# Patient Record
Sex: FEMALE | Race: WHITE | NOT HISPANIC OR LATINO | Employment: UNEMPLOYED | URBAN - METROPOLITAN AREA
[De-identification: names, ages, dates, MRNs, and addresses within clinical notes are randomized per-mention and may not be internally consistent; named-entity substitution may affect disease eponyms.]

---

## 2017-01-31 ENCOUNTER — TELEPHONE (OUTPATIENT)
Dept: PEDIATRICS | Facility: CLINIC | Age: 42
End: 2017-01-31

## 2017-02-01 NOTE — TELEPHONE ENCOUNTER
[facility] CLINICAL DOCUMENTATION    Form Documentation Form or Letter Request    Type or form/letter needing completion: unemployment insurance forms  Provider: MARIA ELENA Parrish CNP  Has provider seen patient for office visit related to reason for form request? Does not know  Date form needed: before 2/7/17  Once completed: Patient will  at .       COLE Ramos

## 2017-02-03 NOTE — TELEPHONE ENCOUNTER
Attempt #2  Left message for patient to call back regarding the forms we received.    Margaret Ramirez CMA

## 2017-02-08 NOTE — TELEPHONE ENCOUNTER
"Called and spoke to patient, she noted okay to note on the form \"that provider does not have a history with patient and to providers knowledge she does not have a disability\". Forms just has to be signed. Can call patient with further questions if needed.    Adina Kong, COLE    "

## 2017-02-16 NOTE — TELEPHONE ENCOUNTER
Left message for patient to call back regarding form that needed to be filled out.  Per Anayeli Ramirez CMA

## 2017-02-16 NOTE — TELEPHONE ENCOUNTER
I think I may not have kept the form as it did not look like it not something I was appropriate to do so if they just need something to say that there is not disability I am sorry may need to have them refax to me and I can do that

## 2017-03-09 DIAGNOSIS — M62.838 SPASM OF MUSCLE: ICD-10-CM

## 2017-03-14 RX ORDER — METHOCARBAMOL 500 MG/1
TABLET, FILM COATED ORAL
Qty: 90 TABLET | Refills: 1 | Status: SHIPPED | OUTPATIENT
Start: 2017-03-14 | End: 2017-05-07

## 2017-04-25 ENCOUNTER — TELEPHONE (OUTPATIENT)
Dept: PEDIATRICS | Facility: CLINIC | Age: 42
End: 2017-04-25

## 2017-04-25 ENCOUNTER — MYC REFILL (OUTPATIENT)
Dept: PEDIATRICS | Facility: CLINIC | Age: 42
End: 2017-04-25

## 2017-04-25 DIAGNOSIS — M62.838 SPASM OF MUSCLE: ICD-10-CM

## 2017-04-25 RX ORDER — METHOCARBAMOL 500 MG/1
TABLET, FILM COATED ORAL
Qty: 90 TABLET | Refills: 1 | Status: CANCELLED | OUTPATIENT
Start: 2017-04-25

## 2017-04-25 NOTE — TELEPHONE ENCOUNTER
Message from MyChart:  Original authorizing provider: MARIA ELENA Boateng CNP would like a refill of the following medications:  methocarbamol (ROBAXIN) 500 MG tablet [MARIA ELENA Boateng CNP]    [facility] [id number]    Comment:

## 2017-04-25 NOTE — TELEPHONE ENCOUNTER
Triage nurse found forms on her desk from 01/30/17 from the unemployment insurance [place], forms were not completed.  I called the patient to advise her of this and if the form had been completed and this was a duplicate copy?  If she needed it done still?  Patient states the forms were never completed but it all worked out and the forms are now irrelevant and to recycle them.       Margaret Ramirez CMA

## 2017-05-07 DIAGNOSIS — M62.838 SPASM OF MUSCLE: ICD-10-CM

## 2017-05-08 RX ORDER — METHOCARBAMOL 500 MG/1
TABLET, FILM COATED ORAL
Qty: 90 TABLET | Refills: 1 | Status: SHIPPED | OUTPATIENT
Start: 2017-05-08 | End: 2017-07-06

## 2017-05-08 NOTE — TELEPHONE ENCOUNTER
Disp Refills Start End LORENZO      methocarbamol (ROBAXIN) 500 MG tablet 90 tablet 1 3/14/2017  No     Sig: TAKE ONE TAB BY MOUTH THREE TIMES DAILY AS NEEDED FOR MUSCLE SPASM         Routing refill request to provider for review/approval because:  Drug not on the [facility] refill protocol     Lorena Obando RN, [facility]

## 2017-07-06 DIAGNOSIS — M62.838 SPASM OF MUSCLE: ICD-10-CM

## 2017-07-06 RX ORDER — METHOCARBAMOL 500 MG/1
TABLET, FILM COATED ORAL
Qty: 90 TABLET | Refills: 1 | Status: SHIPPED | OUTPATIENT
Start: 2017-07-06 | End: 2017-12-14

## 2017-07-06 NOTE — TELEPHONE ENCOUNTER
Routing refill request to provider for review/approval because:  Drug not on the [facility] refill protocol     methocarbamol (ROBAXIN) 500 MG tablet (3x's daily PRN)  Last Written Prescription Date: 5/8/2017  Last Fill Quantity: 90,  # refills: 1   Last Office Visit with G, P or [facility] prescribing provider: 10/11/2016    Gissell Daly RN

## 2017-09-28 ENCOUNTER — MYC MEDICAL ADVICE (OUTPATIENT)
Dept: PEDIATRICS | Facility: CLINIC | Age: 42
End: 2017-09-28

## 2017-09-28 DIAGNOSIS — F98.8 ATTENTION DEFICIT DISORDER, UNSPECIFIED HYPERACTIVITY PRESENCE: Primary | ICD-10-CM

## 2017-09-29 RX ORDER — DEXTROAMPHETAMINE SACCHARATE, AMPHETAMINE ASPARTATE MONOHYDRATE, DEXTROAMPHETAMINE SULFATE AND AMPHETAMINE SULFATE 7.5; 7.5; 7.5; 7.5 MG/1; MG/1; MG/1; MG/1
30 CAPSULE, EXTENDED RELEASE ORAL 3 TIMES DAILY
Qty: 90 CAPSULE | Refills: 0 | Status: SHIPPED | OUTPATIENT
Start: 2017-10-11 | End: 2017-10-10 | Stop reason: ALTCHOICE

## 2017-09-29 NOTE — TELEPHONE ENCOUNTER
Called patient and informed. She agrees to plan.  Brought to .    Lorena Obando RN, [facility]

## 2017-09-29 NOTE — TELEPHONE ENCOUNTER
Last refill was 9/11 so next refill will be dated for 10/11  Again we will do this for only a couple months until she gets back into psychiatry   Please inform patient, refill/prescription approved and when prescription is ready to be picked up at .

## 2017-09-29 NOTE — TELEPHONE ENCOUNTER
Patient called to ask when and where she should  the medication.    Lorena Obando RN, [facility]

## 2017-10-10 ENCOUNTER — TELEPHONE (OUTPATIENT)
Dept: PEDIATRICS | Facility: CLINIC | Age: 42
End: 2017-10-10

## 2017-10-10 DIAGNOSIS — F98.8 ATTENTION DEFICIT DISORDER, UNSPECIFIED HYPERACTIVITY PRESENCE: Primary | ICD-10-CM

## 2017-10-10 RX ORDER — DEXTROAMPHETAMINE SACCHARATE, AMPHETAMINE ASPARTATE, DEXTROAMPHETAMINE SULFATE AND AMPHETAMINE SULFATE 7.5; 7.5; 7.5; 7.5 MG/1; MG/1; MG/1; MG/1
30 TABLET ORAL 3 TIMES DAILY
Qty: 90 TABLET | Refills: 0 | Status: SHIPPED | OUTPATIENT
Start: 2017-10-10 | End: 2017-11-01

## 2017-10-10 NOTE — TELEPHONE ENCOUNTER
Patient called stating she just picked up an adderall XR 30 mg script written by Anayeli Forbes and it is written wrong.  It should be written as Adderall 30 mg tid.  Patient advised Anayeli Forbes will write her a new script.  Patient is on her way to  script this evening.    Margaret Ramirez CMA

## 2017-10-10 NOTE — TELEPHONE ENCOUNTER
Patient advised of information below per Anayeli Forbes.  Patient stated understanding.    Margaret Ramirez CMA

## 2017-10-10 NOTE — TELEPHONE ENCOUNTER
Refill corrected   Remind patient will only do this for 2 months until she gets back in with psychiatry   Please inform patient, refill/prescription approved and when prescription is ready to be picked up at .

## 2017-11-01 ENCOUNTER — MYC MEDICAL ADVICE (OUTPATIENT)
Dept: PEDIATRICS | Facility: CLINIC | Age: 42
End: 2017-11-01

## 2017-11-01 DIAGNOSIS — F98.8 ATTENTION DEFICIT DISORDER, UNSPECIFIED HYPERACTIVITY PRESENCE: ICD-10-CM

## 2017-11-01 RX ORDER — DEXTROAMPHETAMINE SACCHARATE, AMPHETAMINE ASPARTATE, DEXTROAMPHETAMINE SULFATE AND AMPHETAMINE SULFATE 7.5; 7.5; 7.5; 7.5 MG/1; MG/1; MG/1; MG/1
30 TABLET ORAL 3 TIMES DAILY
Qty: 90 TABLET | Refills: 0 | Status: SHIPPED | OUTPATIENT
Start: 2017-11-09 | End: 2017-12-05

## 2017-11-01 NOTE — TELEPHONE ENCOUNTER
Please inform patient, refill/prescriptioni approved. Please mail  prescription at pharmacy.   Please remind her we need to know when her appointment with psychiatry is as this was short term refill arrangement as this dose is higher than I am wanting to prescribe over long ter.

## 2017-11-01 NOTE — TELEPHONE ENCOUNTER
Routed [employer] message to PCP      Created: 11/1/2017 12:55 PM        *-*-*This message has not been handled.*-*-*    I have insurance effective 12/1/17 - is it possible to get my rx for Adderall IR 30mgs x3 daily for November mailed [facility] pharmacy? [facility] [address] [address] [phone]  I will return to psych clinic  12/7/17 for future rx/treatment.  Please let me know.   Thanks!  Aliyah  [phone]            Routing refill request to provider for review/approval because:  Drug not on the [facility] refill protocol       adderall             Last Written Prescription Date: 10/10/17  Last Fill Quantity: 90, # refills: 0    Last Office Visit with [facility], P or [facility] prescribing provider:  10/11/2016   Future Office Visit:        BP Readings from Last 3 Encounters:   10/11/16 150/90   03/07/13 128/85           Haley Wright RN,   [facility]

## 2017-11-02 NOTE — TELEPHONE ENCOUNTER
Script mailed to [facility] pharmacy. mychart message sent to patient.    COLE Ramos

## 2017-11-07 ENCOUNTER — TELEPHONE (OUTPATIENT)
Dept: PEDIATRICS | Facility: CLINIC | Age: 42
End: 2017-11-07

## 2017-11-07 NOTE — TELEPHONE ENCOUNTER
Our records indicate that the patients Adderall was mailed to her [facility] pharmacy on 11/2/2017/ LM for patient indicating this. Gissell Daly RN

## 2017-11-07 NOTE — TELEPHONE ENCOUNTER
11.7.17  Patient is requesting refill of ADDERALL.  She will be going out of town tomorrow and would like a refill.  Please call. [phone]

## 2017-12-02 ENCOUNTER — MYC MEDICAL ADVICE (OUTPATIENT)
Dept: PEDIATRICS | Facility: CLINIC | Age: 42
End: 2017-12-02

## 2017-12-02 DIAGNOSIS — F98.8 ATTENTION DEFICIT DISORDER, UNSPECIFIED HYPERACTIVITY PRESENCE: ICD-10-CM

## 2017-12-04 RX ORDER — DEXTROAMPHETAMINE SACCHARATE, AMPHETAMINE ASPARTATE, DEXTROAMPHETAMINE SULFATE AND AMPHETAMINE SULFATE 7.5; 7.5; 7.5; 7.5 MG/1; MG/1; MG/1; MG/1
30 TABLET ORAL 3 TIMES DAILY
Qty: 90 TABLET | Refills: 0 | Status: CANCELLED | OUTPATIENT
Start: 2017-12-04

## 2017-12-04 NOTE — TELEPHONE ENCOUNTER
Routed [employer] message to PCP    I just learned that my insurance becomes effective 1-1-18, not 12-1-17 like I thought. I had  to  reschedule my psych appointment that was originally 12-7 to 1-24 (which is well past my refill date of the 8th)due to this. I am on  the  cancelation call list hoping to get in sooner. I'm  hoping against hope that you'll allow me one or two more refills on my Adderall to fill the gap. If I have to come in or have an e-appointment I  will & just pay out  of  pocket for  it.  Please let me know ASAP what your thoughts are.  Thank you so much!   Aliyah Angeles  [phone]        amphetamine-dextroamphetamine (ADDERALL) 30 MG per tablet 90 tablet 0 11/9/2017 12/9/2017 No      Sig: Take 1 tablet (30 mg) by mouth 3 times daily     Last office visit:  10/11/16     Haley Wright RN,   [facility]

## 2017-12-05 RX ORDER — DEXTROAMPHETAMINE SACCHARATE, AMPHETAMINE ASPARTATE, DEXTROAMPHETAMINE SULFATE AND AMPHETAMINE SULFATE 7.5; 7.5; 7.5; 7.5 MG/1; MG/1; MG/1; MG/1
30 TABLET ORAL 3 TIMES DAILY
Qty: 90 TABLET | Refills: 0 | Status: SHIPPED | OUTPATIENT
Start: 2017-12-05 | End: 2017-12-14

## 2017-12-14 ENCOUNTER — OFFICE VISIT (OUTPATIENT)
Dept: PEDIATRICS | Facility: CLINIC | Age: 42
End: 2017-12-14

## 2017-12-14 VITALS
HEART RATE: 98 BPM | DIASTOLIC BLOOD PRESSURE: 80 MMHG | SYSTOLIC BLOOD PRESSURE: 125 MMHG | HEIGHT: 65 IN | TEMPERATURE: 98.5 F | OXYGEN SATURATION: 100 % | BODY MASS INDEX: 24.21 KG/M2 | WEIGHT: 145.3 LBS

## 2017-12-14 DIAGNOSIS — F98.8 ATTENTION DEFICIT DISORDER, UNSPECIFIED HYPERACTIVITY PRESENCE: ICD-10-CM

## 2017-12-14 DIAGNOSIS — G47.00 INSOMNIA, UNSPECIFIED TYPE: Primary | ICD-10-CM

## 2017-12-14 PROCEDURE — 99213 OFFICE O/P EST LOW 20 MIN: CPT | Performed by: NURSE PRACTITIONER

## 2017-12-14 RX ORDER — DEXTROAMPHETAMINE SACCHARATE, AMPHETAMINE ASPARTATE, DEXTROAMPHETAMINE SULFATE AND AMPHETAMINE SULFATE 7.5; 7.5; 7.5; 7.5 MG/1; MG/1; MG/1; MG/1
30 TABLET ORAL 3 TIMES DAILY
Qty: 90 TABLET | Refills: 0 | Status: SHIPPED | OUTPATIENT
Start: 2018-01-05 | End: 2018-01-17

## 2017-12-14 RX ORDER — AMITRIPTYLINE HYDROCHLORIDE 10 MG/1
TABLET ORAL
Qty: 90 TABLET | Refills: 0 | Status: SHIPPED | OUTPATIENT
Start: 2017-12-14 | End: 2018-01-20

## 2017-12-14 RX ORDER — GABAPENTIN 300 MG/1
CAPSULE ORAL
Refills: 5 | COMMUNITY
Start: 2017-11-12 | End: 2017-12-14 | Stop reason: ALTCHOICE

## 2017-12-14 ASSESSMENT — ANXIETY QUESTIONNAIRES
6. BECOMING EASILY ANNOYED OR IRRITABLE: NOT AT ALL
3. WORRYING TOO MUCH ABOUT DIFFERENT THINGS: NOT AT ALL
IF YOU CHECKED OFF ANY PROBLEMS ON THIS QUESTIONNAIRE, HOW DIFFICULT HAVE THESE PROBLEMS MADE IT FOR YOU TO DO YOUR WORK, TAKE CARE OF THINGS AT HOME, OR GET ALONG WITH OTHER PEOPLE: NOT DIFFICULT AT ALL
GAD7 TOTAL SCORE: 0
1. FEELING NERVOUS, ANXIOUS, OR ON EDGE: NOT AT ALL
7. FEELING AFRAID AS IF SOMETHING AWFUL MIGHT HAPPEN: NOT AT ALL
2. NOT BEING ABLE TO STOP OR CONTROL WORRYING: NOT AT ALL
5. BEING SO RESTLESS THAT IT IS HARD TO SIT STILL: NOT AT ALL

## 2017-12-14 ASSESSMENT — PATIENT HEALTH QUESTIONNAIRE - PHQ9
SUM OF ALL RESPONSES TO PHQ QUESTIONS 1-9: 0
5. POOR APPETITE OR OVEREATING: NOT AT ALL

## 2017-12-14 NOTE — PROGRESS NOTES
"  SUBJECTIVE:   Aliyah Angeles is a 42 year old female who presents to clinic today for the following health issues:    1. would like to discuss gabapentin  Has been on it for since at [facility]   Was on amitriptyline and that worked but was some concern of interaction with Lexapro and was switched to Gabapentin at bedtime which has not worked as well   Is here for follow up until her insurance starts and can to back to seeing her psychiatrist     Medication Followup of adderall     Taking Medication as prescribed: yes    Side Effects:  None    Medication Helping Symptoms:  yes   Aliyah Angeles is a 42 year old  female, for a medication check and ADHD follow up.      MEDICATION BENEFITS:  Controlled symptoms:  Hyperactivity - motor restlessness, Attention span, Distractability and Finishing tasks  Uncontrolled symptoms:  None     MEDICATION SIDE EFFECTS:   Has:  none  Denies:  appetite suppression, weight loss, insomnia, tics, palpitations, stomach ache, headache, emotional lability, rebound irritability, drowsiness, \"zombie\" effect, growth suppression and dry mouth        Problem list and histories reviewed & adjusted, as indicated.  Additional history: as documented    Patient Active Problem List   Diagnosis     Acne vulgaris     Attention deficit disorder     Anxiety disorder     Cobalamin deficiency     Other long term (current) drug therapy     Personal history of other medical treatment     Drug-seeking behavior     Stress incontinence in female     Insomnia     Mild episode of recurrent major depressive disorder (H)     Premenstrual dysphoric disorder     Raynaud's phenomenon     Bariatric surgery status     Arthralgia of temporomandibular joint     Vitamin D deficiency     Elevated blood pressure reading without diagnosis of hypertension     Past Surgical History:   Procedure Laterality Date     ABDOMEN SURGERY  08/10/2010    Gastric  bypass     CHOLECYSTECTOMY  01/01/2010     COLONOSCOPY  01/01/2009 "     ENT SURGERY  01/01/1988    Tonsils     HYSTERECTOMY, VAGINAL         Social History   Substance Use Topics     Smoking status: Former Smoker     Packs/day: 1.00     Years: 10.00     Types: Cigarettes     Start date: 1/1/1989     Quit date: 8/1/2010     Smokeless tobacco: Never Used     Alcohol use 0.0 oz/week      Comment: about once a week 1 or 2      Family History   Problem Relation Age of Onset     Prostate Cancer Father      DIABETES Maternal Grandmother      Colon Cancer Maternal Grandmother      Other Cancer Maternal Grandmother      pancreatic cancer     DIABETES Paternal Grandmother      DIABETES Paternal Grandfather      Coronary Artery Disease No family hx of      Hypertension No family hx of      Hyperlipidemia No family hx of      CEREBROVASCULAR DISEASE No family hx of      Breast Cancer No family hx of      Depression No family hx of      Anxiety Disorder No family hx of      MENTAL ILLNESS No family hx of      Substance Abuse No family hx of      Anesthesia Reaction No family hx of      Asthma No family hx of      OSTEOPOROSIS No family hx of      Genetic Disorder No family hx of      Thyroid Disease No family hx of      Obesity No family hx of      Unknown/Adopted No family hx of          Current Outpatient Prescriptions   Medication Sig Dispense Refill     amphetamine-dextroamphetamine (ADDERALL) 30 MG per tablet Take 1 tablet (30 mg) by mouth 3 times daily 90 tablet 0     cyanocobalamin (VITAMIN B12) 1000 MCG/ML injection 1 ML every 4 weeks       clindamycin (CLINDAMAX) 1 % gel        escitalopram (LEXAPRO) 20 MG tablet Take 1 tablet by mouth daily        Calcium Citrate-Vitamin D (CALCIUM + D PO) Take 2,000 mg by mouth       DRYSOL 20 % external solution        tretinoin (RETIN-A) 0.025 % cream        MULTIPLE VITAMIN PO Take  by mouth.       cholecalciferol (VITAMIN D3) 5000 UNITS CAPS capsule Take 5,000 Units by mouth daily        Omega-3 Fatty Acids (OMEGA 3 PO) Take  by mouth.        "gabapentin (NEURONTIN) 300 MG capsule TAKE 1 CAPSULE (300 MG) BY MOUTH AT BEDTIME AS NEEDED.  5     Allergies   Allergen Reactions     Nsaids      Gastric bypass     Labs reviewed in EPIC      Reviewed and updated as needed this visit by clinical staffTobacco  Allergies       Reviewed and updated as needed this visit by Provider         ROS:  Constitutional, HEENT, cardiovascular, pulmonary, gi and gu systems are negative, except as otherwise noted.      OBJECTIVE:   /80 (BP Location: Right arm, Patient Position: Sitting, Cuff Size: Adult Regular)  Pulse 98  Temp 98.5  F (36.9  C) (Temporal)  Ht 5' 5\" (1.651 m)  Wt 145 lb 4.8 oz (65.9 kg)  SpO2 100%  Breastfeeding? No  BMI 24.18 kg/m2  Body mass index is 24.18 kg/(m^2).  GENERAL APPEARANCE: alert, active and no distress  RESP: lungs clear to auscultation - no rales, rhonchi or wheezes  CV: regular rates and rhythm and no murmur, click or rub  MS: extremities normal- no gross deformities noted  NEURO: Normal strength and tone, mentation intact and speech normal  PSYCH: mentation appears normal and affect normal/bright  MENTAL STATUS EXAM:  Appearance/Behavior: No apparent distress and Dressed appropriately for weather  Speech: Normal  Mood/Affect: normal affect  Insight: Adequate    Diagnostic Test Results:  none     ASSESSMENT/PLAN:     Aliyah was seen today for a.d.h.d and medication question.    Diagnoses and all orders for this visit:    Insomnia, unspecified type  -     amitriptyline (ELAVIL) 10 MG tablet; Start at 1 tab at bedtime for 3 days, then 2 tabs at bedtime    Attention deficit disorder, unspecified hyperactivity presence  -     amphetamine-dextroamphetamine (ADDERALL) 30 MG per tablet; Take 1 tablet (30 mg) by mouth 3 times daily    PLAN:   1.   Symptomatic therapy suggested: stop the gabapentin and restart the elavil.  2.  Orders Placed This Encounter   Medications     DISCONTD: gabapentin (NEURONTIN) 300 MG capsule     Sig: TAKE 1 " CAPSULE (300 MG) BY MOUTH AT BEDTIME AS NEEDED.     Refill:  5     amphetamine-dextroamphetamine (ADDERALL) 30 MG per tablet     Sig: Take 1 tablet (30 mg) by mouth 3 times daily     Dispense:  90 tablet     Refill:  0     amitriptyline (ELAVIL) 10 MG tablet     Sig: Start at 1 tab at bedtime for 3 days, then 2 tabs at bedtime     Dispense:  90 tablet     Refill:  0       3. Patient needs to follow up in if no improvement,or sooner if worsening of symptoms or other symptoms develop.  FOLLOW UP WITH SPECIALIST :Psychiatry  Schedule physical exam     See Patient Instructions    MARIA ELENA Boateng [facility]

## 2017-12-14 NOTE — MR AVS SNAPSHOT
After Visit Summary   12/14/2017    Aliyah Angeles    MRN: [id number]           Patient Information     Date Of Birth          1975        Visit Information        Provider Department      12/14/2017 7:50 AM Iman Forbes APRN [facility]        Today's Diagnoses     Insomnia, unspecified type    -  1    Attention deficit disorder, unspecified hyperactivity presence          Care Instructions    PLAN:   1.   Symptomatic therapy suggested: stop the gabapentin and restart the elavil.  2.  Orders Placed This Encounter   Medications     DISCONTD: gabapentin (NEURONTIN) 300 MG capsule     Sig: TAKE 1 CAPSULE (300 MG) BY MOUTH AT BEDTIME AS NEEDED.     Refill:  5     amphetamine-dextroamphetamine (ADDERALL) 30 MG per tablet     Sig: Take 1 tablet (30 mg) by mouth 3 times daily     Dispense:  90 tablet     Refill:  0     amitriptyline (ELAVIL) 10 MG tablet     Sig: Start at 1 tab at bedtime for 3 days, then 2 tabs at bedtime     Dispense:  90 tablet     Refill:  0       3. Patient needs to follow up in if no improvement,or sooner if worsening of symptoms or other symptoms develop.  FOLLOW UP WITH SPECIALIST :Psychiatry  Schedule physical exam     It was a pleasure seeing you today at the [facility]. Thank you for allowing us to care for you today. We truly hope we provided you with the excellent service you deserve. Please let us know if there is anything else we can do for you so we can be sure you are leaving completley satisfied with your care experience.       General information about your clinic   Clinic Hours Lab Hours (Appointments are required)   Mon-Thurs: 7:30 AM - 7 PM Mon-Thurs: 7:30 AM - 7 PM   Fri: 7:30 AM - 5 PM Fri: 7:30 AM - 5 PM        After Hours Nurse Advise & Appts:  [facility] Nurse [facility]visors: [phone]  [facility] On Call: to make appointments anytime: [phone] On Call Physician: call [phone] and answering  service will page the on call physician.        For urgent appointments, please call [phone] and ask for the triage nurse or your care team clinic nurse.  How to contact my care team:  Laisha: [email or web address]   Phone: [phone]   Fax: [phone]       [facility]:   Phone: [phone]  Hours: 8:00 AM - 6:00 PM  Medication Refills:  Call your pharmacy and they will forward the refill to us. Please allow 3 business days for your refills to be completed.       Normal or non-critical lab and imaging results will be communicated to you by MyChart, letter or phone within 7 days.  If you do not hear from us within 10 days, please call the clinic. If you have a critical or abnormal lab result, we will notify you by phone as soon as possible.       We now have PWIC (Pediatric Walk in Care)  Monday-Friday from 7:30-4. Simply walk in and be seen for your urgent needs like cough, fever, rash, diarrhea or vomiting, pink eye, UTI. No appointments needed. Ask one of the team for more information      -Your Care Team:    Dr. Mike Lawrence - Internal Medicine/Pediatrics   Dr. Levar Suggs - Family Medicine  Dr. Saloni Reid - Pediatrics  Iman Forbes CNP - Family Practice Nurse Practitioner  Dr. Georgia Magdaleno - Pediatrics                       Follow-ups after your visit        Who to contact     If you have questions or need follow up information about today's clinic visit or your schedule please contact [facility] directly at [phone].  Normal or non-critical lab and imaging results will be communicated to you by MyChart, letter or phone within 4 business days after the clinic has received the results. If you do not hear from us within 7 days, please contact the clinic through [employer] or phone. If you have a critical or abnormal lab result, we will notify you by phone as soon as possible.  Submit refill requests through [employer] or call your pharmacy and they will forward the refill  "request to us. Please allow 3 business days for your refill to be completed.          Additional Information About Your Visit        [employer] Information     [employer] gives you secure access to your electronic health record. If you see a primary care provider, you can also send messages to your care team and make appointments. If you have questions, please call your primary care clinic.  If you do not have a primary care provider, please call [phone] and they will assist you.      [employer] is an electronic gateway that provides easy, online access to your medical records. With [employer], you can request a clinic appointment, read your test results, renew a prescription or communicate with your care team.     To access your existing account, please contact your [facility] or call [phone] for assistance.        Care EveryWhere ID     This is your Care EveryWhere ID. This could be used by other organizations to access your [place] medical records  [id number]        Your Vitals Were     Pulse Temperature Height Pulse Oximetry Breastfeeding? BMI (Body Mass Index)    98 98.5  F (36.9  C) (Temporal) 5' 5\" (1.651 m) 100% No 24.18 kg/m2       Blood Pressure from Last 3 Encounters:   12/14/17 125/80   10/11/16 150/90   03/07/13 128/85    Weight from Last 3 Encounters:   12/14/17 145 lb 4.8 oz (65.9 kg)   10/11/16 154 lb (69.9 kg)   03/07/13 128 lb (58.1 kg)              Today, you had the following     No orders found for display         Today's Medication Changes          These changes are accurate as of: 12/14/17  8:19 AM.  If you have any questions, ask your nurse or doctor.               Start taking these medicines.        Dose/Directions    amitriptyline 10 MG tablet   Commonly known as:  ELAVIL   Used for:  Insomnia, unspecified type   Started by:  Iman Forbes APRN CNP        Start at 1 tab at bedtime for 3 days, then 2 tabs at bedtime   Quantity:  90 tablet   Refills:  " 0         Stop taking these medicines if you haven't already. Please contact your care team if you have questions.     gabapentin 300 MG capsule   Commonly known as:  NEURONTIN   Stopped by:  Iman Forbes APRN CNP                Where to get your medicines      These medications were sent to [facility] - [place], [place] - [address] [place] [address] [address]  [address] [facility] [address]     Phone:  [phone]     amitriptyline 10 MG tablet         Some of these will need a paper prescription and others can be bought over the counter.  Ask your nurse if you have questions.     Bring a paper prescription for each of these medications     amphetamine-dextroamphetamine 30 MG per tablet                Primary Care Provider Office Phone # Fax #    MARIA ELENA Boateng [phone]       [address]   [place] [address]        Equal Access to Services     ARACELIS RICHARDSON : Hadii aad ku hadasho Soomaali, waaxda luqadaha, qaybta kaalmada adeegyada, tisha downing haybette mckeon . So [facility] [phone].    ATENCIÓN: Si habla español, tiene a hackett disposición servicios gratuitos de asistencia lingüística. Addison al [phone].    We comply with applicable federal civil rights laws and [place] laws. We do not discriminate on the basis of race, color, national origin, age, disability, sex, sexual orientation, or gender identity.            Thank you!     Thank you for choosing [facility]  for your care. Our goal is always to provide you with excellent care. Hearing back from our patients is one way we can continue to improve our services. Please take a few minutes to complete the written survey that you may receive in the mail after your visit with us. Thank you!             Your Updated Medication List - Protect others around you: Learn how to safely use, store and throw away your medicines at www.disposemymeds.org.          This list is accurate as of:  12/14/17  8:19 AM.  Always use your most recent med list.                   Brand Name Dispense Instructions for use Diagnosis    amitriptyline 10 MG tablet    ELAVIL    90 tablet    Start at 1 tab at bedtime for 3 days, then 2 tabs at bedtime    Insomnia, unspecified type       amphetamine-dextroamphetamine 30 MG per tablet   Start taking on:  1/5/2018    ADDERALL    90 tablet    Take 1 tablet (30 mg) by mouth 3 times daily    Attention deficit disorder, unspecified hyperactivity presence       CALCIUM + D PO      Take 2,000 mg by mouth        cholecalciferol 5000 UNITS Caps capsule    vitamin D3     Take 5,000 Units by mouth daily        clindamycin 1 % topical gel    CLINDAMAX          cyanocobalamin 1000 MCG/ML injection    VITAMIN B12     1 ML every 4 weeks        DRYSOL 20 % external solution   Generic drug:  aluminum chloride           escitalopram 20 MG tablet    LEXAPRO     Take 1 tablet by mouth daily    Mild episode of recurrent major depressive disorder (H)       MULTIPLE VITAMIN PO      Take  by mouth.        OMEGA 3 PO      Take  by mouth.        tretinoin 0.025 % cream    RETIN-A

## 2017-12-14 NOTE — PATIENT INSTRUCTIONS
PLAN:   1.   Symptomatic therapy suggested: stop the gabapentin and restart the elavil.  2.  Orders Placed This Encounter   Medications     DISCONTD: gabapentin (NEURONTIN) 300 MG capsule     Sig: TAKE 1 CAPSULE (300 MG) BY MOUTH AT BEDTIME AS NEEDED.     Refill:  5     amphetamine-dextroamphetamine (ADDERALL) 30 MG per tablet     Sig: Take 1 tablet (30 mg) by mouth 3 times daily     Dispense:  90 tablet     Refill:  0     amitriptyline (ELAVIL) 10 MG tablet     Sig: Start at 1 tab at bedtime for 3 days, then 2 tabs at bedtime     Dispense:  90 tablet     Refill:  0       3. Patient needs to follow up in if no improvement,or sooner if worsening of symptoms or other symptoms develop.  FOLLOW UP WITH SPECIALIST :Psychiatry  Schedule physical exam     It was a pleasure seeing you today at the [facility]. Thank you for allowing us to care for you today. We truly hope we provided you with the excellent service you deserve. Please let us know if there is anything else we can do for you so we can be sure you are leaving completley satisfied with your care experience.       General information about your clinic   Clinic Hours Lab Hours (Appointments are required)   Mon-Thurs: 7:30 AM - 7 PM Mon-Thurs: 7:30 AM - 7 PM   Fri: 7:30 AM - 5 PM Fri: 7:30 AM - 5 PM        After Hours Nurse Advise & Appts:  [facility] Nurse Advisors: [phone]  [facility] On Call: to make appointments anytime: [phone] On Call Physician: call [phone] and answering service will page the on call physician.        For urgent appointments, please call [phone] and ask for the triage nurse or your care team clinic nurse.  How to contact my care team:  MyChart: [email or web address]   Phone: [phone]   Fax: [phone]       [facility]:   Phone: [phone]  Hours: 8:00 AM - 6:00 PM  Medication Refills:  Call your pharmacy and they will forward the refill to us. Please allow 3 business days for  your refills to be completed.       Normal or non-critical lab and imaging results will be communicated to you by MyChart, letter or phone within 7 days.  If you do not hear from us within 10 days, please call the clinic. If you have a critical or abnormal lab result, we will notify you by phone as soon as possible.       We now have PWIC (Pediatric Walk in Care)  Monday-Friday from 7:30-4. Simply walk in and be seen for your urgent needs like cough, fever, rash, diarrhea or vomiting, pink eye, UTI. No appointments needed. Ask one of the team for more information      -Your Care Team:    Dr. Mike Lawrence - Internal Medicine/Pediatrics   Dr. Levar Suggs - Family Medicine  Dr. Saloni Reid - Pediatrics  Iman Forbes CNP - Family Practice Nurse Practitioner  Dr. Georgia Magdaleno - Pediatrics

## 2017-12-14 NOTE — NURSING NOTE
"Chief Complaint   Patient presents with     A.D.H.D     recheck medication     Medication Question     would like to discuss gabapentin       Initial /80 (BP Location: Right arm, Patient Position: Sitting, Cuff Size: Adult Regular)  Pulse 98  Temp 98.5  F (36.9  C) (Temporal)  Ht 5' 5\" (1.651 m)  Wt 145 lb 4.8 oz (65.9 kg)  SpO2 100%  Breastfeeding? No  BMI 24.18 kg/m2 Estimated body mass index is 24.18 kg/(m^2) as calculated from the following:    Height as of this encounter: 5' 5\" (1.651 m).    Weight as of this encounter: 145 lb 4.8 oz (65.9 kg).  Medication Reconciliation: complete      COLE Ramos      "

## 2017-12-15 ASSESSMENT — ANXIETY QUESTIONNAIRES: GAD7 TOTAL SCORE: 0

## 2017-12-28 ENCOUNTER — MYC MEDICAL ADVICE (OUTPATIENT)
Dept: PEDIATRICS | Facility: CLINIC | Age: 42
End: 2017-12-28

## 2017-12-28 NOTE — TELEPHONE ENCOUNTER
Patient is calling stating that her pharmacy, [facility] need a verbal from Anayeli Forbes to fill the patients medication early. The patient left a phone number of [phone]. Please advise.

## 2017-12-28 NOTE — TELEPHONE ENCOUNTER
Called [facility] and gave verbal order to fill adderall early to John, pharmacist.  Informed patient verbal order given to fill early.        Created: 12/28/2017 11:18 AM        Hi Aliyah Angeles  [place] probably would see if pharmacy will let you fill early.  If they need an OK from us that is fine they may want to call for a verbal on this.  I do not think you need anything except that usually the medications need to be in the bottle they are prescribed in but you may ask pharmacy but I have never had a patient leave country that needed any letter before.   Take care  Iman Forbes, NP, APRN RODERICK Wright RN,   [facility]

## 2018-01-16 ENCOUNTER — MYC MEDICAL ADVICE (OUTPATIENT)
Dept: PEDIATRICS | Facility: CLINIC | Age: 43
End: 2018-01-16

## 2018-01-16 DIAGNOSIS — F98.8 ATTENTION DEFICIT DISORDER, UNSPECIFIED HYPERACTIVITY PRESENCE: ICD-10-CM

## 2018-01-17 RX ORDER — DEXTROAMPHETAMINE SACCHARATE, AMPHETAMINE ASPARTATE, DEXTROAMPHETAMINE SULFATE AND AMPHETAMINE SULFATE 7.5; 7.5; 7.5; 7.5 MG/1; MG/1; MG/1; MG/1
30 TABLET ORAL 3 TIMES DAILY
Qty: 90 TABLET | Refills: 0 | Status: SHIPPED | OUTPATIENT
Start: 2018-02-05 | End: 2018-02-19

## 2018-01-17 NOTE — TELEPHONE ENCOUNTER
Please let patient know can  script   Fill date should be 2/5 as she picked up early in December because she was going to [place] and that script was for 1/5

## 2018-01-17 NOTE — TELEPHONE ENCOUNTER
"Routing refill request to provider for review/approval because:  Drug not on the [facility] refill protocol       amphetamine-dextroamphetamine (ADDERALL) 30 MG per tablet  Sig: Take 1 tablet (30 mg) by mouth 3 times daily  Last Written Prescription Date:  1/5/2018  Last Fill Quantity: 90,  # refills: 0   Last Office Visit with [facility], [facility] or [facility] prescribing provider:  12/4/2017   Future Office Visit:          Last 4 Adderall requests patient was instructed that it was the last refill and to follow up with psychiatry.     10/10/2017 (Early refill)    I have been going to [place] [facility] for ADHD management and rx but recently lost insurance and cannot be seen there without insurance or paying $175 per visit. Im wondering/hoping you would be willing to write my rx for adderall for a month or two until I am able to get insurance coverage again.   Please let me know.   Aliyah     \"Refill corrected   Remind patient will only do this for 2 months until she gets back in with psychiatry   Please inform patient, refill/prescription approved and when prescription is ready to be picked up at \"    11/1/2017  I have insurance effective 12/1/17 - is it possible to get my rx for Adderall IR 30mgs x3 daily for November mailed to my pharmacy? [facility] [address] [address] [phone]   I will return to psych clinic  12/7/17 for future rx/treatment.   Please let me know.   Thanks!   Aliyah  [phone]     12/2/2017  I just learned that my insurance becomes effective 1-1-18, not 12-1-17 like I thought. I had  to  reschedule my psych appointment that was originally 12-7 to 1-24 (which is well past my refill date of the 8th)due to this. I am on  the  cancelation call list hoping to get in sooner. I'm  hoping against hope that you'll allow me one or two more refills on my Adderall to fill the gap. If I have to come in or have an e-appointment I  will & just pay out  of  pocket for  it.  Please " let me know ASAP what your thoughts are.  Thank you so much!   Aliyah Angeles  [phone]    12/28/2017 (early refill)  My family and I were surprised  with a two week stay [place] in [place] with my 's family as a [employer] gift. We're leaving this Saturday (30th) & returning   1/12. I have two questions:   1. Do I  need any paperwork for my prescription  meds for customs that you know of?   2. My Adderall rx is due to be filled 1/5; will I  be  able  to  fill in [place] or should I fill before we leave (early refill)?   Please let me know!   Thanks!     Gissell Daly RN

## 2018-01-20 DIAGNOSIS — G47.00 INSOMNIA, UNSPECIFIED TYPE: ICD-10-CM

## 2018-01-22 RX ORDER — AMITRIPTYLINE HYDROCHLORIDE 10 MG/1
20 TABLET ORAL AT BEDTIME
Qty: 180 TABLET | Refills: 1 | Status: SHIPPED | OUTPATIENT
Start: 2018-01-22 | End: 2018-08-03

## 2018-01-22 NOTE — TELEPHONE ENCOUNTER
amitriptyline (ELAVIL) 10 MG tablet  Last Written Prescription Date:  12/14/2017  Last Fill Quantity: 90,  # refills: 0   Last Office Visit with [facility] or [facility] prescribing provider:  12/14/2017   Future Office Visit:       BP Readings from Last 3 Encounters:   12/14/17 125/80   10/11/16 150/90   03/07/13 128/85       Tricyclic Antidepressants Protocol Passed1/20 11:57 AM   Blood pressure under 140/90    Recent (12 mo) or future (30 d) visit with authorizing provider's specialty     Patient is age 18 or older    No active pregnancy on record    No positive pregnancy test in past 12 months     Refilled per [facility] protocol.    Gissell Daly RN

## 2018-01-24 DIAGNOSIS — G47.00 INSOMNIA, UNSPECIFIED TYPE: ICD-10-CM

## 2018-01-26 RX ORDER — AMITRIPTYLINE HYDROCHLORIDE 10 MG/1
TABLET ORAL
Qty: 90 TABLET | Refills: 0 | OUTPATIENT
Start: 2018-01-26

## 2018-01-26 NOTE — TELEPHONE ENCOUNTER
This is a duplicate, was refilled  on 1/22/18.  Note sent to pharmacy to check.    Lorena Obando RN, [facility]

## 2018-02-19 ENCOUNTER — MYC MEDICAL ADVICE (OUTPATIENT)
Dept: PEDIATRICS | Facility: CLINIC | Age: 43
End: 2018-02-19

## 2018-02-19 DIAGNOSIS — F98.8 ATTENTION DEFICIT DISORDER, UNSPECIFIED HYPERACTIVITY PRESENCE: ICD-10-CM

## 2018-02-19 NOTE — TELEPHONE ENCOUNTER
Routed [employer] message to PCP    Haley Wright RN,   [facility]

## 2018-02-21 RX ORDER — DEXTROAMPHETAMINE SACCHARATE, AMPHETAMINE ASPARTATE, DEXTROAMPHETAMINE SULFATE AND AMPHETAMINE SULFATE 7.5; 7.5; 7.5; 7.5 MG/1; MG/1; MG/1; MG/1
30 TABLET ORAL 3 TIMES DAILY
Qty: 90 TABLET | Refills: 0 | Status: SHIPPED | OUTPATIENT
Start: 2018-03-05 | End: 2018-03-20

## 2018-02-21 NOTE — TELEPHONE ENCOUNTER
Please inform patient, refill/prescription approved and when prescription is ready to be picked up at .   Start date 3/5  Can  or mail to pharmacy which either is fine

## 2018-02-22 NOTE — TELEPHONE ENCOUNTER
amphetamine-dextroamphetamine (ADDERALL) 30 MG per tablet script mailed to [facility]: [address] [place] [address] per patient request.  Sent patient MyChart reply stating script has been mailed.  Shobah Roldan CMA

## 2018-02-22 NOTE — TELEPHONE ENCOUNTER
Patient sent MyChart reply asking to have amphetamine-dextroamphetamine (ADDERALL) 30 MG per tablet script mailed to [facility].  Shobha Roldan CMA

## 2018-03-20 ENCOUNTER — MYC MEDICAL ADVICE (OUTPATIENT)
Dept: PEDIATRICS | Facility: CLINIC | Age: 43
End: 2018-03-20

## 2018-03-20 DIAGNOSIS — F98.8 ATTENTION DEFICIT DISORDER, UNSPECIFIED HYPERACTIVITY PRESENCE: ICD-10-CM

## 2018-03-20 RX ORDER — DEXTROAMPHETAMINE SACCHARATE, AMPHETAMINE ASPARTATE, DEXTROAMPHETAMINE SULFATE AND AMPHETAMINE SULFATE 7.5; 7.5; 7.5; 7.5 MG/1; MG/1; MG/1; MG/1
30 TABLET ORAL 3 TIMES DAILY
Qty: 90 TABLET | Refills: 0 | Status: SHIPPED | OUTPATIENT
Start: 2018-04-04 | End: 2018-04-23

## 2018-03-20 NOTE — TELEPHONE ENCOUNTER
Unable to mail Rx to patient home. No pharmacy specified.    Sent mychart to inform patient and left voicemail on home/cell number as well. Waiting on patient response with pharmacy info.    Rx on MA desk.  Wilver Lassiter, CMA

## 2018-03-20 NOTE — TELEPHONE ENCOUNTER
Routing refill request to provider for review/approval because:  Drug not on the [facility] refill protocol   **Patient would like her script mailed to her**    amphetamine-dextroamphetamine (ADDERALL) 30 MG per tablet 90 tablet 0 3/5/2018  No   Sig: Take 1 tablet (30 mg) by mouth 3 times daily     Last OV with Iman Forbes CNP: 12/14/2017    No future apts.     Gissell Daly RN

## 2018-03-27 ENCOUNTER — MYC MEDICAL ADVICE (OUTPATIENT)
Dept: PEDIATRICS | Facility: CLINIC | Age: 43
End: 2018-03-27

## 2018-03-27 NOTE — TELEPHONE ENCOUNTER
Please give a verbal okay to the pharmacy due to her plan for traveling to refill it early but will start on 4/4/18

## 2018-03-27 NOTE — TELEPHONE ENCOUNTER
[facility] Call Center    Phone Message    May a detailed message be left on voicemail: yes    Reason for Call: Other: Patient had already picked up the prescription, she needs the start date changed, as she will be in [place] when she can finally refill it. Please call to discuss getting medication earlier     Action Taken: Message routed to:  Primary Care p [phone]

## 2018-03-27 NOTE — TELEPHONE ENCOUNTER
Verbal okay given to patients [facility] pharmacy in [facility].     Called patient and informed her of this. No further questions or concerns.     Gissell Daly RN

## 2018-04-21 ENCOUNTER — MYC MEDICAL ADVICE (OUTPATIENT)
Dept: PEDIATRICS | Facility: CLINIC | Age: 43
End: 2018-04-21

## 2018-04-21 DIAGNOSIS — F98.8 ATTENTION DEFICIT DISORDER, UNSPECIFIED HYPERACTIVITY PRESENCE: ICD-10-CM

## 2018-04-23 RX ORDER — DEXTROAMPHETAMINE SACCHARATE, AMPHETAMINE ASPARTATE, DEXTROAMPHETAMINE SULFATE AND AMPHETAMINE SULFATE 7.5; 7.5; 7.5; 7.5 MG/1; MG/1; MG/1; MG/1
30 TABLET ORAL 3 TIMES DAILY
Qty: 90 TABLET | Refills: 0 | Status: SHIPPED | OUTPATIENT
Start: 2018-05-04 | End: 2018-05-14

## 2018-04-23 NOTE — TELEPHONE ENCOUNTER
Please inform patient, refill/prescriptioni approved. Please mail prescription to designated pharmacy.

## 2018-04-23 NOTE — TELEPHONE ENCOUNTER
[facility] Call Center    Phone Message    May a detailed message be left on voicemail: yes    Reason for Call: Medication Refill Request    Has the patient contacted the pharmacy for the refill? Yes   Name of medication being requested: amphetamine-dextroamphetamine (ADDERALL) 30 MG per tablet [[id number]] (Order [id number])     Provider who prescribed the medication: Iman Coello  Pharmacy: [facility] [address] [place] [address] [address]  Date medication is needed: asap   Patient is requesting Rx mailed to [facility]       Action Taken: Message routed to:  Primary Care p [phone]

## 2018-04-23 NOTE — TELEPHONE ENCOUNTER
Routing MyChart refill request to Anayeli Forbes NP to please review when able.  Next refill to be 5/4/18.      amphetamine-dextroamphetamine (ADDERALL) 30 MG per tablet 90 tablet 0 4/4/2018  No   Sig: Take 1 tablet (30 mg) by mouth 3 times daily       Lorena Obando RN, [facility]

## 2018-04-23 NOTE — TELEPHONE ENCOUNTER
Adderall 30 mg script mailed to [facility] [place] [address].    Margaret Ramirez CMA

## 2018-04-27 ENCOUNTER — OFFICE VISIT (OUTPATIENT)
Dept: PEDIATRICS | Facility: CLINIC | Age: 43
End: 2018-04-27

## 2018-04-27 ENCOUNTER — RESULT FOLLOW UP (OUTPATIENT)
Dept: OBGYN | Facility: CLINIC | Age: 43
End: 2018-04-27

## 2018-04-27 VITALS
DIASTOLIC BLOOD PRESSURE: 92 MMHG | WEIGHT: 152.2 LBS | OXYGEN SATURATION: 100 % | TEMPERATURE: 97.8 F | HEIGHT: 64 IN | SYSTOLIC BLOOD PRESSURE: 150 MMHG | HEART RATE: 93 BPM | BODY MASS INDEX: 25.99 KG/M2

## 2018-04-27 DIAGNOSIS — Z12.31 ENCOUNTER FOR SCREENING MAMMOGRAM FOR BREAST CANCER: ICD-10-CM

## 2018-04-27 DIAGNOSIS — Z13.29 SCREENING FOR THYROID DISORDER: ICD-10-CM

## 2018-04-27 DIAGNOSIS — Z12.4 SCREENING FOR MALIGNANT NEOPLASM OF CERVIX: ICD-10-CM

## 2018-04-27 DIAGNOSIS — D22.9 ATYPICAL MOLE: ICD-10-CM

## 2018-04-27 DIAGNOSIS — R87.810 CERVICAL HIGH RISK HPV (HUMAN PAPILLOMAVIRUS) TEST POSITIVE: ICD-10-CM

## 2018-04-27 DIAGNOSIS — F33.0 MILD EPISODE OF RECURRENT MAJOR DEPRESSIVE DISORDER (H): ICD-10-CM

## 2018-04-27 DIAGNOSIS — Z13.1 SCREENING FOR DIABETES MELLITUS: ICD-10-CM

## 2018-04-27 DIAGNOSIS — E53.8 COBALAMIN DEFICIENCY: ICD-10-CM

## 2018-04-27 DIAGNOSIS — F98.8 ATTENTION DEFICIT DISORDER, UNSPECIFIED HYPERACTIVITY PRESENCE: ICD-10-CM

## 2018-04-27 DIAGNOSIS — R76.8 ELEVATED ANTINUCLEAR ANTIBODY (ANA) LEVEL: ICD-10-CM

## 2018-04-27 DIAGNOSIS — Z00.00 ENCOUNTER FOR ROUTINE ADULT HEALTH EXAMINATION WITHOUT ABNORMAL FINDINGS: Primary | ICD-10-CM

## 2018-04-27 DIAGNOSIS — F41.9 ANXIETY DISORDER, UNSPECIFIED TYPE: ICD-10-CM

## 2018-04-27 DIAGNOSIS — E55.9 VITAMIN D DEFICIENCY: ICD-10-CM

## 2018-04-27 DIAGNOSIS — Z98.84 BARIATRIC SURGERY STATUS: ICD-10-CM

## 2018-04-27 DIAGNOSIS — Z13.6 CARDIOVASCULAR SCREENING; LDL GOAL LESS THAN 160: ICD-10-CM

## 2018-04-27 LAB
ALBUMIN SERPL-MCNC: 4 G/DL (ref 3.4–5)
ALP SERPL-CCNC: 73 U/L (ref 40–150)
ALT SERPL W P-5'-P-CCNC: 27 U/L (ref 0–50)
ANION GAP SERPL CALCULATED.3IONS-SCNC: 5 MMOL/L (ref 3–14)
AST SERPL W P-5'-P-CCNC: 33 U/L (ref 0–45)
BILIRUB SERPL-MCNC: 0.5 MG/DL (ref 0.2–1.3)
BUN SERPL-MCNC: 10 MG/DL (ref 7–30)
CALCIUM SERPL-MCNC: 8.6 MG/DL (ref 8.5–10.1)
CHLORIDE SERPL-SCNC: 104 MMOL/L (ref 94–109)
CHOLEST SERPL-MCNC: 216 MG/DL
CO2 SERPL-SCNC: 28 MMOL/L (ref 20–32)
CREAT SERPL-MCNC: 0.64 MG/DL (ref 0.52–1.04)
DEPRECATED CALCIDIOL+CALCIFEROL SERPL-MC: 25 UG/L (ref 20–75)
ERYTHROCYTE [DISTWIDTH] IN BLOOD BY AUTOMATED COUNT: 13.4 % (ref 10–15)
FERRITIN SERPL-MCNC: 36 NG/ML (ref 12–150)
FOLATE SERPL-MCNC: 24.7 NG/ML
GFR SERPL CREATININE-BSD FRML MDRD: >90 ML/MIN/1.7M2
GLUCOSE SERPL-MCNC: 98 MG/DL (ref 70–99)
HCT VFR BLD AUTO: 41 % (ref 35–47)
HDLC SERPL-MCNC: 108 MG/DL
HGB BLD-MCNC: 13.3 G/DL (ref 11.7–15.7)
HIV 1+2 AB+HIV1 P24 AG SERPL QL IA: NONREACTIVE
LDLC SERPL CALC-MCNC: 82 MG/DL
MCH RBC QN AUTO: 30.7 PG (ref 26.5–33)
MCHC RBC AUTO-ENTMCNC: 32.4 G/DL (ref 31.5–36.5)
MCV RBC AUTO: 95 FL (ref 78–100)
NONHDLC SERPL-MCNC: 108 MG/DL
PLATELET # BLD AUTO: 299 10E9/L (ref 150–450)
POTASSIUM SERPL-SCNC: 4.3 MMOL/L (ref 3.4–5.3)
PROT SERPL-MCNC: 7.6 G/DL (ref 6.8–8.8)
RBC # BLD AUTO: 4.33 10E12/L (ref 3.8–5.2)
SODIUM SERPL-SCNC: 137 MMOL/L (ref 133–144)
TRIGL SERPL-MCNC: 129 MG/DL
TSH SERPL DL<=0.005 MIU/L-ACNC: 3.2 MU/L (ref 0.4–4)
VIT B12 SERPL-MCNC: 353 PG/ML (ref 193–986)
WBC # BLD AUTO: 5.3 10E9/L (ref 4–11)

## 2018-04-27 PROCEDURE — 87624 HPV HI-RISK TYP POOLED RSLT: CPT | Performed by: NURSE PRACTITIONER

## 2018-04-27 PROCEDURE — 36415 COLL VENOUS BLD VENIPUNCTURE: CPT | Performed by: NURSE PRACTITIONER

## 2018-04-27 PROCEDURE — 86038 ANTINUCLEAR ANTIBODIES: CPT | Performed by: NURSE PRACTITIONER

## 2018-04-27 PROCEDURE — 80307 DRUG TEST PRSMV CHEM ANLYZR: CPT | Mod: 90 | Performed by: NURSE PRACTITIONER

## 2018-04-27 PROCEDURE — 82306 VITAMIN D 25 HYDROXY: CPT | Performed by: NURSE PRACTITIONER

## 2018-04-27 PROCEDURE — G0145 SCR C/V CYTO,THINLAYER,RESCR: HCPCS | Performed by: NURSE PRACTITIONER

## 2018-04-27 PROCEDURE — 82746 ASSAY OF FOLIC ACID SERUM: CPT | Performed by: NURSE PRACTITIONER

## 2018-04-27 PROCEDURE — 82607 VITAMIN B-12: CPT | Performed by: NURSE PRACTITIONER

## 2018-04-27 PROCEDURE — 86039 ANTINUCLEAR ANTIBODIES (ANA): CPT | Performed by: NURSE PRACTITIONER

## 2018-04-27 PROCEDURE — 99396 PREV VISIT EST AGE 40-64: CPT | Performed by: NURSE PRACTITIONER

## 2018-04-27 PROCEDURE — 80061 LIPID PANEL: CPT | Performed by: NURSE PRACTITIONER

## 2018-04-27 PROCEDURE — 87389 HIV-1 AG W/HIV-1&-2 AB AG IA: CPT | Performed by: NURSE PRACTITIONER

## 2018-04-27 PROCEDURE — 84443 ASSAY THYROID STIM HORMONE: CPT | Performed by: NURSE PRACTITIONER

## 2018-04-27 PROCEDURE — 85027 COMPLETE CBC AUTOMATED: CPT | Performed by: NURSE PRACTITIONER

## 2018-04-27 PROCEDURE — 80053 COMPREHEN METABOLIC PANEL: CPT | Performed by: NURSE PRACTITIONER

## 2018-04-27 PROCEDURE — 99000 SPECIMEN HANDLING OFFICE-LAB: CPT | Performed by: NURSE PRACTITIONER

## 2018-04-27 PROCEDURE — 82728 ASSAY OF FERRITIN: CPT | Performed by: NURSE PRACTITIONER

## 2018-04-27 NOTE — NURSING NOTE
"Chief Complaint   Patient presents with     Physical       Initial BP (!) 150/92 (BP Location: Right arm, Patient Position: Chair, Cuff Size: Adult Regular)  Pulse 93  Temp 97.8  F (36.6  C) (Temporal)  Ht 5' 4\" (1.626 m)  Wt 152 lb 3.2 oz (69 kg)  SpO2 100%  BMI 26.13 kg/m2 Estimated body mass index is 26.13 kg/(m^2) as calculated from the following:    Height as of this encounter: 5' 4\" (1.626 m).    Weight as of this encounter: 152 lb 3.2 oz (69 kg).  Medication Reconciliation: complete     Zehra Raygoza CMA      "

## 2018-04-27 NOTE — MR AVS SNAPSHOT
After Visit Summary   4/27/2018    Aliyah Angeles    MRN: [id number]           Patient Information     Date Of Birth          1975        Visit Information        Provider Department      4/27/2018 9:30 AM Iman Forbes APRN CNP [facility]        Today's Diagnoses     Encounter for routine adult health examination without abnormal findings    -  1    Screening for diabetes mellitus        Encounter for screening mammogram for breast cancer        Screening for thyroid disorder        CARDIOVASCULAR SCREENING; LDL GOAL LESS THAN 160        Cobalamin deficiency        Vitamin D deficiency        Bariatric surgery status        Screening for malignant neoplasm of cervix        Attention deficit disorder, unspecified hyperactivity presence        Anxiety disorder, unspecified type        Mild episode of recurrent major depressive disorder (H)        Elevated antinuclear antibody (FESTUS) level        Atypical mole left buttocks          Care Instructions    PLAN:   1.   Symptomatic therapy suggested: Continue current medications.  2.  Orders Placed This Encounter   Procedures     MA Screening Digital Bilateral     Lipid panel reflex to direct LDL Fasting     Comprehensive metabolic panel     CBC with platelets     Ferritin     TSH with free T4 reflex     JUST IN CASE     Vitamin B12     Vitamin D Deficiency     Folate     HPV High Risk Types DNA Cervical     HIV Antigen Antibody Combo     Drug  Screen Comprehensive , Urine with Reported Meds (MedTox) (Pain Care Package)     Pap imaged thin layer screen reflex to HPV if ASCUS - recommend age 25 - 29     MENTAL HEALTH REFERRAL  - Adult; Psychiatry and Medication Management; Psychiatry; [facility]: Psychiatry Clinic [phone]; We will contact you to schedule the appointment or please call with any questions     DERMATOLOGY REFERRAL       3. Patient needs to follow up in if no improvement,or sooner if worsening of symptoms or other  symptoms develop.  CONSULTATION/REFERRAL to Dermatology  And psychiatry   Mammogram appointment   Follow up in 6 months.  Follow up office visit in one year for annual health maintenance exam, sooner PRN.    Preventive Health Recommendations  Female Ages 40 to 49    Yearly exam:     See your health care provider every year in order to  1. Review health changes.   2. Discuss preventive care.    3. Review your medicines if your doctor prescribed any.      Get a Pap test every three years (unless you have an abnormal result and your provider advises testing more often).      If you get Pap tests with HPV test, you only need to test every 5 years, unless you have an abnormal result. You do not need a Pap test if your uterus was removed (hysterectomy) and you have not had cancer.      You should be tested each year for STDs (sexually transmitted diseases), if you're at risk.       Ask your doctor if you should have a mammogram.      Have a colonoscopy (test for colon cancer) if someone in your family has had colon cancer or polyps before age 50.       Have a cholesterol test every 5 years.       Have a diabetes test (fasting glucose) after age 45. If you are at risk for diabetes, you should have this test every 3 years.    Shots: Get a flu shot each year. Get a tetanus shot every 10 years.     Nutrition:     Eat at least 5 servings of fruits and vegetables each day.    Eat whole-grain bread, whole-wheat pasta and brown rice instead of white grains and rice.    Talk to your provider about Calcium and Vitamin D.     Lifestyle    Exercise at least 150 minutes a week (an average of 30 minutes a day, 5 days a week). This will help you control your weight and prevent disease.    Limit alcohol to one drink per day.    No smoking.     Wear sunscreen to prevent skin cancer.    See your dentist every six months for an exam and cleaning.  It was a pleasure seeing you today at the [facility] - Primary [facility]. Thank  you for allowing us to care for you today. We truly hope we provided you with the excellent service you deserve. Please let us know if there is anything else we can do for you so we can be sure you are leaving completley satisfied with your care experience.       General information about your clinic   Clinic Hours Lab Hours (Appointments are required)   Mon-Thurs: 7:30 AM - 7 PM Mon-Thurs: 7:30 AM - 7 PM   Fri: 7:30 AM - 5 PM Fri: 7:30 AM - 5 PM        After Hours Nurse Advise & Appts:  [facility]: [phone]  [facility] On Call: to make appointments anytime: [phone] On Call Physician: call [phone] and answering service will page the on call physician.        For urgent appointments, please call [phone] and ask for the triage nurse or your care team clinic nurse.  How to contact my care team:  MyChart: [email or web address]   Phone: [phone]   Fax: [phone]       [facility]:   Phone: [phone]  Hours: 8:00 AM - 6:00 PM  Medication Refills:  Call your pharmacy and they will forward the refill to us. Please allow 3 business days for your refills to be completed.       Normal or non-critical lab and imaging results will be communicated to you by MyChart, letter or phone within 7 days.  If you do not hear from us within 10 days, please call the clinic. If you have a critical or abnormal lab result, we will notify you by phone as soon as possible.       We now have PWIC (Pediatric Walk in Care)  Monday-Friday from 7:30-4. Simply walk in and be seen for your urgent needs like cough, fever, rash, diarrhea or vomiting, pink eye, UTI. No appointments needed. Ask one of the team for more information      -Your Care Team:    Dr. Mike Lawrence - Internal Medicine/Pediatrics   Dr. Levar Suggs - Family Medicine  Dr. Saloni Reid - Pediatrics  Dr. Georgia Magdaleno - Pediatrics  Iman Forbes CNP - Family Practice Nurse Practitioner            Follow-ups after your visit         Additional Services     DERMATOLOGY REFERRAL       Your provider has referred you to: [facility]: [facility] [place] [phone]   [email or web address]    Please be aware that coverage of these services is subject to the terms and limitations of your health insurance plan.  Call member services at your health plan with any benefit or coverage questions.      Please bring the following with you to your appointment:    (1) Any X-Rays, CTs or MRIs which have been performed.  Contact the facility where they were done to arrange for  prior to your scheduled appointment.    (2) List of current medications  (3) This referral request   (4) Any documents/labs given to you for this referral            MENTAL HEALTH REFERRAL  - Adult; Psychiatry and Medication Management; Psychiatry; [facility]: Psychiatry Clinic [phone]; We will contact you to schedule the appointment or please call with any questions       All scheduling is subject to the client's specific insurance plan & benefits, provider/location availability, and provider clinical specialities.  Please arrive 15 minutes early for your first appointment and bring your completed paperwork.    Please be aware that coverage of these services is subject to the terms and limitations of your health insurance plan.  Call member services at your health plan with any benefit or coverage questions.                            Future tests that were ordered for you today     Open Future Orders        Priority Expected Expires Ordered    MA Screening Digital Bilateral Routine  4/27/2019 4/27/2018            Who to contact     If you have questions or need follow up information about today's clinic visit or your schedule please contact [facility] directly at [phone].  Normal or non-critical lab and imaging results will be communicated to you by MyChart, letter or phone within 4  "business days after the clinic has received the results. If you do not hear from us within 7 days, please contact the clinic through [employer] or phone. If you have a critical or abnormal lab result, we will notify you by phone as soon as possible.  Submit refill requests through [employer] or call your pharmacy and they will forward the refill request to us. Please allow 3 business days for your refill to be completed.          Additional Information About Your Visit        [employer] Information     [employer] gives you secure access to your electronic health record. If you see a primary care provider, you can also send messages to your care team and make appointments. If you have questions, please call your primary care clinic.  If you do not have a primary care provider, please call [phone] and they will assist you.      [employer] is an electronic gateway that provides easy, online access to your medical records. With [employer], you can request a clinic appointment, read your test results, renew a prescription or communicate with your care team.     To access your existing account, please contact your [facility] or call [phone] for assistance.        Care EveryWhere ID     This is your Care EveryWhere ID. This could be used by other organizations to access your [facility] medical records  [id number]        Your Vitals Were     Pulse Temperature Height Pulse Oximetry BMI (Body Mass Index)       93 97.8  F (36.6  C) (Temporal) 5' 4\" (1.626 m) 100% 26.13 kg/m2        Blood Pressure from Last 3 Encounters:   04/27/18 (!) 150/92   12/14/17 125/80   10/11/16 150/90    Weight from Last 3 Encounters:   04/27/18 152 lb 3.2 oz (69 kg)   12/14/17 145 lb 4.8 oz (65.9 kg)   10/11/16 154 lb (69.9 kg)              We Performed the Following     Antinuclear antibody screen by EIA     CBC with platelets     Comprehensive metabolic panel     DERMATOLOGY REFERRAL     Drug  Screen Comprehensive , Urine " with Reported Meds (MedTox) (Pain Care Package)     Ferritin     Folate     HIV Antigen Antibody Combo     HPV High Risk Types DNA Cervical     JUST IN CASE     Lipid panel reflex to direct LDL Fasting     MENTAL HEALTH REFERRAL  - Adult; Psychiatry and Medication Management; Psychiatry; [facility]: Psychiatry Clinic [phone]; We will contact you to schedule the appointment or please call with any questions     Pap imaged thin layer screen reflex to HPV if ASCUS - recommend age 25 - 29     TSH with free T4 reflex     Vitamin B12     Vitamin D Deficiency        Primary Care Provider Office Phone # Fax #    Iman Forbes, APRN [phone]       [address]   [place] [address]        Equal Access to Services     ARACELIS RICHARDSON : Hadii clarita Ken, waaxda jasmineadaha, qaybta kaalmada orlin, tisha powell. So [facility] [phone].    ATENCIÓN: Si habla español, tiene a hackett disposición servicios gratuitos de asistencia lingüística. Llame al [phone].    We comply with applicable federal civil rights laws and [place] laws. We do not discriminate on the basis of race, color, national origin, age, disability, sex, sexual orientation, or gender identity.            Thank you!     Thank you for choosing [facility]  for your care. Our goal is always to provide you with excellent care. Hearing back from our patients is one way we can continue to improve our services. Please take a few minutes to complete the written survey that you may receive in the mail after your visit with us. Thank you!             Your Updated Medication List - Protect others around you: Learn how to safely use, store and throw away your medicines at www.disposemymeds.org.          This list is accurate as of 4/27/18 10:15 AM.  Always use your most recent med list.                   Brand Name Dispense Instructions for use Diagnosis    amitriptyline 10 MG  tablet    ELAVIL    180 tablet    Take 2 tablets (20 mg) by mouth At Bedtime    Insomnia, unspecified type       amphetamine-dextroamphetamine 30 MG per tablet   Start taking on:  5/4/2018    ADDERALL    90 tablet    Take 1 tablet (30 mg) by mouth 3 times daily    Attention deficit disorder, unspecified hyperactivity presence       CALCIUM + D PO      Take 2,000 mg by mouth        cholecalciferol 5000 units Caps capsule    vitamin D3     Take 5,000 Units by mouth daily        clindamycin 1 % topical gel    CLINDAMAX          cyanocobalamin 1000 MCG/ML injection    VITAMIN B12     1 ML every 4 weeks        DRYSOL 20 % external solution   Generic drug:  aluminum chloride           escitalopram 20 MG tablet    LEXAPRO     Take 1 tablet by mouth daily    Mild episode of recurrent major depressive disorder (H)       MULTIPLE VITAMIN PO      Take  by mouth.        OMEGA 3 PO      Take  by mouth.        tretinoin 0.025 % cream    RETIN-A

## 2018-04-27 NOTE — PROGRESS NOTES
SUBJECTIVE:   CC: Aliyah Angeles is an 42 year old woman who presents for preventive health visit.     Answers for HPI/ROS submitted by the patient on 4/27/2018   Annual Exam:  Getting at least 3 servings of Calcium per day:: NO  Bi-annual eye exam:: NO  Dental care twice a year:: NO  Sleep apnea or symptoms of sleep apnea:: None  Diet:: Other  Frequency of exercise:: 4-5 days/week  Taking medications regularly:: Yes  Medication side effects:: Other  Additional concerns today:: YES  PHQ-2 Score: 0  Duration of exercise:: 30-45 minutes        Today's PHQ-2 Score:   PHQ-2 ( 1999 [employer]) 4/27/2018 12/14/2017   Q1: Little interest or pleasure in doing things 0 0   Q2: Feeling down, depressed or hopeless 0 0   PHQ-2 Score 0 0   Q1: Little interest or pleasure in doing things Not at all -   Q2: Feeling down, depressed or hopeless Not at all -   PHQ-2 Score 0 -       Abuse: Current or Past(Physical, Sexual or Emotional)- No  Do you feel safe in your environment - Yes    Social History   Substance Use Topics     Smoking status: Former Smoker     Packs/day: 1.00     Years: 10.00     Types: Cigarettes     Start date: 1/1/1989     Quit date: 8/1/2010     Smokeless tobacco: Never Used     Alcohol use 0.0 oz/week      Comment: about once a week 1 or 2      If you drink alcohol do you typically have >3 drinks per day or >7 drinks per week? No                     Reviewed orders with patient.  Reviewed health maintenance and updated orders accordingly - Yes  Labs reviewed in EPIC  BP Readings from Last 3 Encounters:   04/27/18 (!) 150/92   12/14/17 125/80   10/11/16 150/90    Wt Readings from Last 3 Encounters:   04/27/18 152 lb 3.2 oz (69 kg)   12/14/17 145 lb 4.8 oz (65.9 kg)   10/11/16 154 lb (69.9 kg)                  Patient Active Problem List   Diagnosis     Acne vulgaris     Attention deficit disorder     Anxiety disorder     Cobalamin deficiency     Other long term (current) drug therapy     Personal history of other  medical treatment     Drug-seeking behavior     Stress incontinence in female     Insomnia     Mild episode of recurrent major depressive disorder (H)     Premenstrual dysphoric disorder     Raynaud's phenomenon     Bariatric surgery status     Arthralgia of temporomandibular joint     Vitamin D deficiency     Elevated blood pressure reading without diagnosis of hypertension     Past Surgical History:   Procedure Laterality Date     ABDOMEN SURGERY  08/10/2010    Gastric  bypass     CHOLECYSTECTOMY  01/01/2010     COLONOSCOPY  01/01/2009     ENT SURGERY  01/01/1988    Tonsils     HYSTERECTOMY, VAGINAL         Social History   Substance Use Topics     Smoking status: Former Smoker     Packs/day: 1.00     Years: 10.00     Types: Cigarettes     Start date: 1/1/1989     Quit date: 8/1/2010     Smokeless tobacco: Never Used     Alcohol use 0.0 oz/week      Comment: about once a week 1 or 2      Family History   Problem Relation Age of Onset     Prostate Cancer Father      DIABETES Maternal Grandmother      Colon Cancer Maternal Grandmother      Other Cancer Maternal Grandmother      pancreatic cancer     DIABETES Paternal Grandmother      DIABETES Paternal Grandfather      Coronary Artery Disease No family hx of      Hypertension No family hx of      Hyperlipidemia No family hx of      CEREBROVASCULAR DISEASE No family hx of      Breast Cancer No family hx of      Depression No family hx of      Anxiety Disorder No family hx of      MENTAL ILLNESS No family hx of      Substance Abuse No family hx of      Anesthesia Reaction No family hx of      Asthma No family hx of      OSTEOPOROSIS No family hx of      Genetic Disorder No family hx of      Thyroid Disease No family hx of      Obesity No family hx of      Unknown/Adopted No family hx of          Current Outpatient Prescriptions   Medication Sig Dispense Refill     amitriptyline (ELAVIL) 10 MG tablet Take 2 tablets (20 mg) by mouth At Bedtime 180 tablet 1     [START ON  5/4/2018] amphetamine-dextroamphetamine (ADDERALL) 30 MG per tablet Take 1 tablet (30 mg) by mouth 3 times daily 90 tablet 0     Calcium Citrate-Vitamin D (CALCIUM + D PO) Take 2,000 mg by mouth       cholecalciferol (VITAMIN D3) 5000 UNITS CAPS capsule Take 5,000 Units by mouth daily        clindamycin (CLINDAMAX) 1 % gel        cyanocobalamin (VITAMIN B12) 1000 MCG/ML injection 1 ML every 4 weeks       DRYSOL 20 % external solution        escitalopram (LEXAPRO) 20 MG tablet Take 1 tablet by mouth daily        MULTIPLE VITAMIN PO Take  by mouth.       Omega-3 Fatty Acids (OMEGA 3 PO) Take  by mouth.       tretinoin (RETIN-A) 0.025 % cream        Allergies   Allergen Reactions     Nsaids      Gastric bypass       Patient under age 50, mutual decision reflected in health maintenance.      Pertinent mammograms are reviewed under the imaging tab.  History of abnormal Pap smear:   YES - other categories - see link Cervical Cytology Screening Guidelines  Last 3 Pap Results:   PAP (no units)   Date Value   11/20/2015 negative     Last 3 Pap and HPV Results:   PAP / HPV Latest Ref Rng & Units 11/20/2015   PAP negative negative     Status post hysterectomy. Pap still indicated.     Reviewed and updated as needed this visit by clinical staff         Reviewed and updated as needed this visit by Provider        Past Medical History:   Diagnosis Date     Acne      ADHD (attention deficit hyperactivity disorder)      Arthralgia of temporomandibular joint 5/19/2016     Arthritis      Cobalamin deficiency 11/24/2014     Drug-seeking behavior 5/19/2016    Overview:  Per  website, patient has filled with multiple controlled substances with multiple prescribers at multiple pharmacies      History of blood transfusion 01/01/1988     Insomnia 7/21/2013     Raynaud's phenomenon 5/25/2015     Vitamin D deficiency 1/16/2013      Past Surgical History:   Procedure Laterality Date     ABDOMEN SURGERY  08/10/2010    Gastric  bypass      "CHOLECYSTECTOMY  01/01/2010     COLONOSCOPY  01/01/2009     ENT SURGERY  01/01/1988    Tonsils     HYSTERECTOMY, VAGINAL         ROS:  CONSTITUTIONAL:POSITIVE  for weight fluctuating  and NEGATIVE  for anorexia and sweats  INTEGUMENTARY/SKIN: NEGATIVE for non-healing lesion  and POSITIVE for mole changes on buttocks has changed and one on left shoulder blade. Teenager tanning bed   EYES: NEGATIVE for vision changes or irritation  ENT: NEGATIVE for ear, mouth and throat problems  RESP:NEGATIVE for significant cough or SOB  BREAST: NEGATIVE for masses, tenderness or discharge  CV: NEGATIVE for chest pain, palpitations or peripheral edema  GI: NEGATIVE for nausea, abdominal pain, heartburn, or change in bowel habits. Intermittent abdomen catch only with movement.    female: no unusual urinary symptoms, no unusual vaginal symptoms and menses: status post hysterectomy  MUSCULOSKELETAL:POSITIVE  for joint pain in hands for a long time.  and NEGATIVE for joint swelling  and joint warmth   NEURO: NEGATIVE for weakness, dizziness or paresthesias  ENDOCRINE: NEGATIVE for temperature intolerance, skin/hair changes  HEME/ALLERGY/IMMUNE: NEGATIVE for bleeding problems  PSYCHIATRIC: POSITIVE forconcentration difficulty, Hx anxiety and Hx depression and NEGATIVE forthoughts of hurting someone else and thoughts of self harm   Had been seeing psychiatry for years. Last seen was about 9 months ago. Was been seen for ADHD and anxiety.     OBJECTIVE:   BP (!) 150/92 (BP Location: Right arm, Patient Position: Chair, Cuff Size: Adult Regular)  Pulse 93  Temp 97.8  F (36.6  C) (Temporal)  Ht 5' 4\" (1.626 m)  Wt 152 lb 3.2 oz (69 kg)  SpO2 100%  BMI 26.13 kg/m2   BP Readings from Last 6 Encounters:   04/27/18 (!) 150/92   12/14/17 125/80   10/11/16 150/90   03/07/13 128/85     Wt Readings from Last 4 Encounters:   04/27/18 152 lb 3.2 oz (69 kg)   12/14/17 145 lb 4.8 oz (65.9 kg)   10/11/16 154 lb (69.9 kg)   03/07/13 128 lb (58.1 " kg)       EXAM:  GENERAL: healthy, alert and no distress  EYES: Eyes grossly normal to inspection, PERRL and conjunctivae and sclerae normal  HENT: ear canals and TM's normal, nose and mouth without ulcers or lesions  NECK: no adenopathy, no asymmetry, masses, or scars and thyroid normal to palpation  RESP: lungs clear to auscultation - no rales, rhonchi or wheezes  BREAST: normal without masses, tenderness or nipple discharge and no palpable axillary masses or adenopathy  CV: regular rate and rhythm, normal S1 S2, no S3 or S4, no murmur, click or rub, no peripheral edema and peripheral pulses strong  ABDOMEN: soft, nontender, no hepatosplenomegaly, no masses and bowel sounds normal   (female): normal female external genitalia, normal urethral meatus , vaginal mucosa pink, moist, well rugated and normal post-hysterectomy exam without masses.   MS: no gross musculoskeletal defects noted, no edema  SKIN: no suspicious lesions or rashes  NEURO: Normal strength and tone, mentation intact and speech normal  PSYCH: mentation appears normal, affect normal/bright  LYMPH: no cervical, supraclavicular, axillary, or inguinal adenopathy    ASSESSMENT/PLAN:   Aliyah was seen today for physical.    Diagnoses and all orders for this visit:    Encounter for routine adult health examination without abnormal findings  -     JUST IN CASE  -     HIV Antigen Antibody Combo  -     Anti Nuclear Olivia IgG by IFA with Reflex    Screening for diabetes mellitus  -     Comprehensive metabolic panel  -     JUST IN CASE    Encounter for screening mammogram for breast cancer  -     MA Screening Digital Bilateral; Future    Screening for thyroid disorder  -     TSH with free T4 reflex    CARDIOVASCULAR SCREENING; LDL GOAL LESS THAN 160  -     Lipid panel reflex to direct LDL Fasting    Cobalamin deficiency  -     Vitamin B12    Vitamin D deficiency  -     Vitamin D Deficiency    Bariatric surgery status  -     CBC with platelets  -     Ferritin  -      Vitamin B12  -     Vitamin D Deficiency  -     Folate    Screening for malignant neoplasm of cervix  -     Cancel: Pap imaged thin layer screen with HPV - recommended age 30 - 65 years (select HPV order below)  -     HPV High Risk Types DNA Cervical  -     Cancel: HPV High Risk Types DNA Cervical  -     Pap imaged thin layer screen reflex to HPV if ASCUS - recommend age 25 - 29    Attention deficit disorder, unspecified hyperactivity presence  -     Drug  Screen Comprehensive , Urine with Reported Meds (MedTox) (Pain Care Package)  -     MENTAL HEALTH REFERRAL  - Adult; Psychiatry and Medication Management; Psychiatry; [facility]: Psychiatry [facility] [phone]; We will contact you to schedule the appointment or please call with any questions    Anxiety disorder, unspecified type  -     MENTAL HEALTH REFERRAL  - Adult; [facility] Medication [facility] [phone]; We will contact you to schedule the appointment or please call with any questions    Mild episode of recurrent major depressive disorder (H)  -     MENTAL HEALTH REFERRAL  - Adult; Psychiatry and Medication Management; Psychiatry; [facility]: Psychiatry [facility] [phone]; We will contact you to schedule the appointment or please call with any questions    Elevated antinuclear antibody (FESTUS) level  -     Cancel: Antinuclear antibody screen by EIA  -     RHEUMATOLOGY REFERRAL    Atypical mole left buttocks  -     DERMATOLOGY REFERRAL      PLAN:   . Patient needs to follow up in if no improvement,or sooner if worsening of symptoms or other symptoms develop.  CONSULTATION/REFERRAL to Dermatology  And psychiatry   Mammogram appointment   Follow up in 6 months.  Follow up office visit in one year for annual health maintenance exam, sooner PRN.      COUNSELING:   Reviewed preventive health counseling, as reflected in patient instructions  Special attention given to:        Regular exercise       Healthy diet/nutrition        "Vision screening       Contraception       Family planning       Osteoporosis Prevention/Bone Health       The ASCVD Risk score (Yobani JAEGER Jr, et al., 2013) failed to calculate for the following reasons:    The valid HDL cholesterol range is 20 to 100 mg/dL    BP Screening:   Last 3 BP Readings:    BP Readings from Last 3 Encounters:   04/27/18 (!) 150/92   12/14/17 125/80   10/11/16 150/90       The following was recommended to the patient:  Re-screen within 4 weeks and recommend lifestyle modifications     reports that she quit smoking about 7 years ago. Her smoking use included Cigarettes. She started smoking about 29 years ago. She has a 10.00 pack-year smoking history. She has never used smokeless tobacco.    Estimated body mass index is 24.18 kg/(m^2) as calculated from the following:    Height as of 12/14/17: 5' 5\" (1.651 m).    Weight as of 12/14/17: 145 lb 4.8 oz (65.9 kg).       Counseling Resources:  ATP IV Guidelines  Pooled Cohorts Equation Calculator  Breast Cancer Risk Calculator  FRAX Risk Assessment  ICSI Preventive Guidelines  Dietary Guidelines for Americans, 2010  USDA's MyPlate  ASA Prophylaxis  Lung CA Screening    Iman Forbes, MARIA ELENA CNP  [facility]  "

## 2018-04-27 NOTE — PATIENT INSTRUCTIONS
PLAN:   1.   Symptomatic therapy suggested: Continue current medications.  2.  Orders Placed This Encounter   Procedures     MA Screening Digital Bilateral     Lipid panel reflex to direct LDL Fasting     Comprehensive metabolic panel     CBC with platelets     Ferritin     TSH with free T4 reflex     JUST IN CASE     Vitamin B12     Vitamin D Deficiency     Folate     HPV High Risk Types DNA Cervical     HIV Antigen Antibody Combo     Drug  Screen Comprehensive , Urine with Reported Meds (MedTox) (Pain Care Package)     Pap imaged thin layer screen reflex to HPV if ASCUS - recommend age 25 - 29     MENTAL HEALTH REFERRAL  - Adult; Psychiatry and Medication Management; Psychiatry; [facility]: Psychiatry Clinic [phone]; We will contact you to schedule the appointment or please call with any questions     DERMATOLOGY REFERRAL       3. Patient needs to follow up in if no improvement,or sooner if worsening of symptoms or other symptoms develop.  CONSULTATION/REFERRAL to Dermatology  And psychiatry   Mammogram appointment   Follow up in 6 months.  Follow up office visit in one year for annual health maintenance exam, sooner PRN.    Preventive Health Recommendations  Female Ages 40 to 49    Yearly exam:     See your health care provider every year in order to  1. Review health changes.   2. Discuss preventive care.    3. Review your medicines if your doctor prescribed any.      Get a Pap test every three years (unless you have an abnormal result and your provider advises testing more often).      If you get Pap tests with HPV test, you only need to test every 5 years, unless you have an abnormal result. You do not need a Pap test if your uterus was removed (hysterectomy) and you have not had cancer.      You should be tested each year for STDs (sexually transmitted diseases), if you're at risk.       Ask your doctor if you should have a mammogram.      Have a colonoscopy (test for colon cancer) if someone in your family  has had colon cancer or polyps before age 50.       Have a cholesterol test every 5 years.       Have a diabetes test (fasting glucose) after age 45. If you are at risk for diabetes, you should have this test every 3 years.    Shots: Get a flu shot each year. Get a tetanus shot every 10 years.     Nutrition:     Eat at least 5 servings of fruits and vegetables each day.    Eat whole-grain bread, whole-wheat pasta and brown rice instead of white grains and rice.    Talk to your provider about Calcium and Vitamin D.     Lifestyle    Exercise at least 150 minutes a week (an average of 30 minutes a day, 5 days a week). This will help you control your weight and prevent disease.    Limit alcohol to one drink per day.    No smoking.     Wear sunscreen to prevent skin cancer.    See your dentist every six months for an exam and cleaning.  It was a pleasure seeing you today at the [facility]. Thank you for allowing us to care for you today. We truly hope we provided you with the excellent service you deserve. Please let us know if there is anything else we can do for you so we can be sure you are leaving completley satisfied with your care experience.       General information about your clinic   Clinic Hours Lab Hours (Appointments are required)   Mon-Thurs: 7:30 AM - 7 PM Mon-Thurs: 7:30 AM - 7 PM   Fri: 7:30 AM - 5 PM Fri: 7:30 AM - 5 PM        After Hours Nurse Advise & Appts:  [facility] Nurse Advisors: [phone]  [facility] On Call: to make appointments anytime: [phone] On Call Physician: call [phone] and answering service will page the on call physician.        For urgent appointments, please call [phone] and ask for the triage nurse or your care team clinic nurse.  How to contact my care team:  MyChart: [email or web address]   Phone: [phone]   Fax: [phone]       [facility] Pharmacy:   Phone: [phone]  Hours: 8:00 AM - 6:00 PM  Medication Refills:  Call  your pharmacy and they will forward the refill to us. Please allow 3 business days for your refills to be completed.       Normal or non-critical lab and imaging results will be communicated to you by MyChart, letter or phone within 7 days.  If you do not hear from us within 10 days, please call the clinic. If you have a critical or abnormal lab result, we will notify you by phone as soon as possible.       We now have PWIC (Pediatric Walk in Care)  Monday-Friday from 7:30-4. Simply walk in and be seen for your urgent needs like cough, fever, rash, diarrhea or vomiting, pink eye, UTI. No appointments needed. Ask one of the team for more information      -Your Care Team:    Dr. Mike Lawrence - Internal Medicine/Pediatrics   Dr. Levar Suggs - Family Medicine  Dr. Saloni Reid - Pediatrics  Dr. Georgia Magdaleno - Pediatrics  Iman Forbes CNP - Family Practice Nurse Practitioner

## 2018-04-27 NOTE — PROGRESS NOTES
Asia Angeles,    Attached are your test results.  -Normal red blood cell (hgb) levels, normal white blood cell count and normal platelet levels.   Please contact us if you have any questions.    Iman Forbes, CNP

## 2018-04-28 ENCOUNTER — MYC MEDICAL ADVICE (OUTPATIENT)
Dept: PEDIATRICS | Facility: CLINIC | Age: 43
End: 2018-04-28

## 2018-04-28 ENCOUNTER — MYC REFILL (OUTPATIENT)
Dept: PEDIATRICS | Facility: CLINIC | Age: 43
End: 2018-04-28

## 2018-04-28 DIAGNOSIS — F33.0 MILD EPISODE OF RECURRENT MAJOR DEPRESSIVE DISORDER (H): ICD-10-CM

## 2018-04-28 DIAGNOSIS — E53.8 COBALAMIN DEFICIENCY: ICD-10-CM

## 2018-04-28 DIAGNOSIS — G47.00 INSOMNIA, UNSPECIFIED TYPE: ICD-10-CM

## 2018-04-28 DIAGNOSIS — Z98.84 BARIATRIC SURGERY STATUS: Primary | ICD-10-CM

## 2018-04-30 LAB
ANA PAT SER IF-IMP: ABNORMAL
ANA SER QL IF: POSITIVE
ANA TITR SER IF: ABNORMAL {TITER}
COPATH REPORT: NORMAL
PAP: NORMAL

## 2018-04-30 RX ORDER — CYANOCOBALAMIN 1000 UG/ML
1 INJECTION, SOLUTION INTRAMUSCULAR; SUBCUTANEOUS
Qty: 0.9 ML | Refills: 11 | Status: SHIPPED | OUTPATIENT
Start: 2018-04-30 | End: 2019-05-28

## 2018-04-30 RX ORDER — ESCITALOPRAM OXALATE 20 MG/1
20 TABLET ORAL DAILY
Qty: 90 TABLET | Refills: 1 | Status: SHIPPED | OUTPATIENT
Start: 2018-04-30 | End: 2018-11-07

## 2018-04-30 NOTE — TELEPHONE ENCOUNTER
Message from [employer]:  Original authorizing provider: MARIA ELENA Boateng CNP would like a refill of the following medications:  amitriptyline (ELAVIL) 10 MG tablet [MARIA ELENA Boateng CNP]    Preferred pharmacy: [id number] [address]    Comment:

## 2018-04-30 NOTE — PROGRESS NOTES
Asia Angeles,    Attached are your test results.  -Liver and gallbladder tests are normal. (ALT,AST, Alk phos, bilirubin), kidney function is normal (Cr, GFR), Sodium is normal, Potassium is normal, Calcium is normal, Glucose is normal (diabetes screening test).   -Cholesterol levels (LDL,HDL, Triglycerides) are normal.  ADVISE: rechecking in 1 year.  -TSH (thyroid stimulating hormone) level is normal which indicates normal thyroid function.  -Normal red blood cell (hgb) levels, normal white blood cell count and normal platelet levels.  -Vitamin D level is  Mildly below normal, 2000 IU daily in  supplements is recommended.   -Ferritin (iron) level is normal.   Please contact us if you have any questions.    Iman Forbes, CNP

## 2018-04-30 NOTE — TELEPHONE ENCOUNTER
Routing refill requests for Vitamin B12 and Lexapro to provider as these are listed as historical.    Lorena Obando RN, [facility]

## 2018-04-30 NOTE — TELEPHONE ENCOUNTER
amitriptyline (ELAVIL) 10 MG tablet      Last Written Prescription Date:  01/22/18  Last Fill Quantity: 180,   # refills: 1  Last Office Visit: 04/27/18

## 2018-05-01 RX ORDER — AMITRIPTYLINE HYDROCHLORIDE 10 MG/1
20 TABLET ORAL AT BEDTIME
Qty: 180 TABLET | Refills: 1 | OUTPATIENT
Start: 2018-05-01

## 2018-05-01 NOTE — PROGRESS NOTES
Asia Angeles,    Attached are your test results.  Lupus screen is positive This does not mean you have Lupus but with your symptoms would consider referral to rheumatology     Please call [phone] to make appointment  if you do not hear from referrals in the next few days.      Please contact us if you have any questions.    Iman Forbes, CNP

## 2018-05-02 NOTE — PROGRESS NOTES
"8/16/07 ASCUS pap,neg HPV  ([facility])  1/18/08 NIL pap ([facility])  2/23/09 NIL pap ([facility])  3/30/10 NIL pap ([facility])  3/8/13 LSIL pap.   3/25/13 [place] bx: NGHIA 1 ([facility])  4/5/13  Hysterectomy with cervix removed. Pathology: \"No malignancy is identified in an entirely histologically examined cervix.\" No further paps needed.   11/20/15 NIL pap  ([facility])  4/27/18 NIL pap, + HR HPV (not 16 or 18) of vaginal cuff. Plan: cotest in 1 yr.   10/10/18 NIL pap, neg HR HPV of vaginal cuff. Plan: no further pap screening per ASCCP current guidelines.      "

## 2018-05-03 ENCOUNTER — MYC MEDICAL ADVICE (OUTPATIENT)
Dept: PEDIATRICS | Facility: CLINIC | Age: 43
End: 2018-05-03

## 2018-05-03 LAB — PAIN DRUG SCR UR W RPTD MEDS: NORMAL

## 2018-05-03 NOTE — TELEPHONE ENCOUNTER
Routed [employer] message to PCP    Haley Wright RN,   [facility]

## 2018-05-04 ENCOUNTER — TELEPHONE (OUTPATIENT)
Dept: PEDIATRICS | Facility: CLINIC | Age: 43
End: 2018-05-04

## 2018-05-04 LAB
FINAL DIAGNOSIS: ABNORMAL
HPV HR 12 DNA CVX QL NAA+PROBE: POSITIVE
HPV16 DNA SPEC QL NAA+PROBE: NEGATIVE
HPV18 DNA SPEC QL NAA+PROBE: NEGATIVE
SPECIMEN DESCRIPTION: ABNORMAL
SPECIMEN SOURCE CVX/VAG CYTO: ABNORMAL

## 2018-05-04 NOTE — TELEPHONE ENCOUNTER
Patient sent in a [employer] message in response to her lab results.   Will close this encounter.      Lorena Obando RN, [facility]

## 2018-05-04 NOTE — TELEPHONE ENCOUNTER
Drug  Screen Comprehensive , Urine with Reported Meds (MedTox) (Pain Care Package)   Status:  Final result   Visible to patient:  Yes (MyChart) Dx:  Attention deficit disorder, unspecifi... Order: [id number]       Notes Recorded by Iman Forbes, APRN CNP on 5/4/2018 at 9:54 AM  Please call patient and let her know her urine was positive for narcotics and need to know if she is getting a script for this and from where.   Also looks like no celexa or amitriptyline in her urine so need to know if taking this regularly  Thanks  Iman Forbes, NP, APRN CNP

## 2018-05-04 NOTE — TELEPHONE ENCOUNTER
Can we call lab and ask if we can addend the result to note hysterectomy was vaginal swab as she had a hysterectomy

## 2018-05-04 NOTE — TELEPHONE ENCOUNTER
I called the lab and asked them to addend the result note to show that the HPV HIGH RISK TYPES DNA CERVICAL swab was a vaginal swab not cervical because patient had a hysterectomy.  Per [facility] Lab states they will change it.    Margaret Ramirez CMA

## 2018-05-14 ENCOUNTER — MYC REFILL (OUTPATIENT)
Dept: PEDIATRICS | Facility: CLINIC | Age: 43
End: 2018-05-14

## 2018-05-14 DIAGNOSIS — F98.8 ATTENTION DEFICIT DISORDER, UNSPECIFIED HYPERACTIVITY PRESENCE: ICD-10-CM

## 2018-05-14 RX ORDER — DEXTROAMPHETAMINE SACCHARATE, AMPHETAMINE ASPARTATE, DEXTROAMPHETAMINE SULFATE AND AMPHETAMINE SULFATE 7.5; 7.5; 7.5; 7.5 MG/1; MG/1; MG/1; MG/1
30 TABLET ORAL 3 TIMES DAILY
Qty: 90 TABLET | Refills: 0 | Status: SHIPPED | OUTPATIENT
Start: 2018-06-04 | End: 2018-06-14

## 2018-05-14 NOTE — TELEPHONE ENCOUNTER
Message from [employer]:  Original authorizing provider: MARIA ELENA Boateng CNP would like a refill of the following medications:  amphetamine-dextroamphetamine (ADDERALL) 30 MG per tablet [MARIA ELENA Boateng CNP]    Preferred pharmacy: [facility] [address] [place] [address] [address]    Comment:  Can you please mail rx to pharmacy? Thanks! Aliyah

## 2018-05-14 NOTE — TELEPHONE ENCOUNTER
Please inform patient, refill/prescriptioni approved. Please mail  prescription to designated pharmacy.   Next start date is June 4

## 2018-05-14 NOTE — TELEPHONE ENCOUNTER
amphetamine-dextroamphetamine (ADDERALL) 30 MG per tablet      Last Written Prescription Date:  05/04/18  Last Fill Quantity: 90,   # refills: 0  Last Office Visit: 04/27/18  Future Office visit:       Routing refill request to provider for review/approval because:  Drug not on the [facility], P or [facility] refill protocol or controlled substance

## 2018-06-11 ENCOUNTER — MYC MEDICAL ADVICE (OUTPATIENT)
Dept: PEDIATRICS | Facility: CLINIC | Age: 43
End: 2018-06-11

## 2018-06-11 DIAGNOSIS — F98.8 ATTENTION DEFICIT DISORDER, UNSPECIFIED HYPERACTIVITY PRESENCE: ICD-10-CM

## 2018-06-11 NOTE — TELEPHONE ENCOUNTER
amphetamine-dextroamphetamine (ADDERALL) 30 MG per tablet      Last Written Prescription Date:  06/04/18  Last Fill Quantity: 90,   # refills: 0  Last Office Visit: 04/27/18  Future Office visit:       Routing refill request to provider for review/approval because:  Drug not on the [facility], P or [facility] refill protocol or controlled substance

## 2018-06-14 RX ORDER — DEXTROAMPHETAMINE SACCHARATE, AMPHETAMINE ASPARTATE, DEXTROAMPHETAMINE SULFATE AND AMPHETAMINE SULFATE 7.5; 7.5; 7.5; 7.5 MG/1; MG/1; MG/1; MG/1
30 TABLET ORAL 3 TIMES DAILY
Qty: 90 TABLET | Refills: 0 | Status: SHIPPED | OUTPATIENT
Start: 2018-06-22 | End: 2018-07-16

## 2018-06-14 NOTE — TELEPHONE ENCOUNTER
Patient has not read [employer] message sent by MA staff on 06/11.    Called [facility] to discuss last fill date. Spoke with pharmacist. Pharmacist states that prescription with a start date of 06/04/18 was filled on 05/23/18 as pharmacist didn't see the start date. Patient received 90 tablets on 05/23/18 to last one month.    Routing message to PCP for review.  Shobha Roldan CMA

## 2018-06-14 NOTE — TELEPHONE ENCOUNTER
Left message for patient advising her that a script for Adderall 30 mg was mailed to [facility] pharmacy.    Margaret Ramirez CMA

## 2018-06-17 ENCOUNTER — MYC MEDICAL ADVICE (OUTPATIENT)
Dept: PEDIATRICS | Facility: CLINIC | Age: 43
End: 2018-06-17

## 2018-07-14 ENCOUNTER — MYC MEDICAL ADVICE (OUTPATIENT)
Dept: PEDIATRICS | Facility: CLINIC | Age: 43
End: 2018-07-14

## 2018-07-14 DIAGNOSIS — F98.8 ATTENTION DEFICIT DISORDER, UNSPECIFIED HYPERACTIVITY PRESENCE: ICD-10-CM

## 2018-07-16 RX ORDER — DEXTROAMPHETAMINE SACCHARATE, AMPHETAMINE ASPARTATE, DEXTROAMPHETAMINE SULFATE AND AMPHETAMINE SULFATE 7.5; 7.5; 7.5; 7.5 MG/1; MG/1; MG/1; MG/1
30 TABLET ORAL 3 TIMES DAILY
Qty: 90 TABLET | Refills: 0 | Status: SHIPPED | OUTPATIENT
Start: 2018-07-22 | End: 2018-07-18

## 2018-07-16 NOTE — TELEPHONE ENCOUNTER
Patient requesting that [facility] completion.    amphetamine-dextroamphetamine (ADDERALL) 30 MG per tablet      Last Written Prescription Date:  06/22/18  Last Fill Quantity: 90,   # refills: 0  Last Office Visit: 04/27/18  Future Office visit:       Routing refill request to provider for review/approval because:  Drug not on the FM, P or [facility] refill protocol or controlled substance

## 2018-07-18 DIAGNOSIS — F98.8 ATTENTION DEFICIT DISORDER, UNSPECIFIED HYPERACTIVITY PRESENCE: ICD-10-CM

## 2018-07-18 RX ORDER — DEXTROAMPHETAMINE SACCHARATE, AMPHETAMINE ASPARTATE, DEXTROAMPHETAMINE SULFATE AND AMPHETAMINE SULFATE 7.5; 7.5; 7.5; 7.5 MG/1; MG/1; MG/1; MG/1
30 TABLET ORAL 3 TIMES DAILY
Qty: 90 TABLET | Refills: 0 | Status: SHIPPED | OUTPATIENT
Start: 2018-07-18 | End: 2018-08-06

## 2018-07-18 NOTE — TELEPHONE ENCOUNTER
Routing to provider to please review and advise. Please indicate if patient can refill the Rx 4 days sooner that prescribed.    amphetamine-dextroamphetamine (ADDERALL) 30 MG per tablet 90 tablet 0 7/22/2018  No   Sig - Route: Take 1 tablet (30 mg) by mouth 3 times daily - Oral       Tia NAWAF Daly

## 2018-07-18 NOTE — TELEPHONE ENCOUNTER
Patient leaving for [place] Tomorrow through 8/10/18 and would like this refilled today. Please call patient to confirm or discuss. Thank you

## 2018-07-18 NOTE — TELEPHONE ENCOUNTER
Where is the current rx?  Pt has original at [facility] in [place].  She needs office to call in the verbal ok to fill early.  [phone]

## 2018-07-18 NOTE — TELEPHONE ENCOUNTER
Called [facility] and verbal Ok'd per Dr Lawrence.  Dr Lawrence rewrote the script and a verbal was called in, script shreadded.  Damaris Mata A

## 2018-07-18 NOTE — TELEPHONE ENCOUNTER
OK to refill   Need to destroy other script   Next refill will be August 22   Please have provider there sign off   Thanks

## 2018-07-18 NOTE — TELEPHONE ENCOUNTER
[facility] Call Center    Phone Message    May a detailed message be left on voicemail: yes    Reason for Call: Medication Question or concern regarding medication   Prescription Clarification  Name of Medication:   amphetamine-dextroamphetamine (ADDERALL) 30 MG per tablet 90 tablet 0 7/22/2018   No   Sig - Route: Take 1 tablet (30 mg) by mouth 3 times daily - Oral       Prescribing Provider: Iman Forbes   Pharmacy: [facility]   What on the order needs clarification? Requesting to see if Rx can be released early since patient is going out of town. Please advise.          Action Taken: Message routed to:  Primary Care p [phone]

## 2018-08-03 DIAGNOSIS — G47.00 INSOMNIA, UNSPECIFIED TYPE: ICD-10-CM

## 2018-08-03 NOTE — TELEPHONE ENCOUNTER
amitriptyline (ELAVIL) 10 MG tablet  Take 2 tablets (20 mg) by mouth At Bedtime    Routing refill request to provider for review/approval because:  Blood pressure above goal.      Tricyclic Agents ( Annual appt and no PHQ9) Failed8/3 12:18 PM  N Blood Pressure under 140/90 in past 12 mos    Recent (12 mo) or future (30 days) visit within authorizing provider's specialty    Patient is age 18 or older    Patient is not pregnant    No positive pregnancy test on record in past 12 mos     BP Readings from Last 1 Encounters:   04/27/18 (!) 150/92     Last office visit for Physical with Anayeli Forbes NP 4/27/18. Plan is to follow up in 6 months.    Lorena Obando RN, [facility]

## 2018-08-04 RX ORDER — AMITRIPTYLINE HYDROCHLORIDE 10 MG/1
20 TABLET ORAL AT BEDTIME
Qty: 180 TABLET | Refills: 1 | Status: SHIPPED | OUTPATIENT
Start: 2018-08-04 | End: 2019-01-03

## 2018-08-06 ENCOUNTER — MYC MEDICAL ADVICE (OUTPATIENT)
Dept: PEDIATRICS | Facility: CLINIC | Age: 43
End: 2018-08-06

## 2018-08-06 DIAGNOSIS — F98.8 ATTENTION DEFICIT DISORDER, UNSPECIFIED HYPERACTIVITY PRESENCE: ICD-10-CM

## 2018-08-06 RX ORDER — DEXTROAMPHETAMINE SACCHARATE, AMPHETAMINE ASPARTATE, DEXTROAMPHETAMINE SULFATE AND AMPHETAMINE SULFATE 7.5; 7.5; 7.5; 7.5 MG/1; MG/1; MG/1; MG/1
30 TABLET ORAL 3 TIMES DAILY
Qty: 90 TABLET | Refills: 0 | Status: SHIPPED | OUTPATIENT
Start: 2018-08-22 | End: 2018-08-27

## 2018-08-06 NOTE — TELEPHONE ENCOUNTER
Can my next Adderall rx be mailed to my pharmacy please?    Yaron  [address] [place] [address] N  [facility] [id number]    Thank you! Please let me know if this is a problem.      Routing refill request to provider for review/approval because:  Drug not on the G refill protocol     amphetamine-dextroamphetamine (ADDERALL) 30 MG per tablet 90 tablet 0 7/18/2018  No      Sig - Route: Take 1 tablet (30 mg) by mouth 3 times daily Next refill August 22. - Oral       Last office visit:  4/27/18        Haley Wright RN,   [facility]

## 2018-08-06 NOTE — TELEPHONE ENCOUNTER
Please inform patient, refill/prescriptioni approved. Please mail  prescription to designated pharmacy.   Let her know refill date is August 22

## 2018-08-09 ENCOUNTER — TELEPHONE (OUTPATIENT)
Dept: PEDIATRICS | Facility: CLINIC | Age: 43
End: 2018-08-09

## 2018-08-09 ENCOUNTER — MYC MEDICAL ADVICE (OUTPATIENT)
Dept: PEDIATRICS | Facility: CLINIC | Age: 43
End: 2018-08-09

## 2018-08-09 NOTE — TELEPHONE ENCOUNTER
Routed [employer] message to PCP    Patient requesting to  RX 8/16/18- 6 days early.  Need to contact [facility] pharmacy and give them ok to  early if able.    amphetamine-dextroamphetamine (ADDERALL) 30 MG per tablet 90 tablet 0 8/22/2018  No      Sig - Route: Take 1 tablet (30 mg) by mouth 3 times daily - Oral     Class: Local Print     Order: [id number]       Printout Tracking      External Result Report       Pharmacy      [facility] - [place], [place] - [address]         Haley Wright RN,   [facility]

## 2018-08-09 NOTE — TELEPHONE ENCOUNTER
Please call pharmacy OK to fill but we need to cecil her records that next refill needs to be then 9/22  Thanks

## 2018-08-10 NOTE — TELEPHONE ENCOUNTER
[facility] pharmacist advised of the information below per Anayeli Forbes.  Pharmacist marked patients file that the next fill on adderall 30 mg will not be until 09/22/18.  Patient advised of the information above via [employer].    Margaret Ramirez CMA

## 2018-08-27 ENCOUNTER — MYC MEDICAL ADVICE (OUTPATIENT)
Dept: PEDIATRICS | Facility: CLINIC | Age: 43
End: 2018-08-27

## 2018-08-27 DIAGNOSIS — F98.8 ATTENTION DEFICIT DISORDER, UNSPECIFIED HYPERACTIVITY PRESENCE: ICD-10-CM

## 2018-08-27 RX ORDER — DEXTROAMPHETAMINE SACCHARATE, AMPHETAMINE ASPARTATE, DEXTROAMPHETAMINE SULFATE AND AMPHETAMINE SULFATE 7.5; 7.5; 7.5; 7.5 MG/1; MG/1; MG/1; MG/1
30 TABLET ORAL 3 TIMES DAILY
Qty: 90 TABLET | Refills: 0 | Status: SHIPPED | OUTPATIENT
Start: 2018-09-21 | End: 2018-09-07

## 2018-08-27 NOTE — TELEPHONE ENCOUNTER
Routing refill request to provider for review/approval because:  Drug not on the [facility] refill protocol     Patients MyChart message.   Thank you!   Could you please mail my September rx to [facility] in [place]?   Between 7/22 and 9/22 there are 63 days (july and august have 31 days).  my start date  should be 9/19 I think, so could I pick it up 9/17 or 9/18? Calos me know please. Thanks and have  a  great  day!  Aliyah     amphetamine-dextroamphetamine (ADDERALL) 30 MG per tablet 90 tablet 0 8/22/2018  No   Sig - Route: Take 1 tablet (30 mg) by mouth 3 times daily - Oral     Last OV with Iman Forbes, CNP: 4/27/2018    No future apts.     Gissell Daly RN

## 2018-08-27 NOTE — TELEPHONE ENCOUNTER
08/10/2018 DEXTROAMP-AMPHETAMIN 30 MG TAB 90.914340 30 [id number] 08/06/2018 [id number] N [id number] 00.0  07/18/2018 DEXTROAMP-AMPHETAMIN 30 MG TAB 90.960490 30 [id number] 07/16/2018 [id number] N [id number] 00.0  06/22/2018 DEXTROAMP-AMPHETAMIN 30 MG TAB 90.655268 30 [id number] 06/14/2018 [id number] N [id number] 00.0    Please inform patient, refill/prescriptioni approved. Please mail  prescription to designated pharmacy.   Next fill date will be 9/21 based on last refills    picked up early on 7/18 and 8/10

## 2018-08-30 NOTE — TELEPHONE ENCOUNTER
Called [facility] pharmacist and gave the ok for patient to  RX 9/19/18 per Anayeli Forbes.    amphetamine-dextroamphetamine (ADDERALL) 30 MG per tablet 90 tablet 0 9/21/2018 10/21/2018 No      Sig - Route: Take 1 tablet (30 mg) by mouth 3 times daily - Oral     Class: Local Print     Order: [id number]       Printout Tracking      External Result Report       Pharmacy      [facility] - [place] [address]     Haley Wright RN,   [facility], [facility]

## 2018-09-05 ENCOUNTER — MYC MEDICAL ADVICE (OUTPATIENT)
Dept: PEDIATRICS | Facility: CLINIC | Age: 43
End: 2018-09-05

## 2018-09-05 DIAGNOSIS — F98.8 ATTENTION DEFICIT DISORDER, UNSPECIFIED HYPERACTIVITY PRESENCE: ICD-10-CM

## 2018-09-05 NOTE — TELEPHONE ENCOUNTER
Routing to provider to please review and advise. Gissell Daly RN    amphetamine-dextroamphetamine (ADDERALL) 30 MG per tablet 90 tablet 0 9/21/2018 10/21/2018 No   Sig - Route: Take 1 tablet (30 mg) by mouth 3 times daily - Oral     Asia Angeles   OK will let pharmacy know can  on 9/19   Take care   Iman Forbes, NP, APRN CNP     August 30, 2018   Haley Wright RN 2:36 PM   Note      Called [facility] pharmacist and gave the ok for patient to  RX 9/19/18 per Anayeli Forbes.               amphetamine-dextroamphetamine (ADDERALL) 30 MG per tablet 90 tablet 0 9/21/2018 10/21/2018 No        Sig - Route: Take 1 tablet (30 mg) by mouth 3 times daily - Oral      Class: Local Print      Order: [id number]        Printout Tracking       External Result Report        Pharmacy       [facility] [address] [place] [address] [address]      Haley Wright RN,   [facility]              Gissell Daly RN

## 2018-09-06 ENCOUNTER — MYC MEDICAL ADVICE (OUTPATIENT)
Dept: PEDIATRICS | Facility: CLINIC | Age: 43
End: 2018-09-06

## 2018-09-06 NOTE — TELEPHONE ENCOUNTER
ADDED THIS MESSAGE TO YESTERDAYS 9/5/2018 Anki MESSAGE.    Haley Wright RN,   [facility], [facility]

## 2018-09-06 NOTE — TELEPHONE ENCOUNTER
Routed [employer] message to PCP    Haley Wright RN,   [facility]

## 2018-09-06 NOTE — TELEPHONE ENCOUNTER
PATIENT SENT ANOTHER MYCHART MESSAGE:  MARIA ELENA Deutsch CNP 3 minutes ago (3:18 PM)                        Sorry to bother you again but  it must've been a subconscious typo when I  requested my refill 9/12 instead of 9/19. 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 9/12-9/21 at [facility], 11/15-11/21at [facility] and 12/12-12/20 at [facility], nothing in October. Will I be able to  my rx beforehand?

## 2018-09-07 RX ORDER — DEXTROAMPHETAMINE SACCHARATE, AMPHETAMINE ASPARTATE, DEXTROAMPHETAMINE SULFATE AND AMPHETAMINE SULFATE 7.5; 7.5; 7.5; 7.5 MG/1; MG/1; MG/1; MG/1
30 TABLET ORAL 3 TIMES DAILY
Qty: 90 TABLET | Refills: 0 | Status: SHIPPED | OUTPATIENT
Start: 2018-09-18 | End: 2018-09-07

## 2018-09-07 RX ORDER — DEXTROAMPHETAMINE SACCHARATE, AMPHETAMINE ASPARTATE, DEXTROAMPHETAMINE SULFATE AND AMPHETAMINE SULFATE 7.5; 7.5; 7.5; 7.5 MG/1; MG/1; MG/1; MG/1
30 TABLET ORAL 3 TIMES DAILY
Qty: 90 TABLET | Refills: 0 | Status: SHIPPED | OUTPATIENT
Start: 2018-09-12 | End: 2018-10-02

## 2018-09-07 NOTE — TELEPHONE ENCOUNTER
Routed [employer] message to PCP    Patient needs new RX.    Haley Wright RN,   [facility]

## 2018-09-07 NOTE — TELEPHONE ENCOUNTER
Please inform patient, refill/prescriptioni approved. Please mail prescription to designated pharmacy with start date of September 12  Next refill will be October 18

## 2018-09-11 ENCOUNTER — MYC MEDICAL ADVICE (OUTPATIENT)
Dept: PEDIATRICS | Facility: CLINIC | Age: 43
End: 2018-09-11

## 2018-09-11 NOTE — TELEPHONE ENCOUNTER
Verbal given to pharmacy. Nothing further needed at this time.     Radha Peralta RN   [facility], [facility]

## 2018-09-11 NOTE — TELEPHONE ENCOUNTER
amphetamine-dextroamphetamine (ADDERALL) 30 MG per tablet script mailed to [facility] pharmacy on 09/07/18. Per patient, pharmacy has not received script at this time. Patient leaves for business trip tomorrow and needs prescription prior to leaving. Pharmacist will take verbal okay to fill from provider prior to 4:00 PM today.  Shobha Roldan CMA

## 2018-10-02 ENCOUNTER — MYC MEDICAL ADVICE (OUTPATIENT)
Dept: PEDIATRICS | Facility: CLINIC | Age: 43
End: 2018-10-02

## 2018-10-02 DIAGNOSIS — F98.8 ATTENTION DEFICIT DISORDER, UNSPECIFIED HYPERACTIVITY PRESENCE: ICD-10-CM

## 2018-10-02 RX ORDER — DEXTROAMPHETAMINE SACCHARATE, AMPHETAMINE ASPARTATE, DEXTROAMPHETAMINE SULFATE AND AMPHETAMINE SULFATE 7.5; 7.5; 7.5; 7.5 MG/1; MG/1; MG/1; MG/1
30 TABLET ORAL 3 TIMES DAILY
Qty: 90 TABLET | Refills: 0 | Status: SHIPPED | OUTPATIENT
Start: 2018-10-18 | End: 2018-11-17

## 2018-10-02 NOTE — TELEPHONE ENCOUNTER
Routing refill request to provider for review/approval because:  Drug not on the [facility] refill protocol   **patient requests that the 10/18/18 script be mailed to her [facility] pharmacy in [facility]**  [facility] [id number] - [place], [place] - [facility] [address]    amphetamine-dextroamphetamine (ADDERALL) 30 MG per tablet 90 tablet 0 9/12/2018 10/12/2018 No   Sig - Route: Take 1 tablet (30 mg) by mouth 3 times daily Next refill will by 10/18/2018 - Oral     Medication Administration Instructions   Next refill will by 10/18/2018     Last OV with MOO Forbes, CNP: 4/27/2018    No future apts.

## 2018-10-02 NOTE — TELEPHONE ENCOUNTER
Please inform patient, refill/prescriptioni approved. Please mail prescription to designated pharmacy.   Need follow up appointment before next refill

## 2018-11-03 NOTE — TELEPHONE ENCOUNTER
Patient replied to [employer] message stating she will  amphetamine-dextroamphetamine (ADDERALL) 30 MG per tablet script in clinic.  Shobha Roldan CMA     Principal Discharge DX:	Hyponatremia  Secondary Diagnosis:	Seizure

## 2018-11-07 DIAGNOSIS — F33.0 MILD EPISODE OF RECURRENT MAJOR DEPRESSIVE DISORDER (H): ICD-10-CM

## 2018-11-08 RX ORDER — ESCITALOPRAM OXALATE 20 MG/1
TABLET ORAL
Qty: 30 TABLET | Refills: 0 | Status: SHIPPED | OUTPATIENT
Start: 2018-11-08 | End: 2019-07-10

## 2018-11-08 NOTE — TELEPHONE ENCOUNTER
Last office visit:  4/27/18     Alisha Refill    lexapro filled for 30 days.    Patient is due for: med check 6 month follow up, updated PHQ9    Future Appointments scheduled:  none    Follow up with patient:   My chart message sent to patient.    Haley Wright RN,   . [facility], [facility]

## 2018-12-26 ENCOUNTER — MYC MEDICAL ADVICE (OUTPATIENT)
Dept: PEDIATRICS | Facility: CLINIC | Age: 43
End: 2018-12-26

## 2018-12-26 DIAGNOSIS — F98.8 ATTENTION DEFICIT DISORDER, UNSPECIFIED HYPERACTIVITY PRESENCE: ICD-10-CM

## 2018-12-26 RX ORDER — DEXTROAMPHETAMINE SACCHARATE, AMPHETAMINE ASPARTATE, DEXTROAMPHETAMINE SULFATE AND AMPHETAMINE SULFATE 7.5; 7.5; 7.5; 7.5 MG/1; MG/1; MG/1; MG/1
30 TABLET ORAL 3 TIMES DAILY
Qty: 90 TABLET | Refills: 0 | Status: CANCELLED | OUTPATIENT
Start: 2018-12-26

## 2018-12-26 NOTE — TELEPHONE ENCOUNTER
Pending Prescriptions:                       Disp   Refills    amphetamine-dextroamphetamine (ADDERALL) *90 tab*0            Sig: Take 1 tablet (30 mg) by mouth 3 times daily Next           refill will by 11/17/2018      Last Written Prescription Date:  10/18/18  Last Fill Quantity: 90,  # refills: 0   Last office visit: 4/27/2018 with prescribing provider:  Iman Forbes NP, MARIA ELENA VAUGHN   Future Office Visit:   Next 5 appointments (look out 90 days)    Jan 03, 2019  3:30 PM CST  MyChart Long with MARIA ELENA Boateng CNP  [facility] ([facility] [address] [place] [id number] [phone]           Routing refill request to provider for review/approval because:  Drug not on the [facility], P or [facility] refill protocol or controlled substance

## 2018-12-26 NOTE — TELEPHONE ENCOUNTER
Patient was not seen by PCP for 6 months overdue recheck since April 2018.  Will need to be seen as scheduled before next refill

## 2018-12-27 ENCOUNTER — MYC MEDICAL ADVICE (OUTPATIENT)
Dept: PEDIATRICS | Facility: CLINIC | Age: 43
End: 2018-12-27

## 2019-01-03 ENCOUNTER — OFFICE VISIT (OUTPATIENT)
Dept: PEDIATRICS | Facility: CLINIC | Age: 44
End: 2019-01-03

## 2019-01-03 ENCOUNTER — TELEPHONE (OUTPATIENT)
Dept: ONCOLOGY | Facility: CLINIC | Age: 44
End: 2019-01-03

## 2019-01-03 VITALS
OXYGEN SATURATION: 98 % | TEMPERATURE: 98.1 F | BODY MASS INDEX: 26.67 KG/M2 | SYSTOLIC BLOOD PRESSURE: 125 MMHG | DIASTOLIC BLOOD PRESSURE: 90 MMHG | HEART RATE: 84 BPM | WEIGHT: 155.4 LBS

## 2019-01-03 DIAGNOSIS — G47.00 INSOMNIA, UNSPECIFIED TYPE: ICD-10-CM

## 2019-01-03 DIAGNOSIS — Z80.3 FAMILY HISTORY OF MALIGNANT NEOPLASM OF BREAST: ICD-10-CM

## 2019-01-03 DIAGNOSIS — F98.8 ATTENTION DEFICIT DISORDER, UNSPECIFIED HYPERACTIVITY PRESENCE: Primary | ICD-10-CM

## 2019-01-03 PROCEDURE — 99213 OFFICE O/P EST LOW 20 MIN: CPT | Performed by: NURSE PRACTITIONER

## 2019-01-03 RX ORDER — AMITRIPTYLINE HYDROCHLORIDE 10 MG/1
20 TABLET ORAL AT BEDTIME
Qty: 180 TABLET | Refills: 1 | Status: SHIPPED | OUTPATIENT
Start: 2019-01-03 | End: 2019-07-10

## 2019-01-03 RX ORDER — DEXTROAMPHETAMINE SACCHARATE, AMPHETAMINE ASPARTATE, DEXTROAMPHETAMINE SULFATE AND AMPHETAMINE SULFATE 7.5; 7.5; 7.5; 7.5 MG/1; MG/1; MG/1; MG/1
30 TABLET ORAL 3 TIMES DAILY
COMMUNITY
Start: 2018-11-20 | End: 2019-01-03

## 2019-01-03 RX ORDER — DEXTROAMPHETAMINE SACCHARATE, AMPHETAMINE ASPARTATE, DEXTROAMPHETAMINE SULFATE AND AMPHETAMINE SULFATE 7.5; 7.5; 7.5; 7.5 MG/1; MG/1; MG/1; MG/1
30 TABLET ORAL 3 TIMES DAILY
Qty: 90 TABLET | Refills: 0 | Status: SHIPPED | OUTPATIENT
Start: 2019-01-03 | End: 2019-01-16

## 2019-01-03 ASSESSMENT — ANXIETY QUESTIONNAIRES
2. NOT BEING ABLE TO STOP OR CONTROL WORRYING: SEVERAL DAYS
6. BECOMING EASILY ANNOYED OR IRRITABLE: NOT AT ALL
3. WORRYING TOO MUCH ABOUT DIFFERENT THINGS: NOT AT ALL
5. BEING SO RESTLESS THAT IT IS HARD TO SIT STILL: NOT AT ALL
1. FEELING NERVOUS, ANXIOUS, OR ON EDGE: SEVERAL DAYS

## 2019-01-03 ASSESSMENT — PATIENT HEALTH QUESTIONNAIRE - PHQ9
SUM OF ALL RESPONSES TO PHQ QUESTIONS 1-9: 1
5. POOR APPETITE OR OVEREATING: SEVERAL DAYS

## 2019-01-03 NOTE — PATIENT INSTRUCTIONS
PLAN:   1.   Symptomatic therapy suggested: Continue current medications.  2.  Orders Placed This Encounter   Medications     DISCONTD: amphetamine-dextroamphetamine (ADDERALL) 30 MG tablet     Sig: Take 30 mg by mouth 3 times daily     amitriptyline (ELAVIL) 10 MG tablet     Sig: Take 2 tablets (20 mg) by mouth At Bedtime     Dispense:  180 tablet     Refill:  1     amphetamine-dextroamphetamine (ADDERALL) 30 MG tablet     Sig: Take 1 tablet (30 mg) by mouth 3 times daily     Dispense:  90 tablet     Refill:  0     Orders Placed This Encounter   Procedures     CANCER RISK MGMT/CANCER GENETIC COUNSELING REFERRAL       3. Patient needs to follow up in if no improvement,or sooner if worsening of symptoms or other symptoms develop.  CONSULTATION/REFERRAL to cancer genetic counseling   FOLLOW UP WITH SPECIALIST :Psychiatry

## 2019-01-03 NOTE — TELEPHONE ENCOUNTER
RECORDS STATUS - GENETIC COUNSELING: Fx Hx of Breast Cancer      RECORDS RECEIVED FROM: [facility] - IN EPIC   DATE RECEIVED: 1/3/2019   NOTES STATUS DETAILS   OFFICE NOTE from referring provider RECEIVED 1/3/2019   OFFICE NOTE from medical oncologist     DISCHARGE SUMMARY from hospital     DISCHARGE REPORT from the ER     OPERATIVE REPORT     MEDICATION LIST     CLINICAL TRIAL TREATMENTS TO DATE     LABS     PATHOLOGY REPORTS     ANYTHING RELATED TO DIAGNOSIS     GENONOMIC TESTING     TYPE:     IMAGING (NEED IMAGES & REPORT)     CT SCANS     MRI     MAMMO     ULTRASOUND     PET

## 2019-01-03 NOTE — NURSING NOTE
"Chief Complaint   Patient presents with     A.D.H.D     Sleep Problem     recheck medication       Initial /90 (BP Location: Right arm, Patient Position: Sitting, Cuff Size: Adult Regular)   Pulse 84   Temp 98.1  F (36.7  C) (Temporal)   Wt 70.5 kg (155 lb 6.4 oz)   SpO2 98%   Breastfeeding? No   BMI 26.67 kg/m   Estimated body mass index is 26.67 kg/m  as calculated from the following:    Height as of 4/27/18: 1.626 m (5' 4\").    Weight as of this encounter: 70.5 kg (155 lb 6.4 oz).  Medication Reconciliation: complete      COLE Ramos      "

## 2019-01-03 NOTE — PROGRESS NOTES
SUBJECTIVE:   Aliyah Angeles is a 43 year old female who presents to clinic today for the following health issues:    Medication Followup of adderall     Taking Medication as prescribed: yes    Side Effects:  None    Medication Helping Symptoms:  yes   Has appointment with psychiatrist  with [facility] around March   Was on a wait list   Is on the Adderal and works well for her and has been on for at least 5 years     Medication Followup of amitriptyline     Taking Medication as prescribed: yes    Side Effects:  None    Medication Helping Symptoms:  Yes      Problem list and histories reviewed & adjusted, as indicated.  Additional history: as documented    Patient Active Problem List   Diagnosis     Acne vulgaris     Attention deficit disorder     Anxiety disorder     Cobalamin deficiency     Other long term (current) drug therapy     Personal history of other medical treatment     Drug-seeking behavior     Stress incontinence in female     Insomnia     Mild episode of recurrent major depressive disorder (H)     Premenstrual dysphoric disorder     Raynaud's phenomenon     Bariatric surgery status     Arthralgia of temporomandibular joint     Vitamin D deficiency     Elevated blood pressure reading without diagnosis of hypertension     Cervical high risk HPV (human papillomavirus) test positive     Past Surgical History:   Procedure Laterality Date     ABDOMEN SURGERY  08/10/2010    Gastric  bypass     CHOLECYSTECTOMY  2010     COLONOSCOPY  2009     ENT SURGERY  1988    Tonsils     HYSTERECTOMY, VAGINAL  2013    abnormal cervical pap        Social History     Tobacco Use     Smoking status: Former Smoker     Packs/day: 1.00     Years: 10.00     Pack years: 10.00     Types: Cigarettes     Start date: 1989     Last attempt to quit: 2010     Years since quittin.4     Smokeless tobacco: Never Used   Substance Use Topics     Alcohol use: Yes     Alcohol/week: 0.0 oz     Comment:  about once a week 1 or 2      Family History   Problem Relation Age of Onset     Prostate Cancer Father      Diabetes Maternal Grandmother      Colon Cancer Maternal Grandmother      Other Cancer Maternal Grandmother         pancreatic cancer     Diabetes Paternal Grandmother      Diabetes Paternal Grandfather      Breast Cancer Sister      Coronary Artery Disease No family hx of      Hypertension No family hx of      Hyperlipidemia No family hx of      Cerebrovascular Disease No family hx of      Depression No family hx of      Anxiety Disorder No family hx of      Mental Illness No family hx of      Substance Abuse No family hx of      Anesthesia Reaction No family hx of      Asthma No family hx of      Osteoporosis No family hx of      Genetic Disorder No family hx of      Thyroid Disease No family hx of      Obesity No family hx of      Unknown/Adopted No family hx of          Current Outpatient Medications   Medication Sig Dispense Refill     amitriptyline (ELAVIL) 10 MG tablet Take 2 tablets (20 mg) by mouth At Bedtime 180 tablet 1     amphetamine-dextroamphetamine (ADDERALL) 30 MG tablet Take 30 mg by mouth 3 times daily       Calcium Citrate-Vitamin D (CALCIUM + D PO) Take 2,000 mg by mouth       cholecalciferol (VITAMIN D3) 5000 UNITS CAPS capsule Take 5,000 Units by mouth daily        clindamycin (CLINDAMAX) 1 % gel        cyanocobalamin (VITAMIN B12) 1000 MCG/ML injection Inject 1 mL (1,000 mcg) into the muscle every 30 days 1 ML every 4 weeks 0.9 mL 11     DRYSOL 20 % external solution        escitalopram (LEXAPRO) 20 MG tablet TAKE 1 TABLET BY MOUTH EVERY DAY (Patient taking differently: TAKE 1 TABLET BY MOUTH DAILY) 30 tablet 0     MULTIPLE VITAMIN PO Take  by mouth.       Omega-3 Fatty Acids (OMEGA 3 PO) Take  by mouth.       tretinoin (RETIN-A) 0.025 % cream        Allergies   Allergen Reactions     Nsaids      Gastric bypass     BP Readings from Last 3 Encounters:   01/03/19 125/90   04/27/18 (!)  150/92   12/14/17 125/80    Wt Readings from Last 3 Encounters:   01/03/19 70.5 kg (155 lb 6.4 oz)   04/27/18 69 kg (152 lb 3.2 oz)   12/14/17 65.9 kg (145 lb 4.8 oz)                  Labs reviewed in EPIC    Reviewed and updated as needed this visit by clinical staff  Tobacco  Allergies  Meds  Med Hx  Surg Hx  Fam Hx  Soc Hx      Reviewed and updated as needed this visit by Provider         ROS:  Constitutional, HEENT, cardiovascular, pulmonary, gi and gu systems are negative, except as otherwise noted.    OBJECTIVE:     /90 (BP Location: Right arm, Patient Position: Sitting, Cuff Size: Adult Regular)   Pulse 84   Temp 98.1  F (36.7  C) (Temporal)   Wt 70.5 kg (155 lb 6.4 oz)   SpO2 98%   Breastfeeding? No   BMI 26.67 kg/m    Body mass index is 26.67 kg/m .  GENERAL APPEARANCE: alert, active and no distress  NECK: no adenopathy and thyroid normal to palpation  RESP: lungs clear to auscultation - no rales, rhonchi or wheezes  CV: regular rates and rhythm  MS: extremities normal- no gross deformities noted  SKIN: no suspicious lesions or rashes  NEURO: Normal strength and tone, mentation intact and speech normal  PSYCH: mentation appears normal and affect normal/bright  MENTAL STATUS EXAM:  Appearance/Behavior: No apparent distress, Casually groomed and Dressed appropriately for weather  Speech: Normal  Mood/Affect: normal affect  Insight: Adequate    Diagnostic Test Results:  none     ASSESSMENT/PLAN:     Aliyah was seen today for a.d.h.d and sleep problem.    Diagnoses and all orders for this visit:    Attention deficit disorder, unspecified hyperactivity presence  -     : amphetamine-dextroamphetamine (ADDERALL) 30 MG tablet; Take 1 tablet (30 mg) by mouth 3 times daily  -    The current medical regimen is effective;  continue present plan and medications.    Insomnia, unspecified type  -     amitriptyline (ELAVIL) 10 MG tablet; Take 2 tablets (20 mg) by mouth At Bedtime    Family history of  malignant neoplasm of breast  -     CANCER RISK MGMT/CANCER GENETIC COUNSELING REFERRAL    PLAN:   Patient needs to follow up in if no improvement,or sooner if worsening of symptoms or other symptoms develop.  CONSULTATION/REFERRAL to cancer genetic counseling   FOLLOW UP WITH SPECIALIST :Psychiatry    See Patient Instructions    MARIA ELENA Boateng [facility]

## 2019-01-03 NOTE — TELEPHONE ENCOUNTER
ONCOLOGY INTAKE: Records Information      APPT INFORMATION: 1/4/19 at 9:00AM  Referring provider:  Iman Forbes CNP  Referring provider s clinic:  [facility]  Reason for visit/diagnosis:  Genetic Counseling - Family History of Breast Cancer    Were the records received with the referral (via [employer])? No    Has patient been seen for any external appt for this diagnosis (enter clinic/location)? No - per clinic, no genetics done prior.

## 2019-01-04 ENCOUNTER — PRE VISIT (OUTPATIENT)
Dept: ONCOLOGY | Facility: CLINIC | Age: 44
End: 2019-01-04

## 2019-01-16 ENCOUNTER — MYC MEDICAL ADVICE (OUTPATIENT)
Dept: PEDIATRICS | Facility: CLINIC | Age: 44
End: 2019-01-16

## 2019-01-16 ENCOUNTER — TELEPHONE (OUTPATIENT)
Dept: PEDIATRICS | Facility: CLINIC | Age: 44
End: 2019-01-16

## 2019-01-16 DIAGNOSIS — F98.8 ATTENTION DEFICIT DISORDER, UNSPECIFIED HYPERACTIVITY PRESENCE: ICD-10-CM

## 2019-01-16 RX ORDER — DEXTROAMPHETAMINE SACCHARATE, AMPHETAMINE ASPARTATE, DEXTROAMPHETAMINE SULFATE AND AMPHETAMINE SULFATE 7.5; 7.5; 7.5; 7.5 MG/1; MG/1; MG/1; MG/1
30 TABLET ORAL 3 TIMES DAILY
Qty: 90 TABLET | Refills: 0 | Status: SHIPPED | OUTPATIENT
Start: 2019-02-02 | End: 2019-02-15

## 2019-01-16 NOTE — TELEPHONE ENCOUNTER
Called the patient for more information. States she was seen at [facility] for further evaluation of elbow pain after a fall earlier today. She was advised to take Tylenol for pain (allergy to nsaids) along with icing the area. She has been doing this but is still in a lot of pain. States she feels achy all over from the fall.     Assisted the patient with scheduling a follow up appointment for tomorrow morning. Patient is asking if there is anything we can do to help her with her pain tonight.    Routing to provider pool to review and advise.     Radha Peralta RN   .[facility]

## 2019-01-16 NOTE — TELEPHONE ENCOUNTER
Pending Prescriptions:                       Disp   Refills    amphetamine-dextroamphetamine (ADDERALL) *90 tab*0            Sig: Take 1 tablet (30 mg) by mouth 3 times daily    Last Written Prescription Date:  1/3/19  Last Fill Quantity: 90,  # refills: 0   Last office visit: Iman Forbes NP, APRN CNP:  1/3/19   Future Office Visit:      Routing refill request to provider for review/approval because:  Drug not on the [facility], [facility] or [facility] refill protocol or controlled substance    Patient not due for new script until 2/3/19.     Patient Contact    Attempt # 1    Sent mychart reply to inform. Waiting on patient response.    Wilver WADE CMA

## 2019-01-16 NOTE — TELEPHONE ENCOUNTER
[facility] Call Center    Phone Message    May a detailed message be left on voicemail: yes    Reason for Call: Other: Patient fell today on the ice and hurt her elbow. She states she was seen at the [facility]. She was advised by the  doctor that she would have to follow up with her PCP for pain meds. Wondering if she can get something today to help her sleep. Please advise.  [phone]    Action Taken: Message routed to:  Primary Care p [phone]

## 2019-01-16 NOTE — TELEPHONE ENCOUNTER
Since patient was just seen today at the urgent care, please inform patient to call the urgent care for prescription if appropriate

## 2019-01-16 NOTE — TELEPHONE ENCOUNTER
Patient responded with above message. Routing to Iman Forbes NP, APRN CNP for review.  Wilver WADE, CMA

## 2019-01-17 NOTE — TELEPHONE ENCOUNTER
Patient is scheduled to be seen in the next 20 minutes for follow up.    Radha Peralta RN   [facility]

## 2019-01-17 NOTE — TELEPHONE ENCOUNTER
Rx mailed to [facility] per patient request. Sent mychart to inform patient.  Wilver WADE CMA

## 2019-01-17 NOTE — TELEPHONE ENCOUNTER
Please inform patient, refill/prescription approved and when prescription is ready to be picked up at .   Start date 2/2/2019

## 2019-01-17 NOTE — TELEPHONE ENCOUNTER
Patient notified, understanding verbalized.     Radha Peralta RN   [facility]

## 2019-01-17 NOTE — TELEPHONE ENCOUNTER
Patient called stating that she wont be making it to her appointment this morning as she just woke up after having a rough night of sleep and is moving really slowly from feeling all over sore and achy. Patient r/s her appointment for tomorrow but is wondering of she can get something stronger than tylenol for pain or maybe a muscle relaxer to get her through today until she is evaluated further tomorrow.    Advised patient she can be seen today at a different location or return to , patient declined. Advised patient that if pain is severe, she will need to go to the ER for further evaluation. Patient states she is not at that point right now but will continue to monitor.    Routing to provider to review and advise.     Radha Peralta RN   .[facility]

## 2019-01-18 ENCOUNTER — OFFICE VISIT (OUTPATIENT)
Dept: PEDIATRICS | Facility: CLINIC | Age: 44
End: 2019-01-18

## 2019-01-18 VITALS
SYSTOLIC BLOOD PRESSURE: 142 MMHG | DIASTOLIC BLOOD PRESSURE: 100 MMHG | BODY MASS INDEX: 27.07 KG/M2 | HEART RATE: 92 BPM | OXYGEN SATURATION: 100 % | WEIGHT: 157.7 LBS | TEMPERATURE: 97.9 F

## 2019-01-18 DIAGNOSIS — S59.901A ELBOW INJURY, RIGHT, INITIAL ENCOUNTER: ICD-10-CM

## 2019-01-18 DIAGNOSIS — M25.521 RIGHT ELBOW PAIN: ICD-10-CM

## 2019-01-18 DIAGNOSIS — M62.830 BACK MUSCLE SPASM: Primary | ICD-10-CM

## 2019-01-18 PROCEDURE — 99214 OFFICE O/P EST MOD 30 MIN: CPT | Performed by: NURSE PRACTITIONER

## 2019-01-18 RX ORDER — METHOCARBAMOL 500 MG/1
500 TABLET, FILM COATED ORAL 3 TIMES DAILY PRN
Qty: 60 TABLET | Refills: 1 | Status: SHIPPED | OUTPATIENT
Start: 2019-01-18 | End: 2019-02-06

## 2019-01-18 RX ORDER — TRAMADOL HYDROCHLORIDE 50 MG/1
50 TABLET ORAL EVERY 6 HOURS PRN
Qty: 20 TABLET | Refills: 0 | Status: SHIPPED | OUTPATIENT
Start: 2019-01-18 | End: 2019-01-21
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
## 2019-01-18 NOTE — PATIENT INSTRUCTIONS
PLAN:   1.   Orders Placed This Encounter   Medications     methocarbamol (ROBAXIN) 500 MG tablet     Sig: Take 1 tablet (500 mg) by mouth 3 times daily as needed for muscle spasms     Dispense:  60 tablet     Refill:  1     traMADol (ULTRAM) 50 MG tablet     Sig: Take 1 tablet (50 mg) by mouth every 6 hours as needed for severe pain     Dispense:  20 tablet     Refill:  0     Orders Placed This Encounter   Procedures     XR Elbow Right 2 Views     ORTHOPEDICS ADULT REFERRAL       2. Patient needs to follow up in if no improvement,or sooner if worsening of symptoms or other symptoms develop.  CONSULTATION/REFERRAL to Orthopedics

## 2019-01-18 NOTE — NURSING NOTE
"Chief Complaint   Patient presents with     RECHECK     follow-up from [facility]       Initial BP (!) 142/100 (BP Location: Right arm, Patient Position: Sitting, Cuff Size: Adult Regular)   Pulse 92   Temp 97.9  F (36.6  C) (Temporal)   Wt 71.5 kg (157 lb 11.2 oz)   SpO2 100%   Breastfeeding? No   BMI 27.07 kg/m   Estimated body mass index is 27.07 kg/m  as calculated from the following:    Height as of 4/27/18: 1.626 m (5' 4\").    Weight as of this encounter: 71.5 kg (157 lb 11.2 oz).  Medication Reconciliation: complete      COLE Ramos      "

## 2019-01-31 ENCOUNTER — TELEPHONE (OUTPATIENT)
Dept: PEDIATRICS | Facility: CLINIC | Age: 44
End: 2019-01-31

## 2019-01-31 NOTE — TELEPHONE ENCOUNTER
[facility] Call Center    Phone Message    May a detailed message be left on voicemail: yes    Reason for Call: Medication Question or concern regarding medication   Prescription Clarification  Name of Medication: amphetamine-dextroamphetamine (ADDERALL) 30 MG tablet [[id number]] (Order [id number])     Prescribing Provider: Iman Forbes   [facility]   What on the order needs clarification? Patient states the Rx is not able to be picked up until 2/2/19. States she needs it tomorrow. The pharmacy is requesting a verbal okay for an early .           Action Taken: Message routed to:  Primary Care p [phone]

## 2019-02-01 ENCOUNTER — MYC MEDICAL ADVICE (OUTPATIENT)
Dept: PEDIATRICS | Facility: CLINIC | Age: 44
End: 2019-02-01

## 2019-02-06 ENCOUNTER — MYC MEDICAL ADVICE (OUTPATIENT)
Dept: PEDIATRICS | Facility: CLINIC | Age: 44
End: 2019-02-06

## 2019-02-06 DIAGNOSIS — M62.830 BACK MUSCLE SPASM: ICD-10-CM

## 2019-02-06 RX ORDER — METHOCARBAMOL 500 MG/1
500 TABLET, FILM COATED ORAL 3 TIMES DAILY PRN
Qty: 60 TABLET | Refills: 0 | Status: SHIPPED | OUTPATIENT
Start: 2019-02-06 | End: 2019-03-18

## 2019-02-06 NOTE — TELEPHONE ENCOUNTER
Review of Systems   Reason unable to perform ROS: non verbal.       Objective:     Vital Signs (Most Recent):  Temp: 99.1 °F (37.3 °C) (02/06/19 0708)  Pulse: 72 (02/06/19 1124)  Resp: 19 (02/06/19 1124)  BP: (!) 96/40 (02/06/19 1124)  SpO2: 100 % (02/06/19 1124) Vital Signs (24h Range):  Temp:  [94.5 °F (34.7 °C)-99.6 °F (37.6 °C)] 99.1 °F (37.3 °C)  Pulse:  [67-75] 72  Resp:  [3-29] 19  SpO2:  [91 %-100 %] 100 %  BP: ()/(32-88) 96/40     Weight: 69.8 kg (153 lb 14.1 oz)  Body mass index is 22.08 kg/m².      Intake/Output Summary (Last 24 hours) at 2/6/2019 1322  Last data filed at 2/6/2019 1100  Gross per 24 hour   Intake 695.83 ml   Output 1915 ml   Net -1219.17 ml       Physical Exam   Constitutional: She has a sickly appearance. She appears ill.   HENT:   Head: Atraumatic.   Eyes: Conjunctivae are normal. Pupils are equal, round, and reactive to light.   Neck: No JVD present. No tracheal deviation present.   Cardiovascular: Normal rate and regular rhythm.   Pulses:       Radial pulses are 2+ on the right side, and 2+ on the left side.        Dorsalis pedis pulses are 1+ on the right side, and 1+ on the left side.   Pulmonary/Chest: She has decreased breath sounds in the right upper field, the right middle field and the right lower field. She has no wheezes. She has rhonchi in the left upper field.   Abdominal: Soft. Bowel sounds are normal. She exhibits no distension.       Musculoskeletal: She exhibits no edema.   Neurological: She is alert.   Skin: Skin is warm and dry. Capillary refill takes 2 to 3 seconds. She is not diaphoretic. No cyanosis.            Vents:  Vent Mode: A/C (02/06/19 1302)  Ventilator Initiated: Yes (02/05/19 1127)  Set Rate: 18 bmp (02/06/19 1302)  Vt Set: 400 mL (02/06/19 1302)  Pressure Support: 0 cmH20 (02/06/19 1302)  PEEP/CPAP: 8 cmH20 (02/06/19 1302)  Oxygen Concentration (%): 40 (02/06/19 1302)  Peak Airway Pressure: 35 cmH2O (02/06/19 1302)  Plateau Pressure: 0 cmH20  Script mailed to pharmacy. Mychart sent to patient to inform her that she wont be able to get filled until 9/21 due to previous early fills.    COLE Ramos     (02/06/19 1302)  Total Ve: 8.23 mL (02/06/19 1302)  F/VT Ratio<105 (RSBI): (!) 44.5 (02/06/19 1124)    Lines/Drains/Airways     Drain                 Urethral Catheter -- days         Gastrostomy/Enterostomy 10/19/18 Percutaneous endoscopic gastrostomy (PEG)  days          Airway                 Surgical Airway Shiley -- days         Surgical Airway 07/18/18 1620 Shiley 202 days          Pressure Ulcer                 Pressure Injury 02/05/19 Left Ischial tuberosity Unstageable - Present on Admission 1 day         Pressure Injury 02/05/19 Right medial Malleolus DTPI - Present on Admission 1 day         Pressure Injury 02/05/19 Sacral spine Unstageable - Present on Admission 1 day         Pressure Injury 02/05/19 1620 Left lateral Hip Stage 3 less than 1 day         Pressure Injury 02/06/19 Left Thigh DTPI - Present on Admission less than 1 day         Pressure Injury 02/06/19 Left Thigh Unstageable - Present on Admission less than 1 day         Pressure Injury 02/06/19 Left lateral Leg Stage 2 less than 1 day         Pressure Injury 02/06/19 Left upper Arm DTPI - Present on Admission less than 1 day          Peripheral Intravenous Line                 Peripheral IV - Single Lumen 02/05/19 1150 Right Hand 1 day         Peripheral IV - Single Lumen 02/05/19 1243 Right Antecubital 1 day         Midline Catheter Insertion/Assessment  - Double Lumen 02/05/19 1717 Left basilic vein (medial side of arm) 18g x 10cm less than 1 day         Peripheral IV - Double Lumen 02/05/19 1545 Left Forearm less than 1 day                Significant Labs:    CBC/Anemia Profile:  Recent Labs   Lab 02/05/19  1150 02/05/19  1221 02/06/19  0343   WBC 16.18*  --  14.24*   HGB 5.6*  --  7.1*   HCT 19.9*  --  23.5*   *  --  602*   MCV 90  --  87   RDW 20.0*  --  19.1*   OCCULTBLOOD  --  Negative  --         Chemistries:  Recent Labs   Lab 02/05/19  1150 02/06/19  0343   * 151*   K 4.9 5.3*    102   CO2 34* 33*   BUN  115* 107*   CREATININE 2.4* 2.2*   CALCIUM 10.9* 10.2   ALBUMIN 1.8* 1.7*   PROT 7.8 7.3   BILITOT 0.2 0.3   ALKPHOS 139* 136*   ALT 14 13   AST 23 21   MG 2.9*  --        All pertinent labs within the past 24 hours have been reviewed.    Significant Imaging:  I have reviewed all pertinent imaging results/findings within the past 24 hours.

## 2019-02-06 NOTE — TELEPHONE ENCOUNTER
There is a refill remaining on 1/18/19 script. Resent to a different pharmacy and sent MyChart.  Zaida Ramirez RN

## 2019-02-15 ENCOUNTER — MYC MEDICAL ADVICE (OUTPATIENT)
Dept: PEDIATRICS | Facility: CLINIC | Age: 44
End: 2019-02-15

## 2019-02-15 DIAGNOSIS — F98.8 ATTENTION DEFICIT DISORDER, UNSPECIFIED HYPERACTIVITY PRESENCE: ICD-10-CM

## 2019-02-15 RX ORDER — DEXTROAMPHETAMINE SACCHARATE, AMPHETAMINE ASPARTATE, DEXTROAMPHETAMINE SULFATE AND AMPHETAMINE SULFATE 7.5; 7.5; 7.5; 7.5 MG/1; MG/1; MG/1; MG/1
30 TABLET ORAL 3 TIMES DAILY
Qty: 90 TABLET | Refills: 0 | Status: SHIPPED | OUTPATIENT
Start: 2019-03-02 | End: 2019-03-15

## 2019-02-15 NOTE — TELEPHONE ENCOUNTER
Patient is requesting March Adderall script so there is time to mail script to [facility] in [place].    Adderall      Last Written Prescription Date:  2/2/19  Last Fill Quantity: 90,   # refills: 0  Last Office Visit: 1/18/19  Future Office visit:       Routing refill request to provider for review/approval because:  Drug not on the [facility], P or [facility] refill protocol or controlled substance

## 2019-03-14 ENCOUNTER — MYC MEDICAL ADVICE (OUTPATIENT)
Dept: PEDIATRICS | Facility: CLINIC | Age: 44
End: 2019-03-14

## 2019-03-14 DIAGNOSIS — F98.8 ATTENTION DEFICIT DISORDER, UNSPECIFIED HYPERACTIVITY PRESENCE: ICD-10-CM

## 2019-03-14 NOTE — TELEPHONE ENCOUNTER
Adderall was sent on 2/15 to start on 3/2 so she should have two weeks left.   Called [facility] and they state is was filled on 2/27.  Sent MyChart.  Zaida Ramirez RN

## 2019-03-15 RX ORDER — DEXTROAMPHETAMINE SACCHARATE, AMPHETAMINE ASPARTATE, DEXTROAMPHETAMINE SULFATE AND AMPHETAMINE SULFATE 7.5; 7.5; 7.5; 7.5 MG/1; MG/1; MG/1; MG/1
30 TABLET ORAL 3 TIMES DAILY
Qty: 90 TABLET | Refills: 0 | Status: SHIPPED | OUTPATIENT
Start: 2019-03-27 | End: 2019-04-15

## 2019-03-15 NOTE — TELEPHONE ENCOUNTER
Please inform patient, refill/prescription approved and when prescription is ready to be picked up at .   Refill date 3/27  Last refill 2/27 02/27/2019  4  02/15/2019  Dextroamp-Amphetamin 30 Mg Tab  90 30 L Rn  [id number] [facility] ([id number])  0  [place]  [place]   01/31/2019  4  01/17/2019  Dextroamp-Amphetamin 30 Mg Tab  90 30 L Rn  [id number] [facility] ([id number][place]  [place]   01/18/2019  4  01/18/2019  Tramadol Hcl 50 Mg Tablet  20 5 L Rn  [id number] [facility] ([id number])  0 20.00 M[place]   01/04/2019  4  01/03/2019  Dextroamp-Amphetamin 30 Mg Tab  90 30 L Rn  [id number] [facility] ([id number])  0  [place]   12/13/2018  3  12/13/2018  Dextroamp-Amphetamin 30 Mg Tab  60 30 Br Lab  [id number] [facility] ([id number])  0  [place]   12/01/2018  1  11/20/2018  Dextroamp-Amphetamin 30 Mg Tab  60 30 Br Lab  [id number] [facility] ([id number])  0  [place]   11/23/2018  2  11/20/2018  Dextroamp-Amphetamin 30 Mg Tab  60 30 Br Lab  [id number] [facility] ([id number])  0  [place]

## 2019-03-18 DIAGNOSIS — M62.830 BACK MUSCLE SPASM: ICD-10-CM

## 2019-03-19 RX ORDER — METHOCARBAMOL 500 MG/1
TABLET, FILM COATED ORAL
Qty: 60 TABLET | Refills: 3 | Status: SHIPPED | OUTPATIENT
Start: 2019-03-19 | End: 2019-07-10

## 2019-03-19 NOTE — TELEPHONE ENCOUNTER
methocarbamol (ROBAXIN) 500 MG tablet  Last Written Prescription Date:  2/6/19  Last Fill Quantity: 60,  # refills: 0   Last office visit: 1/18/2019 with prescribing provider:     Future Office Visit:    Routing refill request to provider for review/approval because:  Drug not on the [facility] refill protocol     Radha Peralta RN   [facility]

## 2019-04-02 ENCOUNTER — MYC MEDICAL ADVICE (OUTPATIENT)
Dept: PEDIATRICS | Facility: CLINIC | Age: 44
End: 2019-04-02

## 2019-04-02 NOTE — TELEPHONE ENCOUNTER
Rx was completed on 3/27/19, mailed to the designated pharmacy on 3/28/19.    Routed to patient message to call her pharmacy to see if arrived today.    Lorena Obando RN, [facility]

## 2019-04-15 ENCOUNTER — MYC MEDICAL ADVICE (OUTPATIENT)
Dept: PEDIATRICS | Facility: CLINIC | Age: 44
End: 2019-04-15

## 2019-04-15 DIAGNOSIS — F98.8 ATTENTION DEFICIT DISORDER, UNSPECIFIED HYPERACTIVITY PRESENCE: ICD-10-CM

## 2019-04-15 PROBLEM — R87.810 CERVICAL HIGH RISK HPV (HUMAN PAPILLOMAVIRUS) TEST POSITIVE: Status: ACTIVE | Noted: 2018-04-27

## 2019-04-15 RX ORDER — DEXTROAMPHETAMINE SACCHARATE, AMPHETAMINE ASPARTATE, DEXTROAMPHETAMINE SULFATE AND AMPHETAMINE SULFATE 7.5; 7.5; 7.5; 7.5 MG/1; MG/1; MG/1; MG/1
30 TABLET ORAL 3 TIMES DAILY
Qty: 90 TABLET | Refills: 0 | Status: SHIPPED | OUTPATIENT
Start: 2019-04-26 | End: 2019-05-09

## 2019-04-15 NOTE — TELEPHONE ENCOUNTER
Adderall      Last Written Prescription Date:  3/27/19*  Last Fill Quantity: 90,   # refills: 0  Last Office Visit: 1/18/19  Future Office visit:       Routing refill request to provider for review/approval because:  Drug not on the [facility], P or [facility] refill protocol or controlled substance

## 2019-04-17 ENCOUNTER — NURSE TRIAGE (OUTPATIENT)
Dept: NURSING | Facility: CLINIC | Age: 44
End: 2019-04-17

## 2019-04-17 ENCOUNTER — TELEPHONE (OUTPATIENT)
Dept: PEDIATRICS | Facility: CLINIC | Age: 44
End: 2019-04-17

## 2019-04-17 NOTE — TELEPHONE ENCOUNTER
Patient asking about pre approval for ADDERALL refill.  Explained to patient that clinic won't get the message until they open in the morning.  Gris Swartz RN  [facility]

## 2019-04-17 NOTE — TELEPHONE ENCOUNTER
Pt states she is going out of the country tomorrow morning. Her Rx is set to be filled on 4/27, but she will still be out of the country and does not have enough of her medication while on her trip.    Pt is inquiring if she'd be able to  the Rx early (today). Please advise.

## 2019-04-17 NOTE — TELEPHONE ENCOUNTER
[facility] pharmacy in [place], [place] calling regarding patient's request for early approval for Adderall refill.      Patient is going to [place] 4-18-19 and will be gone before she can  her refill on 4-26.19 Please call [facility] pharmacy ASAP at [phone].    Routed to [facility]  Gris Swartz RN  [facility]

## 2019-04-18 NOTE — TELEPHONE ENCOUNTER
Reason for Disposition    Caller has NON-URGENT medication question about med that PCP prescribed and triager unable to answer question    Protocols used: MEDICATION QUESTION CALL-ADULT-AH

## 2019-04-18 NOTE — TELEPHONE ENCOUNTER
We can call and say ok to this but then needs to know next refill is still not until 5/26 and let the pharmacy know as well as this is a week early

## 2019-04-18 NOTE — TELEPHONE ENCOUNTER
Called [facility] pharmacy to verify Rx mailed to pharmacy on 4/15/19 was received. Spoke with pharmacist to give verbal ok to fill today per Iman Forbes CNP.    Sent [email or web address] message to inform patient of update and inform of provider message regarding next fill date.    Wilver WADE CMA

## 2019-04-18 NOTE — TELEPHONE ENCOUNTER
Routing encounter to PCP to advise on early fill date due to patient traveling.  Shobha Roldan, CMA

## 2019-05-09 ENCOUNTER — MYC MEDICAL ADVICE (OUTPATIENT)
Dept: PEDIATRICS | Facility: CLINIC | Age: 44
End: 2019-05-09

## 2019-05-09 DIAGNOSIS — F98.8 ATTENTION DEFICIT DISORDER, UNSPECIFIED HYPERACTIVITY PRESENCE: ICD-10-CM

## 2019-05-10 RX ORDER — DEXTROAMPHETAMINE SACCHARATE, AMPHETAMINE ASPARTATE, DEXTROAMPHETAMINE SULFATE AND AMPHETAMINE SULFATE 7.5; 7.5; 7.5; 7.5 MG/1; MG/1; MG/1; MG/1
30 TABLET ORAL 3 TIMES DAILY
Qty: 90 TABLET | Refills: 0 | Status: SHIPPED | OUTPATIENT
Start: 2019-05-26 | End: 2019-06-03

## 2019-05-27 ENCOUNTER — MYC MEDICAL ADVICE (OUTPATIENT)
Dept: PEDIATRICS | Facility: CLINIC | Age: 44
End: 2019-05-27

## 2019-05-27 DIAGNOSIS — E53.8 COBALAMIN DEFICIENCY: ICD-10-CM

## 2019-05-27 DIAGNOSIS — Z98.84 BARIATRIC SURGERY STATUS: ICD-10-CM

## 2019-05-28 RX ORDER — CYANOCOBALAMIN 1000 UG/ML
1 INJECTION, SOLUTION INTRAMUSCULAR; SUBCUTANEOUS
Qty: 0.9 ML | Refills: 7 | Status: SHIPPED | OUTPATIENT
Start: 2019-05-28 | End: 2019-07-10

## 2019-05-28 NOTE — TELEPHONE ENCOUNTER
Prescription approved per [facility] Refill Protocol.  Cyanocobalamin refilled.    Informed patient that the medication was refilled however asking for clarification for the needles and syringes. This is new and would need to be first filled by provider before RN can fill.    Called the [facility] in [place], they said they just sold her the needles to go with the medication as no syringe was needed.    Lorena Obando RN, [facility]

## 2019-06-03 ENCOUNTER — MYC MEDICAL ADVICE (OUTPATIENT)
Dept: PEDIATRICS | Facility: CLINIC | Age: 44
End: 2019-06-03

## 2019-06-03 DIAGNOSIS — F98.8 ATTENTION DEFICIT DISORDER, UNSPECIFIED HYPERACTIVITY PRESENCE: ICD-10-CM

## 2019-06-03 RX ORDER — DEXTROAMPHETAMINE SACCHARATE, AMPHETAMINE ASPARTATE, DEXTROAMPHETAMINE SULFATE AND AMPHETAMINE SULFATE 7.5; 7.5; 7.5; 7.5 MG/1; MG/1; MG/1; MG/1
30 TABLET ORAL 3 TIMES DAILY
Qty: 90 TABLET | Refills: 0 | Status: SHIPPED | OUTPATIENT
Start: 2019-06-03 | End: 2019-06-16

## 2019-06-03 NOTE — TELEPHONE ENCOUNTER
We can do a refill one time only for a lost script   Please inform patient, refill/prescription approved and when prescription is ready to be picked up at .

## 2019-06-03 NOTE — TELEPHONE ENCOUNTER
Called and left  for patient to call back. Mychart sent to patient. Script held on MA desk.    COLE Ramos
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
## 2019-06-14 ENCOUNTER — MYC MEDICAL ADVICE (OUTPATIENT)
Dept: PEDIATRICS | Facility: CLINIC | Age: 44
End: 2019-06-14

## 2019-06-14 DIAGNOSIS — F98.8 ATTENTION DEFICIT DISORDER, UNSPECIFIED HYPERACTIVITY PRESENCE: ICD-10-CM

## 2019-06-14 NOTE — TELEPHONE ENCOUNTER
Routing [employer] message to Anayeli Forbes NP  to please review when able.          amphetamine-dextroamphetamine (ADDERALL) 30 MG tablet 90 tablet 0 6/3/2019  No   Sig - Route: Take 1 tablet (30 mg) by mouth 3 times daily - Oral         Last Written Prescription Date:  6/3/19  Last Fill Quantity: 90,  # refills: 0   Last office visit: 1/18/2019 with prescribing provider:  Anayeli Forbes NP   Future Office Visit:        Lorena Obando RN, [facility]

## 2019-06-16 RX ORDER — DEXTROAMPHETAMINE SACCHARATE, AMPHETAMINE ASPARTATE, DEXTROAMPHETAMINE SULFATE AND AMPHETAMINE SULFATE 7.5; 7.5; 7.5; 7.5 MG/1; MG/1; MG/1; MG/1
30 TABLET ORAL 3 TIMES DAILY
Qty: 90 TABLET | Refills: 0 | Status: SHIPPED | OUTPATIENT
Start: 2019-06-26 | End: 2019-07-11

## 2019-06-17 NOTE — TELEPHONE ENCOUNTER
Script mailed to [facility] pharmacy. mychart sent to patient to inform.    COLE Ramos

## 2019-06-17 NOTE — TELEPHONE ENCOUNTER
She can fill this one early on June 26 as was supposed to be filled July 2  So next refill will be August after this one

## 2019-06-17 NOTE — TELEPHONE ENCOUNTER
Please inform patient, refill/prescriptioni approved. Please mail prescription to designated pharmacy.   OK to fill 6/26 and next refill will be August 2 as this will be an early refill

## 2019-06-25 NOTE — TELEPHONE ENCOUNTER
Called [facility] and gave a verbal for the early Adderall fill and left a VM that we completed what she had requested. Sent MyChart.  Zaida Ramirez RN

## 2019-06-25 NOTE — TELEPHONE ENCOUNTER
Pt is requesting a call back from clinic ASAP. Pt states pharmacy is not releasing medication until tomorrow to her and she was needing this script today as she is going out of town.

## 2019-06-25 NOTE — TELEPHONE ENCOUNTER
Please call and let her know we can call to OK to fill today  Noted she requested to  tomorrow   Could you please send my next adderall rx to [facility] pharmacy in [place] to start 7/4?  [place] are leaving [place]27 for the [place]- can I pick it up at pharmacy 6/26?  Next fill is not until August 2 as this is an early refill

## 2019-07-11 ENCOUNTER — MYC MEDICAL ADVICE (OUTPATIENT)
Dept: PEDIATRICS | Facility: CLINIC | Age: 44
End: 2019-07-11

## 2019-07-11 DIAGNOSIS — F98.8 ATTENTION DEFICIT DISORDER, UNSPECIFIED HYPERACTIVITY PRESENCE: ICD-10-CM

## 2019-07-11 RX ORDER — DEXTROAMPHETAMINE SACCHARATE, AMPHETAMINE ASPARTATE, DEXTROAMPHETAMINE SULFATE, AND AMPHETAMINE SULFATE 7.5; 7.5; 7.5; 7.5 MG/1; MG/1; MG/1; MG/1
30 TABLET ORAL 3 TIMES DAILY
Qty: 90 TABLET | Refills: 0 | Status: SHIPPED | OUTPATIENT
Start: 2019-08-02 | End: 2019-08-02

## 2019-07-11 NOTE — TELEPHONE ENCOUNTER
Next fill would be due on 8/2 per 6/14 encounter.    Adderall      Last Written Prescription Date:  6/26 to start on 7/4  Last Fill Quantity: 90,   # refills: 0  Last Office Visit: 1/18  Future Office visit:       Routing refill request to provider for review/approval because:  Drug not on the [facility], P or [facility] refill protocol or controlled substance

## 2019-07-12 NOTE — TELEPHONE ENCOUNTER
Please inform patient, refill/prescriptioni approved. Please mail prescription to designated pharmacy.   This script to start on August 2

## 2019-07-12 NOTE — TELEPHONE ENCOUNTER
Mailed script to [facility] pharmacy. Sent mychart to inform patient.  Wilver WADE CMA

## 2019-07-18 ENCOUNTER — TELEPHONE (OUTPATIENT)
Dept: PEDIATRICS | Facility: CLINIC | Age: 44
End: 2019-07-18

## 2019-07-18 DIAGNOSIS — M62.830 BACK MUSCLE SPASM: ICD-10-CM

## 2019-07-18 RX ORDER — METHOCARBAMOL 500 MG/1
TABLET, FILM COATED ORAL
Qty: 60 TABLET | Refills: 3 | OUTPATIENT
Start: 2019-07-18
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
## 2019-07-18 NOTE — TELEPHONE ENCOUNTER
Pending Prescriptions:                       Disp   Refills    methocarbamol (ROBAXIN) 500 MG tablet [Ph*60 tab*3            Sig: TAKE 1 TABLET BY MOUTH 3 TIMES DAILY AS NEEDED           FOR MUSCLE SPASMS      Last Written Prescription Date:  7/10/19  Last Fill Quantity: 60,  # refills: 3   Duplicate request

## 2019-07-18 NOTE — TELEPHONE ENCOUNTER
Patient states she's [place] [employer] parking lot now, called to okay an early fill for Adderall due to lost script. She will run it through insurance now, but is unsure when they will cover it.   She was seen at [facility].  Zaida Ramirez RN

## 2019-07-18 NOTE — TELEPHONE ENCOUNTER
Per chart review this was filled on 7/10/19.    Radha Peralta, RN, BSN, PHN  .[facility]
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
## 2019-08-02 ENCOUNTER — MYC MEDICAL ADVICE (OUTPATIENT)
Dept: PEDIATRICS | Facility: CLINIC | Age: 44
End: 2019-08-02

## 2019-08-02 DIAGNOSIS — F98.8 ATTENTION DEFICIT DISORDER, UNSPECIFIED HYPERACTIVITY PRESENCE: ICD-10-CM

## 2019-08-02 RX ORDER — DEXTROAMPHETAMINE SACCHARATE, AMPHETAMINE ASPARTATE, DEXTROAMPHETAMINE SULFATE AND AMPHETAMINE SULFATE 7.5; 7.5; 7.5; 7.5 MG/1; MG/1; MG/1; MG/1
30 TABLET ORAL 3 TIMES DAILY
Qty: 90 TABLET | Refills: 0 | Status: SHIPPED | OUTPATIENT
Start: 2019-08-18 | End: 2019-08-23

## 2019-08-02 NOTE — TELEPHONE ENCOUNTER
[facility] Call Center    Phone Message    May a detailed message be left on voicemail: yes    Reason for Call: Other: Patient requesting a call back in regards to her adderall. Destiney call her work number if it's before 2pm and her cell if its after that. Please advise.     Action Taken: Message routed to:  Primary Care p [phone]

## 2019-08-02 NOTE — TELEPHONE ENCOUNTER
Spoke to patient, she picked up 90 tablets of Adderall on 7/18/19.    She is being proactive and asking for a refill to be mailed to the [facility] pharmacy for next fill on 8/18/19.      Routing to Anayeli Forbes NP to review.    Lorena Obando RN, [facility]

## 2019-08-02 NOTE — TELEPHONE ENCOUNTER
Sent message back to patient regarding this request.  These medications should be at the pharmacy.  Asking patient to call the Pharmacy.    cyanocobalamin (CYANOCOBALAMIN) 1000 MCG/ML injection 3 mL 1 7/10/2019  No   Sig - Route: Inject 1 mL (1,000 mcg) into the muscle every 30 days 1 ML every 4 weeks - Intramuscular   Sent to pharmacy as: cyanocobalamin (CYANOCOBALAMIN) 1000 MCG/ML injection       ADDERALL 30 MG tablet 90 tablet 0 8/2/2019  Yes   Sig - Route: Take 1 tablet (30 mg) by mouth 3 times daily - Oral     Lorena Obando RN, [facility]

## 2019-08-04 DIAGNOSIS — M62.830 BACK MUSCLE SPASM: ICD-10-CM

## 2019-08-05 RX ORDER — METHOCARBAMOL 500 MG/1
TABLET, FILM COATED ORAL
Qty: 60 TABLET | Refills: 3 | Status: SHIPPED | OUTPATIENT
Start: 2019-08-05 | End: 2020-02-06

## 2019-08-05 NOTE — TELEPHONE ENCOUNTER
Pending Prescriptions:                       Disp   Refills    methocarbamol (ROBAXIN) 500 MG tablet [Ph*60 tab*3            Sig: TAKE 1 TABLET BY MOUTH 3 TIMES DAILY AS NEEDED           FOR MUSCLE SPASMS      Last Written Prescription Date:  7/10/19  Last Fill Quantity: 60,  # refills: 3   Last office visit: 1/18/2019 with prescribing provider:  Iman Forbes CNP   Future Office Visit:      Routing refill request to provider for review/approval because:  Drug not on the [facility] or [facility] refill protocol or controlled substance

## 2019-08-21 ENCOUNTER — MYC REFILL (OUTPATIENT)
Dept: PEDIATRICS | Facility: CLINIC | Age: 44
End: 2019-08-21

## 2019-08-21 DIAGNOSIS — F98.8 ATTENTION DEFICIT DISORDER, UNSPECIFIED HYPERACTIVITY PRESENCE: ICD-10-CM

## 2019-08-22 NOTE — TELEPHONE ENCOUNTER
amphetamine-dextroamphetamine (ADDERALL) 30 MG tablet  Last Written Prescription Date:  8/18/19  Last Fill Quantity: 90,  # refills: 0   Last office visit: 1/18/2019 with prescribing provider:     Future Office Visit:      Routing refill request to provider for review/approval because:  Drug not on the [facility] refill protocol     Radha Peralta RN, BSN, PHN  [facility]

## 2019-08-23 ENCOUNTER — MYC MEDICAL ADVICE (OUTPATIENT)
Dept: PEDIATRICS | Facility: CLINIC | Age: 44
End: 2019-08-23

## 2019-08-23 DIAGNOSIS — F98.8 ATTENTION DEFICIT DISORDER, UNSPECIFIED HYPERACTIVITY PRESENCE: ICD-10-CM

## 2019-08-23 RX ORDER — DEXTROAMPHETAMINE SACCHARATE, AMPHETAMINE ASPARTATE, DEXTROAMPHETAMINE SULFATE AND AMPHETAMINE SULFATE 7.5; 7.5; 7.5; 7.5 MG/1; MG/1; MG/1; MG/1
30 TABLET ORAL 3 TIMES DAILY
Qty: 90 TABLET | Refills: 0 | Status: ON HOLD | OUTPATIENT
Start: 2019-09-18 | End: 2020-02-10

## 2019-08-23 RX ORDER — DEXTROAMPHETAMINE SACCHARATE, AMPHETAMINE ASPARTATE, DEXTROAMPHETAMINE SULFATE AND AMPHETAMINE SULFATE 7.5; 7.5; 7.5; 7.5 MG/1; MG/1; MG/1; MG/1
30 TABLET ORAL 3 TIMES DAILY
Qty: 90 TABLET | Refills: 0 | Status: CANCELLED | OUTPATIENT
Start: 2019-08-23

## 2019-08-23 RX ORDER — DEXTROAMPHETAMINE SACCHARATE, AMPHETAMINE ASPARTATE, DEXTROAMPHETAMINE SULFATE AND AMPHETAMINE SULFATE 7.5; 7.5; 7.5; 7.5 MG/1; MG/1; MG/1; MG/1
30 TABLET ORAL 3 TIMES DAILY
Qty: 90 TABLET | Refills: 0 | OUTPATIENT
Start: 2019-08-23

## 2019-08-23 NOTE — TELEPHONE ENCOUNTER
Multiple encounter open regarding same request. Duplicate encounter states above message. Patient asking for Rx for September be printed, signed and mailed to pharmacy.    Routing to Iman Forbes CNP for review of above info and approval/denial of script for September before contacting patient with need for OV scheduling.  Wilver WADE, CMA

## 2019-08-23 NOTE — TELEPHONE ENCOUNTER
Can we check with her pharmacy on this as looks like there was an 8/18 script mailed already?  Also is due for follow up before next refill

## 2019-08-23 NOTE — TELEPHONE ENCOUNTER
amphetamine-dextroamphetamine (ADDERALL) 30 MG tablet  Last Written Prescription Date:  8/2/19  Last Fill Quantity: 90,  # refills: 0   Last office visit: 1/18/2019 with prescribing provider:     Future Office Visit:      Routing refill request to provider for review/approval because:  Drug not on the [facility] refill protocol     Radha Peralta RN, BSN, PHN  [facility]

## 2019-08-24 NOTE — TELEPHONE ENCOUNTER
Please inform patient, refill/prescriptioni approved. Please mail prescription to designated pharmacy.   Will need office appointment before next refill

## 2019-09-16 ENCOUNTER — OFFICE VISIT (OUTPATIENT)
Dept: PEDIATRICS | Facility: CLINIC | Age: 44
End: 2019-09-16

## 2019-09-16 VITALS
BODY MASS INDEX: 25.81 KG/M2 | TEMPERATURE: 97.7 F | WEIGHT: 151.2 LBS | SYSTOLIC BLOOD PRESSURE: 142 MMHG | DIASTOLIC BLOOD PRESSURE: 92 MMHG | HEART RATE: 81 BPM | OXYGEN SATURATION: 100 % | HEIGHT: 64 IN

## 2019-09-16 DIAGNOSIS — Z98.84 BARIATRIC SURGERY STATUS: ICD-10-CM

## 2019-09-16 DIAGNOSIS — E53.8 COBALAMIN DEFICIENCY: ICD-10-CM

## 2019-09-16 DIAGNOSIS — G47.00 INSOMNIA, UNSPECIFIED TYPE: ICD-10-CM

## 2019-09-16 DIAGNOSIS — K02.9 DENTAL CARIES: ICD-10-CM

## 2019-09-16 DIAGNOSIS — F98.8 ATTENTION DEFICIT DISORDER, UNSPECIFIED HYPERACTIVITY PRESENCE: Primary | ICD-10-CM

## 2019-09-16 PROCEDURE — 80307 DRUG TEST PRSMV CHEM ANLYZR: CPT | Mod: 90 | Performed by: NURSE PRACTITIONER

## 2019-09-16 PROCEDURE — 99000 SPECIMEN HANDLING OFFICE-LAB: CPT | Performed by: NURSE PRACTITIONER

## 2019-09-16 PROCEDURE — 99213 OFFICE O/P EST LOW 20 MIN: CPT | Performed by: NURSE PRACTITIONER

## 2019-09-16 RX ORDER — AMITRIPTYLINE HYDROCHLORIDE 10 MG/1
20 TABLET ORAL AT BEDTIME
Qty: 180 TABLET | Refills: 3 | Status: ON HOLD | OUTPATIENT
Start: 2019-09-16 | End: 2020-02-10

## 2019-09-16 RX ORDER — CYANOCOBALAMIN 1000 UG/ML
1 INJECTION, SOLUTION INTRAMUSCULAR; SUBCUTANEOUS
Qty: 3 ML | Refills: 1 | Status: ON HOLD | OUTPATIENT
Start: 2019-09-16 | End: 2020-02-10

## 2019-09-16 ASSESSMENT — ANXIETY QUESTIONNAIRES
3. WORRYING TOO MUCH ABOUT DIFFERENT THINGS: NOT AT ALL
1. FEELING NERVOUS, ANXIOUS, OR ON EDGE: SEVERAL DAYS
6. BECOMING EASILY ANNOYED OR IRRITABLE: NOT AT ALL
2. NOT BEING ABLE TO STOP OR CONTROL WORRYING: NOT AT ALL
7. FEELING AFRAID AS IF SOMETHING AWFUL MIGHT HAPPEN: NOT AT ALL
5. BEING SO RESTLESS THAT IT IS HARD TO SIT STILL: NOT AT ALL
IF YOU CHECKED OFF ANY PROBLEMS ON THIS QUESTIONNAIRE, HOW DIFFICULT HAVE THESE PROBLEMS MADE IT FOR YOU TO DO YOUR WORK, TAKE CARE OF THINGS AT HOME, OR GET ALONG WITH OTHER PEOPLE: NOT DIFFICULT AT ALL
GAD7 TOTAL SCORE: 2

## 2019-09-16 ASSESSMENT — PATIENT HEALTH QUESTIONNAIRE - PHQ9
SUM OF ALL RESPONSES TO PHQ QUESTIONS 1-9: 0
5. POOR APPETITE OR OVEREATING: SEVERAL DAYS

## 2019-09-16 ASSESSMENT — MIFFLIN-ST. JEOR: SCORE: 1324.81

## 2019-09-16 NOTE — PATIENT INSTRUCTIONS
PLAN:   1.   Symptomatic therapy suggested: rest, increase fluids and call prn if symptoms persist or worsen.  Continue current medication regimen unchanged.    2.  Orders Placed This Encounter   Medications     amitriptyline (ELAVIL) 10 MG tablet     Sig: Take 2 tablets (20 mg) by mouth At Bedtime     Dispense:  180 tablet     Refill:  3     cyanocobalamin (CYANOCOBALAMIN) 1000 MCG/ML injection     Sig: Inject 1 mL (1,000 mcg) into the muscle every 30 days 1 ML every 4 weeks     Dispense:  3 mL     Refill:  1     amoxicillin-clavulanate (AUGMENTIN) 875-125 MG tablet     Sig: Take 1 tablet by mouth 2 times daily     Dispense:  20 tablet     Refill:  0     Orders Placed This Encounter   Procedures     Pain Drug Scr UR W Rptd Meds       3. Patient needs to follow up in if no improvement,or sooner if worsening of symptoms or other symptoms develop.  CONSULTATION/REFERRAL to dentist   Follow up in 6 months.  Will follow up and/or notify patient of  results via My Chart to determine further need for followup

## 2019-09-16 NOTE — PROGRESS NOTES
Subjective     Aliyah Angeles is a 43 year old female who presents to clinic today for the following health issues:    HPI   Medication Followup of adderall 30 mg    Taking Medication as prescribed: yes    Side Effects:  None    Medication Helping Symptoms:  yes   Patient is here for follow   At present is uninsured and is on [facility] for [facility]   Also is having a tooth issue at present on upper teeth and left upper tooth on the left is painful   Has been living on [place]       Patient Active Problem List   Diagnosis     Acne vulgaris     Attention deficit disorder     Anxiety disorder     Cobalamin deficiency     Other long term (current) drug therapy     Personal history of other medical treatment     Drug-seeking behavior     Stress incontinence in female     Insomnia     Mild episode of recurrent major depressive disorder (H)     Premenstrual dysphoric disorder     Raynaud's phenomenon     Bariatric surgery status     Arthralgia of temporomandibular joint     Vitamin D deficiency     Elevated blood pressure reading without diagnosis of hypertension     Cervical high risk HPV (human papillomavirus) test positive     Right elbow pain     Past Surgical History:   Procedure Laterality Date     ABDOMEN SURGERY  08/10/2010    Gastric  bypass     BIOPSY  94    HPV, multiple reoccurrances, partial  hysterectomy  2013     CHOLECYSTECTOMY  2010     COLONOSCOPY  2009     ENT SURGERY  1988    Tonsils     GI SURGERY  8/10/10    Fitz-en-Y     HYSTERECTOMY VAGINAL  2013    pathology of cervix benign.        Social History     Tobacco Use     Smoking status: Former Smoker     Packs/day: 1.00     Years: 15.00     Pack years: 15.00     Types: Cigarettes     Start date: 1989     Last attempt to quit: 2010     Years since quittin.1     Smokeless tobacco: Never Used   Substance Use Topics     Alcohol use: Yes     Alcohol/week: 0.0 oz     Comment: Occasional/social     Family History    Problem Relation Age of Onset     Prostate Cancer Father      Hypertension Father      Hyperlipidemia Father      Substance Abuse Father      Obesity Father      Diabetes Maternal Grandmother      Colon Cancer Maternal Grandmother      Other Cancer Maternal Grandmother         pancreatic cancer     Obesity Maternal Grandmother      Diabetes Paternal Grandmother      Obesity Paternal Grandmother      Diabetes Paternal Grandfather      Obesity Paternal Grandfather      Breast Cancer Sister      Breast Cancer Sister      Anxiety Disorder Sister      Anesthesia Reaction Sister      Depression Mother      Anesthesia Reaction Mother      Asthma Daughter      Coronary Artery Disease No family hx of      Hypertension No family hx of      Hyperlipidemia No family hx of      Cerebrovascular Disease No family hx of      Depression No family hx of      Anxiety Disorder No family hx of      Mental Illness No family hx of      Substance Abuse No family hx of      Anesthesia Reaction No family hx of      Asthma No family hx of      Osteoporosis No family hx of      Genetic Disorder No family hx of      Thyroid Disease No family hx of      Obesity No family hx of      Unknown/Adopted No family hx of          Current Outpatient Medications   Medication Sig Dispense Refill     amitriptyline (ELAVIL) 10 MG tablet Take 2 tablets (20 mg) by mouth At Bedtime 180 tablet 3     [START ON 9/18/2019] amphetamine-dextroamphetamine (ADDERALL) 30 MG tablet Take 1 tablet (30 mg) by mouth 3 times daily 90 tablet 0     Calcium Citrate-Vitamin D (CALCIUM + D PO) Take 2,000 mg by mouth       cholecalciferol (VITAMIN D3) 5000 UNITS CAPS capsule Take 5,000 Units by mouth daily        clindamycin (CLINDAMAX) 1 % gel        cyanocobalamin (CYANOCOBALAMIN) 1000 MCG/ML injection Inject 1 mL (1,000 mcg) into the muscle every 30 days 1 ML every 4 weeks 3 mL 1     DRYSOL 20 % external solution        escitalopram (LEXAPRO) 20 MG tablet Take 1 tablet (20 mg)  "by mouth daily 30 tablet 0     methocarbamol (ROBAXIN) 500 MG tablet TAKE 1 TABLET BY MOUTH 3 TIMES DAILY AS NEEDED FOR MUSCLE SPASMS 60 tablet 3     methocarbamol (ROBAXIN) 500 MG tablet TAKE 1 TABLET BY MOUTH 3 TIMES DAILY AS NEEDED FOR MUSCLE SPASMS 60 tablet 3     MULTIPLE VITAMIN PO Take  by mouth.       Needle, Disp, (B-D DISP NEEDLE) 25G X 1\" MISC 1 each every 30 days 12 each 0     Omega-3 Fatty Acids (OMEGA 3 PO) Take  by mouth.       tretinoin (RETIN-A) 0.025 % cream        order for DME Equipment being ordered: sling (Patient not taking: Reported on 9/16/2019) 1 Device 0     Allergies   Allergen Reactions     Nsaids      Gastric bypass     BP Readings from Last 3 Encounters:   09/16/19 (!) 170/110   01/18/19 (!) 142/100   01/03/19 125/90    Wt Readings from Last 3 Encounters:   09/16/19 68.6 kg (151 lb 3.2 oz)   01/18/19 71.5 kg (157 lb 11.2 oz)   01/03/19 70.5 kg (155 lb 6.4 oz)            Reviewed and updated as needed this visit by Provider         Review of Systems   ROS COMP: CONSTITUTIONAL:NEGATIVE for fever, chills, change in weight  ENT/MOUTH: POSITIVE for tooth pain and NEGATIVE for epistaxis, fever, nasal congestion, postnasal drainage and sinus pressuretooth pain   RESP:NEGATIVE for significant cough or SOB  CV: NEGATIVE for chest pain/chest pressure  MUSCULOSKELETAL: NEGATIVE for significant arthralgias or myalgia  NEURO: NEGATIVE for weakness, dizziness or paresthesias  ENDOCRINE: NEGATIVE for temperature intolerance, skin/hair changes  PSYCHIATRIC: NEGATIVE for changes in mood or affect and POSITIVE forconcentration difficulty, Hx anxiety and Hx depression      Objective    BP (!) 142/92 (BP Location: Right arm, Patient Position: Sitting, Cuff Size: Adult Regular)   Pulse 81   Temp 97.7  F (36.5  C) (Temporal)   Ht 1.632 m (5' 4.25\")   Wt 68.6 kg (151 lb 3.2 oz)   SpO2 100%   Breastfeeding? No   BMI 25.75 kg/m    Body mass index is 25.75 kg/m .  Physical Exam   GENERAL APPEARANCE: " alert, active and no distress  Oropharyngeal exam - broken tooth upper left side of mouth. No swelling but patient describes pain into above this tooth.  NECK: no adenopathy, no asymmetry, masses, or scars and thyroid normal to palpation  RESP: lungs clear to auscultation - no rales, rhonchi or wheezes  CV: regular rates and rhythm and no murmur, click or rub  MS: extremities normal- no gross deformities noted  SKIN: Skin color, texture, turgor normal.    NEURO: Normal strength and tone, mentation intact and speech normal  PSYCH: mentation appears normal and affect normal/bright  MENTAL STATUS EXAM:  Appearance/Behavior: No apparent distress and Dressed appropriately for weather  Speech: Normal  Mood/Affect: normal affect  Insight: Adequate    Diagnostic Test Results:  Pending orders and results           Assessment & Plan     Aliyah was seen today for a.d.h.d.    Diagnoses and all orders for this visit:    Attention deficit disorder, unspecified hyperactivity presence  -     Pain Drug Scr UR W Rptd Meds  The current medical regimen is effective;  continue present plan and medications.    Insomnia, unspecified type  -     amitriptyline (ELAVIL) 10 MG tablet; Take 2 tablets (20 mg) by mouth At Bedtime  The current medical regimen is effective;  continue present plan and medications.    Bariatric surgery status  -     cyanocobalamin (CYANOCOBALAMIN) 1000 MCG/ML injection; Inject 1 mL (1,000 mcg) into the muscle every 30 days 1 ML every 4 weeks  The current medical regimen is effective;  continue present plan and medications.    Cobalamin deficiency  -     cyanocobalamin (CYANOCOBALAMIN) 1000 MCG/ML injection; Inject 1 mL (1,000 mcg) into the muscle every 30 days 1 ML every 4 weeks    Dental caries  -     amoxicillin-clavulanate (AUGMENTIN) 875-125 MG tablet; Take 1 tablet by mouth 2 times daily  Patient needs to follow up in if no improvement,or sooner if worsening of symptoms or other symptoms develop.  Told to make  "appointment with dentist       See Patient Instructions  Patient Instructions     PLAN:   1.   Symptomatic therapy suggested: rest, increase fluids and call prn if symptoms persist or worsen.  Continue current medication regimen unchanged.    2.  Orders Placed This Encounter   Medications     amitriptyline (ELAVIL) 10 MG tablet     Sig: Take 2 tablets (20 mg) by mouth At Bedtime     Dispense:  180 tablet     Refill:  3     cyanocobalamin (CYANOCOBALAMIN) 1000 MCG/ML injection     Sig: Inject 1 mL (1,000 mcg) into the muscle every 30 days 1 ML every 4 weeks     Dispense:  3 mL     Refill:  1     amoxicillin-clavulanate (AUGMENTIN) 875-125 MG tablet     Sig: Take 1 tablet by mouth 2 times daily     Dispense:  20 tablet     Refill:  0     Orders Placed This Encounter   Procedures     Pain Drug Scr UR W Rptd Meds       3. Patient needs to follow up in if no improvement,or sooner if worsening of symptoms or other symptoms develop.  CONSULTATION/REFERRAL to dentist   Follow up in 6 months.  Will follow up and/or notify patient of  results via My Chart to determine further need for followup      BMI:   Estimated body mass index is 25.75 kg/m  as calculated from the following:    Height as of this encounter: 1.632 m (5' 4.25\").    Weight as of this encounter: 68.6 kg (151 lb 3.2 oz).   Weight management plan: Discussed healthy diet and exercise guidelines      No follow-ups on file.    MARIA ELENA Boateng CNP  [facility]      "

## 2019-09-17 ENCOUNTER — TELEPHONE (OUTPATIENT)
Dept: PEDIATRICS | Facility: CLINIC | Age: 44
End: 2019-09-17

## 2019-09-17 ENCOUNTER — MYC MEDICAL ADVICE (OUTPATIENT)
Dept: PEDIATRICS | Facility: CLINIC | Age: 44
End: 2019-09-17

## 2019-09-17 DIAGNOSIS — F98.8 ATTENTION DEFICIT DISORDER, UNSPECIFIED HYPERACTIVITY PRESENCE: ICD-10-CM

## 2019-09-17 RX ORDER — DEXTROAMPHETAMINE SACCHARATE, AMPHETAMINE ASPARTATE, DEXTROAMPHETAMINE SULFATE AND AMPHETAMINE SULFATE 7.5; 7.5; 7.5; 7.5 MG/1; MG/1; MG/1; MG/1
30 TABLET ORAL 3 TIMES DAILY
Qty: 90 TABLET | Refills: 0 | Status: CANCELLED | OUTPATIENT
Start: 2019-09-18

## 2019-09-17 ASSESSMENT — ANXIETY QUESTIONNAIRES: GAD7 TOTAL SCORE: 2

## 2019-09-17 NOTE — TELEPHONE ENCOUNTER
Looked up  and last fill date was 8/28 which was an early fill so her next fill as previous was 8/6 so her next date she can  is actually October 4   Looks like she has had an early fill in June and July as well so her next fill will be October 4   We can send in a script to fill on October 4 08/28/2019 4 08/28/2019 Dextroamp-Amphetamin 30 Mg Tab 90.00 30 L Rn [id number] [id number]  08/06/2019 3 07/11/2019 Dextroamp-Amphetamin 30 Mg Tab 90.00 30 L Rn [id number] [facility] [id number]  07/18/2019 3 07/11/2019 Dextroamp-Amphetamin 30 Mg Tab 90.00 30 L Rn [id number] [facility] [id number]  06/25/2019 3 06/16/2019 Dextroamp-Amphetamin 30 Mg Tab 90.00 30 L Rn [id number] [facility] [place]  06/04/2019 4 06/03/2019 Dextroamp-Amphetamin 30 Mg Tab 90.00 30 L Rn [id number] [facility] [id number][place]

## 2019-09-17 NOTE — TELEPHONE ENCOUNTER
8/26 chart review appears that next refill is due on 10/4. A early fill was okayed on 8/28.  Zaida Ramirez RN

## 2019-09-20 LAB — PAIN DRUG SCR UR W RPTD MEDS: NORMAL

## 2019-10-11 ENCOUNTER — MYC MEDICAL ADVICE (OUTPATIENT)
Dept: PEDIATRICS | Facility: CLINIC | Age: 44
End: 2019-10-11

## 2019-10-11 DIAGNOSIS — F98.8 ATTENTION DEFICIT DISORDER, UNSPECIFIED HYPERACTIVITY PRESENCE: Primary | ICD-10-CM

## 2019-10-11 RX ORDER — DEXTROAMPHETAMINE SACCHARATE, AMPHETAMINE ASPARTATE, DEXTROAMPHETAMINE SULFATE AND AMPHETAMINE SULFATE 7.5; 7.5; 7.5; 7.5 MG/1; MG/1; MG/1; MG/1
30 TABLET ORAL 3 TIMES DAILY
Qty: 90 TABLET | Refills: 0 | Status: SHIPPED | OUTPATIENT
Start: 2019-10-24 | End: 2019-10-28

## 2019-10-11 NOTE — TELEPHONE ENCOUNTER
Placed script in mail to pharmacy as requested by patient.  Sent mychart to inform patient.  Wilver WADE CMA

## 2019-10-26 ENCOUNTER — MYC MEDICAL ADVICE (OUTPATIENT)
Dept: PEDIATRICS | Facility: CLINIC | Age: 44
End: 2019-10-26

## 2019-10-26 DIAGNOSIS — F98.8 ATTENTION DEFICIT DISORDER, UNSPECIFIED HYPERACTIVITY PRESENCE: ICD-10-CM

## 2019-10-28 RX ORDER — DEXTROAMPHETAMINE SACCHARATE, AMPHETAMINE ASPARTATE, DEXTROAMPHETAMINE SULFATE AND AMPHETAMINE SULFATE 7.5; 7.5; 7.5; 7.5 MG/1; MG/1; MG/1; MG/1
30 TABLET ORAL 3 TIMES DAILY
Qty: 90 TABLET | Refills: 0 | Status: SHIPPED | OUTPATIENT
Start: 2019-11-15 | End: 2020-02-06

## 2019-10-28 NOTE — TELEPHONE ENCOUNTER
Please inform patient, refill/prescriptioni approved. Please mail prescription to designated pharmacy.   Script written to fill 11/15 but then next refill is 12/23 as this script is early

## 2019-11-15 ENCOUNTER — TELEPHONE (OUTPATIENT)
Dept: PEDIATRICS | Facility: CLINIC | Age: 44
End: 2019-11-15

## 2019-11-15 ENCOUNTER — MYC MEDICAL ADVICE (OUTPATIENT)
Dept: PEDIATRICS | Facility: CLINIC | Age: 44
End: 2019-11-15

## 2019-11-15 DIAGNOSIS — F98.8 ATTENTION DEFICIT DISORDER, UNSPECIFIED HYPERACTIVITY PRESENCE: ICD-10-CM

## 2019-11-15 RX ORDER — DEXTROAMPHETAMINE SACCHARATE, AMPHETAMINE ASPARTATE, DEXTROAMPHETAMINE SULFATE AND AMPHETAMINE SULFATE 7.5; 7.5; 7.5; 7.5 MG/1; MG/1; MG/1; MG/1
30 TABLET ORAL 3 TIMES DAILY
Qty: 90 TABLET | Refills: 0 | Status: CANCELLED | OUTPATIENT
Start: 2019-11-15

## 2019-11-15 NOTE — TELEPHONE ENCOUNTER
We can not [place]  and she received Adderal on 9/24 from myself for 90 tablets  and also from Romulo Santiago for 60 tablets   Because it seems she is getting scripts from 2 different providers I am unable fill any longer       11/01/2019 3 10/28/2019 Dextroamp-Amphetamin 30 Mg Tab 90.00 30 L Rn [id number] [facility] [id number]  10/16/2019 3 10/11/2019 Dextroamp-Amphetamin 30 Mg Tab 90.00 30 L Rn [id number] [facility] [place]  09/24/2019 3 08/23/2019 Dextroamp-Amphetamin 30 Mg Tab 90.00 30 L Rn [id number] [facility] [place]  09/24/2019 4 09/12/2019 Dextroamp-Amphetamin 30 Mg Tab 60.00 30 Br Lab [id number] [facility]  08/28/2019 4 08/28/2019 Dextroamp-Amphetamin 30 Mg Tab 90.00 30 L Rn [id number] [facility] [id number]  08/06/2019 3 07/11/2019 Dextroamp-Amphetamin 30 Mg Tab 90.00 30 L Rn [id number] [facility] [place]  07/18/2019 3 07/11/2019 Dextroamp-Amphetamin 30 Mg Tab 90.00 30 L Rn [id number] [facility] [place]  06/25/2019 3 06/16/2019 Dextroamp-Amphetamin 30 Mg Tab 90.00 30 L Rn [id number] [facility] [place]  06/04/2019 4 06/03/2019 Dextroamp-Amphetamin 30 Mg Tab 90.00 30 L Rn [id number] [facility] [place]75[place]

## 2019-11-15 NOTE — TELEPHONE ENCOUNTER
Just to be fair can we call both the [facility] and the [facility] that she had the scripts filled at on 9/24 to make sure that she had these filled and who the prescribing provider was on the script?   [facility] in [place] and [facility]

## 2019-11-15 NOTE — TELEPHONE ENCOUNTER
Patient states there was two Adderall bottles taken (including one early fill). Patient is [place] way [place] from [place] going to [place].  Zaida Ramirez RN
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
## 2019-11-15 NOTE — TELEPHONE ENCOUNTER
Writer did relay message per NAWAF Cerda stating pt received 2 refills on 9/24/19. Pt does not have access to my chart due to bad reception. Pt would like a call back to discuss with care team that she has not seen Romulo Santiago in 2 years or more. Please advise

## 2019-11-15 NOTE — TELEPHONE ENCOUNTER
We can not [place]  and [place] received Adderal on 9/24 from myself for 90 tablets  and also from Romulo Santiago for 60 tablets   Because it seems she is getting scripts from 2 different providers I am unable fill any longer         11/01/2019 3 10/28/2019 Dextroamp-Amphetamin 30 Mg Tab 90.00 30 L Rn [id number] [facility] [place]  10/16/2019 3 10/11/2019 Dextroamp-Amphetamin 30 Mg Tab 90.00 30 L Rn [id number] [facility] [place]  09/24/2019 3 08/23/2019 Dextroamp-Amphetamin 30 Mg Tab 90.00 30 L Rn [id number] [facility] [place]  09/24/2019 4 09/12/2019 Dextroamp-Amphetamin 30 Mg Tab 60.00 30 Br Lab [id number] [facility] [place]  08/28/2019 4 08/28/2019 Dextroamp-Amphetamin 30 Mg Tab 90.00 30 L Rn [id number] [facility] [place]  08/06/2019 3 07/11/2019 Dextroamp-Amphetamin 30 Mg Tab 90.00 30 L Rn [id number] [facility] [place]  07/18/2019 3 07/11/2019 Dextroamp-Amphetamin 30 Mg Tab 90.00 30 L Rn [id number] [facility] [place]  06/25/2019 3 06/16/2019 Dextroamp-Amphetamin 30 Mg Tab 90.00 30 L Rn [id number] [facility] [place]  06/04/2019 4 06/03/2019 Dextroamp-Amphetamin 30 Mg Tab 90.00 30 L Rn [id number] [facility] [place]

## 2019-11-15 NOTE — TELEPHONE ENCOUNTER
In care everywhere had appointment with Dr Santiago  on 11/20/2018 For ADHD     On   script was written on 9/12/2019 and filled on 9/24/2019    Can no longer fill scripts for Adderal as has received from 2 different providers as documented on

## 2019-11-19 NOTE — TELEPHONE ENCOUNTER
I have called and verified with both pharmacies. [facility] was filled and sent in by Anayeli Forbes. [facility] was filled and sent in by Dr Santiago. Sandra NEUMANN CMA

## 2019-12-03 NOTE — TELEPHONE ENCOUNTER
Pt called transferred to me. Spoke to pt pt very rude and swearing at me. States she is NOT looking for adderall but would like something else.  She is going to have a heart attack. This is all our fault. Pt then hung up on me. Sandra NEUMANN CMA

## 2019-12-11 DIAGNOSIS — F33.0 MILD EPISODE OF RECURRENT MAJOR DEPRESSIVE DISORDER (H): ICD-10-CM

## 2019-12-11 RX ORDER — ESCITALOPRAM OXALATE 20 MG/1
20 TABLET ORAL DAILY
Qty: 90 TABLET | Refills: 0 | Status: ON HOLD | OUTPATIENT
Start: 2019-12-11 | End: 2020-02-10

## 2019-12-11 NOTE — TELEPHONE ENCOUNTER
Patient calling again ebcause she had two missed calls. Nothing showing for any callbacks as documented in patients chart. Feels like shes having heart attack. Denied speaking to a nurse/911.

## 2019-12-11 NOTE — TELEPHONE ENCOUNTER
LOV- 9/16 says to f/u in 6 months. She states she hasn't had Lexapro or Amitriptyline for awhile. Informed her pharmacy should have refills of Amitriptyline and I will send Lexapro.   She can't sleep after going off of Adderall. She hasn't gone to work and she's worried about getting fired. No other provider will write for her. She can't even read a book. Patient is desperate and cried, again informed that PCP is unable to write for Adderall.   Zaida Ramirez RN

## 2019-12-11 NOTE — TELEPHONE ENCOUNTER
[facility] Call Center    Phone Message    May a detailed message be left on voicemail: yes    Reason for Call: Medication Refill Request    Has the patient contacted the pharmacy for the refill? Yes   Name of medication being requested: escitalopram (LEXAPRO) 20 MG tablet [82401] (Order [id number])     Provider who prescribed the medication: Smith  Pharmacy:    [facility] [address] [place] [address] [address] [address] [facility] [address] [facility] [address]    Date medication is needed: ASAP     She also would like a call back to discuss other medication and the dosages.   amphetamine-dextroamphetamine (ADDERALL) 30 MG tablet [[id number]] (Order [id number])   amitriptyline (ELAVIL) 10 MG tablet [[id number]] (Order [id number])    Please advise.    Action Taken: Message routed to:  Primary Care p [phone]

## 2020-01-14 ENCOUNTER — MYC MEDICAL ADVICE (OUTPATIENT)
Dept: PEDIATRICS | Facility: CLINIC | Age: 45
End: 2020-01-14

## 2020-01-14 NOTE — TELEPHONE ENCOUNTER
Routing [employer] message to Anayeli Forbes NP  to please review when able.      Lorena Obando RN, [facility]

## 2020-01-16 ENCOUNTER — NURSE TRIAGE (OUTPATIENT)
Dept: NURSING | Facility: CLINIC | Age: 45
End: 2020-01-16

## 2020-01-16 NOTE — TELEPHONE ENCOUNTER
Patient went to [facility]   And was there for detox in [facility] for three days. She states she needs adderall. She needs something or she is going to lose her job. She wants a call back from Anayeli Forbes.     Please advise.

## 2020-01-17 NOTE — TELEPHONE ENCOUNTER
I Have huddled with Anayeli. Called pt  LM let pt know that this will not be prescribed per Anayeli. If pt was seen at [facility] she can get it from there. Anayeli will not prescribe medication. Sandra NEUMANNCMA

## 2020-01-17 NOTE — TELEPHONE ENCOUNTER
Routing to Anayeli Forbes NP to review.      Call center: if pt. Calls back, please alert pt that Anayeli Forbes NP has sent messages in response regarding seeing psychiatry to pt [facility].      Lorena Obando RN, [facility]

## 2020-01-17 NOTE — TELEPHONE ENCOUNTER
Pt calling again to say she has just been fired from her job because she cant focus and she needs this medication, please advise with pt.

## 2020-01-17 NOTE — TELEPHONE ENCOUNTER
Clinic Action Needed:Yes, please return call    Reason for Call: Aliyah is requesting a refill on her Adderall until she can in to see her psych doctor.  She yuliya like this sent to [facility] in [place]. It appears that [facility] has already denied this medication request, see [employer] message from this afternoon.  However, Aliyah was asking to speak to [facility].  Thank you.     Routed to: [facility] Primary [facility]    Maggy Szymanski, RN  [facility]

## 2020-02-04 ENCOUNTER — TELEPHONE (OUTPATIENT)
Dept: PEDIATRICS | Facility: CLINIC | Age: 45
End: 2020-02-04

## 2020-02-04 NOTE — TELEPHONE ENCOUNTER
[facility] Call Center    Phone Message    May a detailed message be left on voicemail: yes     Reason for Call: Form or Letter   Type or form/letter needing completion: Patient called stating that she needs a statement from Anayeli Forbes for her to get into an Alcohol treatment program.   Provider: Anayeli Forbes  Once completed: Fax form to: [phone]      Action Taken: Message routed to:  Primary Care p [phone]

## 2020-02-05 NOTE — TELEPHONE ENCOUNTER
I Have called pt pt states she does not need letter she will be going into program tomorrow at 9:30. Sandra NEUMANN CMA

## 2020-02-06 ENCOUNTER — HOSPITAL ENCOUNTER (INPATIENT)
Facility: CLINIC | Age: 45
LOS: 4 days | Discharge: PSYCHIATRIC OR SUBSTANCE USE FACILITY | End: 2020-02-10
Attending: EMERGENCY MEDICINE | Admitting: PSYCHIATRY & NEUROLOGY
Payer: MEDICAID

## 2020-02-06 DIAGNOSIS — F10.930 ALCOHOL WITHDRAWAL SYNDROME WITHOUT COMPLICATION (H): ICD-10-CM

## 2020-02-06 DIAGNOSIS — F33.0 MILD EPISODE OF RECURRENT MAJOR DEPRESSIVE DISORDER (H): ICD-10-CM

## 2020-02-06 DIAGNOSIS — F10.10 ALCOHOL ABUSE: Primary | ICD-10-CM

## 2020-02-06 LAB
ALBUMIN SERPL-MCNC: 3.9 G/DL (ref 3.4–5)
ALBUMIN UR-MCNC: NEGATIVE MG/DL
ALCOHOL BREATH TEST: 0 (ref 0–0.01)
ALP SERPL-CCNC: 99 U/L (ref 40–150)
ALT SERPL W P-5'-P-CCNC: 36 U/L (ref 0–50)
AMPHETAMINES UR QL SCN: NEGATIVE
ANION GAP SERPL CALCULATED.3IONS-SCNC: 12 MMOL/L (ref 3–14)
APPEARANCE UR: CLEAR
AST SERPL W P-5'-P-CCNC: 45 U/L (ref 0–45)
BARBITURATES UR QL: NEGATIVE
BASOPHILS # BLD AUTO: 0 10E9/L (ref 0–0.2)
BASOPHILS NFR BLD AUTO: 0.1 %
BENZODIAZ UR QL: POSITIVE
BILIRUB SERPL-MCNC: 0.6 MG/DL (ref 0.2–1.3)
BILIRUB UR QL STRIP: NEGATIVE
BUN SERPL-MCNC: 8 MG/DL (ref 7–30)
CALCIUM SERPL-MCNC: 8.3 MG/DL (ref 8.5–10.1)
CANNABINOIDS UR QL SCN: NEGATIVE
CHLORIDE SERPL-SCNC: 90 MMOL/L (ref 94–109)
CO2 SERPL-SCNC: 24 MMOL/L (ref 20–32)
COCAINE UR QL: NEGATIVE
COLOR UR AUTO: ABNORMAL
CREAT SERPL-MCNC: 0.59 MG/DL (ref 0.52–1.04)
DIFFERENTIAL METHOD BLD: ABNORMAL
EOSINOPHIL # BLD AUTO: 0 10E9/L (ref 0–0.7)
EOSINOPHIL NFR BLD AUTO: 0.1 %
ERYTHROCYTE [DISTWIDTH] IN BLOOD BY AUTOMATED COUNT: 14.7 % (ref 10–15)
ETHANOL UR QL SCN: NEGATIVE
GFR SERPL CREATININE-BSD FRML MDRD: >90 ML/MIN/{1.73_M2}
GLUCOSE SERPL-MCNC: 97 MG/DL (ref 70–99)
GLUCOSE UR STRIP-MCNC: NEGATIVE MG/DL
HCG UR QL: NEGATIVE
HCT VFR BLD AUTO: 34.2 % (ref 35–47)
HGB BLD-MCNC: 11.9 G/DL (ref 11.7–15.7)
HGB UR QL STRIP: NEGATIVE
IMM GRANULOCYTES # BLD: 0.1 10E9/L (ref 0–0.4)
IMM GRANULOCYTES NFR BLD: 0.3 %
KETONES UR STRIP-MCNC: NEGATIVE MG/DL
LEUKOCYTE ESTERASE UR QL STRIP: NEGATIVE
LYMPHOCYTES # BLD AUTO: 1.6 10E9/L (ref 0.8–5.3)
LYMPHOCYTES NFR BLD AUTO: 10.2 %
MAGNESIUM SERPL-MCNC: 1.2 MG/DL (ref 1.6–2.3)
MCH RBC QN AUTO: 33.1 PG (ref 26.5–33)
MCHC RBC AUTO-ENTMCNC: 34.8 G/DL (ref 31.5–36.5)
MCV RBC AUTO: 95 FL (ref 78–100)
MONOCYTES # BLD AUTO: 0.9 10E9/L (ref 0–1.3)
MONOCYTES NFR BLD AUTO: 5.7 %
MUCOUS THREADS #/AREA URNS LPF: PRESENT /LPF
NEUTROPHILS # BLD AUTO: 12.7 10E9/L (ref 1.6–8.3)
NEUTROPHILS NFR BLD AUTO: 83.6 %
NITRATE UR QL: NEGATIVE
NRBC # BLD AUTO: 0 10*3/UL
NRBC BLD AUTO-RTO: 0 /100
OPIATES UR QL SCN: NEGATIVE
PH UR STRIP: 5 PH (ref 5–7)
PLATELET # BLD AUTO: 297 10E9/L (ref 150–450)
POTASSIUM SERPL-SCNC: 3.6 MMOL/L (ref 3.4–5.3)
PROT SERPL-MCNC: 7.1 G/DL (ref 6.8–8.8)
RBC # BLD AUTO: 3.6 10E12/L (ref 3.8–5.2)
RBC #/AREA URNS AUTO: <1 /HPF (ref 0–2)
SODIUM SERPL-SCNC: 126 MMOL/L (ref 133–144)
SOURCE: ABNORMAL
SP GR UR STRIP: 1 (ref 1–1.03)
SQUAMOUS #/AREA URNS AUTO: <1 /HPF (ref 0–1)
UROBILINOGEN UR STRIP-MCNC: NORMAL MG/DL (ref 0–2)
WBC # BLD AUTO: 15.2 10E9/L (ref 4–11)
WBC #/AREA URNS AUTO: 0 /HPF (ref 0–5)

## 2020-02-06 PROCEDURE — 85025 COMPLETE CBC W/AUTO DIFF WBC: CPT | Performed by: EMERGENCY MEDICINE

## 2020-02-06 PROCEDURE — 96361 HYDRATE IV INFUSION ADD-ON: CPT | Performed by: EMERGENCY MEDICINE

## 2020-02-06 PROCEDURE — 80053 COMPREHEN METABOLIC PANEL: CPT | Performed by: EMERGENCY MEDICINE

## 2020-02-06 PROCEDURE — 99285 EMERGENCY DEPT VISIT HI MDM: CPT | Mod: 25 | Performed by: EMERGENCY MEDICINE

## 2020-02-06 PROCEDURE — 83735 ASSAY OF MAGNESIUM: CPT | Performed by: EMERGENCY MEDICINE

## 2020-02-06 PROCEDURE — 99285 EMERGENCY DEPT VISIT HI MDM: CPT | Mod: Z6 | Performed by: EMERGENCY MEDICINE

## 2020-02-06 PROCEDURE — 80307 DRUG TEST PRSMV CHEM ANLYZR: CPT | Performed by: EMERGENCY MEDICINE

## 2020-02-06 PROCEDURE — 96365 THER/PROPH/DIAG IV INF INIT: CPT | Performed by: EMERGENCY MEDICINE

## 2020-02-06 PROCEDURE — 81025 URINE PREGNANCY TEST: CPT | Performed by: EMERGENCY MEDICINE

## 2020-02-06 PROCEDURE — 96366 THER/PROPH/DIAG IV INF ADDON: CPT | Performed by: EMERGENCY MEDICINE

## 2020-02-06 PROCEDURE — 25000128 H RX IP 250 OP 636: Performed by: EMERGENCY MEDICINE

## 2020-02-06 PROCEDURE — 25000132 ZZH RX MED GY IP 250 OP 250 PS 637: Performed by: EMERGENCY MEDICINE

## 2020-02-06 PROCEDURE — 25000132 ZZH RX MED GY IP 250 OP 250 PS 637: Performed by: NURSE PRACTITIONER

## 2020-02-06 PROCEDURE — 12800008 ZZH R&B CD ADULT

## 2020-02-06 PROCEDURE — 25800030 ZZH RX IP 258 OP 636: Performed by: EMERGENCY MEDICINE

## 2020-02-06 PROCEDURE — 81001 URINALYSIS AUTO W/SCOPE: CPT | Performed by: EMERGENCY MEDICINE

## 2020-02-06 PROCEDURE — HZ2ZZZZ DETOXIFICATION SERVICES FOR SUBSTANCE ABUSE TREATMENT: ICD-10-PCS | Performed by: PSYCHIATRY & NEUROLOGY

## 2020-02-06 PROCEDURE — 82075 ASSAY OF BREATH ETHANOL: CPT | Performed by: EMERGENCY MEDICINE

## 2020-02-06 PROCEDURE — 80320 DRUG SCREEN QUANTALCOHOLS: CPT | Performed by: EMERGENCY MEDICINE

## 2020-02-06 RX ORDER — MULTIVITAMIN,THERAPEUTIC
1 TABLET ORAL ONCE
Status: COMPLETED | OUTPATIENT
Start: 2020-02-06 | End: 2020-02-06

## 2020-02-06 RX ORDER — TRAZODONE HYDROCHLORIDE 50 MG/1
50 TABLET, FILM COATED ORAL
Status: DISCONTINUED | OUTPATIENT
Start: 2020-02-06 | End: 2020-02-10 | Stop reason: HOSPADM

## 2020-02-06 RX ORDER — ALUMINA, MAGNESIA, AND SIMETHICONE 2400; 2400; 240 MG/30ML; MG/30ML; MG/30ML
30 SUSPENSION ORAL EVERY 4 HOURS PRN
Status: DISCONTINUED | OUTPATIENT
Start: 2020-02-06 | End: 2020-02-10 | Stop reason: HOSPADM

## 2020-02-06 RX ORDER — FOLIC ACID 1 MG/1
1 TABLET ORAL DAILY
Status: DISCONTINUED | OUTPATIENT
Start: 2020-02-07 | End: 2020-02-10 | Stop reason: HOSPADM

## 2020-02-06 RX ORDER — MAGNESIUM SULFATE HEPTAHYDRATE 40 MG/ML
2 INJECTION, SOLUTION INTRAVENOUS ONCE
Status: COMPLETED | OUTPATIENT
Start: 2020-02-06 | End: 2020-02-06

## 2020-02-06 RX ORDER — DIAZEPAM 5 MG
5-20 TABLET ORAL EVERY 30 MIN PRN
Status: DISCONTINUED | OUTPATIENT
Start: 2020-02-06 | End: 2020-02-10 | Stop reason: HOSPADM

## 2020-02-06 RX ORDER — BISACODYL 10 MG
10 SUPPOSITORY, RECTAL RECTAL DAILY PRN
Status: DISCONTINUED | OUTPATIENT
Start: 2020-02-06 | End: 2020-02-10 | Stop reason: HOSPADM

## 2020-02-06 RX ORDER — MAGNESIUM OXIDE 400 MG/1
800 TABLET ORAL ONCE
Status: COMPLETED | OUTPATIENT
Start: 2020-02-06 | End: 2020-02-06

## 2020-02-06 RX ORDER — MULTIPLE VITAMINS W/ MINERALS TAB 9MG-400MCG
1 TAB ORAL DAILY
Status: DISCONTINUED | OUTPATIENT
Start: 2020-02-07 | End: 2020-02-10 | Stop reason: HOSPADM

## 2020-02-06 RX ORDER — HYDROXYZINE HYDROCHLORIDE 25 MG/1
25 TABLET, FILM COATED ORAL EVERY 4 HOURS PRN
Status: DISCONTINUED | OUTPATIENT
Start: 2020-02-06 | End: 2020-02-07

## 2020-02-06 RX ORDER — SODIUM CHLORIDE 9 MG/ML
1000 INJECTION, SOLUTION INTRAVENOUS CONTINUOUS
Status: DISCONTINUED | OUTPATIENT
Start: 2020-02-06 | End: 2020-02-06

## 2020-02-06 RX ORDER — ACETAMINOPHEN 325 MG/1
650 TABLET ORAL EVERY 4 HOURS PRN
Status: DISCONTINUED | OUTPATIENT
Start: 2020-02-06 | End: 2020-02-10 | Stop reason: HOSPADM

## 2020-02-06 RX ORDER — DIAZEPAM 5 MG
5-20 TABLET ORAL EVERY 30 MIN PRN
Status: DISCONTINUED | OUTPATIENT
Start: 2020-02-06 | End: 2020-02-06

## 2020-02-06 RX ORDER — FOLIC ACID 1 MG/1
1 TABLET ORAL ONCE
Status: COMPLETED | OUTPATIENT
Start: 2020-02-06 | End: 2020-02-06

## 2020-02-06 RX ORDER — ATENOLOL 50 MG/1
50 TABLET ORAL DAILY PRN
Status: DISCONTINUED | OUTPATIENT
Start: 2020-02-06 | End: 2020-02-10 | Stop reason: HOSPADM

## 2020-02-06 RX ORDER — LANOLIN ALCOHOL/MO/W.PET/CERES
100 CREAM (GRAM) TOPICAL DAILY
Status: DISCONTINUED | OUTPATIENT
Start: 2020-02-07 | End: 2020-02-10 | Stop reason: HOSPADM

## 2020-02-06 RX ORDER — LANOLIN ALCOHOL/MO/W.PET/CERES
100 CREAM (GRAM) TOPICAL ONCE
Status: COMPLETED | OUTPATIENT
Start: 2020-02-06 | End: 2020-02-06

## 2020-02-06 RX ADMIN — MAGNESIUM SULFATE 2 G: 2 INJECTION INTRAVENOUS at 14:15

## 2020-02-06 RX ADMIN — DIAZEPAM 5 MG: 5 TABLET ORAL at 12:15

## 2020-02-06 RX ADMIN — TRAZODONE HYDROCHLORIDE 50 MG: 50 TABLET ORAL at 21:28

## 2020-02-06 RX ADMIN — DIAZEPAM 5 MG: 5 TABLET ORAL at 14:24

## 2020-02-06 RX ADMIN — THERA TABS 1 TABLET: TAB at 12:15

## 2020-02-06 RX ADMIN — FOLIC ACID 1 MG: 1 TABLET ORAL at 12:15

## 2020-02-06 RX ADMIN — THIAMINE HCL TAB 100 MG 100 MG: 100 TAB at 12:15

## 2020-02-06 RX ADMIN — Medication 800 MG: at 12:15

## 2020-02-06 RX ADMIN — SODIUM CHLORIDE 1000 ML: 9 INJECTION, SOLUTION INTRAVENOUS at 12:17

## 2020-02-06 ASSESSMENT — ACTIVITIES OF DAILY LIVING (ADL)
DRESS: INDEPENDENT
HYGIENE/GROOMING: INDEPENDENT
LAUNDRY: WITH SUPERVISION
ORAL_HYGIENE: INDEPENDENT

## 2020-02-06 ASSESSMENT — MIFFLIN-ST. JEOR: SCORE: 1373.9

## 2020-02-06 NOTE — ED PROVIDER NOTES
[facility] EMERGENCY DEPARTMENT ([facility])    2/06/20       History     Chief Complaint   Patient presents with     Alcohol Problem     pt seeking detox from alcohol. Last drink was on Tues. Drinks 12-18 beers per day. Pt is shakey. Denies h/o withdrawal seizures.     AYDIN Angeles is a 44 year old female who presents seeking alcohol detox.  She has a prior history of depression, ADHD (on Adderall), alcohol abuse, alcohol induced pancreatitis, gastric bypass, cholecystectomy.  She had been drinking 12-18 beers a day.   Patient states that her last drink was yesterday early morning.  She is experiencing shakes.  No history of alcohol withdrawal seizures or DTs.  She was detoxed 3 weeks ago in [place], was discharged afterwards to [facility].  She states she has been drinking daily since that time.  She states that she was started on suboxone from detox but has not had this in 2 days.  [facility] believes that she needs to complete detox before she could return there. No suicidal or homicidal ideation.  No IV drug use.  No other illicit drug use. Patient did call ahead for detox bed. She denies any history of opiate use in past.  However, she has misused tramadol in the past chart review, but denies any recent tramadol use.  She states she was told that she was placed on Suboxone for her alcohol use.  She adamantly denies any IV heroin use.  She denies any chest pain, shortness of breath, cough, fever, abdominal pain, vomiting, diarrhea, dysuria, hematuria, frequency, or other complaints.  She takes Lexapro and Adderall and states she is been compliant with these medications.  She ambulates without difficulty.  She denies any falls or trauma.    I have reviewed the Medications, Allergies, Past Medical and Surgical History, and Social History in the [employer] system.  Past Medical History:   Diagnosis Date     Acne      ADHD (attention deficit hyperactivity disorder)      Arthralgia of  temporomandibular joint 5/19/2016     Arthritis      Cervical high risk HPV (human papillomavirus) test positive 4/27/2018 4/27/18 NIL pap, + HR HPV (not 16 or 18). Plan: cotest in 1 yr.       Cobalamin deficiency 11/24/2014     Drug-seeking behavior 5/19/2016    Overview:  Per [facility] website, patient has filled with multiple controlled substances with multiple prescribers at multiple pharmacies      History of blood transfusion 01/01/1988     Insomnia 7/21/2013     Raynaud's phenomenon 5/25/2015     Vitamin D deficiency 1/16/2013       Past Surgical History:   Procedure Laterality Date     ABDOMEN SURGERY  08/10/2010    Gastric  bypass     BIOPSY  1/1/94    HPV, multiple reoccurrances, partial  hysterectomy  2013     CHOLECYSTECTOMY  01/01/2010     COLONOSCOPY  01/01/2009     ENT SURGERY  01/01/1988    Tonsils     GI SURGERY  8/10/10    Fitz-en-Y     HYSTERECTOMY VAGINAL  04/05/2013    pathology of cervix benign.        Family History   Problem Relation Age of Onset     Prostate Cancer Father      Hypertension Father      Hyperlipidemia Father      Substance Abuse Father      Obesity Father      Diabetes Maternal Grandmother      Colon Cancer Maternal Grandmother      Other Cancer Maternal Grandmother         pancreatic cancer     Obesity Maternal Grandmother      Diabetes Paternal Grandmother      Obesity Paternal Grandmother      Diabetes Paternal Grandfather      Obesity Paternal Grandfather      Breast Cancer Sister      Breast Cancer Sister      Anxiety Disorder Sister      Anesthesia Reaction Sister      Depression Mother      Anesthesia Reaction Mother      Asthma Daughter      Coronary Artery Disease No family hx of      Hypertension No family hx of      Hyperlipidemia No family hx of      Cerebrovascular Disease No family hx of      Depression No family hx of      Anxiety Disorder No family hx of      Mental Illness No family hx of      Substance Abuse No family hx of      Anesthesia Reaction No family hx  of      Asthma No family hx of      Osteoporosis No family hx of      Genetic Disorder No family hx of      Thyroid Disease No family hx of      Obesity No family hx of      Unknown/Adopted No family hx of        Social History     Tobacco Use     Smoking status: Former Smoker     Packs/day: 1.00     Years: 15.00     Pack years: 15.00     Types: Cigarettes     Start date: 1989     Last attempt to quit: 2010     Years since quittin.5     Smokeless tobacco: Never Used   Substance Use Topics     Alcohol use: Yes     Alcohol/week: 0.0 standard drinks     Comment: Occasional/social      Review of Systems  Pertinent positives and negatives are documented in the HPI. All other systems reviewed and are negative.    Physical Exam   BP: (!) 167/103  Heart Rate: 102  Temp: 98.7  F (37.1  C)  Resp: 18  Weight: 74.8 kg (164 lb 14.4 oz)  SpO2: 97 %      Physical Exam  Vitals signs reviewed.   Constitutional:       General: She is not in acute distress.     Appearance: She is well-developed.   HENT:      Head: Normocephalic and atraumatic.      Comments: No scalp hematoma.  No facial trauma.     Nose: Nose normal.      Mouth/Throat:      Mouth: Mucous membranes are moist.      Pharynx: No oropharyngeal exudate or posterior oropharyngeal erythema.   Eyes:      Extraocular Movements: Extraocular movements intact.      Conjunctiva/sclera: Conjunctivae normal.      Pupils: Pupils are equal, round, and reactive to light.   Neck:      Musculoskeletal: Normal range of motion and neck supple.   Cardiovascular:      Rate and Rhythm: Regular rhythm. Tachycardia present.      Pulses: Normal pulses.      Heart sounds: Normal heart sounds. No murmur.      Comments: Mild tachycardia.  Mild hypertension.  Pulmonary:      Effort: Pulmonary effort is normal. No respiratory distress.      Breath sounds: Normal breath sounds. No wheezing or rales.   Abdominal:      General: Bowel sounds are normal. There is no distension.       Palpations: Abdomen is soft. There is no mass.      Tenderness: There is no abdominal tenderness. There is no guarding or rebound.   Musculoskeletal: Normal range of motion.         General: No swelling, tenderness or signs of injury.      Comments: No track marks noted.   Skin:     General: Skin is warm and dry.      Capillary Refill: Capillary refill takes less than 2 seconds.      Findings: No rash.   Neurological:      General: No focal deficit present.      Mental Status: She is alert and oriented to person, place, and time.      GCS: GCS eye subscore is 4. GCS verbal subscore is 5. GCS motor subscore is 6.      Cranial Nerves: Cranial nerves are intact. No cranial nerve deficit, dysarthria or facial asymmetry.      Sensory: Sensation is intact. No sensory deficit.      Motor: Tremor present. No weakness or abnormal muscle tone.      Gait: Gait normal.      Comments: Tremor noted with arms outstretched.  No asterixis.  5-5 strength in all 4 extremities bilaterally.  Sensation intact light touch in all 4 extremities bilaterally.  Ambulating without difficulty.   Psychiatric:         Mood and Affect: Mood normal.         ED Course       Results for orders placed or performed during the hospital encounter of 02/06/20 (from the past 24 hour(s))   Alcohol breath test POCT   Result Value Ref Range    Alcohol Breath Test 0.00 0.00 - 0.01   CBC with platelets differential   Result Value Ref Range    WBC 15.2 (H) 4.0 - 11.0 10e9/L    RBC Count 3.60 (L) 3.8 - 5.2 10e12/L    Hemoglobin 11.9 11.7 - 15.7 g/dL    Hematocrit 34.2 (L) 35.0 - 47.0 %    MCV 95 78 - 100 fl    MCH 33.1 (H) 26.5 - 33.0 pg    MCHC 34.8 31.5 - 36.5 g/dL    RDW 14.7 10.0 - 15.0 %    Platelet Count 297 150 - 450 10e9/L    Diff Method Automated Method     % Neutrophils 83.6 %    % Lymphocytes 10.2 %    % Monocytes 5.7 %    % Eosinophils 0.1 %    % Basophils 0.1 %    % Immature Granulocytes 0.3 %    Nucleated RBCs 0 0 /100    Absolute Neutrophil 12.7 (H)  1.6 - 8.3 10e9/L    Absolute Lymphocytes 1.6 0.8 - 5.3 10e9/L    Absolute Monocytes 0.9 0.0 - 1.3 10e9/L    Absolute Eosinophils 0.0 0.0 - 0.7 10e9/L    Absolute Basophils 0.0 0.0 - 0.2 10e9/L    Abs Immature Granulocytes 0.1 0 - 0.4 10e9/L    Absolute Nucleated RBC 0.0    Comprehensive metabolic panel   Result Value Ref Range    Sodium 126 (L) 133 - 144 mmol/L    Potassium 3.6 3.4 - 5.3 mmol/L    Chloride 90 (L) 94 - 109 mmol/L    Carbon Dioxide 24 20 - 32 mmol/L    Anion Gap 12 3 - 14 mmol/L    Glucose 97 70 - 99 mg/dL    Urea Nitrogen 8 7 - 30 mg/dL    Creatinine 0.59 0.52 - 1.04 mg/dL    GFR Estimate >90 >60 mL/min/[1.73_m2]    GFR Estimate If Black >90 >60 mL/min/[1.73_m2]    Calcium 8.3 (L) 8.5 - 10.1 mg/dL    Bilirubin Total 0.6 0.2 - 1.3 mg/dL    Albumin 3.9 3.4 - 5.0 g/dL    Protein Total 7.1 6.8 - 8.8 g/dL    Alkaline Phosphatase 99 40 - 150 U/L    ALT 36 0 - 50 U/L    AST 45 0 - 45 U/L   Magnesium   Result Value Ref Range    Magnesium 1.2 (L) 1.6 - 2.3 mg/dL   HCG qualitative urine (UPT)   Result Value Ref Range    HCG Qual Urine Negative NEG^Negative   Drug abuse screen 6 urine (chem dep)   Result Value Ref Range    Amphetamine Qual Urine Negative NEG^Negative    Barbiturates Qual Urine Negative NEG^Negative    Benzodiazepine Qual Urine Positive (A) NEG^Negative    Cannabinoids Qual Urine Negative NEG^Negative    Cocaine Qual Urine Negative NEG^Negative    Ethanol Qual Urine Negative NEG^Negative    Opiates Qualitative Urine Negative NEG^Negative   UA with Microscopic   Result Value Ref Range    Color Urine Straw     Appearance Urine Clear     Glucose Urine Negative NEG^Negative mg/dL    Bilirubin Urine Negative NEG^Negative    Ketones Urine Negative NEG^Negative mg/dL    Specific Gravity Urine 1.002 (L) 1.003 - 1.035    Blood Urine Negative NEG^Negative    pH Urine 5.0 5.0 - 7.0 pH    Protein Albumin Urine Negative NEG^Negative mg/dL    Urobilinogen mg/dL Normal 0.0 - 2.0 mg/dL    Nitrite Urine Negative  NEG^Negative    Leukocyte Esterase Urine Negative NEG^Negative    Source Midstream Urine     WBC Urine 0 0 - 5 /HPF    RBC Urine <1 0 - 2 /HPF    Squamous Epithelial /HPF Urine <1 0 - 1 /HPF    Mucous Urine Present (A) NEG^Negative /LPF     Medications   0.9% sodium chloride BOLUS (has no administration in time range)     Followed by   sodium chloride 0.9% infusion (has no administration in time range)   multivitamin, therapeutic (THERA-VIT) tablet 1 tablet (has no administration in time range)   folic acid (FOLVITE) tablet 1 mg (has no administration in time range)   vitamin B1 (THIAMINE) tablet 100 mg (has no administration in time range)   magnesium oxide (MAG-OX) tablet 800 mg (has no administration in time range)   diazepam (VALIUM) tablet 5-20 mg (has no administration in time range)         Assessments & Plan (with Medical Decision Making)   On exam, patient is overall well-appearing and in no acute distress.  She does have some tremors noted and is mildly tachycardic at 102 bpm and mildly hypertensive at 167/103.  I do feel that she is exhibiting some signs of withdrawal.  She states she has not drank in 24 hours.  She denies any other illicit drug use but reports that she had been previously started on Suboxone after her last detox in [place] 3 weeks ago.  She states she has not taken this in the last 2 days.  She adamantly denies any IV heroin or opioid use.  Per her chart it does appear that she has misused tramadol in the past but she denies taking this recently.  She denies any suicidal or homicidal ideation.  Due to her evidence of withdrawal she did receive 5 mg of oral Valium with improvement.  Her blood pressure has now been within normal limits and her heart rate decreased to the 70s.  She again is in no acute distress.  She was given oral magnesium, thiamine, folic acid, and multivitamin.  She was given IV fluids.  Her magnesium was low at 1.2 thus we did give her 2 g of IV magnesium sulfate as  well.  Urine drug screen is positive for benzodiazepines only which she was given here.  Urine pregnancy test is negative.  CMP reveals a slightly low sodium of 126 with a chloride of 90.  She did have this replaced with the IV normal saline here.  She does not have any signs of effects of hyponatremia on exam.  She does not have any focal neurologic deficits.  CBC revealed a white blood cell count of 15.2 however the patient again after being questioned twice does not have any cough, fever, abdominal pain, nausea, vomiting, diarrhea, urinary symptoms, runny nose, or any other infectious symptoms.  She states she feels entirely at baseline.  Thus this is nonspecific at this time and she does not have any clinical indication for chest x-ray or other testing at this time.  Urinalysis was also negative for any sign of UTI or hematuria.  Per chart review she has had pancreatitis in the past however the patient denies any abdominal pain at this time and thus we feel that pancreatitis would be unlikely.  She does not have any tenderness on abdominal exam.  Her tachycardia improved after treatment of withdrawal and thus we felt that this would be unlikely to be indicative of a significant infection.  She is also afebrile here and has not had any fevers at home.  Alcohol breathalyzer is 0.  Spoke with behavioral intake about the patient and she does have a bed on hold here for alcohol detox.  Patient was voluntary with this plan.  At this time she is awaiting transfer to her inpatient detox bed.  She is in stable condition. Se will be signed out to my oncoming colleague pending transfer to his detox bed. She was stable on my last evaluation.       I have reviewed the nursing notes.    I have reviewed the findings, diagnosis, plan and need for follow up with the patient.    New Prescriptions    No medications on file       Final diagnoses:   Alcohol withdrawal syndrome without complication (H)     I, Saima Banks, am  serving as a trained medical scribe to document services personally performed by Kallie Arias MD based on the provider's statements to me on February 6, 2020.  This document has been checked and approved by the attending provider.    I, Kallie Arias MD, was physically present and have reviewed and verified the accuracy of this note documented by Saima Banks, medical scribe.     2/6/2020   [facility], [place], EMERGENCY DEPARTMENT     Kallie Arias MD  02/06/20 1630

## 2020-02-06 NOTE — ED NOTES
ED to Behavioral Floor Handoff    SITUATION  Aliyah Angeles is a 44 year old female who speaks English and lives in a home alone The patient arrived in the ED by private car from home with a complaint of Alcohol Problem (pt seeking detox from alcohol. Last drink was on Tues. Drinks 12-18 beers per day. Pt is shakey. Denies h/o withdrawal seizures.)  .The patient's current symptoms started/worsened 1 day(s) ago and during this time the symptoms have increased.   In the ED, pt was diagnosed with   Final diagnoses:   Alcohol withdrawal syndrome without complication (H)        Initial vitals were: BP: 144/82  Heart Rate: 90  Temp: 98.7  F (37.1  C)  Resp: 18  Weight: 74.8 kg (164 lb 14.4 oz)  SpO2: 97 %   --------  Is the patient diabetic? No   If yes, last blood glucose? --     If yes, was this treated in the ED? --  --------  Is the patient inebriated (ETOH) No or Impaired on other substances? No  MSSA done? Yes  Last MSSA score: --    Were withdrawal symptoms treated? Yes  Does the patient have a seizure history? No. If yes, date of most recent seizure--  --------  Is the patient patient experiencing suicidal ideation? denies current or recent suicidal ideation     Homicidal ideation? denies current or recent homicidal ideation or behaviors.    Self-injurious behavior/urges? denies current or recent self injurious behavior or ideation.  ------  Was pt aggressive in the ED No  Was a code called No  Is the pt now cooperative? Yes  -------  Meds given in ED:   Medications   0.9% sodium chloride BOLUS (0 mLs Intravenous Stopped 2/6/20 1250)     Followed by   sodium chloride 0.9% infusion (has no administration in time range)   diazepam (VALIUM) tablet 5-20 mg (5 mg Oral Given 2/6/20 1215)   multivitamin, therapeutic (THERA-VIT) tablet 1 tablet (1 tablet Oral Given 2/6/20 1215)   folic acid (FOLVITE) tablet 1 mg (1 mg Oral Given 2/6/20 1215)   vitamin B1 (THIAMINE) tablet 100 mg (100 mg Oral Given 2/6/20 1215)    magnesium oxide (MAG-OX) tablet 800 mg (800 mg Oral Given 20 1215)      Family present during ED course? No  Family currently present? No    BACKGROUND  Does the patient have a cognitive impairment or developmental disability? No  Allergies:   Allergies   Allergen Reactions     Nsaids      Gastric bypass   .   Social demographics are   Social History     Socioeconomic History     Marital status: Legally      Spouse name: Not on file     Number of children: Not on file     Years of education: Not on file     Highest education level: Not on file   Occupational History     Not on file   Social Needs     Financial resource strain: Not on file     Food insecurity:     Worry: Not on file     Inability: Not on file     Transportation needs:     Medical: Not on file     Non-medical: Not on file   Tobacco Use     Smoking status: Former Smoker     Packs/day: 1.00     Years: 15.00     Pack years: 15.00     Types: Cigarettes     Start date: 1989     Last attempt to quit: 2010     Years since quittin.5     Smokeless tobacco: Never Used   Substance and Sexual Activity     Alcohol use: Yes     Alcohol/week: 0.0 standard drinks     Comment: Occasional/social     Drug use: No     Sexual activity: Yes     Partners: Male     Birth control/protection: Male Surgical, Female Surgical     Comment:  one partner   Lifestyle     Physical activity:     Days per week: Not on file     Minutes per session: Not on file     Stress: Not on file   Relationships     Social connections:     Talks on phone: Not on file     Gets together: Not on file     Attends Gnosticist service: Not on file     Active member of club or organization: Not on file     Attends meetings of clubs or organizations: Not on file     Relationship status: Not on file     Intimate partner violence:     Fear of current or ex partner: Not on file     Emotionally abused: Not on file     Physically abused: Not on file     Forced sexual activity: Not  on file   Other Topics Concern     Parent/sibling w/ CABG, MI or angioplasty before 65F 55M? No   Social History Narrative     Not on file        ASSESSMENT  Labs results   Labs Ordered and Resulted from Time of ED Arrival Up to the Time of Departure from the ED   CBC WITH PLATELETS DIFFERENTIAL - Abnormal; Notable for the following components:       Result Value    WBC 15.2 (*)     RBC Count 3.60 (*)     Hematocrit 34.2 (*)     MCH 33.1 (*)     Absolute Neutrophil 12.7 (*)     All other components within normal limits   COMPREHENSIVE METABOLIC PANEL - Abnormal; Notable for the following components:    Sodium 126 (*)     Chloride 90 (*)     Calcium 8.3 (*)     All other components within normal limits   MAGNESIUM - Abnormal; Notable for the following components:    Magnesium 1.2 (*)     All other components within normal limits   ALCOHOL BREATH TEST POCT - Normal   HCG QUALITATIVE URINE   DRUG ABUSE SCREEN 6 CHEM DEP URINE ([facility])   MSSA SCORE AND VS   NOTIFY      Imaging Studies: No results found for this or any previous visit (from the past 24 hour(s)).   Most recent vital signs BP (!) 144/82   Temp 98.7  F (37.1  C) (Oral)   Resp 16   Wt 74.8 kg (164 lb 14.4 oz)   SpO2 98%   BMI 28.09 kg/m     Abnormal labs/tests/findings requiring intervention:---   Pain control: pt had none  Nausea control: pt had none    RECOMMENDATION  Are any infection precautions needed (MRSA, VRE, etc.)? No If yes, what infection? --  ---  Does the patient have mobility issues? independently. If yes, what device does the pt use? ---  ---  Is patient on 72 hour hold or commitment? No If on 72 hour hold, have hold and rights been given to patient? N/A  Are admitting orders written if after 10 p.m. ?N/A  Tasks needing to be completed:---     Lillian Garvey RN

## 2020-02-06 NOTE — PHARMACY-ADMISSION MEDICATION HISTORY
Admission Medication History status for the 2/6/2020 admission is complete.  See EPIC admission navigator for Prior to Admission medications.    Medication history sources:  Patient, Sure Script, [place]      Medication history source reliability: Good    Medication adherence:  Poor    Changes made to PTA medication list (reason)  Added: Tylenol PM  Deleted: Augmentin, Adderall (duplicate), Vitamin D, Methocarbamol, DME device-sling  Changed: Added dosing to fish oil, MV, Tretinoin, Drysol, Clindamycin gel, calcium/vitamin d    Additional medication history information (including reliability of information, actions taken by pharmacist):   -Pt knew medications fairly well.   -She reports not taking several of her medications for the last few months.     [place] :  Adderall 30 mg was filled on 11/1/19, #90, 30 day supply.     Time spent in this activity: 20 minutes    Medication history completed by: Karina Crandall [facility]     Prior to Admission medications    Medication Sig Last Dose Taking? Auth Provider   calcium carbonate 600 mg-vitamin D 400 units (CALTRATE) 600-400 MG-UNIT per tablet Take 1 tablet by mouth daily 2/5/2020 at Unknown time Yes Unknown, Entered By History   escitalopram (LEXAPRO) 20 MG tablet Take 1 tablet (20 mg) by mouth daily 2/6/2020 at Unknown time Yes Iman Forbes APRN CNP   MULTIPLE VITAMIN PO Take 1 tablet by mouth daily  2/6/2020 at Unknown time Yes Reported, Patient   Omega-3 Fatty Acids (OMEGA 3 PO) Take 1 g by mouth daily  2/5/2020 at Unknown time Yes Reported, Patient   amitriptyline (ELAVIL) 10 MG tablet Take 2 tablets (20 mg) by mouth At Bedtime More than a month at Unknown time  Iman Forbes APRN CNP   amphetamine-dextroamphetamine (ADDERALL) 30 MG tablet Take 1 tablet (30 mg) by mouth 3 times daily More than a month at Unknown time  Iman Forbes APRN CNP   clindamycin (CLINDAMAX) 1 % gel Apply topically daily as needed  More than a month at Unknown time   "Reported, Patient   cyanocobalamin (CYANOCOBALAMIN) 1000 MCG/ML injection Inject 1 mL (1,000 mcg) into the muscle every 30 days 1 ML every 4 weeks More than a month at Unknown time  Iman Forbes APRN CNP   DRYSOL 20 % external solution Apply topically daily as needed  More than a month at Unknown time  Reported, Patient   Needle, Disp, (B-D DISP NEEDLE) 25G X 1\" MISC 1 each every 30 days   Iman Forbes APRN CNP   tretinoin (RETIN-A) 0.025 % cream Apply topically daily as needed  More than a month at Unknown time  Reported, Patient         "

## 2020-02-07 LAB
ANION GAP SERPL CALCULATED.3IONS-SCNC: 7 MMOL/L (ref 3–14)
BUN SERPL-MCNC: 6 MG/DL (ref 7–30)
CALCIUM SERPL-MCNC: 8.1 MG/DL (ref 8.5–10.1)
CHLORIDE SERPL-SCNC: 101 MMOL/L (ref 94–109)
CHOLEST SERPL-MCNC: 164 MG/DL
CO2 SERPL-SCNC: 26 MMOL/L (ref 20–32)
CREAT SERPL-MCNC: 0.57 MG/DL (ref 0.52–1.04)
GFR SERPL CREATININE-BSD FRML MDRD: >90 ML/MIN/{1.73_M2}
GGT SERPL-CCNC: 189 U/L (ref 0–40)
GLUCOSE SERPL-MCNC: 93 MG/DL (ref 70–99)
HDLC SERPL-MCNC: 85 MG/DL
LDLC SERPL CALC-MCNC: 48 MG/DL
NONHDLC SERPL-MCNC: 79 MG/DL
POTASSIUM SERPL-SCNC: 4.2 MMOL/L (ref 3.4–5.3)
SODIUM SERPL-SCNC: 134 MMOL/L (ref 133–144)
TRIGL SERPL-MCNC: 156 MG/DL
TSH SERPL DL<=0.005 MIU/L-ACNC: 2.41 MU/L (ref 0.4–4)
VIT B12 SERPL-MCNC: 334 PG/ML (ref 193–986)

## 2020-02-07 PROCEDURE — 25000132 ZZH RX MED GY IP 250 OP 250 PS 637: Performed by: NURSE PRACTITIONER

## 2020-02-07 PROCEDURE — 80061 LIPID PANEL: CPT | Performed by: NURSE PRACTITIONER

## 2020-02-07 PROCEDURE — 12800008 ZZH R&B CD ADULT

## 2020-02-07 PROCEDURE — 25000132 ZZH RX MED GY IP 250 OP 250 PS 637: Performed by: PSYCHIATRY & NEUROLOGY

## 2020-02-07 PROCEDURE — 99207 ZZC DOWN CODE DUE TO SUBSEQUENT EXAM: CPT | Performed by: PSYCHIATRY & NEUROLOGY

## 2020-02-07 PROCEDURE — H2032 ACTIVITY THERAPY, PER 15 MIN: HCPCS

## 2020-02-07 PROCEDURE — 80048 BASIC METABOLIC PNL TOTAL CA: CPT | Performed by: PHYSICIAN ASSISTANT

## 2020-02-07 PROCEDURE — 82607 VITAMIN B-12: CPT | Performed by: NURSE PRACTITIONER

## 2020-02-07 PROCEDURE — 99207 ZZC CONSULT E&M CHANGED TO SUBSEQUENT LEVEL: CPT | Performed by: PHYSICIAN ASSISTANT

## 2020-02-07 PROCEDURE — 84443 ASSAY THYROID STIM HORMONE: CPT | Performed by: NURSE PRACTITIONER

## 2020-02-07 PROCEDURE — 99221 1ST HOSP IP/OBS SF/LOW 40: CPT | Mod: AI | Performed by: PSYCHIATRY & NEUROLOGY

## 2020-02-07 PROCEDURE — 80048 BASIC METABOLIC PNL TOTAL CA: CPT | Performed by: NURSE PRACTITIONER

## 2020-02-07 PROCEDURE — 82977 ASSAY OF GGT: CPT | Performed by: NURSE PRACTITIONER

## 2020-02-07 PROCEDURE — 25000128 H RX IP 250 OP 636: Performed by: PSYCHIATRY & NEUROLOGY

## 2020-02-07 PROCEDURE — 36415 COLL VENOUS BLD VENIPUNCTURE: CPT | Performed by: NURSE PRACTITIONER

## 2020-02-07 PROCEDURE — 99232 SBSQ HOSP IP/OBS MODERATE 35: CPT | Performed by: PHYSICIAN ASSISTANT

## 2020-02-07 RX ORDER — CYANOCOBALAMIN 1000 UG/ML
1000 INJECTION, SOLUTION INTRAMUSCULAR; SUBCUTANEOUS
Status: DISCONTINUED | OUTPATIENT
Start: 2020-02-07 | End: 2020-02-10 | Stop reason: HOSPADM

## 2020-02-07 RX ORDER — NICOTINE 21 MG/24HR
1 PATCH, TRANSDERMAL 24 HOURS TRANSDERMAL DAILY
Status: DISCONTINUED | OUTPATIENT
Start: 2020-02-07 | End: 2020-02-08

## 2020-02-07 RX ORDER — HYDROXYZINE HYDROCHLORIDE 25 MG/1
25-50 TABLET, FILM COATED ORAL EVERY 4 HOURS PRN
Status: DISCONTINUED | OUTPATIENT
Start: 2020-02-07 | End: 2020-02-10 | Stop reason: HOSPADM

## 2020-02-07 RX ORDER — HYDRALAZINE HYDROCHLORIDE 10 MG/1
10 TABLET, FILM COATED ORAL 4 TIMES DAILY PRN
Status: DISCONTINUED | OUTPATIENT
Start: 2020-02-07 | End: 2020-02-10 | Stop reason: HOSPADM

## 2020-02-07 RX ORDER — ESCITALOPRAM OXALATE 20 MG/1
20 TABLET ORAL DAILY
Status: DISCONTINUED | OUTPATIENT
Start: 2020-02-07 | End: 2020-02-10 | Stop reason: HOSPADM

## 2020-02-07 RX ADMIN — CYANOCOBALAMIN 1000 MCG: 1000 INJECTION, SOLUTION INTRAMUSCULAR at 16:06

## 2020-02-07 RX ADMIN — CALCIUM CARBONATE 600 MG (1,500 MG)-VITAMIN D3 400 UNIT TABLET 1 TABLET: at 09:03

## 2020-02-07 RX ADMIN — THIAMINE HCL TAB 100 MG 100 MG: 100 TAB at 09:03

## 2020-02-07 RX ADMIN — MULTIPLE VITAMINS W/ MINERALS TAB 1 TABLET: TAB at 09:03

## 2020-02-07 RX ADMIN — NICOTINE 1 PATCH: 14 PATCH, EXTENDED RELEASE TRANSDERMAL at 14:25

## 2020-02-07 RX ADMIN — HYDROXYZINE HYDROCHLORIDE 50 MG: 25 TABLET, FILM COATED ORAL at 18:16

## 2020-02-07 RX ADMIN — TRAZODONE HYDROCHLORIDE 50 MG: 50 TABLET ORAL at 20:54

## 2020-02-07 RX ADMIN — HYDROXYZINE HYDROCHLORIDE 25 MG: 25 TABLET, FILM COATED ORAL at 12:40

## 2020-02-07 RX ADMIN — ESCITALOPRAM OXALATE 20 MG: 20 TABLET ORAL at 09:03

## 2020-02-07 RX ADMIN — FOLIC ACID 1 MG: 1 TABLET ORAL at 09:03

## 2020-02-07 ASSESSMENT — ACTIVITIES OF DAILY LIVING (ADL)
HYGIENE/GROOMING: INDEPENDENT
ORAL_HYGIENE: INDEPENDENT
LAUNDRY: WITH SUPERVISION
DRESS: INDEPENDENT

## 2020-02-07 NOTE — CONSULTS
Internal Medicine Initial Visit      Aliyah Angeles MRN# [id number]   YOB: 1975 Age: 44 year old   Date of Admission: 2/6/2020  PCP: Iman Forbes    Referring Provider: Behavioral Health - Belia Kerr MD  Reason for Visit: General Medical Evaluation, alcohol intoxication withdrawal         Assessment and Recommendations:   Aliyah Angeles is a 44 year old year old woman with a history of alcohol induced pancreatitis, gastric bypass, cholecystectomy who is admitted to station 3a with EtOH withdrawal.        Alcohol intoxication withdrawal.  Utox: + benzodiazapines, otherwise negative  Management per psychiatry team.     Hyponatremia  NA from labs in ED showed sodium 126 concurrent with low magnesium, low calcium, that may be consistent with dilution such as IV fluids.  Proved with sodium 134.  Patient reports drinking approximately 2 gallons of water yesterday and has been drinking alcohol for several days with poor nutrition.  Suspect is secondary to acute water toxicity plus or minus lab error.  Urinalysis shows SG 1.002 and is not consistent with infection or other abnormality  -Encourage patient to take electrolyte-containing fluids  -Hold off on fluid restriction at this time  -repeat BMP in a.m. if patient remains inpatient    leukocytosis  WBC 15.2 on admission laboratories.  Patient remains AVSS other than intermittent hypertension in the setting of withdrawal.  UA not consistent with infection.  Examination of lungs CTA B  -monitor  -Low threshold for antibiotics if abnormal vitals or signs or symptoms consistent with infection    Hypertension  Occurs in the setting of alcohol withdrawal with labile blood pressure more recently systolic in the 1  range in the  range.  -PRN hydralazine  -Monitor    Elevated GGT  Occurs without transaminitis and in the setting of acute alcohol intoxication.  Relevance is unclear.  -Follow-up nonurgently with primary care  provider    Medicine will follow-up on BP and a.m. labs, please page with any additional concerns.     Sima Flores PA-C  Hospitalist Service   Pager: [phone]     Reason for Admission:   Alcohol intoxication withdrawal         History of Present Illness:   History is obtained from the patient and medical record.     Aliyah Angeles is a 44 year old year old woman with a history of alcohol induced pancreatitis, gastric bypass, cholecystectomy who is admitted to station 3a with EtOH withdrawal.     Internal Medicine service was asked to see patient for a general medication evaluation. Currently, patient is hoping to be discharged and sites recently passed away loved ones. She has not had any consequences of hyponatremia including any headache, dizziness, lightheadedness and has never had a seizure.  She has never had delirium tremens.  She has not had any swelling.  She denies any numbness, tingling, pins-and-needles.  She denies any chest pain, shortness breath, rashes lumps or lesions.  She denies any significant changes to her bowel habits or abdominal pain.  She denies any urinary complaints including any frequency, urgency, dysuria.          Review of Systems:     10 point ROS was performed and negative unless otherwise noted in HPI.           Past Medical History:   Reviewed and updated in Epic.  Past Medical History:   Diagnosis Date     Acne      ADHD (attention deficit hyperactivity disorder)      Arthralgia of temporomandibular joint 5/19/2016     Arthritis      Cervical high risk HPV (human papillomavirus) test positive 4/27/2018 4/27/18 NIL pap, + HR HPV (not 16 or 18). Plan: cotest in 1 yr.       Cobalamin deficiency 11/24/2014     Drug-seeking behavior 5/19/2016    Overview:  Per [facility] website, patient has filled with multiple controlled substances with multiple prescribers at multiple pharmacies      History of blood transfusion 01/01/1988     Insomnia 7/21/2013     Raynaud's phenomenon 5/25/2015      Vitamin D deficiency 2013             Past Surgical History:   Reviewed and updated in Epic.  Past Surgical History:   Procedure Laterality Date     ABDOMEN SURGERY  08/10/2010    Gastric  bypass     BIOPSY  89    HPV, multiple reoccurrances, partial  hysterectomy  2013     CHOLECYSTECTOMY  2010     COLONOSCOPY  2009     ENT SURGERY  1988    Tonsils     GI SURGERY  8/10/10    Fitz-en-Y     HYSTERECTOMY VAGINAL  2013    pathology of cervix benign.              Social History:     Social History     Tobacco Use     Smoking status: Former Smoker     Packs/day: 1.00     Years: 15.00     Pack years: 15.00     Types: Cigarettes     Start date: 1989     Last attempt to quit: 2010     Years since quittin.5     Smokeless tobacco: Never Used   Substance Use Topics     Alcohol use: Yes     Alcohol/week: 0.0 standard drinks     Comment: Occasional/social     Drug use: No             Family History:   Reviewed and updated in Epic.  Family History   Problem Relation Age of Onset     Prostate Cancer Father      Hypertension Father      Hyperlipidemia Father      Substance Abuse Father      Obesity Father      Diabetes Maternal Grandmother      Colon Cancer Maternal Grandmother      Other Cancer Maternal Grandmother         pancreatic cancer     Obesity Maternal Grandmother      Diabetes Paternal Grandmother      Obesity Paternal Grandmother      Diabetes Paternal Grandfather      Obesity Paternal Grandfather      Breast Cancer Sister      Breast Cancer Sister      Anxiety Disorder Sister      Anesthesia Reaction Sister      Depression Mother      Anesthesia Reaction Mother      Asthma Daughter      Coronary Artery Disease No family hx of      Hypertension No family hx of      Hyperlipidemia No family hx of      Cerebrovascular Disease No family hx of      Depression No family hx of      Anxiety Disorder No family hx of      Mental Illness No family hx of      Substance Abuse No  "family hx of      Anesthesia Reaction No family hx of      Asthma No family hx of      Osteoporosis No family hx of      Genetic Disorder No family hx of      Thyroid Disease No family hx of      Obesity No family hx of      Unknown/Adopted No family hx of              Allergies:     Allergies   Allergen Reactions     Nsaids      Gastric bypass             Medications:     Medications Prior to Admission   Medication Sig Dispense Refill Last Dose     calcium carbonate 600 mg-vitamin D 400 units (CALTRATE) 600-400 MG-UNIT per tablet Take 1 tablet by mouth daily   2/5/2020 at Unknown time     escitalopram (LEXAPRO) 20 MG tablet Take 1 tablet (20 mg) by mouth daily 90 tablet 0 2/6/2020 at Unknown time     MULTIPLE VITAMIN PO Take 1 tablet by mouth daily    2/6/2020 at Unknown time     Omega-3 Fatty Acids (OMEGA 3 PO) Take 1 g by mouth daily    2/5/2020 at Unknown time     amitriptyline (ELAVIL) 10 MG tablet Take 2 tablets (20 mg) by mouth At Bedtime 180 tablet 3 More than a month at Unknown time     amphetamine-dextroamphetamine (ADDERALL) 30 MG tablet Take 1 tablet (30 mg) by mouth 3 times daily 90 tablet 0 More than a month at Unknown time     clindamycin (CLINDAMAX) 1 % gel Apply topically daily as needed    More than a month at Unknown time     cyanocobalamin (CYANOCOBALAMIN) 1000 MCG/ML injection Inject 1 mL (1,000 mcg) into the muscle every 30 days 1 ML every 4 weeks 3 mL 1 More than a month at Unknown time     diphenhydrAMINE-acetaminophen (TYLENOL PM)  MG tablet Take 2 tablets by mouth nightly as needed for sleep   Unknown at Unknown time     DRYSOL 20 % external solution Apply topically daily as needed    More than a month at Unknown time     Needle, Disp, (B-D DISP NEEDLE) 25G X 1\" MISC 1 each every 30 days 12 each 0 Taking     tretinoin (RETIN-A) 0.025 % cream Apply topically daily as needed    More than a month at Unknown time        Current Facility-Administered Medications   Medication     " "acetaminophen (TYLENOL) tablet 650 mg     alum & mag hydroxide-simethicone (MYLANTA ES/MAALOX  ES) suspension 30 mL     atenolol (TENORMIN) tablet 50 mg     bisacodyl (DULCOLAX) Suppository 10 mg     diazepam (VALIUM) tablet 5-20 mg     folic acid (FOLVITE) tablet 1 mg     hydrOXYzine (ATARAX) tablet 25 mg     magnesium hydroxide (MILK OF MAGNESIA) suspension 30 mL     multivitamin w/minerals (THERA-VIT-M) tablet 1 tablet     traZODone (DESYREL) tablet 50 mg     vitamin B1 (THIAMINE) tablet 100 mg            Physical Exam:   Blood pressure (!) 171/106, pulse 73, temperature 98.3  F (36.8  C), temperature source Tympanic, resp. rate 16, height 1.626 m (5' 4\"), weight 74.4 kg (164 lb), SpO2 97 %, not currently breastfeeding.  Body mass index is 28.15 kg/m .  GENERAL: Alert and oriented x 3. NAD, ambulatory, cooperative  HEENT: Anicteric sclera. Mucous membranes moist.  Pupils equal and round. EOMI. NC. AT.  CV: RRR. S1, S2. No murmurs appreciated.   RESPIRATORY: Effort normal on room air. Lungs CTAB with no wheezing, rales, rhonchi.   GI: Abdomen soft, non distended, non tender.   MUSCULOSKELETAL: 5 out of 5 strength of the bilateral upper and lower extremities no joint swelling or tenderness.  NEUROLOGICAL: No asterixis.  No cerebellar signs.  No focal deficits. Moves all extremities.   EXTREMITIES: No peripheral edema. Intact bilateral pedal pulses.   SKIN: No jaundice. No rashes.           Data:   CBC:  Recent Labs   Lab Test 02/06/20  1203   WBC 15.2*   RBC 3.60*   HGB 11.9   HCT 34.2*   MCV 95   MCH 33.1*   MCHC 34.8   RDW 14.7          CMP:  Recent Labs   Lab Test 02/06/20  1203   *   POTASSIUM 3.6   CHLORIDE 90*   STEFFANY 8.3*   CO2 24   BUN 8   CR 0.59   GLC 97   AST 45   ALT 36   BILITOTAL 0.6   ALBUMIN 3.9   PROTTOTAL 7.1   ALKPHOS 99       TSH:  TSH   Date Value Ref Range Status   02/07/2020 2.41 0.40 - 4.00 mU/L Final       Tox screen: Utox: + benzodiazapines, otherwise negative  HCG: " neg    Unresulted Labs Ordered in the Past 30 Days of this Admission     Date and Time Order Name Status Description    2/6/2020 2043 Vitamin B12 In process

## 2020-02-07 NOTE — PLAN OF CARE
"NITHYA      Aliyah Angeles is a 44 year old year old female with a chief complaint of Alcohol Problem (pt seeking detox from alcohol. Last drink was on Tues. Drinks 12-18 beers per day. Pt is shakey. Denies h/o withdrawal seizures.)    S = Situation:   Drinking 12-18 beers daily x 3 weeks. Last drink 2/04/20. Went to detox in [facility] 3 weeks ago and was planning to go to [facility] for CD treatment. Pt will be able to go to [facility] after detox.   Denies hx of withdrawal seizures or Dt's.  Has not taken medications x 1 month, except had lexapro this morning. PTA medications not ordered.  Denies SI.  Tearful when discussing friend who committed suicide last week.  B  = Background:   Hx gastric bypass in 2010. Hx depression and ADHD.    Vital Signs: /85   Pulse 75   Temp 98.7  F (37.1  C) (Tympanic)   Resp 16   Ht 1.626 m (5' 4\")   Wt 74.4 kg (164 lb)   SpO2 99%   BMI 28.15 kg/m    A  =  Assessment:   Cooperative with admission process.  Alert and orientated x 4.  MSSA score 4.  Given Valium 5 mg x 2 in ED for withdrawal symptoms.  R =   Request or Recommendation:   Continue to monitor for withdrawal symptoms.  15 minute checks for safety.  Placed on withdrawal precautions.  MSSA for alcohol withdrawal.  "

## 2020-02-07 NOTE — PROGRESS NOTES
"Patient presents as tearful, agitated. Patient stated, \"I need to discharge by 1300 to [facility], or I won't be able to go there until Monday.\" This RN informed patient that she is still not out of detox at this time, and her vitals have not been stabilized. Dr. Kerr notified of patient intent to discharge. Patient requested/received hydroxyzine 25mg x 1 (see MAR) for high anxiety. Patient remains quite verbally abuse to writer, often swearing, stating \"You don't know what it's like to fucking be in here!\"   "

## 2020-02-07 NOTE — PROVIDER NOTIFICATION
"   02/07/20 0745   Vital Signs   Temp 98.3  F (36.8  C)   Temp src Tympanic   Resp 16   Pulse 61   Pulse/Heart Rate Source Monitor   BP (!) 182/114     Patient presents as hypertensive this am. Vitals re-assessed:     Blood pressure (!) 171/106, pulse 73, temperature 98.3  F (36.8  C), temperature source Tympanic, resp. rate 16, height 1.626 m (5' 4\"), weight 74.4 kg (164 lb), SpO2 97 %, not currently breastfeeding.    Internal medicine PA, Sima Flores, paged and notified of HTN.    Patient MSSA = 3 upon first am check. Patient attributes to feeling \"shaky\" because she drank coffee this am, which is atypical. Patient requesting discharge this evening to [facility] if she is out of detox. Pt denies SI/SIB/HI. Patient denies auditory/visual hallucinations. Patient encouraged to drink crystal light with water. Patient eating and drinking appropriately, denies any other issues.   "

## 2020-02-07 NOTE — PROGRESS NOTES
Writer met with pt to discuss aftercare plans. Pt reports she is scheduled to be admitted to [facility] when out-of-detox. Pt signed ROBERT and writer spoke with Oswald ([phone]) at [facility] to coordinate admission. [facility] will not do weekend admission so pt is scheduled to admit to [facility] on Monday 2/10 at 10:30 am. If pt remains hospitalized through the weekend, please coordinate with [facility] for admission on Monday if date or time needs to change. AVS updated.

## 2020-02-07 NOTE — PROGRESS NOTES
02/06/20 2050   Patient Belongings   Did you bring any home meds/supplements to the hospital?  Yes   Disposition of meds  Other (see comment)   Patient Belongings other (see comments)   Belongings Search Yes   Clothing Search Yes   Second Staff Mya & Camille   Comment See notes   Storage: Belongings not searched; in vitals room.  Discharge bin: phone  Med envelope: [id number]  A               Admission:  I am responsible for any personal items that are not sent to the safe or pharmacy.  [facility] is not responsible for loss, theft or damage of any property in my possession.    Signature:  _________________________________ Date: _______  Time: _____                                              Staff Signature:  ____________________________ Date: ________  Time: _____      2nd Staff person, if patient is unable/unwilling to sign:    Signature: ________________________________ Date: ________  Time: _____     Discharge:  [facility] has returned all of my personal belongings:    Signature: _________________________________ Date: ________  Time: _____                                          Staff Signature:  ____________________________ Date: ________  Time: _____
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
## 2020-02-07 NOTE — PLAN OF CARE
Behavioral Team Discussion: (2/7/2020)    Continued Stay Criteria/Rationale: Patient admitted for Chemical Use Issues.  Plan: The following services will be provided to the patient; psychiatric assessment, medication management, therapeutic milieu, individual and group support, and skills groups.   Participants: 3A Provider: Dr. Belia Kerr MD; 3A RN's: Leta Rendon, RN; 3A CM's: Bailey Rosales.  Summary/Recommendation: Providers will assess today for treatment recommendations, discharge planning, and aftercare plans. CM will meet with pt for discharge planning.   Medical/Physical: Per ED note:    Acne       ADHD (attention deficit hyperactivity disorder)       Arthralgia of temporomandibular joint 5/19/2016     Arthritis       Cervical high risk HPV (human papillomavirus) test positive 4/27/2018 4/27/18 NIL pap, + HR HPV (not 16 or 18). Plan: cotest in 1 yr.       Cobalamin deficiency 11/24/2014     Drug-seeking behavior 5/19/2016     Overview:  Per [facility] website, patient has filled with multiple controlled substances with multiple prescribers at multiple pharmacies      History of blood transfusion 01/01/1988     Insomnia 7/21/2013     Raynaud's phenomenon 5/25/2015     Vitamin D deficiency 1/16/2013     Precautions:   Behavioral Orders   Procedures     Code 1 - Restrict to Unit     Routine Programming     As clinically indicated     Status 15     Every 15 minutes.     Withdrawal precautions     Rationale for change in precautions or plan: N/A  Progress: Initial.

## 2020-02-07 NOTE — PROGRESS NOTES
"SPIRITUAL HEALTH SERVICES  [facility] ([facility] Chem Dep  ON-CALL VISIT     REFERRAL SOURCE: Pt request.  Pt was anxious, tearful, and shaky as she shared about her life.   She states \"I'm tired of living like this (drinking every day).\" She states that she has recently missed 3 funerals - an Uncle, a Friend from suicide, and her sister's Father-in-law. She shares that she has \"no where to go,\" adding \"my spouse kicked me out because of drinking.\" She went to her mother's place, sharing that Mom is alcoholic.   She asked for prayer. When asked about her beliefs, she states \"I was raised [employer]regational but I don't go. I don't believe most of that stuff.\" When asked \"what do you believe \" she began to cry and stated \"I don't know. I'm lost.\" She also stated she wanted to leave the hospital because \"I don't like the people here (pts), they scare me.\" Though she couldn't identify what it was about \"the people\" that scared her.    Listening and reflective conversation, affirmed the safety of the unit, prayer for peace, patience and courage to heal.     PLAN: Chaplains remain available per pt/staff request.     Rev. Moraima Barrera MDiv, [facility]  Staff    Pager [phone]  * SHS remains available 24/7 for emergent requests/referrals, either by having the switchboard page the on-call  or by entering an ASAP/STAT consult in Epic (this will also page the on-call ).*          "

## 2020-02-08 LAB
ANION GAP SERPL CALCULATED.3IONS-SCNC: 2 MMOL/L (ref 3–14)
BUN SERPL-MCNC: 5 MG/DL (ref 7–30)
CALCIUM SERPL-MCNC: 8.4 MG/DL (ref 8.5–10.1)
CHLORIDE SERPL-SCNC: 110 MMOL/L (ref 94–109)
CO2 SERPL-SCNC: 30 MMOL/L (ref 20–32)
CREAT SERPL-MCNC: 0.51 MG/DL (ref 0.52–1.04)
ERYTHROCYTE [DISTWIDTH] IN BLOOD BY AUTOMATED COUNT: 15.1 % (ref 10–15)
GFR SERPL CREATININE-BSD FRML MDRD: >90 ML/MIN/{1.73_M2}
GLUCOSE SERPL-MCNC: 91 MG/DL (ref 70–99)
HCT VFR BLD AUTO: 33 % (ref 35–47)
HGB BLD-MCNC: 10.7 G/DL (ref 11.7–15.7)
MCH RBC QN AUTO: 32.6 PG (ref 26.5–33)
MCHC RBC AUTO-ENTMCNC: 32.4 G/DL (ref 31.5–36.5)
MCV RBC AUTO: 101 FL (ref 78–100)
PLATELET # BLD AUTO: 216 10E9/L (ref 150–450)
POTASSIUM SERPL-SCNC: 4.2 MMOL/L (ref 3.4–5.3)
RBC # BLD AUTO: 3.28 10E12/L (ref 3.8–5.2)
SODIUM SERPL-SCNC: 142 MMOL/L (ref 133–144)
WBC # BLD AUTO: 5.8 10E9/L (ref 4–11)

## 2020-02-08 PROCEDURE — 25000132 ZZH RX MED GY IP 250 OP 250 PS 637: Performed by: PSYCHIATRY & NEUROLOGY

## 2020-02-08 PROCEDURE — 80048 BASIC METABOLIC PNL TOTAL CA: CPT | Performed by: PHYSICIAN ASSISTANT

## 2020-02-08 PROCEDURE — 12800008 ZZH R&B CD ADULT

## 2020-02-08 PROCEDURE — 36415 COLL VENOUS BLD VENIPUNCTURE: CPT | Performed by: PHYSICIAN ASSISTANT

## 2020-02-08 PROCEDURE — 25000132 ZZH RX MED GY IP 250 OP 250 PS 637: Performed by: NURSE PRACTITIONER

## 2020-02-08 PROCEDURE — 85027 COMPLETE CBC AUTOMATED: CPT | Performed by: PHYSICIAN ASSISTANT

## 2020-02-08 RX ORDER — POLYETHYLENE GLYCOL 3350 17 G
2 POWDER IN PACKET (EA) ORAL
Status: DISCONTINUED | OUTPATIENT
Start: 2020-02-08 | End: 2020-02-08

## 2020-02-08 RX ORDER — DOCUSATE SODIUM 100 MG/1
100 CAPSULE, LIQUID FILLED ORAL 2 TIMES DAILY
Status: DISCONTINUED | OUTPATIENT
Start: 2020-02-08 | End: 2020-02-10 | Stop reason: HOSPADM

## 2020-02-08 RX ADMIN — THIAMINE HCL TAB 100 MG 100 MG: 100 TAB at 08:17

## 2020-02-08 RX ADMIN — HYDROXYZINE HYDROCHLORIDE 50 MG: 25 TABLET, FILM COATED ORAL at 20:34

## 2020-02-08 RX ADMIN — HYDROXYZINE HYDROCHLORIDE 50 MG: 25 TABLET, FILM COATED ORAL at 12:17

## 2020-02-08 RX ADMIN — NICOTINE 1 PATCH: 14 PATCH, EXTENDED RELEASE TRANSDERMAL at 10:50

## 2020-02-08 RX ADMIN — CALCIUM CARBONATE 600 MG (1,500 MG)-VITAMIN D3 400 UNIT TABLET 1 TABLET: at 08:17

## 2020-02-08 RX ADMIN — FOLIC ACID 1 MG: 1 TABLET ORAL at 08:17

## 2020-02-08 RX ADMIN — NICOTINE POLACRILEX 2 MG: 2 LOZENGE ORAL at 18:51

## 2020-02-08 RX ADMIN — HYDROXYZINE HYDROCHLORIDE 50 MG: 25 TABLET, FILM COATED ORAL at 08:17

## 2020-02-08 RX ADMIN — TRAZODONE HYDROCHLORIDE 50 MG: 50 TABLET ORAL at 22:33

## 2020-02-08 RX ADMIN — NICOTINE POLACRILEX 4 MG: 4 LOZENGE ORAL at 20:35

## 2020-02-08 RX ADMIN — ESCITALOPRAM OXALATE 20 MG: 20 TABLET ORAL at 08:17

## 2020-02-08 RX ADMIN — DOCUSATE SODIUM 100 MG: 100 CAPSULE, LIQUID FILLED ORAL at 20:34

## 2020-02-08 RX ADMIN — MULTIPLE VITAMINS W/ MINERALS TAB 1 TABLET: TAB at 08:17

## 2020-02-08 RX ADMIN — HYDROXYZINE HYDROCHLORIDE 50 MG: 25 TABLET, FILM COATED ORAL at 16:11

## 2020-02-08 RX ADMIN — NICOTINE 1 PATCH: 14 PATCH, EXTENDED RELEASE TRANSDERMAL at 16:10

## 2020-02-08 ASSESSMENT — ACTIVITIES OF DAILY LIVING (ADL)
HYGIENE/GROOMING: INDEPENDENT
DRESS: INDEPENDENT
ORAL_HYGIENE: INDEPENDENT
LAUNDRY: WITH SUPERVISION

## 2020-02-08 NOTE — PLAN OF CARE
S:  Pt has not required any medication for alcohol withdrawal for more than 24 hours. She us eating and drinking normally.  She is visible on the unit.  B:  Pt with history of gastric bypass and more extensive medical history (see Epic list) admitted for alcohol withdrawal and detoxification.  A:  Pt medically stable in the detox process AEB MSSA score of 4 and < 8 for > 24 hours.  R:  Pt removed from the detox monitoring process.  Continue to monitor VS and prepare for discharge to treatment.

## 2020-02-08 NOTE — PROGRESS NOTES
"Participated in Music Therapy group with focus on mood elevation, validation and decreasing anxiety and improved group cohesiveness. Engaged and cooperative in music listening interventions.   Showed progress in session goals. Requested the Aj Baldwin song \"Broken Halos\".  Appeared calm throughout group.      "

## 2020-02-08 NOTE — PROGRESS NOTES
"Brief Medicine Note    Followed up regarding bp and a.m. labs.     Today's vital signs, medications, and nursing notes were reviewed. Labs reviewed    /85 (BP Location: Left arm)   Pulse 70   Temp 98.8  F (37.1  C) (Oral)   Resp 16   Ht 1.626 m (5' 4\")   Wt 74.4 kg (164 lb)   SpO2 99%   BMI 28.15 kg/m        A/P:  Hyponatremia  Today Na is normal at 142. NA from labs in ED showed sodium 126 concurrent with low magnesium, low calcium, that may be consistent with dilution such as IV fluids.  Proved with sodium 134.  Patient reports drinking approximately 2 gallons of water yesterday and has been drinking alcohol for several days with poor nutrition.  Suspect is secondary to acute water toxicity plus or minus lab error.  Urinalysis shows SG 1.002 and is not consistent with infection or other abnormality  -Encourage patient to take electrolyte-containing fluids  -Hold off on fluid restriction at this time    Leukocytosis  WBC today 5.8  -resolved    HTN  bp normotensive today  -no indication for further intervention    Medicine will sign off, please call with questions/concerns       Sima Flores PA-C  Hospitalist Service  Pager: [phone]    "

## 2020-02-08 NOTE — PLAN OF CARE
"Problem: Substance Withdrawal  Goal: Social and Therapeutic (Substance Withdrawal)  Description  Signs and symptoms of listed problems will be absent or manageable.  Outcome: Improving    Patient is up and visible in the milieu. Patient is alert and oriented x 4. Patient is attending/participating in unit programming. Pt is social with peers. Affect is blunted/flat, mood is anxious. Patient denies SI/SIB/HI. Pt denies auditory/visual hallucinations.     This RN administered the PRN medications hydroxyzine 50mg x 1 (see MAR) at the request of the patient for anxiety. Patient reports this medication has been helpful. Patient is tolerating medications well, denies any current side effects.     Patient remains out of detox at this time. Patient reports a good appetite, and good sleep. Patient anticipated to discharge to [place] on Monday. Rest, fluids, and food encouraged. Status 15 checks remain.     Blood pressure 131/85, pulse 70, temperature 98.8  F (37.1  C), temperature source Oral, resp. rate 16, height 1.626 m (5' 4\"), weight 74.4 kg (164 lb), SpO2 99 %, not currently breastfeeding.   "

## 2020-02-09 LAB
OSMOLALITY UR: 380 MMOL/KG (ref 100–1200)
SODIUM UR-SCNC: 91 MMOL/L

## 2020-02-09 PROCEDURE — 84300 ASSAY OF URINE SODIUM: CPT | Performed by: PHYSICIAN ASSISTANT

## 2020-02-09 PROCEDURE — 12800008 ZZH R&B CD ADULT

## 2020-02-09 PROCEDURE — 25000132 ZZH RX MED GY IP 250 OP 250 PS 637: Performed by: PSYCHIATRY & NEUROLOGY

## 2020-02-09 PROCEDURE — 25000132 ZZH RX MED GY IP 250 OP 250 PS 637: Performed by: NURSE PRACTITIONER

## 2020-02-09 PROCEDURE — H2032 ACTIVITY THERAPY, PER 15 MIN: HCPCS

## 2020-02-09 PROCEDURE — 25000132 ZZH RX MED GY IP 250 OP 250 PS 637: Performed by: PHYSICIAN ASSISTANT

## 2020-02-09 PROCEDURE — 83935 ASSAY OF URINE OSMOLALITY: CPT | Performed by: PHYSICIAN ASSISTANT

## 2020-02-09 RX ORDER — POLYETHYLENE GLYCOL 3350 17 G/17G
17 POWDER, FOR SOLUTION ORAL DAILY PRN
Status: DISCONTINUED | OUTPATIENT
Start: 2020-02-09 | End: 2020-02-10 | Stop reason: HOSPADM

## 2020-02-09 RX ADMIN — NICOTINE POLACRILEX 8 MG: 4 LOZENGE ORAL at 17:42

## 2020-02-09 RX ADMIN — HYDRALAZINE HYDROCHLORIDE 10 MG: 10 TABLET ORAL at 20:50

## 2020-02-09 RX ADMIN — NICOTINE POLACRILEX 8 MG: 4 LOZENGE ORAL at 21:31

## 2020-02-09 RX ADMIN — HYDROXYZINE HYDROCHLORIDE 50 MG: 25 TABLET, FILM COATED ORAL at 08:27

## 2020-02-09 RX ADMIN — NICOTINE POLACRILEX 4 MG: 4 LOZENGE ORAL at 15:59

## 2020-02-09 RX ADMIN — NICOTINE POLACRILEX 4 MG: 4 LOZENGE ORAL at 14:07

## 2020-02-09 RX ADMIN — THIAMINE HCL TAB 100 MG 100 MG: 100 TAB at 08:27

## 2020-02-09 RX ADMIN — POLYETHYLENE GLYCOL 3350 17 G: 17 POWDER, FOR SOLUTION ORAL at 14:29

## 2020-02-09 RX ADMIN — ESCITALOPRAM OXALATE 20 MG: 20 TABLET ORAL at 08:27

## 2020-02-09 RX ADMIN — DOCUSATE SODIUM 100 MG: 100 CAPSULE, LIQUID FILLED ORAL at 20:13

## 2020-02-09 RX ADMIN — NICOTINE POLACRILEX 8 MG: 4 LOZENGE ORAL at 20:13

## 2020-02-09 RX ADMIN — TRAZODONE HYDROCHLORIDE 50 MG: 50 TABLET ORAL at 23:38

## 2020-02-09 RX ADMIN — CALCIUM CARBONATE 600 MG (1,500 MG)-VITAMIN D3 400 UNIT TABLET 1 TABLET: at 08:27

## 2020-02-09 RX ADMIN — HYDROXYZINE HYDROCHLORIDE 50 MG: 25 TABLET, FILM COATED ORAL at 12:30

## 2020-02-09 RX ADMIN — TRAZODONE HYDROCHLORIDE 50 MG: 50 TABLET ORAL at 22:27

## 2020-02-09 RX ADMIN — MULTIPLE VITAMINS W/ MINERALS TAB 1 TABLET: TAB at 08:27

## 2020-02-09 RX ADMIN — DOCUSATE SODIUM 100 MG: 100 CAPSULE, LIQUID FILLED ORAL at 08:27

## 2020-02-09 RX ADMIN — FOLIC ACID 1 MG: 1 TABLET ORAL at 08:27

## 2020-02-09 RX ADMIN — HYDROXYZINE HYDROCHLORIDE 50 MG: 25 TABLET, FILM COATED ORAL at 17:41

## 2020-02-09 RX ADMIN — NICOTINE POLACRILEX 4 MG: 4 LOZENGE ORAL at 12:20

## 2020-02-09 RX ADMIN — HYDROXYZINE HYDROCHLORIDE 50 MG: 25 TABLET, FILM COATED ORAL at 21:31

## 2020-02-09 RX ADMIN — NICOTINE POLACRILEX 4 MG: 4 LOZENGE ORAL at 10:30

## 2020-02-09 RX ADMIN — NICOTINE POLACRILEX 4 MG: 4 LOZENGE ORAL at 08:29

## 2020-02-09 ASSESSMENT — ACTIVITIES OF DAILY LIVING (ADL)
ORAL_HYGIENE: INDEPENDENT
LAUNDRY: WITH SUPERVISION
HYGIENE/GROOMING: INDEPENDENT
DRESS: INDEPENDENT

## 2020-02-09 NOTE — PROVIDER NOTIFICATION
Patient reports no relief of constipation at this time despite starting scheduled Colace. Patient requested Miralax PRN. Internal medicine UMESH, Sima Flores, paged and notified.

## 2020-02-09 NOTE — PLAN OF CARE
"Problem: Adult Behavioral Health Plan of Care  Goal: Plan of Care Review  Outcome: Improving  Goal: Patient-Specific Goal (Individualization)  Outcome: Improving  Note:     Patient is up and visible in the milieu. Patient is alert and oriented x 4. Patient is attending/participating in unit programming. Pt is social with peers. Affect is blunted/flat (does brighten at times), and mood is anxious. Patient denies SI/SIB/HI. Pt denies auditory/visual hallucinations.     This RN administered the PRN medications hydroxyzine 50mg x 1, (see MAR) at the request of the patient for anxiety. Patient states this continues to help her. Patient is tolerating medications well, denies any current side effects.     Patient remains out of detox for etoh withdrawal with no issues. Patient reports a good appetite, and good sleep. Patient reports feeling hopeful for an early discharge tomorrow am to [place]. Rest, fluids, and food encouraged. Status 15 checks remain.     Blood pressure (!) 143/89, pulse 76, temperature 98  F (36.7  C), temperature source Tympanic, resp. rate 16, height 1.626 m (5' 4\"), weight 74.4 kg (164 lb), SpO2 99 %, not currently breastfeeding.   "

## 2020-02-10 VITALS
DIASTOLIC BLOOD PRESSURE: 103 MMHG | HEART RATE: 86 BPM | SYSTOLIC BLOOD PRESSURE: 165 MMHG | BODY MASS INDEX: 28 KG/M2 | WEIGHT: 164 LBS | OXYGEN SATURATION: 99 % | RESPIRATION RATE: 16 BRPM | TEMPERATURE: 98.1 F | HEIGHT: 64 IN

## 2020-02-10 PROCEDURE — 25000132 ZZH RX MED GY IP 250 OP 250 PS 637: Performed by: PSYCHIATRY & NEUROLOGY

## 2020-02-10 PROCEDURE — 25000132 ZZH RX MED GY IP 250 OP 250 PS 637: Performed by: PHYSICIAN ASSISTANT

## 2020-02-10 PROCEDURE — 99239 HOSP IP/OBS DSCHRG MGMT >30: CPT | Performed by: PSYCHIATRY & NEUROLOGY

## 2020-02-10 PROCEDURE — 25000132 ZZH RX MED GY IP 250 OP 250 PS 637: Performed by: NURSE PRACTITIONER

## 2020-02-10 RX ORDER — ESCITALOPRAM OXALATE 20 MG/1
20 TABLET ORAL DAILY
Qty: 30 TABLET | Refills: 0 | Status: ON HOLD | OUTPATIENT
Start: 2020-02-10 | End: 2021-09-20

## 2020-02-10 RX ORDER — HYDROXYZINE HYDROCHLORIDE 25 MG/1
25-50 TABLET, FILM COATED ORAL EVERY 4 HOURS PRN
Qty: 30 TABLET | Refills: 0 | Status: ON HOLD | OUTPATIENT
Start: 2020-02-10 | End: 2020-04-10

## 2020-02-10 RX ORDER — TRAZODONE HYDROCHLORIDE 50 MG/1
50 TABLET, FILM COATED ORAL
Qty: 30 TABLET | Refills: 0 | Status: ON HOLD | OUTPATIENT
Start: 2020-02-10 | End: 2021-09-13

## 2020-02-10 RX ORDER — MULTIPLE VITAMINS W/ MINERALS TAB 9MG-400MCG
1 TAB ORAL DAILY
Qty: 30 TABLET | Refills: 1 | Status: ON HOLD | OUTPATIENT
Start: 2020-02-11 | End: 2021-09-20

## 2020-02-10 RX ORDER — NALTREXONE HYDROCHLORIDE 50 MG/1
50 TABLET, FILM COATED ORAL DAILY
Qty: 30 TABLET | Refills: 0 | Status: ON HOLD | OUTPATIENT
Start: 2020-02-10 | End: 2020-04-09

## 2020-02-10 RX ADMIN — THIAMINE HCL TAB 100 MG 100 MG: 100 TAB at 07:53

## 2020-02-10 RX ADMIN — CALCIUM CARBONATE 600 MG (1,500 MG)-VITAMIN D3 400 UNIT TABLET 1 TABLET: at 07:53

## 2020-02-10 RX ADMIN — NICOTINE POLACRILEX 8 MG: 4 LOZENGE ORAL at 08:49

## 2020-02-10 RX ADMIN — HYDROXYZINE HYDROCHLORIDE 50 MG: 25 TABLET, FILM COATED ORAL at 06:05

## 2020-02-10 RX ADMIN — DOCUSATE SODIUM 100 MG: 100 CAPSULE, LIQUID FILLED ORAL at 07:53

## 2020-02-10 RX ADMIN — ESCITALOPRAM OXALATE 20 MG: 20 TABLET ORAL at 07:53

## 2020-02-10 RX ADMIN — POLYETHYLENE GLYCOL 3350 17 G: 17 POWDER, FOR SOLUTION ORAL at 07:54

## 2020-02-10 RX ADMIN — MULTIPLE VITAMINS W/ MINERALS TAB 1 TABLET: TAB at 07:53

## 2020-02-10 RX ADMIN — FOLIC ACID 1 MG: 1 TABLET ORAL at 07:53

## 2020-02-10 RX ADMIN — NICOTINE POLACRILEX 8 MG: 4 LOZENGE ORAL at 06:05

## 2020-02-10 ASSESSMENT — ACTIVITIES OF DAILY LIVING (ADL)
HYGIENE/GROOMING: INDEPENDENT
ORAL_HYGIENE: INDEPENDENT
DRESS: INDEPENDENT
LAUNDRY: WITH SUPERVISION

## 2020-02-10 NOTE — DISCHARGE SUMMARY
Admit Date:     02/06/2020   Discharge Date:      2/10/2020     More than 35 minutes spent on discharge summary, doing the discharge instructions, discharge medication, and discharge mental status examination.      DISCHARGE DIAGNOSES AXIS 1: Alcohol use disorder, severe.      Please see detailed admission by Dr. Kerr on 02/07/2020.      HOSPITAL COURSE:  During the hospitalization, the patient was detoxed off alcohol using MSSA protocol and Valium.  During the hospitalization, the patient's energy, motivation, sleep and interest improved.  She did not have any suicidal or homicidal ideation, plan or intent.  She was seen by Sima Flores PA-C.    She had a low sodium, low magnesium.  They thought it was per dilution from IV fluids and she had a leukocytosis.  She has hypertension and elevated GGT.  She completed the detox.  Her energy, motivation, sleep and interest improved.  She met with the case management.  The patient initially wanted to leave and then I told her that she could leave, but she wanted to stay because she felt like she was a high risk and over the weekend she continued to stay and finished the program and she met with the  and the plan is to go to [place].      DISCHARGE DISPOSITION:  The patient is going to [place].      DISCHARGE MENTAL STATUS EXAMINATION:  The patient is alert, oriented x3.  Recent and remote memory, and language are adequate.  No loose associations.  The patient does not have any active suicidal or homicidal ideation, plan or intent.  The patient is stable to be discharged.         Because this patient meets criteria for an Alcohol Use Disorder, I performed the following brief intervention on the date of this note:              1) Expressed concern that the patient is drinking at unhealthy levels known to increase their risk of alcohol related problems              2) Gave feedback linking alcohol use and health, including personalized feedback explaining how  alcohol use can interact with their medical and/or psychiatric problems, and with prescribed medications.              3) Advised patient to abstain.      DISCHARGE MENTAL STATUS EXAMINATION:  The patient is alert, oriented x3.  Good fund of knowledge.  Good use of language.  Recent and remote memory, language, fund of knowledge are all adequate.  Euthymic mood congruent affect  Speech normal rate/rhythm linear tp no loose asso,The patient does not have any active suicidal or homicidal ideation.  Does not have any auditory or visual hallucination.  Fair insight/judgment At this time, the patient was stable to be discharged.        Pt was not determined to not be a danger to himself or others. At the current time of discharge, the patient does not meet criteria for involuntary hospitalization. On the day of discharge, the patient reports that they do not have suicidal or homicidal ideation and would never hurt themselves or others. Steps taken to minimize risk include: assessing patient s behavior and thought process daily during hospital stay, discharging patient with adequate plan for follow up for mental and physical health and discussing safety plan of returning to the hospital should the patient ever have thoughts of harming themselves or others. Therefore, based on all available evidence including the factors cited above, the patient does not appear to be at imminent risk for self-harm, and is appropriate for outpatient level of care.     Educated about side effects/risk vs benefits /alternative including non treatment.Pt consented to be on medication.     .Total time spent on discharge summary more than 35 min  More than  20 min  planning, coordination of care, medication reconciliation and performance of physical exam on day of discharge.Care was coordinated with unit RN and unit therapist       Aliyah [place]   Home Medication Instructions FRIDA:[id number]    Printed on:02/11/20 1134   Medication  "Information                      calcium carbonate 600 mg-vitamin D 400 units (CALTRATE) 600-400 MG-UNIT per tablet  Take 1 tablet by mouth daily             escitalopram (LEXAPRO) 20 MG tablet  Take 1 tablet (20 mg) by mouth daily             hydrOXYzine (ATARAX) 25 MG tablet  Take 1-2 tablets (25-50 mg) by mouth every 4 hours as needed for anxiety             multivitamin w/minerals (THERA-VIT-M) tablet  Take 1 tablet by mouth daily             naltrexone (DEPADE/REVIA) 50 MG tablet  Take 1 tablet (50 mg) by mouth daily             Needle, Disp, (B-D DISP NEEDLE) 25G X 1\" MISC  1 each every 30 days             nicotine (NICORETTE) 4 MG lozenge  Place 1-2 lozenges (4-8 mg) inside cheek every hour as needed for smoking cessation             Omega-3 Fatty Acids (OMEGA 3 PO)  Take 1 g by mouth daily              traZODone (DESYREL) 50 MG tablet  Take 1 tablet (50 mg) by mouth nightly as needed for sleep             Disposition: [place]     Treatment Follow-Up:  [place]  Admission:  at 10:30 am  [phone]     Primary Care Follow-up:  [facility]  [address] [place] [address] [phone]   Time: PIPPA CASTRO MD             D: 02/10/2020   T: 02/10/2020   MT: JELANI      Name:     MAGALY FAJARDO   MRN:      [id number]        Account:        [id number]   :      1975           Admit Date:     2020                                  Discharge Date:       Document: [id number]      "

## 2020-02-10 NOTE — PROGRESS NOTES
02/09/20 1900   General Information   Art Directive other (see comments)   AT directive was to draw an image of self as landscape. Goals of directive: to create a personal narrative through use of visual metaphor, identifying personal strengths and goals, emotional expression, coping through art.  Pt was a positive participant, focused on task for the full duration of group.  Pts mood was calm.

## 2020-02-10 NOTE — PROGRESS NOTES
Brief Medicine Note    Contacted by nursing regarding elevated blood pressure. Patient's SBP up into the 160s today. BP previously had been elevated during her acute etoh withdrawal period with normotensive readings at times. Nursing staff reports patient is very anxious about discharging to CD facility today. No prior diagnosis of hypertension.     Plan:   - Suspect elevated BP due to patient's acute stress/anxiety   - Will hold on starting daily BP medication at this time   - She should follow-up with a primary care provider within 1 week for BP recheck - RN is assisting patient in setting this up before discharge today     Gloria Colon PA-C  Hospitalist Service  Pager: [phone]

## 2020-02-10 NOTE — PROGRESS NOTES
Patient discharged at 1000 directly to [place]. Patient took a cab. All patient belongings from room, locker, and security sent with patient. Patient escorted off the unit by Psych Associate, Kelsey.     This RN went over discharge instructions, teachings, patient's labs, and unit recommendations with patient. This RN went over patient's prescribed medications being sent with patient from discharge pharmacy. Patient verbalizes and demonstrates understanding of all teachings. Patient verbalizes understanding of medication instructions and indications. Patient denies thoughts of harm towards self or others. Patient denies auditory/visual hallucinations. Pt denies any current medication side effects or medical issues at this time. Patient reports feeling happy and hopeful for the future.     Patient encouraged to make a follow-up appointment for one week post-discharge, patient reported she would do so. Patient denies any further questions and is now discharged at this time.

## 2020-02-10 NOTE — DISCHARGE INSTRUCTIONS
Behavioral Discharge Planning and Instructions  THANK YOU FOR CHOOSING [facility]  3A  [phone]    Summary: You were admitted to Station 3A on 2/6/20 for detoxification from alcohol.  A medical exam was performed that included lab work. You have met with a  and opted to attend residential treatment at [facility].  Please take care and make your recovery a daily priority, Aliyah! It was a pleasure working with you and the entire treatment team here wishes you the very best in your recovery!     Recommendation:  Residential Treatment    Main Diagnoses:  Per Dr. Belia Kerr MD:  Alcohol Use Disorder-Severe    Major Treatments, Procedures and Findings:  You were treated for alcohol detoxification using [facility] protocol. You previously had a chemical dependency assessment. You had labs drawn and those results were reviewed with you. Please take a copy of your lab work with you to your next primary care physician appointment.    Symptoms to Report:  If you experience more anxiety, confusion, sleeplessness, deep sadness or thoughts of suicide, notify your treatment team or notify your primary care physician. IF ANY OF THE SYMPTOMS YOU ARE EXPERIENCING ARE A MEDICAL EMERGENCY CALL 911 IMMEDIATELY.     Lifestyle Adjustment: Adjust your lifestyle to get enough sleep, relaxation, exercise and good nutrition. Continue to develop healthy coping skills to decrease stress and promote a sober living environment. Do not use mood altering substances including alcohol, illegal drugs or addictive medications other than what is currently prescribed.     Disposition: [facility]    Treatment Follow-Up:  [facility]  Admission: Monday February 10th at 10:30 am  [phone]    Primary Care Follow-up:  [facility]  [address] [place] [address]  [phone]  Monday, February 17th 2020 Time: TBD    Resources:   AA/NA and Sponsors are excellent resources for support and you can find one at  any support group meeting.   Alcoholics Anonymous (www.alcoholics-anonymous.org): for local information 24 hours/day  AA Intergroup service office in [place] (http://www.aastpaul.org/) [phone]  AA Intergroup service office in [facility]: [phone]. (http://www.a[place].org/)  Narcotics Anonymous (www.InfotrieveinRayspan.org) (304) 271-7679  https://aafairviewriverside.org/meetings  SMART Recovery - self management for addiction recovery:  www.smartrecYouxiduoy.org  Pathways ~ A Health Crisis Resource & Support Center:  404.520.9755.  https://prescribetoprevent.org/patient-education/videos/  http://www.harmreduction.org  [facility] [phone]  Support Group:  AA/NA and Sponsor/support.  National [place] on Mental Illness (www.[place].yessi.org): 508.264.7709 or 701-156-1706.  Alcoholics Anonymous (www.alcoholics-anonymous.org): Check your phone book for your local chapter.  Suicide Awareness Voices of Education (SAVE) (www.save.org): 712-131-HYOZ (6240)  National Suicide Prevention Line (www.mentalhealthmn.org): 593-547-NQWB (0069)  Mental Health Consumer/Survivor Network of [place] (www.mhcsn.net): 878.251.2057 or 295-561-6803  Mental Health Association of [place] (www.mentalhealth.org): 380.305.1328 or 481-849-7184   Substance Abuse and Mental Health Services (www.samhsa.gov)  [place] Opioid Prevention Coalition: www.opioidcoalition.org    [place] Recovery Connection (MRC)  [facility] connects people seeking recovery to resources that help foster and sustain long-term recovery.  Whether you are seeking resources for treatment, transportation, housing, job training, education, health care or other pathways to recovery, [facility] is a great place to start.  687.932.2964.  www.[facility] Hospital[facility].org    General Medication Instructions:   See your medication sheet(s) for instructions.   Take all medications as prescribed.  Make no changes unless your doctor suggests them.   Go to all your doctor visits.  Be sure to  have all your required lab tests. This way, your medicines can be refilled on time.  Do not use any forms of alcohol.    Please Note:  If you have any questions at anytime after you are discharged please call [facility] detox unit 3AW at [phone].  [facility], [facility] [phone]  Medical Records call [phone]  Outpatient Behavioral Intake call [phone]  LP+ Wait List/Bed Availability call [phone]    Please remember to take all of your behavioral discharge planning and lab paperwork to any follow up appointments, it contains your lab results, diagnosis, medication list and discharge recommendations.      THANK YOU FOR CHOOSING [facility]

## 2020-02-10 NOTE — PROVIDER NOTIFICATION
02/10/20 0738   Vital Signs   Temp 98.1  F (36.7  C)   Temp src Oral   Resp 16   Pulse 86   Pulse/Heart Rate Source Monitor   BP (!) 165/103     Patient hypertensive this am. Patient remains out of detox for etoh withdrawal. Patient anticipated discharge today at 1000. Patient denies any symptoms associated with hypertension. Internal medicine, UMESH Colon, paged and notified. Patient does not meet parameters for hydralazine PRN.

## 2020-03-02 ENCOUNTER — HEALTH MAINTENANCE LETTER (OUTPATIENT)
Age: 45
End: 2020-03-02

## 2020-04-08 ENCOUNTER — HOSPITAL ENCOUNTER (OUTPATIENT)
Facility: CLINIC | Age: 45
Setting detail: OBSERVATION
Discharge: PSYCHIATRIC OR SUBSTANCE USE FACILITY | End: 2020-04-09
Attending: FAMILY MEDICINE | Admitting: INTERNAL MEDICINE
Payer: COMMERCIAL

## 2020-04-08 ENCOUNTER — TELEPHONE (OUTPATIENT)
Dept: BEHAVIORAL HEALTH | Facility: CLINIC | Age: 45
End: 2020-04-08

## 2020-04-08 ENCOUNTER — APPOINTMENT (OUTPATIENT)
Dept: GENERAL RADIOLOGY | Facility: CLINIC | Age: 45
End: 2020-04-08
Attending: FAMILY MEDICINE
Payer: COMMERCIAL

## 2020-04-08 DIAGNOSIS — F10.229 ACUTE ALCOHOLIC INTOXICATION IN ALCOHOLISM WITH COMPLICATION (H): ICD-10-CM

## 2020-04-08 DIAGNOSIS — R06.02 SOB (SHORTNESS OF BREATH): ICD-10-CM

## 2020-04-08 DIAGNOSIS — R05.9 COUGH: ICD-10-CM

## 2020-04-08 PROBLEM — Z20.822 SUSPECTED COVID-19 VIRUS INFECTION: Status: ACTIVE | Noted: 2020-04-08

## 2020-04-08 LAB
ALBUMIN SERPL-MCNC: 3.7 G/DL (ref 3.4–5)
ALBUMIN UR-MCNC: NEGATIVE MG/DL
ALP SERPL-CCNC: 88 U/L (ref 40–150)
ALT SERPL W P-5'-P-CCNC: 21 U/L (ref 0–50)
AMPHETAMINES UR QL SCN: NEGATIVE
ANION GAP SERPL CALCULATED.3IONS-SCNC: 9 MMOL/L (ref 3–14)
APPEARANCE UR: CLEAR
APTT PPP: 26 SEC (ref 22–37)
AST SERPL W P-5'-P-CCNC: 21 U/L (ref 0–45)
BARBITURATES UR QL: NEGATIVE
BASOPHILS # BLD AUTO: 0 10E9/L (ref 0–0.2)
BASOPHILS NFR BLD AUTO: 0.4 %
BENZODIAZ UR QL: NEGATIVE
BILIRUB SERPL-MCNC: 0.3 MG/DL (ref 0.2–1.3)
BILIRUB UR QL STRIP: NEGATIVE
BUN SERPL-MCNC: 9 MG/DL (ref 7–30)
C PNEUM DNA SPEC QL NAA+PROBE: NOT DETECTED
CALCIUM SERPL-MCNC: 8 MG/DL (ref 8.5–10.1)
CANNABINOIDS UR QL SCN: NEGATIVE
CHLORIDE SERPL-SCNC: 99 MMOL/L (ref 94–109)
CO2 SERPL-SCNC: 24 MMOL/L (ref 20–32)
COCAINE UR QL: NEGATIVE
COLOR UR AUTO: NORMAL
CREAT SERPL-MCNC: 0.59 MG/DL (ref 0.52–1.04)
DIFFERENTIAL METHOD BLD: ABNORMAL
EOSINOPHIL # BLD AUTO: 0 10E9/L (ref 0–0.7)
EOSINOPHIL NFR BLD AUTO: 0.2 %
ERYTHROCYTE [DISTWIDTH] IN BLOOD BY AUTOMATED COUNT: 16.2 % (ref 10–15)
ETHANOL SERPL-MCNC: 0.2 G/DL
ETHANOL UR QL SCN: POSITIVE
FLUAV H1 2009 PAND RNA SPEC QL NAA+PROBE: NOT DETECTED
FLUAV H1 RNA SPEC QL NAA+PROBE: NOT DETECTED
FLUAV H3 RNA SPEC QL NAA+PROBE: NOT DETECTED
FLUAV RNA SPEC QL NAA+PROBE: NOT DETECTED
FLUAV+FLUBV RNA SPEC QL NAA+PROBE: NEGATIVE
FLUAV+FLUBV RNA SPEC QL NAA+PROBE: NEGATIVE
FLUBV RNA SPEC QL NAA+PROBE: NOT DETECTED
GFR SERPL CREATININE-BSD FRML MDRD: >90 ML/MIN/{1.73_M2}
GLUCOSE SERPL-MCNC: 93 MG/DL (ref 70–99)
GLUCOSE UR STRIP-MCNC: NEGATIVE MG/DL
HADV DNA SPEC QL NAA+PROBE: NOT DETECTED
HCG UR QL: NEGATIVE
HCOV PNL SPEC NAA+PROBE: NOT DETECTED
HCT VFR BLD AUTO: 35.9 % (ref 35–47)
HGB BLD-MCNC: 12.2 G/DL (ref 11.7–15.7)
HGB UR QL STRIP: NEGATIVE
HMPV RNA SPEC QL NAA+PROBE: NOT DETECTED
HPIV1 RNA SPEC QL NAA+PROBE: NOT DETECTED
HPIV2 RNA SPEC QL NAA+PROBE: NOT DETECTED
HPIV3 RNA SPEC QL NAA+PROBE: NOT DETECTED
HPIV4 RNA SPEC QL NAA+PROBE: NOT DETECTED
IMM GRANULOCYTES # BLD: 0 10E9/L (ref 0–0.4)
IMM GRANULOCYTES NFR BLD: 0.2 %
INR PPP: 1.02 (ref 0.86–1.14)
KETONES UR STRIP-MCNC: NEGATIVE MG/DL
LEUKOCYTE ESTERASE UR QL STRIP: NEGATIVE
LYMPHOCYTES # BLD AUTO: 2.5 10E9/L (ref 0.8–5.3)
LYMPHOCYTES NFR BLD AUTO: 30.8 %
M PNEUMO DNA SPEC QL NAA+PROBE: NOT DETECTED
MCH RBC QN AUTO: 29.2 PG (ref 26.5–33)
MCHC RBC AUTO-ENTMCNC: 34 G/DL (ref 31.5–36.5)
MCV RBC AUTO: 86 FL (ref 78–100)
MICROBIOLOGIST REVIEW: NORMAL
MONOCYTES # BLD AUTO: 0.6 10E9/L (ref 0–1.3)
MONOCYTES NFR BLD AUTO: 6.8 %
NEUTROPHILS # BLD AUTO: 5.1 10E9/L (ref 1.6–8.3)
NEUTROPHILS NFR BLD AUTO: 61.6 %
NITRATE UR QL: NEGATIVE
NRBC # BLD AUTO: 0 10*3/UL
NRBC BLD AUTO-RTO: 0 /100
NT-PROBNP SERPL-MCNC: 32 PG/ML (ref 0–450)
OPIATES UR QL SCN: NEGATIVE
PH UR STRIP: 6 PH (ref 5–7)
PLATELET # BLD AUTO: 285 10E9/L (ref 150–450)
POTASSIUM SERPL-SCNC: 3.9 MMOL/L (ref 3.4–5.3)
PROT SERPL-MCNC: 7 G/DL (ref 6.8–8.8)
RBC # BLD AUTO: 4.18 10E12/L (ref 3.8–5.2)
RSV RNA SPEC NAA+PROBE: NEGATIVE
RSV RNA SPEC QL NAA+PROBE: NOT DETECTED
RSV RNA SPEC QL NAA+PROBE: NOT DETECTED
RV+EV RNA SPEC QL NAA+PROBE: NOT DETECTED
SARS-COV-2 PCR COMMENT: NORMAL
SARS-COV-2 RNA SPEC QL NAA+PROBE: NORMAL
SARS-COV-2 RNA SPEC QL NAA+PROBE: NORMAL
SODIUM SERPL-SCNC: 132 MMOL/L (ref 133–144)
SOURCE: NORMAL
SP GR UR STRIP: 1 (ref 1–1.03)
SPECIMEN SOURCE: NORMAL
TROPONIN I SERPL-MCNC: <0.015 UG/L (ref 0–0.04)
UROBILINOGEN UR STRIP-MCNC: NORMAL MG/DL (ref 0–2)
WBC # BLD AUTO: 8.3 10E9/L (ref 4–11)

## 2020-04-08 PROCEDURE — 99285 EMERGENCY DEPT VISIT HI MDM: CPT | Mod: 25 | Performed by: FAMILY MEDICINE

## 2020-04-08 PROCEDURE — 12000001 ZZH R&B MED SURG/OB UMMC

## 2020-04-08 PROCEDURE — 80307 DRUG TEST PRSMV CHEM ANLYZR: CPT | Performed by: FAMILY MEDICINE

## 2020-04-08 PROCEDURE — 80320 DRUG SCREEN QUANTALCOHOLS: CPT | Performed by: FAMILY MEDICINE

## 2020-04-08 PROCEDURE — 87635 SARS-COV-2 COVID-19 AMP PRB: CPT | Performed by: FAMILY MEDICINE

## 2020-04-08 PROCEDURE — 25800025 ZZH RX 258: Performed by: FAMILY MEDICINE

## 2020-04-08 PROCEDURE — 87631 RESP VIRUS 3-5 TARGETS: CPT | Performed by: FAMILY MEDICINE

## 2020-04-08 PROCEDURE — 25000132 ZZH RX MED GY IP 250 OP 250 PS 637: Performed by: FAMILY MEDICINE

## 2020-04-08 PROCEDURE — 71045 X-RAY EXAM CHEST 1 VIEW: CPT

## 2020-04-08 PROCEDURE — 85730 THROMBOPLASTIN TIME PARTIAL: CPT | Performed by: FAMILY MEDICINE

## 2020-04-08 PROCEDURE — 99207 ZZC CDG-CODE CATEGORY CHANGED: CPT | Performed by: INTERNAL MEDICINE

## 2020-04-08 PROCEDURE — 81025 URINE PREGNANCY TEST: CPT | Performed by: FAMILY MEDICINE

## 2020-04-08 PROCEDURE — 80053 COMPREHEN METABOLIC PANEL: CPT | Performed by: FAMILY MEDICINE

## 2020-04-08 PROCEDURE — 93005 ELECTROCARDIOGRAM TRACING: CPT | Performed by: FAMILY MEDICINE

## 2020-04-08 PROCEDURE — 81003 URINALYSIS AUTO W/O SCOPE: CPT | Performed by: FAMILY MEDICINE

## 2020-04-08 PROCEDURE — 93010 ELECTROCARDIOGRAM REPORT: CPT | Mod: Z6 | Performed by: FAMILY MEDICINE

## 2020-04-08 PROCEDURE — 25000128 H RX IP 250 OP 636: Performed by: FAMILY MEDICINE

## 2020-04-08 PROCEDURE — 84484 ASSAY OF TROPONIN QUANT: CPT | Performed by: FAMILY MEDICINE

## 2020-04-08 PROCEDURE — 99220 ZZC INITIAL OBSERVATION CARE,LEVL III: CPT | Performed by: INTERNAL MEDICINE

## 2020-04-08 PROCEDURE — 87486 CHLMYD PNEUM DNA AMP PROBE: CPT | Performed by: FAMILY MEDICINE

## 2020-04-08 PROCEDURE — 94640 AIRWAY INHALATION TREATMENT: CPT | Performed by: FAMILY MEDICINE

## 2020-04-08 PROCEDURE — 87633 RESP VIRUS 12-25 TARGETS: CPT | Performed by: FAMILY MEDICINE

## 2020-04-08 PROCEDURE — 83880 ASSAY OF NATRIURETIC PEPTIDE: CPT | Performed by: FAMILY MEDICINE

## 2020-04-08 PROCEDURE — 25000125 ZZHC RX 250: Performed by: FAMILY MEDICINE

## 2020-04-08 PROCEDURE — 96365 THER/PROPH/DIAG IV INF INIT: CPT | Performed by: FAMILY MEDICINE

## 2020-04-08 PROCEDURE — 85610 PROTHROMBIN TIME: CPT | Performed by: FAMILY MEDICINE

## 2020-04-08 PROCEDURE — 87581 M.PNEUMON DNA AMP PROBE: CPT | Performed by: FAMILY MEDICINE

## 2020-04-08 PROCEDURE — 85025 COMPLETE CBC W/AUTO DIFF WBC: CPT | Performed by: FAMILY MEDICINE

## 2020-04-08 PROCEDURE — 25000132 ZZH RX MED GY IP 250 OP 250 PS 637: Performed by: INTERNAL MEDICINE

## 2020-04-08 RX ORDER — LANOLIN ALCOHOL/MO/W.PET/CERES
100 CREAM (GRAM) TOPICAL DAILY
Status: DISCONTINUED | OUTPATIENT
Start: 2020-04-09 | End: 2020-04-09 | Stop reason: HOSPADM

## 2020-04-08 RX ORDER — HYDROXYZINE HYDROCHLORIDE 25 MG/1
25-50 TABLET, FILM COATED ORAL EVERY 6 HOURS PRN
Status: DISCONTINUED | OUTPATIENT
Start: 2020-04-08 | End: 2020-04-09 | Stop reason: HOSPADM

## 2020-04-08 RX ORDER — CALCIUM CARBONATE 500 MG/1
1000 TABLET, CHEWABLE ORAL 4 TIMES DAILY PRN
Status: DISCONTINUED | OUTPATIENT
Start: 2020-04-08 | End: 2020-04-09 | Stop reason: HOSPADM

## 2020-04-08 RX ORDER — HYDROCHLOROTHIAZIDE 25 MG/1
25 TABLET ORAL DAILY
Status: ON HOLD | COMMUNITY
End: 2020-04-09

## 2020-04-08 RX ORDER — ESCITALOPRAM OXALATE 20 MG/1
20 TABLET ORAL DAILY
Status: DISCONTINUED | OUTPATIENT
Start: 2020-04-09 | End: 2020-04-09 | Stop reason: HOSPADM

## 2020-04-08 RX ORDER — NALOXONE HYDROCHLORIDE 0.4 MG/ML
.1-.4 INJECTION, SOLUTION INTRAMUSCULAR; INTRAVENOUS; SUBCUTANEOUS
Status: DISCONTINUED | OUTPATIENT
Start: 2020-04-08 | End: 2020-04-09 | Stop reason: HOSPADM

## 2020-04-08 RX ORDER — ALBUTEROL SULFATE 90 UG/1
2 AEROSOL, METERED RESPIRATORY (INHALATION) ONCE
Status: COMPLETED | OUTPATIENT
Start: 2020-04-08 | End: 2020-04-08

## 2020-04-08 RX ORDER — MULTIPLE VITAMINS W/ MINERALS TAB 9MG-400MCG
1 TAB ORAL DAILY
Status: DISCONTINUED | OUTPATIENT
Start: 2020-04-09 | End: 2020-04-09 | Stop reason: HOSPADM

## 2020-04-08 RX ORDER — ALBUTEROL SULFATE 90 UG/1
2 AEROSOL, METERED RESPIRATORY (INHALATION) EVERY 4 HOURS PRN
Status: DISCONTINUED | OUTPATIENT
Start: 2020-04-08 | End: 2020-04-09 | Stop reason: HOSPADM

## 2020-04-08 RX ORDER — ONDANSETRON 2 MG/ML
4 INJECTION INTRAMUSCULAR; INTRAVENOUS EVERY 6 HOURS PRN
Status: DISCONTINUED | OUTPATIENT
Start: 2020-04-08 | End: 2020-04-09 | Stop reason: HOSPADM

## 2020-04-08 RX ORDER — QUETIAPINE FUMARATE 25 MG/1
25-50 TABLET, FILM COATED ORAL
Status: ON HOLD | COMMUNITY
End: 2021-09-13

## 2020-04-08 RX ORDER — TRAZODONE HYDROCHLORIDE 50 MG/1
50 TABLET, FILM COATED ORAL
Status: DISCONTINUED | OUTPATIENT
Start: 2020-04-08 | End: 2020-04-09 | Stop reason: HOSPADM

## 2020-04-08 RX ORDER — HYDRALAZINE HYDROCHLORIDE 10 MG/1
10 TABLET, FILM COATED ORAL 4 TIMES DAILY PRN
Status: DISCONTINUED | OUTPATIENT
Start: 2020-04-08 | End: 2020-04-09 | Stop reason: HOSPADM

## 2020-04-08 RX ORDER — ACETAMINOPHEN 325 MG/1
650 TABLET ORAL EVERY 4 HOURS PRN
Status: DISCONTINUED | OUTPATIENT
Start: 2020-04-08 | End: 2020-04-09 | Stop reason: HOSPADM

## 2020-04-08 RX ORDER — FOLIC ACID 1 MG/1
1 TABLET ORAL DAILY
Status: DISCONTINUED | OUTPATIENT
Start: 2020-04-09 | End: 2020-04-09 | Stop reason: HOSPADM

## 2020-04-08 RX ORDER — LISINOPRIL 20 MG/1
20 TABLET ORAL DAILY
Status: ON HOLD | COMMUNITY
End: 2021-09-13

## 2020-04-08 RX ORDER — LIDOCAINE 40 MG/G
CREAM TOPICAL
Status: DISCONTINUED | OUTPATIENT
Start: 2020-04-08 | End: 2020-04-09 | Stop reason: HOSPADM

## 2020-04-08 RX ORDER — DIAZEPAM 5 MG
5-20 TABLET ORAL EVERY 30 MIN PRN
Status: DISCONTINUED | OUTPATIENT
Start: 2020-04-08 | End: 2020-04-09 | Stop reason: HOSPADM

## 2020-04-08 RX ORDER — LORAZEPAM 1 MG/1
1 TABLET ORAL ONCE
Status: COMPLETED | OUTPATIENT
Start: 2020-04-08 | End: 2020-04-08

## 2020-04-08 RX ORDER — ONDANSETRON 4 MG/1
4 TABLET, ORALLY DISINTEGRATING ORAL EVERY 6 HOURS PRN
Status: DISCONTINUED | OUTPATIENT
Start: 2020-04-08 | End: 2020-04-09 | Stop reason: HOSPADM

## 2020-04-08 RX ORDER — ACETAMINOPHEN 500 MG
500-1000 TABLET ORAL EVERY 6 HOURS PRN
Status: ON HOLD | COMMUNITY
End: 2020-04-09

## 2020-04-08 RX ORDER — LANOLIN ALCOHOL/MO/W.PET/CERES
100 CREAM (GRAM) TOPICAL DAILY
Status: DISCONTINUED | OUTPATIENT
Start: 2020-04-08 | End: 2020-04-08

## 2020-04-08 RX ORDER — LISINOPRIL 20 MG/1
20 TABLET ORAL DAILY
Status: DISCONTINUED | OUTPATIENT
Start: 2020-04-08 | End: 2020-04-09 | Stop reason: HOSPADM

## 2020-04-08 RX ORDER — GABAPENTIN 100 MG/1
100 CAPSULE ORAL
Status: ON HOLD | COMMUNITY
End: 2021-09-20

## 2020-04-08 RX ADMIN — LORAZEPAM 1 MG: 1 TABLET ORAL at 14:24

## 2020-04-08 RX ADMIN — THIAMINE HYDROCHLORIDE: 100 INJECTION, SOLUTION INTRAMUSCULAR; INTRAVENOUS at 11:30

## 2020-04-08 RX ADMIN — ALBUTEROL SULFATE 2 PUFF: 90 AEROSOL, METERED RESPIRATORY (INHALATION) at 11:31

## 2020-04-08 RX ADMIN — LISINOPRIL 20 MG: 20 TABLET ORAL at 17:01

## 2020-04-08 RX ADMIN — HYDROXYZINE HYDROCHLORIDE 50 MG: 25 TABLET, FILM COATED ORAL at 18:47

## 2020-04-08 ASSESSMENT — ENCOUNTER SYMPTOMS
BRUISES/BLEEDS EASILY: 0
ARTHRALGIAS: 1
SORE THROAT: 0
VOICE CHANGE: 0
DYSPHORIC MOOD: 1
HEADACHES: 0
VOMITING: 0
DECREASED CONCENTRATION: 1
COUGH: 1
NERVOUS/ANXIOUS: 1
ACTIVITY CHANGE: 1
SHORTNESS OF BREATH: 1
WEAKNESS: 1
COLOR CHANGE: 0
EYE REDNESS: 0
DYSURIA: 0
HALLUCINATIONS: 0
WHEEZING: 0
CONFUSION: 0
TROUBLE SWALLOWING: 0
ABDOMINAL PAIN: 0
NAUSEA: 0
DIFFICULTY URINATING: 0
APPETITE CHANGE: 1
FEVER: 0
FATIGUE: 1
SEIZURES: 0
NECK STIFFNESS: 0

## 2020-04-08 NOTE — ED NOTES
[facility], [place]   ED Nurse to Floor Handoff     Aliyah Angeles is a 44 year old female who speaks English and lives alone,  is homeless  They arrived in the ED by ambulance from home    ED Chief Complaint: Shortness of Breath (SOB for 2 days increaed today, alert/oriented upon arrival ); Cough (Non productive cough for 2 days ); and Alcohol Problem (Reports drinking a case of beer over last day drinks daily, no hx of withdrawal seizures last drink 7 AM)    ED Dx;   Final diagnoses:   Acute alcoholic intoxication in alcoholism with complication (H)   Cough   SOB (shortness of breath)         Needed?: No    Allergies:   Allergies   Allergen Reactions     Nsaids      Gastric bypass   .  Past Medical Hx:   Past Medical History:   Diagnosis Date     Acne      ADHD (attention deficit hyperactivity disorder)      Arthralgia of temporomandibular joint 5/19/2016     Arthritis      Cervical high risk HPV (human papillomavirus) test positive 4/27/2018 4/27/18 NIL pap, + HR HPV (not 16 or 18). Plan: cotest in 1 yr.       Cobalamin deficiency 11/24/2014     Drug-seeking behavior 5/19/2016    Overview:  Per  website, patient has filled with multiple controlled substances with multiple prescribers at multiple pharmacies      History of blood transfusion 01/01/1988     Insomnia 7/21/2013     Raynaud's phenomenon 5/25/2015     Vitamin D deficiency 1/16/2013      Baseline Mental status: WDL  Current Mental Status changes: at basesline    Infection present or suspected this encounter: yes respiratory (unknown at this time, PUI)  Sepsis suspected: No  Isolation type: No active isolations     Activity level - Baseline/Home:  Independent  Activity Level - Current:   Stand with Assist    Bariatric equipment needed?: No    In the ED these meds were given:   Medications   lidocaine 1 % 0.1-1 mL (has no administration in time range)   lidocaine (LMX4) cream (has no administration in time  range)   sodium chloride (PF) 0.9% PF flush 3 mL (has no administration in time range)   sodium chloride (PF) 0.9% PF flush 3 mL (has no administration in time range)   dextrose 5% and 0.45% NaCl 1,000 mL with Infuvite Adult 10 mL, thiamine 100 mg, folic acid 1 mg infusion ( Intravenous New Bag 4/8/20 1130)   albuterol (PROAIR HFA/PROVENTIL HFA/VENTOLIN HFA) 108 (90 Base) MCG/ACT inhaler 2 puff (2 puffs Inhalation Given 4/8/20 1131)       Drips running?  Yes    Home pump  No    Current LDAs  Peripheral IV 02/06/20 Left Upper arm (Active)   Number of days: 62       Peripheral IV 04/08/20 Left Upper forearm (Active)   Site Assessment WDL 04/08/20 1102   Line Status Saline locked 04/08/20 1102   Phlebitis Scale 0-->no symptoms 04/08/20 1102   Number of days: 0       Labs results:   Labs Ordered and Resulted from Time of ED Arrival Up to the Time of Departure from the ED   CBC WITH PLATELETS DIFFERENTIAL - Abnormal; Notable for the following components:       Result Value    RDW 16.2 (*)     All other components within normal limits   COMPREHENSIVE METABOLIC PANEL - Abnormal; Notable for the following components:    Sodium 132 (*)     Calcium 8.0 (*)     All other components within normal limits   ALCOHOL ETHYL - Abnormal; Notable for the following components:    Ethanol g/dL 0.20 (*)     All other components within normal limits   INR   PARTIAL THROMBOPLASTIN TIME   NT PROBNP INPATIENT   TROPONIN I   DRUG ABUSE SCREEN 6 CHEM DEP URINE ([facility])   UA MACROSCOPIC WITH REFLEX TO MICRO AND CULTURE   HCG QUALITATIVE URINE   PERIPHERAL IV CATHETER   PULSE OXIMETRY NURSING       Imaging Studies:   Recent Results (from the past 24 hour(s))   XR Chest Port 1 View    Narrative    Portable chest    INDICATION: Cough, shortness of breath    COMPARISON: None    FINDINGS: Heart size and shape appear normal.  Well-circumscribed dense subcentimeter pulmonary nodule is noted in  the right lower lung field, consistent with probable  granuloma. No  infiltrates or effusions. Bony structures appear intact.      Impression    IMPRESSION: Granulomatous disease.    ANGÉLICA ABDALLA MD       Recent vital signs:   BP (!) 124/97   Temp 97.2  F (36.2  C) (Oral)   Resp 17   Wt 74.8 kg (165 lb)   SpO2 97%   BMI 28.32 kg/m      Josué Coma Scale Score: 15 (04/08/20 1047)       Cardiac Rhythm: Normal Sinus  Pt needs tele? No  Skin/wound Issues: None    Code Status: Full Code    Pain control: fair    Nausea control: pt had none    Abnormal labs/tests/findings requiring intervention: Granuloma on chest xray, albuterol given for cough/SOB    Family present during ED course? No   Family Comments/Social Situation comments: Family members are healthcare workers     Tasks needing completion: None    DYAN SIMONS RN  [facility] --   [phone] [facility]  [phone] [facility]

## 2020-04-08 NOTE — PHARMACY-ADMISSION MEDICATION HISTORY
Admission medication history for the April 8, 2020 admission is complete.     Interview sources:  Discharge Summary 2/10/2020 & Kell Mcdowell [phone], [facility] Office Visit 2/12/2020    Changes made to PTA medication list (reason)  Added:   - gabapentin 200 mg TID (per [place] - filled 4/3/2020)  - lisinopril 20 mg daily, hydrochlorothiazide 25 mg daily (last filled at [place] 3/10/2020 and 2/12/2020 for 30 day supplies, respectively)  - quetiapine 25-20 mg HS PRN (per Office Visit note 2/12/2020)  Deleted: N/A  Changed:   - omega 3 caps (clarified strength)    Additional medication history information:   - Patient not interviewed as part of this medication history due to enhanced contact precautions. Please confirm medications with patient and discuss non-prescription/OTC medication use that may not be reflected in the list below.  - Patient reported to ED RN that she has not taken any of her scheduled medications for ~2 weeks.    Per [facility] :  - gabapentin 100 mg caps #180 for 30 days supply filled 4/3/2020    Prior to Admission medications    Medication Sig Last Dose Taking? Auth Provider   acetaminophen (TYLENOL) 500 MG tablet Take 500-1,000 mg by mouth every 6 hours as needed for mild pain 4/8/2020 Yes Reported, Patient   calcium carbonate 600 mg-vitamin D 400 units (CALTRATE) 600-400 MG-UNIT per tablet Take 1 tablet by mouth daily Past Month Yes Belia Kerr MD   escitalopram (LEXAPRO) 20 MG tablet Take 1 tablet (20 mg) by mouth daily Past Month Yes Belia Kerr MD   gabapentin (NEURONTIN) 100 MG capsule Take 200 mg by mouth 3 times daily   Unknown, Entered By History   hydrochlorothiazide (HYDRODIURIL) 25 MG tablet Take 25 mg by mouth daily   Unknown, Entered By History   hydrOXYzine (ATARAX) 25 MG tablet Take 1-2 tablets (25-50 mg) by mouth every 4 hours as needed for anxiety PRN Yes Belia Kerr MD   lisinopril (ZESTRIL) 20 MG tablet Take 20 mg by mouth daily   Unknown,  Entered By History   multivitamin w/minerals (THERA-VIT-M) tablet Take 1 tablet by mouth daily Past Month Yes Belia Kerr MD   naltrexone (DEPADE/REVIA) 50 MG tablet Take 1 tablet (50 mg) by mouth daily Past Month Yes Belia Kerr MD   nicotine (NICORETTE) 4 MG lozenge Place 1-2 lozenges (4-8 mg) inside cheek every hour as needed for smoking cessation PRN Yes Belia Kerr MD   omega 3 1000 MG CAPS Take 1 g by mouth daily  Past Month Yes Reported, Patient   QUEtiapine (SEROQUEL) 25 MG tablet Take 25-50 mg by mouth nightly as needed (insomnia)   Unknown, Entered By History   traZODone (DESYREL) 50 MG tablet Take 1 tablet (50 mg) by mouth nightly as needed for sleep PRN Yes Belia Kerr MD       Time spent: 30 minutes    Medication history completed by:   Bennett Shipley PharmD  [facility] Building: Ascom *[phone]

## 2020-04-08 NOTE — TELEPHONE ENCOUNTER
S Pt is a 44 year old female requesting detox. Intake did reserve bed.    B Pt has been drinking at least 12 beers daily. No dt or sz. Travel screen done. Pt does have a cough this is new for pt.    R In route to the ed

## 2020-04-08 NOTE — ED PROVIDER NOTES
ED Provider Note  [facility]      History     Chief Complaint   Patient presents with     Shortness of Breath     SOB for 2 days increaed today, alert/oriented upon arrival      Cough     Non productive cough for 2 days      Alcohol Problem     Reports drinking a case of beer over last day drinks daily, no hx of withdrawal seizures last drink 7 AM     HPI  Aliyah Angeles is a 44 year old female who presents to the ER for eval for 2 days of cough with chest pain and sob. Also reported ongoing etoh use of beer a case per day, last use this am needing detox. Patient reports her daughters who she has been around are health care workers. Patient denies cardiac or asthma or PE etc. No recent travel. Patient noted the cough that is dry without hemoptysis. Patient presented to ER by ambulance would like detox from alcohol and has been using for last 3 weeks. No hx of seizures but tremors from withdrawal. No other reported substances. No ab pain of fever or st or N?V?D. No uti sx. No other substances.     Past Medical History  Past Medical History:   Diagnosis Date     Acne      ADHD (attention deficit hyperactivity disorder)      Arthralgia of temporomandibular joint 5/19/2016     Arthritis      Cervical high risk HPV (human papillomavirus) test positive 4/27/2018 4/27/18 NIL pap, + HR HPV (not 16 or 18). Plan: cotest in 1 yr.       Cobalamin deficiency 11/24/2014     Drug-seeking behavior 5/19/2016    Overview:  Per  website, patient has filled with multiple controlled substances with multiple prescribers at multiple pharmacies      History of blood transfusion 01/01/1988     Insomnia 7/21/2013     Raynaud's phenomenon 5/25/2015     Vitamin D deficiency 1/16/2013     Past Surgical History:   Procedure Laterality Date     ABDOMEN SURGERY  08/10/2010    Gastric  bypass     BIOPSY  1/1/94    HPV, multiple reoccurrances, partial  hysterectomy  2013     CHOLECYSTECTOMY  01/01/2010      "COLONOSCOPY  01/01/2009     ENT SURGERY  01/01/1988    Tonsils     GI SURGERY  8/10/10    [facility]     HYSTERECTOMY VAGINAL  04/05/2013    pathology of cervix benign.      acetaminophen (TYLENOL) 500 MG tablet  calcium carbonate 600 mg-vitamin D 400 units (CALTRATE) 600-400 MG-UNIT per tablet  escitalopram (LEXAPRO) 20 MG tablet  gabapentin (NEURONTIN) 100 MG capsule  hydrochlorothiazide (HYDRODIURIL) 25 MG tablet  hydrOXYzine (ATARAX) 25 MG tablet  lisinopril (ZESTRIL) 20 MG tablet  multivitamin w/minerals (THERA-VIT-M) tablet  naltrexone (DEPADE/REVIA) 50 MG tablet  nicotine (NICORETTE) 4 MG lozenge  omega 3 1000 MG CAPS  QUEtiapine (SEROQUEL) 25 MG tablet  traZODone (DESYREL) 50 MG tablet  Needle, Disp, (B-D DISP NEEDLE) 25G X 1\" MISC      Allergies   Allergen Reactions     Nsaids      Gastric bypass     Past medical history, past surgical history, medications, and allergies were reviewed with the patient. Additional pertinent items: None    Family History  Family History   Problem Relation Age of Onset     Prostate Cancer Father      Hypertension Father      Hyperlipidemia Father      Substance Abuse Father      Obesity Father      Diabetes Maternal Grandmother      Colon Cancer Maternal Grandmother      Other Cancer Maternal Grandmother         pancreatic cancer     Obesity Maternal Grandmother      Diabetes Paternal Grandmother      Obesity Paternal Grandmother      Diabetes Paternal Grandfather      Obesity Paternal Grandfather      Breast Cancer Sister      Breast Cancer Sister      Anxiety Disorder Sister      Anesthesia Reaction Sister      Depression Mother      Anesthesia Reaction Mother      Asthma Daughter      Coronary Artery Disease No family hx of      Hypertension No family hx of      Hyperlipidemia No family hx of      Cerebrovascular Disease No family hx of      Depression No family hx of      Anxiety Disorder No family hx of      Mental Illness No family hx of      Substance Abuse No family hx " of      Anesthesia Reaction No family hx of      Asthma No family hx of      Osteoporosis No family hx of      Genetic Disorder No family hx of      Thyroid Disease No family hx of      Obesity No family hx of      Unknown/Adopted No family hx of      Family history was reviewed with the patient. Additional pertinent items: None    Social History  Social History     Tobacco Use     Smoking status: Current Every Day Smoker     Packs/day: 1.00     Years: 15.00     Pack years: 15.00     Types: Cigarettes     Start date: 1/1/1989     Smokeless tobacco: Never Used   Substance Use Topics     Alcohol use: Yes     Alcohol/week: 0.0 standard drinks     Comment: daily unknown amount      Drug use: No      Social history was reviewed with the patient. Additional pertinent items: None    Review of Systems   Constitutional: Positive for activity change, appetite change and fatigue. Negative for fever.   HENT: Negative for congestion, sore throat, tinnitus, trouble swallowing and voice change.    Eyes: Negative for redness and visual disturbance.   Respiratory: Positive for cough and shortness of breath. Negative for wheezing.    Cardiovascular: Positive for chest pain (with cough).   Gastrointestinal: Negative for abdominal pain, nausea and vomiting.   Genitourinary: Negative for difficulty urinating and dysuria.   Musculoskeletal: Positive for arthralgias. Negative for neck stiffness.        Patient notes wearing a cam walker boot left ankle from an injury recently after tripping over her cat at home   Skin: Negative for color change.   Allergic/Immunologic: Negative for immunocompromised state.   Neurological: Positive for weakness (general). Negative for seizures, syncope and headaches.   Hematological: Does not bruise/bleed easily.   Psychiatric/Behavioral: Positive for decreased concentration and dysphoric mood. Negative for confusion, hallucinations and suicidal ideas. The patient is nervous/anxious.    All other systems  reviewed and are negative.    A complete review of systems was performed with pertinent positives and negatives noted in the HPI, and all other systems negative.    Physical Exam   BP: (!) 135/96  Heart Rate: 97  Temp: 97.2  F (36.2  C)  Resp: 16  Weight: 74.8 kg (165 lb)  SpO2: 98 %  Physical Exam  Constitutional:       General: She is in acute distress.      Appearance: She is well-developed. She is ill-appearing. She is not toxic-appearing or diaphoretic.   HENT:      Head: Normocephalic and atraumatic.      Nose: No congestion.      Mouth/Throat:      Mouth: Mucous membranes are moist.   Eyes:      General: No scleral icterus.     Extraocular Movements: Extraocular movements intact.      Pupils: Pupils are equal, round, and reactive to light.      Comments: Tearful mild bilateral injection   Neck:      Musculoskeletal: Normal range of motion and neck supple.   Cardiovascular:      Rate and Rhythm: Regular rhythm. Tachycardia present.   Pulmonary:      Effort: Respiratory distress present.      Breath sounds: No wheezing or rhonchi.   Abdominal:      General: There is no distension.      Tenderness: There is no abdominal tenderness. There is no guarding.   Musculoskeletal:         General: Tenderness present.      Comments: Left ankle   Skin:     General: Skin is warm and dry.      Capillary Refill: Capillary refill takes less than 2 seconds.      Coloration: Skin is not pale.      Findings: No erythema or rash.   Neurological:      General: No focal deficit present.      Mental Status: She is alert and oriented to person, place, and time.   Psychiatric:      Comments: In the ER patient is tearful anxious here in the ER but redirectable.  The smell of alcohol.         ED Course     ED Course as of Apr 08 1530 Wed Apr 08, 2020   1514 EKG 12 lead     Procedures   Patient valuate here in the ER.  Standard coronavirus precautions were taken throughout.  In the ER and IV was established labs drawn portable chest  x-ray did not show any significant findings.  EKG normal sinus rhythm without changes.  Labs are drawn reviewed CBC chemistries otherwise etc. alcohol level is 0.20 given a banana bag here in the ER.  Sodium is 132.  We did at this point plan to admit the patient to medicine service as patient being covered risk cannot go to straight detox therefore patient needed to medicine the hospital service here they did agree.  The meantime then with being admitted to hospital we ran testing for Covid and influenza and respiratory panel all PCR.  Patient to be admitted to medical bed for alcohol withdrawal along with further testing of new onset cough for the last 2 days.  Patient did receive 1 mg Ativan here with some signs of alcohol withdrawal otherwise stable seen by the hospitalist in the ER also.             EKG Interpretation:      Interpreted by Louie Smith MD  Time reviewed: 1020  Symptoms at time of EKG: cp and sob and cough   Rhythm: normal sinus   Rate: normal  Axis: normal  Ectopy: none  Conduction: normal  ST Segments/ T Waves: No ST-T wave changes  Q Waves: none  Comparison to prior: No old EKG available    Clinical Impression: normal EKG                      Results for orders placed or performed during the hospital encounter of 04/08/20   XR Chest Port 1 View     Status: None    Narrative    Portable chest    INDICATION: Cough, shortness of breath    COMPARISON: None    FINDINGS: Heart size and shape appear normal.  Well-circumscribed dense subcentimeter pulmonary nodule is noted in  the right lower lung field, consistent with probable granuloma. No  infiltrates or effusions. Bony structures appear intact.      Impression    IMPRESSION: Granulomatous disease.    ANGÉLICA ABDALLA MD   CBC with platelets differential     Status: Abnormal   Result Value Ref Range    WBC 8.3 4.0 - 11.0 10e9/L    RBC Count 4.18 3.8 - 5.2 10e12/L    Hemoglobin 12.2 11.7 - 15.7 g/dL    Hematocrit 35.9 35.0 - 47.0 %    MCV  86 78 - 100 fl    MCH 29.2 26.5 - 33.0 pg    MCHC 34.0 31.5 - 36.5 g/dL    RDW 16.2 (H) 10.0 - 15.0 %    Platelet Count 285 150 - 450 10e9/L    Diff Method Automated Method     % Neutrophils 61.6 %    % Lymphocytes 30.8 %    % Monocytes 6.8 %    % Eosinophils 0.2 %    % Basophils 0.4 %    % Immature Granulocytes 0.2 %    Nucleated RBCs 0 0 /100    Absolute Neutrophil 5.1 1.6 - 8.3 10e9/L    Absolute Lymphocytes 2.5 0.8 - 5.3 10e9/L    Absolute Monocytes 0.6 0.0 - 1.3 10e9/L    Absolute Eosinophils 0.0 0.0 - 0.7 10e9/L    Absolute Basophils 0.0 0.0 - 0.2 10e9/L    Abs Immature Granulocytes 0.0 0 - 0.4 10e9/L    Absolute Nucleated RBC 0.0    INR     Status: None   Result Value Ref Range    INR 1.02 0.86 - 1.14   Partial thromboplastin time     Status: None   Result Value Ref Range    PTT 26 22 - 37 sec   Comprehensive metabolic panel     Status: Abnormal   Result Value Ref Range    Sodium 132 (L) 133 - 144 mmol/L    Potassium 3.9 3.4 - 5.3 mmol/L    Chloride 99 94 - 109 mmol/L    Carbon Dioxide 24 20 - 32 mmol/L    Anion Gap 9 3 - 14 mmol/L    Glucose 93 70 - 99 mg/dL    Urea Nitrogen 9 7 - 30 mg/dL    Creatinine 0.59 0.52 - 1.04 mg/dL    GFR Estimate >90 >60 mL/min/[1.73_m2]    GFR Estimate If Black >90 >60 mL/min/[1.73_m2]    Calcium 8.0 (L) 8.5 - 10.1 mg/dL    Bilirubin Total 0.3 0.2 - 1.3 mg/dL    Albumin 3.7 3.4 - 5.0 g/dL    Protein Total 7.0 6.8 - 8.8 g/dL    Alkaline Phosphatase 88 40 - 150 U/L    ALT 21 0 - 50 U/L    AST 21 0 - 45 U/L   Nt probnp inpatient (BNP)     Status: None   Result Value Ref Range    N-Terminal Pro BNP Inpatient 32 0 - 450 pg/mL   Troponin I     Status: None   Result Value Ref Range    Troponin I ES <0.015 0.000 - 0.045 ug/L   Alcohol ethyl     Status: Abnormal   Result Value Ref Range    Ethanol g/dL 0.20 (H) <0.01 g/dL   Drug abuse screen 6 urine (chem dep) ([facility])     Status: Abnormal   Result Value Ref Range    Amphetamine Qual Urine Negative NEG^Negative    Barbiturates Qual Urine  Negative NEG^Negative    Benzodiazepine Qual Urine Negative NEG^Negative    Cannabinoids Qual Urine Negative NEG^Negative    Cocaine Qual Urine Negative NEG^Negative    Ethanol Qual Urine Positive (A) NEG^Negative    Opiates Qualitative Urine Negative NEG^Negative   UA reflex to Microscopic and Culture     Status: None    Specimen: Urine clean catch; Midstream Urine   Result Value Ref Range    Color Urine Light Yellow     Appearance Urine Clear     Glucose Urine Negative NEG^Negative mg/dL    Bilirubin Urine Negative NEG^Negative    Ketones Urine Negative NEG^Negative mg/dL    Specific Gravity Urine 1.004 1.003 - 1.035    Blood Urine Negative NEG^Negative    pH Urine 6.0 5.0 - 7.0 pH    Protein Albumin Urine Negative NEG^Negative mg/dL    Urobilinogen mg/dL Normal 0.0 - 2.0 mg/dL    Nitrite Urine Negative NEG^Negative    Leukocyte Esterase Urine Negative NEG^Negative    Source Midstream Urine    HCG qualitative urine     Status: None   Result Value Ref Range    HCG Qual Urine Negative NEG^Negative   COVID-19 Virus (Coronavirus) by PCR Nasopharyngeal     Status: None    Specimen: Nasopharyngeal   Result Value Ref Range    COVID-19 Virus PCR to [facility] - Source Nasopharyngeal     COVID-19 Virus PCR to [facility] - Result       Test received-See reflex to IDDL test SARS CoV2 (COVID-19) Virus RT-PCR   EKG 12 lead     Status: None (Preliminary result)   Result Value Ref Range    Interpretation ECG Click View Image link to view waveform and result    Influenza A and B and RSV PCR     Status: None   Result Value Ref Range    Specimen Description Nasopharyngeal     Influenza A PCR Negative NEG^Negative    Influenza B PCR Negative NEG^Negative    Resp Syncytial Virus Negative NEG^Negative     Medications   lidocaine 1 % 0.1-1 mL (has no administration in time range)   lidocaine (LMX4) cream (has no administration in time range)   sodium chloride (PF) 0.9% PF flush 3 mL (has no administration in time range)   sodium chloride  (PF) 0.9% PF flush 3 mL (has no administration in time range)   dextrose 5% and 0.45% NaCl 1,000 mL with Infuvite Adult 10 mL, thiamine 100 mg, folic acid 1 mg infusion ( Intravenous Stopped 4/8/20 1220)   albuterol (PROAIR HFA/PROVENTIL HFA/VENTOLIN HFA) 108 (90 Base) MCG/ACT inhaler 2 puff (2 puffs Inhalation Given 4/8/20 1131)   LORazepam (ATIVAN) tablet 1 mg (1 mg Oral Given 4/8/20 1424)     Results for orders placed or performed during the hospital encounter of 04/08/20   XR Chest Port 1 View     Status: None    Narrative    Portable chest    INDICATION: Cough, shortness of breath    COMPARISON: None    FINDINGS: Heart size and shape appear normal.  Well-circumscribed dense subcentimeter pulmonary nodule is noted in  the right lower lung field, consistent with probable granuloma. No  infiltrates or effusions. Bony structures appear intact.      Impression    IMPRESSION: Granulomatous disease.    ANGÉLICA ABDALLA MD   CBC with platelets differential     Status: Abnormal   Result Value Ref Range    WBC 8.3 4.0 - 11.0 10e9/L    RBC Count 4.18 3.8 - 5.2 10e12/L    Hemoglobin 12.2 11.7 - 15.7 g/dL    Hematocrit 35.9 35.0 - 47.0 %    MCV 86 78 - 100 fl    MCH 29.2 26.5 - 33.0 pg    MCHC 34.0 31.5 - 36.5 g/dL    RDW 16.2 (H) 10.0 - 15.0 %    Platelet Count 285 150 - 450 10e9/L    Diff Method Automated Method     % Neutrophils 61.6 %    % Lymphocytes 30.8 %    % Monocytes 6.8 %    % Eosinophils 0.2 %    % Basophils 0.4 %    % Immature Granulocytes 0.2 %    Nucleated RBCs 0 0 /100    Absolute Neutrophil 5.1 1.6 - 8.3 10e9/L    Absolute Lymphocytes 2.5 0.8 - 5.3 10e9/L    Absolute Monocytes 0.6 0.0 - 1.3 10e9/L    Absolute Eosinophils 0.0 0.0 - 0.7 10e9/L    Absolute Basophils 0.0 0.0 - 0.2 10e9/L    Abs Immature Granulocytes 0.0 0 - 0.4 10e9/L    Absolute Nucleated RBC 0.0    INR     Status: None   Result Value Ref Range    INR 1.02 0.86 - 1.14   Partial thromboplastin time     Status: None   Result Value Ref Range     PTT 26 22 - 37 sec   Comprehensive metabolic panel     Status: Abnormal   Result Value Ref Range    Sodium 132 (L) 133 - 144 mmol/L    Potassium 3.9 3.4 - 5.3 mmol/L    Chloride 99 94 - 109 mmol/L    Carbon Dioxide 24 20 - 32 mmol/L    Anion Gap 9 3 - 14 mmol/L    Glucose 93 70 - 99 mg/dL    Urea Nitrogen 9 7 - 30 mg/dL    Creatinine 0.59 0.52 - 1.04 mg/dL    GFR Estimate >90 >60 mL/min/[1.73_m2]    GFR Estimate If Black >90 >60 mL/min/[1.73_m2]    Calcium 8.0 (L) 8.5 - 10.1 mg/dL    Bilirubin Total 0.3 0.2 - 1.3 mg/dL    Albumin 3.7 3.4 - 5.0 g/dL    Protein Total 7.0 6.8 - 8.8 g/dL    Alkaline Phosphatase 88 40 - 150 U/L    ALT 21 0 - 50 U/L    AST 21 0 - 45 U/L   Nt probnp inpatient (BNP)     Status: None   Result Value Ref Range    N-Terminal Pro BNP Inpatient 32 0 - 450 pg/mL   Troponin I     Status: None   Result Value Ref Range    Troponin I ES <0.015 0.000 - 0.045 ug/L   Alcohol ethyl     Status: Abnormal   Result Value Ref Range    Ethanol g/dL 0.20 (H) <0.01 g/dL   Drug abuse screen 6 urine (chem dep) ([facility])     Status: Abnormal   Result Value Ref Range    Amphetamine Qual Urine Negative NEG^Negative    Barbiturates Qual Urine Negative NEG^Negative    Benzodiazepine Qual Urine Negative NEG^Negative    Cannabinoids Qual Urine Negative NEG^Negative    Cocaine Qual Urine Negative NEG^Negative    Ethanol Qual Urine Positive (A) NEG^Negative    Opiates Qualitative Urine Negative NEG^Negative   UA reflex to Microscopic and Culture     Status: None    Specimen: Urine clean catch; Midstream Urine   Result Value Ref Range    Color Urine Light Yellow     Appearance Urine Clear     Glucose Urine Negative NEG^Negative mg/dL    Bilirubin Urine Negative NEG^Negative    Ketones Urine Negative NEG^Negative mg/dL    Specific Gravity Urine 1.004 1.003 - 1.035    Blood Urine Negative NEG^Negative    pH Urine 6.0 5.0 - 7.0 pH    Protein Albumin Urine Negative NEG^Negative mg/dL    Urobilinogen mg/dL Normal 0.0 - 2.0 mg/dL     Nitrite Urine Negative NEG^Negative    Leukocyte Esterase Urine Negative NEG^Negative    Source Midstream Urine    HCG qualitative urine     Status: None   Result Value Ref Range    HCG Qual Urine Negative NEG^Negative   COVID-19 Virus (Coronavirus) by PCR Nasopharyngeal     Status: None    Specimen: Nasopharyngeal   Result Value Ref Range    COVID-19 Virus PCR to [facility] - Source Nasopharyngeal     COVID-19 Virus PCR to [facility] - Result       Test received-See reflex to IDDL test SARS CoV2 (COVID-19) Virus RT-PCR   EKG 12 lead     Status: None (Preliminary result)   Result Value Ref Range    Interpretation ECG Click View Image link to view waveform and result    Influenza A and B and RSV PCR     Status: None   Result Value Ref Range    Specimen Description Nasopharyngeal     Influenza A PCR Negative NEG^Negative    Influenza B PCR Negative NEG^Negative    Resp Syncytial Virus Negative NEG^Negative          Assessments & Plan (with Medical Decision Making)  44-year-old female presents ER for alcohol drawl but she is also had sudden onset of a cough with some chest pain and shortness of breath although vital signs are stable for the last 2 days.  Patient does live with healthcare workers or is had exposure to healthcare workers.  Patient does not have history of asthma cardiac disease.  Here in the ER we did do standard coronavirus precautions.  This was followed throughout the whole ER course negative airflow etc.  Chest x-ray portable did not show an acute changes EKG normal other labs stable sodium 132.  Patient alcohol is 0.2.  Received a banana bag here in the ER.  Given Ativan 1 mg orally for withdrawal symptoms.  Will be admitted to hospitalist service to a medicine bed to further rule out coronavirus potential and treat for alcohol withdrawal patient agrees with plan.  She did receive albuterol metered-dose inhaler 2 puffs which did help slightly also.           I have reviewed the nursing notes. I have  reviewed the findings, diagnosis, plan and need for follow up with the patient.    New Prescriptions    No medications on file       Final diagnoses:   Acute alcoholic intoxication in alcoholism with complication (H)   Cough   SOB (shortness of breath)       --  Louie Smith MD   Emergency Medicine   [facility], [place], EMERGENCY DEPARTMENT  4/8/2020    This note was created at least in part by the use of dragon voice dictation system. Inadvertent typographical errors may still exist.  Louie Smith MD.         Louie Smith MD  04/08/20 [id number]

## 2020-04-08 NOTE — ED TRIAGE NOTES
BIBA for cough/SOB. Homeless for last month. Woke up this morning with increased SOB and cough for 2 days. Temp in route 98.2. Patient called 911 herself.

## 2020-04-08 NOTE — H&P
[facility], [place]    History and Physical - Hospitalist Service       Date of Admission:  4/8/2020    Assessment & Plan   Aliyah Angeles is a 44 year old female admitted on 4/8/2020.   Aliyah Angeles is a 44 year old female with history of HTN, alcohol use disorder, depression, who presents to the ER for eval for 2 days of cough with chest tightness and sob.      # Cough, dry  # SOB- without e/o hypoxemia. Mid to high 90s in pulse ox on RA.   # Covid-19 r/o.   Exposure to daughter who is RN. No travel history.   Hemodynamics stable.   Influenza, RSV PCR neg.   COVID -19 swap PCR- sent, pending. Follow-up.   Droplet, contact isolation pending results  Respi virus panel pcr pending.   EKG, trop. bnp neg. Wbc: 8.3.   CXR: no infiltrate. Well-circumscribed dense subcentimeter pulmonary nodule is noted in the right lower lung field, consistent with probable granuloma.    Supportive treatment: acetaminophen prn. Robitussin DM prn. Cepacol lozenges prn. Albuterol inhaler prn. Cetrizine daily.   Monitor vitals. Pulse ox.   Oxygen prn.     # Alcohol dependency  # Alcohol withdrawal  No hx of seizure. DTs.   S/p banana bag in ED  - Monitor using MSSA , diazepam protocol.   - MVI, thiamine, folic acid daily.   - Sw, CD consult   - Fall precautions.     # HTN: hold pta hydrochlorothiazide. Ct lisinopril with holding parameters.   - Hydralazine prn for sbp>160.   - Monitor.     # Depression, anxiety, insomnia:   - Resume PTA meds.          Diet: regular diet as tolerated.   DVT Prophylaxis: Pneumatic Compression Devices  Rice Catheter: not present  Code Status: full     Disposition Plan   Expected discharge: 2 - 3 days, recommended to prior living arrangement once SIRS/Sepsis treated and detoxed. .  Entered: Ralph Childers MD 04/08/2020, 2:30 PM     The patient's care was discussed with the Patient and RN..    Ralph Childers MD  [facility],  [place]    ______________________________________________________________________    Chief Complaint     Cough, sob x 2 days.   Alcohol problem, seeking detox.     History is obtained from the patient, electronic health record and emergency department physician    History of Present Illness     Aliyah Angeles is a 44 year old female with history of alcohol use disorder, depression, who presents to the ER for eval for 2 days of cough with chest tightness and sob. Cough mostly dry. Patient with subjective dyspnea without hypoxia on RA in ED. No fever or chills.   Hx of etoh use of beer a case per day for ~ last 3 weeks,  last use 7 am of the day of admission.  Patient reports her daughters who she has been around is RN.  Patient denies cardiac or asthma or PE etc. No recent travel. No hemoptysis. No other illicit substance intake. Reports mild shakes, anxiety and nausea as part of alcohol withdrawal.  No hx of seizures or DTs. No emesis. Or pain abdomen. No dysuria or bowel complaint. Denies SI.      Review of Systems    The 10 point Review of Systems is negative other than noted in the HPI or here.     Past Medical History    I have reviewed this patient's medical history and updated it with pertinent information if needed.   Past Medical History:   Diagnosis Date     Acne      ADHD (attention deficit hyperactivity disorder)      Arthralgia of temporomandibular joint 5/19/2016     Arthritis      Cervical high risk HPV (human papillomavirus) test positive 4/27/2018 4/27/18 NIL pap, + HR HPV (not 16 or 18). Plan: cotest in 1 yr.       Cobalamin deficiency 11/24/2014     Drug-seeking behavior 5/19/2016    Overview:  Per  website, patient has filled with multiple controlled substances with multiple prescribers at multiple pharmacies      History of blood transfusion 01/01/1988     Insomnia 7/21/2013     Raynaud's phenomenon 5/25/2015     Vitamin D deficiency 1/16/2013       Past Surgical History   I have reviewed  this patient's surgical history and updated it with pertinent information if needed.  Past Surgical History:   Procedure Laterality Date     ABDOMEN SURGERY  08/10/2010    Gastric  bypass     BIOPSY  1/1/94    HPV, multiple reoccurrances, partial  hysterectomy  2013     CHOLECYSTECTOMY  01/01/2010     COLONOSCOPY  01/01/2009     ENT SURGERY  01/01/1988    Tonsils     GI SURGERY  8/10/10    F[facility]     HYSTERECTOMY VAGINAL  04/05/2013    pathology of cervix benign.        Social History   I have reviewed this patient's social history and updated it with pertinent information if needed.  Social History     Tobacco Use     Smoking status: Current Every Day Smoker     Packs/day: 1.00     Years: 15.00     Pack years: 15.00     Types: Cigarettes     Start date: 1/1/1989     Smokeless tobacco: Never Used   Substance Use Topics     Alcohol use: Yes     Alcohol/week: 0.0 standard drinks     Comment: daily unknown amount      Drug use: No       Family History   I have reviewed this patient's family history and updated it with pertinent information if needed.   Family History   Problem Relation Age of Onset     Prostate Cancer Father      Hypertension Father      Hyperlipidemia Father      Substance Abuse Father      Obesity Father      Diabetes Maternal Grandmother      Colon Cancer Maternal Grandmother      Other Cancer Maternal Grandmother         pancreatic cancer     Obesity Maternal Grandmother      Diabetes Paternal Grandmother      Obesity Paternal Grandmother      Diabetes Paternal Grandfather      Obesity Paternal Grandfather      Breast Cancer Sister      Breast Cancer Sister      Anxiety Disorder Sister      Anesthesia Reaction Sister      Depression Mother      Anesthesia Reaction Mother      Asthma Daughter      Coronary Artery Disease No family hx of      Hypertension No family hx of      Hyperlipidemia No family hx of      Cerebrovascular Disease No family hx of      Depression No family hx of      Anxiety  "Disorder No family hx of      Mental Illness No family hx of      Substance Abuse No family hx of      Anesthesia Reaction No family hx of      Asthma No family hx of      Osteoporosis No family hx of      Genetic Disorder No family hx of      Thyroid Disease No family hx of      Obesity No family hx of      Unknown/Adopted No family hx of        Prior to Admission Medications   Prior to Admission Medications   Prescriptions Last Dose Informant Patient Reported? Taking?   Needle, Disp, (B-D DISP NEEDLE) 25G X 1\" MISC   No No   Si each every 30 days   acetaminophen (TYLENOL) 500 MG tablet 2020 at Unknown time  Yes Yes   Sig: Take 500-1,000 mg by mouth every 6 hours as needed for mild pain   calcium carbonate 600 mg-vitamin D 400 units (CALTRATE) 600-400 MG-UNIT per tablet   No Yes   Sig: Take 1 tablet by mouth daily   escitalopram (LEXAPRO) 20 MG tablet Past Month  No Yes   Sig: Take 1 tablet (20 mg) by mouth daily   hydrOXYzine (ATARAX) 25 MG tablet   No Yes   Sig: Take 1-2 tablets (25-50 mg) by mouth every 4 hours as needed for anxiety   multivitamin w/minerals (THERA-VIT-M) tablet   No Yes   Sig: Take 1 tablet by mouth daily   naltrexone (DEPADE/REVIA) 50 MG tablet Past Month  No Yes   Sig: Take 1 tablet (50 mg) by mouth daily   nicotine (NICORETTE) 4 MG lozenge   No Yes   Sig: Place 1-2 lozenges (4-8 mg) inside cheek every hour as needed for smoking cessation   omega 3 1000 MG CAPS Past Month  Yes Yes   Sig: Take 1 g by mouth daily    traZODone (DESYREL) 50 MG tablet   No Yes   Sig: Take 1 tablet (50 mg) by mouth nightly as needed for sleep      Facility-Administered Medications: None     Allergies   Allergies   Allergen Reactions     Nsaids      Gastric bypass       Physical Exam   Vital Signs: Temp: 98.5  F (36.9  C) Temp src: Oral BP: (!) 140/98   Heart Rate: 87 Resp: 16 SpO2: 100 % O2 Device: None (Room air)    Weight: 165 lbs 0 oz    General: alert, interactive, NAD,  HEENT: AT/NC,  Moist MM  Neck: " Supple. JVD not appreciated.   Respi/Chest: Non labored. On RA. Oxygenating high 90s on monitor.   CVS/Heart: RRR   GI/Abd: non distended.   MSK/Extremities: no pedal edema noted.      Neuro: AO x 4, Grossly non focal. Mild tremors+  Psychiatry: Anxious.   Skin: no new rash on exposed areas.         Data   Data reviewed today: I reviewed all medications, new labs and imaging results over the last 24 hours. I personally reviewed the chest x-ray image(s) showing no acute infiltrates. .    Recent Labs   Lab 04/08/20  1043   WBC 8.3   HGB 12.2   MCV 86      INR 1.02   *   POTASSIUM 3.9   CHLORIDE 99   CO2 24   BUN 9   CR 0.59   ANIONGAP 9   STEFFANY 8.0*   GLC 93   ALBUMIN 3.7   PROTTOTAL 7.0   BILITOTAL 0.3   ALKPHOS 88   ALT 21   AST 21   TROPI <0.015     Recent Results (from the past 24 hour(s))   XR Chest Port 1 View    Narrative    Portable chest    INDICATION: Cough, shortness of breath    COMPARISON: None    FINDINGS: Heart size and shape appear normal.  Well-circumscribed dense subcentimeter pulmonary nodule is noted in  the right lower lung field, consistent with probable granuloma. No  infiltrates or effusions. Bony structures appear intact.      Impression    IMPRESSION: Granulomatous disease.    ANGÉLICA ABDALLA MD

## 2020-04-09 ENCOUNTER — APPOINTMENT (OUTPATIENT)
Dept: BEHAVIORAL HEALTH | Facility: CLINIC | Age: 45
End: 2020-04-09
Attending: FAMILY MEDICINE
Payer: COMMERCIAL

## 2020-04-09 ENCOUNTER — TELEPHONE (OUTPATIENT)
Dept: BEHAVIORAL HEALTH | Facility: CLINIC | Age: 45
End: 2020-04-09

## 2020-04-09 ENCOUNTER — HOSPITAL ENCOUNTER (INPATIENT)
Facility: CLINIC | Age: 45
LOS: 1 days | Discharge: HOME | End: 2020-04-10
Attending: PSYCHIATRY & NEUROLOGY | Admitting: PSYCHIATRY & NEUROLOGY
Payer: COMMERCIAL

## 2020-04-09 VITALS
OXYGEN SATURATION: 99 % | WEIGHT: 165 LBS | TEMPERATURE: 97.3 F | HEART RATE: 65 BPM | RESPIRATION RATE: 16 BRPM | DIASTOLIC BLOOD PRESSURE: 79 MMHG | SYSTOLIC BLOOD PRESSURE: 115 MMHG | BODY MASS INDEX: 28.32 KG/M2

## 2020-04-09 DIAGNOSIS — E16.2 HYPOGLYCEMIA: ICD-10-CM

## 2020-04-09 DIAGNOSIS — K86.0 ALCOHOL-INDUCED CHRONIC PANCREATITIS (H): Primary | ICD-10-CM

## 2020-04-09 PROBLEM — F10.939 ALCOHOL WITHDRAWAL (H): Status: ACTIVE | Noted: 2020-04-09

## 2020-04-09 PROBLEM — F10.20 ALCOHOL DEPENDENCE (H): Status: ACTIVE | Noted: 2020-04-09

## 2020-04-09 LAB — INTERPRETATION ECG - MUSE: NORMAL

## 2020-04-09 PROCEDURE — 99223 1ST HOSP IP/OBS HIGH 75: CPT | Mod: 95 | Performed by: PSYCHIATRY & NEUROLOGY

## 2020-04-09 PROCEDURE — 25000132 ZZH RX MED GY IP 250 OP 250 PS 637: Performed by: INTERNAL MEDICINE

## 2020-04-09 PROCEDURE — 40000007 ZZH STATISTIC ADULT CD FACE TO FACE-NO CHRG

## 2020-04-09 PROCEDURE — 25000132 ZZH RX MED GY IP 250 OP 250 PS 637: Performed by: PSYCHIATRY & NEUROLOGY

## 2020-04-09 PROCEDURE — G0378 HOSPITAL OBSERVATION PER HR: HCPCS

## 2020-04-09 PROCEDURE — 12800008 ZZH R&B CD ADULT

## 2020-04-09 PROCEDURE — 99217 ZZC OBSERVATION CARE DISCHARGE: CPT | Performed by: INTERNAL MEDICINE

## 2020-04-09 PROCEDURE — 25000128 H RX IP 250 OP 636: Performed by: PSYCHIATRY & NEUROLOGY

## 2020-04-09 RX ORDER — ALBUTEROL SULFATE 90 UG/1
2 AEROSOL, METERED RESPIRATORY (INHALATION) EVERY 4 HOURS PRN
Status: ON HOLD | COMMUNITY
Start: 2020-04-09 | End: 2021-09-20

## 2020-04-09 RX ORDER — ACETAMINOPHEN 325 MG/1
650 TABLET ORAL EVERY 4 HOURS PRN
Status: DISCONTINUED | OUTPATIENT
Start: 2020-04-09 | End: 2020-04-10 | Stop reason: HOSPADM

## 2020-04-09 RX ORDER — ESCITALOPRAM OXALATE 20 MG/1
20 TABLET ORAL DAILY
Status: DISCONTINUED | OUTPATIENT
Start: 2020-04-10 | End: 2020-04-10 | Stop reason: HOSPADM

## 2020-04-09 RX ORDER — ALBUTEROL SULFATE 0.83 MG/ML
3 SOLUTION RESPIRATORY (INHALATION) EVERY 4 HOURS PRN
Status: DISCONTINUED | OUTPATIENT
Start: 2020-04-09 | End: 2020-04-10 | Stop reason: HOSPADM

## 2020-04-09 RX ORDER — GABAPENTIN 100 MG/1
200 CAPSULE ORAL 3 TIMES DAILY
Status: DISCONTINUED | OUTPATIENT
Start: 2020-04-09 | End: 2020-04-10 | Stop reason: HOSPADM

## 2020-04-09 RX ORDER — ALUMINA, MAGNESIA, AND SIMETHICONE 2400; 2400; 240 MG/30ML; MG/30ML; MG/30ML
30 SUSPENSION ORAL EVERY 4 HOURS PRN
Status: DISCONTINUED | OUTPATIENT
Start: 2020-04-09 | End: 2020-04-10 | Stop reason: HOSPADM

## 2020-04-09 RX ORDER — LANOLIN ALCOHOL/MO/W.PET/CERES
100 CREAM (GRAM) TOPICAL DAILY
Status: ON HOLD | COMMUNITY
Start: 2020-04-10 | End: 2021-09-13

## 2020-04-09 RX ORDER — DIAZEPAM 5 MG
5-20 TABLET ORAL EVERY 30 MIN PRN
Status: DISCONTINUED | OUTPATIENT
Start: 2020-04-09 | End: 2020-04-10 | Stop reason: HOSPADM

## 2020-04-09 RX ORDER — CYANOCOBALAMIN 1000 UG/ML
1000 INJECTION, SOLUTION INTRAMUSCULAR; SUBCUTANEOUS
Status: DISCONTINUED | OUTPATIENT
Start: 2020-04-09 | End: 2020-04-10 | Stop reason: HOSPADM

## 2020-04-09 RX ORDER — FOLIC ACID 1 MG/1
1 TABLET ORAL DAILY
Status: ON HOLD | COMMUNITY
Start: 2020-04-10 | End: 2021-09-13

## 2020-04-09 RX ORDER — TRAZODONE HYDROCHLORIDE 50 MG/1
50 TABLET, FILM COATED ORAL
Status: DISCONTINUED | OUTPATIENT
Start: 2020-04-09 | End: 2020-04-10 | Stop reason: HOSPADM

## 2020-04-09 RX ORDER — LISINOPRIL 20 MG/1
20 TABLET ORAL DAILY
Status: DISCONTINUED | OUTPATIENT
Start: 2020-04-09 | End: 2020-04-10 | Stop reason: HOSPADM

## 2020-04-09 RX ORDER — DIAZEPAM 5 MG
5-20 TABLET ORAL EVERY 30 MIN PRN
Status: ON HOLD | COMMUNITY
Start: 2020-04-09 | End: 2020-04-10

## 2020-04-09 RX ORDER — ATENOLOL 50 MG/1
50 TABLET ORAL DAILY PRN
Status: DISCONTINUED | OUTPATIENT
Start: 2020-04-09 | End: 2020-04-10 | Stop reason: HOSPADM

## 2020-04-09 RX ORDER — HYDROXYZINE HYDROCHLORIDE 25 MG/1
25 TABLET, FILM COATED ORAL EVERY 4 HOURS PRN
Status: DISCONTINUED | OUTPATIENT
Start: 2020-04-09 | End: 2020-04-10 | Stop reason: HOSPADM

## 2020-04-09 RX ORDER — MULTIPLE VITAMINS W/ MINERALS TAB 9MG-400MCG
1 TAB ORAL DAILY
Status: DISCONTINUED | OUTPATIENT
Start: 2020-04-10 | End: 2020-04-10 | Stop reason: HOSPADM

## 2020-04-09 RX ORDER — FOLIC ACID 1 MG/1
1 TABLET ORAL DAILY
Status: DISCONTINUED | OUTPATIENT
Start: 2020-04-10 | End: 2020-04-10 | Stop reason: HOSPADM

## 2020-04-09 RX ORDER — LANOLIN ALCOHOL/MO/W.PET/CERES
100 CREAM (GRAM) TOPICAL DAILY
Status: DISCONTINUED | OUTPATIENT
Start: 2020-04-10 | End: 2020-04-10 | Stop reason: HOSPADM

## 2020-04-09 RX ORDER — QUETIAPINE FUMARATE 25 MG/1
25-50 TABLET, FILM COATED ORAL
Status: DISCONTINUED | OUTPATIENT
Start: 2020-04-09 | End: 2020-04-10 | Stop reason: HOSPADM

## 2020-04-09 RX ORDER — ACETAMINOPHEN 325 MG/1
650 TABLET ORAL EVERY 4 HOURS PRN
Status: ON HOLD | COMMUNITY
Start: 2020-04-09 | End: 2020-04-10

## 2020-04-09 RX ORDER — BISACODYL 10 MG
10 SUPPOSITORY, RECTAL RECTAL DAILY PRN
Status: DISCONTINUED | OUTPATIENT
Start: 2020-04-09 | End: 2020-04-10 | Stop reason: HOSPADM

## 2020-04-09 RX ADMIN — DIAZEPAM 10 MG: 5 TABLET ORAL at 16:16

## 2020-04-09 RX ADMIN — FOLIC ACID 1 MG: 1 TABLET ORAL at 08:29

## 2020-04-09 RX ADMIN — MULTIPLE VITAMINS W/ MINERALS TAB 1 TABLET: TAB at 08:28

## 2020-04-09 RX ADMIN — QUETIAPINE FUMARATE 50 MG: 25 TABLET ORAL at 23:42

## 2020-04-09 RX ADMIN — GABAPENTIN 200 MG: 100 CAPSULE ORAL at 14:16

## 2020-04-09 RX ADMIN — ACETAMINOPHEN 650 MG: 325 TABLET, FILM COATED ORAL at 00:00

## 2020-04-09 RX ADMIN — DIAZEPAM 5 MG: 5 TABLET ORAL at 06:31

## 2020-04-09 RX ADMIN — DIAZEPAM 5 MG: 5 TABLET ORAL at 10:07

## 2020-04-09 RX ADMIN — HYDROXYZINE HYDROCHLORIDE 50 MG: 25 TABLET, FILM COATED ORAL at 00:00

## 2020-04-09 RX ADMIN — GABAPENTIN 200 MG: 100 CAPSULE ORAL at 20:19

## 2020-04-09 RX ADMIN — DIAZEPAM 10 MG: 5 TABLET ORAL at 14:17

## 2020-04-09 RX ADMIN — CYANOCOBALAMIN 1000 MCG: 1000 INJECTION, SOLUTION INTRAMUSCULAR at 16:32

## 2020-04-09 RX ADMIN — LISINOPRIL 20 MG: 20 TABLET ORAL at 14:16

## 2020-04-09 RX ADMIN — CALCIUM CARBONATE 600 MG (1,500 MG)-VITAMIN D3 400 UNIT TABLET 1 TABLET: at 14:16

## 2020-04-09 RX ADMIN — DIAZEPAM 5 MG: 5 TABLET ORAL at 00:00

## 2020-04-09 RX ADMIN — THIAMINE HCL TAB 100 MG 100 MG: 100 TAB at 08:29

## 2020-04-09 RX ADMIN — ESCITALOPRAM OXALATE 20 MG: 20 TABLET ORAL at 08:28

## 2020-04-09 ASSESSMENT — ACTIVITIES OF DAILY LIVING (ADL)
AMBULATION: 0-->INDEPENDENT
SWALLOWING: 0-->SWALLOWS FOODS/LIQUIDS WITHOUT DIFFICULTY
ORAL_HYGIENE: INDEPENDENT
FALL_HISTORY_WITHIN_LAST_SIX_MONTHS: NO
LAUNDRY: UNABLE TO COMPLETE
DRESS: 0-->INDEPENDENT
COGNITION: 0 - NO COGNITION ISSUES REPORTED
TOILETING: 0-->INDEPENDENT
DRESS: STREET CLOTHES
TRANSFERRING: 0-->INDEPENDENT
HYGIENE/GROOMING: INDEPENDENT
RETIRED_COMMUNICATION: 0-->UNDERSTANDS/COMMUNICATES WITHOUT DIFFICULTY
RETIRED_EATING: 0-->INDEPENDENT
BATHING: 0-->INDEPENDENT

## 2020-04-09 ASSESSMENT — MIFFLIN-ST. JEOR: SCORE: 1321.74

## 2020-04-09 NOTE — H&P
Telemedicine Visit: The patient's condition can be safely assessed and treated via synchronous audio and visual telemedicine encounter.   Start Time: 1.04  Stop Time: 1.20  Reason for Telemedicine Visit: Covid-19   Originating Site (Patient Location): [facility]   Distant Site (Provider Location): Provider Remote Setting   Consent: The patient/guardian has verbally consented to: the potential risks and benefits of telemedicine (video visit) versus in person care; bill my insurance or make self-payment for services provided; and responsibility for payment of non-covered services.   Mode of Communication: Video Conference via Skype   As the provider I attest to compliance with applicable laws and regulations related to telemedicine.       The patient/guardian has been notified of the following:   This telemedicine visit is conducted live between you and your clinician. We have found that certain health care needs can be provided without the need for a physical exam. This service lets us provide the care you need with a telemedicine conversation.          Aliyah Angeles is a 44 year old female who was referred by selffor evaluation of alcoholism .  History was provided by PATEINT who was a good historian.   CHIEF COMPLAINT:  alcohol    HISTORY OF PRESENT ILLNESS:      Patient is 44-year-old  female she was on the medical floor for detox and ruling out covid 19.  This was ruled out and patient came here to detox.  Patient was here with us in February 2 6 2/2/2010 she went to treatment at [facility], she says she completed it records indicate she tried to come to [facility].  Nevertheless patient relapsed she has been drinking 8-16 beers daily her marriage broke apart she was living in her car.  She called 911 and came to the emergency room.      She has a complex medical history.  She has history of depression, alcohol-induced pancreatitis, gastric bypass, cholecystectomy.    Patient has been using the  following substances:   Started at age 11 , became a problem in 2019.      Patient has tolerance, withdrawal, progressive use, loss of control, spending more time and more amount than intended. Patient has made attempts to quit, is experiencing cravings, and reports negative consequences.  Patient does not [place].    Patient does not have a history of seizures.  Patient does not have a history of delirium tremens.    Patient does not have a history of overdose.  Patient does not have a history of IV use.  Patient does not have a history of hepatitis, HIV, a          [id number]         USE DISORDER - CRITERIA  +admits to taking larger amounts than initially intended  +admits to unsuccessful efforts to cut down or control use  +admits to spending a great deal of time in activities necessary to obtain, use and recover from  +admits to cravings or a strong desire to use   + admits to failure to fulfill obligations at work, school or home  + admits to continued use despite negative consequences  + admits to giving up important activities to use  +admits to use in situations in which it is physically dangerous      She feels depressed poor energy no drive isolated lack self-report lack of energy lack of motivation hopeless and helpless  She smokes 1 pack cigarettes a day  She denies using any benzos denies using any other street drugs.    Denies thoughts of suicide or harming others.      Denies auditory or visual hallucinations.     PSYCHIATRIC REVIEW OF SYSTEMS:         Psychiatric Review of Systems:   Depression:   Reports: depressed mood,  Denied any suicidal ideation, decreased interest, changes in sleep, changes in appetite, guilt, hopelessness, helplessness, impaired concentration, decreased energy, irritability.   .  [place]:     Denies: sleeplessness, increased goal-directed activities, abrupt increase in energy, pressured speech  Psychosis:   .  Denies: visual hallucinations, auditory hallucinations,  paranoia  Anxiety:   denied: excessive worries that are difficult to control for the past 6 months,   Chronic anxiety , not able to stop worrying impacting sleep, poor conc, irritable , muscle tension        Denies: worries that are difficult to control for the past 6 months, panic attacks  PTSD:     Denies: re-experiencing past trauma, nightmares, increased arousal, avoidance of traumatic stimuli, impaired function.  OCD:   Reports: obsessions, checking,+ symmetry, cleaning, skin picking.  ED:      Denies: restriction, binging, purging.                  PSYCHIATRIC HISTORY     Previous diagnoses: alcoholism , mdd      Symptoms in relation to substance use (symptoms present without use?): depression precedes her alcohol    Past court commitments: none  SIB /SUICIDE ATTEMPTS NONE          Psych Hosp :none  Outpatient Programs   Inpatient cd trt:none  Out pt cd trt:none        SOCIAL HISTORY                                                                         Financial Support- unemployed living in er car    Legal- None  Recovery Support- none              Family History:   FAMILY HISTORY:   Family History   Problem Relation Age of Onset     Prostate Cancer Father      Hypertension Father      Hyperlipidemia Father      Substance Abuse Father      Obesity Father      Diabetes Maternal Grandmother      Colon Cancer Maternal Grandmother      Other Cancer Maternal Grandmother         pancreatic cancer     Obesity Maternal Grandmother      Diabetes Paternal Grandmother      Obesity Paternal Grandmother      Diabetes Paternal Grandfather      Obesity Paternal Grandfather      Breast Cancer Sister      Breast Cancer Sister      Anxiety Disorder Sister      Anesthesia Reaction Sister      Depression Mother      Anesthesia Reaction Mother      Asthma Daughter      Coronary Artery Disease No family hx of      Hypertension No family hx of      Hyperlipidemia No family hx of      Cerebrovascular Disease No family hx of       Depression No family hx of      Anxiety Disorder No family hx of      Mental Illness No family hx of      Substance Abuse No family hx of      Anesthesia Reaction No family hx of      Asthma No family hx of      Osteoporosis No family hx of      Genetic Disorder No family hx of      Thyroid Disease No family hx of      Obesity No family hx of      Unknown/Adopted No family hx of                     PTA Medications:     Medications Prior to Admission   Medication Sig Dispense Refill Last Dose     albuterol (PROAIR HFA/PROVENTIL HFA/VENTOLIN HFA) 108 (90 Base) MCG/ACT inhaler Inhale 2 puffs into the lungs every 4 hours as needed for wheezing or shortness of breath / dyspnea   4/8/2020 at Unknown time     calcium carbonate 600 mg-vitamin D 400 units (CALTRATE) 600-400 MG-UNIT per tablet Take 1 tablet by mouth daily 30 tablet 0 4/9/2020 at Unknown time     diazepam (VALIUM) 5 MG tablet Take 1-4 tablets (5-20 mg) by mouth every 30 minutes as needed   4/9/2020 at Unknown time     escitalopram (LEXAPRO) 20 MG tablet Take 1 tablet (20 mg) by mouth daily 30 tablet 0 4/9/2020 at Unknown time     [START ON 4/10/2020] folic acid (FOLVITE) 1 MG tablet Take 1 tablet (1 mg) by mouth daily   4/9/2020 at Unknown time     gabapentin (NEURONTIN) 100 MG capsule Take 200 mg by mouth 3 times daily   Past Month at Unknown time     hydrOXYzine (ATARAX) 25 MG tablet Take 1-2 tablets (25-50 mg) by mouth every 4 hours as needed for anxiety 30 tablet 0 4/9/2020 at Unknown time     lisinopril (ZESTRIL) 20 MG tablet Take 20 mg by mouth daily   4/8/2020 at Unknown time     multivitamin w/minerals (THERA-VIT-M) tablet Take 1 tablet by mouth daily 30 tablet 1 4/9/2020 at Unknown time     nicotine (NICORETTE) 4 MG lozenge Place 1-2 lozenges (4-8 mg) inside cheek every hour as needed for smoking cessation 30 lozenge 1 4/9/2020 at Unknown time     QUEtiapine (SEROQUEL) 25 MG tablet Take 25-50 mg by mouth nightly as needed (insomnia)   Past Week at  "Unknown time     [START ON 4/10/2020] vitamin B1 (THIAMINE) 100 MG tablet Take 1 tablet (100 mg) by mouth daily   4/9/2020 at Unknown time     acetaminophen (TYLENOL) 325 MG tablet Take 2 tablets (650 mg) by mouth every 4 hours as needed for mild pain   Unknown at Unknown time     Needle, Disp, (B-D DISP NEEDLE) 25G X 1\" MISC 1 each every 30 days 12 each 0      omega 3 1000 MG CAPS Take 1 g by mouth daily    Unknown at Unknown time     traZODone (DESYREL) 50 MG tablet Take 1 tablet (50 mg) by mouth nightly as needed for sleep 30 tablet 0           Allergies:     Allergies   Allergen Reactions     Nsaids      Gastric bypass          Labs:     Recent Results (from the past 48 hour(s))   EKG 12 lead    Collection Time: 04/08/20 10:29 AM   Result Value Ref Range    Interpretation ECG Click View Image link to view waveform and result    CBC with platelets differential    Collection Time: 04/08/20 10:43 AM   Result Value Ref Range    WBC 8.3 4.0 - 11.0 10e9/L    RBC Count 4.18 3.8 - 5.2 10e12/L    Hemoglobin 12.2 11.7 - 15.7 g/dL    Hematocrit 35.9 35.0 - 47.0 %    MCV 86 78 - 100 fl    MCH 29.2 26.5 - 33.0 pg    MCHC 34.0 31.5 - 36.5 g/dL    RDW 16.2 (H) 10.0 - 15.0 %    Platelet Count 285 150 - 450 10e9/L    Diff Method Automated Method     % Neutrophils 61.6 %    % Lymphocytes 30.8 %    % Monocytes 6.8 %    % Eosinophils 0.2 %    % Basophils 0.4 %    % Immature Granulocytes 0.2 %    Nucleated RBCs 0 0 /100    Absolute Neutrophil 5.1 1.6 - 8.3 10e9/L    Absolute Lymphocytes 2.5 0.8 - 5.3 10e9/L    Absolute Monocytes 0.6 0.0 - 1.3 10e9/L    Absolute Eosinophils 0.0 0.0 - 0.7 10e9/L    Absolute Basophils 0.0 0.0 - 0.2 10e9/L    Abs Immature Granulocytes 0.0 0 - 0.4 10e9/L    Absolute Nucleated RBC 0.0    INR    Collection Time: 04/08/20 10:43 AM   Result Value Ref Range    INR 1.02 0.86 - 1.14   Partial thromboplastin time    Collection Time: 04/08/20 10:43 AM   Result Value Ref Range    PTT 26 22 - 37 sec   Comprehensive " metabolic panel    Collection Time: 04/08/20 10:43 AM   Result Value Ref Range    Sodium 132 (L) 133 - 144 mmol/L    Potassium 3.9 3.4 - 5.3 mmol/L    Chloride 99 94 - 109 mmol/L    Carbon Dioxide 24 20 - 32 mmol/L    Anion Gap 9 3 - 14 mmol/L    Glucose 93 70 - 99 mg/dL    Urea Nitrogen 9 7 - 30 mg/dL    Creatinine 0.59 0.52 - 1.04 mg/dL    GFR Estimate >90 >60 mL/min/[1.73_m2]    GFR Estimate If Black >90 >60 mL/min/[1.73_m2]    Calcium 8.0 (L) 8.5 - 10.1 mg/dL    Bilirubin Total 0.3 0.2 - 1.3 mg/dL    Albumin 3.7 3.4 - 5.0 g/dL    Protein Total 7.0 6.8 - 8.8 g/dL    Alkaline Phosphatase 88 40 - 150 U/L    ALT 21 0 - 50 U/L    AST 21 0 - 45 U/L   Nt probnp inpatient (BNP)    Collection Time: 04/08/20 10:43 AM   Result Value Ref Range    N-Terminal Pro BNP Inpatient 32 0 - 450 pg/mL   Troponin I    Collection Time: 04/08/20 10:43 AM   Result Value Ref Range    Troponin I ES <0.015 0.000 - 0.045 ug/L   Alcohol ethyl    Collection Time: 04/08/20 10:43 AM   Result Value Ref Range    Ethanol g/dL 0.20 (H) <0.01 g/dL   Drug abuse screen 6 urine (chem dep) ([facility])    Collection Time: 04/08/20 10:43 AM   Result Value Ref Range    Amphetamine Qual Urine Negative NEG^Negative    Barbiturates Qual Urine Negative NEG^Negative    Benzodiazepine Qual Urine Negative NEG^Negative    Cannabinoids Qual Urine Negative NEG^Negative    Cocaine Qual Urine Negative NEG^Negative    Ethanol Qual Urine Positive (A) NEG^Negative    Opiates Qualitative Urine Negative NEG^Negative   UA reflex to Microscopic and Culture    Collection Time: 04/08/20 10:43 AM    Specimen: Urine clean catch; Midstream Urine   Result Value Ref Range    Color Urine Light Yellow     Appearance Urine Clear     Glucose Urine Negative NEG^Negative mg/dL    Bilirubin Urine Negative NEG^Negative    Ketones Urine Negative NEG^Negative mg/dL    Specific Gravity Urine 1.004 1.003 - 1.035    Blood Urine Negative NEG^Negative    pH Urine 6.0 5.0 - 7.0 pH    Protein Albumin  Urine Negative NEG^Negative mg/dL    Urobilinogen mg/dL Normal 0.0 - 2.0 mg/dL    Nitrite Urine Negative NEG^Negative    Leukocyte Esterase Urine Negative NEG^Negative    Source Midstream Urine    HCG qualitative urine    Collection Time: 04/08/20 10:43 AM   Result Value Ref Range    HCG Qual Urine Negative NEG^Negative   COVID-19 Virus (Coronavirus) by PCR Nasopharyngeal    Collection Time: 04/08/20 12:14 PM    Specimen: Nasopharyngeal   Result Value Ref Range    COVID-19 Virus PCR to [facility] - Source Nasopharyngeal     COVID-19 Virus PCR to [facility] - Result       Test received-See reflex to IDDL test SARS CoV2 (COVID-19) Virus RT-PCR   Influenza A and B and RSV PCR    Collection Time: 04/08/20 12:14 PM   Result Value Ref Range    Specimen Description Nasopharyngeal     Influenza A PCR Negative NEG^Negative    Influenza B PCR Negative NEG^Negative    Resp Syncytial Virus Negative NEG^Negative   Respiratory Panel PCR - NP Swab    Collection Time: 04/08/20 12:14 PM    Specimen: Nasopharyngeal swab   Result Value Ref Range    Adenovirus Not Detected NDET^Not Detected    Coronavirus Not Detected NDET^Not Detected    Human Metapneumovirus Not Detected NDET^Not Detected    Human Rhinovirus/Enterovirus Not Detected NDET^Not Detected    Influenza A Not Detected NDET^Not Detected    Influenza A, H1 Not Detected NDET^Not Detected    Influenza A 2009 H1N1 Not Detected NDET^Not Detected    Influenza A, H3 Not Detected NDET^Not Detected    Influenza B Not Detected NDET^Not Detected    Parainfluenza Virus 1 Not Detected NDET^Not Detected    Parainfluenza Virus 2 Not Detected NDET^Not Detected    Parainfluenza Virus 3 Not Detected NDET^Not Detected    Parainfluenza Virus 4 Not Detected NDET^Not Detected    Respiratory Syncytial Virus A Not Detected NDET^Not Detected    Respiratory Syncytial Virus B Not Detected NDET^Not Detected    Chlamydia pneumoniae Not Detected NDET^Not Detected    Mycoplasma pneumoniae Not Detected NDET^Not  "Detected    Respiratory Virus Comment See comment below    SARS-CoV-2 COVID-19 Virus (Coronavirus) RT-PCR Nasopharyngeal    Collection Time: 04/08/20 12:14 PM    Specimen: Nasopharyngeal   Result Value Ref Range    SARS-CoV-2 Virus Specimen Source Nasopharyngeal     SARS-CoV-2 PCR Result       NEGATIVE: SARS-CoV-2 (COVID-19) RNA not detected, presumed negative    SARS-CoV-2 PCR Comment       The Simplexa COVID-10 direct PCR assay by [employer] on the PenBlade instrument has been   given Emergency Use Authorization (EUA) for the in vitro qualitative detection of RNA from   the SARS-CoV-2 virus in nasopharyngeal swabs in viral transport medium from patients with   signs and symptoms of infection who are suspected of COVID-19.            Physical and Psychiatric Examination:     BP (!) 166/120   Pulse 102   Temp 99.4  F (37.4  C) (Tympanic)   Resp 16   Ht 1.6 m (5' 3\")   Wt 70.8 kg (156 lb)   BMI 27.63 kg/m    Weight is 156 lbs 0 oz  Body mass index is 27.63 kg/m .    Physical Exam:     ROS: 10 point ROS neg other than the symptoms noted above in the HPI.            Past Medical History:   PAST MEDICAL HISTORY:   Past Medical History:   Diagnosis Date     Acne      ADHD (attention deficit hyperactivity disorder)      Arthralgia of temporomandibular joint 5/19/2016     Arthritis      Cervical high risk HPV (human papillomavirus) test positive 4/27/2018 4/27/18 NIL pap, + HR HPV (not 16 or 18). Plan: cotest in 1 yr.       Cobalamin deficiency 11/24/2014     Drug-seeking behavior 5/19/2016    Overview:  Per [facility] website, patient has filled with multiple controlled substances with multiple prescribers at multiple pharmacies      History of blood transfusion 01/01/1988     Insomnia 7/21/2013     Raynaud's phenomenon 5/25/2015     Vitamin D deficiency 1/16/2013       PAST SURGICAL HISTORY:   Past Surgical History:   Procedure Laterality Date     ABDOMEN SURGERY  08/10/2010    Gastric  bypass     BIOPSY  1/1/94    " HPV, multiple reoccurrances, partial  hysterectomy  2013     CHOLECYSTECTOMY  01/01/2010     COLONOSCOPY  01/01/2009     ENT SURGERY  01/01/1988    Tonsils     GI SURGERY  8/10/10    Fitz-en-Y     HYSTERECTOMY VAGINAL  04/05/2013    pathology of cervix benign.        -    -           MENTAL STATUS EXAM:      Constitutional: General appearance of patient:      Appearance:  awake, alert, appeared as age stated, adequate groomed and slightly unkempt  Attitude:  cooperative  Eye Contact:  good  Mood:  sad  Affect:  congruent   Speech:  clear, coherent normal rate   Psychomotor Behavior:  no evidence of tardive dyskinesia, dystonia, or tics  Thought Process:  logical, linear and goal oriented  Associations:  no loose associations  Thought Content:  no evidence of psychotic thought and active suicidal ideation present  Denied any active suicidal /homicidation ideation plan intent   Insight:  fair  Judgment:  fair  Oriented to:  time, person, and place  Attention Span and Concentration:  intact  Recent and Remote Memory:  intact  Language:  english with appropriate syntax and vocabulary  Fund of Knowledge: appropriate  Muscle Strength and Tone: normal  Gait and Station: Normal     There are no abnormal or psychotic thoughts, no preoccupations, no overvalued ideas, no rumination, no obsessions, no compulsions, no somatic concerns, no hypochrondriasis, no ideas of reference, and no delusions.  Patient denies homicidal thoughts.   Patient denies suicidal thoughts.  Patient appears to have good judgment and good insight.     Musculoskeletal: Patient shows no abnormalities of motor activity: there is no tremor, no tic, and no dystonia.  There is no apparent muscle atrophy, strength and tone appear normal, and there are no abnormal movements.  Patient has normal gait and stance.    Dx  Alcohol use disorder severe  Alcohol withdrawal severe  Major depressive disorder moderate recurrent without psychotic features  Plan  1Alcohol  withdrawal severe  Patient has patient has poor appetite  Her blood pressure is 166/120 her pulse is 102 she has tremor she has agitation sweats  She will be detoxed off alcohol using [facility] protocol on Valium    She scored 8 and 10 since morning and required up to a total of 15 mg of Valium    Alcohol use disorder severe  Patient wants treatment she may need funding from the [facility] she will work with the       Major depressive disorder moderate 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  Continued use will put the patient at risk for medical and/or psychiatric complications.    I HAVE REVIEWED LABS WITH PT AND TALKED ABOUT RESULTS WITH PT  I HAVE REVIEWED AND SUMMARIZED OLD RECORDS including his medication reconcilation of his home medications  and PDMP   I HAVE SPOKEN WITH RN ABOUT MEDICATIONS AND DETOX SCORES  I HAVE SPOKEN WITH CM ABOUT PTS TREATMETN OPTIONS

## 2020-04-09 NOTE — UTILIZATION REVIEW
"  Admission Status; Secondary Review Determination         Under the authority of the Utilization Management Committee, the utilization review process indicated a secondary review on the above patient.  The review outcome is based on review of the medical records, discussions with staff, and applying clinical experience noted on the date of the review.          (x) Observation Status Appropriate - This patient does not meet hospital inpatient criteria and is placed in observation status. If this patient's primary payer is Medicare and was admitted as an inpatient, Condition Code 44 should be used and patient status changed to \"observation\".     RATIONALE FOR DETERMINATION   44 year old female with history of alcohol use disorder, depression, who presents to the ER for eval for 2 days of cough with chest tightness and sob. Cough mostly dry. Patient with subjective dyspnea without hypoxia on RA. No fever or chills. Hx of etoh use of beer a case per day for ~ last 3 weeks,  last use 7 am of the day of admission.  She ruled out for COVID-19, receiving oral benzo low-dose, her CIWA scores are not above 10. The severity of illness, intensity of service provided, expected LOS and risk for adverse outcome make the care appropriate for further observation; however, doesn't meet criteria for hospital inpatient admission. Dr Childers notified of this determination.    This document was produced using voice recognition software.      The information on this document is developed by the utilization review team in order for the business office to ensure compliance.  This only denotes the appropriateness of proper admission status and does not reflect the quality of care rendered.         The definitions of Inpatient Status and Observation Status used in making the determination above are those provided in the CMS Coverage Manual, Chapter 1 and Chapter 6, section 70.4.      Sincerely,     MIGUEL ANGEL FRAGOSO MD    System Medical " Director  Utilization Management  [facility].

## 2020-04-09 NOTE — PLAN OF CARE
Patient arrived the unit from Medical unit (5A) seeking detox from alcohol.  Per report, patient drinking 1 case of beer daily for 3 weeks.  Last use yesterday morning.  No history of seizures but tremors from withdrawals.  No other reported substances.  Patient pleasant and cooperative upon arrived the unit.  Flat affect, denies SI/SIB, endorses depression/anxiety.  MSSA on admission 8.  Received 10 mg valium per protocol.  Patient resting comfortably in room.  Will continue to monitor closely.

## 2020-04-09 NOTE — PROGRESS NOTES
A/Ox's 4. Pt rated pain as tolerable. Tylenol given for headache. MSSA 8 Valium given. Pt declined reassessment while sleeping. CMS intact. Tolerated regular diet. Denied any nausea, CP, SOB, lightheadedness or dizziness. Voiding without pain or difficulty. Passing flatus. Pt up ind.  Resting in bed at this time with call light in reach. Able to make needs known. Continue to monitor.

## 2020-04-09 NOTE — PROGRESS NOTES
04/09/20 1248   Valuables   Patient Belongings other (see comments);locker;returned to patient at discharge   Patient Belongings Put in Hospital Secure Location (Security or Locker, etc.) other (see comments)     Storage - medical boot, large black purse, small black purse, ace bandage, Small tricolor clutch, two packs cigarettes, 2 necklace, lighter, razor, shaving cream, cell charging cord, keys, brown wallet, Glasses,    Med room - [place] phone, [place], MHCP card, [employer] financial Debit/visa, [employer] debit/visa, [place] debit/MasterCard,     A               Admission:  I am responsible for any personal items that are not sent to the safe or pharmacy.  [facility] is not responsible for loss, theft or damage of any property in my possession.    Signature:  _________________________________ Date: _______  Time: _____                                              Staff Signature:  ____________________________ Date: ________  Time: _____      2nd Staff person, if patient is unable/unwilling to sign:    Signature: ________________________________ Date: ________  Time: _____     Discharge:  [facility] has returned all of my personal belongings:    Signature: _________________________________ Date: ________  Time: _____                                          Staff Signature:  ____________________________ Date: ________  Time: _____

## 2020-04-09 NOTE — PROGRESS NOTES
4/8/20 CD consult acknowledged  Per chart review, pt came to the hospital today for SOB, cough, and ETOH detox. Unclear if pt is interested in CD tx at this time.  Patient admitted to 3A Detox 2/6/2020-2/10/2020; she was discharged directly to MICD residential tx at [facility].  Referral info: If pt is interested in CD treatment, she needs a Rule 25 Update. A R25 Update can be completed over the phone by the original -- this is the most efficient way to obtain a new referral. Otherwise, a new  would need a copy of pt's original Rule 25; a copy of pt' discharge summary from [facility] would be helpful too, as she was there within the past month-- EHR documentation shows pt was still at [facility] on 3/9/20 and no longer there by 3/23/20.  Funding info: Pt has Medicaid therefore, would have to apply for CD funding through her [facility], per demographics. Per [facility]: For more information or to access this service, please call [phone] and request to speak with a Rule 25 assessment .    CD will consult with SW tomorrow to determine pt's CD needs.     Maria Elena Singleton, [facility]  [phone]

## 2020-04-09 NOTE — PLAN OF CARE
VS: VSS, Lisinopril held d/t systolic below 120, MSSA between 5-10 Valium for withdrawal symptoms   O2: >90% on RA.    Output: Void spontaneously in the toilet.   Last BM: 4/9 per pt report.   Activity: Independent in the room.   Skin: Intact. Tattoo   Pain: Denies   CMS: Intact. Denies numbness and tingling.   Dressing: None.    Diet: Regular diet. Tolerated well.   LDA: PIV removed overnight per report   Equipment: Personal belonging at bedside.    Plan: Discharge to 3A CD treatment today. Report called at 1130 am   Additional Info: Pt came from ED at 4pm yesterday with covid rule out. Result came back negative. Pt still has dry nonproductive cough.

## 2020-04-09 NOTE — CONSULTS
4/9/2020    Pt transferring to 3A. Please see Maria Elena Singleton's note from 4/08 about CD funding.    KAMLESH Wagner

## 2020-04-09 NOTE — PLAN OF CARE
VS: BP (!) 140/83 (BP Location: Left arm)   Pulse 97   Temp 97.8  F (36.6  C) (Oral)   Resp 18   Wt 74.8 kg (165 lb)   SpO2 98%   BMI 28.32 kg/m     O2: >90% on RA.    Output: Void spontaneously in the toilet.   Last BM: 4/8 per pt report.   Activity: Independent in the room.   Skin: Intact. Tattoo.   Pain: Denies, Atarax given for anxiety.   CMS: Intact. Denies numbness and tingling.   Dressing: None.    Diet: Regular diet. Tolerated well.   LDA: PIV infiltrated and removed.    Equipment: Personal belonging at bedside.    Plan: Possible discharge home tomorrow.     Additional Info: Pt came from ED at 4pm with covid rule out. Result came back negative. Pt still have dry nonproductive cough.

## 2020-04-09 NOTE — PROGRESS NOTES
Writer received call that pt's COVID 19 test results came back negative. Pt's nurse and charge nurse notified.

## 2020-04-09 NOTE — PHARMACY-ADMISSION MEDICATION HISTORY
"Pharmacist completed medication history on 04/08/2020 in Emergency department prior to patient admission to medical floor.    Refer to the note titled, \"Pharmacy-Admission Medication History\" for further details. Also refer to the discharge summary by Dr. Childers on 04/09/20 for further changes to the patients home medications.    Lorena Milan, PharmD, BCPP  Behavioral Health Pharmacist  [facility]  Emergency Room Ascom: *[phone]  Emergency Room Pharmacist phone: [phone]  "

## 2020-04-09 NOTE — DISCHARGE SUMMARY
[facility], [place]  Hospitalist Discharge Summary      Date of Admission:  4/8/2020  Date of Discharge:  4/9/2020 12:13 PM  Discharging Provider: Ralph Childers MD      Discharge Diagnoses      COVID rule out (cough, sob)  Alcohol dependency  Alcohol withdrawal.     Follow-ups Needed After Discharge   Follow-up Appointments     Follow Up and recommended labs and tests      Per inpatient psychiatry.             Discharge Disposition   Discharged to Detox unit.   Condition at discharge: Stable      Hospital Course     Aliyah Angeles is a 44 year old female with history of HTN, alcohol use disorder, depression, who presents to the ER for eval for 2 days of cough with chest tightness and sob.        # Cough, dry  # SOB- without e/o hypoxemia. In ED: Mid to high 90s in pulse ox on RA.   # COVID-19 rule out.   Exposure to daughter who is RN. No travel history.   Hemodynamics stable.   Influenza, RSV PCR neg.   COVID -19 swap PCR- returned negative.   Respi virus panel PCR: negative.   EKG, trop. bnp neg. Wbc: 8.3.   CXR: no infiltrate. Well-circumscribed dense subcentimeter pulmonary nodule is noted in the right lower lung field, consistent with probable granuloma.     Supportive treatment: acetaminophen prn. Robitussin DM prn. Cepacol lozenges prn. Albuterol inhaler prn. Cetrizine daily.   Monitor vitals. Pulse ox.   IS  Oxygen prn.     Doing better.       # Alcohol dependency  # Alcohol withdrawal  No hx of seizure. DTs. Mssa: 10  S/p banana bag in ED  - Monitor using MSSA , diazepam protocol.   - MVI, thiamine, folic acid daily.   - Sw, CD consult   - Fall precautions.       # HTN: hold pta hydrochlorothiazide. Ct lisinopril with holding parameters.   - Hydralazine prn for sbp>160.   - 4/9/2020: resume lisinopril. Ct to hold hydrochlorothiazide.   - Monitor BP     # Depression, anxiety, insomnia:   - Resume PTA meds.       Further per detox unit provider.     Consultations This Hospital  Stay   MEDICATION HISTORY IP PHARMACY CONSULT  SOCIAL WORK IP CONSULT  CHEMICAL DEPENDENCY IP CONSULT    Code Status   Full Code    Time Spent on this Encounter   I, Ralph Childers MD, personally saw the patient today and spent greater than 30 minutes discharging this patient.       Ralph Childers MD  [facility] [place]  ______________________________________________________________________    Physical Exam   Vital Signs: Temp: 97.3  F (36.3  C) Temp src: Oral BP: 115/79 Pulse: 65 Heart Rate: 90 Resp: 16 SpO2: 99 % O2 Device: None (Room air)    Weight: 165 lbs 0 oz      General: alert, interactive, NAD  HEENT: AT/NC, Anicteric, Moist MM  Neck: Supple.   Respi/Chest: Non labored  CVS/Heart: S1S2 regular,   GI/Abd: Soft, non tender, non distended  MSK/Extremities: Distally warm, well perfused.     Neuro: AO x 4, CNs intact.   Psychiatry: Stable mood.   Skin: no rash on exposed areas.            Primary Care Physician   Iman Forbes    Discharge Orders      Reason for your hospital stay    Cough, sob. covid r/o: negative.   Alcohol withdrawal.     Follow Up and recommended labs and tests    Per inpatient psychiatry.     Activity - Up ad reina     Encourage PO fluids     Fall precautions     Advance Diet as Tolerated    Follow this diet upon discharge: Orders Placed This Encounter      Combination Diet Regular Diet Adult       Significant Results and Procedures   Most Recent 3 CBC's:  Recent Labs   Lab Test 04/08/20  1043 02/08/20  0708 02/06/20  1203   WBC 8.3 5.8 15.2*   HGB 12.2 10.7* 11.9   MCV 86 101* 95    216 297     Most Recent 3 BMP's:  Recent Labs   Lab Test 04/08/20  1043 02/08/20  0708 02/07/20  0705   * 142 134   POTASSIUM 3.9 4.2 4.2   CHLORIDE 99 110* 101   CO2 24 30 26   BUN 9 5* 6*   CR 0.59 0.51* 0.57   ANIONGAP 9 2* 7   STEFFANY 8.0* 8.4* 8.1*   GLC 93 91 93     Most Recent 2 LFT's:  Recent Labs   Lab Test 04/08/20  1043 02/06/20  1203   AST 21 45   ALT 21  36   ALKPHOS 88 99   BILITOTAL 0.3 0.6   ,   Results for orders placed or performed during the hospital encounter of 04/08/20   XR Chest Port 1 View    Narrative    Portable chest    INDICATION: Cough, shortness of breath    COMPARISON: None    FINDINGS: Heart size and shape appear normal.  Well-circumscribed dense subcentimeter pulmonary nodule is noted in  the right lower lung field, consistent with probable granuloma. No  infiltrates or effusions. Bony structures appear intact.      Impression    IMPRESSION: Granulomatous disease.    ANGÉLICA ABDALLA MD       Discharge Medications   Discharge Medication List as of 4/9/2020 12:15 PM      START taking these medications    Details   albuterol (PROAIR HFA/PROVENTIL HFA/VENTOLIN HFA) 108 (90 Base) MCG/ACT inhaler Inhale 2 puffs into the lungs every 4 hours as needed for wheezing or shortness of breath / dyspnea, HistoricalPharmacy may dispense brand covered by insurance (Proair, or proventil or ventolin or generic albuterol inhaler)      diazepam (VALIUM) 5 MG tablet Take 1-4 tablets (5-20 mg) by mouth every 30 minutes as needed, Historical      folic acid (FOLVITE) 1 MG tablet Take 1 tablet (1 mg) by mouth daily, Historical      vitamin B1 (THIAMINE) 100 MG tablet Take 1 tablet (100 mg) by mouth daily, Historical         CONTINUE these medications which have CHANGED    Details   acetaminophen (TYLENOL) 325 MG tablet Take 2 tablets (650 mg) by mouth every 4 hours as needed for mild pain, Historical         CONTINUE these medications which have NOT CHANGED    Details   calcium carbonate 600 mg-vitamin D 400 units (CALTRATE) 600-400 MG-UNIT per tablet Take 1 tablet by mouth daily, Disp-30 tablet, R-0, E-Prescribe      escitalopram (LEXAPRO) 20 MG tablet Take 1 tablet (20 mg) by mouth daily, Disp-30 tablet, R-0, E-Prescribe      gabapentin (NEURONTIN) 100 MG capsule Take 200 mg by mouth 3 times daily, Historical      hydrOXYzine (ATARAX) 25 MG tablet Take 1-2 tablets  "(25-50 mg) by mouth every 4 hours as needed for anxiety, Disp-30 tablet, R-0, E-Prescribe      lisinopril (ZESTRIL) 20 MG tablet Take 20 mg by mouth daily, Historical      multivitamin w/minerals (THERA-VIT-M) tablet Take 1 tablet by mouth daily, Disp-30 tablet, R-1, E-Prescribe      nicotine (NICORETTE) 4 MG lozenge Place 1-2 lozenges (4-8 mg) inside cheek every hour as needed for smoking cessation, Disp-30 lozenge, R-1, E-Prescribe      QUEtiapine (SEROQUEL) 25 MG tablet Take 25-50 mg by mouth nightly as needed (insomnia), Historical      Needle, Disp, (B-D DISP NEEDLE) 25G X 1\" MISC 1 each every 30 days, Disp-12 each, R-0, E-Prescribe      omega 3 1000 MG CAPS Take 1 g by mouth daily , Historical      traZODone (DESYREL) 50 MG tablet Take 1 tablet (50 mg) by mouth nightly as needed for sleep, Disp-30 tablet, R-0, E-Prescribe         STOP taking these medications       hydrochlorothiazide (HYDRODIURIL) 25 MG tablet Comments:   Reason for Stopping:         naltrexone (DEPADE/REVIA) 50 MG tablet Comments:   Reason for Stopping:             Allergies   Allergies   Allergen Reactions     Nsaids      Gastric bypass     "

## 2020-04-10 VITALS
HEART RATE: 77 BPM | OXYGEN SATURATION: 100 % | RESPIRATION RATE: 16 BRPM | DIASTOLIC BLOOD PRESSURE: 84 MMHG | HEIGHT: 63 IN | SYSTOLIC BLOOD PRESSURE: 124 MMHG | WEIGHT: 156 LBS | TEMPERATURE: 97.2 F | BODY MASS INDEX: 27.64 KG/M2

## 2020-04-10 LAB
ANION GAP SERPL CALCULATED.3IONS-SCNC: 3 MMOL/L (ref 3–14)
BUN SERPL-MCNC: 10 MG/DL (ref 7–30)
CALCIUM SERPL-MCNC: 8.8 MG/DL (ref 8.5–10.1)
CHLORIDE SERPL-SCNC: 110 MMOL/L (ref 94–109)
CO2 SERPL-SCNC: 28 MMOL/L (ref 20–32)
CREAT SERPL-MCNC: 0.5 MG/DL (ref 0.52–1.04)
GFR SERPL CREATININE-BSD FRML MDRD: >90 ML/MIN/{1.73_M2}
GLUCOSE SERPL-MCNC: 92 MG/DL (ref 70–99)
POTASSIUM SERPL-SCNC: 4.6 MMOL/L (ref 3.4–5.3)
SODIUM SERPL-SCNC: 141 MMOL/L (ref 133–144)

## 2020-04-10 PROCEDURE — 25000132 ZZH RX MED GY IP 250 OP 250 PS 637: Performed by: PSYCHIATRY & NEUROLOGY

## 2020-04-10 PROCEDURE — 36415 COLL VENOUS BLD VENIPUNCTURE: CPT | Performed by: PHYSICIAN ASSISTANT

## 2020-04-10 PROCEDURE — 80048 BASIC METABOLIC PNL TOTAL CA: CPT | Performed by: PHYSICIAN ASSISTANT

## 2020-04-10 PROCEDURE — 99239 HOSP IP/OBS DSCHRG MGMT >30: CPT | Mod: 95 | Performed by: PSYCHIATRY & NEUROLOGY

## 2020-04-10 RX ADMIN — GABAPENTIN 200 MG: 100 CAPSULE ORAL at 08:17

## 2020-04-10 RX ADMIN — GABAPENTIN 200 MG: 100 CAPSULE ORAL at 13:58

## 2020-04-10 RX ADMIN — CALCIUM CARBONATE 600 MG (1,500 MG)-VITAMIN D3 400 UNIT TABLET 1 TABLET: at 08:18

## 2020-04-10 RX ADMIN — THIAMINE HCL TAB 100 MG 100 MG: 100 TAB at 08:18

## 2020-04-10 RX ADMIN — HYDROXYZINE HYDROCHLORIDE 25 MG: 25 TABLET, FILM COATED ORAL at 08:18

## 2020-04-10 RX ADMIN — LISINOPRIL 20 MG: 20 TABLET ORAL at 08:17

## 2020-04-10 RX ADMIN — FOLIC ACID 1 MG: 1 TABLET ORAL at 08:17

## 2020-04-10 RX ADMIN — MULTIPLE VITAMINS W/ MINERALS TAB 1 TABLET: TAB at 08:17

## 2020-04-10 RX ADMIN — ESCITALOPRAM OXALATE 20 MG: 20 TABLET ORAL at 08:17

## 2020-04-10 ASSESSMENT — ACTIVITIES OF DAILY LIVING (ADL)
ORAL_HYGIENE: INDEPENDENT
DRESS: STREET CLOTHES;INDEPENDENT
HYGIENE/GROOMING: HANDWASHING;INDEPENDENT

## 2020-04-10 NOTE — DISCHARGE SUMMARY
Admit Date:     04/09/2020   Discharge Date:    4/10/2020     More than 35 minutes spent on this discharge summary, doing the discharge instructions, discharge medications and mental status examination.  The patient was seen via telemedicine.   The patient's condition can be safely assessed and treated by a synchronous audiovisual telemedicine encounter.   Reason for visit by telemedicine is COVID-19.  Originating site is [address]  Distant provider site is remote setting.  Consent:  The patient has verbally consented to the potential risks versus benefits of telemedicine video visit versus in-person care, bill my insurance, make self-payments for service provided, and responsibility for payment of noncovered services.  Mode of communication is video conference via Skype.  As the provider, I attest to the compliance of applicable laws and regulations related to Telemedicine.   DISCHARGE DIAGNOSES:   Axis I:     1.  Alcohol use disorder, severe.   2.  Alcohol withdrawal, completed.   3.  Major depressive disorder, moderate, recurrent, without psychotic features.      Please see detailed admission by Dr. Kerr on 04/09/2020.      HOSPITAL COURSE:  During the hospitalization, the patient was detoxed off alcohol using MSSA protocol on Valium.  She completed detoxification.  She was seen by Internal Medicine consult.  She came from Medicine and she was seen by Dr. Ralph Childers on 04/08 for a followup.  During the hospitalization, the patient completed detox.  Her energy, motivation, sleep and interest improved.  She did not have any suicidal ideation, plan or intent.  She was seen by Carol Magaña for Internal Medicine consult.  Again, she was asked to continue her medications.  She did not develop any fever.  She was continued on lisinopril and hydrochlorothiazide.  Her energy, motivation, sleep and interest improved.      DISCHARGE DISPOSITION:  The patient going to treatment at [facility].      DISCHARGE  MENTAL STATUS EXAMINATION:  The patient is alert, oriented x 3.  Recent and remote memory, language, and fund of knowledge are all adequate.  No loose associations.  The patient does not have any active suicidal ideation, plan or intent.  The patient is stable to be discharged.                   Labs:     Recent Results (from the past 48 hour(s))   Basic metabolic panel    Collection Time: 04/10/20  6:33 AM   Result Value Ref Range    Sodium 141 133 - 144 mmol/L    Potassium 4.6 3.4 - 5.3 mmol/L    Chloride 110 (H) 94 - 109 mmol/L    Carbon Dioxide 28 20 - 32 mmol/L    Anion Gap 3 3 - 14 mmol/L    Glucose 92 70 - 99 mg/dL    Urea Nitrogen 10 7 - 30 mg/dL    Creatinine 0.50 (L) 0.52 - 1.04 mg/dL    GFR Estimate >90 >60 mL/min/[1.73_m2]    GFR Estimate If Black >90 >60 mL/min/[1.73_m2]    Calcium 8.8 8.5 - 10.1 mg/dL     Because this patient meets criteria for an Alcohol Use Disorder, I performed the following brief intervention on the date of this note:              1) Expressed concern that the patient is drinking at unhealthy levels known to increase their risk of alcohol related problems              2) Gave feedback linking alcohol use and health, including personalized feedback explaining how alcohol use can interact with their medical and/or psychiatric problems, and with prescribed medications.              3) Advised patient to abstain.      DISCHARGE MENTAL STATUS EXAMINATION:  The patient is alert, oriented x3.  Good fund of knowledge.  Good use of language.  Recent and remote memory, language, fund of knowledge are all adequate.  Euthymic mood congruent affect  Speech normal rate/rhythm linear tp no loose asso,The patient does not have any active suicidal or homicidal ideation.  Does not have any auditory or visual hallucination.  Fair insight/judgment At this time, the patient was stable to be discharged.        Pt was not determined to not be a danger to himself or others. At the current time of  discharge, the patient does not meet criteria for involuntary hospitalization. On the day of discharge, the patient reports that they do not have suicidal or homicidal ideation and would never hurt themselves or others. Steps taken to minimize risk include: assessing patient s behavior and thought process daily during hospital stay, discharging patient with adequate plan for follow up for mental and physical health and discussing safety plan of returning to the hospital should the patient ever have thoughts of harming themselves or others. Therefore, based on all available evidence including the factors cited above, the patient does not appear to be at imminent risk for self-harm, and is appropriate for outpatient level of care.     Educated about side effects/risk vs benefits /alternative including non treatment.Pt consented to be on medication.     .Total time spent on discharge summary more than 35 min  More than  20 min  planning, coordination of care, medication reconciliation and performance of physical exam on day of discharge.Care was coordinated with unit RN and unit therapist     Aliyah Angeles   Home Medication Instructions FRIDA:[id number]    Printed on:04/10/20 6867   Medication Information                      albuterol (PROAIR HFA/PROVENTIL HFA/VENTOLIN HFA) 108 (90 Base) MCG/ACT inhaler  Inhale 2 puffs into the lungs every 4 hours as needed for wheezing or shortness of breath / dyspnea             calcium carbonate 600 mg-vitamin D 400 units (CALTRATE) 600-400 MG-UNIT per tablet  Take 1 tablet by mouth daily             escitalopram (LEXAPRO) 20 MG tablet  Take 1 tablet (20 mg) by mouth daily             folic acid (FOLVITE) 1 MG tablet  Take 1 tablet (1 mg) by mouth daily             gabapentin (NEURONTIN) 100 MG capsule  Take 200 mg by mouth 3 times daily             lisinopril (ZESTRIL) 20 MG tablet  Take 20 mg by mouth daily             multivitamin w/minerals (THERA-VIT-M) tablet  Take 1 tablet  "by mouth daily             Needle, Disp, (B-D DISP NEEDLE) 25G X 1\" MISC  1 each every 30 days             nicotine (NICORETTE) 4 MG lozenge  Place 1-2 lozenges (4-8 mg) inside cheek every hour as needed for smoking cessation             omega 3 1000 MG CAPS  Take 1 g by mouth daily              QUEtiapine (SEROQUEL) 25 MG tablet  Take 25-50 mg by mouth nightly as needed (insomnia)             traZODone (DESYREL) 50 MG tablet  Take 1 tablet (50 mg) by mouth nightly as needed for sleep             vitamin B1 (THIAMINE) 100 MG tablet  Take 1 tablet (100 mg) by mouth daily                  CELESTINE CASTRO MD             D: 04/10/2020   T: 04/10/2020   MT:       Name:     MAGALY FJAARDO   MRN:      [id number]        Account:        [id number]   :      1975           Admit Date:     2020                                  Discharge Date:       Document: [id number]       cc: Iman Forbes NP     "

## 2020-04-10 NOTE — PROGRESS NOTES
[facility], [place]   Psychiatric Progress Note  TTelemedicine Visit: The patient's condition can be safely assessed and treated via synchronous audio and visual telemedicine encounter.   Start Time: 10.54  Stop Time: 11.00  Reason for Telemedicine Visit: Covid-19   Originating Site (Patient Location): [facility]  Distant Site (Provider Location): Provider Remote Setting   Consent: The patient/guardian has verbally consented to: the potential risks and benefits of telemedicine (video visit) versus in person care; bill my insurance or make self-payment for services provided; and responsibility for payment of non-covered services.   Mode of Communication: Video Conference via Skype   As the provider I attest to compliance with applicable laws and regulations related to telemedicine.       Pt seen and discharged today

## 2020-04-10 NOTE — PLAN OF CARE
Behavioral Team Discussion: (4/10/2020)    Continued Stay Criteria/Rationale: Patient admitted for alcohol withdrawal, complicated.  Plan: The following services will be provided to the patient; psychiatric assessment, medication management, therapeutic milieu, individual and group support, and skills groups.   Participants: 3A Provider: Dr. Belia Kerr MD; 3A RN's: Gio Hsu RN; 3A CM's: Kailee Davalos LPC [facility]  Summary/Recommendation: Providers will assess today for treatment recommendations, discharge planning, and aftercare plans. CM will meet with pt for discharge planning.   Medical/Physical: Internal medicine consult to be completed 4/10/2020.  Precautions:   Behavioral Orders   Procedures     Code 1 - Restrict to Unit     Routine Programming     As clinically indicated     Status 15     Every 15 minutes.     Withdrawal precautions     Rationale for change in precautions or plan: N/A  Progress: No Change.

## 2020-04-10 NOTE — DISCHARGE INSTRUCTIONS
Behavioral Discharge Planning and Instructions  THANK YOU FOR CHOOSING THE [facility]  3A  [phone]    Summary: You were admitted to Station 3A on 4/10/2020 for detoxification from Alcohol.  A medical exam was performed that included lab work. You have met with a  and opted to pursue IOP treatment at [facility].  Please take care and make your recovery a priority!    Main Diagnosis:  Per  Dr. Kerr  Alcohol use disorder, severe.   Alcohol withdrawal, completed.   Major depressive disorder, moderate, recurrent, without psychotic features.        Major Treatments, Procedures and Findings:  You were detoxed from alcohol. You have met with a  to develop a treatment plan for discharge.  You have had labs drawn and a copy of those labs will be sent home with you. Your alcohol withdrawal is complete and you are being discharged today.  Please bring your lab results with to your follow up doctor appointment.  Make your recovery a priority!                        Symptoms to Report:  If you experience more anxiety, confusion, sleeplessness, deep sadness or thoughts of suicide, notify your treatment team or notify your primary care physician. IF ANY OF THE SYMPTOMS YOU ARE EXPERIENCING ARE A MEDICAL EMERGENCY CALL 911 IMMEDIATELY.     Lifestyle Adjustment: Adjust your lifestyle to get enough sleep, relaxation, exercise and  good nutrition. Continue to develop healthy coping skills to decrease stress and promote a sober living environment. Do not use alcohol, illegal drugs or addictive medications other than what is currently prescribed. AA, NA, and  Sponsor are excellent resources for support.     Disposition: Home, Rule 25 assessment scheduled for 4/14/2020    Rule 25 Assessment:   Appointment date/time: Tuesday, April 14th @ 10:30 AM  Provider/Clinic: KAMLESH Lyon @ [facility]  Phone: [phone] (for  rescheduling or canceling appointment)  You will be called at your preferred number at the time of the appointment by the counselor. This assessment will be completed over the phone.    DISCHARGE RESOURCES:  -SMART Recovery - self management for addiction recovery:  www.smartrecovery.org    -Pathways ~ A Health Crisis Resource & Support Center: 654.632.3229.  -[facility] [phone]   -[facility] Behavioral Intake 372-163-7832 or 497-149-9031.  -Crisis Intervention: 278.642.4452 or 473-103-1734 (TTY: 810.425.5525).  Call anytime.  -Suicide Awareness Voices of Education (SAVE) (www.save.org): 187-087-TXHK (9240)  -National Suicide Prevention Line (www.mentalhealthmn.org): 012-332-PXVY (6180)  -National [place] on Mental Illness (www.[place].yessi.org): 355.628.9288 or 333-485-4357.  -Heuz7yqtm: text the word LIFE to 04860 for immediate support and crisis intervention  -Mental Health Consumer/Survivor Network of [place] (www.mhcsn.net): 191.174.7407 or 549-284-1029  -Mental Health Association of [place] (www.mentalhealth.org): 876.932.9090 or 361-758-8340     -Substance Abuse and Mental Health Services (www.samhsa.gov)  -Harm Reduction Coalition (www. Harmreduction.org)  -www.prescribetoprevent.org or http://prescribetoprevent.org/video  -Poison control 3-364-716-9079     Sober Support Group Information:  AA/NA & Sponsor/Support  -Alcoholics Anonymous (www.alcoholics-anonymous.org): for local information 24 hours/day  -AA Intergroup service office in [place] (http://www.aastpaul.org/) 132.967.3055  -AA Intergroup service office in [facility]: [phone]. (http://www.a[place].org/)  -Narcotics Anonymous (www.naminn[place].org) (443) 710-8456   **Sober Fun Activities: www.soberTok3nactivities.VirtualScopics.doxo/[facility]//[place]    [place] Recovery Connection (MRC)  [facility] connects people seeking recovery to resources that help foster and sustain long-term recovery.  Whether you are seeking  resources for treatment, transportation, housing, job training, education, health care or other pathways to recovery, [facility] is a great place to start.  586.630.1109.  Www.[facility] Cent[facility].org    General Medication Instructions:   See your medication sheet(s) for instructions.   Take all medicines as directed.  Make no changes unless your doctor suggests them.   Go to all your doctor visits.  Be sure to have all your required lab tests. This way, your medicines can be refilled on time.  Do not use any drugs not prescribed by your provider.  AA/NA and Sponsors are excellent resources for support  Avoid alcohol.    Please Note:  If you have any questions at anytime after you are discharged please call the [facility] [place] detox unit 3AW unit at [phone].    [facility], Behavioral Intake [phone]    Please take this discharge folder with you to all your follow up appointments, it contains your lab results, diagnosis, medication list and discharge recommendations.      THANK YOU FOR CHOOSING THE [facility]

## 2020-04-10 NOTE — PROGRESS NOTES
Patient discharged to home via ambulatory at 1710. Discharge instructions reviewed with patient. Patient verbalized understanding of instructions. Patient denies SI, SIB, HI, or hallucinations upon discharge. Patient identified supports and relapse prevention coping skills. No additional concerns noted.

## 2020-04-10 NOTE — PROGRESS NOTES
"Case Management Note  4/10/2020    Checked in with pt regarding discharge planning. Pt is not sure what she wants to do. Pt reports she was in [facility] in Feb, left AMA as she reports no one told her it was a 90 day program. Pt reports she's been staying with her mom in [facility]. Pt reports she would ideally like to return to living with her  and kids in [place], [place], maybe attend IOP out there. Pt reports she has not been living with them since before she attended [place] as he \"changed the locks\" because she was using Adderall. Pt reports thinking it will help her to not use if she is with her , kids, animals. Writer shared observation that these things had been unable to stop pt from using before, which is what led her to being kicked out. Pt reports she knows now \"what I've been missing\" and wants to be with them. Writer validated pt wanting to try to just go back to normal, but cautioned it's impossible to just hit a rewind button. Encouraged pt to talk with her spouse to see if this would be possible before pursuing IOP options in [place]. Will check in with pt later this AM.     Addendum: Checked in with pt again late AM, pt reports she spoke with her  and will return to living with him in [place]. Pt wants to do IOP in this area. Pt and writer looked up programs, pt prefers to go to [facility] in [place] (contact info below). Pt needs Rule 25, was scheduled for one via [facility] for Tuesday 4/14 at 10:30 AM. Signed release for [facility] and this was sent via secure email to . HRASHA updated. CM complete.     Kailee Davalos, JESUS, LADC          "

## 2020-04-10 NOTE — PROGRESS NOTES
Brief Medicine Note    Pt transferred from medical floor. Please refer to admission H&P while on medicine dated 4/8 and discharge summary dated 4/9, of which this writer reviewed and is up to date. Please call the on-call PA-C for any f/u medical issues if they arise. Diagnoses:      Cough  Shortness of breath without hypoxemia  COVID-19 rule out  Granulomatous disease on CXR:  Presented to ED with 2 day history of cough. COVID-19 negative. RSV PCR negative. Influenza negative. CXR without infiltrate. Well-circumscribed dense subcentimeter pulmonary nodule is noted in the right lower lung field, consistent with probable granuloma. No hypoxia. No leukocytosis. BNP and Troponin negative. ECG w/ NSR. Improved on supportive measures.  - Continue tylenol prn  - Continue Robitussin DM prn  - Continue Cepacol lozenges prn  - Continue Albuterol inhaler prn  - Continue Cetrizine daily   - Routine VS and Pulse Ox  - Please call medicine if any worsening cough, fever >100.4F, chest pain, SOB or hypoxia   - Patient is to follow up with PCP within 7 days for hospital follow-up.     Alcohol dependency  Alcohol withdrawal:  - Management per psychiatry    HTN:  On PTA Lisinopril 20mg daily and hydrochlorothiazide. hydrochlorothiazide has been on hold this admission. BP elevated on 4/9 to 160/120 but resolved to ~130/90 without intervention. Asymptomatic. Today /88.  - Continue PTA Lisinopril 20mg daily  - Hold PTA hydrochlorothiazide  - Hydralazine 12.5mg TID prn for SBP >175 or DBP >110   - Patient is to follow up with PCP within 7 days for hospital follow-up of BP    Depression  Anxiety   Insomnia   - Management per psychiatry    Hyponatremia, resolved:   Na on 4/8 epi630. Likely due to hypovolemia and likely to improve with withdrawal and oral intake. Repeat BMP with normalized Na to 141.     Medicine will continue to monitor BP peripherally, please feel free to call with any additional questions or concerns.       Carol  UMESH Magaña  Hospitalist Service  Pager [phone]

## 2020-04-10 NOTE — PROGRESS NOTES
Patient has not scored greater than an 8 on MSSA withdrawal monitoring for alcohol withdrawal in over 24 hours and patient has not required any valium for over 24 hours. Patient is removed from active withdrawal monitoring at this time.

## 2020-04-10 NOTE — DISCHARGE SUMMARY
Admit Date:     04/09/2020         The patient was seen by telemedicine visit.  The patient's condition can be safely assessed and treated via synchronous audiovisual telemedicine and consult.  Start time 10:54.  Stop time 11:00.        REASON FOR TELEMEDICINE:  COVID-19.        ORIGINATING SITE:  [address] [place] [address].       DISTANT SITE:  Provider remote setting.        CONSENT:  The patient has verbally consented to the potential risks and benefits of telemedicine video visit versus in person care, bill my insurance, make payments of services provided, responsibility of payment of noncovered services.      MODE OF COMMUNICATION:  Video conferencing via [employer].       As a provider, I attest to applicable laws and regulations related to telemedicine  More than 35 minutes spent on discharge summary, doing the discharge instructions, discharge medication, discharge mental status examination.  Please review the detailed admission note by Dr. Kerr.      IDENTIFYING INFORMATION:  The patient is a 44-year-old  female.  Please see detailed admission note by Dr. Kerr on 04/09/2020.      HOSPITAL COURSE:  During the hospitalization, the patient was detoxed off alcohol using MSSA protocol on Valium.  The patient was continued on Lexapro for depression.  She is status post gastric bypass.  She had received a B12 shot.last one was over 2 months ago.  She was seen by Dr. Childers.  She was seen by Carol Magaña for a followup.  COVID was ruled out.  She has shortness of breath without hypoxemia.  She met with the .  During her hospitalization, the patient was detoxed off alcohol using MSSA protocol.  She is anticipated to be out of detox.  Her energy, motivation, sleep and interest improved.  She met with the  and the plan is to do outpatient treatment.  Ideally, I would like for her to do some form of formal treatment; however, the patient is not interested.  She wants to go back home and stay with  her .  She is able to go back to live with him.      DISCHARGE DISPOSITION:  The patient going home.      DISCHARGE MENTAL STATUS EXAMINATION:  The patient is alert, oriented x 3.  Recent and remote memory, language and fund of knowledge were all adequate.  No loose associations.  The patient does not have any active suicidal or homicidal ideation, plan or intent.  The patient will continue the present medications which include albuterol 108 mg, calcium carbonate, Lexapro 20 mg, folic acid, gabapentin 200 mg 3 times a day, lisinopril 25 mg, multivitamin, nicotine lozenges and Seroquel.                   Labs:   No results found for this or any previous visit (from the past 48 hour(s)).  Because this patient meets criteria for an Alcohol Use Disorder, I performed the following brief intervention on the date of this note:              1) Expressed concern that the patient is drinking at unhealthy levels known to increase their risk of alcohol related problems              2) Gave feedback linking alcohol use and health, including personalized feedback explaining how alcohol use can interact with their medical and/or psychiatric problems, and with prescribed medications.              3) Advised patient to abstain.      DISCHARGE MENTAL STATUS EXAMINATION:  The patient is alert, oriented x3.  Good fund of knowledge.  Good use of language.  Recent and remote memory, language, fund of knowledge are all adequate.  Euthymic mood congruent affect  Speech normal rate/rhythm linear tp no loose asso,The patient does not have any active suicidal or homicidal ideation.  Does not have any auditory or visual hallucination.  Fair insight/judgment At this time, the patient was stable to be discharged.        Pt was not determined to not be a danger to himself or others. At the current time of discharge, the patient does not meet criteria for involuntary hospitalization. On the day of discharge, the patient reports that they do  "not have suicidal or homicidal ideation and would never hurt themselves or others. Steps taken to minimize risk include: assessing patient s behavior and thought process daily during hospital stay, discharging patient with adequate plan for follow up for mental and physical health and discussing safety plan of returning to the hospital should the patient ever have thoughts of harming themselves or others. Therefore, based on all available evidence including the factors cited above, the patient does not appear to be at imminent risk for self-harm, and is appropriate for outpatient level of care.     Educated about side effects/risk vs benefits /alternative including non treatment.Pt consented to be on medication.     .Total time spent on discharge summary more than 35 min  More than  20 min  planning, coordination of care, medication reconciliation and performance of physical exam on day of discharge.Care was coordinated with unit RN and unit therapist    .   Aliyah Angeles   Home Medication Instructions FRIDA:[id number]    Printed on:04/12/20 1012   Medication Information                      albuterol (PROAIR HFA/PROVENTIL HFA/VENTOLIN HFA) 108 (90 Base) MCG/ACT inhaler  Inhale 2 puffs into the lungs every 4 hours as needed for wheezing or shortness of breath / dyspnea             calcium carbonate 600 mg-vitamin D 400 units (CALTRATE) 600-400 MG-UNIT per tablet  Take 1 tablet by mouth daily             escitalopram (LEXAPRO) 20 MG tablet  Take 1 tablet (20 mg) by mouth daily             folic acid (FOLVITE) 1 MG tablet  Take 1 tablet (1 mg) by mouth daily             gabapentin (NEURONTIN) 100 MG capsule  Take 200 mg by mouth 3 times daily             lisinopril (ZESTRIL) 20 MG tablet  Take 20 mg by mouth daily             multivitamin w/minerals (THERA-VIT-M) tablet  Take 1 tablet by mouth daily             Needle, Disp, (B-D DISP NEEDLE) 25G X 1\" MISC  1 each every 30 days             nicotine (NICORETTE) 4 MG " lozenge  Place 1-2 lozenges (4-8 mg) inside cheek every hour as needed for smoking cessation             omega 3 1000 MG CAPS  Take 1 g by mouth daily              QUEtiapine (SEROQUEL) 25 MG tablet  Take 25-50 mg by mouth nightly as needed (insomnia)             traZODone (DESYREL) 50 MG tablet  Take 1 tablet (50 mg) by mouth nightly as needed for sleep             vitamin B1 (THIAMINE) 100 MG tablet  Take 1 tablet (100 mg) by mouth daily             2020     Rule 25 Assessment:   Appointment date/time:  @ 10:30 AM  Provider/Clinic: KAMLESH Lyon @ [facility]  Phone: [phone] (for rescheduling or canceling appointment)  You will be called at your preferred number at the time of the appointment by the counselor. This assessment will be completed over the phone.     CELESTINE CASTRO MD             D: 04/10/2020   T: 04/10/2020   MT: [id number]      Name:     MAGALY FAJARDO   MRN:      [id number]        Account:        [id number]   :      1975           Admit Date:     2020                                  Discharge Date:       Document: [id number]

## 2020-04-14 ENCOUNTER — HOSPITAL ENCOUNTER (OUTPATIENT)
Dept: BEHAVIORAL HEALTH | Facility: CLINIC | Age: 45
Discharge: HOME | End: 2020-04-14
Attending: FAMILY MEDICINE | Admitting: FAMILY MEDICINE
Payer: COMMERCIAL

## 2020-04-14 VITALS — HEIGHT: 63 IN | WEIGHT: 165 LBS | BODY MASS INDEX: 29.23 KG/M2

## 2020-04-14 PROCEDURE — H0001 ALCOHOL AND/OR DRUG ASSESS: HCPCS | Mod: HF,95

## 2020-04-14 ASSESSMENT — ANXIETY QUESTIONNAIRES
7. FEELING AFRAID AS IF SOMETHING AWFUL MIGHT HAPPEN: MORE THAN HALF THE DAYS
6. BECOMING EASILY ANNOYED OR IRRITABLE: NEARLY EVERY DAY
4. TROUBLE RELAXING: NEARLY EVERY DAY
2. NOT BEING ABLE TO STOP OR CONTROL WORRYING: NEARLY EVERY DAY
GAD7 TOTAL SCORE: 20
5. BEING SO RESTLESS THAT IT IS HARD TO SIT STILL: NEARLY EVERY DAY
IF YOU CHECKED OFF ANY PROBLEMS ON THIS QUESTIONNAIRE, HOW DIFFICULT HAVE THESE PROBLEMS MADE IT FOR YOU TO DO YOUR WORK, TAKE CARE OF THINGS AT HOME, OR GET ALONG WITH OTHER PEOPLE: VERY DIFFICULT
3. WORRYING TOO MUCH ABOUT DIFFERENT THINGS: NEARLY EVERY DAY
1. FEELING NERVOUS, ANXIOUS, OR ON EDGE: NEARLY EVERY DAY

## 2020-04-14 ASSESSMENT — PAIN SCALES - GENERAL: PAINLEVEL: SEVERE PAIN (6)

## 2020-04-14 ASSESSMENT — PATIENT HEALTH QUESTIONNAIRE - PHQ9: SUM OF ALL RESPONSES TO PHQ QUESTIONS 1-9: 19

## 2020-04-14 ASSESSMENT — MIFFLIN-ST. JEOR: SCORE: 1362.57

## 2020-04-14 NOTE — PROGRESS NOTES
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   Sibling(s)/Cousin(s) Always supportive   Child(lyric) Always supportive   Other relative(s) Always supportive   Friend(s)/neighbor(s) The patient doesn't have any other current friends.   Child(lyric) s father(s)/mother(s) Always supportive   Support group member(s) Always supportive   Community of sandy members Usually supportive   /counselor/therapist/healer Always supportive   Other (specify) No     8A. What is your current living situation?     Patient is living with her family and adult children.    8B. What is your long term plan for where you will be living?     NA    8C. Tell me about your living environment/neighborhood? Ask enough follow up questions to determine safety, criminal activity, availability of alcohol and drugs, supportive or antagonistic to the person making changes.      No concerns.    9. Criminal justice history: Gather current/recent history and any significant history related to substance use--Arrests? Convictions? Circumstances? Alcohol or drug involvement? Sentences? Still on probation or parole? Expectations of the court? Current court order? Any sex offenses - lifetime? What level?  (DSM)    None    10. What obstacles exist to participating in treatment? (Time off work, childcare, funding, transportation, pending long-term time, living situation)     The patient denied having any obstacles for participating in substance abuse treatment.    Dimension VI Ratings   Recovery environment - The placing authority must use the criteria in Dimension VI to determine a client s recovery environment.   RISK DESCRIPTIONS - Severity ratin Client is engaged in structured, meaningful activity, but peers, family, significant other, and living environment are unsupportive, or there is criminal justice involvement by the client or among the client's peers, significant others, or in the client's living environment.    REASONS SEVERITY WAS ASSIGNED - (What support does the person have for making changes? What structure/stability does the person have in his or her daily life that will increase the likelihood that changes can be sustained? What problems exist in the person s environment that will jeopardize getting/staying clean and sober?)     Patient resides with home with his family, and unemployed. Pt is currently involved in support groups, has a sponsor. She denies any legal issues at this time.         Client Choice/Exceptions   Would you like services specific to language, age, gender, culture, Druze preference, race, ethnicity, sexual orientation or disability?  No    What particular treatment choices and options would you like to have? IOP/OP.    Do you have a preference for a particular treatment program? [facility] in [facility]    Criteria for Diagnosis     Criteria for Diagnosis  DSM-5 Criteria for Substance Use Disorder  Instructions: Determine whether the client currently meets the criteria for Substance Use Disorder using the diagnostic criteria in the DSM-V pp.481-58. Current means during the most recent 12 months outside a facility that controls access to  substances    Category of Substance Severity (ICD-10 Code / DSM 5 Code)     Alcohol Use Disorder Severe  (10.20) (303.90)   Cannabis Use Disorder The patient does not meet the criteria for a Cannabis use disorder.   Hallucinogen Use Disorder The patient does not meet the criteria for a Hallucinogen use disorder.   Inhalant Use Disorder The patient does not meet the criteria for an Inhalant use disorder.   Opioid Use Disorder The patient does not meet the criteria for an Opioid use disorder.   Sedative, Hypnotic, or Anxiolytic Use Disorder The patient does not meet the criteria for a Sedative/Hypnotic use disorder.   Stimulant Related Disorder The patient does not meet the criteria for a Stimulant use disorder.   Tobacco Use Disorder Severe   (F17.200) (305.1)    Other (or unknown) Substance Use Disorder The patient does not meet the criteria for a Other (or unknown) Substance use disorder.       Collateral Contact Summary   Number of contacts made: NA    Contact with referring person:  No    If court related records were reviewed, summarize here: No court records had been reviewed at the time of this documentation.    Information from collateral contacts supported/largely agreed with information from the client and associated risk ratings.      Rule 25 Assessment Summary and Plan   's Recommendation    Attend [facility] in [facility]      Collateral Contacts     Name:    NA   Relationship:    NA   Phone Number:    NA Releases:    No     NA      Collateral Contacts     Name:    NA   Relationship:    NA   Phone Number:    NA   Releases:    No     NA    ollateral Contacts      A problematic pattern of alcohol/drug use leading to clinically significant impairment or distress, as manifested by at least two of the following, occurring within a 12-month period:    1.) Alcohol/drug is often taken in larger amounts or over a longer period than was intended.  2.) There is a persistent  desire or unsuccessful efforts to cut down or control alcohol/drug use  3.) A great deal of time is spent in activities necessary to obtain alcohol, use alcohol, or recover from its effects.  4.) Craving, or a strong desire or urge to use alcohol/drug  5.) Recurrent alcohol/drug use resulting in a failure to fulfill major role obligations at work, school or home.  6.) Continued alcohol use despite having persistent or recurrent social or interpersonal problems caused or exacerbated by the effects of alcohol/drug.  8.) Recurrent alcohol/drug use in situations in which it is physically hazardous.  9.) Alcohol/drug use is continued despite knowledge of having a persistent or recurrent physical or psychological problem that is likely to have been caused or exacerbated by alcohol.  10.) Tolerance, as defined by either of the following: A need for markedly increased amounts of alcohol/drug to achieve intoxication or desired effect.      Specify if: In early remission:  After full criteria for alcohol/drug use disorder were previously met, none of the criteria for alcohol/drug use disorder have been met for at least 3 months but for less than 12 months (with the exception that Criterion A4,  Craving or a strong desire or urge to use alcohol/drug  may be met).     In sustained remission:   After full criteria for alcohol use disorder were previously met, non of the criteria for alcohol/drug use disorder have been met at any time during a period of 12 months or longer (with the exception that Criterion A4,  Craving or strong desire or urge to use alcohol/drug  may be met).   Specify if:   This additional specifier is used if the individual is in an environment where access to alcohol is restricted.    Mild: Presence of 2-3 symptoms  Moderate: Presence of 4-5 symptoms  Severe: Presence of 6 or more symptoms
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
## 2020-04-15 ASSESSMENT — ANXIETY QUESTIONNAIRES: GAD7 TOTAL SCORE: 20

## 2020-04-16 NOTE — PROGRESS NOTES
On 4/15/20, Pt has requested that her rule 25 assessment be faxed to [facility] for outpatient treatment.

## 2020-12-14 ENCOUNTER — HEALTH MAINTENANCE LETTER (OUTPATIENT)
Age: 45
End: 2020-12-14

## 2021-04-18 ENCOUNTER — HEALTH MAINTENANCE LETTER (OUTPATIENT)
Age: 46
End: 2021-04-18

## 2021-09-13 ENCOUNTER — APPOINTMENT (OUTPATIENT)
Dept: ULTRASOUND IMAGING | Facility: CLINIC | Age: 46
End: 2021-09-13
Attending: INTERNAL MEDICINE
Payer: COMMERCIAL

## 2021-09-13 ENCOUNTER — HOSPITAL ENCOUNTER (INPATIENT)
Facility: CLINIC | Age: 46
LOS: 8 days | Discharge: PSYCHIATRIC OR SUBSTANCE USE FACILITY | End: 2021-09-21
Attending: INTERNAL MEDICINE | Admitting: INTERNAL MEDICINE
Payer: COMMERCIAL

## 2021-09-13 ENCOUNTER — APPOINTMENT (OUTPATIENT)
Dept: CARDIOLOGY | Facility: CLINIC | Age: 46
End: 2021-09-13
Attending: INTERNAL MEDICINE
Payer: COMMERCIAL

## 2021-09-13 ENCOUNTER — APPOINTMENT (OUTPATIENT)
Dept: GENERAL RADIOLOGY | Facility: CLINIC | Age: 46
End: 2021-09-13
Attending: INTERNAL MEDICINE
Payer: COMMERCIAL

## 2021-09-13 DIAGNOSIS — F41.9 ANXIETY DISORDER, UNSPECIFIED TYPE: ICD-10-CM

## 2021-09-13 DIAGNOSIS — F10.10 ALCOHOL ABUSE: ICD-10-CM

## 2021-09-13 DIAGNOSIS — F33.0 MILD EPISODE OF RECURRENT MAJOR DEPRESSIVE DISORDER (H): ICD-10-CM

## 2021-09-13 DIAGNOSIS — E11.69 TYPE 2 DIABETES MELLITUS WITH OTHER SPECIFIED COMPLICATION, WITHOUT LONG-TERM CURRENT USE OF INSULIN (H): ICD-10-CM

## 2021-09-13 DIAGNOSIS — E55.9 VITAMIN D DEFICIENCY: ICD-10-CM

## 2021-09-13 DIAGNOSIS — J45.909 UNCOMPLICATED ASTHMA, UNSPECIFIED ASTHMA SEVERITY, UNSPECIFIED WHETHER PERSISTENT: ICD-10-CM

## 2021-09-13 DIAGNOSIS — K70.31 ALCOHOLIC CIRRHOSIS OF LIVER WITH ASCITES (H): Primary | ICD-10-CM

## 2021-09-13 DIAGNOSIS — F10.188 ALCOHOL ABUSE WITH OTHER ALCOHOL-INDUCED DISORDER (H): ICD-10-CM

## 2021-09-13 LAB
ALBUMIN SERPL-MCNC: 1.7 G/DL (ref 3.4–5)
ALBUMIN UR-MCNC: 30 MG/DL
ALP SERPL-CCNC: 594 U/L (ref 40–150)
ALT SERPL W P-5'-P-CCNC: 96 U/L (ref 0–50)
AMMONIA PLAS-SCNC: 18 UMOL/L (ref 10–50)
AMPHETAMINES UR QL SCN: ABNORMAL
ANION GAP SERPL CALCULATED.3IONS-SCNC: 19 MMOL/L (ref 3–14)
ANION GAP SERPL CALCULATED.3IONS-SCNC: 8 MMOL/L (ref 3–14)
APPEARANCE UR: ABNORMAL
APTT PPP: 38 SECONDS (ref 22–38)
AST SERPL W P-5'-P-CCNC: 373 U/L (ref 0–45)
BARBITURATES UR QL: ABNORMAL
BASE EXCESS BLDV CALC-SCNC: 1 MMOL/L (ref -7.7–1.9)
BASOPHILS # BLD MANUAL: 0 10E3/UL (ref 0–0.2)
BASOPHILS NFR BLD MANUAL: 0 %
BENZODIAZ UR QL: ABNORMAL
BILIRUB DIRECT SERPL-MCNC: 10 MG/DL (ref 0–0.2)
BILIRUB SERPL-MCNC: 13 MG/DL (ref 0.2–1.3)
BILIRUB UR QL STRIP: ABNORMAL
BUN SERPL-MCNC: 4 MG/DL (ref 7–30)
BUN SERPL-MCNC: 4 MG/DL (ref 7–30)
C PNEUM DNA SPEC QL NAA+PROBE: NOT DETECTED
CA-I BLD-MCNC: 3.8 MG/DL (ref 4.4–5.2)
CA-I BLD-MCNC: 4.4 MG/DL (ref 4.4–5.2)
CALCIUM SERPL-MCNC: 7.6 MG/DL (ref 8.5–10.1)
CALCIUM SERPL-MCNC: 8.2 MG/DL (ref 8.5–10.1)
CANNABINOIDS UR QL SCN: ABNORMAL
CHLORIDE BLD-SCNC: 86 MMOL/L (ref 94–109)
CHLORIDE BLD-SCNC: 93 MMOL/L (ref 94–109)
CK SERPL-CCNC: 33 U/L (ref 30–225)
CO2 SERPL-SCNC: 18 MMOL/L (ref 20–32)
CO2 SERPL-SCNC: 26 MMOL/L (ref 20–32)
COCAINE UR QL: ABNORMAL
COLOR UR AUTO: ABNORMAL
CREAT SERPL-MCNC: 0.46 MG/DL (ref 0.52–1.04)
CREAT SERPL-MCNC: 0.47 MG/DL (ref 0.52–1.04)
EOSINOPHIL # BLD MANUAL: 0 10E3/UL (ref 0–0.7)
EOSINOPHIL NFR BLD MANUAL: 0 %
ERYTHROCYTE [DISTWIDTH] IN BLOOD BY AUTOMATED COUNT: 18.4 % (ref 10–15)
ERYTHROCYTE [DISTWIDTH] IN BLOOD BY AUTOMATED COUNT: 18.5 % (ref 10–15)
ETHANOL UR QL SCN: ABNORMAL
FERRITIN SERPL-MCNC: 1839 NG/ML (ref 8–252)
FIBRINOGEN PPP-MCNC: 377 MG/DL (ref 170–490)
FLUAV H1 2009 PAND RNA SPEC QL NAA+PROBE: NOT DETECTED
FLUAV H1 RNA SPEC QL NAA+PROBE: NOT DETECTED
FLUAV H3 RNA SPEC QL NAA+PROBE: NOT DETECTED
FLUAV RNA SPEC QL NAA+PROBE: NOT DETECTED
FLUBV RNA SPEC QL NAA+PROBE: NOT DETECTED
GFR SERPL CREATININE-BSD FRML MDRD: >90 ML/MIN/1.73M2
GFR SERPL CREATININE-BSD FRML MDRD: >90 ML/MIN/1.73M2
GLUCOSE BLD-MCNC: 181 MG/DL (ref 70–99)
GLUCOSE BLD-MCNC: 239 MG/DL (ref 70–99)
GLUCOSE BLDC GLUCOMTR-MCNC: 137 MG/DL (ref 70–99)
GLUCOSE BLDC GLUCOMTR-MCNC: 170 MG/DL (ref 70–99)
GLUCOSE BLDC GLUCOMTR-MCNC: 181 MG/DL (ref 70–99)
GLUCOSE BLDC GLUCOMTR-MCNC: 184 MG/DL (ref 70–99)
GLUCOSE BLDC GLUCOMTR-MCNC: 228 MG/DL (ref 70–99)
GLUCOSE BLDC GLUCOMTR-MCNC: 250 MG/DL (ref 70–99)
GLUCOSE BLDC GLUCOMTR-MCNC: 283 MG/DL (ref 70–99)
GLUCOSE UR STRIP-MCNC: 1000 MG/DL
HADV DNA SPEC QL NAA+PROBE: NOT DETECTED
HAPTOGLOB SERPL-MCNC: 6 MG/DL (ref 32–197)
HBA1C MFR BLD: 9.1 % (ref 0–5.6)
HCO3 BLDV-SCNC: 25 MMOL/L (ref 21–28)
HCOV PNL SPEC NAA+PROBE: NOT DETECTED
HCT VFR BLD AUTO: 24.7 % (ref 35–47)
HCT VFR BLD AUTO: 35.3 % (ref 35–47)
HGB BLD-MCNC: 12.2 G/DL (ref 11.7–15.7)
HGB BLD-MCNC: 8.8 G/DL (ref 11.7–15.7)
HGB UR QL STRIP: NEGATIVE
HMPV RNA SPEC QL NAA+PROBE: NOT DETECTED
HPIV1 RNA SPEC QL NAA+PROBE: NOT DETECTED
HPIV2 RNA SPEC QL NAA+PROBE: NOT DETECTED
HPIV3 RNA SPEC QL NAA+PROBE: NOT DETECTED
HPIV4 RNA SPEC QL NAA+PROBE: NOT DETECTED
HYALINE CASTS: 3 /LPF
INR PPP: 1.35 (ref 0.85–1.15)
KETONES BLD-SCNC: 0.2 MMOL/L (ref 0–0.6)
KETONES UR STRIP-MCNC: NEGATIVE MG/DL
KETONES UR STRIP-MCNC: NEGATIVE MG/DL
LACTATE SERPL-SCNC: 3 MMOL/L (ref 0.7–2)
LACTATE SERPL-SCNC: 4.7 MMOL/L (ref 0.7–2)
LACTATE SERPL-SCNC: 8.5 MMOL/L (ref 0.7–2)
LDH SERPL L TO P-CCNC: 552 U/L (ref 81–234)
LEUKOCYTE ESTERASE UR QL STRIP: NEGATIVE
LIPASE SERPL-CCNC: 138 U/L (ref 73–393)
LVEF ECHO: NORMAL
LYMPHOCYTES # BLD MANUAL: 0.9 10E3/UL (ref 0.8–5.3)
LYMPHOCYTES NFR BLD MANUAL: 11 %
M PNEUMO DNA SPEC QL NAA+PROBE: NOT DETECTED
MAGNESIUM SERPL-MCNC: 1.7 MG/DL (ref 1.6–2.3)
MAGNESIUM SERPL-MCNC: 3 MG/DL (ref 1.6–2.3)
MCH RBC QN AUTO: 36.1 PG (ref 26.5–33)
MCH RBC QN AUTO: 37 PG (ref 26.5–33)
MCHC RBC AUTO-ENTMCNC: 34.6 G/DL (ref 31.5–36.5)
MCHC RBC AUTO-ENTMCNC: 35.6 G/DL (ref 31.5–36.5)
MCV RBC AUTO: 104 FL (ref 78–100)
MCV RBC AUTO: 104 FL (ref 78–100)
MONOCYTES # BLD MANUAL: 0.2 10E3/UL (ref 0–1.3)
MONOCYTES NFR BLD MANUAL: 2 %
MRSA DNA SPEC QL NAA+PROBE: NEGATIVE
NEUTROPHILS # BLD MANUAL: 6.8 10E3/UL (ref 1.6–8.3)
NEUTROPHILS NFR BLD MANUAL: 87 %
NITRATE UR QL: NEGATIVE
NRBC # BLD AUTO: 0.3 10E3/UL
NRBC BLD MANUAL-RTO: 4 %
O2/TOTAL GAS SETTING VFR VENT: 21 %
OPIATES UR QL SCN: ABNORMAL
OSMOLALITY SERPL: 268 MMOL/KG (ref 275–295)
OSMOLALITY UR: 494 MMOL/KG (ref 100–1200)
OXYHGB MFR BLDV: 35 % (ref 70–75)
PATH REPORT.COMMENTS IMP SPEC: NORMAL
PATH REPORT.COMMENTS IMP SPEC: NORMAL
PATH REPORT.FINAL DX SPEC: NORMAL
PATH REPORT.MICROSCOPIC SPEC OTHER STN: NORMAL
PATH REPORT.MICROSCOPIC SPEC OTHER STN: NORMAL
PATH REPORT.RELEVANT HX SPEC: NORMAL
PCO2 BLDV: 34 MM HG (ref 40–50)
PCP UR QL SCN: ABNORMAL
PH BLDV: 7.47 [PH] (ref 7.32–7.43)
PH UR STRIP: 6 [PH] (ref 5–7)
PHOSPHATE SERPL-MCNC: 0.4 MG/DL (ref 2.5–4.5)
PLAT MORPH BLD: ABNORMAL
PLAT MORPH BLD: ABNORMAL
PLATELET # BLD AUTO: 21 10E3/UL (ref 150–450)
PLATELET # BLD AUTO: 28 10E3/UL (ref 150–450)
PO2 BLDV: 23 MM HG (ref 25–47)
POTASSIUM BLD-SCNC: 3.2 MMOL/L (ref 3.4–5.3)
POTASSIUM BLD-SCNC: 3.6 MMOL/L (ref 3.4–5.3)
PROCALCITONIN SERPL-MCNC: 0.87 NG/ML
PROCALCITONIN SERPL-MCNC: 0.9 NG/ML
PROT SERPL-MCNC: 6 G/DL (ref 6.8–8.8)
RBC # BLD AUTO: 2.38 10E6/UL (ref 3.8–5.2)
RBC # BLD AUTO: 3.38 10E6/UL (ref 3.8–5.2)
RBC MORPH BLD: ABNORMAL
RBC MORPH BLD: ABNORMAL
RBC URINE: 1 /HPF
RETICS # AUTO: 0.08 10E6/UL (ref 0.03–0.1)
RETICS/RBC NFR AUTO: 3.4 % (ref 0.5–2)
RSV RNA SPEC QL NAA+PROBE: NOT DETECTED
RSV RNA SPEC QL NAA+PROBE: NOT DETECTED
RV+EV RNA SPEC QL NAA+PROBE: NOT DETECTED
SA TARGET DNA: POSITIVE
SARS-COV-2 RNA RESP QL NAA+PROBE: NEGATIVE
SODIUM SERPL-SCNC: 123 MMOL/L (ref 133–144)
SODIUM SERPL-SCNC: 127 MMOL/L (ref 133–144)
SODIUM UR-SCNC: 6 MMOL/L
SP GR UR STRIP: 1.05 (ref 1–1.03)
SQUAMOUS EPITHELIAL: 8 /HPF
TARGETS BLD QL SMEAR: SLIGHT
TRANSITIONAL EPI: <1 /HPF
TRIGL SERPL-MCNC: NORMAL MG/DL
UROBILINOGEN UR STRIP-MCNC: 2 MG/DL
WBC # BLD AUTO: 7.3 10E3/UL (ref 4–11)
WBC # BLD AUTO: 7.8 10E3/UL (ref 4–11)
WBC CLUMPS #/AREA URNS HPF: PRESENT /HPF
WBC URINE: 7 /HPF

## 2021-09-13 PROCEDURE — 82805 BLOOD GASES W/O2 SATURATION: CPT | Performed by: STUDENT IN AN ORGANIZED HEALTH CARE EDUCATION/TRAINING PROGRAM

## 2021-09-13 PROCEDURE — 84145 PROCALCITONIN (PCT): CPT | Performed by: STUDENT IN AN ORGANIZED HEALTH CARE EDUCATION/TRAINING PROGRAM

## 2021-09-13 PROCEDURE — 99291 CRITICAL CARE FIRST HOUR: CPT | Mod: GC | Performed by: INTERNAL MEDICINE

## 2021-09-13 PROCEDURE — 83010 ASSAY OF HAPTOGLOBIN QUANT: CPT | Performed by: STUDENT IN AN ORGANIZED HEALTH CARE EDUCATION/TRAINING PROGRAM

## 2021-09-13 PROCEDURE — 85610 PROTHROMBIN TIME: CPT | Performed by: STUDENT IN AN ORGANIZED HEALTH CARE EDUCATION/TRAINING PROGRAM

## 2021-09-13 PROCEDURE — 80307 DRUG TEST PRSMV CHEM ANLYZR: CPT | Performed by: STUDENT IN AN ORGANIZED HEALTH CARE EDUCATION/TRAINING PROGRAM

## 2021-09-13 PROCEDURE — 83735 ASSAY OF MAGNESIUM: CPT | Performed by: STUDENT IN AN ORGANIZED HEALTH CARE EDUCATION/TRAINING PROGRAM

## 2021-09-13 PROCEDURE — 250N000009 HC RX 250: Performed by: STUDENT IN AN ORGANIZED HEALTH CARE EDUCATION/TRAINING PROGRAM

## 2021-09-13 PROCEDURE — 250N000013 HC RX MED GY IP 250 OP 250 PS 637: Performed by: INTERNAL MEDICINE

## 2021-09-13 PROCEDURE — 82330 ASSAY OF CALCIUM: CPT | Performed by: STUDENT IN AN ORGANIZED HEALTH CARE EDUCATION/TRAINING PROGRAM

## 2021-09-13 PROCEDURE — 87581 M.PNEUMON DNA AMP PROBE: CPT | Performed by: STUDENT IN AN ORGANIZED HEALTH CARE EDUCATION/TRAINING PROGRAM

## 2021-09-13 PROCEDURE — 85730 THROMBOPLASTIN TIME PARTIAL: CPT | Performed by: STUDENT IN AN ORGANIZED HEALTH CARE EDUCATION/TRAINING PROGRAM

## 2021-09-13 PROCEDURE — 76700 US EXAM ABDOM COMPLETE: CPT | Mod: 26 | Performed by: STUDENT IN AN ORGANIZED HEALTH CARE EDUCATION/TRAINING PROGRAM

## 2021-09-13 PROCEDURE — 85384 FIBRINOGEN ACTIVITY: CPT | Performed by: STUDENT IN AN ORGANIZED HEALTH CARE EDUCATION/TRAINING PROGRAM

## 2021-09-13 PROCEDURE — 84443 ASSAY THYROID STIM HORMONE: CPT | Performed by: STUDENT IN AN ORGANIZED HEALTH CARE EDUCATION/TRAINING PROGRAM

## 2021-09-13 PROCEDURE — 76700 US EXAM ABDOM COMPLETE: CPT

## 2021-09-13 PROCEDURE — 250N000011 HC RX IP 250 OP 636: Performed by: STUDENT IN AN ORGANIZED HEALTH CARE EDUCATION/TRAINING PROGRAM

## 2021-09-13 PROCEDURE — 84478 ASSAY OF TRIGLYCERIDES: CPT | Performed by: STUDENT IN AN ORGANIZED HEALTH CARE EDUCATION/TRAINING PROGRAM

## 2021-09-13 PROCEDURE — 200N000002 HC R&B ICU UMMC

## 2021-09-13 PROCEDURE — 85027 COMPLETE CBC AUTOMATED: CPT | Performed by: STUDENT IN AN ORGANIZED HEALTH CARE EDUCATION/TRAINING PROGRAM

## 2021-09-13 PROCEDURE — 84100 ASSAY OF PHOSPHORUS: CPT | Performed by: STUDENT IN AN ORGANIZED HEALTH CARE EDUCATION/TRAINING PROGRAM

## 2021-09-13 PROCEDURE — 82140 ASSAY OF AMMONIA: CPT | Performed by: STUDENT IN AN ORGANIZED HEALTH CARE EDUCATION/TRAINING PROGRAM

## 2021-09-13 PROCEDURE — 250N000013 HC RX MED GY IP 250 OP 250 PS 637

## 2021-09-13 PROCEDURE — 250N000013 HC RX MED GY IP 250 OP 250 PS 637: Performed by: STUDENT IN AN ORGANIZED HEALTH CARE EDUCATION/TRAINING PROGRAM

## 2021-09-13 PROCEDURE — 83605 ASSAY OF LACTIC ACID: CPT | Performed by: STUDENT IN AN ORGANIZED HEALTH CARE EDUCATION/TRAINING PROGRAM

## 2021-09-13 PROCEDURE — 82010 KETONE BODYS QUAN: CPT | Performed by: STUDENT IN AN ORGANIZED HEALTH CARE EDUCATION/TRAINING PROGRAM

## 2021-09-13 PROCEDURE — 87641 MR-STAPH DNA AMP PROBE: CPT | Performed by: STUDENT IN AN ORGANIZED HEALTH CARE EDUCATION/TRAINING PROGRAM

## 2021-09-13 PROCEDURE — 36415 COLL VENOUS BLD VENIPUNCTURE: CPT | Performed by: STUDENT IN AN ORGANIZED HEALTH CARE EDUCATION/TRAINING PROGRAM

## 2021-09-13 PROCEDURE — 80053 COMPREHEN METABOLIC PANEL: CPT | Performed by: STUDENT IN AN ORGANIZED HEALTH CARE EDUCATION/TRAINING PROGRAM

## 2021-09-13 PROCEDURE — 83690 ASSAY OF LIPASE: CPT | Performed by: STUDENT IN AN ORGANIZED HEALTH CARE EDUCATION/TRAINING PROGRAM

## 2021-09-13 PROCEDURE — 93010 ELECTROCARDIOGRAM REPORT: CPT | Performed by: INTERNAL MEDICINE

## 2021-09-13 PROCEDURE — 999N000208 ECHOCARDIOGRAM COMPLETE

## 2021-09-13 PROCEDURE — 87640 STAPH A DNA AMP PROBE: CPT | Performed by: STUDENT IN AN ORGANIZED HEALTH CARE EDUCATION/TRAINING PROGRAM

## 2021-09-13 PROCEDURE — 81001 URINALYSIS AUTO W/SCOPE: CPT | Performed by: STUDENT IN AN ORGANIZED HEALTH CARE EDUCATION/TRAINING PROGRAM

## 2021-09-13 PROCEDURE — 83615 LACTATE (LD) (LDH) ENZYME: CPT | Performed by: STUDENT IN AN ORGANIZED HEALTH CARE EDUCATION/TRAINING PROGRAM

## 2021-09-13 PROCEDURE — 71045 X-RAY EXAM CHEST 1 VIEW: CPT | Mod: 26 | Performed by: RADIOLOGY

## 2021-09-13 PROCEDURE — 82550 ASSAY OF CK (CPK): CPT | Performed by: INTERNAL MEDICINE

## 2021-09-13 PROCEDURE — 258N000003 HC RX IP 258 OP 636: Performed by: STUDENT IN AN ORGANIZED HEALTH CARE EDUCATION/TRAINING PROGRAM

## 2021-09-13 PROCEDURE — 82728 ASSAY OF FERRITIN: CPT | Performed by: STUDENT IN AN ORGANIZED HEALTH CARE EDUCATION/TRAINING PROGRAM

## 2021-09-13 PROCEDURE — 85060 BLOOD SMEAR INTERPRETATION: CPT | Performed by: PATHOLOGY

## 2021-09-13 PROCEDURE — 250N000012 HC RX MED GY IP 250 OP 636 PS 637: Performed by: STUDENT IN AN ORGANIZED HEALTH CARE EDUCATION/TRAINING PROGRAM

## 2021-09-13 PROCEDURE — 82248 BILIRUBIN DIRECT: CPT | Performed by: STUDENT IN AN ORGANIZED HEALTH CARE EDUCATION/TRAINING PROGRAM

## 2021-09-13 PROCEDURE — 71045 X-RAY EXAM CHEST 1 VIEW: CPT

## 2021-09-13 PROCEDURE — 87040 BLOOD CULTURE FOR BACTERIA: CPT | Performed by: STUDENT IN AN ORGANIZED HEALTH CARE EDUCATION/TRAINING PROGRAM

## 2021-09-13 PROCEDURE — 83935 ASSAY OF URINE OSMOLALITY: CPT | Performed by: STUDENT IN AN ORGANIZED HEALTH CARE EDUCATION/TRAINING PROGRAM

## 2021-09-13 PROCEDURE — 87633 RESP VIRUS 12-25 TARGETS: CPT | Performed by: STUDENT IN AN ORGANIZED HEALTH CARE EDUCATION/TRAINING PROGRAM

## 2021-09-13 PROCEDURE — 81003 URINALYSIS AUTO W/O SCOPE: CPT | Performed by: STUDENT IN AN ORGANIZED HEALTH CARE EDUCATION/TRAINING PROGRAM

## 2021-09-13 PROCEDURE — U0005 INFEC AGEN DETEC AMPLI PROBE: HCPCS | Performed by: STUDENT IN AN ORGANIZED HEALTH CARE EDUCATION/TRAINING PROGRAM

## 2021-09-13 PROCEDURE — HZ2ZZZZ DETOXIFICATION SERVICES FOR SUBSTANCE ABUSE TREATMENT: ICD-10-PCS | Performed by: INTERNAL MEDICINE

## 2021-09-13 PROCEDURE — 255N000002 HC RX 255 OP 636: Performed by: INTERNAL MEDICINE

## 2021-09-13 PROCEDURE — 93306 TTE W/DOPPLER COMPLETE: CPT | Mod: 26 | Performed by: INTERNAL MEDICINE

## 2021-09-13 PROCEDURE — 84300 ASSAY OF URINE SODIUM: CPT | Performed by: STUDENT IN AN ORGANIZED HEALTH CARE EDUCATION/TRAINING PROGRAM

## 2021-09-13 PROCEDURE — 99223 1ST HOSP IP/OBS HIGH 75: CPT | Mod: GC | Performed by: INTERNAL MEDICINE

## 2021-09-13 PROCEDURE — 82746 ASSAY OF FOLIC ACID SERUM: CPT | Performed by: STUDENT IN AN ORGANIZED HEALTH CARE EDUCATION/TRAINING PROGRAM

## 2021-09-13 PROCEDURE — 83930 ASSAY OF BLOOD OSMOLALITY: CPT | Performed by: STUDENT IN AN ORGANIZED HEALTH CARE EDUCATION/TRAINING PROGRAM

## 2021-09-13 PROCEDURE — 83036 HEMOGLOBIN GLYCOSYLATED A1C: CPT | Performed by: STUDENT IN AN ORGANIZED HEALTH CARE EDUCATION/TRAINING PROGRAM

## 2021-09-13 PROCEDURE — 85045 AUTOMATED RETICULOCYTE COUNT: CPT | Performed by: STUDENT IN AN ORGANIZED HEALTH CARE EDUCATION/TRAINING PROGRAM

## 2021-09-13 PROCEDURE — 99222 1ST HOSP IP/OBS MODERATE 55: CPT | Mod: GC | Performed by: INTERNAL MEDICINE

## 2021-09-13 PROCEDURE — 93005 ELECTROCARDIOGRAM TRACING: CPT

## 2021-09-13 RX ORDER — NICOTINE POLACRILEX 4 MG
15-30 LOZENGE BUCCAL
Status: DISCONTINUED | OUTPATIENT
Start: 2021-09-13 | End: 2021-09-13

## 2021-09-13 RX ORDER — AMOXICILLIN 250 MG
1 CAPSULE ORAL 2 TIMES DAILY PRN
Status: DISCONTINUED | OUTPATIENT
Start: 2021-09-13 | End: 2021-09-21 | Stop reason: HOSPADM

## 2021-09-13 RX ORDER — FOLIC ACID 1 MG/1
1 TABLET ORAL DAILY
Status: DISCONTINUED | OUTPATIENT
Start: 2021-09-16 | End: 2021-09-21 | Stop reason: HOSPADM

## 2021-09-13 RX ORDER — MULTIPLE VITAMINS W/ MINERALS TAB 9MG-400MCG
1 TAB ORAL DAILY
Status: DISCONTINUED | OUTPATIENT
Start: 2021-09-13 | End: 2021-09-21 | Stop reason: HOSPADM

## 2021-09-13 RX ORDER — METOPROLOL TARTRATE 1 MG/ML
5 INJECTION, SOLUTION INTRAVENOUS EVERY 6 HOURS PRN
Status: DISCONTINUED | OUTPATIENT
Start: 2021-09-13 | End: 2021-09-14

## 2021-09-13 RX ORDER — ALBUTEROL SULFATE 90 UG/1
2 AEROSOL, METERED RESPIRATORY (INHALATION) EVERY 4 HOURS PRN
Status: DISCONTINUED | OUTPATIENT
Start: 2021-09-13 | End: 2021-09-21 | Stop reason: HOSPADM

## 2021-09-13 RX ORDER — TRIAMCINOLONE ACETONIDE 1 MG/G
CREAM TOPICAL 3 TIMES DAILY
Status: ON HOLD | COMMUNITY
End: 2021-09-20

## 2021-09-13 RX ORDER — ONDANSETRON 4 MG/1
8 TABLET, ORALLY DISINTEGRATING ORAL
Status: ON HOLD | COMMUNITY
Start: 2021-07-31 | End: 2021-09-13

## 2021-09-13 RX ORDER — LANOLIN ALCOHOL/MO/W.PET/CERES
100 CREAM (GRAM) TOPICAL 3 TIMES DAILY
Status: DISCONTINUED | OUTPATIENT
Start: 2021-09-15 | End: 2021-09-13

## 2021-09-13 RX ORDER — CEFTRIAXONE 1 G/1
1 INJECTION, POWDER, FOR SOLUTION INTRAMUSCULAR; INTRAVENOUS EVERY 24 HOURS
Status: DISCONTINUED | OUTPATIENT
Start: 2021-09-13 | End: 2021-09-14

## 2021-09-13 RX ORDER — PRAZOSIN HYDROCHLORIDE 1 MG/1
1 CAPSULE ORAL
Status: ON HOLD | COMMUNITY
Start: 2021-08-31 | End: 2021-09-20

## 2021-09-13 RX ORDER — ONDANSETRON 2 MG/ML
4 INJECTION INTRAMUSCULAR; INTRAVENOUS EVERY 6 HOURS PRN
Status: DISCONTINUED | OUTPATIENT
Start: 2021-09-13 | End: 2021-09-13

## 2021-09-13 RX ORDER — CYANOCOBALAMIN 1000 UG/ML
INJECTION, SOLUTION INTRAMUSCULAR; SUBCUTANEOUS
Status: ON HOLD | COMMUNITY
Start: 2021-08-31 | End: 2021-11-05

## 2021-09-13 RX ORDER — PRAZOSIN HYDROCHLORIDE 1 MG/1
1 CAPSULE ORAL EVERY EVENING
Status: DISCONTINUED | OUTPATIENT
Start: 2021-09-13 | End: 2021-09-21 | Stop reason: HOSPADM

## 2021-09-13 RX ORDER — ACETAMINOPHEN 325 MG/1
650 TABLET ORAL EVERY 4 HOURS PRN
Status: DISCONTINUED | OUTPATIENT
Start: 2021-09-13 | End: 2021-09-14

## 2021-09-13 RX ORDER — HYDROXYZINE PAMOATE 25 MG/1
CAPSULE ORAL
Status: ON HOLD | COMMUNITY
Start: 2021-09-09 | End: 2021-09-20

## 2021-09-13 RX ORDER — LORAZEPAM 1 MG/1
1-2 TABLET ORAL EVERY 30 MIN PRN
Status: DISCONTINUED | OUTPATIENT
Start: 2021-09-13 | End: 2021-09-18

## 2021-09-13 RX ORDER — FOLIC ACID 5 MG/ML
1 INJECTION, SOLUTION INTRAMUSCULAR; INTRAVENOUS; SUBCUTANEOUS DAILY
Status: COMPLETED | OUTPATIENT
Start: 2021-09-14 | End: 2021-09-15

## 2021-09-13 RX ORDER — LANOLIN ALCOHOL/MO/W.PET/CERES
100 CREAM (GRAM) TOPICAL DAILY
Status: DISCONTINUED | OUTPATIENT
Start: 2021-09-21 | End: 2021-09-21 | Stop reason: HOSPADM

## 2021-09-13 RX ORDER — DOXYCYCLINE 100 MG/1
100 CAPSULE ORAL EVERY 12 HOURS SCHEDULED
Status: COMPLETED | OUTPATIENT
Start: 2021-09-13 | End: 2021-09-17

## 2021-09-13 RX ORDER — CLONIDINE HYDROCHLORIDE 0.1 MG/1
0.1 TABLET ORAL EVERY 8 HOURS
Status: CANCELLED | OUTPATIENT
Start: 2021-09-14

## 2021-09-13 RX ORDER — AMOXICILLIN 250 MG
2 CAPSULE ORAL 2 TIMES DAILY PRN
Status: DISCONTINUED | OUTPATIENT
Start: 2021-09-13 | End: 2021-09-21 | Stop reason: HOSPADM

## 2021-09-13 RX ORDER — FOLIC ACID 5 MG/ML
1 INJECTION, SOLUTION INTRAMUSCULAR; INTRAVENOUS; SUBCUTANEOUS ONCE
Status: COMPLETED | OUTPATIENT
Start: 2021-09-13 | End: 2021-09-13

## 2021-09-13 RX ORDER — LORAZEPAM 2 MG/ML
1-2 INJECTION INTRAMUSCULAR EVERY 30 MIN PRN
Status: DISCONTINUED | OUTPATIENT
Start: 2021-09-13 | End: 2021-09-18

## 2021-09-13 RX ORDER — DEXTROSE MONOHYDRATE 25 G/50ML
25-50 INJECTION, SOLUTION INTRAVENOUS
Status: DISCONTINUED | OUTPATIENT
Start: 2021-09-13 | End: 2021-09-13

## 2021-09-13 RX ORDER — LANOLIN ALCOHOL/MO/W.PET/CERES
100 CREAM (GRAM) TOPICAL DAILY
Status: DISCONTINUED | OUTPATIENT
Start: 2021-09-20 | End: 2021-09-13

## 2021-09-13 RX ORDER — NICOTINE POLACRILEX 4 MG
15-30 LOZENGE BUCCAL
Status: DISCONTINUED | OUTPATIENT
Start: 2021-09-13 | End: 2021-09-21 | Stop reason: HOSPADM

## 2021-09-13 RX ORDER — POTASSIUM CHLORIDE 20MEQ/15ML
60 LIQUID (ML) ORAL ONCE
Status: COMPLETED | OUTPATIENT
Start: 2021-09-13 | End: 2021-09-13

## 2021-09-13 RX ORDER — DEXTROSE MONOHYDRATE 25 G/50ML
25-50 INJECTION, SOLUTION INTRAVENOUS
Status: DISCONTINUED | OUTPATIENT
Start: 2021-09-13 | End: 2021-09-21 | Stop reason: HOSPADM

## 2021-09-13 RX ORDER — FAMOTIDINE 20 MG/1
20 TABLET, FILM COATED ORAL 2 TIMES DAILY
Status: ON HOLD | COMMUNITY
Start: 2021-07-15 | End: 2021-09-20

## 2021-09-13 RX ORDER — GABAPENTIN 100 MG/1
100 CAPSULE ORAL 3 TIMES DAILY
Status: DISCONTINUED | OUTPATIENT
Start: 2021-09-13 | End: 2021-09-21 | Stop reason: HOSPADM

## 2021-09-13 RX ORDER — CLONIDINE HYDROCHLORIDE 0.1 MG/1
0.1 TABLET ORAL EVERY 8 HOURS
Status: DISCONTINUED | OUTPATIENT
Start: 2021-09-13 | End: 2021-09-14

## 2021-09-13 RX ORDER — FLUMAZENIL 0.1 MG/ML
0.2 INJECTION, SOLUTION INTRAVENOUS
Status: DISCONTINUED | OUTPATIENT
Start: 2021-09-13 | End: 2021-09-18

## 2021-09-13 RX ORDER — ONDANSETRON 4 MG/1
4 TABLET, ORALLY DISINTEGRATING ORAL EVERY 6 HOURS PRN
Status: DISCONTINUED | OUTPATIENT
Start: 2021-09-13 | End: 2021-09-13

## 2021-09-13 RX ORDER — HALOPERIDOL 5 MG/ML
2.5-5 INJECTION INTRAMUSCULAR EVERY 4 HOURS PRN
Status: DISCONTINUED | OUTPATIENT
Start: 2021-09-13 | End: 2021-09-13

## 2021-09-13 RX ORDER — POTASSIUM CHLORIDE 20MEQ/15ML
60 LIQUID (ML) ORAL DAILY
Status: DISCONTINUED | OUTPATIENT
Start: 2021-09-13 | End: 2021-09-18

## 2021-09-13 RX ORDER — MAGNESIUM SULFATE HEPTAHYDRATE 40 MG/ML
4 INJECTION, SOLUTION INTRAVENOUS ONCE
Status: COMPLETED | OUTPATIENT
Start: 2021-09-13 | End: 2021-09-13

## 2021-09-13 RX ORDER — PANTOPRAZOLE SODIUM 40 MG/1
40 TABLET, DELAYED RELEASE ORAL
Status: DISCONTINUED | OUTPATIENT
Start: 2021-09-14 | End: 2021-09-21 | Stop reason: HOSPADM

## 2021-09-13 RX ORDER — FLUCONAZOLE 150 MG/1
150 TABLET ORAL ONCE
Status: ON HOLD | COMMUNITY
End: 2021-09-20

## 2021-09-13 RX ADMIN — DOXYCYCLINE HYCLATE 100 MG: 100 CAPSULE ORAL at 07:47

## 2021-09-13 RX ADMIN — INSULIN ASPART 5 UNITS: 100 INJECTION, SOLUTION INTRAVENOUS; SUBCUTANEOUS at 06:33

## 2021-09-13 RX ADMIN — MAGNESIUM SULFATE IN WATER 4 G: 40 INJECTION, SOLUTION INTRAVENOUS at 10:45

## 2021-09-13 RX ADMIN — POTASSIUM CHLORIDE 60 MEQ: 40 SOLUTION ORAL at 10:45

## 2021-09-13 RX ADMIN — THIAMINE HYDROCHLORIDE 200 MG: 100 INJECTION, SOLUTION INTRAMUSCULAR; INTRAVENOUS at 07:46

## 2021-09-13 RX ADMIN — HUMAN ALBUMIN MICROSPHERES AND PERFLUTREN 6 ML: 10; .22 INJECTION, SOLUTION INTRAVENOUS at 14:27

## 2021-09-13 RX ADMIN — CALCIUM CARBONATE 600 MG (1,500 MG)-VITAMIN D3 400 UNIT TABLET 1 TABLET: at 10:45

## 2021-09-13 RX ADMIN — CEFTRIAXONE SODIUM 1 G: 1 INJECTION, POWDER, FOR SOLUTION INTRAMUSCULAR; INTRAVENOUS at 07:47

## 2021-09-13 RX ADMIN — POTASSIUM & SODIUM PHOSPHATES POWDER PACK 280-160-250 MG 2 PACKET: 280-160-250 PACK at 15:34

## 2021-09-13 RX ADMIN — GABAPENTIN 100 MG: 100 CAPSULE ORAL at 13:56

## 2021-09-13 RX ADMIN — THIAMINE HYDROCHLORIDE 500 MG: 100 INJECTION, SOLUTION INTRAMUSCULAR; INTRAVENOUS at 13:56

## 2021-09-13 RX ADMIN — THIAMINE HYDROCHLORIDE 500 MG: 100 INJECTION, SOLUTION INTRAMUSCULAR; INTRAVENOUS at 19:44

## 2021-09-13 RX ADMIN — POTASSIUM & SODIUM PHOSPHATES POWDER PACK 280-160-250 MG 2 PACKET: 280-160-250 PACK at 19:44

## 2021-09-13 RX ADMIN — CLONIDINE HYDROCHLORIDE 0.1 MG: 0.1 TABLET ORAL at 07:46

## 2021-09-13 RX ADMIN — GABAPENTIN 100 MG: 100 CAPSULE ORAL at 19:44

## 2021-09-13 RX ADMIN — CALCIUM CHLORIDE 2 G: 100 INJECTION, SOLUTION INTRAVENOUS at 12:33

## 2021-09-13 RX ADMIN — GABAPENTIN 100 MG: 100 CAPSULE ORAL at 10:45

## 2021-09-13 RX ADMIN — POTASSIUM & SODIUM PHOSPHATES POWDER PACK 280-160-250 MG 2 PACKET: 280-160-250 PACK at 12:00

## 2021-09-13 RX ADMIN — SODIUM CHLORIDE 1000 ML: 9 INJECTION, SOLUTION INTRAVENOUS at 06:24

## 2021-09-13 RX ADMIN — SODIUM PHOSPHATE, MONOBASIC, MONOHYDRATE AND SODIUM PHOSPHATE, DIBASIC, ANHYDROUS 25 MMOL: 276; 142 INJECTION, SOLUTION INTRAVENOUS at 12:32

## 2021-09-13 RX ADMIN — Medication 1 TABLET: at 07:46

## 2021-09-13 RX ADMIN — POTASSIUM CHLORIDE 60 MEQ: 40 SOLUTION ORAL at 17:58

## 2021-09-13 RX ADMIN — SODIUM CHLORIDE, POTASSIUM CHLORIDE, SODIUM LACTATE AND CALCIUM CHLORIDE 1000 ML: 600; 310; 30; 20 INJECTION, SOLUTION INTRAVENOUS at 09:29

## 2021-09-13 RX ADMIN — DOXYCYCLINE HYCLATE 100 MG: 100 CAPSULE ORAL at 19:45

## 2021-09-13 RX ADMIN — CLONIDINE HYDROCHLORIDE 0.1 MG: 0.1 TABLET ORAL at 15:34

## 2021-09-13 RX ADMIN — FOLIC ACID 1 MG: 5 INJECTION, SOLUTION INTRAMUSCULAR; INTRAVENOUS; SUBCUTANEOUS at 09:29

## 2021-09-13 ASSESSMENT — ACTIVITIES OF DAILY LIVING (ADL)
ADLS_ACUITY_SCORE: 16
ADLS_ACUITY_SCORE: 16
ADLS_ACUITY_SCORE: 14
ADLS_ACUITY_SCORE: 16

## 2021-09-13 ASSESSMENT — MIFFLIN-ST. JEOR: SCORE: 1405

## 2021-09-13 NOTE — PLAN OF CARE
ICU End of Shift Summary. See flowsheets for vital signs and detailed assessment.    Changes this shift: Oriented and makes needs known. Drowsy and slept most of day. Able to walk to toilet in room with standby assist. Vitally stable. NPO. Two loose bowel movements. UA sent, see results. Replaced potassium, phos, mag, and calcium. AM platelets 28, recheck 21. Lovenox in AM held. MICU notified. Abd US completed. Echo completed.    Plan: Trend labs and replace electrolytes as needed. CIWAs per protocol. Notify team of any changes.      Problem: Risk for Delirium  Goal: Improved Sleep  Outcome: No Change     Problem: Risk for Delirium  Goal: Improved Attention and Thought Clarity  Outcome: No Change     Problem: Adult Inpatient Plan of Care  Goal: Optimal Comfort and Wellbeing  Outcome: No Change

## 2021-09-13 NOTE — PROGRESS NOTES
MEDICAL ICU PROGRESS NOTE  09/13/2021      Date of Service (when I saw the patient): 09/13/2021    ASSESSMENT: Aliyah Angeles is a 45 year old female with PMH alcohol use disorder, gastric bypass, PTSD, fibromyalgia, and alcoholic hepatitis who was presented to OSH ED on 9/12/2021 with weakness, dyspnea, AMS and c/f substance withdrawal. She was transferred to the [facility] ICU after being found to have lactic acidosis at OSH with initial lactate 13.0.     CHANGES and MAJOR THINGS TODAY:   - Lyte Replacements (Ca, Mg, K)  - Hold off on sodium correction   -High Dose Thiamine B12 (wernicke's dosing)   -Check UDS  -Hepatobiliary Consult  -Continue CAP tx (Doxy Ceftri) given dyspnea and mildly elevated procal  -RUQ US w/doppler  -Osm Labs    PLAN:    Neuro:  # Alcohol Abuse  Last drink 9/12/21, reports drinking 1L fireball daily. No history of DT, denies prior intubation, ICU stay. High risk for withdrawal, no withdrawal or encephalopathy signs at this time.  - CIWA with PRN Lorazepam  - Folate & B12 per protocol. Thiamine replacement Wernicke's Dosing TID    #PTSD  #Anxiety  - PTA Prazosin  - HOLD Escitalopram (Prolonged QTc)    #Neuropathy  #Fibromyalgia  - PTA Gabapentin    Pulmonary:  # Dyspnea without hypoxia likely 2/2 to lactic acidosis  # Recent hx of Bronchitis  She reports 3 day history of dyspnea associated w/ cough for the past month. She states she was recently treated with doxycycline for bronchitis, but states that her main complaint is that she can't take a deep breath as her abdomen feels more distended. Her CXR 9/13 looks clear without evidence of PTX, effusion, or PNA, but procal borderline 0.9. Suspect respiratory compensation for metabolic acidosis contributing to sensation of dyspnea.   - Continue doxycycline and ceftriaxone to cover CAP, reassess 9/14 and consider whether to stop antibiotics. MRSA and RSV panel negative.  - Procal AM  - Sputum culture pending.     # Stable 6 mm pulmonary nodule of  RLL  - Follow in May 2022 to document 2 years worth of stability    Cardiovascular:  # QT Prolongation  # C/f Alcoholic cardiomypoathy  Prolongation likely 2/2 to her hypomagnesemia and hypocalcemia. Given her significant history of alcohol abuse, concern for cardiac damage. No evidence of ST elevation or ischemia on EKG at this time.   - Replete Mg, Ca, K  - TTE  - Repeat EKG after lyte replacement    GI/Nutrition:  # Direct Hyperbilirubinemia   # Transaminitis 2/2 alcohol hepatitis  Likely 2/2 to intrinsic hepatic disease from alcoholic cirrhosis. AST: ALT 3:1 classic for alcohol etiology. She has significant hepatomegaly which does cause discomfort on palpation, and does not appear to have a recent hepatitis panel. She does have tattoos on her arm. On exam, she does have a dull abdomen with no shifting, possible some component of ascites, but may be a (small) contributory component to her dyspnea.  - MELD labs daily   - RUQ US with doppler  - Calculate Maddrey's once PT is back, whether she would benefit from steroid therapy.  - Hepatology consult    MELD-NA 9/12 of 27  MELD-Na score: 28 at 9/13/2021  6:42 AM  MELD score: 20 at 9/13/2021  6:42 AM  Calculated from:  Serum Creatinine: 0.46 mg/dL (Using min of 1 mg/dL) at 9/13/2021  6:42 AM  Serum Sodium: 123 mmol/L (Using min of 125 mmol/L) at 9/13/2021  6:42 AM  Total Bilirubin: 13.0 mg/dL at 9/13/2021  6:42 AM  INR(ratio): 1.35 at 9/13/2021  6:42 AM  Age: 45 years    #Pancreatic Fat Stranding  #Hx Alcoholic Pancreatitis  #16mm hypoattenuation near pancreatic head.  CT 9/13/21 at OSH read states some pancreatic atrophy and mld fat stranding. Lipase normal. Her mass could be pseudocyst vs pancreatic mass.  - Will attempt to obtain images from [facility]   - Will need to follow-up MRI after acute hospital stay.     #Hx Gastritis  - Pantoprazole 40mg daily    Renal/Fluids/Electrolytes:  # Anion Gap Metabolic Acidosis  # Lactic acidosis  # C/f starvation  ketosis/alcohol ketoacidosis  Lactate improving with IVF. She is currently afebrile with no signs of infection (clear CXR, urine). No evidence of cardiogenic failure, renal failure, intestinal ischemia. She does have heavy ethanol use and hepatic failure which can cause a lactic acidosis. General differential of other causes of lactic acidosis include tissue hypoxia ( cardiogenic, septic, hemorrhagic, seizure, carbon monoxide) intestinal ischemia, and diabetes with metformin use however lower suspicion given patient history and exam.   - S/p 4 L LR overnight w/ improvement 13 -> 8.5   - Continue LR 1 L bolus   - Echo as above to evaluate for cardiovascular changes  - Lactate in PM      #Hypokalemia  #Hypomagnesemia  #Hypophosphatemia  #Hypocalcemia  Likley 2/2 poor po intake and alcohol abuse. Of note, pathology notes that the specimen is slightly hemolyzed, and her potasium may be lower than it is (3.2) on 9/13. Ddx hypokalemia include decreased K intake (trauma, alkalosis), renal potassium loss (increased aldosterone effects (dehydration -> secondary hyperaldo), hypomagnesemia,  -nutrition consult    Administering 9/13  Calcium carbonate 2g  Calcium chloride 2g  Mag sulfate 4g  Potassium chloride 60 mEq  Sodium phosphate in D5W    # Hypovolemic/Euvolemic Hyponatremia  Suspect contribution of poor PO intake (potomania). Improving hyponatremia, 123 on 9/13. She was 117 on 9/12. Will need to hold shifting her sodium, otherwise there is risk of osmotic demyelination. She does have decreased skin turgor, but as she has received 5L of LR for her elevated lactate, she may be approaching euvolemia. Some evidence of third sparcing (1+ trace pitting edema in left ankle).  - BMP AM  - Urine Osm  - Serum Osm    #Serum Ketone   #Glucosuria  #Elevated Specific gravity  Unknown etiology. Consider mild ketosis 2/2 elevated BG vs starvation. Differential include hyperglycemia (DM, cushings), Fanconi, chronic renal failure, lead  toxicity. Fireball does have ~300g of sugar per 1/5 liter. It is possible that as this is her primary intake, her glucosuria could be 2/2 to fireball intake. She is not on any SGLT-2 inhibitors.   - HbA1c as below   - Urine tox screen    Endocrine:  # Elevated blood glucose  BG in low 200s.  - A1c pending  - High sliding scale insulin.  - If she uptrends, will give insulin drip    ID:  No acute concerns.    Hematology:    # Thrombocytopenia  There is no evidence on CT read of sequestration, possible decreased production, but high suspicion for hemolysis (small component of indirect bilirubin elevation), particular concern for MAHA (TTP vs DIC).  LDH elevated (non-specific).   - Peripheral smear  - Haptoglobin pending  - CBC BID    Musculoskeletal/Skin:  No acute concerns.    General Cares/Prophylaxis:    DVT Prophylaxis: VTE Prophylaxis contraindicated due to worsening thrombocytopenia  GI Prophylaxis: PPI  Restraints: None  Family Communication: Patient declined updating family.  Code Status: Full    Lines/tubes/drains:  - PIVs    Disposition:  - Medical ICU pending resolving lactate.    Patient seen and findings/plan discussed with medical ICU staff, Dr. Rodrigues.    Essie Brand MS4    I was present with the medical student who participated in the service and in the documentation of the notes.  I have verified the history and personally performed the physical exam and medical decision making.  I agree with the assessment and plan of care as documented in the note.     September 13, 2021   Patti Blackwell MD  Internal Medicine-Pediatrics, PGY-1    ====================================  INTERVAL HISTORY:   No acute events, patient was admitted this AM. Patient continues to describe non-productive intermittent cough (unchanged), malaise, abdominal and chest discomfort, and dyspnea.    OBJECTIVE:   1. VITAL SIGNS:   Temp:  [98.7  F (37.1  C)] 98.7  F (37.1  C)  Pulse:  [] 99  Resp:  [22] 22  BP:  ()/(51-68) 89/51  SpO2:  [96 %-99 %] 98 %  Resp: 22    2. INTAKE/ OUTPUT:   I/O last 3 completed shifts:  In: 1000 [IV Piggyback:1000]  Out: 50 [Urine:50]    3. PHYSICAL EXAMINATION:  General: Uncomfortable, jaundiced, tired-appearing  HEENT: EOM intact, no tongue fasiculations  Neuro: Oriented x3. No asterixis.  Pulm/Resp: Clear breath sounds bilaterally without rhonchi, crackles or wheeze, breathing non-labored. Some expiratory wheezes in LL base.  CV: Borderline tachycardia, RR. No m/r/g  Abdomen: Soft, distended, non-tender, some discomfort over RUQ and epigastric regions. Dull to percussion. Hepatomegaly. Overlying caput medusa veins.   Incisions/Skin: Jaundiced.     4. LABS:   Arterial Blood Gases   No lab results found in last 7 days.  Complete Blood Count   No lab results found in last 7 days.  Basic Metabolic Panel  Recent Labs   Lab 09/13/21  0630 09/13/21  0443   * 283*     Liver Function Tests  Recent Labs   Lab 09/13/21  0642   INR 1.35*     Coagulation Profile  Recent Labs   Lab 09/13/21  0642   INR 1.35*       5. RADIOLOGY:   No results found for this or any previous visit (from the past 24 hour(s)).

## 2021-09-13 NOTE — PROGRESS NOTES
Admitted/transferred from: [facility]  Reason for admission/transfer: Elevated lactic   Patient status upon admission/transfer: Stable  Interventions:   Plan: Repeat labs  2 RN skin assessment: completed by Kallie Julio RN and Cindy Ramirez RN  Result of skin assessment and interventions/actions: bruised buttocks from fall  Height, weight, drug calc weight: Done  Patient belongings (see Flowsheet - Adult Profile for details): Purse in room, home medications with security  MDRO education (if applicable): N/A

## 2021-09-13 NOTE — CONSULTS
[facility]    Hepatology Consult    Requesting provider: Fredi Luz MD    Consult requested for concern for possible alcoholic hepatitis; came from OSH, also with hx of alcoholic pancreatitis, possible pseudocyst on outside imaging (image unavailable)    HPI:  45 year old female with history of gastric bypass (2010) c/b B12 deficiency, anxiety, PTSD, fibromyalgia, alcoholic pancreatitis, and alcohol abuse who admitted to the ICU on 9/13 for elevated lactate with new hyperbilirubinemia and recent alcohol abuse.    Ms. Angeles had not been feeling well for the last 3-4 days. On 9/12, she was so weak she was unable to get from her house to her car when she decided to call her father to help get her to the hospital. She was not having abdominal pain. Was not taking ibuprofen or acetaminophen. Is have loose yellow stools, but unsure how often. Denies nausea, vomiting, hematemesis, melena, hematochezia, fevers or chills. Reports drinking 1L of fireball daily for the last week. Explains that prior to the last week she normally drinks 12 beers a day. Last rehab was Jan 2020 for which she remained sober for 60 days. No IVDU.    In regards to family history, she doesn't have any family members with liver disease or liver malignancy.    Medical hx Surgical hx   Past Medical History:   Diagnosis Date     Acne      ADHD (attention deficit hyperactivity disorder)      Arthralgia of temporomandibular joint 5/19/2016     Arthritis      Cervical high risk HPV (human papillomavirus) test positive 4/27/2018 4/27/18 NIL pap, + HR HPV (not 16 or 18). Plan: cotest in 1 yr.       Cobalamin deficiency 11/24/2014     Drug-seeking behavior 5/19/2016    Overview:  Per [facility] website, patient has filled with multiple controlled substances with multiple prescribers at multiple pharmacies      History of blood transfusion 01/01/1988     Insomnia 7/21/2013     Raynaud's phenomenon 5/25/2015     Vitamin D  "deficiency 1/16/2013      Past Surgical History:   Procedure Laterality Date     ABDOMEN SURGERY  08/10/2010    Gastric  bypass     BIOPSY  1/1/94    HPV, multiple reoccurrances, partial  hysterectomy  2013     CHOLECYSTECTOMY  01/01/2010     COLONOSCOPY  01/01/2009     ENT SURGERY  01/01/1988    Tonsils     GI SURGERY  8/10/10    Fitz-en-Y     HYSTERECTOMY VAGINAL  04/05/2013    pathology of cervix benign.           Medications  Current Facility-Administered Medications   Medication Dose Route Frequency     calcium carbonate 600 mg-vitamin D 400 units  1 tablet Oral Daily     cefTRIAXone  1 g Intravenous Q24H     cloNIDine  0.1 mg Oral Q8H     doxycycline hyclate  100 mg Oral Q12H [facility]     [START ON 9/16/2021] folic acid  1 mg Oral Daily     [START ON 9/14/2021] folic acid  1 mg Intravenous Daily     gabapentin  100 mg Oral TID     insulin aspart  1-12 Units Subcutaneous Q4H     multivitamin w/minerals  1 tablet Oral Daily     [START ON 9/14/2021] pantoprazole  40 mg Oral QAM AC     potassium & sodium phosphates  2 packet Oral 4x Daily     prazosin  1 mg Oral QPM     sodium phosphate  25 mmol Intravenous Once     thiamine  500 mg Intravenous TID    Followed by     [START ON 9/16/2021] thiamine  250 mg Intravenous Daily    Followed by     [START ON 9/21/2021] thiamine  100 mg Oral Daily       Allergies  Allergies   Allergen Reactions     Nsaids      Gastric bypass     Trazodone      In a \"hazy\" for the whole next day       Family hx Social hx   Family History   Problem Relation Age of Onset     Prostate Cancer Father      Hypertension Father      Hyperlipidemia Father      Substance Abuse Father      Obesity Father      Diabetes Maternal Grandmother      Colon Cancer Maternal Grandmother      Other Cancer Maternal Grandmother         pancreatic cancer     Obesity Maternal Grandmother      Diabetes Paternal Grandmother      Obesity Paternal Grandmother      Diabetes Paternal Grandfather      Obesity Paternal " "Grandfather      Breast Cancer Sister      Breast Cancer Sister      Anxiety Disorder Sister      Anesthesia Reaction Sister      Depression Mother      Anesthesia Reaction Mother      Asthma Daughter      Coronary Artery Disease No family hx of      Hypertension No family hx of      Hyperlipidemia No family hx of      Cerebrovascular Disease No family hx of      Depression No family hx of      Anxiety Disorder No family hx of      Mental Illness No family hx of      Substance Abuse No family hx of      Anesthesia Reaction No family hx of      Asthma No family hx of      Osteoporosis No family hx of      Genetic Disorder No family hx of      Thyroid Disease No family hx of      Obesity No family hx of      Unknown/Adopted No family hx of       Social History     Tobacco Use     Smoking status: Current Every Day Smoker     Packs/day: 1.00     Years: 15.00     Pack years: 15.00     Types: Cigarettes     Start date: 1/1/1989     Smokeless tobacco: Never Used   Substance Use Topics     Alcohol use: Yes     Alcohol/week: 0.0 standard drinks     Comment: daily unknown amount      Drug use: No          Review of systems  A 10-point review of systems was negative.      Examination  BP 93/64 (BP Location: Left arm)   Pulse 85   Temp 98.6  F (37  C) (Oral)   Resp 16   Ht 1.626 m (5' 4\")   Wt 78 kg (171 lb 15.3 oz)   SpO2 97%   BMI 29.52 kg/m      Intake/Output Summary (Last 24 hours) at 9/13/2021 1343  Last data filed at 9/13/2021 0800  Gross per 24 hour   Intake 1000 ml   Output 150 ml   Net 850 ml       Gen- well, NAD, A+Ox3, juandice, sleeping on arrival to room  Eye- sclera icterus   CVS- RRR  RS- CTA  Abd-soft, distended, non tender to palpation  Extr- no BELINDA  Neuro- mild asterixis  Skin- no obvious rash  Psych- normal mood    Laboratory  Lab Results   Component Value Date     09/13/2021     04/10/2020    POTASSIUM 3.2 09/13/2021    POTASSIUM 4.6 04/10/2020    CHLORIDE 86 09/13/2021    CHLORIDE 110 " 04/10/2020    CO2 18 09/13/2021    CO2 28 04/10/2020    BUN 4 09/13/2021    BUN 10 04/10/2020    CR 0.46 09/13/2021    CR 0.50 04/10/2020       Lab Results   Component Value Date    BILITOTAL 13.0 09/13/2021    BILITOTAL 0.3 04/08/2020    ALT 96 09/13/2021    ALT 21 04/08/2020     09/13/2021    AST 21 04/08/2020    ALKPHOS 594 09/13/2021    ALKPHOS 88 04/08/2020       Lab Results   Component Value Date    ALBUMIN 1.7 09/13/2021    ALBUMIN 3.7 04/08/2020    PROTTOTAL 6.0 09/13/2021    PROTTOTAL 7.0 04/08/2020        Lab Results   Component Value Date    WBC 7.8 09/13/2021    WBC 8.3 04/08/2020    HGB 8.8 09/13/2021    HGB 12.2 04/08/2020     09/13/2021    MCV 86 04/08/2020    PLT 21 09/13/2021     04/08/2020       Lab Results   Component Value Date    INR 1.35 09/13/2021    INR 1.02 04/08/2020     MELD-Na score: 28 at 9/13/2021  6:42 AM  MELD score: 20 at 9/13/2021  6:42 AM  Calculated from:  Serum Creatinine: 0.46 mg/dL (Using min of 1 mg/dL) at 9/13/2021  6:42 AM  Serum Sodium: 123 mmol/L (Using min of 125 mmol/L) at 9/13/2021  6:42 AM  Total Bilirubin: 13.0 mg/dL at 9/13/2021  6:42 AM  INR(ratio): 1.35 at 9/13/2021  6:42 AM  Age: 45 years    Radiology  US ABDOMEN (9/13/2021)  IMPRESSION:   1. Hepatomegaly with evidence of underlying chronic intrinsic hepatic  parenchymal disease including hepatic steatosis. No definite focal  hepatic mass.  2. Status post cholecystectomy.  3. Pancreas and IVC are not visualized with ultrasound.    CT ABDOMEN PELVIS (9/12/2021)  IMPRESSION: 1. Probable acute interstitial pancreatitis. The degree of peripancreatic fat stranding is significantly improved compared to 12/22/2020. 2. Nonspecific 16mm hypoattenuating focus in the region of the pancreatic head. While this is favored to be a complex pseudocyst, a pancreatic mass is difficult to exclude. A follow-up pancreas protocol MRI after resolution of acute symptoms is recommended. No pancreatic duct dilation. 3.  Faint groundglass opacities in the lower lungs are new since 12/22/2020. Imaging features can be seen with (COVID-19) pneumonia, though are nonspecific and can occur with a variety of infectious and noninfectious processes. 4. Trace ascites.     Assessment  45 year old female with history of gastric bypass (2010) c/b B12 deficiency, anxiety, PTSD, fibromyalgia, alcoholic pancreatitis, and alcohol abuse who admitted to the ICU on 9/13 for elevated lactate with new hyperbilirubinemia and recent alcohol abuse. GI is being consulted for possible alcoholic hepatitis; came from OSH, also with hx of alcoholic pancreatitis, possible pseudocyst on outside imaging (image unavailable).     #Alcohol hepatitis  #Direct hyperbilirubinemia  #Hx gastritis   #Hx gastric bypass  #C/f alcoholic cardiomyopathy  Ms. Angeles has a history of AUD for which she has been hospitalized several times. Ultimately, transferred from OSH here due to elevated lactate of 13 requiring ICU level care. Additionally, presented with blood alcohol level of 0.2. Once transferred here ALT 96, , Alk phos 594, Bilirubin total 13, and INR 1.35.  MELD-Na of  28. With current use of alcohol and AST >2x ALT and elevated INR makes this most consistent with alcoholic hepatitis. Maddrey's score calculated to be 13. At this time she would not benefit from initiation of steroids.     #Hx alcoholic pancreatitis  #16 mm hypoattenuation near pancreatic head   CTA at OSH on 9/12 showed probable acute interstitial pancreatitis although improved compared to 12/22/2020. Also noted to have 16 mm hypoattenuated focus in the region of the pancreatic head. Radiology favored complex pseudocyst, but mass difficult to exclude. CTA excludes pancreatic dilation as well as US abdomen 9/13 with out biliary dilation which makes obstructive pathology less likely. We will discuss with our pancreatic/biliary team.  -- We will discuss imaging findings with pancreatic/biliary team  --  "Possible follow up MRI once resolution of acute symptoms    MELD-Na of  28   Hepatic encephalopathy:   Ascites: No evidence of ascites on US 9/13   TIPS: No history of TIPS  Esophageal/Gastric varices: Last EGD was 6/1/2011, but done at OSH unable to review report  Hepatocellular carcinoma: No evidence of mass on US 9/13  Transplant: Not listed, actively drinking (last drink 9/12)  Nutrition: Weight is ; Gain/Loss,  Coagulopathy: INR 1.35  Thrombocytopenia: Plts 21, related to cirrhosis     RECOMMENDATIONS:  -- Rifaximin 550 mg BID  -- Lactulose PO, titrate to 3-4 BMs per day  -- Monitor neurologic status, high risk of progression  -- Monitor transaminases, bilirubin, INR  -- No current indication for steroids  -- Continue supportive cares  -- Adequate protein diet (1.2-1.5g/kg per day) w/ supplements in between meals  -- Please have outside hospital CT images pushed to our system to facilitate our review     Harriett Elam MS4    \"I was present with the student who participated in the service and in the documentation of the note. I have verified the history and personally performed the physical exam and medical decisionmaking. I agree with the assessment and plan of care as documented in the note.\"     Jessie Bourgeois MD, [facility]  Gastroenterology Fellow  Pager [phone]    Attestation:  This patient has been seen and evaluated by me, Sue Mattson.  Discussed with the house staff team or resident(s) and agree with the findings and plan in this note.     "

## 2021-09-13 NOTE — LETTER
Health Information Management Services               Recipient: [facility]          Sender: YANDY Slade PH: [phone], pager [phone], RN station: [phone]          Date: September 21, 2021  Patient Name:  Aliyah Angeles  Routing Message:  Discharge orders          The documents accompanying this notice contain confidential information belonging to the sender.  This information is intended only for the use of the individual or entity named above.  The authorized recipient of this information is prohibited from disclosing this information to any other party and is required to destroy the information after its stated need has been fulfilled, unless otherwise required by state law.      If you are not the intended recipient, you are hereby notified that any disclosure, copy, distribution or action taken in reliance on the contents of these documents is strictly prohibited.  If you have received this document in error, please return it by fax to [phone] with a note on the cover sheet explaining why you are returning it (e.g. not your patient, not your provider, etc.).  If you need further assistance, please call [facility] Centralized Transcription at [phone].  Documents may also be returned by mail to [employer], , [facility] [address], [place], [place] [address].

## 2021-09-13 NOTE — PHARMACY-ADMISSION MEDICATION HISTORY
"  Admission Medication History Completed by Pharmacy    See [facility] Admission Navigator for allergy information, preferred outpatient pharmacy, prior to admission medications and immunization status.     Medication History Sources:     [facility] medication list (primary health system)    Dispense history    Changes made to PTA medication list (reason):    Added: fluconazole (taken x1 on 9/6 for \"cough productive of purulent sputum\", triamcinolone cream    Deleted: seroquel, trazodone (no longer taking)    Changed: gabapentin 200 TID --> 100 mg 2-4x daily    Additional Information:    None    Prior to Admission medications    Medication Sig Last Dose Taking? Auth Provider   albuterol (PROAIR HFA/PROVENTIL HFA/VENTOLIN HFA) 108 (90 Base) MCG/ACT inhaler Inhale 2 puffs into the lungs every 4 hours as needed for wheezing or shortness of breath / dyspnea Unknown at Unknown time Yes Raplh Childers MD   calcium carbonate 600 mg-vitamin D 400 units (CALTRATE) 600-400 MG-UNIT per tablet Take 1 tablet by mouth daily Unknown at Unknown time Yes Belia Kerr MD   cyanocobalamin (CYANOCOBALAMIN) 1000 MCG/ML injection INJECT 1 ML (1,000 MCG) SUBCUTANEOUS EVERY 4 WEEKS. Past Month at Unknown time Yes Reported, Patient   escitalopram (LEXAPRO) 20 MG tablet Take 1 tablet (20 mg) by mouth daily  Patient taking differently: Take 10 mg by mouth daily  Unknown at Unknown time Yes Belia Kerr MD   famotidine (PEPCID) 20 MG tablet Take 20 mg by mouth 2 times daily  Unknown at Unknown time Yes Reported, Patient   gabapentin (NEURONTIN) 100 MG capsule Take 100 mg by mouth 2-4 times daily Unknown at Unknown time Yes Unknown, Entered By History   hydrOXYzine (VISTARIL) 25 MG capsule 1-2 capsules po q 6 hrs itch Unknown at Unknown time Yes Reported, Patient   multivitamin w/minerals (THERA-VIT-M) tablet Take 1 tablet by mouth daily Unknown at Unknown time Yes Belia Kerr MD   prazosin (MINIPRESS) 1 MG capsule Take 1 mg by " mouth Unknown at Unknown time Yes Reported, Patient   triamcinolone (KENALOG) 0.1 % external cream Apply topically 3 times daily Unknown at Unknown time Yes Unknown, Entered By History   fluconazole (DIFLUCAN) 150 MG tablet Take 150 mg by mouth once May repeat 9/11. 9/6/2021  Unknown, Entered By History       Date completed: 09/13/21    Medication history completed by: Jessica Abraham [facility]

## 2021-09-13 NOTE — CONSULTS
BRIEF SW NOTE:    SW met with pt at bedside to provide blank health care directive. Pt had no questions about HCD and understood need for notary. Pt to contact SW if she completes HCD during her hospitalization to set up notary.    JOY Vila, [facility]  ICU   Formerly [facility]  Ph: [phone]  [phone]

## 2021-09-13 NOTE — H&P
[facility]    History and Physical - MICU Service        Date of Admission:  9/13/21    Assessment & Plan      Aliyah Angeles is a 45 year old female admitted on 9/13/21. She has a past medical history of gastric bypass c/b B12 deficiency, anxiety, PTSD, fibromyalgia, alcoholic pancreatitis, and alcohol abuse who was admitted to the ICU on 9/13/21 for elevated lactate in the setting new hyperbilirubinemia and recent alcohol abuse.     Neuro:    #Alcohol Abuse  No obvious signs of withdrawal at this time. No history of withdrawal seizures or hallucinations. Last drink 9/12/21. Given her significant alcohol use (1L fireball daily), she high risk for withdrawal.   -CIWA protocol with PRN Lorazepam   -Thiamine and Folate   -Phos, Mag pending     #PTSD  #Anxiety  -PTA Prazosin   -PTA Escitalopram   -PTA Hydroxyzine 25mg     #Neuropathy  #Fibromyalgia   -PTA Gabapentin     Pulmonary:    #Shortness of Breath  #Recent hx of Bronchitis   Patient reports 3 day history of dyspnea and has been having a cough for the past month. She was recently treated for bronchitis with Doxycycline and she stated her cough is improving. She is oxygenating well on room air. CT Chest at outside hospital with faint groundglass opacities in lower lobes and bandlike atelectasis in right lower lobe. CXR at OSH with no obvious consolidation. Dyspnea may be secondary to resolving bronchitis vs. CAP. She remains afebrile. Of note, patient has not been vaccinated for COVID. COVID test was negative.   -CXR pending   -will treat for CAP with Ceftriaxone, Azithromycin   -MRSA pending  -procalcitonin pending   -respiratory viral panel  -sputum culture     Cardiac:  No acute concerns.     GI/Nutrition:    #Hyperbilirubinemia  #Hx of cholecystectomy  Bilirubin 13 at OSH. This may be secondary to acute liver dysfunction. Suspicion for biliary obstruction is low given patient has had a cholecystectomy and CT  Abdomen at OSH showed no evidence of biliary/pancreatic duct dilation.   -hepatic panel, LDH, haptoglobin   -RUQ U/S    #Pancreatic Fat Stranding   #Hx of alcoholic pancreatitis   #Incidental 16 mm hypoattenuation near pancreatic head   CT Abdomen obtained at OSH (9/13/21) showed pancreatic atrophy and interval decrease in peripancreatic fat stranding. There is some mild fat stranding, however lipase remains within normal limits and patient denies abdominal pain; no acute concern for acute pancreatitis. CT head also showed 16 mm hypoattenuation that could represent complex pseudocyst vs. pancreatic mass.   -patient will need follow up MRI to evaluate for hypoattenuation after resolution of acute symptoms     #Alcoholic Hepatitis   and ALT 88 at OSH. Ethanol level was 0.205. This is likely secondary to alcohol use. CT Abdomen with trace ascites. Patient feels like her abdomen is more distended than usual.   -Abdominal U/S to better evaluate ascites     #Gastritis  -PTA Famotidine     Renal/Fluids/Electrolytes:    #Anion Gap Metabolic Acidosis   #Lactic acidosis   #C/f starvation ketosis/ alcohol ketoacidosis   Lactate 13 at OSH, which improved to 11 s/p 1 L NS. This is likely in setting of alcohol use and poor PO intake. Given significantly elevated lactate, there is some concern for infection. On admission here, patient is  afebrile and hemodynamically stable which is reassuring.   -repeat lactate  -will give additional 1L NS   -serum/urine ketone pending   -for infectious workup, refer to ID     #Hypokalemia   Likely secondary to decreased po intake.     #Hyponatremia   Likely hypovolemic hyponatremia in the setting of alcohol use and poor PO intake. Sodium improved at OSH s/p 1 L NS.  -will give additional 1L NS     Endocrine:    #Hyperglycemia  No prior diagnosis of DM.  -sliding scale insulin  -HgA1c pending     ID:  #Elevated Lactate   Decreased po intake vs. infection.     Workup:  Blood Cultures  (9/13/21):   UA (9/13/21):  Sputum cultures (9/13/21):  Viral Respiratory Panel (9/13/21):     Antibiotics:   Vanc/Cefepime given at OSH prior to hospital admission here.    Ceftriaxone (9/13/21-*)   Azithromycin (9/13/21-*)     Hematology:    #Macrocytic Anemia   #Thrombocytopenia  Platelet 45 at OSH. Platelets on 7/2021 were 131, so this appears lower than baseline. CT Abdomen at OSH with normal sized spleen. This may be secondary to chronic B12 deficiency and further exacerbated by acute illness.    -CTM    MSK/Skin:  No acute concerns.     Lines and Tubes:  PIV        Diet:  NPO, for now    DVT Prophylaxis: Enoxaparin (Lovenox) SQ  Rice Catheter: Not present  Fluids: 1L NS  Central Lines: None  Code Status:  Full    Clinically Significant Risk Factors Present on Admission                 Patient staffed with attending physician Dr. Bar.     Disposition Plan   Medical ICU     Kathi Robles MD PGY-2   MICU Service  [facility]  Securely message with the Vocera Web Console (learn more here)  Text page via [facility] Paging/Directory  Please see sign in/sign out for up to date coverage information  ______________________________________________________________________    Chief Complaint   Shortness of Breath   Abdominal Distension   Decreased oral intake     History is obtained from the patient and EMR.     History of Present Illness   Aliyah Angeles is a 45 year old female admitted on 9/13/21. She has a past medical history of gastric bypass c/b B12 deficiency, anxiety, PTSD, fibromyalgia, alcoholic pancreatitis, and alcohol abuse who was transferred to [facility] from [place] ED for concerns of elevated lactate (11).     Patient was brought by her father to the [place] ED for concerns of dehydration and alcohol intoxication. She has been drinking 1 L of fireball daily, and he thought she was not looking well.     She states her last drink was 9/12/21 before she arrived to  the [place] ED. She states she drank 1 shot of fireball prior to coming to the ED. She states she has frequently been admitted for alcohol withdrawal, however denies a previous history of seizures or hallucinations.     She endorses decreased oral intake for the past week. She states she has not eaten anything because of loss of appetite. Denies abdominal pain, diarrhea dysuria, fevers, chills, nausea, or vomiting.     She also endorses cough and increased shortness of breath. She was recently treated with Doxycycline and since then the cough has somewhat improved, but is persistent. The shortness of breath is new and started about three days ago. She feels like her stomach is more distended than usual.     Review of Systems    The 10 point Review of Systems is negative other than noted in the HPI or here.     Past Medical History    Diabetes Maternal Grandmother     Cancer-colon Maternal Grandmother   dx at age 61     Diabetes Paternal Grandmother     Heart Disease Neg.     Good Health Mother     Other Father   unsure     Good Health Sister     Good Health Brother     Cancer-breast No Family History       Past Surgical History   . Laterality Date     Cryotherapy of cervix 2000     Gastric bypass 8/11/10     Lap cholecystectomy 1/22/2010     Leep procedure 2001     Tonsillectomy 1987     Vaginal hysterectomy 04/05/2013       Social History   Smoking status: Current Every Day Smoker  Packs/day: 1.00  Years: 24.00  Pack years: 24  Types: Cigarettes  Alcohol/week: 84.0 standard drinks  Types: 84 Cans of beer per week      Family History   I have reviewed this patient's family history and updated it with pertinent information if needed.  Family History   Problem Relation Age of Onset     Prostate Cancer Father      Hypertension Father      Hyperlipidemia Father      Substance Abuse Father      Obesity Father      Diabetes Maternal Grandmother      Colon Cancer Maternal Grandmother      Other Cancer Maternal Grandmother          pancreatic cancer     Obesity Maternal Grandmother      Diabetes Paternal Grandmother      Obesity Paternal Grandmother      Diabetes Paternal Grandfather      Obesity Paternal Grandfather      Breast Cancer Sister      Breast Cancer Sister      Anxiety Disorder Sister      Anesthesia Reaction Sister      Depression Mother      Anesthesia Reaction Mother      Asthma Daughter      Coronary Artery Disease No family hx of      Hypertension No family hx of      Hyperlipidemia No family hx of      Cerebrovascular Disease No family hx of      Depression No family hx of      Anxiety Disorder No family hx of      Mental Illness No family hx of      Substance Abuse No family hx of      Anesthesia Reaction No family hx of      Asthma No family hx of      Osteoporosis No family hx of      Genetic Disorder No family hx of      Thyroid Disease No family hx of      Obesity No family hx of      Unknown/Adopted No family hx of        Prior to Admission Medications   Cannot display prior to admission medications because the patient has not been admitted in this contact.     Allergies   Allergies   Allergen Reactions     Nsaids      Gastric bypass       Physical Exam   Vital Signs:                    Weight: 0 lbs 0 oz    General: no acute distress, coughing,laying in bed  HEENT: NC/AT, icteric sclera, EOMI   Resp: minimal course crackles lower lobes, no wheezing, no increased work of breathing  Cardiac: RRR, normal S1/S2, warm distal extremities  Abdomen: distended, soft, nontender   Extremities: no erythema or discoloration, intact sensation in all extremities, no lower extremity edema  Neuro: AAAX3, cranial nerves grossly intact     Data   Data reviewed today: I reviewed all medications, new labs and imaging results over the last 24 hours.

## 2021-09-14 ENCOUNTER — APPOINTMENT (OUTPATIENT)
Dept: OCCUPATIONAL THERAPY | Facility: CLINIC | Age: 46
End: 2021-09-14
Attending: INTERNAL MEDICINE
Payer: COMMERCIAL

## 2021-09-14 LAB
ALBUMIN SERPL-MCNC: 1.1 G/DL (ref 3.4–5)
ALP SERPL-CCNC: 437 U/L (ref 40–150)
ALT SERPL W P-5'-P-CCNC: 71 U/L (ref 0–50)
ANION GAP SERPL CALCULATED.3IONS-SCNC: 7 MMOL/L (ref 3–14)
AST SERPL W P-5'-P-CCNC: 286 U/L (ref 0–45)
ATRIAL RATE - MUSE: 98 BPM
BILIRUB DIRECT SERPL-MCNC: 8.8 MG/DL (ref 0–0.2)
BILIRUB SERPL-MCNC: 10.7 MG/DL (ref 0.2–1.3)
BUN SERPL-MCNC: 4 MG/DL (ref 7–30)
CA-I BLD-MCNC: 4.2 MG/DL (ref 4.4–5.2)
CALCIUM SERPL-MCNC: 7.8 MG/DL (ref 8.5–10.1)
CHLORIDE BLD-SCNC: 97 MMOL/L (ref 94–109)
CO2 SERPL-SCNC: 24 MMOL/L (ref 20–32)
CREAT SERPL-MCNC: 0.53 MG/DL (ref 0.52–1.04)
D DIMER PPP FEU-MCNC: 0.49 UG/ML FEU (ref 0–0.5)
DAT, ANTI-IGG, C3: NORMAL
DIASTOLIC BLOOD PRESSURE - MUSE: NORMAL MMHG
ERYTHROCYTE [DISTWIDTH] IN BLOOD BY AUTOMATED COUNT: 18.6 % (ref 10–15)
ERYTHROCYTE [DISTWIDTH] IN BLOOD BY AUTOMATED COUNT: 18.9 % (ref 10–15)
FOLATE SERPL-MCNC: 3 NG/ML
GFR SERPL CREATININE-BSD FRML MDRD: >90 ML/MIN/1.73M2
GLUCOSE BLD-MCNC: 192 MG/DL (ref 70–99)
GLUCOSE BLDC GLUCOMTR-MCNC: 189 MG/DL (ref 70–99)
GLUCOSE BLDC GLUCOMTR-MCNC: 207 MG/DL (ref 70–99)
GLUCOSE BLDC GLUCOMTR-MCNC: 220 MG/DL (ref 70–99)
GLUCOSE BLDC GLUCOMTR-MCNC: 220 MG/DL (ref 70–99)
GLUCOSE BLDC GLUCOMTR-MCNC: 266 MG/DL (ref 70–99)
HBV CORE IGM SERPL QL IA: NONREACTIVE
HCT VFR BLD AUTO: 23.2 % (ref 35–47)
HCT VFR BLD AUTO: 24.3 % (ref 35–47)
HCV AB SERPL QL IA: NONREACTIVE
HGB BLD-MCNC: 8.1 G/DL (ref 11.7–15.7)
HGB BLD-MCNC: 8.4 G/DL (ref 11.7–15.7)
HGB FREE PLAS-MCNC: 70 MG/DL
HOLD SPECIMEN: NORMAL
INR PPP: 1.62 (ref 0.85–1.15)
INTERPRETATION ECG - MUSE: NORMAL
LACTATE SERPL-SCNC: 1.8 MMOL/L (ref 0.7–2)
LDH SERPL L TO P-CCNC: 454 U/L (ref 81–234)
LDH SERPL L TO P-CCNC: 523 U/L (ref 81–234)
MAGNESIUM SERPL-MCNC: 2.2 MG/DL (ref 1.6–2.3)
MCH RBC QN AUTO: 36.4 PG (ref 26.5–33)
MCH RBC QN AUTO: 37.7 PG (ref 26.5–33)
MCHC RBC AUTO-ENTMCNC: 34.6 G/DL (ref 31.5–36.5)
MCHC RBC AUTO-ENTMCNC: 34.9 G/DL (ref 31.5–36.5)
MCV RBC AUTO: 105 FL (ref 78–100)
MCV RBC AUTO: 108 FL (ref 78–100)
P AXIS - MUSE: 39 DEGREES
PHOSPHATE SERPL-MCNC: 1.2 MG/DL (ref 2.5–4.5)
PHOSPHATE SERPL-MCNC: 1.2 MG/DL (ref 2.5–4.5)
PLAT MORPH BLD: NORMAL
PLATELET # BLD AUTO: 17 10E3/UL (ref 150–450)
PLATELET # BLD AUTO: 20 10E3/UL (ref 150–450)
POTASSIUM BLD-SCNC: 3.9 MMOL/L (ref 3.4–5.3)
PR INTERVAL - MUSE: 146 MS
PROT SERPL-MCNC: 4.4 G/DL (ref 6.8–8.8)
QRS DURATION - MUSE: 92 MS
QT - MUSE: 434 MS
QTC - MUSE: 554 MS
R AXIS - MUSE: 50 DEGREES
RBC # BLD AUTO: 2.15 10E6/UL (ref 3.8–5.2)
RBC # BLD AUTO: 2.31 10E6/UL (ref 3.8–5.2)
RBC MORPH BLD: NORMAL
SODIUM SERPL-SCNC: 128 MMOL/L (ref 133–144)
SPECIMEN EXPIRATION DATE: NORMAL
SYSTOLIC BLOOD PRESSURE - MUSE: NORMAL MMHG
T AXIS - MUSE: 29 DEGREES
TSH SERPL DL<=0.005 MIU/L-ACNC: 3.42 MU/L (ref 0.4–4)
VENTRICULAR RATE- MUSE: 98 BPM
VIT B12 SERPL-MCNC: 3161 PG/ML (ref 193–986)
WBC # BLD AUTO: 6.3 10E3/UL (ref 4–11)
WBC # BLD AUTO: 7.5 10E3/UL (ref 4–11)

## 2021-09-14 PROCEDURE — 85610 PROTHROMBIN TIME: CPT | Performed by: STUDENT IN AN ORGANIZED HEALTH CARE EDUCATION/TRAINING PROGRAM

## 2021-09-14 PROCEDURE — 250N000013 HC RX MED GY IP 250 OP 250 PS 637

## 2021-09-14 PROCEDURE — 999N000147 HC STATISTIC PT IP EVAL DEFER

## 2021-09-14 PROCEDURE — 82330 ASSAY OF CALCIUM: CPT | Performed by: STUDENT IN AN ORGANIZED HEALTH CARE EDUCATION/TRAINING PROGRAM

## 2021-09-14 PROCEDURE — 36415 COLL VENOUS BLD VENIPUNCTURE: CPT | Performed by: STUDENT IN AN ORGANIZED HEALTH CARE EDUCATION/TRAINING PROGRAM

## 2021-09-14 PROCEDURE — 85027 COMPLETE CBC AUTOMATED: CPT | Performed by: INTERNAL MEDICINE

## 2021-09-14 PROCEDURE — 86880 COOMBS TEST DIRECT: CPT | Performed by: STUDENT IN AN ORGANIZED HEALTH CARE EDUCATION/TRAINING PROGRAM

## 2021-09-14 PROCEDURE — 86705 HEP B CORE ANTIBODY IGM: CPT | Performed by: STUDENT IN AN ORGANIZED HEALTH CARE EDUCATION/TRAINING PROGRAM

## 2021-09-14 PROCEDURE — 83615 LACTATE (LD) (LDH) ENZYME: CPT | Performed by: STUDENT IN AN ORGANIZED HEALTH CARE EDUCATION/TRAINING PROGRAM

## 2021-09-14 PROCEDURE — 97535 SELF CARE MNGMENT TRAINING: CPT | Mod: GO

## 2021-09-14 PROCEDURE — 250N000013 HC RX MED GY IP 250 OP 250 PS 637: Performed by: STUDENT IN AN ORGANIZED HEALTH CARE EDUCATION/TRAINING PROGRAM

## 2021-09-14 PROCEDURE — 82607 VITAMIN B-12: CPT | Performed by: STUDENT IN AN ORGANIZED HEALTH CARE EDUCATION/TRAINING PROGRAM

## 2021-09-14 PROCEDURE — 36415 COLL VENOUS BLD VENIPUNCTURE: CPT | Performed by: INTERNAL MEDICINE

## 2021-09-14 PROCEDURE — 250N000009 HC RX 250: Performed by: STUDENT IN AN ORGANIZED HEALTH CARE EDUCATION/TRAINING PROGRAM

## 2021-09-14 PROCEDURE — 250N000009 HC RX 250: Performed by: HOSPITALIST

## 2021-09-14 PROCEDURE — 97530 THERAPEUTIC ACTIVITIES: CPT | Mod: GO

## 2021-09-14 PROCEDURE — 250N000013 HC RX MED GY IP 250 OP 250 PS 637: Performed by: INTERNAL MEDICINE

## 2021-09-14 PROCEDURE — 250N000009 HC RX 250: Performed by: INTERNAL MEDICINE

## 2021-09-14 PROCEDURE — 258N000003 HC RX IP 258 OP 636: Performed by: HOSPITALIST

## 2021-09-14 PROCEDURE — 82525 ASSAY OF COPPER: CPT | Performed by: STUDENT IN AN ORGANIZED HEALTH CARE EDUCATION/TRAINING PROGRAM

## 2021-09-14 PROCEDURE — 258N000003 HC RX IP 258 OP 636: Performed by: STUDENT IN AN ORGANIZED HEALTH CARE EDUCATION/TRAINING PROGRAM

## 2021-09-14 PROCEDURE — 250N000011 HC RX IP 250 OP 636: Performed by: STUDENT IN AN ORGANIZED HEALTH CARE EDUCATION/TRAINING PROGRAM

## 2021-09-14 PROCEDURE — 85379 FIBRIN DEGRADATION QUANT: CPT | Performed by: STUDENT IN AN ORGANIZED HEALTH CARE EDUCATION/TRAINING PROGRAM

## 2021-09-14 PROCEDURE — 99231 SBSQ HOSP IP/OBS SF/LOW 25: CPT | Mod: GC | Performed by: INTERNAL MEDICINE

## 2021-09-14 PROCEDURE — 84100 ASSAY OF PHOSPHORUS: CPT | Performed by: INTERNAL MEDICINE

## 2021-09-14 PROCEDURE — 99222 1ST HOSP IP/OBS MODERATE 55: CPT | Mod: GC | Performed by: INTERNAL MEDICINE

## 2021-09-14 PROCEDURE — 120N000002 HC R&B MED SURG/OB UMMC

## 2021-09-14 PROCEDURE — 83735 ASSAY OF MAGNESIUM: CPT | Performed by: INTERNAL MEDICINE

## 2021-09-14 PROCEDURE — 97165 OT EVAL LOW COMPLEX 30 MIN: CPT | Mod: GO

## 2021-09-14 PROCEDURE — 85027 COMPLETE CBC AUTOMATED: CPT | Performed by: STUDENT IN AN ORGANIZED HEALTH CARE EDUCATION/TRAINING PROGRAM

## 2021-09-14 PROCEDURE — 83051 HEMOGLOBIN PLASMA: CPT | Performed by: STUDENT IN AN ORGANIZED HEALTH CARE EDUCATION/TRAINING PROGRAM

## 2021-09-14 PROCEDURE — 82247 BILIRUBIN TOTAL: CPT | Performed by: STUDENT IN AN ORGANIZED HEALTH CARE EDUCATION/TRAINING PROGRAM

## 2021-09-14 PROCEDURE — 83605 ASSAY OF LACTIC ACID: CPT | Performed by: INTERNAL MEDICINE

## 2021-09-14 PROCEDURE — 87798 DETECT AGENT NOS DNA AMP: CPT | Performed by: STUDENT IN AN ORGANIZED HEALTH CARE EDUCATION/TRAINING PROGRAM

## 2021-09-14 PROCEDURE — 99233 SBSQ HOSP IP/OBS HIGH 50: CPT | Mod: GC | Performed by: INTERNAL MEDICINE

## 2021-09-14 PROCEDURE — 86706 HEP B SURFACE ANTIBODY: CPT | Performed by: STUDENT IN AN ORGANIZED HEALTH CARE EDUCATION/TRAINING PROGRAM

## 2021-09-14 PROCEDURE — 86803 HEPATITIS C AB TEST: CPT | Performed by: STUDENT IN AN ORGANIZED HEALTH CARE EDUCATION/TRAINING PROGRAM

## 2021-09-14 PROCEDURE — 84295 ASSAY OF SERUM SODIUM: CPT | Performed by: STUDENT IN AN ORGANIZED HEALTH CARE EDUCATION/TRAINING PROGRAM

## 2021-09-14 RX ORDER — LIDOCAINE HYDROCHLORIDE 20 MG/ML
1 JELLY TOPICAL DAILY PRN
Status: COMPLETED | OUTPATIENT
Start: 2021-09-14 | End: 2021-09-14

## 2021-09-14 RX ORDER — OXYCODONE HYDROCHLORIDE 5 MG/1
5 TABLET ORAL ONCE
Status: COMPLETED | OUTPATIENT
Start: 2021-09-14 | End: 2021-09-14

## 2021-09-14 RX ORDER — ACETAMINOPHEN 325 MG/1
325 TABLET ORAL ONCE
Status: CANCELLED | OUTPATIENT
Start: 2021-09-14 | End: 2021-09-14

## 2021-09-14 RX ADMIN — Medication 2.5 MG: at 02:03

## 2021-09-14 RX ADMIN — CEFTRIAXONE SODIUM 1 G: 1 INJECTION, POWDER, FOR SOLUTION INTRAMUSCULAR; INTRAVENOUS at 07:30

## 2021-09-14 RX ADMIN — GABAPENTIN 100 MG: 100 CAPSULE ORAL at 07:28

## 2021-09-14 RX ADMIN — Medication 1 TABLET: at 07:28

## 2021-09-14 RX ADMIN — CALCIUM CHLORIDE 2 G: 100 INJECTION, SOLUTION INTRAVENOUS at 11:19

## 2021-09-14 RX ADMIN — CALCIUM CARBONATE 600 MG (1,500 MG)-VITAMIN D3 400 UNIT TABLET 1 TABLET: at 07:28

## 2021-09-14 RX ADMIN — Medication 15 MMOL: at 05:05

## 2021-09-14 RX ADMIN — FOLIC ACID 1 MG: 5 INJECTION, SOLUTION INTRAMUSCULAR; INTRAVENOUS; SUBCUTANEOUS at 07:28

## 2021-09-14 RX ADMIN — RIFAXIMIN 550 MG: 550 TABLET ORAL at 11:19

## 2021-09-14 RX ADMIN — THIAMINE HYDROCHLORIDE 500 MG: 100 INJECTION, SOLUTION INTRAMUSCULAR; INTRAVENOUS at 20:44

## 2021-09-14 RX ADMIN — GABAPENTIN 100 MG: 100 CAPSULE ORAL at 13:59

## 2021-09-14 RX ADMIN — DOXYCYCLINE HYCLATE 100 MG: 100 CAPSULE ORAL at 07:30

## 2021-09-14 RX ADMIN — LIDOCAINE HYDROCHLORIDE 1 ML: 20 JELLY TOPICAL at 18:34

## 2021-09-14 RX ADMIN — DOXYCYCLINE HYCLATE 100 MG: 100 CAPSULE ORAL at 20:44

## 2021-09-14 RX ADMIN — THIAMINE HYDROCHLORIDE 500 MG: 100 INJECTION, SOLUTION INTRAMUSCULAR; INTRAVENOUS at 13:59

## 2021-09-14 RX ADMIN — POTASSIUM CHLORIDE 60 MEQ: 40 SOLUTION ORAL at 07:28

## 2021-09-14 RX ADMIN — GABAPENTIN 100 MG: 100 CAPSULE ORAL at 20:44

## 2021-09-14 RX ADMIN — PANTOPRAZOLE SODIUM 40 MG: 40 TABLET, DELAYED RELEASE ORAL at 07:28

## 2021-09-14 RX ADMIN — RIFAXIMIN 550 MG: 550 TABLET ORAL at 20:44

## 2021-09-14 RX ADMIN — SODIUM PHOSPHATE, MONOBASIC, MONOHYDRATE AND SODIUM PHOSPHATE, DIBASIC, ANHYDROUS 15 MMOL: 276; 142 INJECTION, SOLUTION INTRAVENOUS at 18:34

## 2021-09-14 RX ADMIN — THIAMINE HYDROCHLORIDE 500 MG: 100 INJECTION, SOLUTION INTRAMUSCULAR; INTRAVENOUS at 07:45

## 2021-09-14 RX ADMIN — OXYCODONE HYDROCHLORIDE 5 MG: 5 TABLET ORAL at 14:25

## 2021-09-14 RX ADMIN — LACTULOSE 20 G: 20 POWDER, FOR SOLUTION ORAL at 14:02

## 2021-09-14 ASSESSMENT — ACTIVITIES OF DAILY LIVING (ADL)
ADLS_ACUITY_SCORE: 16
PREVIOUS_RESPONSIBILITIES: MEAL PREP;HOUSEKEEPING;LAUNDRY;SHOPPING;MEDICATION MANAGEMENT;FINANCES;DRIVING
ADLS_ACUITY_SCORE: 16
ADLS_ACUITY_SCORE: 16

## 2021-09-14 ASSESSMENT — MIFFLIN-ST. JEOR: SCORE: 1438

## 2021-09-14 NOTE — CONSULTS
Hematology Consult Note   Date of Service: 09/14/2021    Patient: Aliyah Angeles  MRN: [id number]  Admission Date: 9/13/2021  Hospital Day: 1  Primary Outpatient Hematologist: None    Reason for Consult: Patient with alcoholic hepatitis with concerns for hemolysis and worsening thrombocytopenia of unclear etiology. Smear not concerning for TTP.    Assessment:   Aliyah Angeles is a 45 year old female with a history of severe alcohol use disorder, gastric bypass who was transferred from OSH to ICU for lactic acidosis (13) in the setting of acute alcoholic hepatitis found to have anemia concerning for hemolysis and worsening thrombocytopenia.    1. Acute macrocytic anemia, component of lawanda cell anemia  2. Acute on chronic thromboctyopenia  3. Alcoholic hepatitis  4. Severe alcohol use disorder  5. Gastric bypass    Although there is some degree of hemolysis based on elevated LDH, decreased haptoglobin, elevated plasma free hemoglobin, there is no evidence of a microangiopathic hemolytic anemia on formal peripheral smear (9/13/2021) or our review of peripheral smear today. However, we do see evidence of lawanda cell anemia, occasional bite cells, and poikilocytosis which is likely attributable to her liver disease, alcohol use, and possible G6PD deficiency (not a useful assay at this time). Low suspicion for extravascular hemolysis given normal spleen size and lack of spherocytes but cannot exclude AIHA at this time. No evidence of VTE or severe coagulopathy at this time. Low suspicion for infection as cause of cytopenias as HIV and HCV were negative (8/10/21) with pending EBV PCR, Parvovirus PCR and Hep B serologies. Given her severe alcohol use disorder as well as her history of gastric bypass, there is also a component of hypoproliferation (retic index 0.9) partly due to nutritonal deficiencies but B12 was 581 (8/31/2021), no recent iron labs and current ferritin of 1836 is likely an acute phase reactant from acute  alcoholic hepatitis. There is no reports or evidence of blood loss at this time but given a 4 g drop in hemoglobin in last 24 hours, we recommend further evaluation of this. Thrombocytopenia is likely secondary to liver disease and alcohol use and unlikely to be TTP or ITP.    Plan & Recommendations:   - Await copper level, LIZ, EBV PCR, hepatitis B serologies, parvovirus PCR  - Treatment of severe alcohol use disorder  - Agree with GI consultation    Patient was seen and plan of care was discussed with attending physician Dr. Rivera.  We will continue to follow this patient. Please don't hesitate to contact with questions.    John Calvo MD  Internal Medicine, PGY-3  Pager: [phone]    History of Present Illness:    Aliyah Angeles is a 45 year old female with a history of severe alcohol use disorder, gastric bypass who was transferred from OSH to ICU for lactic acidosis (13) in the setting of acute alcoholic hepatitis found to have anemia concerning for hemolysis and worsening thrombocytopenia.    Patient denies hematochezia, melena, hematemesis, abnormal uterine bleeding (hysterectomy) but does report small intermittent epistaxis.  Patient's abdomen is distended and denies any abdominal pain and is able to ambulate around the room without difficulty.  She denies any fever/chills, chest pain, shortness of breath, leg pain/swelling, rashes, lesions, joint pain. No recent procedures or transfusions of blood products. No history of VTE. Has a history of B12 and iron deficiency.    Subjective    Review of Systems:  A comprehensive ROS was performed and found to be negative or non-contributory with the exception of that noted in the HPI above.    Past Medical History:   Diagnosis Date     Acne      ADHD (attention deficit hyperactivity disorder)      Arthralgia of temporomandibular joint 5/19/2016     Arthritis      Cervical high risk HPV (human papillomavirus) test positive 4/27/2018 4/27/18 NIL pap, + HR  HPV (not 16 or 18). Plan: cotest in 1 yr.       Cobalamin deficiency 11/24/2014     Drug-seeking behavior 5/19/2016    Overview:  Per  website, patient has filled with multiple controlled substances with multiple prescribers at multiple pharmacies      History of blood transfusion 01/01/1988     Insomnia 7/21/2013     Raynaud's phenomenon 5/25/2015     Vitamin D deficiency 1/16/2013     Past Surgical History:   Procedure Laterality Date     ABDOMEN SURGERY  08/10/2010    Gastric  bypass     BIOPSY  1/1/94    HPV, multiple reoccurrances, partial  hysterectomy  2013     CHOLECYSTECTOMY  01/01/2010     COLONOSCOPY  01/01/2009     ENT SURGERY  01/01/1988    Tonsils     GI SURGERY  8/10/10    F[facility]     HYSTERECTOMY VAGINAL  04/05/2013    pathology of cervix benign.      Social History     Socioeconomic History     Marital status: Legally      Spouse name: Not on file     Number of children: Not on file     Years of education: Not on file     Highest education level: Not on file   Occupational History     Not on file   Tobacco Use     Smoking status: Current Every Day Smoker     Packs/day: 1.00     Years: 15.00     Pack years: 15.00     Types: Cigarettes     Start date: 1/1/1989     Smokeless tobacco: Never Used   Substance and Sexual Activity     Alcohol use: Yes     Alcohol/week: 0.0 standard drinks     Comment: daily unknown amount      Drug use: No     Sexual activity: Yes     Partners: Male     Birth control/protection: Male Surgical, Female Surgical     Comment:  one partner   Other Topics Concern     Parent/sibling w/ CABG, MI or angioplasty before 65F 55M? No   Social History Narrative     Not on file     Social Determinants of Health     Financial Resource Strain:      Difficulty of Paying Living Expenses:    Food Insecurity:      Worried About Running Out of Food in the Last Year:      Ran Out of Food in the Last Year:    Transportation Needs:      Lack of Transportation (Medical):       Lack of Transportation (Non-Medical):    Physical Activity:      Days of Exercise per Week:      Minutes of Exercise per Session:    Stress:      Feeling of Stress :    Social Connections:      Frequency of Communication with Friends and Family:      Frequency of Social Gatherings with Friends and Family:      Attends [employer] Services:      Active Member of Clubs or Organizations:      Attends Club or Organization Meetings:      Marital Status:    Intimate Partner Violence:      Fear of Current or Ex-Partner:      Emotionally Abused:      Physically Abused:      Sexually Abused:       Family History   Problem Relation Age of Onset     Prostate Cancer Father      Hypertension Father      Hyperlipidemia Father      Substance Abuse Father      Obesity Father      Diabetes Maternal Grandmother      Colon Cancer Maternal Grandmother      Other Cancer Maternal Grandmother         pancreatic cancer     Obesity Maternal Grandmother      Diabetes Paternal Grandmother      Obesity Paternal Grandmother      Diabetes Paternal Grandfather      Obesity Paternal Grandfather      Breast Cancer Sister      Breast Cancer Sister      Anxiety Disorder Sister      Anesthesia Reaction Sister      Depression Mother      Anesthesia Reaction Mother      Asthma Daughter      Coronary Artery Disease No family hx of      Hypertension No family hx of      Hyperlipidemia No family hx of      Cerebrovascular Disease No family hx of      Depression No family hx of      Anxiety Disorder No family hx of      Mental Illness No family hx of      Substance Abuse No family hx of      Anesthesia Reaction No family hx of      Asthma No family hx of      Osteoporosis No family hx of      Genetic Disorder No family hx of      Thyroid Disease No family hx of      Obesity No family hx of      Unknown/Adopted No family hx of      Medications Prior to Admission   Medication Sig Dispense Refill Last Dose     albuterol (PROAIR HFA/PROVENTIL HFA/VENTOLIN HFA)  "108 (90 Base) MCG/ACT inhaler Inhale 2 puffs into the lungs every 4 hours as needed for wheezing or shortness of breath / dyspnea   Unknown at Unknown time     calcium carbonate 600 mg-vitamin D 400 units (CALTRATE) 600-400 MG-UNIT per tablet Take 1 tablet by mouth daily 30 tablet 0 Unknown at Unknown time     cyanocobalamin (CYANOCOBALAMIN) 1000 MCG/ML injection INJECT 1 ML (1,000 MCG) SUBCUTANEOUS EVERY 4 WEEKS.   Past Month at Unknown time     escitalopram (LEXAPRO) 20 MG tablet Take 1 tablet (20 mg) by mouth daily (Patient taking differently: Take 10 mg by mouth daily ) 30 tablet 0 Unknown at Unknown time     famotidine (PEPCID) 20 MG tablet Take 20 mg by mouth 2 times daily    Unknown at Unknown time     gabapentin (NEURONTIN) 100 MG capsule Take 100 mg by mouth 2-4 times daily   Unknown at Unknown time     hydrOXYzine (VISTARIL) 25 MG capsule 1-2 capsules po q 6 hrs itch   Unknown at Unknown time     multivitamin w/minerals (THERA-VIT-M) tablet Take 1 tablet by mouth daily 30 tablet 1 Unknown at Unknown time     prazosin (MINIPRESS) 1 MG capsule Take 1 mg by mouth   Unknown at Unknown time     triamcinolone (KENALOG) 0.1 % external cream Apply topically 3 times daily   Unknown at Unknown time     fluconazole (DIFLUCAN) 150 MG tablet Take 150 mg by mouth once May repeat 9/11.   9/6/2021     No current outpatient medications on file.     Objective   Physical Exam:    Blood pressure 97/63, pulse 96, temperature 98.1  F (36.7  C), temperature source Axillary, resp. rate 16, height 1.626 m (5' 4\"), weight 81.3 kg (179 lb 3.7 oz), SpO2 97 %, not currently breastfeeding.    Constitutional: awake, alert, cooperative, no apparent distress  HENT: EOM grossly intact, sclera icteric  Respiratory: non-labored respirations on room-air, no cough  Cardiovascular: RRR on Tele, trace BLE edema  GI: soft, distended, non-tender  Skin: Jaundiced with small scattered bruises, no rashes or lesions  Neurologic: Cranial nerves II-XII " are grossly intact, moving all extremities equally and independently    Labs & Studies: I personally reviewed the following studies:  ROUTINE LABS (Last four results):  CMP  Recent Labs   Lab 09/14/21  0725 09/14/21  0342 09/13/21  2352 09/13/21  1940 09/13/21  1605 09/13/21  0642   NA  --  128*  --   --  127* 123*   POTASSIUM  --  3.9  --   --  3.6 3.2*   CHLORIDE  --  97  --   --  93* 86*   CO2  --  24  --   --  26 18*   ANIONGAP  --  7  --   --  8 19*   * 192* 137* 170* 181* 239*   BUN  --  4*  --   --  4* 4*   CR  --  0.53  --   --  0.47* 0.46*   GFRESTIMATED  --  >90  --   --  >90 >90   STEFFANY  --  7.8*  --   --  8.2* 7.6*   MAG  --  2.2  --   --  3.0* 1.7   PHOS  --  1.2*  --   --   --  0.4*   PROTTOTAL  --  4.4*  --   --   --  6.0*   ALBUMIN  --  1.1*  --   --   --  1.7*   BILITOTAL  --  10.7*  --   --   --  13.0*   ALKPHOS  --  437*  --   --   --  594*   AST  --  286*  --   --   --  373*   ALT  --  71*  --   --   --  96*     CBC  Recent Labs   Lab 09/14/21  0403 09/13/21  1201 09/13/21  0642   WBC 6.3 7.8 7.3   RBC 2.15* 2.38* 3.38*   HGB 8.1* 8.8* 12.2   HCT 23.2* 24.7* 35.3   * 104* 104*   MCH 37.7* 37.0* 36.1*   MCHC 34.9 35.6 34.6   RDW 18.9* 18.5* 18.4*   PLT 17* 21* 28*     INR  Recent Labs   Lab 09/14/21  0342 09/13/21  0642   INR 1.62* 1.35*       Imaging:  Echo Complete   Final Result      US Abdomen Complete   Final Result   IMPRESSION:    1. Hepatomegaly with evidence of underlying chronic intrinsic hepatic   parenchymal disease including hepatic steatosis. No definite focal   hepatic mass.   2. Status post cholecystectomy.   3. Pancreas and IVC are not visualized with ultrasound.      I have personally reviewed the examination and initial interpretation   and I agree with the findings.      NOHEMY WELLINGTON MD            SYSTEM ID:  [id number]      XR Chest Port 1 View   Final Result   IMPRESSION:    1. Mild retrocardiac opacities, likely mild atelectasis, recommend   follow-up to clearing  to exclude infection.   2. Stable 6 mm pulmonary nodule of the right lower lung field, given   the radiodensity, suggestive of benign granuloma, recommend follow-up   in May 2022 to document 2 years worth of stability..      I have personally reviewed the examination and initial interpretation   and I agree with the findings.      ANGÉLICA ABDALLA MD            SYSTEM ID:  [id number]

## 2021-09-14 NOTE — PROGRESS NOTES
[facility]    Progress Note - Marbenja 3 Service   Date of Admission:  9/13/2021    Assessment & Plan   Aliyah Angeles is a 46yo female with alcohol use disorder, gastric bypass, PTSD, fibromyalgia, and alcoholic hepatitis who was presented to OSH ED on 9/12/2021 with weakness, dyspnea, AMS and c/f substance withdrawal. She was transferred to the [facility] ICU after being found to have lactic acidosis at OSH with initial lactate 13.0. Labs, imaging, and clinical picture concerning for EtOH hepatitis. Transferred to medicine 9/14.     EtOH Hepatitis  Transaminitis, hyperbilirubinemia   Patient with elevated transaminases and bilirubin concerning for alcoholic hepatitis with AST:ALT ratio 3:1. Significant hepatomegaly noted on imaging. Hepatology consulted and following.   - Hepatology following, appreciate recs  - Lactulose and rifaximin; 3-4BM daily for HE ppx  - Parvo and HCV pending   - Daily MELD labs    MELD-Na score: 27 at 9/14/2021  3:42 AM  MELD score: 21 at 9/14/2021  3:42 AM  Calculated from:  Serum Creatinine: 0.53 mg/dL (Using min of 1 mg/dL) at 9/14/2021  3:42 AM  Serum Sodium: 128 mmol/L at 9/14/2021  3:42 AM  Total Bilirubin: 10.7 mg/dL at 9/14/2021  3:42 AM  INR(ratio): 1.62 at 9/14/2021  3:42 AM  Age: 45 years    EtOH Withdrawal, improving  EtOH use disorder  Last drink 9/12, reports drinking 1L fireball daily, denies significant withdrawal symptoms. Patient accepting of resources to aid in cessation.   - CIWA with ativan  - folate and B12 supplementation    Anemia, normocytic  Thrombocytopenia   Significant thrombocytopenia noted upon admission, has been in the 20's with normal plts noted in February 2020. No evidence of splenic sequestration on imaging. Mild normocytic anemia upon admission with 4g drop on 9/14 without evidence of bleeding. Peripheral smear with lawanda cells, but no evidence of hemolysis despite low haptoglobin and elevated LDH. Hematology  consulted and favor blood loss anemia superimposed on suppressed production in the setting of liver disease. Concern for GIB, will consult luminal if further drop in the AM.   - Hematology consulted, appreciate recs  - Copper level, LIZ, EBV PCR, and parvo pending     QTc Prolongation  C/f Alcoholic cardiomyopathy  Patient with QTc of 554 at the time of admission, likely due to electrolyte derangements, namely hypocalcemia and hypomagnesemia. Echocardiogram wnl, EF >70%.   - repeat EKG tomorrow AM     ?Bronchitis  3 day history of dyspnea associated w/ cough for the past month for which she was treated with doxycycline. CXR 9/13 looks clear without evidence of PTX, effusion, or PNA, but procal borderline 0.9. MRSA and RSV panel negative.   - Continue doxycycline   - Cultures 9/13 negative to date    T2DM  Patient with A1c of 9.1% at the time of admission, this is a new diagnosis.   - High dose sliding scale insulin  - Diabetes educator    Pancreatic fat stranding on CT  16mm hypoattenuation near pancreatic head  CT 9/13/21 at OSH read states some pancreatic atrophy and mild fat stranding, lipase normal. Mass concerning for pseudocyst vs pancreatic mass. Grandmother has history of pancreatic cancer.  - MICU team attempted to obtain images from [facility], left message at medical records as prompted on phone on 9/13  - GI aware. Hepatology service discussing with panc/bili     Anion Gap Metabolic Acidosis, resolved  Lactic acidosis, resolved  C/f starvation ketosis/alcohol ketoacidosis  Lactate elevated at 13 upon presentation, improved rapidly with IVF. Patient with significant alcohol consumption, reports of polyethylene glycol (in Fireball) causing marked lactic acidosis. Lactate now wnl and AGMA resolved.     Hyponatremia, improving   Patient with sodium of 117 at the time of presentation to the OSH, likely hypovolemic at that time. Sodium has slowly trended toward normal, will continue to monitor. Low  suspicion for SIADH with urine osms not significantly elevated.     PTSD  RUBÉN  Fibromyalgia  - Continue pta prazosin and gabapentin  - Holding pta escitalopram for prolonged QTc     Hypokalemia, resolved  Hypomagnesemia, resolved  Hypophosphatemia, improving   Hypocalcemia, improving  Electrolyte derangements due to poor nutritional status in the setting of significant EtOH use disorder.   - RN driven replacement protocol.     GERD - pta pantoprazole        Diet: Regular Diet Adult  Snacks/Supplements Adult: Ensure Enlive; Between Meals    DVT Prophylaxis: VTE Prophylaxis contraindicated due to thrombocytopenia   Rice Catheter: Not present  Fluids: none   Central Lines: None  Code Status: Full Code      Disposition Plan   Expected discharge: 5-7 recommended to prior living arrangement once hemoglobin stable and safe disposition plan/ TCU bed available.     The patient's care was discussed with the Attending Physician, Dr. Blankenship .    Reina Martin MD  [address]  Securely message with the Vocera Web Console (learn more here)  Text page via [employer] Paging/Directory  Please see sign in/sign out for up to date coverage information  ______________________________________________________________________    Interval History   Aliyah is feeling 'ok' and has no complaints of pain at this time. Breathing comfortably on room air and denies n/v. She has not noted melenotic stools or BRBPR.     Data reviewed today: I reviewed all medications, new labs and imaging results over the last 24 hours.     Physical Exam   Vital Signs: Temp: 98.1  F (36.7  C) Temp src: Axillary BP: 113/81 Pulse: 85   Resp: 14 SpO2: 98 % O2 Device: None (Room air)    Weight: 179 lbs 3.74 oz  General Appearance: Appears older than stated age, fatigued, no acute distress  HEENT: scleral icterus   Respiratory: breathing comfortably on RA, CTAB  Cardiovascular: RRR, no murmur appreciated,  well perfused  GI: soft, distended but non-tender, +hepatomegaly   Skin: scattered bruising, no rash noted, jaundiced     Data   Recent Labs   Lab 09/14/21  1652 09/14/21  1647 09/14/21  1115 09/14/21  0725 09/14/21  0403 09/14/21  0342 09/13/21  1605 09/13/21  1201 09/13/21  0642   WBC  --  7.5  --   --  6.3  --   --  7.8 7.3   HGB  --  8.4*  --   --  8.1*  --   --  8.8* 12.2   MCV  --  105*  --   --  108*  --   --  104* 104*   PLT  --  20*  --   --  17*  --   --  21* 28*   INR  --   --   --   --   --  1.62*  --   --  1.35*   NA  --   --   --   --   --  128* 127*  --  123*   POTASSIUM  --   --   --   --   --  3.9 3.6  --  3.2*   CHLORIDE  --   --   --   --   --  97 93*  --  86*   CO2  --   --   --   --   --  24 26  --  18*   BUN  --   --   --   --   --  4* 4*  --  4*   CR  --   --   --   --   --  0.53 0.47*  --  0.46*   ANIONGAP  --   --   --   --   --  7 8  --  19*   STEFFANY  --   --   --   --   --  7.8* 8.2*  --  7.6*   *  --  207* 189*  --  192* 181*  --  239*   ALBUMIN  --   --   --   --   --  1.1*  --   --  1.7*   PROTTOTAL  --   --   --   --   --  4.4*  --   --  6.0*   BILITOTAL  --   --   --   --   --  10.7*  --   --  13.0*   ALKPHOS  --   --   --   --   --  437*  --   --  594*   ALT  --   --   --   --   --  71*  --   --  96*   AST  --   --   --   --   --  286*  --   --  373*   LIPASE  --   --   --   --   --   --   --   --  138

## 2021-09-14 NOTE — PLAN OF CARE
ICU End of Shift Summary. See flowsheets for vital signs and detailed assessment.    Changes this shift: Patient remains vitally stable on room air. NSR, SBP 80s-90s,but MAPs >65, afebrile. Aox4, follows commands. CIWA scores 4-6. Intermittently complaining of back/head ache, 1x oxycodone administered w/ minimal relief. 3x BM over course of shift, all loose. RN asked about testing for C. Diff, providers declining at this time. Up to bathroom w/ standby assist. Lactic down to 1.8 this morning. Sodium 128. Replacing phosphorus. Regular diet initiated. Patient updated on plan of care.     Plan: Continue CIWA protocol. Electrolyte replacements. ETOH withdrawal/treatment education.       Problem: Adult Inpatient Plan of Care  Goal: Absence of Hospital-Acquired Illness or Injury  Outcome: No Change  Intervention: Identify and Manage Fall Risk  Recent Flowsheet Documentation  Taken 9/14/2021 0400 by Maria Elena Chappell RN  Safety Promotion/Fall Prevention:   assistive device/personal items within reach   bed alarm on   fall prevention program maintained   clutter free environment maintained   room door open   safety round/check completed  Taken 9/14/2021 0000 by Maria Elena Chappell RN  Safety Promotion/Fall Prevention:   assistive device/personal items within reach   bed alarm on   fall prevention program maintained   clutter free environment maintained   room door open   safety round/check completed  Taken 9/13/2021 2000 by Maria Elena Chappell RN  Safety Promotion/Fall Prevention:   assistive device/personal items within reach   bed alarm on   fall prevention program maintained   clutter free environment maintained   room door open   safety round/check completed  Intervention: Prevent Skin Injury  Recent Flowsheet Documentation  Taken 9/14/2021 0400 by Maria Elena Chappell RN  Body Position: position changed independently  Taken 9/14/2021 0200 by Maria Elena Chappell RN  Body Position: position changed independently  Taken  9/14/2021 0000 by Maria Elena Chappell RN  Body Position: position changed independently  Taken 9/13/2021 2200 by Maria Elena Chappell RN  Body Position: position changed independently  Taken 9/13/2021 2000 by Maria Elena Chappell RN  Body Position: position changed independently  Intervention: Prevent and Manage VTE (Venous Thromboembolism) Risk  Recent Flowsheet Documentation  Taken 9/14/2021 0400 by Maria Elena Chappell RN  VTE Prevention/Management:   bleeding risk assessed   bleeding precautions maintained   dorsiflexion/plantar flexion performed   AROM (active range of motion) performed  Taken 9/14/2021 0000 by Maria Elena Chappell RN  VTE Prevention/Management:   bleeding risk assessed   bleeding precautions maintained   dorsiflexion/plantar flexion performed   AROM (active range of motion) performed  Taken 9/13/2021 2000 by Maria Elena Chappell RN  VTE Prevention/Management:   bleeding risk assessed   bleeding precautions maintained   dorsiflexion/plantar flexion performed   AROM (active range of motion) performed

## 2021-09-14 NOTE — PLAN OF CARE
Physical Therapy: Orders received. Chart reviewed and discussed with care team.? Physical Therapy not indicated due to SBA with mobility with OT, mobilizing safely and progress to I expected.? Defer discharge recommendations to OT.? Will complete orders.

## 2021-09-14 NOTE — PROGRESS NOTES
"[facility]    Hepatology Follow-up    CC: Alcohol hepatitis    Dx: Direct hyperbilirubinemia   Hx gastritis    Hx gastric bypass   C/f alcoholic cardiomyopathy   Hx alcoholic pancreatitis   16 mm hypoattenuation near pancreatic head    24 hour events: NAOE.    Subjective: Is feeling well today. Reports have loose bowel movements 3-4 times today. Explains that she is eating better. No nausea or vomiting. Denies abdominal pain.      Medications  Current Facility-Administered Medications   Medication Dose Route Frequency     calcium carbonate 600 mg-vitamin D 400 units  1 tablet Oral Daily     [Held by provider] cloNIDine  0.1 mg Oral Q8H     doxycycline hyclate  100 mg Oral Q12H SUNDEEP     [START ON 9/16/2021] folic acid  1 mg Oral Daily     folic acid  1 mg Intravenous Daily     gabapentin  100 mg Oral TID     insulin aspart  1-12 Units Subcutaneous Q4H     lactulose  20 g Oral TID     multivitamin w/minerals  1 tablet Oral Daily     pantoprazole  40 mg Oral QAM AC     potassium chloride  60 mEq Oral Daily     prazosin  1 mg Oral QPM     rifaximin  550 mg Oral BID     sodium phosphate  15 mmol Intravenous Once     thiamine  500 mg Intravenous TID    Followed by     [START ON 9/16/2021] thiamine  250 mg Intravenous Daily    Followed by     [START ON 9/21/2021] thiamine  100 mg Oral Daily       Review of systems  A 10-point review of systems was negative.    Examination  /81   Pulse 85   Temp 98.1  F (36.7  C) (Axillary)   Resp 14   Ht 1.626 m (5' 4\")   Wt 81.3 kg (179 lb 3.7 oz)   SpO2 98%   BMI 30.77 kg/m      Intake/Output Summary (Last 24 hours) at 9/14/2021 1743  Last data filed at 9/14/2021 1400  Gross per 24 hour   Intake 1435 ml   Output --   Net 1435 ml       Gen- well, NAD, A+Ox3, jaundice  Eyes- Sclera icteric   CVS- RRR  RS- CTA  Abd- soft, non distended, non-tender to palpation, +bs  Neuro- no asterixis   Skin- no obvious rash  Psych- normal mood      Laboratory  Lab " Results   Component Value Date     09/14/2021     04/10/2020    POTASSIUM 3.9 09/14/2021    POTASSIUM 4.6 04/10/2020    CHLORIDE 97 09/14/2021    CHLORIDE 110 04/10/2020    CO2 24 09/14/2021    CO2 28 04/10/2020    BUN 4 09/14/2021    BUN 10 04/10/2020    CR 0.53 09/14/2021    CR 0.50 04/10/2020       Lab Results   Component Value Date    BILITOTAL 10.7 09/14/2021    BILITOTAL 0.3 04/08/2020    ALT 71 09/14/2021    ALT 21 04/08/2020     09/14/2021    AST 21 04/08/2020    ALKPHOS 437 09/14/2021    ALKPHOS 88 04/08/2020       Lab Results   Component Value Date    WBC 6.3 09/14/2021    WBC 8.3 04/08/2020    HGB 8.1 09/14/2021    HGB 12.2 04/08/2020     09/14/2021    MCV 86 04/08/2020    PLT 17 09/14/2021     04/08/2020       Lab Results   Component Value Date    INR 1.62 09/14/2021    INR 1.02 04/08/2020     MELD-Na score: 27 at 9/14/2021  3:42 AM  MELD score: 21 at 9/14/2021  3:42 AM  Calculated from:  Serum Creatinine: 0.53 mg/dL (Using min of 1 mg/dL) at 9/14/2021  3:42 AM  Serum Sodium: 128 mmol/L at 9/14/2021  3:42 AM  Total Bilirubin: 10.7 mg/dL at 9/14/2021  3:42 AM  INR(ratio): 1.62 at 9/14/2021  3:42 AM  Age: 45 years    Radiology  US ABDOMEN (9/13/21)  IMPRESSION:   1. Hepatomegaly with evidence of underlying chronic intrinsic hepatic  parenchymal disease including hepatic steatosis. No definite focal  hepatic mass.  2. Status post cholecystectomy.  3. Pancreas and IVC are not visualized with ultrasound.    Assessment  45 year old female with history of gastric bypass (2010) c/b B12 deficiency, anxiety, PTSD, fibromyalgia, alcoholic pancreatitis, and alcohol abuse who admitted to the ICU on 9/13 for elevated lactate with new hyperbilirubinemia and recent alcohol abuse. GI is being consulted for possible alcoholic hepatitis; came from OSH, also with hx of alcoholic pancreatitis, possible pseudocyst on outside imaging (image unavailable).      #Alcohol hepatitis  #Direct  hyperbilirubinemia  #Hx gastritis   #Hx gastric bypass  #C/f alcoholic cardiomyopathy  Ms. Angeles has a history of AUD for which she has been hospitalized several times. Ultimately, transferred from OSH here due to elevated lactate of 13 requiring ICU level care. Additionally, presented with blood alcohol level of 0.2. Once transferred here ALT 96, , Alk phos 594, Bilirubin total 13, and INR 1.35.  MELD-Na of  28. With current use of alcohol and AST >2x ALT and elevated INR makes this most consistent with alcoholic hepatitis. Maddrey's score calculated to be 13. At this time she would not benefit from initiation of steroids.      #Hx alcoholic pancreatitis  #16 mm hypoattenuation near pancreatic head   CTA at OSH on 9/12 showed probable acute interstitial pancreatitis although improved compared to 12/22/2020. Also noted to have 16 mm hypoattenuated focus in the region of the pancreatic head. Radiology favored complex pseudocyst, but mass difficult to exclude. CTA excludes pancreatic dilation as well as US abdomen 9/13 with out biliary dilation which makes obstructive pathology less likely. We will discuss with our pancreatic/biliary team.  -- We will discuss imaging findings with pancreatic/biliary team  -- Possible follow up MRI once resolution of acute symptoms    MELD-Na of  27  Hepatic encephalopathy: No, on lactulose and rifaximin  Ascites: No evidence of ascites on US 9/13   TIPS: No history of TIPS  Esophageal/Gastric varices: Last EGD was 6/1/2011, but done at OSH unable to review report  Hepatocellular carcinoma: No evidence of mass on US 9/13  Transplant: Not listed, actively drinking (last drink 9/12)  Coagulopathy: INR 1.62  Thrombocytopenia: Plts 17, related to cirrhosis     Recommendations  -- Rifaximin 550 mg BID  -- Lactulose PO, titrate to 3-4 BMs per day  -- Monitor neurologic status, high risk of progression  -- Monitor transaminases, bilirubin, INR  -- No current indication for  "steroids  -- Continue supportive cares  -- Adequate protein diet (1.2-1.5g/kg per day) w/ supplements in between meals  -- Please have outside hospital CT images pushed to our system to facilitate our review     Harriett Elam MS4    \"I was present with the student who participated in the service and in the documentation of the note. I have verified the history and personally performed the physical exam and medical decisionmaking. I agree with the assessment and plan of care as documented in the note.\"     Staffed with Dr. Mattson.     Jessie Bourgeois MD, [facility]  Gastroenterology Fellow  Pager [phone]    Attestation:  This patient has been seen and evaluated by me, Sue Mattson.  Discussed with the house staff team or resident(s) and agree with the findings and plan in this note.     "

## 2021-09-14 NOTE — PROGRESS NOTES
MEDICAL ICU PROGRESS NOTE  09/14/2021      Date of Service (when I saw the patient): 09/14/2021    ASSESSMENT: Aliyah Angeles is a 45 year old female with PMH alcohol use disorder, gastric bypass, PTSD, fibromyalgia, and alcoholic hepatitis who was presented to OSH ED on 9/12/2021 with weakness, dyspnea, AMS and c/f substance withdrawal. She was transferred to the [facility] ICU after being found to have lactic acidosis at OSH with initial lactate 13.0. Labs, imaging, and clinical picture concerning for EtOH hepatitis.     CHANGES and MAJOR THINGS TODAY:   - Rifaximin 550 mg BID  - Lactulose PO, titrate to 3-4 BMs per day  - Diabetes Education Consult  - Patient eager to undergo to rehab for EtoH  - Lactate <2 from 13. Vitally and HDS to go to medicine floor     PLAN:    Neuro:  # Alcohol Abuse  Last drink 9/12/21, reports drinking 1L fireball daily. No history of DT, denies prior intubation, ICU stay. High risk for withdrawal, no withdrawal or encephalopathy signs at this time. Interested in substance use treatment options.  - CIWA with PRN Lorazepam  - Has not required ativan as of 9/14  - Folate & B12 per protocol. Thiamine replacement Wernicke's Dosing TID    #PTSD  #Anxiety  - PTA Prazosin  - HOLD Escitalopram (Prolonged QTc)    #Neuropathy  #Fibromyalgia  - PTA Gabapentin    Pulmonary:  # Dyspnea without hypoxia likely 2/2 to lactic acidosis  # Recent hx of Bronchitis  She reports 3 day history of dyspnea associated w/ cough for the past month. She states she was recently treated with doxycycline for bronchitis, but states that her main complaint is that she can't take a deep breath as her abdomen feels more distended. Her CXR 9/13 looks clear without evidence of PTX, effusion, or PNA, but procal borderline 0.9. Suspect respiratory compensation for metabolic acidosis contributing to sensation of dyspnea. MRSA and RSV panel negative  - Continue doxycycline   - S/p ceftriaxone  - Cultures 9/13 negative to date    #  Stable 6 mm pulmonary nodule of RLL  - Follow in May 2022 to document 2 years worth of stability    Cardiovascular:  # QT Prolongation  # C/f Alcoholic cardiomypoathy  Prolongation likely 2/2 to her hypomagnesemia and hypocalcemia. Given her significant history of alcohol abuse, concern for cardiac damage. No evidence of ST elevation or ischemia on EKG at this time. Echo 9/13 with EF > 70%, hyperkinetic with no abnormalities. Corrected K and Mg 9/13.  - Repeat EKG post electrolyte correction     GI/Nutrition:  # Direct Hyperbilirubinemia   # Transaminitis 2/2 alcohol hepatitis  Likely 2/2 to intrinsic hepatic disease from alcoholic cirrhosis. AST: ALT 3:1 classic for alcohol etiology, improving. She has significant hepatomegaly which does cause discomfort on palpation, and does not appear to have a recent hepatitis panel. She does have tattoos on her arm. On exam, she does have a dull abdomen with no shifting, possible some component of ascites, but may be a (small) contributory component to her dyspnea. Need to work-up other possible etiologies of her hepatomegaly.  - MELD labs daily   - Hepatology following  - Parvo, HCV  - Start Rifaximin 550 mg BID  - Start Lactulose PO, titrate to 3-4 BMs per day  - No indications for steroids at this time  - Adequate protein diet w/ supplements in between days    MELD-Na score: 27 at 9/14/2021  3:42 AM  MELD score: 21 at 9/14/2021  3:42 AM  Calculated from:  Serum Creatinine: 0.53 mg/dL (Using min of 1 mg/dL) at 9/14/2021  3:42 AM  Serum Sodium: 128 mmol/L at 9/14/2021  3:42 AM  Total Bilirubin: 10.7 mg/dL at 9/14/2021  3:42 AM  INR(ratio): 1.62 at 9/14/2021  3:42 AM  Age: 45 years    #Pancreatic Fat Stranding  #Hx Alcoholic Pancreatitis  #16mm hypoattenuation near pancreatic head.  CT 9/13/21 at OSH read states some pancreatic atrophy and mld fat stranding. Lipase normal. Her mass could be pseudocyst vs pancreatic mass. Conversation with mother 9/14, her grandmother has  history of pancreatic cancer.  - Attempted to obtain images from [facility], left message at medical records as prompted on phone on 9/13. Will re-attempt today.   - GI is aware. Hepatology service discussing with panc/bili   - Considering follow-up MRI     #Hx Gastritis  - Pantoprazole 40mg daily    Renal/Fluids/Electrolytes:  # Anion Gap Metabolic Acidosis, resolved  # Lactic acidosis, resolved  # C/f starvation ketosis/alcohol ketoacidosis  Lactate improving with IVF. She is currently afebrile with no signs of infection (clear CXR, urine). No evidence of cardiogenic failure, renal failure, intestinal ischemia. She does have heavy ethanol use and hepatic failure which can cause a lactic acidosis. General differential of other causes of lactic acidosis include tissue hypoxia ( cardiogenic, septic, hemorrhagic, seizure, carbon monoxide) intestinal ischemia, and diabetes with metformin use however lower suspicion given patient history and exam. Lactate 1.8 9/14.   - Resolved    #Hypokalemia, resolved  #Hypomagnesemia, resolved  #Hypophosphatemia  #Hypocalcemia  Likley 2/2 poor po intake and alcohol abuse.  Ddx hypokalemia include decreased K intake (trauma, alkalosis), renal potassium loss (increased aldosterone effects (dehydration -> secondary hyperaldo), hypomagnesemia, I suspect her ethology is primarily driven by poor PO intake over past week.  - Phosphate replacement  - Calcium repletion    # Suspected Euvolemic Hyponatremia  Suspect contribution of poor PO intake (potomania). Improving hyponatremia, 123 on 9/13. She was 117 on 9/12, and slowly titrated up to 128 9/14. She received 5L of LR for her elevated lactate, she may be approaching euvolemia. Some evidence of third sparcing (1+ trace pitting edema in left ankle). Her urine osms was not high enough for me to suspect SIADH.  - BMP AM    #Serum Ketone   #Glucosuria  #Elevated Specific gravity  Most likely 2/2 to diabetes mellitus (A1C 9/13 9.1).   Consider mild ketosis 2/2 elevated BG vs starvation. Differential include hyperglycemia (DM, cushings), Fanconi, chronic renal failure, lead toxicity. Fireball does have ~300g of sugar per 1/5 liter. It is possible that as this is her primary intake, her glucosuria could be 2/2 to fireball intake. She is not on any SGLT-2 inhibitors.   -CTM     Endocrine:  # Diabetes  A1c 9.1. New diabetes diagnosis this admission.   - High sliding scale insulin.  - Diabetes Educator  - Defer to medicine team for outpatient diabetes management      ID:  No acute concerns. See pulm for     Hematology:    # Thrombocytopenia  There is no evidence on CT read of sequestration, possible decreased production, but high suspicion for hemolysis (small component of indirect bilirubin elevation), low suspicion for MAHA (TTP vs DIC) with insignificant peripheral smear.  LDH elevated (non-specific). Haptoglobin 6.    Peripheral Blood Smear 9/13:  Moderate normochromic, macrocytic anemia; no increase in erythrocyte regeneration; occasional target cells; very rare helmet/bite cells. Circulating nucleated red blood cells. Marked thrombocytopenia.    - Hematology consulted, appreciate recs     Musculoskeletal/Skin:  No acute concerns.    General Cares/Prophylaxis:    DVT Prophylaxis: VTE Prophylaxis contraindicated due to thrombocytopenia  GI Prophylaxis: PPI  Restraints: None  Family Communication: Mother updated  Code Status: Full    Lines/tubes/drains:  - PIVs    Disposition:  - Medical floor.    Patient seen and findings/plan discussed with medical ICU staff, Dr. Rodrigues.    Essie Brand MS4    I was present with the medical student who participated in the service and in the documentation of the notes.  I have verified the history and personally performed the physical exam and medical decision making.  I agree with the assessment and plan of care as documented in the note.     September 14, 2021   Patti Blackwell MD  Internal  Medicine-Pediatrics, PGY-1        ====================================  INTERVAL HISTORY:   No acute events overnight. Nursing notes reviewed.  Patient with some concerns for back pain overnight, received 1x oxycodone 2.5 mg. Patient continues to describe non-productive intermittent cough (unchanged), malaise, abdominal  discomfort, and improving dyspnea.     OBJECTIVE:   1. VITAL SIGNS:   Temp:  [97.7  F (36.5  C)-98.9  F (37.2  C)] 97.8  F (36.6  C)  Pulse:  [72-99] 79  Resp:  [12-18] 18  BP: ()/(51-76) 89/66  SpO2:  [94 %-99 %] 95 %  Resp: 18    2. INTAKE/ OUTPUT:   I/O last 3 completed shifts:  In: 1065 [I.V.:65; IV Piggyback:1000]  Out: 150 [Urine:150]    3. PHYSICAL EXAMINATION:  General: NAD, jaundiced, conversational   HEENT: EOM intact, no tongue fasiculations  Neuro: Oriented x3. No asterixis.  Pulm/Resp: Clear breath sounds bilaterally without rhonchi, crackles or wheeze, breathing non-labored.   CV: RRR. No m/r/g  Abdomen: Soft, distended, non-tender, minimal discomfort over RUQ and epigastric regions. Dull to percussion. +Hepatomegaly. Overlying caput medusa veins.   Incisions/Skin: Jaundiced. Some scattered bruising.     4. LABS:   Arterial Blood Gases   No lab results found in last 7 days.  Complete Blood Count   Recent Labs   Lab 09/13/21  1201 09/13/21  0642   WBC 7.8 7.3   HGB 8.8* 12.2   PLT 21* 28*     Basic Metabolic Panel  Recent Labs   Lab 09/14/21  0342 09/13/21  2352 09/13/21  1940 09/13/21  1605 09/13/21  0642   *  --   --  127* 123*   POTASSIUM 3.9  --   --  3.6 3.2*   CHLORIDE 97  --   --  93* 86*   CO2 24  --   --  26 18*   BUN 4*  --   --  4* 4*   CR 0.53  --   --  0.47* 0.46*   * 137* 170* 181* 239*     Liver Function Tests  Recent Labs   Lab 09/14/21 0342 09/13/21  0642   * 373*   ALT 71* 96*   ALKPHOS 437* 594*   BILITOTAL 10.7* 13.0*   ALBUMIN 1.1* 1.7*   INR 1.62* 1.35*     Coagulation Profile  Recent Labs   Lab 09/14/21 0342 09/13/21  1345  09/13/21  0642   INR 1.62*  --  1.35*   PTT  --  38  --        5. RADIOLOGY:   Recent Results (from the past 24 hour(s))   US Abdomen Complete    Narrative    EXAMINATION: US ABDOMEN COMPLETE,  9/13/2021 9:22 AM     COMPARISON: None.    HISTORY: Alcoholic hepatitis, distended abdomen    TECHNIQUE: The abdomen was scanned in standard fashion with  specialized ultrasound transducer(s) using both gray-scale and limited  color Doppler techniques.    FINDINGS:  Limited sonographic visualization of the abdomen.    Liver: The liver demonstrates diffusely increased echogenicity,  measuring 24.5 cm. No evidence of a focal hepatic mass. The main  portal vein is patent with antegrade flow, measuring .    Gallbladder: Surgically absent.    Bile Ducts: Both the intra- and extrahepatic biliary system are of  normal caliber.  The common bile duct measures 6 mm in diameter.    Pancreas: Obscured    Kidneys: Both kidneys are of normal echotexture, without mass or  hydronephrosis.   The craniocaudal dimensions are: right- 10.1 cm,  left- 11.3 cm.    Spleen: The spleen is normal in size,  measuring 11.4 cm in sagittal  dimension.    Aorta and IVC: The visualized portions of the aorta and IVC are  unremarkable. The proximal aorta measures 2.0 cm in diameter. IVC not  visualized.    Fluid: No evidence of ascites or pleural effusions.      Impression    IMPRESSION:   1. Hepatomegaly with evidence of underlying chronic intrinsic hepatic  parenchymal disease including hepatic steatosis. No definite focal  hepatic mass.  2. Status post cholecystectomy.  3. Pancreas and IVC are not visualized with ultrasound.    I have personally reviewed the examination and initial interpretation  and I agree with the findings.    NOHEMY WELLINGTON MD         SYSTEM ID:  [id number]   Echo Complete   Result Value    LVEF  70%    Narrative    [id number]  [id number]  [id number]  [id number]^KIMBERLI^CARROLL     [facility]  Echocardiography  Laboratory  [address] [place] [address]     Name: MAGALY FAJARDO  MRN: [id number]  : 1975  Study Date: 2021 02:12 PM  Age: 45 yrs  Gender: Female  Patient Location: [facility] [place]  Reason For Study: Cardiomyopathy - Alcohol  Ordering Physician: CARROLL AG  Referring Physician: CORI SMITH  Performed By: Joanne Butler RDCS     BSA: 1.8 m2  Height: 64 in  Weight: 171 lb  HR: 84  BP: 102/74 mmHg  ______________________________________________________________________________  Procedure  Complete Portable Echo Adult. Contrast Optison. Technically difficult  study.Extremely poor acoustic windows. Optison (NDC #0953-0743-83) given  intravenously. Patient was given 6 ml mixture of 3 ml Optison and 6 ml saline.  3 ml wasted.  ______________________________________________________________________________  Interpretation Summary  Technically difficult study.Extremely poor acoustic windows.  Global and regional left ventricular function is hyperkinetic with an EF >70%.  Right ventricular function, chamber size, wall motion, and thickness are  normal.  Dilation of the inferior vena cava is present with abnormal respiratory  variation in diameter.  No pericardial effusion is present.  There is no prior study for direct comparison.  ______________________________________________________________________________  Left Ventricle  Global and regional left ventricular function is hyperkinetic with an EF >70%.  Left ventricular wall thickness is normal. Left ventricular size is normal.  Left ventricular diastolic function is normal. No regional wall motion  abnormalities are seen.     Right Ventricle  Right ventricular function, chamber size, wall motion, and thickness are  normal.     Atria  Both atria appear normal.     Mitral Valve  The mitral valve is normal.     Aortic Valve  The valve leaflets are not well visualized. On Doppler interrogation, there is  no significant stenosis or  regurgitation.     Tricuspid Valve  The valve leaflets are not well visualized. On Doppler interrogation, there is  no significant stenosis or regurgitation. Pulmonary artery systolic pressure  cannot be assessed.     Pulmonic Valve  The valve leaflets are not well visualized. Trace pulmonic insufficiency is  present.     Vessels  The thoracic aorta cannot be assessed. The pulmonary artery cannot be  assessed. Dilation of the inferior vena cava is present with abnormal  respiratory variation in diameter.     Pericardium  No pericardial effusion is present.     Compared to Previous Study  There is no prior study for direct comparison.  ______________________________________________________________________________  MMode/2D Measurements & Calculations  IVSd: 0.90 cm     LVIDd: 4.3 cm  LVIDs: 2.4 cm  LVPWd: 0.78 cm  FS: 45.5 %  LV mass(C)d: 112.4 grams  LV mass(C)dI: 61.4 grams/m2  asc Aorta Diam: 2.8 cm  LVOT diam: 2.1 cm  LVOT area: 3.5 cm2  LA Volume (BP): 36.2 ml  LA Volume Index (BP): 19.8 ml/m2  RWT: 0.36     Doppler Measurements & Calculations  MV E max ericka: 50.4 cm/sec  MV A max ericka: 57.7 cm/sec  MV E/A: 0.87  MV dec slope: 169.0 cm/sec2  PA acc time: 0.09 sec  E/E' av.3  Lateral E/e': 5.1  Medial E/e': 5.6     ______________________________________________________________________________  Report approved by: Dexter Peters 2021 04:12 PM

## 2021-09-14 NOTE — PLAN OF CARE
ICU End of Shift Summary. See flowsheets for vital signs and detailed assessment.    Changes this shift: Alert and oriented, makes needs known. CIWAs 0. Oxycodone and lidocaine cream ordered for neck and back pain. Therapy worked with patient, up to sink to wash up and went for walk in the hallway. Vitally stable. Tolerating regular diet. Lactulose given for stool goal 3-4 bowel movements a day. Continues to have loose stools. 2GM calcium gluconate replaced and 15mmol sodium phosphate ordered up for phos replacement. MICU signed off to Maroon 3.    Plan: Trend labs. Transfer to [facility] when bed available.        Problem: Risk for Delirium  Goal: Improved Attention and Thought Clarity  Outcome: No Change     Problem: Risk for Delirium  Goal: Improved Sleep  Outcome: No Change

## 2021-09-14 NOTE — PROGRESS NOTES
09/14/21 0926   Quick Adds   Type of Visit Initial Occupational Therapy Evaluation   Living Environment   People in home significant other   Current Living Arrangements apartment   Home Accessibility no concerns   Transportation Anticipated family or friend will provide;car, drives self   Living Environment Comments Pt lives with SO in apt, no stairs present. Tub shower. SO works nights, available during day   Self-Care   Usual Activity Tolerance good   Current Activity Tolerance moderate   Regular Exercise No   Equipment Currently Used at Home none   Activity/Exercise/Self-Care Comment Pt IND with ADLs and functional mobility at baseline   Instrumental Activities of Daily Living (IADL)   Previous Responsibilities meal prep;housekeeping;laundry;shopping;medication management;finances;driving   IADL Comments Pt IND with IADLs   Disability/Function   Hearing Difficulty or Deaf no   Wear Glasses or Blind yes   Vision Management reading glasses   Concentrating, Remembering or Making Decisions Difficulty no   Difficulty Communicating no   Difficulty Eating/Swallowing no   Walking or Climbing Stairs Difficulty no   Dressing/Bathing Difficulty no   Toileting issues no   Doing Errands Independently Difficulty (such as shopping) no   Fall history within last six months yes   Number of times patient has fallen within last six months 3  (alcohol related)   Change in Functional Status Since Onset of Current Illness/Injury yes   General Information   Onset of Illness/Injury or Date of Surgery 09/13/21   Referring Physician Cristian Kingston MD   Patient/Family Therapy Goal Statement (OT) to return to PLOF, go home   Additional Occupational Profile Info/Pertinent History of Current Problem 45 year old female with history of gastric bypass (2010) c/b B12 deficiency, anxiety, PTSD, fibromyalgia, alcoholic pancreatitis, and alcohol abuse who admitted to the ICU on 9/13 for elevated lactate with new hyperbilirubinemia and  recent alcohol abuse.   Performance Patterns (Routines, Roles, Habits) pt is currently looking for work, spends time job hunting, cleaning, running errands   Existing Precautions/Restrictions fall   Left Upper Extremity (Weight-bearing Status) full weight-bearing (FWB)   Right Upper Extremity (Weight-bearing Status) full weight-bearing (FWB)   Left Lower Extremity (Weight-bearing Status) full weight-bearing (FWB)   Right Lower Extremity (Weight-bearing Status) full weight-bearing (FWB)   Heart Disease Risk Factors Medical history   Cognitive Status Examination   Orientation Status orientation to person, place and time   Affect/Mental Status (Cognitive) flat/blunted affect   Follows Commands follows two-step commands;over 90% accuracy   Cognitive Status Comments Pt alert, oriented x 4, flat affect, requires increased processing. will continue to monitor   Visual Perception   Visual Impairment/Limitations WNL;corrective lenses for reading   Sensory   Sensory Quick Adds No deficits were identified   Pain Assessment   Patient Currently in Pain Yes, see Vital Sign flowsheet   Posture   Posture not impaired   Range of Motion Comprehensive   General Range of Motion bilateral upper extremity ROM WFL   Strength Comprehensive (MMT)   Comment, General Manual Muscle Testing (MMT) Assessment B UEs grossly 4/5   Clinical Impression   Criteria for Skilled Therapeutic Interventions Met (OT) yes;meets criteria   OT Diagnosis decreased IND with ADL/IADLs   OT Problem List-Impairments impacting ADL activity tolerance impaired;cognition;pain   Assessment of Occupational Performance 3-5 Performance Deficits   Identified Performance Deficits home management, higher level IADLs, shopping, leisure   Planned Therapy Interventions (OT) ADL retraining;IADL retraining;home program guidelines;progressive activity/exercise;risk factor education;cognition   Clinical Decision Making Complexity (OT) low complexity   Therapy Frequency (OT) 5x/week    Predicted Duration of Therapy 1 week   Anticipated Equipment Needs Upon Discharge (OT)   (TBD)   Risk & Benefits of therapy have been explained evaluation/treatment results reviewed;care plan/treatment goals reviewed;risks/benefits reviewed;current/potential barriers reviewed;participants voiced agreement with care plan;participants included;patient   OT Discharge Planning    OT Discharge Recommendation (DC Rec) Home with assist   OT Rationale for DC Rec Pt mobilizing well, will benefit from assist for higher level IADLs and as needed   OT Brief overview of current status  SBA   Total Evaluation Time (Minutes)   Total Evaluation Time (Minutes) 5

## 2021-09-15 ENCOUNTER — APPOINTMENT (OUTPATIENT)
Dept: OCCUPATIONAL THERAPY | Facility: CLINIC | Age: 46
End: 2021-09-15
Attending: INTERNAL MEDICINE
Payer: COMMERCIAL

## 2021-09-15 LAB
ALBUMIN SERPL-MCNC: 1.3 G/DL (ref 3.4–5)
ALP SERPL-CCNC: 475 U/L (ref 40–150)
ALT SERPL W P-5'-P-CCNC: 77 U/L (ref 0–50)
ANION GAP SERPL CALCULATED.3IONS-SCNC: 8 MMOL/L (ref 3–14)
AST SERPL W P-5'-P-CCNC: 242 U/L (ref 0–45)
BILIRUB SERPL-MCNC: 12.2 MG/DL (ref 0.2–1.3)
BUN SERPL-MCNC: 2 MG/DL (ref 7–30)
CA-I BLD-MCNC: 4.8 MG/DL (ref 4.4–5.2)
CALCIUM SERPL-MCNC: 8.9 MG/DL (ref 8.5–10.1)
CHLORIDE BLD-SCNC: 94 MMOL/L (ref 94–109)
CO2 SERPL-SCNC: 23 MMOL/L (ref 20–32)
CREAT SERPL-MCNC: 0.56 MG/DL (ref 0.52–1.04)
ERYTHROCYTE [DISTWIDTH] IN BLOOD BY AUTOMATED COUNT: 18.6 % (ref 10–15)
GFR SERPL CREATININE-BSD FRML MDRD: >90 ML/MIN/1.73M2
GLUCOSE BLD-MCNC: 139 MG/DL (ref 70–99)
GLUCOSE BLDC GLUCOMTR-MCNC: 128 MG/DL (ref 70–99)
GLUCOSE BLDC GLUCOMTR-MCNC: 174 MG/DL (ref 70–99)
GLUCOSE BLDC GLUCOMTR-MCNC: 179 MG/DL (ref 70–99)
GLUCOSE BLDC GLUCOMTR-MCNC: 240 MG/DL (ref 70–99)
GLUCOSE BLDC GLUCOMTR-MCNC: 304 MG/DL (ref 70–99)
HBV SURFACE AB SERPL IA-ACNC: 0.28 M[IU]/ML
HCT VFR BLD AUTO: 26.6 % (ref 35–47)
HGB BLD-MCNC: 9.1 G/DL (ref 11.7–15.7)
INR PPP: 1.48 (ref 0.85–1.15)
MAGNESIUM SERPL-MCNC: 1.8 MG/DL (ref 1.6–2.3)
MCH RBC QN AUTO: 36.8 PG (ref 26.5–33)
MCHC RBC AUTO-ENTMCNC: 34.2 G/DL (ref 31.5–36.5)
MCV RBC AUTO: 108 FL (ref 78–100)
PHOSPHATE SERPL-MCNC: 1.2 MG/DL (ref 2.5–4.5)
PHOSPHATE SERPL-MCNC: 1.5 MG/DL (ref 2.5–4.5)
PLATELET # BLD AUTO: 18 10E3/UL (ref 150–450)
POTASSIUM BLD-SCNC: 4.4 MMOL/L (ref 3.4–5.3)
PROT SERPL-MCNC: 4.8 G/DL (ref 6.8–8.8)
RBC # BLD AUTO: 2.47 10E6/UL (ref 3.8–5.2)
SODIUM SERPL-SCNC: 125 MMOL/L (ref 133–144)
WBC # BLD AUTO: 9 10E3/UL (ref 4–11)

## 2021-09-15 PROCEDURE — 250N000013 HC RX MED GY IP 250 OP 250 PS 637

## 2021-09-15 PROCEDURE — 84100 ASSAY OF PHOSPHORUS: CPT | Performed by: HOSPITALIST

## 2021-09-15 PROCEDURE — 250N000013 HC RX MED GY IP 250 OP 250 PS 637: Performed by: STUDENT IN AN ORGANIZED HEALTH CARE EDUCATION/TRAINING PROGRAM

## 2021-09-15 PROCEDURE — 99233 SBSQ HOSP IP/OBS HIGH 50: CPT | Mod: GC | Performed by: HOSPITALIST

## 2021-09-15 PROCEDURE — 250N000009 HC RX 250: Performed by: HOSPITALIST

## 2021-09-15 PROCEDURE — 82330 ASSAY OF CALCIUM: CPT | Performed by: STUDENT IN AN ORGANIZED HEALTH CARE EDUCATION/TRAINING PROGRAM

## 2021-09-15 PROCEDURE — 80053 COMPREHEN METABOLIC PANEL: CPT | Performed by: STUDENT IN AN ORGANIZED HEALTH CARE EDUCATION/TRAINING PROGRAM

## 2021-09-15 PROCEDURE — 250N000011 HC RX IP 250 OP 636: Performed by: STUDENT IN AN ORGANIZED HEALTH CARE EDUCATION/TRAINING PROGRAM

## 2021-09-15 PROCEDURE — 97535 SELF CARE MNGMENT TRAINING: CPT | Mod: GO | Performed by: OCCUPATIONAL THERAPIST

## 2021-09-15 PROCEDURE — 85610 PROTHROMBIN TIME: CPT | Performed by: STUDENT IN AN ORGANIZED HEALTH CARE EDUCATION/TRAINING PROGRAM

## 2021-09-15 PROCEDURE — 84100 ASSAY OF PHOSPHORUS: CPT | Performed by: STUDENT IN AN ORGANIZED HEALTH CARE EDUCATION/TRAINING PROGRAM

## 2021-09-15 PROCEDURE — 83735 ASSAY OF MAGNESIUM: CPT | Performed by: HOSPITALIST

## 2021-09-15 PROCEDURE — 250N000012 HC RX MED GY IP 250 OP 636 PS 637: Performed by: STUDENT IN AN ORGANIZED HEALTH CARE EDUCATION/TRAINING PROGRAM

## 2021-09-15 PROCEDURE — 250N000009 HC RX 250: Performed by: STUDENT IN AN ORGANIZED HEALTH CARE EDUCATION/TRAINING PROGRAM

## 2021-09-15 PROCEDURE — 99231 SBSQ HOSP IP/OBS SF/LOW 25: CPT | Mod: GC | Performed by: INTERNAL MEDICINE

## 2021-09-15 PROCEDURE — 36415 COLL VENOUS BLD VENIPUNCTURE: CPT | Performed by: STUDENT IN AN ORGANIZED HEALTH CARE EDUCATION/TRAINING PROGRAM

## 2021-09-15 PROCEDURE — 250N000013 HC RX MED GY IP 250 OP 250 PS 637: Performed by: INTERNAL MEDICINE

## 2021-09-15 PROCEDURE — 258N000003 HC RX IP 258 OP 636: Performed by: STUDENT IN AN ORGANIZED HEALTH CARE EDUCATION/TRAINING PROGRAM

## 2021-09-15 PROCEDURE — 120N000002 HC R&B MED SURG/OB UMMC

## 2021-09-15 PROCEDURE — 85027 COMPLETE CBC AUTOMATED: CPT | Performed by: STUDENT IN AN ORGANIZED HEALTH CARE EDUCATION/TRAINING PROGRAM

## 2021-09-15 PROCEDURE — 99207 PR CDG-MDM COMPONENT: MEETS HIGH - UP CODED: CPT | Performed by: HOSPITALIST

## 2021-09-15 PROCEDURE — 36415 COLL VENOUS BLD VENIPUNCTURE: CPT | Performed by: HOSPITALIST

## 2021-09-15 PROCEDURE — 258N000003 HC RX IP 258 OP 636: Performed by: HOSPITALIST

## 2021-09-15 RX ORDER — OXYCODONE HYDROCHLORIDE 5 MG/1
5 TABLET ORAL
Status: COMPLETED | OUTPATIENT
Start: 2021-09-15 | End: 2021-09-15

## 2021-09-15 RX ORDER — CALCIUM CARBONATE 500 MG/1
500-1000 TABLET, CHEWABLE ORAL 3 TIMES DAILY PRN
Status: DISCONTINUED | OUTPATIENT
Start: 2021-09-15 | End: 2021-09-21 | Stop reason: HOSPADM

## 2021-09-15 RX ADMIN — POTASSIUM CHLORIDE 60 MEQ: 40 SOLUTION ORAL at 07:50

## 2021-09-15 RX ADMIN — Medication 1 TABLET: at 07:49

## 2021-09-15 RX ADMIN — FOLIC ACID 1 MG: 5 INJECTION, SOLUTION INTRAMUSCULAR; INTRAVENOUS; SUBCUTANEOUS at 07:50

## 2021-09-15 RX ADMIN — DOXYCYCLINE HYCLATE 100 MG: 100 CAPSULE ORAL at 20:00

## 2021-09-15 RX ADMIN — SODIUM PHOSPHATE, MONOBASIC, MONOHYDRATE AND SODIUM PHOSPHATE, DIBASIC, ANHYDROUS 15 MMOL: 276; 142 INJECTION, SOLUTION INTRAVENOUS at 16:18

## 2021-09-15 RX ADMIN — CALCIUM CARBONATE 600 MG (1,500 MG)-VITAMIN D3 400 UNIT TABLET 1 TABLET: at 07:49

## 2021-09-15 RX ADMIN — THIAMINE HYDROCHLORIDE 500 MG: 100 INJECTION, SOLUTION INTRAMUSCULAR; INTRAVENOUS at 07:50

## 2021-09-15 RX ADMIN — RIFAXIMIN 550 MG: 550 TABLET ORAL at 20:00

## 2021-09-15 RX ADMIN — SODIUM PHOSPHATE, MONOBASIC, MONOHYDRATE AND SODIUM PHOSPHATE, DIBASIC, ANHYDROUS 15 MMOL: 276; 142 INJECTION, SOLUTION INTRAVENOUS at 06:58

## 2021-09-15 RX ADMIN — PANTOPRAZOLE SODIUM 40 MG: 40 TABLET, DELAYED RELEASE ORAL at 07:49

## 2021-09-15 RX ADMIN — GABAPENTIN 100 MG: 100 CAPSULE ORAL at 20:00

## 2021-09-15 RX ADMIN — DOXYCYCLINE HYCLATE 100 MG: 100 CAPSULE ORAL at 07:49

## 2021-09-15 RX ADMIN — GABAPENTIN 100 MG: 100 CAPSULE ORAL at 07:49

## 2021-09-15 RX ADMIN — PRAZOSIN HYDROCHLORIDE 1 MG: 1 CAPSULE ORAL at 20:00

## 2021-09-15 RX ADMIN — GABAPENTIN 100 MG: 100 CAPSULE ORAL at 15:19

## 2021-09-15 RX ADMIN — OXYCODONE HYDROCHLORIDE 5 MG: 5 TABLET ORAL at 06:45

## 2021-09-15 RX ADMIN — CALCIUM CARBONATE (ANTACID) CHEW TAB 500 MG 1000 MG: 500 CHEW TAB at 15:19

## 2021-09-15 RX ADMIN — RIFAXIMIN 550 MG: 550 TABLET ORAL at 07:49

## 2021-09-15 RX ADMIN — INSULIN GLARGINE 5 UNITS: 100 INJECTION, SOLUTION SUBCUTANEOUS at 21:50

## 2021-09-15 ASSESSMENT — ACTIVITIES OF DAILY LIVING (ADL)
ADLS_ACUITY_SCORE: 15

## 2021-09-15 NOTE — PLAN OF CARE
ICU End of Shift Summary. See flowsheets for vital signs and detailed assessment.    Changes this shift: No acute changes over course of shift. Vital signs stable on room air. SBP 80s-90s, however MAPs maintained >65. Afebrile. Up to bathroom w/ standby assist. 3x BM since midnight. Lactulose dose at 2000 held for meeting stool goal of 3-4/ 24hrs. Aox4, follows commands. CIWA score 2-4. Intermittently complains of neck/head pain, hot packs given, 1x oxycodone ordered. Morning labs pending. Patient updated on plan of care.     Plan: Continue CIWA protocol. Electrolyte replacements per protocols. Support/counseling on alcohol withdrawal/management.       Problem: Risk for Delirium  Goal: Optimal Coping  Outcome: Improving

## 2021-09-15 NOTE — PROGRESS NOTES
[facility]    Progress Note - Maroon 3 Service   Date of Admission:  9/13/2021    Assessment & Plan   Aliyah Angeles is a 46yo female with alcohol use disorder, gastric bypass, PTSD, fibromyalgia, and alcoholic hepatitis who was presented to OSH ED on 9/12/2021 with weakness, dyspnea, AMS and c/f substance withdrawal. She was transferred to the [facility] ICU after being found to have lactic acidosis at OSH with initial lactate 13.0. Labs, imaging, and clinical picture concerning for EtOH hepatitis. Transferred to medicine 9/14.     EtOH Hepatitis  Transaminitis, hyperbilirubinemia   Patient with elevated transaminases and bilirubin concerning for alcoholic hepatitis with AST:ALT ratio 3:1. Significant hepatomegaly noted on imaging. Hepatology consulted and following. No role for steroids at this time. Hepatology follow up in 4wks.   - Hepatology following, appreciate recs  - Lactulose and rifaximin; 3-4BM daily for HE ppx  - Parvo and HCV pending   - Daily MELD labs    MELD-Na score: 28 at 9/15/2021  5:38 AM  MELD score: 20 at 9/15/2021  5:38 AM  Calculated from:  Serum Creatinine: 0.56 mg/dL (Using min of 1 mg/dL) at 9/15/2021  5:38 AM  Serum Sodium: 125 mmol/L at 9/15/2021  5:38 AM  Total Bilirubin: 12.2 mg/dL at 9/15/2021  5:38 AM  INR(ratio): 1.48 at 9/15/2021  5:38 AM  Age: 45 years    EtOH Withdrawal, improving  EtOH use disorder  Last drink 9/12, reports drinking 1L fireball daily, denies significant withdrawal symptoms. Patient accepting of resources to aid in cessation. Chemical dependence and SW consulted - patient would like resources for inpatient treatment.   - CIWA with ativan  - folate and B12 supplementation    Anemia, normocytic  Thrombocytopenia   Significant thrombocytopenia noted upon admission, has been in the 20's with normal plts noted in February 2020. No evidence of splenic sequestration on imaging. Mild normocytic anemia upon admission with 4g  drop on 9/14 without evidence of bleeding. Peripheral smear with lawanda cells, but no evidence of hemolysis despite low haptoglobin and elevated LDH. Hematology consulted and do not feel it is hemolysis. Low suspicion for active bleed with stable Hgb as of 9/15, will continue to monitor. Favor marrow suppression and hepatic disease.   - Hematology consulted, appreciate recs  - Copper level, LIZ, EBV PCR, and parvo pending     QTc Prolongation  C/f Alcoholic cardiomyopathy  Patient with QTc of 554 at the time of admission, likely due to electrolyte derangements, namely hypocalcemia and hypomagnesemia. Echocardiogram wnl, EF >70%.   - repeat EKG tomorrow AM     ?Bronchitis  3 day history of dyspnea associated w/ cough for the past month for which she was treated with doxycycline. CXR 9/13 looks clear without evidence of PTX, effusion, or PNA, but procal borderline 0.9. MRSA and RSV panel negative.   - Continue doxycycline for 5d course  - Cultures 9/13 negative to date    T2DM  Patient with A1c of 9.1% at the time of admission, this is a new diagnosis.   - High dose sliding scale insulin  - Glargine 5U qhs  - Diabetes educator    Pancreatic fat stranding on CT  16mm hypoattenuation near pancreatic head  CT 9/13/21 at OSH read states some pancreatic atrophy and mild fat stranding, lipase normal. Mass concerning for pseudocyst vs pancreatic mass. Grandmother has history of pancreatic cancer.  - [facility] stated they would push the image to our facility as of 9/15  - GI aware. Hepatology service discussing with panc/bili     Anion Gap Metabolic Acidosis, resolved  Lactic acidosis, resolved  C/f starvation ketosis/alcohol ketoacidosis  Lactate elevated at 13 upon presentation, improved rapidly with IVF. Patient with significant alcohol consumption, reports of polyethylene glycol (in Fireball) causing marked lactic acidosis. Lactate now wnl and AGMA resolved.     Hyponatremia, stable   Patient with sodium of 117 at the time of  presentation to the OSH, likely hypovolemic at that time. Sodium has slowly trended toward normal, will continue to monitor. Low suspicion for SIADH with urine osms not significantly elevated.     PTSD  RUBÉN  Fibromyalgia  - Continue pta prazosin and gabapentin  - Holding pta escitalopram for prolonged QTc     Hypokalemia, resolved  Hypomagnesemia, resolved  Hypophosphatemia, improving   Hypocalcemia, improving  Electrolyte derangements due to poor nutritional status in the setting of significant EtOH use disorder.   - RN driven replacement protocol.     GERD - pta pantoprazole        Diet: Regular Diet Adult  Snacks/Supplements Adult: Ensure Enlive; Between Meals    DVT Prophylaxis: VTE Prophylaxis contraindicated due to thrombocytopenia   Rice Catheter: Not present  Fluids: none   Central Lines: None  Code Status: Full Code      Disposition Plan   Expected discharge: 5-7 recommended to prior living arrangement once hemoglobin stable and safe disposition plan/ TCU bed available.     The patient's care was discussed with the Attending Physician, Dr. Blankenship .    Reina Martin MD  [address]  Securely message with the Vocera Web Console (learn more here)  Text page via [employer] Paging/Directory  Please see sign in/sign out for up to date coverage information  ______________________________________________________________________    Interval History   Aliyah is feeling well today but is nervous about discharge and states that if she goes home she believes that she will start drinking again. Adequate UOP and 7 bowel movements yesterday. Blood sugars have been elevated and she received 22U insulin via high dose sliding scale insulin yesterday - will initiate glargine 5 and watch for response, likely further increase tomorrow.     Data reviewed today: I reviewed all medications, new labs and imaging results over the last 24 hours.     Physical Exam   Vital  Signs: Temp: 97.4  F (36.3  C) Temp src: Axillary BP: 97/70 Pulse: 88   Resp: 14 SpO2: 98 % O2 Device: None (Room air)    Weight: 179 lbs 3.74 oz  General Appearance: Appears older than stated age, fatigued, no acute distress  HEENT: scleral icterus   Respiratory: breathing comfortably on RA, CTAB  Cardiovascular: RRR, no murmur appreciated, well perfused  GI: soft, distended but non-tender, +hepatomegaly   Skin: scattered bruising, no rash noted, jaundiced     Data   Recent Labs   Lab 09/15/21  0538 09/15/21  0340 09/14/21  2329 09/14/21  1652 09/14/21  1647 09/14/21  0725 09/14/21  0403 09/14/21  0342 09/13/21  1940 09/13/21  1605 09/13/21  1201 09/13/21  0805 09/13/21  0642   WBC 9.0  --   --   --  7.5  --  6.3  --   --   --    < >   < > 7.3   HGB 9.1*  --   --   --  8.4*  --  8.1*  --   --   --    < >   < > 12.2   *  --   --   --  105*  --  108*  --   --   --    < >   < > 104*   PLT 18*  --   --   --  20*  --  17*  --   --   --    < >   < > 28*   INR 1.48*  --   --   --   --   --   --  1.62*  --   --   --   --  1.35*   *  --   --   --   --   --   --  128*  --  127*  --    < > 123*   POTASSIUM 4.4  --   --   --   --   --   --  3.9  --  3.6  --    < > 3.2*   CHLORIDE 94  --   --   --   --   --   --  97  --  93*  --    < > 86*   CO2 23  --   --   --   --   --   --  24  --  26  --    < > 18*   BUN 2*  --   --   --   --   --   --  4*  --  4*  --    < > 4*   CR 0.56  --   --   --   --   --   --  0.53  --  0.47*  --    < > 0.46*   ANIONGAP 8  --   --   --   --   --   --  7  --  8  --    < > 19*   STEFFANY 8.9  --   --   --   --   --   --  7.8*  --  8.2*  --    < > 7.6*   * 179* 220*   < >  --    < >  --  192*   < > 181*  --    < > 239*   ALBUMIN 1.3*  --   --   --   --   --   --  1.1*  --   --   --    < > 1.7*   PROTTOTAL 4.8*  --   --   --   --   --   --  4.4*  --   --   --    < > 6.0*   BILITOTAL 12.2*  --   --   --   --   --   --  10.7*  --   --   --    < > 13.0*   ALKPHOS 475*  --   --   --   --   --    --  437*  --   --   --    < > 594*   ALT 77*  --   --   --   --   --   --  71*  --   --   --    < > 96*   *  --   --   --   --   --   --  286*  --   --   --    < > 373*   LIPASE  --   --   --   --   --   --   --   --   --   --   --   --  138    < > = values in this interval not displayed.

## 2021-09-15 NOTE — PROGRESS NOTES
"[facility]    Hepatology Follow-up    CC: Alcohol hepatitis    Dx: Direct hyperbilirubinemia   Hx gastritis    Hx gastric bypass   C/f alcoholic cardiomyopathy   Hx alcoholic pancreatitis   16 mm hypoattenuation near pancreatic head    24 hour events: NAOE.    Subjective: Ms. Angeles is feeling tired today. Reports just wanting to sleep. Is able to eat. Denies nausea, vomiting or abdominal pain. Multiple loose bowel movements over night, non bloody.    Medications  Current Facility-Administered Medications   Medication Dose Route Frequency     calcium carbonate 600 mg-vitamin D 400 units  1 tablet Oral Daily     doxycycline hyclate  100 mg Oral Q12H SUNDEEP     [START ON 9/16/2021] folic acid  1 mg Oral Daily     folic acid  1 mg Intravenous Daily     gabapentin  100 mg Oral TID     insulin aspart  1-12 Units Subcutaneous Q4H     lactulose  20 g Oral TID     multivitamin w/minerals  1 tablet Oral Daily     pantoprazole  40 mg Oral QAM AC     potassium chloride  60 mEq Oral Daily     prazosin  1 mg Oral QPM     rifaximin  550 mg Oral BID     sodium phosphate  15 mmol Intravenous Once     thiamine  500 mg Intravenous TID    Followed by     [START ON 9/16/2021] thiamine  250 mg Intravenous Daily    Followed by     [START ON 9/21/2021] thiamine  100 mg Oral Daily       Review of systems  A 10-point review of systems was negative.    Examination  BP (!) 88/57   Pulse 94   Temp 97.4  F (36.3  C) (Axillary)   Resp 14   Ht 1.626 m (5' 4\")   Wt 81.3 kg (179 lb 3.7 oz)   SpO2 99%   BMI 30.77 kg/m      Intake/Output Summary (Last 24 hours) at 9/14/2021 3802  Last data filed at 9/14/2021 1400  Gross per 24 hour   Intake 1435 ml   Output --   Net 1435 ml       Gen- well, NAD, A+Ox3, jaundice  Eyes- Sclera icteric   CVS- RRR  RS- CTA  Abd- soft, non distended, non-tender to palpation, +bs  Neuro- no asterixis   Skin- no obvious rash  Psych- normal mood    Laboratory  Lab Results   Component Value " Date     09/15/2021     04/10/2020    POTASSIUM 4.4 09/15/2021    POTASSIUM 4.6 04/10/2020    CHLORIDE 94 09/15/2021    CHLORIDE 110 04/10/2020    CO2 23 09/15/2021    CO2 28 04/10/2020    BUN 2 09/15/2021    BUN 10 04/10/2020    CR 0.56 09/15/2021    CR 0.50 04/10/2020       Lab Results   Component Value Date    BILITOTAL 12.2 09/15/2021    BILITOTAL 0.3 04/08/2020    ALT 77 09/15/2021    ALT 21 04/08/2020     09/15/2021    AST 21 04/08/2020    ALKPHOS 475 09/15/2021    ALKPHOS 88 04/08/2020       Lab Results   Component Value Date    WBC 9.0 09/15/2021    WBC 8.3 04/08/2020    HGB 9.1 09/15/2021    HGB 12.2 04/08/2020     09/15/2021    MCV 86 04/08/2020    PLT 18 09/15/2021     04/08/2020       Lab Results   Component Value Date    INR 1.48 09/15/2021    INR 1.02 04/08/2020     MELD-Na score: 28 at 9/15/2021  5:38 AM  MELD score: 20 at 9/15/2021  5:38 AM  Calculated from:  Serum Creatinine: 0.56 mg/dL (Using min of 1 mg/dL) at 9/15/2021  5:38 AM  Serum Sodium: 125 mmol/L at 9/15/2021  5:38 AM  Total Bilirubin: 12.2 mg/dL at 9/15/2021  5:38 AM  INR(ratio): 1.48 at 9/15/2021  5:38 AM  Age: 45 years    Radiology  US ABDOMEN (9/13/21)  IMPRESSION:   1. Hepatomegaly with evidence of underlying chronic intrinsic hepatic  parenchymal disease including hepatic steatosis. No definite focal  hepatic mass.  2. Status post cholecystectomy.  3. Pancreas and IVC are not visualized with ultrasound.    Assessment  45 year old female with history of gastric bypass (2010) c/b B12 deficiency, anxiety, PTSD, fibromyalgia, alcoholic pancreatitis, and alcohol abuse who admitted to the ICU on 9/13 for elevated lactate with new hyperbilirubinemia and recent alcohol abuse. GI is being consulted for possible alcoholic hepatitis; came from OSH, also with hx of alcoholic pancreatitis, possible pseudocyst on outside imaging (image unavailable).      #Alcohol hepatitis  #Direct hyperbilirubinemia  #Hx gastritis    #Hx gastric bypass  #C/f alcoholic cardiomyopathy  Ms. Angeles was transferred from OSH here due to elevated lactate of 13 requiring ICU level care, blood alcohol level of 0.2 on admission. ALT 96, , Alk phos 594, Bilirubin total 13, and INR 1.35. With current use of alcohol and AST >2x ALT and elevated INR makes this most consistent with alcoholic hepatitis. On admission, Maddrey's score calculated to be 13, thus steroids not initiated.      #Hx alcoholic pancreatitis  #16 mm hypoattenuation near pancreatic head   CTA at OSH on 9/12 showed probable acute interstitial pancreatitis although improved compared to 12/22/2020. Also noted to have 16 mm hypoattenuated focus in the region of the pancreatic head. Radiology favored complex pseudocyst, but mass difficult to exclude. CTA excludes pancreatic dilation as well as US abdomen 9/13 with out biliary dilation which makes obstructive pathology less likely.  -- We will discuss imaging findings with pancreatic/biliary team once images available to determine outpatient followup  -- Possible follow up MRI once resolution of acute symptoms    MELD-Na of  28  Hepatic encephalopathy: No, on lactulose and rifaximin  Ascites: No evidence of ascites on US 9/13   TIPS: No history of TIPS  Esophageal/Gastric varices: Last EGD was 6/1/2011, but done at OSH unable to review report  Hepatocellular carcinoma: No evidence of mass on US 9/13  Transplant: Not listed, actively drinking (last drink 9/12)  Coagulopathy: INR 1.48  Thrombocytopenia: Plts 18, related to cirrhosis     Recommendations  -- Rifaximin 550 mg BID  -- Lactulose PO, titrate to 3-4 BMs per day  -- Monitor neurologic status, high risk of progression  -- Monitor transaminases, bilirubin, INR  -- Continue supportive cares  -- Adequate protein diet (1.2-1.5g/kg per day) w/ supplements in between meals    The Hepatology team will sign off. Thank you for involving us in this patient's care. Please do not hesitate to  "call if any further questions or concerns.    Please alert GI once hospital CT images pushed to our system for our review    Harriett Bakertong MS4    \"I was present with the student who participated in the service and in the documentation of the note. I have verified the history and personally performed the physical exam and medical decisionmaking. I agree with the assessment and plan of care as documented in the note.\"     Staffed with Dr. Mattson.     Jessie Bourgeois MD  Gastroenterology Fellow  Pager [phone]         Attestation:  This patient has been seen and evaluated by me, Sue Mattson.  Discussed with the house staff team or resident(s) and agree with the findings and plan in this note.           "

## 2021-09-15 NOTE — PLAN OF CARE
ICU End of Shift Summary. See flowsheets for vital signs and detailed assessment.    Changes this shift: Patient remains alert and oriented, tired and sleeping in between cares. BP/HR stable, afebrile. CIWA 3 for fine tremor and headache. 7-8 stools since midnight without lactulose. Voiding adequately. Worsening hyperglycemia, bedtime lantus increased. Minimal PO intake, patient complaining of severe abdominal pain with food intake. MD notified, order for Tums. Up walking in halls with PT. Phos recheck 1.5, 15 mmol sodium phos given.     Plan:  Continue plan of care. Recheck phos level tonight. Notify team of any changes.

## 2021-09-16 ENCOUNTER — APPOINTMENT (OUTPATIENT)
Dept: CT IMAGING | Facility: CLINIC | Age: 46
End: 2021-09-16
Attending: INTERNAL MEDICINE
Payer: COMMERCIAL

## 2021-09-16 LAB
ALBUMIN SERPL-MCNC: 1.2 G/DL (ref 3.4–5)
ALP SERPL-CCNC: 403 U/L (ref 40–150)
ALT SERPL W P-5'-P-CCNC: 64 U/L (ref 0–50)
ANION GAP SERPL CALCULATED.3IONS-SCNC: 5 MMOL/L (ref 3–14)
AST SERPL W P-5'-P-CCNC: 174 U/L (ref 0–45)
BILIRUB SERPL-MCNC: 12.3 MG/DL (ref 0.2–1.3)
BUN SERPL-MCNC: 2 MG/DL (ref 7–30)
C COLI+JEJUNI+LARI FUSA STL QL NAA+PROBE: NOT DETECTED
C DIFF TOX B STL QL: NEGATIVE
CALCIUM SERPL-MCNC: 8.2 MG/DL (ref 8.5–10.1)
CHLORIDE BLD-SCNC: 100 MMOL/L (ref 94–109)
CO2 SERPL-SCNC: 23 MMOL/L (ref 20–32)
COPPER SERPL-MCNC: 113.9 UG/DL
CREAT SERPL-MCNC: 0.59 MG/DL (ref 0.52–1.04)
EC STX1 GENE STL QL NAA+PROBE: NOT DETECTED
EC STX2 GENE STL QL NAA+PROBE: NOT DETECTED
ERYTHROCYTE [DISTWIDTH] IN BLOOD BY AUTOMATED COUNT: 18.6 % (ref 10–15)
GFR SERPL CREATININE-BSD FRML MDRD: >90 ML/MIN/1.73M2
GLUCOSE BLD-MCNC: 107 MG/DL (ref 70–99)
GLUCOSE BLDC GLUCOMTR-MCNC: 113 MG/DL (ref 70–99)
GLUCOSE BLDC GLUCOMTR-MCNC: 118 MG/DL (ref 70–99)
GLUCOSE BLDC GLUCOMTR-MCNC: 125 MG/DL (ref 70–99)
GLUCOSE BLDC GLUCOMTR-MCNC: 166 MG/DL (ref 70–99)
GLUCOSE BLDC GLUCOMTR-MCNC: 174 MG/DL (ref 70–99)
GLUCOSE BLDC GLUCOMTR-MCNC: 190 MG/DL (ref 70–99)
GLUCOSE BLDC GLUCOMTR-MCNC: 236 MG/DL (ref 70–99)
HCT VFR BLD AUTO: 23.1 % (ref 35–47)
HGB BLD-MCNC: 8 G/DL (ref 11.7–15.7)
INR PPP: 1.64 (ref 0.85–1.15)
MCH RBC QN AUTO: 36.9 PG (ref 26.5–33)
MCHC RBC AUTO-ENTMCNC: 34.6 G/DL (ref 31.5–36.5)
MCV RBC AUTO: 107 FL (ref 78–100)
NOROV GI+II ORF1-ORF2 JNC STL QL NAA+PR: NOT DETECTED
PHOSPHATE SERPL-MCNC: 2.3 MG/DL (ref 2.5–4.5)
PHOSPHATE SERPL-MCNC: 2.4 MG/DL (ref 2.5–4.5)
PHOSPHATE SERPL-MCNC: 2.4 MG/DL (ref 2.5–4.5)
PLATELET # BLD AUTO: 18 10E3/UL (ref 150–450)
POTASSIUM BLD-SCNC: 4.8 MMOL/L (ref 3.4–5.3)
PROT SERPL-MCNC: 4.4 G/DL (ref 6.8–8.8)
RBC # BLD AUTO: 2.17 10E6/UL (ref 3.8–5.2)
RVA NSP5 STL QL NAA+PROBE: NOT DETECTED
SALMONELLA SP RPOD STL QL NAA+PROBE: NOT DETECTED
SHIGELLA SP+EIEC IPAH STL QL NAA+PROBE: NOT DETECTED
SODIUM SERPL-SCNC: 128 MMOL/L (ref 133–144)
V CHOL+PARA RFBL+TRKH+TNAA STL QL NAA+PR: NOT DETECTED
WBC # BLD AUTO: 9.9 10E3/UL (ref 4–11)
Y ENTERO RECN STL QL NAA+PROBE: NOT DETECTED

## 2021-09-16 PROCEDURE — 85027 COMPLETE CBC AUTOMATED: CPT | Performed by: HOSPITALIST

## 2021-09-16 PROCEDURE — 87493 C DIFF AMPLIFIED PROBE: CPT | Performed by: STUDENT IN AN ORGANIZED HEALTH CARE EDUCATION/TRAINING PROGRAM

## 2021-09-16 PROCEDURE — 36415 COLL VENOUS BLD VENIPUNCTURE: CPT | Performed by: HOSPITALIST

## 2021-09-16 PROCEDURE — 74178 CT ABD&PLV WO CNTR FLWD CNTR: CPT

## 2021-09-16 PROCEDURE — 82040 ASSAY OF SERUM ALBUMIN: CPT | Performed by: HOSPITALIST

## 2021-09-16 PROCEDURE — 250N000009 HC RX 250: Performed by: STUDENT IN AN ORGANIZED HEALTH CARE EDUCATION/TRAINING PROGRAM

## 2021-09-16 PROCEDURE — 258N000003 HC RX IP 258 OP 636: Performed by: STUDENT IN AN ORGANIZED HEALTH CARE EDUCATION/TRAINING PROGRAM

## 2021-09-16 PROCEDURE — 99233 SBSQ HOSP IP/OBS HIGH 50: CPT | Mod: GC | Performed by: HOSPITALIST

## 2021-09-16 PROCEDURE — 84100 ASSAY OF PHOSPHORUS: CPT | Performed by: STUDENT IN AN ORGANIZED HEALTH CARE EDUCATION/TRAINING PROGRAM

## 2021-09-16 PROCEDURE — 250N000011 HC RX IP 250 OP 636: Performed by: STUDENT IN AN ORGANIZED HEALTH CARE EDUCATION/TRAINING PROGRAM

## 2021-09-16 PROCEDURE — 250N000013 HC RX MED GY IP 250 OP 250 PS 637: Performed by: STUDENT IN AN ORGANIZED HEALTH CARE EDUCATION/TRAINING PROGRAM

## 2021-09-16 PROCEDURE — 36415 COLL VENOUS BLD VENIPUNCTURE: CPT | Performed by: STUDENT IN AN ORGANIZED HEALTH CARE EDUCATION/TRAINING PROGRAM

## 2021-09-16 PROCEDURE — 258N000003 HC RX IP 258 OP 636: Performed by: HOSPITALIST

## 2021-09-16 PROCEDURE — 84100 ASSAY OF PHOSPHORUS: CPT | Performed by: HOSPITALIST

## 2021-09-16 PROCEDURE — 85610 PROTHROMBIN TIME: CPT | Performed by: HOSPITALIST

## 2021-09-16 PROCEDURE — H0001 ALCOHOL AND/OR DRUG ASSESS: HCPCS

## 2021-09-16 PROCEDURE — 250N000013 HC RX MED GY IP 250 OP 250 PS 637

## 2021-09-16 PROCEDURE — 82247 BILIRUBIN TOTAL: CPT | Performed by: HOSPITALIST

## 2021-09-16 PROCEDURE — 250N000009 HC RX 250: Performed by: HOSPITALIST

## 2021-09-16 PROCEDURE — 87506 IADNA-DNA/RNA PROBE TQ 6-11: CPT | Performed by: STUDENT IN AN ORGANIZED HEALTH CARE EDUCATION/TRAINING PROGRAM

## 2021-09-16 PROCEDURE — 74178 CT ABD&PLV WO CNTR FLWD CNTR: CPT | Mod: 26 | Performed by: RADIOLOGY

## 2021-09-16 PROCEDURE — 120N000002 HC R&B MED SURG/OB UMMC

## 2021-09-16 PROCEDURE — 250N000011 HC RX IP 250 OP 636

## 2021-09-16 RX ORDER — HYDROMORPHONE HYDROCHLORIDE 1 MG/ML
0.5 INJECTION, SOLUTION INTRAMUSCULAR; INTRAVENOUS; SUBCUTANEOUS
Status: DISCONTINUED | OUTPATIENT
Start: 2021-09-16 | End: 2021-09-16

## 2021-09-16 RX ORDER — NALOXONE HYDROCHLORIDE 0.4 MG/ML
0.4 INJECTION, SOLUTION INTRAMUSCULAR; INTRAVENOUS; SUBCUTANEOUS
Status: DISCONTINUED | OUTPATIENT
Start: 2021-09-16 | End: 2021-09-21 | Stop reason: HOSPADM

## 2021-09-16 RX ORDER — NALOXONE HYDROCHLORIDE 0.4 MG/ML
0.2 INJECTION, SOLUTION INTRAMUSCULAR; INTRAVENOUS; SUBCUTANEOUS
Status: DISCONTINUED | OUTPATIENT
Start: 2021-09-16 | End: 2021-09-21 | Stop reason: HOSPADM

## 2021-09-16 RX ORDER — LOPERAMIDE HCL 2 MG
2 CAPSULE ORAL 4 TIMES DAILY PRN
Status: DISCONTINUED | OUTPATIENT
Start: 2021-09-16 | End: 2021-09-21 | Stop reason: HOSPADM

## 2021-09-16 RX ORDER — HYDROMORPHONE HYDROCHLORIDE 1 MG/ML
0.5 INJECTION, SOLUTION INTRAMUSCULAR; INTRAVENOUS; SUBCUTANEOUS EVERY 4 HOURS PRN
Status: DISCONTINUED | OUTPATIENT
Start: 2021-09-16 | End: 2021-09-20

## 2021-09-16 RX ORDER — IOPAMIDOL 755 MG/ML
109 INJECTION, SOLUTION INTRAVASCULAR ONCE
Status: COMPLETED | OUTPATIENT
Start: 2021-09-16 | End: 2021-09-16

## 2021-09-16 RX ADMIN — CALCIUM CARBONATE 600 MG (1,500 MG)-VITAMIN D3 400 UNIT TABLET 1 TABLET: at 08:32

## 2021-09-16 RX ADMIN — IOPAMIDOL 109 ML: 755 INJECTION, SOLUTION INTRAVENOUS at 10:19

## 2021-09-16 RX ADMIN — GABAPENTIN 100 MG: 100 CAPSULE ORAL at 16:27

## 2021-09-16 RX ADMIN — Medication 1 TABLET: at 08:32

## 2021-09-16 RX ADMIN — PANTOPRAZOLE SODIUM 40 MG: 40 TABLET, DELAYED RELEASE ORAL at 08:32

## 2021-09-16 RX ADMIN — GABAPENTIN 100 MG: 100 CAPSULE ORAL at 08:32

## 2021-09-16 RX ADMIN — DOXYCYCLINE HYCLATE 100 MG: 100 CAPSULE ORAL at 08:32

## 2021-09-16 RX ADMIN — HYDROMORPHONE HYDROCHLORIDE 0.5 MG: 1 INJECTION, SOLUTION INTRAMUSCULAR; INTRAVENOUS; SUBCUTANEOUS at 19:52

## 2021-09-16 RX ADMIN — RIFAXIMIN 550 MG: 550 TABLET ORAL at 19:53

## 2021-09-16 RX ADMIN — SODIUM PHOSPHATE, MONOBASIC, MONOHYDRATE AND SODIUM PHOSPHATE, DIBASIC, ANHYDROUS 15 MMOL: 276; 142 INJECTION, SOLUTION INTRAVENOUS at 19:40

## 2021-09-16 RX ADMIN — GABAPENTIN 100 MG: 100 CAPSULE ORAL at 19:53

## 2021-09-16 RX ADMIN — LORAZEPAM 1 MG: 1 TABLET ORAL at 10:57

## 2021-09-16 RX ADMIN — POTASSIUM CHLORIDE 60 MEQ: 40 SOLUTION ORAL at 08:31

## 2021-09-16 RX ADMIN — INSULIN GLARGINE 5 UNITS: 100 INJECTION, SOLUTION SUBCUTANEOUS at 22:00

## 2021-09-16 RX ADMIN — PRAZOSIN HYDROCHLORIDE 1 MG: 1 CAPSULE ORAL at 19:53

## 2021-09-16 RX ADMIN — SODIUM PHOSPHATE, MONOBASIC, MONOHYDRATE 15 MMOL: 276; 142 INJECTION, SOLUTION INTRAVENOUS at 06:05

## 2021-09-16 RX ADMIN — RIFAXIMIN 550 MG: 550 TABLET ORAL at 08:32

## 2021-09-16 RX ADMIN — THIAMINE HYDROCHLORIDE 250 MG: 100 INJECTION, SOLUTION INTRAMUSCULAR; INTRAVENOUS at 08:32

## 2021-09-16 RX ADMIN — FOLIC ACID 1 MG: 1 TABLET ORAL at 08:32

## 2021-09-16 RX ADMIN — DOXYCYCLINE HYCLATE 100 MG: 100 CAPSULE ORAL at 19:53

## 2021-09-16 ASSESSMENT — ACTIVITIES OF DAILY LIVING (ADL)
ADLS_ACUITY_SCORE: 15
ADLS_ACUITY_SCORE: 15
ADLS_ACUITY_SCORE: 8
ADLS_ACUITY_SCORE: 15
ADLS_ACUITY_SCORE: 8
ADLS_ACUITY_SCORE: 8

## 2021-09-16 NOTE — PROGRESS NOTES
Brief Hematology Progress Note    Anemia and thrombocytopenia stable. Etiology likely due to alcohol use and liver disease. We will sign off at this time. Please do not hesitate to contact our team for any additional questions or concerns.    Discussed with Dr. Mackey.    John Calvo MD  Internal Medicine, PGY-3  Pager: [phone]

## 2021-09-16 NOTE — PROGRESS NOTES
[facility]    Progress Note - Marbenja 3 Service   Date of Admission:  9/13/2021    Assessment & Plan   Aliyah Angeles is a 44yo female with alcohol use disorder, gastric bypass, PTSD, fibromyalgia, and alcoholic hepatitis who was presented to OSH ED on 9/12/2021 with weakness, dyspnea, AMS and c/f substance withdrawal. She was transferred to the [facility] ICU after being found to have lactic acidosis at OSH with initial lactate 13.0. Labs, imaging, and clinical picture concerning for EtOH hepatitis. Transferred to medicine 9/14.     Changes today:  - Chem dep and SW to see  - PT/OT  - CT A/p with pancreatic protocol  - C.diff and enteric panel pending     EtOH Hepatitis  Transaminitis, hyperbilirubinemia   Patient with elevated transaminases and bilirubin concerning for alcoholic hepatitis with AST:ALT ratio 3:1. Significant hepatomegaly noted on imaging. Hepatology consulted and following. HCV and HBV negative. No role for steroids at this time. Hepatology follow up in 4wk after discharge.  - Hepatology following, appreciate recs  - Lactulose and rifaximin; 3-4BM daily for HE ppx (hold for BM >4 daily)  - Daily MELD labs    MELD-Na score: 27 at 9/16/2021  4:26 AM  MELD score: 21 at 9/16/2021  4:26 AM  Calculated from:  Serum Creatinine: 0.59 mg/dL (Using min of 1 mg/dL) at 9/16/2021  4:26 AM  Serum Sodium: 128 mmol/L at 9/16/2021  4:26 AM  Total Bilirubin: 12.3 mg/dL at 9/16/2021  4:26 AM  INR(ratio): 1.64 at 9/16/2021  4:26 AM  Age: 45 years    EtOH Withdrawal, improving  EtOH use disorder  Last drink 9/12, reports drinking 1L fireball daily, denies significant withdrawal symptoms. Patient accepting of resources to aid in cessation. Chemical dependence and SW consulted - patient would like resources for inpatient treatment.   - CIWA with ativan  - folate and B12 supplementation    Abdominal pain  Pancreatic fat stranding on CT  16mm hypoattenuation near pancreatic  head  CT 9/13/21 at OSH read states some pancreatic atrophy and mild fat stranding, lipase normal. Mass concerning for pseudocyst vs pancreatic mass. Grandmother has history of pancreatic cancer.  - [facility] stated they would push the image to our facility as of 9/15  - CT A/P for worsening epigastric pain 9/16    Diarrhea  Patient with diarrhea (no concern for GIB) since admission, stooled 9 times yesterday. In the setting of recent antibiotics, will send enteric panel and C. Diff.   - labs pending    Anemia, normocytic  Thrombocytopenia   Significant thrombocytopenia noted upon admission, has been in the 20's with normal plts noted in February 2020. No evidence of splenic sequestration on imaging. Mild normocytic anemia upon admission with 4g drop on 9/14 without evidence of bleeding. Peripheral smear with lawanda cells, but no evidence of hemolysis despite low haptoglobin and elevated LDH. Hematology consulted and do not feel it is hemolysis. Low suspicion for active bleed with stable Hgb as of 9/15, will continue to monitor. Favor marrow suppression and hepatic disease.   - Hematology consulted, appreciate recs  - Copper level, LIZ, EBV PCR, and parvo pending     QTc Prolongation  C/f Alcoholic cardiomyopathy  Patient with QTc of 554 at the time of admission, likely due to electrolyte derangements, namely hypocalcemia and hypomagnesemia. Echocardiogram wnl, EF >70%.   - repeat EKG tomorrow AM     ?Bronchitis  3 day history of dyspnea associated w/ cough for the past month for which she was treated with doxycycline. CXR 9/13 looks clear without evidence of PTX, effusion, or PNA, but procal borderline 0.9. MRSA and RSV panel negative.   - Continue doxycycline for 5d course  - Cultures 9/13 negative to date    T2DM  Patient with A1c of 9.1% at the time of admission, this is a new diagnosis.   - High dose sliding scale insulin  - Glargine 5U qhs  - Diabetes educator    Anion Gap Metabolic Acidosis, resolved  Lactic  acidosis, resolved  C/f starvation ketosis/alcohol ketoacidosis  Lactate elevated at 13 upon presentation, improved rapidly with IVF. Patient with significant alcohol consumption, reports of polyethylene glycol (in Fireball) causing marked lactic acidosis. Lactate now wnl and AGMA resolved.     Hyponatremia, stable   Patient with sodium of 117 at the time of presentation to the OSH, likely hypovolemic at that time. Sodium has slowly trended toward normal, will continue to monitor. Low suspicion for SIADH with urine osms not significantly elevated.     PTSD  RUBÉN  Fibromyalgia  - Continue pta prazosin and gabapentin  - Holding pta escitalopram for prolonged QTc     Hypokalemia, resolved  Hypomagnesemia, resolved  Hypophosphatemia, improving   Hypocalcemia, improving  Electrolyte derangements due to poor nutritional status in the setting of significant EtOH use disorder.   - RN driven replacement protocol.     GERD - pta pantoprazole        Diet: Fluid restriction 1500 ML FLUID  Snacks/Supplements Adult: Ensure Enlive; With Meals  2 Gram Sodium Diet    DVT Prophylaxis: VTE Prophylaxis contraindicated due to thrombocytopenia   Rice Catheter: Not present  Fluids: none   Central Lines: None  Code Status: Full Code      Disposition Plan   Expected discharge: 2-3 recommended to prior living arrangement once hemoglobin stable and safe disposition plan/ TCU bed available.     The patient's care was discussed with the Attending Physician, Dr. Blankenship .    Reina Martin MD   Med/Peds, PGY-2  [address]  Securely message with the Vocera Web Console (learn more here)  Text page via [facility] Paging/Directory  Please see sign in/sign out for up to date coverage information  ______________________________________________________________________    Interval History   Aliyah continues to have substantial stool without lactulose - 9 BM's per report yesterday, not oily.  She is alert and oriented and eating well. Labs stable to improving. She would like to talk to chem dep today, SW also consulted yesterday. She would like to pursue inpatient treatment for EtOH use disorder if possible.     Data reviewed today: I reviewed all medications, new labs and imaging results over the last 24 hours.     Physical Exam   Vital Signs: Temp: 98.2  F (36.8  C) Temp src: Oral BP: 96/71 Pulse: 90   Resp: 16 SpO2: 96 % O2 Device: None (Room air)    Weight: 179 lbs 3.74 oz  General Appearance: Appears older than stated age, fatigued, no acute distress but does appear uncomfortable this morning   HEENT: scleral icterus   Respiratory: breathing comfortably on RA, CTAB  Cardiovascular: RRR, no murmur appreciated, well perfused  GI: soft, distended and tender to palpation, +hepatomegaly   Skin: scattered bruising, no rash noted, jaundiced     Data   Recent Labs   Lab 09/16/21  0426 09/16/21  0340 09/16/21  0026 09/15/21  0702 09/15/21  0538 09/14/21  1652 09/14/21  1647 09/14/21  0403 09/14/21  0342 09/13/21  0805 09/13/21  0642   WBC 9.9  --   --   --  9.0  --  7.5   < >  --    < > 7.3   HGB 8.0*  --   --   --  9.1*  --  8.4*   < >  --    < > 12.2   *  --   --   --  108*  --  105*   < >  --    < > 104*   PLT 18*  --   --   --  18*  --  20*   < >  --    < > 28*   INR 1.64*  --   --   --  1.48*  --   --   --  1.62*   < > 1.35*   *  --   --   --  125*  --   --   --  128*   < > 123*   POTASSIUM 4.8  --   --   --  4.4  --   --   --  3.9   < > 3.2*   CHLORIDE 100  --   --   --  94  --   --   --  97   < > 86*   CO2 23  --   --   --  23  --   --   --  24   < > 18*   BUN 2*  --   --   --  2*  --   --   --  4*   < > 4*   CR 0.59  --   --   --  0.56  --   --   --  0.53   < > 0.46*   ANIONGAP 5  --   --   --  8  --   --   --  7   < > 19*   STEFFANY 8.2*  --   --   --  8.9  --   --   --  7.8*   < > 7.6*   * 125* 166*   < > 139*   < >  --    < > 192*   < > 239*   ALBUMIN 1.2*  --   --   --  1.3*  --    --    < > 1.1*   < > 1.7*   PROTTOTAL 4.4*  --   --   --  4.8*  --   --    < > 4.4*   < > 6.0*   BILITOTAL 12.3*  --   --   --  12.2*  --   --    < > 10.7*   < > 13.0*   ALKPHOS 403*  --   --   --  475*  --   --    < > 437*   < > 594*   ALT 64*  --   --   --  77*  --   --    < > 71*   < > 96*   *  --   --   --  242*  --   --    < > 286*   < > 373*   LIPASE  --   --   --   --   --   --   --   --   --   --  138    < > = values in this interval not displayed.

## 2021-09-16 NOTE — PLAN OF CARE
ICU End of Shift Summary. See flowsheets for vital signs and detailed assessment.    Changes this shift: VSS on room air. PO Ativan given x1 for CIWA 8- tremors, headache. Patient complaining of severe abdominal pain. CT done. Patient's appetite ok later this afternoon. Sliding scale insulin given. Phos replaced.     Plan:  Continue plan of care. Notify team of any changes.

## 2021-09-16 NOTE — PLAN OF CARE
ICU End of Shift Summary: See flowsheets for vital signs and detailed assessment    Changes this shift: A&Ox4. Follows commands. Flat affect. Up w/ SBA to toilet. Complained of pain/soreness in abdomen & mild headache- no PRN pain meds available. Patient able to sleep, despite discomfort. CIWAs < 7- no intervention needed. 4-5 loose stools overnight. Voiding spontaneously. BGs controlled w/ sliding scale & scheduled Lantus. Phos 2.4- replaced w/ 15mmol sodium phos- recheck tomorrow AM. Parvo test still pending. No acute events.     Plan: Continue to replace electrolytes as needed. Patient will need diabetes education prior to discharge. Med/surg orders active- awaiting available bed. Continue to monitor & follow POC.     Problem: Risk for Delirium  Goal: Optimal Coping  Outcome: Improving  Goal: Improved Behavioral Control  Outcome: Improving  Goal: Improved Attention and Thought Clarity  Outcome: Improving  Goal: Improved Sleep  Outcome: Improving

## 2021-09-16 NOTE — CONSULTS
BRIEF SOCIAL WORK NOTE    Social Work: Chemical Dependency Screen    Concerns of chemical dependency use? YES    Patient is alert & oriented? YES    Patient is interested in CD treatment? YES    Patient is agreeable to meet with [facility]? YES    Has patient completed an assessment in the past 6 months? NO    If yes, where: N/A    Additional Comments:       YANDY communicated with [facility] Tia Longo prior to meeting with pt. [facility] completed assessment this morning and confirmed pt is interested in IP tx.     YANDY assisted with having pt sign ROBERT's for two [facility] IP tx facilities (see [facility] Chemical Dependency note 09/16/21 for details.). YANDY emailed signed ROBERT's to [facility].     YANDY and [facility] will continue to assist pt with BHARGAV placement as needed.       Jovani Navarro MSW, SW  Acute Care Float   [facility]

## 2021-09-16 NOTE — CONSULTS
9/16/2021      Pt has been interviewed via [employer] and CD Consult has been completed.     Recommendations:      Pt meets criteria for Alcohol Use Disorder. Pt reports she would be willing to attend inpatient CD treatment. Pt is recommended to:     1. Abstain from all non-prescribed mood-altering substances  2. Take all medications as prescribed  3. Enter and complete a residential or inpatient treatment program  4. Increase sober support, attend support meetings at least one time weekly  5. Follow all recommendations upon discharge from treatment. Recommendations may include, but are not limited to: extended treatment, outpatient treatment and/or sober housing.        6.   Follow all recommendations of your medical providers        Referrals:  [facility]  [address] [place] [address]  Phone: [phone]  Fax: [phone]     Nevaeh sher [facility]  Phone: [phone]  Fax: [phone] ATTN: INTAKE         [facility] consult completed by: [facility]  Phone Number: [phone]  E-mail Address: [email or web address] and Addiction [email or web address] Evaluation Department  [address] [place] [id number]

## 2021-09-16 NOTE — PROGRESS NOTES
Care Management Follow Up    Length of Stay (days): 3    Expected Discharge Date: 09/21/2021     Concerns to be Addressed:    discharge planning   Patient plan of care discussed at interdisciplinary rounds: Yes    Anticipated Discharge Disposition:  IP BHARGAV facility     Anticipated Discharge Services:  IP chem dep tx  Anticipated Discharge DME:  N/A    Patient/family educated on Medicare website which has current facility and service quality ratings:  yes  Education Provided on the Discharge Plan:  yes  Patient/Family in Agreement with the Plan:  yes    Referrals Placed by CM/SW:  See below  Private pay costs discussed: Not applicable    Additional Information:  LAD completed assessment this morning and sent referrals to facilities below. YANDY called facilities later in the shift and f/u as follows.     YANDY paged team with update.     Referrals:    [facility]  Phone: [phone]  Fax: [phone] ATTN: INTAKE    09/16/21 3:25PM YANDY spoke with Kandy of [facility] ([phone]) who stated pt has been accepted to facility. However, they cannot accept pt until Tuesday morning 9/21/21. Also, pt must have no withdrawal symptoms upon admission.     [facility]  [address] [place] [address]  Phone: [phone]  Fax: [phone]  09/16/21 3:37 PM YANDY spoke with Viki who stated they received referral, and [facility] will be contacting YANDY. However, she mentioned they are at capacity, and unable to say when they will have opening.            Jovani Navarro, MSW, SW  Acute Care Float   [facility]
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
## 2021-09-17 ENCOUNTER — APPOINTMENT (OUTPATIENT)
Dept: OCCUPATIONAL THERAPY | Facility: CLINIC | Age: 46
End: 2021-09-17
Attending: INTERNAL MEDICINE
Payer: COMMERCIAL

## 2021-09-17 PROBLEM — K70.31 ALCOHOLIC CIRRHOSIS OF LIVER WITH ASCITES (H): Status: ACTIVE | Noted: 2021-09-17

## 2021-09-17 LAB
ALBUMIN SERPL-MCNC: 1.2 G/DL (ref 3.4–5)
ALP SERPL-CCNC: 392 U/L (ref 40–150)
ALT SERPL W P-5'-P-CCNC: 61 U/L (ref 0–50)
ANION GAP SERPL CALCULATED.3IONS-SCNC: 7 MMOL/L (ref 3–14)
AST SERPL W P-5'-P-CCNC: 156 U/L (ref 0–45)
BASOPHILS # BLD MANUAL: 0 10E3/UL (ref 0–0.2)
BASOPHILS NFR BLD MANUAL: 0 %
BILIRUB SERPL-MCNC: 13.8 MG/DL (ref 0.2–1.3)
BUN SERPL-MCNC: 2 MG/DL (ref 7–30)
CALCIUM SERPL-MCNC: 7.6 MG/DL (ref 8.5–10.1)
CHLORIDE BLD-SCNC: 100 MMOL/L (ref 94–109)
CO2 SERPL-SCNC: 23 MMOL/L (ref 20–32)
CREAT SERPL-MCNC: 0.56 MG/DL (ref 0.52–1.04)
EOSINOPHIL # BLD MANUAL: 0 10E3/UL (ref 0–0.7)
EOSINOPHIL NFR BLD MANUAL: 0 %
ERYTHROCYTE [DISTWIDTH] IN BLOOD BY AUTOMATED COUNT: 20.8 % (ref 10–15)
GFR SERPL CREATININE-BSD FRML MDRD: >90 ML/MIN/1.73M2
GLUCOSE BLD-MCNC: 96 MG/DL (ref 70–99)
GLUCOSE BLDC GLUCOMTR-MCNC: 101 MG/DL (ref 70–99)
GLUCOSE BLDC GLUCOMTR-MCNC: 134 MG/DL (ref 70–99)
GLUCOSE BLDC GLUCOMTR-MCNC: 136 MG/DL (ref 70–99)
GLUCOSE BLDC GLUCOMTR-MCNC: 162 MG/DL (ref 70–99)
HCT VFR BLD AUTO: 24.1 % (ref 35–47)
HGB BLD-MCNC: 8.2 G/DL (ref 11.7–15.7)
INR PPP: 1.61 (ref 0.85–1.15)
LYMPHOCYTES # BLD MANUAL: 0.7 10E3/UL (ref 0.8–5.3)
LYMPHOCYTES NFR BLD MANUAL: 5 %
MCH RBC QN AUTO: 36.9 PG (ref 26.5–33)
MCHC RBC AUTO-ENTMCNC: 34 G/DL (ref 31.5–36.5)
MCV RBC AUTO: 109 FL (ref 78–100)
MONOCYTES # BLD MANUAL: 0.8 10E3/UL (ref 0–1.3)
MONOCYTES NFR BLD MANUAL: 6 %
MYELOCYTES # BLD MANUAL: 0.5 10E3/UL
MYELOCYTES NFR BLD MANUAL: 4 %
NEUTROPHILS # BLD MANUAL: 11.1 10E3/UL (ref 1.6–8.3)
NEUTROPHILS NFR BLD MANUAL: 85 %
NRBC # BLD AUTO: 0.3 10E3/UL
NRBC BLD MANUAL-RTO: 2 %
PHOSPHATE SERPL-MCNC: 3.6 MG/DL (ref 2.5–4.5)
PLAT MORPH BLD: ABNORMAL
PLATELET # BLD AUTO: 24 10E3/UL (ref 150–450)
POLYCHROMASIA BLD QL SMEAR: SLIGHT
POTASSIUM BLD-SCNC: 5.1 MMOL/L (ref 3.4–5.3)
PROT SERPL-MCNC: 4.6 G/DL (ref 6.8–8.8)
RBC # BLD AUTO: 2.22 10E6/UL (ref 3.8–5.2)
RBC MORPH BLD: ABNORMAL
SODIUM SERPL-SCNC: 130 MMOL/L (ref 133–144)
WBC # BLD AUTO: 13 10E3/UL (ref 4–11)

## 2021-09-17 PROCEDURE — 97530 THERAPEUTIC ACTIVITIES: CPT | Mod: GO

## 2021-09-17 PROCEDURE — 250N000011 HC RX IP 250 OP 636: Performed by: STUDENT IN AN ORGANIZED HEALTH CARE EDUCATION/TRAINING PROGRAM

## 2021-09-17 PROCEDURE — 99207 PR CDG-MDM COMPONENT: MEETS HIGH - UP CODED: CPT | Performed by: HOSPITALIST

## 2021-09-17 PROCEDURE — 250N000013 HC RX MED GY IP 250 OP 250 PS 637: Performed by: STUDENT IN AN ORGANIZED HEALTH CARE EDUCATION/TRAINING PROGRAM

## 2021-09-17 PROCEDURE — 120N000002 HC R&B MED SURG/OB UMMC

## 2021-09-17 PROCEDURE — 85610 PROTHROMBIN TIME: CPT | Performed by: STUDENT IN AN ORGANIZED HEALTH CARE EDUCATION/TRAINING PROGRAM

## 2021-09-17 PROCEDURE — 93010 ELECTROCARDIOGRAM REPORT: CPT | Performed by: INTERNAL MEDICINE

## 2021-09-17 PROCEDURE — 85027 COMPLETE CBC AUTOMATED: CPT | Performed by: STUDENT IN AN ORGANIZED HEALTH CARE EDUCATION/TRAINING PROGRAM

## 2021-09-17 PROCEDURE — 99233 SBSQ HOSP IP/OBS HIGH 50: CPT | Mod: GC | Performed by: HOSPITALIST

## 2021-09-17 PROCEDURE — 258N000003 HC RX IP 258 OP 636: Performed by: STUDENT IN AN ORGANIZED HEALTH CARE EDUCATION/TRAINING PROGRAM

## 2021-09-17 PROCEDURE — 80053 COMPREHEN METABOLIC PANEL: CPT | Performed by: STUDENT IN AN ORGANIZED HEALTH CARE EDUCATION/TRAINING PROGRAM

## 2021-09-17 PROCEDURE — 84100 ASSAY OF PHOSPHORUS: CPT | Performed by: HOSPITALIST

## 2021-09-17 PROCEDURE — 36415 COLL VENOUS BLD VENIPUNCTURE: CPT | Performed by: STUDENT IN AN ORGANIZED HEALTH CARE EDUCATION/TRAINING PROGRAM

## 2021-09-17 PROCEDURE — 250N000013 HC RX MED GY IP 250 OP 250 PS 637

## 2021-09-17 RX ORDER — HYDROXYZINE HYDROCHLORIDE 25 MG/1
50 TABLET, FILM COATED ORAL ONCE
Status: COMPLETED | OUTPATIENT
Start: 2021-09-17 | End: 2021-09-17

## 2021-09-17 RX ORDER — HYDROXYZINE HYDROCHLORIDE 25 MG/1
25 TABLET, FILM COATED ORAL ONCE
Status: COMPLETED | OUTPATIENT
Start: 2021-09-17 | End: 2021-09-17

## 2021-09-17 RX ADMIN — LACTULOSE 20 G: 20 POWDER, FOR SOLUTION ORAL at 13:43

## 2021-09-17 RX ADMIN — DOXYCYCLINE HYCLATE 100 MG: 100 CAPSULE ORAL at 20:19

## 2021-09-17 RX ADMIN — GABAPENTIN 100 MG: 100 CAPSULE ORAL at 08:29

## 2021-09-17 RX ADMIN — CALCIUM CARBONATE 600 MG (1,500 MG)-VITAMIN D3 400 UNIT TABLET 1 TABLET: at 08:28

## 2021-09-17 RX ADMIN — GABAPENTIN 100 MG: 100 CAPSULE ORAL at 13:37

## 2021-09-17 RX ADMIN — Medication 1 TABLET: at 08:29

## 2021-09-17 RX ADMIN — HYDROXYZINE HYDROCHLORIDE 25 MG: 25 TABLET, FILM COATED ORAL at 20:22

## 2021-09-17 RX ADMIN — FOLIC ACID 1 MG: 1 TABLET ORAL at 08:28

## 2021-09-17 RX ADMIN — PANTOPRAZOLE SODIUM 40 MG: 40 TABLET, DELAYED RELEASE ORAL at 08:29

## 2021-09-17 RX ADMIN — LACTULOSE 20 G: 20 POWDER, FOR SOLUTION ORAL at 08:35

## 2021-09-17 RX ADMIN — PRAZOSIN HYDROCHLORIDE 1 MG: 1 CAPSULE ORAL at 20:23

## 2021-09-17 RX ADMIN — RIFAXIMIN 550 MG: 550 TABLET ORAL at 20:24

## 2021-09-17 RX ADMIN — INSULIN GLARGINE 5 UNITS: 100 INJECTION, SOLUTION SUBCUTANEOUS at 21:25

## 2021-09-17 RX ADMIN — THIAMINE HYDROCHLORIDE 250 MG: 100 INJECTION, SOLUTION INTRAMUSCULAR; INTRAVENOUS at 08:38

## 2021-09-17 RX ADMIN — DOXYCYCLINE HYCLATE 100 MG: 100 CAPSULE ORAL at 08:28

## 2021-09-17 RX ADMIN — HYDROMORPHONE HYDROCHLORIDE 0.5 MG: 1 INJECTION, SOLUTION INTRAMUSCULAR; INTRAVENOUS; SUBCUTANEOUS at 04:37

## 2021-09-17 RX ADMIN — GABAPENTIN 100 MG: 100 CAPSULE ORAL at 20:22

## 2021-09-17 RX ADMIN — RIFAXIMIN 550 MG: 550 TABLET ORAL at 08:29

## 2021-09-17 ASSESSMENT — ACTIVITIES OF DAILY LIVING (ADL)
ADLS_ACUITY_SCORE: 8

## 2021-09-17 ASSESSMENT — MIFFLIN-ST. JEOR: SCORE: 1473.68

## 2021-09-17 NOTE — PLAN OF CARE
ICU End of Shift Summary: See flowsheets for vital signs and detailed assessment    Changes this shift: VSS, afebrile. A&Ox4. Continues to complain of abdominal pain & mild headache- PRN IV dilaudid given x 2 to relief. Dyspnea on exertion. Denies nausea. CIWAs q4h all < 7. Patient reported 2 loose stools overnight. Voiding spontaneously, not saving urine. Phos replaced- recheck 3.6. C-diff/enteric panel negative. Parvo still pending. No acute events.     Plan: Continue to monitor stool frequency & abdominal discomfort. Notify team w/ changes.

## 2021-09-17 NOTE — PLAN OF CARE
Physical Therapy: Orders received again. Chart reviewed and discussed with care team, OT is working with pt and saw pt this morning again to determine if pt has had change/decline in mobility. OT reports pt continues to ambulate SBA with some sway to gait but not unsafe and likely at baseline mobility.?Physical Therapy not indicated due to SBA with mobility with OT, mobilizing safely and progress to I expected.?OT is continuing to work with pt to address skilled therapy needs. Defer discharge recommendations to OT.? Will complete orders.

## 2021-09-17 NOTE — PROGRESS NOTES
"Care Management Follow Up    Length of Stay (days): 4    Expected Discharge Date: 09/21/2021     Concerns to be Addressed:  All concerns addressed     Patient plan of care discussed at interdisciplinary rounds: Yes    Anticipated Discharge Disposition:      Pt to discharge Tuesday, 9/21/21 at 10:45am via [facility] stretcher to:    [facility]  Inpatient [facility]  [address]  [place] [id number]  Phone: [phone]  Admissions- Kandy ([phone])  Fax: [phone]      Anticipated Discharge Services:  IP chem dep tx  Anticipated Discharge DME:  N/A    Patient/family educated on Medicare website which has current facility and service quality ratings:  yes  Education Provided on the Discharge Plan: yes   Patient/Family in Agreement with the Plan:  yes    Private pay costs discussed: Not applicable    Additional Information:    YANDY spoke to Kandy of [facility] who confirmed pt has been accepted to their IP chem dep facility for Tuesday morning. YANDY spoke to Kandy about transport, and Kandy supported pt transporting directly from hospital to facility, and without private transport. Pt must discharge with 30 day supply of medication on her.     YANDY spoke to Payal Mendes MD of team who agreed pt will remain IP until discharging Tuesday morning to IP chem dep program. Team informed that pt must discharge with 30 day supply of medication.    YANDY spoke to Emperatriz Connell, manager of [facility], who stated that even through pt is transporting to unit that is not locked, and pt is going voluntarily, pt requires stretcher transport to chem dep program if she is to utilize EMS service. YANDY scheduled [facility] stretcher for Tuesday, 9/21/21 at 10:45am. YANDY completed PCS form and placed in pt's physical chart.     YANDY met with pt at bedside with update. Pt stated \"I need to go directly from here to there\", and is agreeable with stretcher ride. YANDY informed of " potential OOP costs for stretcher ride, and pt agreeable.     SW updated Kandy [facility]errano [facility] of discharge plan.       Jovani Navarro, JOY, SW  Acute Care Float   [facility]

## 2021-09-17 NOTE — PROGRESS NOTES
[facility]    Progress Note - Maroon 3 Service        Date of Admission:  9/13/2021    Assessment & Plan             Aliyah Angeles is a 46yo female with alcohol use disorder, gastric bypass, PTSD, fibromyalgia, and alcoholic hepatitis who was presented to OSH ED on 9/12/2021 with weakness, dyspnea, AMS and c/f substance withdrawal. She was transferred to the [facility] ICU after being found to have lactic acidosis at OSH with initial lactate 13.0. Labs, imaging, and clinical picture concerning for EtOH hepatitis. Transferred to medicine 9/14.      Changes today:  - New WBC 13.1. No localizing symptoms of infection. Afebrile. Soft BP. CTM.   - Accepted to  [facility] - Tuesday able to transfer   - PT/OT  - Lymphedema wraps to lower extremities   - Encourage Incentive Spirometry   - CT A/p - no pseudocyst seen, consider MRI for further eval; previously known hepatic steatosis; minimal patchy bibasilar ground glass opacities in the lungs   - C.diff and enteric panel - negative      EtOH Hepatitis  Transaminitis, hyperbilirubinemia   Patient with elevated transaminases and bilirubin concerning for alcoholic hepatitis with AST:ALT ratio 3:1. Significant hepatomegaly noted on imaging. Hepatology consulted and following. HCV and HBV negative. No role for steroids at this time. Hepatology follow up in 4wk after discharge.  - Hepatology following, appreciate recs  - Lactulose and rifaximin; 3-4BM daily for HE ppx (hold for BM >4 daily)  - BP remains too soft for diuretics - will add lymphedema wraps for LE   - Daily MELD labs     MELD-Na score: 26 at 9/17/2021  5:30 AM  MELD score: 22 at 9/17/2021  5:30 AM  Calculated from:  Serum Creatinine: 0.56 mg/dL (Using min of 1 mg/dL) at 9/17/2021  5:30 AM  Serum Sodium: 130 mmol/L at 9/17/2021  5:30 AM  Total Bilirubin: 13.8 mg/dL at 9/17/2021  5:30 AM  INR(ratio): 1.61 at 9/17/2021  5:30 AM  Age: 46 years     EtOH Withdrawal, improving  EtOH  use disorder  Last drink 9/12, reports drinking 1L fireball daily, denies significant withdrawal symptoms. Patient accepting of resources to aid in cessation. Chemical dependence and SW consulted - patient would like resources for inpatient treatment.   - CIWA with ativan  - folate and B12 supplementation     Abdominal pain  Pancreatic fat stranding on CT  16mm hypoattenuation near pancreatic head  CT 9/13/21 at OSH read states some pancreatic atrophy and mild fat stranding, lipase normal. Mass concerning for pseudocyst vs pancreatic mass. Grandmother has history of pancreatic cancer.  - [facility] stated they would push the image to our facility as of 9/15  - CT A/P- no evidence of acute pancreatitis     Diarrhea  Patient with diarrhea (no concern for GIB) since admission, stooled 9 times yesterday. In the setting of recent antibiotics, will send enteric panel and C. Diff.   - C. Diff and enteric panel - negative (9/17/21)     Anemia, normocytic  Thrombocytopenia   Significant thrombocytopenia noted upon admission, has been in the 20's with normal plts noted in February 2020. No evidence of splenic sequestration on imaging. Mild normocytic anemia upon admission with 4g drop on 9/14 without evidence of bleeding. Peripheral smear with lawanda cells, but no evidence of hemolysis despite low haptoglobin and elevated LDH. Hematology consulted and do not feel it is hemolysis. Low suspicion for active bleed with stable Hgb as of 9/15, will continue to monitor. Favor marrow suppression and hepatic disease.   - Hematology consulted, appreciate recs  - Copper level, LIZ, EBV PCR, and parvo pending      QTc Prolongation  C/f Alcoholic cardiomyopathy  Patient with QTc of 554 at the time of admission, likely due to electrolyte derangements, namely hypocalcemia and hypomagnesemia. Echocardiogram wnl, EF >70%.   - repeat EKG showed improvement in QTc, however still prolonged - 474     ?Bronchitis  3 day history of dyspnea associated w/  cough for the past month for which she was treated with doxycycline. CXR 9/13 looks clear without evidence of PTX, effusion, or PNA, but procal borderline 0.9. MRSA and RSV panel negative.   - Continue doxycycline for 5d course  - Cultures 9/13 negative to date     T2DM  Patient with A1c of 9.1% at the time of admission, this is a new diagnosis.   - High dose sliding scale insulin  - Glargine 5U qhs  - Diabetes educator     Anion Gap Metabolic Acidosis, resolved  Lactic acidosis, resolved  C/f starvation ketosis/alcohol ketoacidosis  Lactate elevated at 13 upon presentation, improved rapidly with IVF. Patient with significant alcohol consumption, reports of polyethylene glycol (in Fireball) causing marked lactic acidosis. Lactate now wnl and AGMA resolved.      Hyponatremia, stable   Patient with sodium of 117 at the time of presentation to the OSH, likely hypovolemic at that time. Sodium has slowly trended toward normal, will continue to monitor. Low suspicion for SIADH with urine osms not significantly elevated.      PTSD  RUBÉN  Fibromyalgia  - Continue pta prazosin and gabapentin  - Holding pta escitalopram for prolonged QTc      Hypokalemia, resolved  Hypomagnesemia, resolved  Hypophosphatemia, improving   Hypocalcemia, improving  Electrolyte derangements due to poor nutritional status in the setting of significant EtOH use disorder.   - RN driven replacement protocol.      GERD - pta pantoprazole       Diet: Fluid restriction 1500 ML FLUID  Snacks/Supplements Adult: Ensure Enlive; With Meals  2 Gram Sodium Diet    DVT Prophylaxis: VTE Prophylaxis contraindicated due to thrombocytopenia   Rice Catheter: Not present  Fluids: None   Central Lines: None  Code Status: Full Code      Disposition Plan   Expected discharge: 09/21/2021   recommended to IP [facility] treatment  once stable hemoglobin, improved mobility.     The patient's care was discussed with the Attending Physician, Dr. Blankenship  and Patient.    Payal Mendes,  MD Phillip 3 Service  [facility]  Securely message with the [employer] Web Console (learn more here)  Text page via [employer] Paging/Directory  Please see sign in/sign out for up to date coverage information    Clinically Significant Risk Factors Present on Admission                ______________________________________________________________________    Interval History   Aliyah reports that she is feeling OK this AM. She denies fevers/chills, cough, chest pain, nausea. She reports some abdominal pain with worsening distension. She endorses a new rash on her back. She denies lightheadedness. No dysuria. Ongoing diarrhea.      Data reviewed today: I reviewed all medications, new labs and imaging results over the last 24 hours. I personally reviewed the EKG tracing showing QTc 474 and the abdominal CT image(s) showing no acute pancreatitis .    Physical Exam   Vital Signs: Temp: 98.4  F (36.9  C) Temp src: Oral BP: 91/66 Pulse: 97   Resp: 16 SpO2: 96 % O2 Device: None (Room air)    Weight: 187 lbs 1.6 oz  Physical Exam  Vitals and nursing note reviewed.   Constitutional:       Appearance: She is not ill-appearing.   HENT:      Mouth/Throat:      Mouth: Mucous membranes are moist.   Eyes:      General: Scleral icterus present.      Extraocular Movements: Extraocular movements intact.      Pupils: Pupils are equal, round, and reactive to light.   Cardiovascular:      Rate and Rhythm: Normal rate and regular rhythm.      Heart sounds: Normal heart sounds.   Pulmonary:      Effort: Pulmonary effort is normal.      Breath sounds: Normal breath sounds. No rhonchi.      Comments: Diminished at bilateral bases     Abdominal:      General: Bowel sounds are normal. There is distension.      Palpations: Abdomen is soft.      Tenderness: There is abdominal tenderness.   Musculoskeletal:      Right lower leg: Edema present.      Left lower leg: Edema present.      Comments: 2+ pitting edema  in bilateral lower extremities   Skin:     General: Skin is warm and dry.      Coloration: Skin is jaundiced.      Findings: Rash present.      Comments: Uniform petechial rash diffusely over her back      Neurological:      General: No focal deficit present.      Mental Status: She is alert.   Psychiatric:         Mood and Affect: Mood normal.           Data   Recent Labs   Lab 09/17/21  0530 09/17/21  0440 09/16/21  2332 09/16/21  0725 09/16/21  0426 09/15/21  0702 09/15/21  0538 09/13/21  0805 09/13/21  0642   WBC 13.0*  --   --   --  9.9  --  9.0   < > 7.3   HGB 8.2*  --   --   --  8.0*  --  9.1*   < > 12.2   *  --   --   --  107*  --  108*   < > 104*   PLT 24*  --   --   --  18*  --  18*   < > 28*   INR 1.61*  --   --   --  1.64*  --  1.48*   < > 1.35*   *  --   --   --  128*  --  125*   < > 123*   POTASSIUM 5.1  --   --   --  4.8  --  4.4   < > 3.2*   CHLORIDE 100  --   --   --  100  --  94   < > 86*   CO2 23  --   --   --  23  --  23   < > 18*   BUN 2*  --   --   --  2*  --  2*   < > 4*   CR 0.56  --   --   --  0.59  --  0.56   < > 0.46*   ANIONGAP 7  --   --   --  5  --  8   < > 19*   STEFFANY 7.6*  --   --   --  8.2*  --  8.9   < > 7.6*   GLC 96 101* 118*   < > 107*   < > 139*   < > 239*   ALBUMIN 1.2*  --   --   --  1.2*   < > 1.3*   < > 1.7*   PROTTOTAL 4.6*  --   --   --  4.4*   < > 4.8*   < > 6.0*   BILITOTAL 13.8*  --   --   --  12.3*   < > 12.2*   < > 13.0*   ALKPHOS 392*  --   --   --  403*   < > 475*   < > 594*   ALT 61*  --   --   --  64*   < > 77*   < > 96*   *  --   --   --  174*   < > 242*   < > 373*   LIPASE  --   --   --   --   --   --   --   --  138    < > = values in this interval not displayed.     No results found for this or any previous visit (from the past 24 hour(s)).  Medications       calcium carbonate 600 mg-vitamin D 400 units  1 tablet Oral Daily     doxycycline hyclate  100 mg Oral Q12H SUNDEEP     folic acid  1 mg Oral Daily     gabapentin  100 mg Oral TID     insulin  aspart  1-12 Units Subcutaneous Q4H     insulin glargine  5 Units Subcutaneous At Bedtime     lactulose  20 g Oral TID     multivitamin w/minerals  1 tablet Oral Daily     pantoprazole  40 mg Oral QAM AC     [Held by provider] potassium chloride  60 mEq Oral Daily     prazosin  1 mg Oral QPM     rifaximin  550 mg Oral BID     thiamine  250 mg Intravenous Daily    Followed by     [START ON 9/21/2021] thiamine  100 mg Oral Daily

## 2021-09-17 NOTE — PLAN OF CARE
Major Shift Events: Pt A/Ox4. VSS. RA. Denies pain. CIWA scores 1-2, no interventions needed. Multiple BMs this shift reported by pt (~8-9). Not measuring urine. Worked with therapy, walked halls.   Plan: Plan to transfer to med/surg when bed becomes available. Per social work, plan for inpatient chemical dependency on Tuesday (9/21).   For vital signs and complete assessments, please see documentation flowsheets.

## 2021-09-18 ENCOUNTER — APPOINTMENT (OUTPATIENT)
Dept: OCCUPATIONAL THERAPY | Facility: CLINIC | Age: 46
End: 2021-09-18
Attending: INTERNAL MEDICINE
Payer: COMMERCIAL

## 2021-09-18 LAB
ALBUMIN SERPL-MCNC: 1.2 G/DL (ref 3.4–5)
ALP SERPL-CCNC: 368 U/L (ref 40–150)
ALT SERPL W P-5'-P-CCNC: 52 U/L (ref 0–50)
ANION GAP SERPL CALCULATED.3IONS-SCNC: 7 MMOL/L (ref 3–14)
AST SERPL W P-5'-P-CCNC: 134 U/L (ref 0–45)
BACTERIA BLD CULT: NO GROWTH
BILIRUB SERPL-MCNC: 14.7 MG/DL (ref 0.2–1.3)
BUN SERPL-MCNC: 2 MG/DL (ref 7–30)
CALCIUM SERPL-MCNC: 7.8 MG/DL (ref 8.5–10.1)
CHLORIDE BLD-SCNC: 100 MMOL/L (ref 94–109)
CO2 SERPL-SCNC: 22 MMOL/L (ref 20–32)
CREAT SERPL-MCNC: 0.61 MG/DL (ref 0.52–1.04)
ERYTHROCYTE [DISTWIDTH] IN BLOOD BY AUTOMATED COUNT: 21.9 % (ref 10–15)
GFR SERPL CREATININE-BSD FRML MDRD: >90 ML/MIN/1.73M2
GLUCOSE BLD-MCNC: 95 MG/DL (ref 70–99)
GLUCOSE BLDC GLUCOMTR-MCNC: 102 MG/DL (ref 70–99)
GLUCOSE BLDC GLUCOMTR-MCNC: 116 MG/DL (ref 70–99)
GLUCOSE BLDC GLUCOMTR-MCNC: 120 MG/DL (ref 70–99)
GLUCOSE BLDC GLUCOMTR-MCNC: 121 MG/DL (ref 70–99)
GLUCOSE BLDC GLUCOMTR-MCNC: 143 MG/DL (ref 70–99)
GLUCOSE BLDC GLUCOMTR-MCNC: 159 MG/DL (ref 70–99)
HCT VFR BLD AUTO: 26.6 % (ref 35–47)
HGB BLD-MCNC: 8.6 G/DL (ref 11.7–15.7)
MCH RBC QN AUTO: 36.6 PG (ref 26.5–33)
MCHC RBC AUTO-ENTMCNC: 32.3 G/DL (ref 31.5–36.5)
MCV RBC AUTO: 113 FL (ref 78–100)
PLATELET # BLD AUTO: 44 10E3/UL (ref 150–450)
POTASSIUM BLD-SCNC: 4.3 MMOL/L (ref 3.4–5.3)
PROT SERPL-MCNC: 4.6 G/DL (ref 6.8–8.8)
RBC # BLD AUTO: 2.35 10E6/UL (ref 3.8–5.2)
SODIUM SERPL-SCNC: 129 MMOL/L (ref 133–144)
WBC # BLD AUTO: 12.5 10E3/UL (ref 4–11)

## 2021-09-18 PROCEDURE — 250N000011 HC RX IP 250 OP 636: Performed by: STUDENT IN AN ORGANIZED HEALTH CARE EDUCATION/TRAINING PROGRAM

## 2021-09-18 PROCEDURE — 258N000003 HC RX IP 258 OP 636: Performed by: STUDENT IN AN ORGANIZED HEALTH CARE EDUCATION/TRAINING PROGRAM

## 2021-09-18 PROCEDURE — 97140 MANUAL THERAPY 1/> REGIONS: CPT | Mod: GO | Performed by: OCCUPATIONAL THERAPIST

## 2021-09-18 PROCEDURE — 120N000002 HC R&B MED SURG/OB UMMC

## 2021-09-18 PROCEDURE — 99207 PR CDG-MDM COMPONENT: MEETS HIGH - UP CODED: CPT | Performed by: HOSPITALIST

## 2021-09-18 PROCEDURE — 82040 ASSAY OF SERUM ALBUMIN: CPT | Performed by: STUDENT IN AN ORGANIZED HEALTH CARE EDUCATION/TRAINING PROGRAM

## 2021-09-18 PROCEDURE — 250N000013 HC RX MED GY IP 250 OP 250 PS 637: Performed by: STUDENT IN AN ORGANIZED HEALTH CARE EDUCATION/TRAINING PROGRAM

## 2021-09-18 PROCEDURE — 99233 SBSQ HOSP IP/OBS HIGH 50: CPT | Mod: GC | Performed by: HOSPITALIST

## 2021-09-18 PROCEDURE — 85027 COMPLETE CBC AUTOMATED: CPT | Performed by: STUDENT IN AN ORGANIZED HEALTH CARE EDUCATION/TRAINING PROGRAM

## 2021-09-18 PROCEDURE — 36415 COLL VENOUS BLD VENIPUNCTURE: CPT | Performed by: STUDENT IN AN ORGANIZED HEALTH CARE EDUCATION/TRAINING PROGRAM

## 2021-09-18 PROCEDURE — 97168 OT RE-EVAL EST PLAN CARE: CPT | Mod: GO | Performed by: OCCUPATIONAL THERAPIST

## 2021-09-18 RX ADMIN — INSULIN GLARGINE 5 UNITS: 100 INJECTION, SOLUTION SUBCUTANEOUS at 21:13

## 2021-09-18 RX ADMIN — CALCIUM CARBONATE 600 MG (1,500 MG)-VITAMIN D3 400 UNIT TABLET 1 TABLET: at 08:30

## 2021-09-18 RX ADMIN — GABAPENTIN 100 MG: 100 CAPSULE ORAL at 16:20

## 2021-09-18 RX ADMIN — PANTOPRAZOLE SODIUM 40 MG: 40 TABLET, DELAYED RELEASE ORAL at 08:29

## 2021-09-18 RX ADMIN — RIFAXIMIN 550 MG: 550 TABLET ORAL at 08:29

## 2021-09-18 RX ADMIN — GABAPENTIN 100 MG: 100 CAPSULE ORAL at 19:35

## 2021-09-18 RX ADMIN — THIAMINE HYDROCHLORIDE 250 MG: 100 INJECTION, SOLUTION INTRAMUSCULAR; INTRAVENOUS at 08:33

## 2021-09-18 RX ADMIN — HYDROMORPHONE HYDROCHLORIDE 0.5 MG: 1 INJECTION, SOLUTION INTRAMUSCULAR; INTRAVENOUS; SUBCUTANEOUS at 21:44

## 2021-09-18 RX ADMIN — Medication 1 TABLET: at 08:29

## 2021-09-18 RX ADMIN — PRAZOSIN HYDROCHLORIDE 1 MG: 1 CAPSULE ORAL at 19:35

## 2021-09-18 RX ADMIN — RIFAXIMIN 550 MG: 550 TABLET ORAL at 19:35

## 2021-09-18 RX ADMIN — FOLIC ACID 1 MG: 1 TABLET ORAL at 08:29

## 2021-09-18 RX ADMIN — GABAPENTIN 100 MG: 100 CAPSULE ORAL at 08:30

## 2021-09-18 ASSESSMENT — ACTIVITIES OF DAILY LIVING (ADL)
ADLS_ACUITY_SCORE: 6

## 2021-09-18 ASSESSMENT — MIFFLIN-ST. JEOR: SCORE: 1472

## 2021-09-18 NOTE — PROGRESS NOTES
09/18/21 1000   Quick Adds   Type of Visit   (Lymphedema evaluation)   Sensory   Sensory Comments light touch intact.    Pain Assessment   Patient Currently in Pain No   Edema General Information   Onset of Edema   (acute on chronic )   Affected Body Part(s) Right LE;Left LE   Etiology Comments liver disease, decreased mobility   General Comments/Previous Edema Treatment/Edema Equipment patient reports no previous use of LE compression.    Edema Examination/Assessment   Skin Condition Pitting;Dryness;Intact   Skin Condition Comments Skin intact with firm 2+ pitting edema distally. Mild skin dryness.    Skin Integrity intact   Pitting Assessment 2+ pitting B LEs    Edema Assessment Comments GCB applied to B LEs from MTPs to knee creases.    Clinical Impression   Criteria for Skilled Therapeutic Interventions Met (OT) yes   Edema: Patient Presentation Edema   Influenced by the following impairments decreased functional mobility.    Assessment of Occupational Performance 1-3 Performance Deficits   Edema: Planned Interventions Gradient compression bandaging;Fit for compression garment;Edema exercises;Precautions to prevent infection/exacerbation;Education   Intervention Comments GCB to B LEs.    Clinical Decision Making Complexity (OT) low complexity   Therapy Frequency (OT) 5x/week   Predicted Duration of Therapy 1 week   Risk & Benefits of therapy have been explained evaluation/treatment results reviewed;care plan/treatment goals reviewed

## 2021-09-18 NOTE — PROGRESS NOTES
[facility]    Progress Note - Marbenja 3 Service        Date of Admission:  9/13/2021    Assessment & Plan   Aliyah Angeles is a 44yo female with alcohol use disorder, gastric bypass, PTSD, fibromyalgia, and alcoholic hepatitis who was presented to OSH ED on 9/12/2021 with weakness, dyspnea, AMS and c/f substance withdrawal. She was transferred to the [facility] ICU after being found to have lactic acidosis at OSH with initial lactate 13.0. Labs, imaging, and clinical picture concerning for EtOH hepatitis. Transferred to medicine 9/14.      Changes today:  - Imodium (low dose) for diarrhea   - Encourage Mobility  - Discontinue CIWA     EtOH Hepatitis  Transaminitis, hyperbilirubinemia   Patient with elevated transaminases and bilirubin concerning for alcoholic hepatitis with AST:ALT ratio 3:1. Significant hepatomegaly noted on imaging. Hepatology consulted and following. HCV and HBV negative. No role for steroids at this time. Hepatology follow up in 4wk after discharge. BP remains too soft for diuretics- lymphedema wraps added for LE edema.   - Hepatology following, appreciate recs  - Lactulose and rifaximin; 3-4BM daily for HE ppx (hold for BM >4 daily)  - Daily MELD labs     MELD-Na score: 27 at 9/18/2021  7:34 AM  MELD score: 22 at 9/18/2021  7:34 AM  Calculated from:  Serum Creatinine: 0.61 mg/dL (Using min of 1 mg/dL) at 9/18/2021  7:34 AM  Serum Sodium: 129 mmol/L at 9/18/2021  7:34 AM  Total Bilirubin: 14.7 mg/dL at 9/18/2021  7:34 AM  INR(ratio): 1.61 at 9/17/2021  5:30 AM  Age: 46 years     EtOH Withdrawal, improving  EtOH use disorder  Last drink 9/12, reports drinking 1L fireball daily, denies significant withdrawal symptoms. Patient accepting of resources to aid in cessation. Chemical dependence and SW consulted - patient will discharge to inpatient treatment on 9/21.   - folate and B12 supplementation     Abdominal pain  Pancreatic fat stranding on CT  16mm  hypoattenuation near pancreatic head, improved  CT 9/13/21 at OSH read states some pancreatic atrophy and mild fat stranding, lipase normal. Mass concerning for pseudocyst vs pancreatic mass. Grandmother has history of pancreatic cancer. Repeat CT A/P- no evidence of acute pancreatitis and improvement in previously seen hypoattenuation.      Diarrhea  Patient with diarrhea (no concern for GIB) since admission, enteric panel and C. Diff negative. Low dose imodium available.      Anemia, normocytic  Thrombocytopenia   Significant thrombocytopenia noted upon admission, has been in the 20's with normal plts noted in February 2020. No evidence of splenic sequestration on imaging. Mild normocytic anemia upon admission with 4g drop on 9/14 without evidence of bleeding. Peripheral smear with lawanda cells, but no evidence of hemolysis despite low haptoglobin and elevated LDH. Hematology consulted and do not feel it is hemolysis. Low suspicion for active bleed with stable Hgb as of 9/15, will continue to monitor. Favor marrow suppression and hepatic disease.      QTc Prolongation, improved  C/f Alcoholic cardiomyopathy  Patient with QTc of 554 at the time of admission, likely due to electrolyte derangements, namely hypocalcemia and hypomagnesemia. Echocardiogram wnl, EF >70%. Repeat EKG with improvement in QTc to 474.      ?Bronchitis, resolved  3 day history of dyspnea associated w/ cough for the past month for which she was treated with doxycycline. CXR 9/13 looks clear without evidence of PTX, effusion, or PNA, but procal borderline 0.9. MRSA and RSV panel negative. S/p 5d course of doxycycline.      T2DM  Patient with A1c of 9.1% at the time of admission, this is a new diagnosis.   - High dose sliding scale insulin  - Glargine 5U qhs  - Diabetes educator     Anion Gap Metabolic Acidosis, resolved  Lactic acidosis, resolved  C/f starvation ketosis/alcohol ketoacidosis  Lactate elevated at 13 upon presentation, improved  rapidly with IVF. Patient with significant alcohol consumption, reports of polyethylene glycol (in Fireball) causing marked lactic acidosis. Lactate now wnl and AGMA resolved.      Hyponatremia, stable   Patient with sodium of 117 at the time of presentation to the OSH, likely hypovolemic at that time. Sodium has slowly trended toward normal, will continue to monitor. Low suspicion for SIADH with urine osms not significantly elevated.      PTSD  RUBÉN  Fibromyalgia  - Continue pta prazosin and gabapentin  - Holding pta escitalopram for prolonged QTc      Hypokalemia, resolved  Hypomagnesemia, resolved  Hypophosphatemia, improving   Hypocalcemia, improving  Electrolyte derangements due to poor nutritional status in the setting of significant EtOH use disorder.   - RN driven replacement protocol.      GERD - pta pantoprazole       Diet: Fluid restriction 1500 ML FLUID  Snacks/Supplements Adult: Ensure Enlive; With Meals  2 Gram Sodium Diet    DVT Prophylaxis: VTE Prophylaxis contraindicated due to thrombocytopenia   Rice Catheter: Not present  Fluids: None   Central Lines: None  Code Status: Full Code      Disposition Plan   Expected discharge: Will discharge to inpatient treatment on Tuesday 9/21.      The patient's care was discussed with the Attending Physician, Dr. Blankenship  and Patient.    Reina Martin MD  [address]  Securely message with the [employer] Web Console (learn more here)  Text page via [employer] Paging/Directory  Please see sign in/sign out for up to date coverage information  ____________________________________________________________________    Interval History   Aliyah continues to have diarrhea but is otherwise feeling well and resting this morning. Encouraged her to get up and moving to help with regaining strength and to improve her LE edema as pressures remain too soft for diuretic use. Lymphedema wraps in place.     Data reviewed  today: I reviewed all medications, new labs and imaging results over the last 24 hours. I personally reviewed the EKG tracing showing QTc 474 and the abdominal CT image(s) showing no acute pancreatitis .    Physical Exam   Vital Signs: Temp: 98.2  F (36.8  C) Temp src: Oral BP: 97/68 Pulse: 92   Resp: 16 SpO2: 98 % O2 Device: None (Room air)    Weight: 187 lbs 13.31 oz  General Appearance:  resting comfortably, no acute distress   HEENT: scleral icterus, moist oral mucosa   Respiratory: breathing comfortably on RA, CTAB  Cardiovascular: RRR, no murmur appreciated, well perfused  GI: soft, distended and tender to palpation, +hepatomegaly   Skin: scattered bruising, petechia on back, jaundiced       Data   Recent Labs   Lab 09/18/21  1155 09/18/21  0734 09/17/21  1155 09/17/21  0530 09/16/21  0725 09/16/21  0426 09/15/21  0702 09/15/21  0538 09/13/21  0805 09/13/21  0642   WBC  --  12.5*  --  13.0*  --  9.9   < > 9.0   < > 7.3   HGB  --  8.6*  --  8.2*  --  8.0*   < > 9.1*   < > 12.2   MCV  --  113*  --  109*  --  107*   < > 108*   < > 104*   PLT  --  44*  --  24*  --  18*   < > 18*   < > 28*   INR  --   --   --  1.61*  --  1.64*  --  1.48*   < > 1.35*   NA  --  129*  --  130*  --  128*   < > 125*   < > 123*   POTASSIUM  --  4.3  --  5.1  --  4.8   < > 4.4   < > 3.2*   CHLORIDE  --  100  --  100  --  100   < > 94   < > 86*   CO2  --  22  --  23  --  23   < > 23   < > 18*   BUN  --  2*  --  2*  --  2*   < > 2*   < > 4*   CR  --  0.61  --  0.56  --  0.59   < > 0.56   < > 0.46*   ANIONGAP  --  7  --  7  --  5   < > 8   < > 19*   STEFFANY  --  7.8*  --  7.6*  --  8.2*   < > 8.9   < > 7.6*   * 95  102*   < > 96   < > 107*   < > 139*   < > 239*   ALBUMIN  --  1.2*  --  1.2*   < > 1.2*   < > 1.3*   < > 1.7*   PROTTOTAL  --  4.6*  --  4.6*   < > 4.4*   < > 4.8*   < > 6.0*   BILITOTAL  --  14.7*  --  13.8*   < > 12.3*   < > 12.2*   < > 13.0*   ALKPHOS  --  368*  --  392*   < > 403*   < > 475*   < > 594*   ALT  --  52*  --   61*   < > 64*   < > 77*   < > 96*   AST  --  134*  --  156*   < > 174*   < > 242*   < > 373*   LIPASE  --   --   --   --   --   --   --   --   --  138    < > = values in this interval not displayed.     No results found for this or any previous visit (from the past 24 hour(s)).  Medications       calcium carbonate 600 mg-vitamin D 400 units  1 tablet Oral Daily     folic acid  1 mg Oral Daily     gabapentin  100 mg Oral TID     insulin aspart  1-12 Units Subcutaneous Q4H     insulin glargine  5 Units Subcutaneous At Bedtime     lactulose  20 g Oral TID     multivitamin w/minerals  1 tablet Oral Daily     pantoprazole  40 mg Oral QAM AC     [Held by provider] potassium chloride  60 mEq Oral Daily     prazosin  1 mg Oral QPM     rifaximin  550 mg Oral BID     thiamine  250 mg Intravenous Daily    Followed by     [START ON 9/21/2021] thiamine  100 mg Oral Daily

## 2021-09-18 NOTE — PLAN OF CARE
ICU End of Shift Summary. See flowsheets for vital signs and detailed assessment.    Changes this shift: VSS on room air. Some leg pain with activity, relief with legs elevated. Lymph wraps applied today. Good appetite, no sliding scale insulin needed. 5 BMs since midnight without lactulose. Patient went outside today with her mom.     Plan:  Continue plan of care. Transfer to /S.

## 2021-09-18 NOTE — PLAN OF CARE
ICU End of Shift Summary. See flowsheets for vital signs and detailed assessment.    Changes this shift: Pt A/Ox4. VSS. RA. Denies pain. CIWA scores between 1-2 all night, atarax given with 2000 med pass with relief. Held scheduled lactulose d/t multiple BMs throughout shift yesterday. Not measuring urine output. Slept most of night.     Plan:  Has med/surg orders when bed becomes available, plan to transfer to chem dep on Tuesday (9/21).   Problem: Adult Inpatient Plan of Care  Goal: Plan of Care Review  Outcome: No Change     Problem: Adult Inpatient Plan of Care  Goal: Patient-Specific Goal (Individualized)  Outcome: No Change     Problem: Risk for Delirium  Goal: Optimal Coping  Outcome: No Change

## 2021-09-19 ENCOUNTER — APPOINTMENT (OUTPATIENT)
Dept: OCCUPATIONAL THERAPY | Facility: CLINIC | Age: 46
End: 2021-09-19
Attending: INTERNAL MEDICINE
Payer: COMMERCIAL

## 2021-09-19 LAB
B19V DNA SER QL NAA+PROBE: NOT DETECTED
GLUCOSE BLDC GLUCOMTR-MCNC: 108 MG/DL (ref 70–99)
GLUCOSE BLDC GLUCOMTR-MCNC: 112 MG/DL (ref 70–99)
GLUCOSE BLDC GLUCOMTR-MCNC: 122 MG/DL (ref 70–99)
GLUCOSE BLDC GLUCOMTR-MCNC: 162 MG/DL (ref 70–99)
GLUCOSE BLDC GLUCOMTR-MCNC: 68 MG/DL (ref 70–99)

## 2021-09-19 PROCEDURE — 250N000013 HC RX MED GY IP 250 OP 250 PS 637: Performed by: STUDENT IN AN ORGANIZED HEALTH CARE EDUCATION/TRAINING PROGRAM

## 2021-09-19 PROCEDURE — 258N000003 HC RX IP 258 OP 636: Performed by: STUDENT IN AN ORGANIZED HEALTH CARE EDUCATION/TRAINING PROGRAM

## 2021-09-19 PROCEDURE — 99233 SBSQ HOSP IP/OBS HIGH 50: CPT | Mod: GC | Performed by: HOSPITALIST

## 2021-09-19 PROCEDURE — 120N000002 HC R&B MED SURG/OB UMMC

## 2021-09-19 PROCEDURE — 250N000013 HC RX MED GY IP 250 OP 250 PS 637: Performed by: HOSPITALIST

## 2021-09-19 PROCEDURE — 250N000011 HC RX IP 250 OP 636: Performed by: STUDENT IN AN ORGANIZED HEALTH CARE EDUCATION/TRAINING PROGRAM

## 2021-09-19 PROCEDURE — 97140 MANUAL THERAPY 1/> REGIONS: CPT | Mod: GO | Performed by: OCCUPATIONAL THERAPIST

## 2021-09-19 PROCEDURE — 99207 PR CDG-MDM COMPONENT: MEETS HIGH - UP CODED: CPT | Performed by: HOSPITALIST

## 2021-09-19 RX ORDER — ACETAMINOPHEN 325 MG/1
325 TABLET ORAL EVERY 4 HOURS PRN
Status: DISCONTINUED | OUTPATIENT
Start: 2021-09-19 | End: 2021-09-21 | Stop reason: HOSPADM

## 2021-09-19 RX ADMIN — ACETAMINOPHEN 325 MG: 325 TABLET, FILM COATED ORAL at 18:21

## 2021-09-19 RX ADMIN — Medication 1 TABLET: at 08:36

## 2021-09-19 RX ADMIN — GABAPENTIN 100 MG: 100 CAPSULE ORAL at 08:36

## 2021-09-19 RX ADMIN — HYDROMORPHONE HYDROCHLORIDE 0.5 MG: 1 INJECTION, SOLUTION INTRAMUSCULAR; INTRAVENOUS; SUBCUTANEOUS at 03:48

## 2021-09-19 RX ADMIN — GABAPENTIN 100 MG: 100 CAPSULE ORAL at 17:04

## 2021-09-19 RX ADMIN — GABAPENTIN 100 MG: 100 CAPSULE ORAL at 19:52

## 2021-09-19 RX ADMIN — RIFAXIMIN 550 MG: 550 TABLET ORAL at 08:36

## 2021-09-19 RX ADMIN — PRAZOSIN HYDROCHLORIDE 1 MG: 1 CAPSULE ORAL at 19:53

## 2021-09-19 RX ADMIN — RIFAXIMIN 550 MG: 550 TABLET ORAL at 19:53

## 2021-09-19 RX ADMIN — CALCIUM CARBONATE 600 MG (1,500 MG)-VITAMIN D3 400 UNIT TABLET 1 TABLET: at 08:36

## 2021-09-19 RX ADMIN — PANTOPRAZOLE SODIUM 40 MG: 40 TABLET, DELAYED RELEASE ORAL at 08:36

## 2021-09-19 RX ADMIN — LACTULOSE 20 G: 20 POWDER, FOR SOLUTION ORAL at 08:37

## 2021-09-19 RX ADMIN — FOLIC ACID 1 MG: 1 TABLET ORAL at 08:36

## 2021-09-19 RX ADMIN — THIAMINE HYDROCHLORIDE 250 MG: 100 INJECTION, SOLUTION INTRAMUSCULAR; INTRAVENOUS at 08:40

## 2021-09-19 ASSESSMENT — ACTIVITIES OF DAILY LIVING (ADL)
ADLS_ACUITY_SCORE: 6

## 2021-09-19 ASSESSMENT — MIFFLIN-ST. JEOR: SCORE: 1480

## 2021-09-19 NOTE — PROGRESS NOTES
[facility]    Progress Note - Maroon 3 Service        Date of Admission:  9/13/2021    Assessment & Plan             Aliyah Angeles is a 44yo female with alcohol use disorder, gastric bypass, PTSD, fibromyalgia, and alcoholic hepatitis who was presented to OSH ED on 9/12/2021 with weakness, dyspnea, AMS and c/f substance withdrawal. She was transferred to the [facility] ICU after being found to have lactic acidosis at OSH with initial lactate 13.0. Labs, imaging, and clinical picture concerning for EtOH hepatitis. Transferred to medicine 9/14.      Changes today:  - Discontinued Long-Acting Insulin - low BG this AM (68)   - Encourage Mobility  - SW Follow-up      EtOH Hepatitis  Transaminitis, hyperbilirubinemia   Patient with elevated transaminases and bilirubin concerning for alcoholic hepatitis with AST:ALT ratio 3:1. Significant hepatomegaly noted on imaging. Hepatology consulted and following. HCV and HBV negative. No role for steroids at this time. Hepatology follow up in 4wk after discharge. BP remains too soft for diuretics- lymphedema wraps added for LE edema.   - Hepatology following, appreciate recs  - Lactulose and rifaximin; 3-4BM daily for HE ppx (hold for BM >4 daily)  - Daily MELD labs     MELD-Na score: 27 at 9/18/2021  7:34 AM  MELD score: 22 at 9/18/2021  7:34 AM  Calculated from:  Serum Creatinine: 0.61 mg/dL (Using min of 1 mg/dL) at 9/18/2021  7:34 AM  Serum Sodium: 129 mmol/L at 9/18/2021  7:34 AM  Total Bilirubin: 14.7 mg/dL at 9/18/2021  7:34 AM  INR(ratio): 1.61 at 9/17/2021  5:30 AM  Age: 46 years    EtOH Withdrawal, improving  EtOH use disorder  Last drink 9/12, reports drinking 1L fireball daily, denies significant withdrawal symptoms. Patient accepting of resources to aid in cessation. Chemical dependence and SW consulted - patient will discharge to inpatient treatment on 9/21.   - folate and B12 supplementation     Abdominal pain  Pancreatic  fat stranding on CT  16mm hypoattenuation near pancreatic head, improved  CT 9/13/21 at OSH read states some pancreatic atrophy and mild fat stranding, lipase normal. Mass concerning for pseudocyst vs pancreatic mass. Grandmother has history of pancreatic cancer. Repeat CT A/P- no evidence of acute pancreatitis and improvement in previously seen hypoattenuation.      Diarrhea  Patient with diarrhea (no concern for GIB) since admission, enteric panel and C. Diff negative. Low dose imodium available.      Anemia, normocytic  Thrombocytopenia   Significant thrombocytopenia noted upon admission, has been in the 20's with normal plts noted in February 2020. No evidence of splenic sequestration on imaging. Mild normocytic anemia upon admission with 4g drop on 9/14 without evidence of bleeding. Peripheral smear with lawanda cells, but no evidence of hemolysis despite low haptoglobin and elevated LDH. Hematology consulted and do not feel it is hemolysis. Low suspicion for active bleed with stable Hgb as of 9/15, will continue to monitor. Favor marrow suppression and hepatic disease.      QTc Prolongation, improved  C/f Alcoholic cardiomyopathy  Patient with QTc of 554 at the time of admission, likely due to electrolyte derangements, namely hypocalcemia and hypomagnesemia. Echocardiogram wnl, EF >70%. Repeat EKG with improvement in QTc to 474.      ?Bronchitis, resolved  3 day history of dyspnea associated w/ cough for the past month for which she was treated with doxycycline. CXR 9/13 looks clear without evidence of PTX, effusion, or PNA, but procal borderline 0.9. MRSA and RSV panel negative. S/p 5d course of doxycycline.      T2DM  Patient with A1c of 9.1% at the time of admission, this is a new diagnosis.   - High dose sliding scale insulin  - Diabetes educator     Anion Gap Metabolic Acidosis, resolved  Lactic acidosis, resolved  C/f starvation ketosis/alcohol ketoacidosis  Lactate elevated at 13 upon presentation,  improved rapidly with IVF. Patient with significant alcohol consumption, reports of polyethylene glycol (in Fireball) causing marked lactic acidosis. Lactate now wnl and AGMA resolved.      Hyponatremia, stable   Patient with sodium of 117 at the time of presentation to the OSH, likely hypovolemic at that time. Sodium has slowly trended toward normal, will continue to monitor. Low suspicion for SIADH with urine osms not significantly elevated.      PTSD  RUBÉN  Fibromyalgia  - Continue pta prazosin and gabapentin  - Holding pta escitalopram for prolonged QTc      Hypokalemia, resolved  Hypomagnesemia, resolved  Hypophosphatemia, improving   Hypocalcemia, improving  Electrolyte derangements due to poor nutritional status in the setting of significant EtOH use disorder.   - RN driven replacement protocol.      GERD - pta pantoprazole     Diet: Fluid restriction 1500 ML FLUID  Snacks/Supplements Adult: Ensure Enlive; With Meals  2 Gram Sodium Diet    DVT Prophylaxis: VTE Prophylaxis contraindicated due to thrombocytopenia   Rice Catheter: Not present  Fluids: None   Central Lines: None  Code Status: Full Code      Disposition Plan   Expected discharge: 09/21/2021   recommended to inpatient [facility] treatment     The patient's care was discussed with the Attending Physician, Dr. Blankenship and Patient.    Payal Mendes MD  [address]  Securely message with the Vocera Web Console (learn more here)  Text page via [employer] Paging/Directory  Please see sign in/sign out for up to date coverage information    Clinically Significant Risk Factors Present on Admission                ______________________________________________________________________    Interval History   Aliyah reports that she is feeling well this AM. She has no complaints. Her LE edema is much improved with the lymphedema wraps in place. She reports good PO intake and working on being more mobile. She  denies any abdominal pain and reports improvement in her diarrhea.    Data reviewed today: I reviewed all medications, new labs and imaging results over the last 24 hours. I personally reviewed no images or EKG's today.    Physical Exam   Vital Signs: Temp: 97.6  F (36.4  C) Temp src: Oral BP: 95/63 Pulse: 90   Resp: 18 SpO2: 99 % O2 Device: None (Room air)    Weight: 187 lbs 13.31 oz  General Appearance: Well-appearing, alert, no acute distress   Respiratory: CTAB, diminished in RLL   Cardiovascular: RRR, no murmurs   GI: BS+, distended but soft and nontender abdomen   Skin: jaundiced   Other: Lymphedema wraps in place, Scleral icterus      Data   Recent Labs   Lab 09/18/21  2111 09/18/21  1748 09/18/21  1155 09/18/21  0734 09/18/21  0734 09/17/21  1155 09/17/21  0530 09/16/21  0725 09/16/21  0426 09/15/21  0702 09/15/21  0538 09/13/21  0805 09/13/21  0642   WBC  --   --   --   --  12.5*  --  13.0*  --  9.9   < > 9.0   < > 7.3   HGB  --   --   --   --  8.6*  --  8.2*  --  8.0*   < > 9.1*   < > 12.2   MCV  --   --   --   --  113*  --  109*  --  107*   < > 108*   < > 104*   PLT  --   --   --   --  44*  --  24*  --  18*   < > 18*   < > 28*   INR  --   --   --   --   --   --  1.61*  --  1.64*  --  1.48*   < > 1.35*   NA  --   --   --   --  129*  --  130*  --  128*   < > 125*   < > 123*   POTASSIUM  --   --   --   --  4.3  --  5.1  --  4.8   < > 4.4   < > 3.2*   CHLORIDE  --   --   --   --  100  --  100  --  100   < > 94   < > 86*   CO2  --   --   --   --  22  --  23  --  23   < > 23   < > 18*   BUN  --   --   --   --  2*  --  2*  --  2*   < > 2*   < > 4*   CR  --   --   --   --  0.61  --  0.56  --  0.59   < > 0.56   < > 0.46*   ANIONGAP  --   --   --   --  7  --  7  --  5   < > 8   < > 19*   STEFFANY  --   --   --   --  7.8*  --  7.6*  --  8.2*   < > 8.9   < > 7.6*   * 116* 121*   < > 95  102*   < > 96   < > 107*   < > 139*   < > 239*   ALBUMIN  --   --   --   --  1.2*  --  1.2*   < > 1.2*   < > 1.3*   < > 1.7*    PROTTOTAL  --   --   --   --  4.6*  --  4.6*   < > 4.4*   < > 4.8*   < > 6.0*   BILITOTAL  --   --   --   --  14.7*  --  13.8*   < > 12.3*   < > 12.2*   < > 13.0*   ALKPHOS  --   --   --   --  368*  --  392*   < > 403*   < > 475*   < > 594*   ALT  --   --   --   --  52*  --  61*   < > 64*   < > 77*   < > 96*   AST  --   --   --   --  134*  --  156*   < > 174*   < > 242*   < > 373*   LIPASE  --   --   --   --   --   --   --   --   --   --   --   --  138    < > = values in this interval not displayed.     No results found for this or any previous visit (from the past 24 hour(s)).  Medications       calcium carbonate 600 mg-vitamin D 400 units  1 tablet Oral Daily     folic acid  1 mg Oral Daily     gabapentin  100 mg Oral TID     insulin aspart  1-12 Units Subcutaneous TID w/meals     insulin glargine  5 Units Subcutaneous At Bedtime     lactulose  20 g Oral TID     multivitamin w/minerals  1 tablet Oral Daily     pantoprazole  40 mg Oral QAM AC     prazosin  1 mg Oral QPM     rifaximin  550 mg Oral BID     thiamine  250 mg Intravenous Daily    Followed by     [START ON 9/21/2021] thiamine  100 mg Oral Daily

## 2021-09-19 NOTE — PROGRESS NOTES
No major events during shifft. No seizure/withdrawal activities. VS unremarkable. Pt reported mild headache, easily relieved with dilaudid 0.5. Anticipating admission to inpatient treatment

## 2021-09-20 ENCOUNTER — APPOINTMENT (OUTPATIENT)
Dept: OCCUPATIONAL THERAPY | Facility: CLINIC | Age: 46
End: 2021-09-20
Attending: INTERNAL MEDICINE
Payer: COMMERCIAL

## 2021-09-20 LAB
ALBUMIN SERPL-MCNC: 1.1 G/DL (ref 3.4–5)
ALP SERPL-CCNC: 311 U/L (ref 40–150)
ALT SERPL W P-5'-P-CCNC: 37 U/L (ref 0–50)
ANION GAP SERPL CALCULATED.3IONS-SCNC: 7 MMOL/L (ref 3–14)
AST SERPL W P-5'-P-CCNC: 97 U/L (ref 0–45)
ATRIAL RATE - MUSE: 100 BPM
BACTERIA BLD CULT: NO GROWTH
BILIRUB SERPL-MCNC: 15.1 MG/DL (ref 0.2–1.3)
BUN SERPL-MCNC: 3 MG/DL (ref 7–30)
CALCIUM SERPL-MCNC: 7.2 MG/DL (ref 8.5–10.1)
CHLORIDE BLD-SCNC: 101 MMOL/L (ref 94–109)
CO2 SERPL-SCNC: 22 MMOL/L (ref 20–32)
CREAT SERPL-MCNC: 0.61 MG/DL (ref 0.52–1.04)
DIASTOLIC BLOOD PRESSURE - MUSE: NORMAL MMHG
ERYTHROCYTE [DISTWIDTH] IN BLOOD BY AUTOMATED COUNT: 21 % (ref 10–15)
GFR SERPL CREATININE-BSD FRML MDRD: >90 ML/MIN/1.73M2
GLUCOSE BLD-MCNC: 88 MG/DL (ref 70–99)
GLUCOSE BLDC GLUCOMTR-MCNC: 104 MG/DL (ref 70–99)
GLUCOSE BLDC GLUCOMTR-MCNC: 121 MG/DL (ref 70–99)
GLUCOSE BLDC GLUCOMTR-MCNC: 97 MG/DL (ref 70–99)
HCT VFR BLD AUTO: 24.2 % (ref 35–47)
HGB BLD-MCNC: 7.9 G/DL (ref 11.7–15.7)
INR PPP: 1.67 (ref 0.85–1.15)
INTERPRETATION ECG - MUSE: NORMAL
MCH RBC QN AUTO: 36.2 PG (ref 26.5–33)
MCHC RBC AUTO-ENTMCNC: 32.6 G/DL (ref 31.5–36.5)
MCV RBC AUTO: 111 FL (ref 78–100)
P AXIS - MUSE: 47 DEGREES
PLATELET # BLD AUTO: 63 10E3/UL (ref 150–450)
POTASSIUM BLD-SCNC: 3.8 MMOL/L (ref 3.4–5.3)
PR INTERVAL - MUSE: 158 MS
PROT SERPL-MCNC: 4.3 G/DL (ref 6.8–8.8)
QRS DURATION - MUSE: 88 MS
QT - MUSE: 368 MS
QTC - MUSE: 474 MS
R AXIS - MUSE: 52 DEGREES
RBC # BLD AUTO: 2.18 10E6/UL (ref 3.8–5.2)
SODIUM SERPL-SCNC: 130 MMOL/L (ref 133–144)
SYSTOLIC BLOOD PRESSURE - MUSE: NORMAL MMHG
T AXIS - MUSE: 35 DEGREES
VENTRICULAR RATE- MUSE: 100 BPM
WBC # BLD AUTO: 10.1 10E3/UL (ref 4–11)

## 2021-09-20 PROCEDURE — 85610 PROTHROMBIN TIME: CPT | Performed by: STUDENT IN AN ORGANIZED HEALTH CARE EDUCATION/TRAINING PROGRAM

## 2021-09-20 PROCEDURE — 250N000013 HC RX MED GY IP 250 OP 250 PS 637: Performed by: STUDENT IN AN ORGANIZED HEALTH CARE EDUCATION/TRAINING PROGRAM

## 2021-09-20 PROCEDURE — 250N000011 HC RX IP 250 OP 636: Performed by: STUDENT IN AN ORGANIZED HEALTH CARE EDUCATION/TRAINING PROGRAM

## 2021-09-20 PROCEDURE — 120N000002 HC R&B MED SURG/OB UMMC

## 2021-09-20 PROCEDURE — 97535 SELF CARE MNGMENT TRAINING: CPT | Mod: GO | Performed by: OCCUPATIONAL THERAPIST

## 2021-09-20 PROCEDURE — 258N000003 HC RX IP 258 OP 636: Performed by: STUDENT IN AN ORGANIZED HEALTH CARE EDUCATION/TRAINING PROGRAM

## 2021-09-20 PROCEDURE — 85027 COMPLETE CBC AUTOMATED: CPT | Performed by: STUDENT IN AN ORGANIZED HEALTH CARE EDUCATION/TRAINING PROGRAM

## 2021-09-20 PROCEDURE — 82040 ASSAY OF SERUM ALBUMIN: CPT | Performed by: STUDENT IN AN ORGANIZED HEALTH CARE EDUCATION/TRAINING PROGRAM

## 2021-09-20 PROCEDURE — 36415 COLL VENOUS BLD VENIPUNCTURE: CPT | Performed by: STUDENT IN AN ORGANIZED HEALTH CARE EDUCATION/TRAINING PROGRAM

## 2021-09-20 PROCEDURE — 99232 SBSQ HOSP IP/OBS MODERATE 35: CPT | Mod: GC | Performed by: HOSPITALIST

## 2021-09-20 RX ORDER — FOLIC ACID 1 MG/1
1 TABLET ORAL DAILY
Qty: 30 TABLET | Refills: 0 | Status: SHIPPED | OUTPATIENT
Start: 2021-09-21

## 2021-09-20 RX ORDER — PRAZOSIN HYDROCHLORIDE 1 MG/1
1 CAPSULE ORAL EVERY EVENING
Qty: 30 CAPSULE | Refills: 0 | Status: ON HOLD | OUTPATIENT
Start: 2021-09-20 | End: 2021-11-05

## 2021-09-20 RX ORDER — ESCITALOPRAM OXALATE 10 MG/1
10 TABLET ORAL DAILY
Qty: 30 TABLET | Refills: 0 | Status: SHIPPED | OUTPATIENT
Start: 2021-09-20

## 2021-09-20 RX ORDER — ALBUTEROL SULFATE 90 UG/1
2 AEROSOL, METERED RESPIRATORY (INHALATION) EVERY 4 HOURS PRN
Qty: 8.5 G | Refills: 1 | Status: SHIPPED | OUTPATIENT
Start: 2021-09-20

## 2021-09-20 RX ORDER — GABAPENTIN 100 MG/1
100 CAPSULE ORAL 3 TIMES DAILY
Qty: 90 CAPSULE | Refills: 0 | Status: SHIPPED | OUTPATIENT
Start: 2021-09-20

## 2021-09-20 RX ORDER — PANTOPRAZOLE SODIUM 40 MG/1
40 TABLET, DELAYED RELEASE ORAL
Qty: 30 TABLET | Refills: 0 | Status: SHIPPED | OUTPATIENT
Start: 2021-09-21

## 2021-09-20 RX ORDER — GABAPENTIN 100 MG/1
100 CAPSULE ORAL 3 TIMES DAILY
Qty: 90 CAPSULE | Refills: 0 | Status: CANCELLED | OUTPATIENT
Start: 2021-09-20

## 2021-09-20 RX ORDER — LANOLIN ALCOHOL/MO/W.PET/CERES
100 CREAM (GRAM) TOPICAL DAILY
Qty: 30 TABLET | Refills: 0 | Status: SHIPPED | OUTPATIENT
Start: 2021-09-21

## 2021-09-20 RX ADMIN — METFORMIN HYDROCHLORIDE 500 MG: 500 TABLET ORAL at 13:01

## 2021-09-20 RX ADMIN — PRAZOSIN HYDROCHLORIDE 1 MG: 1 CAPSULE ORAL at 19:47

## 2021-09-20 RX ADMIN — FOLIC ACID 1 MG: 1 TABLET ORAL at 07:53

## 2021-09-20 RX ADMIN — PANTOPRAZOLE SODIUM 40 MG: 40 TABLET, DELAYED RELEASE ORAL at 07:54

## 2021-09-20 RX ADMIN — GABAPENTIN 100 MG: 100 CAPSULE ORAL at 19:47

## 2021-09-20 RX ADMIN — GABAPENTIN 100 MG: 100 CAPSULE ORAL at 13:07

## 2021-09-20 RX ADMIN — HYDROMORPHONE HYDROCHLORIDE 0.5 MG: 1 INJECTION, SOLUTION INTRAMUSCULAR; INTRAVENOUS; SUBCUTANEOUS at 01:09

## 2021-09-20 RX ADMIN — Medication 1 TABLET: at 07:53

## 2021-09-20 RX ADMIN — GABAPENTIN 100 MG: 100 CAPSULE ORAL at 07:54

## 2021-09-20 RX ADMIN — RIFAXIMIN 550 MG: 550 TABLET ORAL at 19:47

## 2021-09-20 RX ADMIN — METFORMIN HYDROCHLORIDE 500 MG: 500 TABLET ORAL at 18:22

## 2021-09-20 RX ADMIN — LACTULOSE 20 G: 20 POWDER, FOR SOLUTION ORAL at 13:06

## 2021-09-20 RX ADMIN — THIAMINE HYDROCHLORIDE 250 MG: 100 INJECTION, SOLUTION INTRAMUSCULAR; INTRAVENOUS at 08:51

## 2021-09-20 RX ADMIN — CALCIUM CARBONATE 600 MG (1,500 MG)-VITAMIN D3 400 UNIT TABLET 1 TABLET: at 07:54

## 2021-09-20 RX ADMIN — RIFAXIMIN 550 MG: 550 TABLET ORAL at 07:54

## 2021-09-20 ASSESSMENT — MIFFLIN-ST. JEOR: SCORE: 1487.28

## 2021-09-20 ASSESSMENT — ACTIVITIES OF DAILY LIVING (ADL)
ADLS_ACUITY_SCORE: 4
ADLS_ACUITY_SCORE: 6
ADLS_ACUITY_SCORE: 6
ADLS_ACUITY_SCORE: 4
ADLS_ACUITY_SCORE: 4
ADLS_ACUITY_SCORE: 6

## 2021-09-20 NOTE — PLAN OF CARE
Transferred to: 5A @ ~1640  Status at time of transfer: VSS on RA  Belongings: ipad, cell phone, glasses, and all other belongings sent with patient   Rice removed? (if no, why?): n/a  Chart and medications: sent  Family notified: patient declined  Plan: Plan to discharge to chemical dependency treatment tomorrow @ 1045. Patients will need to be sent with 30 days of medications, father is driving her to treatment.       Problem: Adult Inpatient Plan of Care  Goal: Plan of Care Review  Outcome: Improving

## 2021-09-20 NOTE — PLAN OF CARE
"ICU End of Shift Summary. See flowsheets for vital signs and detailed assessment.    Changes this shift: Patient with uneventful night.  Continues to have ongoing headache and mid back pain that is making sleep difficult.  Heating aqua pad being used.  Declined Tylenol po and states it \"didn't work earlier\" and requesting \"IV medication\".  Patient did go outside x1 for cigarette- escorted by staff.  Patient does voice feeling of acceptance and feeling proud in her choice to \"seek help\" with ETOH rehab.     Plan:  transfer to med surg orders still in place.  Will plan admission to rehab on Tuesday.   "

## 2021-09-20 NOTE — PROGRESS NOTES
CLINICAL NUTRITION SERVICES - REASSESSMENT NOTE   Nutrition Prescription    Malnutrition Status:    Non-severe malnutrition in the context of acute on chronic illness    Recommendations already ordered by Registered Dietitian (RD):  Reviewed Carbohydrate Counting nutrition education    Future/Additional Recommendations:  Monitor PO adequacy and follow if any questions related to diet     EVALUATION OF THE PROGRESS TOWARD GOALS   Diet: 2 g Sodium + 1500 mL fluid restriction +  Ensure Enlive with lunch (not counted toward fluid restriction)    Oral Intake: % meals per RN flowsheets (consuming 100% meals since 9/18).      NEW FINDINGS     -General: Plan to transfer to rehab for ETOH use tomorrow.    -Wt trends: Wt uptrended this admit, suspect fluid-related  09/19/21 0600 86 kg (189 lb 9.5 oz)   09/18/21 0400 85.2 kg (187 lb 13.3 oz)   09/17/21 0000 84.9 kg (187 lb 1.6 oz)   09/14/21 0000 81.3 kg (179 lb 3.7 oz)   09/13/21 0500 78 kg (171 lb 15.3 oz)     -Labs: Reviewed, notable for: Na+ 130 (L), BUN 3 (L, may be indicative of poor protein intake), T bili 15.1 (H), alk phos 311 (H)    -GI: 2-10 BMs daily over past week per I/Os. On lactulose (aiming for 3-4 BMs daily).     -Meds & Vitamin/Mineral Supplementation: Reviewed, notable for: Thera-vit-M, thiamine, caltrate, folic acid    MALNUTRITION  % Intake: Decreased intake does not meet criteria  % Weight Loss: None noted  Subcutaneous Fat Loss: Upper arm:  Mild and Lower arm:  Mild  Muscle Loss: Thoracic region (clavicle, acromium bone, deltoid, trapezius, pectoral):  Moderate, Upper arm (bicep, tricep):  Moderate, Lower arm  (forearm):  Moderate and Dorsal hand:  Moderate per visual (pt's lower body wrapped in blankets)  Fluid Accumulation/Edema: Mild-Moderate (2-3+ edema per RN flowsheets)  Malnutrition Diagnosis: Non-severe malnutrition in the context of acute on chronic illness    Previous Goals   Pt to consume % of nutritionally adequate meal trays  TID, or the equivalent with supplements/snacks.  Evaluation: Met over past few days    Previous Nutrition Diagnosis  Inadequate oral intake related to EtOH abuse, poor appetite as evidenced by pt's report of not eating x 1 week, hypophosphatemia of 0.4 as well as low K+/BUN/creatinine indicating poor nutritional intakes PTA, thin extremities likely indicating muscle wasting.    Evaluation: Improving, new dx below    CURRENT NUTRITION DIAGNOSIS  Food- and nutrition-related knowledge deficit r/t limited previous knowledge of carb counting AEB pt report of not having received formal carb counting nutrition education in the past.      INTERVENTIONS  Implementation  1. Nutrition education: Survival Information  --Provided verbal and written nutrition on diabetes meal planning and importance of consistent carbohydrate intake to optimize glycemic control, aiming for no more than 45 g CHO (3 CHO choices) per meal as she consumes ~5 small meals daily d/t gastric bypass.  --Provided verbal and written nutrition education on carb counting. Pt will not be discharging on insulin per discussion with primary team.    --Handouts provided and reviewed: Carb Counting handout.     2. Paged Medicine primary team regarding insulin plans for discharge. Recommended DM nurse educator in this instance.     Goals  1. Patient to consume % of nutritionally adequate meal trays TID, or the equivalent with supplements/snacks.    2. Patient will verbalize at least one food choice (along with the appropriate portion size) in each of the food groups that contain 1 carb unit.      Monitoring/Evaluation  Progress toward goals will be monitored and evaluated per protocol.     Marva Stanford RD, LD  Pager: [phone]

## 2021-09-20 NOTE — PLAN OF CARE
Time: Transfer from  at 1645 - 1900    Reason for admission: Alcoholic hepatitis   Vitals: VSS on RA ex soft BPs  Activity: Up independently, steady  Pain: Denies  Neuro:  A&Ox4  Cardiac: No acute concerns  Respiratory: No acute concerns  GI/: Pt reports 4 BMs today, on scheduled lactulose. Voids without difficulty.   Diet: Low fat, good appetite.   Lines: L PIV SL  Skin/Wounds: Jaundice, no skin concerns noted. Bilateral compression stockings on for BLE edema.   Labs/imaging: Bili 15.1, trending up.      New changes this shift: Transferred from ICU. No significant changes this shift. Started metformin today, BG stable, not meeting parameters for sliding scale insulin.     Plan: Discharge with father tomorrow at 1045 to inpatient chem dep.

## 2021-09-20 NOTE — PROGRESS NOTES
[facility]    Progress Note - Maroon 3 Service        Date of Admission:  9/13/2021    Assessment & Plan           Aliyah Angeles is a 45 year old woman with alcohol use disorder, gastric bypass, PTSD, fibromyalgia, and alcoholic hepatitis who was presented to OSH ED on 9/12/2021 with weakness, dyspnea, AMS and c/f substance withdrawal. She was transferred to the [facility] ICU after being found to have lactic acidosis at OSH with initial lactate 13.0. Labs, imaging, and clinical picture concerning for EtOH hepatitis. Transferred to medicine 9/14.      Changes today:  - Start metformin 500 mg BID  - Discontinue dilaudid IV    - Plan for discharge tomorrow      EtOH Hepatitis  Elevated liver tests, hyperbilirubinemia   HCV and HBV negative. No role for steroids at this time. Hepatology follow up in 4wk after discharge.   - Hepatology following, appreciate recs  - Lactulose and rifaximin; 3-4BM daily for HE ppx (hold for BM >4 daily)     MELD-Na score: 27 at 9/20/2021  6:23 AM  MELD score: 23 at 9/20/2021  6:23 AM  Calculated from:  Serum Creatinine: 0.61 mg/dL (Using min of 1 mg/dL) at 9/20/2021  6:23 AM  Serum Sodium: 130 mmol/L at 9/20/2021  6:23 AM  Total Bilirubin: 15.1 mg/dL at 9/20/2021  6:23 AM  INR(ratio): 1.67 at 9/20/2021  6:23 AM  Age: 46 years    EtOH use disorder  Last drink 9/12, reports drinking 1L fireball daily, denies significant withdrawal symptoms. Patient accepting of resources to aid in cessation.   - Chemical dependence and SW consulted - patient will discharge to inpatient treatment on 9/21.   - folate and B12 supplementation     Abdominal pain  Pancreatic fat stranding on CT  16mm hypoattenuation near pancreatic head, improved  CT 9/13/21 at OSH read states some pancreatic atrophy and mild fat stranding, lipase normal. Mass concerning for pseudocyst vs pancreatic mass. Grandmother has history of pancreatic cancer. Repeat CT A/P- no evidence of acute  pancreatitis and improvement in previously seen hypoattenuation.      Normocytic anemia  Thrombocytopenia secondary to liver disease    Hgb stable. Favor marrow suppression and hepatic disease.      QTc Prolongation, improved  C/f Alcoholic cardiomyopathy  Patient with QTc of 554 at the time of admission, likely due to electrolyte derangements, namely hypocalcemia and hypomagnesemia. Echocardiogram wnl, EF >70%. Repeat EKG with improvement in QTc to 474.      ?Bronchitis, resolved  S/p 5d course of doxycycline.      T2DM  Patient with A1c of 9.1% at the time of admission, this is a new diagnosis.  - Start metformin 500 mg BID   - Diabetes educator     Anion Gap Metabolic Acidosis, resolved  Lactic acidosis, resolved  C/f starvation ketosis/alcohol ketoacidosis  Lactate elevated at 13 upon presentation, improved rapidly with IVF. Patient with significant alcohol consumption, reports of polyethylene glycol (in Fireball) causing marked lactic acidosis. Lactate now wnl and AGMA resolved.       PTSD  RUBÉN  Fibromyalgia  - Continue pta prazosin and gabapentin  - Holding pta escitalopram for prolonged QTc      Hypokalemia, resolved  Hypomagnesemia, resolved  Hypophosphatemia, improving   Hypocalcemia, improving  Electrolyte derangements due to poor nutritional status in the setting of significant EtOH use disorder.   - RN driven replacement protocol.      GERD - pta pantoprazole     Diet: Fluid restriction 1500 ML FLUID  Snacks/Supplements Adult: Ensure Enlive; With Meals  2 Gram Sodium Diet    DVT Prophylaxis: Contraindicated due to thrombocytopenia  Rice Catheter: Not present  Central Lines: None  Code Status: Full Code      Disposition Plan   Expected discharge: 09/21/2021 outpatient treatment for alcohol use.     The patient's care was discussed with the Attending Physician, Dr. Blankenship.    Stefani Thompson MD   PGY-2 Internal Medicine  [address]  Center  Securely message with the Vocera Web Console (learn more here)  Text page via [employer] Paging/Directory  _________________________________________    Interval History   NAEO. Mild headache got dilaudid. Pt doing well today. No abdominal pain.     Data reviewed today: I reviewed all medications, new labs and imaging results over the last 24 hours. I personally reviewed no images or EKG's today.    Physical Exam   Vital Signs: Temp: 98.4  F (36.9  C) Temp src: Oral BP: (!) 88/63 Pulse: 90   Resp: 16 SpO2: 96 % O2 Device: (P) None (Room air)    Weight: 189 lbs 9.53 oz   Physical Examination:  Gen: well appearing, in NAD  HEENT: icteric sclera  CV: regular rate, no murmurs auscultated  Pulm: normal WOB on ambient air   Abd: soft, distended, jaundiced skin, no tenderness to palpation  Ext: no peripheral edema  Neuro: alert, conversant

## 2021-09-20 NOTE — PROGRESS NOTES
Care Management Discharge Note    Discharge Date: 2021     Discharge Disposition: Inpatient Chemical Dependency    [facility]  Inpatient Chemical Dependency Facility  [address] [place] [address]  Phone: [phone]  Thang Gaitan ([phone])  Fax: [phone]     Discharge Services: Chemical Dependency Resources    Discharge DME: None    Discharge Transportation: family or friend will provide (Pt's father will transport at 10:45 AM tomorrow)    Private pay costs discussed: Not applicable    PAS Confirmation Code:  n/a  Patient/family educated on Medicare website which has current facility and service quality ratings: no    Education Provided on the Discharge Plan: yes  Persons Notified of Discharge Plans: pt, staff at [facility], medical team  Patient/Family in Agreement with the Plan: yes    Handoff Referral Completed: No    Additional Information:  SW spoke to pt to address concerns about transportation and chemical dependency programs. Pt requested her father transport her and spoke to chem dep program about this. Pt's father has been sober 20 years and pt feels confident she will go directly to treatment. Pt's father will  pt by 10:45AM tomorrow. SW cancelled scheduled EMS transport. SW provided pt with general information about treatment center per her request. Pt was concerned about court for her divorce (originally scheduled for today), but it had previously been rescheduled for a later date.     JOY Vila, [facility]  ICU   [facility]  Ph: [phone]  [phone]

## 2021-09-21 ENCOUNTER — DOCUMENTATION ONLY (OUTPATIENT)
Dept: PHARMACY | Facility: CLINIC | Age: 46
End: 2021-09-21

## 2021-09-21 ENCOUNTER — APPOINTMENT (OUTPATIENT)
Dept: OCCUPATIONAL THERAPY | Facility: CLINIC | Age: 46
End: 2021-09-21
Attending: INTERNAL MEDICINE
Payer: COMMERCIAL

## 2021-09-21 VITALS
HEART RATE: 84 BPM | DIASTOLIC BLOOD PRESSURE: 54 MMHG | OXYGEN SATURATION: 98 % | WEIGHT: 191.2 LBS | RESPIRATION RATE: 16 BRPM | TEMPERATURE: 98.8 F | BODY MASS INDEX: 32.64 KG/M2 | HEIGHT: 64 IN | SYSTOLIC BLOOD PRESSURE: 94 MMHG

## 2021-09-21 LAB
ALBUMIN SERPL-MCNC: 1.2 G/DL (ref 3.4–5)
ALP SERPL-CCNC: 315 U/L (ref 40–150)
ALT SERPL W P-5'-P-CCNC: 34 U/L (ref 0–50)
ANION GAP SERPL CALCULATED.3IONS-SCNC: 6 MMOL/L (ref 3–14)
AST SERPL W P-5'-P-CCNC: 91 U/L (ref 0–45)
BILIRUB SERPL-MCNC: 16.9 MG/DL (ref 0.2–1.3)
BUN SERPL-MCNC: 3 MG/DL (ref 7–30)
CALCIUM SERPL-MCNC: 7.8 MG/DL (ref 8.5–10.1)
CHLORIDE BLD-SCNC: 102 MMOL/L (ref 94–109)
CO2 SERPL-SCNC: 23 MMOL/L (ref 20–32)
CREAT SERPL-MCNC: 0.74 MG/DL (ref 0.52–1.04)
ERYTHROCYTE [DISTWIDTH] IN BLOOD BY AUTOMATED COUNT: 20.9 % (ref 10–15)
GFR SERPL CREATININE-BSD FRML MDRD: >90 ML/MIN/1.73M2
GLUCOSE BLD-MCNC: 94 MG/DL (ref 70–99)
HCT VFR BLD AUTO: 28.8 % (ref 35–47)
HGB BLD-MCNC: 9.1 G/DL (ref 11.7–15.7)
MCH RBC QN AUTO: 36.4 PG (ref 26.5–33)
MCHC RBC AUTO-ENTMCNC: 31.6 G/DL (ref 31.5–36.5)
MCV RBC AUTO: 115 FL (ref 78–100)
PLATELET # BLD AUTO: 94 10E3/UL (ref 150–450)
POTASSIUM BLD-SCNC: 4 MMOL/L (ref 3.4–5.3)
PROT SERPL-MCNC: 4.8 G/DL (ref 6.8–8.8)
RBC # BLD AUTO: 2.5 10E6/UL (ref 3.8–5.2)
SODIUM SERPL-SCNC: 131 MMOL/L (ref 133–144)
WBC # BLD AUTO: 10.3 10E3/UL (ref 4–11)

## 2021-09-21 PROCEDURE — 36415 COLL VENOUS BLD VENIPUNCTURE: CPT | Performed by: STUDENT IN AN ORGANIZED HEALTH CARE EDUCATION/TRAINING PROGRAM

## 2021-09-21 PROCEDURE — 99238 HOSP IP/OBS DSCHRG MGMT 30/<: CPT | Mod: GC | Performed by: STUDENT IN AN ORGANIZED HEALTH CARE EDUCATION/TRAINING PROGRAM

## 2021-09-21 PROCEDURE — 99207 PR CDG-CODE INCORRECT PER BILLING BASED ON TIME: CPT | Performed by: STUDENT IN AN ORGANIZED HEALTH CARE EDUCATION/TRAINING PROGRAM

## 2021-09-21 PROCEDURE — 85014 HEMATOCRIT: CPT | Performed by: STUDENT IN AN ORGANIZED HEALTH CARE EDUCATION/TRAINING PROGRAM

## 2021-09-21 PROCEDURE — 97535 SELF CARE MNGMENT TRAINING: CPT | Mod: GO | Performed by: OCCUPATIONAL THERAPIST

## 2021-09-21 PROCEDURE — 250N000013 HC RX MED GY IP 250 OP 250 PS 637: Performed by: STUDENT IN AN ORGANIZED HEALTH CARE EDUCATION/TRAINING PROGRAM

## 2021-09-21 PROCEDURE — 82040 ASSAY OF SERUM ALBUMIN: CPT | Performed by: STUDENT IN AN ORGANIZED HEALTH CARE EDUCATION/TRAINING PROGRAM

## 2021-09-21 RX ADMIN — LACTULOSE 20 G: 20 POWDER, FOR SOLUTION ORAL at 08:09

## 2021-09-21 RX ADMIN — CALCIUM CARBONATE 600 MG (1,500 MG)-VITAMIN D3 400 UNIT TABLET 1 TABLET: at 08:08

## 2021-09-21 RX ADMIN — GABAPENTIN 100 MG: 100 CAPSULE ORAL at 08:08

## 2021-09-21 RX ADMIN — THIAMINE HCL TAB 100 MG 100 MG: 100 TAB at 08:09

## 2021-09-21 RX ADMIN — PANTOPRAZOLE SODIUM 40 MG: 40 TABLET, DELAYED RELEASE ORAL at 08:09

## 2021-09-21 RX ADMIN — Medication 1 TABLET: at 08:09

## 2021-09-21 RX ADMIN — FOLIC ACID 1 MG: 1 TABLET ORAL at 08:13

## 2021-09-21 RX ADMIN — METFORMIN HYDROCHLORIDE 500 MG: 500 TABLET ORAL at 08:08

## 2021-09-21 RX ADMIN — RIFAXIMIN 550 MG: 550 TABLET ORAL at 08:09

## 2021-09-21 ASSESSMENT — ACTIVITIES OF DAILY LIVING (ADL)
ADLS_ACUITY_SCORE: 6

## 2021-09-21 NOTE — DISCHARGE SUMMARY
[facility]  Discharge Summary - Medicine & Pediatrics       Date of Admission:  9/13/2021  Date of Discharge:  9/21/2021 11:27 AM  Discharging Provider: Brandt Yee  Discharge Service: Marjan 3    Discharge Diagnoses   Alcoholic hepatitis  Alcohol use disorder  GERD  Hypokalemia  Hypomagnesemia  Hypophosphatemia  Hypocalcemia  Normocytic anemia  Thrombocytopenia secondary to liver disease    Nonsevere malnutrition of acute on chronic illness    Follow-ups Needed After Discharge    1) New diagnosis of diabetes - follow up with PCP in 2-4 weeks  2) Alcoholic hepatitis - hepatology follow up 4 weeks at discharge    Unresulted Labs Ordered in the Past 30 Days of this Admission       Date and Time Order Name Status Description    9/14/2021  9:27 AM EBV DNA PCR Quantitative Whole Blood In process         These results will be followed up by GI team.     Discharge Disposition   Discharged to rehabilitation facility  Condition at discharge: Stable    Hospital Course    Aliyah Angeles is a 45 year old woman with history of alcohol use disorder, gastric bypass admitted to ICU for elevated lactic acid (13) and new hyperbilirubinemia in the context of recent alcohol use. The following problems were addressed during her hospitalization:    Alcoholic Hepatitis  Alcohol use disorder  Presented to outside hospital with qeakness, dyspnea, altered mental status and concern substance withdrawal. She had been drinking 1L fireball daily per report. Patient made decision to undergo inpatient chemical dependency treatment upon discharge. Hepatology was consulted, MELD-Na 27.   - Hepatology follow up in 4 weeks  - Lactulose and rifaximin; 3-4BM daily for HE ppx (hold for BM >4 daily)  - Folate and B12 supplementation    New diagnosis of T2DM - Patient with A1c of 9.1% at the time of admission, this is a new diagnosis. Started metformin 500 mg BID.       Anxiety and depression - Initially held  PTA escitalopram for prolonged QTc, restarted at discharge.     Lactic acidosis, resolved  Lactate 13 upon presentation, improved rapidly with IV fluids. Patient with significant alcohol consumption, reports of polyethylene glycol (in Fireball) causing marked lactic acidosis, now resolved.      Pancreatic fat stranding on CT  16mm hypoattenuation near pancreatic head, improved  CT 9/13/21 at OSH read states some pancreatic atrophy and mild fat stranding, lipase normal. Mass concerning for pseudocyst vs pancreatic mass. Grandmother has history of pancreatic cancer. Repeat CT A/P- no evidence of acute pancreatitis and improvement in previously seen hypoattenuation.      QTc Prolongation, improved  QTc of 554 on admission, likely due to electrolyte derangements, namely hypocalcemia and hypomagnesemia. Echocardiogram wnl, EF >70%. Repeat EKG with improvement in QTc to 474.      Bronchitis, resolved - s/p 5d course of doxycycline.     Consultations This Hospital Stay   PHYSICAL THERAPY ADULT IP CONSULT  OCCUPATIONAL THERAPY ADULT IP CONSULT  SOCIAL WORK IP CONSULT  GI HEPATOLOGY ADULT IP CONSULT  CARE MANAGEMENT / SOCIAL WORK IP CONSULT  NUTRITION SERVICES ADULT IP CONSULT  HEMATOLOGY ADULT IP CONSULT  CHEMICAL DEPENDENCY IP CONSULT  CARE MANAGEMENT / SOCIAL WORK IP CONSULT  DIABETES EDUCATION IP CONSULT  PHYSICAL THERAPY ADULT IP CONSULT  LYMPHEDEMA THERAPY IP CONSULT  SOCIAL WORK IP CONSULT    Code Status   Full Code     The patient was discussed with Dr. Yee.     Stefani Thompson MD   PGY-2, Internal Medicine  [address] [place] [address]  Phone: [phone]  ______________________________________________________________________    Physical Exam   Vital Signs: Temp: 98.6  F (37  C) Temp src: Oral BP: 93/66 Pulse: 96   Resp: 16 SpO2: 97 % O2 Device: None (Room air)    Weight: 191 lbs 3.2 oz  Gen: well appearing, in NAD  HEENT: icteric sclera  CV:  regular rate, no murmurs auscultated  Pulm: normal WOB on ambient air   Abd: soft, distended, jaundiced skin, no tenderness to palpation  Ext: no peripheral edema  Neuro: alert, conversant      Primary Care Physician   VENICE ROSALES    Discharge Orders   No discharge procedures on file.    Significant Results and Procedures   Most Recent 2 LFT's:Recent Labs   Lab Test 09/20/21  0623 09/18/21  0734   AST 97* 134*   ALT 37 52*   ALKPHOS 311* 368*   BILITOTAL 15.1* 14.7*       Discharge Medications   Current Discharge Medication List        START taking these medications    Details   calcium carbonate-vitamin D (OS-STEFFANY) 600-400 MG-UNIT chewable tablet Take 1 chew tab by mouth daily  Qty: 30 tablet, Refills: 0    Associated Diagnoses: Vitamin D deficiency      folic acid (FOLVITE) 1 MG tablet Take 1 tablet (1 mg) by mouth daily  Qty: 30 tablet, Refills: 0    Associated Diagnoses: Alcoholic cirrhosis of liver with ascites (H)      lactulose (CEPHULAC) 20 GM packet Take 1 packet (20 g) by mouth 3 times daily  Qty: 90 packet, Refills: 0    Associated Diagnoses: Alcoholic cirrhosis of liver with ascites (H)      metFORMIN (GLUCOPHAGE) 500 MG tablet Take 1 tablet (500 mg) by mouth 2 times daily (with meals)  Qty: 60 tablet, Refills: 0    Associated Diagnoses: Type 2 diabetes mellitus with other specified complication, without long-term current use of insulin (H)      multivitamin (CENTRUM) chewable tablet Take 1 tablet by mouth daily  Qty: 30 tablet, Refills: 0    Associated Diagnoses: Alcoholic cirrhosis of liver with ascites (H)      pantoprazole (PROTONIX) 40 MG EC tablet Take 1 tablet (40 mg) by mouth every morning (before breakfast)  Qty: 30 tablet, Refills: 0    Associated Diagnoses: Alcoholic cirrhosis of liver with ascites (H)      rifaximin (XIFAXAN) 550 MG TABS tablet Take 1 tablet (550 mg) by mouth 2 times daily  Qty: 60 tablet, Refills: 0    Associated Diagnoses: Alcoholic cirrhosis of liver with ascites (H)       thiamine (B-1) 100 MG tablet Take 1 tablet (100 mg) by mouth daily  Qty: 30 tablet, Refills: 0    Associated Diagnoses: Alcoholic cirrhosis of liver with ascites (H)           CONTINUE these medications which have CHANGED    Details   albuterol (PROAIR HFA/PROVENTIL HFA/VENTOLIN HFA) 108 (90 Base) MCG/ACT inhaler Inhale 2 puffs into the lungs every 4 hours as needed for shortness of breath / dyspnea or wheezing  Qty: 8.5 g, Refills: 1    Comments: Pharmacy may dispense brand covered by insurance (Proair, or proventil or ventolin or generic albuterol inhaler)  Associated Diagnoses: Uncomplicated asthma, unspecified asthma severity, unspecified whether persistent      escitalopram (LEXAPRO) 10 MG tablet Take 1 tablet (10 mg) by mouth daily  Qty: 30 tablet, Refills: 0    Associated Diagnoses: Mild episode of recurrent major depressive disorder (H); Anxiety disorder, unspecified type      gabapentin (NEURONTIN) 100 MG capsule Take 1 capsule (100 mg) by mouth 3 times daily  Qty: 90 capsule, Refills: 0    Associated Diagnoses: Alcoholic cirrhosis of liver with ascites (H); Type 2 diabetes mellitus with other specified complication, without long-term current use of insulin (H)      prazosin (MINIPRESS) 1 MG capsule Take 1 capsule (1 mg) by mouth every evening  Qty: 30 capsule, Refills: 0    Associated Diagnoses: Alcoholic cirrhosis of liver with ascites (H)           CONTINUE these medications which have NOT CHANGED    Details   cyanocobalamin (CYANOCOBALAMIN) 1000 MCG/ML injection INJECT 1 ML (1,000 MCG) SUBCUTANEOUS EVERY 4 WEEKS.           STOP taking these medications       calcium carbonate 600 mg-vitamin D 400 units (CALTRATE) 600-400 MG-UNIT per tablet Comments:   Reason for Stopping:         famotidine (PEPCID) 20 MG tablet Comments:   Reason for Stopping:         fluconazole (DIFLUCAN) 150 MG tablet Comments:   Reason for Stopping:         hydrOXYzine (VISTARIL) 25 MG capsule Comments:   Reason for Stopping:      "    multivitamin w/minerals (THERA-VIT-M) tablet Comments:   Reason for Stopping:         triamcinolone (KENALOG) 0.1 % external cream Comments:   Reason for Stopping:             Allergies   Allergies   Allergen Reactions    Nsaids      Gastric bypass    Trazodone      In a \"hazy\" for the whole next day     "

## 2021-09-21 NOTE — PLAN OF CARE
Time 0700-discharge at 1130    Reason for admission: Alcoholic Hepatitis w/ascites, Elevated LA  Vitals: VSS on RA  Activity: Independent  Pain: Denies  Neuro: A&OX4  Cardiac: No acute concerns  Respiratory:  No acute concerns  GI/: Voiding spontaneously. Scheduled Lactulose.   Diet: 2gm Sodium, 1.5L Fluid Restriction, tolerating  Lines: None  Skin/Wounds: Jaundiced. No concerns. BLE Edema- Bilateral compressions stockings on.     Plan: [facility] to [facility]. AVS reviewed with patient, meds from security returned, confirmed no IV access, education completed, belongings in room sent with pt. Discharge w/Father who will provided transport to inpatient treatment.

## 2021-09-21 NOTE — PLAN OF CARE
"BP 95/59 (BP Location: Right arm)   Pulse 103   Temp 98.4  F (36.9  C) (Oral)   Resp 16   Ht 1.626 m (5' 4\")   Wt 86.7 kg (191 lb 3.2 oz)   SpO2 98%   BMI 32.82 kg/m        Time: 7200-2766    Reason for admission: Alcoholic cirrhosis of liver w/ ascites   Activity: Independent   Pain: Denies   Neuro: A&O x4.   Cardiac: Soft BP. Tachycardic. Denies chest pain.   Respiratory: Pt on 4L via NC. Tachypenic. Shallow respiration rate. Denies SOB.   GI/: Voiding spontaneously. BM x2 overnight.   Diet: 2 gram Na diet, tolerating.     New changes this shift: PIV removed overnight. Provider okay with no IV access overnight. Held HS lactulose due to bowel goal being met. Pt slept okay overnight. Went outside this AM.     Plan: Discharge today to [facility]- pt has ride set up w/ pt father @ 1045.     Continue to monitor and follow POC     "

## 2021-09-21 NOTE — PROGRESS NOTES
Prior Authorization Approval    xifaxan 550mg tabs  Date Initiated: 9/21/2021  Date Completed: 9/21/2021  Prior Auth Type: Clinical                Status: Approved    Effective Date: 08/22/2021 - 03/20/2022  Copay: 0.00     Filling Pharmacy: [facility] - [address]    Insurance: [employer] - Phone [phone] Fax [phone]  ID: [id number]  Case Number: [id number] / [id number]   Submitted Via: Kate Little  [facility] Liaison  Ph: [phone] Pager: [phone]

## 2021-09-21 NOTE — PROVIDER NOTIFICATION
Provider notified about pt PIV removed due to infiltration. Plan for pt to discharge tomorrow AM. No IV medication overnight. Ayala Baptiste MD okay with pt not have PIV overnight.

## 2021-09-29 ENCOUNTER — APPOINTMENT (OUTPATIENT)
Dept: ULTRASOUND IMAGING | Facility: CLINIC | Age: 46
End: 2021-09-29
Payer: COMMERCIAL

## 2021-09-29 ENCOUNTER — APPOINTMENT (OUTPATIENT)
Dept: GENERAL RADIOLOGY | Facility: CLINIC | Age: 46
End: 2021-09-29
Payer: COMMERCIAL

## 2021-09-29 ENCOUNTER — HOSPITAL ENCOUNTER (INPATIENT)
Facility: CLINIC | Age: 46
LOS: 37 days | Discharge: NURSING, REHAB OR LONG-TERM CARE | End: 2021-11-05
Attending: EMERGENCY MEDICINE | Admitting: STUDENT IN AN ORGANIZED HEALTH CARE EDUCATION/TRAINING PROGRAM
Payer: COMMERCIAL

## 2021-09-29 DIAGNOSIS — L30.9 DERMATITIS: ICD-10-CM

## 2021-09-29 DIAGNOSIS — R25.9 UNSPECIFIED ABNORMAL INVOLUNTARY MOVEMENTS: ICD-10-CM

## 2021-09-29 DIAGNOSIS — G47.00 INSOMNIA, UNSPECIFIED TYPE: ICD-10-CM

## 2021-09-29 DIAGNOSIS — Z72.0 TOBACCO ABUSE: ICD-10-CM

## 2021-09-29 DIAGNOSIS — N30.00 ACUTE CYSTITIS WITHOUT HEMATURIA: Primary | ICD-10-CM

## 2021-09-29 DIAGNOSIS — Z20.822 CONTACT WITH AND (SUSPECTED) EXPOSURE TO COVID-19: ICD-10-CM

## 2021-09-29 DIAGNOSIS — E87.6 HYPOKALEMIA: ICD-10-CM

## 2021-09-29 DIAGNOSIS — K70.10 ALCOHOLIC HEPATITIS WITHOUT ASCITES (H): ICD-10-CM

## 2021-09-29 DIAGNOSIS — F10.188 ALCOHOL ABUSE WITH OTHER ALCOHOL-INDUCED DISORDER (H): ICD-10-CM

## 2021-09-29 DIAGNOSIS — F33.0 MILD EPISODE OF RECURRENT MAJOR DEPRESSIVE DISORDER (H): ICD-10-CM

## 2021-09-29 LAB
ALBUMIN SERPL-MCNC: 1.3 G/DL (ref 3.4–5)
ALP SERPL-CCNC: 232 U/L (ref 40–150)
ALT SERPL W P-5'-P-CCNC: 29 U/L (ref 0–50)
AMMONIA PLAS-SCNC: <10 UMOL/L (ref 10–50)
ANION GAP SERPL CALCULATED.3IONS-SCNC: 10 MMOL/L (ref 3–14)
ANION GAP SERPL CALCULATED.3IONS-SCNC: 8 MMOL/L (ref 3–14)
AST SERPL W P-5'-P-CCNC: 107 U/L (ref 0–45)
ATRIAL RATE - MUSE: 78 BPM
BASOPHILS # BLD MANUAL: 0.2 10E3/UL (ref 0–0.2)
BASOPHILS NFR BLD MANUAL: 2 %
BILIRUB SERPL-MCNC: 18.1 MG/DL (ref 0.2–1.3)
BUN SERPL-MCNC: 7 MG/DL (ref 7–30)
BUN SERPL-MCNC: 7 MG/DL (ref 7–30)
CALCIUM SERPL-MCNC: 7.7 MG/DL (ref 8.5–10.1)
CALCIUM SERPL-MCNC: 8.1 MG/DL (ref 8.5–10.1)
CHLORIDE BLD-SCNC: 101 MMOL/L (ref 94–109)
CHLORIDE BLD-SCNC: 104 MMOL/L (ref 94–109)
CO2 SERPL-SCNC: 20 MMOL/L (ref 20–32)
CO2 SERPL-SCNC: 20 MMOL/L (ref 20–32)
CREAT SERPL-MCNC: 0.97 MG/DL (ref 0.52–1.04)
CREAT SERPL-MCNC: 0.98 MG/DL (ref 0.52–1.04)
DIASTOLIC BLOOD PRESSURE - MUSE: NORMAL MMHG
EOSINOPHIL # BLD MANUAL: 0.1 10E3/UL (ref 0–0.7)
EOSINOPHIL NFR BLD MANUAL: 1 %
ERYTHROCYTE [DISTWIDTH] IN BLOOD BY AUTOMATED COUNT: 16.5 % (ref 10–15)
GFR SERPL CREATININE-BSD FRML MDRD: 69 ML/MIN/1.73M2
GFR SERPL CREATININE-BSD FRML MDRD: 70 ML/MIN/1.73M2
GLUCOSE BLD-MCNC: 88 MG/DL (ref 70–99)
GLUCOSE BLD-MCNC: 95 MG/DL (ref 70–99)
GLUCOSE BLDC GLUCOMTR-MCNC: 133 MG/DL (ref 70–99)
HCT VFR BLD AUTO: 31.9 % (ref 35–47)
HGB BLD-MCNC: 10.2 G/DL (ref 11.7–15.7)
HOLD SPECIMEN: NORMAL
INR PPP: 1.52 (ref 0.85–1.15)
INTERPRETATION ECG - MUSE: NORMAL
LIPASE SERPL-CCNC: 176 U/L (ref 73–393)
LYMPHOCYTES # BLD MANUAL: 1.3 10E3/UL (ref 0.8–5.3)
LYMPHOCYTES NFR BLD MANUAL: 12 %
MAGNESIUM SERPL-MCNC: 2.2 MG/DL (ref 1.6–2.3)
MAGNESIUM SERPL-MCNC: 2.4 MG/DL (ref 1.6–2.3)
MCH RBC QN AUTO: 36.2 PG (ref 26.5–33)
MCHC RBC AUTO-ENTMCNC: 32 G/DL (ref 31.5–36.5)
MCV RBC AUTO: 113 FL (ref 78–100)
MONOCYTES # BLD MANUAL: 0.9 10E3/UL (ref 0–1.3)
MONOCYTES NFR BLD MANUAL: 9 %
NEUTROPHILS # BLD MANUAL: 8 10E3/UL (ref 1.6–8.3)
NEUTROPHILS NFR BLD MANUAL: 76 %
NRBC # BLD AUTO: 0.1 10E3/UL
NRBC BLD MANUAL-RTO: 1 %
P AXIS - MUSE: 63 DEGREES
PHOSPHATE SERPL-MCNC: 2.4 MG/DL (ref 2.5–4.5)
PLAT MORPH BLD: ABNORMAL
PLATELET # BLD AUTO: 164 10E3/UL (ref 150–450)
POLYCHROMASIA BLD QL SMEAR: SLIGHT
POTASSIUM BLD-SCNC: 2.9 MMOL/L (ref 3.4–5.3)
POTASSIUM BLD-SCNC: 3.4 MMOL/L (ref 3.4–5.3)
PR INTERVAL - MUSE: 152 MS
PROT SERPL-MCNC: 5 G/DL (ref 6.8–8.8)
QRS DURATION - MUSE: 98 MS
QT - MUSE: 434 MS
QTC - MUSE: 494 MS
R AXIS - MUSE: 59 DEGREES
RBC # BLD AUTO: 2.82 10E6/UL (ref 3.8–5.2)
RBC MORPH BLD: ABNORMAL
SARS-COV-2 RNA RESP QL NAA+PROBE: NEGATIVE
SODIUM SERPL-SCNC: 131 MMOL/L (ref 133–144)
SODIUM SERPL-SCNC: 132 MMOL/L (ref 133–144)
SYSTOLIC BLOOD PRESSURE - MUSE: NORMAL MMHG
T AXIS - MUSE: 27 DEGREES
VENTRICULAR RATE- MUSE: 78 BPM
WBC # BLD AUTO: 10.5 10E3/UL (ref 4–11)

## 2021-09-29 PROCEDURE — 83735 ASSAY OF MAGNESIUM: CPT | Performed by: STUDENT IN AN ORGANIZED HEALTH CARE EDUCATION/TRAINING PROGRAM

## 2021-09-29 PROCEDURE — 93010 ELECTROCARDIOGRAM REPORT: CPT | Performed by: EMERGENCY MEDICINE

## 2021-09-29 PROCEDURE — 250N000013 HC RX MED GY IP 250 OP 250 PS 637

## 2021-09-29 PROCEDURE — 83735 ASSAY OF MAGNESIUM: CPT | Performed by: EMERGENCY MEDICINE

## 2021-09-29 PROCEDURE — 84100 ASSAY OF PHOSPHORUS: CPT | Performed by: STUDENT IN AN ORGANIZED HEALTH CARE EDUCATION/TRAINING PROGRAM

## 2021-09-29 PROCEDURE — 85027 COMPLETE CBC AUTOMATED: CPT | Performed by: EMERGENCY MEDICINE

## 2021-09-29 PROCEDURE — 99285 EMERGENCY DEPT VISIT HI MDM: CPT | Mod: 25

## 2021-09-29 PROCEDURE — 99223 1ST HOSP IP/OBS HIGH 75: CPT | Mod: AI | Performed by: STUDENT IN AN ORGANIZED HEALTH CARE EDUCATION/TRAINING PROGRAM

## 2021-09-29 PROCEDURE — 85610 PROTHROMBIN TIME: CPT | Performed by: EMERGENCY MEDICINE

## 2021-09-29 PROCEDURE — 99285 EMERGENCY DEPT VISIT HI MDM: CPT | Mod: 25 | Performed by: EMERGENCY MEDICINE

## 2021-09-29 PROCEDURE — 93975 VASCULAR STUDY: CPT

## 2021-09-29 PROCEDURE — 71045 X-RAY EXAM CHEST 1 VIEW: CPT | Mod: 26 | Performed by: STUDENT IN AN ORGANIZED HEALTH CARE EDUCATION/TRAINING PROGRAM

## 2021-09-29 PROCEDURE — 250N000013 HC RX MED GY IP 250 OP 250 PS 637: Performed by: EMERGENCY MEDICINE

## 2021-09-29 PROCEDURE — 96375 TX/PRO/DX INJ NEW DRUG ADDON: CPT | Performed by: EMERGENCY MEDICINE

## 2021-09-29 PROCEDURE — C9803 HOPD COVID-19 SPEC COLLECT: HCPCS

## 2021-09-29 PROCEDURE — 80053 COMPREHEN METABOLIC PANEL: CPT | Performed by: EMERGENCY MEDICINE

## 2021-09-29 PROCEDURE — 82140 ASSAY OF AMMONIA: CPT | Performed by: EMERGENCY MEDICINE

## 2021-09-29 PROCEDURE — 93005 ELECTROCARDIOGRAM TRACING: CPT

## 2021-09-29 PROCEDURE — 96374 THER/PROPH/DIAG INJ IV PUSH: CPT | Performed by: EMERGENCY MEDICINE

## 2021-09-29 PROCEDURE — 96375 TX/PRO/DX INJ NEW DRUG ADDON: CPT

## 2021-09-29 PROCEDURE — 250N000011 HC RX IP 250 OP 636: Performed by: STUDENT IN AN ORGANIZED HEALTH CARE EDUCATION/TRAINING PROGRAM

## 2021-09-29 PROCEDURE — 36415 COLL VENOUS BLD VENIPUNCTURE: CPT | Performed by: STUDENT IN AN ORGANIZED HEALTH CARE EDUCATION/TRAINING PROGRAM

## 2021-09-29 PROCEDURE — 36415 COLL VENOUS BLD VENIPUNCTURE: CPT | Performed by: EMERGENCY MEDICINE

## 2021-09-29 PROCEDURE — 250N000011 HC RX IP 250 OP 636: Performed by: EMERGENCY MEDICINE

## 2021-09-29 PROCEDURE — 83690 ASSAY OF LIPASE: CPT | Performed by: STUDENT IN AN ORGANIZED HEALTH CARE EDUCATION/TRAINING PROGRAM

## 2021-09-29 PROCEDURE — U0005 INFEC AGEN DETEC AMPLI PROBE: HCPCS | Performed by: EMERGENCY MEDICINE

## 2021-09-29 PROCEDURE — 93005 ELECTROCARDIOGRAM TRACING: CPT | Performed by: EMERGENCY MEDICINE

## 2021-09-29 PROCEDURE — 93975 VASCULAR STUDY: CPT | Mod: 26 | Performed by: STUDENT IN AN ORGANIZED HEALTH CARE EDUCATION/TRAINING PROGRAM

## 2021-09-29 PROCEDURE — 250N000013 HC RX MED GY IP 250 OP 250 PS 637: Performed by: STUDENT IN AN ORGANIZED HEALTH CARE EDUCATION/TRAINING PROGRAM

## 2021-09-29 PROCEDURE — 71045 X-RAY EXAM CHEST 1 VIEW: CPT

## 2021-09-29 PROCEDURE — 96374 THER/PROPH/DIAG INJ IV PUSH: CPT

## 2021-09-29 PROCEDURE — 120N000002 HC R&B MED SURG/OB UMMC

## 2021-09-29 PROCEDURE — C9803 HOPD COVID-19 SPEC COLLECT: HCPCS | Performed by: EMERGENCY MEDICINE

## 2021-09-29 RX ORDER — ESCITALOPRAM OXALATE 10 MG/1
10 TABLET ORAL DAILY
Status: DISCONTINUED | OUTPATIENT
Start: 2021-09-30 | End: 2021-10-04

## 2021-09-29 RX ORDER — LACTULOSE 10 G/15ML
20 SOLUTION ORAL 3 TIMES DAILY
Status: DISCONTINUED | OUTPATIENT
Start: 2021-09-29 | End: 2021-09-30

## 2021-09-29 RX ORDER — POTASSIUM CHLORIDE 7.45 MG/ML
10 INJECTION INTRAVENOUS ONCE
Status: COMPLETED | OUTPATIENT
Start: 2021-09-29 | End: 2021-09-29

## 2021-09-29 RX ORDER — LANOLIN ALCOHOL/MO/W.PET/CERES
100 CREAM (GRAM) TOPICAL DAILY
Status: DISCONTINUED | OUTPATIENT
Start: 2021-09-30 | End: 2021-10-04

## 2021-09-29 RX ORDER — ACETAMINOPHEN 325 MG/1
325 TABLET ORAL EVERY 6 HOURS PRN
Status: DISCONTINUED | OUTPATIENT
Start: 2021-09-29 | End: 2021-10-04

## 2021-09-29 RX ORDER — POTASSIUM CHLORIDE 1.5 G/1.58G
40 POWDER, FOR SOLUTION ORAL ONCE
Status: COMPLETED | OUTPATIENT
Start: 2021-09-29 | End: 2021-09-29

## 2021-09-29 RX ORDER — PANTOPRAZOLE SODIUM 40 MG/1
40 TABLET, DELAYED RELEASE ORAL
Status: DISCONTINUED | OUTPATIENT
Start: 2021-09-30 | End: 2021-10-04

## 2021-09-29 RX ORDER — DEXTROSE MONOHYDRATE 25 G/50ML
25-50 INJECTION, SOLUTION INTRAVENOUS
Status: DISCONTINUED | OUTPATIENT
Start: 2021-09-29 | End: 2021-10-04 | Stop reason: CLARIF

## 2021-09-29 RX ORDER — GABAPENTIN 100 MG/1
100 CAPSULE ORAL 3 TIMES DAILY
Status: DISCONTINUED | OUTPATIENT
Start: 2021-09-29 | End: 2021-10-04

## 2021-09-29 RX ORDER — NICOTINE POLACRILEX 4 MG
15-30 LOZENGE BUCCAL
Status: DISCONTINUED | OUTPATIENT
Start: 2021-09-29 | End: 2021-10-04 | Stop reason: CLARIF

## 2021-09-29 RX ORDER — LIDOCAINE 40 MG/G
CREAM TOPICAL
Status: DISCONTINUED | OUTPATIENT
Start: 2021-09-29 | End: 2021-11-05 | Stop reason: HOSPADM

## 2021-09-29 RX ORDER — PRAZOSIN HYDROCHLORIDE 1 MG/1
1 CAPSULE ORAL EVERY EVENING
Status: DISCONTINUED | OUTPATIENT
Start: 2021-09-29 | End: 2021-11-05 | Stop reason: HOSPADM

## 2021-09-29 RX ORDER — FUROSEMIDE 10 MG/ML
40 INJECTION INTRAMUSCULAR; INTRAVENOUS ONCE
Status: COMPLETED | OUTPATIENT
Start: 2021-09-29 | End: 2021-09-29

## 2021-09-29 RX ORDER — FOLIC ACID 1 MG/1
1 TABLET ORAL DAILY
Status: DISCONTINUED | OUTPATIENT
Start: 2021-09-30 | End: 2021-10-04

## 2021-09-29 RX ORDER — DIPHENHYDRAMINE HYDROCHLORIDE 50 MG/ML
25 INJECTION INTRAMUSCULAR; INTRAVENOUS EVERY 6 HOURS PRN
Status: DISCONTINUED | OUTPATIENT
Start: 2021-09-29 | End: 2021-10-02

## 2021-09-29 RX ORDER — UBIDECARENONE 75 MG
100 CAPSULE ORAL DAILY
Status: DISCONTINUED | OUTPATIENT
Start: 2021-09-29 | End: 2021-10-04

## 2021-09-29 RX ORDER — MULTIVITAMIN,THERAPEUTIC
1 TABLET ORAL DAILY
Status: DISCONTINUED | OUTPATIENT
Start: 2021-09-30 | End: 2021-10-04

## 2021-09-29 RX ORDER — DIPHENHYDRAMINE HCL 25 MG
25 CAPSULE ORAL EVERY 6 HOURS PRN
Status: DISCONTINUED | OUTPATIENT
Start: 2021-09-29 | End: 2021-10-03

## 2021-09-29 RX ORDER — ACETAMINOPHEN 500 MG
500 TABLET ORAL EVERY 6 HOURS PRN
Status: DISCONTINUED | OUTPATIENT
Start: 2021-09-29 | End: 2021-09-29

## 2021-09-29 RX ORDER — ACETAMINOPHEN 650 MG/1
325 SUPPOSITORY RECTAL EVERY 6 HOURS PRN
Status: DISCONTINUED | OUTPATIENT
Start: 2021-09-29 | End: 2021-10-04

## 2021-09-29 RX ADMIN — LACTULOSE 20 G: 20 SOLUTION ORAL at 19:53

## 2021-09-29 RX ADMIN — POTASSIUM CHLORIDE 40 MEQ: 1.5 POWDER, FOR SOLUTION ORAL at 22:12

## 2021-09-29 RX ADMIN — POTASSIUM CHLORIDE 10 MEQ: 7.46 INJECTION, SOLUTION INTRAVENOUS at 14:00

## 2021-09-29 RX ADMIN — POTASSIUM CHLORIDE 40 MEQ: 1.5 POWDER, FOR SOLUTION ORAL at 14:00

## 2021-09-29 RX ADMIN — FUROSEMIDE 40 MG: 10 INJECTION, SOLUTION INTRAVENOUS at 19:54

## 2021-09-29 RX ADMIN — Medication 100 MCG: at 22:41

## 2021-09-29 RX ADMIN — GABAPENTIN 100 MG: 100 CAPSULE ORAL at 19:53

## 2021-09-29 RX ADMIN — ACETAMINOPHEN 325 MG: 325 TABLET, FILM COATED ORAL at 19:53

## 2021-09-29 RX ADMIN — RIFAXIMIN 550 MG: 550 TABLET ORAL at 19:53

## 2021-09-29 RX ADMIN — DIPHENHYDRAMINE HYDROCHLORIDE 25 MG: 25 CAPSULE ORAL at 22:41

## 2021-09-29 ASSESSMENT — ENCOUNTER SYMPTOMS
ABDOMINAL DISTENTION: 1
ABDOMINAL PAIN: 1
FEVER: 0
COUGH: 0
SHORTNESS OF BREATH: 0

## 2021-09-29 NOTE — ED PROVIDER NOTES
ED Provider Note  [facility]      History     Chief Complaint   Patient presents with     Abdominal Pain     Jaundice     HPI  Aliyah Angeles is a 46 year old female with a PMH of alcohol abuse, alcohol withdrawal, alcoholic cirrhosis with ascites, DM, hepatitis, high risk HPV positive, Raynaud's phenomenon, S/P gastric bypass, S/P cholecystectomy, S/P hysterectomy and drug-seeking behavior who presents to the ED today complaining of abdominal pain and jaundice.  Patient states she was recently seen at [facility] ED on 9/12/2021 due to alcohol intoxication.  She was then admitted to the hospital here from 9/13/2021 to 9/21/2021.  Patient states that she is on a low-sodium diet due to diabetes.  She also states she is on a fluid restriction.  She states that after leaving the hospital on 9/21 she went to a treatment facility.  She states that they do not accommodate for dietary restrictions so she has been forced to eat high carbs and sugars.  She states that since her discharge from the hospital she has been having bilateral lower extremity swelling.  She states she can no longer wear her compression socks as they do not fit.  She adds that she is up 9 pounds since her discharge as well.  She endorses abdominal pain and bloating.  She notes she has been sober from alcohol since her discharge from the hospital and that her jaundice has improved.  Patient denies fever, cough or shortness of breath.    Past Medical History  Past Medical History:   Diagnosis Date     Acne      ADHD (attention deficit hyperactivity disorder)      Arthralgia of temporomandibular joint 5/19/2016     Arthritis      Cervical high risk HPV (human papillomavirus) test positive 4/27/2018 4/27/18 NIL pap, + HR HPV (not 16 or 18). Plan: cotest in 1 yr.       Cobalamin deficiency 11/24/2014     Drug-seeking behavior 5/19/2016    Overview:  Per  website, patient has filled with multiple controlled substances  "with multiple prescribers at multiple pharmacies      History of blood transfusion 01/01/1988     Insomnia 7/21/2013     Raynaud's phenomenon 5/25/2015     Vitamin D deficiency 1/16/2013     Past Surgical History:   Procedure Laterality Date     ABDOMEN SURGERY  08/10/2010    Gastric  bypass     BIOPSY  1/1/94    HPV, multiple reoccurrances, partial  hysterectomy  2013     CHOLECYSTECTOMY  01/01/2010     COLONOSCOPY  01/01/2009     ENT SURGERY  01/01/1988    Tonsils     GI SURGERY  8/10/10    [facility]     HYSTERECTOMY VAGINAL  04/05/2013    pathology of cervix benign.      No current outpatient medications on file.    Allergies   Allergen Reactions     Nsaids      Gastric bypass     Trazodone      In a \"hazy\" for the whole next day     Family History  Family History   Problem Relation Age of Onset     Prostate Cancer Father      Hypertension Father      Hyperlipidemia Father      Substance Abuse Father      Obesity Father      Diabetes Maternal Grandmother      Colon Cancer Maternal Grandmother      Other Cancer Maternal Grandmother         pancreatic cancer     Obesity Maternal Grandmother      Diabetes Paternal Grandmother      Obesity Paternal Grandmother      Diabetes Paternal Grandfather      Obesity Paternal Grandfather      Breast Cancer Sister      Breast Cancer Sister      Anxiety Disorder Sister      Anesthesia Reaction Sister      Depression Mother      Anesthesia Reaction Mother      Asthma Daughter      Coronary Artery Disease No family hx of      Hypertension No family hx of      Hyperlipidemia No family hx of      Cerebrovascular Disease No family hx of      Depression No family hx of      Anxiety Disorder No family hx of      Mental Illness No family hx of      Substance Abuse No family hx of      Anesthesia Reaction No family hx of      Asthma No family hx of      Osteoporosis No family hx of      Genetic Disorder No family hx of      Thyroid Disease No family hx of      Obesity No family hx of  " "    Unknown/Adopted No family hx of      Social History   Social History     Tobacco Use     Smoking status: Current Every Day Smoker     Packs/day: 1.00     Years: 15.00     Pack years: 15.00     Types: Cigarettes     Start date: 1/1/1989     Smokeless tobacco: Never Used   Substance Use Topics     Alcohol use: Yes     Alcohol/week: 0.0 standard drinks     Comment: daily unknown amount      Drug use: No      Past medical history, past surgical history, medications, allergies, family history, and social history were reviewed with the patient. No additional pertinent items.       Review of Systems   Constitutional: Negative for fever.   Respiratory: Negative for cough and shortness of breath.    Gastrointestinal: Positive for abdominal distention and abdominal pain.   Musculoskeletal:        (Bilateral lower extremity swelling)     All other systems reviewed and are negative.    A complete review of systems was performed with pertinent positives and negatives noted in the HPI, and all other systems negative.    Physical Exam   BP: 102/76  Pulse: 81  Temp: 98  F (36.7  C)  Resp: 16  Height: 162.6 cm (5' 4\")  Weight: 89.9 kg (198 lb 4.8 oz)  SpO2: 99 %  Physical Exam  Vitals and nursing note reviewed.   Constitutional:       General: She is not in acute distress.     Appearance: She is well-developed. She is not ill-appearing, toxic-appearing or diaphoretic.      Comments: Patient is awake and alert, she is mentating normally and protecting her airway without difficulty.  She is visibly jaundiced.   HENT:      Head: Normocephalic and atraumatic.      Mouth/Throat:      Lips: Pink.      Mouth: Mucous membranes are moist.      Pharynx: Oropharynx is clear. No oropharyngeal exudate.   Eyes:      General: Lids are normal. Scleral icterus present.      Extraocular Movements: Extraocular movements intact.      Right eye: No nystagmus.      Left eye: No nystagmus.      Conjunctiva/sclera: Conjunctivae normal.      Pupils: " Pupils are equal, round, and reactive to light.   Neck:      Thyroid: No thyromegaly.      Vascular: No JVD.      Trachea: No tracheal deviation.   Cardiovascular:      Rate and Rhythm: Normal rate and regular rhythm.      Pulses: Normal pulses.      Heart sounds: Normal heart sounds. No murmur heard.   No friction rub. No gallop.    Pulmonary:      Effort: Pulmonary effort is normal. No respiratory distress.      Breath sounds: Normal breath sounds.   Abdominal:      General: Bowel sounds are normal. There is no distension.      Palpations: Abdomen is soft. There is no mass.      Tenderness: There is abdominal tenderness ( Mild, diffuse without peritoneal signs). There is no guarding or rebound.   Musculoskeletal:         General: No tenderness. Normal range of motion.      Cervical back: Normal range of motion and neck supple. No erythema or rigidity.      Right lower leg: 3+ Pitting Edema present.      Left lower leg: 3+ Pitting Edema present.   Lymphadenopathy:      Cervical: No cervical adenopathy.   Skin:     General: Skin is warm and dry.      Capillary Refill: Capillary refill takes less than 2 seconds.      Coloration: Skin is not pale.      Findings: No erythema or rash.   Neurological:      Mental Status: She is alert and oriented to person, place, and time.      Cranial Nerves: No cranial nerve deficit.      Sensory: No sensory deficit.      Motor: Motor function is intact.   Psychiatric:         Mood and Affect: Mood and affect normal.         Speech: Speech normal.         Behavior: Behavior normal.         ED Course     12:17 PM  The patient was seen and examined by Akin Cartagena MD in Room ED26.     Procedures         EKG Interpretation:      Interpreted by Akin Cartagena MD    Symptoms at time of EKG: Hypokalemia  Rhythm: normal sinus   Rate: 78  Ectopy: none  Conduction: normal  ST Segments/ T Waves: QTc prolongation 494ms  Q Waves: none  Comparison to prior:  Unchanged    Clinical Impression: prolonged QT interval              Labs Ordered and Resulted from Time of ED Arrival Up to the Time of Departure from the ED   COMPREHENSIVE METABOLIC PANEL - Abnormal; Notable for the following components:       Result Value    Sodium 131 (*)     Potassium 2.9 (*)     Calcium 8.1 (*)     Alkaline Phosphatase 232 (*)      (*)     Protein Total 5.0 (*)     Albumin 1.3 (*)     Bilirubin Total 18.1 (*)     All other components within normal limits   INR - Abnormal; Notable for the following components:    INR 1.52 (*)     All other components within normal limits   AMMONIA - Abnormal; Notable for the following components:    Ammonia <10 (*)     All other components within normal limits   CBC WITH PLATELETS AND DIFFERENTIAL - Abnormal; Notable for the following components:    RBC Count 2.82 (*)     Hemoglobin 10.2 (*)     Hematocrit 31.9 (*)      (*)     MCH 36.2 (*)     RDW 16.5 (*)     All other components within normal limits   DIFFERENTIAL - Abnormal; Notable for the following components:    NRBCs per 100 WBC 1 (*)     Absolute NRBCs 0.1 (*)     Polychromasia Slight (*)     All other components within normal limits   MAGNESIUM - Abnormal; Notable for the following components:    Magnesium 2.4 (*)     All other components within normal limits   PHOSPHORUS - Abnormal; Notable for the following components:    Phosphorus 2.4 (*)     All other components within normal limits   BASIC METABOLIC PANEL - Abnormal; Notable for the following components:    Sodium 132 (*)     Calcium 7.7 (*)     All other components within normal limits   GLUCOSE BY METER - Abnormal; Notable for the following components:    GLUCOSE BY METER POCT 133 (*)     All other components within normal limits   COVID-19 VIRUS (CORONAVIRUS) BY PCR - Normal    Narrative:     Testing was performed using the Xpert Xpress SARS-CoV-2 Assay on the  Cepheid Gene-Xpert Instrument Systems. Additional information  about  this Emergency Use Authorization (EUA) assay can be found via the Lab  Guide. This test should be ordered for the detection of SARS-CoV-2 in  individuals who meet SARS-CoV-2 clinical and/or epidemiological  criteria. Test performance is unknown in asymptomatic patients. This  test is for in vitro diagnostic use under the FDA EUA for  laboratories certified under CLIA to perform high complexity testing.  This test has not been FDA cleared or approved. A negative result  does not rule out the presence of PCR inhibitors in the specimen or  target RNA in concentration below the limit of detection for the  assay. The possibility of a false negative should be considered if  the patient's recent exposure or clinical presentation suggests  COVID-19. This test was validated by the [facility]. This laboratory is certified under  the Clinical Laboratory Improvement Amendments of 1988 (CLIA-88) as  qualified to perform high complexity laboratory testing.     MAGNESIUM - Normal   LIPASE - Normal   EXTRA RED TOP TUBE   GLUCOSE MONITOR NURSING POCT   GLUCOSE MONITOR NURSING POCT   UA MACROSCOPIC WITH REFLEX TO MICRO AND CULTURE   GLUCOSE MONITOR NURSING POCT   GLUCOSE MONITOR NURSING POCT   GLUCOSE MONITOR NURSING POCT   GLUCOSE MONITOR NURSING POCT   IP ASSIGN PROVIDER TEAM TO TREATMENT TEAM   PERIPHERAL IV CATHETER   VITAL SIGNS   PERIPHERAL IV CATHETER   PULSE OXIMETRY NURSING   INTAKE AND OUTPUT   APPLY PNEUMATIC COMPRESSION DEVICE (PCD)   STRICT INTAKE AND OUTPUT   IP CARBOHYDRATE COUNTING BY NURSING   PATIENT EDUCATION   ASSESS FOR HYPOGLYCEMIA SYMPTOMS   NOTIFY PHYSICIAN   TELEMETRY MONITORING CARDIOLOGY ADULT   DAILY WEIGHTS   CBC WITH PLATELETS & DIFFERENTIAL    Narrative:     The following orders were created for panel order CBC with platelets differential.  Procedure                               Abnormality         Status                     ---------                                -----------         ------                     CBC with platelets and d...[[id number]]  Abnormal            Final result               Manual Differential[[id number]]          Abnormal            Final result                 Please view results for these tests on the individual orders.   EXTRA TUBE    Narrative:     The following orders were created for panel order [place] Draw.  Procedure                               Abnormality         Status                     ---------                               -----------         ------                     Extra Red Top Tube[[id number]]                               Final result                 Please view results for these tests on the individual orders.            Medications   escitalopram (LEXAPRO) tablet 10 mg (10 mg Oral Given 10/1/21 0853)   folic acid (FOLVITE) tablet 1 mg (1 mg Oral Given 10/1/21 0853)   gabapentin (NEURONTIN) capsule 100 mg (100 mg Oral Given 10/1/21 0853)   calcium carbonate 600 mg-vitamin D 400 units (CALTRATE) per tablet 1 tablet (1 tablet Oral Given 9/30/21 0927)   multivitamin, therapeutic (THERA-VIT) tablet 1 tablet (1 tablet Oral Given 9/30/21 0927)   pantoprazole (PROTONIX) EC tablet 40 mg (40 mg Oral Given 10/1/21 0853)   rifaximin (XIFAXAN) tablet 550 mg (550 mg Oral Given 10/1/21 0853)   thiamine (B-1) tablet 100 mg (100 mg Oral Given 10/1/21 0853)   lidocaine 1 % 0.1-1 mL (has no administration in time range)   lidocaine (LMX4) cream (has no administration in time range)   sodium chloride (PF) 0.9% PF flush 3 mL (3 mLs Intracatheter Given 10/1/21 0902)   sodium chloride (PF) 0.9% PF flush 3 mL (has no administration in time range)   melatonin tablet 1 mg (has no administration in time range)   cyanocobalamin (VITAMIN B-12) tablet 100 mcg (100 mcg Oral Given 10/1/21 0853)   acetaminophen (TYLENOL) tablet 325 mg (325 mg Oral Given 9/29/21 1953)     Or   acetaminophen (TYLENOL) Suppository 325 mg ( Rectal See Alternative  9/29/21 1953)   glucose gel 15-30 g (has no administration in time range)     Or   dextrose 50 % injection 25-50 mL (has no administration in time range)     Or   glucagon injection 1 mg (has no administration in time range)   insulin aspart (NovoLOG) injection (RAPID ACTING) (1 Units Subcutaneous Not Given 10/1/21 0901)   insulin aspart (NovoLOG) injection (RAPID ACTING) (1 Units Subcutaneous Not Given 9/30/21 2231)   prazosin (MINIPRESS) capsule 1 mg (1 mg Oral Given 9/30/21 2134)   diphenhydrAMINE (BENADRYL) capsule 25 mg (25 mg Oral Given 10/1/21 0313)     Or   diphenhydrAMINE (BENADRYL) injection 25 mg ( Intravenous See Alternative 10/1/21 0313)   lactulose (CHRONULAC) solution 20 g (20 g Oral Given 10/1/21 0852)   potassium chloride 10 mEq in 100 mL sterile water intermittent infusion (premix) (0 mEq Intravenous Stopped 9/29/21 1410)   potassium chloride (KLOR-CON) Packet 40 mEq (40 mEq Oral Given 9/29/21 1400)   furosemide (LASIX) injection 40 mg (40 mg Intravenous Given 9/29/21 1954)   potassium chloride (KLOR-CON) Packet 40 mEq (40 mEq Oral Given 9/29/21 2212)   potassium chloride ER (KLOR-CON M) CR tablet 40 mEq (40 mEq Oral Given 9/30/21 1158)   potassium chloride ER (KLOR-CON M) CR tablet 20 mEq (20 mEq Oral Given 9/30/21 2037)        Assessments & Plan (with Medical Decision Making)     This patient presented to the emergency department with increased edema and continued hypokalemia with EKG changes.  She had been discharged from the hospital to an alcohol rehab facility and has been unable to maintain the requested dietary restrictions due to a one diet fits all approach at the facility which seems as though is unable to care for someone with a medical needs of this patient.  Given her clinical worsening she will need to be admitted to the hospital for treatment of electrolyte abnormalities as well as volume overload and will need to have safe disposition arranged.  I discussed the case with the triage  hospitalist and patient will be admitted to their service for further treatment and evaluation.    I have reviewed the nursing notes. I have reviewed the findings, diagnosis, plan and need for follow up with the patient.    Current Discharge Medication List          Final diagnoses:   Hypokalemia   I, Romulo New, am serving as a trained medical scribe to document services personally performed by Akin Cartagena MD, based on the provider's statements to me.     I, Akin Cartagena MD, was physically present and have reviewed and verified the accuracy of this note documented by Romulo New.      --  Akin Cartagena MD  formerly [facility] EMERGENCY DEPARTMENT  9/29/2021     Akin Cartagena MD  10/01/21 0975

## 2021-09-29 NOTE — ED NOTES
Bed: ED26  Expected date:   Expected time:   Means of arrival:   Comments:  [place] with a 45 yo F with abdominal pain, jaundice, and edema. ETA 15 mins

## 2021-09-29 NOTE — H&P
[facility]    History and Physical - Marbenja 3 Service        Date of Admission:  9/29/2021    Assessment & Plan      Aliyah Angeles is a 46 year old female admitted on 9/29/2021. She has a history of alcohol use disorder, gastric bypass admitted for abdominal pain and 3 days of missing her medications in the setting of vomiting.    #Alcoholic hepatitis  #Alcohol use disorder  #Jaundice  #Edema, ascites  Patient has been sober since 9/12 and has been at a treatment facility since her discharge 9/21. Drastic diet change triggered decompensation which was worsened by not being able to take her home meds due to vomiting. MELD 26 9/29. With new RUQ pain and acute decompensation, concern for infection although afebrile.  - Hepatology consulted  - US abdomen (new RUQ abdominal pain)  - CXR, UA  - add-on lipase  - Lasix 40mg IV  - cont PTA lactulose, rifaximin for HE ppx  - cont folate & B12 supplementation  - cont PTA pantoprazole  - cont PTA multivitamin    #Hyponatremia  #Hypokalemia  Likely 2/2 worsening cirrhosis in the setting of not taking meds as above in addition to gastric loses from vomiting.  - K & Mg replacement protocols  - BMP & Mg in am    #DM2  -hold PTA Metformin (500mg BID)  - sliding scale insulin while inpatient    #Anxiety  #Depression  Worsened.  - cont PTA escitalopram  - health psychology consult, patient feels this may be helpful  - consider psychiatry consult for medication adjustments       Diet: 2 Gram Sodium Diet    DVT Prophylaxis: Pneumatic Compression Devices  Rice Catheter: Not present  Fluids: None  Central Lines: None  Code Status: Full Code      Disposition Plan   Expected discharge: 3-4 days pending evaluation and treatment, discharge pending ability to tolerate PO nutrition and pain meds. Likely discharge back to treatment facility.      The patient's care was discussed with the Attending Physician, Dr. Astorga.    Alissa Hameed,  MD Phillip 3 Service  [facility]  Securely message with the Blue[employer] Web Console (learn more here)  Text page via M-Files Paging/Directory  Please see sign in/sign out for up to date coverage information  ______________________________________________________________________    Chief Complaint   Abdominal pain, nausea/vomiting    History is obtained from the patient    History of Present Illness   Aliyah Angeles is a 46 year old female with a history of alcohol use disorder (sober since 9/12/2021), alcoholic hepatitis, type 2 diabetes, anxiety, and depression presenting with 4 days of abdominal pain, nausea, vomiting, and not tolerating her oral medications.    Patient reports she was admitted here from 9/13-9/21 and was discharged to an alcohol treatment facility. She states after discharge her diet drastically changed as she did not have access to low sodium foods or diabetes friendly foods at her facility. She did not feel well on this diet and states after a 4 days (9/25) she began to have abdominal pain (RUQ, severe sharp intermittent 4-10 times an hour lasting ~10seconds), nausea, and vomiting and was not able to keep down or tolerate her oral medications. She notes decreased bowel movements due to not tolerating her lactulose. She notes she also felt foggy and noticed her jaundice, extremity edema, and ascites began to worsen (after improving during her hospitalization).    She denies fevers, chills, hematuria, hematochezia, melena, rashes.    Patient states her mood has worsened recently due to her pending divorce and health issues. States she does not have self-harm feelings or suicidal ideation but would like to get her symptoms under better control.    Review of Systems    The 10 point Review of Systems is negative other than noted in the HPI or here.    Past Medical History    I have reviewed this patient's medical history and updated it with pertinent  information if needed.   Past Medical History:   Diagnosis Date     Acne      ADHD (attention deficit hyperactivity disorder)      Arthralgia of temporomandibular joint 5/19/2016     Arthritis      Cervical high risk HPV (human papillomavirus) test positive 4/27/2018 4/27/18 NIL pap, + HR HPV (not 16 or 18). Plan: cotest in 1 yr.       Cobalamin deficiency 11/24/2014     Drug-seeking behavior 5/19/2016    Overview:  Per [facility] website, patient has filled with multiple controlled substances with multiple prescribers at multiple pharmacies      History of blood transfusion 01/01/1988     Insomnia 7/21/2013     Raynaud's phenomenon 5/25/2015     Vitamin D deficiency 1/16/2013       Past Surgical History   I have reviewed this patient's surgical history and updated it with pertinent information if needed.  Past Surgical History:   Procedure Laterality Date     ABDOMEN SURGERY  08/10/2010    Gastric  bypass     BIOPSY  1/1/94    HPV, multiple reoccurrances, partial  hysterectomy  2013     CHOLECYSTECTOMY  01/01/2010     COLONOSCOPY  01/01/2009     ENT SURGERY  01/01/1988    Tonsils     GI SURGERY  8/10/10    Fitz-en-Y     HYSTERECTOMY VAGINAL  04/05/2013    pathology of cervix benign.        Social History   I have reviewed this patient's social history and updated it with pertinent information if needed. Aliyah Angeles  reports that she has been smoking cigarettes. She started smoking about 32 years ago. She has a 15.00 pack-year smoking history. She has never used smokeless tobacco. She reports current alcohol use. She reports that she does not use drugs. Does use marijuana a few times a week to help her sleep (gets this from a friend who gets it from a dispensary).    Family History   I have reviewed this patient's family history and updated it with pertinent information if needed.  Family History   Problem Relation Age of Onset     Prostate Cancer Father      Hypertension Father      Hyperlipidemia Father       Substance Abuse Father      Obesity Father      Diabetes Maternal Grandmother      Colon Cancer Maternal Grandmother      Other Cancer Maternal Grandmother         pancreatic cancer     Obesity Maternal Grandmother      Diabetes Paternal Grandmother      Obesity Paternal Grandmother      Diabetes Paternal Grandfather      Obesity Paternal Grandfather      Breast Cancer Sister      Breast Cancer Sister      Anxiety Disorder Sister      Anesthesia Reaction Sister      Depression Mother      Anesthesia Reaction Mother      Asthma Daughter      Coronary Artery Disease No family hx of      Hypertension No family hx of      Hyperlipidemia No family hx of      Cerebrovascular Disease No family hx of      Depression No family hx of      Anxiety Disorder No family hx of      Mental Illness No family hx of      Substance Abuse No family hx of      Anesthesia Reaction No family hx of      Asthma No family hx of      Osteoporosis No family hx of      Genetic Disorder No family hx of      Thyroid Disease No family hx of      Obesity No family hx of      Unknown/Adopted No family hx of        Prior to Admission Medications   Prior to Admission Medications   Prescriptions Last Dose Informant Patient Reported? Taking?   albuterol (PROAIR HFA/PROVENTIL HFA/VENTOLIN HFA) 108 (90 Base) MCG/ACT inhaler Unknown at Unknown time  No Yes   Sig: Inhale 2 puffs into the lungs every 4 hours as needed for shortness of breath / dyspnea or wheezing   calcium carbonate-vitamin D (OS-STEFFANY) 600-400 MG-UNIT chewable tablet 9/29/2021 at 0800  No Yes   Sig: Take 1 chew tab by mouth daily   cyanocobalamin (CYANOCOBALAMIN) 1000 MCG/ML injection Unknown at Unknown time  Yes Yes   Sig: INJECT 1 ML (1,000 MCG) SUBCUTANEOUS EVERY 4 WEEKS.   escitalopram (LEXAPRO) 10 MG tablet 9/29/2021 at 0800  No Yes   Sig: Take 1 tablet (10 mg) by mouth daily   folic acid (FOLVITE) 1 MG tablet 9/29/2021 at 0800  No Yes   Sig: Take 1 tablet (1 mg) by mouth daily  "  gabapentin (NEURONTIN) 100 MG capsule 9/29/2021 at 0800  No Yes   Sig: Take 1 capsule (100 mg) by mouth 3 times daily   lactulose (CEPHULAC) 20 GM packet 9/29/2021 at Unknown time  No Yes   Sig: Take 1 packet (20 g) by mouth 3 times daily   metFORMIN (GLUCOPHAGE) 500 MG tablet 9/29/2021 at 0800  No Yes   Sig: Take 1 tablet (500 mg) by mouth 2 times daily (with meals)   multivitamin (CENTRUM) chewable tablet 9/29/2021 at 0800  No Yes   Sig: Take 1 tablet by mouth daily   pantoprazole (PROTONIX) 40 MG EC tablet 9/28/2021 at 2200  No Yes   Sig: Take 1 tablet (40 mg) by mouth every morning (before breakfast)   prazosin (MINIPRESS) 1 MG capsule   No Yes   Sig: Take 1 capsule (1 mg) by mouth every evening   rifaximin (XIFAXAN) 550 MG TABS tablet 9/29/2021 at 0800  No Yes   Sig: Take 1 tablet (550 mg) by mouth 2 times daily   thiamine (B-1) 100 MG tablet 9/29/2021 at 0800  No Yes   Sig: Take 1 tablet (100 mg) by mouth daily      Facility-Administered Medications: None     Allergies   Allergies   Allergen Reactions     Nsaids      Gastric bypass     Trazodone      In a \"hazy\" for the whole next day       Physical Exam   Vital Signs: Temp: 98.3  F (36.8  C) Temp src: Oral BP: 99/75 Pulse: 75   Resp: 16 SpO2: 100 % O2 Device: None (Room air)    Weight: 198 lbs 4.8 oz    General Appearance: awake, alert, jaundice appearing  Eyes: sclera icteric  HEENT: normocephalic and atraumatic  Respiratory: Breathing comfortable on room air, lungs clear to auscultation bilaterally  Cardiovascular: Regular rate and rhythm, no murmurs appreciated  GI: Distended, soft, tenderness to palpation RUQ & bilateral lower quadrants (near pubic bone). Caput medusa visible. Liver feels enlarged.  Skin: Jaundiced, caput medusa over abdomen, otherwise no rashes or lesions on visible skin.  Musculoskeletal: Bilateral pitting edema that is tender to palpation  Neurologic: She is alert and oriented to person, place, and time. Flat affect.  Psych: " Depressed mood, no SI    Data   Data reviewed today: I reviewed all medications, new labs and imaging results over the last 24 hours. I personally reviewed no images or EKG's today.    Recent Labs   Lab 09/29/21  1236   WBC 10.5   HGB 10.2*   *      INR 1.52*   *   POTASSIUM 2.9*   CHLORIDE 101   CO2 20   BUN 7   CR 0.98   ANIONGAP 10   STEFFANY 8.1*   GLC 95   ALBUMIN 1.3*   PROTTOTAL 5.0*   BILITOTAL 18.1*   ALKPHOS 232*   ALT 29   *

## 2021-09-29 NOTE — LETTER
Transition Communication Hand-off for Care Transitions to Next Level of Care Provider    Name: Aliyah Angeles  : 1975  MRN #: [id number]  Primary Care Provider: VENICE ROSALES     Primary Clinic: [address] [place] [id number]     Reason for Hospitalization:  Hypokalemia [E87.6]  Admit Date/Time: 2021 12:14 PM  Discharge Date: 21  Payor Source: Payor: [facility] / Plan: [employer] [place] / Product Type: HMO /          Reason for Communication Hand-off Referral: Other Acute Rehab discharge     Discharge Plan:    Aliyah will discharge to [facility] on 21     Concern for non-adherence with plan of care:   Y/N N  Discharge Needs Assessment:  Needs      Most Recent Value   Equipment Currently Used at Home  none         Follow-up plan:    Future Appointments   Date Time Provider Department Center   2021  7:00 AM Marcela Mcfarland OTR [facility] O   2021  8:00 AM [facility]U PT WAITLIST [facility] O       Any outstanding tests or procedures:              Key Recommendations:      HODA Conde    AVS/Discharge Summary is the source of truth; this is a helpful guide for improved communication of patient story

## 2021-09-29 NOTE — ED TRIAGE NOTES
Pt BIBA from treatment facility in [place]   Detoxing from alcohol  Last drink 9/12/2021 - has stayed sober while in treatment  Jaundice significant today, however pt reports this has improved recently  Having abd pain, liver pain, BLE edema with pain, HA, having hard time keeping meds, food, and fluids down  A&Ox4

## 2021-09-30 ENCOUNTER — APPOINTMENT (OUTPATIENT)
Dept: OCCUPATIONAL THERAPY | Facility: CLINIC | Age: 46
End: 2021-09-30
Payer: COMMERCIAL

## 2021-09-30 LAB
ALBUMIN FLD-MCNC: 0.1 G/DL
ALBUMIN SERPL-MCNC: 1.2 G/DL (ref 3.4–5)
ALP SERPL-CCNC: 220 U/L (ref 40–150)
ALT SERPL W P-5'-P-CCNC: 28 U/L (ref 0–50)
ANION GAP SERPL CALCULATED.3IONS-SCNC: 9 MMOL/L (ref 3–14)
APPEARANCE FLD: CLEAR
APTT PPP: 37 SECONDS (ref 22–38)
AST SERPL W P-5'-P-CCNC: 98 U/L (ref 0–45)
BILIRUB SERPL-MCNC: 15.9 MG/DL (ref 0.2–1.3)
BUN SERPL-MCNC: 8 MG/DL (ref 7–30)
CALCIUM SERPL-MCNC: 7.8 MG/DL (ref 8.5–10.1)
CHLORIDE BLD-SCNC: 106 MMOL/L (ref 94–109)
CO2 SERPL-SCNC: 19 MMOL/L (ref 20–32)
COLOR FLD: YELLOW
CREAT SERPL-MCNC: 1.08 MG/DL (ref 0.52–1.04)
ERYTHROCYTE [DISTWIDTH] IN BLOOD BY AUTOMATED COUNT: 16.4 % (ref 10–15)
GFR SERPL CREATININE-BSD FRML MDRD: 61 ML/MIN/1.73M2
GLUCOSE BLD-MCNC: 100 MG/DL (ref 70–99)
GLUCOSE BLDC GLUCOMTR-MCNC: 104 MG/DL (ref 70–99)
GLUCOSE BLDC GLUCOMTR-MCNC: 114 MG/DL (ref 70–99)
GLUCOSE BLDC GLUCOMTR-MCNC: 115 MG/DL (ref 70–99)
GLUCOSE FLD-MCNC: 124 MG/DL
GRAM STAIN RESULT: NORMAL
GRAM STAIN RESULT: NORMAL
HCT VFR BLD AUTO: 26.9 % (ref 35–47)
HGB BLD-MCNC: 8.8 G/DL (ref 11.7–15.7)
HOLD SPECIMEN: NORMAL
INR PPP: 1.64 (ref 0.85–1.15)
LACTATE SERPL-SCNC: 1 MMOL/L (ref 0.7–2)
LDH FLD L TO P-CCNC: 37 U/L
LYMPHOCYTES NFR FLD MANUAL: 39 %
MAGNESIUM SERPL-MCNC: 2.2 MG/DL (ref 1.6–2.3)
MCH RBC QN AUTO: 36.8 PG (ref 26.5–33)
MCHC RBC AUTO-ENTMCNC: 32.7 G/DL (ref 31.5–36.5)
MCV RBC AUTO: 113 FL (ref 78–100)
MONOS+MACROS NFR FLD MANUAL: NORMAL %
NEUTS BAND NFR FLD MANUAL: 9 %
OTHER CELLS FLD MANUAL: 53 %
PLATELET # BLD AUTO: 159 10E3/UL (ref 150–450)
POTASSIUM BLD-SCNC: 3.2 MMOL/L (ref 3.4–5.3)
POTASSIUM BLD-SCNC: 3.5 MMOL/L (ref 3.4–5.3)
POTASSIUM BLD-SCNC: 3.7 MMOL/L (ref 3.4–5.3)
PROT FLD-MCNC: 0.6 G/DL
PROT SERPL-MCNC: 4.5 G/DL (ref 6.8–8.8)
RBC # BLD AUTO: 2.39 10E6/UL (ref 3.8–5.2)
SODIUM SERPL-SCNC: 134 MMOL/L (ref 133–144)
WBC # BLD AUTO: 9.8 10E3/UL (ref 4–11)
WBC # FLD AUTO: 97 /UL

## 2021-09-30 PROCEDURE — 250N000013 HC RX MED GY IP 250 OP 250 PS 637

## 2021-09-30 PROCEDURE — 36415 COLL VENOUS BLD VENIPUNCTURE: CPT | Performed by: STUDENT IN AN ORGANIZED HEALTH CARE EDUCATION/TRAINING PROGRAM

## 2021-09-30 PROCEDURE — 87205 SMEAR GRAM STAIN: CPT | Performed by: STUDENT IN AN ORGANIZED HEALTH CARE EDUCATION/TRAINING PROGRAM

## 2021-09-30 PROCEDURE — 0W9G3ZX DRAINAGE OF PERITONEAL CAVITY, PERCUTANEOUS APPROACH, DIAGNOSTIC: ICD-10-PCS | Performed by: INTERNAL MEDICINE

## 2021-09-30 PROCEDURE — 82042 OTHER SOURCE ALBUMIN QUAN EA: CPT | Performed by: STUDENT IN AN ORGANIZED HEALTH CARE EDUCATION/TRAINING PROGRAM

## 2021-09-30 PROCEDURE — 84157 ASSAY OF PROTEIN OTHER: CPT | Performed by: STUDENT IN AN ORGANIZED HEALTH CARE EDUCATION/TRAINING PROGRAM

## 2021-09-30 PROCEDURE — 83615 LACTATE (LD) (LDH) ENZYME: CPT | Performed by: STUDENT IN AN ORGANIZED HEALTH CARE EDUCATION/TRAINING PROGRAM

## 2021-09-30 PROCEDURE — 85730 THROMBOPLASTIN TIME PARTIAL: CPT

## 2021-09-30 PROCEDURE — 84132 ASSAY OF SERUM POTASSIUM: CPT | Performed by: INTERNAL MEDICINE

## 2021-09-30 PROCEDURE — 83605 ASSAY OF LACTIC ACID: CPT | Performed by: STUDENT IN AN ORGANIZED HEALTH CARE EDUCATION/TRAINING PROGRAM

## 2021-09-30 PROCEDURE — 120N000002 HC R&B MED SURG/OB UMMC

## 2021-09-30 PROCEDURE — 87015 SPECIMEN INFECT AGNT CONCNTJ: CPT | Performed by: STUDENT IN AN ORGANIZED HEALTH CARE EDUCATION/TRAINING PROGRAM

## 2021-09-30 PROCEDURE — 87040 BLOOD CULTURE FOR BACTERIA: CPT | Performed by: STUDENT IN AN ORGANIZED HEALTH CARE EDUCATION/TRAINING PROGRAM

## 2021-09-30 PROCEDURE — 84132 ASSAY OF SERUM POTASSIUM: CPT | Performed by: STUDENT IN AN ORGANIZED HEALTH CARE EDUCATION/TRAINING PROGRAM

## 2021-09-30 PROCEDURE — 82565 ASSAY OF CREATININE: CPT

## 2021-09-30 PROCEDURE — 97535 SELF CARE MNGMENT TRAINING: CPT | Mod: GO

## 2021-09-30 PROCEDURE — 49083 ABD PARACENTESIS W/IMAGING: CPT | Performed by: INTERNAL MEDICINE

## 2021-09-30 PROCEDURE — 250N000013 HC RX MED GY IP 250 OP 250 PS 637: Performed by: STUDENT IN AN ORGANIZED HEALTH CARE EDUCATION/TRAINING PROGRAM

## 2021-09-30 PROCEDURE — 97165 OT EVAL LOW COMPLEX 30 MIN: CPT | Mod: GO

## 2021-09-30 PROCEDURE — 99254 IP/OBS CNSLTJ NEW/EST MOD 60: CPT | Performed by: NURSE PRACTITIONER

## 2021-09-30 PROCEDURE — 36415 COLL VENOUS BLD VENIPUNCTURE: CPT | Performed by: INTERNAL MEDICINE

## 2021-09-30 PROCEDURE — 250N000013 HC RX MED GY IP 250 OP 250 PS 637: Performed by: INTERNAL MEDICINE

## 2021-09-30 PROCEDURE — 85610 PROTHROMBIN TIME: CPT

## 2021-09-30 PROCEDURE — 89050 BODY FLUID CELL COUNT: CPT | Performed by: STUDENT IN AN ORGANIZED HEALTH CARE EDUCATION/TRAINING PROGRAM

## 2021-09-30 PROCEDURE — 89051 BODY FLUID CELL COUNT: CPT | Performed by: STUDENT IN AN ORGANIZED HEALTH CARE EDUCATION/TRAINING PROGRAM

## 2021-09-30 PROCEDURE — 87075 CULTR BACTERIA EXCEPT BLOOD: CPT | Performed by: STUDENT IN AN ORGANIZED HEALTH CARE EDUCATION/TRAINING PROGRAM

## 2021-09-30 PROCEDURE — 85027 COMPLETE CBC AUTOMATED: CPT

## 2021-09-30 PROCEDURE — 82945 GLUCOSE OTHER FLUID: CPT | Performed by: STUDENT IN AN ORGANIZED HEALTH CARE EDUCATION/TRAINING PROGRAM

## 2021-09-30 PROCEDURE — 83735 ASSAY OF MAGNESIUM: CPT

## 2021-09-30 PROCEDURE — 99233 SBSQ HOSP IP/OBS HIGH 50: CPT | Mod: 25 | Performed by: STUDENT IN AN ORGANIZED HEALTH CARE EDUCATION/TRAINING PROGRAM

## 2021-09-30 RX ORDER — POTASSIUM CHLORIDE 750 MG/1
40 TABLET, EXTENDED RELEASE ORAL ONCE
Status: COMPLETED | OUTPATIENT
Start: 2021-09-30 | End: 2021-09-30

## 2021-09-30 RX ORDER — LACTULOSE 10 G/15ML
20 SOLUTION ORAL DAILY
Status: DISCONTINUED | OUTPATIENT
Start: 2021-10-01 | End: 2021-10-01

## 2021-09-30 RX ORDER — POTASSIUM CHLORIDE 750 MG/1
20 TABLET, EXTENDED RELEASE ORAL ONCE
Status: COMPLETED | OUTPATIENT
Start: 2021-09-30 | End: 2021-09-30

## 2021-09-30 RX ADMIN — GABAPENTIN 100 MG: 100 CAPSULE ORAL at 09:27

## 2021-09-30 RX ADMIN — PRAZOSIN HYDROCHLORIDE 1 MG: 1 CAPSULE ORAL at 01:16

## 2021-09-30 RX ADMIN — THIAMINE HCL TAB 100 MG 100 MG: 100 TAB at 09:27

## 2021-09-30 RX ADMIN — THERA TABS 1 TABLET: TAB at 09:27

## 2021-09-30 RX ADMIN — DIPHENHYDRAMINE HYDROCHLORIDE 25 MG: 25 CAPSULE ORAL at 21:34

## 2021-09-30 RX ADMIN — RIFAXIMIN 550 MG: 550 TABLET ORAL at 09:26

## 2021-09-30 RX ADMIN — Medication 100 MCG: at 09:27

## 2021-09-30 RX ADMIN — CALCIUM CARBONATE 600 MG (1,500 MG)-VITAMIN D3 400 UNIT TABLET 1 TABLET: at 09:27

## 2021-09-30 RX ADMIN — PANTOPRAZOLE SODIUM 40 MG: 40 TABLET, DELAYED RELEASE ORAL at 09:27

## 2021-09-30 RX ADMIN — FOLIC ACID 1 MG: 1 TABLET ORAL at 09:27

## 2021-09-30 RX ADMIN — PRAZOSIN HYDROCHLORIDE 1 MG: 1 CAPSULE ORAL at 21:34

## 2021-09-30 RX ADMIN — RIFAXIMIN 550 MG: 550 TABLET ORAL at 20:36

## 2021-09-30 RX ADMIN — ESCITALOPRAM OXALATE 10 MG: 10 TABLET ORAL at 09:27

## 2021-09-30 RX ADMIN — GABAPENTIN 100 MG: 100 CAPSULE ORAL at 20:37

## 2021-09-30 RX ADMIN — POTASSIUM CHLORIDE 40 MEQ: 750 TABLET, EXTENDED RELEASE ORAL at 11:58

## 2021-09-30 RX ADMIN — POTASSIUM CHLORIDE 20 MEQ: 750 TABLET, EXTENDED RELEASE ORAL at 20:37

## 2021-09-30 RX ADMIN — GABAPENTIN 100 MG: 100 CAPSULE ORAL at 14:31

## 2021-09-30 ASSESSMENT — ACTIVITIES OF DAILY LIVING (ADL)
ADLS_ACUITY_SCORE: 8
PREVIOUS_RESPONSIBILITIES: MEAL PREP;HOUSEKEEPING;LAUNDRY;SHOPPING;MEDICATION MANAGEMENT;FINANCES;DRIVING
ADLS_ACUITY_SCORE: 9
ADLS_ACUITY_SCORE: 8
ADLS_ACUITY_SCORE: 9

## 2021-09-30 ASSESSMENT — MIFFLIN-ST. JEOR: SCORE: 1481.38

## 2021-09-30 NOTE — PROGRESS NOTES
Consult and Procedure Service - Procedure Note    Attending:  Dr. Gomez   Indication: Ascites  Risk Assessment: normal   Pre-procedure diagnosis: ascites  Post-procedure diagnosis: same  Procedure name: Diagnostic Paracentesis     The risks and benefits of the procedure were explained to Aliyah Angeles who expressed understanding and opted to proceed.  Consent was obtained and placed in the chart.  A time out was performed.  An area of ascites was located with ultrasound and marked in the Left lower quadrant; the area was prepped and draped in the usual sterile fashion.  8 ml of 1% lidocaine was instilled with a 22 gauge needle and ascites located under real-time guidance. Fluid was not obtained using a 1.5 inch needle and a 22 gauge 3.5 inch was used and fluid obtained on first pass. 10 ml of yellow colored fluid was removed and sent for analysis and the area dressed.     Patient tolerated the procedure well. Please contact the Consult and Procedure Service if any complications or concerns arise.     Cristiano Gomez MD on 9/30/2021 at 11:25 AM

## 2021-09-30 NOTE — PROGRESS NOTES
[facility]    Progress Note - Marjan 3 Service      Date of Admission:  9/29/2021    Changes today:   - Decrease lactulose to daily     Assessment & Plan           Aliyah Angeles is a 46 year old woman admitted on 9/29/2021. She has a history of alcohol use disorder, gastric bypass admitted for abdominal pain and 3 days of missing her medications in the setting of vomiting.     #Alcoholic hepatitis  #Alcohol use disorder  #Edema, ascites  Patient has been sober since 9/12 and has been at a treatment facility since her discharge 9/21. Drastic diet change triggered decompensation which was worsened by not being able to take her home meds due to vomiting. MELD 26 9/29. With new RUQ pain and acute decompensation, concern for infection although afebrile. U/S abdomen is without clot and no signs of infection. MELD 25.   - Hepatology consulted  - Diagnostic para today - follow up results   - Decrease lactulose to daily - may be causing her dehydration and feeling poorly  - cont rifaximin for HE ppx  - cont folate & B12 supplementation  - cont PTA pantoprazole  - CD consulted  - No plan to start steroids      #ANA LAURA   May be related to vomiting, lactulose.   - Hold off on diuresis, encourage PO intake     #Hyponatremia  #Hypokalemia  Likely 2/2 worsening cirrhosis in the setting of not taking meds as above in addition to gastric loses from vomiting.  - K & Mg replacement protocols  - BMP & Mg in am     #DM2  - hold PTA Metformin (500mg BID) - may have caused N/V  - sliding scale insulin while inpatient     #Anxiety and depression  - PTA escitalopram  - Psychology consult      Diet: 2 Gram Sodium Diet  Snacks/Supplements Adult: Ensure Enlive; Between Meals    DVT Prophylaxis: SCDs  Rice Catheter: Not present  Central Lines: None  Code Status: Full Code      Disposition Plan   Expected discharge: 10/03/2021   recommended to prior living arrangement once workup complete.     The  patient's care was discussed with the Attending Physician, Dr. Astorga.    Stefani Thompson MD  PGY-2 Internal Medicine ____________________________________________________________________    Interval History   NAEO. Pt with some abdominal pain. Eating okay, having stools increased due to lactulose.     Data reviewed today: I reviewed all medications, new labs and imaging results over the last 24 hours. I personally reviewed no images or EKG's today.    Physical Exam   Vital Signs: Temp: (!) 96.4  F (35.8  C) Temp src: Oral BP: 99/68 Pulse: 86   Resp: 20 SpO2: 99 % O2 Device: None (Room air)    Weight: 189 lbs 14.4 oz  General Appearance: Sitting up in bed in nad  Respiratory: CTAB without wheezes or crackles  Cardiovascular: regular rate  GI: soft, NT, obese, mild distension  Skin: jaundiced  Other: Alert and oriented x4

## 2021-09-30 NOTE — CONSULTS
Care Management Initial Consult    General Information  Assessment completed with: Patient, Care Team Member, chart review  Type of CM/SW Visit: Initial Assessment  Primary Care Provider verified and updated as needed: Yes   Readmission within the last 30 days: Yes   Return Category: Exacerbation of disease    Reason for Consult: discharge planning, substance use concerns  Advance Care Planning Reviewed: education/resources on health care directives provided          Communication Assessment  Patient's communication style: spoken language (English or Bilingual)    Hearing Difficulty or Deaf: no   Wear Glasses or Blind: no    Cognitive  Cognitive/Neuro/Behavioral: WDL                      Living Environment:   People in home: alone     Current living Arrangements: apartment      Able to return to prior arrangements: yes     Family/Social Support:  Care provided by: self  Provides care for: no one  Marital Status:   Description of Support System: Supportive - Father       Current Resources:   Patient receiving home care services: No  Community Resources: [facility]  Equipment currently used at home: none  Supplies currently used at home: None    Employment/Financial:  Employment Status: employed full-time     Financial Concerns: No concerns identified   Referral to Financial Counselor: No       Lifestyle & Psychosocial Needs:  Social Determinants of Health     Tobacco Use: High Risk     Smoking Tobacco Use: Current Every Day Smoker     Smokeless Tobacco Use: Never Used   Alcohol Use:      Frequency of Alcohol Consumption:      Average Number of Drinks:      Frequency of Binge Drinking:    Financial Resource Strain:      Difficulty of Paying Living Expenses:    Food Insecurity:      Worried About Running Out of Food in the Last Year:      Ran Out of Food in the Last Year:    Transportation Needs:      Lack of Transportation (Medical):      Lack of Transportation (Non-Medical):    Physical  Activity:      Days of Exercise per Week:      Minutes of Exercise per Session:    Stress:      Feeling of Stress :    Social Connections:      Frequency of Communication with Friends and Family:      Frequency of Social Gatherings with Friends and Family:      Attends Yarsani Services:      Active Member of Clubs or Organizations:      Attends Club or Organization Meetings:      Marital Status:    Intimate Partner Violence:      Fear of Current or Ex-Partner:      Emotionally Abused:      Physically Abused:      Sexually Abused:    Depression: At risk     PHQ-2 Score: 6   Housing Stability:      Unable to Pay for Housing in the Last Year:      Number of Places Lived in the Last Year:      Unstable Housing in the Last Year:        Mental Health Status:  Mental Health Status: No Current Concerns       Chemical Dependency Status:  Chemical Dependency Status: Current Concern  - Admitted from [facility] for alcohol dependence treatment         Values/Beliefs:  Spiritual, Cultural Beliefs, Yarsani Practices, Values that affect care: no               Additional Information:    Patient is a 46 year old female admitted on 9/29/2021. She has a history of alcohol use disorder, gastric bypass admitted for abdominal pain and 3 days of missing her medications in the setting of vomiting.    Care Management/Social Work Consult placed due to patient's Elevated Readmission Risk Score.  Patient recently admitted at the [facility] from 9/13/21-9/21/21.  Patient discharged to [facility] Inpatient [facility] Facility. Patient evaluated by PT/OT today and current discharge recommendations indicate need for TCU at time of discharge.    Patient consulted by Chemical Dependency , Tia Hernandez. Referrals being submitted to the following facilities as patient refuses to return to [facility]: [facility], [facility], [facility], and [place].      met with patient to assist  with referrals to the above facilities.  Patient expressed desire to discharge to a treatment facility that allows patients to smoke cigarettes.   also discussed current TCU recommendations and that patient may likely need a short rehabilitation stay prior to treatment; patient expressed understanding.    At this time, patient reports no further questions or needs.  provided name and contact number and encouraged patient to call if any additional needs arise.     HODA Hawk  7D/5C Hematology/Oncology Social Worker  Phone: [phone]  Pager: [phone]  [email or web address]

## 2021-09-30 NOTE — PROGRESS NOTES
09/30/21 5337   Quick Adds   Type of Visit Initial Occupational Therapy Evaluation   Living Environment   People in home significant other   Current Living Arrangements apartment   Home Accessibility no concerns   Transportation Anticipated family or friend will provide   Living Environment Comments At baseline, pt lives with SO in apt with no stairs present. Pt has a tub/shower combo in apartment. Prior to this admission, pt was living in treatment [place], however, not planning to return to that facility, as they cannot accommodate her dietary needs.    Self-Care   Usual Activity Tolerance good   Current Activity Tolerance moderate   Regular Exercise No   Equipment Currently Used at Home none   Activity/Exercise/Self-Care Comment Prior to this admission, pt IND with ambulation, but having difficulty with stairs. Pt reports her roommate was needing to assist with LB dressing and lifting legs when getting into bed. Prior to last admission, pt was IND with all ADLs at baseline.    Instrumental Activities of Daily Living (IADL)   Previous Responsibilities meal prep;housekeeping;laundry;shopping;medication management;finances;driving   IADL Comments Pt IND with all IADLs at baseline, but since living in treatment center, most IADLs are being completed by facility.    Disability/Function   Hearing Difficulty or Deaf no   Wear Glasses or Blind no   Concentrating, Remembering or Making Decisions Difficulty no   Difficulty Communicating no   Difficulty Eating/Swallowing no   Walking or Climbing Stairs Difficulty yes   Walking or Climbing Stairs other (see comments)  (Pt reports not able to do stairs currently, avoids them)   Mobility Management avoids stairs   Dressing/Bathing Difficulty yes   Dressing/Bathing dressing difficulty, assistance 1 person   Dressing/Bathing Management roommate assistance for LB dressing   Toileting issues no   Doing Errands Independently Difficulty (such as shopping) no   Fall history within  last six months yes   Number of times patient has fallen within last six months 1   Change in Functional Status Since Onset of Current Illness/Injury yes   General Information   Onset of Illness/Injury or Date of Surgery 09/29/21   Referring Physician Alissa Hameed MD   Patient/Family Therapy Goal Statement (OT) to return to PLOF, discharge to new treatment facility    Additional Occupational Profile Info/Pertinent History of Current Problem Aliyah Angeles is a 46 year old female admitted on 9/29/2021. She has a history of alcohol use disorder, gastric bypass admitted for abdominal pain and 3 days of missing her medications in the setting of vomiting.   Existing Precautions/Restrictions fall   Left Upper Extremity (Weight-bearing Status) full weight-bearing (FWB)   Right Upper Extremity (Weight-bearing Status) full weight-bearing (FWB)   Left Lower Extremity (Weight-bearing Status) full weight-bearing (FWB)   Right Lower Extremity (Weight-bearing Status) full weight-bearing (FWB)   General Observations and Info activity orders: up ad reina   Cognitive Status Examination   Orientation Status orientation to person, place and time   Affect/Mental Status (Cognitive) flat/blunted affect   Follows Commands follows two-step commands   Cognitive Status Comments Pt alert, oriented x4, flat affect, requires additional time for processing, will continue to monitor   Visual Perception   Visual Impairment/Limitations WNL;corrective lenses for reading   Sensory   Sensory Comments denies n/t   Pain Assessment   Patient Currently in Pain No   Integumentary/Edema   Integumentary/Edema other (describe)   Integumentary/Edema Comments Bilateral foot edema    Posture   Posture not impaired   Range of Motion Comprehensive   General Range of Motion bilateral upper extremity ROM WFL   Strength Comprehensive (MMT)   General Manual Muscle Testing (MMT) Assessment no strength deficits identified   Coordination   Upper Extremity Coordination  No deficits were identified   Bed Mobility   Bed Mobility supine-sit;sit-supine   Supine-Sit [place] (Bed Mobility) minimum assist (75% patient effort)   Sit-Supine [place] (Bed Mobility) maximum assist (25% patient effort)   Comment (Bed Mobility) A to lift BLE into bed   Clinical Impression   Criteria for Skilled Therapeutic Interventions Met (OT) yes   OT Diagnosis decreased IND with ADL/IADLs   OT Problem List-Impairments impacting ADL problems related to;activity tolerance impaired;cognition;pain   Assessment of Occupational Performance 3-5 Performance Deficits   Identified Performance Deficits dressing, bathing, home mgmt, higher level IADLs   Planned Therapy Interventions (OT) ADL retraining;IADL retraining;cognition;progressive activity/exercise;risk factor education   Clinical Decision Making Complexity (OT) low complexity   Therapy Frequency (OT) 5x/week   Predicted Duration of Therapy 1 week   Anticipated Equipment Needs Upon Discharge (OT) other (see comments)  (TBD)   Risk & Benefits of therapy have been explained evaluation/treatment results reviewed;care plan/treatment goals reviewed;risks/benefits reviewed;current/potential barriers reviewed;participants voiced agreement with care plan;participants included;patient   OT Discharge Planning    OT Discharge Recommendation (DC Rec) Transitional Care Facility   OT Rationale for DC Rec Pt currently below baseline level of function and requires A for majority of ADLs. Pt would benefit from continued skilled therapy to increase IND with I/ADLs prior to going to a treatment facility. Pt reports her roommate at treatment facility was assisting her with dressing and getting into bed prior to this admission. Pt needing to go do a different treatment facility upon d/c from hospital, as they were unable to accommodate her dietary needs. Unsure if anyone would be able to assist at treatment facility pt would like to go to.    OT Brief overview of current  status  Ax1 for functional transfers and ambulation   Total Evaluation Time (Minutes)   Total Evaluation Time (Minutes) 5

## 2021-09-30 NOTE — PLAN OF CARE
7320-7765: BPs slightly soft, 90s/50s. OVSS. C/o headache but declines tylenol.  prior to breakfast; did not require sliding scale insulin. Had a small nosebleed this morning that resolved after ~5 minutes when pressure was applied. One BM this morning; on scheduled lactulose. Paracentesis completed this AM. Continue with POC.

## 2021-09-30 NOTE — CONSULTS
9/30/2021      Spoke with pt via telephone for CD Consult. Pt's evaluation is still valid and does not require an update for referrals to be sent. Pt reports she does not want to return to [facility] due to they are unable to accommodate her dietary needs. Pt has also requested a facility without stairs. Email has been sent to unit  with ROBERT's for pt to sign. Referrals will be sent to the following inpatient CD treatment centers.         [place] Access Team for Referrals  Phone: [phone]  Fax: [phone]      [facility] - IP   Handic[facility] accessible  Phone: [phone]   Fax: [phone]    [place] - IP part of [facility] (men and women)  Phone: [phone]  Fax: [phone] for [place] (inpatient)    [facility]  Phone: [phone]  Fax: [phone]      [facility]  Phone: [phone]  Email: [email or web address]

## 2021-09-30 NOTE — PLAN OF CARE
[id number]    Nursing Focus: Admission    D: Arrived at 0600 from ED via Resource Nurse. Patient unaccompanied. Admitted for abdominal pain and N/V. Complains of pain in abdomen and bilateral lower extremities.    I: Admission process began.  Patient oriented to room, enviroment, call light.  MD notified of patient's arrival on unit.     A: BP soft, 80s/60s. Other vital signs stable, afebrile. Sepsis triggered, Lactic 1.0. Tele: NSR with multiple PVCs. Patient stable at this time. Complaining of pain in abdomen 4/10 intermittent cramping and bilateral legs 4/10 constant pressure, declined pain medications. Denied N/V, no SOB. SWAN present. ACHS blood sugars. Up with SBA due to edema in BLE. Edema +3 in BLE. UA to be collected.  Jaundice with scleral yellowing. On schedule lactulose, pt reported multiple stools before being admitted. Able to make needs known.     P: Implement plan of care when available. Continue to monitor patient. Nursing interventions as appropriate. Notify MD with changes in pt status.

## 2021-09-30 NOTE — CONSULTS
Hepatology Consultation    Aliyah Angeles   MRN# [id number]     Age: 46 year old YOB: 1975       Referring provider: Willy Astorga  Attending Hepatologist: Dr. Camille Hinkle  Consult requested for: alcohol hepatitis       Assessment and Recommendation:   Assessment:   Ms. 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       Recommendations:   1. Alcohol hepatitis  - t. Bili has elevated since last admission, Her last alcohol use was 9/12 and patients can have further evidence of hepatitis for 6 weeks after stopping.   - she has hepatomegaly on imaging, advised her this can take months to subside in order to determine level of fibrosis.   - Please involve CD counselor for options of other treatment programs due to food limitations. She reports she does not want to go back to this program.  - No plans to start steroids. She initially had a low Maddrey at 13, now increased to 43.   - She has an appetite. Encouraged her to continue with good oral intake. Ordered protein supplements while inpatient.     2. Ascites  - new ascites seen on US. Paracentesis if able to remove and send for diagnostic sample.     3. Hepatic encephalopathy  - decrease lactulose dose with goal of 2-3 BM's per day. Has also been on rifaximin  - she does not have clear cirrhosis and may not need lactulose/rifaximin. Mentation clear.     4. ANA LAURA  - mild dehydration with vomiting and lactulose. Now with taking in liquids.   - doubled creatinine. Limited use of diuretics.   - may consider     5. DM II  - has been on metformin for new diagnosis of DM. Now on sliding scale insulin. Consider discussion with PCP about alternative if continues to have GI upset.     Plan of care discussed with Dr. Camille Hinkle    Thank you for the opportunity  to be involved in Aliyah Angeles care. Please call with any questions or concerns.     MARIA ELENA Grey, CNP  Inpatient Hepatology [facility]  Text link               History of Present Illness:   Aliayh Angeles is a 46 year old female with a history of alcohol use disorder and alcohol hepatitis, last use 9/12/21. Her liver disease has been complicated by RYGB, anxiety, PTSD, fibromyalgia, alcohol pancreatitis, pancreatic head 9 mm cystic lesion, DM II who was admitted from treatment facility with complaints of vomiting x3 days, lower extremity edema and abdominal pain. She reports having at least 4 BM's per day after her discharge with taking Lactulose x2 daily. She notes she has been unable to obtain low sodium diet or diabetic diet while in the inpatient CD program. She denies any change in mentation. She did have x3 days of vomiting and denies hematemesis, melena or hematochezia.     She was started on metformin during her last hospitalization as she diagnosed with DM. She denies any cravings for alcohol, tremors. She was noted to have mild ascites on her US, none during prior hospital stay.              Past Medical History:     Past Medical History:   Diagnosis Date     Acne      ADHD (attention deficit hyperactivity disorder)      Arthralgia of temporomandibular joint 5/19/2016     Arthritis      Cervical high risk HPV (human papillomavirus) test positive 4/27/2018 4/27/18 NIL pap, + HR HPV (not 16 or 18). Plan: cotest in 1 yr.       Cobalamin deficiency 11/24/2014     Drug-seeking behavior 5/19/2016    Overview:  Per [facility] website, patient has filled with multiple controlled substances with multiple prescribers at multiple pharmacies      History of blood transfusion 01/01/1988     Insomnia 7/21/2013     Raynaud's phenomenon 5/25/2015     Vitamin D deficiency 1/16/2013              Past Surgical History:     Past Surgical History:   Procedure Laterality Date     ABDOMEN SURGERY  08/10/2010    Gastric  bypass      BIOPSY  1/1/94    HPV, multiple reoccurrances, partial  hysterectomy  2013     CHOLECYSTECTOMY  01/01/2010     COLONOSCOPY  01/01/2009     ENT SURGERY  01/01/1988    Tonsils     GI SURGERY  8/10/10    Fitz-en-Y     HYSTERECTOMY VAGINAL  04/05/2013    pathology of cervix benign.               Social History:     Social History     Tobacco Use     Smoking status: Current Every Day Smoker     Packs/day: 1.00     Years: 15.00     Pack years: 15.00     Types: Cigarettes     Start date: 1/1/1989     Smokeless tobacco: Never Used   Substance Use Topics     Alcohol use: Yes     Alcohol/week: 0.0 standard drinks     Comment: daily unknown amount              Family History:   The I have reviewed this patient's family history and updated it with pertinent information if needed.  Family History   Problem Relation Age of Onset     Prostate Cancer Father      Hypertension Father      Hyperlipidemia Father      Substance Abuse Father      Obesity Father      Diabetes Maternal Grandmother      Colon Cancer Maternal Grandmother      Other Cancer Maternal Grandmother         pancreatic cancer     Obesity Maternal Grandmother      Diabetes Paternal Grandmother      Obesity Paternal Grandmother      Diabetes Paternal Grandfather      Obesity Paternal Grandfather      Breast Cancer Sister      Breast Cancer Sister      Anxiety Disorder Sister      Anesthesia Reaction Sister      Depression Mother      Anesthesia Reaction Mother      Asthma Daughter      Coronary Artery Disease No family hx of      Hypertension No family hx of      Hyperlipidemia No family hx of      Cerebrovascular Disease No family hx of      Depression No family hx of      Anxiety Disorder No family hx of      Mental Illness No family hx of      Substance Abuse No family hx of      Anesthesia Reaction No family hx of      Asthma No family hx of      Osteoporosis No family hx of      Genetic Disorder No family hx of      Thyroid Disease No family hx of       "Obesity No family hx of      Unknown/Adopted No family hx of                   Immunizations:     Immunization History   Administered Date(s) Administered     Influenza Intranasal Vaccine 01/21/2013     TD (ADULT, 7+) 08/04/2005     Tdap (Adult) Unspecified Formulation 04/11/2013            Allergies:     Allergies   Allergen Reactions     Nsaids      Gastric bypass     Trazodone      In a \"hazy\" for the whole next day             Medications:   @  Medications Prior to Admission   Medication Sig Dispense Refill Last Dose     albuterol (PROAIR HFA/PROVENTIL HFA/VENTOLIN HFA) 108 (90 Base) MCG/ACT inhaler Inhale 2 puffs into the lungs every 4 hours as needed for shortness of breath / dyspnea or wheezing 8.5 g 1 Unknown at Unknown time     calcium carbonate-vitamin D (OS-STEFFANY) 600-400 MG-UNIT chewable tablet Take 1 chew tab by mouth daily 30 tablet 0 9/29/2021 at 0800     cyanocobalamin (CYANOCOBALAMIN) 1000 MCG/ML injection INJECT 1 ML (1,000 MCG) SUBCUTANEOUS EVERY 4 WEEKS.   Unknown at Unknown time     escitalopram (LEXAPRO) 10 MG tablet Take 1 tablet (10 mg) by mouth daily 30 tablet 0 9/29/2021 at 0800     folic acid (FOLVITE) 1 MG tablet Take 1 tablet (1 mg) by mouth daily 30 tablet 0 9/29/2021 at 0800     gabapentin (NEURONTIN) 100 MG capsule Take 1 capsule (100 mg) by mouth 3 times daily 90 capsule 0 9/29/2021 at 0800     lactulose (CEPHULAC) 20 GM packet Take 1 packet (20 g) by mouth 3 times daily 90 packet 0 9/29/2021 at Unknown time     metFORMIN (GLUCOPHAGE) 500 MG tablet Take 1 tablet (500 mg) by mouth 2 times daily (with meals) 60 tablet 0 9/29/2021 at 0800     multivitamin (CENTRUM) chewable tablet Take 1 tablet by mouth daily 30 tablet 0 9/29/2021 at 0800     pantoprazole (PROTONIX) 40 MG EC tablet Take 1 tablet (40 mg) by mouth every morning (before breakfast) 30 tablet 0 9/29/2021 at 2200     prazosin (MINIPRESS) 1 MG capsule Take 1 capsule (1 mg) by mouth every evening 30 capsule 0 9/29/2021 at Unknown " "time     rifaximin (XIFAXAN) 550 MG TABS tablet Take 1 tablet (550 mg) by mouth 2 times daily 60 tablet 0 9/29/2021 at 0800     thiamine (B-1) 100 MG tablet Take 1 tablet (100 mg) by mouth daily 30 tablet 0 9/29/2021 at 0800   @          Review of Systems:    ROS: 10 point ROS neg other than the symptoms noted above in the HPI.          Physical Exam:   Blood pressure 91/59, pulse 81, temperature (!) 96.3  F (35.7  C), temperature source Oral, resp. rate 20, height 1.626 m (5' 4\"), weight 86.1 kg (189 lb 14.4 oz), SpO2 97 %, not currently breastfeeding. Body mass index is 32.6 kg/m .    General: In no acute distress, mild facial muscle wasting  Neuro: AOx3, No asterixis  HEENT: PERRL trace scleral icterus, Nooral lesions  Lymph:  Nocervical lymphadenoapthy  CV: [id number]/[id number] murmurs, Skin warm and dry  Lungs: clear to auscultation Respirations even and nonlabored on room air  Abd: Mildly distended, tender with palpation. +BS  Extrem: +1-2 lower peripehral edema  Skin: mild jaundice  Psych: flat/pleasant         Data:     Lab Results   Component Value Date    WBC 9.8 09/30/2021    WBC 8.3 04/08/2020     Lab Results   Component Value Date    RBC 2.39 09/30/2021    RBC 4.18 04/08/2020     Lab Results   Component Value Date    HGB 8.8 09/30/2021    HGB 12.2 04/08/2020     Lab Results   Component Value Date    HCT 26.9 09/30/2021    HCT 35.9 04/08/2020     No components found for: MCT  Lab Results   Component Value Date     09/30/2021    MCV 86 04/08/2020     Lab Results   Component Value Date    MCH 36.8 09/30/2021    MCH 29.2 04/08/2020     Lab Results   Component Value Date    MCHC 32.7 09/30/2021    MCHC 34.0 04/08/2020     Lab Results   Component Value Date    RDW 16.4 09/30/2021    RDW 16.2 04/08/2020     Lab Results   Component Value Date     09/30/2021     04/08/2020       Last Basic Metabolic Panel:  Lab Results   Component Value Date     09/30/2021     04/10/2020      Lab Results "   Component Value Date    POTASSIUM 3.2 09/30/2021    POTASSIUM 4.6 04/10/2020     Lab Results   Component Value Date    CHLORIDE 106 09/30/2021    CHLORIDE 110 04/10/2020     Lab Results   Component Value Date    STEFFANY 7.8 09/30/2021    STEFFANY 8.8 04/10/2020     Lab Results   Component Value Date    CO2 19 09/30/2021    CO2 28 04/10/2020     Lab Results   Component Value Date    BUN 8 09/30/2021    BUN 10 04/10/2020     Lab Results   Component Value Date    CR 1.08 09/30/2021    CR 0.50 04/10/2020     Lab Results   Component Value Date     09/30/2021     09/30/2021    GLC 92 04/10/2020       Liver Function Studies -   Recent Labs   Lab Test 09/30/21  0612   PROTTOTAL 4.5*   ALBUMIN 1.2*   BILITOTAL 15.9*   ALKPHOS 220*   AST 98*   ALT 28       Lab Results   Component Value Date    INR 1.64 09/30/2021       MELD-Na score: 25 at 9/30/2021  6:12 AM  MELD score: 23 at 9/30/2021  6:12 AM  Calculated from:  Serum Creatinine: 1.08 mg/dL at 9/30/2021  6:12 AM  Serum Sodium: 134 mmol/L at 9/30/2021  6:12 AM  Total Bilirubin: 15.9 mg/dL at 9/30/2021  6:12 AM  INR(ratio): 1.64 at 9/30/2021  6:12 AM  Age: 46 years           Previous Endoscopy:     No prior endoscopy since 2011    IMAGING:    XR Chest Port 1 View    Narrative  XR CHEST PORT 1 VIEW9/29/2021 7:08 PM    INDICATION: Eval intrapulmonary process    COMPARISON:  9/13/2021.    FINDINGS: AP view of the chest. Low lung lines. Cardiomediastinal  silhouette is stable. Streaky right basilar opacities, likely  atelectasis. No other focal airspace opacities. No pleural effusion or  pneumothorax. Bones are stable. Trachea is midline.    Impression  IMPRESSION: Low lung volumes with streaky right basilar opacities,  likely atelectasis. No other focal cardiopulmonary findings.    I have personally reviewed the examination and initial interpretation  and I agree with the findings.    YUDELKA WISDOM MD      SYSTEM ID:  [id number]    Results for orders placed during the  hospital encounter of 09/29/21    US Abdomen Complete w Doppler Complete    Narrative  EXAMINATION: US ABDOMEN COMPLETE WITH DOPPLER COMPLETE 9/29/2021 7:59  PM    COMPARISON: 9/13/2021    HISTORY: Abdominal pain, nausea    TECHNIQUE: The abdomen was scanned in standard fashion with  specialized ultrasound transducer(s) using both gray-scale, color  Doppler, and spectral flow techniques.    Findings:  Liver: Enlarged hyperechoic liver.    Extrahepatic portal vein flow is antegrade at 33 cm/sec.  Right portal vein flow is antegrade, measuring 25 cm/sec.  Left portal vein flow is antegrade, measuring 25 cm/sec.    Flow in the hepatic artery is towards the liver and:  116 cm/sec peak systolic  0.7 resistive index.    The splenic vein is patent and flow is towards the liver.  The middle,  and right hepatic veins are patent with flow towards the IVC. The IVC  is patent with flow towards the heart.   The visualized aorta is not  dilated.    Gallbladder: Surgically absent    Bile Ducts: No biliary dilatation  The common bile duct measures 7 mm.    Pancreas: Visualized portions of the head and body of the pancreas are  unremarkable.    Kidneys: The right kidney measures 11.1 cm.    Spleen: The spleen measures 13.9 cm in length.    Fluid: Small volume ascites    Impression  Impression:  1.  Patent Doppler evaluation of liver.  2.  Stable hepatomegaly and intrinsic hepatic parenchymal disease.  3.  Small volume ascites.    I have personally reviewed the examination and initial interpretation  and I agree with the findings.    YUDELKA WISDOM MD      SYSTEM ID:  [id number]    Results for orders placed during the hospital encounter of 09/13/21    US Abdomen Complete    Narrative  EXAMINATION: US ABDOMEN COMPLETE,  9/13/2021 9:22 AM    COMPARISON: None.    HISTORY: Alcoholic hepatitis, distended abdomen    TECHNIQUE: The abdomen was scanned in standard fashion with  specialized ultrasound transducer(s) using both gray-scale and  limited  color Doppler techniques.    FINDINGS:  Limited sonographic visualization of the abdomen.    Liver: The liver demonstrates diffusely increased echogenicity,  measuring 24.5 cm. No evidence of a focal hepatic mass. The main  portal vein is patent with antegrade flow, measuring .    Gallbladder: Surgically absent.    Bile Ducts: Both the intra- and extrahepatic biliary system are of  normal caliber.  The common bile duct measures 6 mm in diameter.    Pancreas: Obscured    Kidneys: Both kidneys are of normal echotexture, without mass or  hydronephrosis.   The craniocaudal dimensions are: right- 10.1 cm,  left- 11.3 cm.    Spleen: The spleen is normal in size,  measuring 11.4 cm in sagittal  dimension.    Aorta and IVC: The visualized portions of the aorta and IVC are  unremarkable. The proximal aorta measures 2.0 cm in diameter. IVC not  visualized.    Fluid: No evidence of ascites or pleural effusions.    Impression  IMPRESSION:  1. Hepatomegaly with evidence of underlying chronic intrinsic hepatic  parenchymal disease including hepatic steatosis. No definite focal  hepatic mass.  2. Status post cholecystectomy.  3. Pancreas and IVC are not visualized with ultrasound.    I have personally reviewed the examination and initial interpretation  and I agree with the findings.    NOHEMY WELLINGTON MD      SYSTEM ID:  [id number]

## 2021-09-30 NOTE — PLAN OF CARE
"3662-1009    BP 98/61 (BP Location: Right arm)   Pulse 81   Temp (!) 96.3  F (35.7  C) (Oral)   Resp 20   Ht 1.626 m (5' 4\")   Wt 86.1 kg (189 lb 14.4 oz)   SpO2 97%   BMI 32.60 kg/m      BP soft 90s/60s, asymptomatic. Alert, withdrawn. K- 3.2, replaced, re-check at 1600. Denies pain, nausea and SOB. Paracentesis completed. Abdomen distended. 1200 BG - 115, no intervention per MAR. Lactulose now daily, goal 2-3 Bms/day. Reports 4-5 BMs since 0000. Blood cultures ordered per team. Needs UA. Psych and Chem Dep to see. Continue with POC.    "

## 2021-09-30 NOTE — PLAN OF CARE
Assumed care from 23:00 to 05:50  Blood pressure 99/66, pulse 79, temperature 98.3  F (36.8  C), temperature source Axillary, resp. rate 28, weight 89.9 kg (198 lb 4.8 oz), SpO2 98 %, not currently breastfeeding.    Pt is AVSS as recorded above. On Telemetry with HR in 70s to 80. Trend with NSR, No ectopy noted. He is voiding and mixing urine with stools Still has UA/UC pending. Pt is on Low sodium diet. Pt c/o abdominal discomfort/craps when having stool. She is A/O x 4 and makes needs known. LE edema + 2.Abdomen is large with + Bowel sounds. Continue to monitor labs and treat potassium level and continue with plan of care.      Problem: Adult Inpatient Plan of Care  Goal: Plan of Care Review  Outcome: No Change     Problem: Adult Inpatient Plan of Care  Goal: Optimal Comfort and Wellbeing  Outcome: No Change     Problem: Adult Inpatient Plan of Care  Goal: Absence of Hospital-Acquired Illness or Injury  Intervention: Identify and Manage Fall Risk  Recent Flowsheet Documentation  Taken 9/30/2021 0447 by Kady Valdivia, RN  Safety Promotion/Fall Prevention:    fall prevention program maintained    clutter free environment maintained    lighting adjusted    nonskid shoes/slippers when out of bed    safety round/check completed  Taken 9/30/2021 0100 by Kady Valdivia, RN  Safety Promotion/Fall Prevention:    fall prevention program maintained    clutter free environment maintained    lighting adjusted    nonskid shoes/slippers when out of bed    safety round/check completed

## 2021-09-30 NOTE — PROVIDER NOTIFICATION
"[facility] 3 provider notified at #[id number]: \"FYI - pt currently showing 1:4-5 PVCs on telemetry. Pt asymptomatic.\"  "

## 2021-09-30 NOTE — PROGRESS NOTES
Patient admitted to: Pt was transferred to station 7D at 05:50  Admitted from: ED  Arrived by: left ED in a hospital bed.  Reason for admission:  Patient accompanied by: x 2 staff  Belongings: with pt   Teaching:  Skin double check completed by:

## 2021-09-30 NOTE — PROGRESS NOTES
Pt A&O x 4, Low BP.  SBA to commode. C/o abd and leg edema pain 4/10. Given tylenol for pain x1. Emesis x1 soon after given evening meds. Pt stated that this has been happening all day. K+ 3.4. Given K+ with apple juice after lasix. Ate 50% of supper. Will continue to monitor.

## 2021-10-01 ENCOUNTER — APPOINTMENT (OUTPATIENT)
Dept: PHYSICAL THERAPY | Facility: CLINIC | Age: 46
End: 2021-10-01
Payer: COMMERCIAL

## 2021-10-01 LAB
ALBUMIN SERPL-MCNC: 1.1 G/DL (ref 3.4–5)
ALP SERPL-CCNC: 208 U/L (ref 40–150)
ALT SERPL W P-5'-P-CCNC: 28 U/L (ref 0–50)
ANION GAP SERPL CALCULATED.3IONS-SCNC: 7 MMOL/L (ref 3–14)
AST SERPL W P-5'-P-CCNC: 96 U/L (ref 0–45)
BASOPHILS # BLD MANUAL: 0 10E3/UL (ref 0–0.2)
BASOPHILS NFR BLD MANUAL: 0 %
BILIRUB SERPL-MCNC: 13.9 MG/DL (ref 0.2–1.3)
BUN SERPL-MCNC: 8 MG/DL (ref 7–30)
CALCIUM SERPL-MCNC: 7.8 MG/DL (ref 8.5–10.1)
CHLORIDE BLD-SCNC: 106 MMOL/L (ref 94–109)
CO2 SERPL-SCNC: 19 MMOL/L (ref 20–32)
CREAT SERPL-MCNC: 1.14 MG/DL (ref 0.52–1.04)
EOSINOPHIL # BLD MANUAL: 0.2 10E3/UL (ref 0–0.7)
EOSINOPHIL NFR BLD MANUAL: 2 %
ERYTHROCYTE [DISTWIDTH] IN BLOOD BY AUTOMATED COUNT: 16.1 % (ref 10–15)
GFR SERPL CREATININE-BSD FRML MDRD: 57 ML/MIN/1.73M2
GLUCOSE BLD-MCNC: 104 MG/DL (ref 70–99)
GLUCOSE BLDC GLUCOMTR-MCNC: 103 MG/DL (ref 70–99)
GLUCOSE BLDC GLUCOMTR-MCNC: 126 MG/DL (ref 70–99)
GLUCOSE BLDC GLUCOMTR-MCNC: 162 MG/DL (ref 70–99)
HCT VFR BLD AUTO: 28.7 % (ref 35–47)
HGB BLD-MCNC: 9.1 G/DL (ref 11.7–15.7)
INR PPP: 1.67 (ref 0.85–1.15)
LYMPHOCYTES # BLD MANUAL: 1.3 10E3/UL (ref 0.8–5.3)
LYMPHOCYTES NFR BLD MANUAL: 11 %
MAGNESIUM SERPL-MCNC: 2.2 MG/DL (ref 1.6–2.3)
MCH RBC QN AUTO: 36 PG (ref 26.5–33)
MCHC RBC AUTO-ENTMCNC: 31.7 G/DL (ref 31.5–36.5)
MCV RBC AUTO: 113 FL (ref 78–100)
METAMYELOCYTES # BLD MANUAL: 0.1 10E3/UL
METAMYELOCYTES NFR BLD MANUAL: 1 %
MONOCYTES # BLD MANUAL: 1.5 10E3/UL (ref 0–1.3)
MONOCYTES NFR BLD MANUAL: 13 %
MYELOCYTES # BLD MANUAL: 0.1 10E3/UL
MYELOCYTES NFR BLD MANUAL: 1 %
NEUTROPHILS # BLD MANUAL: 8.5 10E3/UL (ref 1.6–8.3)
NEUTROPHILS NFR BLD MANUAL: 72 %
NRBC # BLD AUTO: 0.1 10E3/UL
NRBC BLD MANUAL-RTO: 1 %
PHOSPHATE SERPL-MCNC: 2.3 MG/DL (ref 2.5–4.5)
PHOSPHATE SERPL-MCNC: 2.4 MG/DL (ref 2.5–4.5)
PLAT MORPH BLD: ABNORMAL
PLATELET # BLD AUTO: 183 10E3/UL (ref 150–450)
POTASSIUM BLD-SCNC: 4.2 MMOL/L (ref 3.4–5.3)
PROT SERPL-MCNC: 4.7 G/DL (ref 6.8–8.8)
RBC # BLD AUTO: 2.53 10E6/UL (ref 3.8–5.2)
RBC MORPH BLD: ABNORMAL
SODIUM SERPL-SCNC: 132 MMOL/L (ref 133–144)
WBC # BLD AUTO: 11.8 10E3/UL (ref 4–11)

## 2021-10-01 PROCEDURE — 250N000013 HC RX MED GY IP 250 OP 250 PS 637

## 2021-10-01 PROCEDURE — 99233 SBSQ HOSP IP/OBS HIGH 50: CPT | Mod: GC | Performed by: STUDENT IN AN ORGANIZED HEALTH CARE EDUCATION/TRAINING PROGRAM

## 2021-10-01 PROCEDURE — 999N000033 HC STATISTIC CHRONIC PULMONARY DISEASE SPECIALIST

## 2021-10-01 PROCEDURE — 36415 COLL VENOUS BLD VENIPUNCTURE: CPT | Performed by: STUDENT IN AN ORGANIZED HEALTH CARE EDUCATION/TRAINING PROGRAM

## 2021-10-01 PROCEDURE — 36415 COLL VENOUS BLD VENIPUNCTURE: CPT | Performed by: INTERNAL MEDICINE

## 2021-10-01 PROCEDURE — 97116 GAIT TRAINING THERAPY: CPT | Mod: GP

## 2021-10-01 PROCEDURE — 85027 COMPLETE CBC AUTOMATED: CPT | Performed by: STUDENT IN AN ORGANIZED HEALTH CARE EDUCATION/TRAINING PROGRAM

## 2021-10-01 PROCEDURE — 258N000003 HC RX IP 258 OP 636: Performed by: INTERNAL MEDICINE

## 2021-10-01 PROCEDURE — 99233 SBSQ HOSP IP/OBS HIGH 50: CPT | Performed by: NURSE PRACTITIONER

## 2021-10-01 PROCEDURE — 85610 PROTHROMBIN TIME: CPT | Performed by: STUDENT IN AN ORGANIZED HEALTH CARE EDUCATION/TRAINING PROGRAM

## 2021-10-01 PROCEDURE — 97530 THERAPEUTIC ACTIVITIES: CPT | Mod: GP

## 2021-10-01 PROCEDURE — 97161 PT EVAL LOW COMPLEX 20 MIN: CPT | Mod: GP

## 2021-10-01 PROCEDURE — 250N000013 HC RX MED GY IP 250 OP 250 PS 637: Performed by: STUDENT IN AN ORGANIZED HEALTH CARE EDUCATION/TRAINING PROGRAM

## 2021-10-01 PROCEDURE — 250N000011 HC RX IP 250 OP 636: Performed by: STUDENT IN AN ORGANIZED HEALTH CARE EDUCATION/TRAINING PROGRAM

## 2021-10-01 PROCEDURE — 250N000009 HC RX 250: Performed by: INTERNAL MEDICINE

## 2021-10-01 PROCEDURE — 120N000002 HC R&B MED SURG/OB UMMC

## 2021-10-01 PROCEDURE — P9047 ALBUMIN (HUMAN), 25%, 50ML: HCPCS | Performed by: STUDENT IN AN ORGANIZED HEALTH CARE EDUCATION/TRAINING PROGRAM

## 2021-10-01 PROCEDURE — 82040 ASSAY OF SERUM ALBUMIN: CPT | Performed by: STUDENT IN AN ORGANIZED HEALTH CARE EDUCATION/TRAINING PROGRAM

## 2021-10-01 PROCEDURE — 999N000128 HC STATISTIC PERIPHERAL IV START W/O US GUIDANCE

## 2021-10-01 PROCEDURE — 84100 ASSAY OF PHOSPHORUS: CPT | Performed by: INTERNAL MEDICINE

## 2021-10-01 PROCEDURE — 90791 PSYCH DIAGNOSTIC EVALUATION: CPT | Mod: HN | Performed by: STUDENT IN AN ORGANIZED HEALTH CARE EDUCATION/TRAINING PROGRAM

## 2021-10-01 PROCEDURE — 84100 ASSAY OF PHOSPHORUS: CPT | Performed by: STUDENT IN AN ORGANIZED HEALTH CARE EDUCATION/TRAINING PROGRAM

## 2021-10-01 PROCEDURE — 83735 ASSAY OF MAGNESIUM: CPT | Performed by: STUDENT IN AN ORGANIZED HEALTH CARE EDUCATION/TRAINING PROGRAM

## 2021-10-01 PROCEDURE — 99406 BEHAV CHNG SMOKING 3-10 MIN: CPT

## 2021-10-01 RX ORDER — ALBUMIN (HUMAN) 12.5 G/50ML
75 SOLUTION INTRAVENOUS 3 TIMES DAILY
Status: DISCONTINUED | OUTPATIENT
Start: 2021-10-01 | End: 2021-10-03

## 2021-10-01 RX ORDER — METOCLOPRAMIDE HYDROCHLORIDE 5 MG/ML
10 INJECTION INTRAMUSCULAR; INTRAVENOUS EVERY 6 HOURS
Status: DISCONTINUED | OUTPATIENT
Start: 2021-10-01 | End: 2021-10-02

## 2021-10-01 RX ADMIN — ESCITALOPRAM OXALATE 10 MG: 10 TABLET ORAL at 08:53

## 2021-10-01 RX ADMIN — THIAMINE HCL TAB 100 MG 100 MG: 100 TAB at 08:53

## 2021-10-01 RX ADMIN — GABAPENTIN 100 MG: 100 CAPSULE ORAL at 16:42

## 2021-10-01 RX ADMIN — ALBUMIN HUMAN 75 G: 0.25 SOLUTION INTRAVENOUS at 14:32

## 2021-10-01 RX ADMIN — DIPHENHYDRAMINE HYDROCHLORIDE 25 MG: 25 CAPSULE ORAL at 21:52

## 2021-10-01 RX ADMIN — RIFAXIMIN 550 MG: 550 TABLET ORAL at 20:14

## 2021-10-01 RX ADMIN — ALBUMIN HUMAN 75 G: 0.25 SOLUTION INTRAVENOUS at 21:02

## 2021-10-01 RX ADMIN — DIPHENHYDRAMINE HYDROCHLORIDE 25 MG: 25 CAPSULE ORAL at 03:13

## 2021-10-01 RX ADMIN — RIFAXIMIN 550 MG: 550 TABLET ORAL at 08:53

## 2021-10-01 RX ADMIN — PRAZOSIN HYDROCHLORIDE 1 MG: 1 CAPSULE ORAL at 20:15

## 2021-10-01 RX ADMIN — LACTULOSE 20 G: 20 SOLUTION ORAL at 08:52

## 2021-10-01 RX ADMIN — POTASSIUM & SODIUM PHOSPHATES POWDER PACK 280-160-250 MG 2 PACKET: 280-160-250 PACK at 20:15

## 2021-10-01 RX ADMIN — PANTOPRAZOLE SODIUM 40 MG: 40 TABLET, DELAYED RELEASE ORAL at 08:53

## 2021-10-01 RX ADMIN — METOCLOPRAMIDE HYDROCHLORIDE 10 MG: 5 INJECTION INTRAMUSCULAR; INTRAVENOUS at 18:06

## 2021-10-01 RX ADMIN — METOCLOPRAMIDE HYDROCHLORIDE 10 MG: 5 INJECTION INTRAMUSCULAR; INTRAVENOUS at 12:54

## 2021-10-01 RX ADMIN — METOCLOPRAMIDE HYDROCHLORIDE 10 MG: 5 INJECTION INTRAMUSCULAR; INTRAVENOUS at 23:50

## 2021-10-01 RX ADMIN — Medication 100 MCG: at 08:53

## 2021-10-01 RX ADMIN — GABAPENTIN 100 MG: 100 CAPSULE ORAL at 08:53

## 2021-10-01 RX ADMIN — FOLIC ACID 1 MG: 1 TABLET ORAL at 08:53

## 2021-10-01 RX ADMIN — GABAPENTIN 100 MG: 100 CAPSULE ORAL at 20:15

## 2021-10-01 ASSESSMENT — ACTIVITIES OF DAILY LIVING (ADL)
ADLS_ACUITY_SCORE: 9

## 2021-10-01 ASSESSMENT — MIFFLIN-ST. JEOR: SCORE: 1495.44

## 2021-10-01 NOTE — PLAN OF CARE
[id number]:   Pt BP continue to be on the Hypotensive side. OVSS, on  tele monitoring. Pt C/o pain abdomen but did not wanted any med for the pain; pt wanted heating pad. BG at 0200 was 103. Voided 500 ml output. Continue with POC.

## 2021-10-01 NOTE — PROGRESS NOTES
"[facility]  HEPATOLOGY PROGRESS NOTE  Aliyah Angeles [id number]       CC: fluid volume overload  SUBJECTIVE:  States mild improvement in her lower extremity edema. She continues to have urine output. Her stools have improved becoming more soft/loose. Denies melena or hematochezia. No change in mentation.     ROS:  A 10-point review of systems was negative.    OBJECTIVE:  VS: BP 99/66 (BP Location: Left arm)   Pulse 96   Temp 97.4  F (36.3  C) (Oral)   Resp 20   Ht 1.626 m (5' 4\")   Wt 87.5 kg (193 lb)   SpO2 96%   BMI 33.13 kg/m       General: In no acute distress, no facial muscle wasting  Neuro: AOx3, No asterixis  Lymph: No cervical lymphadenopathy  HEENT:  mild scleral icterus, Nooral lesions  CV:Skin warm and dry  Lungs: Respirations even and nonlabored on room air  Abd:  Mildly distended, mildly tender. +BS   Extrem: +2 lower peripheral edema   Skin: mild jaundice  Psych: Pleasant    MEDICATIONS:  Current Facility-Administered Medications   Medication     acetaminophen (TYLENOL) tablet 325 mg    Or     acetaminophen (TYLENOL) Suppository 325 mg     calcium carbonate 600 mg-vitamin D 400 units (CALTRATE) per tablet 1 tablet     cyanocobalamin (VITAMIN B-12) tablet 100 mcg     glucose gel 15-30 g    Or     dextrose 50 % injection 25-50 mL    Or     glucagon injection 1 mg     diphenhydrAMINE (BENADRYL) capsule 25 mg    Or     diphenhydrAMINE (BENADRYL) injection 25 mg     escitalopram (LEXAPRO) tablet 10 mg     folic acid (FOLVITE) tablet 1 mg     gabapentin (NEURONTIN) capsule 100 mg     insulin aspart (NovoLOG) injection (RAPID ACTING)     insulin aspart (NovoLOG) injection (RAPID ACTING)     lactulose (CHRONULAC) solution 20 g     lidocaine (LMX4) cream     lidocaine 1 % 0.1-1 mL     melatonin tablet 1 mg     multivitamin, therapeutic (THERA-VIT) tablet 1 tablet     pantoprazole (PROTONIX) EC tablet 40 mg     prazosin (MINIPRESS) capsule 1 mg     rifaximin (XIFAXAN) tablet 550 mg     sodium " chloride (PF) 0.9% PF flush 3 mL     sodium chloride (PF) 0.9% PF flush 3 mL     thiamine (B-1) tablet 100 mg       REVIEW OF LABORATORY, PATHOLOGY AND IMAGING RESULTS:  BMP  Recent Labs   Lab 10/01/21  0627 10/01/21  0214 09/30/21 2221 09/30/21  1800 09/30/21  1424 09/30/21  1223 09/30/21  0715 09/30/21  0612 09/29/21  2216 09/29/21  1851 09/29/21  1236 09/29/21  1236   *  --   --   --   --   --   --  134  --  132*  --  131*   POTASSIUM 4.2  --   --  3.5 3.7  --   --  3.2*   < > 3.4   < > 2.9*   CHLORIDE 106  --   --   --   --   --   --  106  --  104  --  101   STEFFANY 7.8*  --   --   --   --   --   --  7.8*  --  7.7*  --  8.1*   CO2 19*  --   --   --   --   --   --  19*  --  20  --  20   BUN 8  --   --   --   --   --   --  8  --  7  --  7   CR 1.14*  --   --   --   --   --   --  1.08*  --  0.97  --  0.98   * 103* 114*  --   --  115*   < > 100*   < > 88   < > 95    < > = values in this interval not displayed.     CBC  Recent Labs   Lab 10/01/21  0627 09/30/21  0612 09/30/21  0612 09/29/21  1236 09/29/21  1236   WBC 11.8*  --  9.8  --  10.5   RBC 2.53*  --  2.39*  --  2.82*   HGB 9.1*   < > 8.8*   < > 10.2*   HCT 28.7*  --  26.9*  --  31.9*   *  --  113*  --  113*   MCH 36.0*  --  36.8*  --  36.2*   MCHC 31.7  --  32.7  --  32.0   RDW 16.1*  --  16.4*  --  16.5*     --  159  --  164    < > = values in this interval not displayed.     INR  Recent Labs   Lab 10/01/21  0627 09/30/21  0612 09/29/21  1236   INR 1.67* 1.64* 1.52*     LFTs  Recent Labs   Lab 10/01/21  0627 09/30/21  0612 09/29/21  1236   ALKPHOS 208* 220* 232*   AST 96* 98* 107*   ALT 28 28 29   BILITOTAL 13.9* 15.9* 18.1*   PROTTOTAL 4.7* 4.5* 5.0*   ALBUMIN 1.1* 1.2* 1.3*      PANC  Recent Labs   Lab 09/29/21  1851   LIPASE 176      MELD-Na score: 26 at 10/1/2021  6:27 AM  MELD score: 23 at 10/1/2021  6:27 AM  Calculated from:  Serum Creatinine: 1.14 mg/dL at 10/1/2021  6:27 AM  Serum Sodium: 132 mmol/L at 10/1/2021  6:27  AM  Total Bilirubin: 13.9 mg/dL at 10/1/2021  6:27 AM  INR(ratio): 1.67 at 10/1/2021  6:27 AM  Age: 46 years      Imaging Results:  None current    IMPRESSION:  Aliyah Angeles is a 46 year old female with a history of alcohol use disorder and alcohol hepatitis complicated by RYGB, anxiety, PTSD, alcohol pancreatitis, pancreatic cyst, DM II who was admitted with complaints of vomiting, anasarca and abdominal pain.      RECOMMENDATIONS:  1. Alcohol hepatitis  - last alcohol use 9/12.   - US 9/29 with hepatomegaly  - encouraged protein intake and continued oral intake.     2. Ascites  - new onset ascites from portal hypertension. SAAG ~1.1, however serum albumin very low. No evidence of SBP  - diuretics currently on hold in setting of ANA LAURA    3. ANA LAURA  - likely multifactorial with recent vomiting, diarrhea.   - slight increase today. Baseline creatinine. ~0.5. Consider albumin challenge.   - holding diuretics. No evidence of UTI    4. Hepatic encephalopathy  - mentation clear.   - stools improving consistency. Low dose lactulose or wean off.     Patient was discussed with attending physician, Dr. Camille Hinkle.      Next follow up appointment: tbd    Thank you for the opportunity to be involved with  Aliyah Angeles care. Please call with any questions or concerns.    MARIA ELENA Grey, CNP  Inpatient Hepatology [facility]  Text Link

## 2021-10-01 NOTE — CONSULTS
"Smoking Cessation Consult   10/1/2021    Patient: Aliyah Angeles      :  1975                    MRN:[id number]      Hypokalemia [E87.6] @HX    History of Present Illness: Aliyah Angeles is a 46 year old woman with alcohol use disorder (last drink 21), alcoholic hepatitis, jaimie en y gastric bypass admitted for abdominal pain, lower extremity edema, and nausea.    Reason for Consult: Patient identified as current everday smoker per patient chart.     Patient declines any intervention at this time. Willing to discuss current tobacco use; has been smoking an average 1PPD for 15 years. Quit many times; longest abstinence lasted 10 years. Not quit kiya to quit now. Wants to quit driinking first. Not open to behavioral counseling developing smoking cessation plan; not interested in nicotine replacement therapy while in the hospital. Denies experiencing symptoms of withdrawal such as sleeplessness, headache, anxiety, irritability, and intense cravings to smoke. Offered and accepted the educational workbook, \"Quitting for Good with Treatment and Support.\"     Reginald Wagner, RRT, CTTS  Chronic Pulmonary Disease Specialist  Cardiopulmonary Services   Office: [phone]  Pager: [phone]     "

## 2021-10-01 NOTE — PLAN OF CARE
"/69 (BP Location: Left arm)   Pulse 77   Temp 97.9  F (36.6  C) (Oral)   Resp 18   Ht 1.626 m (5' 4\")   Wt 87.5 kg (193 lb)   SpO2 96%   BMI 33.13 kg/m        3231-3028  Neuro:  Pt. alert & Ox4  Behavior: Pt. calm & cooperative with cares.   Activity: Pt. up ad reina & walked in halls x1.  Vital: AVSS on RA.  Endocrine: ACHS. B at 1754 & no coverage given per order parameters.  LDAs: Left PIV saline locked.  Cardiac: Pt. on tele. & pt. in NSR  Respiratory: LS clear bilat.  GI/: Pt. voiding spontaneously.  No stools this shift.   Skin: WNL  Pain/Nausea: Pt. denies pain or nausea.   Diet: 2gm NA+  Labs/Imaging: Phos. 2.3 & being replaced with KPhos 15mmol IV but pt. only received about an hours worth d/t pt. c/o pain & refused rest of infusion. Writer paged on-call to see if oral replacement can be ordered instead.  UA/UC ordered & pt. aware to save urine.  Plan: Continue to follow POC & notify MD with change in status.      "

## 2021-10-01 NOTE — PLAN OF CARE
Soft BP. OVSS. Continues on tele monitoring. C/o pain to abdomen. Heading pad provided with some effect. Poor appetite with no food intake. HS . No correction given per order. K 3.5, replaced orally, recheck in AM. Voiding spontaneously, 200 ml urine output; urine is dark yellow. PRN benadryl given for itching. Continue with POC.

## 2021-10-01 NOTE — PLAN OF CARE
Afebrile, VSS on RA. Alert and oriented x4.  Denies pain or nausea.  Appetite poor.  Pt. On Tele.  Up independently to bathroom.  First of 3 bags of Albumin infusing the other 2 bags are in pt. Bin and all 3 ae scanned in.  Phos replacement still needed.  Continue plan of care.

## 2021-10-01 NOTE — PROGRESS NOTES
[facility]    Progress Note - [facility] 3 Service      Date of Admission:  9/29/2021    Changes today:   - Albumin challenge 75g x 3 days  - Trend liver tests   - Discontinue lactulose    Assessment & Plan           Aliyah Angeles is a 46 year old woman with alcohol use disorder (last drink 9/12/21), alcoholic hepatitis, jaimie en y gastric bypass admitted for abdominal pain, lower extremity edema, and nausea.      Alcoholic hepatitis  Alcohol use disorder  Lower extremity edema, small volume ascites  Patient has been sober since 9/12 and has been at a treatment facility since her discharge 9/21. Hepatomegaly without cirrhosis. Drastic diet change (unable to do low sodium diet at treatment facility), new metformin, increased stool output 2/2 lactulose triggered decompensation which was worsened by not being able to take her home meds due to vomiting. SAAG 1.1 which is not suggestive of portal hypertension causing ascites and diagnostic para without SBP. Suspect increased abdominal distension on 10/1 is from bloating and gas possibly due to lactulose given only small ascites on U/S.   - Hepatology consulted  - Discontinue lactulose   - IV reglan prn for nausea  - cont rifaximin for HE ppx  - cont folate & B12 supplementation  - cont PTA pantoprazole  - Chem dep consulted  - No plan to start steroids   - Protein supplements for hypoalbuminemia      Acute kidney injury    Baseline ~0.98, increased to 1.14 on 10/1/21. May be related to vomiting, lactulose.   - Albumin challenge 75g x3d per hepatology   - Hold off on diuresis, encourage PO intake     Hypervolemic hyponatremia  Hypokalemia, resolved  - Daily BMP      DM2  - hold PTA Metformin (500mg BID) - may have caused N/V  - sliding scale insulin while inpatient     Anxiety and depression  - PTA escitalopram  - Psychology consulted      Diet: 2 Gram Sodium Diet  Snacks/Supplements Adult: Ensure Enlive; Between Meals    DVT  Prophylaxis: SCDs  Rice Catheter: Not present  Central Lines: None  Code Status: Full Code      Disposition Plan   Expected discharge: 10/03/2021   recommended to prior living arrangement once workup complete.     The patient's care was discussed with the Attending Physician, Dr. Astorga.    Stefani Thompson MD  PGY-2 Internal Medicine ____________________________________________________________________    Interval History   NAEO. Pt with some abdominal pain. Eating okay, having stools increased due to lactulose. Feels bloated and continues to have lowe extremity edema. Working with PT.    Data reviewed today: I reviewed all medications, new labs and imaging results over the last 24 hours. I personally reviewed no images or EKG's today.    Physical Exam   Vital Signs: Temp: 97.3  F (36.3  C) Temp src: Oral BP: 94/54 Pulse: 87   Resp: 18 SpO2: 98 % O2 Device: None (Room air)    Weight: 189 lbs 14.4 oz  General Appearance: Sitting up in bed in nad  Respiratory: normal WOB on ambient air   Cardiovascular: regular rate  GI: NT, obese, increased distension, abdomen more firm   Skin: jaundiced, 2+ pitting edema  Other: Alert and oriented x4

## 2021-10-01 NOTE — PROGRESS NOTES
"   10/01/21 1026   Quick Adds   Type of Visit Initial PT Evaluation   Living Environment   Home Accessibility no concerns   Transportation Anticipated family or friend will provide   Living Environment Comments Patient living in treatment facility prior to current admission. Patient reports that living situation following treatment is unknown at this time. Goal to transfer to different treatment facility than where she was prior.    Self-Care   Usual Activity Tolerance good   Current Activity Tolerance fair   Regular Exercise No   Equipment Currently Used at Home none   Activity/Exercise/Self-Care Comment Patient previously independent with ADLs and ambulation, reports that lately ambulation has been more slow but not required AD. Difficulty with stairs.   Disability/Function   Hearing Difficulty or Deaf no   Wear Glasses or Blind no   Vision Management reading glasses   Concentrating, Remembering or Making Decisions Difficulty no   Difficulty Communicating no   Difficulty Eating/Swallowing no   Walking or Climbing Stairs Difficulty yes   Mobility Management avoids stairs   Dressing/Bathing Difficulty yes   Dressing/Bathing dressing difficulty, assistance 1 person   Dressing/Bathing Management prior to admission, roommate in treatment facility helping with LE dressing    Doing Errands Independently Difficulty (such as shopping) no   Fall history within last six months yes   Number of times patient has fallen within last six months 1   Change in Functional Status Since Onset of Current Illness/Injury yes   General Information   Onset of Illness/Injury or Date of Surgery 09/29/21   Referring Physician Alissa Hameed MD   Patient/Family Therapy Goals Statement (PT) To improve ambulation   Pertinent History of Current Problem (include personal factors and/or comorbidities that impact the POC) Per chart, \"Pt currently below baseline level of function and requires A for majority of ADLs. Pt would benefit from continued " "skilled therapy to increase IND with I/ADLs prior to going to a treatment facility. Pt reports her roommate at treatment facility was assisting her with dressing and getting into bed prior to this admission. Pt needing to go do a different treatment facility upon d/c from hospital, as they were unable to accommodate her dietary needs. Unsure if anyone would be able to assist at treatment facility pt would like to go to. \"   Existing Precautions/Restrictions fall   General Observations Activity: up ad reina   Cognition   Affect/Mental Status (Cognition) WFL   Follows Commands (Cognition) follows multi-step commands   Pain Assessment   Patient Currently in Pain Yes, see Vital Sign flowsheet   Integumentary/Edema   Integumentary/Edema Comments Pitting edema on B legs and feet, lymphedema eval scheduled   Posture    Posture Forward head position;Protracted shoulders   Range of Motion (ROM)   ROM Quick Adds ROM WFL   Strength   Manual Muscle Testing Quick Adds Strength WFL   Bed Mobility   Comment (Bed Mobility) Pt Mod I with all bed mobility, significant for pain with rolling and supine<>sit   Transfers   Transfer Safety Comments Patient SBA with sit<>stand   Gait/Stairs (Locomotion)   Comment (Gait/Stairs) Amb 300 feet with no AD and with SBA. Amb with lateral sway and decreased step length and WBOS   Balance   Balance Comments WBOS with ambulation, no LOB   Sensory Examination   Sensory Perception patient reports no sensory changes   Coordination   Coordination no deficits were identified   Muscle Tone   Muscle Tone no deficits were identified   Clinical Impression   Criteria for Skilled Therapeutic Intervention yes, treatment indicated   PT Diagnosis (PT) Decreased activity tolerance and impaired ambulation   Influenced by the following impairments gait dysfunction, balance impairment, aerobic deconditioning   Functional limitations due to impairments ambulation, stairs   Clinical Presentation Stable/Uncomplicated "   Clinical Presentation Rationale Clinical judgment   Clinical Decision Making (Complexity) low complexity   Therapy Frequency (PT) 4x/week   Predicted Duration of Therapy Intervention (days/wks) 1 week   Planned Therapy Interventions (PT) balance training;home exercise program;patient/family education;strengthening;gait training;stair training   Anticipated Equipment Needs at Discharge (PT)   (none)   Risk & Benefits of therapy have been explained evaluation/treatment results reviewed;care plan/treatment goals reviewed;risks/benefits reviewed;current/potential barriers reviewed;patient   PT Discharge Planning    PT Discharge Recommendation (DC Rec) home with assist;Transitional Care Facility   PT Rationale for DC Rec Patient displays ongoing gait impairments due to swelling with decreased activity tolerance but otherwise ambulates safely without AD. Patient plans to discharge to treatment facility that is currently not established. Patient ability to safely discharge right to treatment facility dependent on activity needs of treatment facility. Patient would benefit from ongoing skilled therapy to progress functional endurance.    PT Brief overview of current status  RN: encourage amb with SBA 3-4x/day   Total Evaluation Time   Total Evaluation Time (Minutes) 5
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
## 2021-10-01 NOTE — PROGRESS NOTES
10/1/21      Referral has been sent to [facility] for review.     Received a VM from [facility] informing me they are not handicap accessible so will be unable to review pt for inpatient CD treatment.     Received a VM from [facility]. Pt is in the review process.     KAMLESH Rodriguez  Phone: [phone]  Email: [email or web address]

## 2021-10-02 ENCOUNTER — HEALTH MAINTENANCE LETTER (OUTPATIENT)
Age: 46
End: 2021-10-02

## 2021-10-02 LAB
ALBUMIN SERPL-MCNC: 3 G/DL (ref 3.4–5)
ALBUMIN UR-MCNC: NEGATIVE MG/DL
ALP SERPL-CCNC: 135 U/L (ref 40–150)
ALT SERPL W P-5'-P-CCNC: 22 U/L (ref 0–50)
ANION GAP SERPL CALCULATED.3IONS-SCNC: 9 MMOL/L (ref 3–14)
APPEARANCE UR: ABNORMAL
AST SERPL W P-5'-P-CCNC: 62 U/L (ref 0–45)
BACTERIA #/AREA URNS HPF: ABNORMAL /HPF
BASOPHILS # BLD MANUAL: 0 10E3/UL (ref 0–0.2)
BASOPHILS NFR BLD MANUAL: 0 %
BILIRUB SERPL-MCNC: 13 MG/DL (ref 0.2–1.3)
BILIRUB UR QL STRIP: ABNORMAL
BUN SERPL-MCNC: 8 MG/DL (ref 7–30)
CALCIUM SERPL-MCNC: 8.7 MG/DL (ref 8.5–10.1)
CHLORIDE BLD-SCNC: 104 MMOL/L (ref 94–109)
CO2 SERPL-SCNC: 19 MMOL/L (ref 20–32)
COLOR UR AUTO: ABNORMAL
CREAT SERPL-MCNC: 1.01 MG/DL (ref 0.52–1.04)
EOSINOPHIL # BLD MANUAL: 0.2 10E3/UL (ref 0–0.7)
EOSINOPHIL NFR BLD MANUAL: 2 %
ERYTHROCYTE [DISTWIDTH] IN BLOOD BY AUTOMATED COUNT: 15.9 % (ref 10–15)
ERYTHROCYTE [DISTWIDTH] IN BLOOD BY AUTOMATED COUNT: 16 % (ref 10–15)
FOLATE SERPL-MCNC: 16.1 NG/ML
GFR SERPL CREATININE-BSD FRML MDRD: 66 ML/MIN/1.73M2
GLUCOSE BLD-MCNC: 108 MG/DL (ref 70–99)
GLUCOSE BLDC GLUCOMTR-MCNC: 117 MG/DL (ref 70–99)
GLUCOSE BLDC GLUCOMTR-MCNC: 120 MG/DL (ref 70–99)
GLUCOSE BLDC GLUCOMTR-MCNC: 132 MG/DL (ref 70–99)
GLUCOSE BLDC GLUCOMTR-MCNC: 134 MG/DL (ref 70–99)
GLUCOSE UR STRIP-MCNC: NEGATIVE MG/DL
HCT VFR BLD AUTO: 23.5 % (ref 35–47)
HCT VFR BLD AUTO: 23.5 % (ref 35–47)
HGB BLD-MCNC: 7.3 G/DL (ref 11.7–15.7)
HGB BLD-MCNC: 7.4 G/DL (ref 11.7–15.7)
HGB UR QL STRIP: NEGATIVE
INR PPP: 1.84 (ref 0.85–1.15)
KETONES UR STRIP-MCNC: NEGATIVE MG/DL
LEUKOCYTE ESTERASE UR QL STRIP: NEGATIVE
LYMPHOCYTES # BLD MANUAL: 1.9 10E3/UL (ref 0.8–5.3)
LYMPHOCYTES NFR BLD MANUAL: 16 %
MAGNESIUM SERPL-MCNC: 2 MG/DL (ref 1.6–2.3)
MCH RBC QN AUTO: 35.8 PG (ref 26.5–33)
MCH RBC QN AUTO: 36.1 PG (ref 26.5–33)
MCHC RBC AUTO-ENTMCNC: 31.1 G/DL (ref 31.5–36.5)
MCHC RBC AUTO-ENTMCNC: 31.5 G/DL (ref 31.5–36.5)
MCV RBC AUTO: 115 FL (ref 78–100)
MCV RBC AUTO: 115 FL (ref 78–100)
MONOCYTES # BLD MANUAL: 0.6 10E3/UL (ref 0–1.3)
MONOCYTES NFR BLD MANUAL: 5 %
MUCOUS THREADS #/AREA URNS LPF: PRESENT /LPF
NEUTROPHILS # BLD MANUAL: 9.2 10E3/UL (ref 1.6–8.3)
NEUTROPHILS NFR BLD MANUAL: 77 %
NITRATE UR QL: POSITIVE
PH UR STRIP: 5.5 [PH] (ref 5–7)
PHOSPHATE SERPL-MCNC: 2 MG/DL (ref 2.5–4.5)
PLAT MORPH BLD: ABNORMAL
PLATELET # BLD AUTO: 166 10E3/UL (ref 150–450)
PLATELET # BLD AUTO: 177 10E3/UL (ref 150–450)
POTASSIUM BLD-SCNC: 3.7 MMOL/L (ref 3.4–5.3)
PROT SERPL-MCNC: 5.6 G/DL (ref 6.8–8.8)
RBC # BLD AUTO: 2.04 10E6/UL (ref 3.8–5.2)
RBC # BLD AUTO: 2.05 10E6/UL (ref 3.8–5.2)
RBC MORPH BLD: ABNORMAL
RBC URINE: 1 /HPF
RETICS # AUTO: 0.09 10E6/UL (ref 0.03–0.1)
RETICS # AUTO: 0.09 10E6/UL (ref 0.03–0.1)
RETICS/RBC NFR AUTO: 4.5 % (ref 0.5–2)
RETICS/RBC NFR AUTO: 4.6 % (ref 0.5–2)
SODIUM SERPL-SCNC: 132 MMOL/L (ref 133–144)
SP GR UR STRIP: 1.01 (ref 1–1.03)
SQUAMOUS EPITHELIAL: 2 /HPF
TRANSITIONAL EPI: <1 /HPF
UROBILINOGEN UR STRIP-MCNC: NORMAL MG/DL
VIT B12 SERPL-MCNC: 1318 PG/ML (ref 193–986)
WBC # BLD AUTO: 11.5 10E3/UL (ref 4–11)
WBC # BLD AUTO: 11.9 10E3/UL (ref 4–11)
WBC URINE: 1 /HPF

## 2021-10-02 PROCEDURE — 99233 SBSQ HOSP IP/OBS HIGH 50: CPT | Mod: GC | Performed by: STUDENT IN AN ORGANIZED HEALTH CARE EDUCATION/TRAINING PROGRAM

## 2021-10-02 PROCEDURE — 250N000011 HC RX IP 250 OP 636: Performed by: STUDENT IN AN ORGANIZED HEALTH CARE EDUCATION/TRAINING PROGRAM

## 2021-10-02 PROCEDURE — 36415 COLL VENOUS BLD VENIPUNCTURE: CPT | Performed by: STUDENT IN AN ORGANIZED HEALTH CARE EDUCATION/TRAINING PROGRAM

## 2021-10-02 PROCEDURE — 250N000009 HC RX 250: Performed by: INTERNAL MEDICINE

## 2021-10-02 PROCEDURE — 85027 COMPLETE CBC AUTOMATED: CPT | Performed by: STUDENT IN AN ORGANIZED HEALTH CARE EDUCATION/TRAINING PROGRAM

## 2021-10-02 PROCEDURE — 85610 PROTHROMBIN TIME: CPT | Performed by: STUDENT IN AN ORGANIZED HEALTH CARE EDUCATION/TRAINING PROGRAM

## 2021-10-02 PROCEDURE — 120N000002 HC R&B MED SURG/OB UMMC

## 2021-10-02 PROCEDURE — 82607 VITAMIN B-12: CPT | Performed by: STUDENT IN AN ORGANIZED HEALTH CARE EDUCATION/TRAINING PROGRAM

## 2021-10-02 PROCEDURE — 85014 HEMATOCRIT: CPT | Performed by: STUDENT IN AN ORGANIZED HEALTH CARE EDUCATION/TRAINING PROGRAM

## 2021-10-02 PROCEDURE — 80053 COMPREHEN METABOLIC PANEL: CPT | Performed by: STUDENT IN AN ORGANIZED HEALTH CARE EDUCATION/TRAINING PROGRAM

## 2021-10-02 PROCEDURE — 250N000013 HC RX MED GY IP 250 OP 250 PS 637: Performed by: STUDENT IN AN ORGANIZED HEALTH CARE EDUCATION/TRAINING PROGRAM

## 2021-10-02 PROCEDURE — 258N000003 HC RX IP 258 OP 636: Performed by: INTERNAL MEDICINE

## 2021-10-02 PROCEDURE — 82746 ASSAY OF FOLIC ACID SERUM: CPT | Performed by: STUDENT IN AN ORGANIZED HEALTH CARE EDUCATION/TRAINING PROGRAM

## 2021-10-02 PROCEDURE — 81001 URINALYSIS AUTO W/SCOPE: CPT | Performed by: STUDENT IN AN ORGANIZED HEALTH CARE EDUCATION/TRAINING PROGRAM

## 2021-10-02 PROCEDURE — P9047 ALBUMIN (HUMAN), 25%, 50ML: HCPCS | Performed by: STUDENT IN AN ORGANIZED HEALTH CARE EDUCATION/TRAINING PROGRAM

## 2021-10-02 PROCEDURE — 250N000013 HC RX MED GY IP 250 OP 250 PS 637

## 2021-10-02 PROCEDURE — 84100 ASSAY OF PHOSPHORUS: CPT | Performed by: STUDENT IN AN ORGANIZED HEALTH CARE EDUCATION/TRAINING PROGRAM

## 2021-10-02 PROCEDURE — 83735 ASSAY OF MAGNESIUM: CPT | Performed by: STUDENT IN AN ORGANIZED HEALTH CARE EDUCATION/TRAINING PROGRAM

## 2021-10-02 PROCEDURE — 85045 AUTOMATED RETICULOCYTE COUNT: CPT | Performed by: STUDENT IN AN ORGANIZED HEALTH CARE EDUCATION/TRAINING PROGRAM

## 2021-10-02 PROCEDURE — 87086 URINE CULTURE/COLONY COUNT: CPT | Performed by: STUDENT IN AN ORGANIZED HEALTH CARE EDUCATION/TRAINING PROGRAM

## 2021-10-02 RX ORDER — OXYMETAZOLINE HYDROCHLORIDE 0.05 G/100ML
2 SPRAY NASAL 2 TIMES DAILY PRN
Status: DISCONTINUED | OUTPATIENT
Start: 2021-10-02 | End: 2021-10-03

## 2021-10-02 RX ORDER — DIPHENHYDRAMINE HCL 25 MG
25 CAPSULE ORAL EVERY 6 HOURS PRN
Status: DISCONTINUED | OUTPATIENT
Start: 2021-10-02 | End: 2021-10-02

## 2021-10-02 RX ORDER — POTASSIUM CHLORIDE 750 MG/1
20 TABLET, EXTENDED RELEASE ORAL ONCE
Status: COMPLETED | OUTPATIENT
Start: 2021-10-02 | End: 2021-10-02

## 2021-10-02 RX ORDER — DIPHENHYDRAMINE HCL 25 MG
25 CAPSULE ORAL
Status: DISCONTINUED | OUTPATIENT
Start: 2021-10-02 | End: 2021-10-02

## 2021-10-02 RX ORDER — METOCLOPRAMIDE 5 MG/1
5 TABLET ORAL
Status: DISCONTINUED | OUTPATIENT
Start: 2021-10-02 | End: 2021-10-06

## 2021-10-02 RX ORDER — CEFTRIAXONE 1 G/1
1 INJECTION, POWDER, FOR SOLUTION INTRAMUSCULAR; INTRAVENOUS EVERY 24 HOURS
Status: DISCONTINUED | OUTPATIENT
Start: 2021-10-02 | End: 2021-10-03

## 2021-10-02 RX ORDER — POTASSIUM CHLORIDE 1.5 G/1.58G
20 POWDER, FOR SOLUTION ORAL ONCE
Status: DISCONTINUED | OUTPATIENT
Start: 2021-10-02 | End: 2021-10-02

## 2021-10-02 RX ORDER — LANOLIN ALCOHOL/MO/W.PET/CERES
3 CREAM (GRAM) TOPICAL DAILY PRN
Status: DISCONTINUED | OUTPATIENT
Start: 2021-10-02 | End: 2021-11-05 | Stop reason: HOSPADM

## 2021-10-02 RX ORDER — MAGNESIUM SULFATE HEPTAHYDRATE 40 MG/ML
2 INJECTION, SOLUTION INTRAVENOUS ONCE
Status: COMPLETED | OUTPATIENT
Start: 2021-10-02 | End: 2021-10-02

## 2021-10-02 RX ORDER — DIPHENHYDRAMINE HCL 25 MG
25 CAPSULE ORAL
Status: COMPLETED | OUTPATIENT
Start: 2021-10-02 | End: 2021-10-03

## 2021-10-02 RX ADMIN — ALBUMIN HUMAN 75 G: 0.25 SOLUTION INTRAVENOUS at 16:09

## 2021-10-02 RX ADMIN — THIAMINE HCL TAB 100 MG 100 MG: 100 TAB at 09:49

## 2021-10-02 RX ADMIN — METOCLOPRAMIDE 5 MG: 5 TABLET ORAL at 17:28

## 2021-10-02 RX ADMIN — FOLIC ACID 1 MG: 1 TABLET ORAL at 09:49

## 2021-10-02 RX ADMIN — Medication 1 MG: at 19:35

## 2021-10-02 RX ADMIN — METOCLOPRAMIDE HYDROCHLORIDE 10 MG: 5 INJECTION INTRAMUSCULAR; INTRAVENOUS at 09:50

## 2021-10-02 RX ADMIN — METOCLOPRAMIDE 5 MG: 5 TABLET ORAL at 13:26

## 2021-10-02 RX ADMIN — POTASSIUM PHOSPHATE, MONOBASIC AND POTASSIUM PHOSPHATE, DIBASIC 15 MMOL: 224; 236 INJECTION, SOLUTION INTRAVENOUS at 14:45

## 2021-10-02 RX ADMIN — POTASSIUM CHLORIDE 20 MEQ: 750 TABLET, EXTENDED RELEASE ORAL at 11:24

## 2021-10-02 RX ADMIN — ALBUMIN HUMAN 75 G: 0.25 SOLUTION INTRAVENOUS at 22:13

## 2021-10-02 RX ADMIN — MAGNESIUM SULFATE IN WATER 2 G: 40 INJECTION, SOLUTION INTRAVENOUS at 19:35

## 2021-10-02 RX ADMIN — DIPHENHYDRAMINE HYDROCHLORIDE 25 MG: 25 CAPSULE ORAL at 19:40

## 2021-10-02 RX ADMIN — RIFAXIMIN 550 MG: 550 TABLET ORAL at 19:35

## 2021-10-02 RX ADMIN — GABAPENTIN 100 MG: 100 CAPSULE ORAL at 19:35

## 2021-10-02 RX ADMIN — ALBUMIN HUMAN 75 G: 0.25 SOLUTION INTRAVENOUS at 10:49

## 2021-10-02 RX ADMIN — CEFTRIAXONE SODIUM 1 G: 1 INJECTION, POWDER, FOR SOLUTION INTRAMUSCULAR; INTRAVENOUS at 12:33

## 2021-10-02 RX ADMIN — RIFAXIMIN 550 MG: 550 TABLET ORAL at 09:49

## 2021-10-02 RX ADMIN — PRAZOSIN HYDROCHLORIDE 1 MG: 1 CAPSULE ORAL at 19:35

## 2021-10-02 RX ADMIN — ESCITALOPRAM OXALATE 10 MG: 10 TABLET ORAL at 09:50

## 2021-10-02 RX ADMIN — PANTOPRAZOLE SODIUM 40 MG: 40 TABLET, DELAYED RELEASE ORAL at 09:50

## 2021-10-02 RX ADMIN — Medication 100 MCG: at 09:50

## 2021-10-02 RX ADMIN — GABAPENTIN 100 MG: 100 CAPSULE ORAL at 09:49

## 2021-10-02 ASSESSMENT — ACTIVITIES OF DAILY LIVING (ADL)
ADLS_ACUITY_SCORE: 10
ADLS_ACUITY_SCORE: 10
ADLS_ACUITY_SCORE: 9

## 2021-10-02 ASSESSMENT — MIFFLIN-ST. JEOR: SCORE: 1502

## 2021-10-02 NOTE — PLAN OF CARE
Phosphorus restarted and potassium and magnesium replaced. Denied pain. She states her abdomen feels full.She had 2 lose stools. She had a bloody nose that resolved with pressure. She has a possible UTI so she was given Ceftriaxone.Up with standby assistance in the halls and is independent in the room. She had the dry heave after lunch which resolved on their own.

## 2021-10-02 NOTE — PROGRESS NOTES
[facility]    Progress Note - Marbenja 3 Service      Date of Admission:  9/29/2021    Changes today:  - Albumin challenge 75g x 3 days (day 2 today)  - Trend liver tests (downtrended today)  - B12 & folate level (macrocytic anemia)  - Ceftriaxone for UTI  - cont encouraging PO intake (maverick protein)    Assessment & Plan           Aliyah Angeles is a 46 year old woman with alcohol use disorder (last drink 9/12/21), alcoholic hepatitis, jaimie en y gastric bypass admitted for abdominal pain, lower extremity edema, and nausea.     Anemia, macrocytic  Likely 2/2 to B12 or folate deficiency due to long history of alcohol use disorder. Also started albumin challenge 10/1, so may be dilutional, however anemia is out of proportion to decrease in other cells lines. No obvious evidence of bleeding (mild self-resolving nose bleed today, some streaks of blood with wiping after BM but no hematochezia or melena)  - B12 & folate level  - repeat hgb this afternoon    Alcoholic hepatitis  Alcohol use disorder  Lower extremity edema, small volume ascites  Patient has been sober since 9/12 and has been at a treatment facility since her discharge 9/21. Hepatomegaly without cirrhosis. Drastic diet change (unable to do low sodium diet at treatment facility), new metformin, increased stool output 2/2 lactulose triggered decompensation which was worsened by not being able to take her home meds due to vomiting. SAAG 1.1 which is not suggestive of portal hypertension causing ascites and diagnostic para without SBP. Suspect increased abdominal distension on 10/1 is from bloating and gas possibly due to lactulose given only small ascites on U/S. Discontinued lactulose 10/1.  - Hepatology consulted  - IV reglan prn for nausea  - cont rifaximin for HE ppx  - cont folate & B12 supplementation  - cont PTA pantoprazole  - Chem dep consulted  - No plan to start steroids  - Protein supplements for  hypoalbuminemia     Acute kidney injury    Baseline ~0.98, increased to 1.14 on 10/1/21. May be related to vomiting, lactulose.  - Albumin challenge 75g x3d per hepatology (10/1-10/3)  - Hold off on diuresis, encourage PO intake     Hypervolemic hyponatremia  Hypokalemia, resolved  - Daily BMP     DM2  - hold PTA Metformin (500mg BID) - may have caused N/V  - sliding scale insulin while inpatient     Anxiety and depression  - PTA escitalopram  - Psychology consulted     Diet: 2 Gram Sodium Diet  Snacks/Supplements Adult: Ensure Enlive; Between Meals    DVT Prophylaxis: SCDs  Rice Catheter: Not present  Central Lines: None  Code Status: Full Code      Disposition Plan   Expected discharge: 10/03/2021   recommended to prior living arrangement once workup complete.     The patient's care was discussed with the Attending Physician, Dr. Astorga.    Alissa Hameed MD  PGY-2 Internal Medicine ____________________________________________________________________    Interval History   NAEO. Pt with some continued abdominal pain intermittently but overall reports its improving since admission. States she had a stool this morning which was soft and not watery. States she had a small amount of blood after she wiped but no blood in her actual stool. No black or tarry stool.    Data reviewed today: I reviewed all medications, new labs and imaging results over the last 24 hours. I personally reviewed no images or EKG's today.    Physical Exam   Vital Signs: Temp: 98.7  F (37.1  C) Temp src: Oral BP: 110/73 Pulse: 98   Resp: 22 SpO2: 92 % O2 Device: None (Room air)    Weight: 193 lbs 0 oz  General Appearance: Sitting up in bed in nad  Respiratory: normal WOB on ambient air   Cardiovascular: regular rate  GI: tender over RUQ, obese, increased distension, soft  Skin: jaundiced, 2+ pitting edema  Other: Alert and oriented x4

## 2021-10-02 NOTE — PLAN OF CARE
"  Problem: Adult Inpatient Plan of Care  Goal: Optimal Comfort and Wellbeing  Outcome: Change based on patient need/priority     /60 (BP Location: Left arm)   Pulse 101   Temp 97.8  F (36.6  C) (Oral)   Resp 16   Ht 1.626 m (5' 4\")   Wt 87.5 kg (193 lb)   SpO2 92%. Albumin infused. Was given benadryl for itching. Denies nausea. Pt stated that she voided X 2 but flushed the toilet. Alert and oriented X 4. Blood glucose level was 162. Mild shortness of breath with activities. No complained of pain. Call light appropriate. Will continue to monitor.    "

## 2021-10-03 ENCOUNTER — APPOINTMENT (OUTPATIENT)
Dept: CT IMAGING | Facility: CLINIC | Age: 46
End: 2021-10-03
Payer: COMMERCIAL

## 2021-10-03 ENCOUNTER — APPOINTMENT (OUTPATIENT)
Dept: GENERAL RADIOLOGY | Facility: CLINIC | Age: 46
End: 2021-10-03
Payer: COMMERCIAL

## 2021-10-03 ENCOUNTER — APPOINTMENT (OUTPATIENT)
Dept: GENERAL RADIOLOGY | Facility: CLINIC | Age: 46
End: 2021-10-03
Attending: STUDENT IN AN ORGANIZED HEALTH CARE EDUCATION/TRAINING PROGRAM
Payer: COMMERCIAL

## 2021-10-03 LAB
ABO/RH(D): NORMAL
ALBUMIN SERPL-MCNC: 4.4 G/DL (ref 3.4–5)
ALP SERPL-CCNC: 88 U/L (ref 40–150)
ALT SERPL W P-5'-P-CCNC: 18 U/L (ref 0–50)
ANION GAP SERPL CALCULATED.3IONS-SCNC: 12 MMOL/L (ref 3–14)
ANION GAP SERPL CALCULATED.3IONS-SCNC: 13 MMOL/L (ref 3–14)
ANTIBODY SCREEN: NEGATIVE
AST SERPL W P-5'-P-CCNC: 52 U/L (ref 0–45)
BACTERIA UR CULT: NORMAL
BASE EXCESS BLDV CALC-SCNC: -11.6 MMOL/L (ref -7.7–1.9)
BASE EXCESS BLDV CALC-SCNC: -9.2 MMOL/L (ref -7.7–1.9)
BILIRUB DIRECT SERPL-MCNC: 9.1 MG/DL (ref 0–0.2)
BILIRUB SERPL-MCNC: 12.7 MG/DL (ref 0.2–1.3)
BLD PROD TYP BPU: NORMAL
BLOOD COMPONENT TYPE: NORMAL
BUN SERPL-MCNC: 12 MG/DL (ref 7–30)
BUN SERPL-MCNC: 15 MG/DL (ref 7–30)
CALCIUM SERPL-MCNC: 8.2 MG/DL (ref 8.5–10.1)
CALCIUM SERPL-MCNC: 8.5 MG/DL (ref 8.5–10.1)
CHLORIDE BLD-SCNC: 103 MMOL/L (ref 94–109)
CHLORIDE BLD-SCNC: 104 MMOL/L (ref 94–109)
CO2 SERPL-SCNC: 14 MMOL/L (ref 20–32)
CO2 SERPL-SCNC: 16 MMOL/L (ref 20–32)
CODING SYSTEM: NORMAL
CREAT SERPL-MCNC: 1.27 MG/DL (ref 0.52–1.04)
CREAT SERPL-MCNC: 1.53 MG/DL (ref 0.52–1.04)
CREAT UR-MCNC: 115 MG/DL
CROSSMATCH: NORMAL
ERYTHROCYTE [DISTWIDTH] IN BLOOD BY AUTOMATED COUNT: 16.3 % (ref 10–15)
FIBRINOGEN PPP-MCNC: 256 MG/DL (ref 170–490)
GFR SERPL CREATININE-BSD FRML MDRD: 40 ML/MIN/1.73M2
GFR SERPL CREATININE-BSD FRML MDRD: 50 ML/MIN/1.73M2
GLUCOSE BLD-MCNC: 124 MG/DL (ref 70–99)
GLUCOSE BLD-MCNC: 133 MG/DL (ref 70–99)
GLUCOSE BLDC GLUCOMTR-MCNC: 127 MG/DL (ref 70–99)
GLUCOSE BLDC GLUCOMTR-MCNC: 127 MG/DL (ref 70–99)
GLUCOSE BLDC GLUCOMTR-MCNC: 130 MG/DL (ref 70–99)
HCO3 BLDV-SCNC: 14 MMOL/L (ref 21–28)
HCO3 BLDV-SCNC: 15 MMOL/L (ref 21–28)
HCT VFR BLD AUTO: 22.2 % (ref 35–47)
HGB BLD-MCNC: 6.9 G/DL (ref 11.7–15.7)
HGB BLD-MCNC: 7 G/DL (ref 11.7–15.7)
HGB BLD-MCNC: 7.3 G/DL (ref 11.7–15.7)
HOLD SPECIMEN: NORMAL
INR PPP: 2.42 (ref 0.85–1.15)
ISSUE DATE AND TIME: NORMAL
LACTATE SERPL-SCNC: 2.2 MMOL/L (ref 0.7–2)
LDH SERPL L TO P-CCNC: 146 U/L (ref 81–234)
MAGNESIUM SERPL-MCNC: 2.6 MG/DL (ref 1.6–2.3)
MCH RBC QN AUTO: 35.9 PG (ref 26.5–33)
MCHC RBC AUTO-ENTMCNC: 31.5 G/DL (ref 31.5–36.5)
MCV RBC AUTO: 114 FL (ref 78–100)
NT-PROBNP SERPL-MCNC: ABNORMAL PG/ML (ref 0–450)
O2/TOTAL GAS SETTING VFR VENT: 21 %
O2/TOTAL GAS SETTING VFR VENT: 91 %
PCO2 BLDV: 26 MM HG (ref 40–50)
PCO2 BLDV: 31 MM HG (ref 40–50)
PH BLDV: 7.27 [PH] (ref 7.32–7.43)
PH BLDV: 7.37 [PH] (ref 7.32–7.43)
PHOSPHATE SERPL-MCNC: 2.5 MG/DL (ref 2.5–4.5)
PLATELET # BLD AUTO: 189 10E3/UL (ref 150–450)
PO2 BLDV: 42 MM HG (ref 25–47)
PO2 BLDV: 54 MM HG (ref 25–47)
POTASSIUM BLD-SCNC: 3.6 MMOL/L (ref 3.4–5.3)
POTASSIUM BLD-SCNC: 3.7 MMOL/L (ref 3.4–5.3)
PROCALCITONIN SERPL-MCNC: 2.87 NG/ML
PROT SERPL-MCNC: 6.3 G/DL (ref 6.8–8.8)
RBC # BLD AUTO: 1.95 10E6/UL (ref 3.8–5.2)
SODIUM SERPL-SCNC: 130 MMOL/L (ref 133–144)
SODIUM SERPL-SCNC: 132 MMOL/L (ref 133–144)
SODIUM UR-SCNC: <5 MMOL/L
SPECIMEN EXPIRATION DATE: NORMAL
UNIT ABO/RH: NORMAL
UNIT NUMBER: NORMAL
UNIT STATUS: NORMAL
UNIT TYPE ISBT: 7300
UUN UR-MCNC: 172 MG/DL
UUN/CREAT 24H UR: 1 G/G CR
WBC # BLD AUTO: 17.4 10E3/UL (ref 4–11)

## 2021-10-03 PROCEDURE — 85027 COMPLETE CBC AUTOMATED: CPT | Performed by: STUDENT IN AN ORGANIZED HEALTH CARE EDUCATION/TRAINING PROGRAM

## 2021-10-03 PROCEDURE — 83010 ASSAY OF HAPTOGLOBIN QUANT: CPT | Performed by: STUDENT IN AN ORGANIZED HEALTH CARE EDUCATION/TRAINING PROGRAM

## 2021-10-03 PROCEDURE — 82803 BLOOD GASES ANY COMBINATION: CPT

## 2021-10-03 PROCEDURE — 71045 X-RAY EXAM CHEST 1 VIEW: CPT

## 2021-10-03 PROCEDURE — 85018 HEMOGLOBIN: CPT

## 2021-10-03 PROCEDURE — 83615 LACTATE (LD) (LDH) ENZYME: CPT | Performed by: STUDENT IN AN ORGANIZED HEALTH CARE EDUCATION/TRAINING PROGRAM

## 2021-10-03 PROCEDURE — 120N000003 HC R&B IMCU UMMC

## 2021-10-03 PROCEDURE — 82540 ASSAY OF CREATINE: CPT | Performed by: STUDENT IN AN ORGANIZED HEALTH CARE EDUCATION/TRAINING PROGRAM

## 2021-10-03 PROCEDURE — 250N000011 HC RX IP 250 OP 636

## 2021-10-03 PROCEDURE — 87040 BLOOD CULTURE FOR BACTERIA: CPT | Performed by: STUDENT IN AN ORGANIZED HEALTH CARE EDUCATION/TRAINING PROGRAM

## 2021-10-03 PROCEDURE — 93005 ELECTROCARDIOGRAM TRACING: CPT

## 2021-10-03 PROCEDURE — 250N000011 HC RX IP 250 OP 636: Performed by: STUDENT IN AN ORGANIZED HEALTH CARE EDUCATION/TRAINING PROGRAM

## 2021-10-03 PROCEDURE — 82248 BILIRUBIN DIRECT: CPT | Performed by: STUDENT IN AN ORGANIZED HEALTH CARE EDUCATION/TRAINING PROGRAM

## 2021-10-03 PROCEDURE — 250N000013 HC RX MED GY IP 250 OP 250 PS 637

## 2021-10-03 PROCEDURE — 36415 COLL VENOUS BLD VENIPUNCTURE: CPT | Performed by: STUDENT IN AN ORGANIZED HEALTH CARE EDUCATION/TRAINING PROGRAM

## 2021-10-03 PROCEDURE — 83605 ASSAY OF LACTIC ACID: CPT | Performed by: STUDENT IN AN ORGANIZED HEALTH CARE EDUCATION/TRAINING PROGRAM

## 2021-10-03 PROCEDURE — 250N000013 HC RX MED GY IP 250 OP 250 PS 637: Performed by: STUDENT IN AN ORGANIZED HEALTH CARE EDUCATION/TRAINING PROGRAM

## 2021-10-03 PROCEDURE — 85610 PROTHROMBIN TIME: CPT | Performed by: STUDENT IN AN ORGANIZED HEALTH CARE EDUCATION/TRAINING PROGRAM

## 2021-10-03 PROCEDURE — 84540 ASSAY OF URINE/UREA-N: CPT | Performed by: STUDENT IN AN ORGANIZED HEALTH CARE EDUCATION/TRAINING PROGRAM

## 2021-10-03 PROCEDURE — 74178 CT ABD&PLV WO CNTR FLWD CNTR: CPT

## 2021-10-03 PROCEDURE — 71045 X-RAY EXAM CHEST 1 VIEW: CPT | Mod: 26 | Performed by: RADIOLOGY

## 2021-10-03 PROCEDURE — 86923 COMPATIBILITY TEST ELECTRIC: CPT

## 2021-10-03 PROCEDURE — 83735 ASSAY OF MAGNESIUM: CPT | Performed by: STUDENT IN AN ORGANIZED HEALTH CARE EDUCATION/TRAINING PROGRAM

## 2021-10-03 PROCEDURE — 999N000128 HC STATISTIC PERIPHERAL IV START W/O US GUIDANCE

## 2021-10-03 PROCEDURE — 99233 SBSQ HOSP IP/OBS HIGH 50: CPT | Mod: GC | Performed by: STUDENT IN AN ORGANIZED HEALTH CARE EDUCATION/TRAINING PROGRAM

## 2021-10-03 PROCEDURE — 36415 COLL VENOUS BLD VENIPUNCTURE: CPT

## 2021-10-03 PROCEDURE — 85018 HEMOGLOBIN: CPT | Performed by: STUDENT IN AN ORGANIZED HEALTH CARE EDUCATION/TRAINING PROGRAM

## 2021-10-03 PROCEDURE — 74178 CT ABD&PLV WO CNTR FLWD CNTR: CPT | Mod: 26 | Performed by: RADIOLOGY

## 2021-10-03 PROCEDURE — 71045 X-RAY EXAM CHEST 1 VIEW: CPT | Mod: 77

## 2021-10-03 PROCEDURE — 86901 BLOOD TYPING SEROLOGIC RH(D): CPT | Performed by: STUDENT IN AN ORGANIZED HEALTH CARE EDUCATION/TRAINING PROGRAM

## 2021-10-03 PROCEDURE — 85384 FIBRINOGEN ACTIVITY: CPT | Performed by: STUDENT IN AN ORGANIZED HEALTH CARE EDUCATION/TRAINING PROGRAM

## 2021-10-03 PROCEDURE — 84300 ASSAY OF URINE SODIUM: CPT | Performed by: STUDENT IN AN ORGANIZED HEALTH CARE EDUCATION/TRAINING PROGRAM

## 2021-10-03 PROCEDURE — 84100 ASSAY OF PHOSPHORUS: CPT | Performed by: STUDENT IN AN ORGANIZED HEALTH CARE EDUCATION/TRAINING PROGRAM

## 2021-10-03 PROCEDURE — 93010 ELECTROCARDIOGRAM REPORT: CPT | Performed by: INTERNAL MEDICINE

## 2021-10-03 PROCEDURE — 82947 ASSAY GLUCOSE BLOOD QUANT: CPT

## 2021-10-03 PROCEDURE — 250N000009 HC RX 250: Performed by: STUDENT IN AN ORGANIZED HEALTH CARE EDUCATION/TRAINING PROGRAM

## 2021-10-03 PROCEDURE — 999N000157 HC STATISTIC RCP TIME EA 10 MIN

## 2021-10-03 PROCEDURE — 82040 ASSAY OF SERUM ALBUMIN: CPT | Performed by: STUDENT IN AN ORGANIZED HEALTH CARE EDUCATION/TRAINING PROGRAM

## 2021-10-03 PROCEDURE — P9047 ALBUMIN (HUMAN), 25%, 50ML: HCPCS | Performed by: STUDENT IN AN ORGANIZED HEALTH CARE EDUCATION/TRAINING PROGRAM

## 2021-10-03 PROCEDURE — 83880 ASSAY OF NATRIURETIC PEPTIDE: CPT | Performed by: STUDENT IN AN ORGANIZED HEALTH CARE EDUCATION/TRAINING PROGRAM

## 2021-10-03 PROCEDURE — P9016 RBC LEUKOCYTES REDUCED: HCPCS

## 2021-10-03 PROCEDURE — 258N000003 HC RX IP 258 OP 636: Performed by: STUDENT IN AN ORGANIZED HEALTH CARE EDUCATION/TRAINING PROGRAM

## 2021-10-03 PROCEDURE — 84145 PROCALCITONIN (PCT): CPT | Performed by: STUDENT IN AN ORGANIZED HEALTH CARE EDUCATION/TRAINING PROGRAM

## 2021-10-03 RX ORDER — LIDOCAINE 4 G/G
1 PATCH TOPICAL
Status: DISCONTINUED | OUTPATIENT
Start: 2021-10-03 | End: 2021-10-04

## 2021-10-03 RX ORDER — FUROSEMIDE 10 MG/ML
40 INJECTION INTRAMUSCULAR; INTRAVENOUS EVERY 6 HOURS
Status: DISCONTINUED | OUTPATIENT
Start: 2021-10-03 | End: 2021-10-03

## 2021-10-03 RX ORDER — IOPAMIDOL 755 MG/ML
119 INJECTION, SOLUTION INTRAVASCULAR ONCE
Status: COMPLETED | OUTPATIENT
Start: 2021-10-03 | End: 2021-10-03

## 2021-10-03 RX ORDER — PIPERACILLIN SODIUM, TAZOBACTAM SODIUM 3; .375 G/15ML; G/15ML
3.38 INJECTION, POWDER, LYOPHILIZED, FOR SOLUTION INTRAVENOUS EVERY 6 HOURS
Status: DISCONTINUED | OUTPATIENT
Start: 2021-10-03 | End: 2021-10-07

## 2021-10-03 RX ORDER — FUROSEMIDE 10 MG/ML
20 INJECTION INTRAMUSCULAR; INTRAVENOUS EVERY 6 HOURS
Status: DISPENSED | OUTPATIENT
Start: 2021-10-03 | End: 2021-10-04

## 2021-10-03 RX ORDER — DIPHENHYDRAMINE HCL 25 MG
25-50 CAPSULE ORAL EVERY 6 HOURS PRN
Status: DISCONTINUED | OUTPATIENT
Start: 2021-10-03 | End: 2021-11-05 | Stop reason: HOSPADM

## 2021-10-03 RX ORDER — CARBOXYMETHYLCELLULOSE SODIUM 5 MG/ML
1 SOLUTION/ DROPS OPHTHALMIC
Status: DISCONTINUED | OUTPATIENT
Start: 2021-10-03 | End: 2021-11-05 | Stop reason: HOSPADM

## 2021-10-03 RX ORDER — PROCHLORPERAZINE MALEATE 5 MG
5 TABLET ORAL
Status: DISCONTINUED | OUTPATIENT
Start: 2021-10-03 | End: 2021-10-03

## 2021-10-03 RX ORDER — DIPHENHYDRAMINE HCL 25 MG
25 CAPSULE ORAL EVERY 6 HOURS PRN
Status: DISCONTINUED | OUTPATIENT
Start: 2021-10-03 | End: 2021-10-03

## 2021-10-03 RX ORDER — FUROSEMIDE 10 MG/ML
40 INJECTION INTRAMUSCULAR; INTRAVENOUS ONCE
Status: DISCONTINUED | OUTPATIENT
Start: 2021-10-04 | End: 2021-10-04

## 2021-10-03 RX ORDER — POTASSIUM CHLORIDE 750 MG/1
20 TABLET, EXTENDED RELEASE ORAL ONCE
Status: COMPLETED | OUTPATIENT
Start: 2021-10-03 | End: 2021-10-03

## 2021-10-03 RX ORDER — FUROSEMIDE 10 MG/ML
40 INJECTION INTRAMUSCULAR; INTRAVENOUS ONCE
Status: DISCONTINUED | OUTPATIENT
Start: 2021-10-04 | End: 2021-10-03

## 2021-10-03 RX ORDER — DIPHENHYDRAMINE HYDROCHLORIDE 50 MG/ML
25 INJECTION INTRAMUSCULAR; INTRAVENOUS EVERY 6 HOURS PRN
Status: DISCONTINUED | OUTPATIENT
Start: 2021-10-03 | End: 2021-10-03

## 2021-10-03 RX ORDER — OXYMETAZOLINE HYDROCHLORIDE 0.05 G/100ML
2 SPRAY NASAL 2 TIMES DAILY
Status: DISCONTINUED | OUTPATIENT
Start: 2021-10-03 | End: 2021-10-06

## 2021-10-03 RX ADMIN — VANCOMYCIN HYDROCHLORIDE 750 MG: 1 INJECTION, POWDER, LYOPHILIZED, FOR SOLUTION INTRAVENOUS at 19:10

## 2021-10-03 RX ADMIN — PIPERACILLIN AND TAZOBACTAM 3.38 G: 3; .375 INJECTION, POWDER, LYOPHILIZED, FOR SOLUTION INTRAVENOUS at 12:14

## 2021-10-03 RX ADMIN — POTASSIUM CHLORIDE 20 MEQ: 750 TABLET, EXTENDED RELEASE ORAL at 08:29

## 2021-10-03 RX ADMIN — CEFTRIAXONE SODIUM 1 G: 1 INJECTION, POWDER, FOR SOLUTION INTRAMUSCULAR; INTRAVENOUS at 08:25

## 2021-10-03 RX ADMIN — METOCLOPRAMIDE 5 MG: 5 TABLET ORAL at 12:11

## 2021-10-03 RX ADMIN — RIFAXIMIN 550 MG: 550 TABLET ORAL at 08:29

## 2021-10-03 RX ADMIN — FUROSEMIDE 40 MG: 10 INJECTION, SOLUTION INTRAVENOUS at 21:25

## 2021-10-03 RX ADMIN — FOLIC ACID 1 MG: 1 TABLET ORAL at 08:30

## 2021-10-03 RX ADMIN — METOCLOPRAMIDE 5 MG: 5 TABLET ORAL at 16:25

## 2021-10-03 RX ADMIN — THIAMINE HCL TAB 100 MG 100 MG: 100 TAB at 08:29

## 2021-10-03 RX ADMIN — OXYMETAZOLINE HYDROCHLORIDE 2 SPRAY: 0.05 SPRAY NASAL at 12:11

## 2021-10-03 RX ADMIN — RIFAXIMIN 550 MG: 550 TABLET ORAL at 21:26

## 2021-10-03 RX ADMIN — METOCLOPRAMIDE 5 MG: 5 TABLET ORAL at 08:29

## 2021-10-03 RX ADMIN — GABAPENTIN 100 MG: 100 CAPSULE ORAL at 21:26

## 2021-10-03 RX ADMIN — Medication 3 MG: at 00:16

## 2021-10-03 RX ADMIN — ESCITALOPRAM OXALATE 10 MG: 10 TABLET ORAL at 08:29

## 2021-10-03 RX ADMIN — DIPHENHYDRAMINE HYDROCHLORIDE 25 MG: 50 INJECTION INTRAMUSCULAR; INTRAVENOUS at 18:13

## 2021-10-03 RX ADMIN — PIPERACILLIN AND TAZOBACTAM 3.38 G: 3; .375 INJECTION, POWDER, LYOPHILIZED, FOR SOLUTION INTRAVENOUS at 18:04

## 2021-10-03 RX ADMIN — FUROSEMIDE 20 MG: 10 INJECTION, SOLUTION INTRAVENOUS at 15:42

## 2021-10-03 RX ADMIN — ALBUMIN HUMAN 75 G: 0.25 SOLUTION INTRAVENOUS at 14:34

## 2021-10-03 RX ADMIN — DIPHENHYDRAMINE HYDROCHLORIDE 25 MG: 25 CAPSULE ORAL at 01:18

## 2021-10-03 RX ADMIN — IOPAMIDOL 119 ML: 755 INJECTION, SOLUTION INTRAVENOUS at 11:56

## 2021-10-03 RX ADMIN — SALINE NASAL SPRAY 1 SPRAY: 1.5 SOLUTION NASAL at 12:11

## 2021-10-03 RX ADMIN — PANTOPRAZOLE SODIUM 40 MG: 40 TABLET, DELAYED RELEASE ORAL at 08:29

## 2021-10-03 RX ADMIN — Medication 100 MCG: at 08:30

## 2021-10-03 RX ADMIN — GABAPENTIN 100 MG: 100 CAPSULE ORAL at 15:42

## 2021-10-03 RX ADMIN — Medication 3 MG: at 21:40

## 2021-10-03 RX ADMIN — ALBUMIN HUMAN 75 G: 0.25 SOLUTION INTRAVENOUS at 06:18

## 2021-10-03 RX ADMIN — SALINE NASAL SPRAY 1 SPRAY: 1.5 SOLUTION NASAL at 16:27

## 2021-10-03 RX ADMIN — GABAPENTIN 100 MG: 100 CAPSULE ORAL at 08:30

## 2021-10-03 RX ADMIN — LIDOCAINE 1 PATCH: 246 PATCH TOPICAL at 16:25

## 2021-10-03 ASSESSMENT — ACTIVITIES OF DAILY LIVING (ADL)
ADLS_ACUITY_SCORE: 9
ADLS_ACUITY_SCORE: 10
ADLS_ACUITY_SCORE: 9

## 2021-10-03 ASSESSMENT — MIFFLIN-ST. JEOR: SCORE: 1508.6

## 2021-10-03 NOTE — PROVIDER NOTIFICATION
Provider notification:    Dr. Dexter Fabian notified at 1848 Via web paging system.    Reason for notification:  Critical lab value: Hgb 6.9

## 2021-10-03 NOTE — PROGRESS NOTES
[facility]    Progress Note - Marjan 3 Service      Date of Admission:  9/29/2021    Changes today:  - Worsening anemia, respiratory status, & pain (pending workup includes hemolysis, infection, CT abdomen)  - Broadened ceftriaxone to Zosyn + vanc, blood cultures pending, procal & lactate unremarkable  - LDH wnl, haptoglobin pending, direct hyperbilirubinemia  - CT abdomen w/ contrast with pleural effusions & moderate ascites, hepatomegaly  - Diagnostic & therapeutic paracentesis w/ labs +/- thoracentesis for pleural effusions  - ANA LAURA worse today, Albumin challenge 75g x 3 days (day 3 today)  - MELD 31 today, poor prognosis per hepatology (likely alcoholic hepatitis presenting itself)  - QTc prolonged (508 today), continuing escitalopram, will hold off on Zofran & compazine for now    Assessment & Plan         Aliyah Angeles is a 46 year old woman with alcohol use disorder (last drink 9/12/21), alcoholic hepatitis, jaimie en y gastric bypass admitted for abdominal pain, lower extremity edema, and nausea.    Anemia, macrocytic  Epistaxis  Likely 2/2 to B12 or folate deficiency due to long history of alcohol use disorder. Also started albumin challenge 10/1, so may be dilutional, however anemia is out of proportion to decrease in other cells lines. No obvious evidence of bleeding (mild self-resolving nose bleed today, some streaks of blood with wiping after BM but no hematochezia or melena). B12 high, folate wnl. Anemia likely secondary to alcoholic hepatitis. Hemolysis labs negative (LDH wnl, haptoglobin pending, direct bilirubinemia). Could also have a component of dilution from albumin, in addition to epistaxis.  - repeat hgb this 6pm, transfuse for hgb <7  - f/u haptoglobin    Leukocytosis  Worsening, WBC 17.4 10/3. Afebrile. Increased work of breathing, moderate ascites & bilateral pleural effusions on CT.  - Broaden ceftriaxone to Zosyn + Vanc  - Blood cultures  pending  - lactate 2.2, procal wnl  - paracentesis to evaluate for SBP  - CXR with pleural effusions & increased opacities    Alcoholic hepatitis  Alcohol use disorder  Lower extremity edema, ascites  Patient has been sober since 9/12 and has been at a treatment facility since her discharge 9/21. Hepatomegaly without cirrhosis. Drastic diet change (unable to do low sodium diet at treatment facility), new metformin, increased stool output 2/2 lactulose triggered decompensation which was worsened by not being able to take her home meds due to vomiting. SAAG 1.1 which is not suggestive of portal hypertension causing ascites and diagnostic para without SBP. Suspect increased abdominal distension on 10/1 is from bloating and gas possibly due to lactulose given only small ascites on U/S. Discontinued lactulose 10/1.  - Lasix 20 IV (q6h x2 doses)  - Hepatology consulted  - IV reglan prn for nausea  - cont rifaximin for HE ppx  - cont folate & B12 supplementation  - cont PTA pantoprazole  - Chem dep consulted  - No plan to start steroids  - Protein supplements for hypoalbuminemia    Early mild anion gap metabolic acidosis  Likely secondary to anemia. Partially compensating with respiratory alkalosis.  - CTM    Acute kidney injury  Baseline ~0.98, increased to 1.14 on 10/1/21. May be related to vomiting, lactulose. Likely pre-renal. Did improve after one day of albumin so less likely hepatorenal, however still a possibility.  - follow-up urine studies to calculate FeUrea  - Albumin challenge 75g x3d per hepatology (10/1-10/3)  - Diuresing today (20 IV Lasix q6h x2 doses), encourage PO intake    Hypervolemic hyponatremia  Hypokalemia, resolved  - Daily BMP     DM2  - hold PTA Metformin (500mg BID) - may have caused N/V  - sliding scale insulin while inpatient     Anxiety and depression  - PTA escitalopram  - Psychology consulted     Diet: 2 Gram Sodium Diet  Snacks/Supplements Adult: Ensure Enlive; Between Meals    DVT  Prophylaxis: SCDs  Rice Catheter: Not present  Central Lines: None  Code Status: Full Code      Disposition Plan   Expected discharge: 10/03/2021   recommended to prior living arrangement once workup complete.     The patient's care was discussed with the Attending Physician, Dr. Astorga.    Alissa Hameed MD  PGY-1 Internal Medicine-Pediatrics  Marjan 3 Service ____________________________________________________________________    Interval History   NAEO. Patient reports she is feeling worse. Is having worsened shortness of breath, increased ascites and lower extremity edema. Also notes that she has had epistaxis 3 days in a row now.    Data reviewed today: I reviewed all medications, new labs and imaging results over the last 24 hours. I personally reviewed no images or EKG's today.    Physical Exam   Vital Signs: Temp: 97.4  F (36.3  C) Temp src: Oral BP: 102/67 Pulse: 87   Resp: 18 SpO2: 91 % O2 Device: None (Room air)    Weight: 195 lbs 14.4 oz  General Appearance: Sitting up in recliner, appears uncomfortable  Respiratory: increased WOB when reclined, on ambient air   Cardiovascular: regular rate & rhythm  GI: tender over RUQ, obese, increased distension, soft  Skin: jaundiced, 3+ pitting edema  Other: Alert and oriented x4

## 2021-10-03 NOTE — PLAN OF CARE
Aliyah is more fatigued today. She is more short of breath today and primary team is aware. Denied pain or nausea. Up with assistance of one. She feels like her bilateral leg edema is getting worse. HGB continue to be low so an abdominal CT scan was done to R/O a GI bleed. Plan is to transfer her to a higher level of care for closer monitoring.

## 2021-10-03 NOTE — PLAN OF CARE
Aliyah had increased shortness of breath du to her abdominal ascites and she feels like she has more swelling in her legs. She had a nose bleed which resolved with pressure. She has Afrin nose spray available if her nose bleeds again. Electrolytes replaced and rechecks ordered for tomorrow am.

## 2021-10-03 NOTE — PLAN OF CARE
"Blood pressure 94/67, pulse 75, temperature 98.8  F (37.1  C), temperature source Oral, resp. rate 18, height 1.626 m (5' 4\"), weight 88.9 kg (195 lb 14.4 oz), SpO2 91 %, not currently breastfeeding.    AO x4. Pt continuous to report SOB, VSS on RA. Benadryl x1 for itchiness. Zosyn tolerated well.     Transferring to higher level care unit for closer monitoring of SOB and hemodynamic. Tranfer report given to 6B RN @1840.    Hgb 6.9 @1845, provider and receiving RN notified. Receiving RN to transfuse when pt transfers.    Continue w/ POC.  "

## 2021-10-03 NOTE — PLAN OF CARE
[id number]    VSS on RA. Denies nausea. C/o pain in legs, declined interventions. Melatonin x1 given, without relief. Benadryl x1 given without relief. New order for dose range of melatonin. Continue with plan of care.

## 2021-10-03 NOTE — PHARMACY-VANCOMYCIN DOSING SERVICE
"Pharmacy Vancomycin Initial Note  Date of Service October 3, 2021  Patient's  1975  46 year old, female    Indication: Intra-abdominal infection    Current estimated CrCl = Estimated Creatinine Clearance: 59.1 mL/min (A) (based on SCr of 1.27 mg/dL (H)).    Creatinine for last 3 days  10/1/2021:  6:27 AM Creatinine 1.14 mg/dL  10/2/2021:  7:45 AM Creatinine 1.01 mg/dL  10/3/2021:  7:10 AM Creatinine 1.27 mg/dL    Recent Vancomycin Level(s) for last 3 days  No results found for requested labs within last 72 hours.      Vancomycin IV Administrations (past 72 hours)      No vancomycin orders with administrations in past 72 hours.                Nephrotoxins and other renal medications (From now, onward)    Start     Dose/Rate Route Frequency Ordered Stop    10/03/21 1830  vancomycin (VANCOCIN) 750 mg in sodium chloride 0.9 % 250 mL intermittent infusion      750 mg  over 90 Minutes Intravenous EVERY 12 HOURS 10/03/21 1813      10/03/21 1430  furosemide (LASIX) injection 20 mg      20 mg  over 1-3 Minutes Intravenous EVERY 6 HOURS 10/03/21 1411 10/04/21 0229    10/03/21 1200  piperacillin-tazobactam (ZOSYN) 3.375 g vial to attach to  mL bag     Note to Pharmacy: For [facility], [facility] and [facility]: For Zosyn-naive patients, use the \"Zosyn initial dose + extended infusion\" order panel.    3.375 g  over 30 Minutes Intravenous EVERY 6 HOURS 10/03/21 1123 10/08/21 1159          Contrast Orders - past 72 hours (72h ago, onward)    Start     Dose/Rate Route Frequency Ordered Stop    10/03/21 1200  iopamidol (ISOVUE-370) solution 119 mL      119 mL Intravenous ONCE 10/03/21 1144 10/03/21 1156        Loading dose: N/A  Regimen: 750 mg IV every 12 hours.  Start time: 18:12 on 10/03/2021  Exposure target: AUC24 (range)400-600 mg/L.hr   AUC24,ss: 440 mg/L.hr  Probability of AUC24 > 400: 61 %  Ctrough,ss: 14.9 mg/L  Probability of Ctrough,ss > 20: 20 %  Probability of nephrotoxicity (Lodise ESTRADA ): 10 %          Plan:  1. Start " vancomycin  750 mg IV q12h.   2. Vancomycin monitoring method: AUC  3. Vancomycin therapeutic monitoring goal: 400-600 mg*h/L  4. Pharmacy will check vancomycin levels as appropriate in 1-3 Days.    5. Serum creatinine levels will be ordered daily for the first week of therapy and at least twice weekly for subsequent weeks.      Susi Live, JulienD, BCPS

## 2021-10-04 ENCOUNTER — ANESTHESIA EVENT (OUTPATIENT)
Dept: INTENSIVE CARE | Facility: CLINIC | Age: 46
End: 2021-10-04
Payer: COMMERCIAL

## 2021-10-04 ENCOUNTER — APPOINTMENT (OUTPATIENT)
Dept: GENERAL RADIOLOGY | Facility: CLINIC | Age: 46
End: 2021-10-04
Attending: SURGERY
Payer: COMMERCIAL

## 2021-10-04 ENCOUNTER — ANESTHESIA (OUTPATIENT)
Dept: INTENSIVE CARE | Facility: CLINIC | Age: 46
End: 2021-10-04
Payer: COMMERCIAL

## 2021-10-04 ENCOUNTER — APPOINTMENT (OUTPATIENT)
Dept: GENERAL RADIOLOGY | Facility: CLINIC | Age: 46
End: 2021-10-04
Attending: STUDENT IN AN ORGANIZED HEALTH CARE EDUCATION/TRAINING PROGRAM
Payer: COMMERCIAL

## 2021-10-04 ENCOUNTER — APPOINTMENT (OUTPATIENT)
Dept: GENERAL RADIOLOGY | Facility: CLINIC | Age: 46
End: 2021-10-04
Payer: COMMERCIAL

## 2021-10-04 ENCOUNTER — APPOINTMENT (OUTPATIENT)
Dept: CARDIOLOGY | Facility: CLINIC | Age: 46
End: 2021-10-04
Payer: COMMERCIAL

## 2021-10-04 LAB
ALBUMIN FLD-MCNC: 2.6 G/DL
ALBUMIN SERPL-MCNC: 4.3 G/DL (ref 3.4–5)
ALBUMIN UR-MCNC: 50 MG/DL
ALP SERPL-CCNC: 86 U/L (ref 40–150)
ALT SERPL W P-5'-P-CCNC: 14 U/L (ref 0–50)
AMMONIA PLAS-SCNC: 18 UMOL/L (ref 10–50)
AMORPH CRY #/AREA URNS HPF: ABNORMAL /HPF
AMYLASE FLD-CCNC: 7 U/L
ANION GAP SERPL CALCULATED.3IONS-SCNC: 10 MMOL/L (ref 3–14)
ANION GAP SERPL CALCULATED.3IONS-SCNC: 11 MMOL/L (ref 3–14)
APPEARANCE FLD: ABNORMAL
APPEARANCE FLD: CLEAR
APPEARANCE UR: ABNORMAL
APTT PPP: 46 SECONDS (ref 22–38)
AST SERPL W P-5'-P-CCNC: 58 U/L (ref 0–45)
ATRIAL RATE - MUSE: 89 BPM
BASE EXCESS BLDA CALC-SCNC: -10.4 MMOL/L (ref -9–1.8)
BASE EXCESS BLDA CALC-SCNC: -10.6 MMOL/L (ref -9–1.8)
BASE EXCESS BLDA CALC-SCNC: -10.7 MMOL/L (ref -9–1.8)
BASE EXCESS BLDA CALC-SCNC: -10.9 MMOL/L (ref -9–1.8)
BASE EXCESS BLDA CALC-SCNC: -11.1 MMOL/L (ref -9–1.8)
BASE EXCESS BLDV CALC-SCNC: -10.3 MMOL/L (ref -7.7–1.9)
BASE EXCESS BLDV CALC-SCNC: -10.4 MMOL/L (ref -7.7–1.9)
BASE EXCESS BLDV CALC-SCNC: -11.3 MMOL/L (ref -7.7–1.9)
BASOPHILS # BLD AUTO: 0.1 10E3/UL (ref 0–0.2)
BASOPHILS # BLD AUTO: 0.1 10E3/UL (ref 0–0.2)
BASOPHILS NFR BLD AUTO: 0 %
BASOPHILS NFR BLD AUTO: 0 %
BILIRUB FLD-MCNC: 7.6 MG/DL
BILIRUB SERPL-MCNC: 13.2 MG/DL (ref 0.2–1.3)
BILIRUB UR QL STRIP: ABNORMAL
BUN SERPL-MCNC: 16 MG/DL (ref 7–30)
BUN SERPL-MCNC: 22 MG/DL (ref 7–30)
CA-I BLD-MCNC: 4.8 MG/DL (ref 4.4–5.2)
CALCIUM SERPL-MCNC: 8.1 MG/DL (ref 8.5–10.1)
CALCIUM SERPL-MCNC: 8.2 MG/DL (ref 8.5–10.1)
CHLORIDE BLD-SCNC: 104 MMOL/L (ref 94–109)
CHLORIDE BLD-SCNC: 106 MMOL/L (ref 94–109)
CK SERPL-CCNC: 20 U/L (ref 30–225)
CO2 SERPL-SCNC: 17 MMOL/L (ref 20–32)
CO2 SERPL-SCNC: 17 MMOL/L (ref 20–32)
COLOR FLD: YELLOW
COLOR FLD: YELLOW
COLOR UR AUTO: ABNORMAL
CORTIS SERPL-MCNC: 41.7 UG/DL (ref 4–22)
CREAT SERPL-MCNC: 1.61 MG/DL (ref 0.52–1.04)
CREAT SERPL-MCNC: 1.88 MG/DL (ref 0.52–1.04)
CRP SERPL-MCNC: 96 MG/L (ref 0–8)
DIASTOLIC BLOOD PRESSURE - MUSE: NORMAL MMHG
EOSINOPHIL # BLD AUTO: 0 10E3/UL (ref 0–0.7)
EOSINOPHIL # BLD AUTO: 0.2 10E3/UL (ref 0–0.7)
EOSINOPHIL NFR BLD AUTO: 0 %
EOSINOPHIL NFR BLD AUTO: 1 %
EOSINOPHIL SPEC QL WRIGHT STN: NORMAL
ERYTHROCYTE [DISTWIDTH] IN BLOOD BY AUTOMATED COUNT: 16.4 % (ref 10–15)
ERYTHROCYTE [DISTWIDTH] IN BLOOD BY AUTOMATED COUNT: 16.4 % (ref 10–15)
ERYTHROCYTE [DISTWIDTH] IN BLOOD BY AUTOMATED COUNT: 16.7 % (ref 10–15)
GFR SERPL CREATININE-BSD FRML MDRD: 31 ML/MIN/1.73M2
GFR SERPL CREATININE-BSD FRML MDRD: 38 ML/MIN/1.73M2
GLUCOSE BLD-MCNC: 113 MG/DL (ref 70–99)
GLUCOSE BLD-MCNC: 119 MG/DL (ref 70–99)
GLUCOSE BLDC GLUCOMTR-MCNC: 116 MG/DL (ref 70–99)
GLUCOSE BLDC GLUCOMTR-MCNC: 119 MG/DL (ref 70–99)
GLUCOSE BLDC GLUCOMTR-MCNC: 120 MG/DL (ref 70–99)
GLUCOSE BLDC GLUCOMTR-MCNC: 123 MG/DL (ref 70–99)
GLUCOSE BLDC GLUCOMTR-MCNC: 129 MG/DL (ref 70–99)
GLUCOSE FLD-MCNC: 116 MG/DL
GLUCOSE UR STRIP-MCNC: NEGATIVE MG/DL
GRAM STAIN RESULT: NORMAL
HAPTOGLOB SERPL-MCNC: 39 MG/DL (ref 32–197)
HCO3 BLD-SCNC: 15 MMOL/L (ref 21–28)
HCO3 BLD-SCNC: 16 MMOL/L (ref 21–28)
HCO3 BLDV-SCNC: 16 MMOL/L (ref 21–28)
HCT VFR BLD AUTO: 23.8 % (ref 35–47)
HCT VFR BLD AUTO: 24.7 % (ref 35–47)
HCT VFR BLD AUTO: 24.7 % (ref 35–47)
HGB BLD-MCNC: 7.2 G/DL (ref 11.7–15.7)
HGB BLD-MCNC: 7.2 G/DL (ref 11.7–15.7)
HGB BLD-MCNC: 7.6 G/DL (ref 11.7–15.7)
HGB BLD-MCNC: 7.6 G/DL (ref 11.7–15.7)
HGB UR QL STRIP: ABNORMAL
HYALINE CASTS: 11 /LPF
IMM GRANULOCYTES # BLD: 0.7 10E3/UL
IMM GRANULOCYTES # BLD: 1.1 10E3/UL
IMM GRANULOCYTES NFR BLD: 3 %
IMM GRANULOCYTES NFR BLD: 4 %
INR PPP: 2.48 (ref 0.85–1.15)
INR PPP: 2.52 (ref 0.85–1.15)
INTERPRETATION ECG - MUSE: NORMAL
KETONES UR STRIP-MCNC: NEGATIVE MG/DL
KOH PREPARATION: NORMAL
KOH PREPARATION: NORMAL
L PNEUMO1 AG UR QL IA: NEGATIVE
LACTATE SERPL-SCNC: 2 MMOL/L (ref 0.7–2)
LDH FLD L TO P-CCNC: 83 U/L
LEUKOCYTE ESTERASE UR QL STRIP: NEGATIVE
LVEF ECHO: NORMAL
LYMPHOCYTES # BLD AUTO: 1.2 10E3/UL (ref 0.8–5.3)
LYMPHOCYTES # BLD AUTO: 1.8 10E3/UL (ref 0.8–5.3)
LYMPHOCYTES NFR BLD AUTO: 5 %
LYMPHOCYTES NFR BLD AUTO: 7 %
LYMPHOCYTES NFR FLD MANUAL: 1 %
LYMPHOCYTES NFR FLD MANUAL: 22 %
Lab: NORMAL
MAGNESIUM SERPL-MCNC: 2.6 MG/DL (ref 1.6–2.3)
MCH RBC QN AUTO: 35.8 PG (ref 26.5–33)
MCH RBC QN AUTO: 36.7 PG (ref 26.5–33)
MCH RBC QN AUTO: 36.7 PG (ref 26.5–33)
MCHC RBC AUTO-ENTMCNC: 30.3 G/DL (ref 31.5–36.5)
MCHC RBC AUTO-ENTMCNC: 30.8 G/DL (ref 31.5–36.5)
MCHC RBC AUTO-ENTMCNC: 30.8 G/DL (ref 31.5–36.5)
MCV RBC AUTO: 118 FL (ref 78–100)
MCV RBC AUTO: 119 FL (ref 78–100)
MCV RBC AUTO: 119 FL (ref 78–100)
MONOCYTES # BLD AUTO: 1.5 10E3/UL (ref 0–1.3)
MONOCYTES # BLD AUTO: 2.1 10E3/UL (ref 0–1.3)
MONOCYTES NFR BLD AUTO: 7 %
MONOCYTES NFR BLD AUTO: 7 %
MONOS+MACROS NFR FLD MANUAL: 8 %
MONOS+MACROS NFR FLD MANUAL: NORMAL %
MRSA DNA SPEC QL NAA+PROBE: NEGATIVE
MUCOUS THREADS #/AREA URNS LPF: PRESENT /LPF
NEUTROPHILS # BLD AUTO: 19.5 10E3/UL (ref 1.6–8.3)
NEUTROPHILS # BLD AUTO: 22.9 10E3/UL (ref 1.6–8.3)
NEUTROPHILS NFR BLD AUTO: 81 %
NEUTROPHILS NFR BLD AUTO: 85 %
NEUTS BAND NFR FLD MANUAL: 20 %
NEUTS BAND NFR FLD MANUAL: 91 %
NITRATE UR QL: NEGATIVE
NRBC # BLD AUTO: 0 10E3/UL
NRBC # BLD AUTO: 0.3 10E3/UL
NRBC BLD AUTO-RTO: 0 /100
NRBC BLD AUTO-RTO: 1 /100
NT-PROBNP SERPL-MCNC: ABNORMAL PG/ML (ref 0–450)
O2/TOTAL GAS SETTING VFR VENT: 100 %
O2/TOTAL GAS SETTING VFR VENT: 60 %
O2/TOTAL GAS SETTING VFR VENT: 60 %
O2/TOTAL GAS SETTING VFR VENT: 65 %
O2/TOTAL GAS SETTING VFR VENT: 70 %
O2/TOTAL GAS SETTING VFR VENT: 70 %
O2/TOTAL GAS SETTING VFR VENT: 80 %
O2/TOTAL GAS SETTING VFR VENT: 90 %
OTHER CELLS FLD MANUAL: 59 %
OXYHGB MFR BLD: 83 % (ref 92–100)
OXYHGB MFR BLD: 91 % (ref 92–100)
OXYHGB MFR BLD: 91 % (ref 92–100)
OXYHGB MFR BLD: 98 % (ref 92–100)
P AXIS - MUSE: 51 DEGREES
PATH REPORT.COMMENTS IMP SPEC: NORMAL
PATH REPORT.COMMENTS IMP SPEC: NORMAL
PATH REPORT.FINAL DX SPEC: NORMAL
PATH REPORT.MICROSCOPIC SPEC OTHER STN: NORMAL
PATH REPORT.MICROSCOPIC SPEC OTHER STN: NORMAL
PATH REPORT.RELEVANT HX SPEC: NORMAL
PCO2 BLD: 32 MM HG (ref 35–45)
PCO2 BLD: 36 MM HG (ref 35–45)
PCO2 BLD: 36 MM HG (ref 35–45)
PCO2 BLD: 37 MM HG (ref 35–45)
PCO2 BLD: 38 MM HG (ref 35–45)
PCO2 BLDV: 33 MM HG (ref 40–50)
PCO2 BLDV: 35 MM HG (ref 40–50)
PCO2 BLDV: 41 MM HG (ref 40–50)
PERFORMING LABORATORY: NORMAL
PH BLD: 7.23 [PH] (ref 7.35–7.45)
PH BLD: 7.24 [PH] (ref 7.35–7.45)
PH BLD: 7.25 [PH] (ref 7.35–7.45)
PH BLD: 7.25 [PH] (ref 7.35–7.45)
PH BLD: 7.28 [PH] (ref 7.35–7.45)
PH BLDV: 7.2 [PH] (ref 7.32–7.43)
PH BLDV: 7.26 [PH] (ref 7.32–7.43)
PH BLDV: 7.28 [PH] (ref 7.32–7.43)
PH UR STRIP: 5.5 [PH] (ref 5–7)
PHOSPHATE SERPL-MCNC: 4.1 MG/DL (ref 2.5–4.5)
PLATELET # BLD AUTO: 240 10E3/UL (ref 150–450)
PLATELET # BLD AUTO: 240 10E3/UL (ref 150–450)
PLATELET # BLD AUTO: 277 10E3/UL (ref 150–450)
PO2 BLD: 179 MM HG (ref 80–105)
PO2 BLD: 57 MM HG (ref 80–105)
PO2 BLD: 71 MM HG (ref 80–105)
PO2 BLD: 74 MM HG (ref 80–105)
PO2 BLD: 74 MM HG (ref 80–105)
PO2 BLDV: 46 MM HG (ref 25–47)
PO2 BLDV: 52 MM HG (ref 25–47)
PO2 BLDV: 65 MM HG (ref 25–47)
POTASSIUM BLD-SCNC: 3.6 MMOL/L (ref 3.4–5.3)
POTASSIUM BLD-SCNC: 3.8 MMOL/L (ref 3.4–5.3)
PR INTERVAL - MUSE: 128 MS
PROT FLD-MCNC: 4 G/DL
PROT SERPL-MCNC: 6.5 G/DL (ref 6.8–8.8)
QRS DURATION - MUSE: 96 MS
QT - MUSE: 392 MS
QTC - MUSE: 477 MS
R AXIS - MUSE: 53 DEGREES
RBC # BLD AUTO: 2.01 10E6/UL (ref 3.8–5.2)
RBC # BLD AUTO: 2.07 10E6/UL (ref 3.8–5.2)
RBC # BLD AUTO: 2.07 10E6/UL (ref 3.8–5.2)
RBC URINE: 10 /HPF
S PNEUM AG SPEC QL: NEGATIVE
SA TARGET DNA: NEGATIVE
SARS-COV-2 RNA RESP QL NAA+PROBE: NEGATIVE
SODIUM SERPL-SCNC: 132 MMOL/L (ref 133–144)
SODIUM SERPL-SCNC: 133 MMOL/L (ref 133–144)
SP GR UR STRIP: 1.02 (ref 1–1.03)
SPECIMEN STATUS: NORMAL
SYSTOLIC BLOOD PRESSURE - MUSE: NORMAL MMHG
T AXIS - MUSE: 30 DEGREES
TEST NAME: NORMAL
TRANSITIONAL EPI: <1 /HPF
TRIGL FLD-MCNC: 94 MG/DL
TROPONIN I SERPL-MCNC: <0.015 UG/L (ref 0–0.04)
UROBILINOGEN UR STRIP-MCNC: NORMAL MG/DL
VENTRICULAR RATE- MUSE: 89 BPM
WBC # BLD AUTO: 22.7 10E3/UL (ref 4–11)
WBC # BLD AUTO: 22.7 10E3/UL (ref 4–11)
WBC # BLD AUTO: 28.2 10E3/UL (ref 4–11)
WBC # FLD AUTO: 244 /UL
WBC # FLD AUTO: 273 /UL
WBC URINE: 2 /HPF

## 2021-10-04 PROCEDURE — U0005 INFEC AGEN DETEC AMPLI PROBE: HCPCS | Performed by: STUDENT IN AN ORGANIZED HEALTH CARE EDUCATION/TRAINING PROGRAM

## 2021-10-04 PROCEDURE — 250N000013 HC RX MED GY IP 250 OP 250 PS 637: Performed by: STUDENT IN AN ORGANIZED HEALTH CARE EDUCATION/TRAINING PROGRAM

## 2021-10-04 PROCEDURE — 0BJL8ZZ INSPECTION OF LEFT LUNG, VIA NATURAL OR ARTIFICIAL OPENING ENDOSCOPIC: ICD-10-PCS | Performed by: INTERNAL MEDICINE

## 2021-10-04 PROCEDURE — 87075 CULTR BACTERIA EXCEPT BLOOD: CPT

## 2021-10-04 PROCEDURE — 87899 AGENT NOS ASSAY W/OPTIC: CPT | Performed by: STUDENT IN AN ORGANIZED HEALTH CARE EDUCATION/TRAINING PROGRAM

## 2021-10-04 PROCEDURE — 85730 THROMBOPLASTIN TIME PARTIAL: CPT | Performed by: STUDENT IN AN ORGANIZED HEALTH CARE EDUCATION/TRAINING PROGRAM

## 2021-10-04 PROCEDURE — 71045 X-RAY EXAM CHEST 1 VIEW: CPT

## 2021-10-04 PROCEDURE — 87070 CULTURE OTHR SPECIMN AEROBIC: CPT | Performed by: STUDENT IN AN ORGANIZED HEALTH CARE EDUCATION/TRAINING PROGRAM

## 2021-10-04 PROCEDURE — 99291 CRITICAL CARE FIRST HOUR: CPT | Mod: 25 | Performed by: INTERNAL MEDICINE

## 2021-10-04 PROCEDURE — 36620 INSERTION CATHETER ARTERY: CPT | Mod: GC | Performed by: INTERNAL MEDICINE

## 2021-10-04 PROCEDURE — 94660 CPAP INITIATION&MGMT: CPT

## 2021-10-04 PROCEDURE — 87070 CULTURE OTHR SPECIMN AEROBIC: CPT

## 2021-10-04 PROCEDURE — 258N000003 HC RX IP 258 OP 636: Performed by: STUDENT IN AN ORGANIZED HEALTH CARE EDUCATION/TRAINING PROGRAM

## 2021-10-04 PROCEDURE — 87205 SMEAR GRAM STAIN: CPT | Performed by: STUDENT IN AN ORGANIZED HEALTH CARE EDUCATION/TRAINING PROGRAM

## 2021-10-04 PROCEDURE — 71045 X-RAY EXAM CHEST 1 VIEW: CPT | Mod: 26 | Performed by: RADIOLOGY

## 2021-10-04 PROCEDURE — 88108 CYTOPATH CONCENTRATE TECH: CPT | Mod: 26 | Performed by: PATHOLOGY

## 2021-10-04 PROCEDURE — 03HY32Z INSERTION OF MONITORING DEVICE INTO UPPER ARTERY, PERCUTANEOUS APPROACH: ICD-10-PCS | Performed by: INTERNAL MEDICINE

## 2021-10-04 PROCEDURE — 87581 M.PNEUMON DNA AMP PROBE: CPT | Performed by: STUDENT IN AN ORGANIZED HEALTH CARE EDUCATION/TRAINING PROGRAM

## 2021-10-04 PROCEDURE — 36415 COLL VENOUS BLD VENIPUNCTURE: CPT | Performed by: SURGERY

## 2021-10-04 PROCEDURE — 87102 FUNGUS ISOLATION CULTURE: CPT | Performed by: STUDENT IN AN ORGANIZED HEALTH CARE EDUCATION/TRAINING PROGRAM

## 2021-10-04 PROCEDURE — 82803 BLOOD GASES ANY COMBINATION: CPT | Performed by: STUDENT IN AN ORGANIZED HEALTH CARE EDUCATION/TRAINING PROGRAM

## 2021-10-04 PROCEDURE — 88108 CYTOPATH CONCENTRATE TECH: CPT | Mod: TC | Performed by: STUDENT IN AN ORGANIZED HEALTH CARE EDUCATION/TRAINING PROGRAM

## 2021-10-04 PROCEDURE — 93010 ELECTROCARDIOGRAM REPORT: CPT | Performed by: INTERNAL MEDICINE

## 2021-10-04 PROCEDURE — 84999 UNLISTED CHEMISTRY PROCEDURE: CPT | Performed by: STUDENT IN AN ORGANIZED HEALTH CARE EDUCATION/TRAINING PROGRAM

## 2021-10-04 PROCEDURE — 0B9D8ZX DRAINAGE OF RIGHT MIDDLE LUNG LOBE, VIA NATURAL OR ARTIFICIAL OPENING ENDOSCOPIC, DIAGNOSTIC: ICD-10-PCS | Performed by: INTERNAL MEDICINE

## 2021-10-04 PROCEDURE — 31624 DX BRONCHOSCOPE/LAVAGE: CPT | Mod: GC | Performed by: INTERNAL MEDICINE

## 2021-10-04 PROCEDURE — 89050 BODY FLUID CELL COUNT: CPT | Performed by: STUDENT IN AN ORGANIZED HEALTH CARE EDUCATION/TRAINING PROGRAM

## 2021-10-04 PROCEDURE — 84311 SPECTROPHOTOMETRY: CPT

## 2021-10-04 PROCEDURE — 87040 BLOOD CULTURE FOR BACTERIA: CPT | Performed by: SURGERY

## 2021-10-04 PROCEDURE — 84478 ASSAY OF TRIGLYCERIDES: CPT

## 2021-10-04 PROCEDURE — 94002 VENT MGMT INPAT INIT DAY: CPT

## 2021-10-04 PROCEDURE — 87486 CHLMYD PNEUM DNA AMP PROBE: CPT | Performed by: STUDENT IN AN ORGANIZED HEALTH CARE EDUCATION/TRAINING PROGRAM

## 2021-10-04 PROCEDURE — 49083 ABD PARACENTESIS W/IMAGING: CPT | Performed by: STUDENT IN AN ORGANIZED HEALTH CARE EDUCATION/TRAINING PROGRAM

## 2021-10-04 PROCEDURE — 85025 COMPLETE CBC W/AUTO DIFF WBC: CPT | Performed by: STUDENT IN AN ORGANIZED HEALTH CARE EDUCATION/TRAINING PROGRAM

## 2021-10-04 PROCEDURE — 88305 TISSUE EXAM BY PATHOLOGIST: CPT | Mod: TC | Performed by: STUDENT IN AN ORGANIZED HEALTH CARE EDUCATION/TRAINING PROGRAM

## 2021-10-04 PROCEDURE — 87116 MYCOBACTERIA CULTURE: CPT | Performed by: STUDENT IN AN ORGANIZED HEALTH CARE EDUCATION/TRAINING PROGRAM

## 2021-10-04 PROCEDURE — 370N000003 HC ANESTHESIA WARD SERVICE

## 2021-10-04 PROCEDURE — 82550 ASSAY OF CK (CPK): CPT | Performed by: STUDENT IN AN ORGANIZED HEALTH CARE EDUCATION/TRAINING PROGRAM

## 2021-10-04 PROCEDURE — 87205 SMEAR GRAM STAIN: CPT

## 2021-10-04 PROCEDURE — 82140 ASSAY OF AMMONIA: CPT | Performed by: INTERNAL MEDICINE

## 2021-10-04 PROCEDURE — P9041 ALBUMIN (HUMAN),5%, 50ML: HCPCS | Performed by: STUDENT IN AN ORGANIZED HEALTH CARE EDUCATION/TRAINING PROGRAM

## 2021-10-04 PROCEDURE — 250N000011 HC RX IP 250 OP 636: Performed by: STUDENT IN AN ORGANIZED HEALTH CARE EDUCATION/TRAINING PROGRAM

## 2021-10-04 PROCEDURE — 87449 NOS EACH ORGANISM AG IA: CPT | Performed by: STUDENT IN AN ORGANIZED HEALTH CARE EDUCATION/TRAINING PROGRAM

## 2021-10-04 PROCEDURE — 5A1955Z RESPIRATORY VENTILATION, GREATER THAN 96 CONSECUTIVE HOURS: ICD-10-PCS | Performed by: INTERNAL MEDICINE

## 2021-10-04 PROCEDURE — 85610 PROTHROMBIN TIME: CPT | Performed by: STUDENT IN AN ORGANIZED HEALTH CARE EDUCATION/TRAINING PROGRAM

## 2021-10-04 PROCEDURE — 36415 COLL VENOUS BLD VENIPUNCTURE: CPT | Performed by: STUDENT IN AN ORGANIZED HEALTH CARE EDUCATION/TRAINING PROGRAM

## 2021-10-04 PROCEDURE — 84100 ASSAY OF PHOSPHORUS: CPT | Performed by: STUDENT IN AN ORGANIZED HEALTH CARE EDUCATION/TRAINING PROGRAM

## 2021-10-04 PROCEDURE — 83735 ASSAY OF MAGNESIUM: CPT | Performed by: STUDENT IN AN ORGANIZED HEALTH CARE EDUCATION/TRAINING PROGRAM

## 2021-10-04 PROCEDURE — 82805 BLOOD GASES W/O2 SATURATION: CPT | Performed by: STUDENT IN AN ORGANIZED HEALTH CARE EDUCATION/TRAINING PROGRAM

## 2021-10-04 PROCEDURE — 82330 ASSAY OF CALCIUM: CPT | Performed by: STUDENT IN AN ORGANIZED HEALTH CARE EDUCATION/TRAINING PROGRAM

## 2021-10-04 PROCEDURE — 82533 TOTAL CORTISOL: CPT | Performed by: STUDENT IN AN ORGANIZED HEALTH CARE EDUCATION/TRAINING PROGRAM

## 2021-10-04 PROCEDURE — 250N000011 HC RX IP 250 OP 636

## 2021-10-04 PROCEDURE — 31624 DX BRONCHOSCOPE/LAVAGE: CPT | Performed by: INTERNAL MEDICINE

## 2021-10-04 PROCEDURE — 36415 COLL VENOUS BLD VENIPUNCTURE: CPT

## 2021-10-04 PROCEDURE — 250N000009 HC RX 250: Performed by: STUDENT IN AN ORGANIZED HEALTH CARE EDUCATION/TRAINING PROGRAM

## 2021-10-04 PROCEDURE — 93321 DOPPLER ECHO F-UP/LMTD STD: CPT | Mod: 26 | Performed by: INTERNAL MEDICINE

## 2021-10-04 PROCEDURE — 89051 BODY FLUID CELL COUNT: CPT | Performed by: STUDENT IN AN ORGANIZED HEALTH CARE EDUCATION/TRAINING PROGRAM

## 2021-10-04 PROCEDURE — 82042 OTHER SOURCE ALBUMIN QUAN EA: CPT

## 2021-10-04 PROCEDURE — 87641 MR-STAPH DNA AMP PROBE: CPT | Performed by: SURGERY

## 2021-10-04 PROCEDURE — 82150 ASSAY OF AMYLASE: CPT

## 2021-10-04 PROCEDURE — 93325 DOPPLER ECHO COLOR FLOW MAPG: CPT | Mod: 26 | Performed by: INTERNAL MEDICINE

## 2021-10-04 PROCEDURE — 87798 DETECT AGENT NOS DNA AMP: CPT | Performed by: STUDENT IN AN ORGANIZED HEALTH CARE EDUCATION/TRAINING PROGRAM

## 2021-10-04 PROCEDURE — 83615 LACTATE (LD) (LDH) ENZYME: CPT

## 2021-10-04 PROCEDURE — 93308 TTE F-UP OR LMTD: CPT | Mod: 26 | Performed by: INTERNAL MEDICINE

## 2021-10-04 PROCEDURE — 86140 C-REACTIVE PROTEIN: CPT | Performed by: STUDENT IN AN ORGANIZED HEALTH CARE EDUCATION/TRAINING PROGRAM

## 2021-10-04 PROCEDURE — 999N000065 XR ABDOMEN PORT 1 VIEWS

## 2021-10-04 PROCEDURE — 31622 DX BRONCHOSCOPE/WASH: CPT

## 2021-10-04 PROCEDURE — 87210 SMEAR WET MOUNT SALINE/INK: CPT | Performed by: STUDENT IN AN ORGANIZED HEALTH CARE EDUCATION/TRAINING PROGRAM

## 2021-10-04 PROCEDURE — 93005 ELECTROCARDIOGRAM TRACING: CPT

## 2021-10-04 PROCEDURE — 250N000013 HC RX MED GY IP 250 OP 250 PS 637

## 2021-10-04 PROCEDURE — 84999 UNLISTED CHEMISTRY PROCEDURE: CPT

## 2021-10-04 PROCEDURE — 89051 BODY FLUID CELL COUNT: CPT

## 2021-10-04 PROCEDURE — 83880 ASSAY OF NATRIURETIC PEPTIDE: CPT | Performed by: STUDENT IN AN ORGANIZED HEALTH CARE EDUCATION/TRAINING PROGRAM

## 2021-10-04 PROCEDURE — 84157 ASSAY OF PROTEIN OTHER: CPT

## 2021-10-04 PROCEDURE — 82945 GLUCOSE OTHER FLUID: CPT

## 2021-10-04 PROCEDURE — 3E033XZ INTRODUCTION OF VASOPRESSOR INTO PERIPHERAL VEIN, PERCUTANEOUS APPROACH: ICD-10-PCS | Performed by: INTERNAL MEDICINE

## 2021-10-04 PROCEDURE — 84484 ASSAY OF TROPONIN QUANT: CPT | Performed by: STUDENT IN AN ORGANIZED HEALTH CARE EDUCATION/TRAINING PROGRAM

## 2021-10-04 PROCEDURE — 93325 DOPPLER ECHO COLOR FLOW MAPG: CPT

## 2021-10-04 PROCEDURE — 88305 TISSUE EXAM BY PATHOLOGIST: CPT | Mod: 26 | Performed by: PATHOLOGY

## 2021-10-04 PROCEDURE — 999N000065 XR CHEST PORT 1 VIEW

## 2021-10-04 PROCEDURE — 87206 SMEAR FLUORESCENT/ACID STAI: CPT | Performed by: STUDENT IN AN ORGANIZED HEALTH CARE EDUCATION/TRAINING PROGRAM

## 2021-10-04 PROCEDURE — 83605 ASSAY OF LACTIC ACID: CPT | Performed by: STUDENT IN AN ORGANIZED HEALTH CARE EDUCATION/TRAINING PROGRAM

## 2021-10-04 PROCEDURE — 82040 ASSAY OF SERUM ALBUMIN: CPT | Performed by: STUDENT IN AN ORGANIZED HEALTH CARE EDUCATION/TRAINING PROGRAM

## 2021-10-04 PROCEDURE — 36556 INSERT NON-TUNNEL CV CATH: CPT | Mod: GC | Performed by: INTERNAL MEDICINE

## 2021-10-04 PROCEDURE — 999N000157 HC STATISTIC RCP TIME EA 10 MIN

## 2021-10-04 PROCEDURE — 36600 WITHDRAWAL OF ARTERIAL BLOOD: CPT

## 2021-10-04 PROCEDURE — 89190 NASAL SMEAR FOR EOSINOPHILS: CPT | Performed by: STUDENT IN AN ORGANIZED HEALTH CARE EDUCATION/TRAINING PROGRAM

## 2021-10-04 PROCEDURE — 200N000002 HC R&B ICU UMMC

## 2021-10-04 PROCEDURE — 81003 URINALYSIS AUTO W/O SCOPE: CPT | Performed by: STUDENT IN AN ORGANIZED HEALTH CARE EDUCATION/TRAINING PROGRAM

## 2021-10-04 PROCEDURE — 89050 BODY FLUID CELL COUNT: CPT

## 2021-10-04 PROCEDURE — 250N000013 HC RX MED GY IP 250 OP 250 PS 637: Performed by: SURGERY

## 2021-10-04 PROCEDURE — 82247 BILIRUBIN TOTAL: CPT

## 2021-10-04 PROCEDURE — 82803 BLOOD GASES ANY COMBINATION: CPT

## 2021-10-04 PROCEDURE — 74018 RADEX ABDOMEN 1 VIEW: CPT | Mod: 26 | Performed by: RADIOLOGY

## 2021-10-04 PROCEDURE — 31624 DX BRONCHOSCOPE/LAVAGE: CPT

## 2021-10-04 PROCEDURE — 0W9G3ZZ DRAINAGE OF PERITONEAL CAVITY, PERCUTANEOUS APPROACH: ICD-10-PCS | Performed by: STUDENT IN AN ORGANIZED HEALTH CARE EDUCATION/TRAINING PROGRAM

## 2021-10-04 PROCEDURE — 250N000009 HC RX 250: Performed by: ANESTHESIOLOGY

## 2021-10-04 PROCEDURE — 85027 COMPLETE CBC AUTOMATED: CPT | Performed by: STUDENT IN AN ORGANIZED HEALTH CARE EDUCATION/TRAINING PROGRAM

## 2021-10-04 PROCEDURE — 87633 RESP VIRUS 12-25 TARGETS: CPT | Performed by: STUDENT IN AN ORGANIZED HEALTH CARE EDUCATION/TRAINING PROGRAM

## 2021-10-04 RX ORDER — HEPARIN SODIUM 5000 [USP'U]/.5ML
5000 INJECTION, SOLUTION INTRAVENOUS; SUBCUTANEOUS EVERY 12 HOURS
Status: DISCONTINUED | OUTPATIENT
Start: 2021-10-04 | End: 2021-11-05 | Stop reason: HOSPADM

## 2021-10-04 RX ORDER — NALOXONE HYDROCHLORIDE 0.4 MG/ML
0.2 INJECTION, SOLUTION INTRAMUSCULAR; INTRAVENOUS; SUBCUTANEOUS
Status: DISCONTINUED | OUTPATIENT
Start: 2021-10-04 | End: 2021-11-05 | Stop reason: HOSPADM

## 2021-10-04 RX ORDER — ALBUMIN, HUMAN INJ 5% 5 %
25 SOLUTION INTRAVENOUS ONCE
Status: COMPLETED | OUTPATIENT
Start: 2021-10-04 | End: 2021-10-04

## 2021-10-04 RX ORDER — DEXTROSE MONOHYDRATE 25 G/50ML
25-50 INJECTION, SOLUTION INTRAVENOUS
Status: DISCONTINUED | OUTPATIENT
Start: 2021-10-04 | End: 2021-10-07

## 2021-10-04 RX ORDER — DEXTROSE MONOHYDRATE 100 MG/ML
INJECTION, SOLUTION INTRAVENOUS CONTINUOUS PRN
Status: DISCONTINUED | OUTPATIENT
Start: 2021-10-04 | End: 2021-10-24

## 2021-10-04 RX ORDER — NALOXONE HYDROCHLORIDE 0.4 MG/ML
0.4 INJECTION, SOLUTION INTRAMUSCULAR; INTRAVENOUS; SUBCUTANEOUS
Status: DISCONTINUED | OUTPATIENT
Start: 2021-10-04 | End: 2021-11-05 | Stop reason: HOSPADM

## 2021-10-04 RX ORDER — POLYETHYLENE GLYCOL 3350 17 G/17G
17 POWDER, FOR SOLUTION ORAL 2 TIMES DAILY
Status: DISCONTINUED | OUTPATIENT
Start: 2021-10-04 | End: 2021-10-05

## 2021-10-04 RX ORDER — ETOMIDATE 2 MG/ML
INJECTION INTRAVENOUS
Status: COMPLETED | OUTPATIENT
Start: 2021-10-04 | End: 2021-10-04

## 2021-10-04 RX ORDER — LANOLIN ALCOHOL/MO/W.PET/CERES
100 CREAM (GRAM) TOPICAL DAILY
Status: DISCONTINUED | OUTPATIENT
Start: 2021-10-05 | End: 2021-11-05 | Stop reason: HOSPADM

## 2021-10-04 RX ORDER — FOLIC ACID 1 MG/1
1 TABLET ORAL DAILY
Status: DISCONTINUED | OUTPATIENT
Start: 2021-10-05 | End: 2021-11-05 | Stop reason: HOSPADM

## 2021-10-04 RX ORDER — DOXYCYCLINE 100 MG/10ML
100 INJECTION, POWDER, LYOPHILIZED, FOR SOLUTION INTRAVENOUS EVERY 12 HOURS
Status: DISCONTINUED | OUTPATIENT
Start: 2021-10-04 | End: 2021-10-05

## 2021-10-04 RX ORDER — UBIDECARENONE 75 MG
100 CAPSULE ORAL DAILY
Status: DISCONTINUED | OUTPATIENT
Start: 2021-10-05 | End: 2021-11-05 | Stop reason: HOSPADM

## 2021-10-04 RX ORDER — HYDROXYZINE HYDROCHLORIDE 25 MG/1
50 TABLET, FILM COATED ORAL EVERY 6 HOURS PRN
Status: DISCONTINUED | OUTPATIENT
Start: 2021-10-04 | End: 2021-11-05 | Stop reason: HOSPADM

## 2021-10-04 RX ORDER — GABAPENTIN 250 MG/5ML
100 SOLUTION ORAL
Status: DISCONTINUED | OUTPATIENT
Start: 2021-10-04 | End: 2021-10-04

## 2021-10-04 RX ORDER — HYDROXYZINE HYDROCHLORIDE 25 MG/1
25 TABLET, FILM COATED ORAL EVERY 6 HOURS PRN
Status: DISCONTINUED | OUTPATIENT
Start: 2021-10-04 | End: 2021-11-05 | Stop reason: HOSPADM

## 2021-10-04 RX ORDER — ACETAMINOPHEN 650 MG/1
325 SUPPOSITORY RECTAL EVERY 6 HOURS PRN
Status: DISCONTINUED | OUTPATIENT
Start: 2021-10-04 | End: 2021-10-17

## 2021-10-04 RX ORDER — MIDAZOLAM HCL IN 0.9 % NACL/PF 1 MG/ML
1-8 PLASTIC BAG, INJECTION (ML) INTRAVENOUS CONTINUOUS
Status: DISCONTINUED | OUTPATIENT
Start: 2021-10-04 | End: 2021-10-04

## 2021-10-04 RX ORDER — HYDROXYZINE HYDROCHLORIDE 25 MG/1
50 TABLET, FILM COATED ORAL EVERY 6 HOURS PRN
Status: DISCONTINUED | OUTPATIENT
Start: 2021-10-04 | End: 2021-10-04

## 2021-10-04 RX ORDER — ACETAMINOPHEN 325 MG/1
325 TABLET ORAL EVERY 6 HOURS PRN
Status: DISCONTINUED | OUTPATIENT
Start: 2021-10-04 | End: 2021-10-17

## 2021-10-04 RX ORDER — GABAPENTIN 250 MG/5ML
100 SOLUTION ORAL
Status: DISCONTINUED | OUTPATIENT
Start: 2021-10-04 | End: 2021-10-23 | Stop reason: ALTCHOICE

## 2021-10-04 RX ORDER — HYDROXYZINE HYDROCHLORIDE 25 MG/1
25 TABLET, FILM COATED ORAL EVERY 6 HOURS PRN
Status: DISCONTINUED | OUTPATIENT
Start: 2021-10-04 | End: 2021-10-04

## 2021-10-04 RX ORDER — NOREPINEPHRINE BITARTRATE 0.06 MG/ML
.01-.3 INJECTION, SOLUTION INTRAVENOUS CONTINUOUS
Status: DISCONTINUED | OUTPATIENT
Start: 2021-10-04 | End: 2021-10-21

## 2021-10-04 RX ORDER — MIDAZOLAM HCL IN 0.9 % NACL/PF 1 MG/ML
1-10 PLASTIC BAG, INJECTION (ML) INTRAVENOUS CONTINUOUS
Status: DISCONTINUED | OUTPATIENT
Start: 2021-10-04 | End: 2021-10-07

## 2021-10-04 RX ORDER — NICOTINE POLACRILEX 4 MG
15-30 LOZENGE BUCCAL
Status: DISCONTINUED | OUTPATIENT
Start: 2021-10-04 | End: 2021-10-07

## 2021-10-04 RX ORDER — AMINO AC/PROTEIN HYDR/WHEY PRO 10G-100/30
1 LIQUID (ML) ORAL 2 TIMES DAILY
Status: DISCONTINUED | OUTPATIENT
Start: 2021-10-04 | End: 2021-10-07

## 2021-10-04 RX ORDER — LIDOCAINE HYDROCHLORIDE 20 MG/ML
5 SOLUTION OROPHARYNGEAL ONCE
Status: DISCONTINUED | OUTPATIENT
Start: 2021-10-04 | End: 2021-10-05

## 2021-10-04 RX ORDER — MULTIVITAMIN,THERAPEUTIC
1 TABLET ORAL 2 TIMES DAILY
Status: DISCONTINUED | OUTPATIENT
Start: 2021-10-04 | End: 2021-10-05

## 2021-10-04 RX ORDER — ESCITALOPRAM OXALATE 5 MG/5ML
10 SOLUTION ORAL DAILY
Status: DISCONTINUED | OUTPATIENT
Start: 2021-10-05 | End: 2021-10-23 | Stop reason: ALTCHOICE

## 2021-10-04 RX ORDER — POTASSIUM CHLORIDE 1.5 G/1.58G
20 POWDER, FOR SOLUTION ORAL ONCE
Status: COMPLETED | OUTPATIENT
Start: 2021-10-04 | End: 2021-10-04

## 2021-10-04 RX ADMIN — VANCOMYCIN HYDROCHLORIDE 750 MG: 1 INJECTION, POWDER, LYOPHILIZED, FOR SOLUTION INTRAVENOUS at 09:27

## 2021-10-04 RX ADMIN — SALINE NASAL SPRAY 1 SPRAY: 1.5 SOLUTION NASAL at 15:08

## 2021-10-04 RX ADMIN — PHYTONADIONE 10 MG: 10 INJECTION, EMULSION INTRAMUSCULAR; INTRAVENOUS; SUBCUTANEOUS at 11:18

## 2021-10-04 RX ADMIN — RIFAXIMIN 550 MG: 550 TABLET ORAL at 09:05

## 2021-10-04 RX ADMIN — Medication 0.03 MCG/KG/MIN: at 08:09

## 2021-10-04 RX ADMIN — CALCIUM CARBONATE 600 MG (1,500 MG)-VITAMIN D3 400 UNIT TABLET 1 TABLET: at 09:04

## 2021-10-04 RX ADMIN — ROCURONIUM BROMIDE 100 MG: 10 INJECTION INTRAVENOUS at 08:12

## 2021-10-04 RX ADMIN — Medication 1 UNITS: at 08:08

## 2021-10-04 RX ADMIN — Medication 50 MCG/HR: at 08:34

## 2021-10-04 RX ADMIN — ALBUMIN HUMAN 25 G: 0.05 INJECTION, SOLUTION INTRAVENOUS at 18:20

## 2021-10-04 RX ADMIN — PIPERACILLIN AND TAZOBACTAM 3.38 G: 3; .375 INJECTION, POWDER, LYOPHILIZED, FOR SOLUTION INTRAVENOUS at 11:19

## 2021-10-04 RX ADMIN — FOLIC ACID 1 MG: 1 TABLET ORAL at 09:04

## 2021-10-04 RX ADMIN — HEPARIN SODIUM 5000 UNITS: 5000 INJECTION, SOLUTION INTRAVENOUS; SUBCUTANEOUS at 16:13

## 2021-10-04 RX ADMIN — METOCLOPRAMIDE 5 MG: 5 TABLET ORAL at 09:06

## 2021-10-04 RX ADMIN — THERA TABS 1 TABLET: TAB at 20:31

## 2021-10-04 RX ADMIN — HYDROXYZINE HYDROCHLORIDE 25 MG: 25 TABLET, FILM COATED ORAL at 05:17

## 2021-10-04 RX ADMIN — Medication 20 NG/KG/MIN: at 23:16

## 2021-10-04 RX ADMIN — MIDAZOLAM 2 MG: 1 INJECTION INTRAMUSCULAR; INTRAVENOUS at 17:16

## 2021-10-04 RX ADMIN — HYDROXYZINE HYDROCHLORIDE 25 MG: 25 TABLET, FILM COATED ORAL at 04:01

## 2021-10-04 RX ADMIN — Medication 50 MCG: at 16:45

## 2021-10-04 RX ADMIN — Medication 2 MG/HR: at 08:35

## 2021-10-04 RX ADMIN — SALINE NASAL SPRAY 1 SPRAY: 1.5 SOLUTION NASAL at 09:07

## 2021-10-04 RX ADMIN — POLYETHYLENE GLYCOL 3350 17 G: 17 POWDER, FOR SOLUTION ORAL at 11:18

## 2021-10-04 RX ADMIN — GABAPENTIN 100 MG: 100 CAPSULE ORAL at 09:04

## 2021-10-04 RX ADMIN — PIPERACILLIN AND TAZOBACTAM 3.38 G: 3; .375 INJECTION, POWDER, LYOPHILIZED, FOR SOLUTION INTRAVENOUS at 23:33

## 2021-10-04 RX ADMIN — THIAMINE HCL TAB 100 MG 100 MG: 100 TAB at 09:05

## 2021-10-04 RX ADMIN — Medication 25 MG: at 02:14

## 2021-10-04 RX ADMIN — ACETAMINOPHEN 325 MG: 325 TABLET, FILM COATED ORAL at 20:31

## 2021-10-04 RX ADMIN — MIDAZOLAM 2 MG: 1 INJECTION INTRAMUSCULAR; INTRAVENOUS at 16:45

## 2021-10-04 RX ADMIN — Medication 1 PACKET: at 20:32

## 2021-10-04 RX ADMIN — RIFAXIMIN 550 MG: 550 TABLET ORAL at 20:31

## 2021-10-04 RX ADMIN — PIPERACILLIN AND TAZOBACTAM 3.38 G: 3; .375 INJECTION, POWDER, LYOPHILIZED, FOR SOLUTION INTRAVENOUS at 18:04

## 2021-10-04 RX ADMIN — Medication 50 MCG: at 15:02

## 2021-10-04 RX ADMIN — DOXYCYCLINE 100 MG: 100 INJECTION, POWDER, LYOPHILIZED, FOR SOLUTION INTRAVENOUS at 20:22

## 2021-10-04 RX ADMIN — ETOMIDATE 16 MG: 40 INJECTION, SOLUTION INTRAVENOUS at 08:12

## 2021-10-04 RX ADMIN — POLYETHYLENE GLYCOL 3350 17 G: 17 POWDER, FOR SOLUTION ORAL at 20:31

## 2021-10-04 RX ADMIN — SALINE NASAL SPRAY 1 SPRAY: 1.5 SOLUTION NASAL at 11:20

## 2021-10-04 RX ADMIN — POTASSIUM CHLORIDE 20 MEQ: 1.5 POWDER, FOR SOLUTION ORAL at 23:33

## 2021-10-04 RX ADMIN — THERA TABS 1 TABLET: TAB at 09:05

## 2021-10-04 RX ADMIN — GABAPENTIN 100 MG: 250 SOLUTION ORAL at 18:23

## 2021-10-04 RX ADMIN — PIPERACILLIN AND TAZOBACTAM 3.38 G: 3; .375 INJECTION, POWDER, LYOPHILIZED, FOR SOLUTION INTRAVENOUS at 00:56

## 2021-10-04 RX ADMIN — ESCITALOPRAM OXALATE 10 MG: 10 TABLET ORAL at 09:05

## 2021-10-04 RX ADMIN — GABAPENTIN 100 MG: 250 SUSPENSION ORAL at 15:08

## 2021-10-04 RX ADMIN — CISATRACURIUM BESYLATE 10 MG: 2 INJECTION INTRAVENOUS at 20:14

## 2021-10-04 RX ADMIN — Medication 20 NG/KG/MIN: at 08:49

## 2021-10-04 RX ADMIN — MIDAZOLAM 2 MG: 1 INJECTION INTRAMUSCULAR; INTRAVENOUS at 15:06

## 2021-10-04 RX ADMIN — PIPERACILLIN AND TAZOBACTAM 3.38 G: 3; .375 INJECTION, POWDER, LYOPHILIZED, FOR SOLUTION INTRAVENOUS at 06:05

## 2021-10-04 RX ADMIN — Medication 100 MCG: at 09:06

## 2021-10-04 ASSESSMENT — ACTIVITIES OF DAILY LIVING (ADL)
ADLS_ACUITY_SCORE: 11
ADLS_ACUITY_SCORE: 9
ADLS_ACUITY_SCORE: 11
ADLS_ACUITY_SCORE: 9

## 2021-10-04 ASSESSMENT — MIFFLIN-ST. JEOR
SCORE: 1504
SCORE: 1518

## 2021-10-04 NOTE — PROGRESS NOTES
"Bronchoscopy Risk Assessment Guidelines      A. Patient symptoms to consider when assessing pulmonary TB risk are:    I. Cough greater than 3 weeks; and fever, hemoptysis, pleuritic chest    pain, weight loss greater than 10 lbs, night sweats, fatigue, infiltrates on    upper lobes or superior segments of lower lobes, cavitation on chest    x-ray.   B. Patient risk factors to consider when assessing pulmonary TB risk are:    I. Exposure to known TB case, foreign-born persons (within 5 years of    arrival to [place]), residence in a crowded setting (correctional facility,     long-term care center, etc.), persons with HIV or immunosuppression.    Patients with symptoms and risk factors should generally be considered \"suspect risk\" and bronchoscopies should be performed in airborne precautions.    This patient has NO KNOWN RISK of Tuberculosis (proceed with bronchoscopy)    Specimens sent: yes  Complications: None  Scope used: #[id number] Slim  Attending Physician: Dr Rodrigues with CANDICE Villalobos, RT on 10/4/2021 at 5:30 PM  "

## 2021-10-04 NOTE — PLAN OF CARE
ICU End of Shift Summary. See flowsheets for vital signs and detailed assessment.    Changes this shift: Patient intubated shortly after transfer from  for hypoxic respiratory failure, requiring PEEP 15 and continuous veletri to maintain O2 saturations. Started on levo due to hypotension, presser needs slowing increasing throughout the day. Patient had paracentesis on left side, therapeutic and diagnostic. Bronch was performed, BAL sample to lab, yellow, bile like secretions. Central line and art lines were placed along with OG. Patient becoming more asynchronous with the vent this afternoon, P/F were poor and decision was made to prone, attempting to avoid paralytics. Sedation increased, bolus' of versed and fetanyl given with minimal response. Given 25 g albumin, patient 3rd spacing with generalized +3 edema. Rice with urine output of 35 mL every 2 hours.     Plan: Give paralytic, monitor synchrony with vent. ABG to monitor PaO2. Supine tomorrow after 18 hours. Radiology to place post pyloric feeding tube related to Hx of RNYGP. Maintain MAP > 65.

## 2021-10-04 NOTE — PLAN OF CARE
Admitted/transferred from: 6B  Reason for admission/transfer: Respiratory status  2 RN skin assessment: completed by Jessica AHUJA RN and Kenzie MEZA RN  Result of skin assessment and interventions/actions: Cracked heels/feet. Rash on back  Height, weight, drug calc weight: Done  Patient belongings (see Flowsheet): Purse of belongings.  MDRO education added to care planN/A    Pt is A&Ox4. Anxious. Sinus rhythm, HR 90s. On BiPap at 90% FiO2, sats at 93%. Placed barrett to monitor fluid status. Vanco not delivered from pharm yet. 2 PIVs, 1 TKO.

## 2021-10-04 NOTE — PROCEDURES
[facility]    Arterial line placement    Date/Time: 10/4/2021 11:00 AM  Performed by: Philomena Owens MD  Authorized by: Philomena Owens MD     UNIVERSAL PROTOCOL   Site Marked: NA  Prior Images Obtained and Reviewed:  NA  Required items: Required blood products, implants, devices and special equipment available    Patient identity confirmed:  Arm band  Patient was reevaluated immediately before administering moderate or deep sedation or anesthesia  Confirmation Checklist:  Procedure was appropriate and matched the consent or emergent situation  Time out: Immediately prior to the procedure a time out was called    Universal Protocol: the Joint Commission Universal Protocol was followed    Preparation: Patient was prepped and draped in usual sterile fashion    ESBL (mL):  0      Indication: multiple ABGs respiratory failure hemodynamic monitoring  Location:  Left radial      SEDATION    Patient Sedated: Yes    Sedation:  See MAR for details  Vital signs: Vital signs monitored during sedation      PROCEDURE DETAILS    Needle Gauge:  18  Seldinger technique: Seldinger technique used    Number of Attempts:  1  Post-procedure:  Line sutured and dressing applied    PROCEDURE   Patient Tolerance:  Patient tolerated the procedure well with no immediate complications  Describe Procedure: Initial attempt was on right radial artery.  Needle was placed intravascular with good protrusion of the wire; however, the wire would not retract back through the cathter, so the whole catheter and needle system was removed revealing kinking of the wire past the catheter tip preventing wire retraction.  A second attempt was made on the right radial artery with the Seldinger technique, but artery wall contraction was reduced so there was no return of pulsatile flow.  A third attempt was made after draping and sterilizing the left radial and this attempt was successful on the first  try.  Length of time physician/provider present for 1:1 monitoring during sedation: 20

## 2021-10-04 NOTE — PROCEDURES
ICU BRONCHOSCOPY    Procedure(s):    Bronchoscopy  Bronchalveolar Lavage (RML site(s), see below for details)    Indication:  Respiratory failure    Procedure Details:     The bronchoscope was inserted through the mouth via ETT.    Airway Examination:  A complete airway examination was performed from the distal trachea to the subsegmental level in each lobe of both lungs.  Pertinent findings include diffuse thick yellow secretions in bilateral lungs.                                                                                                                                                                             BAL:  A bronchoalveolar lavage was performed.  The scope was wedged in the RML Medial and then 120 ml of normal saline was instilled.  Gentle suction was then applied with a total return of 60 ml of yellow fluid.           Any disposable equipment was visually inspected and deemed to be intact immediately post procedure.      Recommendations:     -->  Infectious panel, cytology, and cell count submitted for diagnostics  ---> Titrate FiO2 as needed    ==========================================================    Attending of Record:   Zachary Rodrigues MD    Trainee(s) Present: Philomena Owens MD (fellow)    Medications:    5 ml 1% lidocaine  4 mg versed  75 mcg fentanyl    Sedation Time: Ongoing due to critical illness    Time Out:  Performed by verifying the correct patient, correct procedure, and ensuring that all equipment / support staff are present.     The patient's medical record has been reviewed.  The indication for the procedure was reviewed.  The necessary history and physical examination was performed and reviewed.  The risks, benefits and alternatives of the procedure were weighed and the decision was made to continue with the procedure despite not being able to get in direct contact with the patient's medical decision maker after multiple attempts.  Verbal consent was not obtained.   Written consent was not obtained.  This procedure was deemed emergent / urgent.  The proposed procedure and the patient's identification were verified prior to the procedure by the physician and the nurse, respiratory therapist, resident physician (resident / fellow).    Bronchoscopy Risk Assessment Guidelines:      A. Patient symptoms to consider when assessing pulmonary TB risk are:    I. Cough greater than 3 weeks; and fever, hemoptysis, pleuritic chest    pain, weight loss greater than 10 lbs, night sweats, fatigue, infiltrates on    upper lobes or superior segments of lower lobes, cavitation on chest    x-ray.   B. Patient risk factors to consider when assessing pulmonary TB risk are:    I. Exposure to known TB case, foreign-born persons (within 5 years of    arrival to [place]), residence in a crowded setting (correctional facility,     long-term care center, etc.), persons with HIV or immunosuppression.    A Tuberculosis risk assessment was performed:   This patient has NO KNOWN RISK of Tuberculosis (proceed with bronchoscopy)    The procedure was performed in a negative airflow room: No. The reason the patient could not be moved to a negative airflow room was critical illness.    The patient was assessed for the adequacy for the procedure and to receive medications.     Immediately before administration of medications the patient was re-assessed for adequacy to receive sedatives including the heart rate, respiratory rate, mental status, oxygen saturation, blood pressure and adequacy of pulmonary ventilation. These same parameters were continuously monitored throughout the procedure.

## 2021-10-04 NOTE — ANESTHESIA PROCEDURE NOTES
Airway       Patient location during procedure: ICU       Procedure Start/Stop Times: 10/4/2021 8:10 AM and 10/4/2021 8:30 AM  Staff -        Resident/Fellow: Kayla Cannon MD       Performed By: resident  Consent for Airway        Urgency: emergent       Consent: No emergent situation. Verbal consent obtained. Written consent not obtained.       Consent given by: patient       Risks and benefits: risks, benefits and alternatives were discussed       Patient identity confirmed: verbally with patient    Report Obtained from Primary Care Team       History regarding most recent potassium obtained: Yes       History regarding presence/absence of renal failure obtained:Yes       History regarding stroke/CVA obtained:Yes       History regarding presence/absence of NM disorder: YesIndications and Patient Condition       Indications for airway management: respiratory insufficiency       Mallampati: Not Assessed     Induction type:RSI       Mask difficulty assessment: 0 - not attempted    Final Airway Details       Final airway type: endotracheal airway       Successful airway: ETT - single  Endotracheal Airway Details        ETT size (mm): 8.0       Cuffed: yes       Successful intubation technique: video laryngoscopy       VL Blade Size: MAC 3       Grade View of Cords: 1       Adjucts: stylet       Position: Right       Measured from: lips       Secured at (cm): 22       Bite block used: None    Post intubation assessment        ETT secured       Placement verified by: capnometry, equal breath sounds and chest rise        Number of attempts at approach: 1       Number of other approaches attempted: 0       Secured with: commercial tube hamilton       Ease of procedure: easy       Dentition: Intact    Medication(s) Administered   rocuronium (ZEMURON) 10 mg/mL injection, 100 mg  etomidate (AMIDATE) injection, 16 mg  Medication Administration Time: 10/4/2021 8:12 AM    Additional Comments       Discussed with primary team.  Team prefers intubation with etomidate and requests for a larger ETT such as 8.0 for bronchoscopy after intubation. Patient started on NE infusion @ 0.03mcg/kg/min 5 minutes prior to induction.   Larynx view clear with dry yellow thick mucous on the sides. Post intubation nonparticulate yellow matter seen in the ETT which was suction with a 14F suction catheter before PPV.

## 2021-10-04 NOTE — PROGRESS NOTES
Pt intubated for airway protection. Pt intubated with ETT of 8.0/22@lip, current vent settings are mode: A/C, RR 26, , peep 15, FiO2 100%.  Will continue follow.

## 2021-10-04 NOTE — PLAN OF CARE
Neuro: A&Ox4. Intermittently anxious. Able to follow commands. Calls appropriately.   Cardiac: SR. HR 90s-100s. SBP 90s-100s.   Respiratory: Sating low 90s on BiPAP (see provider notification note). Lung sounds coarse. Frequent, weak cough  GI/: Minimal urine output, despite 40mg of lasix. BM X3, all small and loose.   Diet/appetite: 2g sodium diet. NPO while on BiPAP.  Activity:  SBA, up to chair and and commode.   Pain: denied pain during shift.   Skin: No new deficits noted.  LDA's: 2 PIV, infusing and saline locked.     Plan: Continue with POC. Notify primary team with changes.

## 2021-10-04 NOTE — ANESTHESIA CARE TRANSFER NOTE
Patient: Aliyha Angeles    * No procedures listed *    Diagnosis: Respiratory insufficiency   Diagnosis Additional Information: No value filed.    Anesthesia Type: Emergent endotracheal intubation     Note:    Oropharynx: endotracheal tube in place  Level of Consciousness: iatrogenic sedation      Independent Airway: airway patency not satisfactory and stable    Vital Signs Stable: post-procedure vital signs reviewed and stable  Report to RN Given: handoff report given  Patient transferred to: ICU  Comments:   - The patient was given 16 mg of etomidate for induction and 100 mg of rocuronium for neuromuscular blockade. 1u Vasopressin given 5 minutes prior to intubation for low BP. Primary started started pt on NE infusion.   -Larynx view clear with dry yellow thick mucous on the sides. Post intubation nonparticulate yellow matter seen in the ETT which was suction with a 14F suction catheter before PPV.   - Sats in mid 70s after intubation. Bilateral breath sounds equal with ETT at 8.0. Pt recruited and connected with vent with PEEP of 12 per ICU team.   -The patient will be paralyzed for approximately 45 minutes to 1 hour. The primary team was made aware of the patient's paralysis and has ordered midazolam + fentanyl for sedation. The bedside nurse will start sedation within the next 5 minutes.   ICU Handoff: Call for PAUSE to initiate/utilize ICU HANDOFF, Identified Patient, Identified Responsible Provider, Reviewed the Pertinent Medical History, Discussed Surgical Course, Reviewed Intra-OP Anesthesia Management and Issues during Anesthesia, Set Expectations for Post Procedure Period and Allowed Opportunity for Questions and Acknowledgement of Understanding      Vitals:  Vitals Value Taken Time   BP 85/51 10/04/21 0916   Temp     Pulse 88 10/04/21 0917   Resp 26 10/04/21 0917   SpO2 96 % 10/04/21 0917   Vitals shown include unvalidated device data.    Electronically Signed By: Kayla Cannon MD  October 4,  2021  9:18 AM

## 2021-10-04 NOTE — PROGRESS NOTES
"  Internal Medicine Crosscover Note      Aliyah Angeles MRN# [id number]   YOB: 1975 Age: 46 year old       Interval history:  Paged by nurse about patient being tachypneic to 44 and saturating low 90s on oxymask 10L. Went to see patient and she appeared labored with breathing. Stated that she was a bit short of breath. Patient was due 20mg of lasix at 8:30pm and blood transfusion was about to start. Was also due 3rd dose of albumin shortly.    Addendum: Patient was placed on HFNC and repeat VBG showing little improvement. She continues to be tachpneic and SPO2 in low 90s and increased work of breathing. Lasix 40mg IV with only 110cc output. CXR showing increased \"interstitial and airspace opacities. Likely small increase in bilateral pleural effusions as well\". BMP showing increase in Cr from 1.27 to 1.53.     Vitals and labs reviewed.  Notable for: SPO2 90%, , Cr 1.27, Hgb 6.9  Physical exam notable for: 2+ pitting edema in bilateral lower extremities, distended abdomen, crackles in bilateral lungs, and increased work of breathing.    A/P  - Ordered STAT XR chest  - Transitioned to HFNC from oxymask for goal SPO2 >92%  - VBG x2 (before and will repeat 1 hour after starting HFNC)  - Gave 40mg lasix x 1 (instead of scheduled 20mg)  - BMP  - Holding pRBC transfusion and albumin  - ECHO ordered for the am    At second time: placed patient on BiPAP and ordered VBG for 1.5 hours after BiPAP. Did bedside US showing collapsible IVC, adequate cardiac squeeze, and no signs of pericardial effusion. Decided to hold off on further diuresing patient given tenuous fluid status and soft BP.     Garret Butler Jr.  Internal Medicine Resident, PGY-1  [facility]    "

## 2021-10-04 NOTE — PROGRESS NOTES
Addendum 14:40:  Called Radiology regarding ability to place ppFT today. Radiology unable to place ppFT until tomorrow afternoon likely. Discussed with MICU team and RN, krish for trophic TF via OGT today. Plan is to prone pt. Will coordinate ppFT placement with Radiology tomorrow.     Adjusting TF orders: Osmolite 1.5 @ 10 ml/hr + 2 prosource.       CLINICAL NUTRITION SERVICES - ASSESSMENT NOTE     Nutrition Prescription    RECOMMENDATIONS FOR MDs/PROVIDERS TO ORDER:  --Additional water flushes as per primary team.     Vitamin/Mineral Recommendations after Gastric Bypass:         --45-60 mg elemental Iron daily   --5000 international units Vitamin D daily   --One B50 complex tablet daily    Malnutrition Status:    Severe malnutrition in the context of acute on chronic illness    Recommendations already ordered by Registered Dietitian (RD):  --Discontinued supplements with NPO status.  --Released ppFT order to Radiology with history of gastric bypass. Recommend TF via OGT vs ppFT if able to be placed.     TF:  --GOAL: Osmolite 1.5 Faizan @ goal of 45ml/hr (1080ml/day) + 2 Prosource will provide: 1700 kcals (28 kcal/kg), 89 g PRO (1.5 g/kg), 822 ml free H20, 219 g CHO, and 0 g fiber daily.  --Start TF @ 15 ml/hr and advance by 10 ml q 8 hrs until goal rate.  --Do not start or advance TF rate unless K+ >3.0, Mg++ > 1.5,  and Phos > 1.9.  --Minimum 30 ml q 4 hrs water flushes for tube patency.  --If gastric enteral access: HOB > 30 degrees  --Thera-vit-M BID    Future/Additional Recommendations:  --FT position, TF tolerance, advancement.  --Monitor stool trends, consider checking Fecal Elastase with history of acute pancreatitis.   --Weight trends.      REASON FOR ASSESSMENT  Aliyah Angeles is a/an 46 year old female assessed by the dietitian for Provider Order - Registered Dietitian to Assess and Order TF per Medical Nutrition Therapy Protocol and ppFT placement (released to Radiology due to history of gastric bypass)  "    NUTRITION HISTORY  PMH gastric bypass, alcohol use disorder (sober since 9/12/2021), alcoholic hepatitis, type 2 diabetes, anxiety, and depression presenting with 4 days of abdominal pain, nausea, vomiting, and not tolerating her oral medications. Note, pt has had multiple OSH admissions for acute pancreatitis.      Per H&P: \"Patient reports she was admitted here from 9/13-9/21 and was discharged to an alcohol treatment facility. She states after discharge her diet drastically changed as she did not have access to low sodium foods or diabetes friendly foods at her facility. She did not feel well on this diet and states after a 4 days (9/25) she began to have abdominal pain (RUQ, severe sharp intermittent 4-10 times an hour lasting ~10seconds), nausea, and vomiting and was not able to keep down or tolerate her oral medications. She notes decreased bowel movements due to not tolerating her lactulose.\"    Pt was intubated this morning, under COVID precautions for suspected COVID+. Pt was last seen by inpatient RD 9/13 and 9/20.     CURRENT NUTRITION ORDERS  Diet: NPO, previously on 2 g sodium diet with Ensure Enlive between means from 9/29-10/3 with minimal PO intake    LABS  Na 132 (L)  Cr 1.61 (H)  Total bili 13.2 (H)  CRP 96.0 (H)    MEDICATIONS  Caltrate  Vitamin B12 (100 mcg)  Folic acid  Reglan TID before meals (on hold)  Thera-vit-M daily  Miralax  Rifaximin  Thiamine   Fentanyl  Versed  Norepi @ 0.08 mcg/kg/min    ANTHROPOMETRICS  Height: 162.6 cm (5' 4\")  Most Recent Weight: 89.8 kg (197 lb 15.6 oz); driest weight 87.5 kg on 10/1    IBW: 54.5 kg (143% IBW)  BMI: Overweight BMI 25-29.9  Weight History:   09/13/21 78 kg (171 lb 15.3 oz)     Wt Readings from Last 30 Encounters:   10/04/21 89.8 kg (197 lb 15.6 oz)   09/20/21 86.7 kg (191 lb 3.2 oz)   04/14/20 74.8 kg (165 lb)   04/08/20 74.8 kg (165 lb)   09/16/19 68.6 kg (151 lb 3.2 oz)   01/18/19 71.5 kg (157 lb 11.2 oz)   01/03/19 70.5 kg (155 lb 6.4 oz) "   04/27/18 69 kg (152 lb 3.2 oz)   12/14/17 65.9 kg (145 lb 4.8 oz)   10/11/16 69.9 kg (154 lb)   03/07/13 58.1 kg (128 lb)     Dosing Weight: 60 kg adjusted based on IBW (54.5 kg) and driest weight on 9/13 (78 kg)    ASSESSED NUTRITION NEEDS  Estimated Energy Needs: 5578-2239 kcals/day (25 - 30 kcals/kg)  Justification: Maintenance and Vented  Estimated Protein Needs: 72-90 grams protein/day (1.2 - 1.5 grams of pro/kg)  Justification: Increased needs  Estimated Fluid Needs: (1 mL/kcal)   Justification: Maintenance and Per provider pending fluid status    PHYSICAL FINDINGS  See malnutrition section below.  ETT  OGT (AXR)  Radiology consulted to place ppFT  Abdomen firm, distended    MALNUTRITION  % Intake: </= 50% for >/= 5 days (severe)  % Weight Loss: Unable to assess due to fluid shifts  Subcutaneous Fat Loss: Upper arm:  Mild and Lower arm:  Mild *RD note 9/20; pt on COVID+ precautions  Muscle Loss: Thoracic region (clavicle, acromium bone, deltoid, trapezius, pectoral):  Moderate, Upper arm (bicep, tricep):  Moderate, Lower arm  (forearm):  Moderate and Dorsal hand:  Moderate per visual (pt's lower body wrapped in blankets) *RD note 9/20; pt on COVID+ precautions  Fluid Accumulation/Edema: 3+ moderate edema  Malnutrition Diagnosis: Severe malnutrition in the context of acute on chronic illness    NUTRITION DIAGNOSIS  Inadequate oral intake related to poor appetite earlier in admission now mechanically ventilated, inhibiting ability to take nutrition PO as evidenced by NPO status x 1 day and PO intake <50% of meals x 5 days, requiring enteral nutrition to meet 100% of needs.       INTERVENTIONS  Implementation   Collaboration with other providers - rounds  Enteral Nutrition - Initiate     Goals  Total avg nutritional intake to meet a minimum of 25 kcal/kg and 1.2 g PRO/kg daily (per dosing wt 60 kg).     Monitoring/Evaluation  Progress toward goals will be monitored and evaluated per protocol.    Iris Vogel,  MS, RD, LD, [facility] pager: [phone]  ASCOM: [id number]

## 2021-10-04 NOTE — PROVIDER NOTIFICATION
2045: Pt RR is in the 40s per minute on oxymask 8L. Should we hold the blood and give a dose of lasix. Carlos A Butler MD paged.   Orders: Hold blood and scheduled albumin for time being. Chest x-ray, VBG, and CMP ordered. Pt placed on high flow at 65% with a SpO2 goal of 93%. Administer 40mg of Lasix.      2315: Pt is still having increased SOB with a RR in the 30s. Now rq 80% FiO2. Output has only been 100ml. Carlos A Butler MD was paged.   Orders: Pt placed on BiPAP at 65% FiO2. Another VBG was ordered. Orders to hold second dose of lasix.     0235:Pt is wondering if she can take a brake from the BiPAP due to increased anxiety and feelings of claustrophobia.  Orders: MD said it was okay for her to take a brake from the BiPAP. Pt will alternate between HFNC and BiPAP.     0345: Pt is back on HFNC at 100% FiO2 at 60L and sitting in the chair. Would we want to try antianxiety medication to decrease the anxiety on BiPAP?  Orders: PRN dose of Atarax 25mg-50mg was order to decrease anxiety.     0545: Pt's sating in the high 80s so she was put back on BiPAP. Sating 89% SpO2 on 100% FiO2. MD notified and RT called.

## 2021-10-04 NOTE — PROGRESS NOTES
Transfer  Transferred from: 7D  Via: wheelchair  Reason for transfer: Pt appropriate for 6B- improved/worsened patient condition. Increase SOB, abdominal pain. And unstable hgb of 6.9  Family: Aware of transfer  Belongings: Received with pt  Chart: Received with pt  Medications: Meds received from old unit with pt  Code Status verified on armband: yes  2 RN Skin Assessment Completed By: Gracie Ventura. And Doug Butler  Med rec completed: yes/no  Bed surface reassessed with algorithm and charted: yes  New bed surface ordered: no  Suction/Ambu bag/Flowmeter at bedside: yes    Report received from: Kelly MCCRACKEN  Pt status: Pt requiring 10L O2 per oxymask with RR in the 40s. Team notified.

## 2021-10-04 NOTE — PROGRESS NOTES
Internal Medicine Crosscover Note      Aliyah Angeles MRN# [id number]   YOB: 1975 Age: 46 year old       Interval history:  Patient with continued tachypnea.  Had anxiety with bipap (65% FiO2), so switched back to HFNC around 1am (100% FiO2).  Satting mid 90s, though still tachypneic.  Repeat VBG unchanged.  Received call around 6am - patient desatted further on HFNC, satting in mid 80s.  Switched back to bipap at this time, though now required 100% to maintain sats of low 90s.  Minimal output from lasix given earlier in shift.  Tachypnea unchanged.  Decision made to transfer to ICU for impending intubation, given severe, rapid, and worsening respiratory status (was on room air prior to transferring to step down unit around 6pm).  Patient confirmed bedside that she would be okay with intubation.  - Transfer to ICU - updated Ashli Langford, and bed coordinator, who agree with plan  - Echocardiogram ordered for AM, given elevated BNP and pulmonary edema        LIBBY Castanon M.D.  Internal Medicine PGY2  Pager [phone]

## 2021-10-04 NOTE — PROCEDURES
[facility]    Central line    Date/Time: 10/4/2021 10:15 AM  Performed by: Philomena Owens MD  Authorized by: Philomena Owens MD   Indications: vascular access    UNIVERSAL PROTOCOL   Site Marked: Yes  Prior Images Obtained and Reviewed:  NA  Required items: Required blood products, implants, devices and special equipment available    Patient identity confirmed:  Arm band  Patient was reevaluated immediately before administering moderate or deep sedation or anesthesia  Confirmation Checklist:  Procedure was appropriate and matched the consent or emergent situation  Time out: Immediately prior to the procedure a time out was called    Universal Protocol: the Joint Commission Universal Protocol was followed    Preparation: Patient was prepped and draped in usual sterile fashion    ESBL (mL):  1         ANESTHESIA    Local Anesthetic: Lidocaine 1% without epinephrine  Anesthetic Total (mL):  3      SEDATION    Patient Sedated: Yes    Sedation:  See MAR for details  Vital signs: Vital signs monitored during sedation      Preparation: skin prepped with 2% chlorhexidine  Skin prep agent dried: skin prep agent completely dried prior to procedure  Sterile barriers: all five maximum sterile barriers used - cap, mask, sterile gown, sterile gloves, and large sterile sheet  Hand hygiene: hand hygiene performed prior to central venous catheter insertion  Site selection rationale: ease of access, leaving right IJ available in case dialysis is needed  Patient position: HOB 45 degrees.  Catheter type: triple lumen  Ultrasound guidance: yes  Sterile ultrasound techniques: sterile gel and sterile probe covers were used  Number of attempts: 1  Successful placement: yes  Post-procedure: line sutured and dressing applied  Assessment: blood return through all ports,  placement verified by x-ray and no pneumothorax on x-ray    PROCEDURE   Patient Tolerance:  Patient tolerated the  procedure well with no immediate complications    Length of time physician/provider present for 1:1 monitoring during sedation: 25

## 2021-10-04 NOTE — PLAN OF CARE
Transfer  Transferred to: 4C  Via:bed  Reason for transfer:Pt no longer appropriate for 6B- worsened patient condition  Family: Aware of transfer  Belongings: Packed and sent with pt  Chart: Delivered with pt to next unit  Medications: Meds sent to new unit with pt  Report given to: Jessica Walton RN  Pt status: Patient having increased respiratory needs. On BiPAP 80%FiO2.

## 2021-10-04 NOTE — PHARMACY
Pharmacy Tube Feeding Consult    Medication reviewed for administration by feeding tube and for potential food/drug interactions.    Recommendation: No changes are needed at this time.     Pharmacy will continue to follow as new medications are ordered.    Edilma Moreno, JulienD

## 2021-10-04 NOTE — H&P
MEDICAL ICU H&P  10/04/2021    Date of Hospital Admission: 9/29/2021  Date of ICU Admission: 10/4/2021  Reason for Critical Care Admission: Acute hypoxemic respiratory failure  Date of Service (when I saw the patient): 10/04/2021    ASSESSMENT: Aliyah Angeles is a 46 year old woman with alcohol use disorder (last drink 9/12/21), alcoholic hepatitis, jaimie en y gastric bypass, alcohol pancreatitis, pancreatic cyst, Type 2 DM, anxiety and depression admitted for abdominal pain, lower extremity edema, and nausea that started after starting low sodium and diabetic friendly foods in an alcohol treatment facility. Patient was admitted to the medicine floor with abdominal pain and vomiting on 9/29/2021. Overnight 10/3-10/4, patient had worsening respiratory status, concern for sepsis requiring transfer to the ICU for intubation and possible need for pressors as well as further evaluation and management for possible sepsis.     CHANGES and MAJOR THINGS TODAY:   - Intubated, Started midazolam and fentanyl for sedation  - Started Miralax BID  - Obtain ammonia level, ABG, BNP, UA, EKG, trops, cortisol, lactate, blood/urine/respiratory cultures, peripheral smears, urine legionella and s. pneumo antigens, fungal cultures, Urine eosinophils, procal, CRP, COVID test, MRSA swab, B12/folate, INR, PTT, fibrinogen  - Pending consent for thoracentesis  - Start epoprostenol at 20 ng/kg/min  - Start Vit K IV  - Holding feeds, pending OG tube placement with radiology (she is s/p gastric bypass)  - Starting doxycycline today  - Vancoycin, pip/tazo started overnight --> discontinue vancomycin  - Bronchoscopy completed today  - Paracentesis completed today    PLAN:    Neuro:  # Pain and sedation  She was sedated and intubated on 10/4. She is currently at goal for her sedation and stable on the ventilator.   - Midazolam drip + boluses  - Fentanyl drip + boluses  - Likely will use cisatracurium for paralysis if needed for vent synchrony  -  RASS goal -4 to -5    #Anxiety and depression  - PTA escitalopram    # Encephalopathy, likely acute toxic metabolic vs infection  In the setting of hepatic encephalopathy and evidence of portal hypertension on US. Uncertain if pneumonia vs SBP in the setting of septic shock.   - Miralax 17 g BID  - Check ammonia level  - On rifaximin  - Gabapentin 100 mg TID    Pulmonary:  # Acute hypoxemic respiratory failure  # ARDS  # Concern for infection, pneumonia and sepsis  Acute onset hypoxemia, bilateral opacities are consistent with ARDS. No evidence of cardiac etiology. Bilateral ground glass infiltrates on imaging, with bilateral pleural effusions. Increased leukocytosis. Persistent hypoxia and high airway pressures. We are treating for ARDS with intermittent proning and respiratory support. We will work her up for a sepsis source and pneumonia (see ID).   - Intubated today  - Lung protective ventilation  - Thoracentesis likely within next couple of days  - Bronchoscopy completed today, with bile-like fluid suspected aspiration  - Prone positioning to be assessed daily --> likely will prone tonight  - NMB to achieve ventilator synchrony  - Inhaled epoprostenol at 20 ng/kg/min  - Volume management to be assessed daily  - Will consider need for nebulizer treatments, no known primary lung disease  - Trend ABG  - Infectious work-up and treatment as below    Ventilation Mode: CMV/AC  (Continuous Mandatory Ventilation/ Assist Control)  FiO2 (%): 80 %  Rate Set (breaths/minute): 26 breaths/min  Tidal Volume Set (mL): 330 mL  PEEP (cm H2O): (S) 12 cmH2O  Oxygen Concentration (%): 60 %  Resp: (!) 33    Cardiovascular:  # Hypotension, in the setting of shock, possible sepsis  # Edema, possible intravascular depletion  Echo 10/4 with hyperdynamic EF >70%. Moderate tricuspid insufficiency. BNP was over 16,000 this AM --> >28,000.   - Echo today as above  - EKG  - Troponin today, WNL  - Levophed for goal MAP >65  - Random cortisol  today  - Trend lactate, urine output  - Give albumin 5% 500 mL once today    GI/Nutrition:  # Alcoholic hepatitis  # Alcohol use disorder  # Lower extremity edema, ascites  # Hyperbilirubinemia  # History of gastric bypass  Patient has been sober since 9/12 and has been at a treatment facility since her discharge 9/21. Hepatomegaly without cirrhosis. Drastic diet change (unable to do low sodium diet at treatment facility), new metformin, increased stool output 2/2 lactulose triggered decompensation which was worsened by not being able to take her home meds due to vomiting. SAAG 1.1 which is less suggestive of portal hypertension causing ascites. Diagnostic paracentesis on admission without SBP. Suspect increased abdominal distension on 10/1 is from bloating and gas possibly due to lactulose given only small ascites on U/S. Discontinued lactulose 10/1 and then had decreased bowel movements. US on 9/29 showing hepatomegaly. Normal lipase on admission. CT A&P with severe hepatic steatosis, portal HTN, ascites, splenomegaly. Paracentesis completed today revealed hazy fluid, 244 nucleated cells (WNL), and insignificant diff.   - GI following  - Miralax BID  - Trend bilirubin, INR --> MELD labs  - Hold feeds today  - B12, folate, and thiamine   - Feeding tube placement with radiology    MELD-Na score: 32 at 10/4/2021  9:53 AM  MELD score: 31 at 10/4/2021  9:53 AM  Calculated from:  Serum Creatinine: 1.61 mg/dL at 10/4/2021  4:30 AM  Serum Sodium: 132 mmol/L at 10/4/2021  4:30 AM  Total Bilirubin: 13.2 mg/dL at 10/4/2021  4:30 AM  INR(ratio): 2.48 at 10/4/2021  9:53 AM  Age: 46 years    Renal/Fluids/Electrolytes:  #Anion gap metabolic acidosis  Likely 2/2 worsening cirrhosis in the setting of not taking meds as above in addition to gastric loses from vomiting. Partially compensating with respiratory alkalosis. Lactic acid also mildly elevated, combination of poor perfusion and oxygenation.   - CTM  - Trend lactic  acid    #Acute kidney injury  Baseline creatinine ~0.98, increased to 1.14 on 10/1/21. May be related to vomiting, lactulose, third spacing. Likely pre-renal. LE edema is prominent. Intravascular volume likely low; evidenced by low venous volume on echo. Did initially improve after one day of albumin. UA today significant for moderate blood, 50 mg/dl protein, and moderate bilirubin. No evidence of infection.   - Replacement protocols (Mg, Ph, K).   - Fluid restriction  - Check CK  - Urine eosinophils  - Consider diuresis based on echo --> hyperdynamic, normal IVC diameter  - Strict I/O's  - Consider nephrology consult if urine output continues to down-trend    Endocrine:  # Type 2 diabetes mellitus  - Stop the medium sliding scale  - Check BG q4h and PRN    ID:  # Sepsis  Concern for pneumonia, CAP vs HAP. Rule-out COVID. Less concern for SBP given negative paracentesis on admission, small volume of ascites. Worsening leukocytosis. Afebrile. No clear source of infection. No evidence of UTI on UA. Paracentesis completed today revealed hazy fluid, 244 nucleated cells (WNL), and insignificant diff.   - Respiratory culture ordered  - Blood and urine cultures sent 10/3, repeat blood cultures today  - Urine Legionella and strep pneumo antigens, fungal cultures  - Started on vancomycin and Zosyn overnight --> discontinued vancomycin (MRSA negative)  - Will start doxycycline for atypical coverage  - Procal elevated (2.87), CRP elevated (96.0)  - Repeat COVID sent --> negative  - MRSA swab sent --> negative  - Consider diagnostic thoracentesis    Hematology:    # Anemia; Macrocytic  # Coagulopathy  # Acute epistaxis  In the setting of alcohol use disorder, liver failure, alcoholic hepatitis. INR elevated. Likely 2/2 to B12 or folate deficiency due to long history of alcohol use disorder. Also started albumin challenge 10/1, so may be dilutional, however anemia is out of proportion to decrease in other cells lines. B12 high,  folate wnl. Hemolysis labs negative (LDH wnl, haptoglobin WNL, direct bilirubinemia). Nosebleeds starting 10/3. She did not have any acute epistaxis today.   - Transfuse for Hgb <7  - B12, folate levels  - Transfuse for PLTs <10, or if active bleeding and platelets are <30  - Vitamin K IV 10 mg x3 days  - Follow INR  - Continue Afrin for up to 3 days    Musculoskeletal:  No active issues.    Skin:  No active issues.    General Cares/Prophylaxis:    DVT Prophylaxis: Heparin  GI Prophylaxis: PPI  Restraints: None  Family Communication: Father, Melvin. Best time to call is 8:30 to 9:00 AM  Code Status: Full Code    Lines/tubes/drains:  - PIV x2  - ETT  - Rice  - Central line  - Arterial line    Disposition:  - Medical ICU    Patient seen and findings/plan discussed with medical ICU staff, Dr. Rodrigues.    René Ascencio, MS4    Resident/Fellow Attestation   I, Hayley Severson, was present with the medical student who participated in the service and in the documentation of the note.  I have verified the history and personally performed the physical exam and medical decision making.  I agree with the assessment and plan of care as documented in the note.      Hayley Severson, MD-MPH  Internal Medicine-Pediatrics PGY-1    -----------------------------------------------------------------------    HISTORY PRESENTING ILLNESS: Aliyah Angeles is a 46 year old female with a history of alcohol use disorder (sober since 9/12/2021), alcoholic hepatitis, type 2 diabetes, anxiety, and depression presented with 4 days of abdominal pain, nausea, vomiting, and not tolerating her oral medications on 9/30. History obtained from chart review, as patient unable to verbalize and intubated soon after arrival to the MICU.      Patient was admitted here from 9/13-9/21 and was discharged to an alcohol treatment facility. On 9/25. she began to have abdominal pain (RUQ, severe sharp intermittent 4-10 times an hour lasting ~10seconds), nausea, and  vomiting and was not able to keep down or tolerate her oral medications. She thought her symptoms were related to a diet change in the treatment facility. She felt foggy and noticed her jaundice, extremity edema, and ascites began to worsen (after improving during her hospitalization).    It was discovered that she had a macrocytic anemia in the setting of recurrent epistaxis. Last night, she was tachypneic to 44 and saturating low 90s on oxymask 10L with labored breathing. Patient was placed on HFNC and repeat VBG showing little improvement. She continued to be tachpneic and SPO2 in low 90s. Lasix 40mg IV with only 110cc output. She was intubated this AM in the ICU. She has had persistent hypoxia on the vent requiring adjustment of settings and increased FiO2, and is currently on norepi for BP support. Sedated with fentanyl, midazolam.     REVIEW OF SYSTEMS:   Unable to be assessed due to patient neurological status, intubated, sedated.     PAST MEDICAL HISTORY:   Past Medical History:   Diagnosis Date     Acne      ADHD (attention deficit hyperactivity disorder)      Arthralgia of temporomandibular joint 5/19/2016     Arthritis      Cervical high risk HPV (human papillomavirus) test positive 4/27/2018 4/27/18 NIL pap, + HR HPV (not 16 or 18). Plan: cotest in 1 yr.       Cobalamin deficiency 11/24/2014     Drug-seeking behavior 5/19/2016    Overview:  Per  website, patient has filled with multiple controlled substances with multiple prescribers at multiple pharmacies      History of blood transfusion 01/01/1988     Insomnia 7/21/2013     Raynaud's phenomenon 5/25/2015     Vitamin D deficiency 1/16/2013     SURGICAL HISTORY:  Past Surgical History:   Procedure Laterality Date     ABDOMEN SURGERY  08/10/2010    Gastric  bypass     BIOPSY  1/1/94    HPV, multiple reoccurrances, partial  hysterectomy  2013     CHOLECYSTECTOMY  01/01/2010     COLONOSCOPY  01/01/2009     ENT SURGERY  01/01/1988    Tonsils     GI  SURGERY  8/10/10    [facility]     HYSTERECTOMY VAGINAL  04/05/2013    pathology of cervix benign.      SOCIAL HISTORY:  Social History     Socioeconomic History     Marital status: Legally      Spouse name: None     Number of children: None     Years of education: None     Highest education level: None   Occupational History     None   Tobacco Use     Smoking status: Current Every Day Smoker     Packs/day: 1.00     Years: 15.00     Pack years: 15.00     Types: Cigarettes     Start date: 1/1/1989     Smokeless tobacco: Never Used     Tobacco comment: 10/1/2021: Declined behavioral counseling, NRT, and post-hosp follow-up   Substance and Sexual Activity     Alcohol use: Yes     Alcohol/week: ??     Comment: daily unknown amount      Drug use: No     Sexual activity: Yes     Partners: Male     Birth control/protection: Male Surgical, Female Surgical     Comment:  one partner   Other Topics Concern     Parent/sibling w/ CABG, MI or angioplasty before 65F 55M? No   Social History Narrative     None     Social Determinants of Health     Financial Resource Strain:      Difficulty of Paying Living Expenses:    Food Insecurity:      Worried About Running Out of Food in the Last Year:      Ran Out of Food in the Last Year:    Transportation Needs:      Lack of Transportation (Medical):      Lack of Transportation (Non-Medical):    Physical Activity:      Days of Exercise per Week:      Minutes of Exercise per Session:    Stress:      Feeling of Stress :    Social Connections:      Frequency of Communication with Friends and Family:      Frequency of Social Gatherings with Friends and Family:      Attends Denominational Services:      Active Member of Clubs or Organizations:      Attends Club or Organization Meetings:      Marital Status:    Intimate Partner Violence:      Fear of Current or Ex-Partner:      Emotionally Abused:      Physically Abused:      Sexually Abused:      FAMILY HISTORY:   Family History  "  Problem Relation Age of Onset     Prostate Cancer Father      Hypertension Father      Hyperlipidemia Father      Substance Abuse Father      Obesity Father      Diabetes Maternal Grandmother      Colon Cancer Maternal Grandmother      Other Cancer Maternal Grandmother         pancreatic cancer     Obesity Maternal Grandmother      Diabetes Paternal Grandmother      Obesity Paternal Grandmother      Diabetes Paternal Grandfather      Obesity Paternal Grandfather      Breast Cancer Sister      Breast Cancer Sister      Anxiety Disorder Sister      Anesthesia Reaction Sister      Depression Mother      Anesthesia Reaction Mother      Asthma Daughter      Coronary Artery Disease No family hx of      Hypertension No family hx of      Hyperlipidemia No family hx of      Cerebrovascular Disease No family hx of      Depression No family hx of      Anxiety Disorder No family hx of      Mental Illness No family hx of      Substance Abuse No family hx of      Anesthesia Reaction No family hx of      Asthma No family hx of      Osteoporosis No family hx of      Genetic Disorder No family hx of      Thyroid Disease No family hx of      Obesity No family hx of      Unknown/Adopted No family hx of      ALLERGIES:   Allergies   Allergen Reactions     Nsaids      Gastric bypass     Trazodone      In a \"hazy\" for the whole next day     MEDICATIONS:  No current facility-administered medications on file prior to encounter.  albuterol (PROAIR HFA/PROVENTIL HFA/VENTOLIN HFA) 108 (90 Base) MCG/ACT inhaler, Inhale 2 puffs into the lungs every 4 hours as needed for shortness of breath / dyspnea or wheezing  calcium carbonate-vitamin D (OS-STEFFANY) 600-400 MG-UNIT chewable tablet, Take 1 chew tab by mouth daily  cyanocobalamin (CYANOCOBALAMIN) 1000 MCG/ML injection, INJECT 1 ML (1,000 MCG) SUBCUTANEOUS EVERY 4 WEEKS.  escitalopram (LEXAPRO) 10 MG tablet, Take 1 tablet (10 mg) by mouth daily  folic acid (FOLVITE) 1 MG tablet, Take 1 tablet (1 " mg) by mouth daily  gabapentin (NEURONTIN) 100 MG capsule, Take 1 capsule (100 mg) by mouth 3 times daily  lactulose (CEPHULAC) 20 GM packet, Take 1 packet (20 g) by mouth 3 times daily  metFORMIN (GLUCOPHAGE) 500 MG tablet, Take 1 tablet (500 mg) by mouth 2 times daily (with meals)  multivitamin (CENTRUM) chewable tablet, Take 1 tablet by mouth daily  pantoprazole (PROTONIX) 40 MG EC tablet, Take 1 tablet (40 mg) by mouth every morning (before breakfast)  prazosin (MINIPRESS) 1 MG capsule, Take 1 capsule (1 mg) by mouth every evening  rifaximin (XIFAXAN) 550 MG TABS tablet, Take 1 tablet (550 mg) by mouth 2 times daily  thiamine (B-1) 100 MG tablet, Take 1 tablet (100 mg) by mouth daily      PHYSICAL EXAMINATION:  Temp:  [97.8  F (36.6  C)-99.6  F (37.6  C)] 99.6  F (37.6  C)  Pulse:  [] 91  Resp:  [17-49] 33  BP: ()/(48-89) 78/48  MAP:  [24 mmHg-73 mmHg] 69 mmHg  Arterial Line BP: ()/(40-60) 106/55  FiO2 (%):  [60 %-100 %] 80 %  SpO2:  [77 %-100 %] 88 %  General: Intubated and sedated. Appears comfortable. Jaundiced.   HEENT: Pupils are appx 2 mm. PERRL. Scleral icterus. MMM. Nares with dried blood.   Neuro: Sedated.   Pulm/Resp: Coarse breath sounds bilaterally, diminished in the bilateral lobes. Mechanically ventilated.   CV: Tachycardic. Hyperdynamic heart sounds. No murmur noted. 3+ pitting edema in the bilateral lower extremities. Pedal pulses unable to be assessed.   Abdomen: Soft, mildly distended. Fluid wave present. Hypoactive bowel sounds.   : Not assessed.   Incisions/Skin: Icteric.     LABS: Reviewed.   Arterial Blood Gases   Recent Labs   Lab 10/04/21  1616 10/04/21  1124 10/04/21  0708   PH 7.25* 7.28* 7.25*   PCO2 36 32* 36   PO2 57* 71* 74*   HCO3 16* 15* 16*     Complete Blood Count   Recent Labs   Lab 10/04/21  0430 10/03/21  2336 10/03/21  1818 10/03/21  1128 10/03/21  0710 10/03/21  0710 10/02/21  1149 10/02/21  1149 10/02/21  0745 10/02/21  0745   WBC 22.7*  22.7*  --   --    --   --  17.4*  --  11.9*  --  11.5*   HGB 7.6*  7.6* 7.2* 6.9* 7.3*   < > 7.0*   < > 7.3*   < > 7.4*     240  --   --   --   --  189  --  166  --  177    < > = values in this interval not displayed.     Basic Metabolic Panel  Recent Labs   Lab 10/04/21  1618 10/04/21  0858 10/04/21  0758 10/04/21  0638 10/04/21  0430 10/03/21  2220 10/03/21  2116 10/03/21  1141 10/03/21  0710 10/02/21  1223 10/02/21  0745   NA  --   --   --   --  132*  --  132*  --  130*  --  132*   POTASSIUM  --   --   --   --  3.8  --  3.7  --  3.6  --  3.7   CHLORIDE  --   --   --   --  104  --  104  --  103  --  104   CO2  --   --   --   --  17*  --  16*  --  14*  --  19*   BUN  --   --   --   --  16  --  15  --  12  --  8   CR  --   --   --   --  1.61*  --  1.53*  --  1.27*  --  1.01   * 119* 120* 129* 119*   < > 124*   < > 133*   < > 108*    < > = values in this interval not displayed.     Liver Function Tests  Recent Labs   Lab 10/04/21  0953 10/04/21  0430 10/03/21  0710 10/02/21  0745 10/01/21  0627 10/01/21  0627   AST  --  58* 52* 62*  --  96*   ALT  --  14 18 22  --  28   ALKPHOS  --  86 88 135  --  208*   BILITOTAL  --  13.2* 12.7* 13.0*  --  13.9*   ALBUMIN  --  4.3 4.4 3.0*  --  1.1*   INR 2.48* 2.52* 2.42* 1.84*   < > 1.67*    < > = values in this interval not displayed.     Coagulation Profile  Recent Labs   Lab 10/04/21  0953 10/04/21  0430 10/03/21  0710 10/02/21  0745 10/01/21  0627 09/30/21  0612   INR 2.48* 2.52* 2.42* 1.84*   < > 1.64*   PTT 46*  --   --   --   --  37    < > = values in this interval not displayed.       IMAGING:  Recent Results (from the past 24 hour(s))   XR Chest Port 1 View    Narrative    XR CHEST PORT 1 VIEW10/3/2021 9:46 PM    INDICATION: compare to previous    COMPARISON:  Same-day radiograph    FINDINGS: AP view of the chest. Increasing interstitial and airspace  opacities with likely increased bilateral pleural effusions. Cardiac  mediastinal silhouette is obscured. No acute  osseous abnormalities.  Trachea is midline.      Impression    IMPRESSION: Increasing interstitial and airspace opacities. Likely  small increase in bilateral pleural effusions as well.    I have personally reviewed the examination and initial interpretation  and I agree with the findings.    JENNY SINGLETON MD         SYSTEM ID:  [id number]   XR Chest Port 1 View    Narrative    EXAM: XR CHEST PORT 1 VIEW  10/4/2021 9:33 AM     HISTORY:  Endotracheal tube positioning       COMPARISON:  Chest radiograph, 10/3/2021.    FINDINGS:   Portable AP view of the chest. The endotracheal tube tip is  approximately 4.4 cm above the flores. Partially visualized enteric  tube with the tip extending beyond the field of view.    Trachea is midline. Heart size is normal. Grossly unchanged bilateral  diffuse interstitial and airspace opacities. Silhouetting of the left  hemidiaphragm is increased suggest atelectasis/consolidation.  Recommend follow-up to clearing. Slightly increased right greater than  left bilateral pleural effusion without appreciable pneumothorax.    No acute osseous abnormality. Visualized upper abdomen is  unremarkable.        Impression    IMPRESSION:   1. The endotracheal tube tip is approximately 4.4 cm above the flores.    2. Grossly unchanged bilateral diffuse interstitial and airspace  opacities with slightly increased right greater than left pleural  effusion.  3. Increasing retrocardiac atelectasis/consolidation, recommend  follow-up to clearing to exclude infection.    I have personally reviewed the examination and initial interpretation  and I agree with the findings.    ANGÉLICA ABDALLA MD         SYSTEM ID:  [id number]   XR Abdomen Port 1 View    Narrative    EXAMINATION: XR ABDOMEN PORT 1 VIEWS, 10/4/2021 9:34 AM    COMPARISON: 10/3/2021    HISTORY: OG placement    FINDINGS: Orogastric tube in the stomach. Postsurgical changes in this  stomach. No air-filled dilated small bowel loops. Basilar  airspace  opacities.      Impression    IMPRESSION: Orogastric tube in the stomach.     KIKE GIVENS MD         SYSTEM ID:  [id number]   XR Chest Port 1 View    Narrative    EXAM: XR CHEST PORT 1 VIEW  10/4/2021 10:38 AM     HISTORY:  verify central line placement       COMPARISON:  Same day chest radiograph at 0834    FINDINGS:   Portable AP view of the chest. Interval placement of the left IJ  central venous catheter with the tip in the SVC. The tip of the  endotracheal tube is approximately 6 cm above the flores. Partially  visualized gastric tube with the tip extending beyond the  field-of-view. Trachea is midline. Heart size is normal. Grossly  unchanged bilateral diffuse interstitial and airspace opacities. Right  greater than left pleural effusion without appreciable pneumothorax.    No acute osseous abnormality. Visualized upper abdomen is  unremarkable.        Impression    IMPRESSION:   1. Interval placement of left IJ central venous catheter with the tip  in the SVC.  2. The tip of the endotracheal tube is approximately 6 cm above the  flores.  3. Grossly unchanged bilateral diffuse interstitial and airspace  opacities with right greater than left pleural effusion.     I have personally reviewed the examination and initial interpretation  and I agree with the findings.    ANGÉLICA ABDALLA MD         SYSTEM ID:  [id number]   Echo Limited   Result Value    LVEF  70%    Narrative    [id number]  [id number]  [id number]  [id number]^GAYLE BECERRA^J LUIS     [facility],[place]  Echocardiography Laboratory  [address] [place] [id number]     Name: MAGALY FAJARDO  MRN: [id number]  : 1975  Study Date: 10/04/2021 11:32 AM  Age: 46 yrs  Gender: Female  Patient Location: [facility] [place]  Reason For Study: Pulmonary Edema  Ordering Physician: J LUIS ARAUJO JR.  Performed By: Marquez Vick RUTH ANN     BSA: 1.9 m2  Height: 64 in  Weight: 195 lb  HR:  84  ______________________________________________________________________________  Procedure  Limited Portable Echo Adult.  ______________________________________________________________________________  Interpretation Summary  Global and regional left ventricular function is hyperkinetic with an EF >70%.  Right ventricular function, chamber size, wall motion, and thickness are  normal.  The inferior vena cava is normal.  Pulmonary artery systolic pressure is normal.  ______________________________________________________________________________  Left Ventricle  Global and regional left ventricular function is hyperkinetic with an EF >70%.     Right Ventricle  Right ventricular function, chamber size, wall motion, and thickness are  normal.     Tricuspid Valve  Moderate tricuspid insufficiency is present. The right ventricular systolic  pressure is approximated at 23.8 mmHg plus the right atrial pressure.  Pulmonary artery systolic pressure is normal.     Vessels  The inferior vena cava is normal.     Pericardium  No pericardial effusion is present.  ______________________________________________________________________________  Doppler Measurements & Calculations  MV E max ericka: 72.4 cm/sec  MV A max ericka: 46.8 cm/sec  MV E/A: 1.5  MV dec slope: 582.1 cm/sec2  MV dec time: 0.12 sec  PA acc time: 0.10 sec  TR max ericka: 244.0 cm/sec  TR max P.8 mmHg  E/E' av.1  Lateral E/e': 4.6  Medial E/e': 5.5     ______________________________________________________________________________  Report approved by: Dexter Peters 10/04/2021 03:39 PM

## 2021-10-05 ENCOUNTER — APPOINTMENT (OUTPATIENT)
Dept: GENERAL RADIOLOGY | Facility: CLINIC | Age: 46
End: 2021-10-05
Attending: STUDENT IN AN ORGANIZED HEALTH CARE EDUCATION/TRAINING PROGRAM
Payer: COMMERCIAL

## 2021-10-05 ENCOUNTER — APPOINTMENT (OUTPATIENT)
Dept: GENERAL RADIOLOGY | Facility: CLINIC | Age: 46
End: 2021-10-05
Payer: COMMERCIAL

## 2021-10-05 LAB
1,3 BETA GLUCAN SER-MCNC: NORMAL PG/ML
ALBUMIN SERPL-MCNC: 3.5 G/DL (ref 3.4–5)
ALP SERPL-CCNC: 91 U/L (ref 40–150)
ALT SERPL W P-5'-P-CCNC: 13 U/L (ref 0–50)
ANION GAP SERPL CALCULATED.3IONS-SCNC: 15 MMOL/L (ref 3–14)
ANION GAP SERPL CALCULATED.3IONS-SCNC: 9 MMOL/L (ref 3–14)
AST SERPL W P-5'-P-CCNC: 63 U/L (ref 0–45)
ATRIAL RATE - MUSE: 85 BPM
BACTERIA BLD CULT: NO GROWTH
BACTERIA BLD CULT: NO GROWTH
BACTERIA FLD CULT: NO GROWTH
BASE EXCESS BLDA CALC-SCNC: -11.3 MMOL/L (ref -9–1.8)
BASOPHILS # BLD MANUAL: 0 10E3/UL (ref 0–0.2)
BASOPHILS NFR BLD MANUAL: 0 %
BILIRUB SERPL-MCNC: 14.9 MG/DL (ref 0.2–1.3)
BUN SERPL-MCNC: 24 MG/DL (ref 7–30)
BUN SERPL-MCNC: 31 MG/DL (ref 7–30)
CALCIUM SERPL-MCNC: 8.5 MG/DL (ref 8.5–10.1)
CALCIUM SERPL-MCNC: 8.6 MG/DL (ref 8.5–10.1)
CHLORIDE BLD-SCNC: 106 MMOL/L (ref 94–109)
CHLORIDE BLD-SCNC: 106 MMOL/L (ref 94–109)
CO2 SERPL-SCNC: 16 MMOL/L (ref 20–32)
CO2 SERPL-SCNC: 20 MMOL/L (ref 20–32)
CREAT SERPL-MCNC: 1.91 MG/DL (ref 0.52–1.04)
CREAT SERPL-MCNC: 1.95 MG/DL (ref 0.52–1.04)
DIASTOLIC BLOOD PRESSURE - MUSE: NORMAL MMHG
EOSINOPHIL # BLD MANUAL: 0.5 10E3/UL (ref 0–0.7)
EOSINOPHIL NFR BLD MANUAL: 2 %
ERYTHROCYTE [DISTWIDTH] IN BLOOD BY AUTOMATED COUNT: 16.8 % (ref 10–15)
GFR SERPL CREATININE-BSD FRML MDRD: 30 ML/MIN/1.73M2
GFR SERPL CREATININE-BSD FRML MDRD: 31 ML/MIN/1.73M2
GLUCOSE BLD-MCNC: 111 MG/DL (ref 70–99)
GLUCOSE BLD-MCNC: 120 MG/DL (ref 70–99)
GLUCOSE BLDC GLUCOMTR-MCNC: 108 MG/DL (ref 70–99)
GLUCOSE BLDC GLUCOMTR-MCNC: 122 MG/DL (ref 70–99)
GLUCOSE BLDC GLUCOMTR-MCNC: 126 MG/DL (ref 70–99)
HCO3 BLD-SCNC: 15 MMOL/L (ref 21–28)
HCT VFR BLD AUTO: 24.5 % (ref 35–47)
HGB BLD-MCNC: 7.4 G/DL (ref 11.7–15.7)
INR PPP: 1.83 (ref 0.85–1.15)
INTERPRETATION ECG - MUSE: NORMAL
LACTATE SERPL-SCNC: 2 MMOL/L (ref 0.7–2)
LACTATE SERPL-SCNC: 2 MMOL/L (ref 0.7–2)
LYMPHOCYTES # BLD MANUAL: 2.3 10E3/UL (ref 0.8–5.3)
LYMPHOCYTES NFR BLD MANUAL: 9 %
Lab: NORMAL
MAGNESIUM SERPL-MCNC: 2.5 MG/DL (ref 1.6–2.3)
MCH RBC QN AUTO: 35.6 PG (ref 26.5–33)
MCHC RBC AUTO-ENTMCNC: 30.2 G/DL (ref 31.5–36.5)
MCV RBC AUTO: 118 FL (ref 78–100)
MONOCYTES # BLD MANUAL: 1.3 10E3/UL (ref 0–1.3)
MONOCYTES NFR BLD MANUAL: 5 %
NEUTROPHILS # BLD MANUAL: 21.8 10E3/UL (ref 1.6–8.3)
NEUTROPHILS NFR BLD MANUAL: 84 %
NRBC # BLD AUTO: 0.3 10E3/UL
NRBC BLD MANUAL-RTO: 1 %
O2/TOTAL GAS SETTING VFR VENT: 50 %
O2/TOTAL GAS SETTING VFR VENT: 50 %
O2/TOTAL GAS SETTING VFR VENT: 60 %
OBSERVATION IMP: NORMAL
OXYHGB MFR BLD: 97 % (ref 92–100)
OXYHGB MFR BLD: 97 % (ref 92–100)
P AXIS - MUSE: 63 DEGREES
PCO2 BLD: 33 MM HG (ref 35–45)
PCO2 BLD: 33 MM HG (ref 35–45)
PCO2 BLD: 34 MM HG (ref 35–45)
PERFORMING LABORATORY: NORMAL
PH BLD: 7.25 [PH] (ref 7.35–7.45)
PH BLD: 7.26 [PH] (ref 7.35–7.45)
PH BLD: 7.26 [PH] (ref 7.35–7.45)
PHOSPHATE SERPL-MCNC: 4.5 MG/DL (ref 2.5–4.5)
PLAT MORPH BLD: ABNORMAL
PLATELET # BLD AUTO: 236 10E3/UL (ref 150–450)
PO2 BLD: 116 MM HG (ref 80–105)
PO2 BLD: 127 MM HG (ref 80–105)
PO2 BLD: 98 MM HG (ref 80–105)
POTASSIUM BLD-SCNC: 3.7 MMOL/L (ref 3.4–5.3)
POTASSIUM BLD-SCNC: 3.9 MMOL/L (ref 3.4–5.3)
PR INTERVAL - MUSE: 138 MS
PROCALCITONIN SERPL-MCNC: 23.29 NG/ML
PROT SERPL-MCNC: 5.8 G/DL (ref 6.8–8.8)
QRS DURATION - MUSE: 98 MS
QT - MUSE: 406 MS
QTC - MUSE: 483 MS
R AXIS - MUSE: 58 DEGREES
RBC # BLD AUTO: 2.08 10E6/UL (ref 3.8–5.2)
RBC MORPH BLD: ABNORMAL
SODIUM SERPL-SCNC: 135 MMOL/L (ref 133–144)
SODIUM SERPL-SCNC: 137 MMOL/L (ref 133–144)
SPECIMEN STATUS: NORMAL
SYSTOLIC BLOOD PRESSURE - MUSE: NORMAL MMHG
T AXIS - MUSE: 41 DEGREES
TEST NAME: NORMAL
VENTRICULAR RATE- MUSE: 85 BPM
WBC # BLD AUTO: 25.9 10E3/UL (ref 4–11)

## 2021-10-05 PROCEDURE — 258N000003 HC RX IP 258 OP 636: Performed by: STUDENT IN AN ORGANIZED HEALTH CARE EDUCATION/TRAINING PROGRAM

## 2021-10-05 PROCEDURE — 250N000013 HC RX MED GY IP 250 OP 250 PS 637: Performed by: STUDENT IN AN ORGANIZED HEALTH CARE EDUCATION/TRAINING PROGRAM

## 2021-10-05 PROCEDURE — 71045 X-RAY EXAM CHEST 1 VIEW: CPT | Mod: 26 | Performed by: RADIOLOGY

## 2021-10-05 PROCEDURE — 250N000009 HC RX 250: Performed by: STUDENT IN AN ORGANIZED HEALTH CARE EDUCATION/TRAINING PROGRAM

## 2021-10-05 PROCEDURE — 74340 X-RAY GUIDE FOR GI TUBE: CPT

## 2021-10-05 PROCEDURE — 74340 X-RAY GUIDE FOR GI TUBE: CPT | Mod: 26 | Performed by: RADIOLOGY

## 2021-10-05 PROCEDURE — 85610 PROTHROMBIN TIME: CPT | Performed by: STUDENT IN AN ORGANIZED HEALTH CARE EDUCATION/TRAINING PROGRAM

## 2021-10-05 PROCEDURE — 250N000013 HC RX MED GY IP 250 OP 250 PS 637: Performed by: SURGERY

## 2021-10-05 PROCEDURE — 250N000011 HC RX IP 250 OP 636: Performed by: STUDENT IN AN ORGANIZED HEALTH CARE EDUCATION/TRAINING PROGRAM

## 2021-10-05 PROCEDURE — 80053 COMPREHEN METABOLIC PANEL: CPT | Performed by: STUDENT IN AN ORGANIZED HEALTH CARE EDUCATION/TRAINING PROGRAM

## 2021-10-05 PROCEDURE — 250N000009 HC RX 250: Performed by: RADIOLOGY

## 2021-10-05 PROCEDURE — 82805 BLOOD GASES W/O2 SATURATION: CPT | Performed by: STUDENT IN AN ORGANIZED HEALTH CARE EDUCATION/TRAINING PROGRAM

## 2021-10-05 PROCEDURE — 71045 X-RAY EXAM CHEST 1 VIEW: CPT | Mod: 76

## 2021-10-05 PROCEDURE — 84145 PROCALCITONIN (PCT): CPT | Performed by: STUDENT IN AN ORGANIZED HEALTH CARE EDUCATION/TRAINING PROGRAM

## 2021-10-05 PROCEDURE — 250N000011 HC RX IP 250 OP 636

## 2021-10-05 PROCEDURE — 83605 ASSAY OF LACTIC ACID: CPT | Performed by: STUDENT IN AN ORGANIZED HEALTH CARE EDUCATION/TRAINING PROGRAM

## 2021-10-05 PROCEDURE — 99233 SBSQ HOSP IP/OBS HIGH 50: CPT | Performed by: NURSE PRACTITIONER

## 2021-10-05 PROCEDURE — 99291 CRITICAL CARE FIRST HOUR: CPT | Mod: GC | Performed by: INTERNAL MEDICINE

## 2021-10-05 PROCEDURE — 71045 X-RAY EXAM CHEST 1 VIEW: CPT

## 2021-10-05 PROCEDURE — 999N000157 HC STATISTIC RCP TIME EA 10 MIN

## 2021-10-05 PROCEDURE — 44500 INTRO GASTROINTESTINAL TUBE: CPT

## 2021-10-05 PROCEDURE — 200N000002 HC R&B ICU UMMC

## 2021-10-05 PROCEDURE — 84100 ASSAY OF PHOSPHORUS: CPT | Performed by: STUDENT IN AN ORGANIZED HEALTH CARE EDUCATION/TRAINING PROGRAM

## 2021-10-05 PROCEDURE — 83735 ASSAY OF MAGNESIUM: CPT | Performed by: STUDENT IN AN ORGANIZED HEALTH CARE EDUCATION/TRAINING PROGRAM

## 2021-10-05 PROCEDURE — 82803 BLOOD GASES ANY COMBINATION: CPT | Performed by: STUDENT IN AN ORGANIZED HEALTH CARE EDUCATION/TRAINING PROGRAM

## 2021-10-05 PROCEDURE — 85027 COMPLETE CBC AUTOMATED: CPT | Performed by: STUDENT IN AN ORGANIZED HEALTH CARE EDUCATION/TRAINING PROGRAM

## 2021-10-05 PROCEDURE — 94003 VENT MGMT INPAT SUBQ DAY: CPT

## 2021-10-05 PROCEDURE — C9113 INJ PANTOPRAZOLE SODIUM, VIA: HCPCS | Performed by: STUDENT IN AN ORGANIZED HEALTH CARE EDUCATION/TRAINING PROGRAM

## 2021-10-05 RX ORDER — AMOXICILLIN 250 MG
1 CAPSULE ORAL 2 TIMES DAILY
Status: DISCONTINUED | OUTPATIENT
Start: 2021-10-05 | End: 2021-10-08

## 2021-10-05 RX ORDER — DOXYCYCLINE 25 MG/5ML
100 POWDER, FOR SUSPENSION ORAL 2 TIMES DAILY
Status: DISCONTINUED | OUTPATIENT
Start: 2021-10-05 | End: 2021-10-06

## 2021-10-05 RX ORDER — LIDOCAINE HYDROCHLORIDE 20 MG/ML
15 SOLUTION OROPHARYNGEAL ONCE
Status: COMPLETED | OUTPATIENT
Start: 2021-10-05 | End: 2021-10-05

## 2021-10-05 RX ORDER — GUAIFENESIN 600 MG/1
15 TABLET, EXTENDED RELEASE ORAL DAILY
Status: DISCONTINUED | OUTPATIENT
Start: 2021-10-05 | End: 2021-10-24

## 2021-10-05 RX ORDER — BISACODYL 10 MG
10 SUPPOSITORY, RECTAL RECTAL DAILY PRN
Status: DISCONTINUED | OUTPATIENT
Start: 2021-10-05 | End: 2021-11-05 | Stop reason: HOSPADM

## 2021-10-05 RX ORDER — POLYETHYLENE GLYCOL 3350 17 G/17G
17 POWDER, FOR SOLUTION ORAL 3 TIMES DAILY
Status: DISCONTINUED | OUTPATIENT
Start: 2021-10-05 | End: 2021-10-07

## 2021-10-05 RX ADMIN — RIFAXIMIN 550 MG: 550 TABLET ORAL at 19:38

## 2021-10-05 RX ADMIN — VASOPRESSIN 2.4 UNITS/HR: 20 INJECTION INTRAVENOUS at 08:18

## 2021-10-05 RX ADMIN — VASOPRESSIN 2.4 UNITS/HR: 20 INJECTION INTRAVENOUS at 17:30

## 2021-10-05 RX ADMIN — DEXTROSE 3 MCG/KG/MIN: 50 INJECTION, SOLUTION INTRAVENOUS at 11:27

## 2021-10-05 RX ADMIN — THERA TABS 1 TABLET: TAB at 08:21

## 2021-10-05 RX ADMIN — SALINE NASAL SPRAY 1 SPRAY: 1.5 SOLUTION NASAL at 19:40

## 2021-10-05 RX ADMIN — Medication 8 MG/HR: at 13:40

## 2021-10-05 RX ADMIN — PANTOPRAZOLE SODIUM 40 MG: 40 INJECTION, POWDER, FOR SOLUTION INTRAVENOUS at 08:21

## 2021-10-05 RX ADMIN — Medication 8 MG/HR: at 00:30

## 2021-10-05 RX ADMIN — Medication 200 MCG/HR: at 15:47

## 2021-10-05 RX ADMIN — HEPARIN SODIUM 5000 UNITS: 5000 INJECTION, SOLUTION INTRAVENOUS; SUBCUTANEOUS at 17:27

## 2021-10-05 RX ADMIN — ESCITALOPRAM OXALATE 10 MG: 5 SOLUTION ORAL at 08:22

## 2021-10-05 RX ADMIN — POLYETHYLENE GLYCOL 3350 17 G: 17 POWDER, FOR SOLUTION ORAL at 19:38

## 2021-10-05 RX ADMIN — MINERAL OIL AND PETROLATUM: 150; 830 OINTMENT OPHTHALMIC at 08:20

## 2021-10-05 RX ADMIN — Medication 0.1 MCG/KG/MIN: at 08:17

## 2021-10-05 RX ADMIN — PIPERACILLIN AND TAZOBACTAM 3.38 G: 3; .375 INJECTION, POWDER, LYOPHILIZED, FOR SOLUTION INTRAVENOUS at 12:20

## 2021-10-05 RX ADMIN — PHYTONADIONE 10 MG: 10 INJECTION, EMULSION INTRAMUSCULAR; INTRAVENOUS; SUBCUTANEOUS at 09:02

## 2021-10-05 RX ADMIN — GABAPENTIN 100 MG: 250 SOLUTION ORAL at 12:20

## 2021-10-05 RX ADMIN — DEXTROSE 3 MCG/KG/MIN: 50 INJECTION, SOLUTION INTRAVENOUS at 01:17

## 2021-10-05 RX ADMIN — Medication 20 NG/KG/MIN: at 14:59

## 2021-10-05 RX ADMIN — HEPARIN SODIUM 5000 UNITS: 5000 INJECTION, SOLUTION INTRAVENOUS; SUBCUTANEOUS at 03:05

## 2021-10-05 RX ADMIN — FOLIC ACID 1 MG: 1 TABLET ORAL at 08:21

## 2021-10-05 RX ADMIN — GABAPENTIN 100 MG: 250 SOLUTION ORAL at 17:32

## 2021-10-05 RX ADMIN — SALINE NASAL SPRAY 1 SPRAY: 1.5 SOLUTION NASAL at 08:25

## 2021-10-05 RX ADMIN — PIPERACILLIN AND TAZOBACTAM 3.38 G: 3; .375 INJECTION, POWDER, LYOPHILIZED, FOR SOLUTION INTRAVENOUS at 05:47

## 2021-10-05 RX ADMIN — PIPERACILLIN AND TAZOBACTAM 3.38 G: 3; .375 INJECTION, POWDER, LYOPHILIZED, FOR SOLUTION INTRAVENOUS at 17:27

## 2021-10-05 RX ADMIN — DOCUSATE SODIUM 50 MG AND SENNOSIDES 8.6 MG 1 TABLET: 8.6; 5 TABLET, FILM COATED ORAL at 19:38

## 2021-10-05 RX ADMIN — Medication 200 MCG/HR: at 03:11

## 2021-10-05 RX ADMIN — Medication 100 MCG: at 08:22

## 2021-10-05 RX ADMIN — GABAPENTIN 100 MG: 250 SOLUTION ORAL at 08:27

## 2021-10-05 RX ADMIN — POLYETHYLENE GLYCOL 3350 17 G: 17 POWDER, FOR SOLUTION ORAL at 08:20

## 2021-10-05 RX ADMIN — SALINE NASAL SPRAY 1 SPRAY: 1.5 SOLUTION NASAL at 12:17

## 2021-10-05 RX ADMIN — MINERAL OIL AND PETROLATUM: 150; 830 OINTMENT OPHTHALMIC at 17:32

## 2021-10-05 RX ADMIN — LIDOCAINE HYDROCHLORIDE 5 ML: 20 SOLUTION ORAL; TOPICAL at 16:10

## 2021-10-05 RX ADMIN — POLYETHYLENE GLYCOL 3350 17 G: 17 POWDER, FOR SOLUTION ORAL at 17:27

## 2021-10-05 RX ADMIN — DOCUSATE SODIUM 50 MG AND SENNOSIDES 8.6 MG 1 TABLET: 8.6; 5 TABLET, FILM COATED ORAL at 12:20

## 2021-10-05 RX ADMIN — VASOPRESSIN 2.4 UNITS/HR: 20 INJECTION INTRAVENOUS at 01:17

## 2021-10-05 RX ADMIN — DOXYCYCLINE 100 MG: 25 FOR SUSPENSION ORAL at 19:39

## 2021-10-05 RX ADMIN — OXYMETAZOLINE HYDROCHLORIDE 2 SPRAY: 0.05 SPRAY NASAL at 19:39

## 2021-10-05 RX ADMIN — THIAMINE HCL TAB 100 MG 100 MG: 100 TAB at 08:21

## 2021-10-05 RX ADMIN — RIFAXIMIN 550 MG: 550 TABLET ORAL at 08:20

## 2021-10-05 RX ADMIN — DOXYCYCLINE 100 MG: 100 INJECTION, POWDER, LYOPHILIZED, FOR SOLUTION INTRAVENOUS at 09:00

## 2021-10-05 RX ADMIN — CALCIUM CARBONATE 600 MG (1,500 MG)-VITAMIN D3 400 UNIT TABLET 1 TABLET: at 08:21

## 2021-10-05 RX ADMIN — MINERAL OIL AND PETROLATUM: 150; 830 OINTMENT OPHTHALMIC at 01:18

## 2021-10-05 RX ADMIN — Medication 1 PACKET: at 19:40

## 2021-10-05 ASSESSMENT — ACTIVITIES OF DAILY LIVING (ADL)
ADLS_ACUITY_SCORE: 21
ADLS_ACUITY_SCORE: 19
ADLS_ACUITY_SCORE: 21

## 2021-10-05 NOTE — PROGRESS NOTES
MEDICAL ICU PROGRESS NOTE  10/05/2021      Date of Service (when I saw the patient): 10/05/2021    ASSESSMENT: Aliyah Angeles is a 46 year old woman with alcohol use disorder (last drink 9/12/21), alcoholic hepatitis, jaimie en y gastric bypass, alcohol pancreatitis, pancreatic cyst, Type 2 DM, anxiety and depression admitted for abdominal pain, lower extremity edema, and nausea that started after starting low sodium and diabetic friendly foods in an alcohol treatment facility. Patient was admitted to the medicine floor with abdominal pain and vomiting on 9/29/2021. Overnight 10/3-10/4, patient had worsening respiratory status, concern for sepsis requiring transfer to the ICU for intubation and need for pressors as well as further evaluation and management for possible sepsis.      CHANGES and MAJOR THINGS TODAY:   - Paralytic started overnight; wean today as able  - Increase Miralax 17 g from BID to QID, Add Senna (goal 3-4 BMs a day)  - Added bisacodyl suppository  - F/u CXR today  - NJ placement today and starting feeds as able  - ABG this afternoon, with possible prone positioning overnight  - Continue following urine output  - CBC ordered for this afternoon  - Ordered BNP this afternoon    PLAN:     Neuro:  # Pain and sedation  She was sedated and intubated on 10/4. She is currently at goal for her sedation and stable on the ventilator.   - Midazolam drip + boluses  - Fentanyl drip + boluses  - Started cisatracurium for paralysis; wean as able  - RASS goal -4 to -5     #Anxiety and depression  - PTA escitalopram     # Encephalopathy, likely acute toxic metabolic vs infection  In the setting of likely hepatic encephalopathy, infectious encephalopathy. SBP unlikely due to normal paracentesis eval and culture. Normal ammonia from 10/4.  - Miralax 17 g QID, Add Senna (goal 3-4 BMs a day)  - On rifaximin  - Gabapentin 100 mg TID     Pulmonary:  # Acute hypoxemic respiratory failure  # ARDS  # Concern for infection,  pneumonia and sepsis  Acute onset hypoxemia, bilateral opacities are consistent with ARDS. No evidence of cardiac etiology. Bilateral ground glass infiltrates on imaging, with bilateral pleural effusions. Persistent hypoxia and high airway pressures. We are treating for ARDS with intermittent proning and respiratory support. We will work her up for a sepsis source and pneumonia (see ID). Intubated 10/4. Bronchoscopy completed 10/4, with bile-like fluid, suspect aspiration as well as PMN predominance concerning for bacterial pneumonia. Prone position improved oxygenation requirements last night. NMB was used to achieve vent synchrony.   - F/U CXR today which showed unchanged bilateral interstitial and airspace opacities  - Lung protective ventilation  - Thoracentesis if pleural effusions worsen on imaging  - Prone positioning to be assessed daily  - Inhaled epoprostenol at 20 ng/kg/min  - Volume management to be assessed daily; holding diuresis today (suspect intravascular depletion)  - Will consider need for nebulizer treatments, no known primary lung disease  - Trend ABG; obtain one at 18:00  - Infectious work-up and treatment as below     Ventilation Mode: CMV/AC  (Continuous Mandatory Ventilation/ Assist Control)  FiO2 (%): 50 %  Rate Set (breaths/minute): 26 breaths/min  Tidal Volume Set (mL): 330 mL  PEEP (cm H2O): 12 cmH2O  Oxygen Concentration (%): 40 %  Resp: 25    Cardiovascular:  # Hypotension, in the setting of shock, likely sepsis  # Edema, possible intravascular depletion with third-spacing  Echo 10/4 with hyperdynamic EF >70%. Moderate tricuspid insufficiency. Normal IVC. BNP was over 16,000 --> >28,000 on 10/4. Normal trops 10/4. Elevated cortisol 10/4 as expected; insufficiency not suspected. No indication to diurese at this point. She has been requiring pressors and has an ANA LAURA. She is currently third spacing most of the fluid she has been given. See renal. We will consider diuresis if her ARDS  worsens or concern for pulmonary edema.     - Levophed for goal MAP >65  - Vasopressin for goal MAP >65  - Trend lactate (check tomorrow AM), urine output     GI/Nutrition:  # Alcoholic hepatitis  # Alcohol use disorder  # Lower extremity edema, ascites  # Hyperbilirubinemia  # History of gastric bypass  Patient has been sober since 9/12 and has been at a treatment facility since her discharge 9/21. Hepatomegaly without cirrhosis. Drastic diet change (unable to do low sodium diet at treatment facility), new metformin, increased stool output 2/2 lactulose triggered decompensation which was worsened by not being able to take her home meds due to vomiting. SAAG 1.1 which is less suggestive of portal hypertension causing ascites. Diagnostic paracentesis on admission without SBP. Suspect increased abdominal distension on 10/1 is from bloating and gas possibly due to lactulose given only small ascites on U/S. Discontinued lactulose 10/1 and then had decreased bowel movements. US on 9/29 showing hepatomegaly. Normal lipase on admission. CT A&P with severe hepatic steatosis, portal HTN, ascites, splenomegaly. Paracentesis completed 10/4 revealed hazy fluid, 244 nucleated cells (WNL), and insignificant diff.   - GI following, f/u on recs  - Miralax QID  - Trend bilirubin, INR --> MELD labs  - B12, folate, and thiamine   - Feeding tube placement with radiology with possible advancement of feeds    MELD-Na score: 30 at 10/5/2021  3:01 AM  MELD score: 29 at 10/5/2021  3:01 AM  Calculated from:  Serum Creatinine: 1.91 mg/dL at 10/5/2021  3:01 AM  Serum Sodium: 135 mmol/L at 10/5/2021  3:01 AM  Total Bilirubin: 14.9 mg/dL at 10/5/2021  3:01 AM  INR(ratio): 1.83 at 10/5/2021  3:01 AM  Age: 46 years     Renal/Fluids/Electrolytes:  #Anion gap metabolic acidosis  Likely 2/2 worsening cirrhosis in the setting of not taking meds as above in addition to gastric loses from vomiting. Partially compensating with respiratory alkalosis.  Lactic acid also mildly elevated, combination of poor perfusion and oxygenation. Lactic acid was 2.0 today, which is WNL. The blood pH and bicarb are both stable.  - CTM  - Trend lactic acid     #Acute kidney injury, concern for ATN, pre-renal, vs hepatorenal syndrome  Baseline creatinine ~0.98, increased to 1.14 on 10/1/21. May be related to vomiting, lactulose, third spacing. Likely pre-renal. LE edema is prominent and worsening. Intravascular volume likely low; evidenced by low venous volume on echo. Did initially improve after one day of albumin. UA today significant for moderate blood, 50 mg/dl protein, and moderate bilirubin. No evidence of infection. Urine eosinophils negative. Cr continues to uptrend; currently 1.91.  - Replacement protocols (Mg, Ph, K).   - Fluid restriction  - Strict I/O's  - Consider nephrology consult if urine output continues to down-trend, concern for need to dialyze  - Avoid nephrotoxic agents     Endocrine:  # Type 2 diabetes mellitus  - Check BS q4h and PRN  - Goal BS between 100 and 160     ID:  # Sepsis  # Suspect pneumonia (aspiration vs CAP)  Concern for pneumonia, CAP vs HAP. Less concern for SBP given negative paracentesis on admission, small volume of ascites. Improving leukocytosis on 10/5; 25.9 from 28.2 yesterday. Afebrile. No clear source of infection. No evidence of UTI on UA. Paracentesis completed 10/4 revealed hazy fluid, 244 nucleated cells (WNL), and insignificant diff. Elevated procal and PMN's in BAL fluid today suggests a bacterial pneumonia, likely aspiration related due to her history of alcohol abuse and recent symptoms of vomiting. Her CRP was also 96 mg/L on 10/4. Respiratory culture pending. Blood and urine cultures from 10/3 did not grow anything. Pending blood cultures from yesterday. Other 10/4 labs include negative urine legionella, negative strep pneumo, negative BAL KOH prep, negative COVID, negative MRSA. MBC down trending.  - Continue Zosyn for  CAP  - Doxycycline for atypical coverage; may discontinue pending BAL cultures    Abx  - Zosyn 10/4-  - Vanc 10/4-10/4  - Doxycycline 10/4-     Hematology:    # Anemia, macrocytic  # Coagulopathy  # Acute epistaxis  In the setting of alcohol use disorder, liver failure, alcoholic hepatitis. INR elevated. Likely 2/2 to B12 or folate deficiency due to long history of alcohol use disorder. Also started albumin challenge 10/1, so may be dilutional, however anemia is out of proportion to decrease in other cells lines. B12 high, folate wnl. Hemolysis labs negative (LDH wnl, haptoglobin WNL, direct bilirubinemia). Nosebleeds starting 10/3. She did not have any acute epistaxis today. Hemoglobin stable above 7. 7.4 today.   - Transfuse for Hgb <7  - B12, folate levels  - Transfuse for PLTs <10, or if active bleeding and platelets are <30  - Vitamin K IV 10 mg x3 days  - Follow INR  - Continue Afrin PRN for up to 1 more day  - Recheck CBC tomorrow morning     Musculoskeletal:  No active issues.     Skin:  No active issues.     General Cares/Prophylaxis:    DVT Prophylaxis: Heparin  GI Prophylaxis: PPI  Restraints: None  Family Communication: POC updated to her mother. Call after 10 AM.  Code Status: Full Code     Lines/tubes/drains:  - PIV x2  - ETT  - Rice  - Central line  - Arterial line     Disposition:  - Medical ICU     Patient seen and findings/plan discussed with medical ICU staff, Dr. Rodrigues.    René Ascencio, MS4    Resident/Fellow Attestation   I, Hayley Severson, was present with the medical student who participated in the service and in the documentation of the note.  I have verified the history and personally performed the physical exam and medical decision making.  I agree with the assessment and plan of care as documented in the note.      Hayley Severson, MD-MPH  Internal Medicine-Pediatrics PGY-1    ====================================  INTERVAL HISTORY:   Aliyah Angeles is a 46 year old female with a  history of alcohol use disorder (sober since 9/12/2021), alcoholic hepatitis, type 2 diabetes, anxiety, pancreatitis, pancreatic cysts, and depression presented with 4 days of abdominal pain, nausea, vomiting, and not tolerating her oral medications on 9/30. History obtained from chart review, as patient unable to verbalize and intubated soon after arrival to the MICU.      Patient was admitted here from 9/13-9/21 and was discharged to an alcohol treatment facility. On 9/25. she began to have abdominal pain (RUQ, severe sharp intermittent 4-10 times an hour lasting ~10seconds), nausea, and vomiting and was not able to keep down or tolerate her oral medications. She thought her symptoms were related to a diet change in the treatment facility. She felt foggy and noticed her jaundice, extremity edema, and ascites began to worsen (after improving during her hospitalization).     It was discovered that she had a macrocytic anemia in the setting of recurrent epistaxis. Yesterday, she was intubated, sedated, and paralyzed due to increased respiratory demands. Her clinical picture is most consistent with ARDS secondary to pneumonia and gastric aspiration. Today, she was stabilized after NMB and proning. Her BP is being supported with 2 pressors. Her sister visited today and was updated on the status of the patient.     OBJECTIVE:   1. VITAL SIGNS:   Temp:  [97.8  F (36.6  C)-100.4  F (38  C)] (P) 97.8  F (36.6  C)  Pulse:  [] 76  Resp:  [23-36] 25  MAP:  [61 mmHg-79 mmHg] 75 mmHg  Arterial Line BP: ()/(48-62) 99/57  FiO2 (%):  [40 %-100 %] 50 %  SpO2:  [78 %-99 %] 97 %  Ventilation Mode: CMV/AC  (Continuous Mandatory Ventilation/ Assist Control)  FiO2 (%): 50 %  Rate Set (breaths/minute): 26 breaths/min  Tidal Volume Set (mL): 330 mL  PEEP (cm H2O): 12 cmH2O  Oxygen Concentration (%): 40 %  Resp: 25    2. INTAKE/ OUTPUT:   I/O last 3 completed shifts:  In: 2406.84 [I.V.:1466.84; NG/GT:440]  Out: 488  [Urine:488]    3. PHYSICAL EXAMINATION:  General: Proned. comfortable appearing. Sedated. Non responsive.   HEENT: Periorbital edema. 3 mm pupils minimally reactive. Dried blood on the lips and around the vent. Nares without drainage.  Neuro: RASS of -5.   Pulm/Resp: Evidence of fluid or mucus in airways with some gurgling. Mild end expiratory wheezes, most prominent on the right side. Coarse throughout.   CV: RRR with no murmurs  Abdomen: Soft and mildly distended. Fluid wave present.   : barrett catheter in place, urine dark yellow and clear  Incisions/Skin: +3 bilateral pitting edema ~same as yesterday    4. LABS:   Arterial Blood Gases   Recent Labs   Lab 10/05/21  1250 10/05/21  0301 10/04/21  2149 10/04/21  1937   PH 7.25* 7.26* 7.23* 7.24*   PCO2 34* 33* 38 37   PO2 127* 116* 179* 74*   HCO3 15* 15* 16* 16*     Complete Blood Count   Recent Labs   Lab 10/05/21  0301 10/04/21  2149 10/04/21  0430 10/03/21  2336 10/03/21  1128 10/03/21  0710   WBC 25.9* 28.2* 22.7*  22.7*  --   --  17.4*   HGB 7.4* 7.2* 7.6*  7.6* 7.2*   < > 7.0*    277 240  240  --   --  189    < > = values in this interval not displayed.     Basic Metabolic Panel  Recent Labs   Lab 10/05/21  1257 10/05/21  0301 10/04/21  2149 10/04/21  1940 10/04/21  0638 10/04/21  0430 10/03/21  2220 10/03/21  2116   NA  --  135 133  --   --  132*  --  132*   POTASSIUM  --  3.9 3.6  --   --  3.8  --  3.7   CHLORIDE  --  106 106  --   --  104  --  104   CO2  --  20 17*  --   --  17*  --  16*   BUN  --  24 22  --   --  16  --  15   CR  --  1.91* 1.88*  --   --  1.61*  --  1.53*   * 111* 113* 116*   < > 119*   < > 124*    < > = values in this interval not displayed.     Liver Function Tests  Recent Labs   Lab 10/05/21  0301 10/04/21  0953 10/04/21  0430 10/03/21  0710 10/02/21  0745 10/02/21  0745   AST 63*  --  58* 52*  --  62*   ALT 13  --  14 18  --  22   ALKPHOS 91  --  86 88  --  135   BILITOTAL 14.9*  --  13.2* 12.7*  --  13.0*   ALBUMIN  3.5  --  4.3 4.4  --  3.0*   INR 1.83* 2.48* 2.52* 2.42*   < > 1.84*    < > = values in this interval not displayed.     Coagulation Profile  Recent Labs   Lab 10/05/21  0301 10/04/21  0953 10/04/21  0430 10/03/21  0710 10/01/21  0627 09/30/21  0612   INR 1.83* 2.48* 2.52* 2.42*   < > 1.64*   PTT  --  46*  --   --   --  37    < > = values in this interval not displayed.       5. RADIOLOGY:   Recent Results (from the past 24 hour(s))   XR Chest Port 1 View    Narrative    EXAM: XR CHEST PORT 1 VIEW  10/5/2021 12:34 PM     HISTORY:  ETT placement, assess lung fields       COMPARISON:  Chest radiograph, 10/4/2021.    FINDINGS:   Portable AP view of the chest. The endotracheal tube tip is  approximately 5.3 cm above the flores. Left IJ catheter terminates at  the superior cavoatrial junction. Partially visualized enteric tube  with the tip extending beyond the field of view.    Trachea is midline. Heart size is normal. Grossly unchanged diffuse  interstitial and airspace opacities. Left greater than right bilateral  pleural effusion without appreciable pneumothorax.    No acute osseous abnormality. Surgical clips in the left upper  quadrant..        Impression    IMPRESSION:   1. Grossly unchanged bilateral interstitial and airspace opacities.   2. Stable supporting devices.    I have personally reviewed the examination and initial interpretation  and I agree with the findings.    SWATHI NUNO DO         SYSTEM ID:  [id number]

## 2021-10-05 NOTE — PROGRESS NOTES
Consult and Procedure Service - Procedure Note    Attending:Dr Eren Vidales,Dr Cristiano hernandez     Procedure: Diagnostic and therapeutic paracentesis  Indication: fever  Risk Assessment: caogs and imaging assessed   Pre-procedure diagnosis: Encephalopathy   Post-procedure diagnosis: Encephalopathy     The risks and benefits of the procedure were explained to father Mr Melvin Yuan who expressed understanding and opted to proceed.  Consent was obtained and placed in the chart.  A time out was performed.  An area of ascites was located and marked using ultrasound guidance in the left lower quadrant; the area was prepped and draped in the usual sterile fashion.  8 ml of 1% lidocaine was instilled and ascites located.  The  paracentesis catheter and needle were inserted under real-time guidance until ascites obtained then the needle removed and the catheter advanced.  The apparatus was connected to vacuum bottle  and a total of 1800 ml of straw colored fluid removed.   A specimen was sent for analysis. The catheter was withdrawn and the area dressed.  Patient tolerated the procedure well with no immediate complications.  Please contact the Consult and Procedure Service if any complications or concerns arise.     DOS:  10/4/2021

## 2021-10-05 NOTE — PROGRESS NOTES
"[facility]  HEPATOLOGY PROGRESS NOTE  Aliyah Angeles [id number]       CC: fluid volume overload  SUBJECTIVE:  Reviewed recent events with respiratory failure and ARDS, now intubated. Per RN, paralyzed and no weaning of vent or paralytics for now. Desats with prone positioning.     ROS:  A 10-point review of systems was negative.    OBJECTIVE:  VS: BP (!) 78/48   Pulse 76   Temp 99.5  F (37.5  C) (Oral)   Resp 25   Ht 1.626 m (5' 4\")   Wt 89.8 kg (197 lb 15.6 oz)   SpO2 97%   BMI 33.98 kg/m       General:  no facial muscle wasting  Neuro: sedated/paralyzed   Lymph: No cervical lymphadenopathy  HEENT:  mild scleral icterus, Nooral lesions  CV:Skin warm and dry  Lungs: Respirations even and nonlabored on room air  Abd:  Mildly distended, mildly tender. +BS   Extrem: +2 lower peripheral edema   Skin: mild jaundice  Psych: GARLAND    MEDICATIONS:  Current Facility-Administered Medications   Medication     acetaminophen (TYLENOL) tablet 325 mg    Or     acetaminophen (TYLENOL) Suppository 325 mg     cisatracurium (NIMBEX) 200 mg in D5W 100 mL infusion    And     artificial tears ophthalmic ointment     bisacodyl (DULCOLAX) Suppository 10 mg     calcium carbonate 600 mg-vitamin D 400 units (CALTRATE) per tablet 1 tablet     carboxymethylcellulose PF (REFRESH PLUS) 0.5 % ophthalmic solution 1 drop     cyanocobalamin (VITAMIN B-12) tablet 100 mcg     dextrose 10% infusion     glucose gel 15-30 g    Or     dextrose 50 % injection 25-50 mL    Or     glucagon injection 1 mg     diphenhydrAMINE (BENADRYL) capsule 25-50 mg     doxycycline monohydrate (VIBRAMYCIN) suspension 100 mg     epoprostenol (VELETRI) inhalation solution     escitalopram (LEXAPRO) solution 10 mg     fentaNYL (SUBLIMAZE) 50 mcg/mL bolus from infusion pump  mcg     fentaNYL (SUBLIMAZE) infusion     folic acid (FOLVITE) tablet 1 mg     gabapentin (NEURONTIN) solution 100 mg     heparin ANTICOAGULANT injection 5,000 Units     hydrOXYzine (ATARAX) " tablet 25 mg    Or     hydrOXYzine (ATARAX) tablet 50 mg     lidocaine (LMX4) cream     lidocaine 1 % 0.1-1 mL     melatonin tablet 3 mg     [Held by provider] metoclopramide (REGLAN) tablet 5 mg     midazolam (VERSED) drip - ADULT 100 mg/100 mL in NS (pre-mix)     midazolam (VERSED) injection 1-3 mg     multivitamins w/minerals liquid 15 mL     naloxone (NARCAN) injection 0.2 mg    Or     naloxone (NARCAN) injection 0.4 mg    Or     naloxone (NARCAN) injection 0.2 mg    Or     naloxone (NARCAN) injection 0.4 mg     No lozenges or gum should be given while patient on BIPAP/AVAPS/AVAPS AE     norepinephrine (LEVOPHED) 16 mg in  mL infusion MAX CONC CENTRAL LINE     oxymetazoline (AFRIN) 0.05 % spray 2 spray     pantoprazole (PROTONIX) 2 mg/mL suspension 40 mg     Patient may continue current oral medications     phytonadione 10 mg in sodium chloride 0.9 % 50 mL intermittent infusion     piperacillin-tazobactam (ZOSYN) 3.375 g vial to attach to  mL bag     polyethylene glycol (MIRALAX) Packet 17 g     [Held by provider] prazosin (MINIPRESS) capsule 1 mg     protein modular (PROSOURCE TF) 1 packet     rifaximin (XIFAXAN) tablet 550 mg     senna-docusate (SENOKOT-S/PERICOLACE) 8.6-50 MG per tablet 1 tablet     sodium chloride (OCEAN) 0.65 % nasal spray 1 spray     sodium chloride (PF) 0.9% PF flush 3 mL     thiamine (B-1) tablet 100 mg     vasopressin 0.2 units/mL in NS (PITRESSIN) standard conc infusion       REVIEW OF LABORATORY, PATHOLOGY AND IMAGING RESULTS:  BMP  Recent Labs   Lab 10/05/21  1257 10/05/21  0301 10/04/21  2149 10/04/21  1940 10/04/21  0638 10/04/21  0430 10/03/21  2220 10/03/21  2116   NA  --  135 133  --   --  132*  --  132*   POTASSIUM  --  3.9 3.6  --   --  3.8  --  3.7   CHLORIDE  --  106 106  --   --  104  --  104   STEFFANY  --  8.5 8.1*  --   --  8.2*  --  8.2*   CO2  --  20 17*  --   --  17*  --  16*   BUN  --  24 22  --   --  16  --  15   CR  --  1.91* 1.88*  --   --  1.61*  --  1.53*    * 111* 113* 116*   < > 119*   < > 124*    < > = values in this interval not displayed.     CBC  Recent Labs   Lab 10/05/21  0301 10/04/21  2149 10/04/21  2149 10/04/21  0430 10/04/21  0430 10/03/21  1128 10/03/21  0710   WBC 25.9*  --  28.2*  --  22.7*  22.7*  --  17.4*   RBC 2.08*  --  2.01*  --  2.07*  2.07*  --  1.95*   HGB 7.4*   < > 7.2*   < > 7.6*  7.6*   < > 7.0*   HCT 24.5*  --  23.8*  --  24.7*  24.7*  --  22.2*   *  --  118*  --  119*  119*  --  114*   MCH 35.6*  --  35.8*  --  36.7*  36.7*  --  35.9*   MCHC 30.2*  --  30.3*  --  30.8*  30.8*  --  31.5   RDW 16.8*  --  16.7*  --  16.4*  16.4*  --  16.3*     --  277  --  240  240  --  189    < > = values in this interval not displayed.     INR  Recent Labs   Lab 10/05/21  0301 10/04/21  0953 10/04/21  0430 10/03/21  0710   INR 1.83* 2.48* 2.52* 2.42*     LFTs  Recent Labs   Lab 10/05/21  0301 10/04/21  0430 10/03/21  0710 10/02/21  0745   ALKPHOS 91 86 88 135   AST 63* 58* 52* 62*   ALT 13 14 18 22   BILITOTAL 14.9* 13.2* 12.7* 13.0*   PROTTOTAL 5.8* 6.5* 6.3* 5.6*   ALBUMIN 3.5 4.3 4.4 3.0*      PANC  Recent Labs   Lab 09/29/21  1851   LIPASE 176      MELD-Na score: 30 at 10/5/2021  3:01 AM  MELD score: 29 at 10/5/2021  3:01 AM  Calculated from:  Serum Creatinine: 1.91 mg/dL at 10/5/2021  3:01 AM  Serum Sodium: 135 mmol/L at 10/5/2021  3:01 AM  Total Bilirubin: 14.9 mg/dL at 10/5/2021  3:01 AM  INR(ratio): 1.83 at 10/5/2021  3:01 AM  Age: 46 years      Imaging Results:  None current    IMPRESSION:  Aliyah Angeles is a 46 year old female with a history of alcohol use disorder and alcohol hepatitis complicated by RYGB, anxiety, PTSD, alcohol pancreatitis, pancreatic cyst, DM II who was admitted with complaints of vomiting, anasarca and abdominal pain.  She had respiratory      RECOMMENDATIONS:  1. Alcohol hepatitis  - last alcohol use 9/12.   - US 9/29 with hepatomegaly  - consider NG tube feeding when able following  paralytics.      2. Ascites  - new onset ascites from portal hypertension. SAAG ~1.1, however serum albumin very low. No evidence of SBP on 10/4  - diuretics currently on hold in setting of ANA LAURA  - para as needed, please send diagnostic with each para.     3. ANA LAURA  - likely multifactorial with recent vomiting, diarrhea.   - was receiving albumin when started to decline. Echo with moderate TR. RV normal.   Baseline creatinine. ~0.5.   - holding diuretics. No evidence of UTI    4. Hepatic encephalopathy  - now sedated and paralyzed.   - with alcohol hepatitis, unclear level of HE.     Patient was discussed with attending physician, Dr. Camille Hinkle.      Next follow up appointment: tbd    Thank you for the opportunity to be involved with  Aliyah Angeles care. Please call with any questions or concerns.    MARIA ELENA Grey, CNP  Inpatient Hepatology [facility]  Text Link

## 2021-10-05 NOTE — PROGRESS NOTES
CLINICAL NUTRITION SERVICES - BRIEF NOTE      Nutrition Prescription     Recommendations already ordered by Registered Dietitian (RD):  --Pending NDT placement by radiology and okay for TF per MICU team, start TF @ 10 ml/hr and advance by 10 ml q 8 hrs until goal rate.       --GOAL: Osmolite 1.5 Faizan @ goal of 45ml/hr (1080ml/day) + 2 Prosource will provide: 1700 kcals (28 kcal/kg), 89 g PRO (1.5 g/kg), 822 ml free H20, 219 g CHO, and 0 g fiber daily.      Future/Additional Recommendations:  --FT position, TF start/zen/advancement/lytes.   --Weight trends.     *Please see full assessment note from 10/4/2021    New Findings:  Pt supine this morning. Trophic TF held overnight. Spoke with Radiology, plan for ppFT placement this afternoon. Adjusted TF orders to reflect new access.     Interventions  Collaboration with other providers - rounds, RN, Radiology  Enteral Nutrition - as above    RD to follow per protocol.    Iris Vogel, MS, RD, LD, [facility]  MICU pager: [phone]  ASCOM: [id number]

## 2021-10-05 NOTE — PROGRESS NOTES
Care Management Follow Up    Length of Stay (days): 6    Expected Discharge Date: TBD   Concerns to be Addressed: substance/tobacco abuse/use, discharge planning     Patient plan of care discussed at interdisciplinary rounds: Yes    Anticipated Discharge Disposition: Inpatient Chemical Dependency, Transitional Care    Additional Information:  SW received VM from Marzena at [place] ([phone]) regarding pt's referral. SW provided brief update and Marzena requested that the referral be re-submitted when pt is more appropriate for discharge planning.     JOY Vila, LGSW  ICU   [facility]  Ph: [phone]  [phone]

## 2021-10-05 NOTE — PLAN OF CARE
ICU End of Shift Summary. See flowsheets for vital signs and detailed assessment.    Changes this shift: Patient remained prone overnight. ABG improved post-proning, but remained dyssynchronous with ventilator despite heavy sedation and RASS -5. Push of atracurium improved PaO2 significantly, drip started. Able to wean PEEP from 16-->12 and FiO2 to 40%. Slow recovery when patient desats, happens with activity/turns (worse when doing head turns to right side). Urine output declining, about 200mL total for shift. MAP goal>65 - started vasopressin overnight.     Plan: Would be scheduled to supine around 1000. Wean vent settings as tolerated. Continue plan of care. Monitor urine output and fluid status carefully - will likely need dialysis at some point. Possibly consider line placement while supine today. Potentially an echo today while supine based on BNP increase per night MD.  Update family.

## 2021-10-05 NOTE — PLAN OF CARE
ICU End of Shift Summary. See flowsheets for vital signs and detailed assessment.    Changes this shift: Afebrile. NSR. BP labile (on levo and vaso). CMV PEEP12 Fio2 50%.  MAX Veletri. Paralytic holiday starting at 1730. Sedated Rass -5. 0 of 4 twitches 35 amps when paralytic was on. ND placed with x-ray. TF started. Minimal stool output. Low UOP (MD aware).     Plan: ABG at 2000. Discuss turning paralytic on according to gas. Continue POC.

## 2021-10-06 ENCOUNTER — APPOINTMENT (OUTPATIENT)
Dept: GENERAL RADIOLOGY | Facility: CLINIC | Age: 46
End: 2021-10-06
Attending: STUDENT IN AN ORGANIZED HEALTH CARE EDUCATION/TRAINING PROGRAM
Payer: COMMERCIAL

## 2021-10-06 ENCOUNTER — APPOINTMENT (OUTPATIENT)
Dept: CT IMAGING | Facility: CLINIC | Age: 46
End: 2021-10-06
Attending: NURSE PRACTITIONER
Payer: COMMERCIAL

## 2021-10-06 ENCOUNTER — APPOINTMENT (OUTPATIENT)
Dept: ULTRASOUND IMAGING | Facility: CLINIC | Age: 46
End: 2021-10-06
Attending: NURSE PRACTITIONER
Payer: COMMERCIAL

## 2021-10-06 PROBLEM — N17.9 ACUTE KIDNEY FAILURE, UNSPECIFIED (H): Status: ACTIVE | Noted: 2021-10-06

## 2021-10-06 LAB
ALBUMIN SERPL-MCNC: 3.2 G/DL (ref 3.4–5)
ALBUMIN UR-MCNC: 100 MG/DL
ALP SERPL-CCNC: 113 U/L (ref 40–150)
ALT SERPL W P-5'-P-CCNC: 20 U/L (ref 0–50)
AMORPH CRY #/AREA URNS HPF: ABNORMAL /HPF
ANION GAP SERPL CALCULATED.3IONS-SCNC: 11 MMOL/L (ref 3–14)
ANION GAP SERPL CALCULATED.3IONS-SCNC: 15 MMOL/L (ref 3–14)
APPEARANCE UR: ABNORMAL
AST SERPL W P-5'-P-CCNC: 75 U/L (ref 0–45)
BACTERIA #/AREA URNS HPF: ABNORMAL /HPF
BACTERIA BRONCH: NO GROWTH
BACTERIA SPT CULT: NO GROWTH
BASE EXCESS BLDA CALC-SCNC: -10.7 MMOL/L (ref -9–1.8)
BASE EXCESS BLDA CALC-SCNC: -10.8 MMOL/L (ref -9–1.8)
BASE EXCESS BLDA CALC-SCNC: -11.1 MMOL/L (ref -9–1.8)
BASE EXCESS BLDA CALC-SCNC: -11.2 MMOL/L (ref -9–1.8)
BASE EXCESS BLDA CALC-SCNC: -11.4 MMOL/L (ref -9–1.8)
BASOPHILS # BLD MANUAL: 0.3 10E3/UL (ref 0–0.2)
BASOPHILS NFR BLD MANUAL: 1 %
BILIRUB SERPL-MCNC: 15.7 MG/DL (ref 0.2–1.3)
BILIRUB UR QL STRIP: ABNORMAL
BUN SERPL-MCNC: 33 MG/DL (ref 7–30)
BUN SERPL-MCNC: 43 MG/DL (ref 7–30)
CALCIUM SERPL-MCNC: 8 MG/DL (ref 8.5–10.1)
CALCIUM SERPL-MCNC: 8.8 MG/DL (ref 8.5–10.1)
CHLORIDE BLD-SCNC: 108 MMOL/L (ref 94–109)
CHLORIDE BLD-SCNC: 110 MMOL/L (ref 94–109)
CO2 SERPL-SCNC: 14 MMOL/L (ref 20–32)
CO2 SERPL-SCNC: 16 MMOL/L (ref 20–32)
COLOR UR AUTO: ABNORMAL
CREAT SERPL-MCNC: 2.04 MG/DL (ref 0.52–1.04)
CREAT SERPL-MCNC: 2.66 MG/DL (ref 0.52–1.04)
DACRYOCYTES BLD QL SMEAR: SLIGHT
EOSINOPHIL # BLD MANUAL: 1.5 10E3/UL (ref 0–0.7)
EOSINOPHIL NFR BLD MANUAL: 5 %
ERYTHROCYTE [DISTWIDTH] IN BLOOD BY AUTOMATED COUNT: 17 % (ref 10–15)
FRAGMENTS BLD QL SMEAR: SLIGHT
GFR SERPL CREATININE-BSD FRML MDRD: 21 ML/MIN/1.73M2
GFR SERPL CREATININE-BSD FRML MDRD: 28 ML/MIN/1.73M2
GLUCOSE BLD-MCNC: 148 MG/DL (ref 70–99)
GLUCOSE BLD-MCNC: 200 MG/DL (ref 70–99)
GLUCOSE BLDC GLUCOMTR-MCNC: 133 MG/DL (ref 70–99)
GLUCOSE BLDC GLUCOMTR-MCNC: 168 MG/DL (ref 70–99)
GLUCOSE BLDC GLUCOMTR-MCNC: 182 MG/DL (ref 70–99)
GLUCOSE UR STRIP-MCNC: NEGATIVE MG/DL
GRAM STAIN RESULT: NORMAL
GRAM STAIN RESULT: NORMAL
HCO3 BLD-SCNC: 15 MMOL/L (ref 21–28)
HCT VFR BLD AUTO: 26.3 % (ref 35–47)
HGB BLD-MCNC: 8.2 G/DL (ref 11.7–15.7)
HGB UR QL STRIP: ABNORMAL
HYALINE CASTS: 6 /LPF
INR PPP: 1.5 (ref 0.85–1.15)
KETONES UR STRIP-MCNC: ABNORMAL MG/DL
LACTATE SERPL-SCNC: 2 MMOL/L (ref 0.7–2)
LACTATE SERPL-SCNC: 2.4 MMOL/L (ref 0.7–2)
LEUKOCYTE ESTERASE UR QL STRIP: ABNORMAL
LYMPHOCYTES # BLD MANUAL: 2.3 10E3/UL (ref 0.8–5.3)
LYMPHOCYTES NFR BLD MANUAL: 8 %
MAGNESIUM SERPL-MCNC: 2.6 MG/DL (ref 1.6–2.3)
MAYO MISC RESULT: NORMAL
MCH RBC QN AUTO: 36.9 PG (ref 26.5–33)
MCHC RBC AUTO-ENTMCNC: 31.2 G/DL (ref 31.5–36.5)
MCV RBC AUTO: 119 FL (ref 78–100)
MISCELLANEOUS TEST 1 (ARUP): NORMAL
MONOCYTES # BLD MANUAL: 1.5 10E3/UL (ref 0–1.3)
MONOCYTES NFR BLD MANUAL: 5 %
MUCOUS THREADS #/AREA URNS LPF: PRESENT /LPF
MYELOCYTES # BLD MANUAL: 0.3 10E3/UL
MYELOCYTES NFR BLD MANUAL: 1 %
NEUTROPHILS # BLD MANUAL: 23.3 10E3/UL (ref 1.6–8.3)
NEUTROPHILS NFR BLD MANUAL: 80 %
NITRATE UR QL: NEGATIVE
NRBC # BLD AUTO: 0.6 10E3/UL
NRBC BLD MANUAL-RTO: 2 %
NT-PROBNP SERPL-MCNC: ABNORMAL PG/ML (ref 0–450)
O2/TOTAL GAS SETTING VFR VENT: 40 %
O2/TOTAL GAS SETTING VFR VENT: 50 %
O2/TOTAL GAS SETTING VFR VENT: 50 %
OXYHGB MFR BLD: 89 % (ref 92–100)
OXYHGB MFR BLD: 93 % (ref 92–100)
OXYHGB MFR BLD: 98 % (ref 92–100)
PATH REPORT.COMMENTS IMP SPEC: NORMAL
PATH REPORT.FINAL DX SPEC: NORMAL
PATH REPORT.GROSS SPEC: NORMAL
PATH REPORT.RELEVANT HX SPEC: NORMAL
PCO2 BLD: 31 MM HG (ref 35–45)
PCO2 BLD: 33 MM HG (ref 35–45)
PCO2 BLD: 34 MM HG (ref 35–45)
PCO2 BLD: 34 MM HG (ref 35–45)
PCO2 BLD: 35 MM HG (ref 35–45)
PH BLD: 7.25 [PH] (ref 7.35–7.45)
PH BLD: 7.25 [PH] (ref 7.35–7.45)
PH BLD: 7.26 [PH] (ref 7.35–7.45)
PH BLD: 7.26 [PH] (ref 7.35–7.45)
PH BLD: 7.29 [PH] (ref 7.35–7.45)
PH UR STRIP: 5.5 [PH] (ref 5–7)
PHOSPHATE SERPL-MCNC: 4.2 MG/DL (ref 2.5–4.5)
PLAT MORPH BLD: ABNORMAL
PLATELET # BLD AUTO: 233 10E3/UL (ref 150–450)
PO2 BLD: 142 MM HG (ref 80–105)
PO2 BLD: 167 MM HG (ref 80–105)
PO2 BLD: 67 MM HG (ref 80–105)
PO2 BLD: 82 MM HG (ref 80–105)
PO2 BLD: 86 MM HG (ref 80–105)
POLYCHROMASIA BLD QL SMEAR: ABNORMAL
POTASSIUM BLD-SCNC: 3.3 MMOL/L (ref 3.4–5.3)
POTASSIUM BLD-SCNC: 3.5 MMOL/L (ref 3.4–5.3)
PROCALCITONIN SERPL-MCNC: 23.23 NG/ML
PROT SERPL-MCNC: 5.8 G/DL (ref 6.8–8.8)
RBC # BLD AUTO: 2.22 10E6/UL (ref 3.8–5.2)
RBC MORPH BLD: ABNORMAL
RBC URINE: 29 /HPF
SODIUM SERPL-SCNC: 135 MMOL/L (ref 133–144)
SODIUM SERPL-SCNC: 139 MMOL/L (ref 133–144)
SP GR UR STRIP: 1.03 (ref 1–1.03)
SQUAMOUS EPITHELIAL: 2 /HPF
UROBILINOGEN UR STRIP-MCNC: NORMAL MG/DL
WBC # BLD AUTO: 29.1 10E3/UL (ref 4–11)
WBC URINE: 25 /HPF
YEAST #/AREA URNS HPF: ABNORMAL /HPF

## 2021-10-06 PROCEDURE — 258N000003 HC RX IP 258 OP 636: Performed by: STUDENT IN AN ORGANIZED HEALTH CARE EDUCATION/TRAINING PROGRAM

## 2021-10-06 PROCEDURE — 250N000013 HC RX MED GY IP 250 OP 250 PS 637: Performed by: SURGERY

## 2021-10-06 PROCEDURE — 250N000011 HC RX IP 250 OP 636: Performed by: SURGERY

## 2021-10-06 PROCEDURE — 84100 ASSAY OF PHOSPHORUS: CPT | Performed by: STUDENT IN AN ORGANIZED HEALTH CARE EDUCATION/TRAINING PROGRAM

## 2021-10-06 PROCEDURE — 250N000011 HC RX IP 250 OP 636: Performed by: STUDENT IN AN ORGANIZED HEALTH CARE EDUCATION/TRAINING PROGRAM

## 2021-10-06 PROCEDURE — 85027 COMPLETE CBC AUTOMATED: CPT | Performed by: STUDENT IN AN ORGANIZED HEALTH CARE EDUCATION/TRAINING PROGRAM

## 2021-10-06 PROCEDURE — 87106 FUNGI IDENTIFICATION YEAST: CPT | Performed by: NURSE PRACTITIONER

## 2021-10-06 PROCEDURE — 250N000011 HC RX IP 250 OP 636: Performed by: NURSE PRACTITIONER

## 2021-10-06 PROCEDURE — 93975 VASCULAR STUDY: CPT

## 2021-10-06 PROCEDURE — 87086 URINE CULTURE/COLONY COUNT: CPT | Performed by: NURSE PRACTITIONER

## 2021-10-06 PROCEDURE — 87305 ASPERGILLUS AG IA: CPT | Performed by: NURSE PRACTITIONER

## 2021-10-06 PROCEDURE — 80053 COMPREHEN METABOLIC PANEL: CPT | Performed by: STUDENT IN AN ORGANIZED HEALTH CARE EDUCATION/TRAINING PROGRAM

## 2021-10-06 PROCEDURE — 82805 BLOOD GASES W/O2 SATURATION: CPT | Performed by: STUDENT IN AN ORGANIZED HEALTH CARE EDUCATION/TRAINING PROGRAM

## 2021-10-06 PROCEDURE — 250N000013 HC RX MED GY IP 250 OP 250 PS 637: Performed by: STUDENT IN AN ORGANIZED HEALTH CARE EDUCATION/TRAINING PROGRAM

## 2021-10-06 PROCEDURE — 36415 COLL VENOUS BLD VENIPUNCTURE: CPT | Performed by: SURGERY

## 2021-10-06 PROCEDURE — 999N000065 XR CHEST PORT 1 VIEW

## 2021-10-06 PROCEDURE — 999N000157 HC STATISTIC RCP TIME EA 10 MIN

## 2021-10-06 PROCEDURE — 94640 AIRWAY INHALATION TREATMENT: CPT

## 2021-10-06 PROCEDURE — 87103 BLOOD FUNGUS CULTURE: CPT | Performed by: SURGERY

## 2021-10-06 PROCEDURE — 74176 CT ABD & PELVIS W/O CONTRAST: CPT | Mod: 26 | Performed by: RADIOLOGY

## 2021-10-06 PROCEDURE — 250N000009 HC RX 250: Performed by: STUDENT IN AN ORGANIZED HEALTH CARE EDUCATION/TRAINING PROGRAM

## 2021-10-06 PROCEDURE — 83735 ASSAY OF MAGNESIUM: CPT | Performed by: STUDENT IN AN ORGANIZED HEALTH CARE EDUCATION/TRAINING PROGRAM

## 2021-10-06 PROCEDURE — 71250 CT THORAX DX C-: CPT | Mod: 26 | Performed by: RADIOLOGY

## 2021-10-06 PROCEDURE — 83605 ASSAY OF LACTIC ACID: CPT | Performed by: STUDENT IN AN ORGANIZED HEALTH CARE EDUCATION/TRAINING PROGRAM

## 2021-10-06 PROCEDURE — 258N000003 HC RX IP 258 OP 636: Performed by: NURSE PRACTITIONER

## 2021-10-06 PROCEDURE — 85610 PROTHROMBIN TIME: CPT | Performed by: STUDENT IN AN ORGANIZED HEALTH CARE EDUCATION/TRAINING PROGRAM

## 2021-10-06 PROCEDURE — 81001 URINALYSIS AUTO W/SCOPE: CPT | Performed by: NURSE PRACTITIONER

## 2021-10-06 PROCEDURE — 87449 NOS EACH ORGANISM AG IA: CPT | Performed by: NURSE PRACTITIONER

## 2021-10-06 PROCEDURE — 87040 BLOOD CULTURE FOR BACTERIA: CPT | Performed by: SURGERY

## 2021-10-06 PROCEDURE — 83880 ASSAY OF NATRIURETIC PEPTIDE: CPT | Performed by: STUDENT IN AN ORGANIZED HEALTH CARE EDUCATION/TRAINING PROGRAM

## 2021-10-06 PROCEDURE — 87040 BLOOD CULTURE FOR BACTERIA: CPT | Performed by: NURSE PRACTITIONER

## 2021-10-06 PROCEDURE — 200N000002 HC R&B ICU UMMC

## 2021-10-06 PROCEDURE — 87205 SMEAR GRAM STAIN: CPT | Performed by: NURSE PRACTITIONER

## 2021-10-06 PROCEDURE — 94664 DEMO&/EVAL PT USE INHALER: CPT

## 2021-10-06 PROCEDURE — 87070 CULTURE OTHR SPECIMN AEROBIC: CPT | Performed by: NURSE PRACTITIONER

## 2021-10-06 PROCEDURE — 99222 1ST HOSP IP/OBS MODERATE 55: CPT | Mod: 24 | Performed by: INTERNAL MEDICINE

## 2021-10-06 PROCEDURE — 71250 CT THORAX DX C-: CPT

## 2021-10-06 PROCEDURE — 84145 PROCALCITONIN (PCT): CPT | Performed by: STUDENT IN AN ORGANIZED HEALTH CARE EDUCATION/TRAINING PROGRAM

## 2021-10-06 PROCEDURE — 87102 FUNGUS ISOLATION CULTURE: CPT | Performed by: NURSE PRACTITIONER

## 2021-10-06 PROCEDURE — 82803 BLOOD GASES ANY COMBINATION: CPT | Performed by: NURSE PRACTITIONER

## 2021-10-06 PROCEDURE — 71045 X-RAY EXAM CHEST 1 VIEW: CPT | Mod: 26 | Performed by: STUDENT IN AN ORGANIZED HEALTH CARE EDUCATION/TRAINING PROGRAM

## 2021-10-06 PROCEDURE — P9047 ALBUMIN (HUMAN), 25%, 50ML: HCPCS | Performed by: STUDENT IN AN ORGANIZED HEALTH CARE EDUCATION/TRAINING PROGRAM

## 2021-10-06 PROCEDURE — 94003 VENT MGMT INPAT SUBQ DAY: CPT

## 2021-10-06 PROCEDURE — 93975 VASCULAR STUDY: CPT | Mod: 26 | Performed by: STUDENT IN AN ORGANIZED HEALTH CARE EDUCATION/TRAINING PROGRAM

## 2021-10-06 PROCEDURE — 99291 CRITICAL CARE FIRST HOUR: CPT | Mod: GC | Performed by: INTERNAL MEDICINE

## 2021-10-06 PROCEDURE — 250N000011 HC RX IP 250 OP 636

## 2021-10-06 PROCEDURE — 94640 AIRWAY INHALATION TREATMENT: CPT | Mod: 76

## 2021-10-06 RX ORDER — EMOLLIENT BASE
CREAM (GRAM) TOPICAL EVERY 6 HOURS PRN
Status: DISCONTINUED | OUTPATIENT
Start: 2021-10-06 | End: 2021-11-05 | Stop reason: HOSPADM

## 2021-10-06 RX ORDER — MIDODRINE HYDROCHLORIDE 5 MG/1
10 TABLET ORAL
Status: DISCONTINUED | OUTPATIENT
Start: 2021-10-06 | End: 2021-10-07

## 2021-10-06 RX ORDER — ALBUTEROL SULFATE 0.83 MG/ML
2.5 SOLUTION RESPIRATORY (INHALATION)
Status: DISCONTINUED | OUTPATIENT
Start: 2021-10-06 | End: 2021-10-08

## 2021-10-06 RX ORDER — POTASSIUM CHLORIDE 20MEQ/15ML
10 LIQUID (ML) ORAL ONCE
Status: COMPLETED | OUTPATIENT
Start: 2021-10-06 | End: 2021-10-06

## 2021-10-06 RX ORDER — ALBUMIN (HUMAN) 12.5 G/50ML
100 SOLUTION INTRAVENOUS EVERY 24 HOURS
Status: DISCONTINUED | OUTPATIENT
Start: 2021-10-06 | End: 2021-10-07

## 2021-10-06 RX ORDER — POTASSIUM CHLORIDE 750 MG/1
10 TABLET, EXTENDED RELEASE ORAL ONCE
Status: DISCONTINUED | OUTPATIENT
Start: 2021-10-06 | End: 2021-10-06 | Stop reason: CLARIF

## 2021-10-06 RX ADMIN — GABAPENTIN 100 MG: 250 SOLUTION ORAL at 17:35

## 2021-10-06 RX ADMIN — CALCIUM CARBONATE 600 MG (1,500 MG)-VITAMIN D3 400 UNIT TABLET 1 TABLET: at 07:56

## 2021-10-06 RX ADMIN — PHYTONADIONE 10 MG: 10 INJECTION, EMULSION INTRAMUSCULAR; INTRAVENOUS; SUBCUTANEOUS at 07:59

## 2021-10-06 RX ADMIN — Medication 20 NG/KG/MIN: at 06:20

## 2021-10-06 RX ADMIN — VASOPRESSIN 2.4 UNITS/HR: 20 INJECTION INTRAVENOUS at 03:41

## 2021-10-06 RX ADMIN — GABAPENTIN 100 MG: 250 SOLUTION ORAL at 11:31

## 2021-10-06 RX ADMIN — ALBUTEROL SULFATE 2.5 MG: 2.5 SOLUTION RESPIRATORY (INHALATION) at 16:19

## 2021-10-06 RX ADMIN — Medication 150 MCG/HR: at 16:48

## 2021-10-06 RX ADMIN — PIPERACILLIN AND TAZOBACTAM 3.38 G: 3; .375 INJECTION, POWDER, LYOPHILIZED, FOR SOLUTION INTRAVENOUS at 17:35

## 2021-10-06 RX ADMIN — SALINE NASAL SPRAY 1 SPRAY: 1.5 SOLUTION NASAL at 08:02

## 2021-10-06 RX ADMIN — Medication: at 20:00

## 2021-10-06 RX ADMIN — Medication 100 MCG: at 07:57

## 2021-10-06 RX ADMIN — RIFAXIMIN 550 MG: 550 TABLET ORAL at 21:41

## 2021-10-06 RX ADMIN — HEPARIN SODIUM 5000 UNITS: 5000 INJECTION, SOLUTION INTRAVENOUS; SUBCUTANEOUS at 03:51

## 2021-10-06 RX ADMIN — DEXTROSE 2 MCG/KG/MIN: 50 INJECTION, SOLUTION INTRAVENOUS at 10:18

## 2021-10-06 RX ADMIN — OXYMETAZOLINE HYDROCHLORIDE 2 SPRAY: 0.05 SPRAY NASAL at 07:59

## 2021-10-06 RX ADMIN — Medication 1 PACKET: at 08:00

## 2021-10-06 RX ADMIN — MICAFUNGIN SODIUM 100 MG: 50 INJECTION, POWDER, LYOPHILIZED, FOR SOLUTION INTRAVENOUS at 13:07

## 2021-10-06 RX ADMIN — POLYETHYLENE GLYCOL 3350 17 G: 17 POWDER, FOR SOLUTION ORAL at 13:13

## 2021-10-06 RX ADMIN — VASOPRESSIN 2.4 UNITS/HR: 20 INJECTION INTRAVENOUS at 10:18

## 2021-10-06 RX ADMIN — Medication 1 PACKET: at 21:41

## 2021-10-06 RX ADMIN — MINERAL OIL AND PETROLATUM: 150; 830 OINTMENT OPHTHALMIC at 00:22

## 2021-10-06 RX ADMIN — GABAPENTIN 100 MG: 250 SOLUTION ORAL at 07:59

## 2021-10-06 RX ADMIN — ALBUTEROL SULFATE 2.5 MG: 2.5 SOLUTION RESPIRATORY (INHALATION) at 19:36

## 2021-10-06 RX ADMIN — POLYETHYLENE GLYCOL 3350 17 G: 17 POWDER, FOR SOLUTION ORAL at 07:57

## 2021-10-06 RX ADMIN — MINERAL OIL AND PETROLATUM: 150; 830 OINTMENT OPHTHALMIC at 07:56

## 2021-10-06 RX ADMIN — POTASSIUM CHLORIDE 10 MEQ: 40 SOLUTION ORAL at 05:57

## 2021-10-06 RX ADMIN — Medication 0.14 MCG/KG/MIN: at 06:16

## 2021-10-06 RX ADMIN — PIPERACILLIN AND TAZOBACTAM 3.38 G: 3; .375 INJECTION, POWDER, LYOPHILIZED, FOR SOLUTION INTRAVENOUS at 00:17

## 2021-10-06 RX ADMIN — Medication 8 MG/HR: at 01:13

## 2021-10-06 RX ADMIN — RIFAXIMIN 550 MG: 550 TABLET ORAL at 07:57

## 2021-10-06 RX ADMIN — PIPERACILLIN AND TAZOBACTAM 3.38 G: 3; .375 INJECTION, POWDER, LYOPHILIZED, FOR SOLUTION INTRAVENOUS at 05:57

## 2021-10-06 RX ADMIN — FOLIC ACID 1 MG: 1 TABLET ORAL at 07:57

## 2021-10-06 RX ADMIN — POLYETHYLENE GLYCOL 3350 17 G: 17 POWDER, FOR SOLUTION ORAL at 21:41

## 2021-10-06 RX ADMIN — THIAMINE HCL TAB 100 MG 100 MG: 100 TAB at 07:57

## 2021-10-06 RX ADMIN — ALBUMIN HUMAN 100 G: 0.25 SOLUTION INTRAVENOUS at 18:10

## 2021-10-06 RX ADMIN — Medication 200 MCG/HR: at 04:22

## 2021-10-06 RX ADMIN — Medication 20 NG/KG/MIN: at 21:32

## 2021-10-06 RX ADMIN — DOCUSATE SODIUM 50 MG AND SENNOSIDES 8.6 MG 1 TABLET: 8.6; 5 TABLET, FILM COATED ORAL at 07:57

## 2021-10-06 RX ADMIN — MIDODRINE HYDROCHLORIDE 10 MG: 5 TABLET ORAL at 17:34

## 2021-10-06 RX ADMIN — SALINE NASAL SPRAY 1 SPRAY: 1.5 SOLUTION NASAL at 16:48

## 2021-10-06 RX ADMIN — ESCITALOPRAM OXALATE 10 MG: 5 SOLUTION ORAL at 07:59

## 2021-10-06 RX ADMIN — Medication 8 MG/HR: at 13:13

## 2021-10-06 RX ADMIN — SALINE NASAL SPRAY 1 SPRAY: 1.5 SOLUTION NASAL at 21:41

## 2021-10-06 RX ADMIN — DOXYCYCLINE 100 MG: 25 FOR SUSPENSION ORAL at 07:58

## 2021-10-06 RX ADMIN — DOCUSATE SODIUM 50 MG AND SENNOSIDES 8.6 MG 1 TABLET: 8.6; 5 TABLET, FILM COATED ORAL at 21:41

## 2021-10-06 RX ADMIN — Medication 40 MG: at 08:00

## 2021-10-06 RX ADMIN — HEPARIN SODIUM 5000 UNITS: 5000 INJECTION, SOLUTION INTRAVENOUS; SUBCUTANEOUS at 16:48

## 2021-10-06 RX ADMIN — Medication 15 ML: at 11:31

## 2021-10-06 RX ADMIN — SALINE NASAL SPRAY 1 SPRAY: 1.5 SOLUTION NASAL at 11:31

## 2021-10-06 RX ADMIN — VASOPRESSIN 2.4 UNITS/HR: 20 INJECTION INTRAVENOUS at 19:46

## 2021-10-06 RX ADMIN — PIPERACILLIN AND TAZOBACTAM 3.38 G: 3; .375 INJECTION, POWDER, LYOPHILIZED, FOR SOLUTION INTRAVENOUS at 11:31

## 2021-10-06 ASSESSMENT — MIFFLIN-ST. JEOR: SCORE: 1528

## 2021-10-06 ASSESSMENT — ACTIVITIES OF DAILY LIVING (ADL)
ADLS_ACUITY_SCORE: 17
ADLS_ACUITY_SCORE: 19
ADLS_ACUITY_SCORE: 19
ADLS_ACUITY_SCORE: 17
ADLS_ACUITY_SCORE: 17
ADLS_ACUITY_SCORE: 19

## 2021-10-06 NOTE — CONSULTS
Nephrology Initial Consult  October 6, 2021      Aliyah Angeles MRN:[id number] YOB: 1975  Date of Admission:9/29/2021  Primary care provider: Stacie Cameron  Requesting physician: Tito Cornelius MD    ASSESSMENT AND RECOMMENDATIONS:   ANA LAURA-Baseline Cr normal at 0.5 as recently as 9/14/2021, abdominal US pending currently.  Was at inpatient chem dep when she developed abdominal pain, N/V and worsening edema and was brought to [facility] on 9/29.  Was treated for UTI and started on Vanco/Zosyn for leukocytosis but decompensated and was intubated the evening of 10/3.  Noted she did receive albumin x3 days from 10/1-10/3 for ANA LAURA but actually worsened and Cr at time of consult is up to 2.  Lisa <5 on 10/3, ECHO done 10/4 showed hyperdynamic LV and normal IVC.  Etiology of ANA LAURA is likely ATN with underlying HRS physiology which made her more susceptible to hypoperfusion.  Can repeat albumin x3 days, would be reasonable to add octreotide although is on 2 pressors so not of clear benefit.  New UA is pending as well.     -ANA LAURA, likely hypoperfusion in setting of baseline HRS physiology, possible contribution from vanco/zosyn and did receive contrast on 10/3.   -No indication for RRT at this point, reasonable to repeat albumin challenge, we do not feel strongly about octreotide as she is already on 2 pressors but would not be contraindicated.     -Will follow for need for RRT, with her baseline liver disease this would be a poor prognostic sign.       Volume-Has total body volume overload but may be intravascularly dry to euvolemic, albumin 3.2 today after supplementation.      Electrolytes/pH-K normal at 3.5, noted to have low Bicarb but appears to be more NAGMA possibly due to stool losses, ABG 7.26/34/86/15 with mix of both metabolic and resp acidosis.     Liver-Unclear duration, quit drinking on 9/12/2021 and was at chem dep when acute issues started.  Bili is ~16 and INR is 1.5 and a bit improved.  GI  following, not clear if she would be a transplant candidate eventually.      ID-On Vanco/Zosyn, cultures so far have been negative, diagnostic para done.      Nutrition-On Osmolite TF.       Seen and discussed with Dr Gill    Recommendations were communicated to primary team via verbal communication.       MARIA ELENA Wade CNS  Clinical Nurse Specialist  [phone]    REASON FOR CONSULT: Requested to evaluate 46 yof for assessment and management of ANA LAURA.      HISTORY OF PRESENT ILLNESS:  Aliyah Angeles is a 46 yof w/hx of ETOH use (Quit 9/12/2021) w/hepatitis, Fitz en Y bypass, Pancreatitis, DM2, Panc cyst who was admitted 9/29 from chem dep with N/V and worsening abdominal pain and edema.  Her course has been resp failure with ARDS and evolving ANA LAURA.  Noted to have received albumin from 10/1-10/3 (150g/day) with no improvement in Cr.  On Vanco/Zosyn to treat sepsis although cultures at time of consult are negative.  Nephrology consulted for evaluation of ANA LAURA and possible RRT.      PAST MEDICAL HISTORY:  Unable to review with pt due to vent/sedation.   Past Medical History:   Diagnosis Date     Acne      ADHD (attention deficit hyperactivity disorder)      Arthralgia of temporomandibular joint 5/19/2016     Arthritis      Cervical high risk HPV (human papillomavirus) test positive 4/27/2018 4/27/18 NIL pap, + HR HPV (not 16 or 18). Plan: cotest in 1 yr.       Cobalamin deficiency 11/24/2014     Drug-seeking behavior 5/19/2016    Overview:  Per  website, patient has filled with multiple controlled substances with multiple prescribers at multiple pharmacies      History of blood transfusion 01/01/1988     Insomnia 7/21/2013     Raynaud's phenomenon 5/25/2015     Vitamin D deficiency 1/16/2013       Past Surgical History:   Procedure Laterality Date     ABDOMEN SURGERY  08/10/2010    Gastric  bypass     BIOPSY  1/1/94    HPV, multiple reoccurrances, partial  hysterectomy  2013     CHOLECYSTECTOMY   01/01/2010     COLONOSCOPY  01/01/2009     ENT SURGERY  01/01/1988    Tonsils     GI SURGERY  8/10/10    Fitz-en-Y     HYSTERECTOMY VAGINAL  04/05/2013    pathology of cervix benign.         MEDICATIONS:  PTA Meds  Prior to Admission medications    Medication Sig Last Dose Taking? Auth Provider   albuterol (PROAIR HFA/PROVENTIL HFA/VENTOLIN HFA) 108 (90 Base) MCG/ACT inhaler Inhale 2 puffs into the lungs every 4 hours as needed for shortness of breath / dyspnea or wheezing Unknown at Unknown time Yes Stefani Thompson MD   calcium carbonate-vitamin D (OS-STEFFANY) 600-400 MG-UNIT chewable tablet Take 1 chew tab by mouth daily 9/29/2021 at 0800 Yes Stefani Thompson MD   cyanocobalamin (CYANOCOBALAMIN) 1000 MCG/ML injection INJECT 1 ML (1,000 MCG) SUBCUTANEOUS EVERY 4 WEEKS. Unknown at Unknown time Yes Reported, Patient   escitalopram (LEXAPRO) 10 MG tablet Take 1 tablet (10 mg) by mouth daily 9/29/2021 at 0800 Yes Stefani Thompson MD   folic acid (FOLVITE) 1 MG tablet Take 1 tablet (1 mg) by mouth daily 9/29/2021 at 0800 Yes Stefani Thompson MD   gabapentin (NEURONTIN) 100 MG capsule Take 1 capsule (100 mg) by mouth 3 times daily 9/29/2021 at 0800 Yes Stefani Thompson MD   lactulose (CEPHULAC) 20 GM packet Take 1 packet (20 g) by mouth 3 times daily 9/29/2021 at Unknown time Yes Stefani Thompson MD   metFORMIN (GLUCOPHAGE) 500 MG tablet Take 1 tablet (500 mg) by mouth 2 times daily (with meals) 9/29/2021 at 0800 Yes Stefani Thompson MD   multivitamin (CENTRUM) chewable tablet Take 1 tablet by mouth daily 9/29/2021 at 0800 Yes Stefani Thompson MD   pantoprazole (PROTONIX) 40 MG EC tablet Take 1 tablet (40 mg) by mouth every morning (before breakfast) 9/29/2021 at 2200 Yes Stefani Thompson MD   prazosin (MINIPRESS) 1 MG capsule Take 1 capsule (1 mg) by mouth every evening 9/29/2021 at Unknown time Yes Stefani Thompson MD   rifaximin (XIFAXAN) 550 MG TABS tablet Take 1 tablet (550 mg) by mouth 2  "times daily 9/29/2021 at 0800 Yes Stefani Thompson MD   thiamine (B-1) 100 MG tablet Take 1 tablet (100 mg) by mouth daily 9/29/2021 at 0800 Yes Stefani Thompson MD      Current Meds    albuterol  2.5 mg Nebulization Q6H     calcium carbonate 600 mg-vitamin D 400 units  1 tablet Oral or Feeding Tube Daily     cyanocobalamin  100 mcg Oral or Feeding Tube Daily     escitalopram  10 mg Oral or Feeding Tube Daily     folic acid  1 mg Oral or Feeding Tube Daily     gabapentin  100 mg Oral or Feeding Tube TID     heparin ANTICOAGULANT  5,000 Units Subcutaneous Q12H     [Held by provider] metoclopramide  5 mg Oral TID AC     micafungin  100 mg Intravenous Q24H     multivitamins w/minerals  15 mL Oral Daily     pantoprazole  40 mg Per Feeding Tube QAM AC     piperacillin-tazobactam  3.375 g Intravenous Q6H     polyethylene glycol  17 g Oral TID     [Held by provider] prazosin  1 mg Oral QPM     protein modular  1 packet Per Feeding Tube BID     rifaximin  550 mg Oral or Feeding Tube BID     senna-docusate  1 tablet Oral BID     sodium chloride  1 spray Both Nostrils 4x Daily     thiamine  100 mg Oral or Feeding Tube Daily     Infusion Meds    dextrose       epoprostenol (VELETRI) 20 mcg/mL in sterile water inhalation solution 20 ng/kg/min (10/06/21 1208)     fentaNYL 200 mcg/hr (10/06/21 1000)     midazolam 8 mg/hr (10/06/21 1313)     - MEDICATION INSTRUCTIONS -       norepinephrine 0.08 mcg/kg/min (10/06/21 1000)     - MEDICATION INSTRUCTIONS -       vasopressin 2.4 Units/hr (10/06/21 1018)       ALLERGIES:    Allergies   Allergen Reactions     Nsaids      Gastric bypass     Trazodone      In a \"hazy\" for the whole next day       REVIEW OF SYSTEMS:  A 10 point review of systems was negative except as noted above.    SOCIAL HISTORY:   Social History     Socioeconomic History     Marital status: Legally      Spouse name: Not on file     Number of children: Not on file     Years of education: Not on file     " Highest education level: Not on file   Occupational History     Not on file   Tobacco Use     Smoking status: Current Every Day Smoker     Packs/day: 1.00     Years: 15.00     Pack years: 15.00     Types: Cigarettes     Start date: 1/1/1989     Smokeless tobacco: Never Used     Tobacco comment: 10/1/2021: Declined behavioral counseling, NRT, and post-hosp follow-up   Substance and Sexual Activity     Alcohol use: Yes     Alcohol/week: 0.0 standard drinks     Comment: daily unknown amount      Drug use: No     Sexual activity: Yes     Partners: Male     Birth control/protection: Male Surgical, Female Surgical     Comment:  one partner   Other Topics Concern     Parent/sibling w/ CABG, MI or angioplasty before 65F 55M? No   Social History Narrative     Not on file     Social Determinants of Health     Financial Resource Strain:      Difficulty of Paying Living Expenses:    Food Insecurity:      Worried About Running Out of Food in the Last Year:      Ran Out of Food in the Last Year:    Transportation Needs:      Lack of Transportation (Medical):      Lack of Transportation (Non-Medical):    Physical Activity:      Days of Exercise per Week:      Minutes of Exercise per Session:    Stress:      Feeling of Stress :    Social Connections:      Frequency of Communication with Friends and Family:      Frequency of Social Gatherings with Friends and Family:      Attends Sikh Services:      Active Member of Clubs or Organizations:      Attends Club or Organization Meetings:      Marital Status:    Intimate Partner Violence:      Fear of Current or Ex-Partner:      Emotionally Abused:      Physically Abused:      Sexually Abused:      Unable to review with pt due to vent/sedation.     FAMILY MEDICAL HISTORY:   Family History   Problem Relation Age of Onset     Prostate Cancer Father      Hypertension Father      Hyperlipidemia Father      Substance Abuse Father      Obesity Father      Diabetes Maternal  "Grandmother      Colon Cancer Maternal Grandmother      Other Cancer Maternal Grandmother         pancreatic cancer     Obesity Maternal Grandmother      Diabetes Paternal Grandmother      Obesity Paternal Grandmother      Diabetes Paternal Grandfather      Obesity Paternal Grandfather      Breast Cancer Sister      Breast Cancer Sister      Anxiety Disorder Sister      Anesthesia Reaction Sister      Depression Mother      Anesthesia Reaction Mother      Asthma Daughter      Coronary Artery Disease No family hx of      Hypertension No family hx of      Hyperlipidemia No family hx of      Cerebrovascular Disease No family hx of      Depression No family hx of      Anxiety Disorder No family hx of      Mental Illness No family hx of      Substance Abuse No family hx of      Anesthesia Reaction No family hx of      Asthma No family hx of      Osteoporosis No family hx of      Genetic Disorder No family hx of      Thyroid Disease No family hx of      Obesity No family hx of      Unknown/Adopted No family hx of      Unable to review with pt due to vent/sedation.     PHYSICAL EXAM:   Temp  Av.2  F (36.8  C)  Min: 95.8  F (35.4  C)  Max: 100.4  F (38  C)  Arterial Line BP  Min: 76/58  Max: 224/220  Arterial Line MAP (mmHg)  Av.3 mmHg  Min: 24 mmHg  Max: 221 mmHg      Pulse  Av.7  Min: 72  Max: 113 Resp  Av.8  Min: 14  Max: 49  FiO2 (%)  Av.4 %  Min: 40 %  Max: 100 %  SpO2  Av.8 %  Min: 77 %  Max: 100 %       BP (!) 78/48   Pulse 79   Temp 98.9  F (37.2  C) (Axillary)   Resp 26   Ht 1.626 m (5' 4\")   Wt 90.8 kg (200 lb 2.8 oz)   SpO2 96%   BMI 34.36 kg/m     Date 10/06/21 07 - 10/07/21 0659   Shift 9121-1530 6533-1431 8349-0810 24 Hour Total   INTAKE   I.V. 275.01   275.01   NG/   195   Enteral 130   130   Shift Total(mL/kg) 600.01(6.61)   600.01(6.61)   OUTPUT   Urine 53   53   Stool 0   0   Shift Total(mL/kg) 53(0.58)   53(0.58)   Weight (kg) 90.8 90.8 90.8 90.8      Admit " Weight: 89.9 kg (198 lb 4.8 oz)     GENERAL APPEARANCE: Intubated and sedated.    EYES: No scleral icterus  Pulmonary: lungs rhonchi to auscultation with equal breath sounds bilaterally, no clubbing or cyanosis  CV: Regular rhythm, normal rate, no rub   - Edema +2-3 LE/dependent  GI: soft, nontender, normal bowel sounds  MS: no evidence of inflammation in joints, no muscle tenderness  : + Rice, dark brown UOP  SKIN: no rash, warm, dry  NEURO: No focal deficits.      LABS:   CMP  Recent Labs   Lab 10/06/21  1213 10/06/21  0812 10/06/21  0411 10/06/21  0107 10/05/21  1956 10/05/21  1948 10/05/21  1257 10/05/21  0301 10/04/21  2149 10/04/21  2149 10/04/21  0638 10/04/21  0430 10/03/21  1141 10/03/21  0710   NA  --   --  135  --   --  137  --  135  --  133   < > 132*   < > 130*   POTASSIUM  --   --  3.5  --   --  3.7  --  3.9  --  3.6   < > 3.8   < > 3.6   CHLORIDE  --   --  108  --   --  106  --  106  --  106   < > 104   < > 103   CO2  --   --  16*  --   --  16*  --  20  --  17*   < > 17*   < > 14*   ANIONGAP  --   --  11  --   --  15*  --  9  --  10   < > 11   < > 13   * 168* 148* 133*   < > 120*   < > 111*   < > 113*   < > 119*   < > 133*   BUN  --   --  33*  --   --  31*  --  24  --  22   < > 16   < > 12   CR  --   --  2.04*  --   --  1.95*  --  1.91*  --  1.88*   < > 1.61*   < > 1.27*   GFRESTIMATED  --   --  28*  --   --  30*  --  31*  --  31*   < > 38*   < > 50*   STEFFANY  --   --  8.8  --   --  8.6  --  8.5  --  8.1*   < > 8.2*   < > 8.5   MAG  --   --  2.6*  --   --   --   --  2.5*  --   --   --  2.6*  --  2.6*   PHOS  --   --  4.2  --   --   --   --  4.5  --   --   --  4.1  --  2.5   PROTTOTAL  --   --  5.8*  --   --   --   --  5.8*  --   --   --  6.5*  --  6.3*   ALBUMIN  --   --  3.2*  --   --   --   --  3.5  --   --   --  4.3  --  4.4   BILITOTAL  --   --  15.7*  --   --   --   --  14.9*  --   --   --  13.2*  --  12.7*   ALKPHOS  --   --  113  --   --   --   --  91  --   --   --  86  --  88   AST  --    --  75*  --   --   --   --  63*  --   --   --  58*  --  52*   ALT  --   --  20  --   --   --   --  13  --   --   --  14  --  18    < > = values in this interval not displayed.     CBC  Recent Labs   Lab 10/06/21  0411 10/05/21  0301 10/04/21  2149 10/04/21  0430   HGB 8.2* 7.4* 7.2* 7.6*  7.6*   WBC 29.1* 25.9* 28.2* 22.7*  22.7*   RBC 2.22* 2.08* 2.01* 2.07*  2.07*   HCT 26.3* 24.5* 23.8* 24.7*  24.7*   * 118* 118* 119*  119*   MCH 36.9* 35.6* 35.8* 36.7*  36.7*   MCHC 31.2* 30.2* 30.3* 30.8*  30.8*   RDW 17.0* 16.8* 16.7* 16.4*  16.4*    236 277 240  240     INR  Recent Labs   Lab 10/06/21  0411 10/05/21  0301 10/04/21  0953 10/04/21  0430 10/01/21  0627 09/30/21  0612   INR 1.50* 1.83* 2.48* 2.52*   < > 1.64*   PTT  --   --  46*  --   --  37    < > = values in this interval not displayed.     ABG  Recent Labs   Lab 10/06/21  1212 10/06/21  0411 10/06/21  0104 10/05/21  1950   PH 7.26* 7.25* 7.26* 7.26*   PCO2 34* 35 33* 33*   PO2 86 167* 67* 98   HCO3 15* 15* 15* 15*   O2PER 40 50 50 50      URINE STUDIES  Recent Labs   Lab Test 10/06/21  1114 10/04/21  0956 10/02/21  0935 09/13/21  0750   COLOR Dark Yellow* Dark Yellow* Dark Yellow* Dark Yellow*   APPEARANCE Cloudy* Slightly Cloudy* Slightly Cloudy* Slightly Cloudy*   URINEGLC Negative Negative Negative 1000 *   URINEBILI Moderate* Moderate* Moderate* Large*   URINEKETONE Trace* Negative Negative Negative  Negative   SG 1.030 1.020 1.010 1.047*   UBLD Moderate* Moderate* Negative Negative   URINEPH 5.5 5.5 5.5 6.0   PROTEIN 100 * 50 * Negative 30 *   NITRITE Negative Negative Positive* Negative   LEUKEST Trace* Negative Negative Negative   RBCU 29* 10* 1 1   WBCU 25* 2 1 7*     No lab results found.  PTH  No lab results found.  IRON STUDIES  Recent Labs   Lab Test 09/13/21  0642 04/27/18  0925 10/11/16  1929   EYAD 1,839* 36 17

## 2021-10-06 NOTE — PROGRESS NOTES
Patient proned. ETT held manually during flip. No acute issues or concerns. Patient only on 50% when decision made by provider to prone.     Will continue to monitor.

## 2021-10-06 NOTE — PLAN OF CARE
ICU End of Shift Summary. See flowsheets for vital signs and detailed assessment.    Changes this shift: Neuro unchanged, TOF was 4/4 when paralytic was off, remains on versed and fentanyl drip, t,max 99.2, MAP>65 on Levophed and vaso drip, vent settings unchanged, ABG drawn twice while patient was supine, PaO2 98 & 67, PF: 196 & 134, patient was immediately prone at 0200 and restarted paralytic, two hours post prone PF ratio was 334, TOF 0/4, cis drip decrease from 2.5 to 2, tube feed rate advance to 20 ml/hr, low urine output, MD aware, potassium replaced.    Plan: Continue to monitor per plan of care.    Problem: Adult Inpatient Plan of Care  Goal: Plan of Care Review  Outcome: No Change

## 2021-10-06 NOTE — PLAN OF CARE
ICU End of Shift Summary. See flowsheets for vital signs and detailed assessment.    Changes this shift: Vec off at 1000, weaning sedation, RASS remains -5. BP mildly labile, Vaso stable, levl back and forth between 0.08 and 0.12, started Midodrine this evening. PEEP down to 10, stable in supine position. LS with some fine crackles, diminish, minimal secretions. ETT pulled back 3 cm. TF held at 11 for Abdominal US, restarted at 1600. Urine output minimal, nephrology consulted.     Plan: Monitor lytes, Dialysis likely at some point. Continue to wean vent settings. Maintain skin integrity. Monitor stool output. Wean pressors. Wean Sedation. Monitor ABGs.

## 2021-10-06 NOTE — PROGRESS NOTES
MEDICAL ICU PROGRESS NOTE  10/06/2021      Date of Service (when I saw the patient): 10/06/2021  ASSESSMENT: Aliyah Angeles is a 46 year old woman with alcohol use disorder (last drink 9/12/21), alcoholic hepatitis, jaimie en y gastric bypass, alcohol pancreatitis, pancreatic cyst, Type 2 DM, anxiety and depression admitted for abdominal pain, lower extremity edema, and nausea that started after starting low sodium and diabetic friendly foods in an alcohol treatment facility. Patient was admitted to the medicine floor with abdominal pain and vomiting on 9/29/2021. Overnight 10/3-10/4, patient had worsening respiratory status, concern for sepsis requiring transfer to the ICU for intubation and need for pressors as well as further evaluation and management for possible sepsis. She has since required paralysis to stabilize her on the ventilator.     CHANGES and MAJOR THINGS TODAY:   - Hold paralytic, ABG  - Supine at 1300 with imaging --> consider re-proning this evening  - Renal consult, will await recs for volume status/repletion (considering albumin, starting midodrine)  - Abdominal ultrasound for concern of portal vein thrombosis  - CT chest, abdomen, pelvis (non-contrast) today  - Repeat pan-culture  - Discontinue doxycyline, start Micafungin   - Increase bowel regimen    PLAN:     Neuro:  # Pain and sedation  She was sedated and intubated on 10/4. She is currently at goal for her sedation and stable on the ventilator.   - Midazolam drip + boluses  - Fentanyl drip + boluses  - Stop cisatracurium drip for trial off paralysis  - RASS goal -4 to -5     #Anxiety and depression  - PTA escitalopram     # Encephalopathy, likely acute toxic metabolic vs infection  In the setting of likely hepatic encephalopathy, infectious encephalopathy. SBP unlikely due to normal paracentesis eval and culture. Normal ammonia from 10/4. She has only had small liquid stools thus far this admission.  - Assess stooling daily  - Increase  regimen per pharmacy: considering lactulose, increased miralax, suppositories  - Current regimen: Miralax 17 g QID, Add Senna (goal 3-4 BMs a day)  - On rifaximin  - Gabapentin 100 mg TID     Pulmonary:  # Acute hypoxemic respiratory failure  # ARDS  # Concern for infection, pneumonia and sepsis  Acute onset hypoxemia, bilateral opacities are consistent with ARDS. No evidence of cardiac etiology. Bilateral ground glass infiltrates on imaging, with bilateral pleural effusions. Persistent hypoxia and high airway pressures. We are treating for ARDS with intermittent proning and respiratory support. We will work her up for a sepsis source and pneumonia (see ID). Intubated 10/4. Bronchoscopy completed 10/4, with bile-like fluid, suspect aspiration as well as PMN predominance concerning for bacterial pneumonia. Prone position improved oxygenation requirements last night. NMB was used to achieve vent synchrony.   - Lung protective ventilation  - Continue oxygen support with oxygen and PEEP adjustments; wean as able  - Thoracentesis if pleural effusions worsen on imaging  - Prone positioning to be assessed daily  - Inhaled epoprostenol at 20 ng/kg/min  - Volume management to be assessed daily; holding diuresis today (suspect intravascular depletion)  - Wean off paralytic --> supine/imaging/ABG --> consider re-proning this evening  - Infectious work-up and treatment as below     Ventilation Mode: CMV/AC  (Continuous Mandatory Ventilation/ Assist Control)  FiO2 (%): 40 %  Rate Set (breaths/minute): 26 breaths/min  Tidal Volume Set (mL): 330 mL  PEEP (cm H2O): 12 cmH2O  Oxygen Concentration (%): 40 %  Resp: 26    Cardiovascular:  # Hypotension, in the setting of shock, likely sepsis  # Edema, possible intravascular depletion with third-spacing  Echo 10/4 with hyperdynamic EF >70%. Moderate tricuspid insufficiency. Normal IVC. BNP was over 16,000 --> >28,000 (10/4) --> 17,000 (10/6). Normal trops 10/4. Appropriately elevated  cortisol. No indication to diurese at this point. She has been requiring pressors and has an ANA LAURA. She is currently third spacing most of the fluid she has been given. We will consider diuresis if her ARDS worsens or concern for pulmonary edema. Stable lactate of 2.0 on 10/6.  - Levophed for goal MAP >65  - Vasopressin for goal MAP >65  - Trend lactate, urine output  - Will consider midodrine per nephrology consult  - Obtain a RUQ US to assess IV vascular status and check for portal vein thrombosis  - CXR was stable today     GI/Nutrition:  # Alcoholic hepatitis  # Alcohol use disorder  # Lower extremity edema, ascites  # Hyperbilirubinemia  # History of gastric bypass  Patient has been sober since 9/12 and has been at a treatment facility since her discharge 9/21. Hepatomegaly without cirrhosis. Drastic diet change (unable to do low sodium diet at treatment facility), new metformin, increased stool output 2/2 lactulose triggered decompensation which was worsened by not being able to take her home meds due to vomiting. SAAG 1.1 which is less suggestive of portal hypertension causing ascites. Diagnostic paracentesis on admission without SBP. Suspect increased abdominal distension on 10/1 is from bloating and gas possibly due to lactulose given only small ascites on U/S. Discontinued lactulose 10/1 and then had decreased bowel movements. US on 9/29 showing hepatomegaly. Normal lipase on admission. CT A&P on admission with severe hepatic steatosis, portal HTN, ascites, splenomegaly. Paracentesis completed 10/4 revealed hazy fluid, 244 nucleated cells (WNL), and insignificant diff. No growth of ascitic fluid. Bilirubin is trending up, now 15.7.   - GI following, f/u on recs  - Miralax QID  - Trend bilirubin, INR --> MELD labs  - B12, folate, and thiamine   - Feeding tube placement with radiology with advancement of feeds     MELD-Na score: 29 at 10/6/2021  4:11 AM  MELD score: 28 at 10/6/2021  4:11 AM  Calculated  from:  Serum Creatinine: 2.04 mg/dL at 10/6/2021  4:11 AM  Serum Sodium: 135 mmol/L at 10/6/2021  4:11 AM  Total Bilirubin: 15.7 mg/dL at 10/6/2021  4:11 AM  INR(ratio): 1.50 at 10/6/2021  4:11 AM  Age: 46 years    Renal/Fluids/Electrolytes:  #Anion gap metabolic acidosis  Likely 2/2 worsening cirrhosis in the setting of not taking meds as above in addition to gastric loses from vomiting. Partially compensating with respiratory alkalosis. Lactic acid also mildly elevated, likely combination of poor perfusion and oxygenation. Lactic acid was 2.0 today, which is WNL. The blood pH and bicarb are both stable.   - CTM  - Trend lactic acid     #Acute kidney injury, concern for ATN, pre-renal, vs hepatorenal syndrome  Baseline creatinine ~0.98, increased to 1.14 on 10/1/21. May be related to vomiting, lactulose, third spacing. Likely pre-renal. LE edema is prominent and worsening. Intravascular volume likely low; evidenced by low venous volume on echo. Did initially improve after one day of albumin. 50 mg/dl protein, and moderate bilirubin on UA. Uncertain infection, awaiting culture. Urine eosinophils negative. Cr continues to uptrend; currently 2.04. Urine output improved yesterday, but still very low <30 ml/hr.   - Consult nephrology  - Replacement protocols (Mg, Ph, K).   - Fluid restriction  - Strict I/O's  - Concern for need to dialyze  - Avoid nephrotoxic agents     Endocrine:  # Type 2 diabetes mellitus  - Check BS q4h and PRN  - Goal BS between 100 and 160     ID:  # Sepsis  # Suspect pneumonia (aspiration vs CAP)  Concern for pneumonia, CAP vs HAP. Less concern for SBP given negative paracentesis on admission, small volume of ascites. Improving leukocytosis on 10/5; 25.9 from 28.2 yesterday. Afebrile. No clear source of infection. Paracentesis completed 10/4 revealed hazy fluid, 244 nucleated cells (WNL), and insignificant diff. Elevated procal and PMN's in BAL fluid today suggests a bacterial pneumonia, likely  aspiration related due to her history of alcohol abuse and recent symptoms of vomiting. Her CRP was also 96 mg/L on 10/4. Other 10/4 labs include negative urine legionella, negative strep pneumo, negative BAL KOH prep, negative COVID, negative MRSA. Following cultures. BD glucan was indeterminate due to hyperbilirubinemia. Procal stable, WBC elevated 10/6, concern for additional infection.   - Continue Zosyn for CAP  - Stop doxycycline  - Re-culture blood, respiratory secretions, and urine  - Start micafungin with repeat BD glucan  - Ordered CT of chest, abdomen and pelvis without contrast to search for additional source of infection     Abx  - Zosyn 10/4-**  - Vanc 10/4-10/4  - Doxycycline 10/4-10/6  - Micafungin 10/6-**     Hematology:    # Anemia, macrocytic  # Coagulopathy  # Acute epistaxis  In the setting of alcohol use disorder, liver failure, alcoholic hepatitis. INR elevated. Likely 2/2 to B12 or folate deficiency due to long history of alcohol use disorder. Also started albumin challenge 10/1, so may be dilutional, however anemia is out of proportion to decrease in other cells lines. B12 high, folate wnl. Hemolysis labs negative (LDH wnl, haptoglobin WNL, direct bilirubinemia). Nosebleeds starting 10/3. She did not have any acute epistaxis today. Hemoglobin stable.  - Transfuse for Hgb <7  - Transfuse for PLTs <10, or if active bleeding and platelets are <30  - Vitamin K IV 10 mg x3 days  - Follow INR  - Discontinue Afrin  - Trend CBC     Musculoskeletal:  No active issues.     Skin:  #Dry cracked skin on bilateral heels  - Ordered Eucerin to be applied topically     General Cares/Prophylaxis:    DVT Prophylaxis: Heparin  GI Prophylaxis: PPI  Restraints: None  Family Communication: Will update mother after rounds.  Code Status: Full Code     Lines/tubes/drains:  - PIV x2  - ETT  - Rice  - Central line  - Arterial line     Disposition:  - Medical ICU    Patient seen and findings/plan discussed with medical  ICU staff, Dr. Lilia Ascencio, MS4    Resident/Fellow Attestation   I, Hayley Severson, was present with the medical student who participated in the service and in the documentation of the note.  I have verified the history and personally performed the physical exam and medical decision making.  I agree with the assessment and plan of care as documented in the note.      Hayley Severson, MD-MPH  Internal Medicine-Pediatrics PGY-1    ====================================  INTERVAL HISTORY:   Aliyah Angeles is a 46 year old female with a history of alcohol use disorder (sober since 9/12/2021), alcoholic hepatitis, type 2 diabetes, anxiety, pancreatitis, pancreatic cysts, and depression presented with 4 days of abdominal pain, nausea, vomiting, and not tolerating her oral medications on 9/30. History obtained from chart review, as patient unable to verbalize and intubated soon after arrival to the MICU.      Patient was admitted here from 9/13-9/21 and was discharged to an alcohol treatment facility. On 9/25. she began to have abdominal pain (RUQ, severe sharp intermittent 4-10 times an hour lasting ~10seconds), nausea, and vomiting and was not able to keep down or tolerate her oral medications. She thought her symptoms were related to a diet change in the treatment facility. She felt foggy and noticed her jaundice, extremity edema, and ascites began to worsen (after improving during her hospitalization).     She is currently intubated, sedated, and paralzed. Supination yesterday resulted in some increased respiratory needs, but she was still stable. Reproned at 0200 last night.     OBJECTIVE:   1. VITAL SIGNS:   Temp:  [97.8  F (36.6  C)-99.2  F (37.3  C)] 98.9  F (37.2  C)  Pulse:  [72-97] 79  Resp:  [14-27] 26  MAP:  [55 mmHg-221 mmHg] 94 mmHg  Arterial Line BP: ()/() 112/86  FiO2 (%):  [40 %-60 %] 40 %  SpO2:  [85 %-100 %] 96 %  Ventilation Mode: CMV/AC  (Continuous Mandatory Ventilation/  Assist Control)  FiO2 (%): 50 %  Rate Set (breaths/minute): 26 breaths/min  Tidal Volume Set (mL): 330 mL  PEEP (cm H2O): 12 cmH2O  Oxygen Concentration (%): 50 %  Resp: 26    2. INTAKE/ OUTPUT:   I/O last 3 completed shifts:  In: 2342.34 [I.V.:1674.84; NG/GT:517.5]  Out: 755 [Urine:580; Stool:175]    3. PHYSICAL EXAMINATION:  General: Proned. Comfortable appearing. Sedated.  HEENT: Pupils minimally reactive. Nares without drainage. ETT in place.   Neuro: RASS of -5.   Pulm/Resp: Evidence of fluid or mucus in airways with some gurgling. Coarse crackles throughout, though improved.   CV: Not assessed; prone  Abdomen: Soft and mildly distended.   : barrett catheter in place, urine dark yellow and clear  Incisions/Skin: +3 bilateral pitting edema ~same as yesterday. Cracked, broken skin with dried blood near the heal of the left foot. Right foot is also dry and cracked, but no dried blood.     4. LABS:   Arterial Blood Gases   Recent Labs   Lab 10/06/21  0411 10/06/21  0104 10/05/21  1950 10/05/21  1250   PH 7.25* 7.26* 7.26* 7.25*   PCO2 35 33* 33* 34*   PO2 167* 67* 98 127*   HCO3 15* 15* 15* 15*     Complete Blood Count   Recent Labs   Lab 10/06/21  0411 10/05/21  0301 10/04/21  2149 10/04/21  0430   WBC 29.1* 25.9* 28.2* 22.7*  22.7*   HGB 8.2* 7.4* 7.2* 7.6*  7.6*    236 277 240  240     Basic Metabolic Panel  Recent Labs   Lab 10/06/21  0411 10/06/21  0107 10/05/21  1956 10/05/21  1948 10/05/21  1257 10/05/21  0301 10/04/21  2149 10/04/21  2149     --   --  137  --  135  --  133   POTASSIUM 3.5  --   --  3.7  --  3.9  --  3.6   CHLORIDE 108  --   --  106  --  106  --  106   CO2 16*  --   --  16*  --  20  --  17*   BUN 33*  --   --  31*  --  24  --  22   CR 2.04*  --   --  1.95*  --  1.91*  --  1.88*   * 133* 108* 120*   < > 111*   < > 113*    < > = values in this interval not displayed.     Liver Function Tests  Recent Labs   Lab 10/06/21  0411 10/05/21  0301 10/04/21  0953 10/04/21  0586  10/03/21  0710 10/03/21  0710   AST 75* 63*  --  58*  --  52*   ALT 20 13  --  14  --  18   ALKPHOS 113 91  --  86  --  88   BILITOTAL 15.7* 14.9*  --  13.2*  --  12.7*   ALBUMIN 3.2* 3.5  --  4.3  --  4.4   INR 1.50* 1.83* 2.48* 2.52*   < > 2.42*    < > = values in this interval not displayed.     Coagulation Profile  Recent Labs   Lab 10/06/21  0411 10/05/21  0301 10/04/21  0953 10/04/21  0430 10/01/21  0627 09/30/21  0612   INR 1.50* 1.83* 2.48* 2.52*   < > 1.64*   PTT  --   --  46*  --   --  37    < > = values in this interval not displayed.       5. RADIOLOGY:   Recent Results (from the past 24 hour(s))   XR Chest Port 1 View    Narrative    EXAM: XR CHEST PORT 1 VIEW  10/5/2021 12:34 PM     HISTORY:  ETT placement, assess lung fields       COMPARISON:  Chest radiograph, 10/4/2021.    FINDINGS:   Portable AP view of the chest. The endotracheal tube tip is  approximately 5.3 cm above the flores. Left IJ catheter terminates at  the superior cavoatrial junction. Partially visualized enteric tube  with the tip extending beyond the field of view.    Trachea is midline. Heart size is normal. Grossly unchanged diffuse  interstitial and airspace opacities. Left greater than right bilateral  pleural effusion without appreciable pneumothorax.    No acute osseous abnormality. Surgical clips in the left upper  quadrant..        Impression    IMPRESSION:   1. Grossly unchanged bilateral interstitial and airspace opacities.   2. Stable supporting devices.    I have personally reviewed the examination and initial interpretation  and I agree with the findings.    SWATHI NUNO DO         SYSTEM ID:  [id number]   XR Chest Port 1 View    Narrative    EXAM: XR CHEST PORT 1 VIEW  10/5/2021 5:41 PM     HISTORY:  ETT placement, assess lung fields       COMPARISON:  10/5/2021 at 1159 hours    TECHNIQUE: Single frontal radiograph of the chest    FINDINGS:   Endotracheal tube appears to be advanced projecting over the low  thoracic  trachea. New feeding tube courses below the field-of-view.  Contrast material projects over the stomach. Otherwise stable support  devices.    Stable silhouetting of both hemidiaphragms. Diffuse pulmonary  opacities are stable. No appreciable pneumothorax.      Impression    IMPRESSION:   1. Endotracheal now tube projects over the low thoracic trachea. New  feeding tube courses below the field-of-view with contrast material in  the region of the stomach.  2. Diffuse pulmonary edema, bilateral pleural effusions and opacities  are stable.    I have personally reviewed the examination and initial interpretation  and I agree with the findings.    WILLIE POON MD         SYSTEM ID:  [id number]

## 2021-10-07 ENCOUNTER — APPOINTMENT (OUTPATIENT)
Dept: GENERAL RADIOLOGY | Facility: CLINIC | Age: 46
End: 2021-10-07
Payer: COMMERCIAL

## 2021-10-07 ENCOUNTER — APPOINTMENT (OUTPATIENT)
Dept: ULTRASOUND IMAGING | Facility: CLINIC | Age: 46
End: 2021-10-07
Payer: COMMERCIAL

## 2021-10-07 ENCOUNTER — APPOINTMENT (OUTPATIENT)
Dept: PHYSICAL THERAPY | Facility: CLINIC | Age: 46
End: 2021-10-07
Payer: COMMERCIAL

## 2021-10-07 LAB
1,3 BETA GLUCAN SER-MCNC: NORMAL PG/ML
ABO/RH(D): NORMAL
ALBUMIN SERPL-MCNC: 3.1 G/DL (ref 3.4–5)
ALBUMIN SERPL-MCNC: 3.1 G/DL (ref 3.4–5)
ALBUMIN SERPL-MCNC: 3.5 G/DL (ref 3.4–5)
ALP SERPL-CCNC: 108 U/L (ref 40–150)
ALT SERPL W P-5'-P-CCNC: 11 U/L (ref 0–50)
ANION GAP SERPL CALCULATED.3IONS-SCNC: 11 MMOL/L (ref 3–14)
ANION GAP SERPL CALCULATED.3IONS-SCNC: 12 MMOL/L (ref 3–14)
ANION GAP SERPL CALCULATED.3IONS-SCNC: 14 MMOL/L (ref 3–14)
ANTIBODY SCREEN: NEGATIVE
APTT PPP: 44 SECONDS (ref 22–38)
AST SERPL W P-5'-P-CCNC: 47 U/L (ref 0–45)
BACTERIA FLD CULT: NORMAL
BACTERIA UR CULT: NO GROWTH
BASE EXCESS BLDA CALC-SCNC: -11.4 MMOL/L (ref -9–1.8)
BASE EXCESS BLDA CALC-SCNC: -13.4 MMOL/L (ref -9–1.8)
BASE EXCESS BLDA CALC-SCNC: -6.9 MMOL/L (ref -9–1.8)
BASE EXCESS BLDA CALC-SCNC: -7.9 MMOL/L (ref -9–1.8)
BASE EXCESS BLDA CALC-SCNC: -9.4 MMOL/L (ref -9–1.8)
BASE EXCESS BLDV CALC-SCNC: -7.7 MMOL/L (ref -7.7–1.9)
BASOPHILS # BLD MANUAL: 0 10E3/UL (ref 0–0.2)
BASOPHILS # BLD MANUAL: 0.2 10E3/UL (ref 0–0.2)
BASOPHILS NFR BLD MANUAL: 0 %
BASOPHILS NFR BLD MANUAL: 1 %
BILIRUB SERPL-MCNC: 12.5 MG/DL (ref 0.2–1.3)
BLD PROD TYP BPU: NORMAL
BLD PROD TYP BPU: NORMAL
BLOOD COMPONENT TYPE: NORMAL
BLOOD COMPONENT TYPE: NORMAL
BUN SERPL-MCNC: 37 MG/DL (ref 7–30)
BUN SERPL-MCNC: 43 MG/DL (ref 7–30)
BUN SERPL-MCNC: 46 MG/DL (ref 7–30)
CA-I BLD-MCNC: 4.6 MG/DL (ref 4.4–5.2)
CA-I BLD-MCNC: 4.6 MG/DL (ref 4.4–5.2)
CALCIUM SERPL-MCNC: 8 MG/DL (ref 8.5–10.1)
CALCIUM SERPL-MCNC: 8.1 MG/DL (ref 8.5–10.1)
CALCIUM SERPL-MCNC: 8.3 MG/DL (ref 8.5–10.1)
CHLORIDE BLD-SCNC: 111 MMOL/L (ref 94–109)
CHLORIDE BLD-SCNC: 111 MMOL/L (ref 94–109)
CHLORIDE BLD-SCNC: 112 MMOL/L (ref 94–109)
CO2 SERPL-SCNC: 13 MMOL/L (ref 20–32)
CO2 SERPL-SCNC: 18 MMOL/L (ref 20–32)
CO2 SERPL-SCNC: 19 MMOL/L (ref 20–32)
CODING SYSTEM: NORMAL
CODING SYSTEM: NORMAL
CREAT SERPL-MCNC: 2.46 MG/DL (ref 0.52–1.04)
CREAT SERPL-MCNC: 2.83 MG/DL (ref 0.52–1.04)
CREAT SERPL-MCNC: 2.98 MG/DL (ref 0.52–1.04)
CROSSMATCH: NORMAL
CROSSMATCH: NORMAL
EOSINOPHIL # BLD MANUAL: 0 10E3/UL (ref 0–0.7)
EOSINOPHIL # BLD MANUAL: 0.2 10E3/UL (ref 0–0.7)
EOSINOPHIL NFR BLD MANUAL: 0 %
EOSINOPHIL NFR BLD MANUAL: 1 %
ERYTHROCYTE [DISTWIDTH] IN BLOOD BY AUTOMATED COUNT: 17.1 % (ref 10–15)
ERYTHROCYTE [DISTWIDTH] IN BLOOD BY AUTOMATED COUNT: 17.2 % (ref 10–15)
FIBRINOGEN PPP-MCNC: 336 MG/DL (ref 170–490)
GALACTOMANNAN AG SERPL QL IA: NEGATIVE
GALACTOMANNAN AG SPEC IA-ACNC: 0.03
GFR SERPL CREATININE-BSD FRML MDRD: 18 ML/MIN/1.73M2
GFR SERPL CREATININE-BSD FRML MDRD: 19 ML/MIN/1.73M2
GFR SERPL CREATININE-BSD FRML MDRD: 23 ML/MIN/1.73M2
GLUCOSE BLD-MCNC: 214 MG/DL (ref 70–99)
GLUCOSE BLD-MCNC: 223 MG/DL (ref 70–99)
GLUCOSE BLD-MCNC: 259 MG/DL (ref 70–99)
GLUCOSE BLDC GLUCOMTR-MCNC: 193 MG/DL (ref 70–99)
GLUCOSE BLDC GLUCOMTR-MCNC: 221 MG/DL (ref 70–99)
GLUCOSE BLDC GLUCOMTR-MCNC: 224 MG/DL (ref 70–99)
GLUCOSE BLDC GLUCOMTR-MCNC: 246 MG/DL (ref 70–99)
GLUCOSE BLDC GLUCOMTR-MCNC: 255 MG/DL (ref 70–99)
HAPTOGLOB SERPL-MCNC: 99 MG/DL (ref 32–197)
HCO3 BLD-SCNC: 14 MMOL/L (ref 21–28)
HCO3 BLD-SCNC: 15 MMOL/L (ref 21–28)
HCO3 BLD-SCNC: 17 MMOL/L (ref 21–28)
HCO3 BLD-SCNC: 19 MMOL/L (ref 21–28)
HCO3 BLD-SCNC: 19 MMOL/L (ref 21–28)
HCO3 BLDV-SCNC: 19 MMOL/L (ref 21–28)
HCT VFR BLD AUTO: 20.7 % (ref 35–47)
HCT VFR BLD AUTO: 21.2 % (ref 35–47)
HGB BLD-MCNC: 6.2 G/DL (ref 11.7–15.7)
HGB BLD-MCNC: 6.2 G/DL (ref 11.7–15.7)
HGB BLD-MCNC: 6.4 G/DL (ref 11.7–15.7)
HGB BLD-MCNC: 7.4 G/DL (ref 11.7–15.7)
INR PPP: 1.67 (ref 0.85–1.15)
ISSUE DATE AND TIME: NORMAL
LACTATE SERPL-SCNC: 3 MMOL/L (ref 0.7–2)
LACTATE SERPL-SCNC: 3.9 MMOL/L (ref 0.7–2)
LACTATE SERPL-SCNC: 3.9 MMOL/L (ref 0.7–2)
LACTATE SERPL-SCNC: 4.1 MMOL/L (ref 0.7–2)
LDH SERPL L TO P-CCNC: 197 U/L (ref 81–234)
LYMPHOCYTES # BLD MANUAL: 0.9 10E3/UL (ref 0.8–5.3)
LYMPHOCYTES # BLD MANUAL: 2.6 10E3/UL (ref 0.8–5.3)
LYMPHOCYTES NFR BLD MANUAL: 17 %
LYMPHOCYTES NFR BLD MANUAL: 6 %
MAGNESIUM SERPL-MCNC: 2.5 MG/DL (ref 1.6–2.3)
MAGNESIUM SERPL-MCNC: 2.5 MG/DL (ref 1.6–2.3)
MAGNESIUM SERPL-MCNC: 2.6 MG/DL (ref 1.6–2.3)
MCH RBC QN AUTO: 36 PG (ref 26.5–33)
MCH RBC QN AUTO: 36.5 PG (ref 26.5–33)
MCHC RBC AUTO-ENTMCNC: 29.2 G/DL (ref 31.5–36.5)
MCHC RBC AUTO-ENTMCNC: 30 G/DL (ref 31.5–36.5)
MCV RBC AUTO: 122 FL (ref 78–100)
MCV RBC AUTO: 123 FL (ref 78–100)
MONOCYTES # BLD MANUAL: 0.7 10E3/UL (ref 0–1.3)
MONOCYTES # BLD MANUAL: 1.1 10E3/UL (ref 0–1.3)
MONOCYTES NFR BLD MANUAL: 5 %
MONOCYTES NFR BLD MANUAL: 7 %
MYELOCYTES # BLD MANUAL: 0.5 10E3/UL
MYELOCYTES # BLD MANUAL: 0.6 10E3/UL
MYELOCYTES NFR BLD MANUAL: 3 %
MYELOCYTES NFR BLD MANUAL: 4 %
NEUTROPHILS # BLD MANUAL: 10.9 10E3/UL (ref 1.6–8.3)
NEUTROPHILS # BLD MANUAL: 12.2 10E3/UL (ref 1.6–8.3)
NEUTROPHILS NFR BLD MANUAL: 70 %
NEUTROPHILS NFR BLD MANUAL: 85 %
NRBC # BLD AUTO: 0.2 10E3/UL
NRBC # BLD AUTO: 0.4 10E3/UL
NRBC BLD MANUAL-RTO: 1 %
NRBC BLD MANUAL-RTO: 3 %
O2/TOTAL GAS SETTING VFR VENT: 35 %
O2/TOTAL GAS SETTING VFR VENT: 35 %
O2/TOTAL GAS SETTING VFR VENT: 40 %
OBSERVATION IMP: NORMAL
OXYHGB MFR BLD: 92 % (ref 92–100)
OXYHGB MFR BLD: 95 % (ref 92–100)
OXYHGB MFR BLD: 95 % (ref 92–100)
OXYHGB MFR BLD: 97 % (ref 92–100)
OXYHGB MFR BLD: 97 % (ref 92–100)
OXYHGB MFR BLDV: 75 % (ref 70–75)
PCO2 BLD: 35 MM HG (ref 35–45)
PCO2 BLD: 38 MM HG (ref 35–45)
PCO2 BLD: 38 MM HG (ref 35–45)
PCO2 BLD: 40 MM HG (ref 35–45)
PCO2 BLD: 40 MM HG (ref 35–45)
PCO2 BLDV: 43 MM HG (ref 40–50)
PF4 HEPARIN CMPLX AB SER QL: NEGATIVE
PH BLD: 7.19 [PH] (ref 7.35–7.45)
PH BLD: 7.22 [PH] (ref 7.35–7.45)
PH BLD: 7.26 [PH] (ref 7.35–7.45)
PH BLD: 7.27 [PH] (ref 7.35–7.45)
PH BLD: 7.29 [PH] (ref 7.35–7.45)
PH BLDV: 7.25 [PH] (ref 7.32–7.43)
PHOSPHATE SERPL-MCNC: 2.5 MG/DL (ref 2.5–4.5)
PHOSPHATE SERPL-MCNC: 2.5 MG/DL (ref 2.5–4.5)
PHOSPHATE SERPL-MCNC: 3.2 MG/DL (ref 2.5–4.5)
PLAT MORPH BLD: ABNORMAL
PLAT MORPH BLD: ABNORMAL
PLATELET # BLD AUTO: 102 10E3/UL (ref 150–450)
PLATELET # BLD AUTO: 107 10E3/UL (ref 150–450)
PO2 BLD: 109 MM HG (ref 80–105)
PO2 BLD: 117 MM HG (ref 80–105)
PO2 BLD: 76 MM HG (ref 80–105)
PO2 BLD: 94 MM HG (ref 80–105)
PO2 BLD: 94 MM HG (ref 80–105)
PO2 BLDV: 46 MM HG (ref 25–47)
POLYCHROMASIA BLD QL SMEAR: SLIGHT
POTASSIUM BLD-SCNC: 3 MMOL/L (ref 3.4–5.3)
POTASSIUM BLD-SCNC: 3.5 MMOL/L (ref 3.4–5.3)
POTASSIUM BLD-SCNC: 3.8 MMOL/L (ref 3.4–5.3)
PROCALCITONIN SERPL-MCNC: 27.32 NG/ML
PROMYELOCYTES # BLD MANUAL: 0.2 10E3/UL
PROMYELOCYTES NFR BLD MANUAL: 1 %
PROT SERPL-MCNC: 5.8 G/DL (ref 6.8–8.8)
RBC # BLD AUTO: 1.7 10E6/UL (ref 3.8–5.2)
RBC # BLD AUTO: 1.72 10E6/UL (ref 3.8–5.2)
RBC MORPH BLD: ABNORMAL
RBC MORPH BLD: ABNORMAL
RETICS # AUTO: 0.11 10E6/UL (ref 0.03–0.1)
RETICS # AUTO: 0.12 10E6/UL (ref 0.03–0.1)
RETICS/RBC NFR AUTO: 6.1 % (ref 0.5–2)
RETICS/RBC NFR AUTO: 6.8 % (ref 0.5–2)
SODIUM SERPL-SCNC: 138 MMOL/L (ref 133–144)
SODIUM SERPL-SCNC: 141 MMOL/L (ref 133–144)
SODIUM SERPL-SCNC: 142 MMOL/L (ref 133–144)
SPECIMEN EXPIRATION DATE: NORMAL
UNIT ABO/RH: NORMAL
UNIT ABO/RH: NORMAL
UNIT NUMBER: NORMAL
UNIT NUMBER: NORMAL
UNIT STATUS: NORMAL
UNIT STATUS: NORMAL
UNIT TYPE ISBT: 7300
UNIT TYPE ISBT: 7300
WBC # BLD AUTO: 14.3 10E3/UL (ref 4–11)
WBC # BLD AUTO: 15.5 10E3/UL (ref 4–11)

## 2021-10-07 PROCEDURE — 76770 US EXAM ABDO BACK WALL COMP: CPT | Mod: 26 | Performed by: RADIOLOGY

## 2021-10-07 PROCEDURE — 250N000011 HC RX IP 250 OP 636: Performed by: STUDENT IN AN ORGANIZED HEALTH CARE EDUCATION/TRAINING PROGRAM

## 2021-10-07 PROCEDURE — 85018 HEMOGLOBIN: CPT | Performed by: SURGERY

## 2021-10-07 PROCEDURE — 76770 US EXAM ABDO BACK WALL COMP: CPT

## 2021-10-07 PROCEDURE — 85018 HEMOGLOBIN: CPT | Performed by: STUDENT IN AN ORGANIZED HEALTH CARE EDUCATION/TRAINING PROGRAM

## 2021-10-07 PROCEDURE — 250N000011 HC RX IP 250 OP 636: Performed by: SURGERY

## 2021-10-07 PROCEDURE — 94640 AIRWAY INHALATION TREATMENT: CPT

## 2021-10-07 PROCEDURE — 02H633Z INSERTION OF INFUSION DEVICE INTO RIGHT ATRIUM, PERCUTANEOUS APPROACH: ICD-10-PCS | Performed by: INTERNAL MEDICINE

## 2021-10-07 PROCEDURE — 99291 CRITICAL CARE FIRST HOUR: CPT | Mod: 25 | Performed by: INTERNAL MEDICINE

## 2021-10-07 PROCEDURE — 200N000002 HC R&B ICU UMMC

## 2021-10-07 PROCEDURE — 85384 FIBRINOGEN ACTIVITY: CPT | Performed by: STUDENT IN AN ORGANIZED HEALTH CARE EDUCATION/TRAINING PROGRAM

## 2021-10-07 PROCEDURE — 97110 THERAPEUTIC EXERCISES: CPT | Mod: GP

## 2021-10-07 PROCEDURE — 250N000013 HC RX MED GY IP 250 OP 250 PS 637: Performed by: STUDENT IN AN ORGANIZED HEALTH CARE EDUCATION/TRAINING PROGRAM

## 2021-10-07 PROCEDURE — 82805 BLOOD GASES W/O2 SATURATION: CPT | Performed by: SURGERY

## 2021-10-07 PROCEDURE — 87205 SMEAR GRAM STAIN: CPT | Performed by: STUDENT IN AN ORGANIZED HEALTH CARE EDUCATION/TRAINING PROGRAM

## 2021-10-07 PROCEDURE — 90947 DIALYSIS REPEATED EVAL: CPT

## 2021-10-07 PROCEDURE — 85014 HEMATOCRIT: CPT | Performed by: STUDENT IN AN ORGANIZED HEALTH CARE EDUCATION/TRAINING PROGRAM

## 2021-10-07 PROCEDURE — 87040 BLOOD CULTURE FOR BACTERIA: CPT | Performed by: STUDENT IN AN ORGANIZED HEALTH CARE EDUCATION/TRAINING PROGRAM

## 2021-10-07 PROCEDURE — 86900 BLOOD TYPING SEROLOGIC ABO: CPT | Performed by: SURGERY

## 2021-10-07 PROCEDURE — 250N000011 HC RX IP 250 OP 636

## 2021-10-07 PROCEDURE — 86923 COMPATIBILITY TEST ELECTRIC: CPT | Performed by: STUDENT IN AN ORGANIZED HEALTH CARE EDUCATION/TRAINING PROGRAM

## 2021-10-07 PROCEDURE — 258N000003 HC RX IP 258 OP 636: Performed by: STUDENT IN AN ORGANIZED HEALTH CARE EDUCATION/TRAINING PROGRAM

## 2021-10-07 PROCEDURE — 250N000009 HC RX 250: Performed by: CLINICAL NURSE SPECIALIST

## 2021-10-07 PROCEDURE — 82805 BLOOD GASES W/O2 SATURATION: CPT | Performed by: INTERNAL MEDICINE

## 2021-10-07 PROCEDURE — 5A1D90Z PERFORMANCE OF URINARY FILTRATION, CONTINUOUS, GREATER THAN 18 HOURS PER DAY: ICD-10-PCS | Performed by: INTERNAL MEDICINE

## 2021-10-07 PROCEDURE — 86022 PLATELET ANTIBODIES: CPT | Performed by: STUDENT IN AN ORGANIZED HEALTH CARE EDUCATION/TRAINING PROGRAM

## 2021-10-07 PROCEDURE — 250N000009 HC RX 250: Performed by: STUDENT IN AN ORGANIZED HEALTH CARE EDUCATION/TRAINING PROGRAM

## 2021-10-07 PROCEDURE — 258N000003 HC RX IP 258 OP 636: Performed by: NURSE PRACTITIONER

## 2021-10-07 PROCEDURE — 83735 ASSAY OF MAGNESIUM: CPT | Performed by: CLINICAL NURSE SPECIALIST

## 2021-10-07 PROCEDURE — 85730 THROMBOPLASTIN TIME PARTIAL: CPT | Performed by: STUDENT IN AN ORGANIZED HEALTH CARE EDUCATION/TRAINING PROGRAM

## 2021-10-07 PROCEDURE — 99233 SBSQ HOSP IP/OBS HIGH 50: CPT | Performed by: NURSE PRACTITIONER

## 2021-10-07 PROCEDURE — 99233 SBSQ HOSP IP/OBS HIGH 50: CPT | Mod: 24 | Performed by: INTERNAL MEDICINE

## 2021-10-07 PROCEDURE — 36592 COLLECT BLOOD FROM PICC: CPT | Performed by: STUDENT IN AN ORGANIZED HEALTH CARE EDUCATION/TRAINING PROGRAM

## 2021-10-07 PROCEDURE — 94645 CONT INHLJ TX EACH ADDL HOUR: CPT

## 2021-10-07 PROCEDURE — 94003 VENT MGMT INPAT SUBQ DAY: CPT

## 2021-10-07 PROCEDURE — 80069 RENAL FUNCTION PANEL: CPT | Performed by: STUDENT IN AN ORGANIZED HEALTH CARE EDUCATION/TRAINING PROGRAM

## 2021-10-07 PROCEDURE — P9016 RBC LEUKOCYTES REDUCED: HCPCS | Performed by: STUDENT IN AN ORGANIZED HEALTH CARE EDUCATION/TRAINING PROGRAM

## 2021-10-07 PROCEDURE — 82330 ASSAY OF CALCIUM: CPT | Performed by: CLINICAL NURSE SPECIALIST

## 2021-10-07 PROCEDURE — 03HY32Z INSERTION OF MONITORING DEVICE INTO UPPER ARTERY, PERCUTANEOUS APPROACH: ICD-10-PCS | Performed by: INTERNAL MEDICINE

## 2021-10-07 PROCEDURE — 71045 X-RAY EXAM CHEST 1 VIEW: CPT | Mod: 26 | Performed by: RADIOLOGY

## 2021-10-07 PROCEDURE — 250N000013 HC RX MED GY IP 250 OP 250 PS 637: Performed by: SURGERY

## 2021-10-07 PROCEDURE — 85045 AUTOMATED RETICULOCYTE COUNT: CPT | Performed by: STUDENT IN AN ORGANIZED HEALTH CARE EDUCATION/TRAINING PROGRAM

## 2021-10-07 PROCEDURE — 36556 INSERT NON-TUNNEL CV CATH: CPT | Mod: GC | Performed by: INTERNAL MEDICINE

## 2021-10-07 PROCEDURE — 82805 BLOOD GASES W/O2 SATURATION: CPT | Performed by: STUDENT IN AN ORGANIZED HEALTH CARE EDUCATION/TRAINING PROGRAM

## 2021-10-07 PROCEDURE — 85610 PROTHROMBIN TIME: CPT | Performed by: STUDENT IN AN ORGANIZED HEALTH CARE EDUCATION/TRAINING PROGRAM

## 2021-10-07 PROCEDURE — 94640 AIRWAY INHALATION TREATMENT: CPT | Mod: 76

## 2021-10-07 PROCEDURE — 999N000065 XR CHEST PORT 1 VIEW

## 2021-10-07 PROCEDURE — 97530 THERAPEUTIC ACTIVITIES: CPT | Mod: GP

## 2021-10-07 PROCEDURE — 250N000013 HC RX MED GY IP 250 OP 250 PS 637: Performed by: NURSE PRACTITIONER

## 2021-10-07 PROCEDURE — 84145 PROCALCITONIN (PCT): CPT | Performed by: STUDENT IN AN ORGANIZED HEALTH CARE EDUCATION/TRAINING PROGRAM

## 2021-10-07 PROCEDURE — 84100 ASSAY OF PHOSPHORUS: CPT | Performed by: CLINICAL NURSE SPECIALIST

## 2021-10-07 PROCEDURE — 83605 ASSAY OF LACTIC ACID: CPT | Performed by: STUDENT IN AN ORGANIZED HEALTH CARE EDUCATION/TRAINING PROGRAM

## 2021-10-07 PROCEDURE — 71045 X-RAY EXAM CHEST 1 VIEW: CPT

## 2021-10-07 PROCEDURE — 97161 PT EVAL LOW COMPLEX 20 MIN: CPT | Mod: GP

## 2021-10-07 PROCEDURE — 80053 COMPREHEN METABOLIC PANEL: CPT | Performed by: STUDENT IN AN ORGANIZED HEALTH CARE EDUCATION/TRAINING PROGRAM

## 2021-10-07 PROCEDURE — 84100 ASSAY OF PHOSPHORUS: CPT | Performed by: STUDENT IN AN ORGANIZED HEALTH CARE EDUCATION/TRAINING PROGRAM

## 2021-10-07 PROCEDURE — 85060 BLOOD SMEAR INTERPRETATION: CPT | Performed by: STUDENT IN AN ORGANIZED HEALTH CARE EDUCATION/TRAINING PROGRAM

## 2021-10-07 PROCEDURE — 85027 COMPLETE CBC AUTOMATED: CPT | Performed by: STUDENT IN AN ORGANIZED HEALTH CARE EDUCATION/TRAINING PROGRAM

## 2021-10-07 PROCEDURE — 83735 ASSAY OF MAGNESIUM: CPT | Performed by: STUDENT IN AN ORGANIZED HEALTH CARE EDUCATION/TRAINING PROGRAM

## 2021-10-07 PROCEDURE — 250N000011 HC RX IP 250 OP 636: Performed by: NURSE PRACTITIONER

## 2021-10-07 PROCEDURE — 83615 LACTATE (LD) (LDH) ENZYME: CPT | Performed by: STUDENT IN AN ORGANIZED HEALTH CARE EDUCATION/TRAINING PROGRAM

## 2021-10-07 PROCEDURE — 82805 BLOOD GASES W/O2 SATURATION: CPT | Performed by: NURSE PRACTITIONER

## 2021-10-07 PROCEDURE — 83010 ASSAY OF HAPTOGLOBIN QUANT: CPT | Performed by: STUDENT IN AN ORGANIZED HEALTH CARE EDUCATION/TRAINING PROGRAM

## 2021-10-07 PROCEDURE — 999N000157 HC STATISTIC RCP TIME EA 10 MIN

## 2021-10-07 RX ORDER — POLYETHYLENE GLYCOL 3350 17 G/17G
17 POWDER, FOR SOLUTION ORAL 2 TIMES DAILY
Status: DISCONTINUED | OUTPATIENT
Start: 2021-10-07 | End: 2021-10-09

## 2021-10-07 RX ORDER — MAGNESIUM SULFATE HEPTAHYDRATE 40 MG/ML
2 INJECTION, SOLUTION INTRAVENOUS EVERY 8 HOURS PRN
Status: DISCONTINUED | OUTPATIENT
Start: 2021-10-07 | End: 2021-10-13

## 2021-10-07 RX ORDER — NICOTINE POLACRILEX 4 MG
15-30 LOZENGE BUCCAL
Status: DISCONTINUED | OUTPATIENT
Start: 2021-10-07 | End: 2021-11-05 | Stop reason: HOSPADM

## 2021-10-07 RX ORDER — AMINO AC/PROTEIN HYDR/WHEY PRO 10G-100/30
2 LIQUID (ML) ORAL 2 TIMES DAILY
Status: DISCONTINUED | OUTPATIENT
Start: 2021-10-07 | End: 2021-10-18

## 2021-10-07 RX ORDER — CALCIUM GLUCONATE 20 MG/ML
2 INJECTION, SOLUTION INTRAVENOUS EVERY 8 HOURS PRN
Status: DISCONTINUED | OUTPATIENT
Start: 2021-10-07 | End: 2021-10-13

## 2021-10-07 RX ORDER — POTASSIUM CHLORIDE 29.8 MG/ML
20 INJECTION INTRAVENOUS EVERY 8 HOURS PRN
Status: DISCONTINUED | OUTPATIENT
Start: 2021-10-07 | End: 2021-10-13

## 2021-10-07 RX ORDER — MIDAZOLAM HCL IN 0.9 % NACL/PF 1 MG/ML
1-10 PLASTIC BAG, INJECTION (ML) INTRAVENOUS CONTINUOUS
Status: DISCONTINUED | OUTPATIENT
Start: 2021-10-07 | End: 2021-10-08

## 2021-10-07 RX ORDER — DEXTROSE MONOHYDRATE 25 G/50ML
25-50 INJECTION, SOLUTION INTRAVENOUS
Status: DISCONTINUED | OUTPATIENT
Start: 2021-10-07 | End: 2021-11-05 | Stop reason: HOSPADM

## 2021-10-07 RX ORDER — PIPERACILLIN SODIUM, TAZOBACTAM SODIUM 4; .5 G/20ML; G/20ML
4.5 INJECTION, POWDER, LYOPHILIZED, FOR SOLUTION INTRAVENOUS EVERY 6 HOURS
Status: COMPLETED | OUTPATIENT
Start: 2021-10-08 | End: 2021-10-08

## 2021-10-07 RX ORDER — POTASSIUM CHLORIDE 1.5 G/1.58G
20 POWDER, FOR SOLUTION ORAL ONCE
Status: COMPLETED | OUTPATIENT
Start: 2021-10-07 | End: 2021-10-07

## 2021-10-07 RX ORDER — POTASSIUM CHLORIDE 20MEQ/15ML
60 LIQUID (ML) ORAL ONCE
Status: COMPLETED | OUTPATIENT
Start: 2021-10-07 | End: 2021-10-07

## 2021-10-07 RX ADMIN — VASOPRESSIN 2.4 UNITS/HR: 20 INJECTION INTRAVENOUS at 21:48

## 2021-10-07 RX ADMIN — ESCITALOPRAM OXALATE 10 MG: 5 SOLUTION ORAL at 07:57

## 2021-10-07 RX ADMIN — Medication 150 MCG/HR: at 09:36

## 2021-10-07 RX ADMIN — MICAFUNGIN SODIUM 100 MG: 50 INJECTION, POWDER, LYOPHILIZED, FOR SOLUTION INTRAVENOUS at 13:11

## 2021-10-07 RX ADMIN — Medication 10 NG/KG/MIN: at 15:29

## 2021-10-07 RX ADMIN — Medication 100 MCG: at 07:59

## 2021-10-07 RX ADMIN — Medication 2 PACKET: at 19:50

## 2021-10-07 RX ADMIN — Medication 30 MEQ/HR: at 09:08

## 2021-10-07 RX ADMIN — VASOPRESSIN 2.4 UNITS/HR: 20 INJECTION INTRAVENOUS at 04:20

## 2021-10-07 RX ADMIN — RIFAXIMIN 550 MG: 550 TABLET ORAL at 07:58

## 2021-10-07 RX ADMIN — Medication 15 ML: at 11:41

## 2021-10-07 RX ADMIN — RIFAXIMIN 550 MG: 550 TABLET ORAL at 19:50

## 2021-10-07 RX ADMIN — HEPARIN SODIUM 5000 UNITS: 5000 INJECTION, SOLUTION INTRAVENOUS; SUBCUTANEOUS at 16:33

## 2021-10-07 RX ADMIN — CALCIUM CHLORIDE, MAGNESIUM CHLORIDE, SODIUM CHLORIDE, SODIUM BICARBONATE, POTASSIUM CHLORIDE AND SODIUM PHOSPHATE DIBASIC DIHYDRATE 12.5 ML/KG/HR: 3.68; 3.05; 6.34; 3.09; .314; .187 INJECTION INTRAVENOUS at 20:35

## 2021-10-07 RX ADMIN — CALCIUM CHLORIDE, MAGNESIUM CHLORIDE, SODIUM CHLORIDE, SODIUM BICARBONATE, POTASSIUM CHLORIDE AND SODIUM PHOSPHATE DIBASIC DIHYDRATE: 3.68; 3.05; 6.34; 3.09; .314; .187 INJECTION INTRAVENOUS at 15:55

## 2021-10-07 RX ADMIN — DOCUSATE SODIUM 50 MG AND SENNOSIDES 8.6 MG 1 TABLET: 8.6; 5 TABLET, FILM COATED ORAL at 07:59

## 2021-10-07 RX ADMIN — Medication 40 MG: at 07:58

## 2021-10-07 RX ADMIN — SALINE NASAL SPRAY 1 SPRAY: 1.5 SOLUTION NASAL at 07:59

## 2021-10-07 RX ADMIN — SALINE NASAL SPRAY 1 SPRAY: 1.5 SOLUTION NASAL at 19:50

## 2021-10-07 RX ADMIN — POLYETHYLENE GLYCOL 3350 17 G: 17 POWDER, FOR SOLUTION ORAL at 07:58

## 2021-10-07 RX ADMIN — THIAMINE HCL TAB 100 MG 100 MG: 100 TAB at 07:58

## 2021-10-07 RX ADMIN — Medication 30 MEQ/HR: at 15:59

## 2021-10-07 RX ADMIN — MIDODRINE HYDROCHLORIDE 10 MG: 5 TABLET ORAL at 11:41

## 2021-10-07 RX ADMIN — Medication 1 PACKET: at 08:00

## 2021-10-07 RX ADMIN — Medication 30 MEQ/HR: at 11:53

## 2021-10-07 RX ADMIN — CALCIUM CHLORIDE, MAGNESIUM CHLORIDE, SODIUM CHLORIDE, SODIUM BICARBONATE, POTASSIUM CHLORIDE AND SODIUM PHOSPHATE DIBASIC DIHYDRATE 12.5 ML/KG/HR: 3.68; 3.05; 6.34; 3.09; .314; .187 INJECTION INTRAVENOUS at 15:55

## 2021-10-07 RX ADMIN — ALBUTEROL SULFATE 2.5 MG: 2.5 SOLUTION RESPIRATORY (INHALATION) at 14:01

## 2021-10-07 RX ADMIN — POTASSIUM CHLORIDE 60 MEQ: 40 SOLUTION ORAL at 14:10

## 2021-10-07 RX ADMIN — HEPARIN SODIUM 5000 UNITS: 5000 INJECTION, SOLUTION INTRAVENOUS; SUBCUTANEOUS at 04:14

## 2021-10-07 RX ADMIN — VASOPRESSIN 2.4 UNITS/HR: 20 INJECTION INTRAVENOUS at 12:15

## 2021-10-07 RX ADMIN — Medication 0.08 MCG/KG/MIN: at 14:10

## 2021-10-07 RX ADMIN — PIPERACILLIN AND TAZOBACTAM 3.38 G: 3; .375 INJECTION, POWDER, LYOPHILIZED, FOR SOLUTION INTRAVENOUS at 11:38

## 2021-10-07 RX ADMIN — POLYETHYLENE GLYCOL 3350 17 G: 17 POWDER, FOR SOLUTION ORAL at 19:50

## 2021-10-07 RX ADMIN — GABAPENTIN 100 MG: 250 SOLUTION ORAL at 11:41

## 2021-10-07 RX ADMIN — GABAPENTIN 100 MG: 250 SOLUTION ORAL at 17:07

## 2021-10-07 RX ADMIN — ALBUTEROL SULFATE 2.5 MG: 2.5 SOLUTION RESPIRATORY (INHALATION) at 08:59

## 2021-10-07 RX ADMIN — Medication: at 04:00

## 2021-10-07 RX ADMIN — FOLIC ACID 1 MG: 1 TABLET ORAL at 07:58

## 2021-10-07 RX ADMIN — ALBUTEROL SULFATE 2.5 MG: 2.5 SOLUTION RESPIRATORY (INHALATION) at 03:35

## 2021-10-07 RX ADMIN — Medication 30 MEQ/HR: at 06:06

## 2021-10-07 RX ADMIN — POTASSIUM CHLORIDE 20 MEQ: 1.5 POWDER, FOR SOLUTION ORAL at 01:01

## 2021-10-07 RX ADMIN — SALINE NASAL SPRAY 1 SPRAY: 1.5 SOLUTION NASAL at 15:37

## 2021-10-07 RX ADMIN — Medication 5 MG/HR: at 06:05

## 2021-10-07 RX ADMIN — CALCIUM CHLORIDE, MAGNESIUM CHLORIDE, SODIUM CHLORIDE, SODIUM BICARBONATE, POTASSIUM CHLORIDE AND SODIUM PHOSPHATE DIBASIC DIHYDRATE 12.5 ML/KG/HR: 3.68; 3.05; 6.34; 3.09; .314; .187 INJECTION INTRAVENOUS at 20:33

## 2021-10-07 RX ADMIN — PIPERACILLIN AND TAZOBACTAM 3.38 G: 3; .375 INJECTION, POWDER, LYOPHILIZED, FOR SOLUTION INTRAVENOUS at 17:27

## 2021-10-07 RX ADMIN — CALCIUM CARBONATE 600 MG (1,500 MG)-VITAMIN D3 400 UNIT TABLET 1 TABLET: at 07:59

## 2021-10-07 RX ADMIN — PIPERACILLIN AND TAZOBACTAM 3.38 G: 3; .375 INJECTION, POWDER, LYOPHILIZED, FOR SOLUTION INTRAVENOUS at 06:30

## 2021-10-07 RX ADMIN — INSULIN ASPART 3 UNITS: 100 INJECTION, SOLUTION INTRAVENOUS; SUBCUTANEOUS at 19:50

## 2021-10-07 RX ADMIN — SALINE NASAL SPRAY 1 SPRAY: 1.5 SOLUTION NASAL at 11:41

## 2021-10-07 RX ADMIN — Medication: at 16:56

## 2021-10-07 RX ADMIN — DOCUSATE SODIUM 50 MG AND SENNOSIDES 8.6 MG 1 TABLET: 8.6; 5 TABLET, FILM COATED ORAL at 19:50

## 2021-10-07 RX ADMIN — PIPERACILLIN AND TAZOBACTAM 3.38 G: 3; .375 INJECTION, POWDER, LYOPHILIZED, FOR SOLUTION INTRAVENOUS at 01:01

## 2021-10-07 RX ADMIN — GABAPENTIN 100 MG: 250 SOLUTION ORAL at 07:59

## 2021-10-07 RX ADMIN — MIDODRINE HYDROCHLORIDE 10 MG: 5 TABLET ORAL at 07:58

## 2021-10-07 ASSESSMENT — MIFFLIN-ST. JEOR: SCORE: 1550

## 2021-10-07 ASSESSMENT — ACTIVITIES OF DAILY LIVING (ADL)
ADLS_ACUITY_SCORE: 21
ADLS_ACUITY_SCORE: 25
ADLS_ACUITY_SCORE: 21
ADLS_ACUITY_SCORE: 23
ADLS_ACUITY_SCORE: 19
ADLS_ACUITY_SCORE: 21

## 2021-10-07 NOTE — PROGRESS NOTES
CRRT INITIATION NOTE    Consent for CRRT Completed:  YES  Patient s Vascular Access: RIJ Catheter               Placement Confirmed: YES  Manufacture:  [employer]    Model:  Enma  Length/Welsh Size:   Flush Volume:  1.6/1.6    DATA:  Procedure:  CVVHDF  Start Time:  1624  Machine#:  2  Filter:  M150  Blood Flow:  200  ML/min  Pre-Replacement Solution:  Phoxillum BK 4/2.5  Post-Replacement Solution:  Phoxillum BK 4/2.5  Dialysate Solution:  Phoxillum BK 4/2.5  Pre-Replacement Solution Rate:  1200 mL/hr  Post-Replacement Solution Rate:  200 mL/hr  Dialysate Flow Rate:  1200 mL/hr   Patient Removal Rate:  100 mL/hr  Anticoagulation Type and Rate:  NA    ASSESSMENT:  How Patient Tolerated Initiation:   Vital Signs:  HR 88, 122/53 (69), 97%, RR 25  Initial Pressures:  Access:  -50  Filter:  90  Return:  74  TMP:  63  Change in Filter Pressure:  -9      INTERVENTIONS:  Initiate CRRT per order.    PLAN:  Pull fluid as ordered, contact resource with questions or concerns.

## 2021-10-07 NOTE — PROGRESS NOTES
"[facility]  HEPATOLOGY PROGRESS NOTE  Aliyah Angeles [id number]       CC: fluid volume overload  SUBJECTIVE:  Reviewed with nursing, patient remains sedated. Now with stool output with rectal tube.     ROS:  A 10-point review of systems was negative.    OBJECTIVE:  VS: /58 (BP Location: Right arm)   Pulse 86   Temp 97.8  F (36.6  C) (Axillary)   Resp 26   Ht 1.626 m (5' 4\")   Wt 93 kg (205 lb 0.4 oz)   SpO2 94%   BMI 35.19 kg/m       Gross per 24 hour   Intake 4031.57 ml   Output 1204 ml   Net 2827.57 ml     General:  no facial muscle wasting  Neuro: sedated/paralyzed   Lymph: No cervical lymphadenopathy  HEENT:  mild scleral icterus, Nooral lesions  CV:Skin warm and dry  Lungs: Respirations even and nonlabored on vent  Abd:  moderately distended  Extrem: +2 lower peripheral edema   Skin: mild jaundice  Psych: GARLAND    MEDICATIONS:  Current Facility-Administered Medications   Medication     0.9% sodium chloride BOLUS     acetaminophen (TYLENOL) tablet 325 mg    Or     acetaminophen (TYLENOL) Suppository 325 mg     albumin human 25 % injection 100 g     albuterol (PROVENTIL) neb solution 2.5 mg     bisacodyl (DULCOLAX) Suppository 10 mg     calcium carbonate 600 mg-vitamin D 400 units (CALTRATE) per tablet 1 tablet     calcium gluconate 2 g in 100 mL sodium chloride intermittent infusion (premix)     calcium gluconate 4 g in sodium chloride 0.9 % 100 mL intermittent infusion     carboxymethylcellulose PF (REFRESH PLUS) 0.5 % ophthalmic solution 1 drop     cyanocobalamin (VITAMIN B-12) tablet 100 mcg     dextrose 10% infusion     glucose gel 15-30 g    Or     dextrose 50 % injection 25-50 mL    Or     glucagon injection 1 mg     dialysate for CVVHD & CVVHDF (Phoxillum BK4/2.5)     diphenhydrAMINE (BENADRYL) capsule 25-50 mg     emollient (VANICREAM) cream     epoprostenol (VELETRI) inhalation solution     escitalopram (LEXAPRO) solution 10 mg     fentaNYL (SUBLIMAZE) 50 mcg/mL bolus from infusion pump "  mcg     fentaNYL (SUBLIMAZE) infusion     folic acid (FOLVITE) tablet 1 mg     gabapentin (NEURONTIN) solution 100 mg     [Held by provider] heparin ANTICOAGULANT injection 5,000 Units     hydrOXYzine (ATARAX) tablet 25 mg    Or     hydrOXYzine (ATARAX) tablet 50 mg     insulin aspart (NovoLOG) injection (RAPID ACTING)     lidocaine (LMX4) cream     lidocaine 1 % 0.1-1 mL     magnesium sulfate 2 g in water intermittent infusion     melatonin tablet 3 mg     micafungin (MYCAMINE) 100 mg in sodium chloride 0.9 % 100 mL intermittent infusion     midazolam (VERSED) drip - ADULT 100 mg/100 mL in NS (pre-mix)     midazolam (VERSED) injection 1-3 mg     midodrine (PROAMATINE) tablet 10 mg     multivitamins w/minerals liquid 15 mL     naloxone (NARCAN) injection 0.2 mg    Or     naloxone (NARCAN) injection 0.4 mg    Or     naloxone (NARCAN) injection 0.2 mg    Or     naloxone (NARCAN) injection 0.4 mg     No heparin required     No lozenges or gum should be given while patient on BIPAP/AVAPS/AVAPS AE     norepinephrine (LEVOPHED) 16 mg in  mL infusion MAX CONC CENTRAL LINE     pantoprazole (PROTONIX) 2 mg/mL suspension 40 mg     Patient may continue current oral medications     piperacillin-tazobactam (ZOSYN) 3.375 g vial to attach to  mL bag     polyethylene glycol (MIRALAX) Packet 17 g     POST-filter replacement solution for CVVHD & CVVHDF (Phoxillum BK4/2.5)     potassium chloride 20 mEq in 50 mL intermittent infusion     [Held by provider] prazosin (MINIPRESS) capsule 1 mg     PRE-filter replacement solution for CVVHD & CVVHDF (Phoxillum BK4/2.5)     protein modular (PROSOURCE TF) 2 packet     rifaximin (XIFAXAN) tablet 550 mg     senna-docusate (SENOKOT-S/PERICOLACE) 8.6-50 MG per tablet 1 tablet     sodium bicarbonate (BICARB) 1 mEq/mL drip     sodium chloride (OCEAN) 0.65 % nasal spray 1 spray     sodium chloride (PF) 0.9% PF flush 3 mL     sodium phosphate (NaPHOS) 15 mMol in 250 mL D5W PREMADE  infusion     thiamine (B-1) tablet 100 mg     vasopressin 0.2 units/mL in NS (PITRESSIN) standard conc infusion       REVIEW OF LABORATORY, PATHOLOGY AND IMAGING RESULTS:  BMP  Recent Labs   Lab 10/07/21  1116 10/07/21  0819 10/07/21  0351 10/07/21  0248 10/06/21  2316 10/06/21  1213 10/06/21  0812 10/06/21  0411 10/05/21  1956 10/05/21  1948   NA  --   --  138  --  139  --   --  135  --  137   POTASSIUM  --   --  3.5  --  3.3*  --   --  3.5  --  3.7   CHLORIDE  --   --  111*  --  110*  --   --  108  --  106   STEFFANY  --   --  8.3*  --  8.0*  --   --  8.8  --  8.6   CO2  --   --  13*  --  14*  --   --  16*  --  16*   BUN  --   --  43*  --  43*  --   --  33*  --  31*   CR  --   --  2.83*  --  2.66*  --   --  2.04*  --  1.95*   * 246* 223* 221* 200*   < >   < > 148*   < > 120*    < > = values in this interval not displayed.     CBC  Recent Labs   Lab 10/07/21  0822 10/07/21  0520 10/07/21  0352 10/06/21  0411 10/06/21  0411 10/05/21  0301 10/05/21  0301   WBC 14.3*  --  15.5*  --  29.1*  --  25.9*   RBC 1.72*  --  1.70*  --  2.22*  --  2.08*   HGB 6.2*   < > 6.2*   < > 8.2*   < > 7.4*   HCT 21.2*  --  20.7*  --  26.3*  --  24.5*   *  --  122*  --  119*  --  118*   MCH 36.0*  --  36.5*  --  36.9*  --  35.6*   MCHC 29.2*  --  30.0*  --  31.2*  --  30.2*   RDW 17.2*  --  17.1*  --  17.0*  --  16.8*   *  --  107*  --  233  --  236    < > = values in this interval not displayed.     INR  Recent Labs   Lab 10/07/21  0352 10/06/21  0411 10/05/21  0301 10/04/21  0953   INR 1.67* 1.50* 1.83* 2.48*     LFTs  Recent Labs   Lab 10/07/21  0351 10/06/21  0411 10/05/21  0301 10/04/21  0430   ALKPHOS 108 113 91 86   AST 47* 75* 63* 58*   ALT 11 20 13 14   BILITOTAL 12.5* 15.7* 14.9* 13.2*   PROTTOTAL 5.8* 5.8* 5.8* 6.5*   ALBUMIN 3.5 3.2* 3.5 4.3      PANC  No lab results found in last 7 days.   MELD-Na score: 32 at 10/7/2021  3:52 AM  MELD score: 32 at 10/7/2021  3:52 AM  Calculated from:  Serum Creatinine: 2.83  mg/dL at 10/7/2021  3:51 AM  Serum Sodium: 138 mmol/L (Using max of 137 mmol/L) at 10/7/2021  3:51 AM  Total Bilirubin: 12.5 mg/dL at 10/7/2021  3:51 AM  INR(ratio): 1.67 at 10/7/2021  3:52 AM  Age: 46 years      Imaging Results:  US abd doppler 10/6/21  IMPRESSION: No evidence of portal venous clot. Antegrade flow  demonstrated throughout the portal and hepatic veins.    CT chest/abd wo 10/6/21  IMPRESSION:   1a. Extensive groundglass and consolidative opacities throughout the  lungs, consistent with infectious etiology. Some degree of pulmonary  edema may be present as well given bilateral interlobular septal  thickening.  1b. Small bilateral pleural effusions.  1c. Low-lying endotracheal tube. Recommend retracting approximately 3  cm. Findings communicated with nursing staff.  2. Hepatomegaly with severe hepatic steatosis. Mild splenomegaly.  3. Moderate amount of free fluid/ascites and soft tissue anasarca.  4. Retained contrast in the renal cortices from prior  contrast-enhanced CT, compatible with acute kidney injury.    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  She developed respiratory failure and ARDS on 10/3 requiring pronation and pressor support.     RECOMMENDATIONS:  1. Alcohol hepatitis  - last alcohol use 9/12.   - US and CT with hepatomegaly and severe steatosis. She does have portal hypertension in setting of severe hepatitis.   - continue with plans for nutrition supplementation.     2. Ascites  - initial ascites testing was cosistent with portal hypertension with SAAG ~1.1, however serum albumin very low. Repeat para on 10/4 with SAAG 1.4 (did receive daily albumin prior) and t protein of 4.0 which is more suggestive of heart failure source. Echo with moderate TR. RV  normal.. No evidence of SBP on 10/4  - diuretics currently on hold in setting of ANA LAURA  - para as needed, please send diagnostic sample with each para.     3. ANA LAURA  - likely multifactorial with recent vomiting, diarrhea.   - was receiving albumin when started to decline.    Baseline creatinine. ~0.5.   - holding diuretics. No evidence of UTI  - primary team planning on CRRT    4. Hepatic encephalopathy  - now sedated and paralyzed. Goal stool output should be ~500 ml given she does not have cirrhosis.      5. Acute respiratory failure  - chest CT with extensive ground glass opacities, unclear cause of current ARDS  - concern for aspiration     Patient was discussed with attending physician, Dr.Leventhal      Next follow up appointment: tbd    Thank you for the opportunity to be involved with  Aliyah Angeles care. Please call with any questions or concerns.    MARIA ELENA Grey, CNP  Inpatient Hepatology [facility]  Text Link

## 2021-10-07 NOTE — PLAN OF CARE
ICU End of Shift Summary. See flowsheets for vital signs and detailed assessment.    Changes this shift: RASS -5. Not following commands. Not withdrawing from pain. Versed decreased from 6 to 5. Fentanyl @ 150. CPOT 0. TMAX 100.1. BC's drawn x2 and sputum collected this morning. NSR. Intermittently hypotensive. Pressors titrated to MAP goal >65. Levo @ 0.06. Vaso @ 2.4. Morning AB.19/35/117/14 on 40% FiO2, , RR 26, PEEP 10. MD notified and ordered sodium bicarb gtt @ 30. FiO2 decreased to 35%. Flolan @ 20. TF's advanced to goal of 45 mL/hr. Rectal pouch with 400 mL's out. Decreasing UOP overnight (~3 mL/hr); MD notified and no new orders placed. Unable to collect UA/UC this morning. HGB 6.4 this morning; MD notified and 1 unit of RBC's ordered. PLT's 107 this morning; HIT reflex ordered and heparin held. Lactic rising (3.0), MD aware and ordered recheck for 1000.     Plan: Titrate pressors to MAP goal. Wean vent settings and sedation as tolerated. Transfuse 1 unit RBC's and recheck labs as ordered.

## 2021-10-07 NOTE — PROCEDURES
[facility]    Arterial line placement    Date/Time: 10/7/2021 4:10 PM  Performed by: Philomena Owens MD  Authorized by: Philomena Owens MD     UNIVERSAL PROTOCOL   Site Marked: No  Prior Images Obtained and Reviewed:  Yes  Required items: Required blood products, implants, devices and special equipment available    Patient identity confirmed:  Arm band  NA - No sedation, light sedation, or local anesthesia  Confirmation Checklist:  Patient's identity using two indicators and correct equipment/implants were available  Time out: Immediately prior to the procedure a time out was called    Universal Protocol: the Joint Commission Universal Protocol was followed    Preparation: Patient was prepped and draped in usual sterile fashion    ESBL (mL):  0      Indication: multiple ABGs respiratory failure hemodynamic monitoring  Location:  Right radial      SEDATION    Patient Sedated: Yes    Sedation:  See MAR for details  Vital signs: Vital signs monitored during sedation      PROCEDURE DETAILS    Needle Gauge:  18  Seldinger technique: Seldinger technique used    Number of Attempts:  2  Post-procedure:  Line sutured and dressing applied    PROCEDURE   Patient Tolerance:  Patient tolerated the procedure well with no immediate complications  Describe Procedure: Patient's left radial arterial line stopped working and could not get blood return or a good arterial wave form.  She was still on 2 pressors, requiring mechanical ventilation, and was starting CRRT, so I felt that her arterial line was still indicated.  She had good right sided radial pulses, so I cleaned her distal RUE with chlorhexidine and used live ultrasound guidance to identify and puncture the right radial artery.  The first attempt was performed by Student Dr. René Ascencio but despite getting good blood return, we were unable to advance the wire.  I performed the second attempt using an Arrow Dart and was  able to place the cather intraarterial with return of pulsatile blood and a good wave form appreciated on the monitor.  Arterial catheter was sutured in place and a Biopatch was applied.  A sterile dressing was used to cover the line at the site of skin entry.  Length of time physician/provider present for 1:1 monitoring during sedation: 15

## 2021-10-07 NOTE — PLAN OF CARE
ICU End of Shift Summary. See flowsheets for vital signs and detailed assessment.    Changes this shift: 1 unit RBCs, Hgb up to 7.4. LA still 3.9. HIT panel negative. Weaning sedation, versed @3 Fent @100. Unresponsive. Afebrile. MAP >65 on levo @0.1 & vaso @2.4. Flolan weaned off this evening. PEEP 10, FiO2 40%. Bicarb gtt off this evening when CRRT started. CRRT started around 1700, goal I=O.  65mls of urine out in 12hrs. 300mls watery stool. BS elevated in 200s, increased sliding scale.     Plan: CRRT, wean sedation, vent weaning.       Problem: Adult Inpatient Plan of Care  Goal: Plan of Care Review  Outcome: No Change     Problem: Fluid Volume Excess  Goal: Fluid Balance  Outcome: No Change

## 2021-10-07 NOTE — PROGRESS NOTES
MEDICAL ICU PROGRESS NOTE  10/07/2021      Date of Service (when I saw the patient): 10/07/2021  ASSESSMENT: Aliyah Angeles is a 46 year old woman with alcohol use disorder (last drink 9/12/21), alcoholic hepatitis, jaimie en y gastric bypass, alcohol pancreatitis, pancreatic cyst, Type 2 DM, anxiety and depression admitted for abdominal pain, lower extremity edema, and nausea that started after starting low sodium and diabetic friendly foods in an alcohol treatment facility. Patient was admitted to the medicine floor with abdominal pain and vomiting on 9/29/2021. Overnight 10/3-10/4, patient had worsening respiratory status, concern for sepsis requiring transfer to the ICU for intubation and need for pressors as well as further evaluation and management for likely sepsis.      CHANGES and MAJOR THINGS TODAY:   - Ordered VBG w/ oxyhemoglobin   - follow up HIT panel --> negative  - follow up hemolysis work up --> only elevated retic count  - Renal US today was normal  - Keep supine now  - wean flolan; half it now; discontinue this evening  - Decreased bowel regimen; placed rectal tube  - Start CRRT for acidosis  - Trend lactic acid  - started medium sliding scale insulin  - RASS goal off -2 to -3 today after CRRT line placements and any other scheduled procedures  - Transfused blood today; f/u hgb      PLAN:     Neuro:  # Pain and sedation  She was sedated and intubated on 10/4. She is currently at goal for her sedation and stable on the ventilator.   - Midazolam drip + boluses  - Fentanyl drip + boluses  - RASS goal -2 to -3 after procedures     #Anxiety and depression  - PTA escitalopram     # Encephalopathy, likely acute toxic metabolic vs infection  In the setting of likely hepatic encephalopathy, infectious encephalopathy. SBP unlikely due to normal paracentesis eval and culture. Normal ammonia from 10/4.   - Reduce bowel regimen to Miralax 17 g BID, Senna, and bisacodyl supp.  - goal 3-4 loose stools daily or  1 L of stool daily  - Assess stooling daily  - Place a rectal tube today  - On rifaximin  - Gabapentin 100 mg TID     Pulmonary:  # Acute hypoxemic respiratory failure  # ARDS  # Concern for infection, pneumonia and sepsis  Acute onset hypoxemia, bilateral opacities are consistent with ARDS. No evidence of cardiac etiology. Bilateral ground glass infiltrates on imaging, with bilateral pleural effusions. We are treating for ARDS with intermittent proning and lung protective respiratory support. Infectious work-up, likely pneumonia. Intubated 10/4. Bronchoscopy completed 10/4, with bile-like fluid, suspect aspiration as well as PMN predominance concerning for bacterial pneumonia. Currently stable on vent settings; supine; off paralytic. Chest CT 10/6 showed ground glass and consolidative opacities throughout the lungs.  - Turn off epoprostenol today as tolerated  - Lung protective ventilation  - Keep supine; Prone if needed for pulmonary support  - Continue oxygen support with oxygen and PEEP adjustments; wean as able  - Volume management to be assessed daily; CRRT starting today with net even I/O goal  - Infectious work-up and treatment as below     Ventilation Mode: CMV/AC  (Continuous Mandatory Ventilation/ Assist Control)  FiO2 (%): 35 %  Rate Set (breaths/minute): 26 breaths/min  Tidal Volume Set (mL): 330 mL  PEEP (cm H2O): 10 cmH2O  Oxygen Concentration (%): 35 %  Resp: 26    Cardiovascular:  # Hypotension, in the setting of shock, likely sepsis  # Edema, possible intravascular depletion with third-spacing  Echo 10/4 with hyperdynamic EF >70%. Moderate tricuspid insufficiency. Normal IVC. BNP was over 16,000 --> >28,000 (10/4) --> 17,000 (10/6). Normal trops 10/4. Appropriately elevated cortisol. She has been requiring pressors and has an ANA LAURA. She is currently third spacing most of the fluid she has been given. We are currently treating her sepsis. Her liver disease is likely contributing to the shock.  Continuing to manage with pressors. Lactate is trending up and is most recently 4.1.   - Levophed for goal MAP >65  - Vasopressin for goal MAP >65  - Discontinued midodrine and albumin     # Lactic acidosis  This is in the setting of ANA LAURA now requiring CRRT. CT scan showing pneumonia, no clear intra-abdominal source of infection. Appears to be perfusing well, less concern for ischemia.   - Trend lactate Q12H    GI/Nutrition:  # Alcoholic hepatitis  # Alcohol use disorder  # Lower extremity edema, ascites  # Hyperbilirubinemia  # History of gastric bypass  Patient has been sober since 9/12 and has been at a treatment facility since her discharge 9/21. Hepatomegaly and hepatic steatosis without cirrhosis. Diagnostic paracentesis on admission without SBP. Discontinued lactulose 10/1 and then had decreased bowel movements. Normal lipase on admission. CT A&P on admission with severe hepatic steatosis, portal HTN, ascites, splenomegaly (similar on repeat 10/6). Paracentesis completed 10/4 revealed hazy fluid, 244 nucleated cells (WNL), and insignificant diff. No growth of ascitic fluid.   - GI following, appreciate recs  - Miralax BID  - Trend bilirubin, INR --> MELD labs  - B12, folate, and thiamine      MELD-Na score: 32 at 10/7/2021 12:07 PM  MELD score: 32 at 10/7/2021 12:07 PM  Calculated from:  Serum Creatinine: 2.98 mg/dL at 10/7/2021 12:07 PM  Serum Sodium: 141 mmol/L (Using max of 137 mmol/L) at 10/7/2021 12:07 PM  Total Bilirubin: 12.5 mg/dL at 10/7/2021  3:51 AM  INR(ratio): 1.67 at 10/7/2021  3:52 AM  Age: 46 years    Renal/Fluids/Electrolytes:  # Anion gap metabolic acidosis  # Lactic acidosis  Likely 2/2 worsening cirrhosis in the setting of not taking meds as above in addition to gastric loses from vomiting. Partially compensating with respiratory alkalosis. Lactic acid also elevated, likely combination of poor perfusion and poor renal function.  - CTM  - Trend lactic acid     # Acute kidney injury, concern  for ATN, pre-renal, vs hepatorenal syndrome  # Acute renal failure  Baseline creatinine ~0.98, increased to 1.14 on 10/1/21. May be related to vomiting, lactulose, third spacing. Likely pre-renal. LE edema is prominent and worsening. Intravascular volume likely low; evidenced by low venous volume on echo. Did initially improve after one day of albumin. 50 mg/dl protein, and moderate bilirubin on UA. Uncertain infection, awaiting culture. Urine eosinophils negative. Cr continues to uptrend; currently 2.83. Urine output remains very low.  - Started CRRT today  - Consulted nephrology, appreciate recs  - Replacement protocols (Mg, Ph, K).   - Strict I/O's; goal net even  - Avoid nephrotoxic agents     Endocrine:  # Type 2 diabetes mellitus  Her blood sugar was consistently over 200 as she approached her feeding goal rate.  - Start medium sliding scale today, consider further adjustment if continues to be elevated  - Check BS q4h and PRN  - Goal BS between 100 and 160     ID:  # Sepsis  # Suspect pneumonia (aspiration vs CAP)  Concern for pneumonia, CAP vs HAP. Less concern for SBP given negative paracentesis on admission. Afebrile. Paracentesis completed 10/4 revealed hazy fluid, 244 nucleated cells (WNL), and insignificant diff. Elevated procal and PMN's in BAL fluid 10/4 suggests a bacterial pneumonia, likely aspiration related due to her history of recent symptoms of vomiting. Following cultures, no clear infectious source yet. BD glucan was indeterminate due to hyperbilirubinemia. Procal elevated to 27.3 today, but WBC count down to 15.5 from 29.   - Continue Zosyn   - Continue micafungin for possible fungal involvement     Abx  - Zosyn 10/4-**  - Vanc 10/4-10/4  - Doxycycline 10/4-10/6  - Micafungin 10/6-**     Hematology:    # Anemia, macrocytic  # Coagulopathy  # Acute epistaxis  In the setting of alcohol use disorder, liver failure, alcoholic hepatitis. INR elevated. Likely 2/2 to B12 or folate deficiency due to  long history of alcohol use disorder. Today, her hemoglobin dropped to 6.2 requiring transfusion. Post transfusion hemoglobin was 7.4.  - Transfuse for Hgb <7  - Transfuse for PLTs <10, or if active bleeding and platelets are <30  - Vitamin K IV 10 mg x3 days (10/4 - 10/6)  - Follow INR  - Trend CBC  - Ordered LDH, fibrinogen, haptoglobin, retic count to assess for possible hemolysis     #Thrombocytopenia  Today, all three cell lines dropped in the setting of renal disease, liver disease, chronic alcoholism, and after a +2.3 liter I/O balance. This was likely partially dilutional with blunted bone marrow response secondary to alcoholism.   - Ordered HIT panel, hemolysis work up --> negative    Musculoskeletal:  No active issues.     Skin:  #Dry cracked skin on bilateral heels  - Continue Eucerin to be applied topically     General Cares/Prophylaxis:    DVT Prophylaxis: Heparin  GI Prophylaxis: PPI  Restraints: None  Family Communication: Will update mother after rounds.  Code Status: Full Code     Lines/tubes/drains:  - PIV x2  - ETT  - Rice  - Central line  - Arterial line  - Hemodialysis line     Disposition:  - Medical ICU    Patient seen and findings/plan discussed with medical ICU staff, Dr. Lilia Ascencio, MS4    Resident/Fellow Attestation   I, Hayley Severson, was present with the medical student who participated in the service and in the documentation of the note.  I have verified the history and personally performed the physical exam and medical decision making.  I agree with the assessment and plan of care as documented in the note.      Hayley Severson, MD-MPH  Internal Medicine-Pediatrics PGY-1    ====================================  INTERVAL HISTORY:   Nursing notes reviewed. She is currently intubated and sedated. She has had stable vitals overnight while supine. She did require blood transfusion this morning. Potassium replaced.     OBJECTIVE:   1. VITAL SIGNS:   Temp:  [97  F (36.1   C)-100.1  F (37.8  C)] 97.6  F (36.4  C)  Pulse:  [75-94] 94  Resp:  [13-29] 26  BP: (101)/(58) 101/58  MAP:  [48 mmHg-94 mmHg] 67 mmHg  Arterial Line BP: ()/(36-87) 78/59  FiO2 (%):  [35 %-40 %] 35 %  SpO2:  [89 %-100 %] 94 %  Ventilation Mode: CMV/AC  (Continuous Mandatory Ventilation/ Assist Control)  FiO2 (%): 35 %  Rate Set (breaths/minute): 26 breaths/min  Tidal Volume Set (mL): 330 mL  PEEP (cm H2O): 10 cmH2O  Oxygen Concentration (%): 35 %  Resp: 26    2. INTAKE/ OUTPUT:   I/O last 3 completed shifts:  In: 4248.13 [I.V.:1682.13; NG/GT:841]  Out: 979 [Urine:79; Stool:900]    3. PHYSICAL EXAMINATION:  General: Supine. Comfortable appearing. Sedated.  HEENT: Pupils minimally reactive. Nares without drainage. ETT in place.   Neuro: RASS of -5.   Pulm/Resp: Evidence of fluid or mucus in airways with some gurgling. Coarse crackles throughout, though improved from previous.   CV: RRR w/out murmurs.  Abdomen: Soft and moderately more distended, hypoactive bowel sounds.   : No urine assessed.   Incisions/Skin: +3 bilateral pitting edema ~same as yesterday. Cracked, dry skin on the bilateral heels.     4. LABS:   Arterial Blood Gases   Recent Labs   Lab 10/07/21  1207 10/07/21  0822 10/07/21  0334 10/06/21  1829   PH 7.26* 7.22* 7.19* 7.25*   PCO2 38 38 35 34*   PO2 94 94 117* 82   HCO3 17* 15* 14* 15*     Complete Blood Count   Recent Labs   Lab 10/07/21  1211 10/07/21  0822 10/07/21  0520 10/07/21  0352 10/06/21  0411 10/06/21  0411 10/05/21  0301 10/05/21  0301   WBC  --  14.3*  --  15.5*  --  29.1*  --  25.9*   HGB 7.4* 6.2* 6.4* 6.2*   < > 8.2*   < > 7.4*   PLT  --  102*  --  107*  --  233  --  236    < > = values in this interval not displayed.     Basic Metabolic Panel  Recent Labs   Lab 10/07/21  1539 10/07/21  1207 10/07/21  1116 10/07/21  0819 10/07/21  0351 10/07/21  0248 10/06/21  2316 10/06/21  0812 10/06/21  0411   NA  --  141  --   --  138  --  139  --  135   POTASSIUM  --  3.0*  --   --  3.5   --  3.3*  --  3.5   CHLORIDE  --  111*  --   --  111*  --  110*  --  108   CO2  --  18*  --   --  13*  --  14*  --  16*   BUN  --  46*  --   --  43*  --  43*  --  33*   CR  --  2.98*  --   --  2.83*  --  2.66*  --  2.04*   * 259* 224* 246* 223*   < > 200*   < > 148*    < > = values in this interval not displayed.     Liver Function Tests  Recent Labs   Lab 10/07/21  1207 10/07/21  0352 10/07/21  0351 10/06/21  0411 10/05/21  0301 10/04/21  0953 10/04/21  0430 10/04/21  0430   AST  --   --  47* 75* 63*  --   --  58*   ALT  --   --  11 20 13  --   --  14   ALKPHOS  --   --  108 113 91  --   --  86   BILITOTAL  --   --  12.5* 15.7* 14.9*  --   --  13.2*   ALBUMIN 3.1*  --  3.5 3.2* 3.5  --    < > 4.3   INR  --  1.67*  --  1.50* 1.83* 2.48*   < > 2.52*    < > = values in this interval not displayed.     Coagulation Profile  Recent Labs   Lab 10/07/21  0821 10/07/21  0352 10/06/21  0411 10/05/21  0301 10/04/21  0953   INR  --  1.67* 1.50* 1.83* 2.48*   PTT 44*  --   --   --  46*       5. RADIOLOGY:   Recent Results (from the past 24 hour(s))   US Abd/Pelvis Duplex Complete Portable    Narrative    ULTRASOUND ABDOMEN COMPLETE WITH DOPPLER 10/6/2021 4:10 PM    CLINICAL HISTORY: concern for portal venous thrombosis.    COMPARISONS: Abdominal ultrasound 9/29/2021    TECHNIQUE: Doppler evaluation of the hepatic vasculature.    FINDINGS:    Splenic vein: 20 cm/s, antegrade  Main portal vein: 13 cm/s, antegrade  Right portal vein: 14 cm/s, antegrade  Left portal vein: 18 cm/s, antegrade    Left hepatic vein: 30 cm/s, antegrade  Middle hepatic vein: 33 cm/s, antegrade  Right hepatic vein: 26 cm/s, antegrade    Inferior vena cava: 48 cm/s, phasic    Hepatic artery: 140/36 cm/s, resistive index of 0.74  Right hepatic artery: 117/26 cm/s, resistive index of 0.78  Left hepatic artery: 83/24 cm/s, resistive index of 0.71      Impression    IMPRESSION: No evidence of portal venous clot. Antegrade flow  demonstrated throughout  the portal and hepatic veins.    I have personally reviewed the examination and initial interpretation  and I agree with the findings.    YUDELKA WISDOM MD         SYSTEM ID:  [id number]   XR Chest Port 1 View    Narrative    EXAM: XR CHEST 1 VW 10/6/2021      HISTORY: Please check ET tube placement.    COMPARISON: Same-day CT.     TECHNIQUE: Frontal view of the chest.    FINDINGS: Endotracheal tube tip is approximately 6.7 cm above the  flores. Left jugular central venous catheter tip is in the right  atrium. Enteric tube tip and sidehole projecting over the stomach.  Feeding tube tip is beyond view inferiorly. Mild patchy pulmonary  opacities. Cardiomediastinal silhouette is stable. Probable small  pleural effusions. No definite pneumothorax. Bones and upper abdomen  are unremarkable.      Impression    IMPRESSION: Endotracheal tube tip is not well profiled and projects  about 7 cm above the flores. Stable remaining chest with patchy  opacities and probable small pleural effusions.    I have personally reviewed the examination and initial interpretation  and I agree with the findings.    DORY VANG MD         SYSTEM ID:  [id number]   XR Chest Port 1 View    Narrative    XR CHEST PORT 1 VIEW10/7/2021 6:14 AM    INDICATION: Assess ETT placement, lung fields    COMPARISON:  10/6/2021    FINDINGS: AP view of the chest. Endotracheal tube tip in unchanged  position, approximately 5 cm from the flores. Enteric tubes coursing  down the field-of-view of this exam. Left IJ venous catheter tip in  the cavoatrial junction.    Cardiomediastinal silhouette is stable. Diffuse interstitial and  airspace opacities and small bilateral pleural effusions slightly more  pronounced on exam performed yesterday. Low lung volumes. Lungs are  stable.      Impression    IMPRESSION:   1. Support devices in stable position, endotracheal tube tip 4.5 cm  from the flores.  2. Diffuse interstitial and airspace opacities and likely  small  bilateral pleural effusions have progressed since exam performed  yesterday    I have personally reviewed the examination and initial interpretation  and I agree with the findings.    KIKE GIVENS MD         SYSTEM ID:  [id number]   US Renal Complete    Narrative    EXAMINATION: US RENAL COMPLETE, 10/7/2021 10:45 AM     COMPARISON: None.    HISTORY: Acute renal failure    TECHNIQUE: The kidneys and bladder were scanned in the standard  fashion with specialized ultrasound transducer(s) using both gray  scale and limited color/spectral Doppler techniques.    FINDINGS:    Right kidney: Measures 12.3 cm in length. Parenchyma is of normal  thickness and echogenicity. No focal mass. No hydronephrosis.    Left kidney: Measures 12.3 cm in length. Parenchyma is of normal  thickness and echogenicity. No focal mass. No hydronephrosis.     Bladder: Empty. Rice present.      Impression    IMPRESSION:  1.  Normal kidneys.  2.  Mild ascites.    MARIYA PRATT MD         SYSTEM ID:  [id number]   XR Chest Port 1 View    Narrative    EXAM: XR CHEST PORT 1 VIEW  10/7/2021 11:36 AM     HISTORY:  RIJ placement       COMPARISON:  Radiographs 10/7/2021, 10/6/2021; CT 8/6/2021    TECHNIQUE: Portable AP radiograph of the chest.    FINDINGS:   Endotracheal tube tip projects over the midthoracic trachea. Feeding  tube courses below the diaphragm and beyond the field-of-view. Enteric  tube tip projects over the stomach. Right IJ central venous catheter  tip projects over the low right atrium. Left IJ central venous  catheter tip projects over the cavoatrial junction. Residual contrast  within the stomach.    The trachea is midline. Stable cardiac mediastinal silhouette.  Unchanged mixed airspace and interstitial opacities. Calcified nodule  in the mid right lung. No large pleural effusion. No appreciable  pneumothorax.      Impression    IMPRESSION:   1. Right IJ catheter tip projects over the low right atrium. Recommend  retracting  up to 8 cm for cavoatrial junction sectioning.  2. Additional support devices stable.  3. Unchanged mixed interstitial and airspace opacities compatible with  infection.     I have personally reviewed the examination and initial interpretation  and I agree with the findings.    WILLIE POON MD         SYSTEM ID:  [id number]

## 2021-10-07 NOTE — PROGRESS NOTES
CLINICAL NUTRITION SERVICES - BRIEF NOTE  *Please see full assessment note from 10/4/2021    New Findings:  Plans to start CRRT today. Increasing Prosource to better meet increased protein needs.   Labs reviewed.     Dosing Weight: 60 kg adjusted based on IBW (54.5 kg) and driest weight on 9/13 (78 kg)    NEW ASSESSED NUTRITION NEEDS  Estimated Energy Needs: 8737-1289 kcals/day (25 - 30 kcals/kg)  Justification: Maintenance and Vented  Estimated Protein Needs: 72- grams protein/day (1.2 - 1.5 - 2 grams of pro/kg)  Justification: Increased needs; higher end with CRRT    Interventions  Collaboration with other providers - rounds  Enteral Nutrition - NEW GOAL: Osmolite 1.5 Faizan @ goal of 45ml/hr (1080ml/day) + 4 Prosource will provide: 1780 kcals (30 kcal/kg), 111 g PRO (1.9 g/kg), 822 ml free H20, 219 g CHO, and 0 g fiber daily.    RD to follow per protocol.    Iris Vogel, MS, RD, LD, [facility] pager: [phone]  ASCOM: [id number]

## 2021-10-07 NOTE — PROGRESS NOTES
Nephrology Progress Note  10/07/2021       Aliyah Angeles is a 46 yof w/hx of ETOH use (Quit 9/12/2021) w/hepatitis, Fitz en Y bypass, Pancreatitis, DM2, Panc cyst who was admitted 9/29 from chem dep with N/V and worsening abdominal pain and edema.  Her course has been resp failure with ARDS and evolving ANA LAURA.  Noted to have received albumin from 10/1-10/3 (150g/day) with no improvement in Cr.  On Vanco/Zosyn to treat sepsis although cultures at time of consult are negative.  Nephrology consulted for evaluation of ANA LAURA and possible RRT.       Interval History :   Mrs Angeles's Cr is up sharply and is acidotic today, still on 2 pressors and overall worsening so planning to start CRRT today.  Goal I=O for volume, using 4k baths, I called and discussed implications of multisystem failure with family, continuing to treat sepsis and hope for some improvement. Can stop bicarb gtt once serum bicarb has normalized.      Assessment & Recommendations:   ANA LAURA-Baseline Cr normal at 0.5 as recently as 9/14/2021, abdominal US pending currently.  Was at inpatient chem dep when she developed abdominal pain, N/V and worsening edema and was brought to [facility] on 9/29.  Was treated for UTI and started on Vanco/Zosyn for leukocytosis but decompensated and was intubated the evening of 10/3.  Noted she did receive albumin x3 days from 10/1-10/3 for ANA LAURA but actually worsened and Cr at time of consult is up to 2.  Lisa <5 on 10/3, ECHO done 10/4 showed hyperdynamic LV and normal IVC.  Etiology of ANA LAURA is likely ATN with underlying HRS physiology which made her more susceptible to hypoperfusion.                  -ANA LAURA, likely hypoperfusion in setting of baseline HRS physiology, possible contribution from vanco/zosyn and did receive contrast on 10/3.                -Starting CRRT today as Cr is up sharply and is acidotic and on 2 pressors.  I=O fluid goal, 4k baths.       -Dialysis consent signed and scanned in under media.       Volume-Has total  "body volume overload, on 2 pressors.  Will try to match I=O as able, is on 2 pressors so is on track to be positive today.       Electrolytes/pH-K normal at 3.0 bicarb 18, ABG 7.26/38/94/17 with mix of both metabolic and resp acidosis.      Liver-Unclear duration, quit drinking on 9/12/2021 and was at chem dep when acute issues started.  Bili is ~16 and INR is 1.5 and a bit improved.  GI following, not clear if she would be a transplant candidate eventually.       ID-On Vanco/Zosyn, cultures so far have been negative, diagnostic para done.       Nutrition-On Osmolite TF.          Seen and discussed with Dr Gill     Recommendations were communicated to primary team via verbal communication.        MARIA ELENA Wade CNS  Clinical Nurse Specialist  [phone]    Review of Systems:   I reviewed the following systems:  ROS not done due to vent/sedation.     Physical Exam:   I/O last 3 completed shifts:  In: 3533.11 [I.V.:1617.11; NG/GT:806]  Out: 1002 [Urine:152; Stool:850]   /58 (BP Location: Right arm)   Pulse 81   Temp 97.6  F (36.4  C) (Axillary)   Resp 26   Ht 1.626 m (5' 4\")   Wt 93 kg (205 lb 0.4 oz)   SpO2 93%   BMI 35.19 kg/m       GENERAL APPEARANCE: Intubated and sedated.    EYES: No scleral icterus  Pulmonary: lungs rhonchi to auscultation with equal breath sounds bilaterally, no clubbing or cyanosis  CV: Regular rhythm, normal rate, no rub   - Edema +2-3 LE/dependent  GI: soft, nontender, normal bowel sounds  MS: no evidence of inflammation in joints, no muscle tenderness  : + Rice, dark brown UOP  SKIN: no rash, warm, dry  NEURO: No focal deficits.      Labs:   All labs reviewed by me  Electrolytes/Renal - Recent Labs   Lab Test 10/07/21  1207 10/07/21  1116 10/07/21  0819 10/07/21  0351 10/07/21  0248 10/06/21  2316 10/06/21  0812 10/06/21  0411     --   --  138  --  139   < > 135   POTASSIUM 3.0*  --   --  3.5  --  3.3*   < > 3.5   CHLORIDE 111*  --   --  111*  --  110*   < > " 108   CO2 18*  --   --  13*  --  14*   < > 16*   BUN 46*  --   --  43*  --  43*   < > 33*   CR 2.98*  --   --  2.83*  --  2.66*   < > 2.04*   * 224* 246* 223*   < > 200*   < > 148*   STEFFANY 8.0*  --   --  8.3*  --  8.0*   < > 8.8   MAG 2.5*  --   --  2.5*  --   --   --  2.6*   PHOS 2.5  --   --  3.2  --   --   --  4.2    < > = values in this interval not displayed.       CBC -   Recent Labs   Lab Test 10/07/21  1211 10/07/21  0822 10/07/21  0520 10/07/21  0352 10/07/21  0352 10/06/21  0411 10/06/21  0411   WBC  --  14.3*  --   --  15.5*  --  29.1*   HGB 7.4* 6.2* 6.4*   < > 6.2*   < > 8.2*   PLT  --  102*  --   --  107*  --  233    < > = values in this interval not displayed.       LFTs -   Recent Labs   Lab Test 10/07/21  1207 10/07/21  0351 10/06/21  0411 10/05/21  0301 10/05/21  0301   ALKPHOS  --  108 113  --  91   BILITOTAL  --  12.5* 15.7*  --  14.9*   ALT  --  11 20  --  13   AST  --  47* 75*  --  63*   PROTTOTAL  --  5.8* 5.8*  --  5.8*   ALBUMIN 3.1* 3.5 3.2*   < > 3.5    < > = values in this interval not displayed.       Iron Panel -   Recent Labs   Lab Test 09/13/21  0642   EYAD 1,839*           Current Medications:    albumin human  100 g Intravenous Q24H     albuterol  2.5 mg Nebulization Q6H     calcium carbonate 600 mg-vitamin D 400 units  1 tablet Oral or Feeding Tube Daily     cyanocobalamin  100 mcg Oral or Feeding Tube Daily     escitalopram  10 mg Oral or Feeding Tube Daily     folic acid  1 mg Oral or Feeding Tube Daily     gabapentin  100 mg Oral or Feeding Tube TID     [Held by provider] heparin ANTICOAGULANT  5,000 Units Subcutaneous Q12H     insulin aspart  1-6 Units Subcutaneous Q4H     micafungin  100 mg Intravenous Q24H     midodrine  10 mg Oral TID w/meals     multivitamins w/minerals  15 mL Oral Daily     pantoprazole  40 mg Per Feeding Tube QAM AC     piperacillin-tazobactam  3.375 g Intravenous Q6H     polyethylene glycol  17 g Oral BID     [Held by provider] prazosin  1 mg Oral  QPM     protein modular  2 packet Per Feeding Tube BID     rifaximin  550 mg Oral or Feeding Tube BID     senna-docusate  1 tablet Oral BID     sodium chloride  1 spray Both Nostrils 4x Daily     thiamine  100 mg Oral or Feeding Tube Daily       dextrose       CRRT replacement solution       epoprostenol (VELETRI) 20 mcg/mL in sterile water inhalation solution 10 ng/kg/min (10/07/21 1125)     fentaNYL 125 mcg/hr (10/07/21 1300)     midazolam       - MEDICATION INSTRUCTIONS -       - MEDICATION INSTRUCTIONS -       norepinephrine 0.08 mcg/kg/min (10/07/21 1410)     - MEDICATION INSTRUCTIONS -       CRRT replacement solution       CRRT replacement solution       sodium bicarbonate 30 mEq/hr (10/07/21 1300)     vasopressin 2.4 Units/hr (10/07/21 1300)

## 2021-10-07 NOTE — PROCEDURES
[facility]    Hemodialysis Line    Date/Time: 10/7/2021 11:02 AM  Performed by: Philomena Owens MD  Authorized by: Zachary Rodrigues MD   Indications: hemodialysis.    UNIVERSAL PROTOCOL   Site Marked: NA  Prior Images Obtained and Reviewed:  Yes  Required items: Required blood products, implants, devices and special equipment available    Patient identity confirmed:  Arm band and provided demographic data  NA - No sedation, light sedation, or local anesthesia  Confirmation Checklist:  Patient's identity using two indicators and procedure was appropriate and matched the consent or emergent situation  Time out: Immediately prior to the procedure a time out was called    Universal Protocol: the Joint Commission Universal Protocol was followed    Preparation: Patient was prepped and draped in usual sterile fashion    ESBL (mL):  3         ANESTHESIA    Local Anesthetic: Lidocaine 1% without epinephrine  Anesthetic Total (mL):  5      SEDATION    Patient Sedated: Yes    Sedation:  See MAR for details  Vital signs: Vital signs monitored during sedation      Preparation: skin prepped with 2% chlorhexidine  Skin prep agent dried: skin prep agent completely dried prior to procedure  Sterile barriers: all five maximum sterile barriers used - cap, mask, sterile gown, sterile gloves, and large sterile sheet  Hand hygiene: hand hygiene performed prior to central venous catheter insertion  Site selection rationale: good view, increased likelihood for successful hemodialysis  Patient position: flat  Catheter type: double lumen  Catheter size: 12 Fr  Ultrasound guidance: yes  Sterile ultrasound techniques: sterile gel and sterile probe covers were used  Number of attempts: 1  Successful placement: yes  Post-procedure: line sutured and dressing applied  Assessment: blood return through all ports and free fluid flow    PROCEDURE   Patient Tolerance:  Patient tolerated the procedure  well with no immediate complications     CXR is ordered and pending at this time  Length of time physician/provider present for 1:1 monitoring during sedation: 15

## 2021-10-07 NOTE — PROGRESS NOTES
10/07/21 1330   Quick Adds   Type of Visit PT Re-evaluation   Living Environment   Transportation Anticipated family or friend will provide   Living Environment Comments Pt currently intubated/sedated due to significant change in medical status and respiratory distress. Prior to this admission, pt living in treatment facility per PT initial eval. See initial PT eval for further details, no new subjective information available upon re-eval today.   General Information   Onset of Illness/Injury or Date of Surgery 10/04/21   Referring Physician Tito Cornelius MD   Pertinent History of Current Problem (include personal factors and/or comorbidities that impact the POC) 46 year old woman with alcohol use disorder (last drink 9/12/21), alcoholic hepatitis, jaimie en y gastric bypass, alcohol pancreatitis, pancreatic cyst, Type 2 DM, anxiety and depression admitted for abdominal pain, lower extremity edema, and nausea that started after starting low sodium and diabetic friendly foods in an alcohol treatment facility. Patient was admitted to the medicine floor with abdominal pain and vomiting on 9/29/2021. Overnight 10/3-10/4, patient had worsening respiratory status, concern for sepsis requiring transfer to the ICU for intubation and need for pressors as well as further evaluation and management for likely sepsis.   General Observations Art line present tenuous, needing pressors and patient position dependent during session needing head lowered at times.   Cognition   Cognitive Status Comments Pt intubated/sedated and unable to particiapte in subjective re-eval currently. Pt's son present briefly but reports has not seen pt in about 6 months and unable to attest to function.   Range of Motion (ROM)   ROM Comment B LEs 75-90% full ROM limited due to edema, beginning CRRT today.   Bed Mobility   Comment (Bed Mobility) dependent   Clinical Impression   Criteria for Skilled Therapeutic Intervention yes, treatment  indicated   PT Diagnosis (PT) impaired functional ROM and mobility   Influenced by the following impairments decreased ROM, strength, pulm function, complex medical status   Functional limitations due to impairments decreased I with all mobility and ADLs   Clinical Presentation Evolving/Changing   Clinical Presentation Rationale Clinical judgment   Clinical Decision Making (Complexity) moderate complexity   Therapy Frequency (PT) 3x/week   Predicted Duration of Therapy Intervention (days/wks) 3 weeks   Planned Therapy Interventions (PT) balance training;home exercise program;patient/family education;strengthening;gait training;stair training;bed mobility training;ROM (range of motion);transfer training;progressive activity/exercise;risk factor education;home program guidelines   Risk & Benefits of therapy have been explained evaluation/treatment results reviewed;care plan/treatment goals reviewed;risks/benefits reviewed;current/potential barriers reviewed;participants voiced agreement with care plan;participants included;chiara   PT Discharge Planning    PT Discharge Recommendation (DC Rec) Transitional Care Facility   PT Rationale for DC Rec Pt currently in ICU in critical condition, will continue to assess medical status and address discharge recommendations as appropriate. May need rehab stay depending on continued intubation/sedation and loss of function.    PT Brief overview of current status  dependent, intubated   Total Evaluation Time   Total Evaluation Time (Minutes) 5

## 2021-10-08 ENCOUNTER — APPOINTMENT (OUTPATIENT)
Dept: CT IMAGING | Facility: CLINIC | Age: 46
End: 2021-10-08
Attending: STUDENT IN AN ORGANIZED HEALTH CARE EDUCATION/TRAINING PROGRAM
Payer: COMMERCIAL

## 2021-10-08 ENCOUNTER — APPOINTMENT (OUTPATIENT)
Dept: GENERAL RADIOLOGY | Facility: CLINIC | Age: 46
End: 2021-10-08
Payer: COMMERCIAL

## 2021-10-08 ENCOUNTER — APPOINTMENT (OUTPATIENT)
Dept: MRI IMAGING | Facility: CLINIC | Age: 46
End: 2021-10-08
Payer: COMMERCIAL

## 2021-10-08 LAB
ALBUMIN SERPL-MCNC: 2.7 G/DL (ref 3.4–5)
ALBUMIN SERPL-MCNC: 2.9 G/DL (ref 3.4–5)
ALBUMIN SERPL-MCNC: 2.9 G/DL (ref 3.4–5)
ALP SERPL-CCNC: 174 U/L (ref 40–150)
ALT SERPL W P-5'-P-CCNC: 14 U/L (ref 0–50)
AMMONIA PLAS-SCNC: 18 UMOL/L (ref 10–50)
ANION GAP SERPL CALCULATED.3IONS-SCNC: 10 MMOL/L (ref 3–14)
ANION GAP SERPL CALCULATED.3IONS-SCNC: 10 MMOL/L (ref 3–14)
ANION GAP SERPL CALCULATED.3IONS-SCNC: 8 MMOL/L (ref 3–14)
AST SERPL W P-5'-P-CCNC: 69 U/L (ref 0–45)
BACTERIA BLD CULT: NO GROWTH
BACTERIA BLD CULT: NO GROWTH
BACTERIA SPT CULT: ABNORMAL
BASE EXCESS BLDA CALC-SCNC: -2.1 MMOL/L (ref -9–1.8)
BASE EXCESS BLDA CALC-SCNC: -2.6 MMOL/L (ref -9–1.8)
BASE EXCESS BLDA CALC-SCNC: -3.9 MMOL/L (ref -9–1.8)
BILIRUB DIRECT SERPL-MCNC: 10.8 MG/DL (ref 0–0.2)
BILIRUB SERPL-MCNC: 13.9 MG/DL (ref 0.2–1.3)
BUN SERPL-MCNC: 23 MG/DL (ref 7–30)
BUN SERPL-MCNC: 24 MG/DL (ref 7–30)
BUN SERPL-MCNC: 29 MG/DL (ref 7–30)
CA-I BLD-MCNC: 4.4 MG/DL (ref 4.4–5.2)
CA-I BLD-MCNC: 4.5 MG/DL (ref 4.4–5.2)
CA-I BLD-MCNC: 4.5 MG/DL (ref 4.4–5.2)
CALCIUM SERPL-MCNC: 8 MG/DL (ref 8.5–10.1)
CALCIUM SERPL-MCNC: 8.1 MG/DL (ref 8.5–10.1)
CALCIUM SERPL-MCNC: 8.2 MG/DL (ref 8.5–10.1)
CHLORIDE BLD-SCNC: 109 MMOL/L (ref 94–109)
CHLORIDE BLD-SCNC: 109 MMOL/L (ref 94–109)
CHLORIDE BLD-SCNC: 110 MMOL/L (ref 94–109)
CO2 SERPL-SCNC: 20 MMOL/L (ref 20–32)
CO2 SERPL-SCNC: 22 MMOL/L (ref 20–32)
CO2 SERPL-SCNC: 24 MMOL/L (ref 20–32)
CREAT SERPL-MCNC: 1.52 MG/DL (ref 0.52–1.04)
CREAT SERPL-MCNC: 1.65 MG/DL (ref 0.52–1.04)
CREAT SERPL-MCNC: 1.95 MG/DL (ref 0.52–1.04)
ERYTHROCYTE [DISTWIDTH] IN BLOOD BY AUTOMATED COUNT: 22.7 % (ref 10–15)
ERYTHROCYTE [DISTWIDTH] IN BLOOD BY AUTOMATED COUNT: 22.8 % (ref 10–15)
ERYTHROCYTE [DISTWIDTH] IN BLOOD BY AUTOMATED COUNT: 22.8 % (ref 10–15)
GFR SERPL CREATININE-BSD FRML MDRD: 30 ML/MIN/1.73M2
GFR SERPL CREATININE-BSD FRML MDRD: 37 ML/MIN/1.73M2
GFR SERPL CREATININE-BSD FRML MDRD: 41 ML/MIN/1.73M2
GLUCOSE BLD-MCNC: 153 MG/DL (ref 70–99)
GLUCOSE BLD-MCNC: 178 MG/DL (ref 70–99)
GLUCOSE BLD-MCNC: 205 MG/DL (ref 70–99)
GLUCOSE BLDC GLUCOMTR-MCNC: 152 MG/DL (ref 70–99)
GLUCOSE BLDC GLUCOMTR-MCNC: 162 MG/DL (ref 70–99)
GLUCOSE BLDC GLUCOMTR-MCNC: 162 MG/DL (ref 70–99)
GLUCOSE BLDC GLUCOMTR-MCNC: 180 MG/DL (ref 70–99)
GLUCOSE BLDC GLUCOMTR-MCNC: 197 MG/DL (ref 70–99)
GLUCOSE BLDC GLUCOMTR-MCNC: 206 MG/DL (ref 70–99)
GRAM STAIN RESULT: ABNORMAL
GRAM STAIN RESULT: ABNORMAL
HCO3 BLD-SCNC: 21 MMOL/L (ref 21–28)
HCO3 BLD-SCNC: 23 MMOL/L (ref 21–28)
HCO3 BLD-SCNC: 23 MMOL/L (ref 21–28)
HCT VFR BLD AUTO: 25 % (ref 35–47)
HCT VFR BLD AUTO: 25 % (ref 35–47)
HCT VFR BLD AUTO: 26 % (ref 35–47)
HGB BLD-MCNC: 7.7 G/DL (ref 11.7–15.7)
HGB BLD-MCNC: 7.7 G/DL (ref 11.7–15.7)
HGB BLD-MCNC: 8 G/DL (ref 11.7–15.7)
INR PPP: 1.44 (ref 0.85–1.15)
LACTATE SERPL-SCNC: 2.4 MMOL/L (ref 0.7–2)
LACTATE SERPL-SCNC: 2.4 MMOL/L (ref 0.7–2)
LACTATE SERPL-SCNC: 2.5 MMOL/L (ref 0.7–2)
MAGNESIUM SERPL-MCNC: 2.5 MG/DL (ref 1.6–2.3)
MAGNESIUM SERPL-MCNC: 2.5 MG/DL (ref 1.6–2.3)
MAGNESIUM SERPL-MCNC: 2.6 MG/DL (ref 1.6–2.3)
MCH RBC QN AUTO: 34.2 PG (ref 26.5–33)
MCH RBC QN AUTO: 34.4 PG (ref 26.5–33)
MCH RBC QN AUTO: 35 PG (ref 26.5–33)
MCHC RBC AUTO-ENTMCNC: 30.8 G/DL (ref 31.5–36.5)
MCV RBC AUTO: 111 FL (ref 78–100)
MCV RBC AUTO: 112 FL (ref 78–100)
MCV RBC AUTO: 114 FL (ref 78–100)
O2/TOTAL GAS SETTING VFR VENT: 30 %
O2/TOTAL GAS SETTING VFR VENT: 35 %
O2/TOTAL GAS SETTING VFR VENT: 40 %
OXYHGB MFR BLD: 95 % (ref 92–100)
OXYHGB MFR BLD: 95 % (ref 92–100)
OXYHGB MFR BLD: 96 % (ref 92–100)
PATH REPORT.COMMENTS IMP SPEC: NORMAL
PATH REPORT.COMMENTS IMP SPEC: NORMAL
PATH REPORT.FINAL DX SPEC: NORMAL
PATH REPORT.MICROSCOPIC SPEC OTHER STN: NORMAL
PATH REPORT.MICROSCOPIC SPEC OTHER STN: NORMAL
PATH REPORT.RELEVANT HX SPEC: NORMAL
PCO2 BLD: 39 MM HG (ref 35–45)
PCO2 BLD: 41 MM HG (ref 35–45)
PCO2 BLD: 41 MM HG (ref 35–45)
PH BLD: 7.35 [PH] (ref 7.35–7.45)
PH BLD: 7.36 [PH] (ref 7.35–7.45)
PH BLD: 7.36 [PH] (ref 7.35–7.45)
PHOSPHATE SERPL-MCNC: 2.5 MG/DL (ref 2.5–4.5)
PHOSPHATE SERPL-MCNC: 2.6 MG/DL (ref 2.5–4.5)
PHOSPHATE SERPL-MCNC: 2.7 MG/DL (ref 2.5–4.5)
PLATELET # BLD AUTO: 75 10E3/UL (ref 150–450)
PLATELET # BLD AUTO: 81 10E3/UL (ref 150–450)
PLATELET # BLD AUTO: 88 10E3/UL (ref 150–450)
PO2 BLD: 100 MM HG (ref 80–105)
PO2 BLD: 83 MM HG (ref 80–105)
PO2 BLD: 90 MM HG (ref 80–105)
POTASSIUM BLD-SCNC: 3.8 MMOL/L (ref 3.4–5.3)
POTASSIUM BLD-SCNC: 4 MMOL/L (ref 3.4–5.3)
POTASSIUM BLD-SCNC: 4.2 MMOL/L (ref 3.4–5.3)
PROCALCITONIN SERPL-MCNC: 11.94 NG/ML
PROT SERPL-MCNC: 5.5 G/DL (ref 6.8–8.8)
RBC # BLD AUTO: 2.2 10E6/UL (ref 3.8–5.2)
RBC # BLD AUTO: 2.24 10E6/UL (ref 3.8–5.2)
RBC # BLD AUTO: 2.34 10E6/UL (ref 3.8–5.2)
SODIUM SERPL-SCNC: 140 MMOL/L (ref 133–144)
SODIUM SERPL-SCNC: 141 MMOL/L (ref 133–144)
SODIUM SERPL-SCNC: 141 MMOL/L (ref 133–144)
WBC # BLD AUTO: 18.5 10E3/UL (ref 4–11)
WBC # BLD AUTO: 21.5 10E3/UL (ref 4–11)
WBC # BLD AUTO: 23.1 10E3/UL (ref 4–11)

## 2021-10-08 PROCEDURE — 71045 X-RAY EXAM CHEST 1 VIEW: CPT | Mod: 26 | Performed by: RADIOLOGY

## 2021-10-08 PROCEDURE — 83605 ASSAY OF LACTIC ACID: CPT | Performed by: STUDENT IN AN ORGANIZED HEALTH CARE EDUCATION/TRAINING PROGRAM

## 2021-10-08 PROCEDURE — 85027 COMPLETE CBC AUTOMATED: CPT | Performed by: CLINICAL NURSE SPECIALIST

## 2021-10-08 PROCEDURE — 250N000013 HC RX MED GY IP 250 OP 250 PS 637: Performed by: SURGERY

## 2021-10-08 PROCEDURE — 85610 PROTHROMBIN TIME: CPT | Performed by: STUDENT IN AN ORGANIZED HEALTH CARE EDUCATION/TRAINING PROGRAM

## 2021-10-08 PROCEDURE — 250N000013 HC RX MED GY IP 250 OP 250 PS 637: Performed by: NURSE PRACTITIONER

## 2021-10-08 PROCEDURE — 250N000011 HC RX IP 250 OP 636: Performed by: SURGERY

## 2021-10-08 PROCEDURE — 70551 MRI BRAIN STEM W/O DYE: CPT

## 2021-10-08 PROCEDURE — 200N000002 HC R&B ICU UMMC

## 2021-10-08 PROCEDURE — 94003 VENT MGMT INPAT SUBQ DAY: CPT

## 2021-10-08 PROCEDURE — 250N000011 HC RX IP 250 OP 636: Performed by: STUDENT IN AN ORGANIZED HEALTH CARE EDUCATION/TRAINING PROGRAM

## 2021-10-08 PROCEDURE — 80069 RENAL FUNCTION PANEL: CPT | Performed by: CLINICAL NURSE SPECIALIST

## 2021-10-08 PROCEDURE — 94640 AIRWAY INHALATION TREATMENT: CPT | Mod: 76

## 2021-10-08 PROCEDURE — 87040 BLOOD CULTURE FOR BACTERIA: CPT | Performed by: STUDENT IN AN ORGANIZED HEALTH CARE EDUCATION/TRAINING PROGRAM

## 2021-10-08 PROCEDURE — 82805 BLOOD GASES W/O2 SATURATION: CPT | Performed by: STUDENT IN AN ORGANIZED HEALTH CARE EDUCATION/TRAINING PROGRAM

## 2021-10-08 PROCEDURE — 250N000009 HC RX 250: Performed by: STUDENT IN AN ORGANIZED HEALTH CARE EDUCATION/TRAINING PROGRAM

## 2021-10-08 PROCEDURE — 83735 ASSAY OF MAGNESIUM: CPT | Performed by: CLINICAL NURSE SPECIALIST

## 2021-10-08 PROCEDURE — 90947 DIALYSIS REPEATED EVAL: CPT

## 2021-10-08 PROCEDURE — 999N000185 HC STATISTIC TRANSPORT TIME EA 15 MIN

## 2021-10-08 PROCEDURE — 84145 PROCALCITONIN (PCT): CPT | Performed by: STUDENT IN AN ORGANIZED HEALTH CARE EDUCATION/TRAINING PROGRAM

## 2021-10-08 PROCEDURE — 70450 CT HEAD/BRAIN W/O DYE: CPT | Mod: 26 | Performed by: RADIOLOGY

## 2021-10-08 PROCEDURE — 84100 ASSAY OF PHOSPHORUS: CPT | Performed by: CLINICAL NURSE SPECIALIST

## 2021-10-08 PROCEDURE — 258N000003 HC RX IP 258 OP 636: Performed by: STUDENT IN AN ORGANIZED HEALTH CARE EDUCATION/TRAINING PROGRAM

## 2021-10-08 PROCEDURE — 999N000157 HC STATISTIC RCP TIME EA 10 MIN

## 2021-10-08 PROCEDURE — 87205 SMEAR GRAM STAIN: CPT | Performed by: STUDENT IN AN ORGANIZED HEALTH CARE EDUCATION/TRAINING PROGRAM

## 2021-10-08 PROCEDURE — 250N000011 HC RX IP 250 OP 636: Performed by: NURSE PRACTITIONER

## 2021-10-08 PROCEDURE — 82248 BILIRUBIN DIRECT: CPT | Performed by: CLINICAL NURSE SPECIALIST

## 2021-10-08 PROCEDURE — 84295 ASSAY OF SERUM SODIUM: CPT | Performed by: CLINICAL NURSE SPECIALIST

## 2021-10-08 PROCEDURE — 90945 DIALYSIS ONE EVALUATION: CPT | Performed by: INTERNAL MEDICINE

## 2021-10-08 PROCEDURE — 94640 AIRWAY INHALATION TREATMENT: CPT

## 2021-10-08 PROCEDURE — 250N000009 HC RX 250: Performed by: CLINICAL NURSE SPECIALIST

## 2021-10-08 PROCEDURE — 82330 ASSAY OF CALCIUM: CPT | Performed by: CLINICAL NURSE SPECIALIST

## 2021-10-08 PROCEDURE — 99291 CRITICAL CARE FIRST HOUR: CPT | Mod: GC | Performed by: INTERNAL MEDICINE

## 2021-10-08 PROCEDURE — 70551 MRI BRAIN STEM W/O DYE: CPT | Mod: 26 | Performed by: RADIOLOGY

## 2021-10-08 PROCEDURE — 258N000003 HC RX IP 258 OP 636: Performed by: NURSE PRACTITIONER

## 2021-10-08 PROCEDURE — 82140 ASSAY OF AMMONIA: CPT | Performed by: STUDENT IN AN ORGANIZED HEALTH CARE EDUCATION/TRAINING PROGRAM

## 2021-10-08 PROCEDURE — 70450 CT HEAD/BRAIN W/O DYE: CPT

## 2021-10-08 PROCEDURE — 71045 X-RAY EXAM CHEST 1 VIEW: CPT

## 2021-10-08 RX ORDER — ALBUTEROL SULFATE 0.83 MG/ML
2.5 SOLUTION RESPIRATORY (INHALATION) EVERY 4 HOURS PRN
Status: DISCONTINUED | OUTPATIENT
Start: 2021-10-08 | End: 2021-11-05 | Stop reason: HOSPADM

## 2021-10-08 RX ORDER — PIPERACILLIN SODIUM, TAZOBACTAM SODIUM 3; .375 G/15ML; G/15ML
3.38 INJECTION, POWDER, LYOPHILIZED, FOR SOLUTION INTRAVENOUS EVERY 6 HOURS
Status: DISCONTINUED | OUTPATIENT
Start: 2021-10-08 | End: 2021-10-13

## 2021-10-08 RX ORDER — AMOXICILLIN 250 MG
1 CAPSULE ORAL 2 TIMES DAILY PRN
Status: DISCONTINUED | OUTPATIENT
Start: 2021-10-08 | End: 2021-10-15

## 2021-10-08 RX ADMIN — CALCIUM CHLORIDE, MAGNESIUM CHLORIDE, SODIUM CHLORIDE, SODIUM BICARBONATE, POTASSIUM CHLORIDE AND SODIUM PHOSPHATE DIBASIC DIHYDRATE 12.5 ML/KG/HR: 3.68; 3.05; 6.34; 3.09; .314; .187 INJECTION INTRAVENOUS at 13:04

## 2021-10-08 RX ADMIN — INSULIN ASPART 3 UNITS: 100 INJECTION, SOLUTION INTRAVENOUS; SUBCUTANEOUS at 00:13

## 2021-10-08 RX ADMIN — CALCIUM CHLORIDE, MAGNESIUM CHLORIDE, SODIUM CHLORIDE, SODIUM BICARBONATE, POTASSIUM CHLORIDE AND SODIUM PHOSPHATE DIBASIC DIHYDRATE 12.5 ML/KG/HR: 3.68; 3.05; 6.34; 3.09; .314; .187 INJECTION INTRAVENOUS at 09:07

## 2021-10-08 RX ADMIN — FOLIC ACID 1 MG: 1 TABLET ORAL at 08:08

## 2021-10-08 RX ADMIN — Medication 15 ML: at 12:00

## 2021-10-08 RX ADMIN — CALCIUM CHLORIDE, MAGNESIUM CHLORIDE, SODIUM CHLORIDE, SODIUM BICARBONATE, POTASSIUM CHLORIDE AND SODIUM PHOSPHATE DIBASIC DIHYDRATE 12.5 ML/KG/HR: 3.68; 3.05; 6.34; 3.09; .314; .187 INJECTION INTRAVENOUS at 04:52

## 2021-10-08 RX ADMIN — RIFAXIMIN 550 MG: 550 TABLET ORAL at 08:07

## 2021-10-08 RX ADMIN — CALCIUM CHLORIDE, MAGNESIUM CHLORIDE, SODIUM CHLORIDE, SODIUM BICARBONATE, POTASSIUM CHLORIDE AND SODIUM PHOSPHATE DIBASIC DIHYDRATE 12.5 ML/KG/HR: 3.68; 3.05; 6.34; 3.09; .314; .187 INJECTION INTRAVENOUS at 21:18

## 2021-10-08 RX ADMIN — Medication 0.2 MCG/KG/MIN: at 18:23

## 2021-10-08 RX ADMIN — ALBUTEROL SULFATE 2.5 MG: 2.5 SOLUTION RESPIRATORY (INHALATION) at 01:33

## 2021-10-08 RX ADMIN — INSULIN ASPART 1 UNITS: 100 INJECTION, SOLUTION INTRAVENOUS; SUBCUTANEOUS at 15:21

## 2021-10-08 RX ADMIN — POLYETHYLENE GLYCOL 3350 17 G: 17 POWDER, FOR SOLUTION ORAL at 11:59

## 2021-10-08 RX ADMIN — SALINE NASAL SPRAY 1 SPRAY: 1.5 SOLUTION NASAL at 08:11

## 2021-10-08 RX ADMIN — ESCITALOPRAM OXALATE 10 MG: 5 SOLUTION ORAL at 08:10

## 2021-10-08 RX ADMIN — INSULIN ASPART 2 UNITS: 100 INJECTION, SOLUTION INTRAVENOUS; SUBCUTANEOUS at 21:05

## 2021-10-08 RX ADMIN — HEPARIN SODIUM 5000 UNITS: 5000 INJECTION, SOLUTION INTRAVENOUS; SUBCUTANEOUS at 16:32

## 2021-10-08 RX ADMIN — HEPARIN SODIUM 5000 UNITS: 5000 INJECTION, SOLUTION INTRAVENOUS; SUBCUTANEOUS at 04:29

## 2021-10-08 RX ADMIN — PIPERACILLIN AND TAZOBACTAM 3.38 G: 3; .375 INJECTION, POWDER, LYOPHILIZED, FOR SOLUTION INTRAVENOUS at 13:04

## 2021-10-08 RX ADMIN — PIPERACILLIN SODIUM AND TAZOBACTAM SODIUM 4.5 G: 4; .5 INJECTION, POWDER, LYOPHILIZED, FOR SOLUTION INTRAVENOUS at 00:18

## 2021-10-08 RX ADMIN — GABAPENTIN 100 MG: 250 SOLUTION ORAL at 08:09

## 2021-10-08 RX ADMIN — RIFAXIMIN 550 MG: 550 TABLET ORAL at 20:52

## 2021-10-08 RX ADMIN — CALCIUM CHLORIDE, MAGNESIUM CHLORIDE, SODIUM CHLORIDE, SODIUM BICARBONATE, POTASSIUM CHLORIDE AND SODIUM PHOSPHATE DIBASIC DIHYDRATE 12.5 ML/KG/HR: 3.68; 3.05; 6.34; 3.09; .314; .187 INJECTION INTRAVENOUS at 09:06

## 2021-10-08 RX ADMIN — PIPERACILLIN AND TAZOBACTAM 3.38 G: 3; .375 INJECTION, POWDER, LYOPHILIZED, FOR SOLUTION INTRAVENOUS at 20:32

## 2021-10-08 RX ADMIN — GABAPENTIN 100 MG: 250 SOLUTION ORAL at 18:03

## 2021-10-08 RX ADMIN — CALCIUM CARBONATE 600 MG (1,500 MG)-VITAMIN D3 400 UNIT TABLET 1 TABLET: at 08:08

## 2021-10-08 RX ADMIN — INSULIN ASPART 1 UNITS: 100 INJECTION, SOLUTION INTRAVENOUS; SUBCUTANEOUS at 12:01

## 2021-10-08 RX ADMIN — SALINE NASAL SPRAY 1 SPRAY: 1.5 SOLUTION NASAL at 21:09

## 2021-10-08 RX ADMIN — Medication 100 MCG: at 08:08

## 2021-10-08 RX ADMIN — INSULIN ASPART 1 UNITS: 100 INJECTION, SOLUTION INTRAVENOUS; SUBCUTANEOUS at 09:24

## 2021-10-08 RX ADMIN — Medication: at 10:19

## 2021-10-08 RX ADMIN — MICAFUNGIN SODIUM 100 MG: 50 INJECTION, POWDER, LYOPHILIZED, FOR SOLUTION INTRAVENOUS at 15:16

## 2021-10-08 RX ADMIN — INSULIN ASPART 3 UNITS: 100 INJECTION, SOLUTION INTRAVENOUS; SUBCUTANEOUS at 04:27

## 2021-10-08 RX ADMIN — THIAMINE HCL TAB 100 MG 100 MG: 100 TAB at 08:07

## 2021-10-08 RX ADMIN — Medication 2 PACKET: at 20:51

## 2021-10-08 RX ADMIN — CALCIUM CHLORIDE, MAGNESIUM CHLORIDE, SODIUM CHLORIDE, SODIUM BICARBONATE, POTASSIUM CHLORIDE AND SODIUM PHOSPHATE DIBASIC DIHYDRATE: 3.68; 3.05; 6.34; 3.09; .314; .187 INJECTION INTRAVENOUS at 14:55

## 2021-10-08 RX ADMIN — Medication: at 00:39

## 2021-10-08 RX ADMIN — SALINE NASAL SPRAY 1 SPRAY: 1.5 SOLUTION NASAL at 16:38

## 2021-10-08 RX ADMIN — POLYETHYLENE GLYCOL 3350 17 G: 17 POWDER, FOR SOLUTION ORAL at 20:51

## 2021-10-08 RX ADMIN — VASOPRESSIN 1.2 UNITS/HR: 20 INJECTION INTRAVENOUS at 18:23

## 2021-10-08 RX ADMIN — SALINE NASAL SPRAY 1 SPRAY: 1.5 SOLUTION NASAL at 12:01

## 2021-10-08 RX ADMIN — GABAPENTIN 100 MG: 250 SOLUTION ORAL at 12:02

## 2021-10-08 RX ADMIN — Medication 0.2 MCG/KG/MIN: at 14:25

## 2021-10-08 RX ADMIN — CALCIUM CHLORIDE, MAGNESIUM CHLORIDE, SODIUM CHLORIDE, SODIUM BICARBONATE, POTASSIUM CHLORIDE AND SODIUM PHOSPHATE DIBASIC DIHYDRATE 12.5 ML/KG/HR: 3.68; 3.05; 6.34; 3.09; .314; .187 INJECTION INTRAVENOUS at 00:41

## 2021-10-08 RX ADMIN — Medication 2 PACKET: at 08:12

## 2021-10-08 RX ADMIN — CALCIUM CHLORIDE, MAGNESIUM CHLORIDE, SODIUM CHLORIDE, SODIUM BICARBONATE, POTASSIUM CHLORIDE AND SODIUM PHOSPHATE DIBASIC DIHYDRATE 12.5 ML/KG/HR: 3.68; 3.05; 6.34; 3.09; .314; .187 INJECTION INTRAVENOUS at 14:55

## 2021-10-08 RX ADMIN — ALBUTEROL SULFATE 2.5 MG: 2.5 SOLUTION RESPIRATORY (INHALATION) at 08:32

## 2021-10-08 RX ADMIN — Medication 40 MG: at 08:09

## 2021-10-08 RX ADMIN — CALCIUM CHLORIDE, MAGNESIUM CHLORIDE, SODIUM CHLORIDE, SODIUM BICARBONATE, POTASSIUM CHLORIDE AND SODIUM PHOSPHATE DIBASIC DIHYDRATE 12.5 ML/KG/HR: 3.68; 3.05; 6.34; 3.09; .314; .187 INJECTION INTRAVENOUS at 14:54

## 2021-10-08 RX ADMIN — PIPERACILLIN SODIUM AND TAZOBACTAM SODIUM 4.5 G: 4; .5 INJECTION, POWDER, LYOPHILIZED, FOR SOLUTION INTRAVENOUS at 06:19

## 2021-10-08 ASSESSMENT — ACTIVITIES OF DAILY LIVING (ADL)
ADLS_ACUITY_SCORE: 25
ADLS_ACUITY_SCORE: 19
ADLS_ACUITY_SCORE: 25
ADLS_ACUITY_SCORE: 25
ADLS_ACUITY_SCORE: 21
ADLS_ACUITY_SCORE: 19

## 2021-10-08 ASSESSMENT — MIFFLIN-ST. JEOR: SCORE: 1557

## 2021-10-08 NOTE — PROGRESS NOTES
MEDICAL ICU PROGRESS NOTE  10/08/2021      Date of Service (when I saw the patient): 10/08/2021    ASSESSMENT: Aliyah Angeles is a 46 year old woman with alcohol use disorder (last drink 9/12/21), alcoholic hepatitis, jaimie en y gastric bypass, alcohol pancreatitis, pancreatic cyst, Type 2 DM, anxiety and depression admitted for abdominal pain, lower extremity edema, and nausea that started after starting low sodium and diabetic friendly foods in an alcohol treatment facility. Patient was admitted to the medicine floor with abdominal pain and vomiting on 9/29/2021. Overnight 10/3-10/4, patient had worsening respiratory status, concern for sepsis requiring transfer to the ICU for intubation and need for pressors as well as further evaluation and management for likely sepsis.      CHANGES and MAJOR THINGS TODAY:   - Wean sedation with fentanyl, discontinue midazolam  - RASS goal 0 to -1  - Non-contrast head CT today  - Senna and suppositories PRN  - Repeat ammonia level today  - Albuterol nebs PRN  - Increase pull rate on CRRT (-50 mL/hr)  - PEEP goal of 5 today as tolerated)  - Ultrasound the abdomen today, consider repeat paracentesis     PLAN:     Neuro:  # Pain and sedation  She was sedated and intubated on 10/4. Minimal response to painful stimuli.   - Midazolam drip + boluses --> discontinue  - Fentanyl drip + boluses --> wean  - RASS goal 0 to -1  - Non-contrast head CT today     #Anxiety and depression  - PTA escitalopram     # Encephalopathy, likely acute toxic metabolic vs infection  In the setting of likely hepatic encephalopathy, infectious encephalopathy. SBP unlikely due to normal paracentesis eval and culture. Normal ammonia from 10/4. Will work on continuing to wean sedation.   - Miralax 17 g BID  - Make Senna and bisacodyl supp PRN  - Goal 3-4 loose stools daily or 1 L of stool daily  - On rifaximin  - Gabapentin 100 mg TID  - Repeat ammonia level today     Pulmonary:  # Acute hypoxemic respiratory  failure  # ARDS  # Concern for infection, pneumonia and sepsis  Acute onset hypoxemia, bilateral opacities are consistent with ARDS. No evidence of cardiac etiology. Bilateral ground glass infiltrates on imaging, with bilateral pleural effusions. We are treating for ARDS with intermittent proning and lung protective respiratory support. Infectious work-up, likely pneumonia. Intubated 10/4. Bronchoscopy completed 10/4, with bile-like fluid, suspect aspiration as well as PMN predominance concerning for bacterial pneumonia. Currently stable on vent settings; supine; off paralytic. Chest CT 10/6 showed ground glass and consolidative opacities throughout the lungs. Epoprostenol off since 10/7.   - Lung protective ventilation  - Continue oxygen support with oxygen and PEEP adjustments; wean as able to PEEP of 5  - Volume management to be assessed daily; CRRT starting today with net even I/O goal  - Infectious work-up and treatment as below  - Albuterol neb to PRN     Ventilation Mode: CMV/AC  (Continuous Mandatory Ventilation/ Assist Control)  FiO2 (%): 30 %  Rate Set (breaths/minute): 26 breaths/min  Tidal Volume Set (mL): 330 mL  PEEP (cm H2O): 10 cmH2O  Oxygen Concentration (%): 30 %  Resp: 26    Cardiovascular:  # Hypotension, in the setting of shock, likely sepsis  # Edema, possible intravascular depletion with third-spacing  Echo 10/4 with hyperdynamic EF >70%. Moderate tricuspid insufficiency. Normal IVC. BNP was over 16,000 --> >28,000 (10/4) --> 17,000 (10/6). Normal trops 10/4. Appropriately elevated cortisol. She has been requiring pressors and has an ANA LAURA. She is currently third spacing most of the fluid she has been given. We are currently treating her sepsis. Her liver disease is likely contributing to the shock. Continuing to manage with pressors. Lactate is trending up and is most recently 4.1.   - Levophed for goal MAP >65  - Vasopressin for goal MAP >65     # Lactic acidosis, improving  This is in the  setting of ANA LAURA now requiring CRRT. CT scan showing pneumonia, no clear intra-abdominal source of infection. Appears to be perfusing well, less concern for ischemia. Improving with CRRT.   - Trend lactate Q12H     GI/Nutrition:  # Alcoholic hepatitis  # Alcohol use disorder  # Lower extremity edema, ascites  # Hyperbilirubinemia  # History of gastric bypass  Patient has been sober since 9/12 and has been at a treatment facility since her discharge 9/21. Hepatomegaly and hepatic steatosis without cirrhosis. Diagnostic paracentesis on admission without SBP. Discontinued lactulose 10/1 and then had decreased bowel movements. Normal lipase on admission. CT A&P on admission with severe hepatic steatosis, portal HTN, ascites, splenomegaly (similar on repeat 10/6). Paracentesis completed 10/4 revealed hazy fluid, 244 nucleated cells (WNL), and insignificant diff. No growth of ascitic fluid.   - GI following, appreciate recs  - Miralax BID  - Trend bilirubin, INR --> MELD labs  - B12, folate, and thiamine      MELD-Na score: 27 at 10/8/2021  4:17 AM  MELD score: 27 at 10/8/2021  4:17 AM  Calculated from:  Serum Creatinine: 1.95 mg/dL at 10/8/2021  4:17 AM  Serum Sodium: 140 mmol/L (Using max of 137 mmol/L) at 10/8/2021  4:17 AM  Total Bilirubin: 13.9 mg/dL at 10/8/2021  4:17 AM  INR(ratio): 1.44 at 10/8/2021  4:17 AM  Age: 46 years     Renal/Fluids/Electrolytes:  # Anion gap metabolic acidosis  # Lactic acidosis  Likely 2/2 worsening cirrhosis in the setting of not taking meds as above in addition to gastric loses from vomiting. Partially compensating with respiratory alkalosis. Lactic acid also elevated, likely combination of poor perfusion and poor renal function.  - CTM  - Trend lactic acid     # Acute kidney injury, concern for ATN, pre-renal, vs hepatorenal syndrome  # Acute renal failure  Baseline creatinine ~0.98, increased to 1.14 on 10/1/21. May be related to vomiting, lactulose, third spacing. Likely pre-renal. LE  edema is prominent and worsening. Intravascular volume likely low; evidenced by low venous volume on echo. Did initially improve after one day of albumin. 50 mg/dl protein, and moderate bilirubin on UA. Uncertain infection, awaiting culture. Urine eosinophils negative. Cr continues to uptrend; currently 2.83. Urine output remains very low.  - Continue CRRT, increase pull rate (-50 mL/hr goal)  - Consulted nephrology, appreciate recs  - Replacement protocols (Mg, Ph, K).   - Strict I/O's; goal net even  - Avoid nephrotoxic agents     Endocrine:  # Type 2 diabetes mellitus  Her blood sugar was consistently over 200 as she approached her feeding goal rate.  - High-resistance sliding scale insulin  - Will consider basal insulin if persistently hyperglycemic  - Check BS q4h and PRN  - Goal BS between 100 and 160     ID:  # Sepsis  # Suspect pneumonia (aspiration vs CAP)  Concern for pneumonia, CAP vs HAP. Less concern for SBP given negative paracentesis on admission. Afebrile. Paracentesis completed 10/4 revealed hazy fluid, 244 nucleated cells (WNL), and insignificant diff. Elevated procal and PMN's in BAL fluid 10/4 suggests a bacterial pneumonia, likely aspiration related due to her history of recent symptoms of vomiting. Following cultures, no clear infectious source yet. BD glucan was indeterminate due to hyperbilirubinemia. Procal down-trending, leukocytosis persistent.   - Continue Zosyn   - Continue micafungin for possible fungal involvement  - Consider repeat paracentesis, will bedside US today     Abx  - Zosyn 10/4-**  - Vanc 10/4-10/4  - Doxycycline 10/4-10/6  - Micafungin 10/6-**     Hematology:    # Anemia, macrocytic  # Coagulopathy  # Acute epistaxis  In the setting of alcohol use disorder, liver failure, alcoholic hepatitis. INR elevated. Likely 2/2 to B12 or folate deficiency due to long history of alcohol use disorder. Today, her hemoglobin dropped to 6.2 requiring transfusion. Post transfusion  hemoglobin was 7.4. Hemolysis labs negative. Retic count elevated.  - Transfuse for Hgb <7  - S/p vitamin K IV 10 mg x3 days (10/4 - 10/6)  - Follow INR  - Trend CBC     #Thrombocytopenia  Persistent in the setting of renal disease, liver disease, chronic alcoholism, and after a +2.3 liter I/O balance. This was likely partially dilutional with blunted bone marrow response secondary to alcoholism.   - Ordered HIT panel, hemolysis work up --> negative  - Transfuse for PLTs <10, or if active bleeding and platelets are <30     Musculoskeletal:  No active issues.     Skin:  #Dry cracked skin on bilateral heels  - Continue Eucerin to be applied topically     General Cares/Prophylaxis:    DVT Prophylaxis: Heparin  GI Prophylaxis: PPI  Restraints: None  Family Communication: Will update mother after rounds.  Code Status: Full Code     Lines/tubes/drains:  - PIV x2  - ETT  - Rice  - Central line  - Arterial line  - Hemodialysis line     Disposition:  - Medical ICU  Patient seen and findings/plan discussed with medical ICU staff, Dr. Rodrigues.    René Ascencio, MS4    Resident/Fellow Attestation   I, Hayley Severson, was present with the medical student who participated in the service and in the documentation of the note.  I have verified the history and personally performed the physical exam and medical decision making.  I agree with the assessment and plan of care as documented in the note.      Hayley Severson, MD-MPH  Internal Medicine-Pediatrics PGY-1    ====================================  INTERVAL HISTORY:   RASS continued at -5 despite weaning sedation; versed off around midnight and fentanyl reduced to 50. Tolerating CRRT. No acute concerns.    OBJECTIVE:   1. VITAL SIGNS:   Temp:  [97  F (36.1  C)-98.7  F (37.1  C)] 97.5  F (36.4  C)  Pulse:  [63-94] 77  Resp:  [25-28] 26  BP: ()/(59-66) 92/66  MAP:  [48 mmHg-95 mmHg] 70 mmHg  Arterial Line BP: ()/(36-74) 102/54  FiO2 (%):  [30 %-40 %] 30 %  SpO2:  [81  %-100 %] 98 %  Ventilation Mode: CMV/AC  (Continuous Mandatory Ventilation/ Assist Control)  FiO2 (%): 30 %  Rate Set (breaths/minute): 26 breaths/min  Tidal Volume Set (mL): 330 mL  PEEP (cm H2O): 10 cmH2O  Oxygen Concentration (%): 35 %  Resp: 26    2. INTAKE/ OUTPUT:   I/O last 3 completed shifts:  In: 3940.29 [I.V.:1675.29; NG/GT:835]  Out: 2029 [Urine:53; Other:876; Stool:1100]    3. PHYSICAL EXAMINATION:  General: Sedated, supine  HEENT: PERRL though sluggish, MMM, scleral icterus, ETT in place  Neuro: Not withdrawing to painful stimuli  Pulm/Resp: Breath sounds equal bilaterally, crackles intermittently, mechanically ventilated  CV: RRR, no murmurs noted  Abdomen: Distended, bowel sounds hypoactive, somewhat more taut  : No urine  Incisions/Skin: Petechiae diffusely, jaundiced, scattered ecchymoses    4. LABS:   Arterial Blood Gases   Recent Labs   Lab 10/08/21  0418 10/07/21  2009 10/07/21  1713 10/07/21  1207   PH 7.35 7.27* 7.29* 7.26*   PCO2 39 40 40 38   PO2 100 109* 76* 94   HCO3 21 19* 19* 17*     Complete Blood Count   Recent Labs   Lab 10/08/21  0417 10/07/21  1211 10/07/21  0822 10/07/21  0520 10/07/21  0352 10/07/21  0352 10/06/21  0411 10/06/21  0411   WBC 18.5*  --  14.3*  --   --  15.5*  --  29.1*   HGB 7.7* 7.4* 6.2* 6.4*   < > 6.2*   < > 8.2*   PLT 75*  --  102*  --   --  107*  --  233    < > = values in this interval not displayed.     Basic Metabolic Panel  Recent Labs   Lab 10/08/21  0423 10/08/21  0417 10/08/21  0013 10/07/21  2008 10/07/21  1539 10/07/21  1207 10/07/21  0819 10/07/21  0351   NA  --  140  --  142  --  141  --  138   POTASSIUM  --  3.8  --  3.8  --  3.0*  --  3.5   CHLORIDE  --  110*  --  112*  --  111*  --  111*   CO2  --  20  --  19*  --  18*  --  13*   BUN  --  29  --  37*  --  46*  --  43*   CR  --  1.95*  --  2.46*  --  2.98*  --  2.83*   * 205* 197* 214*   < > 259*   < > 223*    < > = values in this interval not displayed.     Liver Function Tests  Recent  Labs   Lab 10/08/21  0417 10/07/21  2008 10/07/21  1207 10/07/21  0352 10/07/21  0351 10/06/21  0411 10/06/21  0411 10/05/21  0301 10/05/21  0301   AST 69*  --   --   --  47*  --  75*  --  63*   ALT 14  --   --   --  11  --  20  --  13   ALKPHOS 174*  --   --   --  108  --  113  --  91   BILITOTAL 13.9*  --   --   --  12.5*  --  15.7*  --  14.9*   ALBUMIN 2.9* 3.1* 3.1*  --  3.5   < > 3.2*   < > 3.5   INR 1.44*  --   --  1.67*  --   --  1.50*  --  1.83*    < > = values in this interval not displayed.     Coagulation Profile  Recent Labs   Lab 10/08/21  0417 10/07/21  0821 10/07/21  0352 10/06/21  0411 10/05/21  0301 10/04/21  0953 10/04/21  0953   INR 1.44*  --  1.67* 1.50* 1.83*   < > 2.48*   PTT  --  44*  --   --   --   --  46*    < > = values in this interval not displayed.       5. RADIOLOGY:   Recent Results (from the past 24 hour(s))   US Renal Complete    Narrative    EXAMINATION: US RENAL COMPLETE, 10/7/2021 10:45 AM     COMPARISON: None.    HISTORY: Acute renal failure    TECHNIQUE: The kidneys and bladder were scanned in the standard  fashion with specialized ultrasound transducer(s) using both gray  scale and limited color/spectral Doppler techniques.    FINDINGS:    Right kidney: Measures 12.3 cm in length. Parenchyma is of normal  thickness and echogenicity. No focal mass. No hydronephrosis.    Left kidney: Measures 12.3 cm in length. Parenchyma is of normal  thickness and echogenicity. No focal mass. No hydronephrosis.     Bladder: Empty. Rice present.      Impression    IMPRESSION:  1.  Normal kidneys.  2.  Mild ascites.    MARIYA PRATT MD         SYSTEM ID:  [id number]   XR Chest Port 1 View    Narrative    EXAM: XR CHEST PORT 1 VIEW  10/7/2021 11:36 AM     HISTORY:  RIJ placement       COMPARISON:  Radiographs 10/7/2021, 10/6/2021; CT 8/6/2021    TECHNIQUE: Portable AP radiograph of the chest.    FINDINGS:   Endotracheal tube tip projects over the midthoracic trachea. Feeding  tube courses below  the diaphragm and beyond the field-of-view. Enteric  tube tip projects over the stomach. Right IJ central venous catheter  tip projects over the low right atrium. Left IJ central venous  catheter tip projects over the cavoatrial junction. Residual contrast  within the stomach.    The trachea is midline. Stable cardiac mediastinal silhouette.  Unchanged mixed airspace and interstitial opacities. Calcified nodule  in the mid right lung. No large pleural effusion. No appreciable  pneumothorax.      Impression    IMPRESSION:   1. Right IJ catheter tip projects over the low right atrium. Recommend  retracting up to 8 cm for cavoatrial junction sectioning.  2. Additional support devices stable.  3. Unchanged mixed interstitial and airspace opacities compatible with  infection.     I have personally reviewed the examination and initial interpretation  and I agree with the findings.    WILLIE POON MD         SYSTEM ID:  [id number]   XR Chest Port 1 View    Narrative    EXAM: XR CHEST PORT 1 VIEW  10/7/2021 2:44 PM     HISTORY:  RIJ placement       COMPARISON:  Same day chest x-ray at 1109 hours.    FINDINGS:   Portable AP of the chest. The tip of the ET tube is approximately 4.9  cm above the flores. Both left and right IJ central venous catheter  terminates at superior cavoatrial junction. The sidehole of gastric  tube is obscured by the feeding tube with its tip projecting over the  stomach. The feeding tube extends below the diaphragm with the tip out  of field of view. Residual contrast in the stomach.    Trachea is midline. Heart size is normal. Prominent and indistinct  pulmonary vasculature. Unchanged mixed airspace and interstitial  opacities. No significant pleural effusion or appreciable  pneumothorax. Stable right lung granuloma.    No acute osseous abnormality. Visualized upper abdomen is  unremarkable.        Impression    IMPRESSION:     1. Right IJ central venous catheter terminates at superior  cavoatrial  junction. Other supporting devices are stable.    2. Unchanged mixed interstitial and airspace opacities compatible with  infection.    I have personally reviewed the examination and initial interpretation  and I agree with the findings.    WILLIE POON MD         SYSTEM ID:  [id number]

## 2021-10-08 NOTE — PROGRESS NOTES
CRRT STATUS NOTE    DATA:  Time:  1800  Pressures WNL:  YES  Filter Status:  WDL    Problems Reported/Alarms Noted:  None reported    Supplies Present:  YES    ASSESSMENT:  Patient Net Fluid Balance:  Net negative 42.1 ml since midnight  Vital Signs:     Temp: 98.8  F (37.1  C) Temp src: Axillary BP: 92/66 Pulse: 97   Resp: 18 SpO2: 98 % O2 Device: Mechanical Ventilator      Labs: K 4.0, Mg 2.6, Phos 2.6, Cr 1.65, iCa 4.5, Hgb 8.0              Goals of Therapy:  0-50cc/hr net negative.    INTERVENTIONS:   Restarted new circuit after filter clotted during short saline recirculation    PLAN:  Continue CRRT per plan of care.

## 2021-10-08 NOTE — PROGRESS NOTES
Nephrology Progress Note  10/08/2021         Aliyah Angeles is a 46 yof w/hx of ETOH use (Quit 9/12/2021) w/hepatitis, Fizt en Y bypass, Pancreatitis, DM2, Panc cyst who was admitted 9/29 from chem dep with N/V and worsening abdominal pain and edema.  Her course has been resp failure with ARDS and evolving ANA LAURA.  Noted to have received albumin from 10/1-10/3 (150g/day) with no improvement in Cr.  On Vanco/Zosyn to treat sepsis although cultures at time of consult are negative.  Nephrology consulted for evaluation of ANA LAURA and possible RRT, started CRRT on 10/7.       Interval History :   Mrs Angeles had CRRT started yesterday for management of volume and acidosis, was essentially even once started on CRRT and is weaned down to one pressor.  Continuing CRRT today and planning on trying to pull 50cc/hr if pressor needs are stable.  Treating sepsis and watching for recovery, chances are unclear given her baseline liver disease.       Assessment & Recommendations:   ANA LAURA-Baseline Cr normal at 0.5 as recently as 9/14/2021, abdominal US pending currently.  Was at inpatient chem dep when she developed abdominal pain, N/V and worsening edema and was brought to [facility] on 9/29.  Was treated for UTI and started on Vanco/Zosyn for leukocytosis but decompensated and was intubated the evening of 10/3.  Noted she did receive albumin x3 days from 10/1-10/3 for ANA LAURA but actually worsened and Cr at time of consult is up to 2.  Lisa <5 on 10/3, ECHO done 10/4 showed hyperdynamic LV and normal IVC.  Etiology of ANA LAURA is likely ATN with underlying HRS physiology which made her more susceptible to hypoperfusion.  Started CRRT on 10/7 for management of volume and acidosis.                  -ANA LAURA, likely hypoperfusion in setting of baseline HRS physiology, possible contribution from vanco/zosyn and did receive contrast on 10/3.                -CRRT started 10/7, continuing.  0-50cc/hr fluid goal, 4k baths.                   -Dialysis consent signed  "and scanned in under media.       Volume-Has total body volume overload, net positive yesterday but essentially even after starting CRRT, planning 0-50cc/hr net negative today.  Main goal is matching, can pull a bit if pressor needs are stable.      Electrolytes/pH-K normal at 3.8 bicarb 20, ABG 7.35/39/100/21.       Liver-Unclear duration, quit drinking on 9/12/2021 and was at chem dep when acute issues started.  Bili is ~16 and INR is 1.5 and a bit improved.  GI following, not clear if she would be a transplant candidate eventually.       ID-On Vanco/Zosyn, cultures so far have been negative, diagnostic para done.       Nutrition-On Osmolite TF.          Seen and discussed with Dr Gill     Recommendations were communicated to primary team via verbal communication.     MARIA ELENA Wade CNS  Clinical Nurse Specialist  [phone]    Review of Systems:   I reviewed the following systems:  ROS not done due to vent/sedation.     Physical Exam:   I/O last 3 completed shifts:  In: 3694.71 [I.V.:1543.71; NG/GT:721]  Out: 2709 [Urine:30; Other:1729; Stool:950]   BP 92/66 (BP Location: Right arm)   Pulse 85   Temp 97  F (36.1  C) (Axillary)   Resp 26   Ht 1.626 m (5' 4\")   Wt 93.7 kg (206 lb 9.1 oz)   SpO2 98%   BMI 35.46 kg/m       GENERAL APPEARANCE: Intubated and sedated.    EYES: No scleral icterus  Pulmonary: lungs rhonchi to auscultation with equal breath sounds bilaterally, no clubbing or cyanosis  CV: Regular rhythm, normal rate, no rub   - Edema +2-3 LE/dependent  GI: soft, nontender, normal bowel sounds  MS: no evidence of inflammation in joints, no muscle tenderness  : + Rice, dark brown UOP  SKIN: no rash, warm, dry  NEURO: No focal deficits.      Labs:   All labs reviewed by me  Electrolytes/Renal - Recent Labs   Lab Test 10/08/21  0923 10/08/21  0423 10/08/21  0417 10/08/21  0013 10/07/21  2008 10/07/21  1539 10/07/21  1207   NA  --   --  140  --  142  --  141   POTASSIUM  --   --  3.8  --  3.8 "  --  3.0*   CHLORIDE  --   --  110*  --  112*  --  111*   CO2  --   --  20  --  19*  --  18*   BUN  --   --  29  --  37*  --  46*   CR  --   --  1.95*  --  2.46*  --  2.98*   * 206* 205*   < > 214*   < > 259*   STEFFANY  --   --  8.1*  --  8.1*  --  8.0*   MAG  --   --  2.5*  --  2.6*  --  2.5*   PHOS  --   --  2.5  --  2.5  --  2.5    < > = values in this interval not displayed.       CBC -   Recent Labs   Lab Test 10/08/21  0417 10/07/21  1211 10/07/21  0822 10/07/21  0520 10/07/21  0352   WBC 18.5*  --  14.3*  --  15.5*   HGB 7.7* 7.4* 6.2*   < > 6.2*   PLT 75*  --  102*  --  107*    < > = values in this interval not displayed.       LFTs -   Recent Labs   Lab Test 10/08/21  0417 10/07/21  2008 10/07/21  1207 10/07/21  0351 10/07/21  0351 10/06/21  0411 10/06/21  0411   ALKPHOS 174*  --   --   --  108  --  113   BILITOTAL 13.9*  --   --   --  12.5*  --  15.7*   ALT 14  --   --   --  11  --  20   AST 69*  --   --   --  47*  --  75*   PROTTOTAL 5.5*  --   --   --  5.8*  --  5.8*   ALBUMIN 2.9* 3.1* 3.1*   < > 3.5   < > 3.2*    < > = values in this interval not displayed.       Iron Panel -   Recent Labs   Lab Test 09/13/21  0642   EYAD 1,839*           Current Medications:    albuterol  2.5 mg Nebulization Q6H     calcium carbonate 600 mg-vitamin D 400 units  1 tablet Oral or Feeding Tube Daily     cyanocobalamin  100 mcg Oral or Feeding Tube Daily     escitalopram  10 mg Oral or Feeding Tube Daily     folic acid  1 mg Oral or Feeding Tube Daily     gabapentin  100 mg Oral or Feeding Tube TID     heparin ANTICOAGULANT  5,000 Units Subcutaneous Q12H     insulin aspart  1-12 Units Subcutaneous Q4H     micafungin  100 mg Intravenous Q24H     multivitamins w/minerals  15 mL Oral Daily     pantoprazole  40 mg Per Feeding Tube QAM AC     polyethylene glycol  17 g Oral BID     [Held by provider] prazosin  1 mg Oral QPM     protein modular  2 packet Per Feeding Tube BID     rifaximin  550 mg Oral or Feeding Tube BID      senna-docusate  1 tablet Oral BID     sodium chloride  1 spray Both Nostrils 4x Daily     thiamine  100 mg Oral or Feeding Tube Daily       dextrose       CRRT replacement solution 12.5 mL/kg/hr (10/08/21 0906)     [Held by provider] epoprostenol (VELETRI) 20 mcg/mL in sterile water inhalation solution Stopped (10/07/21 1632)     fentaNYL 25 mcg/hr (10/08/21 1100)     midazolam Stopped (10/07/21 2315)     - MEDICATION INSTRUCTIONS -       - MEDICATION INSTRUCTIONS -       norepinephrine 0.14 mcg/kg/min (10/08/21 1100)     - MEDICATION INSTRUCTIONS -       CRRT replacement solution 200 mL/hr at 10/07/21 1555     CRRT replacement solution 12.5 mL/kg/hr (10/08/21 0907)     vasopressin Stopped (10/08/21 0520)

## 2021-10-08 NOTE — PLAN OF CARE
ICU End of Shift Summary. See flowsheets for vital signs and detailed assessment.    Changes this shift: RASS -5. Versed off @ 2315. Fentanyl decreased from 100 to 50. CPOT 0. Afebrile. NSR. Pressors titrated to MAP goal >65. Vaso off @ 0520. Levo @ 0.1. BP labile with turns. Morning AB.35/39/100/21 on 35%, RR 26, , PEEP 10. FiO2 decreased to 30%. Stool output 200 mL's since 0000. Anuric. Net negative 165 thus far. Tolerating CRRT goal (I=O). Procalcitonin  critical at 11.94 (down-trending from yesterday). CXR done this morning.     Plan: Wean vent settings and sedation. Continue towards CRRT goal.

## 2021-10-09 ENCOUNTER — APPOINTMENT (OUTPATIENT)
Dept: PHYSICAL THERAPY | Facility: CLINIC | Age: 46
End: 2021-10-09
Payer: COMMERCIAL

## 2021-10-09 ENCOUNTER — APPOINTMENT (OUTPATIENT)
Dept: GENERAL RADIOLOGY | Facility: CLINIC | Age: 46
End: 2021-10-09
Payer: COMMERCIAL

## 2021-10-09 LAB
ALBUMIN SERPL-MCNC: 2.5 G/DL (ref 3.4–5)
ALBUMIN SERPL-MCNC: 2.6 G/DL (ref 3.4–5)
ALBUMIN SERPL-MCNC: 2.6 G/DL (ref 3.4–5)
ALP SERPL-CCNC: 208 U/L (ref 40–150)
ALT SERPL W P-5'-P-CCNC: 18 U/L (ref 0–50)
ANION GAP SERPL CALCULATED.3IONS-SCNC: 7 MMOL/L (ref 3–14)
ANION GAP SERPL CALCULATED.3IONS-SCNC: 8 MMOL/L (ref 3–14)
ANION GAP SERPL CALCULATED.3IONS-SCNC: 8 MMOL/L (ref 3–14)
AST SERPL W P-5'-P-CCNC: 94 U/L (ref 0–45)
BACTERIA BLD CULT: NO GROWTH
BACTERIA BLD CULT: NO GROWTH
BACTERIA FLD CULT: NO GROWTH
BACTERIA SPT CULT: NO GROWTH
BASE EXCESS BLDA CALC-SCNC: -1.1 MMOL/L (ref -9–1.8)
BASE EXCESS BLDA CALC-SCNC: 0.2 MMOL/L (ref -9–1.8)
BILIRUB DIRECT SERPL-MCNC: 10.8 MG/DL (ref 0–0.2)
BILIRUB SERPL-MCNC: 13.7 MG/DL (ref 0.2–1.3)
BUN SERPL-MCNC: 17 MG/DL (ref 7–30)
BUN SERPL-MCNC: 19 MG/DL (ref 7–30)
BUN SERPL-MCNC: 21 MG/DL (ref 7–30)
CA-I BLD-MCNC: 4.4 MG/DL (ref 4.4–5.2)
CA-I BLD-MCNC: 4.5 MG/DL (ref 4.4–5.2)
CA-I BLD-MCNC: 4.6 MG/DL (ref 4.4–5.2)
CALCIUM SERPL-MCNC: 8 MG/DL (ref 8.5–10.1)
CALCIUM SERPL-MCNC: 8 MG/DL (ref 8.5–10.1)
CALCIUM SERPL-MCNC: 8.1 MG/DL (ref 8.5–10.1)
CHLORIDE BLD-SCNC: 108 MMOL/L (ref 94–109)
CHLORIDE BLD-SCNC: 108 MMOL/L (ref 94–109)
CHLORIDE BLD-SCNC: 109 MMOL/L (ref 94–109)
CO2 SERPL-SCNC: 24 MMOL/L (ref 20–32)
CREAT SERPL-MCNC: 0.98 MG/DL (ref 0.52–1.04)
CREAT SERPL-MCNC: 1.11 MG/DL (ref 0.52–1.04)
CREAT SERPL-MCNC: 1.27 MG/DL (ref 0.52–1.04)
ERYTHROCYTE [DISTWIDTH] IN BLOOD BY AUTOMATED COUNT: 22.5 % (ref 10–15)
ERYTHROCYTE [DISTWIDTH] IN BLOOD BY AUTOMATED COUNT: 22.5 % (ref 10–15)
ERYTHROCYTE [DISTWIDTH] IN BLOOD BY AUTOMATED COUNT: 22.7 % (ref 10–15)
GFR SERPL CREATININE-BSD FRML MDRD: 50 ML/MIN/1.73M2
GFR SERPL CREATININE-BSD FRML MDRD: 59 ML/MIN/1.73M2
GFR SERPL CREATININE-BSD FRML MDRD: 69 ML/MIN/1.73M2
GLUCOSE BLD-MCNC: 182 MG/DL (ref 70–99)
GLUCOSE BLD-MCNC: 184 MG/DL (ref 70–99)
GLUCOSE BLD-MCNC: 206 MG/DL (ref 70–99)
GLUCOSE BLDC GLUCOMTR-MCNC: 171 MG/DL (ref 70–99)
GLUCOSE BLDC GLUCOMTR-MCNC: 180 MG/DL (ref 70–99)
GLUCOSE BLDC GLUCOMTR-MCNC: 183 MG/DL (ref 70–99)
GLUCOSE BLDC GLUCOMTR-MCNC: 185 MG/DL (ref 70–99)
GLUCOSE BLDC GLUCOMTR-MCNC: 202 MG/DL (ref 70–99)
GLUCOSE BLDC GLUCOMTR-MCNC: 209 MG/DL (ref 70–99)
GRAM STAIN RESULT: NORMAL
GRAM STAIN RESULT: NORMAL
HCO3 BLD-SCNC: 24 MMOL/L (ref 21–28)
HCO3 BLD-SCNC: 24 MMOL/L (ref 21–28)
HCT VFR BLD AUTO: 24.1 % (ref 35–47)
HCT VFR BLD AUTO: 24.7 % (ref 35–47)
HCT VFR BLD AUTO: 25 % (ref 35–47)
HGB BLD-MCNC: 7.4 G/DL (ref 11.7–15.7)
HGB BLD-MCNC: 7.7 G/DL (ref 11.7–15.7)
HGB BLD-MCNC: 7.7 G/DL (ref 11.7–15.7)
INR PPP: 1.33 (ref 0.85–1.15)
LACTATE SERPL-SCNC: 1.8 MMOL/L (ref 0.7–2)
LACTATE SERPL-SCNC: 2 MMOL/L (ref 0.7–2)
LACTATE SERPL-SCNC: 2 MMOL/L (ref 0.7–2)
MAGNESIUM SERPL-MCNC: 2.4 MG/DL (ref 1.6–2.3)
MAGNESIUM SERPL-MCNC: 2.5 MG/DL (ref 1.6–2.3)
MAGNESIUM SERPL-MCNC: 2.5 MG/DL (ref 1.6–2.3)
MCH RBC QN AUTO: 34.3 PG (ref 26.5–33)
MCH RBC QN AUTO: 34.4 PG (ref 26.5–33)
MCH RBC QN AUTO: 34.4 PG (ref 26.5–33)
MCHC RBC AUTO-ENTMCNC: 30.7 G/DL (ref 31.5–36.5)
MCHC RBC AUTO-ENTMCNC: 30.8 G/DL (ref 31.5–36.5)
MCHC RBC AUTO-ENTMCNC: 31.2 G/DL (ref 31.5–36.5)
MCV RBC AUTO: 110 FL (ref 78–100)
MCV RBC AUTO: 112 FL (ref 78–100)
MCV RBC AUTO: 112 FL (ref 78–100)
O2/TOTAL GAS SETTING VFR VENT: 30 %
O2/TOTAL GAS SETTING VFR VENT: 35 %
OXYHGB MFR BLD: 92 % (ref 92–100)
OXYHGB MFR BLD: 96 % (ref 92–100)
PCO2 BLD: 35 MM HG (ref 35–45)
PCO2 BLD: 39 MM HG (ref 35–45)
PH BLD: 7.39 [PH] (ref 7.35–7.45)
PH BLD: 7.45 [PH] (ref 7.35–7.45)
PHOSPHATE SERPL-MCNC: 2.6 MG/DL (ref 2.5–4.5)
PHOSPHATE SERPL-MCNC: 2.8 MG/DL (ref 2.5–4.5)
PHOSPHATE SERPL-MCNC: 2.8 MG/DL (ref 2.5–4.5)
PLATELET # BLD AUTO: 76 10E3/UL (ref 150–450)
PLATELET # BLD AUTO: 82 10E3/UL (ref 150–450)
PLATELET # BLD AUTO: 83 10E3/UL (ref 150–450)
PO2 BLD: 67 MM HG (ref 80–105)
PO2 BLD: 91 MM HG (ref 80–105)
POTASSIUM BLD-SCNC: 3.9 MMOL/L (ref 3.4–5.3)
POTASSIUM BLD-SCNC: 4 MMOL/L (ref 3.4–5.3)
POTASSIUM BLD-SCNC: 4.1 MMOL/L (ref 3.4–5.3)
PROCALCITONIN SERPL-MCNC: 6.7 NG/ML
PROT SERPL-MCNC: 5.4 G/DL (ref 6.8–8.8)
RBC # BLD AUTO: 2.16 10E6/UL (ref 3.8–5.2)
RBC # BLD AUTO: 2.24 10E6/UL (ref 3.8–5.2)
RBC # BLD AUTO: 2.24 10E6/UL (ref 3.8–5.2)
SODIUM SERPL-SCNC: 140 MMOL/L (ref 133–144)
WBC # BLD AUTO: 19.9 10E3/UL (ref 4–11)
WBC # BLD AUTO: 20.6 10E3/UL (ref 4–11)
WBC # BLD AUTO: 22 10E3/UL (ref 4–11)

## 2021-10-09 PROCEDURE — 71045 X-RAY EXAM CHEST 1 VIEW: CPT

## 2021-10-09 PROCEDURE — 250N000011 HC RX IP 250 OP 636: Performed by: STUDENT IN AN ORGANIZED HEALTH CARE EDUCATION/TRAINING PROGRAM

## 2021-10-09 PROCEDURE — 83735 ASSAY OF MAGNESIUM: CPT | Performed by: CLINICAL NURSE SPECIALIST

## 2021-10-09 PROCEDURE — 71045 X-RAY EXAM CHEST 1 VIEW: CPT | Mod: 26 | Performed by: RADIOLOGY

## 2021-10-09 PROCEDURE — 90945 DIALYSIS ONE EVALUATION: CPT | Performed by: INTERNAL MEDICINE

## 2021-10-09 PROCEDURE — 250N000013 HC RX MED GY IP 250 OP 250 PS 637: Performed by: STUDENT IN AN ORGANIZED HEALTH CARE EDUCATION/TRAINING PROGRAM

## 2021-10-09 PROCEDURE — 82247 BILIRUBIN TOTAL: CPT | Performed by: CLINICAL NURSE SPECIALIST

## 2021-10-09 PROCEDURE — 82330 ASSAY OF CALCIUM: CPT | Performed by: CLINICAL NURSE SPECIALIST

## 2021-10-09 PROCEDURE — 84100 ASSAY OF PHOSPHORUS: CPT | Performed by: CLINICAL NURSE SPECIALIST

## 2021-10-09 PROCEDURE — 82248 BILIRUBIN DIRECT: CPT | Performed by: CLINICAL NURSE SPECIALIST

## 2021-10-09 PROCEDURE — 84450 TRANSFERASE (AST) (SGOT): CPT | Performed by: CLINICAL NURSE SPECIALIST

## 2021-10-09 PROCEDURE — 94003 VENT MGMT INPAT SUBQ DAY: CPT

## 2021-10-09 PROCEDURE — 84145 PROCALCITONIN (PCT): CPT | Performed by: STUDENT IN AN ORGANIZED HEALTH CARE EDUCATION/TRAINING PROGRAM

## 2021-10-09 PROCEDURE — 82040 ASSAY OF SERUM ALBUMIN: CPT | Performed by: CLINICAL NURSE SPECIALIST

## 2021-10-09 PROCEDURE — 94640 AIRWAY INHALATION TREATMENT: CPT

## 2021-10-09 PROCEDURE — 999N000015 HC STATISTIC ARTERIAL MONITORING DAILY

## 2021-10-09 PROCEDURE — 90947 DIALYSIS REPEATED EVAL: CPT

## 2021-10-09 PROCEDURE — 85027 COMPLETE CBC AUTOMATED: CPT | Performed by: CLINICAL NURSE SPECIALIST

## 2021-10-09 PROCEDURE — 999N000157 HC STATISTIC RCP TIME EA 10 MIN

## 2021-10-09 PROCEDURE — 85610 PROTHROMBIN TIME: CPT | Performed by: STUDENT IN AN ORGANIZED HEALTH CARE EDUCATION/TRAINING PROGRAM

## 2021-10-09 PROCEDURE — 83605 ASSAY OF LACTIC ACID: CPT | Performed by: STUDENT IN AN ORGANIZED HEALTH CARE EDUCATION/TRAINING PROGRAM

## 2021-10-09 PROCEDURE — 250N000009 HC RX 250: Performed by: STUDENT IN AN ORGANIZED HEALTH CARE EDUCATION/TRAINING PROGRAM

## 2021-10-09 PROCEDURE — 258N000003 HC RX IP 258 OP 636: Performed by: STUDENT IN AN ORGANIZED HEALTH CARE EDUCATION/TRAINING PROGRAM

## 2021-10-09 PROCEDURE — 200N000002 HC R&B ICU UMMC

## 2021-10-09 PROCEDURE — 99291 CRITICAL CARE FIRST HOUR: CPT | Performed by: INTERNAL MEDICINE

## 2021-10-09 PROCEDURE — 97110 THERAPEUTIC EXERCISES: CPT | Mod: GP

## 2021-10-09 PROCEDURE — 82805 BLOOD GASES W/O2 SATURATION: CPT | Performed by: STUDENT IN AN ORGANIZED HEALTH CARE EDUCATION/TRAINING PROGRAM

## 2021-10-09 PROCEDURE — 250N000013 HC RX MED GY IP 250 OP 250 PS 637: Performed by: SURGERY

## 2021-10-09 PROCEDURE — 84075 ASSAY ALKALINE PHOSPHATASE: CPT | Performed by: CLINICAL NURSE SPECIALIST

## 2021-10-09 PROCEDURE — 250N000009 HC RX 250: Performed by: CLINICAL NURSE SPECIALIST

## 2021-10-09 RX ORDER — POLYETHYLENE GLYCOL 3350 17 G/17G
17 POWDER, FOR SOLUTION ORAL 2 TIMES DAILY
Status: DISCONTINUED | OUTPATIENT
Start: 2021-10-09 | End: 2021-11-05 | Stop reason: HOSPADM

## 2021-10-09 RX ORDER — POLYETHYLENE GLYCOL 3350 17 G/17G
17-34 POWDER, FOR SOLUTION ORAL 2 TIMES DAILY PRN
Status: DISCONTINUED | OUTPATIENT
Start: 2021-10-09 | End: 2021-10-09

## 2021-10-09 RX ADMIN — Medication 2 PACKET: at 08:14

## 2021-10-09 RX ADMIN — SALINE NASAL SPRAY 1 SPRAY: 1.5 SOLUTION NASAL at 15:48

## 2021-10-09 RX ADMIN — Medication 15 ML: at 12:22

## 2021-10-09 RX ADMIN — INSULIN ASPART 3 UNITS: 100 INJECTION, SOLUTION INTRAVENOUS; SUBCUTANEOUS at 20:03

## 2021-10-09 RX ADMIN — ALBUTEROL SULFATE 2.5 MG: 2.5 SOLUTION RESPIRATORY (INHALATION) at 12:23

## 2021-10-09 RX ADMIN — PIPERACILLIN AND TAZOBACTAM 3.38 G: 3; .375 INJECTION, POWDER, LYOPHILIZED, FOR SOLUTION INTRAVENOUS at 07:52

## 2021-10-09 RX ADMIN — CALCIUM CHLORIDE, MAGNESIUM CHLORIDE, SODIUM CHLORIDE, SODIUM BICARBONATE, POTASSIUM CHLORIDE AND SODIUM PHOSPHATE DIBASIC DIHYDRATE 12.5 ML/KG/HR: 3.68; 3.05; 6.34; 3.09; .314; .187 INJECTION INTRAVENOUS at 22:07

## 2021-10-09 RX ADMIN — CALCIUM CHLORIDE, MAGNESIUM CHLORIDE, SODIUM CHLORIDE, SODIUM BICARBONATE, POTASSIUM CHLORIDE AND SODIUM PHOSPHATE DIBASIC DIHYDRATE 12.5 ML/KG/HR: 3.68; 3.05; 6.34; 3.09; .314; .187 INJECTION INTRAVENOUS at 01:34

## 2021-10-09 RX ADMIN — INSULIN ASPART 2 UNITS: 100 INJECTION, SOLUTION INTRAVENOUS; SUBCUTANEOUS at 04:23

## 2021-10-09 RX ADMIN — THIAMINE HCL TAB 100 MG 100 MG: 100 TAB at 08:07

## 2021-10-09 RX ADMIN — CALCIUM CHLORIDE, MAGNESIUM CHLORIDE, SODIUM CHLORIDE, SODIUM BICARBONATE, POTASSIUM CHLORIDE AND SODIUM PHOSPHATE DIBASIC DIHYDRATE 12.5 ML/KG/HR: 3.68; 3.05; 6.34; 3.09; .314; .187 INJECTION INTRAVENOUS at 17:49

## 2021-10-09 RX ADMIN — CALCIUM CARBONATE 600 MG (1,500 MG)-VITAMIN D3 400 UNIT TABLET 1 TABLET: at 08:07

## 2021-10-09 RX ADMIN — RIFAXIMIN 550 MG: 550 TABLET ORAL at 08:07

## 2021-10-09 RX ADMIN — INSULIN ASPART 3 UNITS: 100 INJECTION, SOLUTION INTRAVENOUS; SUBCUTANEOUS at 16:03

## 2021-10-09 RX ADMIN — CALCIUM CHLORIDE, MAGNESIUM CHLORIDE, SODIUM CHLORIDE, SODIUM BICARBONATE, POTASSIUM CHLORIDE AND SODIUM PHOSPHATE DIBASIC DIHYDRATE: 3.68; 3.05; 6.34; 3.09; .314; .187 INJECTION INTRAVENOUS at 17:48

## 2021-10-09 RX ADMIN — CALCIUM CHLORIDE, MAGNESIUM CHLORIDE, SODIUM CHLORIDE, SODIUM BICARBONATE, POTASSIUM CHLORIDE AND SODIUM PHOSPHATE DIBASIC DIHYDRATE 12.5 ML/KG/HR: 3.68; 3.05; 6.34; 3.09; .314; .187 INJECTION INTRAVENOUS at 05:42

## 2021-10-09 RX ADMIN — POLYETHYLENE GLYCOL 3350 17 G: 17 POWDER, FOR SOLUTION ORAL at 08:12

## 2021-10-09 RX ADMIN — SALINE NASAL SPRAY 1 SPRAY: 1.5 SOLUTION NASAL at 08:07

## 2021-10-09 RX ADMIN — GABAPENTIN 100 MG: 250 SOLUTION ORAL at 08:07

## 2021-10-09 RX ADMIN — PIPERACILLIN AND TAZOBACTAM 3.38 G: 3; .375 INJECTION, POWDER, LYOPHILIZED, FOR SOLUTION INTRAVENOUS at 02:06

## 2021-10-09 RX ADMIN — RIFAXIMIN 550 MG: 550 TABLET ORAL at 20:14

## 2021-10-09 RX ADMIN — CALCIUM CHLORIDE, MAGNESIUM CHLORIDE, SODIUM CHLORIDE, SODIUM BICARBONATE, POTASSIUM CHLORIDE AND SODIUM PHOSPHATE DIBASIC DIHYDRATE 12.5 ML/KG/HR: 3.68; 3.05; 6.34; 3.09; .314; .187 INJECTION INTRAVENOUS at 17:50

## 2021-10-09 RX ADMIN — VASOPRESSIN 2.4 UNITS/HR: 20 INJECTION INTRAVENOUS at 00:09

## 2021-10-09 RX ADMIN — GABAPENTIN 100 MG: 250 SOLUTION ORAL at 12:15

## 2021-10-09 RX ADMIN — HEPARIN SODIUM 5000 UNITS: 5000 INJECTION, SOLUTION INTRAVENOUS; SUBCUTANEOUS at 04:17

## 2021-10-09 RX ADMIN — Medication 2 PACKET: at 20:14

## 2021-10-09 RX ADMIN — INSULIN ASPART 2 UNITS: 100 INJECTION, SOLUTION INTRAVENOUS; SUBCUTANEOUS at 00:09

## 2021-10-09 RX ADMIN — ESCITALOPRAM OXALATE 10 MG: 5 SOLUTION ORAL at 08:06

## 2021-10-09 RX ADMIN — GABAPENTIN 100 MG: 250 SOLUTION ORAL at 17:51

## 2021-10-09 RX ADMIN — PIPERACILLIN AND TAZOBACTAM 3.38 G: 3; .375 INJECTION, POWDER, LYOPHILIZED, FOR SOLUTION INTRAVENOUS at 13:52

## 2021-10-09 RX ADMIN — SALINE NASAL SPRAY 1 SPRAY: 1.5 SOLUTION NASAL at 12:14

## 2021-10-09 RX ADMIN — CALCIUM CHLORIDE, MAGNESIUM CHLORIDE, SODIUM CHLORIDE, SODIUM BICARBONATE, POTASSIUM CHLORIDE AND SODIUM PHOSPHATE DIBASIC DIHYDRATE 12.5 ML/KG/HR: 3.68; 3.05; 6.34; 3.09; .314; .187 INJECTION INTRAVENOUS at 13:52

## 2021-10-09 RX ADMIN — Medication 40 MG: at 08:06

## 2021-10-09 RX ADMIN — CALCIUM CHLORIDE, MAGNESIUM CHLORIDE, SODIUM CHLORIDE, SODIUM BICARBONATE, POTASSIUM CHLORIDE AND SODIUM PHOSPHATE DIBASIC DIHYDRATE 12.5 ML/KG/HR: 3.68; 3.05; 6.34; 3.09; .314; .187 INJECTION INTRAVENOUS at 13:53

## 2021-10-09 RX ADMIN — PIPERACILLIN AND TAZOBACTAM 3.38 G: 3; .375 INJECTION, POWDER, LYOPHILIZED, FOR SOLUTION INTRAVENOUS at 19:55

## 2021-10-09 RX ADMIN — HEPARIN SODIUM 5000 UNITS: 5000 INJECTION, SOLUTION INTRAVENOUS; SUBCUTANEOUS at 15:48

## 2021-10-09 RX ADMIN — FOLIC ACID 1 MG: 1 TABLET ORAL at 08:07

## 2021-10-09 RX ADMIN — CALCIUM CHLORIDE, MAGNESIUM CHLORIDE, SODIUM CHLORIDE, SODIUM BICARBONATE, POTASSIUM CHLORIDE AND SODIUM PHOSPHATE DIBASIC DIHYDRATE 12.5 ML/KG/HR: 3.68; 3.05; 6.34; 3.09; .314; .187 INJECTION INTRAVENOUS at 10:00

## 2021-10-09 RX ADMIN — CALCIUM CHLORIDE, MAGNESIUM CHLORIDE, SODIUM CHLORIDE, SODIUM BICARBONATE, POTASSIUM CHLORIDE AND SODIUM PHOSPHATE DIBASIC DIHYDRATE 12.5 ML/KG/HR: 3.68; 3.05; 6.34; 3.09; .314; .187 INJECTION INTRAVENOUS at 01:35

## 2021-10-09 RX ADMIN — Medication 0.12 MCG/KG/MIN: at 08:12

## 2021-10-09 RX ADMIN — INSULIN ASPART 2 UNITS: 100 INJECTION, SOLUTION INTRAVENOUS; SUBCUTANEOUS at 08:29

## 2021-10-09 RX ADMIN — SALINE NASAL SPRAY 1 SPRAY: 1.5 SOLUTION NASAL at 20:04

## 2021-10-09 RX ADMIN — Medication 100 MCG: at 08:07

## 2021-10-09 RX ADMIN — CALCIUM CHLORIDE, MAGNESIUM CHLORIDE, SODIUM CHLORIDE, SODIUM BICARBONATE, POTASSIUM CHLORIDE AND SODIUM PHOSPHATE DIBASIC DIHYDRATE 12.5 ML/KG/HR: 3.68; 3.05; 6.34; 3.09; .314; .187 INJECTION INTRAVENOUS at 09:59

## 2021-10-09 RX ADMIN — INSULIN ASPART 2 UNITS: 100 INJECTION, SOLUTION INTRAVENOUS; SUBCUTANEOUS at 12:15

## 2021-10-09 ASSESSMENT — ACTIVITIES OF DAILY LIVING (ADL)
ADLS_ACUITY_SCORE: 21
ADLS_ACUITY_SCORE: 21
ADLS_ACUITY_SCORE: 25
ADLS_ACUITY_SCORE: 25
ADLS_ACUITY_SCORE: 23
ADLS_ACUITY_SCORE: 25

## 2021-10-09 ASSESSMENT — MIFFLIN-ST. JEOR: SCORE: 1562

## 2021-10-09 NOTE — PLAN OF CARE
ICU End of Shift Summary. See flowsheets for vital signs and detailed assessment.    Changes this shift: Pt returned from MRI at 1945 last evening. See results for details. Pt RASS -4, sedating meds discontinued since yesterday. Pt intermittently grimacing with cares. Pt appeared to slightly opened eyes x1 to command and sternal rub, otherwise not moving extremities or withdrawing. Miralax dose given for goal stool output 500 ml to 1L daily. 300 ml stool overnight. Pt tolerating fluid removal goal on CRRT of net negative 50 ml/hr. Vaso now off and norepi currently 0.14 for MAP goal 65. ABG WDL, fiO2 decreased to 35%. Pt remains on PEEP 8.    Plan:  Continue to wean pressor and vent as able. Continue plan of care.

## 2021-10-09 NOTE — PROGRESS NOTES
"Nephrology Inpatient Visit Note    Reason for Visit  Acute renal failure on CRRT    Subjective   The following portions of the patient's chart were reviewed: current medications, problem list, laboratory workup, and diagnostic data.    This is a 46-year-old female with a history significant for alcohol use disorder who was admitted to the hospital from her chemical dependency facility with abdominal pain, nausea and vomiting on 9/29.  She was transferred to the intensive care unit on 10/5 with septic shock and respiratory failure and a possible aspiration event.  She was intubated.  She was  hypotensive on vasopressors.      Her serum creatinine baseline is normal around 1 mg/dL. Her urine sodium level is less than 5.   Her creatinine started to increase around 10/4.  CT scan of the abdomen no evidence of hydronephrosis or kidney stones. She did have retained contrast in her kidneys. She was started on CRRT on 10/7.     She remains severely oliguric. She tolerated CRRT fairly well yesterday. She is off vasopressin, but still on Levophed. She was net positive around 200 mL yesterday.    Objective   Vital Signs   Blood pressure 118/80, pulse 86, temperature 97  F (36.1  C), temperature source Axillary, resp. rate (!) 33, height 1.626 m (5' 4\"), weight 94.2 kg (207 lb 10.8 oz), SpO2 96 %, not currently breastfeeding.    Physical Exam   GENERAL: Intubated  EYES: No scleral icterus  Pulmonary: Diminished, no wheezed  CV: Regular rhythm, normal rate, bilateral lower ext edema  GI: soft, nontender, normal bowel sounds  MS: no evidence of inflammation in joints, no muscle tenderness  SKIN: no rash, warm, dry     Diagnostic Data  Recent Labs     10/09/21  0356   WBC 19.9*   HGB 7.4*   HCT 24.1*   *   PLT 76*      POTASSIUM 4.1   HCO3 24   BUN 21   ALBUMIN 2.6*     Assessment & Plan   #1 Acute renal failure on CRRT  #2 Septic shock  #3 Hypoxic respiratory failure due to aspiration pneumonia  #4 " Encephalopathy    She likely has septic ATN in the setting of hepatorenal physiology.  Her kidney function remains poor.  She remains critically ill. She was seen and examined during CRRT. She is tolerating the procedure well.     Continue CRRT today with the plan of keeping her even in terms of volume balance in the next 24 hours. Her metabolic profile is acceptable.    The plan of care was discussed with the nursing staff, and primary service. Thank you for the consultation.

## 2021-10-09 NOTE — PROGRESS NOTES
MEDICAL ICU PROGRESS NOTE  10/09/2021      Date of Service (when I saw the patient): 10/09/2021    ASSESSMENT: Aliyah Angeles is a 46 year old woman with alcohol use disorder (last drink 9/12/21), alcoholic hepatitis, jaimie en y gastric bypass, alcohol pancreatitis, pancreatic cyst, Type 2 DM, anxiety and depression admitted for abdominal pain, lower extremity edema, and nausea that started after starting low sodium and diabetic friendly foods in an alcohol treatment facility. Patient was admitted to the medicine floor with abdominal pain and vomiting on 9/29/2021. Overnight 10/3-10/4, patient had worsening respiratory status, concern for sepsis requiring transfer to the ICU for intubation and need for pressors as well as further evaluation and management for likely sepsis. Now off sedation. Weaning pressors. On minimal respiratory support. On CRRT.      CHANGES and MAJOR THINGS TODAY:   - RASS goal 0 to -1  - Stopped micafungin  - Continuing to hold sedation  - Miralax scheduled BID  - Reduced lactate labs to daily (from q12h)  - Goal I=O on CRRT  - Decrease PEEP to 5     PLAN:     Neuro:  # Pain and sedation  She was sedated and intubated on 10/4. Minimal response to painful stimuli.   - Holding all sedation  - RASS goal 0 to -1     # Anxiety and depression  - PTA escitalopram     # Encephalopathy, likely acute toxic metabolic vs infection  In the setting of likely hepatic encephalopathy, infectious encephalopathy. SBP unlikely due to normal paracentesis eval and culture. Normal ammonia from 10/4. Will work on continuing to wean sedation. CT with changes that could correlate with hypoxic brain injury, MRI 10/8 negative for anoxic injury.  - Miralax 17 g BID; scheduled  - Goal 3-4 loose stools daily or 0.5 to 1 L of stool daily  - On rifaximin  - Gabapentin 100 mg TID    # Alcohol use disorder  Last alcohol use 9/12.21.  - Thiamine, folic acid, MVI supplementation    Pulmonary:  # Acute hypoxemic respiratory  failure  # ARDS  # Concern for infection, pneumonia and sepsis  Acute onset hypoxemia, bilateral opacities are consistent with ARDS. No evidence of cardiac etiology. Bilateral ground glass infiltrates on imaging, with bilateral pleural effusions. Treated with lung-protective ventilation, prone positioning. Intubated, bronch 10/4 with bile-like fluid, suspect aspiration as well as PMN predominance concerning for bacterial pneumonia. Currently stable on vent settings; supine; off NMB; off inhaled epo 10/7. Chest CT 10/6 showed ground glass and consolidative opacities throughout the lungs. CRRT started 10/7.   - Lung protective ventilation AC 16/450/+8/35%  - Continue oxygen support with oxygen and PEEP adjustments; wean as able to PEEP of 5   - Volume management to be assessed daily; CRRT running with net even I/O goal  - Infectious work-up and treatment as below  - Albuterol neb PRN  - SBT if patient is following commands    # Respiratory alkalosis  Patient is driving her own ventilatory rate.     Cardiovascular:  # Hypotension, in the setting of shock, likely sepsis  # Edema, possible intravascular depletion with third-spacing  Echo 10/4 with hyperdynamic EF >70%. Moderate tricuspid insufficiency. Normal IVC. BNP was over 16,000 --> >28,000 (10/4) --> 17,000 (10/6). Normal trops 10/4. Appropriately elevated cortisol. She has been requiring pressors and has an ANA LAURA. She is currently third spacing most of the fluid she has been given. We are currently treating her sepsis. Her liver disease is likely contributing to the shock. Continuing to manage with pressors. Lactate is now WNL.   - Levophed for goal MAP >65  - Vasopressin for goal MAP >65; currently off     # Lactic acidosis, resolved  This is in the setting of ANA LAURA now requiring CRRT. CT scan 10/7 showing pneumonia, no clear intra-abdominal source of infection. Appears to be perfusing well, less concern for ischemia. Improving with CRRT.       GI/Nutrition:  # Alcoholic hepatitis  # Ascites  # Hyperbilirubinemia  # History of RYGB  Patient has been sober since 9/12 and has been at a treatment facility since her discharge 9/21. Hepatomegaly and hepatic steatosis without cirrhosis. Diagnostic paracentesis on admission without SBP. Discontinued lactulose 10/1 and then had decreased bowel movements. Normal lipase on admission. CT A/P on admission with severe hepatic steatosis, portal HTN, ascites, splenomegaly (similar on repeat 10/6). Paracentesis 10/4 without SBP.  On 10/9, AST was 94 and trending up. AST elevation possibly due to alcoholic liver disease, however sobriety started a month ago according to the chart review.  - Daily LFT's; continuing to trend  - GI following, appreciate recs  - Miralax BID  - Trend bilirubin, INR --> MELD labs     Renal/Fluids/Electrolytes:  # Anion gap metabolic acidosis, resolved  # Lactic acidosis; resolved  Likely 2/2 worsening cirrhosis in the setting of not taking meds as above in addition to gastric loses from vomiting. Partially compensating with respiratory alkalosis. Lactic acid also elevated, likely combination of poor perfusion and poor renal function.  - CTM     # Acute kidney injury, concern for ATN, pre-renal, vs hepatorenal syndrome, improving  Baseline creatinine ~0.98, increased to 1.14 on 10/1/21. May be related to vomiting, lactulose, third spacing. Likely pre-renal. LE edema is prominent and worsening. Intravascular volume likely low; evidenced by low venous volume on echo. Did initially improve after one day of albumin. 50 mg/dl protein, and moderate bilirubin on UA. Uncertain infection, awaiting culture. Urine eosinophils negative.   - Continue CRRT, pull rate per I/O goal of net even  - Consulted nephrology, appreciate recs  - Replacement protocols (Mg, Ph, K).   - Strict I/O's; goal net even  - Avoid nephrotoxic agents     Endocrine:  # Type 2 diabetes mellitus  Her blood sugar was consistently  over 200 as she approached her feeding goal rate.  - High-resistance sliding scale insulin  - Will consider basal insulin if persistently hyperglycemic  - Check BS q4h and PRN  - Goal BS between 100 and 180     ID:  # Septic shock, improving  # Pneumonia (aspiration vs CAP)  Concern for pneumonia, CAP vs HAP. Less concern for SBP given negative paracentesis on admission. Afebrile. Paracentesis 10/4 negative for SBP. Elevated procal and PMN's in BAL fluid 10/4 suggests a bacterial pneumonia, likely aspiration related due to her history of recent symptoms of vomiting. Following cultures, no clear infectious source yet. BD glucan was indeterminate due to hyperbilirubinemia. Procal down-trending, leukocytosis persistent. Candida did grow from the respiratory culture taken on 10/6. Not concerning for fungal infection.   - Continue Zosyn     Abx  - Zosyn 10/4-**  - Vanc 10/4-10/4  - Doxycycline 10/4-10/6  - Micafungin 10/6-10/9     Hematology:    # Anemia, macrocytic  # Coagulopathy  # Acute epistaxis  In the setting of alcohol use disorder, liver failure, alcoholic hepatitis. INR elevated. Likely 2/2 to B12 or folate deficiency due to long history of alcohol use disorder. On 10/7 her hemoglobin dropped to 6.2 requiring transfusion. Post transfusion hemoglobin was 7.4. Hemolysis labs negative. Retic count elevated. Hemoglobin has remained stable between 7.5 and 8 since the transfusion.   - Transfuse for Hgb <7; Plt <10  - Follow INR  - Trend CBC     # Thrombocytopenia  Persistent in the setting of renal disease, liver disease, chronic alcoholism, and after a +2.3 liter I/O balance. This was likely partially dilutional with blunted bone marrow response secondary to alcoholism. HIT screen and hemolysis work up negative.  - Transfuse for PLTs <10, or if active bleeding and platelets are <30     Musculoskeletal:  No active issues     Skin:  # Dry cracked skin on bilateral heels  Improving.  - Continue Eucerin to be applied  topically     General Cares/Prophylaxis:    DVT Prophylaxis: Heparin subQ  GI Prophylaxis: PPI  Restraints: None  Family Communication: Mother updated by phone  Code Status: Full Code     Lines/tubes/drains:  - PIV x2  - ETT  - Rice  - Central line  - Arterial line  - Hemodialysis line     Disposition:  - Medical ICU  Patient seen and findings/plan discussed with medical ICU staff, Dr. Rodrigues.    René Ascencio, MS4    I, Roseanna Kaplan, was present with the medical student who participated in the service and in the documentation of the note. I have verified the history and personally performed the physical exam and medical decision making. I agree with the assessment and plan of care as documented in the note.    MARIA ELENA Cuevas CNP     ====================================  INTERVAL HISTORY:   RASS improved to -4. Tolerating CRRT appropriately. Review nursing notes.     OBJECTIVE:   1. VITAL SIGNS:   Temp:  [97  F (36.1  C)-99  F (37.2  C)] 98.2  F (36.8  C)  Pulse:  [] 84  Resp:  [17-33] 25  BP: (118)/(80) 118/80  MAP:  [57 mmHg-99 mmHg] 69 mmHg  Arterial Line BP: ()/(35-69) 102/53  FiO2 (%):  [30 %-40 %] 30 %  SpO2:  [94 %-100 %] 95 %  Ventilation Mode: CMV/AC  (Continuous Mandatory Ventilation/ Assist Control)  FiO2 (%): 30 %  Rate Set (breaths/minute): 16 breaths/min  Tidal Volume Set (mL): 450 mL  PEEP (cm H2O): (S) 5 cmH2O (Per MD )  Oxygen Concentration (%): 35 %  Resp: 25    2. INTAKE/ OUTPUT:   I/O last 3 completed shifts:  In: 2527.79 [I.V.:1042.79; NG/GT:540]  Out: 2479 [Other:1879; Stool:600]    3. PHYSICAL EXAMINATION:  General: Sedated, supine  HEENT: PERRL though sluggish, MMM, scleral icterus, ETT in place  Neuro: Not withdrawing to painful stimuli  Pulm/Resp: Breath sounds equal bilaterally, crackles intermittently, mechanically ventilated  CV: RRR, no murmurs noted; LE edema 3+  Abdomen: Distended, bowel sounds hypoactive, somewhat more taut  : No urine  Incisions/Skin:  Petechiae diffusely, jaundiced, scattered ecchymoses    4. LABS:   Arterial Blood Gases   Recent Labs   Lab 10/09/21  1209 10/09/21  0356 10/08/21  1523 10/08/21  1155   PH 7.45 7.39 7.36 7.36   PCO2 35 39 41 41   PO2 67* 91 90 83   HCO3 24 24 23 23     Complete Blood Count   Recent Labs   Lab 10/09/21  1209 10/09/21  0356 10/08/21  2103 10/08/21  1154   WBC 20.6* 19.9* 21.5* 23.1*   HGB 7.7* 7.4* 7.7* 8.0*   PLT 83* 76* 81* 88*     Basic Metabolic Panel  Recent Labs   Lab 10/09/21  1209 10/09/21  1208 10/09/21  0823 10/09/21  0358 10/09/21  0356 10/08/21  2104 10/08/21  2103 10/08/21  1520 10/08/21  1154 10/08/21  0423 10/08/21  0417     --   --   --  140  --  141  --  141   < > 140   POTASSIUM 3.9  --   --   --  4.1  --  4.2  --  4.0   < > 3.8   CHLORIDE 108  --   --   --  109  --  109  --  109   < > 110*   CO2  --   --   --   --  24  --  22  --  24  --  20   BUN  --   --   --   --  21  --  23  --  24  --  29   CR  --   --   --   --  1.27*  --  1.52*  --  1.65*  --  1.95*   GLC  --  183* 171* 180* 184*   < > 178*   < > 153*  162*   < > 205*    < > = values in this interval not displayed.     Liver Function Tests  Recent Labs   Lab 10/09/21  0356 10/08/21  2103 10/08/21  1154 10/08/21  0417 10/07/21  1207 10/07/21  0352 10/07/21  0351 10/06/21  0411 10/06/21  0411   AST 94*  --   --  69*  --   --  47*  --  75*   ALT 18  --   --  14  --   --  11  --  20   ALKPHOS 208*  --   --  174*  --   --  108  --  113   BILITOTAL 13.7*  --   --  13.9*  --   --  12.5*  --  15.7*   ALBUMIN 2.6* 2.7* 2.9* 2.9*   < >  --  3.5   < > 3.2*   INR 1.33*  --   --  1.44*  --  1.67*  --   --  1.50*    < > = values in this interval not displayed.     Coagulation Profile  Recent Labs   Lab 10/09/21  0356 10/08/21  0417 10/07/21  0821 10/07/21  0352 10/06/21  0411 10/05/21  0301 10/04/21  0953   INR 1.33* 1.44*  --  1.67* 1.50*   < > 2.48*   PTT  --   --  44*  --   --   --  46*    < > = values in this interval not displayed.       5.  RADIOLOGY:   Recent Results (from the past 24 hour(s))   CT Head w/o Contrast    Narrative    CT HEAD W/O CONTRAST 10/8/2021 2:14 PM    History: Mental status change, unknown cause; Concern for hypoxic  brain injury   ICD-10:    Comparison: None available    Technique: Using multidetector thin collimation helical acquisition  technique, axial, coronal and sagittal CT images from the skull base  to the vertex were obtained without intravenous contrast.   (topogram) image(s) also obtained and reviewed.    Findings: There is no intracranial hemorrhage, mass effect, or midline  shift. Gray-white matter differentiation in both cerebral hemispheres  appears present, but may be attenuated. Ventricles are proportionate  to the cerebral sulci. The basal cisterns are clear.    The bony calvaria and the bones of the skull base are normal. The  paranasal sinuses are relatively clear. Mastoid effusions  bilaterally.. A nasoenteric tube is present.      Impression    Impression:    1. No definite acute intracranial pathology.  2. The gray-white matter differentiation both cerebral hemispheres  appears mildly attenuated, which may be related to hypoxic ischemic  encephalopathy or may be related to technique. There are however no  secondary findings of anoxic brain injury, cerebral edema or mass  effect. Attention is recommended on follow-up imaging.         ARSENIO HASTINGS MD         SYSTEM ID:  [id number]   MR Brain w/o Contrast    Narrative    EXAM: MR BRAIN W/O CONTRAST  10/8/2021 7:34 PM     HISTORY:  Rule out ischemic infarct       COMPARISON:  Same-day CT    TECHNIQUE: Multiplanar, multisequence MRI images of the brain were  obtained without intravenous contrast.    CONTRAST: None.    FINDINGS:   No mass effect, midline shift, or intracranial hemorrhage. No  ventriculomegaly. Diffusion-weighted images reveal no abnormal reduced  diffusion. Susceptibility weighted imaging reveals no intracranial  hemorrhage.    The  visualized paranasal sinuses, and mastoid air cells are clear. The  globes and intraocular structures are within normal limits.      Impression    IMPRESSION:  1. No abnormal reduced diffusion to suggest infarction. No definite  evidence of cerebral edema. Findings on prior CT are likely related to  technique. No definite evidence for hypoxic ischemic encephalitis on  this limited MRI of the brain.         I have personally reviewed the examination and initial interpretation  and I agree with the findings.    ARSENIO HASTINGS MD         SYSTEM ID:  [id number]   XR Chest Port 1 View    Narrative    EXAM: XR CHEST PORT 1 VIEW  10/9/2021 6:42 AM     HISTORY:  Assess ETT placement, lung fields       COMPARISON:  10/8/2021    FINDINGS:   AP of the chest. Endotracheal tube tip is approximately 5.2 cm above  the flores. Both left and right IJ central venous catheters are in  stable position. Partially visualized enteric tubes.    Cardiomediastinal silhouette is unchanged. Low lung volumes. Prominent  pulmonary vasculature. Diffuse mixed opacities, similar. No  significant pleural effusion or appreciable pneumothorax. Bones are  stable. Right lung granuloma. Stable contrast in the stomach.      Impression    IMPRESSION:   1. Support devices in stable position.  2. Low lung volumes with diffuse mixed opacities, similar to prior.  Findings likely represent pulmonary edema or atelectasis.    I have personally reviewed the examination and initial interpretation  and I agree with the findings.    SWATHI NUNO DO         SYSTEM ID:  [id number]

## 2021-10-09 NOTE — PROGRESS NOTES
MICU Progress Note 10/9/2021    MICU Staff    This patient has been seen and evaluated by me.  I have discussed care with the housestaff  and agree with the findings and plan in their note.    Aliyah Angeles is a 46 year old woman with alcohol use disorder (last drink 9/12/21), alcoholic hepatitis, jaimie en y gastric bypass, alcohol pancreatitis, pancreatic cyst, Type 2 DM, anxiety and depression admitted for abdominal pain, lower extremity edema, and nausea that started after starting low sodium and diabetic friendly foods in an alcohol treatment facility. Patient was admitted to the medicine floor with abdominal pain and vomiting on 9/29/2021.  Patient had worsening respiratory status, concern for sepsis requiring transfer to the ICU for intubation and need for pressors as well as further evaluation and management for likely sepsis.     Intubated, not sedated on vent. On CRRT. On NE for hypotension. Slightly more awake today.   Plan for today:  1. Decrease PEEP to 5  2. Continue CRRT  3. Continue tube feeds  4. Continue zosyn. Stop micafungin    Quirino Chatman MD  Pulmonary/Critical Care  October 9, 2021 12:34 PM    CCT 30 minutes separate from procedures

## 2021-10-09 NOTE — PROGRESS NOTES
CRRT STATUS NOTE    DATA:  Time:  5:36 PM  Pressures WNL:  YES  Filter Status:  WDL    Problems Reported/Alarms Noted:  none    Supplies Present:  YES    ASSESSMENT:  Patient Net Fluid Balance:  -1287 ml  Vital Signs:  ART B/P: 102/50(68), T: 99.4, P: 90, R: 21  Labs:    Last Renal Panel:  Sodium   Date Value Ref Range Status   10/09/2021 140 133 - 144 mmol/L Final   04/10/2020 141 133 - 144 mmol/L Final     Potassium   Date Value Ref Range Status   10/09/2021 3.9 3.4 - 5.3 mmol/L Final   04/10/2020 4.6 3.4 - 5.3 mmol/L Final     Chloride   Date Value Ref Range Status   10/09/2021 108 94 - 109 mmol/L Final   04/10/2020 110 (H) 94 - 109 mmol/L Final     Carbon Dioxide   Date Value Ref Range Status   04/10/2020 28 20 - 32 mmol/L Final     Carbon Dioxide (CO2)   Date Value Ref Range Status   10/09/2021 24 20 - 32 mmol/L Final     Anion Gap   Date Value Ref Range Status   10/09/2021 8 3 - 14 mmol/L Final   04/10/2020 3 3 - 14 mmol/L Final     Glucose   Date Value Ref Range Status   10/09/2021 182 (H) 70 - 99 mg/dL Final   04/10/2020 92 70 - 99 mg/dL Final     GLUCOSE BY METER POCT   Date Value Ref Range Status   10/09/2021 202 (H) 70 - 99 mg/dL Final     Urea Nitrogen   Date Value Ref Range Status   10/09/2021 19 7 - 30 mg/dL Final   04/10/2020 10 7 - 30 mg/dL Final     Creatinine   Date Value Ref Range Status   10/09/2021 1.11 (H) 0.52 - 1.04 mg/dL Final   04/10/2020 0.50 (L) 0.52 - 1.04 mg/dL Final     GFR Estimate   Date Value Ref Range Status   10/09/2021 59 (L) >60 mL/min/1.73m2 Final     Comment:     As of July 11, 2021, eGFR is calculated by the CKD-EPI creatinine equation, without race adjustment. eGFR can be influenced by muscle mass, exercise, and diet. The reported eGFR is an estimation only and is only applicable if the renal function is stable.   04/10/2020 >90 >60 mL/min/[1.73_m2] Final     Comment:     Non  GFR Calc  Starting 12/18/2018, serum creatinine based estimated GFR (eGFR) will be    calculated using the Chronic Kidney Disease Epidemiology Collaboration   (CKD-EPI) equation.       Calcium   Date Value Ref Range Status   10/09/2021 8.0 (L) 8.5 - 10.1 mg/dL Final   04/10/2020 8.8 8.5 - 10.1 mg/dL Final     Phosphorus   Date Value Ref Range Status   10/09/2021 2.6 2.5 - 4.5 mg/dL Final     Albumin   Date Value Ref Range Status   10/09/2021 2.6 (L) 3.4 - 5.0 g/dL Final   04/08/2020 3.7 3.4 - 5.0 g/dL Final       Goals of Therapy: 0-50 ml/hr    INTERVENTIONS:   none    PLAN:  Continue plan of care and call CRRT resource @ [phone] with and concerns

## 2021-10-09 NOTE — PLAN OF CARE
ICU End of Shift Summary. See flowsheets for vital signs and detailed assessment.    Changes this shift: PERRLA, opens eyes to repeated/vigorous stimulation, purposeful movement in BUE.  Able to elicit cough and gag, overbreathing vent.  TMax 99.4.  CMV/AC 30%/16/450/8-->5 PIPs 25-35.  Plateau ~ 28.  LS: clear-->coarse/rhonchus.  Moderate amounts of thick yellow inline secretions.  SR c murmur and JVD.  MAP>65 c norepi gtt.  Anuric.  Met stool goal.  Tolerating CRRT well.      Plan: Will continue to support and monitor.

## 2021-10-09 NOTE — PLAN OF CARE
ICU End of Shift Summary. See flowsheets for vital signs and detailed assessment.    Changes this shift: RASS -4 (goal 0-1).  PERRLA, able to elicit cough/grimace, intermittently spontaneously breathing. No purposeful movement in extremities.  Fent off @ 1115.  Tmax 99.  CMV/AC 30-->40%/26-->16/330-->450/10-->8.  PIPs ~30.  LS: coarse/rhonchus, diminished.  Small amounts of thick, yellow inline secretions.  SR, increasingly tachycardic.  JVD and murmur appreciated.  BP labile, MAP >65 c norepi and vaso.  Anuric.  Using miralax rather than lactulose for HE.  Head CT and MRI.  Sputum and BC sent for elevated WBC and tachycardia.    CRRT changed to 0-50/hr, tolerating well.      Plan:  Will continue to support and monitor.

## 2021-10-10 ENCOUNTER — APPOINTMENT (OUTPATIENT)
Dept: GENERAL RADIOLOGY | Facility: CLINIC | Age: 46
End: 2021-10-10
Payer: COMMERCIAL

## 2021-10-10 ENCOUNTER — APPOINTMENT (OUTPATIENT)
Dept: NEUROLOGY | Facility: CLINIC | Age: 46
End: 2021-10-10
Attending: STUDENT IN AN ORGANIZED HEALTH CARE EDUCATION/TRAINING PROGRAM
Payer: COMMERCIAL

## 2021-10-10 LAB
ALBUMIN SERPL-MCNC: 2.4 G/DL (ref 3.4–5)
ALBUMIN SERPL-MCNC: 2.4 G/DL (ref 3.4–5)
ALBUMIN SERPL-MCNC: 2.5 G/DL (ref 3.4–5)
ALP SERPL-CCNC: 245 U/L (ref 40–150)
ALT SERPL W P-5'-P-CCNC: 26 U/L (ref 0–50)
ANION GAP SERPL CALCULATED.3IONS-SCNC: 6 MMOL/L (ref 3–14)
ANION GAP SERPL CALCULATED.3IONS-SCNC: 7 MMOL/L (ref 3–14)
ANION GAP SERPL CALCULATED.3IONS-SCNC: 8 MMOL/L (ref 3–14)
AST SERPL W P-5'-P-CCNC: 117 U/L (ref 0–45)
BACTERIA SPT CULT: ABNORMAL
BASE EXCESS BLDA CALC-SCNC: 1 MMOL/L (ref -9–1.8)
BASE EXCESS BLDA CALC-SCNC: 2 MMOL/L (ref -9–1.8)
BASE EXCESS BLDA CALC-SCNC: 2 MMOL/L (ref -9–1.8)
BILIRUB DIRECT SERPL-MCNC: 10 MG/DL (ref 0–0.2)
BILIRUB SERPL-MCNC: 12.7 MG/DL (ref 0.2–1.3)
BUN SERPL-MCNC: 16 MG/DL (ref 7–30)
CA-I BLD-MCNC: 4.4 MG/DL (ref 4.4–5.2)
CA-I BLD-MCNC: 4.5 MG/DL (ref 4.4–5.2)
CA-I BLD-MCNC: 4.6 MG/DL (ref 4.4–5.2)
CALCIUM SERPL-MCNC: 8.2 MG/DL (ref 8.5–10.1)
CALCIUM SERPL-MCNC: 8.3 MG/DL (ref 8.5–10.1)
CALCIUM SERPL-MCNC: 8.3 MG/DL (ref 8.5–10.1)
CHLORIDE BLD-SCNC: 107 MMOL/L (ref 94–109)
CHLORIDE BLD-SCNC: 107 MMOL/L (ref 94–109)
CHLORIDE BLD-SCNC: 109 MMOL/L (ref 94–109)
CO2 SERPL-SCNC: 24 MMOL/L (ref 20–32)
CO2 SERPL-SCNC: 24 MMOL/L (ref 20–32)
CO2 SERPL-SCNC: 25 MMOL/L (ref 20–32)
CREAT SERPL-MCNC: 0.78 MG/DL (ref 0.52–1.04)
CREAT SERPL-MCNC: 0.8 MG/DL (ref 0.52–1.04)
CREAT SERPL-MCNC: 0.84 MG/DL (ref 0.52–1.04)
CRYPTOC AG SPEC QL: NEGATIVE
ERYTHROCYTE [DISTWIDTH] IN BLOOD BY AUTOMATED COUNT: 22.1 % (ref 10–15)
ERYTHROCYTE [DISTWIDTH] IN BLOOD BY AUTOMATED COUNT: 22.2 % (ref 10–15)
ERYTHROCYTE [DISTWIDTH] IN BLOOD BY AUTOMATED COUNT: 22.4 % (ref 10–15)
GFR SERPL CREATININE-BSD FRML MDRD: 83 ML/MIN/1.73M2
GFR SERPL CREATININE-BSD FRML MDRD: 88 ML/MIN/1.73M2
GFR SERPL CREATININE-BSD FRML MDRD: >90 ML/MIN/1.73M2
GLUCOSE BLD-MCNC: 200 MG/DL (ref 70–99)
GLUCOSE BLD-MCNC: 206 MG/DL (ref 70–99)
GLUCOSE BLD-MCNC: 211 MG/DL (ref 70–99)
GLUCOSE BLDC GLUCOMTR-MCNC: 194 MG/DL (ref 70–99)
GLUCOSE BLDC GLUCOMTR-MCNC: 196 MG/DL (ref 70–99)
GLUCOSE BLDC GLUCOMTR-MCNC: 196 MG/DL (ref 70–99)
GLUCOSE BLDC GLUCOMTR-MCNC: 199 MG/DL (ref 70–99)
GLUCOSE BLDC GLUCOMTR-MCNC: 207 MG/DL (ref 70–99)
GLUCOSE BLDC GLUCOMTR-MCNC: 209 MG/DL (ref 70–99)
GRAM STAIN RESULT: ABNORMAL
GRAM STAIN RESULT: ABNORMAL
HCO3 BLD-SCNC: 25 MMOL/L (ref 21–28)
HCT VFR BLD AUTO: 24.4 % (ref 35–47)
HCT VFR BLD AUTO: 24.7 % (ref 35–47)
HCT VFR BLD AUTO: 25 % (ref 35–47)
HGB BLD-MCNC: 7.6 G/DL (ref 11.7–15.7)
HGB BLD-MCNC: 7.7 G/DL (ref 11.7–15.7)
HGB BLD-MCNC: 7.8 G/DL (ref 11.7–15.7)
INR PPP: 1.29 (ref 0.85–1.15)
LACTATE SERPL-SCNC: 1.7 MMOL/L (ref 0.7–2)
MAGNESIUM SERPL-MCNC: 2.5 MG/DL (ref 1.6–2.3)
MCH RBC QN AUTO: 34.2 PG (ref 26.5–33)
MCH RBC QN AUTO: 34.7 PG (ref 26.5–33)
MCH RBC QN AUTO: 35 PG (ref 26.5–33)
MCHC RBC AUTO-ENTMCNC: 30.8 G/DL (ref 31.5–36.5)
MCHC RBC AUTO-ENTMCNC: 31.1 G/DL (ref 31.5–36.5)
MCHC RBC AUTO-ENTMCNC: 31.6 G/DL (ref 31.5–36.5)
MCV RBC AUTO: 110 FL (ref 78–100)
MCV RBC AUTO: 111 FL (ref 78–100)
MCV RBC AUTO: 113 FL (ref 78–100)
O2/TOTAL GAS SETTING VFR VENT: 30 %
O2/TOTAL GAS SETTING VFR VENT: 30 %
O2/TOTAL GAS SETTING VFR VENT: 40 %
OXYHGB MFR BLD: 93 % (ref 92–100)
OXYHGB MFR BLD: 94 % (ref 92–100)
OXYHGB MFR BLD: 95 % (ref 92–100)
PCO2 BLD: 34 MM HG (ref 35–45)
PCO2 BLD: 34 MM HG (ref 35–45)
PCO2 BLD: 37 MM HG (ref 35–45)
PH BLD: 7.44 [PH] (ref 7.35–7.45)
PH BLD: 7.49 [PH] (ref 7.35–7.45)
PH BLD: 7.49 [PH] (ref 7.35–7.45)
PHOSPHATE SERPL-MCNC: 2.6 MG/DL (ref 2.5–4.5)
PHOSPHATE SERPL-MCNC: 2.8 MG/DL (ref 2.5–4.5)
PHOSPHATE SERPL-MCNC: 2.8 MG/DL (ref 2.5–4.5)
PLATELET # BLD AUTO: 83 10E3/UL (ref 150–450)
PLATELET # BLD AUTO: 86 10E3/UL (ref 150–450)
PLATELET # BLD AUTO: 86 10E3/UL (ref 150–450)
PO2 BLD: 71 MM HG (ref 80–105)
PO2 BLD: 78 MM HG (ref 80–105)
PO2 BLD: 84 MM HG (ref 80–105)
POTASSIUM BLD-SCNC: 3.8 MMOL/L (ref 3.4–5.3)
POTASSIUM BLD-SCNC: 3.8 MMOL/L (ref 3.4–5.3)
POTASSIUM BLD-SCNC: 3.9 MMOL/L (ref 3.4–5.3)
PROCALCITONIN SERPL-MCNC: 6.04 NG/ML
PROT SERPL-MCNC: 5.5 G/DL (ref 6.8–8.8)
RBC # BLD AUTO: 2.22 10E6/UL (ref 3.8–5.2)
RBC # BLD AUTO: 2.22 10E6/UL (ref 3.8–5.2)
RBC # BLD AUTO: 2.23 10E6/UL (ref 3.8–5.2)
SODIUM SERPL-SCNC: 139 MMOL/L (ref 133–144)
WBC # BLD AUTO: 20.9 10E3/UL (ref 4–11)
WBC # BLD AUTO: 21.6 10E3/UL (ref 4–11)
WBC # BLD AUTO: 22 10E3/UL (ref 4–11)

## 2021-10-10 PROCEDURE — 87899 AGENT NOS ASSAY W/OPTIC: CPT | Performed by: STUDENT IN AN ORGANIZED HEALTH CARE EDUCATION/TRAINING PROGRAM

## 2021-10-10 PROCEDURE — 85041 AUTOMATED RBC COUNT: CPT | Performed by: CLINICAL NURSE SPECIALIST

## 2021-10-10 PROCEDURE — 250N000009 HC RX 250: Performed by: CLINICAL NURSE SPECIALIST

## 2021-10-10 PROCEDURE — 80069 RENAL FUNCTION PANEL: CPT | Performed by: CLINICAL NURSE SPECIALIST

## 2021-10-10 PROCEDURE — 84100 ASSAY OF PHOSPHORUS: CPT | Performed by: CLINICAL NURSE SPECIALIST

## 2021-10-10 PROCEDURE — 90947 DIALYSIS REPEATED EVAL: CPT

## 2021-10-10 PROCEDURE — 82805 BLOOD GASES W/O2 SATURATION: CPT | Performed by: STUDENT IN AN ORGANIZED HEALTH CARE EDUCATION/TRAINING PROGRAM

## 2021-10-10 PROCEDURE — 200N000002 HC R&B ICU UMMC

## 2021-10-10 PROCEDURE — 999N000157 HC STATISTIC RCP TIME EA 10 MIN

## 2021-10-10 PROCEDURE — 82247 BILIRUBIN TOTAL: CPT | Performed by: CLINICAL NURSE SPECIALIST

## 2021-10-10 PROCEDURE — 83605 ASSAY OF LACTIC ACID: CPT | Performed by: STUDENT IN AN ORGANIZED HEALTH CARE EDUCATION/TRAINING PROGRAM

## 2021-10-10 PROCEDURE — 99291 CRITICAL CARE FIRST HOUR: CPT | Mod: GC | Performed by: INTERNAL MEDICINE

## 2021-10-10 PROCEDURE — 83735 ASSAY OF MAGNESIUM: CPT | Performed by: CLINICAL NURSE SPECIALIST

## 2021-10-10 PROCEDURE — 95714 VEEG EA 12-26 HR UNMNTR: CPT

## 2021-10-10 PROCEDURE — 84075 ASSAY ALKALINE PHOSPHATASE: CPT | Performed by: CLINICAL NURSE SPECIALIST

## 2021-10-10 PROCEDURE — 82330 ASSAY OF CALCIUM: CPT | Performed by: CLINICAL NURSE SPECIALIST

## 2021-10-10 PROCEDURE — 85027 COMPLETE CBC AUTOMATED: CPT | Performed by: CLINICAL NURSE SPECIALIST

## 2021-10-10 PROCEDURE — 250N000011 HC RX IP 250 OP 636: Performed by: STUDENT IN AN ORGANIZED HEALTH CARE EDUCATION/TRAINING PROGRAM

## 2021-10-10 PROCEDURE — 250N000009 HC RX 250: Performed by: STUDENT IN AN ORGANIZED HEALTH CARE EDUCATION/TRAINING PROGRAM

## 2021-10-10 PROCEDURE — 95720 EEG PHY/QHP EA INCR W/VEEG: CPT | Performed by: PSYCHIATRY & NEUROLOGY

## 2021-10-10 PROCEDURE — 71045 X-RAY EXAM CHEST 1 VIEW: CPT | Mod: 26 | Performed by: RADIOLOGY

## 2021-10-10 PROCEDURE — 250N000013 HC RX MED GY IP 250 OP 250 PS 637: Performed by: NURSE PRACTITIONER

## 2021-10-10 PROCEDURE — 94003 VENT MGMT INPAT SUBQ DAY: CPT

## 2021-10-10 PROCEDURE — 999N000015 HC STATISTIC ARTERIAL MONITORING DAILY

## 2021-10-10 PROCEDURE — 82248 BILIRUBIN DIRECT: CPT | Performed by: CLINICAL NURSE SPECIALIST

## 2021-10-10 PROCEDURE — 250N000012 HC RX MED GY IP 250 OP 636 PS 637: Performed by: STUDENT IN AN ORGANIZED HEALTH CARE EDUCATION/TRAINING PROGRAM

## 2021-10-10 PROCEDURE — 90945 DIALYSIS ONE EVALUATION: CPT | Performed by: INTERNAL MEDICINE

## 2021-10-10 PROCEDURE — 250N000013 HC RX MED GY IP 250 OP 250 PS 637: Performed by: SURGERY

## 2021-10-10 PROCEDURE — 84145 PROCALCITONIN (PCT): CPT | Performed by: STUDENT IN AN ORGANIZED HEALTH CARE EDUCATION/TRAINING PROGRAM

## 2021-10-10 PROCEDURE — 85610 PROTHROMBIN TIME: CPT | Performed by: STUDENT IN AN ORGANIZED HEALTH CARE EDUCATION/TRAINING PROGRAM

## 2021-10-10 PROCEDURE — 71045 X-RAY EXAM CHEST 1 VIEW: CPT

## 2021-10-10 RX ORDER — FENTANYL CITRATE 50 UG/ML
50-100 INJECTION, SOLUTION INTRAMUSCULAR; INTRAVENOUS
Status: DISCONTINUED | OUTPATIENT
Start: 2021-10-10 | End: 2021-10-12

## 2021-10-10 RX ADMIN — CALCIUM CHLORIDE, MAGNESIUM CHLORIDE, SODIUM CHLORIDE, SODIUM BICARBONATE, POTASSIUM CHLORIDE AND SODIUM PHOSPHATE DIBASIC DIHYDRATE 12.5 ML/KG/HR: 3.68; 3.05; 6.34; 3.09; .314; .187 INJECTION INTRAVENOUS at 06:32

## 2021-10-10 RX ADMIN — CALCIUM CHLORIDE, MAGNESIUM CHLORIDE, SODIUM CHLORIDE, SODIUM BICARBONATE, POTASSIUM CHLORIDE AND SODIUM PHOSPHATE DIBASIC DIHYDRATE 12.5 ML/KG/HR: 3.68; 3.05; 6.34; 3.09; .314; .187 INJECTION INTRAVENOUS at 02:23

## 2021-10-10 RX ADMIN — THIAMINE HCL TAB 100 MG 100 MG: 100 TAB at 08:43

## 2021-10-10 RX ADMIN — PIPERACILLIN AND TAZOBACTAM 3.38 G: 3; .375 INJECTION, POWDER, LYOPHILIZED, FOR SOLUTION INTRAVENOUS at 08:43

## 2021-10-10 RX ADMIN — CALCIUM CARBONATE 600 MG (1,500 MG)-VITAMIN D3 400 UNIT TABLET 1 TABLET: at 08:44

## 2021-10-10 RX ADMIN — PIPERACILLIN AND TAZOBACTAM 3.38 G: 3; .375 INJECTION, POWDER, LYOPHILIZED, FOR SOLUTION INTRAVENOUS at 02:22

## 2021-10-10 RX ADMIN — Medication 15 ML: at 13:30

## 2021-10-10 RX ADMIN — FOLIC ACID 1 MG: 1 TABLET ORAL at 08:44

## 2021-10-10 RX ADMIN — SALINE NASAL SPRAY 1 SPRAY: 1.5 SOLUTION NASAL at 16:16

## 2021-10-10 RX ADMIN — GABAPENTIN 100 MG: 250 SOLUTION ORAL at 18:36

## 2021-10-10 RX ADMIN — POLYETHYLENE GLYCOL 3350 17 G: 17 POWDER, FOR SOLUTION ORAL at 08:44

## 2021-10-10 RX ADMIN — Medication 100 MCG: at 08:44

## 2021-10-10 RX ADMIN — Medication: at 23:08

## 2021-10-10 RX ADMIN — ACETAMINOPHEN 650 MG: 325 TABLET, FILM COATED ORAL at 22:07

## 2021-10-10 RX ADMIN — CALCIUM CHLORIDE, MAGNESIUM CHLORIDE, SODIUM CHLORIDE, SODIUM BICARBONATE, POTASSIUM CHLORIDE AND SODIUM PHOSPHATE DIBASIC DIHYDRATE 12.5 ML/KG/HR: 3.68; 3.05; 6.34; 3.09; .314; .187 INJECTION INTRAVENOUS at 06:31

## 2021-10-10 RX ADMIN — INSULIN GLARGINE 10 UNITS: 100 INJECTION, SOLUTION SUBCUTANEOUS at 11:47

## 2021-10-10 RX ADMIN — SALINE NASAL SPRAY 1 SPRAY: 1.5 SOLUTION NASAL at 08:45

## 2021-10-10 RX ADMIN — GABAPENTIN 100 MG: 250 SOLUTION ORAL at 11:30

## 2021-10-10 RX ADMIN — GABAPENTIN 100 MG: 250 SOLUTION ORAL at 08:45

## 2021-10-10 RX ADMIN — INSULIN ASPART 3 UNITS: 100 INJECTION, SOLUTION INTRAVENOUS; SUBCUTANEOUS at 16:21

## 2021-10-10 RX ADMIN — Medication 2 PACKET: at 08:49

## 2021-10-10 RX ADMIN — INSULIN ASPART 3 UNITS: 100 INJECTION, SOLUTION INTRAVENOUS; SUBCUTANEOUS at 09:02

## 2021-10-10 RX ADMIN — Medication 0.09 MCG/KG/MIN: at 10:46

## 2021-10-10 RX ADMIN — HEPARIN SODIUM 5000 UNITS: 5000 INJECTION, SOLUTION INTRAVENOUS; SUBCUTANEOUS at 04:16

## 2021-10-10 RX ADMIN — PIPERACILLIN AND TAZOBACTAM 3.38 G: 3; .375 INJECTION, POWDER, LYOPHILIZED, FOR SOLUTION INTRAVENOUS at 20:04

## 2021-10-10 RX ADMIN — PIPERACILLIN AND TAZOBACTAM 3.38 G: 3; .375 INJECTION, POWDER, LYOPHILIZED, FOR SOLUTION INTRAVENOUS at 13:36

## 2021-10-10 RX ADMIN — RIFAXIMIN 550 MG: 550 TABLET ORAL at 20:04

## 2021-10-10 RX ADMIN — Medication 40 MG: at 08:44

## 2021-10-10 RX ADMIN — SALINE NASAL SPRAY 1 SPRAY: 1.5 SOLUTION NASAL at 20:05

## 2021-10-10 RX ADMIN — CALCIUM CHLORIDE, MAGNESIUM CHLORIDE, SODIUM CHLORIDE, SODIUM BICARBONATE, POTASSIUM CHLORIDE AND SODIUM PHOSPHATE DIBASIC DIHYDRATE: 3.68; 3.05; 6.34; 3.09; .314; .187 INJECTION INTRAVENOUS at 19:23

## 2021-10-10 RX ADMIN — CALCIUM CHLORIDE, MAGNESIUM CHLORIDE, SODIUM CHLORIDE, SODIUM BICARBONATE, POTASSIUM CHLORIDE AND SODIUM PHOSPHATE DIBASIC DIHYDRATE 12.5 ML/KG/HR: 3.68; 3.05; 6.34; 3.09; .314; .187 INJECTION INTRAVENOUS at 14:59

## 2021-10-10 RX ADMIN — INSULIN ASPART 3 UNITS: 100 INJECTION, SOLUTION INTRAVENOUS; SUBCUTANEOUS at 00:58

## 2021-10-10 RX ADMIN — INSULIN ASPART 3 UNITS: 100 INJECTION, SOLUTION INTRAVENOUS; SUBCUTANEOUS at 11:31

## 2021-10-10 RX ADMIN — CALCIUM CHLORIDE, MAGNESIUM CHLORIDE, SODIUM CHLORIDE, SODIUM BICARBONATE, POTASSIUM CHLORIDE AND SODIUM PHOSPHATE DIBASIC DIHYDRATE 12.5 ML/KG/HR: 3.68; 3.05; 6.34; 3.09; .314; .187 INJECTION INTRAVENOUS at 18:35

## 2021-10-10 RX ADMIN — Medication 2 PACKET: at 20:04

## 2021-10-10 RX ADMIN — INSULIN ASPART 3 UNITS: 100 INJECTION, SOLUTION INTRAVENOUS; SUBCUTANEOUS at 20:10

## 2021-10-10 RX ADMIN — CALCIUM CHLORIDE, MAGNESIUM CHLORIDE, SODIUM CHLORIDE, SODIUM BICARBONATE, POTASSIUM CHLORIDE AND SODIUM PHOSPHATE DIBASIC DIHYDRATE 12.5 ML/KG/HR: 3.68; 3.05; 6.34; 3.09; .314; .187 INJECTION INTRAVENOUS at 10:26

## 2021-10-10 RX ADMIN — RIFAXIMIN 550 MG: 550 TABLET ORAL at 08:43

## 2021-10-10 RX ADMIN — ESCITALOPRAM OXALATE 10 MG: 5 SOLUTION ORAL at 08:43

## 2021-10-10 RX ADMIN — CALCIUM CHLORIDE, MAGNESIUM CHLORIDE, SODIUM CHLORIDE, SODIUM BICARBONATE, POTASSIUM CHLORIDE AND SODIUM PHOSPHATE DIBASIC DIHYDRATE 12.5 ML/KG/HR: 3.68; 3.05; 6.34; 3.09; .314; .187 INJECTION INTRAVENOUS at 18:34

## 2021-10-10 RX ADMIN — INSULIN ASPART 3 UNITS: 100 INJECTION, SOLUTION INTRAVENOUS; SUBCUTANEOUS at 04:16

## 2021-10-10 RX ADMIN — HEPARIN SODIUM 5000 UNITS: 5000 INJECTION, SOLUTION INTRAVENOUS; SUBCUTANEOUS at 16:16

## 2021-10-10 RX ADMIN — SALINE NASAL SPRAY 1 SPRAY: 1.5 SOLUTION NASAL at 11:30

## 2021-10-10 ASSESSMENT — ACTIVITIES OF DAILY LIVING (ADL)
ADLS_ACUITY_SCORE: 23
ADLS_ACUITY_SCORE: 21
ADLS_ACUITY_SCORE: 25
ADLS_ACUITY_SCORE: 25
ADLS_ACUITY_SCORE: 21
ADLS_ACUITY_SCORE: 25

## 2021-10-10 ASSESSMENT — MIFFLIN-ST. JEOR: SCORE: 1552

## 2021-10-10 NOTE — PLAN OF CARE
ICU End of Shift Summary. See flowsheets for vital signs and detailed assessment.    Changes this shift: PERRLA, nystagmus noted.  Purposeful movement in BUE, random movement in BLE.  Not opening eyes/tracking/withdrawing.  EEG started-no seizure activity noted, moderate-severe encephalopathy. Afebrile.  CMV/AC 30-->40%/16/450/5 PIPs ~19-->27.  LS: coarse/rhonchus.  Moderate amounts of clear/creamy thick inline secretions.  SR c intermittent murmur and PACs.  JVD noted.  MAP>65 c norepi.  Stool goal (now 1.5-2L) met.  Unable to bladder scan d/t ascites.  Tolerating CRRT well.      Wrist restraints started to prevent extubation/line pulling.    Plan: Will continue to support and monitor.

## 2021-10-10 NOTE — PROGRESS NOTES
INITIAL REPORT of Video-EEG Monitoring              DATE OF RECORDING:  10/10/2021         I reviewed the first 1 hour of video-EEG monitoring of Aliyah Angeles.         The EEG during coma was abnormal due to generalized delta-theta slowing, with a relative paucity of faster alpha-beta activities bilaterally.  No interictal epileptiform abnormalities and no electrographic seizures were observed.         These abnormalities indicate moderate-severe electrographic encephalopathy.  This recording excludes non-convulsive status epilepticus as a possible cause of this encephalopathy.    Screening for intermittent seizures will require a longer period of recording.   Rasta Torres M.D.

## 2021-10-10 NOTE — PROGRESS NOTES
MEDICAL ICU PROGRESS NOTE  10/10/2021      Date of Service (when I saw the patient): 10/10/2021    ASSESSMENT: Aliyah Angeles is a 46 year old woman with alcohol use disorder (last drink 9/12/21), alcoholic hepatitis, jaimie en y gastric bypass, alcohol pancreatitis, pancreatic cyst, Type 2 DM, anxiety and depression admitted for abdominal pain, lower extremity edema, and nausea that started after starting low sodium and diabetic friendly foods in an alcohol treatment facility. Patient was admitted to the medicine floor with abdominal pain and vomiting on 9/29/2021. Overnight 10/3-10/4, patient had worsening respiratory status, concern for sepsis requiring transfer to the ICU for intubation and need for pressors as well as further evaluation and management for likely sepsis. Now off sedation. Weaning pressors. On minimal respiratory support. On CRRT.      CHANGES and MAJOR THINGS TODAY:   - Sending cryptococcal antigen blood today  - vEEG for possible nystagmus, neurological status  - Consider bedside US, paracentesis tomorrow  - Lantus 10 units daily    PLAN:     Neuro:  # Pain and sedation  She was sedated and intubated on 10/4. Minimal response to painful stimuli. Does spontaneously move all limbs and occasionally open her eyes.   - Holding all sedation  - RASS goal 0 to -1     # Anxiety and depression  - PTA escitalopram     # Encephalopathy, likely acute toxic metabolic vs infection  In the setting of likely hepatic encephalopathy, infectious encephalopathy. SBP unlikely due to normal paracentesis eval and culture. Normal ammonia from 10/4. Will work on continuing to wean sedation. CT with changes that could correlate with hypoxic brain injury, MRI 10/8 negative for anoxic injury.  - Miralax 17 g BID; scheduled  - Goal 3-4 loose stools daily >1 L of stool daily  - On rifaximin  - Gabapentin 100 mg TID  - vEEG today    # Alcohol use disorder  Last alcohol use 9/12.21.  - Thiamine, folic acid, MVI  supplementation    Pulmonary:  # Acute hypoxemic respiratory failure  # ARDS  # Concern for infection, pneumonia and sepsis  Acute onset hypoxemia, bilateral opacities are consistent with ARDS. No evidence of cardiac etiology. Bilateral ground glass infiltrates on imaging, with bilateral pleural effusions. Treated with lung-protective ventilation, prone positioning. Intubated, bronch 10/4 with bile-like fluid, suspect aspiration as well as PMN predominance concerning for bacterial pneumonia. Currently stable on vent settings; supine; off NMB; off inhaled epo 10/7. Chest CT 10/6 showed ground glass and consolidative opacities throughout the lungs. CRRT started 10/7.   - Lung protective ventilation AC 16/450/+5/30%  - Continue oxygen support with oxygen and PEEP adjustments; wean as able  - Volume management to be assessed daily; CRRT running with net even to slightly net negative I/O goal  - Infectious work-up and treatment as below  - Albuterol neb PRN  - SBT if patient is following commands    # Respiratory alkalosis  Patient is driving her own ventilatory rate.     Cardiovascular:  # Hypotension, in the setting of shock, likely sepsis  # Edema, possible intravascular depletion with third-spacing  Echo 10/4 with hyperdynamic EF >70%. Moderate tricuspid insufficiency. Normal IVC. BNP was over 16,000 --> >28,000 (10/4) --> 17,000 (10/6). Normal trops 10/4. Appropriately elevated cortisol. She has been requiring pressors and has an ANA LAURA. She is currently third spacing most of the fluid she has been given. We are currently treating her sepsis. Her liver disease is likely contributing to the shock. Continuing to manage with pressors. Lactate is now WNL.   - Levophed for goal MAP >65     # Lactic acidosis, resolved  This is in the setting of ANA LAURA now requiring CRRT. CT scan 10/7 showing pneumonia, no clear intra-abdominal source of infection. Appears to be perfusing well, less concern for ischemia. Improving with CRRT.       GI/Nutrition:  # Alcoholic hepatitis  # Ascites  # Hyperbilirubinemia  # History of RYGB  # Transaminitis  Patient has been sober since 9/12 and has been at a treatment facility since her discharge 9/21. Hepatomegaly and hepatic steatosis without cirrhosis. Diagnostic paracentesis on admission without SBP. Discontinued lactulose 10/1 and then had decreased bowel movements. Normal lipase on admission. CT A/P on admission with severe hepatic steatosis, portal HTN, ascites, splenomegaly (similar on repeat CT 10/6). Paracentesis 10/4 without SBP.  On 10/9, AST was 94 and trending up and continued to do so on 10/10. AST elevation possibly due to alcoholic liver disease, however sobriety started a month ago according to the chart review.  - Daily LFT's; continuing to trend  - GI following, appreciate recs  - Miralax BID  - Trend bilirubin, INR --> MELD labs     Renal/Fluids/Electrolytes:  # Anion gap metabolic acidosis, resolved  # Lactic acidosis, resolved  Likely 2/2 worsening cirrhosis in the setting of not taking meds as above in addition to gastric loses from vomiting. Partially compensating with respiratory alkalosis.   - CTM     # Acute kidney injury, concern for ATN, pre-renal, vs hepatorenal syndrome, improving  Baseline creatinine ~0.98, increased to 1.14 on 10/1/21. May be related to vomiting, lactulose, third spacing. Likely pre-renal. LE edema is prominent and worsening. Intravascular volume likely low; evidenced by low venous volume on echo. Did initially improve after one day of albumin. 50 mg/dl protein, and moderate bilirubin on UA. Uncertain infection, awaiting culture. Urine eosinophils negative.   - Continue CRRT, pull rate per I/O goal of net even to slightly net negative  - Consulted nephrology, appreciate recs  - Replacement protocols (Mg, Ph, K).   - Strict I/Os  - Avoid nephrotoxic agents     Endocrine:  # Type 2 diabetes mellitus  Her blood sugar was consistently over 200 as she approached  her feeding goal rate.  - High-resistance sliding scale insulin  - Lantus 10 units daily  - Check BS q4h and PRN  - Goal BS between 100 and 180     ID:  # Septic shock, improving  # Pneumonia (aspiration vs CAP)  Concern for pneumonia, CAP vs HAP. Less concern for SBP given negative paracentesis on admission. Afebrile. Paracentesis 10/4 negative for SBP. Elevated procal and PMN's in BAL fluid 10/4 suggests a bacterial pneumonia, likely aspiration related due to her history of recent symptoms of vomiting. Following cultures, no clear infectious source yet. BD glucan was indeterminate due to hyperbilirubinemia. Procal down-trending, leukocytosis persistent. Candida did grow from the respiratory culture taken on 10/6. Not concerning for fungal infection.   - Continue Zosyn     Abx  - Zosyn 10/4-**  - Vanc 10/4-10/4  - Doxycycline 10/4-10/6  - Micafungin 10/6-10/9     Hematology:    # Anemia, macrocytic  # Coagulopathy  # Acute epistaxis  In the setting of alcohol use disorder, liver failure, alcoholic hepatitis. INR elevated. Likely 2/2 to B12 or folate deficiency due to long history of alcohol use disorder. On 10/7 her hemoglobin dropped to 6.2 requiring transfusion. Post transfusion hemoglobin was 7.4. Hemolysis labs negative. Retic count elevated. Hemoglobin has remained stable between 7.5 and 8 since the transfusion.   - Transfuse for Hgb <7; Plt <10  - Follow INR  - Trend CBC     # Thrombocytopenia  Persistent in the setting of renal disease, liver disease, chronic alcoholism, and after a +2.3 liter I/O balance. This was likely partially dilutional with blunted bone marrow response secondary to alcoholism. HIT screen and hemolysis work up negative.  - Transfuse for PLTs <10, or if active bleeding and platelets are <30     Musculoskeletal:  No active issues     Skin:  # Dry cracked skin on bilateral heels  Improving.  - Continue Eucerin to be applied topically     General Cares/Prophylaxis:    DVT  Prophylaxis: Heparin subQ  GI Prophylaxis: PPI  Restraints: None  Family Communication: Mother to be updated by phone  Code Status: Full Code     Lines/tubes/drains:  - PIV x2  - ETT  - Rice  - Central line  - Arterial line  - Hemodialysis line     Disposition:  - Medical ICU    Patient seen and findings/plan discussed with medical ICU staff, Dr. Chatman.    Hayley Severson, MD-MPH  Internal Medicine-Pediatrics PGY-1    ====================================  INTERVAL HISTORY:   Nursing notes reviewed. No acute events overnight. Moving head and BUE more.     OBJECTIVE:   1. VITAL SIGNS:   Temp:  [97  F (36.1  C)-99.4  F (37.4  C)] 98.2  F (36.8  C)  Pulse:  [72-93] 82  Resp:  [16-33] 20  BP: (109)/(73) 109/73  MAP:  [61 mmHg-94 mmHg] 80 mmHg  Arterial Line BP: ()/(44-72) 108/60  FiO2 (%):  [30 %] 30 %  SpO2:  [93 %-100 %] 97 %  Ventilation Mode: CMV/AC  (Continuous Mandatory Ventilation/ Assist Control)  FiO2 (%): 30 %  Rate Set (breaths/minute): 16 breaths/min  Tidal Volume Set (mL): 450 mL  PEEP (cm H2O): 5 cmH2O  Oxygen Concentration (%): 30 %  Resp: 20    2. INTAKE/ OUTPUT:   I/O last 3 completed shifts:  In: 2634.85 [I.V.:934.85; NG/GT:620]  Out: 3804 [Other:2204; Stool:1600]    3. PHYSICAL EXAMINATION:  General: Sedated, supine  HEENT: PERRL though sluggish, MMM, scleral icterus, ETT in place  Neuro: Not withdrawing to painful stimuli, spontaneously moving BUE, LLE, head  Pulm/Resp: Breath sounds equal bilaterally, crackles intermittently, mechanically ventilated  CV: RRR, no murmurs noted; LE edema 3+  Abdomen: Distended, bowel sounds hypoactive, somewhat more taut today  : No urine assessed  Incisions/Skin: Petechiae diffusely, jaundiced, scattered ecchymoses    4. LABS:   Arterial Blood Gases   Recent Labs   Lab 10/10/21  0406 10/09/21  1209 10/09/21  0356 10/08/21  1523   PH 7.44 7.45 7.39 7.36   PCO2 37 35 39 41   PO2 84 67* 91 90   HCO3 25 24 24 23     Complete Blood Count   Recent Labs   Lab  10/10/21  0406 10/09/21  1953 10/09/21  1209 10/09/21  0356   WBC 20.9* 22.0* 20.6* 19.9*   HGB 7.7* 7.7* 7.7* 7.4*   PLT 83* 82* 83* 76*     Basic Metabolic Panel  Recent Labs   Lab 10/10/21  0407 10/10/21  0406 10/10/21  0056 10/09/21  1954 10/09/21  1953 10/09/21  1557 10/09/21  1209 10/09/21  0358 10/09/21  0356   NA  --  139  --   --  140  --  140  --  140   POTASSIUM  --  3.8  --   --  4.0  --  3.9  --  4.1   CHLORIDE  --  107  --   --  108  --  108  --  109   CO2  --  24  --   --  24  --  24  --  24   BUN  --  16  --   --  17  --  19  --  21   CR  --  0.84  --   --  0.98  --  1.11*  --  1.27*   * 200* 196* 209* 206*   < > 182*   < > 184*    < > = values in this interval not displayed.     Liver Function Tests  Recent Labs   Lab 10/10/21  0406 10/09/21  1953 10/09/21  1209 10/09/21  0356 10/08/21  1154 10/08/21  0417 10/07/21  1207 10/07/21  0352 10/07/21  0351 10/06/21  0411   *  --   --  94*  --  69*  --   --  47*  --    ALT 26  --   --  18  --  14  --   --  11  --    ALKPHOS 245*  --   --  208*  --  174*  --   --  108  --    BILITOTAL 12.7*  --   --  13.7*  --  13.9*  --   --  12.5*  --    ALBUMIN 2.5* 2.5* 2.6* 2.6*   < > 2.9*   < >  --  3.5  --    INR 1.29*  --   --  1.33*  --  1.44*  --  1.67*  --    < >    < > = values in this interval not displayed.     Coagulation Profile  Recent Labs   Lab 10/10/21  0406 10/09/21  0356 10/08/21  0417 10/07/21  0821 10/07/21  0352 10/05/21  0301 10/04/21  0953   INR 1.29* 1.33* 1.44*  --  1.67*   < > 2.48*   PTT  --   --   --  44*  --   --  46*    < > = values in this interval not displayed.       5. RADIOLOGY:   No results found for this or any previous visit (from the past 24 hour(s)).

## 2021-10-10 NOTE — PLAN OF CARE
ICU End of Shift Summary. See flowsheets for vital signs and detailed assessment.    Changes this shift: Pt had uneventful night. Pt moving head more, raising bilateral arms up spontaneously or at times with cares. Still not following commands; not noted to open eyes or moves lower extremities overnight. Tolerating CRRT fluid removal goal of net -50 ml/hr. Norepi weaned slightly to 0.09 to maintain MAP goal >65. Blood glucoses 190s-200s. No changes in S/S insulin, lantus ordered for this AM. Pt remains on 30% fiO2. ABG WDL.     Plan:  PST/continue to vent wean as able. Continue plan of care.

## 2021-10-10 NOTE — PROGRESS NOTES
CRRT STATUS NOTE    DATA:  Time:  1749  Pressures WNL:  YES  Filter Status:  WDL    Problems Reported/Alarms Noted:  None    Supplies Present:  YES    ASSESSMENT:  Patient Net Fluid Balance:  -1881cc since midnight  Vital Signs:     Temp: 98.6  F (37  C) Temp src: Axillary BP: 109/73 Pulse: 86   Resp: 19 SpO2: 99 % O2 Device: Mechanical Ventilator      Labs: K 3.9, Mg 2.5, Phos 2.6, Cr 0.80, iCa 4.5, Hgb 7.6              Goals of Therapy:  0-50cc/hr net negative    INTERVENTIONS:   None this shift.    PLAN:  Continue with plan of care. Contact CRRT resource with any questions or concerns.

## 2021-10-10 NOTE — PROGRESS NOTES
"Nephrology Inpatient Visit Note    Reason for Visit  Acute renal failure on CRRT    Subjective   The following portions of the patient's chart were reviewed: current medications, problem list, laboratory workup, and diagnostic data.    This is a 46-year-old female with a history significant for alcohol use disorder who was admitted to the hospital from her chemical dependency facility with abdominal pain, nausea and vomiting on 9/29.  She was transferred to the intensive care unit on 10/5 with septic shock and respiratory failure and a possible aspiration event.  She was intubated.  She was  hypotensive on vasopressors.      Her serum creatinine baseline is normal around 1 mg/dL. Her urine sodium level is less than 5.   Her creatinine started to increase around 10/4.  CT scan of the abdomen no evidence of hydronephrosis or kidney stones. She did have retained contrast in her kidneys. She was started on CRRT on 10/7.     She remains severely oliguric. She tolerated CRRT fairly well yesterday. She remains on Levophed but her requirements are decreasing. She was net negative around 1200 mL yesterday.    Objective   Vital Signs   Blood pressure 109/73, pulse 85, temperature (!) 96.4  F (35.8  C), temperature source Axillary, resp. rate 25, height 1.626 m (5' 4\"), weight 93.2 kg (205 lb 7.5 oz), SpO2 100 %, not currently breastfeeding.  I/O last 3 completed shifts:  In: 2543.17 [I.V.:843.17; NG/GT:620]  Out: 3774 [Other:2324; Stool:1450]    Physical Exam   GENERAL: Intubated  EYES: No scleral icterus  Pulmonary: Diminished, no wheezed  CV: Regular rhythm, normal rate, bilateral lower ext edema  GI: soft, nontender, normal bowel sounds  MS: no evidence of inflammation in joints, no muscle tenderness  SKIN: no rash, warm, dry     Diagnostic Data  Recent Labs     10/09/21  0356   WBC 19.9*   HGB 7.4*   HCT 24.1*   *   PLT 76*      POTASSIUM 4.1   HCO3 24   BUN 21   ALBUMIN 2.6*     Assessment & Plan   #1 Acute " renal failure on CRRT  #2 Septic shock  #3 Hypoxic respiratory failure due to aspiration pneumonia  #4 Encephalopathy    She likely has septic ATN in the setting of hepatorenal physiology.  Her kidney function remains poor.  She remains critically ill. She was seen and examined during CRRT. She is tolerating the procedure well.     Continue CRRT today with the plan of keeping her even to 0.5L negative in terms of volume balance in the next 24 hours. Her metabolic profile is acceptable.    The plan of care was discussed with the nursing staff, and primary service. Thank you for the consultation.

## 2021-10-10 NOTE — PROGRESS NOTES
MICU Progress Note 10/10/2021    MICU Staff    This patient has been seen and evaluated by me.  I have discussed care with the housestaff  and agree with the findings and plan in their note.    Aliyah Angeles is a 46 year old woman with alcohol use disorder (last drink 9/12/21), alcoholic hepatitis, jaimie en y gastric bypass, alcohol pancreatitis, pancreatic cyst, Type 2 DM, anxiety and depression admitted for abdominal pain, lower extremity edema, and nausea that started after starting low sodium and diabetic friendly foods in an alcohol treatment facility. Patient was admitted to the medicine floor with abdominal pain and vomiting on 9/29/2021.  Patient had worsening respiratory status, concern for sepsis requiring transfer to the ICU for intubation and need for pressors as well as further evaluation and management for likely sepsis.     Intubated, not sedated on vent. On CRRT. On NE for hypotension. Slightly more awake, moving extremities. Still not following commands.  Plan for today:  1. Continue supportive cares. Wean NE as able.  2. Continue CRRT  3. Continue tube feeds  4. Continue zosyn. Off Micafungin  5. PS trial.   6. AMS. Noted by nurse to have nystagmus today. Send blood for crypto antigen. Will obtain video EEG.    Quirino Chatman MD  Pulmonary/Critical Care  October 10, 2021     CCT 30 minutes separate from procedures

## 2021-10-10 NOTE — PROGRESS NOTES
"CRRT STATUS NOTE    DATA:  Time:  6:00 PM  Pressures WNL:  YES  Filter Status:  WDL    Problems Reported/Alarms Noted:  None.    Supplies Present:  YES    ASSESSMENT:  Patient Net Fluid Balance:  -398.57 ([id number]).  Vital Signs:  /73 (BP Location: Left arm)   Pulse 81   Temp 97.1  F (36.2  C) (Axillary)   Resp 21   Ht 1.626 m (5' 4\")   Wt 93.2 kg (205 lb 7.5 oz)   SpO2 93%   BMI 35.27 kg/m    Labs:  K 3.8, Mag 2.5, Phos 2.8, iCa 4.4, Hgb 7.7, plts 83.  Goals of Therapy:  0-50cc/hr net negative.    INTERVENTIONS:   Routine interventions.    PLAN:  Continue to monitor and update CRRT resource RN at [phone] with concerns/questions/changes.  Change set q 72 hrs and PRN.                     "

## 2021-10-11 ENCOUNTER — APPOINTMENT (OUTPATIENT)
Dept: GENERAL RADIOLOGY | Facility: CLINIC | Age: 46
End: 2021-10-11
Payer: COMMERCIAL

## 2021-10-11 ENCOUNTER — APPOINTMENT (OUTPATIENT)
Dept: NEUROLOGY | Facility: CLINIC | Age: 46
End: 2021-10-11
Attending: STUDENT IN AN ORGANIZED HEALTH CARE EDUCATION/TRAINING PROGRAM
Payer: COMMERCIAL

## 2021-10-11 LAB
ALBUMIN FLD-MCNC: 1 G/DL
ALBUMIN SERPL-MCNC: 2.2 G/DL (ref 3.4–5)
ALBUMIN SERPL-MCNC: 2.2 G/DL (ref 3.4–5)
ALBUMIN SERPL-MCNC: 2.4 G/DL (ref 3.4–5)
ALBUMIN SERPL-MCNC: 2.4 G/DL (ref 3.4–5)
ALP SERPL-CCNC: 268 U/L (ref 40–150)
ALT SERPL W P-5'-P-CCNC: 29 U/L (ref 0–50)
ANION GAP SERPL CALCULATED.3IONS-SCNC: 6 MMOL/L (ref 3–14)
ANION GAP SERPL CALCULATED.3IONS-SCNC: 7 MMOL/L (ref 3–14)
ANION GAP SERPL CALCULATED.3IONS-SCNC: 8 MMOL/L (ref 3–14)
APPEARANCE FLD: CLEAR
AST SERPL W P-5'-P-CCNC: 112 U/L (ref 0–45)
BACTERIA BLD CULT: NO GROWTH
BACTERIA BLD CULT: NO GROWTH
BACTERIA FLD CULT: NORMAL
BASE EXCESS BLDA CALC-SCNC: 1.8 MMOL/L (ref -9–1.8)
BILIRUB DIRECT SERPL-MCNC: 9.3 MG/DL (ref 0–0.2)
BILIRUB SERPL-MCNC: 12 MG/DL (ref 0.2–1.3)
BUN SERPL-MCNC: 17 MG/DL (ref 7–30)
CA-I BLD-MCNC: 4.5 MG/DL (ref 4.4–5.2)
CA-I BLD-MCNC: 4.5 MG/DL (ref 4.4–5.2)
CA-I BLD-MCNC: 4.6 MG/DL (ref 4.4–5.2)
CALCIUM SERPL-MCNC: 8 MG/DL (ref 8.5–10.1)
CALCIUM SERPL-MCNC: 8.2 MG/DL (ref 8.5–10.1)
CALCIUM SERPL-MCNC: 8.5 MG/DL (ref 8.5–10.1)
CHLORIDE BLD-SCNC: 106 MMOL/L (ref 94–109)
CHLORIDE BLD-SCNC: 108 MMOL/L (ref 94–109)
CHLORIDE BLD-SCNC: 108 MMOL/L (ref 94–109)
CO2 SERPL-SCNC: 24 MMOL/L (ref 20–32)
CO2 SERPL-SCNC: 24 MMOL/L (ref 20–32)
CO2 SERPL-SCNC: 26 MMOL/L (ref 20–32)
COLOR FLD: YELLOW
CREAT SERPL-MCNC: 0.75 MG/DL (ref 0.52–1.04)
CREAT SERPL-MCNC: 0.75 MG/DL (ref 0.52–1.04)
CREAT SERPL-MCNC: 0.76 MG/DL (ref 0.52–1.04)
ERYTHROCYTE [DISTWIDTH] IN BLOOD BY AUTOMATED COUNT: 21.7 % (ref 10–15)
ERYTHROCYTE [DISTWIDTH] IN BLOOD BY AUTOMATED COUNT: 22 % (ref 10–15)
ERYTHROCYTE [DISTWIDTH] IN BLOOD BY AUTOMATED COUNT: 22.1 % (ref 10–15)
GFR SERPL CREATININE-BSD FRML MDRD: >90 ML/MIN/1.73M2
GLUCOSE BLD-MCNC: 185 MG/DL (ref 70–99)
GLUCOSE BLD-MCNC: 196 MG/DL (ref 70–99)
GLUCOSE BLD-MCNC: 204 MG/DL (ref 70–99)
GLUCOSE BLDC GLUCOMTR-MCNC: 177 MG/DL (ref 70–99)
GLUCOSE BLDC GLUCOMTR-MCNC: 184 MG/DL (ref 70–99)
GLUCOSE BLDC GLUCOMTR-MCNC: 186 MG/DL (ref 70–99)
GLUCOSE BLDC GLUCOMTR-MCNC: 188 MG/DL (ref 70–99)
GLUCOSE BLDC GLUCOMTR-MCNC: 193 MG/DL (ref 70–99)
GLUCOSE BLDC GLUCOMTR-MCNC: 204 MG/DL (ref 70–99)
HCO3 BLD-SCNC: 25 MMOL/L (ref 21–28)
HCT VFR BLD AUTO: 23.1 % (ref 35–47)
HCT VFR BLD AUTO: 23.5 % (ref 35–47)
HCT VFR BLD AUTO: 24.5 % (ref 35–47)
HGB BLD-MCNC: 7.1 G/DL (ref 11.7–15.7)
HGB BLD-MCNC: 7.3 G/DL (ref 11.7–15.7)
HGB BLD-MCNC: 7.3 G/DL (ref 11.7–15.7)
INR PPP: 1.33 (ref 0.85–1.15)
LACTATE SERPL-SCNC: 1.4 MMOL/L (ref 0.7–2)
LYMPHOCYTES NFR FLD MANUAL: 66 %
MAGNESIUM SERPL-MCNC: 2.4 MG/DL (ref 1.6–2.3)
MAGNESIUM SERPL-MCNC: 2.5 MG/DL (ref 1.6–2.3)
MAGNESIUM SERPL-MCNC: 2.5 MG/DL (ref 1.6–2.3)
MCH RBC QN AUTO: 33 PG (ref 26.5–33)
MCH RBC QN AUTO: 34 PG (ref 26.5–33)
MCH RBC QN AUTO: 34.8 PG (ref 26.5–33)
MCHC RBC AUTO-ENTMCNC: 29.8 G/DL (ref 31.5–36.5)
MCHC RBC AUTO-ENTMCNC: 30.2 G/DL (ref 31.5–36.5)
MCHC RBC AUTO-ENTMCNC: 31.6 G/DL (ref 31.5–36.5)
MCV RBC AUTO: 110 FL (ref 78–100)
MCV RBC AUTO: 111 FL (ref 78–100)
MCV RBC AUTO: 112 FL (ref 78–100)
MONOS+MACROS NFR FLD MANUAL: NORMAL %
NEUTS BAND NFR FLD MANUAL: 20 %
O2/TOTAL GAS SETTING VFR VENT: 35 %
OTHER CELLS FLD MANUAL: 14 %
OXYHGB MFR BLD: 94 % (ref 92–100)
PCO2 BLD: 33 MM HG (ref 35–45)
PH BLD: 7.49 [PH] (ref 7.35–7.45)
PHOSPHATE SERPL-MCNC: 2.7 MG/DL (ref 2.5–4.5)
PHOSPHATE SERPL-MCNC: 2.7 MG/DL (ref 2.5–4.5)
PHOSPHATE SERPL-MCNC: 2.9 MG/DL (ref 2.5–4.5)
PLATELET # BLD AUTO: 78 10E3/UL (ref 150–450)
PLATELET # BLD AUTO: 80 10E3/UL (ref 150–450)
PLATELET # BLD AUTO: 84 10E3/UL (ref 150–450)
PO2 BLD: 79 MM HG (ref 80–105)
POTASSIUM BLD-SCNC: 3.5 MMOL/L (ref 3.4–5.3)
POTASSIUM BLD-SCNC: 3.6 MMOL/L (ref 3.4–5.3)
POTASSIUM BLD-SCNC: 3.6 MMOL/L (ref 3.4–5.3)
PROCALCITONIN SERPL-MCNC: 5.57 NG/ML
PROT FLD-MCNC: 1.9 G/DL
PROT SERPL-MCNC: 5.3 G/DL (ref 6.8–8.8)
RBC # BLD AUTO: 2.09 10E6/UL (ref 3.8–5.2)
RBC # BLD AUTO: 2.1 10E6/UL (ref 3.8–5.2)
RBC # BLD AUTO: 2.21 10E6/UL (ref 3.8–5.2)
SODIUM SERPL-SCNC: 138 MMOL/L (ref 133–144)
SODIUM SERPL-SCNC: 139 MMOL/L (ref 133–144)
SODIUM SERPL-SCNC: 140 MMOL/L (ref 133–144)
WBC # BLD AUTO: 17 10E3/UL (ref 4–11)
WBC # BLD AUTO: 18.5 10E3/UL (ref 4–11)
WBC # BLD AUTO: 22.3 10E3/UL (ref 4–11)
WBC # FLD AUTO: 396 /UL

## 2021-10-11 PROCEDURE — 85027 COMPLETE CBC AUTOMATED: CPT | Performed by: CLINICAL NURSE SPECIALIST

## 2021-10-11 PROCEDURE — 84100 ASSAY OF PHOSPHORUS: CPT | Performed by: CLINICAL NURSE SPECIALIST

## 2021-10-11 PROCEDURE — 250N000013 HC RX MED GY IP 250 OP 250 PS 637: Performed by: STUDENT IN AN ORGANIZED HEALTH CARE EDUCATION/TRAINING PROGRAM

## 2021-10-11 PROCEDURE — 99233 SBSQ HOSP IP/OBS HIGH 50: CPT | Performed by: NURSE PRACTITIONER

## 2021-10-11 PROCEDURE — 83735 ASSAY OF MAGNESIUM: CPT | Performed by: CLINICAL NURSE SPECIALIST

## 2021-10-11 PROCEDURE — 84157 ASSAY OF PROTEIN OTHER: CPT | Performed by: STUDENT IN AN ORGANIZED HEALTH CARE EDUCATION/TRAINING PROGRAM

## 2021-10-11 PROCEDURE — 95714 VEEG EA 12-26 HR UNMNTR: CPT

## 2021-10-11 PROCEDURE — 80069 RENAL FUNCTION PANEL: CPT | Performed by: CLINICAL NURSE SPECIALIST

## 2021-10-11 PROCEDURE — 82040 ASSAY OF SERUM ALBUMIN: CPT | Performed by: STUDENT IN AN ORGANIZED HEALTH CARE EDUCATION/TRAINING PROGRAM

## 2021-10-11 PROCEDURE — 250N000011 HC RX IP 250 OP 636: Performed by: STUDENT IN AN ORGANIZED HEALTH CARE EDUCATION/TRAINING PROGRAM

## 2021-10-11 PROCEDURE — 71045 X-RAY EXAM CHEST 1 VIEW: CPT

## 2021-10-11 PROCEDURE — 82042 OTHER SOURCE ALBUMIN QUAN EA: CPT | Performed by: STUDENT IN AN ORGANIZED HEALTH CARE EDUCATION/TRAINING PROGRAM

## 2021-10-11 PROCEDURE — 82330 ASSAY OF CALCIUM: CPT | Performed by: CLINICAL NURSE SPECIALIST

## 2021-10-11 PROCEDURE — 99291 CRITICAL CARE FIRST HOUR: CPT | Mod: GC | Performed by: STUDENT IN AN ORGANIZED HEALTH CARE EDUCATION/TRAINING PROGRAM

## 2021-10-11 PROCEDURE — 84460 ALANINE AMINO (ALT) (SGPT): CPT | Performed by: CLINICAL NURSE SPECIALIST

## 2021-10-11 PROCEDURE — 36592 COLLECT BLOOD FROM PICC: CPT | Performed by: CLINICAL NURSE SPECIALIST

## 2021-10-11 PROCEDURE — 84145 PROCALCITONIN (PCT): CPT | Performed by: STUDENT IN AN ORGANIZED HEALTH CARE EDUCATION/TRAINING PROGRAM

## 2021-10-11 PROCEDURE — 250N000009 HC RX 250: Performed by: CLINICAL NURSE SPECIALIST

## 2021-10-11 PROCEDURE — 99233 SBSQ HOSP IP/OBS HIGH 50: CPT | Performed by: STUDENT IN AN ORGANIZED HEALTH CARE EDUCATION/TRAINING PROGRAM

## 2021-10-11 PROCEDURE — 83605 ASSAY OF LACTIC ACID: CPT | Performed by: STUDENT IN AN ORGANIZED HEALTH CARE EDUCATION/TRAINING PROGRAM

## 2021-10-11 PROCEDURE — 999N000157 HC STATISTIC RCP TIME EA 10 MIN

## 2021-10-11 PROCEDURE — 87075 CULTR BACTERIA EXCEPT BLOOD: CPT | Performed by: STUDENT IN AN ORGANIZED HEALTH CARE EDUCATION/TRAINING PROGRAM

## 2021-10-11 PROCEDURE — 85610 PROTHROMBIN TIME: CPT | Performed by: STUDENT IN AN ORGANIZED HEALTH CARE EDUCATION/TRAINING PROGRAM

## 2021-10-11 PROCEDURE — 87205 SMEAR GRAM STAIN: CPT | Performed by: STUDENT IN AN ORGANIZED HEALTH CARE EDUCATION/TRAINING PROGRAM

## 2021-10-11 PROCEDURE — 250N000013 HC RX MED GY IP 250 OP 250 PS 637: Performed by: SURGERY

## 2021-10-11 PROCEDURE — 82248 BILIRUBIN DIRECT: CPT | Performed by: CLINICAL NURSE SPECIALIST

## 2021-10-11 PROCEDURE — 84075 ASSAY ALKALINE PHOSPHATASE: CPT | Performed by: CLINICAL NURSE SPECIALIST

## 2021-10-11 PROCEDURE — 90947 DIALYSIS REPEATED EVAL: CPT

## 2021-10-11 PROCEDURE — 89050 BODY FLUID CELL COUNT: CPT | Performed by: STUDENT IN AN ORGANIZED HEALTH CARE EDUCATION/TRAINING PROGRAM

## 2021-10-11 PROCEDURE — 82247 BILIRUBIN TOTAL: CPT | Performed by: CLINICAL NURSE SPECIALIST

## 2021-10-11 PROCEDURE — 82805 BLOOD GASES W/O2 SATURATION: CPT | Performed by: STUDENT IN AN ORGANIZED HEALTH CARE EDUCATION/TRAINING PROGRAM

## 2021-10-11 PROCEDURE — 95720 EEG PHY/QHP EA INCR W/VEEG: CPT | Mod: GC | Performed by: PSYCHIATRY & NEUROLOGY

## 2021-10-11 PROCEDURE — 71045 X-RAY EXAM CHEST 1 VIEW: CPT | Mod: 26 | Performed by: STUDENT IN AN ORGANIZED HEALTH CARE EDUCATION/TRAINING PROGRAM

## 2021-10-11 PROCEDURE — 94003 VENT MGMT INPAT SUBQ DAY: CPT

## 2021-10-11 PROCEDURE — 89051 BODY FLUID CELL COUNT: CPT | Performed by: STUDENT IN AN ORGANIZED HEALTH CARE EDUCATION/TRAINING PROGRAM

## 2021-10-11 PROCEDURE — 200N000002 HC R&B ICU UMMC

## 2021-10-11 RX ADMIN — HYDROXYZINE HYDROCHLORIDE 50 MG: 25 TABLET, FILM COATED ORAL at 21:07

## 2021-10-11 RX ADMIN — PIPERACILLIN AND TAZOBACTAM 3.38 G: 3; .375 INJECTION, POWDER, LYOPHILIZED, FOR SOLUTION INTRAVENOUS at 14:34

## 2021-10-11 RX ADMIN — GABAPENTIN 100 MG: 250 SOLUTION ORAL at 11:15

## 2021-10-11 RX ADMIN — GABAPENTIN 100 MG: 250 SOLUTION ORAL at 17:43

## 2021-10-11 RX ADMIN — Medication 100 MCG: at 08:02

## 2021-10-11 RX ADMIN — Medication 3 MG: at 22:47

## 2021-10-11 RX ADMIN — RIFAXIMIN 550 MG: 550 TABLET ORAL at 08:02

## 2021-10-11 RX ADMIN — CALCIUM CHLORIDE, MAGNESIUM CHLORIDE, SODIUM CHLORIDE, SODIUM BICARBONATE, POTASSIUM CHLORIDE AND SODIUM PHOSPHATE DIBASIC DIHYDRATE 12.5 ML/KG/HR: 3.68; 3.05; 6.34; 3.09; .314; .187 INJECTION INTRAVENOUS at 03:26

## 2021-10-11 RX ADMIN — FOLIC ACID 1 MG: 1 TABLET ORAL at 08:02

## 2021-10-11 RX ADMIN — CALCIUM CHLORIDE, MAGNESIUM CHLORIDE, SODIUM CHLORIDE, SODIUM BICARBONATE, POTASSIUM CHLORIDE AND SODIUM PHOSPHATE DIBASIC DIHYDRATE 12.5 ML/KG/HR: 3.68; 3.05; 6.34; 3.09; .314; .187 INJECTION INTRAVENOUS at 23:05

## 2021-10-11 RX ADMIN — PIPERACILLIN AND TAZOBACTAM 3.38 G: 3; .375 INJECTION, POWDER, LYOPHILIZED, FOR SOLUTION INTRAVENOUS at 08:03

## 2021-10-11 RX ADMIN — THIAMINE HCL TAB 100 MG 100 MG: 100 TAB at 08:02

## 2021-10-11 RX ADMIN — Medication 15 ML: at 11:14

## 2021-10-11 RX ADMIN — CALCIUM CHLORIDE, MAGNESIUM CHLORIDE, SODIUM CHLORIDE, SODIUM BICARBONATE, POTASSIUM CHLORIDE AND SODIUM PHOSPHATE DIBASIC DIHYDRATE 12.5 ML/KG/HR: 3.68; 3.05; 6.34; 3.09; .314; .187 INJECTION INTRAVENOUS at 07:46

## 2021-10-11 RX ADMIN — RIFAXIMIN 550 MG: 550 TABLET ORAL at 19:55

## 2021-10-11 RX ADMIN — CALCIUM CHLORIDE, MAGNESIUM CHLORIDE, SODIUM CHLORIDE, SODIUM BICARBONATE, POTASSIUM CHLORIDE AND SODIUM PHOSPHATE DIBASIC DIHYDRATE 12.5 ML/KG/HR: 3.68; 3.05; 6.34; 3.09; .314; .187 INJECTION INTRAVENOUS at 14:42

## 2021-10-11 RX ADMIN — SALINE NASAL SPRAY 1 SPRAY: 1.5 SOLUTION NASAL at 16:25

## 2021-10-11 RX ADMIN — HEPARIN SODIUM 5000 UNITS: 5000 INJECTION, SOLUTION INTRAVENOUS; SUBCUTANEOUS at 16:25

## 2021-10-11 RX ADMIN — CALCIUM CARBONATE 600 MG (1,500 MG)-VITAMIN D3 400 UNIT TABLET 1 TABLET: at 08:02

## 2021-10-11 RX ADMIN — SALINE NASAL SPRAY 1 SPRAY: 1.5 SOLUTION NASAL at 08:03

## 2021-10-11 RX ADMIN — PIPERACILLIN AND TAZOBACTAM 3.38 G: 3; .375 INJECTION, POWDER, LYOPHILIZED, FOR SOLUTION INTRAVENOUS at 19:53

## 2021-10-11 RX ADMIN — SALINE NASAL SPRAY 1 SPRAY: 1.5 SOLUTION NASAL at 19:56

## 2021-10-11 RX ADMIN — PIPERACILLIN AND TAZOBACTAM 3.38 G: 3; .375 INJECTION, POWDER, LYOPHILIZED, FOR SOLUTION INTRAVENOUS at 02:00

## 2021-10-11 RX ADMIN — INSULIN ASPART 2 UNITS: 100 INJECTION, SOLUTION INTRAVENOUS; SUBCUTANEOUS at 11:15

## 2021-10-11 RX ADMIN — Medication 2 PACKET: at 08:03

## 2021-10-11 RX ADMIN — CALCIUM CHLORIDE, MAGNESIUM CHLORIDE, SODIUM CHLORIDE, SODIUM BICARBONATE, POTASSIUM CHLORIDE AND SODIUM PHOSPHATE DIBASIC DIHYDRATE: 3.68; 3.05; 6.34; 3.09; .314; .187 INJECTION INTRAVENOUS at 14:42

## 2021-10-11 RX ADMIN — FENTANYL CITRATE 50 MCG: 50 INJECTION INTRAMUSCULAR; INTRAVENOUS at 01:54

## 2021-10-11 RX ADMIN — CALCIUM CHLORIDE, MAGNESIUM CHLORIDE, SODIUM CHLORIDE, SODIUM BICARBONATE, POTASSIUM CHLORIDE AND SODIUM PHOSPHATE DIBASIC DIHYDRATE 12.5 ML/KG/HR: 3.68; 3.05; 6.34; 3.09; .314; .187 INJECTION INTRAVENOUS at 11:49

## 2021-10-11 RX ADMIN — Medication 2 PACKET: at 19:55

## 2021-10-11 RX ADMIN — FENTANYL CITRATE 100 MCG: 50 INJECTION INTRAMUSCULAR; INTRAVENOUS at 19:56

## 2021-10-11 RX ADMIN — ACETAMINOPHEN 325 MG: 325 TABLET, FILM COATED ORAL at 16:25

## 2021-10-11 RX ADMIN — POLYETHYLENE GLYCOL 3350 17 G: 17 POWDER, FOR SOLUTION ORAL at 08:02

## 2021-10-11 RX ADMIN — Medication: at 23:01

## 2021-10-11 RX ADMIN — INSULIN ASPART 2 UNITS: 100 INJECTION, SOLUTION INTRAVENOUS; SUBCUTANEOUS at 04:13

## 2021-10-11 RX ADMIN — INSULIN ASPART 3 UNITS: 100 INJECTION, SOLUTION INTRAVENOUS; SUBCUTANEOUS at 16:27

## 2021-10-11 RX ADMIN — ESCITALOPRAM OXALATE 10 MG: 5 SOLUTION ORAL at 08:02

## 2021-10-11 RX ADMIN — INSULIN ASPART 2 UNITS: 100 INJECTION, SOLUTION INTRAVENOUS; SUBCUTANEOUS at 08:09

## 2021-10-11 RX ADMIN — INSULIN ASPART 3 UNITS: 100 INJECTION, SOLUTION INTRAVENOUS; SUBCUTANEOUS at 00:15

## 2021-10-11 RX ADMIN — SALINE NASAL SPRAY 1 SPRAY: 1.5 SOLUTION NASAL at 11:15

## 2021-10-11 RX ADMIN — INSULIN GLARGINE 10 UNITS: 100 INJECTION, SOLUTION SUBCUTANEOUS at 08:03

## 2021-10-11 RX ADMIN — HYDROXYZINE HYDROCHLORIDE 50 MG: 25 TABLET, FILM COATED ORAL at 11:14

## 2021-10-11 RX ADMIN — GABAPENTIN 100 MG: 250 SOLUTION ORAL at 08:02

## 2021-10-11 RX ADMIN — Medication 40 MG: at 08:02

## 2021-10-11 RX ADMIN — HEPARIN SODIUM 5000 UNITS: 5000 INJECTION, SOLUTION INTRAVENOUS; SUBCUTANEOUS at 04:13

## 2021-10-11 RX ADMIN — INSULIN ASPART 2 UNITS: 100 INJECTION, SOLUTION INTRAVENOUS; SUBCUTANEOUS at 20:15

## 2021-10-11 RX ADMIN — FENTANYL CITRATE 100 MCG: 50 INJECTION INTRAMUSCULAR; INTRAVENOUS at 23:01

## 2021-10-11 ASSESSMENT — ACTIVITIES OF DAILY LIVING (ADL)
ADLS_ACUITY_SCORE: 21
ADLS_ACUITY_SCORE: 23
ADLS_ACUITY_SCORE: 21
ADLS_ACUITY_SCORE: 21

## 2021-10-11 ASSESSMENT — MIFFLIN-ST. JEOR: SCORE: 1539

## 2021-10-11 NOTE — PROGRESS NOTES
CRRT STATUS NOTE    DATA:  Time:  5:20 AM  Pressures WNL:  YES  Filter Status:  WDL    Problems Reported/Alarms Noted:  None reported by bedside RN    Supplies Present:  YES    ASSESSMENT:  Patient Net Fluid Balance:  10/10 -2321cc and -270 since midnight  Vital Signs:  HR 80's, MAP>75 on levophed gtt  Labs:  , K 3.6, Cr 0.75, Hgb 7.3  Goals of Therapy:  0-50cc/hr net negative.    INTERVENTIONS:   Fluid removal decreased in the evening since pt too negative for the day already.    PLAN:  Continue with plan of care. Call CRRT resource RN with questions or concerns at #[phone].

## 2021-10-11 NOTE — PROGRESS NOTES
"[facility]  HEPATOLOGY PROGRESS NOTE  Aliyah Angeles [id number]       CC: fluid volume overload  SUBJECTIVE:  Sedated, per RN, has followed intermittent commands. Remains on CRRT and vented.     ROS:  A 10-point review of systems was negative.    OBJECTIVE:  VS: /77   Pulse 89   Temp 97.6  F (36.4  C) (Axillary)   Resp 26   Ht 1.626 m (5' 4\")   Wt 91.9 kg (202 lb 9.6 oz)   SpO2 99%   BMI 34.78 kg/m       Gross per 24 hour   Intake 2414.72 ml   Output 5172 ml   Net -2757.28 ml     General:  no facial muscle wasting  Neuro: sedated  Lymph: No cervical lymphadenopathy  HEENT:  mild scleral icterus, Nooral lesions  CV:Skin warm and dry  Lungs: Respirations even and nonlabored on vent  Abd:  moderately distended, +tympany. Brown stool in rectal bag  Extrem: +2 lower peripheral edema   Skin: mild jaundice  Psych: GARLAND    MEDICATIONS:  Current Facility-Administered Medications   Medication     0.9% sodium chloride BOLUS     acetaminophen (TYLENOL) tablet 325 mg    Or     acetaminophen (TYLENOL) Suppository 325 mg     albuterol (PROVENTIL) neb solution 2.5 mg     bisacodyl (DULCOLAX) Suppository 10 mg     calcium carbonate 600 mg-vitamin D 400 units (CALTRATE) per tablet 1 tablet     calcium gluconate 2 g in 100 mL sodium chloride intermittent infusion (premix)     calcium gluconate 4 g in sodium chloride 0.9 % 100 mL intermittent infusion     carboxymethylcellulose PF (REFRESH PLUS) 0.5 % ophthalmic solution 1 drop     cyanocobalamin (VITAMIN B-12) tablet 100 mcg     dextrose 10% infusion     glucose gel 15-30 g    Or     dextrose 50 % injection 25-50 mL    Or     glucagon injection 1 mg     dialysate for CVVHD & CVVHDF (Phoxillum BK4/2.5)     diphenhydrAMINE (BENADRYL) capsule 25-50 mg     emollient (VANICREAM) cream     escitalopram (LEXAPRO) solution 10 mg     fentaNYL (PF) (SUBLIMAZE) injection  mcg     folic acid (FOLVITE) tablet 1 mg     gabapentin (NEURONTIN) solution 100 mg     heparin " ANTICOAGULANT injection 5,000 Units     hydrOXYzine (ATARAX) tablet 25 mg    Or     hydrOXYzine (ATARAX) tablet 50 mg     insulin aspart (NovoLOG) injection (RAPID ACTING)     insulin glargine (LANTUS PEN) injection 10 Units     lidocaine (LMX4) cream     lidocaine 1 % 0.1-1 mL     magnesium sulfate 2 g in water intermittent infusion     melatonin tablet 3 mg     midazolam (VERSED) injection 1-3 mg     multivitamins w/minerals liquid 15 mL     naloxone (NARCAN) injection 0.2 mg    Or     naloxone (NARCAN) injection 0.4 mg    Or     naloxone (NARCAN) injection 0.2 mg    Or     naloxone (NARCAN) injection 0.4 mg     No heparin required     No lozenges or gum should be given while patient on BIPAP/AVAPS/AVAPS AE     norepinephrine (LEVOPHED) 16 mg in  mL infusion MAX CONC CENTRAL LINE     pantoprazole (PROTONIX) 2 mg/mL suspension 40 mg     Patient may continue current oral medications     piperacillin-tazobactam (ZOSYN) 3.375 g vial to attach to  mL bag     polyethylene glycol (MIRALAX) Packet 17 g     POST-filter replacement solution for CVVHD & CVVHDF (Phoxillum BK4/2.5)     potassium chloride 20 mEq in 50 mL intermittent infusion     [Held by provider] prazosin (MINIPRESS) capsule 1 mg     PRE-filter replacement solution for CVVHD & CVVHDF (Phoxillum BK4/2.5)     protein modular (PROSOURCE TF) 2 packet     rifaximin (XIFAXAN) tablet 550 mg     senna-docusate (SENOKOT-S/PERICOLACE) 8.6-50 MG per tablet 1 tablet     sodium chloride (OCEAN) 0.65 % nasal spray 1 spray     sodium chloride (PF) 0.9% PF flush 3 mL     sodium phosphate (NaPHOS) 15 mMol in 250 mL D5W PREMADE infusion     thiamine (B-1) tablet 100 mg       REVIEW OF LABORATORY, PATHOLOGY AND IMAGING RESULTS:  BMP  Recent Labs   Lab 10/11/21  0808 10/11/21  0349 10/11/21  0013 10/10/21  1953 10/10/21  1952 10/10/21  1621 10/10/21  1123 10/10/21  0407 10/10/21  0406   NA  --  140  --  139  --   --  139  --  139   POTASSIUM  --  3.6  --  3.8  --    --  3.9  --  3.8   CHLORIDE  --  106  --  107  --   --  109  --  107   STEFFANY  --  8.5  --  8.3*  --   --  8.2*  --  8.3*   CO2  --  26  --  25  --   --  24  --  24   BUN  --  17  --  16  --   --  16  --  16   CR  --  0.75  --  0.78  --   --  0.80  --  0.84   * 196*  188* 204* 206*   < >   < > 211*   < > 200*    < > = values in this interval not displayed.     CBC  Recent Labs   Lab 10/11/21  0349 10/10/21  1953 10/10/21  1953 10/10/21  1123 10/10/21  1123 10/10/21  0406 10/10/21  0406   WBC 22.3*  --  21.6*  --  22.0*  --  20.9*   RBC 2.21*  --  2.23*  --  2.22*  --  2.22*   HGB 7.3*   < > 7.8*   < > 7.6*   < > 7.7*   HCT 24.5*  --  24.7*  --  24.4*  --  25.0*   *  --  111*  --  110*  --  113*   MCH 33.0  --  35.0*  --  34.2*  --  34.7*   MCHC 29.8*  --  31.6  --  31.1*  --  30.8*   RDW 22.1*  --  22.1*  --  22.2*  --  22.4*   PLT 84*  --  86*  --  86*  --  83*    < > = values in this interval not displayed.     INR  Recent Labs   Lab 10/11/21  0349 10/10/21  0406 10/09/21  0356 10/08/21  0417   INR 1.33* 1.29* 1.33* 1.44*     LFTs  Recent Labs   Lab 10/11/21  0349 10/10/21  1953 10/10/21  1123 10/10/21  0406 10/09/21  1209 10/09/21  0356 10/08/21  1154 10/08/21  0417   ALKPHOS 268*  --   --  245*  --  208*  --  174*   *  --   --  117*  --  94*  --  69*   ALT 29  --   --  26  --  18  --  14   BILITOTAL 12.0*  --   --  12.7*  --  13.7*  --  13.9*   PROTTOTAL 5.3*  --   --  5.5*  --  5.4*  --  5.5*   ALBUMIN 2.4* 2.4* 2.4* 2.5*   < > 2.6*   < > 2.9*    < > = values in this interval not displayed.      PANC  No lab results found in last 7 days.   MELD-Na score: 19 at 10/11/2021  3:49 AM  MELD score: 19 at 10/11/2021  3:49 AM  Calculated from:  Serum Creatinine: 0.75 mg/dL (Using min of 1 mg/dL) at 10/11/2021  3:49 AM  Serum Sodium: 140 mmol/L (Using max of 137 mmol/L) at 10/11/2021  3:49 AM  Total Bilirubin: 12.0 mg/dL at 10/11/2021  3:49 AM  INR(ratio): 1.33 at 10/11/2021  3:49 AM  Age: 46  years      Imaging Results:    CXR 10/11/21  IMPRESSION:   1. Low lung volumes with persistent perihilar opacities.  2. Small bilateral pleural effusions.    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  She developed respiratory failure and ARDS on 10/3 requiring pronation and pressor support, now improving.      RECOMMENDATIONS:  1. Alcohol hepatitis  - last alcohol use 9/12.   - US and CT with hepatomegaly and severe steatosis. She does have portal hypertension in setting of severe hepatitis.   - Continued on tube feeding.     2. Ascites  - initial ascites testing was cosistent with portal hypertension with SAAG ~1.1, however serum albumin very low. Repeat para on 10/4 with SAAG 1.4 (did receive daily albumin prior) and t protein of 4.0 which is more suggestive of heart failure source. Echo with moderate TR. RV normal.. No evidence of SBP on 10/4  - diuretics currently on hold in setting of ANA LAURA/CRRT  - para as needed, please send diagnostic sample with each para.     3. ANA LAURA  - likely multifactorial with recent vomiting, diarrhea, alcohol hepatitis.   - was receiving albumin when started to have respiratory decline.    Baseline creatinine. ~0.5. Now on CRRT and able to pull fluids. Remains on low dose norepi.     4. Hepatic encephalopathy  - has recently been paralyzed. Now having >2L stool output with miralax. Now with abdominal distention and tympany with a significant amount of stool output, recommend c-diff testing, diagnostic para if fluid available and AXR to evaluate tympany. Goal stool output should be ~500 ml given she does not have cirrhosis. With her recent acute liver injury, she will have prolonged time of metabolism.   - Head imaging- MRI head without evidence of acute injury.        5. Acute respiratory failure  - chest CT with extensive ground glass opacities, unclear cause of current ARDS  - improving oxygenation and normal PEEP    Patient was discussed with attending physician, Dr.Leventhal      Next follow up appointment: tbd    Thank you for the opportunity to be involved with  Aliyah Angeles care. Please call with any questions or concerns.    MARIA ELENA Grey, CNP  Inpatient Hepatology [facility]  Text Link

## 2021-10-11 NOTE — PROGRESS NOTES
MEDICAL ICU PROGRESS NOTE  10/11/2021      Date of Service (when I saw the patient): 10/11/2021    ASSESSMENT: Aliyah Angeles is a 46 year old woman with alcohol use disorder (last drink 9/12/21), alcoholic hepatitis, jaimie en y gastric bypass, alcohol pancreatitis, pancreatic cyst, Type 2 DM, anxiety and depression admitted for abdominal pain, lower extremity edema, and nausea that started after starting low sodium and diabetic friendly foods in an alcohol treatment facility. Patient was admitted to the medicine floor with abdominal pain and vomiting on 9/29/2021. Overnight 10/3-10/4, patient had worsening respiratory status, concern for sepsis requiring transfer to the ICU for intubation and need for pressors as well as further evaluation and management for likely sepsis. Now off sedation. Weaning pressors. On minimal respiratory support. On CRRT.      CHANGES and MAJOR THINGS TODAY:   - Attempted SBT today, did not tolerate --> will CTM and try again if she wakes up more  - Consider abdominal US and paracentesis today  - Goal stools per GI <1L, consider Cdiff if continues to stool large amounts off bowel regimen  - Follow-up GI recs regarding LFTs    PLAN:     Neuro:  # Pain and sedation  She was sedated and intubated on 10/4. Minimal response to painful stimuli. Does spontaneously move all limbs and occasionally open her eyes now, not following commands.   - Holding all sedation  - Fentanyl PRNs     # Anxiety and depression  - PTA escitalopram     # Encephalopathy, likely acute toxic metabolic vs infection  In the setting of likely hepatic encephalopathy, infectious encephalopathy, sedation. SBP unlikely due to normal paracentesis eval and culture. Normal ammonia from 10/4. Will work on continuing to wean sedation. CT with changes that could correlate with hypoxic brain injury, MRI 10/8 negative for anoxic injury. EEG with encephalopathy, no seizures.   - Miralax BID  - Goal 3-4 loose stools daily >1 L of  stool daily  - On rifaximin  - Gabapentin 100 mg TID  - vEEG --> no seizures so far    # Alcohol use disorder  Last alcohol use 9/12.21.  - Thiamine, folic acid, MVI supplementation    Pulmonary:  # Acute hypoxemic respiratory failure  # ARDS  # Concern for infection, pneumonia and sepsis  Acute onset hypoxemia, bilateral opacities are consistent with ARDS. No evidence of cardiac etiology. Bilateral ground glass infiltrates on imaging, with bilateral pleural effusions. Treated with lung-protective ventilation, prone positioning. Intubated, bronch 10/4 with bile-like fluid, suspect aspiration as well as PMN predominance concerning for bacterial pneumonia. Currently stable on vent settings; supine; off NMB; off inhaled epo 10/7. Chest CT 10/6 showed ground glass and consolidative opacities throughout the lungs. CRRT started 10/7.   - Continue oxygen support with oxygen and PEEP adjustments; wean as able  - Volume management to be assessed daily; CRRT running with net even to slightly net negative I/O goal  - Infectious work-up and treatment as below  - Albuterol neb PRN  - SBT if patient is following commands    Ventilation Mode: CMV/AC  (Continuous Mandatory Ventilation/ Assist Control)  FiO2 (%): 35 %  Rate Set (breaths/minute): 16 breaths/min  Tidal Volume Set (mL): 450 mL  PEEP (cm H2O): 5 cmH2O  Oxygen Concentration (%): 35 %  Resp: 30      # Respiratory alkalosis  Patient is driving her own ventilatory rate.     Cardiovascular:  # Hypotension, in the setting of likely septic shock, improving  # Edema, possible intravascular depletion with third-spacing  Echo 10/4 with hyperdynamic EF >70%. Moderate tricuspid insufficiency. Normal IVC. BNP was over 16,000 --> >28,000 (10/4) --> 17,000 (10/6). Normal trops 10/4. Appropriately elevated cortisol. She has been requiring pressors and has an ANA LAURA. She is currently third spacing most of the fluid she has been given. We are currently treating her sepsis. Her liver disease  is likely contributing to the shock. Continuing to manage with pressors. Lactate is now WNL.   - Levophed for goal MAP >65     # Lactic acidosis, resolved  This is in the setting of ANA LAURA now requiring CRRT. CT scan 10/7 showing pneumonia, no clear intra-abdominal source of infection. Appears to be perfusing well, less concern for ischemia. Improving with CRRT.      GI/Nutrition:  # Alcoholic hepatitis  # Ascites  # Hyperbilirubinemia  # History of RYGB  # Transaminitis  Patient has been sober since 9/12 and has been at a treatment facility since her discharge 9/21. Hepatomegaly and hepatic steatosis without cirrhosis. Diagnostic paracentesis on admission without SBP. Discontinued lactulose 10/1 and then had decreased bowel movements. Normal lipase on admission. CT A/P on admission with severe hepatic steatosis, portal HTN, ascites, splenomegaly (similar on repeat CT 10/6). Paracentesis 10/4 without SBP.  On 10/9, AST was 94 and trending up and continued to do so on 10/10. AST elevation possibly due to alcoholic liver disease, however sobriety started a month ago according to the chart review.  - Daily LFT's; continuing to trend  - GI following, appreciate recs  - Miralax BID  - Trend bilirubin, INR --> MELD labs  - Consider bedside US, paracentesis today     Renal/Fluids/Electrolytes:  # Anion gap metabolic acidosis, resolved  # Lactic acidosis, resolved  Likely 2/2 worsening cirrhosis in the setting of not taking meds as above in addition to gastric loses from vomiting. Partially compensating with respiratory alkalosis.   - CTM     # Acute kidney injury, concern for ATN, pre-renal, vs hepatorenal syndrome, improving  Baseline creatinine ~0.98, increased to 1.14 on 10/1/21. May be related to vomiting, lactulose, third spacing. Likely pre-renal. LE edema is prominent and worsening. Intravascular volume likely low; evidenced by low venous volume on echo. Did initially improve after one day of albumin. 50 mg/dl protein,  and moderate bilirubin on UA. Uncertain infection, awaiting cultures. Urine eosinophils negative.   - Continue CRRT, pull rate per I/O goal of net even to slightly net negative  - Consulted nephrology, appreciate recs  - Replacement protocols (Mg, Ph, K).   - Strict I/Os  - Avoid nephrotoxic agents     Endocrine:  # Type 2 diabetes mellitus  Her blood sugar was consistently over 200 as she approached her feeding goal rate, now improved.  - High-resistance sliding scale insulin  - Lantus 10 units daily  - Check BS q4h and PRN  - Goal BS between 100 and 160     ID:  # Septic shock, improving  # Pneumonia (aspiration vs CAP)  Concern for pneumonia, CAP vs HAP. Less concern for SBP given negative paracentesis on admission. Afebrile. Paracentesis 10/4 negative for SBP. Elevated procal and PMN's in BAL fluid 10/4 suggests a bacterial pneumonia, likely aspiration related due to her history of recent symptoms of vomiting. Following cultures, no clear infectious source yet. BD glucan was indeterminate due to hyperbilirubinemia. Procal down-trending, leukocytosis persistent. Candida did grow from the respiratory culture taken on 10/6. Not concerning for fungal infection.   - Continue Zosyn   - Cryptococcal antigen negative    Abx  - Zosyn 10/4-**  - Vanc 10/4-10/4  - Doxycycline 10/4-10/6  - Micafungin 10/6-10/9     Hematology:    # Anemia, macrocytic  # Coagulopathy  # Acute epistaxis  In the setting of alcohol use disorder, liver failure, alcoholic hepatitis. INR elevated. Likely 2/2 to B12 or folate deficiency due to long history of alcohol use disorder. On 10/7 her hemoglobin dropped to 6.2 requiring transfusion. Post transfusion hemoglobin was 7.4. Hemolysis labs negative. Retic count elevated. Hemoglobin has remained stable between 7.5 and 8 since the transfusion.   - Transfuse for Hgb <7; Plt <10  - Follow INR  - Trend CBC     # Thrombocytopenia  Persistent in the setting of renal disease, liver disease, chronic  alcoholism, and after a +2.3 liter I/O balance. This was likely partially dilutional with blunted bone marrow response secondary to alcoholism. HIT screen and hemolysis work up negative.  - Transfuse for PLTs <10, or if active bleeding and platelets are <30     Musculoskeletal:  No active issues     Skin:  # Dry cracked skin on bilateral heels  Improving.  - Continue Eucerin to be applied topically     General Cares/Prophylaxis:    DVT Prophylaxis: Heparin subQ  GI Prophylaxis: PPI  Restraints: None  Family Communication: Mother to be updated by phone  Code Status: Full Code     Lines/tubes/drains:  - PIV x2  - ETT  - Rice  - Central line  - Arterial line  - Hemodialysis line     Disposition:  - Medical ICU    Patient seen and findings/plan discussed with medical ICU staff, Dr. Quinn.    René Ascencio, MS4    Resident/Fellow Attestation   I, Hayley Severson, was present with the medical student who participated in the service and in the documentation of the note.  I have verified the history and personally performed the physical exam and medical decision making.  I agree with the assessment and plan of care as documented in the note.      Hayley Severson, MD-MPH  Internal Medicine-Pediatrics PGY-1    ====================================  INTERVAL HISTORY:   Nursing notes reviewed. Patient able to follow some simple commands, opening eyes a bit and squeezing fingers, occasionally moving feet/toes to command. Restless. Tylenol and fentanyl given x1 with improvement. Dark, icteric urine x1. Levophed weaned.     OBJECTIVE:   1. VITAL SIGNS:   Temp:  [96.4  F (35.8  C)-99.1  F (37.3  C)] 98  F (36.7  C)  Pulse:  [] 82  Resp:  [18-32] 29  BP: (113)/(77) 113/77  MAP:  [60 mmHg-92 mmHg] 69 mmHg  Arterial Line BP: ()/(42-69) 98/51  FiO2 (%):  [30 %-40 %] 35 %  SpO2:  [94 %-100 %] 98 %  Ventilation Mode: CMV/AC  (Continuous Mandatory Ventilation/ Assist Control)  FiO2 (%): 35 %  Rate Set (breaths/minute): 16  breaths/min  Tidal Volume Set (mL): 450 mL  PEEP (cm H2O): 5 cmH2O  Oxygen Concentration (%): 35 %  Resp: 29    2. INTAKE/ OUTPUT:   I/O last 3 completed shifts:  In: 2512.05 [I.V.:780.05; Other:2; NG/GT:650]  Out: 4987 [Other:2287; Stool:2700]    3. PHYSICAL EXAMINATION:  General: Sedated, supine  HEENT: PERRL, MMM, scleral icterus, ETT in place  Neuro: Not withdrawing to painful stimuli, spontaneously moving BUE, BLE (left>right), moving head  Pulm/Resp: Breath sounds equal bilaterally, crackles intermittently, rhonchi worse in the LLL today, mechanically ventilated  CV: RRR, intermittent systolic murmur noted; LE edema 3+  Abdomen: Distended, bowel sounds hyperactive, somewhat more taut today  : No urine assessed  Incisions/Skin: Petechiae diffusely, jaundiced, scattered ecchymoses    4. LABS:   Arterial Blood Gases   Recent Labs   Lab 10/11/21  0350 10/10/21  1835 10/10/21  1123 10/10/21  0406   PH 7.49* 7.49* 7.49* 7.44   PCO2 33* 34* 34* 37   PO2 79* 78* 71* 84   HCO3 25 25 25 25     Complete Blood Count   Recent Labs   Lab 10/11/21  0349 10/10/21  1953 10/10/21  1123 10/10/21  0406   WBC 22.3* 21.6* 22.0* 20.9*   HGB 7.3* 7.8* 7.6* 7.7*   PLT 84* 86* 86* 83*     Basic Metabolic Panel  Recent Labs   Lab 10/11/21  0349 10/11/21  0013 10/10/21  1953 10/10/21  1621 10/10/21  1123 10/10/21  0407 10/10/21  0406     --  139  --  139  --  139   POTASSIUM 3.6  --  3.8  --  3.9  --  3.8   CHLORIDE 106  --  107  --  109  --  107   CO2 26  --  25  --  24  --  24   BUN 17  --  16  --  16  --  16   CR 0.75  --  0.78  --  0.80  --  0.84   *  188* 204* 206*   < > 211*   < > 200*    < > = values in this interval not displayed.     Liver Function Tests  Recent Labs   Lab 10/11/21  0349 10/10/21  1953 10/10/21  1123 10/10/21  0406 10/09/21  1209 10/09/21  0356 10/08/21  1154 10/08/21  0417   *  --   --  117*  --  94*  --  69*   ALT 29  --   --  26  --  18  --  14   ALKPHOS 268*  --   --  245*  --  208*   --  174*   BILITOTAL 12.0*  --   --  12.7*  --  13.7*  --  13.9*   ALBUMIN 2.4* 2.4* 2.4* 2.5*   < > 2.6*   < > 2.9*   INR 1.33*  --   --  1.29*  --  1.33*  --  1.44*    < > = values in this interval not displayed.     Coagulation Profile  Recent Labs   Lab 10/11/21  0349 10/10/21  0406 10/09/21  0356 10/08/21  0417 10/07/21  0821 10/07/21  0352 10/05/21  0301 10/04/21  0953   INR 1.33* 1.29* 1.33* 1.44*  --    < >   < > 2.48*   PTT  --   --   --   --  44*  --   --  46*    < > = values in this interval not displayed.       5. RADIOLOGY:   No results found for this or any previous visit (from the past 24 hour(s)).

## 2021-10-11 NOTE — PROCEDURES
[facility]    Paracentesis    Date/Time: 10/11/2021 4:20 PM  Performed by: Philomena Owens MD  Authorized by: Philomena Owens MD     PRE-PROCEDURE DETAILS  Initial or subsequent exam: subsequent  Procedure purpose: therapeutic and diagnostic  Indications: abdominal discomfort secondary to ascites and secondary bacterial peritonitis      UNIVERSAL PROTOCOL   Site Marked: Yes  Prior Images Obtained and Reviewed:  Yes  Required items: Required blood products, implants, devices and special equipment available    Patient identity confirmed:  Arm band  NA - No sedation, light sedation, or local anesthesia  Confirmation Checklist:  Relevant allergies, procedure was appropriate and matched the consent or emergent situation and correct equipment/implants were available  Universal Protocol: the Joint Commission Universal Protocol was followed    Preparation: Patient was prepped and draped in usual sterile fashion    ESBL (mL):  1         ANESTHESIA    Local Anesthetic: Lidocaine 1% without epinephrine  Anesthetic Total (mL):  5      SEDATION    Patient Sedated: No    POST PROCEDURE DETAILS  Needle gauge: 18  Ultrasound guidance: yes  Puncture site: right lower quadrant  Fluid removed: 1250(ml)  Fluid appearance: clear, serous and bilious  Dressing: pressure dressing      PROCEDURE   Patient Tolerance:  Patient tolerated the procedure well with no immediate complications     Initial attempt performed by Student Dr. René Ascencio complicated by clotting off of paracentesis needle.  We flushed the needle with sterile saline and I reattempted with removal of fluid as described above.      Labs have been ordered for the fluid.  Length of time physician/provider present for 1:1 monitoring during sedation: 0

## 2021-10-11 NOTE — PLAN OF CARE
ICU End of Shift Summary. See flowsheets for vital signs and detailed assessment.    Changes this shift: Pt intermittently following simple commands, restless, & moving all extremities. Needing sm amt of levo to keep MAP >65. Failed PST d/t RR in 40's. Stool output goal changed to 1L. CRRT goal changed to 100-150 net neg. Paracentesis done at bedside, 1.25 L removed & samples sent to lab. Father at bedside & updated.     Plan: Continue POC & notify team w/ changes or concerns.

## 2021-10-11 NOTE — PROGRESS NOTES
CLINICAL NUTRITION SERVICES - REASSESSMENT NOTE     Nutrition Prescription    RECOMMENDATIONS FOR MDs/PROVIDERS TO ORDER:  --Water flushes as per primary team.     Malnutrition Status:    Severe malnutrition in the context of acute on chronic illness    Recommendations already ordered by Registered Dietitian (RD):  --Continue TF as ordered: Osmolite 1.5 Faizan @ goal of 45ml/hr (1080ml/day) + 4 Prosource will provide: 1780 kcals (30 kcal/kg), 111 g PRO (1.9 g/kg), 822 ml free H20, 219 g CHO, and 0 g fiber daily.     Future/Additional Recommendations:  --Ongoing TF tolerance, weight trends.  --Renal function with standard TF formula.        --If renal formula becomes indicated: Novasource Renal @ 35 ml/hr (840 ml) + 3 Prosource provides 1800 kcal (30 kcal/kg), 109 g pro (1.8 g/kg), 154 g CHO, 602 ml free water, and 0 g fiber daily.      EVALUATION OF THE PROGRESS TOWARD GOALS   Diet: NPO  Nutrition Support:   10/4 - ___ : Osmolite 1.5 @ 10 ml/hr + 2 Prosource -> not started  10/5-10/7 : Osmolite 1.5 Faizan @ goal of 45ml/hr (1080ml/day) + 2 Prosource will provide: 1700 kcals (28 kcal/kg), 89 g PRO (1.5 g/kg), 822 ml free H20, 219 g CHO, and 0 g fiber daily.   10/7 - ___ : increased prosource with starting CRRT:        --Osmolite 1.5 Faizan @ goal of 45ml/hr (1080ml/day) + 4 Prosource will provide: 1780 kcals (30 kcal/kg), 111 g PRO (1.9 g/kg), 822 ml free H20, 219 g CHO, and 0 g fiber daily.   Intake: average daily TF intake x 6 days = 788 ml volume, ~1200 kcal (20 kcal/kg), ~85 g pro (1.4 g/kg)     NEW FINDINGS   Weight: trending up since admission, driest weight on 86.1 kg on 9/30; confounded by fluid shifts  Labs: K+ 3.6 (low normal), Cr 0.75 (WNL), direct bili 9.3 (H), -211  Meds: caltrate, vitamin B12 (100 mcg daily), folic acid, high sliding scale insulin, lantus 10 units daily, certavite, miralax BID, prosource 2 packets BID, rifaximin, thiamine, norepi (off)  GI: abdomen distended/firm, ascites (possible  paracentesis); 0-2700 ml stool output  Renal: CRRT started 10/7  Resp: intubated 10/4    MALNUTRITION  % Intake: Decreased intake does not meet criteria  % Weight Loss: Unable to assess - confounded by fluid  Subcutaneous Fat Loss: Facial region:  Mild to moderate and Upper arm:  mild  Muscle Loss: Temporal:  moderate, Thoracic region (clavicle, acromium bone, deltoid, trapezius, pectoral):  severe and Upper arm (bicep, tricep):  severe  Fluid Accumulation/Edema: mild to moderate 2-3+ edema  Malnutrition Diagnosis: Severe malnutrition in the context of acute on chronic illness    Previous Goals   Total avg nutritional intake to meet a minimum of 25 kcal/kg and 1.2 g PRO/kg daily (per dosing wt 60 kg).  Evaluation: Not met    Previous Nutrition Diagnosis  Inadequate oral intake related to poor appetite earlier in admission now mechanically ventilated, inhibiting ability to take nutrition PO as evidenced by NPO status x 1 day and PO intake <50% of meals x 5 days, requiring enteral nutrition to meet 100% of needs.    Evaluation: No change    CURRENT NUTRITION DIAGNOSIS  Inadequate oral intake related to mechanical ventilation inhibiting ability to take nutrition PO as evidenced by NPO status, requiring enteral nutrition to meet 100% of needs.       INTERVENTIONS  Implementation  Collaboration with other providers - rounds  Enteral Nutrition - continue    Goals  Total avg nutritional intake to meet a minimum of 25 kcal/kg and 1.5 g PRO/kg daily (per dosing wt 60 kg).    Monitoring/Evaluation  Progress toward goals will be monitored and evaluated per protocol.    Iris Vogel, MS, RD, LD, [facility]  MICU pager: [phone]  ASCOM: [id number]

## 2021-10-11 NOTE — PLAN OF CARE
ICU End of Shift Summary. See flowsheets for vital signs and detailed assessment.    Changes this shift: Pt able follow some simple commands, slightly opening eyes, squeezing fingers, and wiggling toes/moving feet to command inconsistently. Pt unable to nod head yes/no to questions. Pt restless throughout most of night, tylenol given x1 and fentanyl x1 for brief improvement in restlessness. Pt incontinent of dark, icteric urine x1. FiO2 decreased to 35 from 40%. Pt continues to overbreathe vent rate, RR from 20s to upper 30s with restlessness. CRRT continues, pt tolerating fluid removal well. Levo weaned to 0.03 this AM.     Plan:  PST and continue to wean levo as tolerated. Continue plan of care.           Problem: Restraint for Non-Violent/Non-Self-Destructive Behavior  Goal: Prevent/Manage Potential Problems  Description: Maintain safety of patient and others during period of restraint.  Promote psychological and physical wellbeing.  Prevent injury to skin and involved body parts.  Promote nutrition, hydration, and elimination.  Outcome: Completed

## 2021-10-11 NOTE — PROGRESS NOTES
CRRT STATUS NOTE    DATA:  Time:  6:43 PM  Pressures WNL:  YES  Filter Status:  WDL    Problems Reported/Alarms Noted:  Filter changed.    Supplies Present:  YES    ASSESSMENT:  Patient Net Fluid Balance:  Net IO Since Admission: 3,676.93 mL [10/11/21 1843]     Intake/Output Summary (Last 24 hours) at 10/11/2021 1843  Last data filed at 10/11/2021 1800  Gross per 24 hour   Intake 2447.32 ml   Output 4092 ml   Net -1644.68 ml      Vitals:  Temp:  [97.6  F (36.4  C)-99.1  F (37.3  C)] 98.6  F (37  C)  Pulse:  [] 86  Resp:  [22-32] 28  BP: (113)/(77) 113/77  MAP:  [54 mmHg-94 mmHg] 70 mmHg  Arterial Line BP: ()/(38-73) 98/56  FiO2 (%):  [35 %-40 %] 35 %  SpO2:  [92 %-100 %] 100 %   Labs:    Lab Results   Component Value Date     10/11/2021    POTASSIUM 3.6 10/11/2021    CR 0.75 10/11/2021    BUN 17 10/11/2021    MAG 2.5 (H) 10/11/2021    PHOS 2.7 10/11/2021    WBC 18.5 (H) 10/11/2021    HGB 7.3 (L) 10/11/2021    HCT 23.1 (L) 10/11/2021    PLT 78 (L) 10/11/2021     Goals of Therapy:  Negative 2400ml to 3600ml for day.  Pt negative 2.2L    INTERVENTIONS:   Review flow sheets and discuss plan of care with bedside nurse and nephrology team.    PLAN:  Continue fluid removal as tolerated per goals of therapy.  Check filter daily and change filter q 72 hrs and PRN.  Please contact the CRRT resource RN at [phone] with any questions/concerns.

## 2021-10-11 NOTE — PROGRESS NOTES
"SPIRITUAL HEALTH SERVICES  SPIRITUAL ASSESSMENT Progress Note  [facility] ([place] 4C      REFERRAL SOURCE: this was a follow-up 4C unit  visit with pt and her father - pt had been seen by on-call  on Friday, and received [employer] sacrament of anointing from on-call   at that time. Father Sandra is out this week.      I let pt's father know that pt had been anointed - he was grateful to hear that. He updated me regarding pt's condition and plan of care - he feels his daughter is making slow but steady progress and is hopeful \"she will start waking up more...\"      PLAN: will check in on pt again this week if she is still on unit.     Aakash Horvath) Mary Ryan M.Div., [facility]  Staff   Pager [phone]       * [facility] remains available 24/7 for emergent requests/referrals, either by having the switchboard page the on-call  or by entering an ASAP/STAT consult in Epic (this will also page the on-call ). Routine Epic consults receive an initial response within 24 hours.*  "

## 2021-10-11 NOTE — PROGRESS NOTES
Nephrology Progress Note  10/11/2021         Aliyah Angeles is a 46 yof w/hx of ETOH use (Quit 9/12/2021) w/hepatitis, Fitz en Y bypass, Pancreatitis, DM2, Panc cyst who was admitted 9/29 from chem dep with N/V and worsening abdominal pain and edema.  Her course has been resp failure with ARDS and evolving ANA LARUA.  Noted to have received albumin from 10/1-10/3 (150g/day) with no improvement in Cr.  On Vanco/Zosyn to treat sepsis although cultures at time of consult are negative.  Nephrology consulted for evaluation of ANA LAURA and possible RRT, started CRRT on 10/7.       Interval History :   Mrs Angeles had CRRT started 10/7 for management of volume and acidosis, net negative for the first time yesterday at 1.2L, already 2.3L negative today although GI is a significant contribution.  Ordered for 100-150cc/hr, likely to be able to wean pressor as well as it is at low dose.       Assessment & Recommendations:   ANA LAURA-Baseline Cr normal at 0.5 as recently as 9/14/2021, abdominal US pending currently.  Was at inpatient chem dep when she developed abdominal pain, N/V and worsening edema and was brought to [facility] on 9/29.  Was treated for UTI and started on Vanco/Zosyn for leukocytosis but decompensated and was intubated the evening of 10/3.  Noted she did receive albumin x3 days from 10/1-10/3 for ANA LAURA but actually worsened and Cr at time of consult is up to 2.  Lisa <5 on 10/3, ECHO done 10/4 showed hyperdynamic LV and normal IVC.  Etiology of ANA LAURA is likely ATN with underlying HRS physiology which made her more susceptible to hypoperfusion.  Started CRRT on 10/7 for management of volume and acidosis.                  -ANA LAURA, likely hypoperfusion in setting of baseline HRS physiology, possible contribution from vanco/zosyn and did receive contrast on 10/3.                -CRRT started 10/7, continuing.  100cc/hr fluid goal, 4k baths.                   -Dialysis consent signed and scanned in under media.       Volume-Has total body  "volume overload, net negative 1.2L yesterday, overall still up ~6L but more stable hemodynamically so pulling 100cc/hr net negative.      Electrolytes/pH-K normal at 3.6 bicarb 26, ABG 7.35/39/100/21.       Liver-Unclear duration, quit drinking on 9/12/2021 and was at chem dep when acute issues started.  Bili is ~12 and INR is 1.3 and a bit improved.  GI following, not clear if she would be a transplant candidate eventually.       ID-On Vanco/Zosyn, cultures so far have been negative (other than candida in sputum), diagnostic para done.       Nutrition-On Osmolite TF.          Seen and discussed with Dr Del Rosario     Recommendations were communicated to primary team via verbal communication.        MARIA ELENA Wade CNS  Clinical Nurse Specialist  [phone]    Review of Systems:   I reviewed the following systems:  ROS not done due to vent/sedation.     Physical Exam:   I/O last 3 completed shifts:  In: 2482.32 [I.V.:750.32; Other:2; NG/GT:650]  Out: 4742 [Other:2092; Stool:2650]   /77   Pulse 85   Temp 98  F (36.7  C) (Axillary)   Resp 27   Ht 1.626 m (5' 4\")   Wt 91.9 kg (202 lb 9.6 oz)   SpO2 99%   BMI 34.78 kg/m       GENERAL APPEARANCE: Intubated and sedated.    EYES: No scleral icterus  Pulmonary: lungs rhonchi to auscultation with equal breath sounds bilaterally, no clubbing or cyanosis  CV: Regular rhythm, normal rate, no rub   - Edema +2-3 LE/dependent  GI: soft, nontender, normal bowel sounds  MS: no evidence of inflammation in joints, no muscle tenderness  : + Rice, dark brown UOP  SKIN: no rash, warm, dry  NEURO: No focal deficits.      Labs:   All labs reviewed by me  Electrolytes/Renal - Recent Labs   Lab Test 10/11/21  0349 10/11/21  0013 10/10/21  1953 10/10/21  1952 10/10/21  1621 10/10/21  1123     --  139  --   --  139   POTASSIUM 3.6  --  3.8  --   --  3.9   CHLORIDE 106  --  107  --   --  109   CO2 26  --  25  --   --  24   BUN 17  --  16  --   --  16   CR 0.75  --  0.78  " --   --  0.80   *  188* 204* 206*   < >   < > 211*   STEFFANY 8.5  --  8.3*  --   --  8.2*   MAG 2.5*  --  2.5*  --   --  2.5*   PHOS 2.7  --  2.8  --   --  2.6    < > = values in this interval not displayed.       CBC -   Recent Labs   Lab Test 10/11/21  0349 10/10/21  1953 10/10/21  1123   WBC 22.3* 21.6* 22.0*   HGB 7.3* 7.8* 7.6*   PLT 84* 86* 86*       LFTs -   Recent Labs   Lab Test 10/11/21  0349 10/10/21  1953 10/10/21  1123 10/10/21  0406 10/10/21  0406 10/09/21  1209 10/09/21  0356   ALKPHOS 268*  --   --   --  245*  --  208*   BILITOTAL 12.0*  --   --   --  12.7*  --  13.7*   ALT 29  --   --   --  26  --  18   *  --   --   --  117*  --  94*   PROTTOTAL 5.3*  --   --   --  5.5*  --  5.4*   ALBUMIN 2.4* 2.4* 2.4*   < > 2.5*   < > 2.6*    < > = values in this interval not displayed.       Iron Panel -   Recent Labs   Lab Test 09/13/21  0642   EYAD 1,839*           Current Medications:    calcium carbonate 600 mg-vitamin D 400 units  1 tablet Oral or Feeding Tube Daily     cyanocobalamin  100 mcg Oral or Feeding Tube Daily     escitalopram  10 mg Oral or Feeding Tube Daily     folic acid  1 mg Oral or Feeding Tube Daily     gabapentin  100 mg Oral or Feeding Tube TID     heparin ANTICOAGULANT  5,000 Units Subcutaneous Q12H     insulin aspart  1-12 Units Subcutaneous Q4H     insulin glargine  10 Units Subcutaneous QAM AC     multivitamins w/minerals  15 mL Oral Daily     pantoprazole  40 mg Per Feeding Tube QAM AC     piperacillin-tazobactam  3.375 g Intravenous Q6H     polyethylene glycol  17 g Oral BID     [Held by provider] prazosin  1 mg Oral QPM     protein modular  2 packet Per Feeding Tube BID     rifaximin  550 mg Oral or Feeding Tube BID     sodium chloride  1 spray Both Nostrils 4x Daily     thiamine  100 mg Oral or Feeding Tube Daily       dextrose       CRRT replacement solution 12.5 mL/kg/hr (10/11/21 0746)     - MEDICATION INSTRUCTIONS -       - MEDICATION INSTRUCTIONS -        norepinephrine 0.03 mcg/kg/min (10/11/21 0700)     - MEDICATION INSTRUCTIONS -       CRRT replacement solution 200 mL/hr at 10/10/21 1923     CRRT replacement solution 12.5 mL/kg/hr (10/11/21 0746)

## 2021-10-12 ENCOUNTER — APPOINTMENT (OUTPATIENT)
Dept: GENERAL RADIOLOGY | Facility: CLINIC | Age: 46
End: 2021-10-12
Attending: NURSE PRACTITIONER
Payer: COMMERCIAL

## 2021-10-12 ENCOUNTER — APPOINTMENT (OUTPATIENT)
Dept: NEUROLOGY | Facility: CLINIC | Age: 46
End: 2021-10-12
Attending: STUDENT IN AN ORGANIZED HEALTH CARE EDUCATION/TRAINING PROGRAM
Payer: COMMERCIAL

## 2021-10-12 ENCOUNTER — APPOINTMENT (OUTPATIENT)
Dept: PHYSICAL THERAPY | Facility: CLINIC | Age: 46
End: 2021-10-12
Payer: COMMERCIAL

## 2021-10-12 LAB
ABO/RH(D): NORMAL
ALBUMIN SERPL-MCNC: 2.1 G/DL (ref 3.4–5)
ALBUMIN SERPL-MCNC: 2.2 G/DL (ref 3.4–5)
ALBUMIN SERPL-MCNC: 2.3 G/DL (ref 3.4–5)
ALP SERPL-CCNC: 266 U/L (ref 40–150)
ALT SERPL W P-5'-P-CCNC: 31 U/L (ref 0–50)
ANION GAP SERPL CALCULATED.3IONS-SCNC: 7 MMOL/L (ref 3–14)
ANION GAP SERPL CALCULATED.3IONS-SCNC: 7 MMOL/L (ref 3–14)
ANION GAP SERPL CALCULATED.3IONS-SCNC: 8 MMOL/L (ref 3–14)
ANTIBODY SCREEN: NEGATIVE
AST SERPL W P-5'-P-CCNC: 118 U/L (ref 0–45)
BACTERIA BLD CULT: NO GROWTH
BACTERIA BLD CULT: NO GROWTH
BASE EXCESS BLDA CALC-SCNC: 2.1 MMOL/L (ref -9–1.8)
BILIRUB DIRECT SERPL-MCNC: 8.2 MG/DL (ref 0–0.2)
BILIRUB SERPL-MCNC: 9.9 MG/DL (ref 0.2–1.3)
BLD PROD TYP BPU: NORMAL
BLOOD COMPONENT TYPE: NORMAL
BUN SERPL-MCNC: 17 MG/DL (ref 7–30)
BUN SERPL-MCNC: 18 MG/DL (ref 7–30)
BUN SERPL-MCNC: 18 MG/DL (ref 7–30)
CA-I BLD-MCNC: 4.4 MG/DL (ref 4.4–5.2)
CA-I BLD-MCNC: 4.5 MG/DL (ref 4.4–5.2)
CA-I BLD-MCNC: 4.5 MG/DL (ref 4.4–5.2)
CALCIUM SERPL-MCNC: 8 MG/DL (ref 8.5–10.1)
CALCIUM SERPL-MCNC: 8.1 MG/DL (ref 8.5–10.1)
CALCIUM SERPL-MCNC: 8.2 MG/DL (ref 8.5–10.1)
CHLORIDE BLD-SCNC: 107 MMOL/L (ref 94–109)
CHLORIDE BLD-SCNC: 107 MMOL/L (ref 94–109)
CHLORIDE BLD-SCNC: 108 MMOL/L (ref 94–109)
CO2 SERPL-SCNC: 22 MMOL/L (ref 20–32)
CO2 SERPL-SCNC: 24 MMOL/L (ref 20–32)
CO2 SERPL-SCNC: 24 MMOL/L (ref 20–32)
CODING SYSTEM: NORMAL
CREAT SERPL-MCNC: 0.72 MG/DL (ref 0.52–1.04)
CREAT SERPL-MCNC: 0.75 MG/DL (ref 0.52–1.04)
CREAT SERPL-MCNC: 0.8 MG/DL (ref 0.52–1.04)
CROSSMATCH: NORMAL
ERYTHROCYTE [DISTWIDTH] IN BLOOD BY AUTOMATED COUNT: 21.8 % (ref 10–15)
ERYTHROCYTE [DISTWIDTH] IN BLOOD BY AUTOMATED COUNT: 23.5 % (ref 10–15)
ERYTHROCYTE [DISTWIDTH] IN BLOOD BY AUTOMATED COUNT: 23.8 % (ref 10–15)
GFR SERPL CREATININE-BSD FRML MDRD: 88 ML/MIN/1.73M2
GFR SERPL CREATININE-BSD FRML MDRD: >90 ML/MIN/1.73M2
GFR SERPL CREATININE-BSD FRML MDRD: >90 ML/MIN/1.73M2
GLUCOSE BLD-MCNC: 177 MG/DL (ref 70–99)
GLUCOSE BLD-MCNC: 182 MG/DL (ref 70–99)
GLUCOSE BLD-MCNC: 193 MG/DL (ref 70–99)
GLUCOSE BLDC GLUCOMTR-MCNC: 153 MG/DL (ref 70–99)
GLUCOSE BLDC GLUCOMTR-MCNC: 175 MG/DL (ref 70–99)
GLUCOSE BLDC GLUCOMTR-MCNC: 178 MG/DL (ref 70–99)
GLUCOSE BLDC GLUCOMTR-MCNC: 181 MG/DL (ref 70–99)
GLUCOSE BLDC GLUCOMTR-MCNC: 182 MG/DL (ref 70–99)
GLUCOSE BLDC GLUCOMTR-MCNC: 192 MG/DL (ref 70–99)
HCO3 BLD-SCNC: 25 MMOL/L (ref 21–28)
HCT VFR BLD AUTO: 22.4 % (ref 35–47)
HCT VFR BLD AUTO: 25.7 % (ref 35–47)
HCT VFR BLD AUTO: 25.7 % (ref 35–47)
HGB BLD-MCNC: 6.7 G/DL (ref 11.7–15.7)
HGB BLD-MCNC: 8 G/DL (ref 11.7–15.7)
HGB BLD-MCNC: 8 G/DL (ref 11.7–15.7)
INR PPP: 1.3 (ref 0.85–1.15)
ISSUE DATE AND TIME: NORMAL
LACTATE SERPL-SCNC: 1.4 MMOL/L (ref 0.7–2)
MAGNESIUM SERPL-MCNC: 2.4 MG/DL (ref 1.6–2.3)
MAGNESIUM SERPL-MCNC: 2.4 MG/DL (ref 1.6–2.3)
MAGNESIUM SERPL-MCNC: 2.6 MG/DL (ref 1.6–2.3)
MCH RBC QN AUTO: 32.8 PG (ref 26.5–33)
MCH RBC QN AUTO: 33.1 PG (ref 26.5–33)
MCH RBC QN AUTO: 33.1 PG (ref 26.5–33)
MCHC RBC AUTO-ENTMCNC: 29.9 G/DL (ref 31.5–36.5)
MCHC RBC AUTO-ENTMCNC: 31.1 G/DL (ref 31.5–36.5)
MCHC RBC AUTO-ENTMCNC: 31.1 G/DL (ref 31.5–36.5)
MCV RBC AUTO: 106 FL (ref 78–100)
MCV RBC AUTO: 106 FL (ref 78–100)
MCV RBC AUTO: 110 FL (ref 78–100)
O2/TOTAL GAS SETTING VFR VENT: 35 %
OXYHGB MFR BLD: 94 % (ref 92–100)
PCO2 BLD: 32 MM HG (ref 35–45)
PH BLD: 7.51 [PH] (ref 7.35–7.45)
PHOSPHATE SERPL-MCNC: 3 MG/DL (ref 2.5–4.5)
PHOSPHATE SERPL-MCNC: 3.1 MG/DL (ref 2.5–4.5)
PHOSPHATE SERPL-MCNC: 3.7 MG/DL (ref 2.5–4.5)
PLATELET # BLD AUTO: 78 10E3/UL (ref 150–450)
PLATELET # BLD AUTO: 88 10E3/UL (ref 150–450)
PLATELET # BLD AUTO: 89 10E3/UL (ref 150–450)
PO2 BLD: 78 MM HG (ref 80–105)
POTASSIUM BLD-SCNC: 3.6 MMOL/L (ref 3.4–5.3)
POTASSIUM BLD-SCNC: 3.6 MMOL/L (ref 3.4–5.3)
POTASSIUM BLD-SCNC: 3.8 MMOL/L (ref 3.4–5.3)
PROCALCITONIN SERPL-MCNC: 4.88 NG/ML
PROT SERPL-MCNC: 5.2 G/DL (ref 6.8–8.8)
RBC # BLD AUTO: 2.04 10E6/UL (ref 3.8–5.2)
RBC # BLD AUTO: 2.42 10E6/UL (ref 3.8–5.2)
RBC # BLD AUTO: 2.42 10E6/UL (ref 3.8–5.2)
SARS-COV-2 RNA RESP QL NAA+PROBE: NEGATIVE
SODIUM SERPL-SCNC: 137 MMOL/L (ref 133–144)
SODIUM SERPL-SCNC: 138 MMOL/L (ref 133–144)
SODIUM SERPL-SCNC: 139 MMOL/L (ref 133–144)
SPECIMEN EXPIRATION DATE: NORMAL
UNIT ABO/RH: NORMAL
UNIT NUMBER: NORMAL
UNIT STATUS: NORMAL
UNIT TYPE ISBT: 7300
WBC # BLD AUTO: 16.8 10E3/UL (ref 4–11)
WBC # BLD AUTO: 18.8 10E3/UL (ref 4–11)
WBC # BLD AUTO: 19 10E3/UL (ref 4–11)

## 2021-10-12 PROCEDURE — 80069 RENAL FUNCTION PANEL: CPT | Performed by: CLINICAL NURSE SPECIALIST

## 2021-10-12 PROCEDURE — 258N000003 HC RX IP 258 OP 636: Performed by: STUDENT IN AN ORGANIZED HEALTH CARE EDUCATION/TRAINING PROGRAM

## 2021-10-12 PROCEDURE — 95714 VEEG EA 12-26 HR UNMNTR: CPT

## 2021-10-12 PROCEDURE — 85610 PROTHROMBIN TIME: CPT | Performed by: STUDENT IN AN ORGANIZED HEALTH CARE EDUCATION/TRAINING PROGRAM

## 2021-10-12 PROCEDURE — 82330 ASSAY OF CALCIUM: CPT | Performed by: CLINICAL NURSE SPECIALIST

## 2021-10-12 PROCEDURE — 258N000003 HC RX IP 258 OP 636: Performed by: NURSE PRACTITIONER

## 2021-10-12 PROCEDURE — 86923 COMPATIBILITY TEST ELECTRIC: CPT | Performed by: STUDENT IN AN ORGANIZED HEALTH CARE EDUCATION/TRAINING PROGRAM

## 2021-10-12 PROCEDURE — 84100 ASSAY OF PHOSPHORUS: CPT | Performed by: CLINICAL NURSE SPECIALIST

## 2021-10-12 PROCEDURE — 83605 ASSAY OF LACTIC ACID: CPT | Performed by: STUDENT IN AN ORGANIZED HEALTH CARE EDUCATION/TRAINING PROGRAM

## 2021-10-12 PROCEDURE — 86900 BLOOD TYPING SEROLOGIC ABO: CPT | Performed by: STUDENT IN AN ORGANIZED HEALTH CARE EDUCATION/TRAINING PROGRAM

## 2021-10-12 PROCEDURE — 250N000013 HC RX MED GY IP 250 OP 250 PS 637: Performed by: SURGERY

## 2021-10-12 PROCEDURE — 250N000011 HC RX IP 250 OP 636: Performed by: STUDENT IN AN ORGANIZED HEALTH CARE EDUCATION/TRAINING PROGRAM

## 2021-10-12 PROCEDURE — 82248 BILIRUBIN DIRECT: CPT | Performed by: CLINICAL NURSE SPECIALIST

## 2021-10-12 PROCEDURE — 97110 THERAPEUTIC EXERCISES: CPT | Mod: GP

## 2021-10-12 PROCEDURE — 83735 ASSAY OF MAGNESIUM: CPT | Performed by: CLINICAL NURSE SPECIALIST

## 2021-10-12 PROCEDURE — 250N000009 HC RX 250: Performed by: STUDENT IN AN ORGANIZED HEALTH CARE EDUCATION/TRAINING PROGRAM

## 2021-10-12 PROCEDURE — U0003 INFECTIOUS AGENT DETECTION BY NUCLEIC ACID (DNA OR RNA); SEVERE ACUTE RESPIRATORY SYNDROME CORONAVIRUS 2 (SARS-COV-2) (CORONAVIRUS DISEASE [COVID-19]), AMPLIFIED PROBE TECHNIQUE, MAKING USE OF HIGH THROUGHPUT TECHNOLOGIES AS DESCRIBED BY CMS-2020-01-R: HCPCS | Performed by: STUDENT IN AN ORGANIZED HEALTH CARE EDUCATION/TRAINING PROGRAM

## 2021-10-12 PROCEDURE — 82247 BILIRUBIN TOTAL: CPT | Performed by: CLINICAL NURSE SPECIALIST

## 2021-10-12 PROCEDURE — 250N000009 HC RX 250: Performed by: NURSE PRACTITIONER

## 2021-10-12 PROCEDURE — 82805 BLOOD GASES W/O2 SATURATION: CPT | Performed by: STUDENT IN AN ORGANIZED HEALTH CARE EDUCATION/TRAINING PROGRAM

## 2021-10-12 PROCEDURE — 74018 RADEX ABDOMEN 1 VIEW: CPT | Mod: 26 | Performed by: RADIOLOGY

## 2021-10-12 PROCEDURE — 999N000157 HC STATISTIC RCP TIME EA 10 MIN

## 2021-10-12 PROCEDURE — 84460 ALANINE AMINO (ALT) (SGPT): CPT | Performed by: CLINICAL NURSE SPECIALIST

## 2021-10-12 PROCEDURE — 94003 VENT MGMT INPAT SUBQ DAY: CPT

## 2021-10-12 PROCEDURE — 250N000013 HC RX MED GY IP 250 OP 250 PS 637: Performed by: STUDENT IN AN ORGANIZED HEALTH CARE EDUCATION/TRAINING PROGRAM

## 2021-10-12 PROCEDURE — 74018 RADEX ABDOMEN 1 VIEW: CPT

## 2021-10-12 PROCEDURE — 99291 CRITICAL CARE FIRST HOUR: CPT | Mod: GC | Performed by: STUDENT IN AN ORGANIZED HEALTH CARE EDUCATION/TRAINING PROGRAM

## 2021-10-12 PROCEDURE — 85027 COMPLETE CBC AUTOMATED: CPT | Performed by: CLINICAL NURSE SPECIALIST

## 2021-10-12 PROCEDURE — 84145 PROCALCITONIN (PCT): CPT | Performed by: STUDENT IN AN ORGANIZED HEALTH CARE EDUCATION/TRAINING PROGRAM

## 2021-10-12 PROCEDURE — 90947 DIALYSIS REPEATED EVAL: CPT

## 2021-10-12 PROCEDURE — 95720 EEG PHY/QHP EA INCR W/VEEG: CPT | Performed by: PSYCHIATRY & NEUROLOGY

## 2021-10-12 PROCEDURE — 250N000011 HC RX IP 250 OP 636: Performed by: NURSE PRACTITIONER

## 2021-10-12 PROCEDURE — 82040 ASSAY OF SERUM ALBUMIN: CPT | Performed by: CLINICAL NURSE SPECIALIST

## 2021-10-12 PROCEDURE — P9016 RBC LEUKOCYTES REDUCED: HCPCS | Performed by: STUDENT IN AN ORGANIZED HEALTH CARE EDUCATION/TRAINING PROGRAM

## 2021-10-12 PROCEDURE — 99233 SBSQ HOSP IP/OBS HIGH 50: CPT | Performed by: STUDENT IN AN ORGANIZED HEALTH CARE EDUCATION/TRAINING PROGRAM

## 2021-10-12 PROCEDURE — 200N000002 HC R&B ICU UMMC

## 2021-10-12 PROCEDURE — 250N000009 HC RX 250: Performed by: CLINICAL NURSE SPECIALIST

## 2021-10-12 RX ORDER — DEXMEDETOMIDINE HYDROCHLORIDE 4 UG/ML
0.2-0.7 INJECTION, SOLUTION INTRAVENOUS CONTINUOUS
Status: DISCONTINUED | OUTPATIENT
Start: 2021-10-12 | End: 2021-10-14

## 2021-10-12 RX ORDER — FENTANYL CITRATE 50 UG/ML
25-50 INJECTION, SOLUTION INTRAMUSCULAR; INTRAVENOUS
Status: DISCONTINUED | OUTPATIENT
Start: 2021-10-12 | End: 2021-10-14

## 2021-10-12 RX ADMIN — FENTANYL CITRATE 50 MCG: 50 INJECTION INTRAMUSCULAR; INTRAVENOUS at 21:46

## 2021-10-12 RX ADMIN — Medication 100 MCG: at 08:08

## 2021-10-12 RX ADMIN — FOLIC ACID 1 MG: 1 TABLET ORAL at 08:08

## 2021-10-12 RX ADMIN — SALINE NASAL SPRAY 1 SPRAY: 1.5 SOLUTION NASAL at 08:08

## 2021-10-12 RX ADMIN — CALCIUM CHLORIDE, MAGNESIUM CHLORIDE, SODIUM CHLORIDE, SODIUM BICARBONATE, POTASSIUM CHLORIDE AND SODIUM PHOSPHATE DIBASIC DIHYDRATE 12.5 ML/KG/HR: 3.68; 3.05; 6.34; 3.09; .314; .187 INJECTION INTRAVENOUS at 03:17

## 2021-10-12 RX ADMIN — PIPERACILLIN AND TAZOBACTAM 3.38 G: 3; .375 INJECTION, POWDER, LYOPHILIZED, FOR SOLUTION INTRAVENOUS at 02:07

## 2021-10-12 RX ADMIN — GABAPENTIN 100 MG: 250 SOLUTION ORAL at 11:32

## 2021-10-12 RX ADMIN — FENTANYL CITRATE 50 MCG: 50 INJECTION INTRAMUSCULAR; INTRAVENOUS at 17:35

## 2021-10-12 RX ADMIN — INSULIN ASPART 3 UNITS: 100 INJECTION, SOLUTION INTRAVENOUS; SUBCUTANEOUS at 00:10

## 2021-10-12 RX ADMIN — CALCIUM CHLORIDE, MAGNESIUM CHLORIDE, SODIUM CHLORIDE, SODIUM BICARBONATE, POTASSIUM CHLORIDE AND SODIUM PHOSPHATE DIBASIC DIHYDRATE 12.5 ML/KG/HR: 3.68; 3.05; 6.34; 3.09; .314; .187 INJECTION INTRAVENOUS at 11:32

## 2021-10-12 RX ADMIN — PIPERACILLIN AND TAZOBACTAM 3.38 G: 3; .375 INJECTION, POWDER, LYOPHILIZED, FOR SOLUTION INTRAVENOUS at 08:08

## 2021-10-12 RX ADMIN — FENTANYL CITRATE 50 MCG: 50 INJECTION INTRAMUSCULAR; INTRAVENOUS at 19:28

## 2021-10-12 RX ADMIN — INSULIN ASPART 1 UNITS: 100 INJECTION, SOLUTION INTRAVENOUS; SUBCUTANEOUS at 11:34

## 2021-10-12 RX ADMIN — POLYETHYLENE GLYCOL 3350 17 G: 17 POWDER, FOR SOLUTION ORAL at 19:28

## 2021-10-12 RX ADMIN — RIFAXIMIN 550 MG: 550 TABLET ORAL at 19:28

## 2021-10-12 RX ADMIN — HEPARIN SODIUM 5000 UNITS: 5000 INJECTION, SOLUTION INTRAVENOUS; SUBCUTANEOUS at 04:13

## 2021-10-12 RX ADMIN — CALCIUM CHLORIDE, MAGNESIUM CHLORIDE, SODIUM CHLORIDE, SODIUM BICARBONATE, POTASSIUM CHLORIDE AND SODIUM PHOSPHATE DIBASIC DIHYDRATE 12.5 ML/KG/HR: 3.68; 3.05; 6.34; 3.09; .314; .187 INJECTION INTRAVENOUS at 15:35

## 2021-10-12 RX ADMIN — Medication 40 MG: at 08:08

## 2021-10-12 RX ADMIN — THIAMINE HCL TAB 100 MG 100 MG: 100 TAB at 08:08

## 2021-10-12 RX ADMIN — ESCITALOPRAM OXALATE 10 MG: 5 SOLUTION ORAL at 08:08

## 2021-10-12 RX ADMIN — INSULIN GLARGINE 10 UNITS: 100 INJECTION, SOLUTION SUBCUTANEOUS at 08:08

## 2021-10-12 RX ADMIN — DEXMEDETOMIDINE 0.2 MCG/KG/HR: 100 INJECTION, SOLUTION, CONCENTRATE INTRAVENOUS at 09:51

## 2021-10-12 RX ADMIN — CALCIUM CHLORIDE, MAGNESIUM CHLORIDE, SODIUM CHLORIDE, SODIUM BICARBONATE, POTASSIUM CHLORIDE AND SODIUM PHOSPHATE DIBASIC DIHYDRATE 12.5 ML/KG/HR: 3.68; 3.05; 6.34; 3.09; .314; .187 INJECTION INTRAVENOUS at 19:53

## 2021-10-12 RX ADMIN — PIPERACILLIN AND TAZOBACTAM 3.38 G: 3; .375 INJECTION, POWDER, LYOPHILIZED, FOR SOLUTION INTRAVENOUS at 14:19

## 2021-10-12 RX ADMIN — SALINE NASAL SPRAY 1 SPRAY: 1.5 SOLUTION NASAL at 15:20

## 2021-10-12 RX ADMIN — CALCIUM CHLORIDE, MAGNESIUM CHLORIDE, SODIUM CHLORIDE, SODIUM BICARBONATE, POTASSIUM CHLORIDE AND SODIUM PHOSPHATE DIBASIC DIHYDRATE 12.5 ML/KG/HR: 3.68; 3.05; 6.34; 3.09; .314; .187 INJECTION INTRAVENOUS at 07:27

## 2021-10-12 RX ADMIN — RIFAXIMIN 550 MG: 550 TABLET ORAL at 08:08

## 2021-10-12 RX ADMIN — DEXMEDETOMIDINE 0.4 MCG/KG/HR: 100 INJECTION, SOLUTION, CONCENTRATE INTRAVENOUS at 20:12

## 2021-10-12 RX ADMIN — INSULIN ASPART 2 UNITS: 100 INJECTION, SOLUTION INTRAVENOUS; SUBCUTANEOUS at 08:15

## 2021-10-12 RX ADMIN — SALINE NASAL SPRAY 1 SPRAY: 1.5 SOLUTION NASAL at 19:29

## 2021-10-12 RX ADMIN — CALCIUM CARBONATE 600 MG (1,500 MG)-VITAMIN D3 400 UNIT TABLET 1 TABLET: at 08:08

## 2021-10-12 RX ADMIN — GABAPENTIN 100 MG: 250 SOLUTION ORAL at 08:08

## 2021-10-12 RX ADMIN — HYDROXYZINE HYDROCHLORIDE 50 MG: 25 TABLET, FILM COATED ORAL at 20:25

## 2021-10-12 RX ADMIN — Medication 2 PACKET: at 08:09

## 2021-10-12 RX ADMIN — INSULIN ASPART 2 UNITS: 100 INJECTION, SOLUTION INTRAVENOUS; SUBCUTANEOUS at 20:06

## 2021-10-12 RX ADMIN — POLYETHYLENE GLYCOL 3350 17 G: 17 POWDER, FOR SOLUTION ORAL at 09:51

## 2021-10-12 RX ADMIN — PIPERACILLIN AND TAZOBACTAM 3.38 G: 3; .375 INJECTION, POWDER, LYOPHILIZED, FOR SOLUTION INTRAVENOUS at 19:28

## 2021-10-12 RX ADMIN — Medication 2 PACKET: at 19:29

## 2021-10-12 RX ADMIN — SALINE NASAL SPRAY 1 SPRAY: 1.5 SOLUTION NASAL at 11:32

## 2021-10-12 RX ADMIN — CALCIUM CHLORIDE, MAGNESIUM CHLORIDE, SODIUM CHLORIDE, SODIUM BICARBONATE, POTASSIUM CHLORIDE AND SODIUM PHOSPHATE DIBASIC DIHYDRATE 12.5 ML/KG/HR: 3.68; 3.05; 6.34; 3.09; .314; .187 INJECTION INTRAVENOUS at 19:55

## 2021-10-12 RX ADMIN — INSULIN ASPART 2 UNITS: 100 INJECTION, SOLUTION INTRAVENOUS; SUBCUTANEOUS at 15:25

## 2021-10-12 RX ADMIN — ACETAMINOPHEN 325 MG: 325 TABLET, FILM COATED ORAL at 08:08

## 2021-10-12 RX ADMIN — Medication 15 ML: at 12:43

## 2021-10-12 RX ADMIN — GABAPENTIN 100 MG: 250 SOLUTION ORAL at 17:36

## 2021-10-12 RX ADMIN — INSULIN ASPART 2 UNITS: 100 INJECTION, SOLUTION INTRAVENOUS; SUBCUTANEOUS at 04:44

## 2021-10-12 RX ADMIN — Medication 3 MG: at 19:28

## 2021-10-12 RX ADMIN — HEPARIN SODIUM 5000 UNITS: 5000 INJECTION, SOLUTION INTRAVENOUS; SUBCUTANEOUS at 15:20

## 2021-10-12 RX ADMIN — FENTANYL CITRATE 100 MCG: 50 INJECTION INTRAMUSCULAR; INTRAVENOUS at 04:52

## 2021-10-12 RX ADMIN — CALCIUM CHLORIDE, MAGNESIUM CHLORIDE, SODIUM CHLORIDE, SODIUM BICARBONATE, POTASSIUM CHLORIDE AND SODIUM PHOSPHATE DIBASIC DIHYDRATE: 3.68; 3.05; 6.34; 3.09; .314; .187 INJECTION INTRAVENOUS at 16:11

## 2021-10-12 RX ADMIN — HYDROXYZINE HYDROCHLORIDE 50 MG: 25 TABLET, FILM COATED ORAL at 08:08

## 2021-10-12 ASSESSMENT — ACTIVITIES OF DAILY LIVING (ADL)
ADLS_ACUITY_SCORE: 21
ADLS_ACUITY_SCORE: 19
ADLS_ACUITY_SCORE: 23
ADLS_ACUITY_SCORE: 19

## 2021-10-12 ASSESSMENT — MIFFLIN-ST. JEOR: SCORE: 1500

## 2021-10-12 NOTE — PROGRESS NOTES
CRRT STATUS NOTE    DATA:  Time:  0548 AM    Pressures WNL:  YES     Filter Status:  WDL    Problems Reported/Alarms Noted:  None    Supplies Present:  YES    ASSESSMENT:  Patient Net Fluid Balance:    10/11/2021 I/O= -2728.76cc     Vital Signs @0400:  T=97.6 (ax), HR=87, RR=26, TN=197/62 (MAP=79), SPO2=95% on FIO2 35% via vent.    Pressors remained off overnight.    Labs:   Labs monitored and reviewed.    ROUTINE ICU LABS (Last four results)  CMPRecent Labs   Lab 10/12/21  0410 10/12/21  0406 10/12/21  0010 10/11/21  2014 10/11/21  2010 10/11/21  1626 10/11/21  1127 10/11/21  0808 10/11/21  0349 10/10/21  0407 10/10/21  0406 10/09/21  0358 10/09/21  0356   NA  --  138  --   --  138  --  139  --  140   < > 139   < > 140   POTASSIUM  --  3.6  --   --  3.5  --  3.6  --  3.6   < > 3.8   < > 4.1   CHLORIDE  --  107  --   --  108  --  108  --  106   < > 107   < > 109   CO2  --  24  --   --  24  --  24  --  26   < > 24   < > 24   ANIONGAP  --  7  --   --  6  --  7  --  8   < > 8   < > 7   * 193* 192* 177* 185*   < > 204*   < > 196*  188*   < > 200*   < > 184*   BUN  --  17  --   --  17  --  17  --  17   < > 16   < > 21   CR  --  0.72  --   --  0.76  --  0.75  --  0.75   < > 0.84   < > 1.27*   GFRESTIMATED  --  >90  --   --  >90  --  >90  --  >90   < > 83   < > 50*   STEFFANY  --  8.2*  --   --  8.2*  --  8.0*  --  8.5   < > 8.3*   < > 8.0*   MAG  --  2.4*  --   --  2.4*  --  2.5*  --  2.5*   < > 2.5*   < > 2.4*   PHOS  --  3.0  --   --  2.9  --  2.7  --  2.7   < > 2.8   < > 2.8   PROTTOTAL  --  5.2*  --   --   --   --   --   --  5.3*  --  5.5*  --  5.4*   ALBUMIN  --  2.2*  --   --  2.2*  --  2.4*  2.2*  --  2.4*   < > 2.5*   < > 2.6*   BILITOTAL  --  9.9*  --   --   --   --   --   --  12.0*  --  12.7*  --  13.7*   ALKPHOS  --  266*  --   --   --   --   --   --  268*  --  245*  --  208*   AST  --  118*  --   --   --   --   --   --  112*  --  117*  --  94*   ALT  --  31  --   --   --   --   --   --  29  --  26  --  18     < > = values in this interval not displayed.     CBC  Recent Labs   Lab 10/12/21  0406 10/11/21  2010 10/11/21  1127 10/11/21  0349   WBC 16.8* 17.0* 18.5* 22.3*   RBC 2.04* 2.09* 2.10* 2.21*   HGB 6.7* 7.1* 7.3* 7.3*   HCT 22.4* 23.5* 23.1* 24.5*   * 112* 110* 111*   MCH 32.8 34.0* 34.8* 33.0   MCHC 29.9* 30.2* 31.6 29.8*   RDW 21.8* 22.0* 21.7* 22.1*   PLT 78* 80* 78* 84*     INR  Recent Labs   Lab 10/12/21  0406 10/11/21  0349 10/10/21  0406 10/09/21  0356   INR 1.30* 1.33* 1.29* 1.33*     Arterial Blood Gas  Recent Labs   Lab 10/12/21  0405 10/11/21  0350 10/10/21  1835 10/10/21  1123   PH 7.51* 7.49* 7.49* 7.49*   PCO2 32* 33* 34* 34*   PO2 78* 79* 78* 71*   HCO3 25 25 25 25   O2PER 35 35 40 30     0405 LA=1.4, ICa=4.4    Goals of Therapy:    Net negative 100-150cc/hr as BP allows    INTERVENTIONS:   None    PLAN:  Continue to monitor.  Change CRRT set q72hrs and PRN.  Call CRRT RN at [phone] with questions.  See flowsheets for details.

## 2021-10-12 NOTE — PLAN OF CARE
ICU End of Shift Summary. See flowsheets for vital signs and detailed assessment.    Changes this shift: RASS 1 to -1. PRN melatonin and PRN atarax given. Following simple commands. CPOT 6; PRN fentanyl given x3. Afebrile. NSR. Hemodynamically stable. Levo off since 2113. Vent settings unchanged. RR 18 to 29. Head gear changed this morning with assistance from RT. Stool output 200 mL's since 0000. Anuric. Tolerating CRRT goal. Net negative 946.5 mL's thus far. Critical HGB this morning of 6.7; no active bleeding noted; MD notified and 1 unit of RBC's ordered.     Plan: PST's as tolerated. Continue towards CRRT goal.

## 2021-10-12 NOTE — PROGRESS NOTES
"CRRT STATUS NOTE    DATA:  Time:  5:31 PM  Pressures WNL:  YES  Filter Status:  WDL    Problems Reported/Alarms Noted:  None    Supplies Present:  YES    ASSESSMENT:  Patient Net Fluid Balance:  -1.17L since 0000.  Vital Signs:  /77   Pulse 71   Temp 98.1  F (36.7  C) (Axillary)   Resp (!) 36   Ht 1.626 m (5' 4\")   Wt 88 kg (194 lb 0.1 oz)   SpO2 98%   BMI 33.30 kg/m    Labs:  k 3.6, ical 4.5, cr 0.80, mg 2.6   Goals of Therapy:  0-50cc/hr net negative  INTERVENTIONS:   None. Do not restart circuit if it clots. Transition to iHD.     PLAN:  Cont to monitor, notify CRRT RN with any questions or concerns. [id number]    "

## 2021-10-12 NOTE — PROGRESS NOTES
Nephrology Progress Note  10/12/2021         Aliyah Angeles is a 46 yof w/hx of ETOH use (Quit 9/12/2021) w/hepatitis, Fitz en Y bypass, Pancreatitis, DM2, Panc cyst who was admitted 9/29 from chem dep with N/V and worsening abdominal pain and edema.  Her course has been resp failure with ARDS and evolving ANA LAURA.  Noted to have received albumin from 10/1-10/3 (150g/day) with no improvement in Cr.  On Vanco/Zosyn to treat sepsis although cultures at time of consult are negative.  Nephrology consulted for evaluation of ANA LAURA and possible RRT, started CRRT on 10/7.       Interval History :   Mrs Angeles had CRRT started 10/7 for management of volume and acidosis, net negative 2.7L yesterday and is off of pressors.  She had ~1.5L of GI contribution to output so needed 3.5L of UF to meet goal, getting closer to iHD with improving hemodynamics and close to euvolemic status.  Will continue CRRT today but can hold on restarting if circuit clots and try iHD modality and watch for recovery.  Ordered for 0-50cc/hr net negative as BP's allow, main goal matching intake and weaning off of remaining pressor.       Assessment & Recommendations:   ANA LAURA-Baseline Cr normal at 0.5 as recently as 9/14/2021, abdominal US pending currently.  Was at inpatient chem dep when she developed abdominal pain, N/V and worsening edema and was brought to [facility] on 9/29.  Was treated for UTI and started on Vanco/Zosyn for leukocytosis but decompensated and was intubated the evening of 10/3.  Noted she did receive albumin x3 days from 10/1-10/3 for ANA LAURA but actually worsened and Cr at time of consult is up to 2.  Lisa <5 on 10/3, ECHO done 10/4 showed hyperdynamic LV and normal IVC.  Etiology of ANA LAURA is likely ATN with underlying HRS physiology which made her more susceptible to hypoperfusion.  Started CRRT on 10/7 for management of volume and acidosis.                  -ANA LAURA, likely hypoperfusion in setting of baseline HRS physiology, possible contribution from  "vanco/zosyn and did receive contrast on 10/3.                -CRRT started 10/7, continuing.  0-50cc/hr fluid goal, 4k baths.                   -Dialysis consent signed and scanned in under media.       Volume-Has total body volume overload, net negative 1.2L yesterday, overall still up ~6L but more stable hemodynamically so pulling 0-50cc/hr net negative.      Electrolytes/pH-K normal at 3.6 bicarb 24, ABG 7.51/32/78/25.       Liver-Unclear duration, quit drinking on 9/12/2021 and was at chem dep when acute issues started.  Bili is ~10 and INR is 1.3 and a bit improved.  GI following, not clear if she would be a transplant candidate eventually.       ID-On Zosyn, cultures so far have been negative (other than candida in sputum), diagnostic para done.       Nutrition-On Osmolite TF.          Seen and discussed with Dr Del Rosario     Recommendations were communicated to primary team via verbal communication.        Alexis Robbins, MARIA ELENA CNS  Clinical Nurse Specialist  [phone]    Review of Systems:   I reviewed the following systems:  ROS not done due to vent/sedation.     Physical Exam:   I/O last 3 completed shifts:  In: 2328.64 [I.V.:649.64; NG/GT:599]  Out: 5667 [Other:4017; Stool:1650]   /77   Pulse 85   Temp 97.7  F (36.5  C) (Axillary)   Resp 21   Ht 1.626 m (5' 4\")   Wt 88 kg (194 lb 0.1 oz)   SpO2 100%   BMI 33.30 kg/m       GENERAL APPEARANCE: Intubated and sedated.    EYES: No scleral icterus  Pulmonary: lungs rhonchi to auscultation with equal breath sounds bilaterally, no clubbing or cyanosis  CV: Regular rhythm, normal rate, no rub   - Edema +2-3 LE/dependent  GI: soft, nontender, normal bowel sounds  MS: no evidence of inflammation in joints, no muscle tenderness  : + Rice, dark brown UOP  SKIN: no rash, warm, dry  NEURO: No focal deficits.      Labs:   All labs reviewed by me  Electrolytes/Renal - Recent Labs   Lab Test 10/12/21  0410 10/12/21  0406 10/12/21  0010 10/11/21  2014 " 10/11/21  2010 10/11/21  1626 10/11/21  1127   NA  --  138  --   --  138  --  139   POTASSIUM  --  3.6  --   --  3.5  --  3.6   CHLORIDE  --  107  --   --  108  --  108   CO2  --  24  --   --  24  --  24   BUN  --  17  --   --  17  --  17   CR  --  0.72  --   --  0.76  --  0.75   * 193* 192*   < > 185*   < > 204*   STEFFANY  --  8.2*  --   --  8.2*  --  8.0*   MAG  --  2.4*  --   --  2.4*  --  2.5*   PHOS  --  3.0  --   --  2.9  --  2.7    < > = values in this interval not displayed.       CBC -   Recent Labs   Lab Test 10/12/21  0406 10/11/21  2010 10/11/21  1127   WBC 16.8* 17.0* 18.5*   HGB 6.7* 7.1* 7.3*   PLT 78* 80* 78*       LFTs -   Recent Labs   Lab Test 10/12/21  0406 10/11/21  2010 10/11/21  1127 10/11/21  0349 10/11/21  0349 10/10/21  1123 10/10/21  0406   ALKPHOS 266*  --   --   --  268*  --  245*   BILITOTAL 9.9*  --   --   --  12.0*  --  12.7*   ALT 31  --   --   --  29  --  26   *  --   --   --  112*  --  117*   PROTTOTAL 5.2*  --   --   --  5.3*  --  5.5*   ALBUMIN 2.2* 2.2* 2.4*  2.2*   < > 2.4*   < > 2.5*    < > = values in this interval not displayed.       Iron Panel -   Recent Labs   Lab Test 09/13/21  0642   EYAD 1,839*           Current Medications:    calcium carbonate 600 mg-vitamin D 400 units  1 tablet Oral or Feeding Tube Daily     cyanocobalamin  100 mcg Oral or Feeding Tube Daily     escitalopram  10 mg Oral or Feeding Tube Daily     folic acid  1 mg Oral or Feeding Tube Daily     gabapentin  100 mg Oral or Feeding Tube TID     heparin ANTICOAGULANT  5,000 Units Subcutaneous Q12H     insulin aspart  1-12 Units Subcutaneous Q4H     insulin glargine  10 Units Subcutaneous QAM AC     multivitamins w/minerals  15 mL Oral Daily     pantoprazole  40 mg Per Feeding Tube QAM AC     piperacillin-tazobactam  3.375 g Intravenous Q6H     polyethylene glycol  17 g Oral BID     [Held by provider] prazosin  1 mg Oral QPM     protein modular  2 packet Per Feeding Tube BID     rifaximin  550  mg Oral or Feeding Tube BID     sodium chloride  1 spray Both Nostrils 4x Daily     thiamine  100 mg Oral or Feeding Tube Daily       dextrose       CRRT replacement solution 12.5 mL/kg/hr (10/12/21 0727)     - MEDICATION INSTRUCTIONS -       - MEDICATION INSTRUCTIONS -       norepinephrine Stopped (10/11/21 2113)     - MEDICATION INSTRUCTIONS -       CRRT replacement solution 200 mL/hr at 10/11/21 1442     CRRT replacement solution 12.5 mL/kg/hr (10/12/21 0727)

## 2021-10-12 NOTE — PLAN OF CARE
ICU End of Shift Summary. See flowsheets for vital signs and detailed assessment.    Changes this shift: Pt restless & tachpneic - prexedex gtt started. RASS +1 to -1, very labile. PST 15/5 for 15 min but stopped d/t RR 40-50's. Levo gtt on/off to keep to MAP >65. CRRT goal changed to 0-50/hr; pt did have 1 spontaneous urine occurrence. Hgb 8.0 s/p 1 U RBC.     Plan: Continue POC & notify team w/ changes or concerns.

## 2021-10-12 NOTE — PROGRESS NOTES
MEDICAL ICU PROGRESS NOTE  10/12/2021      Date of Service (when I saw the patient): 10/12/2021    ASSESSMENT: Aliyah Angeles is a 46 year old woman with alcohol use disorder (last drink 9/12/21), alcoholic hepatitis, jaimie en y gastric bypass, alcohol pancreatitis, pancreatic cyst, Type 2 DM, anxiety and depression admitted for abdominal pain, lower extremity edema, and nausea that started after starting low sodium and diabetic friendly foods in an alcohol treatment facility. Patient was admitted to the medicine floor with abdominal pain and vomiting on 9/29/2021. Overnight 10/3-10/4, patient had worsening respiratory status, concern for sepsis requiring transfer to the ICU for intubation and need for pressors as well as further evaluation and management for likely sepsis. Now off sedation. Weaning pressors. On minimal respiratory support. On CRRT.      CHANGES and MAJOR THINGS TODAY:   - SBT as tolerated today  - Tachypnea, agitation, waxing and waning mental status limiting extubation  - Abdominal xray today to evaluate abdominal pain --> normal  - Follow-up GI recs regarding LFTs  - Start Precedex for agitation    PLAN:     Neuro:  # Pain and sedation  She was sedated and intubated on 10/4. Minimal response to painful stimuli. Does spontaneously move all limbs and occasionally open her eyes now, occasionally following commands.   - Precedex drip  - Fentanyl PRNs     # Anxiety and depression  - PTA escitalopram     # Encephalopathy, likely acute toxic metabolic vs infection  In the setting of likely hepatic encephalopathy, infectious encephalopathy, sedation. SBP unlikely due to normal paracentesis eval and culture. Normal ammonia from 10/4. Will work on continuing to wean sedation. CT with changes that could correlate with hypoxic brain injury, MRI 10/8 negative for anoxic injury. EEG with encephalopathy, no seizures.   - Miralax BID  - Goal 3-4 loose stools daily >1 L of stool daily  - On rifaximin  -  Gabapentin 100 mg TID  - vEEG --> no seizures so far    # Alcohol use disorder  Last alcohol use 9/12.21.  - Thiamine, folic acid, MVI supplementation    Pulmonary:  # Acute hypoxemic respiratory failure  # ARDS  # Concern for infection, pneumonia and sepsis  Acute onset hypoxemia, bilateral opacities are consistent with ARDS. No evidence of cardiac etiology. Bilateral ground glass infiltrates on imaging, with bilateral pleural effusions. Treated with lung-protective ventilation, prone positioning. Intubated, bronch 10/4 with bile-like fluid, suspect aspiration as well as PMN predominance concerning for bacterial pneumonia. Currently stable on vent settings; supine; off NMB; off inhaled epo 10/7. Chest CT 10/6 showed ground glass and consolidative opacities throughout the lungs. CRRT started 10/7.   - Continue oxygen support with oxygen and PEEP adjustments; wean as able  - CRRT running with net negative I/O goal  - Infectious work-up and treatment as below  - Albuterol neb PRN  - SBT if patient is following commands    Ventilation Mode: CMV/AC  (Continuous Mandatory Ventilation/ Assist Control)  FiO2 (%): 35 %  Rate Set (breaths/minute): 16 breaths/min  Tidal Volume Set (mL): 450 mL  PEEP (cm H2O): 5 cmH2O  Oxygen Concentration (%): 35 %  Resp: (!) 31      # Respiratory alkalosis  Patient is driving her own ventilatory rate.     Cardiovascular:  # Hypotension, in the setting of likely septic shock, improving  # Edema, possible intravascular depletion with third-spacing  Echo 10/4 with hyperdynamic EF >70%. Moderate tricuspid insufficiency. Normal IVC. BNP was over 16,000 --> >28,000 (10/4) --> 17,000 (10/6). Normal trops 10/4. Appropriately elevated cortisol. She has been requiring pressors and has an ANA LAURA. She is currently third spacing most of the fluid she has been given. Treating for sepsis. Her liver disease is likely contributing to the shock. Lactate is now WNL.   - Levophed for goal MAP >65, currently  off     # Lactic acidosis, resolved  This is in the setting of ANA LAURA now requiring CRRT. CT scan 10/7 showing pneumonia, no clear intra-abdominal source of infection. Appears to be perfusing well, less concern for ischemia. Improving with CRRT, adequate resuscitation.      GI/Nutrition:  # Alcoholic hepatitis  # Ascites  # Hyperbilirubinemia  # History of RYGB  # Transaminitis  Patient has been sober since 9/12 and has been at a treatment facility since her discharge 9/21. Hepatomegaly and hepatic steatosis without cirrhosis. Diagnostic paracentesis on admission without SBP. Discontinued lactulose 10/1 and then had decreased bowel movements. Normal lipase on admission. CT A/P on admission with severe hepatic steatosis, portal HTN, ascites, splenomegaly (similar on repeat CT 10/6). Paracentesis 10/4 without SBP.  On 10/9, AST was 94 and trending up and continued to do so on 10/10. AST elevation possibly due to alcoholic liver disease, however sobriety started a month ago according to the chart review. Repeat paracentesis 10/11 less concerning for SBP.  - Daily LFT's; continuing to trend  - GI following, appreciate recs  - Miralax BID  - Trend bilirubin, INR --> MELD labs  - AXR today for abdominal pain --> no acute pathology     Renal/Fluids/Electrolytes:  # Anion gap metabolic acidosis, resolved  # Lactic acidosis, resolved  Likely 2/2 worsening cirrhosis in the setting of not taking meds as above in addition to gastric loses from vomiting. Partially compensating with respiratory alkalosis.   - CTM     # Acute kidney injury, concern for ATN, pre-renal, vs hepatorenal syndrome, improving  Baseline creatinine ~0.98, increased to 1.14 on 10/1/21. May be related to vomiting, lactulose, third spacing. Likely pre-renal. LE edema is prominent and worsening. Intravascular volume likely low; evidenced by low venous volume on echo. Did initially improve after one day of albumin. 50 mg/dl protein, and moderate bilirubin on UA.  Uncertain infection, awaiting cultures. Urine eosinophils negative.   - Continue CRRT, pull rate per I/O goal of net negative  - Consulted nephrology, appreciate recs  - Replacement protocols (Mg, Ph, K).   - Strict I/Os  - Avoid nephrotoxic agents     Endocrine:  # Type 2 diabetes mellitus  Her blood sugar was consistently over 200 as she approached her feeding goal rate, now improved.  - High-resistance sliding scale insulin  - Lantus 10 units daily  - Check BS q4h and PRN  - Goal BS between 100 and 160     ID:  # Septic shock, improving  # Pneumonia (aspiration vs CAP)  Concern for pneumonia, CAP vs HAP. Less concern for SBP given negative paracentesis on admission. Afebrile. Paracentesis 10/4 negative for SBP. Elevated procal and PMN's in BAL fluid 10/4 suggests a bacterial pneumonia, likely aspiration related due to her history of recent symptoms of vomiting. Following cultures, no clear infectious source yet. BD glucan was indeterminate due to hyperbilirubinemia. Procal down-trending, leukocytosis persistent. Candida did grow from the respiratory culture taken on 10/6. Cryptococcal antigen negative.  - Continue Zosyn for 10 day course    Abx  - Zosyn 10/4-**  - Vanc 10/4-10/4  - Doxycycline 10/4-10/6  - Micafungin 10/6-10/9     Hematology:    # Anemia, macrocytic  # Coagulopathy  # Acute epistaxis  In the setting of alcohol use disorder, liver failure, alcoholic hepatitis. INR elevated. Likely 2/2 to B12 or folate deficiency due to long history of alcohol use disorder. On 10/7, 10/12 required transfusion. Hemolysis labs negative. Retic count elevated.  - Transfuse for Hgb <7; Plt <10  - Follow INR  - Trend CBC  - Transfused overnight     # Thrombocytopenia  Persistent in the setting of renal disease, liver disease, chronic alcoholism, and after a +2.3 liter I/O balance. This was likely partially dilutional with blunted bone marrow response secondary to alcoholism. HIT screen and hemolysis work up negative.  -  Transfuse for PLTs <10, or if active bleeding and platelets are <30     Musculoskeletal:  No active issues     Skin:  # Dry cracked skin on bilateral heels, improving  - Continue Eucerin to be applied topically     General Cares/Prophylaxis:    DVT Prophylaxis: Heparin subQ  GI Prophylaxis: PPI  Restraints: None  Family Communication: Sister updated at bedside.   Code Status: Full Code     Lines/tubes/drains:  - PIV x2  - ETT  - Rice  - Central line  - Arterial line  - Hemodialysis line     Disposition:  - Medical ICU    Patient seen and findings/plan discussed with medical ICU staff, Dr. Quinn.    René Ascencio, MS4    Resident/Fellow Attestation   I, Hayley Severson, was present with the medical student who participated in the service and in the documentation of the note.  I have verified the history and personally performed the physical exam and medical decision making.  I agree with the assessment and plan of care as documented in the note.      Hayley Severson, MD-MPH  Internal Medicine-Pediatrics PGY-1    ====================================  INTERVAL HISTORY:   Nursing notes reviewed. Following simple commands. Fentanyl given x3. Levophed off. Stool output slowing, 200 mL since 0000. Tolerating net negative CRRT goal. Transfused overnight.     OBJECTIVE:   1. VITAL SIGNS:   Temp:  [97.6  F (36.4  C)-98.8  F (37.1  C)] 97.7  F (36.5  C)  Pulse:  [71-98] 87  Resp:  [20-32] 31  MAP:  [54 mmHg-94 mmHg] 82 mmHg  Arterial Line BP: ()/(38-73) 106/64  FiO2 (%):  [35 %] 35 %  SpO2:  [92 %-100 %] 100 %  Ventilation Mode: CMV/AC  (Continuous Mandatory Ventilation/ Assist Control)  FiO2 (%): 35 %  Rate Set (breaths/minute): 16 breaths/min  Tidal Volume Set (mL): 450 mL  PEEP (cm H2O): 5 cmH2O  Oxygen Concentration (%): 35 %  Resp: (!) 31    2. INTAKE/ OUTPUT:   I/O last 3 completed shifts:  In: 2329.34 [I.V.:675.34; NG/GT:574]  Out: 4761 [Other:3311; Stool:1450]    3. PHYSICAL EXAMINATION:  General: Awake, somewhat  agitated, supine  HEENT: PERRL, MMM, scleral icterus, ETT in place, moving head frequently  Neuro: More awake today, responding to some questions with nodding head, moving all extremities spontaneously and intermittently following commands  Pulm/Resp: Breath sounds equal bilaterally, crackles intermittently, rhonchi worse in the LLL today, mechanically ventilated  CV: RRR, no murmur today; LE edema 3+  Abdomen: Distended, bowel sounds hyperactive, more soft today, tender diffusely to palpation  : No urine assessed  Incisions/Skin: Petechiae diffusely, jaundiced, scattered ecchymoses    4. LABS:   Arterial Blood Gases   Recent Labs   Lab 10/12/21  0405 10/11/21  0350 10/10/21  1835 10/10/21  1123   PH 7.51* 7.49* 7.49* 7.49*   PCO2 32* 33* 34* 34*   PO2 78* 79* 78* 71*   HCO3 25 25 25 25     Complete Blood Count   Recent Labs   Lab 10/12/21  0406 10/11/21  2010 10/11/21  1127 10/11/21  0349   WBC 16.8* 17.0* 18.5* 22.3*   HGB 6.7* 7.1* 7.3* 7.3*   PLT 78* 80* 78* 84*     Basic Metabolic Panel  Recent Labs   Lab 10/12/21  0410 10/12/21  0406 10/12/21  0010 10/11/21  2014 10/11/21  2010 10/11/21  1626 10/11/21  1127 10/11/21  0808 10/11/21  0349   NA  --  138  --   --  138  --  139  --  140   POTASSIUM  --  3.6  --   --  3.5  --  3.6  --  3.6   CHLORIDE  --  107  --   --  108  --  108  --  106   CO2  --  24  --   --  24  --  24  --  26   BUN  --  17  --   --  17  --  17  --  17   CR  --  0.72  --   --  0.76  --  0.75  --  0.75   * 193* 192* 177* 185*   < > 204*   < > 196*  188*    < > = values in this interval not displayed.     Liver Function Tests  Recent Labs   Lab 10/12/21  0406 10/11/21  2010 10/11/21  1127 10/11/21  0349 10/10/21  1123 10/10/21  0406 10/09/21  1209 10/09/21  0356   *  --   --  112*  --  117*  --  94*   ALT 31  --   --  29  --  26  --  18   ALKPHOS 266*  --   --  268*  --  245*  --  208*   BILITOTAL 9.9*  --   --  12.0*  --  12.7*  --  13.7*   ALBUMIN 2.2* 2.2* 2.4*  2.2* 2.4*    < > 2.5*   < > 2.6*   INR 1.30*  --   --  1.33*  --  1.29*  --  1.33*    < > = values in this interval not displayed.     Coagulation Profile  Recent Labs   Lab 10/12/21  0406 10/11/21  0349 10/10/21  0406 10/09/21  0356 10/08/21  0417 10/07/21  0821   INR 1.30* 1.33* 1.29* 1.33*   < >  --    PTT  --   --   --   --   --  44*    < > = values in this interval not displayed.       5. RADIOLOGY:   No results found for this or any previous visit (from the past 24 hour(s)).

## 2021-10-13 ENCOUNTER — APPOINTMENT (OUTPATIENT)
Dept: NEUROLOGY | Facility: CLINIC | Age: 46
End: 2021-10-13
Attending: STUDENT IN AN ORGANIZED HEALTH CARE EDUCATION/TRAINING PROGRAM
Payer: COMMERCIAL

## 2021-10-13 LAB
ALBUMIN SERPL-MCNC: 2 G/DL (ref 3.4–5)
ALBUMIN SERPL-MCNC: 2.2 G/DL (ref 3.4–5)
ALP SERPL-CCNC: 285 U/L (ref 40–150)
ALT SERPL W P-5'-P-CCNC: 36 U/L (ref 0–50)
ANION GAP SERPL CALCULATED.3IONS-SCNC: 7 MMOL/L (ref 3–14)
ANION GAP SERPL CALCULATED.3IONS-SCNC: 7 MMOL/L (ref 3–14)
ANION GAP SERPL CALCULATED.3IONS-SCNC: 9 MMOL/L (ref 3–14)
AST SERPL W P-5'-P-CCNC: 114 U/L (ref 0–45)
BACTERIA BLD CULT: NO GROWTH
BACTERIA BLD CULT: NO GROWTH
BASE EXCESS BLDA CALC-SCNC: 0.7 MMOL/L (ref -9–1.8)
BILIRUB DIRECT SERPL-MCNC: 8.1 MG/DL (ref 0–0.2)
BILIRUB SERPL-MCNC: 9.6 MG/DL (ref 0.2–1.3)
BUN SERPL-MCNC: 18 MG/DL (ref 7–30)
BUN SERPL-MCNC: 20 MG/DL (ref 7–30)
BUN SERPL-MCNC: 29 MG/DL (ref 7–30)
CA-I BLD-MCNC: 4.4 MG/DL (ref 4.4–5.2)
CA-I BLD-MCNC: 4.5 MG/DL (ref 4.4–5.2)
CALCIUM SERPL-MCNC: 8.2 MG/DL (ref 8.5–10.1)
CALCIUM SERPL-MCNC: 8.4 MG/DL (ref 8.5–10.1)
CALCIUM SERPL-MCNC: 8.4 MG/DL (ref 8.5–10.1)
CHLORIDE BLD-SCNC: 106 MMOL/L (ref 94–109)
CHLORIDE BLD-SCNC: 106 MMOL/L (ref 94–109)
CHLORIDE BLD-SCNC: 107 MMOL/L (ref 94–109)
CO2 SERPL-SCNC: 21 MMOL/L (ref 20–32)
CO2 SERPL-SCNC: 24 MMOL/L (ref 20–32)
CO2 SERPL-SCNC: 24 MMOL/L (ref 20–32)
CREAT SERPL-MCNC: 0.71 MG/DL (ref 0.52–1.04)
CREAT SERPL-MCNC: 0.77 MG/DL (ref 0.52–1.04)
CREAT SERPL-MCNC: 1.18 MG/DL (ref 0.52–1.04)
CREAT UR-SCNC: 9211 UMOL/L
CREATINE 24H UR-MRATE: NORMAL MG/24H
CREATINE/CREAT UR-SRTO: 27 MMOL/MOL CRT
ERYTHROCYTE [DISTWIDTH] IN BLOOD BY AUTOMATED COUNT: 23.2 % (ref 10–15)
ERYTHROCYTE [DISTWIDTH] IN BLOOD BY AUTOMATED COUNT: 23.7 % (ref 10–15)
GFR SERPL CREATININE-BSD FRML MDRD: 55 ML/MIN/1.73M2
GFR SERPL CREATININE-BSD FRML MDRD: >90 ML/MIN/1.73M2
GFR SERPL CREATININE-BSD FRML MDRD: >90 ML/MIN/1.73M2
GLUCOSE BLD-MCNC: 182 MG/DL (ref 70–99)
GLUCOSE BLD-MCNC: 194 MG/DL (ref 70–99)
GLUCOSE BLD-MCNC: 200 MG/DL (ref 70–99)
GLUCOSE BLDC GLUCOMTR-MCNC: 152 MG/DL (ref 70–99)
GLUCOSE BLDC GLUCOMTR-MCNC: 174 MG/DL (ref 70–99)
GLUCOSE BLDC GLUCOMTR-MCNC: 177 MG/DL (ref 70–99)
GLUCOSE BLDC GLUCOMTR-MCNC: 181 MG/DL (ref 70–99)
GLUCOSE BLDC GLUCOMTR-MCNC: 201 MG/DL (ref 70–99)
GLUCOSE BLDC GLUCOMTR-MCNC: 208 MG/DL (ref 70–99)
HCO3 BLD-SCNC: 25 MMOL/L (ref 21–28)
HCT VFR BLD AUTO: 25.8 % (ref 35–47)
HCT VFR BLD AUTO: 25.9 % (ref 35–47)
HGB BLD-MCNC: 7.9 G/DL (ref 11.7–15.7)
HGB BLD-MCNC: 8.1 G/DL (ref 11.7–15.7)
INR PPP: 1.25 (ref 0.85–1.15)
LACTATE SERPL-SCNC: 1.3 MMOL/L (ref 0.7–2)
MAGNESIUM SERPL-MCNC: 2.5 MG/DL (ref 1.6–2.3)
MAGNESIUM SERPL-MCNC: 2.5 MG/DL (ref 1.6–2.3)
MCH RBC QN AUTO: 32.5 PG (ref 26.5–33)
MCH RBC QN AUTO: 33.6 PG (ref 26.5–33)
MCHC RBC AUTO-ENTMCNC: 30.5 G/DL (ref 31.5–36.5)
MCHC RBC AUTO-ENTMCNC: 31.4 G/DL (ref 31.5–36.5)
MCV RBC AUTO: 107 FL (ref 78–100)
MCV RBC AUTO: 107 FL (ref 78–100)
O2/TOTAL GAS SETTING VFR VENT: 35 %
OXYHGB MFR BLD: 98 % (ref 92–100)
PCO2 BLD: 35 MM HG (ref 35–45)
PH BLD: 7.45 [PH] (ref 7.35–7.45)
PHOSPHATE SERPL-MCNC: 3.8 MG/DL (ref 2.5–4.5)
PHOSPHATE SERPL-MCNC: 3.8 MG/DL (ref 2.5–4.5)
PLATELET # BLD AUTO: 106 10E3/UL (ref 150–450)
PLATELET # BLD AUTO: 98 10E3/UL (ref 150–450)
PO2 BLD: 133 MM HG (ref 80–105)
POTASSIUM BLD-SCNC: 3.8 MMOL/L (ref 3.4–5.3)
POTASSIUM BLD-SCNC: 3.9 MMOL/L (ref 3.4–5.3)
POTASSIUM BLD-SCNC: 4.1 MMOL/L (ref 3.4–5.3)
PROT SERPL-MCNC: 5.3 G/DL (ref 6.8–8.8)
RBC # BLD AUTO: 2.41 10E6/UL (ref 3.8–5.2)
RBC # BLD AUTO: 2.43 10E6/UL (ref 3.8–5.2)
SODIUM SERPL-SCNC: 137 MMOL/L (ref 133–144)
WBC # BLD AUTO: 20.1 10E3/UL (ref 4–11)
WBC # BLD AUTO: 20.8 10E3/UL (ref 4–11)

## 2021-10-13 PROCEDURE — 87106 FUNGI IDENTIFICATION YEAST: CPT | Performed by: NURSE PRACTITIONER

## 2021-10-13 PROCEDURE — 36415 COLL VENOUS BLD VENIPUNCTURE: CPT | Performed by: PEDIATRICS

## 2021-10-13 PROCEDURE — 85014 HEMATOCRIT: CPT | Performed by: CLINICAL NURSE SPECIALIST

## 2021-10-13 PROCEDURE — 82248 BILIRUBIN DIRECT: CPT | Performed by: CLINICAL NURSE SPECIALIST

## 2021-10-13 PROCEDURE — 250N000009 HC RX 250: Performed by: CLINICAL NURSE SPECIALIST

## 2021-10-13 PROCEDURE — 84100 ASSAY OF PHOSPHORUS: CPT | Performed by: CLINICAL NURSE SPECIALIST

## 2021-10-13 PROCEDURE — 83735 ASSAY OF MAGNESIUM: CPT | Performed by: CLINICAL NURSE SPECIALIST

## 2021-10-13 PROCEDURE — 87040 BLOOD CULTURE FOR BACTERIA: CPT | Performed by: PEDIATRICS

## 2021-10-13 PROCEDURE — 250N000009 HC RX 250: Performed by: STUDENT IN AN ORGANIZED HEALTH CARE EDUCATION/TRAINING PROGRAM

## 2021-10-13 PROCEDURE — 80048 BASIC METABOLIC PNL TOTAL CA: CPT | Performed by: STUDENT IN AN ORGANIZED HEALTH CARE EDUCATION/TRAINING PROGRAM

## 2021-10-13 PROCEDURE — 250N000011 HC RX IP 250 OP 636: Performed by: STUDENT IN AN ORGANIZED HEALTH CARE EDUCATION/TRAINING PROGRAM

## 2021-10-13 PROCEDURE — 200N000002 HC R&B ICU UMMC

## 2021-10-13 PROCEDURE — 250N000013 HC RX MED GY IP 250 OP 250 PS 637: Performed by: STUDENT IN AN ORGANIZED HEALTH CARE EDUCATION/TRAINING PROGRAM

## 2021-10-13 PROCEDURE — 999N000157 HC STATISTIC RCP TIME EA 10 MIN

## 2021-10-13 PROCEDURE — 95711 VEEG 2-12 HR UNMONITORED: CPT

## 2021-10-13 PROCEDURE — 94003 VENT MGMT INPAT SUBQ DAY: CPT

## 2021-10-13 PROCEDURE — 250N000013 HC RX MED GY IP 250 OP 250 PS 637: Performed by: SURGERY

## 2021-10-13 PROCEDURE — 99291 CRITICAL CARE FIRST HOUR: CPT | Mod: GC | Performed by: STUDENT IN AN ORGANIZED HEALTH CARE EDUCATION/TRAINING PROGRAM

## 2021-10-13 PROCEDURE — 250N000013 HC RX MED GY IP 250 OP 250 PS 637: Performed by: NURSE PRACTITIONER

## 2021-10-13 PROCEDURE — 999N000015 HC STATISTIC ARTERIAL MONITORING DAILY

## 2021-10-13 PROCEDURE — 87086 URINE CULTURE/COLONY COUNT: CPT | Performed by: NURSE PRACTITIONER

## 2021-10-13 PROCEDURE — 84450 TRANSFERASE (AST) (SGOT): CPT | Performed by: CLINICAL NURSE SPECIALIST

## 2021-10-13 PROCEDURE — 84075 ASSAY ALKALINE PHOSPHATASE: CPT | Performed by: CLINICAL NURSE SPECIALIST

## 2021-10-13 PROCEDURE — 95718 EEG PHYS/QHP 2-12 HR W/VEEG: CPT | Performed by: PSYCHIATRY & NEUROLOGY

## 2021-10-13 PROCEDURE — 87040 BLOOD CULTURE FOR BACTERIA: CPT | Performed by: NURSE PRACTITIONER

## 2021-10-13 PROCEDURE — 85610 PROTHROMBIN TIME: CPT | Performed by: STUDENT IN AN ORGANIZED HEALTH CARE EDUCATION/TRAINING PROGRAM

## 2021-10-13 PROCEDURE — 82330 ASSAY OF CALCIUM: CPT | Performed by: CLINICAL NURSE SPECIALIST

## 2021-10-13 PROCEDURE — 82247 BILIRUBIN TOTAL: CPT | Performed by: CLINICAL NURSE SPECIALIST

## 2021-10-13 PROCEDURE — 258N000003 HC RX IP 258 OP 636: Performed by: STUDENT IN AN ORGANIZED HEALTH CARE EDUCATION/TRAINING PROGRAM

## 2021-10-13 PROCEDURE — 82805 BLOOD GASES W/O2 SATURATION: CPT | Performed by: STUDENT IN AN ORGANIZED HEALTH CARE EDUCATION/TRAINING PROGRAM

## 2021-10-13 PROCEDURE — 83605 ASSAY OF LACTIC ACID: CPT | Performed by: STUDENT IN AN ORGANIZED HEALTH CARE EDUCATION/TRAINING PROGRAM

## 2021-10-13 PROCEDURE — 258N000003 HC RX IP 258 OP 636: Performed by: NURSE PRACTITIONER

## 2021-10-13 PROCEDURE — 250N000011 HC RX IP 250 OP 636: Performed by: NURSE PRACTITIONER

## 2021-10-13 PROCEDURE — 80069 RENAL FUNCTION PANEL: CPT | Performed by: CLINICAL NURSE SPECIALIST

## 2021-10-13 PROCEDURE — 99233 SBSQ HOSP IP/OBS HIGH 50: CPT | Performed by: STUDENT IN AN ORGANIZED HEALTH CARE EDUCATION/TRAINING PROGRAM

## 2021-10-13 PROCEDURE — 85018 HEMOGLOBIN: CPT | Performed by: CLINICAL NURSE SPECIALIST

## 2021-10-13 RX ORDER — OXYCODONE HYDROCHLORIDE 5 MG/1
5-10 TABLET ORAL EVERY 4 HOURS PRN
Status: DISCONTINUED | OUTPATIENT
Start: 2021-10-13 | End: 2021-10-18

## 2021-10-13 RX ADMIN — RIFAXIMIN 550 MG: 550 TABLET ORAL at 19:34

## 2021-10-13 RX ADMIN — INSULIN ASPART 2 UNITS: 100 INJECTION, SOLUTION INTRAVENOUS; SUBCUTANEOUS at 19:47

## 2021-10-13 RX ADMIN — SODIUM CHLORIDE, POTASSIUM CHLORIDE, SODIUM LACTATE AND CALCIUM CHLORIDE 500 ML: 600; 310; 30; 20 INJECTION, SOLUTION INTRAVENOUS at 11:08

## 2021-10-13 RX ADMIN — Medication 2 PACKET: at 19:35

## 2021-10-13 RX ADMIN — FENTANYL CITRATE 50 MCG: 50 INJECTION INTRAMUSCULAR; INTRAVENOUS at 06:29

## 2021-10-13 RX ADMIN — OXYCODONE HYDROCHLORIDE 10 MG: 5 TABLET ORAL at 19:34

## 2021-10-13 RX ADMIN — ACETAMINOPHEN 325 MG: 325 TABLET, FILM COATED ORAL at 08:00

## 2021-10-13 RX ADMIN — POLYETHYLENE GLYCOL 3350 17 G: 17 POWDER, FOR SOLUTION ORAL at 07:56

## 2021-10-13 RX ADMIN — Medication 100 MCG: at 07:56

## 2021-10-13 RX ADMIN — THIAMINE HCL TAB 100 MG 100 MG: 100 TAB at 07:56

## 2021-10-13 RX ADMIN — PIPERACILLIN AND TAZOBACTAM 3.38 G: 3; .375 INJECTION, POWDER, LYOPHILIZED, FOR SOLUTION INTRAVENOUS at 03:15

## 2021-10-13 RX ADMIN — INSULIN ASPART 3 UNITS: 100 INJECTION, SOLUTION INTRAVENOUS; SUBCUTANEOUS at 15:25

## 2021-10-13 RX ADMIN — INSULIN ASPART 2 UNITS: 100 INJECTION, SOLUTION INTRAVENOUS; SUBCUTANEOUS at 08:18

## 2021-10-13 RX ADMIN — CALCIUM CHLORIDE, MAGNESIUM CHLORIDE, SODIUM CHLORIDE, SODIUM BICARBONATE, POTASSIUM CHLORIDE AND SODIUM PHOSPHATE DIBASIC DIHYDRATE 12.5 ML/KG/HR: 3.68; 3.05; 6.34; 3.09; .314; .187 INJECTION INTRAVENOUS at 00:10

## 2021-10-13 RX ADMIN — DEXMEDETOMIDINE 0.7 MCG/KG/HR: 100 INJECTION, SOLUTION, CONCENTRATE INTRAVENOUS at 19:19

## 2021-10-13 RX ADMIN — CALCIUM CARBONATE 600 MG (1,500 MG)-VITAMIN D3 400 UNIT TABLET 1 TABLET: at 07:55

## 2021-10-13 RX ADMIN — SALINE NASAL SPRAY 1 SPRAY: 1.5 SOLUTION NASAL at 11:09

## 2021-10-13 RX ADMIN — GABAPENTIN 100 MG: 250 SOLUTION ORAL at 07:55

## 2021-10-13 RX ADMIN — PIPERACILLIN AND TAZOBACTAM 3.38 G: 3; .375 INJECTION, POWDER, LYOPHILIZED, FOR SOLUTION INTRAVENOUS at 07:56

## 2021-10-13 RX ADMIN — CALCIUM CHLORIDE, MAGNESIUM CHLORIDE, SODIUM CHLORIDE, SODIUM BICARBONATE, POTASSIUM CHLORIDE AND SODIUM PHOSPHATE DIBASIC DIHYDRATE 12.5 ML/KG/HR: 3.68; 3.05; 6.34; 3.09; .314; .187 INJECTION INTRAVENOUS at 08:26

## 2021-10-13 RX ADMIN — CALCIUM CHLORIDE, MAGNESIUM CHLORIDE, SODIUM CHLORIDE, SODIUM BICARBONATE, POTASSIUM CHLORIDE AND SODIUM PHOSPHATE DIBASIC DIHYDRATE 12.5 ML/KG/HR: 3.68; 3.05; 6.34; 3.09; .314; .187 INJECTION INTRAVENOUS at 04:08

## 2021-10-13 RX ADMIN — INSULIN ASPART 3 UNITS: 100 INJECTION, SOLUTION INTRAVENOUS; SUBCUTANEOUS at 04:18

## 2021-10-13 RX ADMIN — INSULIN ASPART 1 UNITS: 100 INJECTION, SOLUTION INTRAVENOUS; SUBCUTANEOUS at 00:34

## 2021-10-13 RX ADMIN — Medication 40 MG: at 07:56

## 2021-10-13 RX ADMIN — ESCITALOPRAM OXALATE 10 MG: 5 SOLUTION ORAL at 07:56

## 2021-10-13 RX ADMIN — SALINE NASAL SPRAY 1 SPRAY: 1.5 SOLUTION NASAL at 19:34

## 2021-10-13 RX ADMIN — FENTANYL CITRATE 50 MCG: 50 INJECTION INTRAMUSCULAR; INTRAVENOUS at 14:34

## 2021-10-13 RX ADMIN — CALCIUM CHLORIDE, MAGNESIUM CHLORIDE, SODIUM CHLORIDE, SODIUM BICARBONATE, POTASSIUM CHLORIDE AND SODIUM PHOSPHATE DIBASIC DIHYDRATE 12.5 ML/KG/HR: 3.68; 3.05; 6.34; 3.09; .314; .187 INJECTION INTRAVENOUS at 00:09

## 2021-10-13 RX ADMIN — SALINE NASAL SPRAY 1 SPRAY: 1.5 SOLUTION NASAL at 15:25

## 2021-10-13 RX ADMIN — GABAPENTIN 100 MG: 250 SOLUTION ORAL at 11:10

## 2021-10-13 RX ADMIN — INSULIN GLARGINE 10 UNITS: 100 INJECTION, SOLUTION SUBCUTANEOUS at 07:56

## 2021-10-13 RX ADMIN — HYDROXYZINE HYDROCHLORIDE 50 MG: 25 TABLET, FILM COATED ORAL at 08:00

## 2021-10-13 RX ADMIN — OXYCODONE HYDROCHLORIDE 5 MG: 5 TABLET ORAL at 12:41

## 2021-10-13 RX ADMIN — POLYETHYLENE GLYCOL 3350 17 G: 17 POWDER, FOR SOLUTION ORAL at 19:34

## 2021-10-13 RX ADMIN — Medication 2 PACKET: at 07:56

## 2021-10-13 RX ADMIN — FENTANYL CITRATE 50 MCG: 50 INJECTION INTRAMUSCULAR; INTRAVENOUS at 17:20

## 2021-10-13 RX ADMIN — OXYCODONE HYDROCHLORIDE 5 MG: 5 TABLET ORAL at 15:02

## 2021-10-13 RX ADMIN — DEXMEDETOMIDINE 0.3 MCG/KG/HR: 100 INJECTION, SOLUTION, CONCENTRATE INTRAVENOUS at 09:58

## 2021-10-13 RX ADMIN — HYDROXYZINE HYDROCHLORIDE 50 MG: 25 TABLET, FILM COATED ORAL at 17:22

## 2021-10-13 RX ADMIN — GABAPENTIN 100 MG: 250 SOLUTION ORAL at 17:23

## 2021-10-13 RX ADMIN — RIFAXIMIN 550 MG: 550 TABLET ORAL at 07:56

## 2021-10-13 RX ADMIN — SALINE NASAL SPRAY 1 SPRAY: 1.5 SOLUTION NASAL at 07:56

## 2021-10-13 RX ADMIN — Medication 3 MG: at 19:34

## 2021-10-13 RX ADMIN — FENTANYL CITRATE 50 MCG: 50 INJECTION INTRAMUSCULAR; INTRAVENOUS at 00:17

## 2021-10-13 RX ADMIN — ACETAMINOPHEN 325 MG: 325 TABLET, FILM COATED ORAL at 15:29

## 2021-10-13 RX ADMIN — Medication 15 ML: at 11:14

## 2021-10-13 RX ADMIN — HEPARIN SODIUM 5000 UNITS: 5000 INJECTION, SOLUTION INTRAVENOUS; SUBCUTANEOUS at 04:18

## 2021-10-13 RX ADMIN — HEPARIN SODIUM 5000 UNITS: 5000 INJECTION, SOLUTION INTRAVENOUS; SUBCUTANEOUS at 15:29

## 2021-10-13 RX ADMIN — FOLIC ACID 1 MG: 1 TABLET ORAL at 07:55

## 2021-10-13 RX ADMIN — INSULIN ASPART 2 UNITS: 100 INJECTION, SOLUTION INTRAVENOUS; SUBCUTANEOUS at 11:20

## 2021-10-13 RX ADMIN — Medication: at 04:24

## 2021-10-13 ASSESSMENT — MIFFLIN-ST. JEOR: SCORE: 1481

## 2021-10-13 ASSESSMENT — ACTIVITIES OF DAILY LIVING (ADL)
ADLS_ACUITY_SCORE: 19
ADLS_ACUITY_SCORE: 21
ADLS_ACUITY_SCORE: 21
ADLS_ACUITY_SCORE: 19
ADLS_ACUITY_SCORE: 21
ADLS_ACUITY_SCORE: 19

## 2021-10-13 NOTE — PROGRESS NOTES
CRRT STATUS NOTE    DATA:  Time:  6:02 AM  Pressures WNL:  YES  Filter Status:  WDL    Problems Reported/Alarms Noted:  None    Supplies Present:  YES    ASSESSMENT:  Patient Net Fluid Balance:  -121 mL since MN  Vital Signs:  Temp     HR     BP mmHg    CVP     RR     SpO2  Labs:  Reviewed.   Goals of Therapy:  0-50 mL/hr net negative    INTERVENTIONS:   None needed.     PLAN:  If circuit goes down will not restart, plan to transition to iHD.

## 2021-10-13 NOTE — PLAN OF CARE
ICU End of Shift Summary. See flowsheets for vital signs and detailed assessment.     Changes this shift: RASS 1 to -2 overnight. PRN melatonin and PRN atarax given. Following commands. Dex at 0.4 to 0.6 overnight. Patient endorsed pain; unable to indicate location; PRN fentanyl given. Afebrile. SR to SB. Levo titrated to MAP goal >65. RR 30 to 50 at HS; dex increased and RR improved. Stool output 100 mL's since 0000. Voided 100 mL's via purewick. Unable to tolerate CRRT this morning 2/2 hypotension and increase pressor needs. Net negative 58 mL's thus far.    Plan: PST's as tolerated. Continue towards CRRT goal; possibly transition to HD.

## 2021-10-13 NOTE — PROGRESS NOTES
MEDICAL ICU PROGRESS NOTE  10/13/2021      Date of Service (when I saw the patient): 10/13/2021    ASSESSMENT: Aliyah Angeles is a 46 year old woman with alcohol use disorder (last drink 9/12/21), alcoholic hepatitis, jaimie en y gastric bypass, alcohol pancreatitis, pancreatic cyst, Type 2 DM, anxiety and depression admitted for abdominal pain, lower extremity edema, and nausea that started after starting low sodium and diabetic friendly foods in an alcohol treatment facility. Patient was admitted to the medicine floor with abdominal pain and vomiting on 9/29/2021. Overnight 10/3-10/4, patient had worsening respiratory status, concern for sepsis requiring transfer to the ICU for intubation and need for pressors as well as further evaluation and management for likely sepsis. On pressors, minimal sedation, and now off CRRT.     CHANGES and MAJOR THINGS TODAY:   - SBT as tolerated today  - Tachypnea, agitation, waxing and waning mental status limiting extubation  - Stopped CRRT today and associated labs  - Stopped vEEG  - Stopped zosyn after 10 days  - Panculture today  - Started oxycodone for pain    PLAN:     Neuro:  # Pain and sedation  She was sedated and intubated on 10/4. Minimal response to painful stimuli. Does spontaneously move all limbs and occasionally open her eyes now, following commands, nodding yes and no to questions.   - Precedex drip, wean as tolerated  - Fentanyl PRNs  - Started oxycodone PO  - Scheduled Tylenol     # Anxiety and depression   - PTA escitalopram     # Encephalopathy, likely acute toxic metabolic vs infection  In the setting of likely hepatic encephalopathy, infectious encephalopathy, sedation. SBP unlikely due to normal paracentesis eval and culture. Normal ammonia from 10/4. EEG with encephalopathy, no seizures. No acute findings on MRI.   - Miralax BID  - Goal 3-4 loose stools daily / or 500 mL to 1 L of stool daily  - On rifaximin  - Gabapentin 100 mg TID  - stopped vEEG    #  Alcohol use disorder  Last alcohol use 9/12.21.  - Thiamine, folic acid, MVI supplementation    Pulmonary:  # Acute hypoxemic respiratory failure  # ARDS  # Concern for infection, pneumonia and sepsis  Acute onset hypoxemia, bilateral opacities are consistent with ARDS. No evidence of cardiac etiology. Bilateral ground glass infiltrates on imaging, with bilateral pleural effusions. Treated with lung-protective ventilation, prone positioning. Intubated, bronch 10/4 with bile-like fluid, suspect aspiration as well as PMN predominance concerning for bacterial pneumonia. Currently stable on vent settings; supine; off NMB; off inhaled epo 10/7. Chest CT 10/6 showed ground glass and consolidative opacities throughout the lungs. CRRT 10/7 to 10/13.   - Continue oxygen support with oxygen and PEEP adjustments; wean as able  - Stopped CRRT due to intolerance with increasing pressor support requirements, attempt to wean off with improved renal function  - Infectious work-up and treatment as below  - Albuterol neb PRN  - SBT if patient is following commands (7/5); She tolerated 2 hours and then 1 hour at these settings.    Ventilation Mode: CMV/AC  (Continuous Mandatory Ventilation/ Assist Control)  FiO2 (%): 30 %  Rate Set (breaths/minute): 16 breaths/min  Tidal Volume Set (mL): 450 mL  PEEP (cm H2O): 5 cmH2O  Pressure Support (cm H2O): 7 cmH2O  Oxygen Concentration (%): 30 %  Resp: 20      # Respiratory alkalosis  Patient is driving her own ventilatory rate.      Cardiovascular:  # Hypotension, in the setting of likely septic shock, improving  # Edema, possible intravascular depletion with third-spacing  Echo 10/4 with hyperdynamic EF >70%. Moderate tricuspid insufficiency. Normal IVC. BNP was over 16,000 --> >28,000 (10/4) --> 17,000 (10/6). Normal trops 10/4. Appropriately elevated cortisol. She has been requiring pressors and has an ANA LAURA. She is currently third spacing most of the fluid she has been given. Treating for  sepsis. Her liver disease is likely contributing to the shock. Lactate is now WNL.   - Levophed for goal MAP >65  - LR bolus 500 mL today     # Lactic acidosis, resolved  This is in the setting of ANA LAURA that required requiring CRRT. CT scan 10/7 showing pneumonia, no clear intra-abdominal source of infection. Appears to be perfusing well, less concern for ischemia.      GI/Nutrition:  # Alcoholic hepatitis  # Ascites  # Hyperbilirubinemia  # History of RYGB  # Transaminitis  Patient has been sober since 9/12 and has been at a treatment facility since her discharge 9/21. Hepatomegaly and hepatic steatosis without cirrhosis. Diagnostic paracentesis on admission without SBP. Discontinued lactulose 10/1 and then had decreased bowel movements. Normal lipase on admission. CT A/P on admission with severe hepatic steatosis, portal HTN, ascites, splenomegaly (similar on repeat CT 10/6). Paracentesis 10/4 without SBP.  On 10/9, AST was 94 and trending up and continued to do so on 10/10. AST elevation possibly due to alcoholic liver disease, however sobriety started a month ago according to the chart review. Repeat paracentesis 10/11 not concerning for SBP.  - Trend LFT's biweekly  - GI following, appreciate recs  - Miralax BID  - Trend bilirubin, INR --> MELD labs     Renal/Fluids/Electrolytes:  # Anion gap metabolic acidosis, resolved  # Lactic acidosis, resolved  Likely 2/2 worsening cirrhosis in the setting of not taking meds as above in addition to gastric loses from vomiting. Partially compensating with respiratory alkalosis.   - CTM     # Acute kidney injury, concern for ATN, pre-renal, vs hepatorenal syndrome, improving  Baseline creatinine ~0.98, increased to 1.14 on 10/1/21. May be related to vomiting, lactulose, third spacing. Likely pre-renal. LE edema is prominent and worsening. Intravascular volume likely low; evidenced by low venous volume on echo. Did initially improve after one day of albumin. 50 mg/dl protein,  and moderate bilirubin on UA. Uncertain infection, awaiting cultures. Urine eosinophils negative.   - Consulted nephrology, appreciate recs  - Replacement protocols (Mg, Ph, K).   - Strict I/Os  - Avoid nephrotoxic agents     Endocrine:  # Type 2 diabetes mellitus  Her blood sugar was consistently over 200 as she approached her feeding goal rate, now improved.  - High-resistance sliding scale insulin  - Lantus 10 units daily  - Check BS q4h and PRN  - Goal BS between 100 and 160     ID:  # Septic shock, improving  # Pneumonia (aspiration vs CAP)  Concern for pneumonia, CAP vs HAP. Less concern for SBP given negative paracentesis on admission. Afebrile. Paracentesis 10/4 negative for SBP. Elevated procal and PMN's in BAL fluid 10/4 suggests a bacterial pneumonia, likely aspiration related due to her history of recent symptoms of vomiting. Following cultures, no clear infectious source yet. BD glucan was indeterminate due to hyperbilirubinemia. Procal down-trending, leukocytosis persistent. Candida did grow from the respiratory culture taken on 10/6. Cryptococcal antigen negative. Today, her WBC juan f by 3 since yesterday. Her WBC has remained elevated during her admission. If she does have a new infection, it is unlikely that it is susceptible to Zosyn considering she has been on it for 10 days. We will consider Meropenem if her clinical picture and labs are concerning for new infection. Procal has been down trending despite consistently high WBC.   - Finished Zosyn 10 day course today  - Pan-culture    Abx  - Zosyn 10/4-10/13  - Vanc 10/4-10/4  - Doxycycline 10/4-10/6  - Micafungin 10/6-10/9     Hematology:    # Anemia, macrocytic  # Coagulopathy  # Acute epistaxis  In the setting of alcohol use disorder, liver failure, alcoholic hepatitis. INR elevated. Likely 2/2 to B12 or folate deficiency due to long history of alcohol use disorder. On 10/7, 10/12 required transfusion. Hemolysis labs negative. Retic count  elevated. Stable hemoglobin.  - Transfuse for Hgb <7  - Follow INR  - Trend CBC  - Transfused overnight     # Thrombocytopenia  Persistent in the setting of renal disease, liver disease, chronic alcoholism, and after a +2.3 liter I/O balance. This was likely partially dilutional with blunted bone marrow response secondary to alcoholism. HIT screen and hemolysis work up negative.  - Transfuse for PLTs <10, or if active bleeding and platelets are <30     Musculoskeletal:  No active issues     Skin:  # Dry cracked skin on bilateral heels, improving  - Continue Eucerin to be applied topically     General Cares/Prophylaxis:    DVT Prophylaxis: Heparin subQ  GI Prophylaxis: PPI  Restraints: None  Family Communication: daily with mother  Code Status: Full Code     Lines/tubes/drains:  - PIV x2  - ETT  - Rice  - Central line  - Arterial line  - Hemodialysis line     Disposition:  - Medical ICU    Patient seen and findings/plan discussed with medical ICU staff, Dr. Quinn.    René Ascencio, MS4    Resident/Fellow Attestation   I, Hayley Severson, was present with the medical student who participated in the service and in the documentation of the note.  I have verified the history and personally performed the physical exam and medical decision making.  I agree with the assessment and plan of care as documented in the note.      Hayley Severson, MD-MPH  Internal Medicine-Pediatrics PGY-1    ====================================  INTERVAL HISTORY:   Nursing notes reviewed. Following simple commands. Fentanyl given x3. Levophed off. Stool output slowing, 200 mL since 0000. Tolerating net negative CRRT goal. Transfused overnight.     OBJECTIVE:   1. VITAL SIGNS:   Temp:  [97.5  F (36.4  C)-98.5  F (36.9  C)] 97.9  F (36.6  C)  Pulse:  [50-83] 75  Resp:  [17-41] 20  BP: (102)/(62) 102/62  MAP:  [58 mmHg-154 mmHg] 75 mmHg  Arterial Line BP: ()/() 104/56  FiO2 (%):  [30 %-35 %] 30 %  SpO2:  [93 %-100 %] 98 %  Ventilation  Mode: CMV/AC  (Continuous Mandatory Ventilation/ Assist Control)  FiO2 (%): 30 %  Rate Set (breaths/minute): 16 breaths/min  Tidal Volume Set (mL): 450 mL  PEEP (cm H2O): 5 cmH2O  Pressure Support (cm H2O): 7 cmH2O  Oxygen Concentration (%): 30 %  Resp: 20    2. INTAKE/ OUTPUT:   I/O last 3 completed shifts:  In: 3021.37 [I.V.:879.37; NG/GT:712]  Out: 3640 [Urine:100; Other:2940; Stool:600]    3. PHYSICAL EXAMINATION:  General: Following direction, nodding yes and no for questions, trying to speak  HEENT: PERRL, MMM, scleral icterus, ETT in place  Neuro: More awake today, responding to some questions with nodding head, moving all extremities spontaneously and following commands  Pulm/Resp: Breath sounds equal bilaterally, crackles intermittently, rhonchi present in the RLL.   CV: RRR, no murmur today; LE edema 3+  Abdomen: Distended, bowel sounds hyperactive, mild tenderness noted  : Very little urine  Incisions/Skin: Petechiae diffusely, jaundiced, scattered ecchymoses    4. LABS:   Arterial Blood Gases   Recent Labs   Lab 10/13/21  0410 10/12/21  0405 10/11/21  0350 10/10/21  1835   PH 7.45 7.51* 7.49* 7.49*   PCO2 35 32* 33* 34*   PO2 133* 78* 79* 78*   HCO3 25 25 25 25     Complete Blood Count   Recent Labs   Lab 10/13/21  1145 10/13/21  0410 10/12/21  2002 10/12/21  1136   WBC 20.8* 20.1* 19.0* 18.8*   HGB 7.9* 8.1* 8.0* 8.0*   * 98* 88* 89*     Basic Metabolic Panel  Recent Labs   Lab 10/13/21  1145 10/13/21  1120 10/13/21  0817 10/13/21  0415 10/13/21  0410 10/13/21  0033 10/12/21  2005 10/12/21  2002 10/12/21  1524 10/12/21  1136     --   --   --  137  --   --  137  --  139   POTASSIUM 3.8  --   --   --  3.9  --   --  3.8  --  3.6   CHLORIDE 106  --   --   --  106  --   --  107  --  108   CO2 24  --   --   --  24  --   --  22  --  24   BUN 20  --   --   --  18  --   --  18  --  18   CR 0.77  --   --   --  0.71  --   --  0.75  --  0.80   * 177* 181* 201* 194*   < >   < > 182*   < > 177*     < > = values in this interval not displayed.     Liver Function Tests  Recent Labs   Lab 10/13/21  1145 10/13/21  0410 10/12/21  2002 10/12/21  1136 10/12/21  0406 10/12/21  0406 10/11/21  1127 10/11/21  0349 10/10/21  1123 10/10/21  0406   AST  --  114*  --   --   --  118*  --  112*  --  117*   ALT  --  36  --   --   --  31  --  29  --  26   ALKPHOS  --  285*  --   --   --  266*  --  268*  --  245*   BILITOTAL  --  9.6*  --   --   --  9.9*  --  12.0*  --  12.7*   ALBUMIN 2.0* 2.2* 2.3* 2.1*   < > 2.2*   < > 2.4*   < > 2.5*   INR  --  1.25*  --   --   --  1.30*  --  1.33*  --  1.29*    < > = values in this interval not displayed.     Coagulation Profile  Recent Labs   Lab 10/13/21  0410 10/12/21  0406 10/11/21  0349 10/10/21  0406 10/08/21  0417 10/07/21  0821   INR 1.25* 1.30* 1.33* 1.29*   < >  --    PTT  --   --   --   --   --  44*    < > = values in this interval not displayed.       5. RADIOLOGY:   No results found for this or any previous visit (from the past 24 hour(s)).

## 2021-10-13 NOTE — PROGRESS NOTES
Nephrology Progress Note  10/13/2021            Aliayh Angeles is a 46 yof w/hx of ETOH use (Quit 9/12/2021) w/hepatitis, Fitz en Y bypass, Pancreatitis, DM2, Panc cyst who was admitted 9/29 from chem dep with N/V and worsening abdominal pain and edema.  Her course has been resp failure with ARDS and evolving ANA LAURA.  Noted to have received albumin from 10/1-10/3 (150g/day) with no improvement in Cr.  On Vanco/Zosyn to treat sepsis although cultures at time of consult are negative.  Nephrology consulted for evaluation of ANA LAURA and possible RRT, started CRRT on 10/7.       Interval History :   Mrs Angeles had CRRT started 10/7 for management of volume and acidosis, net negative 1.5L yesterday.  On one pressor and also has some a-line waveform variability. Stopping CRRT today and will give the rest of the day off, +/- HD run tomorrow depending on labs/hemodynamics/fluid balance.      Assessment & Recommendations:   ANA LAURA-Baseline Cr normal at 0.5 as recently as 9/14/2021, abdominal US pending currently.  Was at inpatient chem dep when she developed abdominal pain, N/V and worsening edema and was brought to [facility] on 9/29.  Was treated for UTI and started on Vanco/Zosyn for leukocytosis but decompensated and was intubated the evening of 10/3.  Noted she did receive albumin x3 days from 10/1-10/3 for ANA LAURA but actually worsened and Cr at time of consult is up to 2.  Lisa <5 on 10/3, ECHO done 10/4 showed hyperdynamic LV and normal IVC.  Etiology of ANA LAURA is likely ATN with underlying HRS physiology which made her more susceptible to hypoperfusion.  Started CRRT on 10/7 for management of volume and acidosis.                  -ANA LAURA, likely hypoperfusion in setting of baseline HRS physiology, possible contribution from vanco/zosyn and did receive contrast on 10/3.                -CRRT started 10/7, stopping today.  Will eval for iHD tomorrow vs the following day.                   -Dialysis consent signed and scanned in under media.  "      Volume-Has total body volume overload, net negative 1.5L yesterday, nearly back to admit wt, still on one pressor.  Planning 0-50cc/hr today.       Electrolytes/pH-K normal at 3.8 bicarb 24, ABG 7.45/35/133/25.       Liver-Unclear duration, quit drinking on 9/12/2021 and was at chem dep when acute issues started.  Bili is ~10 and INR is 1.3 and a bit improved.  GI following, not clear if she would be a transplant candidate eventually.       ID-On Zosyn, cultures so far have been negative (other than candida in sputum), diagnostic para done.       Nutrition-On Osmolite TF.          Seen and discussed with Dr Del Rosario     Recommendations were communicated to primary team via verbal communication.        MARIA ELENA Wade CNS  Clinical Nurse Specialist  [phone]    Review of Systems:   I reviewed the following systems:  ROS not done due to vent/sedation.     Physical Exam:   I/O last 3 completed shifts:  In: 3021.37 [I.V.:879.37; NG/GT:712]  Out: 3640 [Urine:100; Other:2940; Stool:600]   /77   Pulse 58   Temp 97.6  F (36.4  C) (Axillary)   Resp 20   Ht 1.626 m (5' 4\")   Wt 86.1 kg (189 lb 13.1 oz)   SpO2 99%   BMI 32.58 kg/m       GENERAL APPEARANCE: Intubated and sedated.    EYES: No scleral icterus  Pulmonary: lungs rhonchi to auscultation with equal breath sounds bilaterally, no clubbing or cyanosis  CV: Regular rhythm, normal rate, no rub   - Edema +2-3 LE/dependent  GI: soft, nontender, normal bowel sounds  MS: no evidence of inflammation in joints, no muscle tenderness  : + Rice, dark brown UOP  SKIN: no rash, warm, dry  NEURO: No focal deficits.      Labs:   All labs reviewed by me  Electrolytes/Renal - Recent Labs   Lab Test 10/13/21  0415 10/13/21  0410 10/13/21  0033 10/12/21  2005 10/12/21  2002 10/12/21  1524 10/12/21  1136   NA  --  137  --   --  137  --  139   POTASSIUM  --  3.9  --   --  3.8  --  3.6   CHLORIDE  --  106  --   --  107  --  108   CO2  --  24  --   --  22  --  24 "   BUN  --  18  --   --  18  --  18   CR  --  0.71  --   --  0.75  --  0.80   * 194* 152*   < > 182*   < > 177*   STEFFANY  --  8.4*  --   --  8.1*  --  8.0*   MAG  --  2.5*  --   --  2.4*  --  2.6*   PHOS  --  3.8  --   --  3.7  --  3.1    < > = values in this interval not displayed.       CBC -   Recent Labs   Lab Test 10/13/21  0410 10/12/21  2002 10/12/21  1136   WBC 20.1* 19.0* 18.8*   HGB 8.1* 8.0* 8.0*   PLT 98* 88* 89*       LFTs -   Recent Labs   Lab Test 10/13/21  0410 10/12/21  2002 10/12/21  1136 10/12/21  0406 10/12/21  0406 10/11/21  1127 10/11/21  0349   ALKPHOS 285*  --   --   --  266*  --  268*   BILITOTAL 9.6*  --   --   --  9.9*  --  12.0*   ALT 36  --   --   --  31  --  29   *  --   --   --  118*  --  112*   PROTTOTAL 5.3*  --   --   --  5.2*  --  5.3*   ALBUMIN 2.2* 2.3* 2.1*   < > 2.2*   < > 2.4*    < > = values in this interval not displayed.       Iron Panel -   Recent Labs   Lab Test 09/13/21  0642   EYAD 1,839*           Current Medications:    calcium carbonate 600 mg-vitamin D 400 units  1 tablet Oral or Feeding Tube Daily     cyanocobalamin  100 mcg Oral or Feeding Tube Daily     escitalopram  10 mg Oral or Feeding Tube Daily     folic acid  1 mg Oral or Feeding Tube Daily     gabapentin  100 mg Oral or Feeding Tube TID     heparin ANTICOAGULANT  5,000 Units Subcutaneous Q12H     insulin aspart  1-12 Units Subcutaneous Q4H     insulin glargine  10 Units Subcutaneous QAM AC     multivitamins w/minerals  15 mL Oral Daily     pantoprazole  40 mg Per Feeding Tube QAM AC     piperacillin-tazobactam  3.375 g Intravenous Q6H     polyethylene glycol  17 g Oral BID     [Held by provider] prazosin  1 mg Oral QPM     protein modular  2 packet Per Feeding Tube BID     rifaximin  550 mg Oral or Feeding Tube BID     sodium chloride  1 spray Both Nostrils 4x Daily     thiamine  100 mg Oral or Feeding Tube Daily       dexmedetomidine 0.4 mcg/kg/hr (10/13/21 0715)     dextrose       CRRT  replacement solution 12.5 mL/kg/hr (10/13/21 0408)     - MEDICATION INSTRUCTIONS -       norepinephrine 0.06 mcg/kg/min (10/13/21 0700)     - MEDICATION INSTRUCTIONS -       CRRT replacement solution 200 mL/hr at 10/12/21 1611     CRRT replacement solution 12.5 mL/kg/hr (10/13/21 0408)

## 2021-10-13 NOTE — PLAN OF CARE
ICU End of Shift Summary. See flowsheets for vital signs and detailed assessment.    Changes this shift: Precedex gtt titrated for restlessness/agitation; prn oxycodone added. EEG discontinued. Tolerated PST 7/5 X2 for 1.75 & 1 hr(s). Tmax 99, WBC increasing, pressor need increasing throughout day despite off CRRT-pan cultured & 500 LR bolus given.     Plan: Continue POC & notify team w/ changes or concerns.

## 2021-10-14 ENCOUNTER — APPOINTMENT (OUTPATIENT)
Dept: PHYSICAL THERAPY | Facility: CLINIC | Age: 46
End: 2021-10-14
Payer: COMMERCIAL

## 2021-10-14 ENCOUNTER — APPOINTMENT (OUTPATIENT)
Dept: CT IMAGING | Facility: CLINIC | Age: 46
End: 2021-10-14
Payer: COMMERCIAL

## 2021-10-14 ENCOUNTER — APPOINTMENT (OUTPATIENT)
Dept: SPEECH THERAPY | Facility: CLINIC | Age: 46
End: 2021-10-14
Payer: COMMERCIAL

## 2021-10-14 LAB
ALBUMIN SERPL-MCNC: 2 G/DL (ref 3.4–5)
ALP SERPL-CCNC: 325 U/L (ref 40–150)
ALT SERPL W P-5'-P-CCNC: 39 U/L (ref 0–50)
ANION GAP SERPL CALCULATED.3IONS-SCNC: 6 MMOL/L (ref 3–14)
ANION GAP SERPL CALCULATED.3IONS-SCNC: 7 MMOL/L (ref 3–14)
AST SERPL W P-5'-P-CCNC: 95 U/L (ref 0–45)
BASE EXCESS BLDA CALC-SCNC: -1.4 MMOL/L (ref -9–1.8)
BILIRUB DIRECT SERPL-MCNC: 6.9 MG/DL (ref 0–0.2)
BILIRUB SERPL-MCNC: 8.4 MG/DL (ref 0.2–1.3)
BUN SERPL-MCNC: 34 MG/DL (ref 7–30)
BUN SERPL-MCNC: 45 MG/DL (ref 7–30)
CALCIUM SERPL-MCNC: 8.2 MG/DL (ref 8.5–10.1)
CALCIUM SERPL-MCNC: 8.6 MG/DL (ref 8.5–10.1)
CHLORIDE BLD-SCNC: 105 MMOL/L (ref 94–109)
CHLORIDE BLD-SCNC: 106 MMOL/L (ref 94–109)
CO2 SERPL-SCNC: 22 MMOL/L (ref 20–32)
CO2 SERPL-SCNC: 24 MMOL/L (ref 20–32)
CORTIS SERPL-MCNC: 19.2 UG/DL (ref 4–22)
CREAT SERPL-MCNC: 1.29 MG/DL (ref 0.52–1.04)
CREAT SERPL-MCNC: 1.82 MG/DL (ref 0.52–1.04)
ERYTHROCYTE [DISTWIDTH] IN BLOOD BY AUTOMATED COUNT: 22.9 % (ref 10–15)
GFR SERPL CREATININE-BSD FRML MDRD: 33 ML/MIN/1.73M2
GFR SERPL CREATININE-BSD FRML MDRD: 49 ML/MIN/1.73M2
GLUCOSE BLD-MCNC: 195 MG/DL (ref 70–99)
GLUCOSE BLD-MCNC: 222 MG/DL (ref 70–99)
GLUCOSE BLDC GLUCOMTR-MCNC: 180 MG/DL (ref 70–99)
GLUCOSE BLDC GLUCOMTR-MCNC: 186 MG/DL (ref 70–99)
GLUCOSE BLDC GLUCOMTR-MCNC: 190 MG/DL (ref 70–99)
GLUCOSE BLDC GLUCOMTR-MCNC: 194 MG/DL (ref 70–99)
GLUCOSE BLDC GLUCOMTR-MCNC: 209 MG/DL (ref 70–99)
GLUCOSE BLDC GLUCOMTR-MCNC: 240 MG/DL (ref 70–99)
HCO3 BLD-SCNC: 23 MMOL/L (ref 21–28)
HCT VFR BLD AUTO: 26 % (ref 35–47)
HGB BLD-MCNC: 8.1 G/DL (ref 11.7–15.7)
LIPASE SERPL-CCNC: 366 U/L (ref 73–393)
MAGNESIUM SERPL-MCNC: 2.5 MG/DL (ref 1.6–2.3)
MCH RBC QN AUTO: 33.3 PG (ref 26.5–33)
MCHC RBC AUTO-ENTMCNC: 31.2 G/DL (ref 31.5–36.5)
MCV RBC AUTO: 107 FL (ref 78–100)
O2/TOTAL GAS SETTING VFR VENT: 30 %
OXYHGB MFR BLD: 95 % (ref 92–100)
PCO2 BLD: 34 MM HG (ref 35–45)
PH BLD: 7.44 [PH] (ref 7.35–7.45)
PHOSPHATE SERPL-MCNC: 5.3 MG/DL (ref 2.5–4.5)
PLATELET # BLD AUTO: 129 10E3/UL (ref 150–450)
PO2 BLD: 97 MM HG (ref 80–105)
POTASSIUM BLD-SCNC: 4.1 MMOL/L (ref 3.4–5.3)
POTASSIUM BLD-SCNC: 4.3 MMOL/L (ref 3.4–5.3)
PROT SERPL-MCNC: 5.5 G/DL (ref 6.8–8.8)
RBC # BLD AUTO: 2.43 10E6/UL (ref 3.8–5.2)
SODIUM SERPL-SCNC: 134 MMOL/L (ref 133–144)
SODIUM SERPL-SCNC: 136 MMOL/L (ref 133–144)
WBC # BLD AUTO: 22.6 10E3/UL (ref 4–11)

## 2021-10-14 PROCEDURE — 83735 ASSAY OF MAGNESIUM: CPT | Performed by: STUDENT IN AN ORGANIZED HEALTH CARE EDUCATION/TRAINING PROGRAM

## 2021-10-14 PROCEDURE — 36592 COLLECT BLOOD FROM PICC: CPT | Performed by: SURGERY

## 2021-10-14 PROCEDURE — 250N000009 HC RX 250: Performed by: STUDENT IN AN ORGANIZED HEALTH CARE EDUCATION/TRAINING PROGRAM

## 2021-10-14 PROCEDURE — 92610 EVALUATE SWALLOWING FUNCTION: CPT | Mod: GN

## 2021-10-14 PROCEDURE — 200N000002 HC R&B ICU UMMC

## 2021-10-14 PROCEDURE — 250N000013 HC RX MED GY IP 250 OP 250 PS 637: Performed by: STUDENT IN AN ORGANIZED HEALTH CARE EDUCATION/TRAINING PROGRAM

## 2021-10-14 PROCEDURE — 93005 ELECTROCARDIOGRAM TRACING: CPT

## 2021-10-14 PROCEDURE — 97530 THERAPEUTIC ACTIVITIES: CPT | Mod: GP

## 2021-10-14 PROCEDURE — 71250 CT THORAX DX C-: CPT

## 2021-10-14 PROCEDURE — 250N000013 HC RX MED GY IP 250 OP 250 PS 637: Performed by: NURSE PRACTITIONER

## 2021-10-14 PROCEDURE — 99291 CRITICAL CARE FIRST HOUR: CPT | Mod: GC | Performed by: STUDENT IN AN ORGANIZED HEALTH CARE EDUCATION/TRAINING PROGRAM

## 2021-10-14 PROCEDURE — 85014 HEMATOCRIT: CPT | Performed by: STUDENT IN AN ORGANIZED HEALTH CARE EDUCATION/TRAINING PROGRAM

## 2021-10-14 PROCEDURE — 82533 TOTAL CORTISOL: CPT | Performed by: STUDENT IN AN ORGANIZED HEALTH CARE EDUCATION/TRAINING PROGRAM

## 2021-10-14 PROCEDURE — 93010 ELECTROCARDIOGRAM REPORT: CPT | Performed by: INTERNAL MEDICINE

## 2021-10-14 PROCEDURE — 80048 BASIC METABOLIC PNL TOTAL CA: CPT | Performed by: SURGERY

## 2021-10-14 PROCEDURE — 74176 CT ABD & PELVIS W/O CONTRAST: CPT | Mod: 26 | Performed by: RADIOLOGY

## 2021-10-14 PROCEDURE — 92526 ORAL FUNCTION THERAPY: CPT | Mod: GN

## 2021-10-14 PROCEDURE — 250N000013 HC RX MED GY IP 250 OP 250 PS 637

## 2021-10-14 PROCEDURE — 84100 ASSAY OF PHOSPHORUS: CPT | Performed by: STUDENT IN AN ORGANIZED HEALTH CARE EDUCATION/TRAINING PROGRAM

## 2021-10-14 PROCEDURE — 99207 PR CONSULT E&M CHANGED TO INITIAL LEVEL: CPT | Performed by: PSYCHIATRY & NEUROLOGY

## 2021-10-14 PROCEDURE — 99233 SBSQ HOSP IP/OBS HIGH 50: CPT | Performed by: STUDENT IN AN ORGANIZED HEALTH CARE EDUCATION/TRAINING PROGRAM

## 2021-10-14 PROCEDURE — 250N000011 HC RX IP 250 OP 636: Performed by: STUDENT IN AN ORGANIZED HEALTH CARE EDUCATION/TRAINING PROGRAM

## 2021-10-14 PROCEDURE — 82248 BILIRUBIN DIRECT: CPT | Performed by: STUDENT IN AN ORGANIZED HEALTH CARE EDUCATION/TRAINING PROGRAM

## 2021-10-14 PROCEDURE — 82805 BLOOD GASES W/O2 SATURATION: CPT | Performed by: STUDENT IN AN ORGANIZED HEALTH CARE EDUCATION/TRAINING PROGRAM

## 2021-10-14 PROCEDURE — 999N000157 HC STATISTIC RCP TIME EA 10 MIN

## 2021-10-14 PROCEDURE — 82310 ASSAY OF CALCIUM: CPT | Performed by: STUDENT IN AN ORGANIZED HEALTH CARE EDUCATION/TRAINING PROGRAM

## 2021-10-14 PROCEDURE — 71250 CT THORAX DX C-: CPT | Mod: 26 | Performed by: RADIOLOGY

## 2021-10-14 PROCEDURE — 99222 1ST HOSP IP/OBS MODERATE 55: CPT | Performed by: PSYCHIATRY & NEUROLOGY

## 2021-10-14 PROCEDURE — 99233 SBSQ HOSP IP/OBS HIGH 50: CPT | Performed by: NURSE PRACTITIONER

## 2021-10-14 PROCEDURE — 83690 ASSAY OF LIPASE: CPT | Performed by: STUDENT IN AN ORGANIZED HEALTH CARE EDUCATION/TRAINING PROGRAM

## 2021-10-14 PROCEDURE — 258N000003 HC RX IP 258 OP 636: Performed by: STUDENT IN AN ORGANIZED HEALTH CARE EDUCATION/TRAINING PROGRAM

## 2021-10-14 PROCEDURE — 250N000011 HC RX IP 250 OP 636: Performed by: NURSE PRACTITIONER

## 2021-10-14 PROCEDURE — 250N000013 HC RX MED GY IP 250 OP 250 PS 637: Performed by: SURGERY

## 2021-10-14 RX ORDER — HYDROMORPHONE HCL IN WATER/PF 6 MG/30 ML
.2-.4 PATIENT CONTROLLED ANALGESIA SYRINGE INTRAVENOUS EVERY 4 HOURS PRN
Status: DISCONTINUED | OUTPATIENT
Start: 2021-10-14 | End: 2021-10-16

## 2021-10-14 RX ORDER — MIDODRINE HYDROCHLORIDE 5 MG/1
10 TABLET ORAL
Status: DISCONTINUED | OUTPATIENT
Start: 2021-10-14 | End: 2021-10-16

## 2021-10-14 RX ORDER — QUETIAPINE FUMARATE 25 MG/1
25 TABLET, FILM COATED ORAL AT BEDTIME
Status: DISCONTINUED | OUTPATIENT
Start: 2021-10-14 | End: 2021-10-22

## 2021-10-14 RX ADMIN — GABAPENTIN 100 MG: 250 SOLUTION ORAL at 18:40

## 2021-10-14 RX ADMIN — MIDODRINE HYDROCHLORIDE 10 MG: 5 TABLET ORAL at 11:45

## 2021-10-14 RX ADMIN — Medication 15 ML: at 11:45

## 2021-10-14 RX ADMIN — HYDROMORPHONE HYDROCHLORIDE 0.4 MG: 0.2 INJECTION, SOLUTION INTRAMUSCULAR; INTRAVENOUS; SUBCUTANEOUS at 09:09

## 2021-10-14 RX ADMIN — Medication 100 MCG: at 07:55

## 2021-10-14 RX ADMIN — Medication 3 MG: at 20:44

## 2021-10-14 RX ADMIN — Medication 2 PACKET: at 20:44

## 2021-10-14 RX ADMIN — INSULIN ASPART 2 UNITS: 100 INJECTION, SOLUTION INTRAVENOUS; SUBCUTANEOUS at 12:11

## 2021-10-14 RX ADMIN — POLYETHYLENE GLYCOL 3350 17 G: 17 POWDER, FOR SOLUTION ORAL at 07:55

## 2021-10-14 RX ADMIN — CALCIUM CARBONATE 600 MG (1,500 MG)-VITAMIN D3 400 UNIT TABLET 1 TABLET: at 07:55

## 2021-10-14 RX ADMIN — Medication 2 PACKET: at 07:57

## 2021-10-14 RX ADMIN — ESCITALOPRAM OXALATE 10 MG: 5 SOLUTION ORAL at 07:56

## 2021-10-14 RX ADMIN — ACETAMINOPHEN 325 MG: 325 TABLET, FILM COATED ORAL at 00:10

## 2021-10-14 RX ADMIN — INSULIN ASPART 2 UNITS: 100 INJECTION, SOLUTION INTRAVENOUS; SUBCUTANEOUS at 08:08

## 2021-10-14 RX ADMIN — FOLIC ACID 1 MG: 1 TABLET ORAL at 07:55

## 2021-10-14 RX ADMIN — DEXMEDETOMIDINE 0.7 MCG/KG/HR: 100 INJECTION, SOLUTION, CONCENTRATE INTRAVENOUS at 01:56

## 2021-10-14 RX ADMIN — OXYCODONE HYDROCHLORIDE 10 MG: 5 TABLET ORAL at 12:00

## 2021-10-14 RX ADMIN — HYDROXYZINE HYDROCHLORIDE 25 MG: 25 TABLET, FILM COATED ORAL at 07:53

## 2021-10-14 RX ADMIN — MIDODRINE HYDROCHLORIDE 10 MG: 5 TABLET ORAL at 09:22

## 2021-10-14 RX ADMIN — HEPARIN SODIUM 5000 UNITS: 5000 INJECTION, SOLUTION INTRAVENOUS; SUBCUTANEOUS at 03:50

## 2021-10-14 RX ADMIN — HYDROXYZINE HYDROCHLORIDE 50 MG: 25 TABLET, FILM COATED ORAL at 16:36

## 2021-10-14 RX ADMIN — OXYCODONE HYDROCHLORIDE 10 MG: 5 TABLET ORAL at 17:15

## 2021-10-14 RX ADMIN — HYDROMORPHONE HYDROCHLORIDE 0.4 MG: 0.2 INJECTION, SOLUTION INTRAMUSCULAR; INTRAVENOUS; SUBCUTANEOUS at 15:04

## 2021-10-14 RX ADMIN — OXYCODONE HYDROCHLORIDE 10 MG: 5 TABLET ORAL at 04:04

## 2021-10-14 RX ADMIN — DOCUSATE SODIUM 50 MG AND SENNOSIDES 8.6 MG 1 TABLET: 8.6; 5 TABLET, FILM COATED ORAL at 07:55

## 2021-10-14 RX ADMIN — SALINE NASAL SPRAY 1 SPRAY: 1.5 SOLUTION NASAL at 16:37

## 2021-10-14 RX ADMIN — Medication 50 MG: at 17:15

## 2021-10-14 RX ADMIN — INSULIN ASPART 3 UNITS: 100 INJECTION, SOLUTION INTRAVENOUS; SUBCUTANEOUS at 23:47

## 2021-10-14 RX ADMIN — Medication 40 MG: at 07:56

## 2021-10-14 RX ADMIN — THIAMINE HCL TAB 100 MG 100 MG: 100 TAB at 07:55

## 2021-10-14 RX ADMIN — SALINE NASAL SPRAY 1 SPRAY: 1.5 SOLUTION NASAL at 20:45

## 2021-10-14 RX ADMIN — RIFAXIMIN 550 MG: 550 TABLET ORAL at 07:55

## 2021-10-14 RX ADMIN — FENTANYL CITRATE 25 MCG: 50 INJECTION INTRAMUSCULAR; INTRAVENOUS at 06:45

## 2021-10-14 RX ADMIN — GABAPENTIN 100 MG: 250 SOLUTION ORAL at 11:45

## 2021-10-14 RX ADMIN — SALINE NASAL SPRAY 1 SPRAY: 1.5 SOLUTION NASAL at 12:01

## 2021-10-14 RX ADMIN — MIDODRINE HYDROCHLORIDE 10 MG: 5 TABLET ORAL at 18:40

## 2021-10-14 RX ADMIN — INSULIN ASPART 5 UNITS: 100 INJECTION, SOLUTION INTRAVENOUS; SUBCUTANEOUS at 16:34

## 2021-10-14 RX ADMIN — OXYCODONE HYDROCHLORIDE 10 MG: 5 TABLET ORAL at 23:47

## 2021-10-14 RX ADMIN — INSULIN ASPART 3 UNITS: 100 INJECTION, SOLUTION INTRAVENOUS; SUBCUTANEOUS at 20:45

## 2021-10-14 RX ADMIN — Medication 0.12 MCG/KG/MIN: at 01:57

## 2021-10-14 RX ADMIN — RIFAXIMIN 550 MG: 550 TABLET ORAL at 20:44

## 2021-10-14 RX ADMIN — HYDROMORPHONE HYDROCHLORIDE 0.4 MG: 0.2 INJECTION, SOLUTION INTRAMUSCULAR; INTRAVENOUS; SUBCUTANEOUS at 20:44

## 2021-10-14 RX ADMIN — OXYCODONE HYDROCHLORIDE 10 MG: 5 TABLET ORAL at 00:10

## 2021-10-14 RX ADMIN — OXYCODONE HYDROCHLORIDE 10 MG: 5 TABLET ORAL at 07:53

## 2021-10-14 RX ADMIN — SALINE NASAL SPRAY 1 SPRAY: 1.5 SOLUTION NASAL at 07:57

## 2021-10-14 RX ADMIN — INSULIN ASPART 4 UNITS: 100 INJECTION, SOLUTION INTRAVENOUS; SUBCUTANEOUS at 04:06

## 2021-10-14 RX ADMIN — INSULIN GLARGINE 10 UNITS: 100 INJECTION, SOLUTION SUBCUTANEOUS at 08:08

## 2021-10-14 RX ADMIN — QUETIAPINE FUMARATE 25 MG: 25 TABLET ORAL at 21:05

## 2021-10-14 RX ADMIN — HEPARIN SODIUM 5000 UNITS: 5000 INJECTION, SOLUTION INTRAVENOUS; SUBCUTANEOUS at 16:37

## 2021-10-14 RX ADMIN — INSULIN ASPART 3 UNITS: 100 INJECTION, SOLUTION INTRAVENOUS; SUBCUTANEOUS at 00:20

## 2021-10-14 ASSESSMENT — MIFFLIN-ST. JEOR: SCORE: 1500

## 2021-10-14 ASSESSMENT — ACTIVITIES OF DAILY LIVING (ADL)
ADLS_ACUITY_SCORE: 19
ADLS_ACUITY_SCORE: 17
ADLS_ACUITY_SCORE: 19

## 2021-10-14 NOTE — PROGRESS NOTES
"[facility]  HEPATOLOGY PROGRESS NOTE  Aliyah Angeles [id number]       CC: fluid volume overload  SUBJECTIVE:  States she wants to go to the \"[facility]\". She states she is having significant abdominal pain. Per RN, has received pain medications without improvement. Denies nausea, vomiting.     ROS:  A 10-point review of systems was negative.    OBJECTIVE:  VS: /62   Pulse 94   Temp 98.2  F (36.8  C) (Axillary)   Resp (!) 32   Ht 1.626 m (5' 4\")   Wt 88 kg (194 lb 0.1 oz)   SpO2 95%   BMI 33.30 kg/m       Gross per 24 hour   Intake 2416.25 ml   Output 620 ml   Net 1796.25 ml     General:  no facial muscle wasting  Neuro: AOx2, no asterixis  Lymph: No cervical lymphadenopathy  HEENT:  mild scleral icterus, Nooral lesions  CV:Skin warm and dry  Lungs: Respirations even and nonlabored   Abd:  moderately distended, tender with any palpation.   Extrem: +2 lower peripheral edema   Skin: mild jaundice  Psych: pleasant    MEDICATIONS:  Current Facility-Administered Medications   Medication     0.9% sodium chloride BOLUS     acetaminophen (TYLENOL) tablet 325 mg    Or     acetaminophen (TYLENOL) Suppository 325 mg     albuterol (PROVENTIL) neb solution 2.5 mg     bisacodyl (DULCOLAX) Suppository 10 mg     calcium carbonate 600 mg-vitamin D 400 units (CALTRATE) per tablet 1 tablet     carboxymethylcellulose PF (REFRESH PLUS) 0.5 % ophthalmic solution 1 drop     cyanocobalamin (VITAMIN B-12) tablet 100 mcg     dextrose 10% infusion     glucose gel 15-30 g    Or     dextrose 50 % injection 25-50 mL    Or     glucagon injection 1 mg     diphenhydrAMINE (BENADRYL) capsule 25-50 mg     emollient (VANICREAM) cream     escitalopram (LEXAPRO) solution 10 mg     folic acid (FOLVITE) tablet 1 mg     gabapentin (NEURONTIN) solution 100 mg     heparin ANTICOAGULANT injection 5,000 Units     HYDROmorphone (DILAUDID) injection 0.2-0.4 mg     hydrOXYzine (ATARAX) tablet 25 mg    Or     hydrOXYzine (ATARAX) tablet " 50 mg     insulin aspart (NovoLOG) injection (RAPID ACTING)     insulin glargine (LANTUS PEN) injection 10 Units     lidocaine (LMX4) cream     lidocaine 1 % 0.1-1 mL     melatonin tablet 3 mg     midodrine (PROAMATINE) tablet 10 mg     multivitamins w/minerals liquid 15 mL     naloxone (NARCAN) injection 0.2 mg    Or     naloxone (NARCAN) injection 0.4 mg    Or     naloxone (NARCAN) injection 0.2 mg    Or     naloxone (NARCAN) injection 0.4 mg     norepinephrine (LEVOPHED) 16 mg in  mL infusion MAX CONC CENTRAL LINE     oxyCODONE (ROXICODONE) tablet 5-10 mg     pantoprazole (PROTONIX) 2 mg/mL suspension 40 mg     Patient may continue current oral medications     polyethylene glycol (MIRALAX) Packet 17 g     [Held by provider] prazosin (MINIPRESS) capsule 1 mg     protein modular (PROSOURCE TF) 2 packet     rifaximin (XIFAXAN) tablet 550 mg     senna-docusate (SENOKOT-S/PERICOLACE) 8.6-50 MG per tablet 1 tablet     sodium chloride (OCEAN) 0.65 % nasal spray 1 spray     sodium chloride (PF) 0.9% PF flush 3 mL     thiamine (B-1) tablet 100 mg       REVIEW OF LABORATORY, PATHOLOGY AND IMAGING RESULTS:  BMP  Recent Labs   Lab 10/14/21  0807 10/14/21  0328 10/14/21  0020 10/13/21  2202 10/13/21  1524 10/13/21  1145 10/13/21  0415 10/13/21  0410   NA  --  136  --  137  --  137  --  137   POTASSIUM  --  4.1  --  4.1  --  3.8  --  3.9   CHLORIDE  --  106  --  107  --  106  --  106   STEFFANY  --  8.2*  --  8.2*  --  8.4*  --  8.4*   CO2  --  24  --  21  --  24  --  24   BUN  --  34*  --  29  --  20  --  18   CR  --  1.29*  --  1.18*  --  0.77  --  0.71   * 222* 209* 200*   < > 182*   < > 194*    < > = values in this interval not displayed.     CBC  Recent Labs   Lab 10/14/21  0328 10/13/21  1145 10/13/21  1145 10/13/21  0410 10/13/21  0410 10/12/21  2002 10/12/21  2002   WBC 22.6*  --  20.8*  --  20.1*  --  19.0*   RBC 2.43*  --  2.43*  --  2.41*  --  2.42*   HGB 8.1*   < > 7.9*   < > 8.1*   < > 8.0*   HCT 26.0*  --   25.9*  --  25.8*  --  25.7*   *  --  107*  --  107*  --  106*   MCH 33.3*  --  32.5  --  33.6*  --  33.1*   MCHC 31.2*  --  30.5*  --  31.4*  --  31.1*   RDW 22.9*  --  23.2*  --  23.7*  --  23.8*   *  --  106*  --  98*  --  88*    < > = values in this interval not displayed.     INR  Recent Labs   Lab 10/13/21  0410 10/12/21  0406 10/11/21  0349 10/10/21  0406   INR 1.25* 1.30* 1.33* 1.29*     LFTs  Recent Labs   Lab 10/14/21  0328 10/13/21  1145 10/13/21  0410 10/12/21  2002 10/12/21  1136 10/12/21  0406 10/11/21  1127 10/11/21  0349   ALKPHOS 325*  --  285*  --   --  266*  --  268*   AST 95*  --  114*  --   --  118*  --  112*   ALT 39  --  36  --   --  31  --  29   BILITOTAL 8.4*  --  9.6*  --   --  9.9*  --  12.0*   PROTTOTAL 5.5*  --  5.3*  --   --  5.2*  --  5.3*   ALBUMIN 2.0* 2.0* 2.2* 2.3*   < > 2.2*   < > 2.4*    < > = values in this interval not displayed.      PANC  Recent Labs   Lab 10/14/21  0328   LIPASE 366      MELD-Na score: 21 at 10/14/2021  3:28 AM  MELD score: 20 at 10/14/2021  3:28 AM  Calculated from:  Serum Creatinine: 1.29 mg/dL at 10/14/2021  3:28 AM  Serum Sodium: 136 mmol/L at 10/14/2021  3:28 AM  Total Bilirubin: 8.4 mg/dL at 10/14/2021  3:28 AM  INR(ratio): 1.25 at 10/13/2021  4:10 AM  Age: 46 years      Imaging Results:    CT chest/abd wo 10/14/21  IMPRESSION:  1. Improved pulmonary opacities reflecting improved infection and  edema. Decreased small bilateral pleural effusions.  2. Increased fluid stranding and enlarged lymph nodes in the central  mesentery and surrounding the pancreas, likely due to fluid third  spacing rather than pancreatitis given normal lipase. Anasarca.  Differential includes mesenteric adenitis or lymphoma.  3. Marked hepatic steatosis. Splenomegaly.    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  She developed respiratory failure and ARDS on 10/3 requiring pronation and pressor support, extubated on 10/13/21.     RECOMMENDATIONS:  1. Alcohol hepatitis  - last alcohol use 9/12.   - US and CT with hepatomegaly and severe steatosis. She does have portal hypertension in setting of severe hepatitis.   - Continued on tube feeding.     2. Ascites  - para as needed, please send diagnostic sample with each para.     3. ANA LAURA  - likely multifactorial with recent vomiting, diarrhea, alcohol hepatitis.   - followed by nephrology and CRRT on hold.    4. Hepatic encephalopathy  - having mild change in mentation, improving.   - now with abdominal pain- no clear cause with noncontrast imaging. Has been on miralax for stool output.     Patient was discussed with attending physician, Dr. Jackson    Next follow up appointment: tbd    Thank you for the opportunity to be involved with  Aliyah Angeles [facility]. Please call with any questions or concerns.    MARIA ELENA Grey, CNP  Inpatient Hepatology [facility]  Text Link

## 2021-10-14 NOTE — PLAN OF CARE
ICU End of Shift Summary. See flowsheets for vital signs and detailed assessment.    Changes this shift: Drowsy, but arouses to voice; oriented x4. Following commands. Voice hoarse. Sedation turned off. C/O of abdominal pain; PRN oxycodone 10 mg with tylenol 325 mg given along with PRN fentanyl x1. TMAX 99.6. NSR. Intermittent hypotension. Levo at 0.1. Patient self extubated this morning. Using HFNC at 30% on 15 LPM. SpO2 97%. Fair, productive cough. No stool since 0000. Straight cath done for culture (20 mL's).     Plan: Reassess need for HD. Monitor respiratory status after self-extubation. Titrate Levo to MAP goal >65.

## 2021-10-14 NOTE — CONSULTS
"Consult Date: 10/14/2021    PSYCHIATRY CONSULTATION    IDENTIFICATION:  Ms. Aliyah Angeles is a 46-year-old white female with a history of serious alcoholic liver disease with multiple previous admissions.  I am asked to evaluate her intermittent agitation and mood liability by Dr. Severson    HISTORY:  Prior to interviewing this patient, I had an opportunity to review the electronic medical record and note that this patient has had multiple detox admissions in our institution and at least 2 CD treatments at [facility].  In 2021, she was seen by Dr. Kerr on an inpatient basis.  On two occasions she has been transferred to [facility] and then developed withdrawal and sent back to Dr. Kerr's service.  This year, she was again in [facility], developed medical sequela and came back to our institution, where she developed respiratory failure and required intubation.  She has had a prolonged hospitalization with intubation, dialysis and increasing WBCs, but happily her INR is coming down and her mentation seems to be improving.    On my interview, the patient was clearly experiencing mood lability.  She also reported auditory hallucinations, which she finds quite embarrassing.  She admits to being confused, particularly at night.  Her daughter mentions that she has had a marked \"moodiness,\" intermittent tearfulness, and nursing staff reports that after 1 phone discussion with family, she reported that she would just as soon die.  However, she has not voiced a clear suicidal plan and on my interview, she was not reporting suicidal ideation.  She was very concerned about her auditory hallucinations and lack of sleep and seemed quite relieved when I suggested there would likely be treatment available.  She does not believe she has been on Seroquel in the past.  She does have a history of depression and has been treated with Lexapro.    For more specifics of this patient's chemical dependency history, please see the admission note " "completed by Dr. Kerr on 04/09/2020, and also a note from Dr. Kerr on 02/06/2020, which suggests that she had a brief period of abusing Adderall, and back in 2014, a brief period of abusing tramadol.    Today, the treatment team ordered a fresh EKG and I note that her QTc has come down from 483 to 469, so I believe Seroquel will be safe and we can go ahead and restart her Lexapro at 10 mg in the morning.  I recommend a Seroquel dose of 25 mg at bedtime, may repeat x 1 if she does not sleep.    PAST MEDICAL HISTORY:  Includes acne, ADHD, arthralgia of the temporomandibular joint, arthritis, cervical high-risk HPV, cobalamin deficiency, history of blood transfusion, insomnia, Raynaud's phenomenon and vitamin D deficiency.  The chart also suggests that she has filled \"multiple controlled substances with multiple prescribers.\"    FAMILY HISTORY:  The patient's daughter was recently diagnosed with depression.  The patient's mother had depression and sister had anxiety.  Family  substance abuse history is unclear.    SOCIAL HISTORY:  The patient is a smoker and has about a [id number] history.  She has abused various substances, but mainly alcohol.  She is somewhat estranged from various family members, but her daughter is supportive and in the room.  The patient is .    PHYSICAL REVIEW OF SYSTEMS:  The patient denied headache or problems with vision, but she reports she has difficulty hearing bilaterally.  She denied chest pain or shortness of breath, but does have chronic back pain and belly pain.  She reports she has been able to make some urine and she has had a bowel movement.  She has generalized weakness, but denies specific issues with muscles, skin or joints, other than her back.    MENTAL STATUS EXAM:  On my interview, the patient was lying down in her Lillian chair with multiple tubes, including a feeding tube, oxygen cannula and IVs.  She reports that her mood is variable.  Her affect has been " labile.  Her speech was somewhat difficult to hear, but usually coherent.  Her associations were tight.  Her thought processes were slow, but generally logical and linear.  Her content of thought includes embarrassing auditory hallucinations.  She has had intermittent suicidal description, but currently her content of thought was without suicidal intent or plan.  Recent and remote memory both seemed mildly impaired, as was fund of knowledge; however, she was alert and oriented x 3.  Use of language and concentration were generally intact.  Insight and judgment remain somewhat guarded secondary to waxing and waning mental status.  Muscle strength and tone are decreased.  Recent vitals include a blood pressure of 102/62, temperature of 98.2, pulse of 91 and respiration rate of 28 with 96% oxygen saturation.  Recent MRI from 10/08/2021 did not reveal specific indications for anoxic encephalopathy.    IMPRESSION:  Delirium, likely multifactorial including potential hepatic encephalopathy as well as infection and recent sedation.    RECOMMENDATIONS:  Seroquel 25 mg at bedtime, may repeat x1, may reinstitute Lexapro 10 mg in the morning.    Aakash Hdz MD        D: 10/14/2021   T: 10/14/2021   MT: SHAKILA/SERGOA    Name:     MAGALY FAJARDO  MRN:      [id number]        Account:      [id number]   :      1975           Consult Date: 10/14/2021     Document: [id number]

## 2021-10-14 NOTE — PROGRESS NOTES
Nephrology Progress Note  10/14/2021         Aliyah Angeles is a 46 yof w/hx of ETOH use (Quit 9/12/2021) w/hepatitis, Fitz en Y bypass, Pancreatitis, DM2, Panc cyst who was admitted 9/29 from chem dep with N/V and worsening abdominal pain and edema.  Her course has been resp failure with ARDS and evolving ANA LAURA.  Noted to have received albumin from 10/1-10/3 (150g/day) with no improvement in Cr.  On Vanco/Zosyn to treat sepsis although cultures at time of consult are negative.  Nephrology consulted for evaluation of ANA LAURA and possible RRT, started CRRT on 10/7.       Interval History :   Mrs Angeles had CRRT started 10/7 for management of volume and acidosis, net positive ~1L yesterday, self extubated overnight and is fairly stable from resp standpoint.  Considered HD run today but is on one pressor and has no electrolye issue pushing for clearance, will hold for now and consider HD vs back to CRRT tomorrow am.       Assessment & Recommendations:   ANA LAURA-Baseline Cr normal at 0.5 as recently as 9/14/2021, abdominal US pending currently.  Was at inpatient chem dep when she developed abdominal pain, N/V and worsening edema and was brought to [facility] on 9/29.  Was treated for UTI and started on Vanco/Zosyn for leukocytosis but decompensated and was intubated the evening of 10/3.  Noted she did receive albumin x3 days from 10/1-10/3 for ANA LAURA but actually worsened and Cr at time of consult is up to 2.  Lisa <5 on 10/3, ECHO done 10/4 showed hyperdynamic LV and normal IVC.  Etiology of ANA LAURA is likely ATN with underlying HRS physiology which made her more susceptible to hypoperfusion.  Started CRRT on 10/7 for management of volume and acidosis.                  -ANA LAURA, likely hypoperfusion in setting of baseline HRS physiology, possible contribution from vanco/zosyn and did receive contrast on 10/3.                -CRRT started 10/7, stopped 10/13.  No issue pushing for run today, will hold off and plan for iHD vs CRRT tomorrow am  "depending on hemodynamics.                   -Dialysis consent signed and scanned in under media.       Volume-Has total body volume overload, net positive ~1L with stopping CRRT, self extubated overnight and resp status is reasonable.  Still on norepi at moderate dose.       Electrolytes/pH-K normal at 4.1 bicarb 24, ABG 7.44/34/97/25.       Liver-Unclear duration, quit drinking on 9/12/2021 and was at chem dep when acute issues started.  Bili is ~10 and INR is 1.3 and a bit improved.  GI following, not clear if she would be a transplant candidate eventually.       ID-On Zosyn, cultures so far have been negative (other than candida in sputum), diagnostic para done.       Nutrition-On Osmolite TF.          Seen and discussed with Dr Del Rosario     Recommendations were communicated to primary team via verbal communication.        MARIA ELENA Wade CNS  Clinical Nurse Specialist  [phone]    Review of Systems:   I reviewed the following systems:  ROS not done due to vent/sedation.     Physical Exam:   I/O last 3 completed shifts:  In: 2572.24 [I.V.:742.24; NG/GT:750]  Out: 895 [Urine:20; Other:275; Stool:600]   /62   Pulse 89   Temp 99.2  F (37.3  C) (Oral)   Resp 23   Ht 1.626 m (5' 4\")   Wt 88 kg (194 lb 0.1 oz)   SpO2 95%   BMI 33.30 kg/m       GENERAL APPEARANCE: Intubated and sedated.    EYES: No scleral icterus  Pulmonary: lungs rhonchi to auscultation with equal breath sounds bilaterally, no clubbing or cyanosis  CV: Regular rhythm, normal rate, no rub   - Edema +2-3 LE/dependent  GI: soft, nontender, normal bowel sounds  MS: no evidence of inflammation in joints, no muscle tenderness  : + Rice, dark brown UOP  SKIN: no rash, warm, dry  NEURO: No focal deficits.      Labs:   All labs reviewed by me  Electrolytes/Renal - Recent Labs   Lab Test 10/14/21  0807 10/14/21  0328 10/14/21  0020 10/13/21  2202 10/13/21  1946 10/13/21  1524 10/13/21  1145 10/13/21  0415 10/13/21  0410   NA  --  136  --  " 137  --   --  137   < > 137   POTASSIUM  --  4.1  --  4.1  --   --  3.8   < > 3.9   CHLORIDE  --  106  --  107  --   --  106   < > 106   CO2  --  24  --  21  --   --  24   < > 24   BUN  --  34*  --  29  --   --  20   < > 18   CR  --  1.29*  --  1.18*  --   --  0.77   < > 0.71   * 222* 209* 200*   < >   < > 182*   < > 194*   STEFFANY  --  8.2*  --  8.2*  --   --  8.4*   < > 8.4*   MAG  --  2.5*  --   --   --   --  2.5*  --  2.5*   PHOS  --  5.3*  --   --   --   --  3.8  --  3.8    < > = values in this interval not displayed.       CBC -   Recent Labs   Lab Test 10/14/21  0328 10/13/21  1145 10/13/21  0410   WBC 22.6* 20.8* 20.1*   HGB 8.1* 7.9* 8.1*   * 106* 98*       LFTs -   Recent Labs   Lab Test 10/14/21  0328 10/13/21  1145 10/13/21  0410 10/12/21  1136 10/12/21  0406   ALKPHOS 325*  --  285*  --  266*   BILITOTAL 8.4*  --  9.6*  --  9.9*   ALT 39  --  36  --  31   AST 95*  --  114*  --  118*   PROTTOTAL 5.5*  --  5.3*  --  5.2*   ALBUMIN 2.0* 2.0* 2.2*   < > 2.2*    < > = values in this interval not displayed.       Iron Panel -   Recent Labs   Lab Test 09/13/21  0642   EYAD 1,839*           Current Medications:    calcium carbonate 600 mg-vitamin D 400 units  1 tablet Oral or Feeding Tube Daily     cyanocobalamin  100 mcg Oral or Feeding Tube Daily     escitalopram  10 mg Oral or Feeding Tube Daily     folic acid  1 mg Oral or Feeding Tube Daily     gabapentin  100 mg Oral or Feeding Tube TID     heparin ANTICOAGULANT  5,000 Units Subcutaneous Q12H     insulin aspart  1-12 Units Subcutaneous Q4H     insulin glargine  10 Units Subcutaneous QAM AC     midodrine  10 mg Oral TID     multivitamins w/minerals  15 mL Oral Daily     pantoprazole  40 mg Per Feeding Tube QAM AC     polyethylene glycol  17 g Oral BID     [Held by provider] prazosin  1 mg Oral QPM     protein modular  2 packet Per Feeding Tube BID     rifaximin  550 mg Oral or Feeding Tube BID     sodium chloride  1 spray Both Nostrils 4x Daily      thiamine  100 mg Oral or Feeding Tube Daily       dextrose       norepinephrine 0.12 mcg/kg/min (10/14/21 1000)     - MEDICATION INSTRUCTIONS -

## 2021-10-14 NOTE — PROGRESS NOTES
10/14/21 1315   General Information   Onset of Illness/Injury or Date of Surgery 09/29/21   Referring Physician Severson, Hayley, MD   Patient/Family Therapy Goal Statement (SLP) None stated   Pertinent History of Current Problem Pt is a 46 year old female with a history of alcohol use disorder and alcohol hepatitis complicated by RYGB, anxiety, PTSD, alcohol pancreatitis, pancreatic cyst, DM II who was admitted with complaints of vomiting, anasarca and abdominal pain.  She developed respiratory failure and ARDS on 10/3 requiring pronation and pressor support, extubated on 10/13/21. D/t prolonged intubation, clinical swallow eval completed per MD order.    General Observations Alert, requires encouragement to participate   Past History of Dysphagia None    Type of Evaluation   Type of Evaluation Swallow Evaluation   Oral Motor   Oral Musculature generally intact   Structural Abnormalities none present   Mucosal Quality good   Dentition (Oral Motor)   Dentition (Oral Motor) natural dentition   Facial Symmetry (Oral Motor)   Facial Symmetry (Oral Motor) WNL   Lip Function (Oral Motor)   Lip Range of Motion (Oral Motor) WNL   Tongue Function (Oral Motor)   Tongue ROM (Oral Motor) WNL   Jaw Function (Oral Motor)   Jaw Function (Oral Motor) WNL   Cough/Swallow/Gag Reflex (Oral Motor)   Comment, Cough/Swallow/Gag Reflex (Oral Motor) WNL   Vocal Quality/Secretion Management (Oral Motor)   Vocal Quality (Oral Motor) hoarse   Comment, Vocal Quality/Secretion Management (Oral Motor) Pt endorsed voice is subjectively hoarse, but strong and clear to this clinician   General Swallowing Observations   Current Diet/Method of Nutritional Intake (General Swallowing Observations, NIS) NPO;nasogastric tube (NG)   Respiratory Support (General Swallowing Observations) nasal cannula  (2LPM)   Swallowing Evaluation Clinical swallow evaluation   Clinical Swallow Evaluation   Feeding Assistance minimal assistance required   Clinical  Swallow Evaluation Textures Trialed thin liquids;mildly thick liquids;pureed;solid foods   Clinical Swallow Eval: Thin Liquid Texture Trial   Mode of Presentation, Thin Liquids cup;self-fed   Volume of Liquid or Food Presented 4oz thin water   Oral Phase of Swallow   (oral holding)   Pharyngeal Phase of Swallow impaired;repeated swallows;throat clearing;wet vocal quality after swallow   Diagnostic Statement Pt noted to demonstrate inconsistent bolus holding. Pt required occasional cues to swallow. After repeated swallows, pt demonstrated throat clear and wet vocal quality. One instance of strong prolonged coughing.   Clinical Swallow Eval: Mildly Thick Liquids   Mode of Presentation cup;self-fed   Volume Presented 4oz mildly thick liquids   Oral Phase   (oral holding)   Pharyngeal Phase intact   Diagnostic Statement Improved swallow with thick liquids, no s/sx of aspiration   Clinical Swallow Evaluation: Puree Solid Texture Trial   Mode of Presentation, Puree spoon;self-fed   Volume of Puree Presented 3oz pudding   Oral Phase, Puree WFL   Pharyngeal Phase, Puree intact   Diagnostic Statement WFL, no overt s/sx of aspiration   Clinical Swallow Evaluation: Solid Food Texture Trial   Mode of Presentation self-fed   Volume Presented 1/2 cracker   Oral Phase   (prolonged and inefficient mastication)   Pharyngeal Phase intact   Diagnostic Statement Oral phase prolonged with inefficient mastication. No s/sx of aspiration after pt swallowed.   Esophageal Phase of Swallow   Patient reports or presents with symptoms of esophageal dysphagia No   Swallowing Recommendations   Diet Consistency Recommendations minced & moist (level 5);mildly thick liquids (level 2)   Supervision Level for Intake 1:1 supervision needed   Mode of Delivery Recommendations bolus size, small;slow rate of intake   Monitoring/Assistance Required (Eating/Swallowing) stop eating activities when fatigue is present;monitor for cough or change in vocal  quality with intake   Recommended Feeding/Eating Techniques (Swallow Eval) maintain upright sitting position for eating   Medication Administration Recommendations, Swallowing (SLP) via [place]   Instrumental Assessment Recommendations instrumental evaluation not recommended at this time   General Therapy Interventions   Planned Therapy Interventions Dysphagia Treatment   Dysphagia treatment Oropharyngeal exercise training;Modified diet education;Instruction of safe swallow strategies;Compensatory strategies for swallowing   SLP Therapy Assessment/Plan   Criteria for Skilled Therapeutic Interventions Met (SLP Eval) yes;treatment indicated   SLP Diagnosis Mild dysphagia s/p prolonged intubation   Rehab Potential (SLP Eval) good, to achieve stated therapy goals   Therapy Frequency (SLP Eval) 5 times/wk   Predicted Duration of Therapy Intervention (SLP Eval) 1 week   Comment, Therapy Assessment/Plan (SLP) Clinical swallow eval completed per MD order. Pt presents with mild dysphagia s/p prolonged intubation. Oral mech exam unremarkable aside from pt's report of subjective hoarseness. Pt assessed with thin liquids, mildly-thick liquids, puree, and cracker. Oral phase prolonged for cracker, and oral holding appreciated with liquids. Pharyngeal phase characterized by repeated swallows, throat clearing, and wet vocal quality with thin liquids, improved with thickened liquids. Recommend minced and moist diet (IDDSI 5), mildly-thick liquids (IDDSI 2) with 1:1 assistance. Ensure pt is upright and alert for all PO. SLP will follow.   Therapy Plan Review/Discharge Plan (SLP)   Therapy Plan Review (SLP) evaluation/treatment results reviewed;care plan/treatment goals reviewed;risks/benefits reviewed;current/potential barriers reviewed;participants voiced agreement with care plan;participants included;patient;daughter   Demonstrates Need for Referral to Another Service (SLP) clinical nutrition services/dietitian   SLP Discharge Planning     SLP Discharge Recommendation (DC Rec) Transitional Care Facility   SLP Rationale for DC Rec Dysphagia   SLP Brief overview of current status  Recommend minced and moist diet (IDDSI 5), mildly-thick liquids (IDDSI 2) with 1:1 assistance. Ensure pt is upright and alert for all PO. SLP will follow.    Total Evaluation Time   Total Evaluation Time (Minutes) 22

## 2021-10-14 NOTE — PROGRESS NOTES
MEDICAL ICU PROGRESS NOTE  10/14/2021      Date of Service (when I saw the patient): 10/14/2021    ASSESSMENT: Aliyah Angeles is a 46 year old woman with alcohol use disorder (last drink 9/12/21), alcoholic hepatitis, jaimie en y gastric bypass, alcohol pancreatitis, pancreatic cyst, Type 2 DM, anxiety and depression admitted for abdominal pain, lower extremity edema, and nausea that started after starting low sodium and diabetic friendly foods in an alcohol treatment facility. Patient was admitted to the medicine floor with abdominal pain and vomiting on 9/29/2021. Overnight 10/3-10/4, patient had worsening respiratory status, concern for sepsis requiring transfer to the ICU for intubation and need for pressors as well as further evaluation and management for likely sepsis. Self extubated 10/14. Currently off sedation and CRRT, awake with severe abdominal pain.     CHANGES and MAJOR THINGS TODAY:   - Self-extubated overnight, on HFNC  - Psych consult, agitated and wanting to die  - Stop fentanyl, start Dilaudid PRN instead  - Check lipase, cortisol  - CT chest, abdomen&pelvis w/o contrast  - Speech consult  - Discontinue Precedex  - Start midodrine  - Start seroquel at bedtime for sleep    PLAN:     Neuro:  # Pain  She was sedated and intubated on 10/4. Self-extubated 10/14 AM. Awake and alert, oriented. Talking and answering questions appropriately.   - Stop Precedex, fentanyl  - Start Dilaudid  - Started oxycodone PO  - Scheduled Tylenol     # Anxiety and depression   She stated today that she just wanted water and didn't care if it killed her. Delirium. She feels like she is dying. She is having a lot of abdominal pain. Psych recommends Seroquel at bedtime.   - PTA escitalopram  - Psych consult, appreciate recs  - Ordered PRN Seroquel 25 mg @ bedtime     # Encephalopathy, likely acute toxic metabolic vs infection, resolved  In the setting of likely hepatic encephalopathy, infectious encephalopathy,  sedation. SBP unlikely due to normal paracentesis eval and culture. Normal ammonia from 10/4. EEG with encephalopathy, no seizures. No acute findings on MRI. She does not appear encephalopathic now.  - Miralax BID  - Goal 3-4 loose stools daily / or 500 mL to 1 L of stool daily  - On rifaximin  - Gabapentin 100 mg TID  - Consider increasing bowel regimen to meet goals    # Alcohol use disorder  Last alcohol use 9/12.21.  - Thiamine, folic acid, MVI supplementation    Pulmonary:  # Acute hypoxemic respiratory failure  # ARDS  # Concern for infection, pneumonia and sepsis  Acute onset hypoxemia, bilateral opacities are consistent with ARDS. No evidence of cardiac etiology. Bilateral ground glass infiltrates on imaging, with bilateral pleural effusions. Treated with lung-protective ventilation, prone positioning. Intubated, bronch 10/4 with bile-like fluid, suspect aspiration as well as PMN predominance concerning for bacterial pneumonia. Currently stable on vent settings; supine; off NMB; off inhaled epo 10/7. Chest CT 10/6 showed ground glass and consolidative opacities throughout the lungs. CRRT 10/7 to 10/13. On the morning of 10/14, she self extubated and has been stable on high flow today.   - Infectious work-up and treatment as below  - Albuterol neb PRN  - On HFNC --> Step down to nasal cannula if possible    # Respiratory alkalosis  Improved     Cardiovascular:  # Hypotension, in the setting of likely septic shock  # Edema, possible intravascular depletion with third-spacing  Echo 10/4 with hyperdynamic EF >70%. Moderate tricuspid insufficiency. Normal IVC. She has been requiring pressors and has an ANA LAURA. Treating for sepsis. Her liver disease is likely contributing to the shock. She has been requiring increasing pressors that last couple of days. Concerned for septic shock. See ID.   - Levophed for goal MAP >65  - Started midodrine 10 mg TID for increasing pressor requirements  - Check cortisol for adrenal  response  - EKG to assess QTc     # Lactic acidosis, resolved  This is in the setting of ANA LAURA that required requiring CRRT. CT scan 10/7 showing pneumonia, no clear intra-abdominal source of infection. Appears to be perfusing well, less concern for ischemia.      GI/Nutrition:  # Alcoholic hepatitis  # Ascites  # Hyperbilirubinemia  # History of RYGB  # Transaminitis  Patient has been sober since 9/12 and has been at a treatment facility since her discharge 9/21. Hepatomegaly and hepatic steatosis without cirrhosis. Diagnostic paracentesis on admission without SBP. Discontinued lactulose 10/1 and then had decreased bowel movements. Normal lipase on admission. CT A/P on admission with severe hepatic steatosis, portal HTN, ascites, splenomegaly (similar on repeat CT 10/6). Paracentesis 10/4 without SBP.  On 10/9, AST was 94 and trending up and continued to do so on 10/10. AST elevation possibly due to alcoholic liver disease, however sobriety started a month ago according to the chart review. Repeat paracentesis 10/11 not concerning for SBP. Now that she is more awake, she is able to endorse severe abdominal pain. Her abdomen is soft, but with distention consistent with ascites. Dx includes pancreatitis, cholecystitis, chronic abdominal pain ISO cirrhosis and ascites; less concern for acute pathology.   - CT chest, abdomen, pelvis today  - Swallow study with SLP, appreciate recs for diet  - Trend LFT's biweekly  - GI following, appreciate recs  - Miralax BID  - Trend bilirubin, INR --> MELD labs  - Lipase today     Renal/Fluids/Electrolytes:  # Anion gap metabolic acidosis, resolved  # Lactic acidosis, resolved  - CTM     # Acute kidney injury, concern for ATN, pre-renal, vs hepatorenal syndrome, improving  Baseline creatinine ~0.98. May be related to vomiting, lactulose, third spacing. Likely pre-renal. She was on CRRT from 10/7 to 10/13. They stopped due to increasing pressor needs and intolerance, some urine output.    - Consulted nephrology, appreciate recs  - Likely will require iHD  - Replacement protocols (Mg, Ph, K).   - Strict I/Os  - Avoid nephrotoxic agents     Endocrine:  # Type 2 diabetes mellitus  Her blood sugar was consistently over 200 as she approached her feeding goal rate, has somewhat stabilized. CTM.  - High-resistance sliding scale insulin  - Lantus 10 units daily  - Check BS q4h and PRN  - Goal BS between 100 and 180     ID:  # Septic shock, improving  # Pneumonia (aspiration vs CAP)  Concern for pneumonia, CAP vs HAP. Less concern for SBP given negative paracentesis on admission. Afebrile. Paracentesis 10/4 negative for SBP. Elevated procal and PMN's in BAL fluid 10/4 suggests a bacterial pneumonia, likely aspiration related due to her history of recent symptoms of vomiting. Following cultures, no clear infectious source yet. Over the last several days, she has been having a steady rise in WBC count after down-trending for a few day. Her Zosyn was stopped yesterday after a 10 day course. If she does have a new infection, it is unlikely that it is susceptible to Zosyn considering she has been on it for 10 days.   - Follow-up cultures  - Pan-scan  - Will consider Meropenem if she continues to require more pressors due to concern for septic shock    Abx  - Zosyn 10/4-10/13  - Vanc 10/4-10/4  - Doxycycline 10/4-10/6  - Micafungin 10/6-10/9     Hematology:    # Anemia, macrocytic  # Coagulopathy  # Acute epistaxis  In the setting of alcohol use disorder, liver failure, alcoholic hepatitis. INR elevated. Likely 2/2 to B12 or folate deficiency due to long history of alcohol use disorder. On 10/7, 10/12 required transfusion. Hemolysis labs negative. Retic count elevated. Stable hemoglobin.  - Transfuse for Hgb <7  - Follow INR  - Trend CBC     # Thrombocytopenia  Persistent in the setting of renal disease, liver disease, chronic alcoholism, and after a +2.3 liter I/O balance. This was likely partially dilutional  with blunted bone marrow response secondary to alcoholism. HIT screen and hemolysis work up negative.  - Transfuse for PLTs <10, or if active bleeding and platelets are <30     Musculoskeletal:  No active issues     Skin:  # Dry cracked skin on bilateral heels, improving  - Continue Eucerin to be applied topically     General Cares/Prophylaxis:    DVT Prophylaxis: Heparin subQ  GI Prophylaxis: PPI  Restraints: None  Family Communication: daily with mother  Code Status: Full Code     Lines/tubes/drains:  - PIV x2  - ETT  - Rice  - Central line  - Arterial line  - Hemodialysis line     Disposition:  - Medical ICU    Patient seen and findings/plan discussed with medical ICU staff, Dr. Quinn.    René Ascencio, MS4    Resident/Fellow Attestation   I, Hayley Severson, was present with the medical student who participated in the service and in the documentation of the note.  I have verified the history and personally performed the physical exam and medical decision making.  I agree with the assessment and plan of care as documented in the note.      Hayley Severson, MD-MPH  Internal Medicine-Pediatrics PGY-1    ====================================  INTERVAL HISTORY:   Nursing notes reviewed. Self extubated this AM. Stable on HF. Endorsing severe abdominal and bilateral leg pain. Begging to drink water. Stating that she doesn't care if the water kills her. Concerned that EMT's will take her back to rehab. Claims she got a call last night that they were coming to get her. No SOB. No pain on deep inhalation.     OBJECTIVE:   1. VITAL SIGNS:   Temp:  [98.2  F (36.8  C)-99.6  F (37.6  C)] 98.2  F (36.8  C)  Pulse:  [61-97] 91  Resp:  [13-33] 28  MAP:  [55 mmHg-108 mmHg] 75 mmHg  Arterial Line BP: ()/() 101/55  FiO2 (%):  [30 %-40 %] 30 %  SpO2:  [86 %-100 %] 96 %    2. INTAKE/ OUTPUT:   I/O last 3 completed shifts:  In: 2572.24 [I.V.:742.24; NG/GT:750]  Out: 895 [Urine:20; Other:275; Stool:600]    3. PHYSICAL  "EXAMINATION:  General: Awake and alert. Conversing appropriately, but with some delusions.   HEENT: PERRL, MMM, scleral icterus, HFNC in place  Neuro: Awake, alert, answering questions appropriately, moving all extremities, no focal deficits  Pulm/Resp: Breath sounds equal bilaterally, wheezes intermittently, rhonchi present in the RLL.   CV: RRR, no murmur today; LE edema 2+  Abdomen: Distended but soft, bowel sounds hyperactive, significant diffuse tenderness  : Rice in place, did not assess urine  Incisions/Skin: Petechiae diffusely, jaundiced, scattered ecchymoses  Psych: Stating \"Just let me drink water and die.\" Very tearful. Somewhat delirious.     4. LABS:   Arterial Blood Gases   Recent Labs   Lab 10/14/21  0328 10/13/21  0410 10/12/21  0405 10/11/21  0350   PH 7.44 7.45 7.51* 7.49*   PCO2 34* 35 32* 33*   PO2 97 133* 78* 79*   HCO3 23 25 25 25     Complete Blood Count   Recent Labs   Lab 10/14/21  0328 10/13/21  1145 10/13/21  0410 10/12/21  2002   WBC 22.6* 20.8* 20.1* 19.0*   HGB 8.1* 7.9* 8.1* 8.0*   * 106* 98* 88*     Basic Metabolic Panel  Recent Labs   Lab 10/14/21  1211 10/14/21  0807 10/14/21  0328 10/14/21  0020 10/13/21  2202 10/13/21  1946 10/13/21  1524 10/13/21  1145 10/13/21  0415 10/13/21  0410   NA  --   --  136  --  137  --   --  137  --  137   POTASSIUM  --   --  4.1  --  4.1  --   --  3.8  --  3.9   CHLORIDE  --   --  106  --  107  --   --  106  --  106   CO2  --   --  24  --  21  --   --  24  --  24   BUN  --   --  34*  --  29  --   --  20  --  18   CR  --   --  1.29*  --  1.18*  --   --  0.77  --  0.71   * 180* 222* 209* 200*   < >   < > 182*   < > 194*    < > = values in this interval not displayed.     Liver Function Tests  Recent Labs   Lab 10/14/21  0328 10/13/21  1145 10/13/21  0410 10/12/21  2002 10/12/21  1136 10/12/21  0406 10/11/21  1127 10/11/21  0349 10/10/21  1123 10/10/21  0406   AST 95*  --  114*  --   --  118*  --  112*  --  117*   ALT 39  --  36  --   " --  31  --  29  --  26   ALKPHOS 325*  --  285*  --   --  266*  --  268*  --  245*   BILITOTAL 8.4*  --  9.6*  --   --  9.9*  --  12.0*  --  12.7*   ALBUMIN 2.0* 2.0* 2.2* 2.3*   < > 2.2*   < > 2.4*   < > 2.5*   INR  --   --  1.25*  --   --  1.30*  --  1.33*  --  1.29*    < > = values in this interval not displayed.     Coagulation Profile  Recent Labs   Lab 10/13/21  0410 10/12/21  0406 10/11/21  0349 10/10/21  0406   INR 1.25* 1.30* 1.33* 1.29*       5. RADIOLOGY:   Pending CT

## 2021-10-14 NOTE — PLAN OF CARE
"ICU End of Shift Summary. See flowsheets for vital signs and detailed assessment.    Changes this shift: Patient very anxious and seemingly delirious today. Answers orientation questions correctly but very forgetful. Said to nurse this AM \"God just let me die\", MICU notified and psych consult ordered. Atarax given x2, 10 mg oxy q 4 hours for abdominal/back pain, dilaudid PRN given x2 for breakthrough. Repositioned frequently. PRN benadryl given for itching. CT chest/abdomen/pelvis done. Levophed gtt titrated for MAP>65, midodrine added. Afebrile, tolerating 2L NC. Cleared for nectar thick liquids by SLP. Rectal tube removed, patient had adequate stool output today. 2-3 small, immeasurable voids. Up to chair with lift, transfer back to bed with walker/2 assist.     Plan:  Continue to wean levophed gtt. Notify MICU of any changes overnight.   "

## 2021-10-15 ENCOUNTER — APPOINTMENT (OUTPATIENT)
Dept: SPEECH THERAPY | Facility: CLINIC | Age: 46
End: 2021-10-15
Payer: COMMERCIAL

## 2021-10-15 ENCOUNTER — APPOINTMENT (OUTPATIENT)
Dept: OCCUPATIONAL THERAPY | Facility: CLINIC | Age: 46
End: 2021-10-15
Payer: COMMERCIAL

## 2021-10-15 ENCOUNTER — APPOINTMENT (OUTPATIENT)
Dept: PHYSICAL THERAPY | Facility: CLINIC | Age: 46
End: 2021-10-15
Payer: COMMERCIAL

## 2021-10-15 LAB
ANION GAP SERPL CALCULATED.3IONS-SCNC: 8 MMOL/L (ref 3–14)
ATRIAL RATE - MUSE: 92 BPM
BACTERIA SPT CULT: ABNORMAL
BACTERIA SPT CULT: ABNORMAL
BASOPHILS # BLD AUTO: 0.1 10E3/UL (ref 0–0.2)
BASOPHILS # BLD AUTO: 0.1 10E3/UL (ref 0–0.2)
BASOPHILS NFR BLD AUTO: 0 %
BASOPHILS NFR BLD AUTO: 0 %
BUN SERPL-MCNC: 54 MG/DL (ref 7–30)
CALCIUM SERPL-MCNC: 8.6 MG/DL (ref 8.5–10.1)
CHLORIDE BLD-SCNC: 103 MMOL/L (ref 94–109)
CO2 SERPL-SCNC: 23 MMOL/L (ref 20–32)
CREAT SERPL-MCNC: 1.93 MG/DL (ref 0.52–1.04)
DIASTOLIC BLOOD PRESSURE - MUSE: NORMAL MMHG
EOSINOPHIL # BLD AUTO: 0.2 10E3/UL (ref 0–0.7)
EOSINOPHIL # BLD AUTO: 0.2 10E3/UL (ref 0–0.7)
EOSINOPHIL NFR BLD AUTO: 1 %
EOSINOPHIL NFR BLD AUTO: 1 %
ERYTHROCYTE [DISTWIDTH] IN BLOOD BY AUTOMATED COUNT: 21.3 % (ref 10–15)
ERYTHROCYTE [DISTWIDTH] IN BLOOD BY AUTOMATED COUNT: 22.1 % (ref 10–15)
GFR SERPL CREATININE-BSD FRML MDRD: 30 ML/MIN/1.73M2
GLUCOSE BLD-MCNC: 204 MG/DL (ref 70–99)
GLUCOSE BLDC GLUCOMTR-MCNC: 196 MG/DL (ref 70–99)
GLUCOSE BLDC GLUCOMTR-MCNC: 199 MG/DL (ref 70–99)
GLUCOSE BLDC GLUCOMTR-MCNC: 201 MG/DL (ref 70–99)
GLUCOSE BLDC GLUCOMTR-MCNC: 212 MG/DL (ref 70–99)
GLUCOSE BLDC GLUCOMTR-MCNC: 219 MG/DL (ref 70–99)
GRAM STAIN RESULT: ABNORMAL
GRAM STAIN RESULT: ABNORMAL
HCT VFR BLD AUTO: 23.5 % (ref 35–47)
HCT VFR BLD AUTO: 23.6 % (ref 35–47)
HGB BLD-MCNC: 7.3 G/DL (ref 11.7–15.7)
HGB BLD-MCNC: 7.4 G/DL (ref 11.7–15.7)
HGB BLD-MCNC: 7.4 G/DL (ref 11.7–15.7)
IMM GRANULOCYTES # BLD: 0.7 10E3/UL
IMM GRANULOCYTES # BLD: 0.7 10E3/UL
IMM GRANULOCYTES NFR BLD: 3 %
IMM GRANULOCYTES NFR BLD: 4 %
INTERPRETATION ECG - MUSE: NORMAL
LACTATE SERPL-SCNC: 1.8 MMOL/L (ref 0.7–2)
LYMPHOCYTES # BLD AUTO: 3.2 10E3/UL (ref 0.8–5.3)
LYMPHOCYTES # BLD AUTO: 3.5 10E3/UL (ref 0.8–5.3)
LYMPHOCYTES NFR BLD AUTO: 15 %
LYMPHOCYTES NFR BLD AUTO: 16 %
MAGNESIUM SERPL-MCNC: 2.4 MG/DL (ref 1.6–2.3)
MCH RBC QN AUTO: 32.6 PG (ref 26.5–33)
MCH RBC QN AUTO: 32.7 PG (ref 26.5–33)
MCHC RBC AUTO-ENTMCNC: 30.9 G/DL (ref 31.5–36.5)
MCHC RBC AUTO-ENTMCNC: 31.5 G/DL (ref 31.5–36.5)
MCV RBC AUTO: 104 FL (ref 78–100)
MCV RBC AUTO: 106 FL (ref 78–100)
MONOCYTES # BLD AUTO: 2.2 10E3/UL (ref 0–1.3)
MONOCYTES # BLD AUTO: 2.5 10E3/UL (ref 0–1.3)
MONOCYTES NFR BLD AUTO: 10 %
MONOCYTES NFR BLD AUTO: 12 %
NEUTROPHILS # BLD AUTO: 14.1 10E3/UL (ref 1.6–8.3)
NEUTROPHILS # BLD AUTO: 14.6 10E3/UL (ref 1.6–8.3)
NEUTROPHILS NFR BLD AUTO: 68 %
NEUTROPHILS NFR BLD AUTO: 70 %
NRBC # BLD AUTO: 0 10E3/UL
NRBC # BLD AUTO: 0 10E3/UL
NRBC BLD AUTO-RTO: 0 /100
NRBC BLD AUTO-RTO: 0 /100
P AXIS - MUSE: 61 DEGREES
PHOSPHATE SERPL-MCNC: 6.3 MG/DL (ref 2.5–4.5)
PLATELET # BLD AUTO: 140 10E3/UL (ref 150–450)
PLATELET # BLD AUTO: 176 10E3/UL (ref 150–450)
POTASSIUM BLD-SCNC: 4.5 MMOL/L (ref 3.4–5.3)
PR INTERVAL - MUSE: 138 MS
QRS DURATION - MUSE: 82 MS
QT - MUSE: 380 MS
QTC - MUSE: 469 MS
R AXIS - MUSE: 45 DEGREES
RBC # BLD AUTO: 2.23 10E6/UL (ref 3.8–5.2)
RBC # BLD AUTO: 2.27 10E6/UL (ref 3.8–5.2)
SODIUM SERPL-SCNC: 134 MMOL/L (ref 133–144)
SYSTOLIC BLOOD PRESSURE - MUSE: NORMAL MMHG
T AXIS - MUSE: 18 DEGREES
VENTRICULAR RATE- MUSE: 92 BPM
WBC # BLD AUTO: 20.8 10E3/UL (ref 4–11)
WBC # BLD AUTO: 21.3 10E3/UL (ref 4–11)

## 2021-10-15 PROCEDURE — 97530 THERAPEUTIC ACTIVITIES: CPT | Mod: GP

## 2021-10-15 PROCEDURE — 99291 CRITICAL CARE FIRST HOUR: CPT | Mod: GC | Performed by: STUDENT IN AN ORGANIZED HEALTH CARE EDUCATION/TRAINING PROGRAM

## 2021-10-15 PROCEDURE — 90937 HEMODIALYSIS REPEATED EVAL: CPT

## 2021-10-15 PROCEDURE — 250N000013 HC RX MED GY IP 250 OP 250 PS 637: Performed by: NURSE PRACTITIONER

## 2021-10-15 PROCEDURE — 85025 COMPLETE CBC W/AUTO DIFF WBC: CPT | Performed by: STUDENT IN AN ORGANIZED HEALTH CARE EDUCATION/TRAINING PROGRAM

## 2021-10-15 PROCEDURE — 99233 SBSQ HOSP IP/OBS HIGH 50: CPT | Performed by: STUDENT IN AN ORGANIZED HEALTH CARE EDUCATION/TRAINING PROGRAM

## 2021-10-15 PROCEDURE — 200N000002 HC R&B ICU UMMC

## 2021-10-15 PROCEDURE — 250N000011 HC RX IP 250 OP 636: Performed by: STUDENT IN AN ORGANIZED HEALTH CARE EDUCATION/TRAINING PROGRAM

## 2021-10-15 PROCEDURE — 250N000013 HC RX MED GY IP 250 OP 250 PS 637: Performed by: STUDENT IN AN ORGANIZED HEALTH CARE EDUCATION/TRAINING PROGRAM

## 2021-10-15 PROCEDURE — 83735 ASSAY OF MAGNESIUM: CPT | Performed by: STUDENT IN AN ORGANIZED HEALTH CARE EDUCATION/TRAINING PROGRAM

## 2021-10-15 PROCEDURE — 258N000003 HC RX IP 258 OP 636: Performed by: CLINICAL NURSE SPECIALIST

## 2021-10-15 PROCEDURE — 80048 BASIC METABOLIC PNL TOTAL CA: CPT | Performed by: STUDENT IN AN ORGANIZED HEALTH CARE EDUCATION/TRAINING PROGRAM

## 2021-10-15 PROCEDURE — 250N000013 HC RX MED GY IP 250 OP 250 PS 637: Performed by: SURGERY

## 2021-10-15 PROCEDURE — 97140 MANUAL THERAPY 1/> REGIONS: CPT | Mod: GO | Performed by: OCCUPATIONAL THERAPIST

## 2021-10-15 PROCEDURE — 83605 ASSAY OF LACTIC ACID: CPT | Performed by: STUDENT IN AN ORGANIZED HEALTH CARE EDUCATION/TRAINING PROGRAM

## 2021-10-15 PROCEDURE — 84100 ASSAY OF PHOSPHORUS: CPT | Performed by: STUDENT IN AN ORGANIZED HEALTH CARE EDUCATION/TRAINING PROGRAM

## 2021-10-15 PROCEDURE — 92526 ORAL FUNCTION THERAPY: CPT | Mod: GN

## 2021-10-15 PROCEDURE — 97168 OT RE-EVAL EST PLAN CARE: CPT | Mod: GO | Performed by: OCCUPATIONAL THERAPIST

## 2021-10-15 PROCEDURE — 250N000013 HC RX MED GY IP 250 OP 250 PS 637

## 2021-10-15 PROCEDURE — 5A1D70Z PERFORMANCE OF URINARY FILTRATION, INTERMITTENT, LESS THAN 6 HOURS PER DAY: ICD-10-PCS | Performed by: STUDENT IN AN ORGANIZED HEALTH CARE EDUCATION/TRAINING PROGRAM

## 2021-10-15 PROCEDURE — 250N000009 HC RX 250: Performed by: STUDENT IN AN ORGANIZED HEALTH CARE EDUCATION/TRAINING PROGRAM

## 2021-10-15 RX ORDER — AMOXICILLIN 250 MG
1 CAPSULE ORAL 2 TIMES DAILY
Status: DISCONTINUED | OUTPATIENT
Start: 2021-10-15 | End: 2021-10-16

## 2021-10-15 RX ORDER — FLUCONAZOLE 150 MG/1
150 TABLET ORAL DAILY
Status: CANCELLED | OUTPATIENT
Start: 2021-10-15

## 2021-10-15 RX ADMIN — MIDODRINE HYDROCHLORIDE 10 MG: 5 TABLET ORAL at 09:20

## 2021-10-15 RX ADMIN — CALCIUM CARBONATE 600 MG (1,500 MG)-VITAMIN D3 400 UNIT TABLET 1 TABLET: at 09:20

## 2021-10-15 RX ADMIN — HYDROXYZINE HYDROCHLORIDE 50 MG: 25 TABLET, FILM COATED ORAL at 17:12

## 2021-10-15 RX ADMIN — HYDROMORPHONE HYDROCHLORIDE 0.2 MG: 0.2 INJECTION, SOLUTION INTRAMUSCULAR; INTRAVENOUS; SUBCUTANEOUS at 17:12

## 2021-10-15 RX ADMIN — MIDODRINE HYDROCHLORIDE 10 MG: 5 TABLET ORAL at 12:37

## 2021-10-15 RX ADMIN — HEPARIN SODIUM 5000 UNITS: 5000 INJECTION, SOLUTION INTRAVENOUS; SUBCUTANEOUS at 18:25

## 2021-10-15 RX ADMIN — GABAPENTIN 100 MG: 250 SOLUTION ORAL at 12:37

## 2021-10-15 RX ADMIN — THIAMINE HCL TAB 100 MG 100 MG: 100 TAB at 09:21

## 2021-10-15 RX ADMIN — Medication 2 PACKET: at 09:22

## 2021-10-15 RX ADMIN — INSULIN ASPART 3 UNITS: 100 INJECTION, SOLUTION INTRAVENOUS; SUBCUTANEOUS at 09:28

## 2021-10-15 RX ADMIN — QUETIAPINE FUMARATE 25 MG: 25 TABLET ORAL at 21:11

## 2021-10-15 RX ADMIN — POLYETHYLENE GLYCOL 3350 17 G: 17 POWDER, FOR SOLUTION ORAL at 21:11

## 2021-10-15 RX ADMIN — INSULIN ASPART 3 UNITS: 100 INJECTION, SOLUTION INTRAVENOUS; SUBCUTANEOUS at 21:15

## 2021-10-15 RX ADMIN — RIFAXIMIN 550 MG: 550 TABLET ORAL at 21:11

## 2021-10-15 RX ADMIN — ESCITALOPRAM OXALATE 10 MG: 5 SOLUTION ORAL at 09:16

## 2021-10-15 RX ADMIN — GABAPENTIN 100 MG: 250 SOLUTION ORAL at 09:22

## 2021-10-15 RX ADMIN — SALINE NASAL SPRAY 1 SPRAY: 1.5 SOLUTION NASAL at 09:21

## 2021-10-15 RX ADMIN — INSULIN ASPART 4 UNITS: 100 INJECTION, SOLUTION INTRAVENOUS; SUBCUTANEOUS at 12:49

## 2021-10-15 RX ADMIN — SODIUM CHLORIDE 300 ML: 9 INJECTION, SOLUTION INTRAVENOUS at 15:53

## 2021-10-15 RX ADMIN — POLYETHYLENE GLYCOL 3350 17 G: 17 POWDER, FOR SOLUTION ORAL at 09:16

## 2021-10-15 RX ADMIN — SALINE NASAL SPRAY 1 SPRAY: 1.5 SOLUTION NASAL at 21:11

## 2021-10-15 RX ADMIN — INSULIN ASPART 3 UNITS: 100 INJECTION, SOLUTION INTRAVENOUS; SUBCUTANEOUS at 17:19

## 2021-10-15 RX ADMIN — INSULIN GLARGINE 10 UNITS: 100 INJECTION, SOLUTION SUBCUTANEOUS at 09:16

## 2021-10-15 RX ADMIN — OXYCODONE HYDROCHLORIDE 10 MG: 5 TABLET ORAL at 13:51

## 2021-10-15 RX ADMIN — Medication 40 MG: at 09:16

## 2021-10-15 RX ADMIN — Medication: at 15:54

## 2021-10-15 RX ADMIN — RIFAXIMIN 550 MG: 550 TABLET ORAL at 09:20

## 2021-10-15 RX ADMIN — GABAPENTIN 100 MG: 250 SOLUTION ORAL at 18:28

## 2021-10-15 RX ADMIN — Medication 2 PACKET: at 21:12

## 2021-10-15 RX ADMIN — FOLIC ACID 1 MG: 1 TABLET ORAL at 09:20

## 2021-10-15 RX ADMIN — INSULIN ASPART 3 UNITS: 100 INJECTION, SOLUTION INTRAVENOUS; SUBCUTANEOUS at 03:47

## 2021-10-15 RX ADMIN — SALINE NASAL SPRAY 1 SPRAY: 1.5 SOLUTION NASAL at 17:19

## 2021-10-15 RX ADMIN — OXYCODONE HYDROCHLORIDE 10 MG: 5 TABLET ORAL at 21:48

## 2021-10-15 RX ADMIN — MIDODRINE HYDROCHLORIDE 10 MG: 5 TABLET ORAL at 18:29

## 2021-10-15 RX ADMIN — Medication 50 MG: at 18:28

## 2021-10-15 RX ADMIN — Medication 0.05 MCG/KG/MIN: at 12:45

## 2021-10-15 RX ADMIN — Medication 15 ML: at 12:37

## 2021-10-15 RX ADMIN — HEPARIN SODIUM 5000 UNITS: 5000 INJECTION, SOLUTION INTRAVENOUS; SUBCUTANEOUS at 03:46

## 2021-10-15 RX ADMIN — HYDROMORPHONE HYDROCHLORIDE 0.4 MG: 0.2 INJECTION, SOLUTION INTRAMUSCULAR; INTRAVENOUS; SUBCUTANEOUS at 07:01

## 2021-10-15 RX ADMIN — SODIUM CHLORIDE 250 ML: 9 INJECTION, SOLUTION INTRAVENOUS at 15:54

## 2021-10-15 RX ADMIN — SALINE NASAL SPRAY 1 SPRAY: 1.5 SOLUTION NASAL at 12:38

## 2021-10-15 RX ADMIN — OXYCODONE HYDROCHLORIDE 10 MG: 5 TABLET ORAL at 03:50

## 2021-10-15 RX ADMIN — Medication 100 MCG: at 09:20

## 2021-10-15 RX ADMIN — DOCUSATE SODIUM 50 MG AND SENNOSIDES 8.6 MG 1 TABLET: 8.6; 5 TABLET, FILM COATED ORAL at 21:11

## 2021-10-15 ASSESSMENT — MIFFLIN-ST. JEOR: SCORE: 1468

## 2021-10-15 ASSESSMENT — ACTIVITIES OF DAILY LIVING (ADL)
ADLS_ACUITY_SCORE: 16
ADLS_ACUITY_SCORE: 19
ADLS_ACUITY_SCORE: 18
ADLS_ACUITY_SCORE: 19
ADLS_ACUITY_SCORE: 17
ADLS_ACUITY_SCORE: 19
PREVIOUS_RESPONSIBILITIES: MEAL PREP;HOUSEKEEPING;LAUNDRY;SHOPPING;MEDICATION MANAGEMENT;FINANCES;DRIVING

## 2021-10-15 NOTE — PROGRESS NOTES
MEDICAL ICU PROGRESS NOTE  10/15/2021      Date of Service (when I saw the patient): 10/15/2021    ASSESSMENT: Aliyah Angeles is a 46 year old woman with alcohol use disorder (last drink 9/12/21), alcoholic hepatitis, jaimie en y gastric bypass, alcohol pancreatitis, pancreatic cyst, Type 2 DM, anxiety and depression admitted for abdominal pain, lower extremity edema, and nausea that started after starting low sodium and diabetic friendly foods in an alcohol treatment facility. Patient was admitted to the medicine floor with abdominal pain and vomiting on 9/29/2021. Overnight 10/3-10/4, patient had worsening respiratory status, concern for sepsis requiring transfer to the ICU for intubation and need for pressors as well as further evaluation and management for likely sepsis. Self extubated 10/14. Currently off sedation and CRRT, awake with severe abdominal pain.     CHANGES and MAJOR THINGS TODAY:   - Senna to scheduled  - Recheck hgb today due to hemoglobin of 7.3  - Increase Lantus to 15 units daily  - Check lactic acid tonight  - Changed MAP goal to >60 due to soft PTA pressures  - Restart dialysis per nephrology    PLAN:     Neuro:  # Pain  She was sedated and intubated on 10/4. Self-extubated 10/14 AM. Awake and alert, oriented. Talking and answering questions appropriately. Family notes she has struggled with addiction to not only alcohol, but narcotics as well. We will consider adjusting pain regimen as tolerated.   - Cont Dilaudid PRN  - Cont oxycodone PO PRN  - Cont Tylenol     # Anxiety and depression   On 10/14, after self extubation, she was making comments about dying. She was quite delirious with emotional lability. Mood seems greatly improved today.  - PTA escitalopram  - Psych consult, appreciate recs  - Cont PRN Seroquel 25 mg @ bedtime     # Encephalopathy, likely acute toxic metabolic vs infection, resolved  In the setting of likely hepatic encephalopathy, infectious encephalopathy, sedation. SBP  unlikely due to normal paracentesis eval and culture. Normal ammonia from 10/4. EEG with encephalopathy, no seizures. No acute findings on MRI. She does not appear encephalopathic now.  - Miralax BID  - Goal 3-4 loose stools daily / or 500 mL to 1 L of stool daily  - On rifaximin  - Gabapentin 100 mg TID  - Changed Senna S to scheduled    # Alcohol use disorder  Last alcohol use 9/12.21.  - Thiamine, folic acid, MVI supplementation    Pulmonary:  # Acute hypoxemic respiratory failure  # ARDS  # Concern for infection, pneumonia and sepsis  Acute onset hypoxemia, bilateral opacities are consistent with ARDS. No evidence of cardiac etiology. Bilateral ground glass infiltrates on imaging, with bilateral pleural effusions. Treated with lung-protective ventilation, prone positioning. Intubated, bronch 10/4 with bile-like fluid, suspect aspiration as well as PMN predominance concerning for bacterial pneumonia. Currently stable on vent settings; supine; off NMB; off inhaled epo 10/7. Chest CT 10/6 showed ground glass and consolidative opacities throughout the lungs. CRRT 10/7 to 10/13. On the morning of 10/14, she self extubated and has been stable on high flow today.   - Infectious work-up and treatment as below  - Albuterol neb PRN  - Continue with nasal canula as tolerated    # Respiratory alkalosis  Improved     Cardiovascular:  # Hypotension, in the setting of likely septic shock, improving  # Edema, possible intravascular depletion with third-spacing, improving  Echo 10/4 with hyperdynamic EF >70%. Moderate tricuspid insufficiency. Normal IVC. She has been requiring pressors and has an ANA LAURA. Treating for sepsis. Her liver disease is likely contributing to the shock. She has been requiring increasing pressors that last couple of days. Concerned for septic shock. See ID.   - Levophed for goal MAP >60  - Cont midodrine 10 mg TID to reduce pressor requirements     # Lactic acidosis, resolved  This is in the setting of ANA LAURA  that required requiring CRRT. CT scan 10/7 showing pneumonia, no clear intra-abdominal source of infection. Appears to be perfusing well, less concern for ischemia.      GI/Nutrition:  # Alcoholic hepatitis  # Ascites  # Hyperbilirubinemia, improving  # History of RYGB  # Transaminitis, stable  Patient has been sober since 9/12 and has been at a treatment facility since her discharge 9/21. Hepatomegaly and hepatic steatosis without cirrhosis. Diagnostic paracentesis on admission without SBP. Discontinued lactulose 10/1 and then had decreased bowel movements. Normal lipase on admission. CT A/P on admission with severe hepatic steatosis, portal HTN, ascites, splenomegaly (similar on repeat CT 10/6). Paracentesis 10/4 without SBP.  On 10/9, AST was 94 and trending up and continued to do so on 10/10. AST elevation possibly due to alcoholic liver disease, however sobriety started a month ago according to the chart review. Repeat paracentesis 10/11 not concerning for SBP. On 10/14 endorsed severe abdominal pain. Her abdomen is soft, but with distention consistent with ascites. CT A&P with enlarged lymph nodes, ascites.   - Trend LFT's biweekly  - GI following, appreciate recs  - Miralax BID, Senna scheduled  - Trend bilirubin, INR --> MELD labs  - SLP recommended mildly thickened fluids and minced and moist diet.      Renal/Fluids/Electrolytes:  # Anion gap metabolic acidosis, resolved  # Lactic acidosis, resolved  - CTM     # Acute kidney injury, concern for ATN, pre-renal, vs hepatorenal syndrome, improving  Baseline creatinine ~0.98. May be related to vomiting, lactulose, third spacing. Likely pre-renal. She was on CRRT from 10/7 to 10/13. They stopped due to increasing pressor needs and intolerance, some urine output.   - Consulted nephrology, appreciate recs  - Likely will require iHD  - Replacement protocols (Mg, Ph, K).   - Strict I/Os  - Avoid nephrotoxic agents     Endocrine:  # Type 2 diabetes mellitus  Her  blood sugar was consistently over 200 as she approached her feeding goal rate, has somewhat stabilized. CTM.  - High-resistance sliding scale insulin  - Increase Lantus to 15 units daily  - Check BS q4h and PRN  - Goal BS between 100 and 180     ID:  # Septic shock, improving  # Pneumonia (aspiration vs CAP)  Concern for pneumonia, CAP vs HAP. Less concern for SBP given negative paracentesis on admission. Afebrile. Paracentesis 10/4 negative for SBP. Elevated procal and PMN's in BAL fluid 10/4 suggests a bacterial pneumonia, likely aspiration related due to her history of recent symptoms of vomiting. Over the last several days, she has been having a steady rise in WBC count after down-trending for a few days. No pathogenic growth from any cultures; most recent cultures was 10/13.   - Will consider Meropenem if there is increased concern for septic shock  - Yeast on urine culture, likely secondary to Rice    Abx   - Zosyn 10/4-10/13  - Vanc 10/4-10/4  - Doxycycline 10/4-10/6  - Micafungin 10/6-10/9     Hematology:    # Anemia, macrocytic  # Coagulopathy  # Acute epistaxis  In the setting of alcohol use disorder, liver failure, alcoholic hepatitis. INR elevated. Likely 2/2 to B12 or folate deficiency due to long history of alcohol use disorder. On 10/7, 10/12 required transfusion. Hemolysis labs negative. Retic count elevated. Stable hemoglobin.  - Transfuse for Hgb <7  - Follow INR  - Trend CBC  - Recheck hemoglobin today    # Thrombocytopenia  Persistent in the setting of renal disease, liver disease, chronic alcoholism, and after a +2.3 liter I/O balance. This was likely partially dilutional with blunted bone marrow response secondary to alcoholism. HIT screen and hemolysis work up negative.  - Transfuse for PLTs <10, or if active bleeding and platelets are <30     Musculoskeletal:  No active issues     Skin:  # Dry cracked skin on bilateral heels, improving  - Continue Eucerin to be applied topically     General  Cares/Prophylaxis:    DVT Prophylaxis: Heparin subQ  GI Prophylaxis: PPI  Restraints: None  Family Communication: daily with mother  Code Status: Full Code     Lines/tubes/drains:  - PIV x2  - ETT  - Rice  - Central line  - Arterial line  - Hemodialysis line     Disposition:  - Medical ICU    Patient seen and findings/plan discussed with medical ICU staff, Dr. Quinn.    René Ascencio, MS4    Resident/Fellow Attestation   I, Hayley Severson, was present with the medical student who participated in the service and in the documentation of the note.  I have verified the history and personally performed the physical exam and medical decision making.  I agree with the assessment and plan of care as documented in the note.      Hayley Severson, MD-MPH  Internal Medicine-Pediatrics PGY-1    ====================================  INTERVAL HISTORY:   Nursing notes reviewed. No acute events overnight. She slept well with seroquel. Mood has improved. Sitting up in a chair today.     OBJECTIVE:   1. VITAL SIGNS:   Temp:  [97.9  F (36.6  C)-99.4  F (37.4  C)] 98.2  F (36.8  C)  Pulse:  [] 103  Resp:  [10-32] 21  MAP:  [58 mmHg-83 mmHg] 79 mmHg  Arterial Line BP: ()/(43-64) 100/61  SpO2:  [90 %-99 %] 94 %    2. INTAKE/ OUTPUT:   I/O last 3 completed shifts:  In: 3148.34 [P.O.:960; I.V.:238.34; NG/GT:870]  Out: 600 [Urine:200; Stool:400]    3. PHYSICAL EXAMINATION:  General: Awake and alert. Conversing appropriately without any delusions. Sitting up in a chair  HEENT: PERRL, MMM, scleral icterus, nasal cannula  Neuro: Awake, alert, answering questions appropriately, moving all extremities, no focal deficits  Pulm/Resp: Breath sounds equal bilaterally. Clear to ascultation. Non labored breathing  CV: RRR, no murmur today; LE edema 2+  Abdomen: Distended but soft, bowel sounds hyperactive, mild tenderness  : Rice in place, dark yellow/brown colored urine  Incisions/Skin: Petechiae diffusely, scattered ecchymoses  Psych:  Good mood. Awake and alert, pleasant affect.     4. LABS:   Arterial Blood Gases   Recent Labs   Lab 10/14/21  0328 10/13/21  0410 10/12/21  0405 10/11/21  0350   PH 7.44 7.45 7.51* 7.49*   PCO2 34* 35 32* 33*   PO2 97 133* 78* 79*   HCO3 23 25 25 25     Complete Blood Count   Recent Labs   Lab 10/15/21  0353 10/14/21  0328 10/13/21  1145 10/13/21  0410   WBC 21.3* 22.6* 20.8* 20.1*   HGB 7.3* 8.1* 7.9* 8.1*   * 129* 106* 98*     Basic Metabolic Panel  Recent Labs   Lab 10/15/21  1248 10/15/21  0923 10/15/21  0353 10/15/21  0329 10/14/21  2343 10/14/21  2057 10/14/21  0807 10/14/21  0328 10/14/21  0020 10/13/21  2202   NA  --   --  134  --   --  134  --  136  --  137   POTASSIUM  --   --  4.5  --   --  4.3  --  4.1  --  4.1   CHLORIDE  --   --  103  --   --  105  --  106  --  107   CO2  --   --  23  --   --  22  --  24  --  21   BUN  --   --  54*  --   --  45*  --  34*  --  29   CR  --   --  1.93*  --   --  1.82*  --  1.29*  --  1.18*   * 212* 204* 199*   < > 195*   < > 222*   < > 200*    < > = values in this interval not displayed.     Liver Function Tests  Recent Labs   Lab 10/14/21  0328 10/13/21  1145 10/13/21  0410 10/12/21  2002 10/12/21  1136 10/12/21  0406 10/11/21  1127 10/11/21  0349 10/10/21  1123 10/10/21  0406   AST 95*  --  114*  --   --  118*  --  112*  --  117*   ALT 39  --  36  --   --  31  --  29  --  26   ALKPHOS 325*  --  285*  --   --  266*  --  268*  --  245*   BILITOTAL 8.4*  --  9.6*  --   --  9.9*  --  12.0*  --  12.7*   ALBUMIN 2.0* 2.0* 2.2* 2.3*   < > 2.2*   < > 2.4*   < > 2.5*   INR  --   --  1.25*  --   --  1.30*  --  1.33*  --  1.29*    < > = values in this interval not displayed.     Coagulation Profile  Recent Labs   Lab 10/13/21  0410 10/12/21  0406 10/11/21  0349 10/10/21  0406   INR 1.25* 1.30* 1.33* 1.29*       5. RADIOLOGY:   No results found for this or any previous visit (from the past 24 hour(s)).

## 2021-10-15 NOTE — PROGRESS NOTES
Nephrology Progress Note  10/15/2021         Aliyah Angeles is a 46 yof w/hx of ETOH use (Quit 9/12/2021) w/hepatitis, Fitz en Y bypass, Pancreatitis, DM2, Panc cyst who was admitted 9/29 from chem dep with N/V and worsening abdominal pain and edema.  Her course has been resp failure with ARDS and evolving ANA LAURA.  Noted to have received albumin from 10/1-10/3 (150g/day) with no improvement in Cr.  On Vanco/Zosyn to treat sepsis although cultures at time of consult are negative.  Nephrology consulted for evaluation of ANA LAURA and possible RRT, started CRRT on 10/7.       Interval History :   Mrs Angeles had CRRT started 10/7 for management of volume and acidosis, stopped on 10/13 and had day off of RRT yesterday.  I had hoped that she would wean off of pressor off of RRT but does remain on low dose norepi.  Will try iHD today but will stop if pressor needs increase significantly, will try to pull 2L which is about enough to match intake today, does have significant edema but is breathing comfortably sitting up in chair.  If she does not tolerate iHD we will consider going back to CRRT today or tomorrow am.       Assessment & Recommendations:   ANA LAURA-Baseline Cr normal at 0.5 as recently as 9/14/2021, abdominal US pending currently.  Was at inpatient chem dep when she developed abdominal pain, N/V and worsening edema and was brought to [facility] on 9/29.  Was treated for UTI and started on Vanco/Zosyn for leukocytosis but decompensated and was intubated the evening of 10/3.  Noted she did receive albumin x3 days from 10/1-10/3 for ANA LAURA but actually worsened and Cr at time of consult is up to 2.  Lisa <5 on 10/3, ECHO done 10/4 showed hyperdynamic LV and normal IVC.  Etiology of ANA LAURA is likely ATN with underlying HRS physiology which made her more susceptible to hypoperfusion.  Started CRRT on 10/7 for management of volume and acidosis.                  -ANA LAURA, likely hypoperfusion in setting of baseline HRS physiology, possible  "contribution from vanco/zosyn and did receive contrast on 10/3.                -CRRT started 10/7, stopped 10/13.  Trying iHD today but if not tolerated well from HR/BP standpoint we can transition back to CRRT, is on one pressor at low dose.                   -Dialysis consent signed and scanned in under media.       Volume-Has total body volume overload, net positive ~3L yesterday and has significant edema.  Some unmeasured UOP, running for 2L of UF as goal today.        Electrolytes/pH-K normal at 4.5 bicarb 23, ABG 7.44/34/97/25.       Liver-Unclear duration, quit drinking on 9/12/2021 and was at chem dep when acute issues started.  Bili is ~10 and INR is 1.3 and a bit improved.  GI following, not clear if she would be a transplant candidate eventually.       ID-Off abx other than Rifaximin.       Nutrition-On Osmolite TF.          Seen and discussed with Dr Del Rosario     Recommendations were communicated to primary team via verbal communication.           MARIA ELENA Wade CNS  Clinical Nurse Specialist  [phone]      Review of Systems:   I reviewed the following systems:  Gen: No fevers or chills  CV: No CP at rest  Resp: No SOB at rest  GI: No N/V    Physical Exam:   I/O last 3 completed shifts:  In: 3148.34 [P.O.:960; I.V.:238.34; NG/GT:870]  Out: 600 [Urine:200; Stool:400]   /62   Pulse 100   Temp 98.2  F (36.8  C) (Oral)   Resp 24   Ht 1.626 m (5' 4\")   Wt 84.8 kg (186 lb 15.2 oz)   SpO2 96%   BMI 32.09 kg/m       GENERAL APPEARANCE: Extubated, stable from pulm standpoint.  Sitting up in chair today.   EYES: No scleral icterus  Pulmonary: lungs rhonchi to auscultation with equal breath sounds bilaterally, no clubbing or cyanosis  CV: Regular rhythm, normal rate, no rub   - Edema +2 LE/dependent  GI: soft, nontender, normal bowel sounds  MS: no evidence of inflammation in joints, no muscle tenderness  : No barrett, some intermittent UOP.   SKIN: no rash, warm, dry  NEURO: No focal " deficits.      Labs:   All labs reviewed by me  Electrolytes/Renal - Recent Labs   Lab Test 10/15/21  0923 10/15/21  0353 10/15/21  0329 10/14/21  2343 10/14/21  2057 10/14/21  0807 10/14/21  0328 10/13/21  1524 10/13/21  1145   NA  --  134  --   --  134  --  136   < > 137   POTASSIUM  --  4.5  --   --  4.3  --  4.1   < > 3.8   CHLORIDE  --  103  --   --  105  --  106   < > 106   CO2  --  23  --   --  22  --  24   < > 24   BUN  --  54*  --   --  45*  --  34*   < > 20   CR  --  1.93*  --   --  1.82*  --  1.29*   < > 0.77   * 204* 199*   < > 195*   < > 222*   < > 182*   STEFFANY  --  8.6  --   --  8.6  --  8.2*   < > 8.4*   MAG  --  2.4*  --   --   --   --  2.5*  --  2.5*   PHOS  --  6.3*  --   --   --   --  5.3*  --  3.8    < > = values in this interval not displayed.       CBC -   Recent Labs   Lab Test 10/15/21  0353 10/14/21  0328 10/13/21  1145   WBC 21.3* 22.6* 20.8*   HGB 7.3* 8.1* 7.9*   * 129* 106*       LFTs -   Recent Labs   Lab Test 10/14/21  0328 10/13/21  1145 10/13/21  0410 10/12/21  1136 10/12/21  0406   ALKPHOS 325*  --  285*  --  266*   BILITOTAL 8.4*  --  9.6*  --  9.9*   ALT 39  --  36  --  31   AST 95*  --  114*  --  118*   PROTTOTAL 5.5*  --  5.3*  --  5.2*   ALBUMIN 2.0* 2.0* 2.2*   < > 2.2*    < > = values in this interval not displayed.       Iron Panel -   Recent Labs   Lab Test 09/13/21  0642   EYAD 1,839*           Current Medications:    sodium chloride 0.9%  250 mL Intravenous Once in dialysis/CRRT     sodium chloride 0.9%  300 mL Hemodialysis Machine Once     calcium carbonate 600 mg-vitamin D 400 units  1 tablet Oral or Feeding Tube Daily     cyanocobalamin  100 mcg Oral or Feeding Tube Daily     escitalopram  10 mg Oral or Feeding Tube Daily     folic acid  1 mg Oral or Feeding Tube Daily     gabapentin  100 mg Oral or Feeding Tube TID     heparin ANTICOAGULANT  5,000 Units Subcutaneous Q12H     insulin aspart  1-12 Units Subcutaneous Q4H     [START ON 10/16/2021] insulin  glargine  15 Units Subcutaneous QAM AC     insulin glargine  5 Units Subcutaneous Once     midodrine  10 mg Oral TID     multivitamins w/minerals  15 mL Oral Daily     - MEDICATION INSTRUCTIONS -   Does not apply Once     pantoprazole  40 mg Per Feeding Tube QAM AC     polyethylene glycol  17 g Oral BID     [Held by provider] prazosin  1 mg Oral QPM     protein modular  2 packet Per Feeding Tube BID     QUEtiapine  25 mg Oral At Bedtime     rifaximin  550 mg Oral or Feeding Tube BID     senna-docusate  1 tablet Oral BID     sodium chloride  1 spray Both Nostrils 4x Daily     sodium chloride (PF)  9 mL Intracatheter During Dialysis/CRRT (from stock)     sodium chloride (PF)  9 mL Intracatheter During Dialysis/CRRT (from stock)     thiamine  100 mg Oral or Feeding Tube Daily       dextrose       norepinephrine 0.05 mcg/kg/min (10/15/21 1100)     - MEDICATION INSTRUCTIONS -

## 2021-10-15 NOTE — PROGRESS NOTES
10/15/21 7850   Quick Adds   Quick Adds Edema   Type of Visit Occupational Therapy Re-evaluation   Living Environment   People in home significant other   Current Living Arrangements apartment   Home Accessibility no concerns   Transportation Anticipated family or friend will provide   Living Environment Comments Pt lives with SO in apt, no stairs present. Tub shower. SO works nights, available during day   Self-Care   Usual Activity Tolerance good   Current Activity Tolerance fair   Regular Exercise No   Equipment Currently Used at Home none   Activity/Exercise/Self-Care Comment Pt IND with ADLs and functional mobility at baseline   Instrumental Activities of Daily Living (IADL)   Previous Responsibilities meal prep;housekeeping;laundry;shopping;medication management;finances;driving   IADL Comments Pt IND with all IADLs at baseline, but since living in treatment center, most IADLs are being completed by facility.    Disability/Function   Hearing Difficulty or Deaf no   Wear Glasses or Blind no   Vision Management glasses for reading   Concentrating, Remembering or Making Decisions Difficulty no   Difficulty Communicating no   Difficulty Eating/Swallowing no   Walking or Climbing Stairs Difficulty yes   Walking or Climbing Stairs other (see comments)   Mobility Management stairs   Dressing/Bathing Difficulty no   Dressing/Bathing Management some assistance for LBD   Toileting issues no   Doing Errands Independently Difficulty (such as shopping) no   Fall history within last six months yes   Number of times patient has fallen within last six months 1   Change in Functional Status Since Onset of Current Illness/Injury yes   General Information   Onset of Illness/Injury or Date of Surgery 09/29/21   Referring Physician Alissa Hameed   Patient/Family Therapy Goal Statement (OT) to return to PLOF   Additional Occupational Profile Info/Pertinent History of Current Problem Aliyah Angeles is a 46 year old female  admitted on 9/29/2021. She has a history of alcohol use disorder, gastric bypass admitted for abdominal pain and 3 days of missing her medications in the setting of vomiting.   Existing Precautions/Restrictions fall   Cognitive Status Examination   Orientation Status orientation to person, place and time   Visual Perception   Visual Impairment/Limitations WFL   Integumentary/Edema   Integumentary/Edema other (describe)   Integumentary/Edema Comments B LE edema   Edema General Information   Onset of Edema 10/15/21  (acute on chronic)   Affected Body Part(s) Right LE;Left LE   Etiology Comments liver disease, decreased mobility   General Comments/Previous Edema Treatment/Edema Equipment Pt reports compression garments from previous stay   Edema Examination/Assessment   Skin Condition Pitting;Dryness;Intact   Skin Condition Comments Pt with shiny 2+/3 edema noted in B LE   Skin Integrity intact   Pitting Assessment 2+/3 edema in B UE, pitting edema   Clinical Impression   Criteria for Skilled Therapeutic Interventions Met (OT) yes;meets criteria   OT Diagnosis B LE edema   Edema: Patient Presentation Edema   OT Problem List-Impairments impacting ADL problems related to;activity tolerance impaired;cognition;pain   Assessment of Occupational Performance 1-3 Performance Deficits   Identified Performance Deficits dressing, bathing   Planned Therapy Interventions (OT) manual therapy   Edema: Planned Interventions Gradient compression bandaging;Fit for compression garment;Edema exercises;Precautions to prevent infection/exacerbation;Manual therapy;ADL training   Clinical Decision Making Complexity (OT) low complexity   Therapy Frequency (OT) Daily  (edema)   Predicted Duration of Therapy 2 weeks   Risk & Benefits of therapy have been explained patient;evaluation/treatment results reviewed;care plan/treatment goals reviewed   OT Discharge Planning    OT Discharge Recommendation (DC Rec) Transitional Care Facility   OT  Rationale for DC Rec Pt currently below baseline level of function and requires A for majority of ADLs. Pt would benefit from continued skilled therapy to increase IND with I/ADLs prior to going to a treatment facility. Pt reports her roommate at treatment facility was assisting her with dressing and getting into bed prior to this admission. Pt needing to go do a different treatment facility upon d/c from hospital, as they were unable to accommodate her dietary needs. Unsure if anyone would be able to assist at treatment facility pt would like to go to.    Total Evaluation Time (Minutes)   Total Evaluation Time (Minutes) 5

## 2021-10-15 NOTE — PLAN OF CARE
Major Shift Events: No acute events overnight. Neuro intact with intermittent forgetfulness. Moderate to severe back, abdomen and flank pain treated with PRN Dilaudid and Oxy. NSR-ST, afebrile, MAPs 60's-70's, Levo currently at 0.08. 2L NC overnight with no 02 desaturations. BG's 190-204. Oliguric overnight with 200 ml output overnight. Phos at 6.3 in AM.   Plan: Wean Levo as tolerated, pain management, possible HD, continue POC.   For vital signs and complete assessments, please see documentation flowsheets.

## 2021-10-16 ENCOUNTER — APPOINTMENT (OUTPATIENT)
Dept: OCCUPATIONAL THERAPY | Facility: CLINIC | Age: 46
End: 2021-10-16
Payer: COMMERCIAL

## 2021-10-16 ENCOUNTER — APPOINTMENT (OUTPATIENT)
Dept: SPEECH THERAPY | Facility: CLINIC | Age: 46
End: 2021-10-16
Payer: COMMERCIAL

## 2021-10-16 LAB
ABO/RH(D): NORMAL
ANION GAP SERPL CALCULATED.3IONS-SCNC: 8 MMOL/L (ref 3–14)
ANTIBODY SCREEN: NEGATIVE
BACTERIA FLD CULT: NO GROWTH
BACTERIA UR CULT: ABNORMAL
BASOPHILS # BLD AUTO: 0.1 10E3/UL (ref 0–0.2)
BASOPHILS NFR BLD AUTO: 0 %
BLD PROD TYP BPU: NORMAL
BLOOD COMPONENT TYPE: NORMAL
BUN SERPL-MCNC: 36 MG/DL (ref 7–30)
CALCIUM SERPL-MCNC: 7.6 MG/DL (ref 8.5–10.1)
CHLORIDE BLD-SCNC: 106 MMOL/L (ref 94–109)
CO2 SERPL-SCNC: 22 MMOL/L (ref 20–32)
CODING SYSTEM: NORMAL
CREAT SERPL-MCNC: 1.45 MG/DL (ref 0.52–1.04)
CROSSMATCH: NORMAL
EOSINOPHIL # BLD AUTO: 0.2 10E3/UL (ref 0–0.7)
EOSINOPHIL NFR BLD AUTO: 1 %
ERYTHROCYTE [DISTWIDTH] IN BLOOD BY AUTOMATED COUNT: 21.5 % (ref 10–15)
GFR SERPL CREATININE-BSD FRML MDRD: 43 ML/MIN/1.73M2
GLUCOSE BLD-MCNC: 222 MG/DL (ref 70–99)
GLUCOSE BLDC GLUCOMTR-MCNC: 196 MG/DL (ref 70–99)
GLUCOSE BLDC GLUCOMTR-MCNC: 200 MG/DL (ref 70–99)
GLUCOSE BLDC GLUCOMTR-MCNC: 208 MG/DL (ref 70–99)
GLUCOSE BLDC GLUCOMTR-MCNC: 209 MG/DL (ref 70–99)
GLUCOSE BLDC GLUCOMTR-MCNC: 219 MG/DL (ref 70–99)
GLUCOSE BLDC GLUCOMTR-MCNC: 249 MG/DL (ref 70–99)
GRAM STAIN RESULT: NORMAL
HCT VFR BLD AUTO: 20.5 % (ref 35–47)
HGB BLD-MCNC: 6.5 G/DL (ref 11.7–15.7)
HGB BLD-MCNC: 8.2 G/DL (ref 11.7–15.7)
IMM GRANULOCYTES # BLD: 0.6 10E3/UL
IMM GRANULOCYTES NFR BLD: 4 %
INR PPP: 1.31 (ref 0.85–1.15)
ISSUE DATE AND TIME: NORMAL
LYMPHOCYTES # BLD AUTO: 2.5 10E3/UL (ref 0.8–5.3)
LYMPHOCYTES NFR BLD AUTO: 15 %
MAGNESIUM SERPL-MCNC: 1.8 MG/DL (ref 1.6–2.3)
MCH RBC QN AUTO: 33.3 PG (ref 26.5–33)
MCHC RBC AUTO-ENTMCNC: 31.7 G/DL (ref 31.5–36.5)
MCV RBC AUTO: 105 FL (ref 78–100)
MONOCYTES # BLD AUTO: 2.2 10E3/UL (ref 0–1.3)
MONOCYTES NFR BLD AUTO: 13 %
NEUTROPHILS # BLD AUTO: 11.3 10E3/UL (ref 1.6–8.3)
NEUTROPHILS NFR BLD AUTO: 67 %
NRBC # BLD AUTO: 0 10E3/UL
NRBC BLD AUTO-RTO: 0 /100
PHOSPHATE SERPL-MCNC: 4.1 MG/DL (ref 2.5–4.5)
PLATELET # BLD AUTO: 152 10E3/UL (ref 150–450)
POTASSIUM BLD-SCNC: 3.8 MMOL/L (ref 3.4–5.3)
RBC # BLD AUTO: 1.95 10E6/UL (ref 3.8–5.2)
SODIUM SERPL-SCNC: 136 MMOL/L (ref 133–144)
SPECIMEN EXPIRATION DATE: NORMAL
UNIT ABO/RH: NORMAL
UNIT NUMBER: NORMAL
UNIT STATUS: NORMAL
UNIT TYPE ISBT: 7300
WBC # BLD AUTO: 16.9 10E3/UL (ref 4–11)

## 2021-10-16 PROCEDURE — 92526 ORAL FUNCTION THERAPY: CPT | Mod: GN

## 2021-10-16 PROCEDURE — 99291 CRITICAL CARE FIRST HOUR: CPT | Mod: GC | Performed by: INTERNAL MEDICINE

## 2021-10-16 PROCEDURE — 250N000013 HC RX MED GY IP 250 OP 250 PS 637: Performed by: STUDENT IN AN ORGANIZED HEALTH CARE EDUCATION/TRAINING PROGRAM

## 2021-10-16 PROCEDURE — 99233 SBSQ HOSP IP/OBS HIGH 50: CPT | Performed by: INTERNAL MEDICINE

## 2021-10-16 PROCEDURE — 86900 BLOOD TYPING SEROLOGIC ABO: CPT | Performed by: STUDENT IN AN ORGANIZED HEALTH CARE EDUCATION/TRAINING PROGRAM

## 2021-10-16 PROCEDURE — 80048 BASIC METABOLIC PNL TOTAL CA: CPT | Performed by: STUDENT IN AN ORGANIZED HEALTH CARE EDUCATION/TRAINING PROGRAM

## 2021-10-16 PROCEDURE — 200N000002 HC R&B ICU UMMC

## 2021-10-16 PROCEDURE — 250N000011 HC RX IP 250 OP 636: Performed by: STUDENT IN AN ORGANIZED HEALTH CARE EDUCATION/TRAINING PROGRAM

## 2021-10-16 PROCEDURE — 250N000013 HC RX MED GY IP 250 OP 250 PS 637: Performed by: NURSE PRACTITIONER

## 2021-10-16 PROCEDURE — 83735 ASSAY OF MAGNESIUM: CPT | Performed by: STUDENT IN AN ORGANIZED HEALTH CARE EDUCATION/TRAINING PROGRAM

## 2021-10-16 PROCEDURE — 97140 MANUAL THERAPY 1/> REGIONS: CPT | Mod: GO | Performed by: OCCUPATIONAL THERAPIST

## 2021-10-16 PROCEDURE — 86923 COMPATIBILITY TEST ELECTRIC: CPT | Performed by: STUDENT IN AN ORGANIZED HEALTH CARE EDUCATION/TRAINING PROGRAM

## 2021-10-16 PROCEDURE — 85025 COMPLETE CBC W/AUTO DIFF WBC: CPT | Performed by: STUDENT IN AN ORGANIZED HEALTH CARE EDUCATION/TRAINING PROGRAM

## 2021-10-16 PROCEDURE — 250N000013 HC RX MED GY IP 250 OP 250 PS 637

## 2021-10-16 PROCEDURE — 85610 PROTHROMBIN TIME: CPT | Performed by: NURSE PRACTITIONER

## 2021-10-16 PROCEDURE — P9016 RBC LEUKOCYTES REDUCED: HCPCS | Performed by: STUDENT IN AN ORGANIZED HEALTH CARE EDUCATION/TRAINING PROGRAM

## 2021-10-16 PROCEDURE — 250N000013 HC RX MED GY IP 250 OP 250 PS 637: Performed by: SURGERY

## 2021-10-16 PROCEDURE — 84100 ASSAY OF PHOSPHORUS: CPT | Performed by: STUDENT IN AN ORGANIZED HEALTH CARE EDUCATION/TRAINING PROGRAM

## 2021-10-16 PROCEDURE — 999N000128 HC STATISTIC PERIPHERAL IV START W/O US GUIDANCE

## 2021-10-16 PROCEDURE — 85018 HEMOGLOBIN: CPT | Performed by: STUDENT IN AN ORGANIZED HEALTH CARE EDUCATION/TRAINING PROGRAM

## 2021-10-16 RX ORDER — AMOXICILLIN 250 MG
1 CAPSULE ORAL 2 TIMES DAILY PRN
Status: DISCONTINUED | OUTPATIENT
Start: 2021-10-16 | End: 2021-11-05 | Stop reason: HOSPADM

## 2021-10-16 RX ORDER — MIDODRINE HYDROCHLORIDE 5 MG/1
20 TABLET ORAL
Status: DISCONTINUED | OUTPATIENT
Start: 2021-10-16 | End: 2021-10-18

## 2021-10-16 RX ADMIN — GABAPENTIN 100 MG: 250 SOLUTION ORAL at 18:11

## 2021-10-16 RX ADMIN — Medication 100 MCG: at 08:38

## 2021-10-16 RX ADMIN — CALCIUM CARBONATE 600 MG (1,500 MG)-VITAMIN D3 400 UNIT TABLET 1 TABLET: at 08:38

## 2021-10-16 RX ADMIN — SALINE NASAL SPRAY 1 SPRAY: 1.5 SOLUTION NASAL at 11:53

## 2021-10-16 RX ADMIN — Medication 40 MG: at 08:37

## 2021-10-16 RX ADMIN — OXYCODONE HYDROCHLORIDE 10 MG: 5 TABLET ORAL at 02:28

## 2021-10-16 RX ADMIN — Medication 2 PACKET: at 08:39

## 2021-10-16 RX ADMIN — Medication 15 ML: at 11:53

## 2021-10-16 RX ADMIN — INSULIN ASPART 3 UNITS: 100 INJECTION, SOLUTION INTRAVENOUS; SUBCUTANEOUS at 11:52

## 2021-10-16 RX ADMIN — ACETAMINOPHEN 325 MG: 325 TABLET, FILM COATED ORAL at 11:53

## 2021-10-16 RX ADMIN — Medication 2 PACKET: at 21:06

## 2021-10-16 RX ADMIN — SALINE NASAL SPRAY 1 SPRAY: 1.5 SOLUTION NASAL at 08:41

## 2021-10-16 RX ADMIN — MIDODRINE HYDROCHLORIDE 10 MG: 5 TABLET ORAL at 11:53

## 2021-10-16 RX ADMIN — INSULIN ASPART 5 UNITS: 100 INJECTION, SOLUTION INTRAVENOUS; SUBCUTANEOUS at 09:04

## 2021-10-16 RX ADMIN — Medication 50 MG: at 21:05

## 2021-10-16 RX ADMIN — FOLIC ACID 1 MG: 1 TABLET ORAL at 08:38

## 2021-10-16 RX ADMIN — RIFAXIMIN 550 MG: 550 TABLET ORAL at 08:38

## 2021-10-16 RX ADMIN — INSULIN ASPART 3 UNITS: 100 INJECTION, SOLUTION INTRAVENOUS; SUBCUTANEOUS at 00:33

## 2021-10-16 RX ADMIN — OXYCODONE HYDROCHLORIDE 10 MG: 5 TABLET ORAL at 15:51

## 2021-10-16 RX ADMIN — POLYETHYLENE GLYCOL 3350 17 G: 17 POWDER, FOR SOLUTION ORAL at 21:06

## 2021-10-16 RX ADMIN — INSULIN ASPART 3 UNITS: 100 INJECTION, SOLUTION INTRAVENOUS; SUBCUTANEOUS at 21:07

## 2021-10-16 RX ADMIN — INSULIN ASPART 3 UNITS: 100 INJECTION, SOLUTION INTRAVENOUS; SUBCUTANEOUS at 04:59

## 2021-10-16 RX ADMIN — INSULIN ASPART 4 UNITS: 100 INJECTION, SOLUTION INTRAVENOUS; SUBCUTANEOUS at 15:55

## 2021-10-16 RX ADMIN — Medication 3 MG: at 21:05

## 2021-10-16 RX ADMIN — GABAPENTIN 100 MG: 250 SOLUTION ORAL at 11:53

## 2021-10-16 RX ADMIN — RIFAXIMIN 550 MG: 550 TABLET ORAL at 21:05

## 2021-10-16 RX ADMIN — HEPARIN SODIUM 5000 UNITS: 5000 INJECTION, SOLUTION INTRAVENOUS; SUBCUTANEOUS at 21:06

## 2021-10-16 RX ADMIN — HYDROXYZINE HYDROCHLORIDE 50 MG: 25 TABLET, FILM COATED ORAL at 02:28

## 2021-10-16 RX ADMIN — HEPARIN SODIUM 5000 UNITS: 5000 INJECTION, SOLUTION INTRAVENOUS; SUBCUTANEOUS at 09:29

## 2021-10-16 RX ADMIN — MIDODRINE HYDROCHLORIDE 20 MG: 5 TABLET ORAL at 18:11

## 2021-10-16 RX ADMIN — OXYCODONE HYDROCHLORIDE 10 MG: 5 TABLET ORAL at 09:29

## 2021-10-16 RX ADMIN — MIDODRINE HYDROCHLORIDE 10 MG: 5 TABLET ORAL at 08:38

## 2021-10-16 RX ADMIN — GABAPENTIN 100 MG: 250 SOLUTION ORAL at 08:37

## 2021-10-16 RX ADMIN — SALINE NASAL SPRAY 1 SPRAY: 1.5 SOLUTION NASAL at 15:55

## 2021-10-16 RX ADMIN — ACETAMINOPHEN 325 MG: 325 TABLET, FILM COATED ORAL at 05:10

## 2021-10-16 RX ADMIN — THIAMINE HCL TAB 100 MG 100 MG: 100 TAB at 08:38

## 2021-10-16 RX ADMIN — ESCITALOPRAM OXALATE 10 MG: 5 SOLUTION ORAL at 08:37

## 2021-10-16 RX ADMIN — Medication 50 MG: at 02:04

## 2021-10-16 RX ADMIN — HYDROXYZINE HYDROCHLORIDE 50 MG: 25 TABLET, FILM COATED ORAL at 21:05

## 2021-10-16 RX ADMIN — QUETIAPINE FUMARATE 25 MG: 25 TABLET ORAL at 21:05

## 2021-10-16 ASSESSMENT — ACTIVITIES OF DAILY LIVING (ADL)
ADLS_ACUITY_SCORE: 18

## 2021-10-16 ASSESSMENT — MIFFLIN-ST. JEOR: SCORE: 1534

## 2021-10-16 NOTE — PROGRESS NOTES
Talked with Aliyah today, and she had mentioned to the nursing team that she would feel most comfortable going to a CD treatment center after discharge, rather than home. Stated she liked her previous center, except the difficulties with her diet restrictions. SW consult placed for help with discharge planning.     Hayley Severson, MD-MPH  Internal Medicine-Pediatrics PGY-1

## 2021-10-16 NOTE — PLAN OF CARE
"ICU End of Shift Summary. See flowsheets for vital signs and detailed assessment.    Changes this shift: Alert and oriented. Occasionally forgetful. In evening patient became tearful and verbalized \"I can't go back home, I need to go somewhere where I can get chemical dependency help\". Social work consult put in, MICU aware. Oxy and tylenol given for pain, dilaudid discontinued. Very weak. Up to commode and chair via lift. Attempted to stand pivot with two assist to commode but was too weak.Levo titrated to maintain MAPs above 60. Afebrile. Lymph wraps replaced. Increased midodrine dose to 20mg. Weaned down to 1L NC. No SOB. Tolerating nectar thick liquids. TF running at 45mL/hr with standard flushes. 3 loose stools today. Lantus increased to 10 units BID. 1 unit PRBC given with recheck Hgb 8.2.     Plan: Trend labs. Monitor neuro status. Manage pain. Titrate levo per MAP goal. Transfer upstairs when stable.      Problem: OT General Care Plan  Goal: Cognitive (OT)  Description: Cognitive (OT)  Outcome: No Change     Problem: OT General Care Plan  Goal: Toilet Transfer/Toileting (OT)  Description: Toilet Transfer/Toileting (OT)  Outcome: No Change     Problem: Fluid Volume Excess  Goal: Fluid Balance  Outcome: No Change       "

## 2021-10-16 NOTE — PLAN OF CARE
Major Shift Events:  A&Ox4, forgetful and confused for brief moments. PERRLA, following commands. Tmax 100,2, tylenol given, MD aware. Sinus rhythm to sinch tach, 90s to 110s. Levo titrated to maintain MAP goal >60. VSS on NC 2L, shallow breathing. Continent, oliguric. Two loose, watery bms on bedpan. TF @ goal 40/hr with standard flushes. Nectar think liquid, minced diet. Complains of pain in abdomen and back, generalized and achy. Oxy given x 2. Atarax given x1 for anxiety. Benadryl given x 1 for itchy skin. Right A-line in place, left PICC in place. Levo @ 0.07. Hgb 6.5, blood products ordered.     Plan: Infuse 1u PRBCs and continue to monitor stools and respiratory status.     For vital signs and complete assessments, please see documentation flowsheets.    Problem: Risk for Delirium  Goal: Optimal Coping  Outcome: No Change     Problem: Risk for Delirium  Goal: Improved Behavioral Control  Outcome: Improving     Problem: Risk for Delirium  Goal: Improved Attention and Thought Clarity  Outcome: No Change     Problem: Risk for Delirium  Goal: Improved Sleep  Outcome: No Change

## 2021-10-16 NOTE — PROGRESS NOTES
Nephrology attending    S: Pt doing well this morning. Denies N/V/F/C. 4pt ROS negative. Had dialysis yesterday with 1L UF. Remains on norepi.    O: 98.6   P102   R25   115/61   95/49   97% on 2L  Gen - sitting up in bed, nad  HENT - neck supple  CV - rrr, no rub  Resp - cta anteriorly  Abd - BS+  Ext - 2-3+ edema    Labs noted  Medications reviewed    A/Rec: 47yo F with ANA LAURA.  ANA LAURA - no acute indication for RRT. Remains on norepi. Plan for next HD likely Monday.    Anemia - mgmt per primary team    Electrolytes - acceptable    Wm Moser MD  [phone]

## 2021-10-16 NOTE — PROGRESS NOTES
MEDICAL ICU PROGRESS NOTE  10/16/2021      Date of Service (when I saw the patient): 10/16/2021    ASSESSMENT: Aliyah Angeles is a 46 year old woman with alcohol use disorder (last drink 9/12/21), alcoholic hepatitis, jaimie en y gastric bypass, alcohol pancreatitis, pancreatic cyst, Type 2 DM, anxiety and depression admitted for abdominal pain, lower extremity edema, and nausea that started after starting low sodium and diabetic friendly foods in an alcohol treatment facility. Patient was admitted to the medicine floor with abdominal pain and vomiting on 9/29/2021. Overnight 10/3-10/4, patient had worsening respiratory status, concern for sepsis requiring transfer to the ICU for intubation and need for pressors as well as further evaluation and management for likely sepsis. Self extubated 10/14. Currently off sedation and CRRT, awake with well controlled abdominal pain.     CHANGES and MAJOR THINGS TODAY:   - Discontinue dilaudid  - Increased midodrine to 20 mg TID  - Increase Lantus to 10 units BID  - Recheck hemoglobin post blood transfusion  - Change senna to PRN with goal of 3-4 loose stools daily    PLAN:     Neuro:  # Pain  She was sedated and intubated on 10/4. Self-extubated 10/14 AM. Awake and alert, oriented. Talking and answering questions appropriately. Family notes she has struggled with addiction to not only alcohol, but narcotics as well. We will consider adjusting pain regimen as tolerated.   - DC Dilaudid  - Cont oxycodone PO PRN  - Cont Tylenol     # Anxiety and depression   On 10/14, after self extubation, she was making comments about dying. She was quite delirious with emotional lability. Mood seems greatly improved today.Sleeps through the night with seroquel.   - PTA escitalopram  - Psych consult, appreciate recs  - Cont PRN Seroquel 25 mg @ bedtime      # Encephalopathy, likely acute toxic metabolic vs infection, resolved  In the setting of likely hepatic encephalopathy, infectious  encephalopathy, sedation. SBP unlikely due to normal paracentesis eval and culture. Normal ammonia from 10/4.  No acute findings on MRI from 10/8. Awake, alert, answering questions appropriately; encephalopathy appears to have resolved.   - Miralax BID  - Goal 3-4 loose stools daily  - On rifaximin  - Gabapentin 100 mg TID  - Changed Senna S to scheduled    # Alcohol use disorder  Last alcohol use 9/12.21.  - Thiamine, folic acid, MVI supplementation    Pulmonary:  # Acute hypoxemic respiratory failure; Improved  # ARDS; Resolved  # Concern for infection, pneumonia and sepsis  Acute onset hypoxemia, bilateral opacities are consistent with ARDS. No evidence of cardiac etiology. Bilateral ground glass infiltrates on imaging, with bilateral pleural effusions. Treated with lung-protective ventilation, prone positioning. Intubated, bronch 10/4 with bile-like fluid, suspect aspiration as well as PMN predominance concerning for bacterial pneumonia. Currently stable on vent settings; supine; off NMB; off inhaled epo 10/7. Chest CT 10/6 showed ground glass and consolidative opacities throughout the lungs. CRRT 10/7 to 10/13. On the morning of 10/14, she self extubated and has been stable. Currently on 2L of NC.   - Infectious work-up and treatment as below  - Albuterol neb PRN  - Continue with nasal canula as tolerated    # Respiratory alkalosis  Improved     Cardiovascular:  # Hypotension, in the setting of likely septic shock, improving  # Edema, possible intravascular depletion with third-spacing, improving  Echo 10/4 with hyperdynamic EF >70%. Moderate tricuspid insufficiency. Normal IVC. She has been requiring pressors and has an ANA LAURA. Treating for sepsis. Her liver disease is likely contributing to the shock. She has been requiring increasing pressors that last couple of days. Concerned for septic shock. See ID.   - Levophed for goal MAP >60  - Increased midodrine to 20 mg TID to reduce pressor requirements     # Lactic  acidosis, resolved  This is in the setting of ANA LAURA that required requiring CRRT. CT scan 10/7 showing pneumonia, no clear intra-abdominal source of infection. Appears to be perfusing well, less concern for ischemia.      GI/Nutrition:  # Alcoholic hepatitis  # Ascites  # Hyperbilirubinemia, improving  # History of RYGB  # Transaminitis, stable  Patient has been sober since 9/12 and has been at a treatment facility since her discharge 9/21. Hepatomegaly and hepatic steatosis without cirrhosis. Diagnostic paracentesis on admission without SBP. Discontinued lactulose 10/1 and then had decreased bowel movements. Normal lipase on admission. CT A/P on admission with severe hepatic steatosis, portal HTN, ascites, splenomegaly (similar on repeat CT 10/6). Paracentesis 10/4 without SBP.  On 10/9, AST was 94 and trending up and continued to do so on 10/10. AST elevation possibly due to alcoholic liver disease, however sobriety started a month ago according to the chart review. Repeat paracentesis 10/11 not concerning for SBP. On 10/14 endorsed severe abdominal pain. Her abdomen is soft, but with distention consistent with ascites. CT A&P with enlarged lymph nodes, ascites. While her last drink was 9/12, hepatology suggested acute symptoms of alcoholic hepatitis can last upwards of 6 weeks.   - Trend LFT's biweekly  - GI following, appreciate recs  - Miralax BID; Senna PRN  - Trend bilirubin, INR --> MELD labs  - SLP recommended mildly thickened fluids and minced and moist diet.     Renal/Fluids/Electrolytes:  # Anion gap metabolic acidosis, resolved  # Lactic acidosis, resolved  - CTM     # Acute kidney injury, concern for ATN, pre-renal, vs hepatorenal syndrome, improving  Baseline creatinine ~0.98. May be related to vomiting, lactulose, third spacing. Likely pre-renal. She was on CRRT from 10/7 to 10/13. They stopped due to increasing pressor needs and intolerance, some urine output. Started iHD yesterday. Tolerated it with  increase in pressor requirement.   - Consulted nephrology, appreciate recs  - Assess for hemodialysis daily  - Replacement protocols (Mg, Ph, K).   - Strict I/Os  - Avoid nephrotoxic agents    Endocrine:  # Type 2 diabetes mellitus  Her blood sugar was consistently over 200 as she approached her feeding goal rate, has somewhat stabilized. CTM.  - High-resistance sliding scale insulin  - Increase Lantus to 10 units BID  - Check BS q4h and PRN  - Goal BS between 100 and 180    ID:  # Septic shock, improving  # Pneumonia (aspiration vs CAP)  Concern for pneumonia, CAP vs HAP. Less concern for SBP given negative paracentesis on admission. Afebrile. Paracentesis 10/4 negative for SBP. Elevated procal and PMN's in BAL fluid 10/4 suggests a bacterial pneumonia, likely aspiration related due to her history of recent symptoms of vomiting. No pathogenic growth from any cultures; most recent cultures was 10/13. Today, her WBC improved.  - Will consider Meropenem if there is increased concern for septic shock  - Yeast on urine culture, likely secondary to Rice  - CTM CBC    Abx   - Zosyn 10/4-10/13  - Vanc 10/4-10/4  - Doxycycline 10/4-10/6  - Micafungin 10/6-10/9     Hematology:    # Anemia, macrocytic  # Coagulopathy  # Acute epistaxis  In the setting of alcohol use disorder, liver failure, alcoholic hepatitis. INR elevated. Likely 2/2 to B12 or folate deficiency due to long history of alcohol use disorder. On 10/7, 10/12 required transfusion. She required transfusion this AM. Hemoglobin was 8.2 on recheck.  - Transfuse for Hgb <7  - Follow INR  - Trend CBC in the AM  - Rechecked hemoglobin today -> 8.2    # Thrombocytopenia  Persistent in the setting of renal disease, liver disease, chronic alcoholism, and after a +2.3 liter I/O balance. This was likely partially dilutional with blunted bone marrow response secondary to alcoholism. HIT screen and hemolysis work up negative.  - Transfuse for PLTs <10, or if active  bleeding and platelets are <30     Musculoskeletal:  No active issues     Skin:  # Dry cracked skin on bilateral heels, improving  - Continue Eucerin to be applied topically     General Cares/Prophylaxis:    DVT Prophylaxis: Heparin subQ  GI Prophylaxis: PPI  Restraints: None  Family Communication: daily with mother  Code Status: Full Code     Lines/tubes/drains:  - PIV x2  - ETT  - Rice  - Central line  - Arterial line  - Hemodialysis line     Disposition:  - Medical ICU    Patient seen and findings/plan discussed with medical ICU staff, Dr. Chatman.    René Ascencio, MS4    Resident/Fellow Attestation   I, Hayley Severson, was present with the medical student who participated in the service and in the documentation of the note.  I have verified the history and personally performed the physical exam and medical decision making.  I agree with the assessment and plan of care as documented in the note.      Hayley Severson, MD-MPH  Internal Medicine-Pediatrics PGY-1    ====================================  INTERVAL HISTORY:   Nursing notes reviewed. Required blood transfusion this morning for low hemoglobin. Tolerated iHD, but needed increased pressor support. Endorses abdominal pain. Feeling weak and tired. She was asleep in her chair and continuously fell back asleep during our conversation. No CP or SOB.     OBJECTIVE:   1. VITAL SIGNS:   Temp:  [97.9  F (36.6  C)-100.2  F (37.9  C)] 99.6  F (37.6  C)  Pulse:  [] 100  Resp:  [11-30] 27  MAP:  [62 mmHg-85 mmHg] 79 mmHg  Arterial Line BP: ()/(46-63) 108/58  SpO2:  [87 %-99 %] 94 %    2. INTAKE/ OUTPUT:   I/O last 3 completed shifts:  In: 2259.33 [I.V.:202.33; NG/GT:700]  Out: 1200 [Urine:200; Other:1000]    3. PHYSICAL EXAMINATION:  General: Conversing appropriately without any delusions. Sitting up in a chair.  HEENT: PERRL, MMM, scleral icterus, nasal cannula   Neuro: Answering questions appropriately, moving all extremities, no focal  deficits  Pulm/Resp: Breath sounds equal bilaterally. Clear to ascultation. Non labored breathing  CV: RRR, no murmur noted today; LE edema 2+  Abdomen: Distended but soft, bowel sounds hyperactive, mild tenderness diffusely  : Rice in place, no urine assessed  Incisions/Skin: Petechiae diffusely, scattered ecchymoses  Psych: Good mood. Awake and alert, pleasant affect.   EXT: Tenderness in the bilateral ankles when assessing edema    4. LABS:   Arterial Blood Gases   Recent Labs   Lab 10/14/21  0328 10/13/21  0410 10/12/21  0405 10/11/21  0350   PH 7.44 7.45 7.51* 7.49*   PCO2 34* 35 32* 33*   PO2 97 133* 78* 79*   HCO3 23 25 25 25     Complete Blood Count   Recent Labs   Lab 10/16/21  1156 10/16/21  0431 10/15/21  2127 10/15/21  0353 10/14/21  0328 10/14/21  0328   WBC  --  16.9* 20.8* 21.3*  --  22.6*   HGB 8.2* 6.5* 7.4*  7.4* 7.3*   < > 8.1*   PLT  --  152 176 140*  --  129*    < > = values in this interval not displayed.     Basic Metabolic Panel  Recent Labs   Lab 10/16/21  1555 10/16/21  1151 10/16/21  0902 10/16/21  0435 10/16/21  0431 10/16/21  0031 10/15/21  0923 10/15/21  0353 10/14/21  2343 10/14/21  2057 10/14/21  0807 10/14/21  0328   NA  --   --   --   --  136  --   --  134  --  134  --  136   POTASSIUM  --   --   --   --  3.8  --   --  4.5  --  4.3  --  4.1   CHLORIDE  --   --   --   --  106  --   --  103  --  105  --  106   CO2  --   --   --   --  22  --   --  23  --  22  --  24   BUN  --   --   --   --  36*  --   --  54*  --  45*  --  34*   CR  --   --   --   --  1.45*  --   --  1.93*  --  1.82*  --  1.29*   * 208* 249* 200* 222*   < >   < > 204*   < > 195*   < > 222*    < > = values in this interval not displayed.     Liver Function Tests  Recent Labs   Lab 10/16/21  0431 10/14/21  0328 10/13/21  1145 10/13/21  0410 10/12/21  2002 10/12/21  1136 10/12/21  0406 10/11/21  1127 10/11/21  0349   AST  --  95*  --  114*  --   --  118*  --  112*   ALT  --  39  --  36  --   --  31  --  29    ALKPHOS  --  325*  --  285*  --   --  266*  --  268*   BILITOTAL  --  8.4*  --  9.6*  --   --  9.9*  --  12.0*   ALBUMIN  --  2.0* 2.0* 2.2* 2.3*   < > 2.2*   < > 2.4*   INR 1.31*  --   --  1.25*  --   --  1.30*  --  1.33*    < > = values in this interval not displayed.     Coagulation Profile  Recent Labs   Lab 10/16/21  0431 10/13/21  0410 10/12/21  0406 10/11/21  0349   INR 1.31* 1.25* 1.30* 1.33*       5. RADIOLOGY:   No results found for this or any previous visit (from the past 24 hour(s)).

## 2021-10-16 NOTE — PROGRESS NOTES
[facility] MICU STAFF NOTE     Aliyah Angeles remains critically ill with hypoxic respiratory failure and hypotension     I personally examined and evaluated the patient today. I have evaluated all laboratory values and imaging studies from the past 24 hours.     Key findings and decisions made today included:   47 yo female with history of alcoholic hepatitis who presented with abdominal pain who was transferred to the ICU for worsening respiratory status requiring intubation, self-extubated 10/14.  1. Aspiration pneumonia: Improved on CT Chest and is only on 2L oxygen.   2. Hypotension: Unclear etiology, does not appear to be septic. WBC stable. Increase midodrine and decrease MAP goal to 60 given liver disease.   3. Abdominal pain: Improved after dilaudid. No pathology found on CT abdomen.  4. Renal failure: HD per nephrology.      I personally managed the ventilator, sedation, pain control and analgesia, metabolic abnormalities, antibiotic therapy, and nutritional status.      Consults ongoing and ordered are: nephrology, psychiatry, hepatology     Procedures that will happen today are: None     All treatments were placed at my direction.  I formulated today s plan with the house staff team or resident(s) and agree with the findings and plan in the associated note.      I spent a total of 40 minutes (excluding procedure time) personally providing and directing critical care services at the bedside and on the critical care unit for Aliyah Angeles      Quirino Chatman MD  Pulmonary/Critical Care  October 16, 2021 1:58 PM

## 2021-10-16 NOTE — PROGRESS NOTES
HEMODIALYSIS TREATMENT NOTE    Date: 10/15/2021  Time: 7:00 PM    Data:  Pre Wt: 84.8kg    Desired Wt: 83.8 kg   Post Wt:  83.8kg  Weight change: 1  kg  Ultrafiltration - Post Run Net Total Removed (mL): 1000 mL  Vascular Access Status: patent  Dialyzer Rinse: Clear  Total Blood Volume Processed: 52.2 L Liters  Total Dialysis (Treatment) Time: 2871-5421 Hours    Lab:   No    Interventions:      Assessment:  Patient on hemodialysis from 1360-9492. Patient ran on K2 and Ca 2.5 baths. Frequently drowsy, a/o x 3 with stable vital signs. 1L of fluid removal achieved. Max . Rinsed back with no issues. Right double lumen internal jugular CVC patent. Dressing intact and saline locked. Report given to NAWAF Mendez.     Plan:    Per renal

## 2021-10-17 ENCOUNTER — APPOINTMENT (OUTPATIENT)
Dept: SPEECH THERAPY | Facility: CLINIC | Age: 46
End: 2021-10-17
Payer: COMMERCIAL

## 2021-10-17 ENCOUNTER — APPOINTMENT (OUTPATIENT)
Dept: GENERAL RADIOLOGY | Facility: CLINIC | Age: 46
End: 2021-10-17
Attending: STUDENT IN AN ORGANIZED HEALTH CARE EDUCATION/TRAINING PROGRAM
Payer: COMMERCIAL

## 2021-10-17 LAB
ALBUMIN SERPL-MCNC: 1.5 G/DL (ref 3.4–5)
ALBUMIN UR-MCNC: 50 MG/DL
ALBUMIN UR-MCNC: 50 MG/DL
ALP SERPL-CCNC: 338 U/L (ref 40–150)
ALT SERPL W P-5'-P-CCNC: 43 U/L (ref 0–50)
ANION GAP SERPL CALCULATED.3IONS-SCNC: 8 MMOL/L (ref 3–14)
APPEARANCE UR: ABNORMAL
APPEARANCE UR: ABNORMAL
AST SERPL W P-5'-P-CCNC: 97 U/L (ref 0–45)
BASOPHILS # BLD MANUAL: 0.2 10E3/UL (ref 0–0.2)
BASOPHILS NFR BLD MANUAL: 1 %
BILIRUB DIRECT SERPL-MCNC: 5.3 MG/DL (ref 0–0.2)
BILIRUB SERPL-MCNC: 6.5 MG/DL (ref 0.2–1.3)
BILIRUB UR QL STRIP: ABNORMAL
BILIRUB UR QL STRIP: ABNORMAL
BUN SERPL-MCNC: 55 MG/DL (ref 7–30)
C DIFF TOX B STL QL: NEGATIVE
CALCIUM SERPL-MCNC: 8.6 MG/DL (ref 8.5–10.1)
CHLORIDE BLD-SCNC: 101 MMOL/L (ref 94–109)
CO2 SERPL-SCNC: 24 MMOL/L (ref 20–32)
COLOR UR AUTO: ABNORMAL
COLOR UR AUTO: ABNORMAL
CREAT SERPL-MCNC: 2.17 MG/DL (ref 0.52–1.04)
CRP SERPL-MCNC: 140 MG/L (ref 0–8)
EOSINOPHIL # BLD MANUAL: 0.4 10E3/UL (ref 0–0.7)
EOSINOPHIL NFR BLD MANUAL: 2 %
ERYTHROCYTE [DISTWIDTH] IN BLOOD BY AUTOMATED COUNT: 21.5 % (ref 10–15)
GFR SERPL CREATININE-BSD FRML MDRD: 26 ML/MIN/1.73M2
GLUCOSE BLD-MCNC: 217 MG/DL (ref 70–99)
GLUCOSE BLDC GLUCOMTR-MCNC: 172 MG/DL (ref 70–99)
GLUCOSE BLDC GLUCOMTR-MCNC: 193 MG/DL (ref 70–99)
GLUCOSE BLDC GLUCOMTR-MCNC: 200 MG/DL (ref 70–99)
GLUCOSE BLDC GLUCOMTR-MCNC: 201 MG/DL (ref 70–99)
GLUCOSE BLDC GLUCOMTR-MCNC: 201 MG/DL (ref 70–99)
GLUCOSE BLDC GLUCOMTR-MCNC: 214 MG/DL (ref 70–99)
GLUCOSE UR STRIP-MCNC: NEGATIVE MG/DL
GLUCOSE UR STRIP-MCNC: NEGATIVE MG/DL
HCT VFR BLD AUTO: 24.9 % (ref 35–47)
HGB BLD-MCNC: 7.9 G/DL (ref 11.7–15.7)
HGB UR QL STRIP: NEGATIVE
HGB UR QL STRIP: NEGATIVE
KETONES UR STRIP-MCNC: NEGATIVE MG/DL
KETONES UR STRIP-MCNC: NEGATIVE MG/DL
LACTATE SERPL-SCNC: 1.9 MMOL/L (ref 0.7–2)
LEUKOCYTE ESTERASE UR QL STRIP: ABNORMAL
LEUKOCYTE ESTERASE UR QL STRIP: ABNORMAL
LIPASE SERPL-CCNC: 218 U/L (ref 73–393)
LYMPHOCYTES # BLD MANUAL: 2.4 10E3/UL (ref 0.8–5.3)
LYMPHOCYTES NFR BLD MANUAL: 13 %
MAGNESIUM SERPL-MCNC: 2.1 MG/DL (ref 1.6–2.3)
MCH RBC QN AUTO: 32 PG (ref 26.5–33)
MCHC RBC AUTO-ENTMCNC: 31.7 G/DL (ref 31.5–36.5)
MCV RBC AUTO: 101 FL (ref 78–100)
MONOCYTES # BLD MANUAL: 1.5 10E3/UL (ref 0–1.3)
MONOCYTES NFR BLD MANUAL: 8 %
MRSA DNA SPEC QL NAA+PROBE: NEGATIVE
MUCOUS THREADS #/AREA URNS LPF: PRESENT /LPF
MUCOUS THREADS #/AREA URNS LPF: PRESENT /LPF
NEUTROPHILS # BLD MANUAL: 14.1 10E3/UL (ref 1.6–8.3)
NEUTROPHILS NFR BLD MANUAL: 76 %
NITRATE UR QL: NEGATIVE
NITRATE UR QL: NEGATIVE
NRBC # BLD AUTO: 0.6 10E3/UL
NRBC BLD MANUAL-RTO: 3 %
PH UR STRIP: 5 [PH] (ref 5–7)
PH UR STRIP: 5 [PH] (ref 5–7)
PHOSPHATE SERPL-MCNC: 5.2 MG/DL (ref 2.5–4.5)
PLAT MORPH BLD: ABNORMAL
PLATELET # BLD AUTO: 204 10E3/UL (ref 150–450)
POTASSIUM BLD-SCNC: 4.3 MMOL/L (ref 3.4–5.3)
PROCALCITONIN SERPL-MCNC: 7.52 NG/ML
PROT SERPL-MCNC: 5.4 G/DL (ref 6.8–8.8)
RBC # BLD AUTO: 2.47 10E6/UL (ref 3.8–5.2)
RBC MORPH BLD: ABNORMAL
RBC URINE: 0 /HPF
RBC URINE: 0 /HPF
SA TARGET DNA: NEGATIVE
SODIUM SERPL-SCNC: 133 MMOL/L (ref 133–144)
SP GR UR STRIP: 1.02 (ref 1–1.03)
SP GR UR STRIP: 1.02 (ref 1–1.03)
TRANSITIONAL EPI: 2 /HPF
TRANSITIONAL EPI: 2 /HPF
UROBILINOGEN UR STRIP-MCNC: NORMAL MG/DL
UROBILINOGEN UR STRIP-MCNC: NORMAL MG/DL
WBC # BLD AUTO: 18.6 10E3/UL (ref 4–11)
WBC URINE: 41 /HPF
WBC URINE: 41 /HPF
YEAST #/AREA URNS HPF: ABNORMAL /HPF
YEAST #/AREA URNS HPF: ABNORMAL /HPF

## 2021-10-17 PROCEDURE — 250N000011 HC RX IP 250 OP 636: Performed by: SURGERY

## 2021-10-17 PROCEDURE — 81001 URINALYSIS AUTO W/SCOPE: CPT | Performed by: STUDENT IN AN ORGANIZED HEALTH CARE EDUCATION/TRAINING PROGRAM

## 2021-10-17 PROCEDURE — 250N000013 HC RX MED GY IP 250 OP 250 PS 637: Performed by: NURSE PRACTITIONER

## 2021-10-17 PROCEDURE — 92526 ORAL FUNCTION THERAPY: CPT | Mod: GN

## 2021-10-17 PROCEDURE — 71045 X-RAY EXAM CHEST 1 VIEW: CPT

## 2021-10-17 PROCEDURE — 250N000013 HC RX MED GY IP 250 OP 250 PS 637: Performed by: STUDENT IN AN ORGANIZED HEALTH CARE EDUCATION/TRAINING PROGRAM

## 2021-10-17 PROCEDURE — 258N000003 HC RX IP 258 OP 636: Performed by: SURGERY

## 2021-10-17 PROCEDURE — 83605 ASSAY OF LACTIC ACID: CPT | Performed by: STUDENT IN AN ORGANIZED HEALTH CARE EDUCATION/TRAINING PROGRAM

## 2021-10-17 PROCEDURE — 87640 STAPH A DNA AMP PROBE: CPT | Performed by: NURSE PRACTITIONER

## 2021-10-17 PROCEDURE — 82248 BILIRUBIN DIRECT: CPT | Performed by: NURSE PRACTITIONER

## 2021-10-17 PROCEDURE — 80069 RENAL FUNCTION PANEL: CPT | Performed by: STUDENT IN AN ORGANIZED HEALTH CARE EDUCATION/TRAINING PROGRAM

## 2021-10-17 PROCEDURE — 71045 X-RAY EXAM CHEST 1 VIEW: CPT | Mod: 26 | Performed by: RADIOLOGY

## 2021-10-17 PROCEDURE — 87040 BLOOD CULTURE FOR BACTERIA: CPT | Performed by: INTERNAL MEDICINE

## 2021-10-17 PROCEDURE — 87641 MR-STAPH DNA AMP PROBE: CPT | Performed by: NURSE PRACTITIONER

## 2021-10-17 PROCEDURE — 83690 ASSAY OF LIPASE: CPT | Performed by: NURSE PRACTITIONER

## 2021-10-17 PROCEDURE — 87086 URINE CULTURE/COLONY COUNT: CPT | Performed by: STUDENT IN AN ORGANIZED HEALTH CARE EDUCATION/TRAINING PROGRAM

## 2021-10-17 PROCEDURE — 36415 COLL VENOUS BLD VENIPUNCTURE: CPT | Performed by: INTERNAL MEDICINE

## 2021-10-17 PROCEDURE — 87493 C DIFF AMPLIFIED PROBE: CPT | Performed by: NURSE PRACTITIONER

## 2021-10-17 PROCEDURE — 200N000002 HC R&B ICU UMMC

## 2021-10-17 PROCEDURE — 250N000011 HC RX IP 250 OP 636: Performed by: STUDENT IN AN ORGANIZED HEALTH CARE EDUCATION/TRAINING PROGRAM

## 2021-10-17 PROCEDURE — 87040 BLOOD CULTURE FOR BACTERIA: CPT | Performed by: NURSE PRACTITIONER

## 2021-10-17 PROCEDURE — 85027 COMPLETE CBC AUTOMATED: CPT | Performed by: STUDENT IN AN ORGANIZED HEALTH CARE EDUCATION/TRAINING PROGRAM

## 2021-10-17 PROCEDURE — 84100 ASSAY OF PHOSPHORUS: CPT | Performed by: STUDENT IN AN ORGANIZED HEALTH CARE EDUCATION/TRAINING PROGRAM

## 2021-10-17 PROCEDURE — 250N000013 HC RX MED GY IP 250 OP 250 PS 637

## 2021-10-17 PROCEDURE — 250N000013 HC RX MED GY IP 250 OP 250 PS 637: Performed by: SURGERY

## 2021-10-17 PROCEDURE — 86140 C-REACTIVE PROTEIN: CPT | Performed by: STUDENT IN AN ORGANIZED HEALTH CARE EDUCATION/TRAINING PROGRAM

## 2021-10-17 PROCEDURE — 84145 PROCALCITONIN (PCT): CPT | Performed by: NURSE PRACTITIONER

## 2021-10-17 PROCEDURE — 83735 ASSAY OF MAGNESIUM: CPT | Performed by: STUDENT IN AN ORGANIZED HEALTH CARE EDUCATION/TRAINING PROGRAM

## 2021-10-17 PROCEDURE — 87106 FUNGI IDENTIFICATION YEAST: CPT | Performed by: STUDENT IN AN ORGANIZED HEALTH CARE EDUCATION/TRAINING PROGRAM

## 2021-10-17 PROCEDURE — 36592 COLLECT BLOOD FROM PICC: CPT | Performed by: STUDENT IN AN ORGANIZED HEALTH CARE EDUCATION/TRAINING PROGRAM

## 2021-10-17 PROCEDURE — 99291 CRITICAL CARE FIRST HOUR: CPT | Mod: GC | Performed by: INTERNAL MEDICINE

## 2021-10-17 RX ORDER — ACETAMINOPHEN 325 MG/1
650 TABLET ORAL EVERY 8 HOURS
Status: DISCONTINUED | OUTPATIENT
Start: 2021-10-17 | End: 2021-11-05 | Stop reason: HOSPADM

## 2021-10-17 RX ORDER — PIPERACILLIN SODIUM, TAZOBACTAM SODIUM 2; .25 G/10ML; G/10ML
2.25 INJECTION, POWDER, LYOPHILIZED, FOR SOLUTION INTRAVENOUS EVERY 6 HOURS
Status: COMPLETED | OUTPATIENT
Start: 2021-10-17 | End: 2021-10-26

## 2021-10-17 RX ADMIN — MIDODRINE HYDROCHLORIDE 20 MG: 5 TABLET ORAL at 08:29

## 2021-10-17 RX ADMIN — OXYCODONE HYDROCHLORIDE 5 MG: 5 TABLET ORAL at 04:13

## 2021-10-17 RX ADMIN — INSULIN ASPART 3 UNITS: 100 INJECTION, SOLUTION INTRAVENOUS; SUBCUTANEOUS at 08:56

## 2021-10-17 RX ADMIN — Medication 3 MG: at 20:44

## 2021-10-17 RX ADMIN — POLYETHYLENE GLYCOL 3350 17 G: 17 POWDER, FOR SOLUTION ORAL at 20:44

## 2021-10-17 RX ADMIN — QUETIAPINE FUMARATE 25 MG: 25 TABLET ORAL at 21:16

## 2021-10-17 RX ADMIN — Medication 2 PACKET: at 20:46

## 2021-10-17 RX ADMIN — INSULIN ASPART 3 UNITS: 100 INJECTION, SOLUTION INTRAVENOUS; SUBCUTANEOUS at 04:00

## 2021-10-17 RX ADMIN — Medication 2 PACKET: at 08:30

## 2021-10-17 RX ADMIN — INSULIN ASPART 3 UNITS: 100 INJECTION, SOLUTION INTRAVENOUS; SUBCUTANEOUS at 20:45

## 2021-10-17 RX ADMIN — MIDODRINE HYDROCHLORIDE 20 MG: 5 TABLET ORAL at 11:57

## 2021-10-17 RX ADMIN — HEPARIN SODIUM 5000 UNITS: 5000 INJECTION, SOLUTION INTRAVENOUS; SUBCUTANEOUS at 20:44

## 2021-10-17 RX ADMIN — Medication 50 MG: at 04:13

## 2021-10-17 RX ADMIN — SALINE NASAL SPRAY 1 SPRAY: 1.5 SOLUTION NASAL at 11:58

## 2021-10-17 RX ADMIN — RIFAXIMIN 550 MG: 550 TABLET ORAL at 08:29

## 2021-10-17 RX ADMIN — OXYCODONE HYDROCHLORIDE 10 MG: 5 TABLET ORAL at 17:42

## 2021-10-17 RX ADMIN — ACETAMINOPHEN 325 MG: 325 TABLET, FILM COATED ORAL at 00:21

## 2021-10-17 RX ADMIN — RIFAXIMIN 550 MG: 550 TABLET ORAL at 20:44

## 2021-10-17 RX ADMIN — INSULIN ASPART 2 UNITS: 100 INJECTION, SOLUTION INTRAVENOUS; SUBCUTANEOUS at 16:03

## 2021-10-17 RX ADMIN — CALCIUM CARBONATE 600 MG (1,500 MG)-VITAMIN D3 400 UNIT TABLET 1 TABLET: at 08:29

## 2021-10-17 RX ADMIN — VANCOMYCIN HYDROCHLORIDE 2250 MG: 1 INJECTION, POWDER, LYOPHILIZED, FOR SOLUTION INTRAVENOUS at 13:16

## 2021-10-17 RX ADMIN — ACETAMINOPHEN 650 MG: 325 TABLET, FILM COATED ORAL at 10:14

## 2021-10-17 RX ADMIN — MIDODRINE HYDROCHLORIDE 20 MG: 5 TABLET ORAL at 17:35

## 2021-10-17 RX ADMIN — HEPARIN SODIUM 5000 UNITS: 5000 INJECTION, SOLUTION INTRAVENOUS; SUBCUTANEOUS at 08:30

## 2021-10-17 RX ADMIN — Medication 40 MG: at 08:56

## 2021-10-17 RX ADMIN — GABAPENTIN 100 MG: 250 SOLUTION ORAL at 11:58

## 2021-10-17 RX ADMIN — SALINE NASAL SPRAY 1 SPRAY: 1.5 SOLUTION NASAL at 16:03

## 2021-10-17 RX ADMIN — INSULIN ASPART 3 UNITS: 100 INJECTION, SOLUTION INTRAVENOUS; SUBCUTANEOUS at 00:21

## 2021-10-17 RX ADMIN — ESCITALOPRAM OXALATE 10 MG: 5 SOLUTION ORAL at 08:56

## 2021-10-17 RX ADMIN — SALINE NASAL SPRAY 1 SPRAY: 1.5 SOLUTION NASAL at 08:31

## 2021-10-17 RX ADMIN — Medication 15 ML: at 11:57

## 2021-10-17 RX ADMIN — OXYCODONE HYDROCHLORIDE 10 MG: 5 TABLET ORAL at 08:31

## 2021-10-17 RX ADMIN — PIPERACILLIN SODIUM AND TAZOBACTAM SODIUM 2.25 G: 2; .25 INJECTION, POWDER, LYOPHILIZED, FOR SOLUTION INTRAVENOUS at 13:24

## 2021-10-17 RX ADMIN — Medication 100 MCG: at 08:29

## 2021-10-17 RX ADMIN — ACETAMINOPHEN 650 MG: 325 TABLET, FILM COATED ORAL at 17:35

## 2021-10-17 RX ADMIN — PIPERACILLIN SODIUM AND TAZOBACTAM SODIUM 2.25 G: 2; .25 INJECTION, POWDER, LYOPHILIZED, FOR SOLUTION INTRAVENOUS at 17:41

## 2021-10-17 RX ADMIN — GABAPENTIN 100 MG: 250 SOLUTION ORAL at 17:35

## 2021-10-17 RX ADMIN — GABAPENTIN 100 MG: 250 SOLUTION ORAL at 08:56

## 2021-10-17 RX ADMIN — FOLIC ACID 1 MG: 1 TABLET ORAL at 08:29

## 2021-10-17 RX ADMIN — Medication 50 MG: at 20:43

## 2021-10-17 RX ADMIN — INSULIN ASPART 3 UNITS: 100 INJECTION, SOLUTION INTRAVENOUS; SUBCUTANEOUS at 11:58

## 2021-10-17 RX ADMIN — THIAMINE HCL TAB 100 MG 100 MG: 100 TAB at 08:29

## 2021-10-17 ASSESSMENT — ACTIVITIES OF DAILY LIVING (ADL)
ADLS_ACUITY_SCORE: 16
ADLS_ACUITY_SCORE: 18
ADLS_ACUITY_SCORE: 16
ADLS_ACUITY_SCORE: 16
ADLS_ACUITY_SCORE: 18
ADLS_ACUITY_SCORE: 18

## 2021-10-17 NOTE — PROGRESS NOTES
MEDICAL ICU PROGRESS NOTE  10/17/2021      Date of Service (when I saw the patient): 10/17/2021    ASSESSMENT: Aliyah Angeles is a 46 year old woman with alcohol use disorder (last drink 9/12/21), alcoholic hepatitis, jaimie en y gastric bypass, alcohol pancreatitis, pancreatic cyst, Type 2 DM, anxiety and depression admitted for abdominal pain, lower extremity edema, and nausea that started after starting low sodium and diabetic friendly foods in an alcohol treatment facility. Patient was admitted to the medicine floor with abdominal pain and vomiting on 9/29/2021. Overnight 10/3-10/4, patient had worsening respiratory status, concern for sepsis requiring transfer to the ICU for intubation and need for pressors as well as further evaluation and management for likely sepsis. Self extubated 10/14. Currently off sedation and CRRT, awake with well controlled abdominal pain.     CHANGES and MAJOR THINGS TODAY:   - Blood and sputum cx  - start vanc/zosyn, elevated procal  - MRSA nares  - CXR in am (? Evolving Pna)  - lipase, hepatic panel  - Test c diff  - scheduled tylenol 650mg q 8H  - Consider reducing MAP goal farther once suspicion of new infx improving   - increase lantus to 10-> 15 BID (5 unit(s) now)     PLAN:     Neuro:  # Pain  She was sedated and intubated on 10/4. Self-extubated 10/14 AM. Awake and alert, oriented. Talking and answering questions appropriately. Family notes she has struggled with addiction to not only alcohol, but narcotics as well. We will consider adjusting pain regimen as tolerated.   - Cont oxycodone PO PRN  - Scheduled Tylenol 650 mg q8h     # Anxiety and depression   - PTA escitalopram  - Cont PRN Seroquel 25 mg @ bedtime    # Encephalopathy, likely acute toxic metabolic vs infection, resolved  In the setting of likely hepatic encephalopathy, infectious encephalopathy, sedation. SBP unlikely due to normal paracentesis eval and culture. Normal ammonia from 10/4.  No acute findings on  MRI from 10/8. Awake, alert, answering questions appropriately; encephalopathy appears to have mostly resolved.   - Miralax BID  - Goal 3-4 loose stools daily  - On rifaximin  - Gabapentin 100 mg TID  - Changed Senna to scheduled    # Alcohol use disorder  Last alcohol use 9/12.21.  - Thiamine, folic acid, MVI supplementation    Pulmonary:  # Acute hypoxemic respiratory failure; Improved  # ARDS; Resolved  # Concern for infection, pneumonia and sepsis  Acute onset hypoxemia, bilateral opacities are consistent with ARDS. No evidence of cardiac etiology. Bilateral ground glass infiltrates on imaging, with bilateral pleural effusions. Treated with lung-protective ventilation, prone positioning. Intubated, bronch 10/4 with bile-like fluid, suspect aspiration as well as PMN predominance concerning for bacterial pneumonia. Currently stable on vent settings; supine; off NMB; off inhaled epo 10/7. Chest CT 10/6 showed ground glass and consolidative opacities throughout the lungs. CRRT 10/7 to 10/13. On the morning of 10/14, she self extubated and has been stable. Currently on 2L of NC.   - Infectious work-up and treatment as below  - Albuterol neb PRN  - Continue with nasal canula as tolerated    # Respiratory alkalosis  Improved     Cardiovascular:  # Hypotension, in the setting of likely septic shock, improving  # Edema, possible intravascular depletion with third-spacing, improving  Echo 10/4 with hyperdynamic EF >70%. Moderate tricuspid insufficiency. Normal IVC. She has been requiring pressors and has an ANA LAURA. Treating for sepsis. Her liver disease is likely contributing to the shock. She has been requiring increasing pressors that last couple of days. Concerned for septic shock. See ID.   - Levophed for goal MAP >60  - Increased midodrine to 20 mg TID to reduce pressor requirements        GI/Nutrition:  # Alcoholic hepatitis  # Ascites  # Hyperbilirubinemia, improving  # History of RYGB  # Transaminitis,  stable  Patient has been sober since 9/12 and has been at a treatment facility since her discharge 9/21. Hepatomegaly and hepatic steatosis without cirrhosis. Diagnostic paracentesis on admission without SBP. Discontinued lactulose 10/1 and then had decreased bowel movements. Normal lipase on admission. CT A/P on admission with severe hepatic steatosis, portal HTN, ascites, splenomegaly (similar on repeat CT 10/6). Paracentesis 10/4 without SBP.  On 10/9, AST was 94 and trending up and continued to do so on 10/10. AST elevation possibly due to alcoholic liver disease, however sobriety started a month ago according to the chart review. Repeat paracentesis 10/11 not concerning for SBP. On 10/14 endorsed severe abdominal pain. Her abdomen is soft, but with distention consistent with ascites. CT A&P with enlarged lymph nodes, ascites. While her last drink was 9/12, hepatology suggested acute symptoms of alcoholic hepatitis can last upwards of 6 weeks.   - Trend LFT's biweekly  - GI following, appreciate recs  - Miralax BID; Senna PRN  - Trend bilirubin, INR --> MELD labs  - SLP recommended mildly thickened fluids and minced and moist diet.     Renal/Fluids/Electrolytes:  # Anion gap metabolic acidosis, resolved  # Lactic acidosis, resolved  - CTM     # Acute kidney injury, concern for ATN, pre-renal, vs hepatorenal syndrome, improving  Baseline creatinine ~0.98. May be related to vomiting, lactulose, third spacing. Likely pre-renal. She was on CRRT from 10/7 to 10/13. They stopped due to increasing pressor needs and intolerance, some urine output. Now iHD.  - Consulted nephrology, appreciate recs  - Assess for hemodialysis daily  - Replacement protocols (Mg, Ph, K).   - Strict I/Os  - Avoid nephrotoxic agents    Endocrine:  # Type 2 diabetes mellitus  Her blood sugar was consistently over 200 as she approached her feeding goal rate, has somewhat stabilized. CTM.  - High-resistance sliding scale insulin  - Increase  Lantus to 15 units BID  - Check BS q4h and PRN  - Goal BS between 100 and 180    ID:  # Septic shock, improving  # Pneumonia (aspiration vs CAP)  # Fever 10/16  Concern for pneumonia, CAP vs HAP. Less concern for SBP given negative paracentesis on admission. Afebrile. Paracentesis 10/4 negative for SBP. Elevated procal and PMN's in BAL fluid 10/4 suggests a bacterial pneumonia, likely aspiration related due to her history of recent symptoms of vomiting. No pathogenic growth from any cultures; most recent cultures was 10/13. Off antibiotics since 10/13, restart abx today in setting of continued hypotension, new fever overnight, CRP newly elevated at 140 and elevated procal (although hard to interpret in setting of ESRD). CXR without any obvious new opacities. Collecting urinalysis. Will consider additional paracentesis pending course.   - Will consider Meropenem if there is increased concern for septic shock  - Yeast on urine culture, likely secondary to Rice  - CTM CBC    Abx   - Zosyn 10/4-10/13, 10/17-p  - Vanc 10/4-10/4, 10/17-p  - Doxycycline 10/4-10/6  - Micafungin 10/6-10/9    Cultures  10/17 c.diff pending  10/17 Blood cultures off dialysis line and PICC  10/17 Blood cultures peripheral  10/17 MRSA nares pending  10/17 UA w/ reflex       Hematology:    # Anemia, macrocytic  # Coagulopathy  # Acute epistaxis  In the setting of alcohol use disorder, liver failure, alcoholic hepatitis. INR elevated. Likely 2/2 to B12 or folate deficiency due to long history of alcohol use disorder. On 10/7, 10/12, 10/16 with prbc transfusion.  - Transfuse for Hgb <7  - Follow INR  - Trend CBC in the AM    # Thrombocytopenia  Persistent in the setting of renal disease, liver disease, chronic alcoholism, and after a +2.3 liter I/O balance. This was likely partially dilutional with blunted bone marrow response secondary to alcoholism. HIT screen and hemolysis work up negative.  - Transfuse for PLTs <10, or if active bleeding and  platelets are <30     Musculoskeletal:  No active issues     Skin:  # Dry cracked skin on bilateral heels, improving  - Continue Eucerin to be applied topically     General Cares/Prophylaxis:    DVT Prophylaxis: Heparin subQ  GI Prophylaxis: PPI  Restraints: None  Family Communication: daily with mother  Code Status: Full Code     Lines/tubes/drains:  - PIV x2  - ETT  - Rice  - Central line  - Arterial line  - Hemodialysis line     Disposition:  - Medical ICU    Patient seen and findings/plan discussed with medical ICU staff, Dr. Chatman.    Bipin Negron MD MPH  Internal Medicine and Pediatrics, PGY-3  [facility]    ====================================  INTERVAL HISTORY:   Nursing notes reviewed. Febrile overnight. No new cough, chest pain, shortness of breath. Abdominal pain appears baseline.      OBJECTIVE:   1. VITAL SIGNS:   Temp:  [97.9  F (36.6  C)-101.7  F (38.7  C)] 98.4  F (36.9  C)  Pulse:  [] 104  Resp:  [19-32] 23  MAP:  [58 mmHg-85 mmHg] 71 mmHg  Arterial Line BP: ()/(43-62) 104/53  SpO2:  [89 %-97 %] 95 %    2. INTAKE/ OUTPUT:   I/O last 3 completed shifts:  In: 2299.57 [I.V.:182.57; NG/GT:715]  Out: -     3. PHYSICAL EXAMINATION:  General: Conversing appropriately without any delusions. Sitting up in bed.  HEENT: PERRL, MMM, scleral icterus, nasal cannula   Neuro: Answering questions appropriately, moving all extremities, no focal deficits  Pulm/Resp: Breath sounds equal bilaterally. Clear to ascultation. Non labored breathing  CV: RRR, no murmur noted today; LE edema 2+ above wraps on thighs  Abdomen: Distended but soft, bowel sounds hyperactive, tenderness diffusely  : Rice in place, no urine assessed  Incisions/Skin: Petechiae diffusely, scattered ecchymoses  Psych: Good mood. Awake and alert, pleasant affect.     4. LABS:   Arterial Blood Gases   Recent Labs   Lab 10/14/21  0328 10/13/21  0410 10/12/21  0405 10/11/21  0350   PH 7.44 7.45 7.51* 7.49*   PCO2 34* 35 32* 33*    PO2 97 133* 78* 79*   HCO3 23 25 25 25     Complete Blood Count   Recent Labs   Lab 10/17/21  0358 10/16/21  1156 10/16/21  0431 10/15/21  2127 10/15/21  0353 10/15/21  0353   WBC 18.6*  --  16.9* 20.8*  --  21.3*   HGB 7.9* 8.2* 6.5* 7.4*  7.4*   < > 7.3*     --  152 176  --  140*    < > = values in this interval not displayed.     Basic Metabolic Panel  Recent Labs   Lab 10/17/21  0358 10/17/21  0013 10/16/21  2055 10/16/21  0435 10/16/21  0431 10/15/21  0923 10/15/21  0353 10/14/21  2343 10/14/21  2057     --   --   --  136  --  134  --  134   POTASSIUM 4.3  --   --   --  3.8  --  4.5  --  4.3   CHLORIDE 101  --   --   --  106  --  103  --  105   CO2 24  --   --   --  22  --  23  --  22   BUN 55*  --   --   --  36*  --  54*  --  45*   CR 2.17*  --   --   --  1.45*  --  1.93*  --  1.82*   *  201* 200* 196*   < > 222*   < > 204*   < > 195*    < > = values in this interval not displayed.     Liver Function Tests  Recent Labs   Lab 10/16/21  0431 10/14/21  0328 10/13/21  1145 10/13/21  0410 10/12/21  2002 10/12/21  1136 10/12/21  0406 10/11/21  1127 10/11/21  0349   AST  --  95*  --  114*  --   --  118*  --  112*   ALT  --  39  --  36  --   --  31  --  29   ALKPHOS  --  325*  --  285*  --   --  266*  --  268*   BILITOTAL  --  8.4*  --  9.6*  --   --  9.9*  --  12.0*   ALBUMIN  --  2.0* 2.0* 2.2* 2.3*   < > 2.2*   < > 2.4*   INR 1.31*  --   --  1.25*  --   --  1.30*  --  1.33*    < > = values in this interval not displayed.     Coagulation Profile  Recent Labs   Lab 10/16/21  0431 10/13/21  0410 10/12/21  0406 10/11/21  0349   INR 1.31* 1.25* 1.30* 1.33*       5. RADIOLOGY:   No results found for this or any previous visit (from the past 24 hour(s)).

## 2021-10-17 NOTE — PLAN OF CARE
"ICU End of Shift Summary. See flowsheets for vital signs and detailed assessment.    Changes this shift: Alert and oriented. Occasionally confused. Stated \"Do you guys smoke? Can you grab my cigarettes to go outside and smoke.\" Occasionally unaware of date/time. Tylenol modified to 650mg q8hr. Still very weak and unmotivated to transfer via stand pivot. Refused PT in AM. Used lift once to transfer to commode. Requesting bedpan. Tmax 100.4. MICU aware. MRSA swab, UA and UC (from straight cath), blood cultures from lines, and cdiff sample sent. Cdiff negative. Increased midodrine to 20mg. Levo titrated per MAP goal of >60. RA-1L NC. Denies shortness of breath. TF at 45mL/hr with standard flushes. Ordered up lunch, 25% eaten. Tolerating nectar thickened liquids. Lantus increased to 15 units BID. 4 loose bowel movements today. Held bowel meds. Oliguric. Procal 7.52, MICU notified. Zosyn and vanco started.     Plan: Trend labs. Titrate levo per MAP goal. Encourage activity. Reorient as needed. Pending UC and BC. Transfer when off levo and stable.        Problem: OT General Care Plan  Goal: Cognitive (OT)  Description: Cognitive (OT)  Outcome: No Change     Problem: Risk for Delirium  Goal: Improved Attention and Thought Clarity  Outcome: No Change     Problem: Adult Inpatient Plan of Care  Goal: Readiness for Transition of Care  Outcome: No Change     "

## 2021-10-17 NOTE — PHARMACY-VANCOMYCIN DOSING SERVICE
"      Pharmacy Vancomycin Initial Note  Date of Service 2021  Patient's  1975  46 year old, female    Indication: Sepsis    Current estimated CrCl =  iHD    Creatinine for last 3 days  10/14/2021:  8:57 PM Creatinine 1.82 mg/dL  10/15/2021:  3:53 AM Creatinine 1.93 mg/dL  10/16/2021:  4:31 AM Creatinine 1.45 mg/dL  10/17/2021:  3:58 AM Creatinine 2.17 mg/dL    Recent Vancomycin Level(s) for last 3 days  No results found for requested labs within last 72 hours.      Vancomycin IV Administrations (past 72 hours)                   vancomycin (VANCOCIN) 2,250 mg in sodium chloride 0.9 % 500 mL intermittent infusion (mg) 2,250 mg New Bag 10/17/21 1316                Nephrotoxins and other renal medications (From now, onward)    Start     Dose/Rate Route Frequency Ordered Stop    10/17/21 1230  piperacillin-tazobactam (ZOSYN) 2.25 g vial to attach to  ml bag     Note to Pharmacy: For [facility], [facility] and [facility]: For Zosyn-naive patients, use the \"Zosyn initial dose + extended infusion\" order panel.    2.25 g  over 30 Minutes Intravenous EVERY 6 HOURS 10/17/21 1208      10/17/21 1230  vancomycin (VANCOCIN) 2,250 mg in sodium chloride 0.9 % 500 mL intermittent infusion      2,250 mg  over 120 Minutes Intravenous ONCE 10/17/21 1225      10/17/21 1225  vancomycin [facility] - receiving intermittent dosing      1 each Does not apply SEE ADMIN INSTRUCTIONS 10/17/21 1225      10/04/21 0800  norepinephrine (LEVOPHED) 16 mg in  mL infusion MAX CONC CENTRAL LINE      0.01-0.3 mcg/kg/min × 89.8 kg  0.8-25.3 mL/hr  Intravenous CONTINUOUS 10/04/21 0740            Contrast Orders - past 72 hours (72h ago, onward)    None              Plan:  1. Start vancomycin  2250 mg iv x 1, then intermittent dosing   2. Vancomycin monitoring method: Trough (Method 2 = manual dose calculation)  3. Vancomycin therapeutic monitoring goal: 15-20 mg/L  4. Pharmacy will check vancomycin levels as appropriate in 1-3 Days.    5. Serum " creatinine levels will be ordered daily for the first week of therapy and at least twice weekly for subsequent weeks.      Edilma Moreno, PharmD

## 2021-10-17 NOTE — PROGRESS NOTES
[facility] MICU STAFF NOTE 10-     Aliyah Angeles remains critically ill with hypoxic respiratory failure and hypotension     I personally examined and evaluated the patient today. I have evaluated all laboratory values and imaging studies from the past 24 hours.     Key findings and decisions made today included:   47 yo female with history of alcoholic hepatitis who presented with abdominal pain who was transferred to the ICU for worsening respiratory status requiring intubation, self-extubated 10/14.  1. Aspiration pneumonia: Improved on CT Chest and is only on 2L oxygen.   2. Hypotension: Unclear etiology, does not appear to be septic. WBC stable. Increased midodrine to 20  TID.  MAP goal decreased to 60 given liver disease. Wean NE as able.  3. Abdominal pain - chronic: Improved after dilaudid. No pathology found on CT abdomen. Paracentesis earlier in the week was negative for SBP.  4. Renal failure: iHD per nephrology.   5. New fever today. Panculture. Start emperic vanco/zosyn.      I personally managed the ventilator, sedation, pain control and analgesia, metabolic abnormalities, antibiotic therapy, and nutritional status.        All treatments were placed at my direction.  I formulated today s plan with the house staff team or resident(s) and agree with the findings and plan in the associated note.      I spent a total of 40 minutes (excluding procedure time) personally providing and directing critical care services at the bedside and on the critical care unit for Aliyah Angeles      Quirino Chatman MD  Pulmonary/Critical Care  October 17, 2021

## 2021-10-17 NOTE — PLAN OF CARE
ICU End of Shift Summary. See flowsheets for vital signs and detailed assessment.    Changes this shift: Patient is A&O x4, but forgetful at times with some auditory hallucinations still.  Patient is constantly having abdominal pain.   It's rated a 4-8.  Meds help some, but she still feels full and stretched belly.  Patient is still on Levo.  Every time it's decreased her MAPs drop below 60 and it drops more while asleep.  Patient is still on 0.06 of levo.  Patient had one temp of 101.7 over night.  A tylenol was given and sheet pulled back.  Patient dropped to 97.9F in a few hours.  Patient is still very edematous.  Patient is on 1L of O2.  She is able to maintain at 91-92% RA while awake, but drops into mid 80s while asleep.  Patient had 2 stools and 2 urine.  Both mixed.  Will need to straight cath for urine sample.       Plan: Will continue to monitor.  Will do dialysis on Monday unless labs change.  Will continue to try to wean Levo.

## 2021-10-17 NOTE — CONSULTS
"Acknowledging SW consult for \"Would like to go a chemical dependency treatment center when discharged, rather than home. Would appreciate help in arranging this. Thanks!\"    Per chart review, SW assessment completed and SW (incl LADC) already following patient for CD treatment center placement, referrals already initiated to [facility] and [facility].     Will defer to unit SW for follow-up on weekday.     Cris Marie, [facility], [facility]  Social Work Services/Care Management, Casual staff  [facility]      Weekend Social Work (avail on [employer]):  4A, 4C, 4E, 5A, 5B pager [phone]  6A, 6B, 6C, 6D  pager [phone]  7A, 7B, 7C, 7D, 5C pager [phone]  on-call/after hours  pager [phone]    Weekend RN Care Coordinator  pager [phone]  (home d/c with needs incl home care, assisted living facility returns, durable medical equip, IV antibiotics)         "

## 2021-10-18 ENCOUNTER — APPOINTMENT (OUTPATIENT)
Dept: PHYSICAL THERAPY | Facility: CLINIC | Age: 46
End: 2021-10-18
Payer: COMMERCIAL

## 2021-10-18 ENCOUNTER — APPOINTMENT (OUTPATIENT)
Dept: GENERAL RADIOLOGY | Facility: CLINIC | Age: 46
End: 2021-10-18
Attending: NURSE PRACTITIONER
Payer: COMMERCIAL

## 2021-10-18 ENCOUNTER — APPOINTMENT (OUTPATIENT)
Dept: OCCUPATIONAL THERAPY | Facility: CLINIC | Age: 46
End: 2021-10-18
Payer: COMMERCIAL

## 2021-10-18 LAB
ALBUMIN SERPL-MCNC: 1.4 G/DL (ref 3.4–5)
ALP SERPL-CCNC: 299 U/L (ref 40–150)
ALT SERPL W P-5'-P-CCNC: 38 U/L (ref 0–50)
ANION GAP SERPL CALCULATED.3IONS-SCNC: 11 MMOL/L (ref 3–14)
AST SERPL W P-5'-P-CCNC: 102 U/L (ref 0–45)
BACTERIA BLD CULT: NO GROWTH
BACTERIA BLD CULT: NO GROWTH
BACTERIA FLD CULT: NORMAL
BASE EXCESS BLDA CALC-SCNC: 0.7 MMOL/L (ref -9–1.8)
BASOPHILS # BLD MANUAL: 0 10E3/UL (ref 0–0.2)
BASOPHILS NFR BLD MANUAL: 0 %
BILIRUB DIRECT SERPL-MCNC: 4.9 MG/DL (ref 0–0.2)
BILIRUB SERPL-MCNC: 5.9 MG/DL (ref 0.2–1.3)
BUN SERPL-MCNC: 71 MG/DL (ref 7–30)
BURR CELLS BLD QL SMEAR: ABNORMAL
CA-I BLD-MCNC: 4.7 MG/DL (ref 4.4–5.2)
CALCIUM SERPL-MCNC: 8.3 MG/DL (ref 8.5–10.1)
CHLORIDE BLD-SCNC: 102 MMOL/L (ref 94–109)
CO2 SERPL-SCNC: 22 MMOL/L (ref 20–32)
CREAT SERPL-MCNC: 2.66 MG/DL (ref 0.52–1.04)
CRP SERPL-MCNC: 150 MG/L (ref 0–8)
EOSINOPHIL # BLD MANUAL: 0 10E3/UL (ref 0–0.7)
EOSINOPHIL NFR BLD MANUAL: 0 %
ERYTHROCYTE [DISTWIDTH] IN BLOOD BY AUTOMATED COUNT: 21.2 % (ref 10–15)
GFR SERPL CREATININE-BSD FRML MDRD: 21 ML/MIN/1.73M2
GLUCOSE BLD-MCNC: 172 MG/DL (ref 70–99)
GLUCOSE BLDC GLUCOMTR-MCNC: 147 MG/DL (ref 70–99)
GLUCOSE BLDC GLUCOMTR-MCNC: 163 MG/DL (ref 70–99)
GLUCOSE BLDC GLUCOMTR-MCNC: 181 MG/DL (ref 70–99)
GLUCOSE BLDC GLUCOMTR-MCNC: 191 MG/DL (ref 70–99)
GLUCOSE BLDC GLUCOMTR-MCNC: 195 MG/DL (ref 70–99)
GLUCOSE BLDC GLUCOMTR-MCNC: 195 MG/DL (ref 70–99)
HCO3 BLD-SCNC: 25 MMOL/L (ref 21–28)
HCT VFR BLD AUTO: 23.7 % (ref 35–47)
HGB BLD-MCNC: 7.4 G/DL (ref 11.7–15.7)
HOLD SPECIMEN: NORMAL
LACTATE SERPL-SCNC: 2 MMOL/L (ref 0.7–2)
LYMPHOCYTES # BLD MANUAL: 1 10E3/UL (ref 0.8–5.3)
LYMPHOCYTES NFR BLD MANUAL: 6 %
MAGNESIUM SERPL-MCNC: 2 MG/DL (ref 1.6–2.3)
MCH RBC QN AUTO: 32.2 PG (ref 26.5–33)
MCHC RBC AUTO-ENTMCNC: 31.2 G/DL (ref 31.5–36.5)
MCV RBC AUTO: 103 FL (ref 78–100)
MONOCYTES # BLD MANUAL: 1.5 10E3/UL (ref 0–1.3)
MONOCYTES NFR BLD MANUAL: 9 %
MYELOCYTES # BLD MANUAL: 0.3 10E3/UL
MYELOCYTES NFR BLD MANUAL: 2 %
NEUTROPHILS # BLD MANUAL: 14.2 10E3/UL (ref 1.6–8.3)
NEUTROPHILS NFR BLD MANUAL: 83 %
O2/TOTAL GAS SETTING VFR VENT: 2 %
OXYHGB MFR BLD: 92 % (ref 92–100)
PCO2 BLD: 35 MM HG (ref 35–45)
PH BLD: 7.46 [PH] (ref 7.35–7.45)
PHOSPHATE SERPL-MCNC: 6.3 MG/DL (ref 2.5–4.5)
PLAT MORPH BLD: ABNORMAL
PLATELET # BLD AUTO: 201 10E3/UL (ref 150–450)
PO2 BLD: 71 MM HG (ref 80–105)
POTASSIUM BLD-SCNC: 4.4 MMOL/L (ref 3.4–5.3)
PROCALCITONIN SERPL-MCNC: 6.93 NG/ML
PROT SERPL-MCNC: 4.4 G/DL (ref 6.8–8.8)
RBC # BLD AUTO: 2.3 10E6/UL (ref 3.8–5.2)
RBC MORPH BLD: ABNORMAL
SODIUM SERPL-SCNC: 135 MMOL/L (ref 133–144)
TARGETS BLD QL SMEAR: SLIGHT
VANCOMYCIN SERPL-MCNC: 27.2 MG/L
WBC # BLD AUTO: 17.1 10E3/UL (ref 4–11)

## 2021-10-18 PROCEDURE — 99233 SBSQ HOSP IP/OBS HIGH 50: CPT | Performed by: INTERNAL MEDICINE

## 2021-10-18 PROCEDURE — 250N000013 HC RX MED GY IP 250 OP 250 PS 637: Performed by: SURGERY

## 2021-10-18 PROCEDURE — 250N000013 HC RX MED GY IP 250 OP 250 PS 637: Performed by: STUDENT IN AN ORGANIZED HEALTH CARE EDUCATION/TRAINING PROGRAM

## 2021-10-18 PROCEDURE — 36592 COLLECT BLOOD FROM PICC: CPT | Performed by: STUDENT IN AN ORGANIZED HEALTH CARE EDUCATION/TRAINING PROGRAM

## 2021-10-18 PROCEDURE — 97140 MANUAL THERAPY 1/> REGIONS: CPT | Mod: GO

## 2021-10-18 PROCEDURE — 200N000002 HC R&B ICU UMMC

## 2021-10-18 PROCEDURE — 250N000013 HC RX MED GY IP 250 OP 250 PS 637: Performed by: NURSE PRACTITIONER

## 2021-10-18 PROCEDURE — 97530 THERAPEUTIC ACTIVITIES: CPT | Mod: GP

## 2021-10-18 PROCEDURE — 83605 ASSAY OF LACTIC ACID: CPT | Performed by: STUDENT IN AN ORGANIZED HEALTH CARE EDUCATION/TRAINING PROGRAM

## 2021-10-18 PROCEDURE — 85027 COMPLETE CBC AUTOMATED: CPT | Performed by: STUDENT IN AN ORGANIZED HEALTH CARE EDUCATION/TRAINING PROGRAM

## 2021-10-18 PROCEDURE — 80048 BASIC METABOLIC PNL TOTAL CA: CPT | Performed by: STUDENT IN AN ORGANIZED HEALTH CARE EDUCATION/TRAINING PROGRAM

## 2021-10-18 PROCEDURE — 250N000012 HC RX MED GY IP 250 OP 636 PS 637: Performed by: STUDENT IN AN ORGANIZED HEALTH CARE EDUCATION/TRAINING PROGRAM

## 2021-10-18 PROCEDURE — 82330 ASSAY OF CALCIUM: CPT | Performed by: SURGERY

## 2021-10-18 PROCEDURE — 80202 ASSAY OF VANCOMYCIN: CPT | Performed by: SURGERY

## 2021-10-18 PROCEDURE — 71045 X-RAY EXAM CHEST 1 VIEW: CPT

## 2021-10-18 PROCEDURE — 84145 PROCALCITONIN (PCT): CPT | Performed by: STUDENT IN AN ORGANIZED HEALTH CARE EDUCATION/TRAINING PROGRAM

## 2021-10-18 PROCEDURE — 99291 CRITICAL CARE FIRST HOUR: CPT | Mod: GC

## 2021-10-18 PROCEDURE — 84100 ASSAY OF PHOSPHORUS: CPT | Performed by: STUDENT IN AN ORGANIZED HEALTH CARE EDUCATION/TRAINING PROGRAM

## 2021-10-18 PROCEDURE — 90937 HEMODIALYSIS REPEATED EVAL: CPT

## 2021-10-18 PROCEDURE — 82248 BILIRUBIN DIRECT: CPT | Performed by: STUDENT IN AN ORGANIZED HEALTH CARE EDUCATION/TRAINING PROGRAM

## 2021-10-18 PROCEDURE — 86140 C-REACTIVE PROTEIN: CPT | Performed by: STUDENT IN AN ORGANIZED HEALTH CARE EDUCATION/TRAINING PROGRAM

## 2021-10-18 PROCEDURE — 83735 ASSAY OF MAGNESIUM: CPT | Performed by: STUDENT IN AN ORGANIZED HEALTH CARE EDUCATION/TRAINING PROGRAM

## 2021-10-18 PROCEDURE — 71045 X-RAY EXAM CHEST 1 VIEW: CPT | Mod: 26 | Performed by: RADIOLOGY

## 2021-10-18 PROCEDURE — 250N000013 HC RX MED GY IP 250 OP 250 PS 637

## 2021-10-18 PROCEDURE — 250N000009 HC RX 250: Performed by: STUDENT IN AN ORGANIZED HEALTH CARE EDUCATION/TRAINING PROGRAM

## 2021-10-18 PROCEDURE — 258N000003 HC RX IP 258 OP 636: Performed by: CLINICAL NURSE SPECIALIST

## 2021-10-18 PROCEDURE — 250N000011 HC RX IP 250 OP 636: Performed by: STUDENT IN AN ORGANIZED HEALTH CARE EDUCATION/TRAINING PROGRAM

## 2021-10-18 PROCEDURE — 82805 BLOOD GASES W/O2 SATURATION: CPT | Performed by: STUDENT IN AN ORGANIZED HEALTH CARE EDUCATION/TRAINING PROGRAM

## 2021-10-18 RX ORDER — AMINO AC/PROTEIN HYDR/WHEY PRO 10G-100/30
1 LIQUID (ML) ORAL 2 TIMES DAILY
Status: DISCONTINUED | OUTPATIENT
Start: 2021-10-18 | End: 2021-10-25

## 2021-10-18 RX ORDER — MIDODRINE HYDROCHLORIDE 5 MG/1
20 TABLET ORAL EVERY 8 HOURS
Status: DISCONTINUED | OUTPATIENT
Start: 2021-10-18 | End: 2021-10-21

## 2021-10-18 RX ADMIN — MIDODRINE HYDROCHLORIDE 20 MG: 5 TABLET ORAL at 07:25

## 2021-10-18 RX ADMIN — GABAPENTIN 100 MG: 250 SOLUTION ORAL at 17:44

## 2021-10-18 RX ADMIN — Medication 2.5 MG: at 22:58

## 2021-10-18 RX ADMIN — RIFAXIMIN 550 MG: 550 TABLET ORAL at 20:01

## 2021-10-18 RX ADMIN — SALINE NASAL SPRAY 1 SPRAY: 1.5 SOLUTION NASAL at 20:02

## 2021-10-18 RX ADMIN — Medication 40 MG: at 07:25

## 2021-10-18 RX ADMIN — INSULIN GLARGINE 10 UNITS: 100 INJECTION, SOLUTION SUBCUTANEOUS at 00:49

## 2021-10-18 RX ADMIN — PIPERACILLIN SODIUM AND TAZOBACTAM SODIUM 2.25 G: 2; .25 INJECTION, POWDER, LYOPHILIZED, FOR SOLUTION INTRAVENOUS at 06:09

## 2021-10-18 RX ADMIN — RIFAXIMIN 550 MG: 550 TABLET ORAL at 07:25

## 2021-10-18 RX ADMIN — MIDODRINE HYDROCHLORIDE 20 MG: 5 TABLET ORAL at 22:58

## 2021-10-18 RX ADMIN — Medication 50 MG: at 20:01

## 2021-10-18 RX ADMIN — Medication 2 PACKET: at 07:31

## 2021-10-18 RX ADMIN — PIPERACILLIN SODIUM AND TAZOBACTAM SODIUM 2.25 G: 2; .25 INJECTION, POWDER, LYOPHILIZED, FOR SOLUTION INTRAVENOUS at 00:27

## 2021-10-18 RX ADMIN — INSULIN ASPART 2 UNITS: 100 INJECTION, SOLUTION INTRAVENOUS; SUBCUTANEOUS at 07:26

## 2021-10-18 RX ADMIN — Medication 15 ML: at 12:31

## 2021-10-18 RX ADMIN — ESCITALOPRAM OXALATE 10 MG: 5 SOLUTION ORAL at 07:24

## 2021-10-18 RX ADMIN — Medication 0.02 MCG/KG/MIN: at 00:51

## 2021-10-18 RX ADMIN — Medication: at 13:46

## 2021-10-18 RX ADMIN — INSULIN ASPART 2 UNITS: 100 INJECTION, SOLUTION INTRAVENOUS; SUBCUTANEOUS at 20:08

## 2021-10-18 RX ADMIN — HYDROXYZINE HYDROCHLORIDE 50 MG: 25 TABLET, FILM COATED ORAL at 03:35

## 2021-10-18 RX ADMIN — SALINE NASAL SPRAY 1 SPRAY: 1.5 SOLUTION NASAL at 07:31

## 2021-10-18 RX ADMIN — HEPARIN SODIUM 5000 UNITS: 5000 INJECTION, SOLUTION INTRAVENOUS; SUBCUTANEOUS at 07:26

## 2021-10-18 RX ADMIN — Medication 2.5 MG: at 14:01

## 2021-10-18 RX ADMIN — INSULIN ASPART 3 UNITS: 100 INJECTION, SOLUTION INTRAVENOUS; SUBCUTANEOUS at 00:24

## 2021-10-18 RX ADMIN — INSULIN ASPART 3 UNITS: 100 INJECTION, SOLUTION INTRAVENOUS; SUBCUTANEOUS at 17:51

## 2021-10-18 RX ADMIN — HEPARIN SODIUM 5000 UNITS: 5000 INJECTION, SOLUTION INTRAVENOUS; SUBCUTANEOUS at 20:01

## 2021-10-18 RX ADMIN — Medication 2.5 MG: at 17:43

## 2021-10-18 RX ADMIN — POLYETHYLENE GLYCOL 3350 17 G: 17 POWDER, FOR SOLUTION ORAL at 07:26

## 2021-10-18 RX ADMIN — THIAMINE HCL TAB 100 MG 100 MG: 100 TAB at 07:26

## 2021-10-18 RX ADMIN — PIPERACILLIN SODIUM AND TAZOBACTAM SODIUM 2.25 G: 2; .25 INJECTION, POWDER, LYOPHILIZED, FOR SOLUTION INTRAVENOUS at 12:31

## 2021-10-18 RX ADMIN — Medication 25 MG: at 07:26

## 2021-10-18 RX ADMIN — CALCIUM CARBONATE 600 MG (1,500 MG)-VITAMIN D3 400 UNIT TABLET 1 TABLET: at 07:26

## 2021-10-18 RX ADMIN — GABAPENTIN 100 MG: 250 SOLUTION ORAL at 07:25

## 2021-10-18 RX ADMIN — SODIUM CHLORIDE 250 ML: 9 INJECTION, SOLUTION INTRAVENOUS at 13:45

## 2021-10-18 RX ADMIN — INSULIN ASPART 1 UNITS: 100 INJECTION, SOLUTION INTRAVENOUS; SUBCUTANEOUS at 12:34

## 2021-10-18 RX ADMIN — ACETAMINOPHEN 650 MG: 325 TABLET, FILM COATED ORAL at 10:06

## 2021-10-18 RX ADMIN — ACETAMINOPHEN 650 MG: 325 TABLET, FILM COATED ORAL at 02:42

## 2021-10-18 RX ADMIN — Medication 100 MCG: at 07:26

## 2021-10-18 RX ADMIN — MIDODRINE HYDROCHLORIDE 20 MG: 5 TABLET ORAL at 14:02

## 2021-10-18 RX ADMIN — Medication 1 PACKET: at 20:01

## 2021-10-18 RX ADMIN — FOLIC ACID 1 MG: 1 TABLET ORAL at 07:26

## 2021-10-18 RX ADMIN — GABAPENTIN 100 MG: 250 SOLUTION ORAL at 12:34

## 2021-10-18 RX ADMIN — SODIUM CHLORIDE 300 ML: 9 INJECTION, SOLUTION INTRAVENOUS at 13:45

## 2021-10-18 RX ADMIN — QUETIAPINE FUMARATE 25 MG: 25 TABLET ORAL at 22:58

## 2021-10-18 RX ADMIN — INSULIN ASPART 2 UNITS: 100 INJECTION, SOLUTION INTRAVENOUS; SUBCUTANEOUS at 03:35

## 2021-10-18 RX ADMIN — PIPERACILLIN SODIUM AND TAZOBACTAM SODIUM 2.25 G: 2; .25 INJECTION, POWDER, LYOPHILIZED, FOR SOLUTION INTRAVENOUS at 17:43

## 2021-10-18 ASSESSMENT — ACTIVITIES OF DAILY LIVING (ADL)
ADLS_ACUITY_SCORE: 16
ADLS_ACUITY_SCORE: 18
ADLS_ACUITY_SCORE: 16
ADLS_ACUITY_SCORE: 18
ADLS_ACUITY_SCORE: 16
ADLS_ACUITY_SCORE: 18
ADLS_ACUITY_SCORE: 16
ADLS_ACUITY_SCORE: 18
ADLS_ACUITY_SCORE: 16
ADLS_ACUITY_SCORE: 18
ADLS_ACUITY_SCORE: 18

## 2021-10-18 NOTE — PROGRESS NOTES
Nephrology Progress Note  10/18/2021           Aliyah Angeles is a 46 yof w/hx of ETOH use (Quit 9/12/2021) w/hepatitis, Fitz en Y bypass, Pancreatitis, DM2, Panc cyst who was admitted 9/29 from chem dep with N/V and worsening abdominal pain and edema.  Her course has been resp failure with ARDS and evolving ANA LAURA.  Noted to have received albumin from 10/1-10/3 (150g/day) with no improvement in Cr.  On Vanco/Zosyn to treat sepsis although cultures at time of consult are negative.  Nephrology consulted for evaluation of ANA LAURA and possible RRT, started CRRT on 10/7.       Interval History :   Mrs Angeles had CRRT started 10/7 for management of volume and acidosis, stopped on 10/13 and had run on 10/15, has fallen behind with regards to volume status since transitioning.  Running today and will try to pull 2L, may need daily runs to keep up with intake, will consider going back to CRRT if volume overload causes secondary issues.       Assessment & Recommendations:   ANA LAURA-Baseline Cr normal at 0.5 as recently as 9/14/2021, abdominal US pending currently.  Was at inpatient chem dep when she developed abdominal pain, N/V and worsening edema and was brought to [facility] on 9/29.  Was treated for UTI and started on Vanco/Zosyn for leukocytosis but decompensated and was intubated the evening of 10/3.  Noted she did receive albumin x3 days from 10/1-10/3 for ANA LAURA but actually worsened and Cr at time of consult is up to 2.  Lisa <5 on 10/3, ECHO done 10/4 showed hyperdynamic LV and normal IVC.  Etiology of ANA LAURA is likely ATN with underlying HRS physiology which made her more susceptible to hypoperfusion.  Started CRRT on 10/7 for management of volume and acidosis.  Trying to transition to iHD.                  -ANA LAURA, likely hypoperfusion in setting of baseline HRS physiology, possible contribution from vanco/zosyn and did receive contrast on 10/3.                -CRRT started 10/7, stopped 10/13.  Trying iHD again today but has fallen  "behind in regards to fluid status, is still on low dose norepi so will consider CRRT if fluid status worsened despite frequent runs.                   -Dialysis consent signed and scanned in under media.       Volume-Has total body volume overload, net positive ~2.6L yesterday and similar the previous day, will try to pull 2L today, may need to run extra to manage volume, albumin is low so will consider supplementing in order to pull fluid.       Electrolytes/pH-K normal at 4.4 bicarb 22, ABG 7.44/34/97/25.       Liver-Unclear duration, quit drinking on 9/12/2021 and was at chem dep when acute issues started.  Bili is ~10 and INR is 1.3 and a bit improved.  GI following, not clear if she would be a transplant candidate eventually.       ID-Off abx other than Rifaximin.       Nutrition-On Osmolite TF.          Seen and discussed with Dr Russo     Recommendations were communicated to primary team via verbal communication.        MARIA ELENA Wade CNS  Clinical Nurse Specialist  [phone]    Review of Systems:   I reviewed the following systems:  Gen: No fevers or chills  CV: No CP at rest  Resp: No SOB at rest  GI: No N/V    Physical Exam:   I/O last 3 completed shifts:  In: 2831.95 [P.O.:250; I.V.:726.95; NG/GT:775]  Out: 60 [Urine:60]   /67   Pulse 105   Temp 99.3  F (37.4  C)   Resp 17   Ht 1.626 m (5' 4\")   Wt 91.4 kg (201 lb 8 oz)   SpO2 94%   BMI 34.59 kg/m       GENERAL APPEARANCE: Extubated, stable from pulm standpoint.  Sitting up in chair today.   EYES: No scleral icterus  Pulmonary: lungs rhonchi to auscultation with equal breath sounds bilaterally, no clubbing or cyanosis  CV: Regular rhythm, normal rate, no rub   - Edema +2 LE/dependent  GI: soft, nontender, normal bowel sounds  MS: no evidence of inflammation in joints, no muscle tenderness  : No barrett, some intermittent UOP.   SKIN: no rash, warm, dry  NEURO: No focal deficits.      Labs:   All labs reviewed by " me  Electrolytes/Renal - Recent Labs   Lab Test 10/18/21  0722 10/18/21  0354 10/18/21  0333 10/17/21  0854 10/17/21  0358 10/16/21  0435 10/16/21  0431   NA  --  135  --   --  133  --  136   POTASSIUM  --  4.4  --   --  4.3  --  3.8   CHLORIDE  --  102  --   --  101  --  106   CO2  --  22  --   --  24  --  22   BUN  --  71*  --   --  55*  --  36*   CR  --  2.66*  --   --  2.17*  --  1.45*   * 172* 195*   < > 217*  201*   < > 222*   STEFFANY  --  8.3*  --   --  8.6  --  7.6*   MAG  --  2.0  --   --  2.1  --  1.8   PHOS  --  6.3*  --   --  5.2*  --  4.1    < > = values in this interval not displayed.       CBC -   Recent Labs   Lab Test 10/18/21  0354 10/17/21  0358 10/16/21  1156 10/16/21  0431 10/16/21  0431   WBC 17.1* 18.6*  --   --  16.9*   HGB 7.4* 7.9* 8.2*   < > 6.5*    204  --   --  152    < > = values in this interval not displayed.       LFTs -   Recent Labs   Lab Test 10/18/21  0354 10/17/21  0358 10/14/21  0328   ALKPHOS 299* 338* 325*   BILITOTAL 5.9* 6.5* 8.4*   ALT 38 43 39   * 97* 95*   PROTTOTAL 4.4* 5.4* 5.5*   ALBUMIN 1.4* 1.5* 2.0*       Iron Panel -   Recent Labs   Lab Test 09/13/21  0642   EYAD 1,839*           Current Medications:    sodium chloride 0.9%  250 mL Intravenous Once in dialysis/CRRT     sodium chloride 0.9%  300 mL Hemodialysis Machine Once     acetaminophen  650 mg Oral or Feeding Tube Q8H     calcium carbonate 600 mg-vitamin D 400 units  1 tablet Oral or Feeding Tube Daily     cyanocobalamin  100 mcg Oral or Feeding Tube Daily     escitalopram  10 mg Oral or Feeding Tube Daily     folic acid  1 mg Oral or Feeding Tube Daily     gabapentin  100 mg Oral or Feeding Tube TID     heparin ANTICOAGULANT  5,000 Units Subcutaneous Q12H     insulin aspart  1-12 Units Subcutaneous Q4H     insulin glargine  15 Units Subcutaneous BID     midodrine  20 mg Oral TID     multivitamins w/minerals  15 mL Oral Daily     - MEDICATION INSTRUCTIONS -   Does not apply Once      pantoprazole  40 mg Per Feeding Tube QAM AC     piperacillin-tazobactam  2.25 g Intravenous Q6H     polyethylene glycol  17 g Oral BID     [Held by provider] prazosin  1 mg Oral QPM     protein modular  2 packet Per Feeding Tube BID     QUEtiapine  25 mg Oral At Bedtime     rifaximin  550 mg Oral or Feeding Tube BID     sodium chloride  1 spray Both Nostrils 4x Daily     sodium chloride (PF)  9 mL Intracatheter During Dialysis/CRRT (from stock)     sodium chloride (PF)  9 mL Intracatheter During Dialysis/CRRT (from stock)     thiamine  100 mg Oral or Feeding Tube Daily     vancomycin [facility] [place] - receiving intermittent dosing  1 each Does not apply See Admin Instructions       dextrose       norepinephrine 0.03 mcg/kg/min (10/18/21 0700)     - MEDICATION INSTRUCTIONS -

## 2021-10-18 NOTE — PHARMACY-VANCOMYCIN DOSING SERVICE
"Pharmacy Vancomycin Note  Date of Service 2021  Patient's  1975   46 year old, female    Indication: Sepsis  Day of Therapy: 2  Current vancomycin regimen:  Intermittent due to renal function   Current vancomycin monitoring method: Trough (Method 2 = manual dose calculation)  Current vancomycin therapeutic monitoring goal: 15-20 mg/L    Current estimated CrCl = iHD    Recent Vancomycin Levels (past 3 days)  10/18/2021:  3:54 AM Vancomycin 27.2 mg/L - PRE iHD     Vancomycin IV Administrations (past 72 hours)                   vancomycin (VANCOCIN) 2,250 mg in sodium chloride 0.9 % 500 mL intermittent infusion (mg) 2,250 mg New Bag 10/17/21 1316                Nephrotoxins and other renal medications (From now, onward)    Start     Dose/Rate Route Frequency Ordered Stop    10/17/21 1230  piperacillin-tazobactam (ZOSYN) 2.25 g vial to attach to  ml bag     Note to Pharmacy: For [facility], [facility] and [facility]: For Zosyn-naive patients, use the \"Zosyn initial dose + extended infusion\" order panel.    2.25 g  over 30 Minutes Intravenous EVERY 6 HOURS 10/17/21 1208      10/04/21 0800  norepinephrine (LEVOPHED) 16 mg in  mL infusion MAX CONC CENTRAL LINE      0.01-0.3 mcg/kg/min × 89.8 kg  0.8-25.3 mL/hr  Intravenous CONTINUOUS 10/04/21 0740              Plan:  1. Team discontinued vancomycin today     Ansley Montes [facility]  "

## 2021-10-18 NOTE — PROGRESS NOTES
MEDICAL ICU PROGRESS NOTE  10/18/2021      Date of Service (when I saw the patient): 10/18/2021    ASSESSMENT: Aliyah Angeles is a 46 year old woman with alcohol use disorder (last drink 9/12/21), alcoholic hepatitis, jaimie en y gastric bypass, alcohol pancreatitis, pancreatic cyst, Type 2 DM, anxiety and depression admitted for abdominal pain, lower extremity edema, and nausea that started after starting low sodium and diabetic friendly foods in an alcohol treatment facility. Patient was admitted to the medicine floor with abdominal pain and vomiting on 9/29/2021. Overnight 10/3-10/4, patient had worsening respiratory status, concern for sepsis requiring transfer to the ICU for intubation and need for pressors as well as further evaluation and management for likely sepsis. Self extubated 10/14. Currently off sedation and CRRT, awake with well controlled abdominal pain.     CHANGES and MAJOR THINGS TODAY:   - Stop vancomycin (MRSA negative)  - Oxycodone decreased to 2.5 mg for PRNs  - Dialysis today  - Add-on procal/CRP ~stable  - Continue Zosyn for now with concern for new pneumonia  - 35 units Lantus daily      PLAN:     Neuro:  # Pain  She was sedated and intubated on 10/4. Self-extubated 10/14 AM. Awake and alert, oriented. Talking and answering questions appropriately. Family notes she has struggled with addiction to not only alcohol, but narcotics as well. We will consider adjusting pain regimen as tolerated.   - Cont oxycodone PO PRN --> decrease to 2.5 mg PRN  - Scheduled Tylenol 650 mg q8h  - Hydroxyzine makes MAPs drop, try oxycodone first     # Anxiety and depression   - PTA escitalopram  - Cont PRN Seroquel 25 mg @ bedtime    # Encephalopathy, likely acute toxic metabolic vs infection, resolved  In the setting of likely hepatic encephalopathy, infectious encephalopathy, sedation. SBP unlikely due to normal paracentesis eval and culture. Normal ammonia from 10/4.  No acute findings on MRI from 10/8.  Awake, alert, answering questions appropriately; encephalopathy appears to have mostly resolved.   - Miralax BID  - Goal 3-4 loose stools daily  - On rifaximin  - Gabapentin 100 mg TID  - Changed Senna to scheduled    # Alcohol use disorder  Last alcohol use 9/12.21.  - Thiamine, folic acid, MVI supplementation    Pulmonary:  # Acute hypoxemic respiratory failure; Improved  # ARDS; Resolved  # Concern for new infection, pneumonia  # Respiratory alkalosis, improved  Acute onset hypoxemia, bilateral opacities are consistent with ARDS. No evidence of cardiac etiology. Bilateral ground glass infiltrates on imaging, with bilateral pleural effusions. Treated with lung-protective ventilation, prone positioning. Intubated, bronch 10/4 with bile-like fluid, suspect aspiration as well as PMN predominance concerning for bacterial pneumonia. Currently stable on vent settings; supine; off NMB; off inhaled epo 10/7. Chest CT 10/6 showed ground glass and consolidative opacities throughout the lungs. CRRT 10/7 to 10/13. On the morning of 10/14, she self extubated and has been stable on nasal canula. Increased work of breathing concerning for new infection (PNA) with developing RLL infiltrate on CXR vs fluid overload from lack of dialysis last couple of days vs increased ascites pushing up on diaphragm (or combination of above).   - Infectious work-up and treatment as below  - Albuterol neb PRN  - Continue with nasal canula as tolerated  - Infectious work-up below    Cardiovascular:  # Hypotension, in the setting of likely septic shock vs liver disease, improving  # Edema, possible intravascular depletion with third-spacing, improving  Echo 10/4 with hyperdynamic EF >70%. Moderate tricuspid insufficiency. Normal IVC. She has been requiring pressors and has an ANA LAURA. Treating for sepsis. Her liver disease is likely contributing to the shock. She has been requiring increasing pressors that last couple of days. Concerned for septic  shock. See ID.   - Levophed for goal MAP >60  - Midodrine 20 mg TID     GI/Nutrition:  # Alcoholic hepatitis  # Ascites  # Hyperbilirubinemia, improving  # History of RYGB  # Transaminitis, stable  Patient has been sober since 9/12 and has been at a treatment facility since her discharge 9/21. Hepatomegaly and hepatic steatosis without cirrhosis. CT A/P on admission with severe hepatic steatosis, portal HTN, ascites, splenomegaly (similar on repeat CT 10/6). Paracentesis 10/4, 10/11 without SBP.  On 10/9, AST was 94 and trending up and continued to do so on 10/10. AST elevation possibly due to alcoholic liver disease, however sobriety started a month ago according to the chart review. On 10/14 endorsed severe abdominal pain. Her abdomen is soft, but with distention consistent with ascites. Repeat CT A&P with enlarged lymph nodes, ascites. Hepatology suggested acute symptoms of alcoholic hepatitis can last upwards of 6 weeks; abdominal pain improved but persistent.   - Trend LFT's biweekly  - GI following, appreciate recs  - Miralax BID; Senna PRN  - Trend bilirubin, INR --> MELD labs  - SLP consulted, appreciate diet recs  - Continue to assess for need for therapeutic vs diagnostic paracentesis    Renal/Fluids/Electrolytes:  # Anion gap metabolic acidosis, resolved  # Lactic acidosis, resolved  - CTM     # Acute kidney injury, concern for ATN, pre-renal, vs hepatorenal syndrome, improving  Baseline creatinine ~0.98. May be related to vomiting, lactulose, third spacing. Likely pre-renal. She was on CRRT from 10/7 to 10/13. They stopped due to increasing pressor needs and intolerance, some urine output. Now iHD.  - Consulted nephrology, appreciate recs  - Assess for hemodialysis daily --> plan for dialysis today  - Replacement protocols (Mg, Ph, K).   - Strict I/Os  - Avoid nephrotoxic agents    Endocrine:  # Type 2 diabetes mellitus  Her blood sugar was consistently over 200 as she approached her feeding goal rate,  has somewhat stabilized. CTM.  - High-resistance sliding scale insulin  - Increase Lantus to 35 units daily  - Check BS q4h and PRN  - Goal BS between 100 and 180    ID:  # Septic shock, improving  # Pneumonia (aspiration vs CAP)  # New fever, leukocytosis  Concern for pneumonia, CAP vs HAP. Less concern for SBP given negative paracentesis on admission. Afebrile. Paracentesis 10/4 negative for SBP. Elevated procal and PMN's in BAL fluid 10/4 suggests a bacterial pneumonia, likely aspiration related due to her history of recent symptoms of vomiting. No pathogenic growth from any cultures; most recent cultures was 10/13. Off antibiotics since 10/13, restart abx 10/17 in setting of continued hypotension, new fever overnight, CRP newly elevated at 140 and elevated procal (although hard to interpret in setting of ESRD). CXR with possible developing infiltrates (fluid vs new infection). Will consider additional paracentesis pending course.   - Will consider Meropenem if there is increased concern for septic shock  - Yeast on urine culture, likely secondary to Rice (now out)  - Continue to monitor leukocytosis, procal, CRP  - Stop vancomycin today given MRSA negative 10/17  - Continue Zosyn    Abx   - Zosyn 10/4-10/13, 10/17-p  - Vanc 10/4-10/4, 10/17-10/18  - Doxycycline 10/4-10/6  - Micafungin 10/6-10/9    Cultures  10/17 c.diff pending  10/17 Blood cultures off dialysis line and PICC  10/17 Blood cultures peripheral  10/17 MRSA nares pending  10/17 UA w/ reflex     Hematology:    # Anemia, macrocytic  # Coagulopathy  # Acute epistaxis  In the setting of alcohol use disorder, liver failure, alcoholic hepatitis. INR elevated. Likely 2/2 to B12 or folate deficiency due to long history of alcohol use disorder. Transfused pRBCs on 10/7, 10/12, 10/16.  - Transfuse for Hgb <7  - Follow INR  - Trend CBC in the AM    # Thrombocytopenia, improved  Persistent in the setting of renal disease, liver disease, chronic alcoholism, and  after a +2.3 liter I/O balance. This was likely partially dilutional with blunted bone marrow response secondary to alcoholism. HIT screen and hemolysis work up negative.  - Transfuse for PLTs <10, or if active bleeding and platelets are <30     Musculoskeletal:  No active issues     Skin:  # Dry cracked skin on bilateral heels, improving  - Continue Eucerin to be applied topically     General Cares/Prophylaxis:    DVT Prophylaxis: Heparin subQ  GI Prophylaxis: PPI  Restraints: None  Family Communication: daily with mother  Code Status: Full Code     Lines/tubes/drains:  - PIV  - Central line  - Arterial line  - Hemodialysis line  - NG     Disposition:  - Medical ICU    Patient seen and findings/plan discussed with medical ICU staff, Dr. Troy.    Hayley Severson, MD-MPH  Internal Medicine-Pediatrics PGY-1    ====================================  INTERVAL HISTORY:   Nursing notes reviewed. Lethargic at beginning of night, better this AM. Still on Levophed. Temps in the 99s overnight. Increased work of breathing but denies shortness of breath, stable on 1L NC. Additional 10 units of Lantus given overnight. Minimal urine output. 2 BM overnight. Endorsing abdominal pain, back pain this morning. Abdominal pain diffuse but worse on the right side. States she has difficulty catching her breath, particularly with movement and activity, improves somewhat when she settles out/rests. Speaking in short sentences.     OBJECTIVE:   1. VITAL SIGNS:   Temp:  [98.9  F (37.2  C)-100.4  F (38  C)] 99.3  F (37.4  C)  Pulse:  [] 103  Resp:  [17-35] 20  MAP:  [51 mmHg-86 mmHg] 58 mmHg  Arterial Line BP: ()/(37-63) 80/46  SpO2:  [89 %-98 %] 94 %    2. INTAKE/ OUTPUT:   I/O last 3 completed shifts:  In: 2831.95 [P.O.:250; I.V.:726.95; NG/GT:775]  Out: 60 [Urine:60]    3. PHYSICAL EXAMINATION:  General: Conversing appropriately. Alert. Sitting up in bed.  HEENT: PERRL, MMM, scleral icterus, nasal cannula, small amount of  dried bloody nasal drainage   Neuro: Answering questions appropriately, moving all extremities, no focal deficits  Pulm/Resp: Breath sounds equal bilaterally. Crackles in left upper lobe. Somewhat increased work of breathing noted, speaking in shorter sentences  CV: RRR, no murmur noted today; LE edema 2+ above wraps on calves  Abdomen: Distended, somewhat taut, bowel sounds hyperactive, tenderness diffusely but worst on the right side  : No urine assessed  Incisions/Skin: Petechiae diffusely, scattered ecchymoses  Psych: Good mood. Awake and alert, pleasant affect.     4. LABS:   Arterial Blood Gases   Recent Labs   Lab 10/14/21  0328 10/13/21  0410 10/12/21  0405   PH 7.44 7.45 7.51*   PCO2 34* 35 32*   PO2 97 133* 78*   HCO3 23 25 25     Complete Blood Count   Recent Labs   Lab 10/18/21  0354 10/17/21  0358 10/16/21  1156 10/16/21  0431 10/15/21  2127 10/15/21  2127   WBC 17.1* 18.6*  --  16.9*  --  20.8*   HGB 7.4* 7.9* 8.2* 6.5*   < > 7.4*  7.4*    204  --  152  --  176    < > = values in this interval not displayed.     Basic Metabolic Panel  Recent Labs   Lab 10/18/21  0354 10/18/21  0333 10/18/21  0020 10/17/21  2041 10/17/21  0854 10/17/21  0358 10/16/21  0435 10/16/21  0431 10/15/21  0923 10/15/21  0353     --   --   --   --  133  --  136  --  134   POTASSIUM 4.4  --   --   --   --  4.3  --  3.8  --  4.5   CHLORIDE 102  --   --   --   --  101  --  106  --  103   CO2 22  --   --   --   --  24  --  22  --  23   BUN 71*  --   --   --   --  55*  --  36*  --  54*   CR 2.66*  --   --   --   --  2.17*  --  1.45*  --  1.93*   * 195* 195* 193*   < > 217*  201*   < > 222*   < > 204*    < > = values in this interval not displayed.     Liver Function Tests  Recent Labs   Lab 10/18/21  0354 10/17/21  0358 10/16/21  0431 10/14/21  0328 10/13/21  1145 10/13/21  0410 10/13/21  0410 10/12/21  1136 10/12/21  0406   * 97*  --  95*  --   --  114*  --  118*   ALT 38 43  --  39  --   --  36  --   31   ALKPHOS 299* 338*  --  325*  --   --  285*  --  266*   BILITOTAL 5.9* 6.5*  --  8.4*  --   --  9.6*  --  9.9*   ALBUMIN 1.4* 1.5*  --  2.0* 2.0*   < > 2.2*   < > 2.2*   INR  --   --  1.31*  --   --   --  1.25*  --  1.30*    < > = values in this interval not displayed.     Coagulation Profile  Recent Labs   Lab 10/16/21  0431 10/13/21  0410 10/12/21  0406   INR 1.31* 1.25* 1.30*       5. RADIOLOGY:   Recent Results (from the past 24 hour(s))   XR Chest Port 1 View    Narrative    Exam: XR CHEST PORT 1 VIEW, 10/17/2021 10:48 AM    Indication: Evaluate lung fields, new fever    Comparison: 10/14/2021    Findings:   Feeding tube seen coursing through the mediastinum. Right IJ central  line tip in the low superior vena cava. Left IJ central line tip in  the high right atrium.    Heart within normal limits. Diffuse mixed opacities throughout both  lungs are similar. No new focal areas of consolidation.      Impression    Impression:   1. Stable support devices.  2. Stable diffuse mixed opacities throughout both lungs. No new  consolidations identified.    PAULO ROGERS MD         SYSTEM ID:  [id number]

## 2021-10-18 NOTE — PLAN OF CARE
.ICU End of Shift Summary. See flowsheets for vital signs and detailed assessment.    Changes this shift: Up in chair with PT assist, heavy assist x2 to return to bed.  A/Ox4, per report more interactive and alert than yesterday. Currently receiving HD with norpi gtt on to maintain MAP > 65.   On RA to 2L O2.      Plan: Continue to wean norepi after HD as able.  Continue POC.      Problem: Adult Inpatient Plan of Care  Goal: Plan of Care Review  Outcome: Improving  Goal: Absence of Hospital-Acquired Illness or Injury  Intervention: Identify and Manage Fall Risk  Recent Flowsheet Documentation  Taken 10/18/2021 1200 by Tana Lindsay RN  Safety Promotion/Fall Prevention: treat reversible contributory factors  Intervention: Prevent Skin Injury  Recent Flowsheet Documentation  Taken 10/18/2021 1200 by Tana Lindsay RN  Body Position: weight shifting  Intervention: Prevent and Manage VTE (Venous Thromboembolism) Risk  Recent Flowsheet Documentation  Taken 10/18/2021 1200 by Tana Lindsay RN  VTE Prevention/Management: ambulation promoted

## 2021-10-18 NOTE — PROGRESS NOTES
HEMODIALYSIS TREATMENT NOTE    Date: 10/18/2021  Time: 5:29 PM    Data:  Pre Wt: 91.4kg    Desired Wt: 89.4 kg   Post Wt:  89.4kg  Weight change:  2 kg  Ultrafiltration - Post Run Net Total Removed (mL): 2000 mL  Vascular Access Status: patent  Dialyzer Rinse: Clear  Total Blood Volume Processed: 70.3 L Liters  Total Dialysis (Treatment) Time: 7122-9414 Hours    Lab:   No    Interventions:      Assessment:  Patient on hemodialysis from 7160-3207. Patient ran on K3 and Ca 3 baths. A/O x 3 with stable vital signs. 2L of fluid removal achieved. Max . Patient rinsed back with no issues. Right double lumen internal jugular CVC patent, caps changed and and saline locked. Report given to David RN     Plan:    Per renal

## 2021-10-18 NOTE — PROGRESS NOTES
Critical Care Services Progress Note:  I personally examined and evaluated the patient today. I formulated today s plan with the house staff team or resident(s) and agree with the findings and plan in the associated note (see separately attested resident note).   I have evaluated all laboratory values and imaging studies from the past 24 hours.  Summary of hospital course:  46 year old female with alcohol hepatitis presents with ANA LAURA, altered mental status.  Was intubated on, then extubated on 10/14. CRRT transitioned to IHD. Remains in the ICU for hypotension    Overnight events/pertinent findings today:  More tachypnea overnight. No other acute events    Data  Na 135, K 4.4,     WBC 17.1, Hgb 7.4  procalcitonin 7.5  CXR with increased RLL and CARLOZ opacities    I/O +2.68 L     Assessment/plan:    Acute Hypoxemic Respiratory Failure - pulmonary edema and possible pneumonia  - zosyn  - dialysis today    Shock: due to liver dysfunction and possible sepsis  -midodrine  - levophed   - MAP goal 60    ANA LAURA - HRS, ATN   - IHD    Rest per resident note.    I spent a total of 40 minutes (excluding procedure time) personally providing and directing critical care services at the bedside and on the critical care unit for this patient.     Rik Troy MD

## 2021-10-18 NOTE — PROGRESS NOTES
CLINICAL NUTRITION SERVICES - REASSESSMENT NOTE     Nutrition Prescription    RECOMMENDATIONS FOR MDs/PROVIDERS TO ORDER:  --Recommend continuing enteral nutrition as pt's primary nutrition source at this time.     Malnutrition Status:    Severe malnutrition in the context of acute on chronic illness    Recommendations already ordered by Registered Dietitian (RD):  --Decrease Prosource while off CRRT.  --NEW TF goal: Osmolite 1.5 Faizan @ goal of 45ml/hr (1080ml/day) + 2 Prosource will provide: 1700 kcals (28 kcal/kg), 89 g PRO (1.5 g/kg), 822 ml free H20, 219 g CHO, and 0 g fiber daily.     Future/Additional Recommendations:  --Diet per SLP.  --Weight trends.  --iHD vs CRRT -> increase Prosource if CRRT re-starts.  --If renal formula is indicated: Novasource Renal @ 35 ml/hr (840 ml) + 2 Prosource provides 1760 kcal (29 kcal/kg), 98 g pro (1.6 g/kg), 154 g CHO, 602 ml free water, and 0 g fiber daily.     --If able to tolerate increase in TF rate, recommend trial of cycled TF: Osmolite 1.5 @ 65 ml/hr x 16 hrs + 2 Prosource = 1640 kcal (27 kcal/kg), 88 g pro (~1.5 g/kg)       --Vs Novasource Renal @ 50 ml/hr x 16 hrs + 2 Prosource = 1680 kcal (28 kcal/kg), 95 g pro (1.6 g/kg)     EVALUATION OF THE PROGRESS TOWARD GOALS   Diet: Level 5: Minced & Moist Dysphagia Diet with mildly thickened liquids since 10/15       --Minimal PO intake 0-25%  Nutrition Support:   10/5-10/7 : Osmolite 1.5 Faizan @ goal of 45ml/hr (1080ml/day) + 2 Prosource will provide: 1700 kcals (28 kcal/kg), 89 g PRO (1.5 g/kg), 822 ml free H20, 219 g CHO, and 0 g fiber daily.   10/7 - ___ : increased prosource with starting CRRT:        --Osmolite 1.5 Faizan @ goal of 45ml/hr (1080ml/day) + 4 Prosource will provide: 1780 kcals (30 kcal/kg), 111 g PRO (1.9 g/kg), 822 ml free H20, 219 g CHO, and 0 g fiber daily.   Intake: average daily TF + Prosource intake x 7 days = 1074 ml volume, 1770 kcal (29.5 kcal/kg), 112 g pro (1.9 g/kg)    Visited with pt this  "afternoon, iHD running. Pt seemed a little confused. Pt reports not eating anything today (untouched at bedside). Pt asked about stopping or reducing the TF so she can \"feel hungry.\" Discussed concerns for her nutrition intake if TF is decreased, as her iHD vs possible CRRT require increased nutritional needs. Discussed with pt recommendation would be to increase TF rate significantly overnight to still meet nutrition needs while TF is off during the day. Pt hesitant to increasing TF rate at this time. Continue current TF orders.      NEW FINDINGS   Weight: variable, overall down since admission but confounded by fluid. Driest weight this admission 84.8 kg on 10/15 (still fluid up from dry weight on 9/13 of 78 kg)  Labs: K+ 4.4 (WNL), BUN 71 (H), Cr 2.66 (H), Phos 6.3 (H), Direct bili 4.9 (H), .0 (H)  Meds: caltrate, vitamin B12 (100 mcg), folic acid, high sliding scale insulin, lantus 10 units q HS, certavite, miralax BID, prosource 2 packets BID, rifaximin, thiamine (100 mg), norepi @ 0.1 mcg/kg/min  GI:400-1500 ml stool output/day vs 12 BM  Renal: CRRT 10/7-10/13, started iHD though possible resumption of CRRT per nephrology notes  Resp: self-extubated 10/14    MALNUTRITION  % Intake: Decreased intake does not meet criteria - TF  % Weight Loss: None noted - confounded by fluid  Subcutaneous Fat Loss: Facial region:  Mild to moderate and Upper arm:  mild  Muscle Loss: Temporal:  moderate, Thoracic region (clavicle, acromium bone, deltoid, trapezius, pectoral):  severe and Upper arm (bicep, tricep):  severe  Fluid Accumulation/Edema: Moderate 3+ edema  Malnutrition Diagnosis: Severe malnutrition in the context of acute on chronic illness    Previous Goals   Total avg nutritional intake to meet a minimum of 25 kcal/kg and 1.5 g PRO/kg daily (per dosing wt 60 kg).  Evaluation: Met    Previous Nutrition Diagnosis  Inadequate oral intake related to mechanical ventilation inhibiting ability to take nutrition PO " as evidenced by NPO status, requiring enteral nutrition to meet 100% of needs.     Evaluation: modified below    CURRENT NUTRITION DIAGNOSIS  Inadequate oral intake related to dysphagia, poor appetite, and prolonged recent intubation as evidenced by 0-25% PO intake on minced and moist, mildly thick diet, requiring enteral nutrition to meet 100% of needs.       INTERVENTIONS  Implementation  Enteral Nutrition - modified as above    Goals  Total avg nutritional intake to meet a minimum of 25 kcal/kg and 1.2-1.5 g PRO/kg daily (per dosing wt 60 kg).    Monitoring/Evaluation  Progress toward goals will be monitored and evaluated per protocol.    Iris Vogel, MS, RD, LD, [facility]  MICU pager: [phone]  ASCOM: [id number]

## 2021-10-18 NOTE — PLAN OF CARE
ICU End of Shift Summary. See flowsheets for vital signs and detailed assessment.    Changes this shift: Patient was lethargic and feeling tired when I came in tonight.  This morning she seems to be doing much better.  More awake and seems more ready to do what needs to be done today.  Still on Levo.  MAPs are frequently at 58-62 on 0.02 of levo.  Patient temps stayed in the 99's tonight.  Patient still seems to be breathing heavy/hard, but states she is not SOB and sats are good on 1L in the mid 90s.  BS have been hanging out in the 190s, an extra 10 of lantus given at midnight.  Minimal urine output.  2 stools overnight.  3 stools during days yesterday.      Plan: Will have dialysis today.  Will continue to monitor.     DISPLAY PLAN FREE TEXT

## 2021-10-19 ENCOUNTER — APPOINTMENT (OUTPATIENT)
Dept: PHYSICAL THERAPY | Facility: CLINIC | Age: 46
End: 2021-10-19
Payer: COMMERCIAL

## 2021-10-19 ENCOUNTER — APPOINTMENT (OUTPATIENT)
Dept: GENERAL RADIOLOGY | Facility: CLINIC | Age: 46
End: 2021-10-19
Payer: COMMERCIAL

## 2021-10-19 ENCOUNTER — APPOINTMENT (OUTPATIENT)
Dept: OCCUPATIONAL THERAPY | Facility: CLINIC | Age: 46
End: 2021-10-19
Payer: COMMERCIAL

## 2021-10-19 ENCOUNTER — APPOINTMENT (OUTPATIENT)
Dept: SPEECH THERAPY | Facility: CLINIC | Age: 46
End: 2021-10-19
Payer: COMMERCIAL

## 2021-10-19 LAB
ANION GAP SERPL CALCULATED.3IONS-SCNC: 9 MMOL/L (ref 3–14)
BACTERIA UR CULT: ABNORMAL
BASOPHILS # BLD MANUAL: 0.2 10E3/UL (ref 0–0.2)
BASOPHILS NFR BLD MANUAL: 1 %
BUN SERPL-MCNC: 41 MG/DL (ref 7–30)
CALCIUM SERPL-MCNC: 8.5 MG/DL (ref 8.5–10.1)
CHLORIDE BLD-SCNC: 102 MMOL/L (ref 94–109)
CO2 SERPL-SCNC: 23 MMOL/L (ref 20–32)
CREAT SERPL-MCNC: 1.86 MG/DL (ref 0.52–1.04)
CRP SERPL-MCNC: 160 MG/L (ref 0–8)
EOSINOPHIL # BLD MANUAL: 0.2 10E3/UL (ref 0–0.7)
EOSINOPHIL NFR BLD MANUAL: 1 %
ERYTHROCYTE [DISTWIDTH] IN BLOOD BY AUTOMATED COUNT: 21.3 % (ref 10–15)
GFR SERPL CREATININE-BSD FRML MDRD: 32 ML/MIN/1.73M2
GLUCOSE BLD-MCNC: 174 MG/DL (ref 70–99)
GLUCOSE BLDC GLUCOMTR-MCNC: 136 MG/DL (ref 70–99)
GLUCOSE BLDC GLUCOMTR-MCNC: 142 MG/DL (ref 70–99)
GLUCOSE BLDC GLUCOMTR-MCNC: 154 MG/DL (ref 70–99)
GLUCOSE BLDC GLUCOMTR-MCNC: 171 MG/DL (ref 70–99)
GLUCOSE BLDC GLUCOMTR-MCNC: 177 MG/DL (ref 70–99)
GLUCOSE BLDC GLUCOMTR-MCNC: 183 MG/DL (ref 70–99)
GLUCOSE BLDC GLUCOMTR-MCNC: 185 MG/DL (ref 70–99)
HCT VFR BLD AUTO: 23.5 % (ref 35–47)
HGB BLD-MCNC: 7.6 G/DL (ref 11.7–15.7)
INR PPP: 1.37 (ref 0.85–1.15)
LYMPHOCYTES # BLD MANUAL: 3.4 10E3/UL (ref 0.8–5.3)
LYMPHOCYTES NFR BLD MANUAL: 14 %
MAGNESIUM SERPL-MCNC: 1.8 MG/DL (ref 1.6–2.3)
MCH RBC QN AUTO: 33 PG (ref 26.5–33)
MCHC RBC AUTO-ENTMCNC: 32.3 G/DL (ref 31.5–36.5)
MCV RBC AUTO: 102 FL (ref 78–100)
MONOCYTES # BLD MANUAL: 2.4 10E3/UL (ref 0–1.3)
MONOCYTES NFR BLD MANUAL: 10 %
MYELOCYTES # BLD MANUAL: 0.2 10E3/UL
MYELOCYTES NFR BLD MANUAL: 1 %
NEUTROPHILS # BLD MANUAL: 17.3 10E3/UL (ref 1.6–8.3)
NEUTROPHILS NFR BLD MANUAL: 72 %
NRBC # BLD AUTO: 0.2 10E3/UL
NRBC BLD MANUAL-RTO: 1 %
PHOSPHATE SERPL-MCNC: 4.4 MG/DL (ref 2.5–4.5)
PLAT MORPH BLD: ABNORMAL
PLATELET # BLD AUTO: 244 10E3/UL (ref 150–450)
POLYCHROMASIA BLD QL SMEAR: ABNORMAL
POTASSIUM BLD-SCNC: 4 MMOL/L (ref 3.4–5.3)
PROCALCITONIN SERPL-MCNC: 6.09 NG/ML
PROMYELOCYTES # BLD MANUAL: 0.2 10E3/UL
PROMYELOCYTES NFR BLD MANUAL: 1 %
RBC # BLD AUTO: 2.3 10E6/UL (ref 3.8–5.2)
RBC MORPH BLD: ABNORMAL
SARS-COV-2 RNA RESP QL NAA+PROBE: NEGATIVE
SODIUM SERPL-SCNC: 134 MMOL/L (ref 133–144)
WBC # BLD AUTO: 24 10E3/UL (ref 4–11)

## 2021-10-19 PROCEDURE — 250N000011 HC RX IP 250 OP 636: Performed by: STUDENT IN AN ORGANIZED HEALTH CARE EDUCATION/TRAINING PROGRAM

## 2021-10-19 PROCEDURE — 250N000013 HC RX MED GY IP 250 OP 250 PS 637: Performed by: NURSE PRACTITIONER

## 2021-10-19 PROCEDURE — 97110 THERAPEUTIC EXERCISES: CPT | Mod: GO | Performed by: OCCUPATIONAL THERAPIST

## 2021-10-19 PROCEDURE — 99232 SBSQ HOSP IP/OBS MODERATE 35: CPT | Performed by: INTERNAL MEDICINE

## 2021-10-19 PROCEDURE — 97530 THERAPEUTIC ACTIVITIES: CPT | Mod: GP

## 2021-10-19 PROCEDURE — 250N000013 HC RX MED GY IP 250 OP 250 PS 637: Performed by: STUDENT IN AN ORGANIZED HEALTH CARE EDUCATION/TRAINING PROGRAM

## 2021-10-19 PROCEDURE — 84145 PROCALCITONIN (PCT): CPT | Performed by: STUDENT IN AN ORGANIZED HEALTH CARE EDUCATION/TRAINING PROGRAM

## 2021-10-19 PROCEDURE — 200N000002 HC R&B ICU UMMC

## 2021-10-19 PROCEDURE — 85610 PROTHROMBIN TIME: CPT | Performed by: NURSE PRACTITIONER

## 2021-10-19 PROCEDURE — 250N000011 HC RX IP 250 OP 636: Performed by: CLINICAL NURSE SPECIALIST

## 2021-10-19 PROCEDURE — U0005 INFEC AGEN DETEC AMPLI PROBE: HCPCS | Performed by: SURGERY

## 2021-10-19 PROCEDURE — 97535 SELF CARE MNGMENT TRAINING: CPT | Mod: GO | Performed by: OCCUPATIONAL THERAPIST

## 2021-10-19 PROCEDURE — 92526 ORAL FUNCTION THERAPY: CPT | Mod: GN

## 2021-10-19 PROCEDURE — 83735 ASSAY OF MAGNESIUM: CPT | Performed by: STUDENT IN AN ORGANIZED HEALTH CARE EDUCATION/TRAINING PROGRAM

## 2021-10-19 PROCEDURE — 86140 C-REACTIVE PROTEIN: CPT | Performed by: STUDENT IN AN ORGANIZED HEALTH CARE EDUCATION/TRAINING PROGRAM

## 2021-10-19 PROCEDURE — 84100 ASSAY OF PHOSPHORUS: CPT | Performed by: STUDENT IN AN ORGANIZED HEALTH CARE EDUCATION/TRAINING PROGRAM

## 2021-10-19 PROCEDURE — 71045 X-RAY EXAM CHEST 1 VIEW: CPT | Mod: 26 | Performed by: RADIOLOGY

## 2021-10-19 PROCEDURE — 97116 GAIT TRAINING THERAPY: CPT | Mod: GP

## 2021-10-19 PROCEDURE — 85027 COMPLETE CBC AUTOMATED: CPT | Performed by: STUDENT IN AN ORGANIZED HEALTH CARE EDUCATION/TRAINING PROGRAM

## 2021-10-19 PROCEDURE — 71045 X-RAY EXAM CHEST 1 VIEW: CPT

## 2021-10-19 PROCEDURE — 250N000013 HC RX MED GY IP 250 OP 250 PS 637: Performed by: SURGERY

## 2021-10-19 PROCEDURE — 99291 CRITICAL CARE FIRST HOUR: CPT | Mod: GC

## 2021-10-19 PROCEDURE — 80048 BASIC METABOLIC PNL TOTAL CA: CPT | Performed by: STUDENT IN AN ORGANIZED HEALTH CARE EDUCATION/TRAINING PROGRAM

## 2021-10-19 RX ORDER — FUROSEMIDE 10 MG/ML
120 INJECTION INTRAMUSCULAR; INTRAVENOUS ONCE
Status: COMPLETED | OUTPATIENT
Start: 2021-10-19 | End: 2021-10-19

## 2021-10-19 RX ORDER — FUROSEMIDE 10 MG/ML
80 INJECTION INTRAMUSCULAR; INTRAVENOUS ONCE
Status: DISCONTINUED | OUTPATIENT
Start: 2021-10-19 | End: 2021-10-21

## 2021-10-19 RX ADMIN — PIPERACILLIN SODIUM AND TAZOBACTAM SODIUM 2.25 G: 2; .25 INJECTION, POWDER, LYOPHILIZED, FOR SOLUTION INTRAVENOUS at 18:41

## 2021-10-19 RX ADMIN — Medication 2.5 MG: at 17:20

## 2021-10-19 RX ADMIN — INSULIN ASPART 2 UNITS: 100 INJECTION, SOLUTION INTRAVENOUS; SUBCUTANEOUS at 09:01

## 2021-10-19 RX ADMIN — PIPERACILLIN SODIUM AND TAZOBACTAM SODIUM 2.25 G: 2; .25 INJECTION, POWDER, LYOPHILIZED, FOR SOLUTION INTRAVENOUS at 23:57

## 2021-10-19 RX ADMIN — GABAPENTIN 100 MG: 250 SOLUTION ORAL at 12:42

## 2021-10-19 RX ADMIN — THIAMINE HCL TAB 100 MG 100 MG: 100 TAB at 08:39

## 2021-10-19 RX ADMIN — GABAPENTIN 100 MG: 250 SOLUTION ORAL at 09:24

## 2021-10-19 RX ADMIN — MIDODRINE HYDROCHLORIDE 20 MG: 5 TABLET ORAL at 15:06

## 2021-10-19 RX ADMIN — GABAPENTIN 100 MG: 250 SOLUTION ORAL at 20:53

## 2021-10-19 RX ADMIN — Medication 2.5 MG: at 04:10

## 2021-10-19 RX ADMIN — Medication 1 PACKET: at 08:54

## 2021-10-19 RX ADMIN — PIPERACILLIN SODIUM AND TAZOBACTAM SODIUM 2.25 G: 2; .25 INJECTION, POWDER, LYOPHILIZED, FOR SOLUTION INTRAVENOUS at 12:40

## 2021-10-19 RX ADMIN — Medication 15 ML: at 12:40

## 2021-10-19 RX ADMIN — POLYETHYLENE GLYCOL 3350 17 G: 17 POWDER, FOR SOLUTION ORAL at 08:39

## 2021-10-19 RX ADMIN — FOLIC ACID 1 MG: 1 TABLET ORAL at 08:40

## 2021-10-19 RX ADMIN — Medication 2.5 MG: at 08:38

## 2021-10-19 RX ADMIN — SALINE NASAL SPRAY 1 SPRAY: 1.5 SOLUTION NASAL at 17:19

## 2021-10-19 RX ADMIN — Medication 2.5 MG: at 20:53

## 2021-10-19 RX ADMIN — SALINE NASAL SPRAY 1 SPRAY: 1.5 SOLUTION NASAL at 09:24

## 2021-10-19 RX ADMIN — RIFAXIMIN 550 MG: 550 TABLET ORAL at 08:39

## 2021-10-19 RX ADMIN — INSULIN ASPART 2 UNITS: 100 INJECTION, SOLUTION INTRAVENOUS; SUBCUTANEOUS at 00:08

## 2021-10-19 RX ADMIN — SALINE NASAL SPRAY 1 SPRAY: 1.5 SOLUTION NASAL at 12:42

## 2021-10-19 RX ADMIN — CALCIUM CARBONATE 600 MG (1,500 MG)-VITAMIN D3 400 UNIT TABLET 1 TABLET: at 08:39

## 2021-10-19 RX ADMIN — SALINE NASAL SPRAY 1 SPRAY: 1.5 SOLUTION NASAL at 20:54

## 2021-10-19 RX ADMIN — INSULIN ASPART 2 UNITS: 100 INJECTION, SOLUTION INTRAVENOUS; SUBCUTANEOUS at 12:42

## 2021-10-19 RX ADMIN — HEPARIN SODIUM 5000 UNITS: 5000 INJECTION, SOLUTION INTRAVENOUS; SUBCUTANEOUS at 20:53

## 2021-10-19 RX ADMIN — Medication 1 PACKET: at 20:54

## 2021-10-19 RX ADMIN — HEPARIN SODIUM 5000 UNITS: 5000 INJECTION, SOLUTION INTRAVENOUS; SUBCUTANEOUS at 08:39

## 2021-10-19 RX ADMIN — Medication 40 MG: at 08:39

## 2021-10-19 RX ADMIN — ACETAMINOPHEN 650 MG: 325 TABLET, FILM COATED ORAL at 09:24

## 2021-10-19 RX ADMIN — INSULIN ASPART 1 UNITS: 100 INJECTION, SOLUTION INTRAVENOUS; SUBCUTANEOUS at 23:49

## 2021-10-19 RX ADMIN — INSULIN ASPART 1 UNITS: 100 INJECTION, SOLUTION INTRAVENOUS; SUBCUTANEOUS at 17:18

## 2021-10-19 RX ADMIN — ACETAMINOPHEN 650 MG: 325 TABLET, FILM COATED ORAL at 18:41

## 2021-10-19 RX ADMIN — HYDROXYZINE HYDROCHLORIDE 50 MG: 25 TABLET, FILM COATED ORAL at 02:36

## 2021-10-19 RX ADMIN — ESCITALOPRAM OXALATE 10 MG: 5 SOLUTION ORAL at 08:39

## 2021-10-19 RX ADMIN — PIPERACILLIN SODIUM AND TAZOBACTAM SODIUM 2.25 G: 2; .25 INJECTION, POWDER, LYOPHILIZED, FOR SOLUTION INTRAVENOUS at 00:12

## 2021-10-19 RX ADMIN — FUROSEMIDE 120 MG: 10 INJECTION, SOLUTION INTRAVENOUS at 12:40

## 2021-10-19 RX ADMIN — Medication 100 MCG: at 08:39

## 2021-10-19 RX ADMIN — PIPERACILLIN SODIUM AND TAZOBACTAM SODIUM 2.25 G: 2; .25 INJECTION, POWDER, LYOPHILIZED, FOR SOLUTION INTRAVENOUS at 05:52

## 2021-10-19 RX ADMIN — MIDODRINE HYDROCHLORIDE 20 MG: 5 TABLET ORAL at 21:07

## 2021-10-19 RX ADMIN — MIDODRINE HYDROCHLORIDE 20 MG: 5 TABLET ORAL at 05:53

## 2021-10-19 RX ADMIN — RIFAXIMIN 550 MG: 550 TABLET ORAL at 20:53

## 2021-10-19 RX ADMIN — QUETIAPINE FUMARATE 25 MG: 25 TABLET ORAL at 21:07

## 2021-10-19 ASSESSMENT — ACTIVITIES OF DAILY LIVING (ADL)
ADLS_ACUITY_SCORE: 11
ADLS_ACUITY_SCORE: 18
ADLS_ACUITY_SCORE: 18
ADLS_ACUITY_SCORE: 14
ADLS_ACUITY_SCORE: 14
ADLS_ACUITY_SCORE: 16
ADLS_ACUITY_SCORE: 11
ADLS_ACUITY_SCORE: 16
ADLS_ACUITY_SCORE: 11
ADLS_ACUITY_SCORE: 16
ADLS_ACUITY_SCORE: 18
ADLS_ACUITY_SCORE: 18

## 2021-10-19 ASSESSMENT — MIFFLIN-ST. JEOR: SCORE: 1540

## 2021-10-19 NOTE — PROGRESS NOTES
Nephrology Progress Note  10/19/2021         Aliyah Angeles is a 46 yof w/hx of ETOH use (Quit 9/12/2021) w/hepatitis, Fitz en Y bypass, Pancreatitis, DM2, Panc cyst who was admitted 9/29 from chem dep with N/V and worsening abdominal pain and edema.  Her course has been resp failure with ARDS and evolving ANA LAURA.  Noted to have received albumin from 10/1-10/3 (150g/day) with no improvement in Cr.  On Vanco/Zosyn to treat sepsis although cultures at time of consult are negative.  Nephrology consulted for evaluation of ANA LAURA and possible RRT, started CRRT on 10/7.       Interval History :   Mrs Angeles had CRRT started 10/7 for management of volume and acidosis, stopped on 10/13, had run yesterday but holding today with reasonable chemistries and multiple unmeasured UOP's yesterday.  I ordered lasix today 120mg and she has made 300cc of UOP, will repeat this evening.  Likely HD tomorrow but will see how chemistries trend before committing to run.       Assessment & Recommendations:   ANA LAURA-Baseline Cr normal at 0.5 as recently as 9/14/2021, abdominal US pending currently.  Was at inpatient chem dep when she developed abdominal pain, N/V and worsening edema and was brought to [facility] on 9/29.  Was treated for UTI and started on Vanco/Zosyn for leukocytosis but decompensated and was intubated the evening of 10/3.  Noted she did receive albumin x3 days from 10/1-10/3 for ANA LAURA but actually worsened and Cr at time of consult is up to 2.  Lisa <5 on 10/3, ECHO done 10/4 showed hyperdynamic LV and normal IVC.  Etiology of ANA LAURA is likely ATN with underlying HRS physiology which made her more susceptible to hypoperfusion.  Started CRRT on 10/7 for management of volume and acidosis.  Trying to transition to iHD.                  -ANA LAURA, likely hypoperfusion in setting of baseline HRS physiology, possible contribution from vanco/zosyn and did receive contrast on 10/3.                -CRRT started 10/7, stopped 10/13.  Had HD yesterday as well  "as unmeasured UOP, giving lasix today.                   -Dialysis consent signed and scanned in under media.       Volume-Has total body volume overload, net positive ~2.6L yesterday and similar the previous day, will try to pull 2L today, may need to run extra to manage volume, albumin is low so will consider supplementing in order to pull fluid.       Electrolytes/pH-K normal at 4.0 bicarb 23, ABG 7.46/35/71/25.       Liver-Unclear duration, quit drinking on 9/12/2021 and was at chem dep when acute issues started.  Bili is down to 6 and INR is 1.4 and a bit improved.  GI following, not clear if she would be a transplant candidate eventually.       ID-Off abx other than Rifaximin.       Nutrition-On Osmolite TF.          Seen and discussed with Dr Russo     Recommendations were communicated to primary team via verbal communication.        MARIA ELENA Wade CNS  Clinical Nurse Specialist  [phone]    Review of Systems:   I reviewed the following systems:  Gen: No fevers or chills  CV: No CP at rest  Resp: No SOB at rest  GI: No N/V    Physical Exam:   I/O last 3 completed shifts:  In: 2391.01 [P.O.:120; I.V.:536.01; NG/GT:655]  Out: 2100 [Urine:100; Other:2000]   /69   Pulse 89   Temp 97.8  F (36.6  C) (Oral)   Resp 22   Ht 1.626 m (5' 4\")   Wt 92 kg (202 lb 13.2 oz)   SpO2 97%   BMI 34.81 kg/m       GENERAL APPEARANCE: Extubated, stable from pulm standpoint.  Sitting up in chair, a bit SOB today.   EYES: No scleral icterus  Pulmonary: lungs rhonchi to auscultation with equal breath sounds bilaterally, no clubbing or cyanosis  CV: Regular rhythm, normal rate, no rub   - Edema +2 LE/dependent  GI: soft, nontender, normal bowel sounds  MS: no evidence of inflammation in joints, no muscle tenderness  : No barrett, some intermittent UOP.   SKIN: no rash, warm, dry  NEURO: No focal deficits.      Labs:   All labs reviewed by me  Electrolytes/Renal - Recent Labs   Lab Test 10/19/21  1233 " 10/19/21  0857 10/19/21  0401 10/19/21  0356 10/19/21  0006 10/18/21  0722 10/18/21  0354 10/17/21  0854 10/17/21  0358   NA  --   --   --  134  --   --  135  --  133   POTASSIUM  --   --   --  4.0  --   --  4.4  --  4.3   CHLORIDE  --   --   --  102  --   --  102  --  101   CO2  --   --   --  23  --   --  22  --  24   BUN  --   --   --  41*  --   --  71*  --  55*   CR  --   --   --  1.86*  --   --  2.66*  --  2.17*   * 185* 177* 174*   < >   < > 172*   < > 217*  201*   STEFFANY  --   --   --  8.5  --   --  8.3*  --  8.6   MAG  --   --   --  1.8  --   --  2.0  --  2.1   PHOS  --   --   --  4.4  --   --  6.3*  --  5.2*    < > = values in this interval not displayed.       CBC -   Recent Labs   Lab Test 10/19/21  0356 10/18/21  0354 10/17/21  0358   WBC 24.0* 17.1* 18.6*   HGB 7.6* 7.4* 7.9*    201 204       LFTs -   Recent Labs   Lab Test 10/18/21  0354 10/17/21  0358 10/14/21  0328   ALKPHOS 299* 338* 325*   BILITOTAL 5.9* 6.5* 8.4*   ALT 38 43 39   * 97* 95*   PROTTOTAL 4.4* 5.4* 5.5*   ALBUMIN 1.4* 1.5* 2.0*       Iron Panel -   Recent Labs   Lab Test 09/13/21  0642   EYAD 1,839*           Current Medications:    acetaminophen  650 mg Oral or Feeding Tube Q8H     calcium carbonate 600 mg-vitamin D 400 units  1 tablet Oral or Feeding Tube Daily     cyanocobalamin  100 mcg Oral or Feeding Tube Daily     escitalopram  10 mg Oral or Feeding Tube Daily     folic acid  1 mg Oral or Feeding Tube Daily     furosemide  80 mg Intravenous Once     gabapentin  100 mg Oral or Feeding Tube TID     heparin ANTICOAGULANT  5,000 Units Subcutaneous Q12H     insulin aspart  1-12 Units Subcutaneous Q4H     insulin glargine  35 Units Subcutaneous Daily     midodrine  20 mg Oral or Feeding Tube Q8H     multivitamins w/minerals  15 mL Oral Daily     pantoprazole  40 mg Per Feeding Tube QAM AC     piperacillin-tazobactam  2.25 g Intravenous Q6H     polyethylene glycol  17 g Oral BID     [Held by provider] prazosin  1 mg  Oral QPM     protein modular  1 packet Per Feeding Tube BID     QUEtiapine  25 mg Oral At Bedtime     rifaximin  550 mg Oral or Feeding Tube BID     sodium chloride  1 spray Both Nostrils 4x Daily     thiamine  100 mg Oral or Feeding Tube Daily       dextrose       norepinephrine 0.02 mcg/kg/min (10/19/21 1400)     - MEDICATION INSTRUCTIONS -

## 2021-10-19 NOTE — PROGRESS NOTES
MEDICAL ICU PROGRESS NOTE  10/19/2021      Date of Service (when I saw the patient): 10/19/2021    ASSESSMENT: Aliyah Angeles is a 46 year old woman with alcohol use disorder (last drink 9/12/21), alcoholic hepatitis, jaimie en y gastric bypass, alcohol pancreatitis, pancreatic cyst, Type 2 DM, anxiety and depression admitted with ANA LAURA, abdominal pain, and AMS requiring intubation. Self extubated on 10/14. CRRT transitioned to iHD. Currently in ICU for hypotension requiring levophed.       CHANGES and MAJOR THINGS TODAY:   - Diagnostic and therapeutic paracentesis today  - Titrate levophed as tolerated   - Advanced diet     PLAN:     Neuro:  # Pain  - Cont oxycodone 2.5 mg po PRN  - Scheduled Tylenol 650 mg q8h  - Hydroxyzine makes MAPs drop, try oxycodone first     # Anxiety and depression   - PTA escitalopram  - Cont PRN Seroquel 25 mg @ bedtime    # Encephalopathy, likely acute toxic metabolic vs infection, resolved  In the setting of likely hepatic encephalopathy, infectious encephalopathy, sedation. SBP unlikely due to normal paracentesis eval and culture. Normal ammonia from 10/4.  No acute findings on MRI from 10/8. Awake, alert, answering questions appropriately; encephalopathy appears to have mostly resolved.   - Miralax BID  - Goal 3-4 loose stools daily  - On rifaximin  - Gabapentin 100 mg TID  - Changed Senna to scheduled    # Alcohol use disorder  Last alcohol use 9/12.21.  - Thiamine, folic acid, MVI supplementation    Pulmonary:  # Acute hypoxemic respiratory failure; Improved  # ARDS; Resolved  # Concern for new infection, pneumonia  # Respiratory alkalosis, improved  Acute onset hypoxemia, bilateral opacities are consistent with ARDS. No evidence of cardiac etiology. Bilateral ground glass infiltrates on imaging, with bilateral pleural effusions. Treated with lung-protective ventilation, prone positioning. Intubated, bronch 10/4 with bile-like fluid, suspect aspiration as well as PMN predominance  concerning for bacterial pneumonia. Currently stable on vent settings; supine; off NMB; off inhaled epo 10/7. Chest CT 10/6 showed ground glass and consolidative opacities throughout the lungs. CRRT 10/7 to 10/13. On the morning of 10/14, she self extubated and has been stable on nasal canula. Increased work of breathing concerning for new infection (PNA) with developing RLL infiltrate on CXR vs fluid overload from lack of dialysis last couple of days vs increased ascites pushing up on diaphragm (or combination of above).   - Infectious work-up and treatment as below  - Albuterol neb PRN  - Continue with nasal canula as tolerated  - Infectious work-up below    Cardiovascular:  # Hypotension, in the setting of likely septic shock vs liver disease, improving  # Edema, possible intravascular depletion with third-spacing, improving  Echo 10/4 with hyperdynamic EF >70%. Moderate tricuspid insufficiency. Normal IVC. She has been requiring pressors and has an ANA LAURA. Treating for sepsis. Her liver disease is likely contributing to the shock. She has been requiring increasing pressors that last couple of days. Concerned for septic shock. See ID.   - Levophed for goal MAP >60  - Midodrine 20 mg TID     GI/Nutrition:  # Alcoholic hepatitis  # Ascites  # Hyperbilirubinemia, improving  # History of RYGB  # Transaminitis, stable  Patient has been sober since 9/12 and has been at a treatment facility since her discharge 9/21. Hepatomegaly and hepatic steatosis without cirrhosis. CT A/P on admission with severe hepatic steatosis, portal HTN, ascites, splenomegaly (similar on repeat CT 10/6). Paracentesis 10/4, 10/11 without SBP.  On 10/9, AST was 94 and trending up and continued to do so on 10/10. AST elevation possibly due to alcoholic liver disease, however sobriety started a month ago according to the chart review. On 10/14 endorsed severe abdominal pain. Her abdomen is soft, but with distention consistent with ascites. Repeat CT  A&P with enlarged lymph nodes, ascites. Hepatology suggested acute symptoms of alcoholic hepatitis can last upwards of 6 weeks; abdominal pain improved but persistent.   - Trend LFT's biweekly  - GI following, appreciate recs  - Miralax BID; Senna PRN  - Trend bilirubin, INR --> MELD labs  - SLP consulted, appreciate diet recs  - Continue to assess for need for therapeutic vs diagnostic paracentesis    Renal/Fluids/Electrolytes:  # Anion gap metabolic acidosis, resolved  # Lactic acidosis, resolved  - CTM     # Acute kidney injury, concern for ATN, pre-renal, vs hepatorenal syndrome, improving  Baseline creatinine ~0.98. May be related to vomiting, lactulose, third spacing. Likely pre-renal. She was on CRRT from 10/7 to 10/13. They stopped due to increasing pressor needs and intolerance, some urine output. Now iHD.  - Consulted nephrology, appreciate recs  - Replacement protocols (Mg, Ph, K).   - Strict I/Os  - Avoid nephrotoxic agents    Endocrine:  # Type 2 diabetes mellitus  Her blood sugar was consistently over 200 as she approached her feeding goal rate, has somewhat stabilized. CTM.  - High-resistance sliding scale insulin  - Continue Lantus to 35 units daily  - Check BS q4h and PRN  - Goal BS between 100 and 180    ID:  # Septic shock, improving  # Pneumonia (aspiration vs CAP)  # New fever, leukocytosis  Concern for pneumonia, CAP vs HAP. Less concern for SBP given negative paracentesis on admission. Afebrile. Paracentesis 10/4 negative for SBP. Elevated procal and PMN's in BAL fluid 10/4 suggests a bacterial pneumonia, likely aspiration related due to her history of recent symptoms of vomiting. No pathogenic growth from any cultures; most recent cultures was 10/13. Off antibiotics since 10/13, restart abx 10/17 in setting of continued hypotension, new fever overnight, CRP newly elevated at 140 and elevated procal (although hard to interpret in setting of ESRD). CXR with possible developing infiltrates  (fluid vs new infection). Will consider additional paracentesis pending course.   - Will consider Meropenem if there is increased concern for septic shock  - Yeast on urine culture, likely secondary to Rice (now out)  - Continue to monitor leukocytosis, procal, CRP  - Stop vancomycin 10/18 given MRSA negative 10/17  - Continue Zosyn for a total 8 day course     Abx   - Zosyn 10/4-10/13, 10/17-p  - Vanc 10/4-10/4, 10/17-10/18  - Doxycycline 10/4-10/6  - Micafungin 10/6-10/9    Cultures  10/17 c.diff pending  10/17 Blood cultures off dialysis line and PICC  10/17 Blood cultures peripheral  10/17 MRSA nares pending  10/17 UA w/ reflex     Hematology:    # Anemia, macrocytic  # Coagulopathy  # Acute epistaxis  In the setting of alcohol use disorder, liver failure, alcoholic hepatitis. INR elevated. Likely 2/2 to B12 or folate deficiency due to long history of alcohol use disorder. Transfused pRBCs on 10/7, 10/12, 10/16.  - Transfuse for Hgb <7  - Follow INR  - Trend CBC in the AM    # Thrombocytopenia, improved  Persistent in the setting of renal disease, liver disease, chronic alcoholism, and after a +2.3 liter I/O balance. This was likely partially dilutional with blunted bone marrow response secondary to alcoholism. HIT screen and hemolysis work up negative.  - Transfuse for PLTs <10, or if active bleeding and platelets are <30     Musculoskeletal:  No active issues     Skin:  # Dry cracked skin on bilateral heels, improving  - Continue Eucerin to be applied topically     General Cares/Prophylaxis:    DVT Prophylaxis: Heparin subQ  GI Prophylaxis: PPI  Restraints: None  Family Communication: daily with mother  Code Status: Full Code     Lines/tubes/drains:  - PIV  - Central line  - Arterial line  - Hemodialysis line  - NG     Disposition:  - Medical ICU    Patient seen and findings/plan discussed with medical ICU staff, Dr. Troy.    Carol Ji MD  Internal Medicine -  PGY1  [phone]      ====================================  INTERVAL HISTORY:   Nursing notes reviewed. Still on Levophed.    OBJECTIVE:   1. VITAL SIGNS:   Temp:  [97.8  F (36.6  C)-99.3  F (37.4  C)] 97.8  F (36.6  C)  Pulse:  [] 89  Resp:  [0-51] 22  BP: ()/() 100/68  MAP:  [28 mmHg-95 mmHg] 28 mmHg  Arterial Line BP: ()/(21-80) 43/21  SpO2:  [91 %-100 %] 99 %    2. INTAKE/ OUTPUT:   I/O last 3 completed shifts:  In: 2754.56 [P.O.:350; I.V.:634.56; NG/GT:690]  Out: 2300 [Urine:300; Other:2000]    3. PHYSICAL EXAMINATION:  General: Conversing appropriately. Alert. Sitting up in chair  HEENT: PERRL, MMM, scleral icterus  Pulm/Resp: CTAB. No respiratory distress on 2L O2  CV: RRR, no murmur, LE edema 2+ above wraps on calves  Abdomen: Mildly distended, RUQ tenderness to palpation, no rebound or guarding  Skin: mildly jaundiced  Neuro: Answering questions appropriately, moving all extremities, no focal deficits, no asterixis    Psych: Awake and alert, pleasant affect.     4. LABS:   Arterial Blood Gases   Recent Labs   Lab 10/18/21  2020 10/14/21  0328 10/13/21  0410   PH 7.46* 7.44 7.45   PCO2 35 34* 35   PO2 71* 97 133*   HCO3 25 23 25     Complete Blood Count   Recent Labs   Lab 10/19/21  0356 10/18/21  0354 10/17/21  0358 10/16/21  1156 10/16/21  0431 10/16/21  0431   WBC 24.0* 17.1* 18.6*  --   --  16.9*   HGB 7.6* 7.4* 7.9* 8.2*   < > 6.5*    201 204  --   --  152    < > = values in this interval not displayed.     Basic Metabolic Panel  Recent Labs   Lab 10/19/21  0401 10/19/21  0356 10/19/21  0006 10/18/21  2007 10/18/21  0722 10/18/21  0354 10/17/21  0854 10/17/21  0358 10/16/21  0435 10/16/21  0431   NA  --  134  --   --   --  135  --  133  --  136   POTASSIUM  --  4.0  --   --   --  4.4  --  4.3  --  3.8   CHLORIDE  --  102  --   --   --  102  --  101  --  106   CO2  --  23  --   --   --  22  --  24  --  22   BUN  --  41*  --   --   --  71*  --  55*  --  36*   CR  --  1.86*   --   --   --  2.66*  --  2.17*  --  1.45*   * 174* 183* 181*   < > 172*   < > 217*  201*   < > 222*    < > = values in this interval not displayed.     Liver Function Tests  Recent Labs   Lab 10/19/21  0356 10/18/21  0354 10/17/21  0358 10/16/21  0431 10/14/21  0328 10/13/21  1145 10/13/21  0410 10/13/21  0410   AST  --  102* 97*  --  95*  --   --  114*   ALT  --  38 43  --  39  --   --  36   ALKPHOS  --  299* 338*  --  325*  --   --  285*   BILITOTAL  --  5.9* 6.5*  --  8.4*  --   --  9.6*   ALBUMIN  --  1.4* 1.5*  --  2.0* 2.0*   < > 2.2*   INR 1.37*  --   --  1.31*  --   --   --  1.25*    < > = values in this interval not displayed.     Coagulation Profile  Recent Labs   Lab 10/19/21  0356 10/16/21  0431 10/13/21  0410   INR 1.37* 1.31* 1.25*       5. RADIOLOGY:   No results found for this or any previous visit (from the past 24 hour(s)).

## 2021-10-19 NOTE — PROGRESS NOTES
1500 - 1900    Admit: 9/29/2021 12:14 PM Hypokalemia     Neuro: AAOx4 but forgetful. Follows commands.     Vitals: Temp: 98.3  F (36.8  C) Temp src: Oral   Pulse: 104   Resp: (!) 32 SpO2: 93 % O2 Device: Nasal cannula Oxygen Delivery: 2 LPM  SR/ST    GI/: 2 BM, total of 7 today per report. Unable to maintain strict I/O. Urine unmeasureable, void x2.     Running: TKO and levo at 0.09.     Mobility: Turn every two. Lift/Ax2 to chair.     Changes: 2L removed with HD.     Plan: Wean levo as able.

## 2021-10-19 NOTE — PROGRESS NOTES
SPIRITUAL HEALTH SERVICES  SPIRITUAL ASSESSMENT Progress Note  [facility] ([place] 4C     Brief visit with pt, who was awake, interactive. I introduced myself as one of the ICU chaplains, let her know I had visited with her father when he was in the room, and we prayed for her. I let pt know that our   would be returning this week and would be available for follow-up support. Pt appreciates our checking in with her - no particular needs expressed today.    PLAN: will refer to   Father Sandra for follow-up.    Aakash Horvath) Mary Ryan M.Div., [facility]  Staff   Pager [phone]      * [facility] remains available 24/7 for emergent requests/referrals, either by having the switchboard page the on-call  or by entering an ASAP/STAT consult in Epic (this will also page the on-call ). Routine Epic consults receive an initial response within 24 hours.*

## 2021-10-19 NOTE — PLAN OF CARE
ICU End of Shift Summary. See flowsheets for vital signs and detailed assessment.     Changes this shift: Pt awake most of evening despite PRN oxy x2 and hydroxyzine. Pt alert, answering questions appropriately but is forgetful at times. 10+ loose bowel movements since 10/18 AM. Rectal pouch placed due to frequent stooling. Unable to accurately measure during beginning of shift due to mixing. External cath placed. Up in chair with PT assist, heavy assist x2 to return to bed.  A/Ox4, per report more interactive and alert than yesterday. Levo to maintain MAP > 65. Art line very positional. Arm board placed. On RA to 2L O2.       Plan: Continue to wean Levo as able. If up to chair, use sling as pt was heavy assist of 2 getting back into bed for PT yesterday afternoon. Continue POC.

## 2021-10-19 NOTE — PROGRESS NOTES
Critical Care Services Progress Note:  I personally examined and evaluated the patient today. I formulated today s plan with the house staff team or resident(s) and agree with the findings and plan in the associated note (see separately attested resident note).   I have evaluated all laboratory values and imaging studies from the past 24 hours.  Summary of hospital course:  46 year old female with alcohol hepatitis presents with ANA LAURA, altered mental status.  Was intubated on, then extubated on 10/14. CRRT transitioned to IHD. Remains in the ICU for hypotension    Overnight events/pertinent findings today:  More tachypnea overnight. No other acute events    99.3, , /80, O2 sat 95% on 2 L NC    Levophed 0.07    Data  Na 134, K 4.0    WBC 24, Hgb 7.6    I/O: +335 mL    Assessment/plan:    Acute Hypoxemic Respiratory Failure - pulmonary edema and possible pneumonia  - zosyn  - dialysis again today    Shock: due to liver dysfunction and possible sepsis  -midodrine  - levophed   - MAP goal 60    ANA LAURA - HRS, ATN   - IHD    Cirrhosis with ascites  - therapeutic paracentesis today    Rest per resident note.    I spent a total of 35 minutes (excluding procedure time) personally providing and directing critical care services at the bedside and on the critical care unit for this patient.     Rik Troy MD

## 2021-10-20 ENCOUNTER — APPOINTMENT (OUTPATIENT)
Dept: OCCUPATIONAL THERAPY | Facility: CLINIC | Age: 46
End: 2021-10-20
Payer: COMMERCIAL

## 2021-10-20 ENCOUNTER — APPOINTMENT (OUTPATIENT)
Dept: SPEECH THERAPY | Facility: CLINIC | Age: 46
End: 2021-10-20
Payer: COMMERCIAL

## 2021-10-20 LAB
ANION GAP SERPL CALCULATED.3IONS-SCNC: 10 MMOL/L (ref 3–14)
BASOPHILS # BLD MANUAL: 0 10E3/UL (ref 0–0.2)
BASOPHILS NFR BLD MANUAL: 0 %
BUN SERPL-MCNC: 52 MG/DL (ref 7–30)
CALCIUM SERPL-MCNC: 8.5 MG/DL (ref 8.5–10.1)
CHLORIDE BLD-SCNC: 101 MMOL/L (ref 94–109)
CO2 SERPL-SCNC: 23 MMOL/L (ref 20–32)
CREAT SERPL-MCNC: 2.36 MG/DL (ref 0.52–1.04)
EOSINOPHIL # BLD MANUAL: 0.4 10E3/UL (ref 0–0.7)
EOSINOPHIL NFR BLD MANUAL: 2 %
ERYTHROCYTE [DISTWIDTH] IN BLOOD BY AUTOMATED COUNT: 21.2 % (ref 10–15)
GFR SERPL CREATININE-BSD FRML MDRD: 24 ML/MIN/1.73M2
GLUCOSE BLD-MCNC: 176 MG/DL (ref 70–99)
GLUCOSE BLDC GLUCOMTR-MCNC: 134 MG/DL (ref 70–99)
GLUCOSE BLDC GLUCOMTR-MCNC: 143 MG/DL (ref 70–99)
GLUCOSE BLDC GLUCOMTR-MCNC: 150 MG/DL (ref 70–99)
GLUCOSE BLDC GLUCOMTR-MCNC: 161 MG/DL (ref 70–99)
GLUCOSE BLDC GLUCOMTR-MCNC: 172 MG/DL (ref 70–99)
GLUCOSE BLDC GLUCOMTR-MCNC: 173 MG/DL (ref 70–99)
HCT VFR BLD AUTO: 23.1 % (ref 35–47)
HGB BLD-MCNC: 7.3 G/DL (ref 11.7–15.7)
LYMPHOCYTES # BLD MANUAL: 2.5 10E3/UL (ref 0.8–5.3)
LYMPHOCYTES NFR BLD MANUAL: 13 %
MAGNESIUM SERPL-MCNC: 1.8 MG/DL (ref 1.6–2.3)
MCH RBC QN AUTO: 32.4 PG (ref 26.5–33)
MCHC RBC AUTO-ENTMCNC: 31.6 G/DL (ref 31.5–36.5)
MCV RBC AUTO: 103 FL (ref 78–100)
MONOCYTES # BLD MANUAL: 2 10E3/UL (ref 0–1.3)
MONOCYTES NFR BLD MANUAL: 10 %
MYELOCYTES # BLD MANUAL: 0.2 10E3/UL
MYELOCYTES NFR BLD MANUAL: 1 %
NEUTROPHILS # BLD MANUAL: 14.5 10E3/UL (ref 1.6–8.3)
NEUTROPHILS NFR BLD MANUAL: 74 %
PHOSPHATE SERPL-MCNC: 6 MG/DL (ref 2.5–4.5)
PLAT MORPH BLD: ABNORMAL
PLATELET # BLD AUTO: 222 10E3/UL (ref 150–450)
POTASSIUM BLD-SCNC: 4.2 MMOL/L (ref 3.4–5.3)
RBC # BLD AUTO: 2.25 10E6/UL (ref 3.8–5.2)
RBC MORPH BLD: ABNORMAL
SODIUM SERPL-SCNC: 134 MMOL/L (ref 133–144)
WBC # BLD AUTO: 19.6 10E3/UL (ref 4–11)

## 2021-10-20 PROCEDURE — 85027 COMPLETE CBC AUTOMATED: CPT | Performed by: STUDENT IN AN ORGANIZED HEALTH CARE EDUCATION/TRAINING PROGRAM

## 2021-10-20 PROCEDURE — 99233 SBSQ HOSP IP/OBS HIGH 50: CPT | Performed by: STUDENT IN AN ORGANIZED HEALTH CARE EDUCATION/TRAINING PROGRAM

## 2021-10-20 PROCEDURE — 250N000011 HC RX IP 250 OP 636: Performed by: CLINICAL NURSE SPECIALIST

## 2021-10-20 PROCEDURE — 250N000013 HC RX MED GY IP 250 OP 250 PS 637: Performed by: STUDENT IN AN ORGANIZED HEALTH CARE EDUCATION/TRAINING PROGRAM

## 2021-10-20 PROCEDURE — 83735 ASSAY OF MAGNESIUM: CPT | Performed by: STUDENT IN AN ORGANIZED HEALTH CARE EDUCATION/TRAINING PROGRAM

## 2021-10-20 PROCEDURE — 250N000011 HC RX IP 250 OP 636: Performed by: SURGERY

## 2021-10-20 PROCEDURE — 120N000002 HC R&B MED SURG/OB UMMC

## 2021-10-20 PROCEDURE — 97140 MANUAL THERAPY 1/> REGIONS: CPT | Mod: GO

## 2021-10-20 PROCEDURE — 92526 ORAL FUNCTION THERAPY: CPT | Mod: GN

## 2021-10-20 PROCEDURE — 250N000011 HC RX IP 250 OP 636: Performed by: STUDENT IN AN ORGANIZED HEALTH CARE EDUCATION/TRAINING PROGRAM

## 2021-10-20 PROCEDURE — 97110 THERAPEUTIC EXERCISES: CPT | Mod: GO | Performed by: OCCUPATIONAL THERAPIST

## 2021-10-20 PROCEDURE — 84100 ASSAY OF PHOSPHORUS: CPT | Performed by: STUDENT IN AN ORGANIZED HEALTH CARE EDUCATION/TRAINING PROGRAM

## 2021-10-20 PROCEDURE — 99233 SBSQ HOSP IP/OBS HIGH 50: CPT | Mod: GC

## 2021-10-20 PROCEDURE — 250N000013 HC RX MED GY IP 250 OP 250 PS 637: Performed by: NURSE PRACTITIONER

## 2021-10-20 PROCEDURE — 80048 BASIC METABOLIC PNL TOTAL CA: CPT | Performed by: STUDENT IN AN ORGANIZED HEALTH CARE EDUCATION/TRAINING PROGRAM

## 2021-10-20 PROCEDURE — 250N000013 HC RX MED GY IP 250 OP 250 PS 637: Performed by: SURGERY

## 2021-10-20 PROCEDURE — 99232 SBSQ HOSP IP/OBS MODERATE 35: CPT | Performed by: INTERNAL MEDICINE

## 2021-10-20 RX ORDER — SIMETHICONE 80 MG
80 TABLET,CHEWABLE ORAL EVERY 6 HOURS PRN
Status: DISCONTINUED | OUTPATIENT
Start: 2021-10-20 | End: 2021-11-05 | Stop reason: HOSPADM

## 2021-10-20 RX ORDER — FUROSEMIDE 10 MG/ML
120 INJECTION INTRAMUSCULAR; INTRAVENOUS EVERY 6 HOURS
Status: COMPLETED | OUTPATIENT
Start: 2021-10-20 | End: 2021-10-20

## 2021-10-20 RX ORDER — ALBUMIN, HUMAN INJ 5% 5 %
250 SOLUTION INTRAVENOUS
Status: DISCONTINUED | OUTPATIENT
Start: 2021-10-20 | End: 2021-10-20

## 2021-10-20 RX ADMIN — HEPARIN SODIUM 5000 UNITS: 5000 INJECTION, SOLUTION INTRAVENOUS; SUBCUTANEOUS at 08:18

## 2021-10-20 RX ADMIN — Medication 1 PACKET: at 08:20

## 2021-10-20 RX ADMIN — Medication 2.5 MG: at 09:55

## 2021-10-20 RX ADMIN — Medication 1 PACKET: at 19:22

## 2021-10-20 RX ADMIN — GABAPENTIN 100 MG: 250 SOLUTION ORAL at 19:21

## 2021-10-20 RX ADMIN — Medication 100 MCG: at 08:18

## 2021-10-20 RX ADMIN — QUETIAPINE FUMARATE 25 MG: 25 TABLET ORAL at 21:22

## 2021-10-20 RX ADMIN — Medication 15 ML: at 11:44

## 2021-10-20 RX ADMIN — RIFAXIMIN 550 MG: 550 TABLET ORAL at 19:20

## 2021-10-20 RX ADMIN — SALINE NASAL SPRAY 1 SPRAY: 1.5 SOLUTION NASAL at 08:20

## 2021-10-20 RX ADMIN — MIDODRINE HYDROCHLORIDE 20 MG: 5 TABLET ORAL at 15:13

## 2021-10-20 RX ADMIN — Medication 2.5 MG: at 04:48

## 2021-10-20 RX ADMIN — ACETAMINOPHEN 650 MG: 325 TABLET, FILM COATED ORAL at 19:20

## 2021-10-20 RX ADMIN — INSULIN ASPART 1 UNITS: 100 INJECTION, SOLUTION INTRAVENOUS; SUBCUTANEOUS at 23:35

## 2021-10-20 RX ADMIN — SALINE NASAL SPRAY 1 SPRAY: 1.5 SOLUTION NASAL at 11:45

## 2021-10-20 RX ADMIN — ESCITALOPRAM OXALATE 10 MG: 5 SOLUTION ORAL at 09:51

## 2021-10-20 RX ADMIN — HEPARIN SODIUM 5000 UNITS: 5000 INJECTION, SOLUTION INTRAVENOUS; SUBCUTANEOUS at 19:22

## 2021-10-20 RX ADMIN — INSULIN ASPART 2 UNITS: 100 INJECTION, SOLUTION INTRAVENOUS; SUBCUTANEOUS at 04:37

## 2021-10-20 RX ADMIN — INSULIN ASPART 1 UNITS: 100 INJECTION, SOLUTION INTRAVENOUS; SUBCUTANEOUS at 08:25

## 2021-10-20 RX ADMIN — PIPERACILLIN SODIUM AND TAZOBACTAM SODIUM 2.25 G: 2; .25 INJECTION, POWDER, LYOPHILIZED, FOR SOLUTION INTRAVENOUS at 11:43

## 2021-10-20 RX ADMIN — INSULIN ASPART 1 UNITS: 100 INJECTION, SOLUTION INTRAVENOUS; SUBCUTANEOUS at 19:37

## 2021-10-20 RX ADMIN — PIPERACILLIN SODIUM AND TAZOBACTAM SODIUM 2.25 G: 2; .25 INJECTION, POWDER, LYOPHILIZED, FOR SOLUTION INTRAVENOUS at 23:31

## 2021-10-20 RX ADMIN — Medication 2.5 MG: at 16:21

## 2021-10-20 RX ADMIN — FOLIC ACID 1 MG: 1 TABLET ORAL at 08:18

## 2021-10-20 RX ADMIN — PIPERACILLIN SODIUM AND TAZOBACTAM SODIUM 2.25 G: 2; .25 INJECTION, POWDER, LYOPHILIZED, FOR SOLUTION INTRAVENOUS at 06:06

## 2021-10-20 RX ADMIN — FUROSEMIDE 120 MG: 10 INJECTION, SOLUTION INTRAVENOUS at 16:21

## 2021-10-20 RX ADMIN — PIPERACILLIN SODIUM AND TAZOBACTAM SODIUM 2.25 G: 2; .25 INJECTION, POWDER, LYOPHILIZED, FOR SOLUTION INTRAVENOUS at 19:21

## 2021-10-20 RX ADMIN — THIAMINE HCL TAB 100 MG 100 MG: 100 TAB at 08:18

## 2021-10-20 RX ADMIN — SALINE NASAL SPRAY 1 SPRAY: 1.5 SOLUTION NASAL at 19:22

## 2021-10-20 RX ADMIN — MIDODRINE HYDROCHLORIDE 20 MG: 5 TABLET ORAL at 06:06

## 2021-10-20 RX ADMIN — MIDODRINE HYDROCHLORIDE 20 MG: 5 TABLET ORAL at 21:22

## 2021-10-20 RX ADMIN — INSULIN ASPART 2 UNITS: 100 INJECTION, SOLUTION INTRAVENOUS; SUBCUTANEOUS at 11:52

## 2021-10-20 RX ADMIN — Medication 40 MG: at 08:18

## 2021-10-20 RX ADMIN — ACETAMINOPHEN 650 MG: 325 TABLET, FILM COATED ORAL at 11:44

## 2021-10-20 RX ADMIN — CALCIUM CARBONATE 600 MG (1,500 MG)-VITAMIN D3 400 UNIT TABLET 1 TABLET: at 08:18

## 2021-10-20 RX ADMIN — Medication 3 MG: at 01:51

## 2021-10-20 RX ADMIN — GABAPENTIN 100 MG: 250 SOLUTION ORAL at 08:21

## 2021-10-20 RX ADMIN — FUROSEMIDE 120 MG: 10 INJECTION, SOLUTION INTRAVENOUS at 11:43

## 2021-10-20 RX ADMIN — RIFAXIMIN 550 MG: 550 TABLET ORAL at 08:18

## 2021-10-20 RX ADMIN — GABAPENTIN 100 MG: 250 SOLUTION ORAL at 11:45

## 2021-10-20 ASSESSMENT — ACTIVITIES OF DAILY LIVING (ADL)
ADLS_ACUITY_SCORE: 13

## 2021-10-20 ASSESSMENT — MIFFLIN-ST. JEOR
SCORE: 1542
SCORE: 1506

## 2021-10-20 NOTE — PROGRESS NOTES
[facility]    Transfer to Medicine Progress Note - Hospitalist Service, Gold night       Date of Admission:  9/29/2021    Updates today:  - Transfer [place]ICU to [facility]  - stable on 2L O2  - previously on Levophed, off ~24h  - sore throat, exam benign, add lozenges  - added simethicone and fiber for gas pain/distention  - patient feels she is having early satiety with TF and requestes consideration for pause TF to increase her appetite      Assessment & Plan           Aliyah Angeles is a 46 year old female with PMH of alcohol use disorder (last drink 9/12/21), alcoholic hepatitis, jaimie en y gastric bypass, alcohol pancreatitis, pancreatic cyst, Type 2 DM, anxiety and depression admitted on 9/29/2021 with ANA LAURA abdominal pain and altered mental status requiring intubation.     Initially admitted to medicine and transferred to ICU 10/4/21 due to emergently needing intubation due to not protecting airway and respiratory failure. She self-extubated on 10/14. Pressors were stopped 10/19.  She continues to have dialysis-dependent renal failure on a trial of diuretics. She has been in mICU 9 days and now transfers to medicine.      Acute hypoxemic respiratory failure with prior ARDS; Improved  Concern for new CAP vs aspiration pneumonia  Respiratory alkalosis, improved  On admission, no hypoxia and CXR minimal perihilar opacities. On 10/3 developed acute onset hypoxemia, started on HiFlow, CXR with diffuse bilateral opacities and clinically c/w ARDS. No evidence of cardiac etiology. Treated with lung-protective ventilation, prone positioning. Intubated, bronch 10/4 with bile-like fluid, suspect aspiration as well as PMN predominance concerning for bacterial pneumonia. Off inhaled epo 10/7. Chest CT 10/6 extensive GGO and consolidative opacities throughout the lungs c/w infection. CRRT 10/7 to 10/13. On the morning of 10/14, she self extubated and has been stable on nasal canula.  Increased WOB concerning for new infection (PNA) with developing RLL infiltrate on CXR vs fluid overload. Multiple VBGs alkalotic, stable.   - Infectious work-up and treatment as below  - Albuterol neb PRN  - Continue with nasal canula as tolerated  - Infectious work-up below   - O2 to maintain sats >92%, transferred on 2LPM NC  - IS, activity as able   - FEN: TF, nasoduodenal Osmolite 1.5 goal 45ml/h, FWF 30ml q4h, diet dysphagia 6 (soft bite-sized and thin liquids)  - diuresis: lasix 80mg IV 10/19, total 240mg lasix IV (10/20)    Septic shock, improving  Pneumonia (aspiration vs CAP)  New fever, leukocytosis  Concern for pneumonia, CAP vs HAP. CXR 10/7 showing progression of bilateral opacities. Less concern for SBP given negative paracentesis on admission. Low grade fevers intermittently and most-recent fevers 10/20 a.m.. Paracentesis 10/4 negative for SBP. Elevated procal and PMN's in BAL fluid 10/4 c/w bacterial pneumonia, likely aspiration related due to her history of recent symptoms of vomiting. Respiratory cultures 10/13 C. Albicans and normal karl. Off antibiotics 10/13-10/17, resumed in setting of continued hypotension/fever/ and elevated procal. CXR with possible developing infiltrates (fluid vs new infection).   WBC: 9.8 (9/30) --> 17.4 --> 22.7 (10/4) --> 14.3 (10/7) --> 23 (10/8) --> 19.6 (10/20)  Procal: 0.8 (9/13) -> 2.87 (10/3) --> 23.23 (10/6) --> 11.9 ( 10/8) --> 6.7 (10/9) --> 6 (10/19)  Most recent imaging: CXR 10/19 Stable bilateral interstitial opacities  -  paracentesis 10/4/21 neg for SBP, re-attempt paracentesis, not sufficient ascites, will consider additional paracentesis pending course.   - bp: midodrine 20mg TID, levophed weaned off/ stopped 10/19  - Will consider Meropenem if there is increased concern for septic shock, also could consider ID consult  - Continue to monitor leukocytosis, procal, CRP  Abx   - Zosyn 10/4-10/13, 10/17-p, Continue Zosyn for a total 8 day course   -  "Vanc 10/4-10/4, 10/17-10/18  - Doxycycline 10/4-10/6  - Micafungin 10/6-10/9  Cultures  10/17 c.diff neg  10/17 Blood cultures off dialysis line and PICC NGTD  10/17 Blood cultures peripheral NGTD  10/17 MRSA nares neg  10/13 and 10/17 Urine culture + 10-50k CFU C. Glabrata felt to be 2/2 barrett (discontinued)  Other  COVID-19 negative, most-recently 10/19  No other respiratory viral testing performed    Sore throat and odynophagia  Exam negative for obstruction, lesions, exudates, erythema. No dysphagia  - sore throat lozenges  - monitor    Abdominal pain and bloating  Alcohol-associate hepatitis, c/b ascites, portal HTN, severe hepatic steatosis  Mild Transaminitis, elevated bilirubin  Alcohol use disorder (quit 9/2021)  CT A/P w/ and w/o contrast 10/3 with no e/o GIB, \"severe hepatic steatosis with signs of portal hypertension including recanalized paraumbilical vein, mild splenomegaly and moderate volume ascites.\" RUQ US with doppler no e/o thrombus and antegrade flow in portal/hepatic veins. Ammonia has remained low, AST/ ALT stable. Bili improving. On exam 10/20 no jaundice, no asterixis, A&O x3.   Last alcohol use 9/12.21.  - Thiamine, folic acid, MVI supplementation  - MELD score 26 on 10/20.   - rifaxamin 550mg BID  - low threshold to consider paracentesis if no improvement in infection and ascites  - pain mng: APAP q8h, oxycodone 2.5mg q4h prn   - protonix 40mg daily  - added simethicone and psyllium     Acute kidney injury, likely HRS  Hypervolemia   Concern for ATN, pre-renal, improving  Baseline creatinine ~0.5. Renal US 10/7 normal parenchyma and no hydro. Suspected multifactorial in the setting of vomiting, lactulose, third spacing. Likely pre-renal. She was on CRRT from 10/7 to 10/13. They stopped due to increasing pressor needs and intolerance, some urine output. Now iHD.  - per nephrology, difficult to gauge diuresis given lack of strict I&O  - Consulted nephrology, appreciate recs  - Replacement " protocols (Mg, Ph, K).   - Strict I/Os, Purewick as able  - Avoid nephrotoxic agents    Hypotension, suspected 2/2 liver shock and sepsis 2/2 aspiration pneumonia, improving  Anasarca, possible intravascular depletion with third-spacing, improving  Limited TTE 10/4 with hyperdynamic EF >70%,moderate TI, normal IVC. Requiring pressors until 10/19. Sepsis/infection treatment as per above. Suspect liver disease contributing to the shock.   - goal MAP >60  - Midodrine 20 mg TID  - levophed stopped 10/19  - lymphedema wraps   - diuresis and dialysis as per above     Type 2 diabetes mellitus (A1C 9.1 9/13/21)  Her blood sugar was consistently over 200 as she approached her feeding goal rate, has somewhat stabilized. BG now in the 130-170s.   - Continue Lantus to 35 units daily  - Check BS q4h and PRN  - Goal BS between 100 and 180    Macrocytic Anemia  Coagulopathy  Acute epistaxis, resolved  In the setting of alcohol use disorder, liver failure, alcoholic hepatitis. Likely 2/2 to B12 or folate deficiency due to long history of alcohol use disorder. Transfused pRBCs on 10/7, 10/12, 10/16. hgb currently stable.  - INR up to 2.5 (10/4) now stable at 1.3, continue to trend  - Transfuse for Hgb <7  - Trend CBC     -------------------------------------------------------------------------  Stable and/or Chronic PMH:    Anxiety and depression   - PTA escitalopram  - Cont PRN Seroquel 25 mg @ bedtime    Dry cracked skin on bilateral heels, improving  - Continue Eucerin to be applied topically  ---------------------------------------------------------------------------  Resolved medical issues:    ARDS; Resolved  - see also above Re: pneumonia and volume mng    Thrombocytopenia, resolved  Persistent in the setting of renal disease, liver disease, chronic alcoholism, and after a +2.3 liter I/O balance. This was likely partially dilutional with blunted bone marrow response secondary to alcoholism. HIT screen and hemolysis work up  negative.  - Transfuse for PLTs <10, or if active bleeding and platelets are <30    Encephalopathy, likely acute toxic metabolic vs infection, resolved  In the setting of likely hepatic encephalopathy, infectious encephalopathy, sedation. SBP unlikely due to normal paracentesis eval and culture. Normal ammonia from 10/4.  No acute findings on MRI from 10/8. Awake, alert, answering questions appropriately; encephalopathy appears to have mostly resolved.   - Psychiatry consulted 10/14, appreciate input  - Miralax BID  - Goal 3-4 loose stools daily  - On rifaximin  - Gabapentin 100 mg TID  - Changed Senna to scheduled    Anion gap metabolic acidosis, resolved  Lactic acidosis, resolved  - monitor     Diet: Adult Formula Drip Feeding: Continuous Osmolite 1.5; Nasoduodenal tube; Goal Rate: 45; mL/hr; Medication - Feeding Tube Flush Frequency: At least 15-30 mL water before and after medication administration and with tube clogging; Amount to Send (Nut...  Soft & Bite Sized Diet (level 6) Thin Liquids (level 0)    DVT Prophylaxis: heparin subcutaneous  Rice Catheter: Not present  Central Lines: PRESENT  CVC TRIPLE LUMEN Left Internal jugular-Site Assessment: WDL  CVC Double Lumen Right Internal jugular Non - tunneled-Site Assessment: WDL  Code Status: Full Code      Disposition Plan   Expected discharge: 10/29/2021   recommended to suspect need for TCU pending interval progress once adequate pain management/ tolerating PO medications, antibiotic plan established, blood pressure within MAP ~60 or greater, renal function improved, safe disposition plan/ TCU bed available and SIRS/Sepsis treated.     The patient's care was discussed with the Bedside Nurse, Patient and mICU Team.    Sima Flores PA-C  Hospitalist Service, [facility]  Securely message with the Vocera Web Console (learn more here)  Text page via [employer] Paging/Directory  Please see sign in/sign out  for up to date coverage information    Clinically Significant Risk Factors Present on Admission                ______________________________________________________________________    Interval History    Aliyah is improving and is stable off pressors and per mICU is stable to transfer to medicine. She is most bothered by continued bloating and states she has a 8/10 generalized abdominal pain that has been stable and somewhat improves with medications. She notes stools are intermittently with diarrhea and has difficulty discerning what is urine vs stool as both tend to pass simultaneously, maverick since she started lasix and notes good urine output.  She feels her LE and general edema have been stable and is tolerating lymphedema wraps. She is SOB when she lays flat or works with PT. She is able to stand and walk 2-3 steps with 2-person assistance. She also notes lumbar pain and generalized pain when she works with PT    She notes a new sore throat today and mild odynophagia, does not endorse dysphagia.     Continues to have sweats and on/off fevers and chills. + purulent sputum today with small red blood in sputum     Patient feels she is having early satiety with TF and requestes consideration for pause TF to increase her appetite.    Denies chest pain, palpitations, rashes, lumps, lesions, bleeding (including epistaxis, hematuria, melena/hematochezia). Denies dysuria.        4 point ROS is negative other than those as per HPI    Data reviewed today: I reviewed all medications, new labs and imaging results over the last 24 hours. I personally reviewed no images or EKG's today.    Physical Exam   Vital Signs: Temp: 98.9  F (37.2  C) Temp src: Axillary BP: 101/63 Pulse: 82   Resp: 21 SpO2: 97 % O2 Device: Nasal cannula Oxygen Delivery: 2 LPM  Weight: 203 lbs 4.23 oz  GENERAL: Alert and oriented x 3. NAD. Able to sit upright with significant assistance, holding bed railing and pulled forward. Cooperative.   HEENT: Throat  clear, no lesions, exudates. Anicteric sclera. Mucous membranes moist. NC. AT. Pupils equal and round.   CV: RRR. S1, S2. No murmurs appreciated.   RESPIRATORY: Effort normal on 2L NC. Lungs diminished bases, and trace LE rales, no wheezing, rhonchi.   GI: + tympanic, diffusely tender to palpation, no focal tenderness, Abdomen soft. No rebound, guarding. No lesions.   NEUROLOGICAL: No focal deficits. Moves all extremities.  CN 2-12 grossly intact.  EXTREMITIES: lymphedema wraps on bilateral lower legs, + 4+ edema of the bilateral thighs throughout. Intact bilateral pedal pulses.   SKIN: No jaundice. No rashes on exposed skin.  BACK: no lesions      Data   Recent Labs   Lab 10/20/21  1614 10/20/21  1152 10/20/21  0825 10/20/21  0436 10/20/21  0436 10/19/21  0401 10/19/21  0356 10/18/21  0722 10/18/21  0354 10/17/21  0854 10/17/21  0358 10/16/21  0435 10/16/21  0431 10/14/21  0807 10/14/21  0328   WBC  --   --   --   --  19.6*  --  24.0*  --  17.1*   < > 18.6*   < > 16.9*   < > 22.6*   HGB  --   --   --   --  7.3*  --  7.6*  --  7.4*   < > 7.9*   < > 6.5*   < > 8.1*   MCV  --   --   --   --  103*  --  102*  --  103*   < > 101*   < > 105*   < > 107*   PLT  --   --   --   --  222  --  244  --  201   < > 204   < > 152   < > 129*   INR  --   --   --   --   --   --  1.37*  --   --   --   --   --  1.31*  --   --    NA  --   --   --   --  134  --  134  --  135   < > 133   < > 136   < > 136   POTASSIUM  --   --   --   --  4.2  --  4.0  --  4.4   < > 4.3   < > 3.8   < > 4.1   CHLORIDE  --   --   --   --  101  --  102  --  102   < > 101   < > 106   < > 106   CO2  --   --   --   --  23  --  23  --  22   < > 24   < > 22   < > 24   BUN  --   --   --   --  52*  --  41*  --  71*   < > 55*   < > 36*   < > 34*   CR  --   --   --   --  2.36*  --  1.86*  --  2.66*   < > 2.17*   < > 1.45*   < > 1.29*   ANIONGAP  --   --   --   --  10  --  9  --  11   < > 8   < > 8   < > 6   STEFFANY  --   --   --   --  8.5  --  8.5  --  8.3*   < > 8.6   < >  7.6*   < > 8.2*   * 173* 161*   < > 176*  172*   < > 174*   < > 172*   < > 217*  201*   < > 222*   < > 222*   ALBUMIN  --   --   --   --   --   --   --   --  1.4*  --  1.5*  --   --    < > 2.0*   PROTTOTAL  --   --   --   --   --   --   --   --  4.4*  --  5.4*  --   --    < > 5.5*   BILITOTAL  --   --   --   --   --   --   --   --  5.9*  --  6.5*  --   --    < > 8.4*   ALKPHOS  --   --   --   --   --   --   --   --  299*  --  338*  --   --    < > 325*   ALT  --   --   --   --   --   --   --   --  38  --  43  --   --    < > 39   AST  --   --   --   --   --   --   --   --  102*  --  97*  --   --    < > 95*   LIPASE  --   --   --   --   --   --   --   --   --   --  218  --   --   --  366    < > = values in this interval not displayed.

## 2021-10-20 NOTE — TELEPHONE ENCOUNTER
RECORDS RECEIVED FROM: U   Appt Date: 10.26.2021   NOTES STATUS DETAILS   OFFICE NOTE from referring provider Internal 09.13.2021 Consult Stefani Thompson MD   OFFICE NOTES from other specialists N/A    DISCHARGE SUMMARY from hospital Internal 09.13.2021 Stefani Thompson MD   MEDICATION LIST Internal    LIVER BIOSPY (IF APPLICABLE)      PATHOLOGY REPORTS  N/A    IMAGING     ENDOSCOPY (IF AVAILABLE) N/A    COLONOSCOPY (IF AVAILABLE) N/A    ULTRASOUND LIVER Internal 09.29.2021, 09.13.2021 US ABDOMEN COMPLETE    CT OF ABDOMEN N/A    MRI OF LIVER N/A    FIBROSCAN, US ELASTOGRAPHY, FIBROSIS SCAN, MR ELASTOGRAPHY N/A    LABS     HEPATIC PANEL (LIVER PANEL) Internal 10.18.2021   BASIC METABOLIC PANEL Internal 10.20.2021   COMPLETE METABOLIC PANEL Internal 10.20.2021   COMPLETE BLOOD COUNT (CBC) Internal 10.20.2021   INTERNATIONAL NORMALIZED RATIO (INR) Internal 10.19.2021   HEPATITIS C ANTIBODY Internal 09.14.2021   HEPATITIS C VIRAL LOAD/PCR N/A    HEPATITIS C GENOTYPE N/A    HEPATITIS B SURFACE ANTIGEN N/A    HEPATITIS B SURFACE ANTIBODY Internal 09.14.2021   HEPATITIS B DNA QUANT LEVEL N/A    HEPATITIS B CORE ANTIBODY Internal 09.14.2021

## 2021-10-20 NOTE — PROGRESS NOTES
ICU End of Shift Summary. See flowsheets for vital signs and detailed assessment.    Changes this shift:   Patient remains alert and oriented through the shift. Has been continent of urine and stool on a bed pan but has consistently refused to use the purewick and refused a rectal pouch. Up to the chair for a couple of hours this afternoon. 4-5 loose watery stools. Remains faily unstable and weak on her feet.     Plan:   Continue to monitor and assess. Follow treatment plan.

## 2021-10-20 NOTE — PROGRESS NOTES
MEDICAL ICU PROGRESS NOTE  10/20/2021      Date of Service (when I saw the patient): 10/20/2021    ASSESSMENT: Aliyah Angeles is a 46 year old woman with alcohol use disorder (last drink 9/12/21), alcoholic hepatitis, jaimie en y gastric bypass, alcohol pancreatitis, pancreatic cyst, Type 2 DM, anxiety and depression admitted with ANA LAURA, abdominal pain, and AMS requiring intubation. Self extubated on 10/14. CRRT transitioned to iHD. Off levophed since 10/19 afternoon.      CHANGES and MAJOR THINGS TODAY:   - Transfer to medicine floor given no longer requiring pressors     PLAN:     Neuro:  # Pain  - Cont oxycodone 2.5 mg po PRN  - Scheduled Tylenol 650 mg q8h  - Hydroxyzine makes MAPs drop, try oxycodone first     # Anxiety and depression   - PTA escitalopram  - Cont PRN Seroquel 25 mg @ bedtime    # Encephalopathy, likely acute toxic metabolic vs infection, resolved  In the setting of likely hepatic encephalopathy, infectious encephalopathy, sedation. SBP unlikely due to normal paracentesis eval and culture. Normal ammonia from 10/4.  No acute findings on MRI from 10/8. Awake, alert, answering questions appropriately; encephalopathy appears to have mostly resolved.   - Miralax BID  - Goal 3-4 loose stools daily  - On rifaximin  - Gabapentin 100 mg TID  - Changed Senna to scheduled    # Alcohol use disorder  Last alcohol use 9/12.21.  - Thiamine, folic acid, MVI supplementation    Pulmonary:  # Acute hypoxemic respiratory failure; Improved  # ARDS; Resolved  # Concern for new infection, pneumonia  # Respiratory alkalosis, improved  Stable on 2L O2. O2 requirement likely from pneumonia (RLL infiltrate on CXR) as well as significant hypervolemia given ARF requiring dialysis   - Continue Zosyn for total 8 day course   - Albuterol neb PRN    Cardiovascular:  # Hypotension, in the setting of likely septic shock vs liver disease, resolved  Echo 10/4 with hyperdynamic EF >70%. Shock likely 2/2 sepsis and liver disease is  likely contributing as well. No longer requiring pressors as of 10/19  - Midodrine 20 mg TID     GI/Nutrition:  # Alcoholic hepatitis  # Ascites  # Hyperbilirubinemia, improving  # History of RYGB  # Transaminitis, stable  Patient has been sober since 9/12 and has been at a treatment facility since her discharge 9/21. Hepatomegaly and hepatic steatosis without cirrhosis. CT A/P on admission with severe hepatic steatosis, portal HTN, ascites, splenomegaly (similar on repeat CT 10/6). Paracentesis 10/4, 10/11 without SBP.  On 10/9, AST was 94 and trending up and continued to do so on 10/10. AST elevation possibly due to alcoholic liver disease, however sobriety started a month ago according to the chart review. On 10/14 endorsed severe abdominal pain. Her abdomen is soft, but with distention consistent with ascites. Repeat CT A&P with enlarged lymph nodes, ascites. Hepatology suggested acute symptoms of alcoholic hepatitis can last upwards of 6 weeks; abdominal pain improved but persistent.   - Trend LFT's biweekly  - GI following, appreciate recs  - Miralax BID; Senna PRN  - Trend bilirubin, INR --> MELD labs  - SLP consulted, appreciate diet recs  - Continue to assess for need for therapeutic vs diagnostic paracentesis    Renal/Fluids/Electrolytes:  # ANA LAURA 2/2 ATN w/ underlying HRS physiology  - iHD (new this admit)  -  nephrology following, appreciate recs  - Replacement protocols (Mg, Ph, K).   - Strict I/Os  - Avoid nephrotoxic agents    Endocrine:  # Type 2 diabetes mellitus  Her blood sugar was consistently over 200 as she approached her feeding goal rate, has somewhat stabilized. CTM.  - High-resistance sliding scale insulin  - Continue Lantus to 35 units daily  - Check BS q4h and PRN  - Goal BS between 100 and 180    ID:  # Septic shock, resolved   # Pneumonia (aspiration vs CAP)  Concern for pneumonia, CAP vs HAP. Less concern for SBP given negative paracentesis on admission. Afebrile. Paracentesis 10/4  negative for SBP. Elevated procal and PMN's in BAL fluid 10/4 suggests a bacterial pneumonia, likely aspiration related due to her history of recent symptoms of vomiting. No pathogenic growth from any cultures. Improving on antibiotic course   - Continue Zosyn for a total 8 day course     Abx   - Zosyn 10/4-10/13, 10/17-p  - Vanc 10/4-10/4, 10/17-10/18  - Doxycycline 10/4-10/6  - Micafungin 10/6-10/9    Hematology:    # Anemia, macrocytic  # Coagulopathy  # Acute epistaxis  In the setting of alcohol use disorder, liver failure, alcoholic hepatitis. INR elevated. Likely 2/2 to B12 or folate deficiency due to long history of alcohol use disorder. Transfused pRBCs on 10/7, 10/12, 10/16.  - Transfuse for Hgb <7  - Follow INR  - Trend CBC in the AM    # Thrombocytopenia, improved  Persistent in the setting of renal disease, liver disease, chronic alcoholism, and after a +2.3 liter I/O balance. This was likely partially dilutional with blunted bone marrow response secondary to alcoholism. HIT screen and hemolysis work up negative.  - Transfuse for PLTs <10, or if active bleeding and platelets are <30     Musculoskeletal:  No active issues     Skin:  # Dry cracked skin on bilateral heels, improving  - Continue Eucerin to be applied topically     General Cares/Prophylaxis:    DVT Prophylaxis: Heparin subQ  GI Prophylaxis: PPI  Restraints: None  Family Communication: daily with mother  Code Status: Full Code     Lines/tubes/drains:  - PIV  - Central line  - Hemodialysis line  - NG     Disposition:  - Medical ICU    Patient seen and findings/plan discussed with medical ICU staff, Dr. Troy.    Carol Ji MD  Internal Medicine - PGY1  [phone]      ====================================  INTERVAL HISTORY:   Nursing notes reviewed. NAEO. No longer requiring pressers.     OBJECTIVE:   1. VITAL SIGNS:   Temp:  [97.9  F (36.6  C)-101  F (38.3  C)] 98.3  F (36.8  C)  Pulse:  [] 75  Resp:  [14-32] 15  BP:  ()/(47-77) 84/47  SpO2:  [91 %-99 %] 99 %    2. INTAKE/ OUTPUT:   I/O last 3 completed shifts:  In: 2692.85 [P.O.:590; I.V.:397.85; NG/GT:625]  Out: 100 [Stool:100]    3. PHYSICAL EXAMINATION:  General: Conversing appropriately. Alert. Sitting up in bed  HEENT: PERRL, MMM, scleral icterus  Pulm/Resp: bilateral lower lobes with inspiratory crackles. No respiratory distress on 2L O2  CV: RRR, no murmur, LE edema 2+ above wraps on calves  Abdomen: Mildly distended, RUQ tenderness to palpation, no rebound or guarding  Skin: mildly jaundiced  Neuro: Answering questions appropriately, moving all extremities, no focal deficits, no asterixis    Psych: Awake and alert, pleasant affect.     4. LABS:   Arterial Blood Gases   Recent Labs   Lab 10/18/21  2020 10/14/21  0328   PH 7.46* 7.44   PCO2 35 34*   PO2 71* 97   HCO3 25 23     Complete Blood Count   Recent Labs   Lab 10/20/21  0436 10/19/21  0356 10/18/21  0354 10/17/21  0358   WBC 19.6* 24.0* 17.1* 18.6*   HGB 7.3* 7.6* 7.4* 7.9*    244 201 204     Basic Metabolic Panel  Recent Labs   Lab 10/20/21  1937 10/20/21  1614 10/20/21  1152 10/20/21  0825 10/20/21  0436 10/20/21  0436 10/19/21  0401 10/19/21  0356 10/18/21  0722 10/18/21  0354 10/17/21  0854 10/17/21  0358   NA  --   --   --   --   --  134  --  134  --  135  --  133   POTASSIUM  --   --   --   --   --  4.2  --  4.0  --  4.4  --  4.3   CHLORIDE  --   --   --   --   --  101  --  102  --  102  --  101   CO2  --   --   --   --   --  23  --  23  --  22  --  24   BUN  --   --   --   --   --  52*  --  41*  --  71*  --  55*   CR  --   --   --   --   --  2.36*  --  1.86*  --  2.66*  --  2.17*   * 134* 173* 161*   < > 176*  172*   < > 174*   < > 172*   < > 217*  201*    < > = values in this interval not displayed.     Liver Function Tests  Recent Labs   Lab 10/19/21  0356 10/18/21  0354 10/17/21  0358 10/16/21  0431 10/14/21  0328   AST  --  102* 97*  --  95*   ALT  --  38 43  --  39   ALKPHOS  --   299* 338*  --  325*   BILITOTAL  --  5.9* 6.5*  --  8.4*   ALBUMIN  --  1.4* 1.5*  --  2.0*   INR 1.37*  --   --  1.31*  --      Coagulation Profile  Recent Labs   Lab 10/19/21  0356 10/16/21  0431   INR 1.37* 1.31*       5. RADIOLOGY:   No results found for this or any previous visit (from the past 24 hour(s)).

## 2021-10-20 NOTE — PLAN OF CARE
ICU End of Shift Summary. See flowsheets for vital signs and detailed assessment.     Changes this shift: No major events overnight. Pt on 2L NC. Remains off levo, MAPs greater than 65, sinus tach. On bedpan several times overnight with loose stools, still declining rectal pouch or purewick. PRN oxycodone x2 for pain. PRN melatonin also given. Pt able to sleep a few hours overnight.      Plan: Paracentesis planned for later date.

## 2021-10-20 NOTE — PROGRESS NOTES
Nephrology Progress Note  10/20/2021         Aliyah Angeles is a 46 yof w/hx of ETOH use (Quit 9/12/2021) w/hepatitis, Fitz en Y bypass, Pancreatitis, DM2, Panc cyst who was admitted 9/29 from chem dep with N/V and worsening abdominal pain and edema.  Her course has been resp failure with ARDS and evolving ANA LAURA.  Noted to have received albumin from 10/1-10/3 (150g/day) with no improvement in Cr.  On Vanco/Zosyn to treat sepsis although cultures at time of consult are negative.  Nephrology consulted for evaluation of ANA LAURA and possible RRT, started CRRT on 10/7.       Interval History :   Mrs Angeles had CRRT started 10/7 for management of volume and acidosis, had run on 10/18 but yesterday off and made UOP with lasix.  Had planned on run today but Cr rise was minimal and although difficult to quantify she is making UOP.  Giving lasix again today, will evaluate for run tomorrow am depending on chemistries and volume status.       Assessment & Recommendations:   ANA LAURA-Baseline Cr normal at 0.5 as recently as 9/14/2021, abdominal US pending currently.  Was at inpatient chem dep when she developed abdominal pain, N/V and worsening edema and was brought to [facility] on 9/29.  Was treated for UTI and started on Vanco/Zosyn for leukocytosis but decompensated and was intubated the evening of 10/3.  Noted she did receive albumin x3 days from 10/1-10/3 for ANA LAURA but actually worsened and Cr at time of consult is up to 2.  Lisa <5 on 10/3, ECHO done 10/4 showed hyperdynamic LV and normal IVC.  Etiology of ANA LAURA is likely ATN with underlying HRS physiology which made her more susceptible to hypoperfusion.  Started CRRT on 10/7 for management of volume and acidosis.  Trying to transition to iHD.                  -ANA LAURA, likely hypoperfusion in setting of baseline HRS physiology, possible contribution from vanco/zosyn and did receive contrast on 10/3.                -CRRT started 10/7, stopped 10/13.  Had HD on 10/18, giving lasix again today,  "may need HD again but will depend on how chemistries trend.                  -Dialysis consent signed and scanned in under media.       Volume-Has total body volume overload, I/O's difficult to interpret as we are not capturing much of her UOP but she is having no pulm issues and is responding to lasix.  Ordered 120mg lasix x2 today, will see how chemistries and volume status trend.       Electrolytes/pH-K normal at 4.2 bicarb 23.       Liver-Unclear duration, quit drinking on 9/12/2021 and was at chem dep when acute issues started.  Bili is down to 6 and INR is 1.4 and a bit improved.  GI following, not clear if she would be a transplant candidate eventually.       ID-On Zosyn and Rifaximin.       Nutrition-On Osmolite TF.          Seen and discussed with Dr Russo     Recommendations were communicated to primary team via verbal communication.        MARIA ELENA Wade CNS  Clinical Nurse Specialist  [phone]    Review of Systems:   I reviewed the following systems:  Gen: No fevers or chills  CV: No CP at rest  Resp: No SOB at rest  GI: No N/V    Physical Exam:   I/O last 3 completed shifts:  In: 3304.18 [P.O.:700; I.V.:679.18; NG/GT:845]  Out: 300 [Urine:200; Stool:100]   /62 (BP Location: Left arm)   Pulse 87   Temp 98.9  F (37.2  C) (Axillary)   Resp 30   Ht 1.626 m (5' 4\")   Wt 92.2 kg (203 lb 4.2 oz)   SpO2 94%   BMI 34.89 kg/m       GENERAL APPEARANCE: Extubated, stable from pulm standpoint.  Sitting up in chair, a bit SOB today.   EYES: No scleral icterus  Pulmonary: lungs rhonchi to auscultation with equal breath sounds bilaterally, no clubbing or cyanosis  CV: Regular rhythm, normal rate, no rub   - Edema +2 LE/dependent  GI: soft, nontender, normal bowel sounds  MS: no evidence of inflammation in joints, no muscle tenderness  : No barrett, some intermittent UOP.   SKIN: no rash, warm, dry  NEURO: No focal deficits.      Labs:   All labs reviewed by me  Electrolytes/Renal - Recent Labs "   Lab Test 10/20/21  1152 10/20/21  0825 10/20/21  0436 10/19/21  0401 10/19/21  0356 10/18/21  0722 10/18/21  0354   NA  --   --  134  --  134  --  135   POTASSIUM  --   --  4.2  --  4.0  --  4.4   CHLORIDE  --   --  101  --  102  --  102   CO2  --   --  23  --  23  --  22   BUN  --   --  52*  --  41*  --  71*   CR  --   --  2.36*  --  1.86*  --  2.66*   * 161* 176*  172*   < > 174*   < > 172*   STEFFANY  --   --  8.5  --  8.5  --  8.3*   MAG  --   --  1.8  --  1.8  --  2.0   PHOS  --   --  6.0*  --  4.4  --  6.3*    < > = values in this interval not displayed.       CBC -   Recent Labs   Lab Test 10/20/21  0436 10/19/21  0356 10/18/21  0354   WBC 19.6* 24.0* 17.1*   HGB 7.3* 7.6* 7.4*    244 201       LFTs -   Recent Labs   Lab Test 10/18/21  0354 10/17/21  0358 10/14/21  0328   ALKPHOS 299* 338* 325*   BILITOTAL 5.9* 6.5* 8.4*   ALT 38 43 39   * 97* 95*   PROTTOTAL 4.4* 5.4* 5.5*   ALBUMIN 1.4* 1.5* 2.0*       Iron Panel -   Recent Labs   Lab Test 09/13/21  0642   EYAD 1,839*           Current Medications:    acetaminophen  650 mg Oral or Feeding Tube Q8H     calcium carbonate 600 mg-vitamin D 400 units  1 tablet Oral or Feeding Tube Daily     cyanocobalamin  100 mcg Oral or Feeding Tube Daily     escitalopram  10 mg Oral or Feeding Tube Daily     folic acid  1 mg Oral or Feeding Tube Daily     furosemide  120 mg Intravenous Q6H     furosemide  80 mg Intravenous Once     gabapentin  100 mg Oral or Feeding Tube TID     heparin ANTICOAGULANT  5,000 Units Subcutaneous Q12H     insulin aspart  1-12 Units Subcutaneous Q4H     insulin glargine  35 Units Subcutaneous Daily     midodrine  20 mg Oral or Feeding Tube Q8H     multivitamins w/minerals  15 mL Oral Daily     pantoprazole  40 mg Per Feeding Tube QAM AC     piperacillin-tazobactam  2.25 g Intravenous Q6H     polyethylene glycol  17 g Oral BID     [Held by provider] prazosin  1 mg Oral QPM     protein modular  1 packet Per Feeding Tube BID      QUEtiapine  25 mg Oral At Bedtime     rifaximin  550 mg Oral or Feeding Tube BID     sodium chloride  1 spray Both Nostrils 4x Daily     thiamine  100 mg Oral or Feeding Tube Daily       dextrose       norepinephrine Stopped (10/19/21 1430)     - MEDICATION INSTRUCTIONS -

## 2021-10-20 NOTE — PLAN OF CARE
ICU End of Shift Summary. See flowsheets for vital signs and detailed assessment.    Changes this shift: Patient's rectal pouch and PureWick were removed today. Patient weaned off of Levo. MICU 2 opted to attempt paracentesis today but were unsuccessful in finding appropriate access.     Plan: Paracentesis planned for later date. Monitor skin status.

## 2021-10-20 NOTE — PROGRESS NOTES
Critical Care Services Progress Note:  I personally examined and evaluated the patient today. I formulated today s plan with the house staff team or resident(s) and agree with the findings and plan in the associated note (see separately attested resident note).   I have evaluated all laboratory values and imaging studies from the past 24 hours.  Summary of hospital course:  46 year old female with alcohol hepatitis presents with ANA LAURA, altered mental status.  Was intubated on, then extubated on 10/14. CRRT transitioned to IHD. Remains in the ICU for hypotension    Overnight events/pertinent findings today:    HD again yesterday. Levophed weaned off.     99.1, HR 98, /69, O2 sat 95% on 2 L NC    Levophed off since yesterday    Data  Na 134, K 4.2    WBC 19.6, Hgb 7.3    I/O: +2.9 L    Assessment/plan:    Acute Hypoxemic Respiratory Failure - pulmonary edema and possible pneumonia  - zosyn for tentative 8 day course. Can be extended if needed    Shock: improving. Still requiring midodrine  -midodrine    ANA LAURA - HRS, ATN   - IHD    Cirrhosis with ascites  - no good pocket for para.     Okay to transfer to the floor.     Rest per resident note.      Rik Troy MD

## 2021-10-21 ENCOUNTER — APPOINTMENT (OUTPATIENT)
Dept: PHYSICAL THERAPY | Facility: CLINIC | Age: 46
End: 2021-10-21
Payer: COMMERCIAL

## 2021-10-21 ENCOUNTER — APPOINTMENT (OUTPATIENT)
Dept: OCCUPATIONAL THERAPY | Facility: CLINIC | Age: 46
End: 2021-10-21
Payer: COMMERCIAL

## 2021-10-21 LAB
ABO/RH(D): NORMAL
ALBUMIN SERPL-MCNC: 1.4 G/DL (ref 3.4–5)
ALP SERPL-CCNC: 303 U/L (ref 40–150)
ALT SERPL W P-5'-P-CCNC: 41 U/L (ref 0–50)
ANION GAP SERPL CALCULATED.3IONS-SCNC: 9 MMOL/L (ref 3–14)
ANTIBODY SCREEN: NEGATIVE
AST SERPL W P-5'-P-CCNC: 98 U/L (ref 0–45)
BASOPHILS # BLD MANUAL: 0 10E3/UL (ref 0–0.2)
BASOPHILS NFR BLD MANUAL: 0 %
BILIRUB DIRECT SERPL-MCNC: 3.4 MG/DL (ref 0–0.2)
BILIRUB SERPL-MCNC: 4.1 MG/DL (ref 0.2–1.3)
BLD PROD TYP BPU: NORMAL
BLOOD COMPONENT TYPE: NORMAL
BUN SERPL-MCNC: 61 MG/DL (ref 7–30)
CALCIUM SERPL-MCNC: 8.7 MG/DL (ref 8.5–10.1)
CHLORIDE BLD-SCNC: 100 MMOL/L (ref 94–109)
CO2 SERPL-SCNC: 25 MMOL/L (ref 20–32)
CODING SYSTEM: NORMAL
CREAT SERPL-MCNC: 2.78 MG/DL (ref 0.52–1.04)
CROSSMATCH: NORMAL
CRP SERPL-MCNC: 130 MG/L (ref 0–8)
DACRYOCYTES BLD QL SMEAR: SLIGHT
EOSINOPHIL # BLD MANUAL: 1.4 10E3/UL (ref 0–0.7)
EOSINOPHIL NFR BLD MANUAL: 8 %
ERYTHROCYTE [DISTWIDTH] IN BLOOD BY AUTOMATED COUNT: 20.7 % (ref 10–15)
GFR SERPL CREATININE-BSD FRML MDRD: 20 ML/MIN/1.73M2
GLUCOSE BLD-MCNC: 157 MG/DL (ref 70–99)
GLUCOSE BLDC GLUCOMTR-MCNC: 100 MG/DL (ref 70–99)
GLUCOSE BLDC GLUCOMTR-MCNC: 105 MG/DL (ref 70–99)
GLUCOSE BLDC GLUCOMTR-MCNC: 166 MG/DL (ref 70–99)
GLUCOSE BLDC GLUCOMTR-MCNC: 167 MG/DL (ref 70–99)
GLUCOSE BLDC GLUCOMTR-MCNC: 74 MG/DL (ref 70–99)
HCT VFR BLD AUTO: 21.5 % (ref 35–47)
HGB BLD-MCNC: 6.5 G/DL (ref 11.7–15.7)
HGB BLD-MCNC: 6.7 G/DL (ref 11.7–15.7)
HGB BLD-MCNC: 7.2 G/DL (ref 11.7–15.7)
LYMPHOCYTES # BLD MANUAL: 3.2 10E3/UL (ref 0.8–5.3)
LYMPHOCYTES NFR BLD MANUAL: 18 %
MAGNESIUM SERPL-MCNC: 1.8 MG/DL (ref 1.6–2.3)
MCH RBC QN AUTO: 31.3 PG (ref 26.5–33)
MCHC RBC AUTO-ENTMCNC: 30.2 G/DL (ref 31.5–36.5)
MCV RBC AUTO: 103 FL (ref 78–100)
METAMYELOCYTES # BLD MANUAL: 0.4 10E3/UL
METAMYELOCYTES NFR BLD MANUAL: 2 %
MONOCYTES # BLD MANUAL: 1.3 10E3/UL (ref 0–1.3)
MONOCYTES NFR BLD MANUAL: 7 %
MYELOCYTES # BLD MANUAL: 0.5 10E3/UL
MYELOCYTES NFR BLD MANUAL: 3 %
NEUTROPHILS # BLD MANUAL: 11.2 10E3/UL (ref 1.6–8.3)
NEUTROPHILS NFR BLD MANUAL: 62 %
NRBC # BLD AUTO: 0.2 10E3/UL
NRBC BLD MANUAL-RTO: 1 %
PHOSPHATE SERPL-MCNC: 7 MG/DL (ref 2.5–4.5)
PLAT MORPH BLD: ABNORMAL
PLATELET # BLD AUTO: 201 10E3/UL (ref 150–450)
POLYCHROMASIA BLD QL SMEAR: ABNORMAL
POTASSIUM BLD-SCNC: 4 MMOL/L (ref 3.4–5.3)
PROCALCITONIN SERPL-MCNC: 7.28 NG/ML
PROT SERPL-MCNC: 5.4 G/DL (ref 6.8–8.8)
RBC # BLD AUTO: 2.08 10E6/UL (ref 3.8–5.2)
RBC MORPH BLD: ABNORMAL
SODIUM SERPL-SCNC: 134 MMOL/L (ref 133–144)
TARGETS BLD QL SMEAR: SLIGHT
UNIT ABO/RH: NORMAL
UNIT NUMBER: NORMAL
UNIT STATUS: NORMAL
UNIT TYPE ISBT: 7300
WBC # BLD AUTO: 18 10E3/UL (ref 4–11)

## 2021-10-21 PROCEDURE — 97535 SELF CARE MNGMENT TRAINING: CPT | Mod: GO | Performed by: OCCUPATIONAL THERAPIST

## 2021-10-21 PROCEDURE — 86923 COMPATIBILITY TEST ELECTRIC: CPT | Performed by: PHYSICIAN ASSISTANT

## 2021-10-21 PROCEDURE — 99233 SBSQ HOSP IP/OBS HIGH 50: CPT | Performed by: PEDIATRICS

## 2021-10-21 PROCEDURE — 99207 PR APP CREDIT; MD BILLING SHARED VISIT: CPT | Performed by: PHYSICIAN ASSISTANT

## 2021-10-21 PROCEDURE — 258N000001 HC RX 258: Performed by: PHYSICIAN ASSISTANT

## 2021-10-21 PROCEDURE — 36592 COLLECT BLOOD FROM PICC: CPT | Performed by: INTERNAL MEDICINE

## 2021-10-21 PROCEDURE — 97116 GAIT TRAINING THERAPY: CPT | Mod: GP

## 2021-10-21 PROCEDURE — 80048 BASIC METABOLIC PNL TOTAL CA: CPT | Performed by: STUDENT IN AN ORGANIZED HEALTH CARE EDUCATION/TRAINING PROGRAM

## 2021-10-21 PROCEDURE — 84100 ASSAY OF PHOSPHORUS: CPT | Performed by: STUDENT IN AN ORGANIZED HEALTH CARE EDUCATION/TRAINING PROGRAM

## 2021-10-21 PROCEDURE — 86900 BLOOD TYPING SEROLOGIC ABO: CPT | Performed by: PHYSICIAN ASSISTANT

## 2021-10-21 PROCEDURE — 85018 HEMOGLOBIN: CPT | Performed by: INTERNAL MEDICINE

## 2021-10-21 PROCEDURE — 85018 HEMOGLOBIN: CPT | Performed by: PHYSICIAN ASSISTANT

## 2021-10-21 PROCEDURE — 250N000013 HC RX MED GY IP 250 OP 250 PS 637: Performed by: SURGERY

## 2021-10-21 PROCEDURE — 82248 BILIRUBIN DIRECT: CPT | Performed by: STUDENT IN AN ORGANIZED HEALTH CARE EDUCATION/TRAINING PROGRAM

## 2021-10-21 PROCEDURE — 84145 PROCALCITONIN (PCT): CPT | Performed by: PHYSICIAN ASSISTANT

## 2021-10-21 PROCEDURE — 250N000013 HC RX MED GY IP 250 OP 250 PS 637: Performed by: STUDENT IN AN ORGANIZED HEALTH CARE EDUCATION/TRAINING PROGRAM

## 2021-10-21 PROCEDURE — 250N000011 HC RX IP 250 OP 636: Performed by: STUDENT IN AN ORGANIZED HEALTH CARE EDUCATION/TRAINING PROGRAM

## 2021-10-21 PROCEDURE — 83735 ASSAY OF MAGNESIUM: CPT | Performed by: STUDENT IN AN ORGANIZED HEALTH CARE EDUCATION/TRAINING PROGRAM

## 2021-10-21 PROCEDURE — 250N000013 HC RX MED GY IP 250 OP 250 PS 637: Performed by: PHYSICIAN ASSISTANT

## 2021-10-21 PROCEDURE — 250N000013 HC RX MED GY IP 250 OP 250 PS 637: Performed by: NURSE PRACTITIONER

## 2021-10-21 PROCEDURE — 120N000002 HC R&B MED SURG/OB UMMC

## 2021-10-21 PROCEDURE — 250N000011 HC RX IP 250 OP 636: Performed by: SURGERY

## 2021-10-21 PROCEDURE — 99232 SBSQ HOSP IP/OBS MODERATE 35: CPT | Performed by: INTERNAL MEDICINE

## 2021-10-21 PROCEDURE — 86140 C-REACTIVE PROTEIN: CPT | Performed by: PHYSICIAN ASSISTANT

## 2021-10-21 PROCEDURE — 85027 COMPLETE CBC AUTOMATED: CPT | Performed by: STUDENT IN AN ORGANIZED HEALTH CARE EDUCATION/TRAINING PROGRAM

## 2021-10-21 PROCEDURE — 250N000011 HC RX IP 250 OP 636: Performed by: PHYSICIAN ASSISTANT

## 2021-10-21 PROCEDURE — 250N000013 HC RX MED GY IP 250 OP 250 PS 637

## 2021-10-21 PROCEDURE — 36592 COLLECT BLOOD FROM PICC: CPT | Performed by: STUDENT IN AN ORGANIZED HEALTH CARE EDUCATION/TRAINING PROGRAM

## 2021-10-21 RX ORDER — MIDODRINE HYDROCHLORIDE 5 MG/1
15 TABLET ORAL EVERY 8 HOURS
Status: DISCONTINUED | OUTPATIENT
Start: 2021-10-21 | End: 2021-10-21

## 2021-10-21 RX ORDER — MIDODRINE HYDROCHLORIDE 5 MG/1
20 TABLET ORAL EVERY 8 HOURS
Status: DISCONTINUED | OUTPATIENT
Start: 2021-10-21 | End: 2021-11-05 | Stop reason: HOSPADM

## 2021-10-21 RX ADMIN — PIPERACILLIN SODIUM AND TAZOBACTAM SODIUM 2.25 G: 2; .25 INJECTION, POWDER, LYOPHILIZED, FOR SOLUTION INTRAVENOUS at 12:54

## 2021-10-21 RX ADMIN — PSYLLIUM HUSK 1 PACKET: 3.4 POWDER ORAL at 08:25

## 2021-10-21 RX ADMIN — ACETAMINOPHEN 650 MG: 325 TABLET, FILM COATED ORAL at 09:52

## 2021-10-21 RX ADMIN — Medication 1 PACKET: at 20:41

## 2021-10-21 RX ADMIN — RIFAXIMIN 550 MG: 550 TABLET ORAL at 08:07

## 2021-10-21 RX ADMIN — CALCIUM CARBONATE 600 MG (1,500 MG)-VITAMIN D3 400 UNIT TABLET 1 TABLET: at 08:07

## 2021-10-21 RX ADMIN — Medication 50 MG: at 01:54

## 2021-10-21 RX ADMIN — MIDODRINE HYDROCHLORIDE 15 MG: 5 TABLET ORAL at 13:23

## 2021-10-21 RX ADMIN — Medication 15 ML: at 12:54

## 2021-10-21 RX ADMIN — ACETAMINOPHEN 650 MG: 325 TABLET, FILM COATED ORAL at 17:03

## 2021-10-21 RX ADMIN — QUETIAPINE FUMARATE 25 MG: 25 TABLET ORAL at 22:17

## 2021-10-21 RX ADMIN — FOLIC ACID 1 MG: 1 TABLET ORAL at 08:07

## 2021-10-21 RX ADMIN — ESCITALOPRAM OXALATE 10 MG: 5 SOLUTION ORAL at 08:08

## 2021-10-21 RX ADMIN — DEXTROSE MONOHYDRATE: 100 INJECTION, SOLUTION INTRAVENOUS at 10:46

## 2021-10-21 RX ADMIN — SALINE NASAL SPRAY 1 SPRAY: 1.5 SOLUTION NASAL at 08:09

## 2021-10-21 RX ADMIN — INSULIN ASPART 2 UNITS: 100 INJECTION, SOLUTION INTRAVENOUS; SUBCUTANEOUS at 08:14

## 2021-10-21 RX ADMIN — Medication 2.5 MG: at 22:14

## 2021-10-21 RX ADMIN — MIDODRINE HYDROCHLORIDE 20 MG: 5 TABLET ORAL at 06:33

## 2021-10-21 RX ADMIN — PIPERACILLIN SODIUM AND TAZOBACTAM SODIUM 2.25 G: 2; .25 INJECTION, POWDER, LYOPHILIZED, FOR SOLUTION INTRAVENOUS at 06:33

## 2021-10-21 RX ADMIN — Medication 3 MG: at 04:11

## 2021-10-21 RX ADMIN — GABAPENTIN 100 MG: 250 SOLUTION ORAL at 12:55

## 2021-10-21 RX ADMIN — Medication 40 MG: at 08:25

## 2021-10-21 RX ADMIN — GABAPENTIN 100 MG: 250 SOLUTION ORAL at 17:04

## 2021-10-21 RX ADMIN — Medication 2.5 MG: at 04:58

## 2021-10-21 RX ADMIN — FUROSEMIDE 200 MG: 10 INJECTION, SOLUTION INTRAVENOUS at 16:45

## 2021-10-21 RX ADMIN — RIFAXIMIN 550 MG: 550 TABLET ORAL at 20:41

## 2021-10-21 RX ADMIN — MIDODRINE HYDROCHLORIDE 20 MG: 5 TABLET ORAL at 22:13

## 2021-10-21 RX ADMIN — Medication 2.5 MG: at 09:52

## 2021-10-21 RX ADMIN — PIPERACILLIN SODIUM AND TAZOBACTAM SODIUM 2.25 G: 2; .25 INJECTION, POWDER, LYOPHILIZED, FOR SOLUTION INTRAVENOUS at 17:38

## 2021-10-21 RX ADMIN — INSULIN ASPART 2 UNITS: 100 INJECTION, SOLUTION INTRAVENOUS; SUBCUTANEOUS at 03:34

## 2021-10-21 RX ADMIN — Medication 100 MCG: at 08:07

## 2021-10-21 RX ADMIN — Medication 1 PACKET: at 08:10

## 2021-10-21 RX ADMIN — GABAPENTIN 100 MG: 250 SOLUTION ORAL at 08:09

## 2021-10-21 RX ADMIN — SALINE NASAL SPRAY 1 SPRAY: 1.5 SOLUTION NASAL at 20:41

## 2021-10-21 RX ADMIN — Medication 2.5 MG: at 17:50

## 2021-10-21 RX ADMIN — HEPARIN SODIUM 5000 UNITS: 5000 INJECTION, SOLUTION INTRAVENOUS; SUBCUTANEOUS at 08:09

## 2021-10-21 RX ADMIN — THIAMINE HCL TAB 100 MG 100 MG: 100 TAB at 08:07

## 2021-10-21 ASSESSMENT — ACTIVITIES OF DAILY LIVING (ADL)
ADLS_ACUITY_SCORE: 13

## 2021-10-21 ASSESSMENT — MIFFLIN-ST. JEOR
SCORE: 1563
SCORE: 1597

## 2021-10-21 NOTE — PROGRESS NOTES
"  Nephrology Progress Note  10/21/2021         Assessment & Recommendations:   Aliyah Angeles is a 46 year old year old female      Problem list  # ANA LAURA secondary to HRS; hypotension with component of sepsis; baseline creatinine 0.5; currently on temporarily dialysis catheter  #Alcoholic cirrhosis  # Hx of Fitz-En-Y surgery  # Hypotension; stable  # Sepsis    Although the patient has shown signs of renal recovery, I do not think this is enough to keep her off of dialysis.  We are trying to dialyze her today however, her nontunneled catheter is not working.  Dialysis nurse could not even push the fluid into the lines.  Hence I would recommend to have IR put tunneled dialysis catheter tomorrow and run her on dialysis tomorrow to remove fluid and for clearance.  In the meantime, please try giving Lasix 200 mg to promote urine output.    Recommendations were communicated to primary team via note and verbal.    Jacqueline Russo MD     Interval History :   Nursing and provider notes from last 24 hours reviewed.  In the last 24 hours Aliyah Angeles: Yesterday we gave Lasix 120 mg x 2 and the patient had 300 cc of urine output in 5 occurrences of urine.  However, the patient still shows signs of volume overloaded on exam with 2+ pitting edema and crackles on exam.  Her weight today is also increased to 94 kg.  She is, however, off pressors since yesterday.    Review of Systems:   10 systems were reviewed and all negative except as mentioned above.     Physical Exam:   I/O last 3 completed shifts:  In: 1888.5 [I.V.:528.5; NG/GT:460]  Out: 550 [Urine:550]   BP 93/47   Pulse 99   Temp 98.2  F (36.8  C) (Oral)   Resp 21   Ht 1.626 m (5' 4\")   Wt 94.3 kg (207 lb 14.3 oz)   SpO2 100%   BMI 35.68 kg/m       GENERAL APPEARANCE: Alert, mild acute distress  EYES:  Not pale conjunctiva, pupils equal  HENT: Mouth without ulcers or lesions  PULM: Tachypnea, mild increase in work of breathing, crackles on both lungs.  CV: " regular rhythm, normal rate, no rub     -JVD no distended.      -edema: 1+ to 2+  GI: soft,  - tender, no distended, bowel sounds are present  INTEGUMENT: No rash  NEURO:  Non focal. No asterixis.   Access: Right IJ nontunneled dialysis catheter: CDI    Labs:   All labs reviewed by me  Electrolytes/Renal - Recent Labs   Lab Test 10/21/21  1251 10/21/21  0813 10/21/21  0337 10/20/21  0825 10/20/21  0436 10/19/21  0401 10/19/21  0356   NA  --   --  134  --  134  --  134   POTASSIUM  --   --  4.0  --  4.2  --  4.0   CHLORIDE  --   --  100  --  101  --  102   CO2  --   --  25  --  23  --  23   BUN  --   --  61*  --  52*  --  41*   CR  --   --  2.78*  --  2.36*  --  1.86*   * 166* 157*   < > 176*  172*   < > 174*   STEFFANY  --   --  8.7  --  8.5  --  8.5   MAG  --   --  1.8  --  1.8  --  1.8   PHOS  --   --  7.0*  --  6.0*  --  4.4    < > = values in this interval not displayed.       CBC -   Recent Labs   Lab Test 10/21/21  1057 10/21/21  0638 10/21/21  0337 10/20/21  0436 10/20/21  0436 10/19/21  0356 10/19/21  0356   WBC  --   --  18.0*  --  19.6*  --  24.0*   HGB 7.2* 6.7* 6.5*   < > 7.3*   < > 7.6*   PLT  --   --  201  --  222  --  244    < > = values in this interval not displayed.       LFTs -   Recent Labs   Lab Test 10/21/21  0337 10/18/21  0354 10/17/21  0358   ALKPHOS 303* 299* 338*   BILITOTAL 4.1* 5.9* 6.5*   ALT 41 38 43   AST 98* 102* 97*   PROTTOTAL 5.4* 4.4* 5.4*   ALBUMIN 1.4* 1.4* 1.5*       Iron Panel -   Recent Labs   Lab Test 09/13/21  0642   EYAD 1,839*         Imaging:  I reviewed imaging studies.     Current Medications:    sodium chloride 0.9%  250 mL Intravenous Once in dialysis/CRRT     sodium chloride 0.9%  300 mL Hemodialysis Machine Once     acetaminophen  650 mg Oral or Feeding Tube Q8H     calcium carbonate 600 mg-vitamin D 400 units  1 tablet Oral or Feeding Tube Daily     cyanocobalamin  100 mcg Oral or Feeding Tube Daily     escitalopram  10 mg Oral or Feeding Tube Daily     folic  acid  1 mg Oral or Feeding Tube Daily     furosemide  200 mg Intravenous Once     gabapentin  100 mg Oral or Feeding Tube TID     [Held by provider] heparin ANTICOAGULANT  5,000 Units Subcutaneous Q12H     insulin aspart  1-12 Units Subcutaneous Q4H     [Held by provider] insulin glargine  35 Units Subcutaneous Daily     midodrine  20 mg Oral or Feeding Tube Q8H     multivitamins w/minerals  15 mL Oral Daily     - MEDICATION INSTRUCTIONS -   Does not apply Once     pantoprazole  40 mg Per Feeding Tube QAM AC     piperacillin-tazobactam  2.25 g Intravenous Q6H     polyethylene glycol  17 g Oral BID     [Held by provider] prazosin  1 mg Oral QPM     protein modular  1 packet Per Feeding Tube BID     psyllium  1 packet Oral Daily     QUEtiapine  25 mg Oral At Bedtime     rifaximin  550 mg Oral or Feeding Tube BID     sodium chloride  1 spray Both Nostrils 4x Daily     thiamine  100 mg Oral or Feeding Tube Daily       dextrose 45 mL/hr at 10/21/21 1046     - MEDICATION INSTRUCTIONS -       Jacqueline Russo MD

## 2021-10-21 NOTE — PLAN OF CARE
ICU End of Shift Summary. See flowsheets for vital signs and detailed assessment.     Changes this shift:   Patient remains alert and oriented through the shift. Agreeable to purewick, however, when using bedpan for stool, there is some unaccounted urine mixed with stool. One dose PRN pain meds required overnight. Pt able to sleep soundly majority of night. VSS, BP's labile, MAP's consistently above 60. HGB 6.5 this AM, ashley team requesting recheck. Recheck drawn and sent to lab.      Plan:   Continue to monitor and assess. Administer blood products if needed. Follow treatment plan.

## 2021-10-21 NOTE — PROGRESS NOTES
CLINICAL NUTRITION SERVICES - BRIEF NOTE      Nutrition Prescription     RECOMMENDATIONS FOR MDs/PROVIDERS TO ORDER:  --Paged Gold 6 JEMMA and discussed with PharmD, pt will receive significantly less g CHO/day from new cycled TF regimen and pt received lantus this morning. TF will be turned off until 8pm tonight. Monitor BG levels closely and adjust insulin regimen PRN.      Recommendations already ordered by Registered Dietitian (RD):  --New cycled TF regimen: Novasource Renal @ 45 ml/hr x 12 hrs (8pm-8am) + 2 Prosource provides 540 ml volume, 1160 kcal (19 kcal/kg or 77% low-end needs), 71 g pro (1.2 g/kg or 100% low-end needs), 99 g CHO, 387 ml free water.     --Calorie counts.     --Trial Ensure Clear BID between meals.      Future/Additional Recommendations:  --PO intake, supplement tolerance/acceptance.     *Please see full assessment note from 10/18/2021    New Findings:  Received call from Gold team provider. Pt more alert/awake and wanting to try eating more but not feeling hungry. Discussed cycling TF and meeting 75% of needs instead of 100%. Will start kcal counts, as well. Diet advanced on 10/19 to soft and bite sized (level 6) and thin liquids per SLP.     Labs: Phos 7.0 (H), Cr 2.78 (H) --> on iHD;   Meds: lantus 35 units    Interventions  Calorie counts  Collaboration with other providers - Gold 6, PharmD  Enteral Nutrition - adjusted as above  Medical food supplement therapy    RD to follow per protocol.    Iris Vogel, MS, RD, LD, [facility] pager: [phone]  ASCOM: [id number]

## 2021-10-21 NOTE — CONSULTS
"    Interventional Radiology Consult Service Note    Patient is on IR schedule 10/22 for a TCVC placement for HD, RIJ NTCVC removal.   Labs WNL for procedure. COVID neg.    Orders for NPO have been entered. Pt on IV antibiotics.  Consent will be done prior to procedure.     Please contact the IR [place] RN [place] [phone] for estimated time of procedure.     Case discussed with Dr. Baer from IR and Gloria Colon PA-C. This is a 46 year old female with a history of alcohol use disorder, Fitz-en-Y gastric bypass (2010), DM2, anxiety, depression, who was initially admitted to Internal Medicine service with alcohol hepatitis. She transferred to MICU on 10/4/21 with acute hypoxic respiratory failure 2/2 ARDS requiring intubation. Hospitalization complicated by septic shock, pneumonia, ANA LAURA requiring CRRT and now iHD via RIJ NTCVC placed 10/7, and encephalopathy. Self-extubated on 10/14, weaned off pressors on 10/19, and transferred to floor status on 10/20. Reportedly RIJ NTCVC is no longer functional and IR is consulted for TCVC placement for HD. Nephrology feels long-term access is appropriate.    Expected date of discharge: TBD    Vitals:   BP 93/47   Pulse 99   Temp 98.2  F (36.8  C) (Oral)   Resp 21   Ht 1.626 m (5' 4\")   Wt 94.3 kg (207 lb 14.3 oz)   SpO2 100%   BMI 35.68 kg/m      Pertinent Labs:     Lab Results   Component Value Date    WBC 18.0 (H) 10/21/2021    WBC 19.6 (H) 10/20/2021    WBC 24.0 (H) 10/19/2021    WBC 8.3 04/08/2020    WBC 5.8 02/08/2020    WBC 15.2 (H) 02/06/2020       Lab Results   Component Value Date    HGB 7.2 10/21/2021    HGB 6.7 10/21/2021    HGB 6.5 10/21/2021    HGB 12.2 04/08/2020    HGB 10.7 02/08/2020    HGB 11.9 02/06/2020       Lab Results   Component Value Date     10/21/2021     10/20/2021     10/19/2021     04/08/2020     02/08/2020     02/06/2020       Lab Results   Component Value Date    INR 1.37 (H) 10/19/2021    INR 1.02 " 04/08/2020    PTT 44 (H) 10/07/2021    PTT 26 04/08/2020       Lab Results   Component Value Date    POTASSIUM 4.0 10/21/2021    POTASSIUM 4.6 04/10/2020        MARIA ELENA Begum CNP  Interventional Radiology  Pager: [phone]

## 2021-10-21 NOTE — PROGRESS NOTES
[facility]    Medicine Progress Note - Hospitalist Service, Gold 6       Date of Admission:  9/29/2021    Assessment & Plan         Aliyah Angeles is a 46 year old female with a history of alcohol use disorder, Fitz-en-Y gastric bypass (2010), DM2, anxiety, depression, who was initially admitted to Internal Medicine service with alcohol hepatitis. She transferred to MICU on 10/4/21 with acute hypoxic respiratory failure 2/2 ARDS requiring intubation. Hospitalization complicated by septic shock, pneumonia, ANA LAURA requiring CRRT and now iHD, encephalopathy. Self-extubated on 10/14, weaned off pressors on 10/19, and transferred to floor status on 10/20.     Changes today:   - IV Lasix 200 mg per Nephrology   - Attempted iHD - RIJ not working, IR consult for new line tomorrow   - NPO at midnight, hold heparin in anticipation of line placement   - Cycle TF to run overnight      Acute hypoxic respiratory failure, improving  ARDS, resolved  Pneumonia  Pulmonary edema   Acute decline in respiratory status requiring intubation on 10/4, CXR c/w ARDS, likely secondary to HAP vs aspiration. BAL cultures negative. Treated with Zosyn 10/4 - 10/13. Self-extubated on 10/14 and O2 needs stable on NC. Developed increased work of breathing on 10/17, CXR demonstrated increased interstitial opacities. Currently managing pulmonary edema with Lasix + iHD and treating for VAP. CRP. WBC improving. Afebrile.    - Continue IV Zosyn to complete a 7-day course, on day #5   - Lasix per Nephrology   - Continuous pulse oximetry. Wean O2 as able.    - Trend CBC daily, CRP q48h    Septic shock, resolved: Shock most likely secondary to sepsis with hepatic dysfunction contributing as well. TTE with hyperdynamic LVEF, no RV dysfunction. Weaned off pressors on 10/19. Continues to require midodrine for soft blood pressures.   - Midodrine 20 mg TID, wean as able   - Vitals q4h     Alcohol hepatitis: Recently  admitted in September with ETOH hepatitis, started on rifaximin and lactulose per Hepatology recs. Now re-presented with ETOH hepatitis, Maddrey score 13 on admission. Acute hepatitis c/b portal hypertension with ascites. Diagnostic mane negative for SBP (most recently 10/11). Tbili, INR, AST/ALT all improving.    - Lactulose titrated to 3-4 loose BMs daily   - Rifaximin BID   - Trend CMP, INR   - Scheduled to follow-up with Hepatology on 10/26    ANA LAURA  Hypervolemia  Baseline Cr ~0.5. Cr to peak of 2.98. Decline in renal function most likely secondary to ATN (hypotension, medications, contrast), possible component of HRS but overall seems unlikely given the degree of her hepatitis. On CRRT 10.7 - 10/13, now transitioned to iHD.    - Nephrology following   - Lymphedema team consulted   - HD run today   - IV Lasix 200 mg per Nephrology   - Strict I/Os   - Daily weights    Toxic metabolic encephalopathy, resolved: Multifactorial secondary to acute illness, sedating medications, HE. Psychiatry consulted 10/15, started Seroquel.  Fully A&O today.    - Lactulose, rifaximin   - Continue Seroquel 25 mg at bedtime, will start weaning tomorrow if mentation stable   - Delirium precautions   - Minimize sedating medications    Abdominal pain: Secondary to alcohol hepatitis, gas pains.    - Tylenol q8h   - Oxycodone 2.5 mg q4h PRN - wean as able   - Simethicone PRN    Severe malnutrition  Dysphagia   In the context of acute on chronic illness. Started on TF 10/5. Now more awake and interested in eating but not feeling hungry.    - Nutrition, SLP following   - Discussed with RD, will cycle ND TF to run from 2000 - 0800 to promote daytime hunger   - Thin liquid + soft food diet per SLP   - Calorie counts    DM2: A1C 9.1. PTA on metformin. Transitioned to long-acting insulin due to hyperglycemia while on tube feeds.     - Titrate D10 to maintain BG >80 while off tube feeds today    - Already received Lantus 35 units this AM, will  reduce AM dose pending oral intake and BG trend today    - Holding metformin while inpatient   - HSSI    - BG checks q4h for now   - Hypoglycemia protocol    Acute on chronic macrocytic anemia:  Likely secondary to alcohol use, alcohol hepatitis, nutritional deficiencies. Intermittently requiring PRBC transfusions since admission (10/7, 10/12, 10/16). No e/o active bleeding. Hgb 7.2 today.   - CBC in AM    Alcohol use disorder: Last drink on 9/12/21. PTA was at CD treatment facility.    - Patient interested in resuming CD treatment, will need to re-evaluate this closer to discharge date, at this time PT/OT recommending TCU   - Continue folic acid, thiamine, MVI supplements      Other Stable Medical Issues:   Coagulopathy: INR to peak of 2.5, now much improved. Likely secondary to hepatic dysfunction.   MDD, RUBÉN: Continue PTA Lexapro. Prazosin on hold d/t hypotension, resume as able. Health Psychology consulted 10/1, re-involve PRN.   GERD: Continue PPI.   Neuropathy: Continue PTA gabapentin.          Diet: Adult Formula Drip Feeding: Continuous Osmolite 1.5; Nasoduodenal tube; Goal Rate: 45; mL/hr; Medication - Feeding Tube Flush Frequency: At least 15-30 mL water before and after medication administration and with tube clogging; Amount to Send (Nut...  Soft & Bite Sized Diet (level 6) Thin Liquids (level 0)    DVT Prophylaxis: Heparin SQ  Rice Catheter: Not present  Central Lines: PRESENT  CVC TRIPLE LUMEN Left Internal jugular-Site Assessment: WDL  CVC Double Lumen Right Internal jugular Non - tunneled-Site Assessment: WDL  Code Status: Full Code      Disposition Plan   Expected discharge: 10/29/2021   recommended to transitional care unit once antibiotics complete, dialysis plan in place, clinically improved.     The patient's care was discussed with the Attending Physician, Dr. Cruz, Bedside Nurse and Patient. Attempted to call jordan Kelley per patient request, left VM to call back.     Gloria Colon,  UMESH  Hospitalist Service, [address]  Securely message with the [email or web address] Web Console (learn more here)  Text page via [facility] Paging/Directory  Please see sign in/sign out for up to date coverage information    Clinically Significant Risk Factors Present on Admission                ______________________________________________________________________    Interval History   Would like to decrease tube feeds today, is feeling full all the time. Drinking good amount of oral fluids. Has dyspnea when laying flat, otherwise is breathing comfortably. Oxygen needs stable. No fevers, chest pain. RUQ abdominal pain is improving.      Data reviewed today: I reviewed all medications, new labs and imaging results over the last 24 hours.    Physical Exam   Vital Signs: Temp: 98.3  F (36.8  C) Temp src: Oral BP: 112/82 Pulse: 97   Resp: 20 SpO2: 96 % O2 Device: Nasal cannula Oxygen Delivery: 2 LPM  Weight: 207 lbs 14.3 oz  Constitutional: Awake and alert, in no apparent distress. Lying comfortably in bed.   Eyes: + scleral icterus  Respiratory: Breathing non-labored. Bibasilar crackles. Good air entry.   Cardiovascular:  RRR, normal S1/S2. No rubs or murmurs. 3+ pitting edema extending up to bilateral hips. Lymphedema wraps in place.   GI: + RUQ tenderness. Distended but soft. Hyperactive bowel sounds.   Skin: Jaundiced.  No visible rashes, lesions, or bruising of concern.   Neurologic: Alert and oriented to person, place, and time. No focal deficits. Moves all extremities. No tremor. No asterixis. Facies symmetric.       Data   ROUTINE IP LABS (Last four results)  Recent Labs   Lab 10/21/21  0813 10/21/21  0337 10/21/21  0334 10/20/21  2335 10/20/21  0825 10/20/21  0436 10/19/21  0401 10/19/21  0356 10/18/21  0722 10/18/21  0354 10/17/21  0854 10/17/21  0358   NA  --  134  --   --   --  134  --  134  --  135   < > 133   POTASSIUM  --  4.0  --   --   --  4.2  --  4.0  --  4.4   <  > 4.3   CHLORIDE  --  100  --   --   --  101  --  102  --  102   < > 101   CO2  --  25  --   --   --  23  --  23  --  22   < > 24   ANIONGAP  --  9  --   --   --  10  --  9  --  11   < > 8   * 157* 167* 143*   < > 176*  172*   < > 174*   < > 172*   < > 217*  201*   BUN  --  61*  --   --   --  52*  --  41*  --  71*   < > 55*   CR  --  2.78*  --   --   --  2.36*  --  1.86*  --  2.66*   < > 2.17*   STEFFANY  --  8.7  --   --   --  8.5  --  8.5  --  8.3*   < > 8.6   MAG  --  1.8  --   --   --  1.8  --  1.8  --  2.0   < > 2.1   PHOS  --  7.0*  --   --   --  6.0*  --  4.4  --  6.3*   < > 5.2*   PROTTOTAL  --  5.4*  --   --   --   --   --   --   --  4.4*  --  5.4*   ALBUMIN  --  1.4*  --   --   --   --   --   --   --  1.4*  --  1.5*   BILITOTAL  --  4.1*  --   --   --   --   --   --   --  5.9*  --  6.5*   ALKPHOS  --  303*  --   --   --   --   --   --   --  299*  --  338*   AST  --  98*  --   --   --   --   --   --   --  102*  --  97*   ALT  --  41  --   --   --   --   --   --   --  38  --  43    < > = values in this interval not displayed.     Recent Labs   Lab 10/21/21  0638 10/21/21  0337 10/20/21  0436 10/19/21  0356 10/18/21  0354 10/18/21  0354   WBC  --  18.0* 19.6* 24.0*  --  17.1*   RBC  --  2.08* 2.25* 2.30*  --  2.30*   HGB 6.7* 6.5* 7.3* 7.6*   < > 7.4*   HCT  --  21.5* 23.1* 23.5*  --  23.7*   MCV  --  103* 103* 102*  --  103*   MCH  --  31.3 32.4 33.0  --  32.2   MCHC  --  30.2* 31.6 32.3  --  31.2*   RDW  --  20.7* 21.2* 21.3*  --  21.2*   PLT  --  201 222 244  --  201    < > = values in this interval not displayed.     Recent Labs   Lab 10/19/21  0356 10/16/21  0431   INR 1.37* 1.31*

## 2021-10-21 NOTE — PROGRESS NOTES
Care Management Follow Up    Length of Stay (days): 22    Expected Discharge Date: 10/29/2021     Concerns to be Addressed: substance/tobacco abuse/use, discharge planning     Patient plan of care discussed at interdisciplinary rounds: Yes    Anticipated Discharge Disposition: Inpatient Chemical Dependency, Transitional Care     Anticipated Discharge Services: None  Anticipated Discharge DME: None    Patient/family educated on Medicare website which has current facility and service quality ratings: yes  Education Provided on the Discharge Plan:  yes  Patient/Family in Agreement with the Plan: yes    Referrals Placed by CM/SW:    Private pay costs discussed: Not applicable    Additional Information:  SW met with pt at bedside to discuss discharge planning. Pt is hopeful to return to inpatient chemical dependency treatment. SW explained current recommendations for TCU and likely need to be independent for chem dep. Pt agreeable to TCU. SW will provide pt with Medicare list and encouraged consideration of broad referrals.     Pt has a completed health care directive, but has not been notarized yet. SW offered to schedule notary and pt is agreeable. SW will coordinate time with pt.     JOY Vila, LGSW  ICU   [facility]  Ph: [phone]  [phone]

## 2021-10-22 ENCOUNTER — APPOINTMENT (OUTPATIENT)
Dept: INTERVENTIONAL RADIOLOGY/VASCULAR | Facility: CLINIC | Age: 46
End: 2021-10-22
Attending: NURSE PRACTITIONER
Payer: COMMERCIAL

## 2021-10-22 LAB
ALBUMIN SERPL-MCNC: 1.5 G/DL (ref 3.4–5)
ALP SERPL-CCNC: 285 U/L (ref 40–150)
ALT SERPL W P-5'-P-CCNC: 42 U/L (ref 0–50)
ANION GAP SERPL CALCULATED.3IONS-SCNC: 11 MMOL/L (ref 3–14)
AST SERPL W P-5'-P-CCNC: 85 U/L (ref 0–45)
BACTERIA BLD CULT: NO GROWTH
BASOPHILS # BLD MANUAL: 0.4 10E3/UL (ref 0–0.2)
BASOPHILS NFR BLD MANUAL: 2 %
BILIRUB DIRECT SERPL-MCNC: 3.5 MG/DL (ref 0–0.2)
BILIRUB SERPL-MCNC: 3.9 MG/DL (ref 0.2–1.3)
BUN SERPL-MCNC: 68 MG/DL (ref 7–30)
CALCIUM SERPL-MCNC: 8.6 MG/DL (ref 8.5–10.1)
CHLORIDE BLD-SCNC: 97 MMOL/L (ref 94–109)
CO2 SERPL-SCNC: 25 MMOL/L (ref 20–32)
CREAT SERPL-MCNC: 3.14 MG/DL (ref 0.52–1.04)
EOSINOPHIL # BLD MANUAL: 1 10E3/UL (ref 0–0.7)
EOSINOPHIL NFR BLD MANUAL: 5 %
ERYTHROCYTE [DISTWIDTH] IN BLOOD BY AUTOMATED COUNT: 20.5 % (ref 10–15)
GFR SERPL CREATININE-BSD FRML MDRD: 17 ML/MIN/1.73M2
GLUCOSE BLD-MCNC: 106 MG/DL (ref 70–99)
GLUCOSE BLDC GLUCOMTR-MCNC: 100 MG/DL (ref 70–99)
GLUCOSE BLDC GLUCOMTR-MCNC: 107 MG/DL (ref 70–99)
GLUCOSE BLDC GLUCOMTR-MCNC: 122 MG/DL (ref 70–99)
GLUCOSE BLDC GLUCOMTR-MCNC: 125 MG/DL (ref 70–99)
GLUCOSE BLDC GLUCOMTR-MCNC: 125 MG/DL (ref 70–99)
GLUCOSE BLDC GLUCOMTR-MCNC: 131 MG/DL (ref 70–99)
GLUCOSE BLDC GLUCOMTR-MCNC: 136 MG/DL (ref 70–99)
GLUCOSE BLDC GLUCOMTR-MCNC: 152 MG/DL (ref 70–99)
HCT VFR BLD AUTO: 22.5 % (ref 35–47)
HGB BLD-MCNC: 7 G/DL (ref 11.7–15.7)
HOLD SPECIMEN: NORMAL
INR PPP: 1.32 (ref 0.85–1.15)
LACTATE SERPL-SCNC: 0.5 MMOL/L (ref 0.7–2)
LYMPHOCYTES # BLD MANUAL: 2.3 10E3/UL (ref 0.8–5.3)
LYMPHOCYTES NFR BLD MANUAL: 12 %
MAGNESIUM SERPL-MCNC: 1.9 MG/DL (ref 1.6–2.3)
MCH RBC QN AUTO: 32.7 PG (ref 26.5–33)
MCHC RBC AUTO-ENTMCNC: 31.1 G/DL (ref 31.5–36.5)
MCV RBC AUTO: 105 FL (ref 78–100)
MONOCYTES # BLD MANUAL: 1.7 10E3/UL (ref 0–1.3)
MONOCYTES NFR BLD MANUAL: 9 %
NEUTROPHILS # BLD MANUAL: 13.8 10E3/UL (ref 1.6–8.3)
NEUTROPHILS NFR BLD MANUAL: 72 %
PHOSPHATE SERPL-MCNC: 7.9 MG/DL (ref 2.5–4.5)
PLAT MORPH BLD: ABNORMAL
PLATELET # BLD AUTO: 218 10E3/UL (ref 150–450)
POTASSIUM BLD-SCNC: 3.7 MMOL/L (ref 3.4–5.3)
PROT SERPL-MCNC: 5.7 G/DL (ref 6.8–8.8)
RBC # BLD AUTO: 2.14 10E6/UL (ref 3.8–5.2)
RBC MORPH BLD: ABNORMAL
SODIUM SERPL-SCNC: 133 MMOL/L (ref 133–144)
WBC # BLD AUTO: 19.2 10E3/UL (ref 4–11)

## 2021-10-22 PROCEDURE — 36415 COLL VENOUS BLD VENIPUNCTURE: CPT | Performed by: STUDENT IN AN ORGANIZED HEALTH CARE EDUCATION/TRAINING PROGRAM

## 2021-10-22 PROCEDURE — 250N000011 HC RX IP 250 OP 636: Performed by: SURGERY

## 2021-10-22 PROCEDURE — 250N000013 HC RX MED GY IP 250 OP 250 PS 637: Performed by: STUDENT IN AN ORGANIZED HEALTH CARE EDUCATION/TRAINING PROGRAM

## 2021-10-22 PROCEDURE — 250N000013 HC RX MED GY IP 250 OP 250 PS 637: Performed by: PHYSICIAN ASSISTANT

## 2021-10-22 PROCEDURE — 80048 BASIC METABOLIC PNL TOTAL CA: CPT | Performed by: STUDENT IN AN ORGANIZED HEALTH CARE EDUCATION/TRAINING PROGRAM

## 2021-10-22 PROCEDURE — 120N000002 HC R&B MED SURG/OB UMMC

## 2021-10-22 PROCEDURE — 250N000011 HC RX IP 250 OP 636: Performed by: RADIOLOGY

## 2021-10-22 PROCEDURE — 258N000003 HC RX IP 258 OP 636: Performed by: INTERNAL MEDICINE

## 2021-10-22 PROCEDURE — 250N000011 HC RX IP 250 OP 636: Performed by: STUDENT IN AN ORGANIZED HEALTH CARE EDUCATION/TRAINING PROGRAM

## 2021-10-22 PROCEDURE — 36558 INSERT TUNNELED CV CATH: CPT | Mod: GC | Performed by: RADIOLOGY

## 2021-10-22 PROCEDURE — 84100 ASSAY OF PHOSPHORUS: CPT | Performed by: STUDENT IN AN ORGANIZED HEALTH CARE EDUCATION/TRAINING PROGRAM

## 2021-10-22 PROCEDURE — 76937 US GUIDE VASCULAR ACCESS: CPT

## 2021-10-22 PROCEDURE — C1769 GUIDE WIRE: HCPCS

## 2021-10-22 PROCEDURE — 272N000602 HC WOUND GLUE CR1

## 2021-10-22 PROCEDURE — 90937 HEMODIALYSIS REPEATED EVAL: CPT

## 2021-10-22 PROCEDURE — 99233 SBSQ HOSP IP/OBS HIGH 50: CPT | Performed by: PEDIATRICS

## 2021-10-22 PROCEDURE — 77001 FLUOROGUIDE FOR VEIN DEVICE: CPT

## 2021-10-22 PROCEDURE — 83735 ASSAY OF MAGNESIUM: CPT | Performed by: STUDENT IN AN ORGANIZED HEALTH CARE EDUCATION/TRAINING PROGRAM

## 2021-10-22 PROCEDURE — 83605 ASSAY OF LACTIC ACID: CPT | Performed by: INTERNAL MEDICINE

## 2021-10-22 PROCEDURE — 250N000009 HC RX 250: Performed by: RADIOLOGY

## 2021-10-22 PROCEDURE — 99232 SBSQ HOSP IP/OBS MODERATE 35: CPT | Mod: 25 | Performed by: INTERNAL MEDICINE

## 2021-10-22 PROCEDURE — 82248 BILIRUBIN DIRECT: CPT | Performed by: PHYSICIAN ASSISTANT

## 2021-10-22 PROCEDURE — 250N000013 HC RX MED GY IP 250 OP 250 PS 637: Performed by: SURGERY

## 2021-10-22 PROCEDURE — 0JH63XZ INSERTION OF TUNNELED VASCULAR ACCESS DEVICE INTO CHEST SUBCUTANEOUS TISSUE AND FASCIA, PERCUTANEOUS APPROACH: ICD-10-PCS | Performed by: RADIOLOGY

## 2021-10-22 PROCEDURE — 250N000013 HC RX MED GY IP 250 OP 250 PS 637: Performed by: NURSE PRACTITIONER

## 2021-10-22 PROCEDURE — 02H633Z INSERTION OF INFUSION DEVICE INTO RIGHT ATRIUM, PERCUTANEOUS APPROACH: ICD-10-PCS | Performed by: RADIOLOGY

## 2021-10-22 PROCEDURE — 99207 PR APP CREDIT; MD BILLING SHARED VISIT: CPT | Performed by: PHYSICIAN ASSISTANT

## 2021-10-22 PROCEDURE — 02PY33Z REMOVAL OF INFUSION DEVICE FROM GREAT VESSEL, PERCUTANEOUS APPROACH: ICD-10-PCS | Performed by: RADIOLOGY

## 2021-10-22 PROCEDURE — 99152 MOD SED SAME PHYS/QHP 5/>YRS: CPT | Mod: GC | Performed by: RADIOLOGY

## 2021-10-22 PROCEDURE — 77001 FLUOROGUIDE FOR VEIN DEVICE: CPT | Mod: 26 | Performed by: RADIOLOGY

## 2021-10-22 PROCEDURE — 85610 PROTHROMBIN TIME: CPT | Performed by: NURSE PRACTITIONER

## 2021-10-22 PROCEDURE — 99152 MOD SED SAME PHYS/QHP 5/>YRS: CPT

## 2021-10-22 PROCEDURE — 76937 US GUIDE VASCULAR ACCESS: CPT | Mod: 26 | Performed by: RADIOLOGY

## 2021-10-22 PROCEDURE — C1750 CATH, HEMODIALYSIS,LONG-TERM: HCPCS

## 2021-10-22 PROCEDURE — 272N000504 HC NEEDLE CR4

## 2021-10-22 PROCEDURE — 85027 COMPLETE CBC AUTOMATED: CPT | Performed by: PHYSICIAN ASSISTANT

## 2021-10-22 RX ORDER — NALOXONE HYDROCHLORIDE 0.4 MG/ML
0.2 INJECTION, SOLUTION INTRAMUSCULAR; INTRAVENOUS; SUBCUTANEOUS
Status: DISCONTINUED | OUTPATIENT
Start: 2021-10-22 | End: 2021-10-22 | Stop reason: HOSPADM

## 2021-10-22 RX ORDER — HEPARIN SODIUM 1000 [USP'U]/ML
3 INJECTION, SOLUTION INTRAVENOUS; SUBCUTANEOUS ONCE
Status: DISCONTINUED | OUTPATIENT
Start: 2021-10-22 | End: 2021-10-22

## 2021-10-22 RX ORDER — NALOXONE HYDROCHLORIDE 0.4 MG/ML
0.4 INJECTION, SOLUTION INTRAMUSCULAR; INTRAVENOUS; SUBCUTANEOUS
Status: DISCONTINUED | OUTPATIENT
Start: 2021-10-22 | End: 2021-10-22 | Stop reason: HOSPADM

## 2021-10-22 RX ORDER — HEPARIN SODIUM 1000 [USP'U]/ML
2.5 INJECTION, SOLUTION INTRAVENOUS; SUBCUTANEOUS ONCE
Status: COMPLETED | OUTPATIENT
Start: 2021-10-22 | End: 2021-10-22

## 2021-10-22 RX ORDER — FLUMAZENIL 0.1 MG/ML
0.2 INJECTION, SOLUTION INTRAVENOUS
Status: DISCONTINUED | OUTPATIENT
Start: 2021-10-22 | End: 2021-10-22 | Stop reason: HOSPADM

## 2021-10-22 RX ORDER — FENTANYL CITRATE 50 UG/ML
25-50 INJECTION, SOLUTION INTRAMUSCULAR; INTRAVENOUS EVERY 5 MIN PRN
Status: DISCONTINUED | OUTPATIENT
Start: 2021-10-22 | End: 2021-10-22 | Stop reason: HOSPADM

## 2021-10-22 RX ORDER — IODIXANOL 320 MG/ML
50 INJECTION, SOLUTION INTRAVASCULAR ONCE
Status: DISCONTINUED | OUTPATIENT
Start: 2021-10-22 | End: 2021-10-22 | Stop reason: CLARIF

## 2021-10-22 RX ADMIN — Medication 15 ML: at 12:27

## 2021-10-22 RX ADMIN — POLYETHYLENE GLYCOL 3350 17 G: 17 POWDER, FOR SOLUTION ORAL at 21:16

## 2021-10-22 RX ADMIN — PIPERACILLIN SODIUM AND TAZOBACTAM SODIUM 2.25 G: 2; .25 INJECTION, POWDER, LYOPHILIZED, FOR SOLUTION INTRAVENOUS at 12:27

## 2021-10-22 RX ADMIN — SALINE NASAL SPRAY 1 SPRAY: 1.5 SOLUTION NASAL at 17:55

## 2021-10-22 RX ADMIN — SODIUM CHLORIDE 250 ML: 9 INJECTION, SOLUTION INTRAVENOUS at 14:27

## 2021-10-22 RX ADMIN — MIDAZOLAM 1 MG: 1 INJECTION INTRAMUSCULAR; INTRAVENOUS at 13:34

## 2021-10-22 RX ADMIN — SALINE NASAL SPRAY 1 SPRAY: 1.5 SOLUTION NASAL at 08:53

## 2021-10-22 RX ADMIN — MIDODRINE HYDROCHLORIDE 20 MG: 5 TABLET ORAL at 17:55

## 2021-10-22 RX ADMIN — PIPERACILLIN SODIUM AND TAZOBACTAM SODIUM 2.25 G: 2; .25 INJECTION, POWDER, LYOPHILIZED, FOR SOLUTION INTRAVENOUS at 05:55

## 2021-10-22 RX ADMIN — HEPARIN SODIUM 5000 UNITS: 5000 INJECTION, SOLUTION INTRAVENOUS; SUBCUTANEOUS at 21:18

## 2021-10-22 RX ADMIN — Medication 1 PACKET: at 08:55

## 2021-10-22 RX ADMIN — Medication 100 MCG: at 09:00

## 2021-10-22 RX ADMIN — Medication 40 MG: at 08:52

## 2021-10-22 RX ADMIN — FENTANYL CITRATE 25 MCG: 50 INJECTION INTRAMUSCULAR; INTRAVENOUS at 13:42

## 2021-10-22 RX ADMIN — GABAPENTIN 100 MG: 250 SOLUTION ORAL at 17:55

## 2021-10-22 RX ADMIN — FENTANYL CITRATE 50 MCG: 50 INJECTION INTRAMUSCULAR; INTRAVENOUS at 13:34

## 2021-10-22 RX ADMIN — PIPERACILLIN SODIUM AND TAZOBACTAM SODIUM 2.25 G: 2; .25 INJECTION, POWDER, LYOPHILIZED, FOR SOLUTION INTRAVENOUS at 00:17

## 2021-10-22 RX ADMIN — INSULIN ASPART 1 UNITS: 100 INJECTION, SOLUTION INTRAVENOUS; SUBCUTANEOUS at 17:56

## 2021-10-22 RX ADMIN — Medication: at 14:27

## 2021-10-22 RX ADMIN — ESCITALOPRAM OXALATE 10 MG: 5 SOLUTION ORAL at 08:52

## 2021-10-22 RX ADMIN — ACETAMINOPHEN 650 MG: 325 TABLET, FILM COATED ORAL at 09:01

## 2021-10-22 RX ADMIN — THIAMINE HCL TAB 100 MG 100 MG: 100 TAB at 09:00

## 2021-10-22 RX ADMIN — PIPERACILLIN SODIUM AND TAZOBACTAM SODIUM 2.25 G: 2; .25 INJECTION, POWDER, LYOPHILIZED, FOR SOLUTION INTRAVENOUS at 17:52

## 2021-10-22 RX ADMIN — HEPARIN SODIUM 2500 UNITS: 1000 INJECTION, SOLUTION INTRAVENOUS; SUBCUTANEOUS at 13:52

## 2021-10-22 RX ADMIN — FOLIC ACID 1 MG: 1 TABLET ORAL at 09:00

## 2021-10-22 RX ADMIN — Medication 2.5 MG: at 12:31

## 2021-10-22 RX ADMIN — SALINE NASAL SPRAY 1 SPRAY: 1.5 SOLUTION NASAL at 12:27

## 2021-10-22 RX ADMIN — Medication 2.5 MG: at 08:52

## 2021-10-22 RX ADMIN — MIDODRINE HYDROCHLORIDE 20 MG: 5 TABLET ORAL at 05:55

## 2021-10-22 RX ADMIN — Medication 2.5 MG: at 04:15

## 2021-10-22 RX ADMIN — Medication 2.5 MG: at 22:32

## 2021-10-22 RX ADMIN — GABAPENTIN 100 MG: 250 SOLUTION ORAL at 08:54

## 2021-10-22 RX ADMIN — LIDOCAINE HYDROCHLORIDE 5 ML: 10 INJECTION, SOLUTION EPIDURAL; INFILTRATION; INTRACAUDAL; PERINEURAL at 13:49

## 2021-10-22 RX ADMIN — Medication 2.5 MG: at 18:32

## 2021-10-22 RX ADMIN — HEPARIN SODIUM 2500 UNITS: 1000 INJECTION, SOLUTION INTRAVENOUS; SUBCUTANEOUS at 13:53

## 2021-10-22 RX ADMIN — GABAPENTIN 100 MG: 250 SOLUTION ORAL at 12:27

## 2021-10-22 RX ADMIN — Medication 1 PACKET: at 21:26

## 2021-10-22 RX ADMIN — PSYLLIUM HUSK 1 PACKET: 3.4 POWDER ORAL at 08:54

## 2021-10-22 RX ADMIN — CALCIUM CARBONATE 600 MG (1,500 MG)-VITAMIN D3 400 UNIT TABLET 1 TABLET: at 09:00

## 2021-10-22 RX ADMIN — SODIUM CHLORIDE 300 ML: 9 INJECTION, SOLUTION INTRAVENOUS at 14:27

## 2021-10-22 RX ADMIN — ACETAMINOPHEN 650 MG: 325 TABLET, FILM COATED ORAL at 17:55

## 2021-10-22 RX ADMIN — MIDODRINE HYDROCHLORIDE 20 MG: 5 TABLET ORAL at 21:16

## 2021-10-22 RX ADMIN — MIDAZOLAM 0.5 MG: 1 INJECTION INTRAMUSCULAR; INTRAVENOUS at 13:42

## 2021-10-22 RX ADMIN — RIFAXIMIN 550 MG: 550 TABLET ORAL at 09:00

## 2021-10-22 ASSESSMENT — ACTIVITIES OF DAILY LIVING (ADL)
ADLS_ACUITY_SCORE: 13
ADLS_ACUITY_SCORE: 15
ADLS_ACUITY_SCORE: 13
ADLS_ACUITY_SCORE: 13

## 2021-10-22 ASSESSMENT — MIFFLIN-ST. JEOR: SCORE: 1586

## 2021-10-22 NOTE — PROCEDURES
[facility]    Procedure: IR Procedure Note    Date/Time: 10/22/2021 1:57 PM  Performed by: Aries Bettencourt DO  Authorized by: Aries Bettencourt DO   IR Fellow Physician: Sg    UNIVERSAL PROTOCOL   Site Marked: NA  Prior Images Obtained and Reviewed:  Yes  Required items: Required blood products, implants, devices and special equipment available    Patient identity confirmed:  Verbally with patient, arm band, provided demographic data and hospital-assigned identification number  Patient was reevaluated immediately before administering moderate or deep sedation or anesthesia  Confirmation Checklist:  Patient's identity using two indicators, relevant allergies, procedure was appropriate and matched the consent or emergent situation and correct equipment/implants were available  Time out: Immediately prior to the procedure a time out was called    Universal Protocol: the Joint Commission Universal Protocol was followed    Preparation: Patient was prepped and draped in usual sterile fashion           ANESTHESIA    Anesthesia: Local infiltration  Local Anesthetic:  Lidocaine 1% without epinephrine      SEDATION    Patient Sedated: Yes    Sedation Type:  Moderate (conscious) sedation  Sedation:  Fentanyl and midazolam  Vital signs: Vital signs monitored during sedation    See dictated procedure note for full details.  Findings: Uncomplicated placement of right IJ tunneled dialysis catheter, 19 cm 14.5 Greek.  Uncomplicated removal of right nontunneled IJ catheter.    Specimens: none    Complications: None    Condition: Stable    Plan: Catheter is heparin locked and ready for immediate use.    PROCEDURE   Patient Tolerance:  Patient tolerated the procedure well with no immediate complications    Length of time physician/provider present for 1:1 monitoring during sedation: 20

## 2021-10-22 NOTE — PROGRESS NOTES
HEMODIALYSIS TREATMENT NOTE    Date: 10/22/2021  Time: 4:58 PM    Data:  Pre Wt: 97.7 kg (215 lb 6.2 oz)   Desired Wt: 95.7 kg   Post Wt: 96.6 kg (212 lb 15.4 oz)  Weight change: 1.1 kg  Ultrafiltration - Post Run Net Total Removed (mL): 1100 mL  Vascular Access Status: CVC  patent  Dialyzer Rinse: Clear  Total Blood Volume Processed: 66.83 L   Total Dialysis (Treatment) Time: 3   Dialysate Bath: K 3, Ca 2.25  Heparin: None    Lab:   Yes; Lactic Acid    Interventions:  Pt completed her scheduled 3 hours of HD without complication. During tx, hypotension noted; pt asymptomatic, goal dropped from 2 to 1.5L, and subsequently had UF turned off d/t unresolved hypotension. [facility] updated. 1.1mL net of fluid was pulled. BG @ about 1700 was 152, insulin not given as pt ordered food never arrived here till she left. During tx, pt triggered sepsis risk, and lactic acid was drawn and sent to lab per protocol. Ending BP was 109/96. Pt rinsed back post tx, lumen were saline locked, end caps changed, and hand off report given to PCN.    Assessment:  -Calm & Cooperative  -BP remain soft but asymptomatic  -A & O X 4 but sometimes forgetful       Plan:    Per renal

## 2021-10-22 NOTE — PROGRESS NOTES
Nephrology Progress Note  10/22/2021         Aliyah Angeles is a 46 yof w/hx of ETOH use (Quit 9/12/2021) w/hepatitis, Fitz en Y bypass, Pancreatitis, DM2, Panc cyst who was admitted 9/29 from chem dep with N/V and worsening abdominal pain and edema.  Her course has been resp failure with ARDS and evolving ANA LAURA.  Noted to have received albumin from 10/1-10/3 (150g/day) with no improvement in Cr.  On Vanco/Zosyn to treat sepsis although cultures at time of consult are negative.  Nephrology consulted for evaluation of ANA LAURA and possible RRT, started CRRT on 10/7.       Interval History :   Mrs Angeles had CRRT started 10/7 for management of volume and acidosis, transitioned to iHD on 10/15.  Tried diuretics the past 2 days but Cr is up so running HD today.  Tunneled line placed today, deciding on runs daily, likely will discharge on HD.       Assessment & Recommendations:   ANA LAURA-Baseline Cr normal at 0.5 as recently as 9/14/2021, abdominal US pending currently.  Was at inpatient chem dep when she developed abdominal pain, N/V and worsening edema and was brought to [facility] on 9/29.  Was treated for UTI and started on Vanco/Zosyn for leukocytosis but decompensated and was intubated the evening of 10/3.  Noted she did receive albumin x3 days from 10/1-10/3 for ANA LAURA but actually worsened and Cr at time of consult is up to 2.  Lisa <5 on 10/3, ECHO done 10/4 showed hyperdynamic LV and normal IVC.  Etiology of ANA LAURA is likely ATN with underlying HRS physiology which made her more susceptible to hypoperfusion.  Started CRRT on 10/7 for management of volume and acidosis.  Trying to transition to iHD.                  -ANA LAURA, likely hypoperfusion in setting of baseline HRS physiology, possible contribution from vanco/zosyn and did receive contrast on 10/3.  Some UOP but no signs of recovery yet.                  -CRRT started 10/7, stopped 10/13.  Planned for HD today.     -Line is tunneled placed 10/22.                   -Dialysis consent  "signed and scanned in under media.       Volume-Has total body volume overload, I/O's difficult to interpret as we are not capturing much of her UOP and wt's are unreliable.  Planning on HD today and will try to pull 2-2.5L with run.       Electrolytes/pH-K normal at 3.7 bicarb 25.       Liver-Unclear duration, quit drinking on 9/12/2021 and was at chem dep when acute issues started.  Bili is down to 6 and INR is 1.4 and a bit improved.  GI following, not clear if she would be a transplant candidate eventually.       ID-On Zosyn and Rifaximin.       Nutrition-On Osmolite TF.          Seen and discussed with Dr Russo     Recommendations were communicated to primary team via verbal communication.      MARIA ELENA Wade CNS  Clinical Nurse Specialist  [phone]    Review of Systems:   I reviewed the following systems:  Gen: No fevers or chills  CV: No CP at rest  Resp: No SOB at rest  GI: No N/V    Physical Exam:   I/O last 3 completed shifts:  In: 790 [P.O.:120; I.V.:400; NG/GT:270]  Out: 150 [Urine:150]   BP 97/60   Pulse 73   Temp (!) 95.8  F (35.4  C) (Oral)   Resp 18   Ht 1.626 m (5' 4\")   Wt 97.7 kg (215 lb 6.2 oz)   SpO2 100%   BMI 36.97 kg/m       GENERAL APPEARANCE: Extubated, stable from pulm standpoint.  Sitting up in chair, a bit SOB today.   EYES: No scleral icterus  Pulmonary: lungs rhonchi to auscultation with equal breath sounds bilaterally, no clubbing or cyanosis  CV: Regular rhythm, normal rate, no rub   - Edema +2 LE/dependent  GI: soft, nontender, normal bowel sounds  MS: no evidence of inflammation in joints, no muscle tenderness  : No barrett, some intermittent UOP.   SKIN: no rash, warm, dry  NEURO: No focal deficits.    Labs:   All labs reviewed by me  Electrolytes/Renal - Recent Labs   Lab Test 10/22/21  1429 10/22/21  1409 10/22/21  1226 10/22/21  0806 10/22/21  0535 10/21/21  0813 10/21/21  0337 10/20/21  0825 10/20/21  0436   NA  --   --   --   --  133  --  134  --  134 "   POTASSIUM  --   --   --   --  3.7  --  4.0  --  4.2   CHLORIDE  --   --   --   --  97  --  100  --  101   CO2  --   --   --   --  25  --  25  --  23   BUN  --   --   --   --  68*  --  61*  --  52*   CR  --   --   --   --  3.14*  --  2.78*  --  2.36*   * 125* 131*   < > 106*   < > 157*   < > 176*  172*   STEFFANY  --   --   --   --  8.6  --  8.7  --  8.5   MAG  --   --   --   --  1.9  --  1.8  --  1.8   PHOS  --   --   --   --  7.9*  --  7.0*  --  6.0*    < > = values in this interval not displayed.       CBC -   Recent Labs   Lab Test 10/22/21  0535 10/21/21  1057 10/21/21  0638 10/21/21  0337 10/21/21  0337 10/20/21  0436 10/20/21  0436   WBC 19.2*  --   --   --  18.0*  --  19.6*   HGB 7.0* 7.2* 6.7*   < > 6.5*   < > 7.3*     --   --   --  201  --  222    < > = values in this interval not displayed.       LFTs -   Recent Labs   Lab Test 10/22/21  0535 10/21/21  0337 10/18/21  0354   ALKPHOS 285* 303* 299*   BILITOTAL 3.9* 4.1* 5.9*   ALT 42 41 38   AST 85* 98* 102*   PROTTOTAL 5.7* 5.4* 4.4*   ALBUMIN 1.5* 1.4* 1.4*       Iron Panel -   Recent Labs   Lab Test 09/13/21  0642   EYAD 1,839*           Current Medications:    acetaminophen  650 mg Oral or Feeding Tube Q8H     calcium carbonate 600 mg-vitamin D 400 units  1 tablet Oral or Feeding Tube Daily     cyanocobalamin  100 mcg Oral or Feeding Tube Daily     escitalopram  10 mg Oral or Feeding Tube Daily     folic acid  1 mg Oral or Feeding Tube Daily     gabapentin  100 mg Oral or Feeding Tube TID     heparin ANTICOAGULANT  5,000 Units Subcutaneous Q12H     insulin aspart  1-12 Units Subcutaneous Q4H     [Held by provider] insulin glargine  35 Units Subcutaneous Daily     midodrine  20 mg Oral or Feeding Tube Q8H     multivitamins w/minerals  15 mL Oral Daily     pantoprazole  40 mg Per Feeding Tube QAM AC     piperacillin-tazobactam  2.25 g Intravenous Q6H     polyethylene glycol  17 g Oral BID     [Held by provider] prazosin  1 mg Oral QPM      protein modular  1 packet Per Feeding Tube BID     psyllium  1 packet Oral Daily     QUEtiapine  12.5 mg Oral At Bedtime     rifaximin  550 mg Oral or Feeding Tube BID     sodium chloride  1 spray Both Nostrils 4x Daily     thiamine  100 mg Oral or Feeding Tube Daily       dextrose 45 mL/hr at 10/22/21 0800

## 2021-10-22 NOTE — SUMMARY OF CARE
Pt brought, earrings, phone, glasses, wallet, , clothes, book, notebooks, pens, tablet, ring and bracelet, purse, bag, tea, self care products, misc. 2 grey boots.

## 2021-10-22 NOTE — PLAN OF CARE
"BP 90/57 (BP Location: Right arm)   Pulse 79   Temp 97.8  F (36.6  C) (Oral)   Resp 20   Ht 1.626 m (5' 4\")   Wt 96.6 kg (212 lb 15.4 oz)   SpO2 97%   BMI 36.56 kg/m      Care from: [id number]      VS & Pain: VSS ex on 2 LPM nc, prn po Oxy x2 was given for abdominal and back pain    HEENT: scleras yellow    Neuro: A&O x4    Respiratory: dyspnea on exertion, diminished lung sounds in BLL     Cardiac: murmur detected    Peripheral neurovascular: 3+ dependent and BLE edema, 1+ generalized edema    GI/: adequate urine output, and BM x2 this shift     Nutrition: NPO for procedure, NJ is clamped and landmark unchanged     Skin: dry & flaky, bruised bilateral arms and abdomen, peeling at chest and abdomen    Musculoskeletal: generalized weakness    Lines: L PIV is saline locked, R internal jugular is CDI for HD, L internal jugular brown lumen is TKO with NS, white and blue lumens are saline locked    Activity: assist of 2, gait belt and walker    Events this shift: Old R internal jugular was removed and new HD line was placed in IR. HD was done. Call light within reach and bed alarm on.    Plan: Continue to follow poc.    "

## 2021-10-22 NOTE — PROGRESS NOTES
Transferred to:5B at 2112  Status at time of transfer: AxOx4, afebrile, calm and pleasant, VSS  Belongings: Sent with patient to 5B  Rice removed? (if no, why?): NA  Chart and medications: Sent with pt to 5B  Family notified: Yes

## 2021-10-22 NOTE — PRE-PROCEDURE
GENERAL PRE-PROCEDURE:   Procedure:  Tunneled CVC placement    Written consent obtained?: Yes    Risks and benefits: Risks, benefits and alternatives were discussed    Consent given by:  Patient  Patient states understanding of procedure being performed: Yes    Patient's understanding of procedure matches consent: Yes    Procedure consent matches procedure scheduled: Yes    Expected level of sedation:  Moderate  Appropriately NPO:  Yes  ASA Class:  3  Mallampati  :  Grade 2- soft palate, base of uvula, tonsillar pillars, and portion of posterior pharyngeal wall visible  Lungs:  Lungs clear with good breath sounds bilaterally  Heart:  Normal heart sounds and rate  History & Physical reviewed:  History and physical reviewed and no updates needed  Statement of review:  I have reviewed the lab findings, diagnostic data, medications, and the plan for sedation

## 2021-10-22 NOTE — PLAN OF CARE
Late entry for 2200  Reason for transfer: improving  Transferred from: 4c   Report received from: Katelin RN  2 RN skin assessment completed by:   Bed algorithm reevaluated: yes  Was Pulsate ordered?:No  Detailed Belongings: cell phone, glasses.      Patient abdomen distended.and painful at sides.   Npo for tunnel cath placement today.  D10 running at 45cc per hour.    R;  needs skin check. Continue to monitor and treat per plan of care.

## 2021-10-22 NOTE — PLAN OF CARE
ICU End of Shift Summary. See flowsheets for vital signs and detailed assessment.    Changes this shift: A&O, intermittently c/o generalized pain relieved by PRN oxy. Remains on 2L. Tube feeds changed to cycle overnight (1894-2038). Using D10 to keep blood glucose levels stable while on Lantus. BG=74, asymptomatic; treated with juice. Attempted iHD run today, line unable to be flushed. Plan for IR in AM for tunneled cath; NPO at midnight. IV Lasix x1. Up to commode multiple times; mixed urine & loose stool. Hgb recheck stable, did not receive blood this unit. Walked in montes with therapy with assist.    Plan:  Transfer to floor when bed available.      Problem: Behavioral Health Comorbidity  Goal: Maintenance of Behavioral Health Symptom Control  Outcome: Improving     Problem: Diabetes Comorbidity  Goal: Blood Glucose Level Within Targeted Range  Outcome: Improving

## 2021-10-22 NOTE — IR NOTE
Patient Name: Aliyah Angeles  Medical Record Number: [id number]  Today's Date: 10/22/2021    Procedure: percutaneous double lumen large bore tunneled central line for dialysis, remove non-tunneled central line  Proceduralist: Dr. Alonso Herbert    Procedure Start: 1334  Procedure end: 13:54  Sedation medications administered: 75 mcg fentanyl, 1.5 mg versed     Report given to:  RN and dialysis RN  : none    Other Notes: Pt arrived to IR room 5 from . Consent reviewed. Pt denies any questions or concerns regarding procedure. Pt positioned supine and monitored per protocol. Pt tolerated procedure without any noted complications. Pt transferred directly to dialysis.     Other notes: D10w infusion noted to be off on pump on arrival to IR, awaiting new bag of fluids per floor RN. Attempted to flush that line (blue port of right internal jugular and unable to flush).  Tried to flush locked white port and also unable to flush it.  New D10w strung and started on brown port of left internal jugular.  RN notified. BS checked at end of procedure and 125.

## 2021-10-22 NOTE — PLAN OF CARE
"Care assumed: 9593-6817   Vitals  Pain  BP 98/53 (BP Location: Right arm)   Pulse 84   Temp 97  F (36.1  C) (Oral)   Resp 20   Ht 1.626 m (5' 4\")   Wt 97.7 kg (215 lb 6.2 oz)   SpO2 94% 2LPM   BMI 36.97 kg/m          Pain in hips. Oxy given 2.5mg    Activity  1-2 assist with gait belt and walker to commode    Neuro/Mood  A&Ox 4     Denies numbness and tingling    Cardiac  No signs of acute distress    Respiratory  No signs of acute respiratory distress    GI/  Patient on hemodialysis.    Diet/ Nutrition  NPO for New placement of dialysis line      Skin, Wounds, LDAs [place] full skin sleeping   Triple L CVC internal jugular infusing, PIV SL, and R CVC double lumen internal jugular  NJ For tube feedings; holding for tonight due to procedure   Infusing D10 at 45mL/hr to prevent hypoglycemia during the night.   Lymph wraps in place BLE      Plan of Care  Other notes Follow POC    New dialysis line tomorrow.   Blood sugar checks q4h        "

## 2021-10-22 NOTE — PROGRESS NOTES
[facility]    Medicine Progress Note - Hospitalist Service, Gold 6       Date of Admission:  9/29/2021    Assessment & Plan         Aliyah Angeles is a 46 year old female with a history of alcohol use disorder, Fitz-en-Y gastric bypass (2010), DM2, anxiety, depression, who was initially admitted to Internal Medicine service with alcohol hepatitis. She transferred to MICU on 10/4/21 with acute hypoxic respiratory failure 2/2 ARDS requiring intubation. Hospitalization complicated by septic shock, pneumonia, ANA LAURA requiring CRRT and now iHD, encephalopathy. Self-extubated on 10/14, weaned off pressors on 10/19, and transferred to floor status on 10/20.     Changes today:   - Tunneled line placement with IR, scheduled for 1330   - HD this afternoon   - Decrease Seroquel to 12.5 mg qhs      Acute hypoxic respiratory failure, improving  ARDS, resolved  Pneumonia  Pulmonary edema   Acute decline in respiratory status requiring intubation on 10/4, CXR c/w ARDS, likely secondary to HAP vs aspiration. BAL cultures negative. Treated with Zosyn 10/4 - 10/13. Self-extubated on 10/14. Developed increased work of breathing on 10/17, CXR demonstrated increased interstitial opacities concerning for pulmonary edema vs pneumonia. Currently managing volume with Lasix + iHD and treating for VAP. Deconditioning and atelectasis likely contributing to ongoing O2 needs. CRP. WBC improving. Afebrile.    - Continue IV Zosyn to complete a 8-day course, on day #6   - Lasix per Nephrology   - Incentive spirometry   - OOB at least TID   - Continuous pulse oximetry. Wean O2 as able.    - Trend CBC daily, CRP q48h     Septic shock, resolved: Shock most likely secondary to sepsis with hepatic dysfunction contributing as well. TTE with hyperdynamic LVEF, no RV dysfunction. Weaned off pressors on 10/19. Continues to require midodrine for soft blood pressures.   - Midodrine 20 mg TID, wean as able   - Vitals  q4h     ANA LAURA  Hypervolemia  Baseline Cr ~0.5. Cr to peak of 2.98. Decline in renal function most likely secondary to ATN (hypotension, medications, contrast), possible component of HRS. On CRRT 10.7 - 10/13, now transitioned to iHD. RIJ not functioning, unable to dialyze on 10/21.    - Nephrology following   - Tunneled line placement with IR today   - HD this afternoon   - No Lasix today per Nephrology   - Strict I/Os   - Daily weights   - Lymphedema team consulted    Alcohol hepatitis: Recently admitted in September with ETOH hepatitis, started on rifaximin and lactulose per Hepatology recs. Now re-presented with ETOH hepatitis, Maddrey score 13 on admission. Acute hepatitis c/b portal hypertension with ascites. Diagnostic mane negative for SBP (most recently 10/11, 1.25L off). Tbili, INR, AST/ALT, platelets all improving. MELD 25.     - Eval for therapeutic paracentesis tomorrow   - Lactulose titrated to 3-4 loose BMs daily   - Rifaximin BID   - Trend CMP, INR   - Scheduled to follow-up with Hepatology on 10/26    Toxic metabolic encephalopathy, resolved: Multifactorial secondary to acute illness, sedating medications, HE. Psychiatry consulted 10/15, started Seroquel.  Fully A&O today.    - Lactulose, rifaximin   - Decrease Seroquel to 12.5 mg at bedtime    - Delirium precautions   - Minimize sedating medications    Abdominal pain: Secondary to alcohol hepatitis, gas pains.    - Tylenol TID   - Oxycodone 2.5 mg q4h PRN - wean as able   - Simethicone PRN    Severe malnutrition  Dysphagia   In the context of acute on chronic illness. Started on TF 10/5. Now more awake and interested in eating but not feeling hungry, transitioned to cycled feeds on 10/21.    - Nutrition, SLP following   - Continue ND TF running 2000 - 0800 to promote daytime hunger   - Thin liquid + soft food diet per SLP   - Calorie counts     DM2: A1C 9.1. PTA on metformin. Transitioned to long-acting insulin due to hyperglycemia while on tube  feeds.     - NPO today, hold Lantus   - Holding metformin while inpatient   - HSSI    - BG checks q4h    - Hypoglycemia protocol     Acute on chronic macrocytic anemia:  Likely secondary to alcohol use, alcohol hepatitis, nutritional deficiencies. Intermittently requiring PRBC transfusions since admission (10/7, 10/12, 10/16). No e/o active bleeding. Hgb 7.2 today.   - CBC in AM     Alcohol use disorder: Last drink on 9/12/21. PTA was at CD treatment facility.    - Patient interested in resuming CD treatment, will need to re-evaluate this closer to discharge date, at this time PT/OT recommending TCU   - Continue folic acid, thiamine, MVI supplements        Other Stable Medical Issues:   Coagulopathy: INR to peak of 2.5, now much improved. Likely secondary to hepatic dysfunction.   MDD, RUBÉN: Continue PTA Lexapro. Prazosin on hold d/t hypotension, resume as able. Health Psychology consulted 10/1, re-involve PRN.   GERD: Continue PPI.   Neuropathy: Continue PTA gabapentin.          Diet: Adult Formula Drip Feeding: Continuous Novasource Renal; Nasoduodenal tube; Goal Rate: 45; mL/hr; From: 8:00 PM; 8:00 AM; Medication - Feeding Tube Flush Frequency: At least 15-30 mL water before and after medication administration and with tube c...  Calorie Counts  Snacks/Supplements Adult: Ensure Clear; Between Meals  NPO per Anesthesia Guidelines for Procedure/Surgery Except for: Meds    DVT Prophylaxis: Heparin subcutaneous - holding today in prep for IR line placement  Rice Catheter: Not present  Central Lines: PRESENT  CVC TRIPLE LUMEN Left Internal jugular-Site Assessment: WDL  CVC Double Lumen Right Internal jugular Non - tunneled-Site Assessment: WDL  Code Status: Full Code      Disposition Plan   Expected discharge: 10/29/2021   recommended to transitional care unit once clinically improved, dialysis plan in place.     The patient's care was discussed with the Attending Physician, Dr. Cruz, Bedside Nurse and Patient.  Updated daughter Allie today.     Gloria Colon PA-C  Hospitalist Service, [address]  Securely message with the Vocera Web Console (learn more here)  Text page via [facility] Paging/Directory  Please see sign in/sign out for up to date coverage information    Clinically Significant Risk Factors Present on Admission                ______________________________________________________________________    Interval History   Felt hungry during the day yesterday, was able to eat in small amounts. Taking in good amount of PO fluids. RIJ was not functioning so was unable to dialyze. Now NPO for new line placement today. Good stool output. No fevers. O2 needs stable.     Data reviewed today: I reviewed all medications, new labs and imaging results over the last 24 hours.    Physical Exam   Vital Signs: Temp: 97  F (36.1  C) Temp src: Oral BP: 98/53 Pulse: 84   Resp: 20 SpO2: 94 % O2 Device: Nasal cannula Oxygen Delivery: 2 LPM  Weight: 215 lbs 6.23 oz  Constitutional: Awake and alert, in no apparent distress. Lying comfortably in bed.   Eyes: + scleral icterus  Respiratory: Breathing non-labored. Bibasilar crackles. Good air entry.   Cardiovascular:  RRR, normal S1/S2. No rubs or murmurs. 3+ pitting edema extending up to bilateral hips. Lymphedema wraps in place.   GI: + RUQ tenderness. Distended but soft. Hyperactive bowel sounds.   Skin: Jaundiced.  No visible rashes, lesions, or bruising of concern.   Neurologic: Alert and oriented to person, place, and time. No focal deficits. Moves all extremities. No tremor. No asterixis. Facies symmetric.     Data   ROUTINE IP LABS (Last four results)  Recent Labs   Lab 10/22/21  0535 10/22/21  0405 10/22/21  0017 10/21/21  2040 10/21/21  0813 10/21/21  0337 10/20/21  0825 10/20/21  0436 10/19/21  0401 10/19/21  0356 10/18/21  0722 10/18/21  0354 10/17/21  0854 10/17/21  0358     --   --   --   --  134  --  134  --  134    < > 135   < > 133   POTASSIUM 3.7  --   --   --   --  4.0  --  4.2  --  4.0   < > 4.4   < > 4.3   CHLORIDE 97  --   --   --   --  100  --  101  --  102   < > 102   < > 101   CO2 25  --   --   --   --  25  --  23  --  23   < > 22   < > 24   ANIONGAP 11  --   --   --   --  9  --  10  --  9   < > 11   < > 8   * 107* 100* 105*   < > 157*   < > 176*  172*   < > 174*   < > 172*   < > 217*  201*   BUN 68*  --   --   --   --  61*  --  52*  --  41*   < > 71*   < > 55*   CR 3.14*  --   --   --   --  2.78*  --  2.36*  --  1.86*   < > 2.66*   < > 2.17*   STEFFANY 8.6  --   --   --   --  8.7  --  8.5  --  8.5   < > 8.3*   < > 8.6   MAG 1.9  --   --   --   --  1.8  --  1.8  --  1.8   < > 2.0   < > 2.1   PHOS 7.9*  --   --   --   --  7.0*  --  6.0*  --  4.4   < > 6.3*   < > 5.2*   PROTTOTAL  --   --   --   --   --  5.4*  --   --   --   --   --  4.4*  --  5.4*   ALBUMIN  --   --   --   --   --  1.4*  --   --   --   --   --  1.4*  --  1.5*   BILITOTAL  --   --   --   --   --  4.1*  --   --   --   --   --  5.9*  --  6.5*   ALKPHOS  --   --   --   --   --  303*  --   --   --   --   --  299*  --  338*   AST  --   --   --   --   --  98*  --   --   --   --   --  102*  --  97*   ALT  --   --   --   --   --  41  --   --   --   --   --  38  --  43    < > = values in this interval not displayed.     Recent Labs   Lab 10/21/21  1057 10/21/21  0638 10/21/21  0337 10/20/21  0436 10/19/21  0356 10/19/21  0356 10/18/21  0354 10/18/21  0354   WBC  --   --  18.0* 19.6*  --  24.0*  --  17.1*   RBC  --   --  2.08* 2.25*  --  2.30*  --  2.30*   HGB 7.2* 6.7* 6.5* 7.3*   < > 7.6*   < > 7.4*   HCT  --   --  21.5* 23.1*  --  23.5*  --  23.7*   MCV  --   --  103* 103*  --  102*  --  103*   MCH  --   --  31.3 32.4  --  33.0  --  32.2   MCHC  --   --  30.2* 31.6  --  32.3  --  31.2*   RDW  --   --  20.7* 21.2*  --  21.3*  --  21.2*   PLT  --   --  201 222  --  244  --  201    < > = values in this interval not displayed.     Recent Labs   Lab  10/22/21  0535 10/19/21  0356 10/16/21  0431   INR 1.32* 1.37* 1.31*

## 2021-10-23 ENCOUNTER — APPOINTMENT (OUTPATIENT)
Dept: SPEECH THERAPY | Facility: CLINIC | Age: 46
End: 2021-10-23
Payer: COMMERCIAL

## 2021-10-23 ENCOUNTER — APPOINTMENT (OUTPATIENT)
Dept: OCCUPATIONAL THERAPY | Facility: CLINIC | Age: 46
End: 2021-10-23
Payer: COMMERCIAL

## 2021-10-23 LAB
ANION GAP SERPL CALCULATED.3IONS-SCNC: 8 MMOL/L (ref 3–14)
BLD PROD TYP BPU: NORMAL
BLOOD COMPONENT TYPE: NORMAL
BUN SERPL-MCNC: 39 MG/DL (ref 7–30)
CALCIUM SERPL-MCNC: 7.8 MG/DL (ref 8.5–10.1)
CHLORIDE BLD-SCNC: 95 MMOL/L (ref 94–109)
CO2 SERPL-SCNC: 30 MMOL/L (ref 20–32)
CODING SYSTEM: NORMAL
CREAT SERPL-MCNC: 2.09 MG/DL (ref 0.52–1.04)
CROSSMATCH: NORMAL
CRP SERPL-MCNC: 90 MG/L (ref 0–8)
ERYTHROCYTE [DISTWIDTH] IN BLOOD BY AUTOMATED COUNT: 20 % (ref 10–15)
GFR SERPL CREATININE-BSD FRML MDRD: 28 ML/MIN/1.73M2
GLUCOSE BLD-MCNC: 115 MG/DL (ref 70–99)
GLUCOSE BLDC GLUCOMTR-MCNC: 111 MG/DL (ref 70–99)
GLUCOSE BLDC GLUCOMTR-MCNC: 115 MG/DL (ref 70–99)
GLUCOSE BLDC GLUCOMTR-MCNC: 119 MG/DL (ref 70–99)
GLUCOSE BLDC GLUCOMTR-MCNC: 127 MG/DL (ref 70–99)
GLUCOSE BLDC GLUCOMTR-MCNC: 135 MG/DL (ref 70–99)
GLUCOSE BLDC GLUCOMTR-MCNC: 138 MG/DL (ref 70–99)
HCT VFR BLD AUTO: 22.2 % (ref 35–47)
HGB BLD-MCNC: 6.9 G/DL (ref 11.7–15.7)
HOLD SPECIMEN: NORMAL
ISSUE DATE AND TIME: NORMAL
LACTATE SERPL-SCNC: 1.4 MMOL/L (ref 0.7–2)
MAGNESIUM SERPL-MCNC: 1.6 MG/DL (ref 1.6–2.3)
MCH RBC QN AUTO: 32.4 PG (ref 26.5–33)
MCHC RBC AUTO-ENTMCNC: 31.1 G/DL (ref 31.5–36.5)
MCV RBC AUTO: 104 FL (ref 78–100)
PHOSPHATE SERPL-MCNC: 5.6 MG/DL (ref 2.5–4.5)
PLATELET # BLD AUTO: 241 10E3/UL (ref 150–450)
POTASSIUM BLD-SCNC: 3.3 MMOL/L (ref 3.4–5.3)
RBC # BLD AUTO: 2.13 10E6/UL (ref 3.8–5.2)
SODIUM SERPL-SCNC: 133 MMOL/L (ref 133–144)
UNIT ABO/RH: NORMAL
UNIT NUMBER: NORMAL
UNIT STATUS: NORMAL
UNIT TYPE ISBT: 7300
WBC # BLD AUTO: 17.1 10E3/UL (ref 4–11)

## 2021-10-23 PROCEDURE — 97140 MANUAL THERAPY 1/> REGIONS: CPT | Mod: GO | Performed by: OCCUPATIONAL THERAPIST

## 2021-10-23 PROCEDURE — P9016 RBC LEUKOCYTES REDUCED: HCPCS | Performed by: PHYSICIAN ASSISTANT

## 2021-10-23 PROCEDURE — 250N000011 HC RX IP 250 OP 636: Performed by: PHYSICIAN ASSISTANT

## 2021-10-23 PROCEDURE — 250N000013 HC RX MED GY IP 250 OP 250 PS 637: Performed by: SURGERY

## 2021-10-23 PROCEDURE — 99233 SBSQ HOSP IP/OBS HIGH 50: CPT | Performed by: PEDIATRICS

## 2021-10-23 PROCEDURE — 86140 C-REACTIVE PROTEIN: CPT | Performed by: PHYSICIAN ASSISTANT

## 2021-10-23 PROCEDURE — 92526 ORAL FUNCTION THERAPY: CPT | Mod: GN

## 2021-10-23 PROCEDURE — 250N000011 HC RX IP 250 OP 636: Performed by: SURGERY

## 2021-10-23 PROCEDURE — 84100 ASSAY OF PHOSPHORUS: CPT | Performed by: PHYSICIAN ASSISTANT

## 2021-10-23 PROCEDURE — 86923 COMPATIBILITY TEST ELECTRIC: CPT | Performed by: PHYSICIAN ASSISTANT

## 2021-10-23 PROCEDURE — 82374 ASSAY BLOOD CARBON DIOXIDE: CPT | Performed by: PHYSICIAN ASSISTANT

## 2021-10-23 PROCEDURE — 90937 HEMODIALYSIS REPEATED EVAL: CPT

## 2021-10-23 PROCEDURE — 250N000013 HC RX MED GY IP 250 OP 250 PS 637: Performed by: PHYSICIAN ASSISTANT

## 2021-10-23 PROCEDURE — 36415 COLL VENOUS BLD VENIPUNCTURE: CPT | Performed by: INTERNAL MEDICINE

## 2021-10-23 PROCEDURE — 250N000013 HC RX MED GY IP 250 OP 250 PS 637: Performed by: PEDIATRICS

## 2021-10-23 PROCEDURE — 258N000003 HC RX IP 258 OP 636: Performed by: PHYSICIAN ASSISTANT

## 2021-10-23 PROCEDURE — 83735 ASSAY OF MAGNESIUM: CPT | Performed by: PHYSICIAN ASSISTANT

## 2021-10-23 PROCEDURE — 36415 COLL VENOUS BLD VENIPUNCTURE: CPT | Performed by: PHYSICIAN ASSISTANT

## 2021-10-23 PROCEDURE — 85027 COMPLETE CBC AUTOMATED: CPT | Performed by: PHYSICIAN ASSISTANT

## 2021-10-23 PROCEDURE — P9041 ALBUMIN (HUMAN),5%, 50ML: HCPCS | Performed by: PHYSICIAN ASSISTANT

## 2021-10-23 PROCEDURE — 83605 ASSAY OF LACTIC ACID: CPT | Performed by: INTERNAL MEDICINE

## 2021-10-23 PROCEDURE — 99207 PR NO BILLABLE SERVICE THIS VISIT: CPT | Performed by: PHYSICIAN ASSISTANT

## 2021-10-23 PROCEDURE — 99233 SBSQ HOSP IP/OBS HIGH 50: CPT | Mod: 24 | Performed by: PHYSICIAN ASSISTANT

## 2021-10-23 PROCEDURE — 120N000002 HC R&B MED SURG/OB UMMC

## 2021-10-23 PROCEDURE — 99207 PR APP CREDIT; MD BILLING SHARED VISIT: CPT | Performed by: PHYSICIAN ASSISTANT

## 2021-10-23 RX ORDER — ESCITALOPRAM OXALATE 10 MG/1
10 TABLET ORAL DAILY
Status: DISCONTINUED | OUTPATIENT
Start: 2021-10-23 | End: 2021-11-05 | Stop reason: HOSPADM

## 2021-10-23 RX ORDER — GABAPENTIN 100 MG/1
100 CAPSULE ORAL 3 TIMES DAILY
Status: DISCONTINUED | OUTPATIENT
Start: 2021-10-23 | End: 2021-11-05 | Stop reason: HOSPADM

## 2021-10-23 RX ORDER — ALBUMIN (HUMAN) 12.5 G/50ML
50 SOLUTION INTRAVENOUS
Status: DISCONTINUED | OUTPATIENT
Start: 2021-10-23 | End: 2021-10-23

## 2021-10-23 RX ORDER — PANTOPRAZOLE SODIUM 40 MG/1
40 TABLET, DELAYED RELEASE ORAL
Status: DISCONTINUED | OUTPATIENT
Start: 2021-10-23 | End: 2021-11-05 | Stop reason: HOSPADM

## 2021-10-23 RX ORDER — ALBUMIN, HUMAN INJ 5% 5 %
250 SOLUTION INTRAVENOUS
Status: COMPLETED | OUTPATIENT
Start: 2021-10-23 | End: 2021-10-23

## 2021-10-23 RX ADMIN — FOLIC ACID 1 MG: 1 TABLET ORAL at 07:48

## 2021-10-23 RX ADMIN — RIFAXIMIN 550 MG: 550 TABLET ORAL at 07:47

## 2021-10-23 RX ADMIN — PANTOPRAZOLE SODIUM 40 MG: 40 TABLET, DELAYED RELEASE ORAL at 08:45

## 2021-10-23 RX ADMIN — PIPERACILLIN SODIUM AND TAZOBACTAM SODIUM 2.25 G: 2; .25 INJECTION, POWDER, LYOPHILIZED, FOR SOLUTION INTRAVENOUS at 06:12

## 2021-10-23 RX ADMIN — GABAPENTIN 100 MG: 100 CAPSULE ORAL at 13:16

## 2021-10-23 RX ADMIN — MIDODRINE HYDROCHLORIDE 20 MG: 5 TABLET ORAL at 07:46

## 2021-10-23 RX ADMIN — MIDODRINE HYDROCHLORIDE 20 MG: 5 TABLET ORAL at 00:34

## 2021-10-23 RX ADMIN — PIPERACILLIN SODIUM AND TAZOBACTAM SODIUM 2.25 G: 2; .25 INJECTION, POWDER, LYOPHILIZED, FOR SOLUTION INTRAVENOUS at 00:35

## 2021-10-23 RX ADMIN — PIPERACILLIN SODIUM AND TAZOBACTAM SODIUM 2.25 G: 2; .25 INJECTION, POWDER, LYOPHILIZED, FOR SOLUTION INTRAVENOUS at 23:15

## 2021-10-23 RX ADMIN — HYDROXYZINE HYDROCHLORIDE 50 MG: 25 TABLET, FILM COATED ORAL at 00:34

## 2021-10-23 RX ADMIN — HEPARIN SODIUM 5000 UNITS: 5000 INJECTION, SOLUTION INTRAVENOUS; SUBCUTANEOUS at 07:49

## 2021-10-23 RX ADMIN — THIAMINE HCL TAB 100 MG 100 MG: 100 TAB at 07:48

## 2021-10-23 RX ADMIN — SALINE NASAL SPRAY 1 SPRAY: 1.5 SOLUTION NASAL at 07:51

## 2021-10-23 RX ADMIN — PIPERACILLIN SODIUM AND TAZOBACTAM SODIUM 2.25 G: 2; .25 INJECTION, POWDER, LYOPHILIZED, FOR SOLUTION INTRAVENOUS at 18:59

## 2021-10-23 RX ADMIN — CALCIUM CARBONATE 600 MG (1,500 MG)-VITAMIN D3 400 UNIT TABLET 1 TABLET: at 07:47

## 2021-10-23 RX ADMIN — PIPERACILLIN SODIUM AND TAZOBACTAM SODIUM 2.25 G: 2; .25 INJECTION, POWDER, LYOPHILIZED, FOR SOLUTION INTRAVENOUS at 13:15

## 2021-10-23 RX ADMIN — Medication 100 MCG: at 07:47

## 2021-10-23 RX ADMIN — MIDODRINE HYDROCHLORIDE 20 MG: 5 TABLET ORAL at 18:58

## 2021-10-23 RX ADMIN — Medication 2.5 MG: at 10:59

## 2021-10-23 RX ADMIN — HEPARIN SODIUM 5000 UNITS: 5000 INJECTION, SOLUTION INTRAVENOUS; SUBCUTANEOUS at 20:03

## 2021-10-23 RX ADMIN — SALINE NASAL SPRAY 1 SPRAY: 1.5 SOLUTION NASAL at 20:03

## 2021-10-23 RX ADMIN — SODIUM CHLORIDE 300 ML: 9 INJECTION, SOLUTION INTRAVENOUS at 15:35

## 2021-10-23 RX ADMIN — Medication 2.5 MG: at 14:33

## 2021-10-23 RX ADMIN — Medication 2.5 MG: at 20:03

## 2021-10-23 RX ADMIN — POLYETHYLENE GLYCOL 3350 17 G: 17 POWDER, FOR SOLUTION ORAL at 20:03

## 2021-10-23 RX ADMIN — GABAPENTIN 100 MG: 100 CAPSULE ORAL at 20:03

## 2021-10-23 RX ADMIN — GABAPENTIN 100 MG: 100 CAPSULE ORAL at 08:45

## 2021-10-23 RX ADMIN — Medication 2.5 MG: at 06:15

## 2021-10-23 RX ADMIN — ESCITALOPRAM OXALATE 10 MG: 10 TABLET ORAL at 08:45

## 2021-10-23 RX ADMIN — Medication 12.5 MG: at 01:02

## 2021-10-23 RX ADMIN — ACETAMINOPHEN 650 MG: 325 TABLET, FILM COATED ORAL at 10:59

## 2021-10-23 RX ADMIN — MIDODRINE HYDROCHLORIDE 20 MG: 5 TABLET ORAL at 23:16

## 2021-10-23 RX ADMIN — Medication: at 15:35

## 2021-10-23 RX ADMIN — ALBUMIN HUMAN 250 ML: 0.05 INJECTION, SOLUTION INTRAVENOUS at 15:34

## 2021-10-23 RX ADMIN — SALINE NASAL SPRAY 1 SPRAY: 1.5 SOLUTION NASAL at 13:29

## 2021-10-23 RX ADMIN — Medication 12.5 MG: at 23:16

## 2021-10-23 RX ADMIN — RIFAXIMIN 550 MG: 550 TABLET ORAL at 20:03

## 2021-10-23 ASSESSMENT — ACTIVITIES OF DAILY LIVING (ADL)
ADLS_ACUITY_SCORE: 12
ADLS_ACUITY_SCORE: 13
ADLS_ACUITY_SCORE: 12
ADLS_ACUITY_SCORE: 13
ADLS_ACUITY_SCORE: 12
ADLS_ACUITY_SCORE: 13

## 2021-10-23 ASSESSMENT — MIFFLIN-ST. JEOR: SCORE: 1572

## 2021-10-23 NOTE — PLAN OF CARE
0351-0373: A&O x4. Patients NJ tube clogged. Tried clog zapper x2, still clogged.Tube feedings held for the night. MD notified. Able to take smaller pills with pudding and bigger pills cut in half. BG stable during shift. C/O abdominal and back pain. Assist of 2 with gait belt and walker to the commode. 3 BM during shift. R internal jugular is CDI for HD. L internal jugular brown lumen is TKO with NS, white and blue lumens are saline locked. Will continue to monitor and follow plan of care.

## 2021-10-23 NOTE — PROVIDER NOTIFICATION
GOLD 6- 5234-2 TG. Patients NJ tube clogged. Tried clog zapper x2, still clogged. Unable to run feeding. Pt can take some meds with pudding except Rifaximin.     Update: try to split bigger pills in half if possible for patient to take. Will reassess NJ tube.

## 2021-10-23 NOTE — PROGRESS NOTES
HEMODIALYSIS TREATMENT NOTE    Date: 10/23/2021  Time: 6:09 PM    Data:  Pre Wt: 96.6 kg (215 lb 6.2 oz)   Desired Wt: 94.6 kg   Post Wt: 94.6 kg (212 lb 15.4 oz)  Weight change: 2 kg  Ultrafiltration - Post Run Net Total Removed (mL): 2000 mL  Vascular Access Status: patent  Dialyzer Rinse: Clear  Total Blood Volume Processed: 54.2 L Liters  Total Dialysis (Treatment) Time: 0735-9601 Hours    Lab:   No    Interventions:      Assessment:  Patient on hemodialysis from 0145-9806. Patient ran on K4 and Ca3 baths. A/O with stable vitals. 2L of fluid removed. Max . Pt rinsed back with no issues. Right double lumen tunneled internal jugular CVC patent. Caps changed and saline locked. Report given to NAWAF Dao.     Plan:    Per renal

## 2021-10-23 NOTE — PROGRESS NOTES
8048-4901: A&O x4. Patients NJ tube removed, encouraging patient to eat orally.  Pt. Is on calorie counts, with small bites and soft foods.  Able to take smaller pills with pudding and bigger pills cut in half. BG stable during shift. C/O abdominal and back pain. Assist of 2 with gait belt and walker to the commode. R internal jugular is CDI for HD. L internal jugular can be removed. Will continue to monitor and follow plan of care. Hemoglobin 6.9, blood transfusion in process.  Hemodialysis at 1500.  O2 at 100% on 3L NC.  BP: 90/50

## 2021-10-23 NOTE — PROGRESS NOTES
Patient has been educated on potential risks of choosing to leave the unit and that the responsibility for patient well-being will belong to the patient. Pt has been informed that admission to hospital is due to need for medical treatment. Education given to the patient on some of the potential risks included but are not limited to:      - lack of access to nursing intervention      - possible missed appointments with MD, therapies, tests      - possible missed medications, antibiotics, management of IV's    Patient Response: Pt states she understands and will be back in 15 minutes    Patient notified staff prior to leaving unit: yes  Coban wrap placed over IV prior to pt leaving unit: No, internal jugular triple lumen line unable to wrap. Green caps in place.     Pt strongly encouraged not to leave due to critical Hgb of 6.9 and need for 1 unit RBC as well as need for 3L O2 NC. Pt pushed down to front lobby by dad at 1245 and told to return by 1pm for antibx and blood infusion.

## 2021-10-23 NOTE — PROGRESS NOTES
[facility], [place]    Medicine Progress Note - Hospitalist Service, Gold 6       Date of Admission:  9/29/2021    Assessment & Plan         Aliyah Angeles is a 46 year old female with a history of alcohol use disorder, Fitz-en-Y gastric bypass (2010), DM2, anxiety, depression, who was initially admitted to Internal Medicine service with alcohol hepatitis. She transferred to MICU on 10/4/21 with acute hypoxic respiratory failure 2/2 ARDS requiring intubation. Hospitalization complicated by septic shock, pneumonia, ANA LAURA requiring CRRT and now iHD, encephalopathy. Self-extubated on 10/14, weaned off pressors on 10/19, and transferred to floor status on 10/20.     Changes today:   - NJ clogged, will remove today   - Trial oral intake this weekend, if inadequate PO intake will need to replace NG vs NJ   - UF run today     Acute hypoxic respiratory failure, improving  ARDS, resolved  Pneumonia  Pulmonary edema   Acute decline in respiratory status requiring intubation on 10/4, CXR c/w ARDS, likely secondary to HAP vs aspiration. BAL cultures negative. Treated with Zosyn 10/4 - 10/13. Self-extubated on 10/14. Developed increased work of breathing on 10/17, CXR demonstrated increased interstitial opacities concerning for pulmonary edema vs pneumonia. Currently managing volume with Lasix + iHD and treating for VAP. Deconditioning and atelectasis likely contributing to ongoing O2 needs. CRP. WBC improving. Afebrile.    - Continue IV Zosyn to complete a 8-day course, on day #7   - Lasix, HD per Nephrology   - Incentive spirometry   - OOB at least TID   - Continuous pulse oximetry. Wean O2 as able.    - Trend CBC daily, CRP q48h    Septic shock, resolved: Shock most likely secondary to sepsis with hepatic dysfunction contributing as well. TTE with hyperdynamic LVEF, no RV dysfunction. Weaned off pressors on 10/19. Continues to require midodrine for soft blood pressures.   - Midodrine 20 mg  TID, wean as able   - Vitals q4h      ANA LAURA  Hypervolemia  Baseline Cr ~0.5. Cr to peak of 2.98. Decline in renal function most likely secondary to ATN (hypotension, medications, contrast), possible component of HRS. On CRRT 10.7 - 10/13, now transitioned to iHD. RIJ not functioning, unable to dialyze on 10/21. New tunneled RIJ placed by IR On 10/22.   - Nephrology following   - UF today   - No Lasix today    - Strict I/Os   - Daily weights   - Lymphedema team consulted    Alcohol hepatitis: Recently admitted in September with ETOH hepatitis, started on rifaximin and lactulose per Hepatology recs. Now re-presented with ETOH hepatitis, Maddrey score 13 on admission. Acute hepatitis c/b portal hypertension with ascites. Diagnostic mane negative for SBP (most recently 10/11, 1.25L off). Tbili, INR, AST/ALT, platelets all improving. MELD 25.     - Eval for therapeutic paracentesis tomorrow   - Lactulose titrated to 3-4 loose BMs daily   - Rifaximin BID   - Trend CMP, INR   - Scheduled to follow-up with Hepatology on 10/26, will likely need to be re-scheduled    Toxic metabolic encephalopathy, resolved: Multifactorial secondary to acute illness, sedating medications, HE. Psychiatry consulted 10/15, started Seroquel.  Fully A&O today.    - Lactulose, rifaximin   - Seroquel to 12.5 mg at bedtime, taper off tomorrow if stable   - Delirium precautions   - Minimize sedating medications     Abdominal pain: Secondary to alcohol hepatitis, gas pains.    - Tylenol TID   - Oxycodone 2.5 mg q4h PRN - wean as able   - Simethicone PRN    Severe malnutrition  Dysphagia   In the context of acute on chronic illness. Started on TF 10/5. Now more awake and interested in eating but not feeling hungry, transitioned to cycled feeds on 10/21. NJ clogged overnight.   - Remove NJ tube   - Will trial oral intake this weekend, if unable to meet caloric goals will need to replace NG vs NJ   - Nutrition, SLP following   - Thin liquid + soft food  diet per SLP   - Calorie counts    DM2: A1C 9.1. PTA on metformin. Transitioned to insulin due to hyperglycemia while on tube feeds. BG much improved with cycling of TF.    - Hold Lantus   - Holding metformin while inpatient   - HSSI    - BG checks q4h    - Hypoglycemia protocol    Acute on chronic macrocytic anemia:  Likely secondary to alcohol use, alcohol hepatitis, nutritional deficiencies. Intermittently requiring PRBC transfusions since admission (10/7, 10/12, 10/16). No e/o active bleeding. Hgb 7.2 today.   - CBC in AM     Alcohol use disorder: Last drink on 9/12/21. PTA was at CD treatment facility.    - Patient interested in resuming CD treatment, will need to re-evaluate this closer to discharge date, at this time PT/OT recommending TCU   - Continue folic acid, thiamine, MVI supplements        Other Stable Medical Issues:   Coagulopathy: INR to peak of 2.5, now much improved. Likely secondary to hepatic dysfunction.   MDD, RUBÉN: Continue PTA Lexapro. Prazosin on hold d/t hypotension, resume as able. Health Psychology consulted 10/1, re-involve PRN.   GERD: Continue PPI.   Neuropathy: Continue PTA gabapentin.          Diet: Adult Formula Drip Feeding: Continuous Novasource Renal; Nasoduodenal tube; Goal Rate: 45; mL/hr; From: 8:00 PM; 8:00 AM; Medication - Feeding Tube Flush Frequency: At least 15-30 mL water before and after medication administration and with tube c...  Calorie Counts  Snacks/Supplements Adult: Ensure Clear; Between Meals  Combination Diet Soft and Bite Sized Diet (level 6); Thin Liquids (level 0)    DVT Prophylaxis: Heparin SQ  Rice Catheter: Not present  Central Lines: PRESENT  CVC TRIPLE LUMEN Left Internal jugular-Site Assessment: WDL except  CVC Double Lumen Right Internal jugular Tunneled-Site Assessment: WDL  Code Status: Full Code      Disposition Plan   Expected discharge: 11/03/2021   recommended to transitional care unit once HD plan established, safe disposition plan in  place.     The patient's care was discussed with the Attending Physician, Dr. Cruz, Bedside Nurse and Patient.    Gloria Colon PA-C  Hospitalist Service, [address]  Securely message with the Vocera Web Console (learn more here)  Text page via [facility] Paging/Directory  Please see sign in/sign out for up to date coverage information    Clinically Significant Risk Factors Present on Admission                ______________________________________________________________________    Interval History   Has ongoing dyspnea, stable, worsens when laying flat. Minimal oral intake yesterday. NJ clogged overnight, unable to de-clog despite multiple attempts by nursing staff. No fevers.     Data reviewed today: I reviewed all medications, new labs and imaging results over the last 24 hours.     Physical Exam   Vital Signs: Temp: (!) 96.7  F (35.9  C) Temp src: Oral BP: 95/62 Pulse: 75   Resp: 18 SpO2: 100 % O2 Device: Nasal cannula Oxygen Delivery: 2 LPM  Weight: 212 lbs 15.43 oz  Constitutional: Awake and alert, in no apparent distress. Sitting up comfortably in bed.   Eyes: + scleral icterus  Respiratory: Breathing non-labored. Bibasilar crackles. Good air entry.   Cardiovascular:  RRR, normal S1/S2. No rubs or murmurs. 2+ pitting edema extending up to bilateral hips. Lymphedema wraps in place.   GI: Non-tender. Distended, firm. Hyperactive bowel sounds.   Skin: Jaundiced.  No visible rashes, lesions, or bruising of concern.   Neurologic: Alert and oriented to person, place, and time. No focal deficits. Moves all extremities. No tremor. No asterixis. Facies symmetric.         Data   ROUTINE IP LABS (Last four results)  Recent Labs   Lab 10/23/21  0754 10/23/21  0627 10/23/21  0432 10/23/21  0101 10/22/21  0806 10/22/21  0535 10/21/21  0813 10/21/21  0337 10/20/21  0825 10/20/21  0436 10/18/21  0722 10/18/21  0354 10/17/21  0854 10/17/21  0358   NA  --  133  --   --    --  133  --  134  --  134   < > 135   < > 133   POTASSIUM  --  3.3*  --   --   --  3.7  --  4.0  --  4.2   < > 4.4   < > 4.3   CHLORIDE  --  95  --   --   --  97  --  100  --  101   < > 102   < > 101   CO2  --  30  --   --   --  25  --  25  --  23   < > 22   < > 24   ANIONGAP  --  8  --   --   --  11  --  9  --  10   < > 11   < > 8   * 115* 115* 138*   < > 106*   < > 157*   < > 176*  172*   < > 172*   < > 217*  201*   BUN  --  39*  --   --   --  68*  --  61*  --  52*   < > 71*   < > 55*   CR  --  2.09*  --   --   --  3.14*  --  2.78*  --  2.36*   < > 2.66*   < > 2.17*   STEFFANY  --  7.8*  --   --   --  8.6  --  8.7  --  8.5   < > 8.3*   < > 8.6   MAG  --  1.6  --   --   --  1.9  --  1.8  --  1.8   < > 2.0   < > 2.1   PHOS  --  5.6*  --   --   --  7.9*  --  7.0*  --  6.0*   < > 6.3*   < > 5.2*   PROTTOTAL  --   --   --   --   --  5.7*  --  5.4*  --   --   --  4.4*  --  5.4*   ALBUMIN  --   --   --   --   --  1.5*  --  1.4*  --   --   --  1.4*  --  1.5*   BILITOTAL  --   --   --   --   --  3.9*  --  4.1*  --   --   --  5.9*  --  6.5*   ALKPHOS  --   --   --   --   --  285*  --  303*  --   --   --  299*  --  338*   AST  --   --   --   --   --  85*  --  98*  --   --   --  102*  --  97*   ALT  --   --   --   --   --  42  --  41  --   --   --  38  --  43    < > = values in this interval not displayed.     Recent Labs   Lab 10/22/21  0535 10/21/21  1057 10/21/21  0638 10/21/21  0337 10/20/21  0436 10/20/21  0436 10/19/21  0356 10/19/21  0356   WBC 19.2*  --   --  18.0*  --  19.6*  --  24.0*   RBC 2.14*  --   --  2.08*  --  2.25*  --  2.30*   HGB 7.0* 7.2* 6.7* 6.5*   < > 7.3*   < > 7.6*   HCT 22.5*  --   --  21.5*  --  23.1*  --  23.5*   *  --   --  103*  --  103*  --  102*   MCH 32.7  --   --  31.3  --  32.4  --  33.0   MCHC 31.1*  --   --  30.2*  --  31.6  --  32.3   RDW 20.5*  --   --  20.7*  --  21.2*  --  21.3*     --   --  201  --  222  --  244    < > = values in this interval not displayed.      Recent Labs   Lab 10/22/21  0535 10/19/21  0356   INR 1.32* 1.37*

## 2021-10-23 NOTE — PROGRESS NOTES
Calorie Count  Intake recorded for: 10/22  Total Kcals: 0 Total Protein: 0g  Kcals from Hospital Food: 0   Protein: 0g  Kcals from Outside Food (average):0 Protein: 0g  # Meals Ordered from Kitchen: 1 meal  # Meals Recorded: 0  # Supplements Recorded: 0

## 2021-10-23 NOTE — PROGRESS NOTES
Nephrology Progress Note  10/23/2021         Aliyah Angeles is a 46 yof w/hx of ETOH use (Quit 9/12/2021) w/hepatitis, Fitz en Y bypass, Pancreatitis, DM2, Panc cyst who was admitted 9/29 from chem dep with N/V and worsening abdominal pain and edema.  Her course has been resp failure with ARDS and evolving ANA LAURA.  Noted to have received albumin from 10/1-10/3 (150g/day) with no improvement in Cr.  On Vanco/Zosyn to treat sepsis although cultures at time of consult are negative.  Nephrology consulted for evaluation of ANA LAURA and possible RRT, started CRRT on 10/7.       Interval History :   Mrs Angeles had CRRT started 10/7 for management of volume and acidosis, transitioned to iHD on 10/15.  Tried diuretics the past 2 days but Cr went up so had HD yesterday. Only able to pull 1.1 L due to low BP. Will have UF today, try for 2 L over 2.5 hours. Tunneled line placed 10/22/1, deciding on runs daily, likely will discharge on HD.  Getting midodrine 20 mg TID.     Assessment & Recommendations:   ANA LAURA-Baseline Cr normal at 0.5 as recently as 9/14/2021, abdominal US pending currently.  Was at inpatient chem dep when she developed abdominal pain, N/V and worsening edema and was brought to [facility] on 9/29.  Was treated for UTI and started on Vanco/Zosyn for leukocytosis but decompensated and was intubated the evening of 10/3.  Noted she did receive albumin x3 days from 10/1-10/3 for ANA LAURA but actually worsened and Cr at time of consult is up to 2.  Lisa <5 on 10/3, ECHO done 10/4 showed hyperdynamic LV and normal IVC.  Etiology of ANA LAURA is likely ATN with underlying HRS physiology which made her more susceptible to hypoperfusion.  Started CRRT on 10/7 for management of volume and acidosis.  Trying to transition to iHD.                  -ANA LAURA, likely hypoperfusion in setting of baseline HRS physiology, possible contribution from vanco/zosyn and did receive contrast on 10/3.  Some UOP but no signs of recovery yet.                  -CRRT  "started 10/7, stopped 10/13.  Planned for UF only run today.     -Line is tunneled placed 10/22.                   -Dialysis consent signed and scanned in under media.       Volume-Has total body volume overload, I/O's difficult to interpret as we are not capturing much of her UOP and wt's are unreliable.  Planning on UF only today and will try to pull 2 L with run.       Electrolytes/pH-K normal at 3.7 bicarb 25, sodium 133.       Liver-Unclear duration, quit drinking on 9/12/2021 and was at chem dep when acute issues started.  Bili is down to 6 and INR is 1.4 and a bit improved.  GI following, not clear if she would be a transplant candidate eventually.       ID-On Zosyn and Rifaximin.       Nutrition-On Osmolite TF.          Seen and discussed with Dr Holden     Recommendations were communicated to primary team via this note.      Sharon Johnson PA-C    Review of Systems:   I reviewed the following systems:  Gen: No fevers or chills  CV: No CP at rest  Resp: No SOB at rest  GI: No N/V    Physical Exam:   I/O last 3 completed shifts:  In: 150 [P.O.:150]  Out: 1300 [Urine:200; Other:1100]   BP 95/62 (BP Location: Right arm)   Pulse 75   Temp (!) 96.7  F (35.9  C) (Oral)   Resp 18   Ht 1.626 m (5' 4\")   Wt 96.6 kg (212 lb 15.4 oz)   SpO2 100%   BMI 36.56 kg/m       GENERAL APPEARANCE: Extubated, stable from pulm standpoint.  Sitting up in chair, a bit SOB today.   EYES: No scleral icterus  Pulmonary: lungs rhonchi to auscultation with equal breath sounds bilaterally, no clubbing or cyanosis  CV: Regular rhythm, normal rate, no rub   - Edema +2 LE/dependent  GI: soft, nontender, normal bowel sounds  MS: no evidence of inflammation in joints, no muscle tenderness  : No barrett, some intermittent UOP.   SKIN: no rash, warm, dry  NEURO: No focal deficits.    Labs:   All labs reviewed by me  Electrolytes/Renal -   Recent Labs   Lab Test 10/23/21  0754 10/23/21  0627 10/23/21  0432 10/22/21  0806 10/22/21  0535 " 10/21/21  0813 10/21/21  0337   NA  --  133  --   --  133  --  134   POTASSIUM  --  3.3*  --   --  3.7  --  4.0   CHLORIDE  --  95  --   --  97  --  100   CO2  --  30  --   --  25  --  25   BUN  --  39*  --   --  68*  --  61*   CR  --  2.09*  --   --  3.14*  --  2.78*   * 115* 115*   < > 106*   < > 157*   STEFFANY  --  7.8*  --   --  8.6  --  8.7   MAG  --  1.6  --   --  1.9  --  1.8   PHOS  --  5.6*  --   --  7.9*  --  7.0*    < > = values in this interval not displayed.       CBC -   Recent Labs   Lab Test 10/22/21  0535 10/21/21  1057 10/21/21  0638 10/21/21  0337 10/21/21  0337 10/20/21  0436 10/20/21  0436   WBC 19.2*  --   --   --  18.0*  --  19.6*   HGB 7.0* 7.2* 6.7*   < > 6.5*   < > 7.3*     --   --   --  201  --  222    < > = values in this interval not displayed.       LFTs -   Recent Labs   Lab Test 10/22/21  0535 10/21/21  0337 10/18/21  0354   ALKPHOS 285* 303* 299*   BILITOTAL 3.9* 4.1* 5.9*   ALT 42 41 38   AST 85* 98* 102*   PROTTOTAL 5.7* 5.4* 4.4*   ALBUMIN 1.5* 1.4* 1.4*       Iron Panel -   Recent Labs   Lab Test 09/13/21  0642   EYAD 1,839*           Current Medications:    sodium chloride 0.9%  300 mL Hemodialysis Machine Once     acetaminophen  650 mg Oral or Feeding Tube Q8H     albumin human  250 mL Intravenous Once in dialysis/CRRT     calcium carbonate 600 mg-vitamin D 400 units  1 tablet Oral or Feeding Tube Daily     cyanocobalamin  100 mcg Oral or Feeding Tube Daily     escitalopram  10 mg Oral Daily     folic acid  1 mg Oral or Feeding Tube Daily     gabapentin  100 mg Oral TID     heparin ANTICOAGULANT  5,000 Units Subcutaneous Q12H     insulin aspart  1-12 Units Subcutaneous Q4H     [Held by provider] insulin glargine  35 Units Subcutaneous Daily     midodrine  20 mg Oral or Feeding Tube Q8H     multivitamins w/minerals  15 mL Oral Daily     - MEDICATION INSTRUCTIONS -   Does not apply Once     pantoprazole  40 mg Oral QAM AC     piperacillin-tazobactam  2.25 g Intravenous  Q6H     polyethylene glycol  17 g Oral BID     [Held by provider] prazosin  1 mg Oral QPM     protein modular  1 packet Per Feeding Tube BID     psyllium  1 packet Oral Daily     QUEtiapine  12.5 mg Oral At Bedtime     rifaximin  550 mg Oral or Feeding Tube BID     sodium chloride  1 spray Both Nostrils 4x Daily     sodium chloride (PF)  9 mL Intracatheter During Dialysis/CRRT (from stock)     sodium chloride (PF)  9 mL Intracatheter During Dialysis/CRRT (from stock)     thiamine  100 mg Oral or Feeding Tube Daily       dextrose Stopped (10/22/21 6915)

## 2021-10-23 NOTE — SUMMARY OF CARE
Provider notified (name): Gloria Colon  Reason for notification: Low Hgb level  Recommendation/request given to provider: Provider notified of Critical value  Response from provider: Awaiting for response

## 2021-10-24 ENCOUNTER — APPOINTMENT (OUTPATIENT)
Dept: SPEECH THERAPY | Facility: CLINIC | Age: 46
End: 2021-10-24
Payer: COMMERCIAL

## 2021-10-24 LAB
ALBUMIN SERPL-MCNC: 1.6 G/DL (ref 3.4–5)
ALP SERPL-CCNC: 351 U/L (ref 40–150)
ALT SERPL W P-5'-P-CCNC: 36 U/L (ref 0–50)
ANION GAP SERPL CALCULATED.3IONS-SCNC: 8 MMOL/L (ref 3–14)
AST SERPL W P-5'-P-CCNC: 75 U/L (ref 0–45)
BILIRUB SERPL-MCNC: 3.5 MG/DL (ref 0.2–1.3)
BUN SERPL-MCNC: 27 MG/DL (ref 7–30)
CALCIUM SERPL-MCNC: 8.4 MG/DL (ref 8.5–10.1)
CHLORIDE BLD-SCNC: 99 MMOL/L (ref 94–109)
CO2 SERPL-SCNC: 27 MMOL/L (ref 20–32)
CREAT SERPL-MCNC: 1.86 MG/DL (ref 0.52–1.04)
ERYTHROCYTE [DISTWIDTH] IN BLOOD BY AUTOMATED COUNT: 20.9 % (ref 10–15)
GFR SERPL CREATININE-BSD FRML MDRD: 32 ML/MIN/1.73M2
GLUCOSE BLD-MCNC: 108 MG/DL (ref 70–99)
GLUCOSE BLDC GLUCOMTR-MCNC: 105 MG/DL (ref 70–99)
GLUCOSE BLDC GLUCOMTR-MCNC: 109 MG/DL (ref 70–99)
GLUCOSE BLDC GLUCOMTR-MCNC: 110 MG/DL (ref 70–99)
GLUCOSE BLDC GLUCOMTR-MCNC: 126 MG/DL (ref 70–99)
GLUCOSE BLDC GLUCOMTR-MCNC: 127 MG/DL (ref 70–99)
GLUCOSE BLDC GLUCOMTR-MCNC: 136 MG/DL (ref 70–99)
HCT VFR BLD AUTO: 25.7 % (ref 35–47)
HGB BLD-MCNC: 7.9 G/DL (ref 11.7–15.7)
MAGNESIUM SERPL-MCNC: 1.7 MG/DL (ref 1.6–2.3)
MCH RBC QN AUTO: 31.2 PG (ref 26.5–33)
MCHC RBC AUTO-ENTMCNC: 30.7 G/DL (ref 31.5–36.5)
MCV RBC AUTO: 102 FL (ref 78–100)
PHOSPHATE SERPL-MCNC: 4.5 MG/DL (ref 2.5–4.5)
PLATELET # BLD AUTO: 239 10E3/UL (ref 150–450)
POTASSIUM BLD-SCNC: 3.5 MMOL/L (ref 3.4–5.3)
PROT SERPL-MCNC: 6.1 G/DL (ref 6.8–8.8)
RBC # BLD AUTO: 2.53 10E6/UL (ref 3.8–5.2)
SODIUM SERPL-SCNC: 134 MMOL/L (ref 133–144)
WBC # BLD AUTO: 17.2 10E3/UL (ref 4–11)

## 2021-10-24 PROCEDURE — 250N000013 HC RX MED GY IP 250 OP 250 PS 637: Performed by: PHYSICIAN ASSISTANT

## 2021-10-24 PROCEDURE — 86481 TB AG RESPONSE T-CELL SUSP: CPT | Performed by: PHYSICIAN ASSISTANT

## 2021-10-24 PROCEDURE — 86706 HEP B SURFACE ANTIBODY: CPT | Performed by: PHYSICIAN ASSISTANT

## 2021-10-24 PROCEDURE — 99207 PR APP CREDIT; MD BILLING SHARED VISIT: CPT | Performed by: PHYSICIAN ASSISTANT

## 2021-10-24 PROCEDURE — 250N000011 HC RX IP 250 OP 636: Performed by: PHYSICIAN ASSISTANT

## 2021-10-24 PROCEDURE — 83735 ASSAY OF MAGNESIUM: CPT | Performed by: PHYSICIAN ASSISTANT

## 2021-10-24 PROCEDURE — 120N000002 HC R&B MED SURG/OB UMMC

## 2021-10-24 PROCEDURE — 36415 COLL VENOUS BLD VENIPUNCTURE: CPT | Performed by: PHYSICIAN ASSISTANT

## 2021-10-24 PROCEDURE — 84100 ASSAY OF PHOSPHORUS: CPT | Performed by: PHYSICIAN ASSISTANT

## 2021-10-24 PROCEDURE — 85027 COMPLETE CBC AUTOMATED: CPT | Performed by: PHYSICIAN ASSISTANT

## 2021-10-24 PROCEDURE — 87340 HEPATITIS B SURFACE AG IA: CPT | Performed by: PHYSICIAN ASSISTANT

## 2021-10-24 PROCEDURE — 80053 COMPREHEN METABOLIC PANEL: CPT | Performed by: PHYSICIAN ASSISTANT

## 2021-10-24 PROCEDURE — 99232 SBSQ HOSP IP/OBS MODERATE 35: CPT | Performed by: PEDIATRICS

## 2021-10-24 PROCEDURE — 250N000013 HC RX MED GY IP 250 OP 250 PS 637: Performed by: PEDIATRICS

## 2021-10-24 PROCEDURE — 92526 ORAL FUNCTION THERAPY: CPT | Mod: GN

## 2021-10-24 PROCEDURE — 99233 SBSQ HOSP IP/OBS HIGH 50: CPT | Mod: 24 | Performed by: PHYSICIAN ASSISTANT

## 2021-10-24 RX ORDER — MULTIVITAMIN,THERAPEUTIC
1 TABLET ORAL DAILY
Status: DISCONTINUED | OUTPATIENT
Start: 2021-10-24 | End: 2021-10-25

## 2021-10-24 RX ORDER — ALBUMIN, HUMAN INJ 5% 5 %
250 SOLUTION INTRAVENOUS
Status: DISCONTINUED | OUTPATIENT
Start: 2021-10-25 | End: 2021-10-25

## 2021-10-24 RX ADMIN — FOLIC ACID 1 MG: 1 TABLET ORAL at 07:37

## 2021-10-24 RX ADMIN — RIFAXIMIN 550 MG: 550 TABLET ORAL at 07:37

## 2021-10-24 RX ADMIN — MIDODRINE HYDROCHLORIDE 20 MG: 5 TABLET ORAL at 07:35

## 2021-10-24 RX ADMIN — PIPERACILLIN SODIUM AND TAZOBACTAM SODIUM 2.25 G: 2; .25 INJECTION, POWDER, LYOPHILIZED, FOR SOLUTION INTRAVENOUS at 18:04

## 2021-10-24 RX ADMIN — POLYETHYLENE GLYCOL 3350 17 G: 17 POWDER, FOR SOLUTION ORAL at 20:28

## 2021-10-24 RX ADMIN — PIPERACILLIN SODIUM AND TAZOBACTAM SODIUM 2.25 G: 2; .25 INJECTION, POWDER, LYOPHILIZED, FOR SOLUTION INTRAVENOUS at 06:08

## 2021-10-24 RX ADMIN — MIDODRINE HYDROCHLORIDE 20 MG: 5 TABLET ORAL at 16:27

## 2021-10-24 RX ADMIN — SALINE NASAL SPRAY 1 SPRAY: 1.5 SOLUTION NASAL at 07:38

## 2021-10-24 RX ADMIN — Medication 2.5 MG: at 01:57

## 2021-10-24 RX ADMIN — PIPERACILLIN SODIUM AND TAZOBACTAM SODIUM 2.25 G: 2; .25 INJECTION, POWDER, LYOPHILIZED, FOR SOLUTION INTRAVENOUS at 12:47

## 2021-10-24 RX ADMIN — SALINE NASAL SPRAY 1 SPRAY: 1.5 SOLUTION NASAL at 20:28

## 2021-10-24 RX ADMIN — RIFAXIMIN 550 MG: 550 TABLET ORAL at 20:29

## 2021-10-24 RX ADMIN — POLYETHYLENE GLYCOL 3350 17 G: 17 POWDER, FOR SOLUTION ORAL at 07:42

## 2021-10-24 RX ADMIN — THIAMINE HCL TAB 100 MG 100 MG: 100 TAB at 07:37

## 2021-10-24 RX ADMIN — Medication 2.5 MG: at 06:41

## 2021-10-24 RX ADMIN — ACETAMINOPHEN 650 MG: 325 TABLET, FILM COATED ORAL at 01:57

## 2021-10-24 RX ADMIN — Medication 2.5 MG: at 16:27

## 2021-10-24 RX ADMIN — THERA TABS 1 TABLET: TAB at 12:02

## 2021-10-24 RX ADMIN — Medication 2.5 MG: at 20:10

## 2021-10-24 RX ADMIN — ESCITALOPRAM OXALATE 10 MG: 10 TABLET ORAL at 07:36

## 2021-10-24 RX ADMIN — HEPARIN SODIUM 5000 UNITS: 5000 INJECTION, SOLUTION INTRAVENOUS; SUBCUTANEOUS at 20:09

## 2021-10-24 RX ADMIN — GABAPENTIN 100 MG: 100 CAPSULE ORAL at 20:10

## 2021-10-24 RX ADMIN — PANTOPRAZOLE SODIUM 40 MG: 40 TABLET, DELAYED RELEASE ORAL at 07:37

## 2021-10-24 RX ADMIN — GABAPENTIN 100 MG: 100 CAPSULE ORAL at 13:31

## 2021-10-24 RX ADMIN — HEPARIN SODIUM 5000 UNITS: 5000 INJECTION, SOLUTION INTRAVENOUS; SUBCUTANEOUS at 07:38

## 2021-10-24 RX ADMIN — GABAPENTIN 100 MG: 100 CAPSULE ORAL at 07:37

## 2021-10-24 RX ADMIN — Medication 100 MCG: at 07:37

## 2021-10-24 RX ADMIN — CALCIUM CARBONATE 600 MG (1,500 MG)-VITAMIN D3 400 UNIT TABLET 1 TABLET: at 07:36

## 2021-10-24 RX ADMIN — Medication 2.5 MG: at 12:02

## 2021-10-24 ASSESSMENT — MIFFLIN-ST. JEOR: SCORE: 1594

## 2021-10-24 ASSESSMENT — ACTIVITIES OF DAILY LIVING (ADL)
ADLS_ACUITY_SCORE: 12
ADLS_ACUITY_SCORE: 10
ADLS_ACUITY_SCORE: 12
ADLS_ACUITY_SCORE: 10
ADLS_ACUITY_SCORE: 12
ADLS_ACUITY_SCORE: 12
ADLS_ACUITY_SCORE: 10
ADLS_ACUITY_SCORE: 10
ADLS_ACUITY_SCORE: 12
ADLS_ACUITY_SCORE: 12
ADLS_ACUITY_SCORE: 10
ADLS_ACUITY_SCORE: 10
ADLS_ACUITY_SCORE: 12

## 2021-10-24 NOTE — PROGRESS NOTES
[facility]    Medicine Progress Note - Hospitalist Service, Gold 6       Date of Admission:  9/29/2021    Assessment & Plan         Aliyah Angeles is a 46 year old female with a history of alcohol use disorder, Fitz-en-Y gastric bypass (2010), DM2, anxiety, depression, who was initially admitted to Internal Medicine service with alcohol hepatitis. She transferred to MICU on 10/4/21 with acute hypoxic respiratory failure 2/2 ARDS requiring intubation. Hospitalization complicated by septic shock, pneumonia, ANA LAURA requiring CRRT and now iHD, encephalopathy. Self-extubated on 10/14, weaned off pressors on 10/19, and transferred to floor status on 10/20.     Changes today:   - Advance to regular diet   - Discontinue Seroquel   - No HD run      Acute hypoxic respiratory failure, improving  ARDS, resolved  Pneumonia  Pulmonary edema   Acute decline in respiratory status requiring intubation on 10/4, CXR c/w ARDS, likely secondary to HAP vs aspiration. BAL cultures negative. Treated with Zosyn 10/4 - 10/13. Self-extubated on 10/14. Developed increased work of breathing on 10/17, CXR demonstrated increased interstitial opacities concerning for pulmonary edema vs pneumonia. Currently managing volume with Lasix + iHD and treating for VAP. Deconditioning and atelectasis likely contributing to ongoing O2 needs. Still requiring frequent HD for volume management, anticipate will need on discharge.   - Continue IV Zosyn to complete a 10-day course, on day #8   - Incentive spirometry   - OOB at least TID   - Continuous pulse oximetry. Wean O2 as able.    - Trend CBC daily, CRP q48h     Septic shock, resolved: Shock most likely secondary to sepsis with hepatic dysfunction contributing as well. TTE with hyperdynamic LVEF, no RV dysfunction. Weaned off pressors on 10/19. Continues to require midodrine for soft blood pressures.   - Midodrine 20 mg TID, wean as able   - Vitals q4h       ANA LAURA  Hypervolemia  Baseline Cr ~0.5. Cr to peak of 2.98. Decline in renal function most likely secondary to ATN (hypotension, medications, contrast), possible component of HRS. On CRRT 10.7 - 10/13, now transitioned to iHD. RIJ not functioning, unable to dialyze on 10/21. New tunneled RIJ placed by IR On 10/22.   - Nephrology following   - HD tomorrow   - Discussed with Nephrology, no evidence of renal recovery at this time, will plan to discharge on HD and start working on outpatient dialysis unit    - Strict I/Os   - Daily weights   - Lymphedema team consulted    Alcohol hepatitis: Recently admitted in September with ETOH hepatitis, started on rifaximin and lactulose per Hepatology recs. Now re-presented with ETOH hepatitis, Maddrey score 13 on admission. Acute hepatitis c/b portal hypertension with ascites. Diagnostic mane negative for SBP (most recently 10/11, 1.25L off). Tbili, INR, AST/ALT, platelets all improving. MELD 25.     - Dr. Cruz performed bedside US, no need for paracentesis today as her symptoms have improved   - Lactulose titrated to 3-4 loose BMs daily   - Rifaximin BID   - Trend CMP, INR   - Scheduled to follow-up with Hepatology on 10/26, will need to be re-scheduled    Toxic metabolic encephalopathy, resolved: Multifactorial secondary to acute illness, sedating medications, HE. Psychiatry consulted 10/15, started Seroquel. Fully A&O today.    - Lactulose, rifaximin   - Discontinue Seroquel    - Delirium precautions   - Minimize sedating medications     Abdominal pain: Secondary to alcohol hepatitis, gas pains.    - Tylenol TID   - Oxycodone 2.5 mg q4h PRN - wean as able   - Simethicone PRN    Severe malnutrition  Dysphagia   In the context of acute on chronic illness. Started on TF 10/5. Now more awake and interested in eating but not feeling hungry, transitioned to cycled feeds on 10/21. NJ clogged and removed on 10/23.    - Trial oral intake this weekend, if unable to meet caloric  goals will need to replace NG vs NJ   - Nutrition, SLP following   - Advance to regular diet per SLP   - Calorie counts    DM2: A1C 9.1. PTA on metformin. Transitioned to insulin due to hyperglycemia while on tube feeds. BG much improved with cycling of TF.    - Hold Lantus   - Holding metformin while inpatient   - HSSI    - BG checks q4h    - Hypoglycemia protocol     Acute on chronic macrocytic anemia:  Likely secondary to alcohol use, alcohol hepatitis, nutritional deficiencies. Intermittently requiring PRBC transfusions since admission (10/7, 10/12, 10/16). No e/o active bleeding. Hgb 7.2 today.   - CBC in AM     Alcohol use disorder: Last drink on 9/12/21. PTA was at CD treatment facility.    - Patient interested in resuming CD treatment, will need to re-evaluate this closer to discharge date, at this time PT/OT recommending TCU   - Continue folic acid, thiamine, MVI supplements        Other Stable Medical Issues:   Coagulopathy: INR to peak of 2.5, now much improved. Likely secondary to hepatic dysfunction.   MDD, RUBÉN: Continue PTA Lexapro. Prazosin on hold d/t hypotension, resume as able. Health Psychology consulted 10/1, re-involve PRN.   GERD: Continue PPI.   Neuropathy: Continue PTA gabapentin.        Diet: Adult Formula Drip Feeding: Continuous Novasource Renal; Nasoduodenal tube; Goal Rate: 45; mL/hr; From: 8:00 PM; 8:00 AM; Medication - Feeding Tube Flush Frequency: At least 15-30 mL water before and after medication administration and with tube c...  Calorie Counts  Snacks/Supplements Adult: Ensure Clear; Between Meals  Combination Diet Soft and Bite Sized Diet (level 6); Thin Liquids (level 0)    DVT Prophylaxis: Heparin SQ  Rice Catheter: Not present  Central Lines: None  Code Status: Full Code      Disposition Plan   Expected discharge: 11/03/2021   recommended to transitional care unit once dialysis plan established, safe disposition plan in place.     The patient's care was discussed with the  Attending Physician, Dr. Cruz, Bedside Nurse and Patient. Updated SW today. Attempted to call daughter TANIKA Kelley full.     Gloria Colon PA-C  Hospitalist Service, [address]  Securely message with the Vocera Web Console (learn more here)  Text page via [facility] Paging/Directory  Please see sign in/sign out for up to date coverage information    Clinically Significant Risk Factors Present on Admission                ______________________________________________________________________    Interval History   Oral intake improved yesterday, she would like to advance her diet. No concerns with coughing or choking with oral intake. Abdomen is feeling better today after dialysis run. No fevers. Dyspnea stable.     Data reviewed today: I reviewed all medications, new labs and imaging results over the last 24 hours.    Physical Exam   Vital Signs: Temp: (!) 96.3  F (35.7  C) Temp src: Oral BP: 102/59 Pulse: 78   Resp: 18 SpO2: 99 % O2 Device: Nasal cannula Oxygen Delivery: 3 LPM  Weight: 209 lbs 14.05 oz  Constitutional: Awake and alert, in no apparent distress. Sitting up comfortably in bed.   Eyes: + scleral icterus  Respiratory: Breathing non-labored. Bibasilar crackles. Good air entry.   Cardiovascular:  RRR, normal S1/S2. No rubs or murmurs. 2+ pitting edema extending up to bilateral hips. Lymphedema wraps in place.   GI: Non-tender. Distended, firm. Hyperactive bowel sounds.   Skin: Jaundiced.  No visible rashes, lesions, or bruising of concern.   Neurologic: Alert and oriented to person, place, and time. No focal deficits. Moves all extremities. No tremor. No asterixis. Facies symmetric.      Data    ROUTINE IP LABS (Last four results)  Recent Labs   Lab 10/24/21  0740 10/24/21  0529 10/24/21  0428 10/24/21  0007 10/23/21  0754 10/23/21  0627 10/22/21  0806 10/22/21  0535 10/21/21  0813 10/21/21  0337 10/18/21  0722 10/18/21  0354   NA  --  134  --   --   --   133  --  133  --  134   < > 135   POTASSIUM  --  3.5  --   --   --  3.3*  --  3.7  --  4.0   < > 4.4   CHLORIDE  --  99  --   --   --  95  --  97  --  100   < > 102   CO2  --  27  --   --   --  30  --  25  --  25   < > 22   ANIONGAP  --  8  --   --   --  8  --  11  --  9   < > 11   * 108* 105* 127*   < > 115*   < > 106*   < > 157*   < > 172*   BUN  --  27  --   --   --  39*  --  68*  --  61*   < > 71*   CR  --  1.86*  --   --   --  2.09*  --  3.14*  --  2.78*   < > 2.66*   STEFFANY  --  8.4*  --   --   --  7.8*  --  8.6  --  8.7   < > 8.3*   MAG  --  1.7  --   --   --  1.6  --  1.9  --  1.8   < > 2.0   PHOS  --  4.5  --   --   --  5.6*  --  7.9*  --  7.0*   < > 6.3*   PROTTOTAL  --  6.1*  --   --   --   --   --  5.7*  --  5.4*  --  4.4*   ALBUMIN  --  1.6*  --   --   --   --   --  1.5*  --  1.4*  --  1.4*   BILITOTAL  --  3.5*  --   --   --   --   --  3.9*  --  4.1*  --  5.9*   ALKPHOS  --  351*  --   --   --   --   --  285*  --  303*  --  299*   AST  --  75*  --   --   --   --   --  85*  --  98*  --  102*   ALT  --  36  --   --   --   --   --  42  --  41  --  38    < > = values in this interval not displayed.     Recent Labs   Lab 10/24/21  0837 10/23/21  0627 10/22/21  0535 10/21/21  1057 10/21/21  0638 10/21/21  0337   WBC 17.2* 17.1* 19.2*  --   --  18.0*   RBC 2.53* 2.13* 2.14*  --   --  2.08*   HGB 7.9* 6.9* 7.0* 7.2*   < > 6.5*   HCT 25.7* 22.2* 22.5*  --   --  21.5*   * 104* 105*  --   --  103*   MCH 31.2 32.4 32.7  --   --  31.3   MCHC 30.7* 31.1* 31.1*  --   --  30.2*   RDW 20.9* 20.0* 20.5*  --   --  20.7*    241 218  --   --  201    < > = values in this interval not displayed.     Recent Labs   Lab 10/22/21  0535 10/19/21  0356   INR 1.32* 1.37*

## 2021-10-24 NOTE — PLAN OF CARE
D/I: Pt is alert and oriented x4. Vitally stable on 2L O2 via NC, but dyspneic on exertion. Tolerating oral intake and taking pills whole with water.  and 105.  C/O abdominal/back pain; managed with Oxycodone 2.5 mg Q4h PRN. Assist of 2 with gait belt and walker to the commode, but no BM since midnight. R internal jugular dressing is CDI for HD. L internal jugular access removed per order this morning at 0630, quick clot applied with 4x4 gauze and pressure dressing. Skin is jaundiced. PIV infusing TKO between antibiotics.  P: Will continue to monitor and follow plan of care.

## 2021-10-24 NOTE — PLAN OF CARE
VS: VSS   O2: 100% on 3L O2   Voiding 200 mL on the commode   Last BM: 10.24.2021   Activity: Pt. Up assist x1 to commode   Skin: Jaundiced, edema wraps still in place   Pain: Generalized abdominal pain, relief with oxycodone x1   CMS: Generalized weakness, edema    Diet: Switched to regular adult diet, appetite good   LDA: PIV infusing Zosyn, RIJ saline locked for HD   Plan: Promote food, wean oxycodone as appropriate

## 2021-10-24 NOTE — PLAN OF CARE
Assumed cares: [id number]    Events: Dialysis run completed. Sepsis triggered at the end of dialysis due to tachypnea and elevated WBCs (down trending)    VS: VSS on 3L NC  Labs: K WNL. Critically low Hgb, 1 unit of RBC given.   IV: PIV saline locked. LIJ to be removed.     Neuro: A&Ox4  GI/: Voiding adequately. 1x BM on shift.   Nutrition: 3rd attempt to unclog NJ unsuccessful, NJ removed. Pt has good appetite on regular diet and swallows pills well.   Skin: Jaundiced, edema wraps replaced.   Pain: Pt c/o generalized pain w/ adequate relief from PRN oxycodone x1.   Activity: Pt up assist x1 to bedside commode.     Plan: LIJ to be removed (patient care order in place). Waiting for lactic level. Paracentesis tomorrow.

## 2021-10-24 NOTE — SIGNIFICANT EVENT
SPIRITUAL HEALTH SERVICES Significant Event  Religion Sacrament of ANOINTING  [facility] 5[facility]    Pt anointed by Father Sandra France   Pager [phone]

## 2021-10-24 NOTE — PROGRESS NOTES
Nephrology Progress Note  10/24/2021         Aliyah Angeles is a 46 yof w/hx of ETOH use (Quit 9/12/2021) w/hepatitis, Fitz en Y bypass, Pancreatitis, DM2, Panc cyst who was admitted 9/29 from chem dep with N/V and worsening abdominal pain and edema.  Her course has been resp failure with ARDS and evolving ANA LAURA.  Noted to have received albumin from 10/1-10/3 (150g/day) with no improvement in Cr.  On Vanco/Zosyn to treat sepsis although cultures at time of consult are negative.  Nephrology consulted for evaluation of ANA LAURA and possible RRT, started CRRT on 10/7.       Interval History :   Mrs Angeles had CRRT started 10/7 for management of volume and acidosis, transitioned to iHD on 10/15.  Tried diuretics the but Cr went up so had HD on 10/22/21. Only able to pull 1.1 L due to low BP. She had UF only run yesterday and was able to pull 2 L over 2.5 hours. Tunneled line placed 10/22/1, deciding on runs daily, likely will discharge on HD.  Getting midodrine 20 mg TID. Next HD on Monday 10/25/21.     Assessment & Recommendations:   ANA LAURA-Baseline Cr normal at 0.5 as recently as 9/14/2021, abdominal US pending currently.  Was at inpatient chem dep when she developed abdominal pain, N/V and worsening edema and was brought to [facility] on 9/29.  Was treated for UTI and started on Vanco/Zosyn for leukocytosis but decompensated and was intubated the evening of 10/3.  Noted she did receive albumin x3 days from 10/1-10/3 for ANA LAURA but actually worsened and Cr at time of consult is up to 2.  Lisa <5 on 10/3, ECHO done 10/4 showed hyperdynamic LV and normal IVC.  Etiology of ANA LAURA is likely ATN with underlying HRS physiology which made her more susceptible to hypoperfusion.  Started CRRT on 10/7 for management of volume and acidosis.  Trying to transition to iHD.                  -ANA LAURA, likely hypoperfusion in setting of baseline HRS physiology, possible contribution from vanco/zosyn and did receive contrast on 10/3.  Some UOP but no signs of  "recovery yet.                  -CRRT started 10/7, stopped 10/13.  Plan for HD tomorrow        -Line is tunneled placed 10/22.                   -Dialysis consent signed and scanned in under media.           Volume- Has total body volume overload, I/O's difficult to interpret as we are not capturing much of her UOP and wt's are unreliable.  Planning on HD tomorrow. Consider trial of lasix again?     Electrolytes/pH-K normal at 3.5 bicarb 27, sodium 134.       Liver-Unclear duration, quit drinking on 9/12/2021 and was at chem dep when acute issues started.  Bili is down to 6 and INR is 1.4 and a bit improved.  GI following, not clear if she would be a transplant candidate eventually.       ID-On Zosyn and Rifaximin.       Nutrition-On Osmolite TF.            Recommendations were communicated to primary team via this note.      Sharon Johnson PA-C    Review of Systems:   I reviewed the following systems:  Gen: No fevers or chills  CV: No CP at rest  Resp: No SOB at rest  GI: No N/V    Physical Exam:   I/O last 3 completed shifts:  In: 600 [P.O.:480; I.V.:120]  Out: 2350 [Urine:350; Other:2000]   /59 (BP Location: Right arm)   Pulse 78   Temp (!) 96.3  F (35.7  C) (Oral)   Resp 18   Ht 1.626 m (5' 4\")   Wt 95.2 kg (209 lb 14.1 oz)   SpO2 99%   BMI 36.03 kg/m       GENERAL APPEARANCE: Extubated, stable from pulm standpoint.  Sitting up in chair, a bit SOB today.   EYES: No scleral icterus  Pulmonary: bibasilar crackles on auscultation with equal breath sounds bilaterally, no clubbing or cyanosis  CV: Regular rhythm, normal rate, no rub   - Edema +2 LE/dependent  GI: soft, nontender, normal bowel sounds  MS: no evidence of inflammation in joints, no muscle tenderness  : No barrett, some intermittent UOP.   SKIN: no rash, warm, dry  NEURO: No focal deficits.    Labs:   All labs reviewed by me  Electrolytes/Renal -   Recent Labs   Lab Test 10/24/21  0740 10/24/21  0529 10/24/21  0428 10/23/21  8504 " 10/23/21  0627 10/22/21  0806 10/22/21  0535   NA  --  134  --   --  133  --  133   POTASSIUM  --  3.5  --   --  3.3*  --  3.7   CHLORIDE  --  99  --   --  95  --  97   CO2  --  27  --   --  30  --  25   BUN  --  27  --   --  39*  --  68*   CR  --  1.86*  --   --  2.09*  --  3.14*   * 108* 105*   < > 115*   < > 106*   STEFFANY  --  8.4*  --   --  7.8*  --  8.6   MAG  --  1.7  --   --  1.6  --  1.9   PHOS  --  4.5  --   --  5.6*  --  7.9*    < > = values in this interval not displayed.       CBC -   Recent Labs   Lab Test 10/24/21  0837 10/23/21  0627 10/22/21  0535   WBC 17.2* 17.1* 19.2*   HGB 7.9* 6.9* 7.0*    241 218       LFTs -   Recent Labs   Lab Test 10/24/21  0529 10/22/21  0535 10/21/21  0337   ALKPHOS 351* 285* 303*   BILITOTAL 3.5* 3.9* 4.1*   ALT 36 42 41   AST 75* 85* 98*   PROTTOTAL 6.1* 5.7* 5.4*   ALBUMIN 1.6* 1.5* 1.4*       Iron Panel -   Recent Labs   Lab Test 09/13/21  0642   EYAD 1,839*           Current Medications:    acetaminophen  650 mg Oral or Feeding Tube Q8H     calcium carbonate 600 mg-vitamin D 400 units  1 tablet Oral or Feeding Tube Daily     cyanocobalamin  100 mcg Oral or Feeding Tube Daily     escitalopram  10 mg Oral Daily     folic acid  1 mg Oral or Feeding Tube Daily     gabapentin  100 mg Oral TID     heparin ANTICOAGULANT  5,000 Units Subcutaneous Q12H     insulin aspart  1-7 Units Subcutaneous TID AC     insulin aspart  1-5 Units Subcutaneous At Bedtime     midodrine  20 mg Oral or Feeding Tube Q8H     multivitamins w/minerals  15 mL Oral Daily     pantoprazole  40 mg Oral QAM AC     piperacillin-tazobactam  2.25 g Intravenous Q6H     polyethylene glycol  17 g Oral BID     [Held by provider] prazosin  1 mg Oral QPM     protein modular  1 packet Per Feeding Tube BID     psyllium  1 packet Oral Daily     rifaximin  550 mg Oral or Feeding Tube BID     sodium chloride  1 spray Both Nostrils 4x Daily     thiamine  100 mg Oral or Feeding Tube Daily

## 2021-10-24 NOTE — PLAN OF CARE
Speech Language Therapy Discharge Summary    Reason for therapy discharge:    All goals and outcomes met, no further needs identified.    Progress towards therapy goal(s). See goals on Care Plan in [facility] electronic health record for goal details.  Goals met    Therapy recommendation(s):    No further therapy is recommended. Rec a regular diet and thin liquids.

## 2021-10-24 NOTE — PROGRESS NOTES
Calorie Count  Intake recorded for: 10/23  Total Kcals: 616 Total Protein: 19g  Kcals from Hospital Food: 616   Protein: 19g  Kcals from Outside Food (average):0 Protein: 0g  # Meals Ordered from Kitchen: 4 meals  # Meals Recorded: 2 meals (first - 100% pureed Tajik toast w/ syrup & butter)      (second - 25% scrambled eggs w/ 2 ketchup)  # Supplements Recorded: 0

## 2021-10-25 ENCOUNTER — APPOINTMENT (OUTPATIENT)
Dept: OCCUPATIONAL THERAPY | Facility: CLINIC | Age: 46
End: 2021-10-25
Payer: COMMERCIAL

## 2021-10-25 ENCOUNTER — APPOINTMENT (OUTPATIENT)
Dept: PHYSICAL THERAPY | Facility: CLINIC | Age: 46
End: 2021-10-25
Payer: COMMERCIAL

## 2021-10-25 LAB
ALBUMIN SERPL-MCNC: 1.6 G/DL (ref 3.4–5)
ALP SERPL-CCNC: 327 U/L (ref 40–150)
ALT SERPL W P-5'-P-CCNC: 32 U/L (ref 0–50)
ANION GAP SERPL CALCULATED.3IONS-SCNC: 10 MMOL/L (ref 3–14)
AST SERPL W P-5'-P-CCNC: 72 U/L (ref 0–45)
BILIRUB SERPL-MCNC: 3.2 MG/DL (ref 0.2–1.3)
BUN SERPL-MCNC: 30 MG/DL (ref 7–30)
CALCIUM SERPL-MCNC: 8.3 MG/DL (ref 8.5–10.1)
CHLORIDE BLD-SCNC: 100 MMOL/L (ref 94–109)
CO2 SERPL-SCNC: 26 MMOL/L (ref 20–32)
CREAT SERPL-MCNC: 2.26 MG/DL (ref 0.52–1.04)
CRP SERPL-MCNC: 66 MG/L (ref 0–8)
ERYTHROCYTE [DISTWIDTH] IN BLOOD BY AUTOMATED COUNT: 20.4 % (ref 10–15)
GFR SERPL CREATININE-BSD FRML MDRD: 25 ML/MIN/1.73M2
GLUCOSE BLD-MCNC: 96 MG/DL (ref 70–99)
GLUCOSE BLDC GLUCOMTR-MCNC: 109 MG/DL (ref 70–99)
GLUCOSE BLDC GLUCOMTR-MCNC: 114 MG/DL (ref 70–99)
GLUCOSE BLDC GLUCOMTR-MCNC: 119 MG/DL (ref 70–99)
GLUCOSE BLDC GLUCOMTR-MCNC: 241 MG/DL (ref 70–99)
GLUCOSE BLDC GLUCOMTR-MCNC: 91 MG/DL (ref 70–99)
HBV SURFACE AB SERPL IA-ACNC: 3.74 M[IU]/ML
HBV SURFACE AG SERPL QL IA: NONREACTIVE
HCT VFR BLD AUTO: 25.2 % (ref 35–47)
HGB BLD-MCNC: 7.6 G/DL (ref 11.7–15.7)
INR PPP: 1.26 (ref 0.85–1.15)
LACTATE SERPL-SCNC: 1.1 MMOL/L (ref 0.7–2)
MAGNESIUM SERPL-MCNC: 1.5 MG/DL (ref 1.6–2.3)
MCH RBC QN AUTO: 30.8 PG (ref 26.5–33)
MCHC RBC AUTO-ENTMCNC: 30.2 G/DL (ref 31.5–36.5)
MCV RBC AUTO: 102 FL (ref 78–100)
PHOSPHATE SERPL-MCNC: 5.6 MG/DL (ref 2.5–4.5)
PLATELET # BLD AUTO: 258 10E3/UL (ref 150–450)
POTASSIUM BLD-SCNC: 3.5 MMOL/L (ref 3.4–5.3)
PROT SERPL-MCNC: 6.2 G/DL (ref 6.8–8.8)
QUANTIFERON MITOGEN: 6.99 IU/ML
QUANTIFERON NIL TUBE: 0.05 IU/ML
QUANTIFERON TB1 TUBE: 0.05 IU/ML
QUANTIFERON TB2 TUBE: 0.06
RBC # BLD AUTO: 2.47 10E6/UL (ref 3.8–5.2)
SODIUM SERPL-SCNC: 136 MMOL/L (ref 133–144)
WBC # BLD AUTO: 18.3 10E3/UL (ref 4–11)

## 2021-10-25 PROCEDURE — 250N000013 HC RX MED GY IP 250 OP 250 PS 637: Performed by: STUDENT IN AN ORGANIZED HEALTH CARE EDUCATION/TRAINING PROGRAM

## 2021-10-25 PROCEDURE — 86140 C-REACTIVE PROTEIN: CPT | Performed by: PEDIATRICS

## 2021-10-25 PROCEDURE — 36415 COLL VENOUS BLD VENIPUNCTURE: CPT | Performed by: PHYSICIAN ASSISTANT

## 2021-10-25 PROCEDURE — 83605 ASSAY OF LACTIC ACID: CPT | Performed by: INTERNAL MEDICINE

## 2021-10-25 PROCEDURE — 99207 PR APP CREDIT; MD BILLING SHARED VISIT: CPT | Performed by: PHYSICIAN ASSISTANT

## 2021-10-25 PROCEDURE — 250N000013 HC RX MED GY IP 250 OP 250 PS 637: Performed by: PHYSICIAN ASSISTANT

## 2021-10-25 PROCEDURE — 97530 THERAPEUTIC ACTIVITIES: CPT | Mod: GP

## 2021-10-25 PROCEDURE — 36415 COLL VENOUS BLD VENIPUNCTURE: CPT | Performed by: INTERNAL MEDICINE

## 2021-10-25 PROCEDURE — 99233 SBSQ HOSP IP/OBS HIGH 50: CPT | Performed by: PEDIATRICS

## 2021-10-25 PROCEDURE — 97535 SELF CARE MNGMENT TRAINING: CPT | Mod: GO

## 2021-10-25 PROCEDURE — 85610 PROTHROMBIN TIME: CPT | Performed by: PHYSICIAN ASSISTANT

## 2021-10-25 PROCEDURE — 999N000127 HC STATISTIC PERIPHERAL IV START W US GUIDANCE

## 2021-10-25 PROCEDURE — 120N000002 HC R&B MED SURG/OB UMMC

## 2021-10-25 PROCEDURE — 83735 ASSAY OF MAGNESIUM: CPT | Performed by: PHYSICIAN ASSISTANT

## 2021-10-25 PROCEDURE — 84100 ASSAY OF PHOSPHORUS: CPT | Performed by: PHYSICIAN ASSISTANT

## 2021-10-25 PROCEDURE — 250N000011 HC RX IP 250 OP 636: Performed by: PHYSICIAN ASSISTANT

## 2021-10-25 PROCEDURE — 97140 MANUAL THERAPY 1/> REGIONS: CPT | Mod: GO | Performed by: OCCUPATIONAL THERAPIST

## 2021-10-25 PROCEDURE — 97116 GAIT TRAINING THERAPY: CPT | Mod: GP

## 2021-10-25 PROCEDURE — 82040 ASSAY OF SERUM ALBUMIN: CPT | Performed by: PHYSICIAN ASSISTANT

## 2021-10-25 PROCEDURE — 250N000013 HC RX MED GY IP 250 OP 250 PS 637: Performed by: PEDIATRICS

## 2021-10-25 PROCEDURE — 97530 THERAPEUTIC ACTIVITIES: CPT | Mod: GO

## 2021-10-25 PROCEDURE — 99233 SBSQ HOSP IP/OBS HIGH 50: CPT | Performed by: INTERNAL MEDICINE

## 2021-10-25 PROCEDURE — 97110 THERAPEUTIC EXERCISES: CPT | Mod: GP

## 2021-10-25 PROCEDURE — 85027 COMPLETE CBC AUTOMATED: CPT | Performed by: PHYSICIAN ASSISTANT

## 2021-10-25 PROCEDURE — 99207 PR NON-BILLABLE SERV PER CHARTING: CPT | Performed by: CLINICAL NURSE SPECIALIST

## 2021-10-25 RX ADMIN — PANTOPRAZOLE SODIUM 40 MG: 40 TABLET, DELAYED RELEASE ORAL at 07:37

## 2021-10-25 RX ADMIN — POLYETHYLENE GLYCOL 3350 17 G: 17 POWDER, FOR SOLUTION ORAL at 19:43

## 2021-10-25 RX ADMIN — Medication 1 CAPSULE: at 14:02

## 2021-10-25 RX ADMIN — MIDODRINE HYDROCHLORIDE 20 MG: 5 TABLET ORAL at 01:08

## 2021-10-25 RX ADMIN — FOLIC ACID 1 MG: 1 TABLET ORAL at 11:05

## 2021-10-25 RX ADMIN — RIFAXIMIN 550 MG: 550 TABLET ORAL at 07:37

## 2021-10-25 RX ADMIN — SALINE NASAL SPRAY 1 SPRAY: 1.5 SOLUTION NASAL at 07:37

## 2021-10-25 RX ADMIN — Medication 100 MCG: at 11:05

## 2021-10-25 RX ADMIN — ESCITALOPRAM OXALATE 10 MG: 10 TABLET ORAL at 07:37

## 2021-10-25 RX ADMIN — Medication 2.5 MG: at 01:18

## 2021-10-25 RX ADMIN — GABAPENTIN 100 MG: 100 CAPSULE ORAL at 07:37

## 2021-10-25 RX ADMIN — RIFAXIMIN 550 MG: 550 TABLET ORAL at 19:43

## 2021-10-25 RX ADMIN — MIDODRINE HYDROCHLORIDE 20 MG: 5 TABLET ORAL at 07:37

## 2021-10-25 RX ADMIN — GABAPENTIN 100 MG: 100 CAPSULE ORAL at 14:02

## 2021-10-25 RX ADMIN — PIPERACILLIN SODIUM AND TAZOBACTAM SODIUM 2.25 G: 2; .25 INJECTION, POWDER, LYOPHILIZED, FOR SOLUTION INTRAVENOUS at 06:14

## 2021-10-25 RX ADMIN — HEPARIN SODIUM 5000 UNITS: 5000 INJECTION, SOLUTION INTRAVENOUS; SUBCUTANEOUS at 07:36

## 2021-10-25 RX ADMIN — THERA TABS 1 TABLET: TAB at 11:05

## 2021-10-25 RX ADMIN — CALCIUM CARBONATE 600 MG (1,500 MG)-VITAMIN D3 400 UNIT TABLET 1 TABLET: at 11:05

## 2021-10-25 RX ADMIN — MIDODRINE HYDROCHLORIDE 20 MG: 5 TABLET ORAL at 17:33

## 2021-10-25 RX ADMIN — HEPARIN SODIUM 5000 UNITS: 5000 INJECTION, SOLUTION INTRAVENOUS; SUBCUTANEOUS at 19:43

## 2021-10-25 RX ADMIN — GABAPENTIN 100 MG: 100 CAPSULE ORAL at 19:43

## 2021-10-25 RX ADMIN — POLYETHYLENE GLYCOL 3350 17 G: 17 POWDER, FOR SOLUTION ORAL at 07:36

## 2021-10-25 RX ADMIN — Medication 2.5 MG: at 14:02

## 2021-10-25 RX ADMIN — Medication 3 MG: at 23:08

## 2021-10-25 RX ADMIN — PIPERACILLIN SODIUM AND TAZOBACTAM SODIUM 2.25 G: 2; .25 INJECTION, POWDER, LYOPHILIZED, FOR SOLUTION INTRAVENOUS at 17:33

## 2021-10-25 RX ADMIN — PIPERACILLIN SODIUM AND TAZOBACTAM SODIUM 2.25 G: 2; .25 INJECTION, POWDER, LYOPHILIZED, FOR SOLUTION INTRAVENOUS at 12:31

## 2021-10-25 RX ADMIN — Medication 2.5 MG: at 19:43

## 2021-10-25 RX ADMIN — THIAMINE HCL TAB 100 MG 100 MG: 100 TAB at 11:05

## 2021-10-25 RX ADMIN — PIPERACILLIN SODIUM AND TAZOBACTAM SODIUM 2.25 G: 2; .25 INJECTION, POWDER, LYOPHILIZED, FOR SOLUTION INTRAVENOUS at 01:08

## 2021-10-25 ASSESSMENT — ACTIVITIES OF DAILY LIVING (ADL)
ADLS_ACUITY_SCORE: 10

## 2021-10-25 ASSESSMENT — MIFFLIN-ST. JEOR: SCORE: 1587

## 2021-10-25 NOTE — PLAN OF CARE
Assumed cares: [id number]    No change during shift.  VS: VSS on 3L NC  GI/: Voiding adequately. 2x BM on shift.   Nutrition: Poor appetite.   Ambulation: Up w/ PT and OT. Lymph wraps changed.   Pain: PRN oxycodone given x1 for back pain.    Plan: Pt hoping to speak w/ SW about discharge plan to TCU.

## 2021-10-25 NOTE — PROGRESS NOTES
Nephrology Progress Note  10/25/2021         Aliyah Angeles is a 46 yof w/hx of ETOH use (Quit 9/12/2021) w/hepatitis, Fitz en Y bypass, Pancreatitis, DM2, Panc cyst who was admitted 9/29 from chem dep with N/V and worsening abdominal pain and edema.  Her course has been resp failure with ARDS and evolving ANA LAURA.  Noted to have received albumin from 10/1-10/3 (150g/day) with no improvement in Cr.  On Vanco/Zosyn to treat sepsis although cultures at time of consult are negative.  Nephrology consulted for evaluation of ANA LAURA and possible RRT, started CRRT on 10/7.       Interval History :   Mrs Angeles had CRRT started 10/7 for management of volume and acidosis, transitioned to iHD on 10/15, had last run on 10/22. UOP is not entirely clear but is increasing overall, will plan for HD tomorrow but will review chemistries before committing, hopeful she is having recovery but is delayed due to her baseline liver disease.        Assessment & Recommendations:   ANA LAURA-Baseline Cr normal at 0.5 as recently as 9/14/2021, abdominal US pending currently.  Was at inpatient chem dep when she developed abdominal pain, N/V and worsening edema and was brought to [facility] on 9/29.  Was treated for UTI and started on Vanco/Zosyn for leukocytosis but decompensated and was intubated the evening of 10/3.  Noted she did receive albumin x3 days from 10/1-10/3 for ANA LAURA but actually worsened and Cr at time of consult is up to 2.  Lisa <5 on 10/3, ECHO done 10/4 showed hyperdynamic LV and normal IVC.  Etiology of ANA LAURA is likely ATN with underlying HRS physiology which made her more susceptible to hypoperfusion.  Started CRRT on 10/7 for management of volume and acidosis.  Trying to transition to iHD.                  -ANA LAURA, likely hypoperfusion in setting of baseline HRS physiology, possible contribution from vanco/zosyn and did receive contrast on 10/3.  Some UOP, still with low gfr but hopeful for further recovery.                   -CRRT started  "10/7, stopped 10/13.  Deciding on HD daily, holding today.                -Line is tunneled placed 10/22.                   -Dialysis consent signed and scanned in under media.       Volume-Has total body volume overload, I/O's difficult to interpret as we are not capturing much of her UOP and wt's are unreliable.  Holding on run today with minimal Cr rise, will plan for HD tomorrow but will review chemistries before committing.       Electrolytes/pH-K normal at 3.5 bicarb 26.       Liver-Unclear duration, quit drinking on 9/12/2021 and was at chem dep when acute issues started.  Bili is down to 6 and INR is 1.4 and a bit improved.  GI following, not clear if she would be a transplant candidate eventually.       ID-On Zosyn and Rifaximin.       Nutrition-Taking PO     Seen and discussed with Dr Lock     Recommendations were communicated to primary team via verbal communication.        MARIA ELENA Wade CNS  Clinical Nurse Specialist  [phone]    Review of Systems:   I reviewed the following systems:  Gen: No fevers or chills  CV: No CP at rest  Resp: No SOB at rest  GI: No N/V    Physical Exam:   I/O last 3 completed shifts:  In: 720 [P.O.:720]  Out: 450 [Urine:350; Stool:100]   /71 (BP Location: Right arm)   Pulse 81   Temp 97.7  F (36.5  C) (Oral)   Resp 16   Ht 1.626 m (5' 4\")   Wt 97.4 kg (214 lb 11.7 oz)   SpO2 98%   BMI 36.86 kg/m       GENERAL APPEARANCE: Extubated, stable from pulm standpoint.  Sitting up in chair, a bit SOB today.   EYES: No scleral icterus  Pulmonary: lungs rhonchi to auscultation with equal breath sounds bilaterally, no clubbing or cyanosis  CV: Regular rhythm, normal rate, no rub   - Edema +2 LE/dependent  GI: soft, nontender, normal bowel sounds  MS: no evidence of inflammation in joints, no muscle tenderness  : No barrett, some intermittent UOP.   SKIN: no rash, warm, dry  NEURO: No focal deficits.    Labs:   All labs reviewed by me  Electrolytes/Renal - Recent " Labs   Lab Test 10/25/21  1230 10/25/21  0740 10/25/21  0555 10/24/21  0740 10/24/21  0529 10/23/21  0754 10/23/21  0627   NA  --   --  136  --  134  --  133   POTASSIUM  --   --  3.5  --  3.5  --  3.3*   CHLORIDE  --   --  100  --  99  --  95   CO2  --   --  26  --  27  --  30   BUN  --   --  30  --  27  --  39*   CR  --   --  2.26*  --  1.86*  --  2.09*   * 119* 96   < > 108*   < > 115*   STEFFANY  --   --  8.3*  --  8.4*  --  7.8*   MAG  --   --  1.5*  --  1.7  --  1.6   PHOS  --   --  5.6*  --  4.5  --  5.6*    < > = values in this interval not displayed.       CBC -   Recent Labs   Lab Test 10/25/21  0555 10/24/21  0837 10/23/21  0627   WBC 18.3* 17.2* 17.1*   HGB 7.6* 7.9* 6.9*    239 241       LFTs -   Recent Labs   Lab Test 10/25/21  0555 10/24/21  0529 10/22/21  0535   ALKPHOS 327* 351* 285*   BILITOTAL 3.2* 3.5* 3.9*   ALT 32 36 42   AST 72* 75* 85*   PROTTOTAL 6.2* 6.1* 5.7*   ALBUMIN 1.6* 1.6* 1.5*       Iron Panel -   Recent Labs   Lab Test 09/13/21  0642   EYAD 1,839*           Current Medications:    acetaminophen  650 mg Oral or Feeding Tube Q8H     calcium carbonate 600 mg-vitamin D 400 units  1 tablet Oral or Feeding Tube Daily     cyanocobalamin  100 mcg Oral or Feeding Tube Daily     escitalopram  10 mg Oral Daily     folic acid  1 mg Oral or Feeding Tube Daily     gabapentin  100 mg Oral TID     heparin ANTICOAGULANT  5,000 Units Subcutaneous Q12H     insulin aspart  1-7 Units Subcutaneous TID AC     insulin aspart  1-5 Units Subcutaneous At Bedtime     midodrine  20 mg Oral or Feeding Tube Q8H     multivitamin RENAL  1 capsule Oral Daily     pantoprazole  40 mg Oral QAM AC     piperacillin-tazobactam  2.25 g Intravenous Q6H     polyethylene glycol  17 g Oral BID     [Held by provider] prazosin  1 mg Oral QPM     psyllium  1 packet Oral Daily     rifaximin  550 mg Oral or Feeding Tube BID     sodium chloride  1 spray Both Nostrils 4x Daily     thiamine  100 mg Oral or Feeding Tube Daily

## 2021-10-25 NOTE — PROGRESS NOTES
[facility]    Medicine Progress Note - Hospitalist Service, Gold 6       Date of Admission:  9/29/2021    Assessment & Plan      Janay Angeles is a 46-year-old female with a history of alcohol use disorder, Fitz-en-Y gastric bypass (2010), DM2, anxiety, depression, who was initially admitted to internal medicine service with alcoholic hepatitis.  She transferred to MICU on 10/4/2021 with acute hypoxic respiratory failure secondary to ARDS requiring intubation.  Hospitalization complicated by septic shock, pneumonia, ANA LAURA requiring CRRT and now iHD, encephalopathy.  Self extubated on 10/14, weaned off pressors on 10/19, and transferred to general medical floor on 10/20.    Acute hypoxic respiratory failure, improving  ARDS, resolved  Pneumonia  Pulmonary edema   Acute decline in respiratory status requiring intubation on 10/4, CXR c/w ARDS, likely secondary to HAP vs aspiration. BAL cultures negative. Treated with Zosyn 10/4 - 10/13. Self-extubated on 10/14. Developed increased work of breathing on 10/17, CXR demonstrated increased interstitial opacities concerning for pulmonary edema vs pneumonia. Currently managing volume with Lasix + iHD and treating for VAP. Deconditioning and atelectasis likely contributing to ongoing O2 needs. Still requiring frequent HD for volume management, anticipate will need on discharge.   - Continue IV Zosyn to complete a 10-day course, on day #9   - Incentive spirometry   - OOB at least TID   - Continuous pulse oximetry. Wean O2 as able.    - Trend CBC daily, CRP q48h     Septic shock, resolved: Shock most likely secondary to sepsis with hepatic dysfunction contributing as well. TTE with hyperdynamic LVEF, no RV dysfunction. Weaned off pressors on 10/19. Continues to require midodrine for soft blood pressures.   - Midodrine 20 mg TID, wean as able   - Vitals q4h      ANA LAURA  Hypervolemia  Baseline Cr ~0.5. Cr to peak of 2.98. Decline in  renal function most likely secondary to ATN (hypotension, medications, contrast), possible component of HRS. On CRRT 10.7 - 10/13, now transitioned to iHD. RIJ not functioning, unable to dialyze on 10/21. New tunneled RIJ placed by IR On 10/22.   - Nephrology following   - HD tomorrow   - Discussed with Nephrology, no evidence of renal recovery at this time, will plan to discharge on HD and start working on outpatient dialysis unit    - Strict I/Os   - Daily weights   - Lymphedema team consulted     Alcohol hepatitis: Recently admitted in September with ETOH hepatitis, started on rifaximin and lactulose per Hepatology recs. Now re-presented with ETOH hepatitis, Maddrey score 13 on admission. Acute hepatitis c/b portal hypertension with ascites. Diagnostic mane negative for SBP (most recently 10/11, 1.25L off). Tbili, INR, AST/ALT, platelets all improving. MELD 23.  - May consider para given complaint of increased abdominal distention/discomfort affecting appetite   - Lactulose titrated to 3-4 loose BMs daily   - Rifaximin BID   - Trend CMP, INR   - Scheduled to follow-up with Hepatology on 10/26, will need to be re-scheduled     Toxic metabolic encephalopathy, resolved: Multifactorial secondary to acute illness, sedating medications, HE. Psychiatry consulted 10/15, started Seroquel. Fully A&O today.    - Lactulose, rifaximin   - Discontinue Seroquel    - Delirium precautions   - Minimize sedating medications     Abdominal pain: Secondary to alcohol hepatitis, gas pains.    - Tylenol TID   - Oxycodone 2.5 mg q4h PRN - wean as able   - Simethicone PRN  -  Consider therapeutic para     Severe malnutrition  Dysphagia   In the context of acute on chronic illness. Started on TF 10/5. Now more awake and interested in eating but not feeling hungry, transitioned to cycled feeds on 10/21. NJ clogged and removed on 10/23.    - Trial oral intake, if unable to meet caloric goals will need to replace NG vs NJ   - Nutrition, SLP  following   - Advance to regular diet per SLP   - Calorie counts     DM2: A1C 9.1. PTA on metformin. Transitioned to insulin due to hyperglycemia while on tube feeds.    - Hold Lantus   - Holding metformin while inpatient   - MSSI    - BG checks q4h    - Hypoglycemia protocol     Acute on chronic macrocytic anemia:  Likely secondary to alcohol use, alcohol hepatitis, nutritional deficiencies. Intermittently requiring PRBC transfusions since admission (10/7, 10/12, 10/16). No e/o active bleeding. Hgb 7.6 today.   - CBC in AM     Alcohol use disorder: Last drink on 9/12/21. PTA was at CD treatment facility.    - Patient interested in resuming CD treatment, will need to re-evaluate this closer to discharge date, at this time PT/OT recommending TCU   - Continue folic acid, thiamine, MVI supplements        Other Stable Medical Issues:   Coagulopathy: INR to peak of 2.5, now much improved. Likely secondary to hepatic dysfunction.   MDD, RUBÉN: Continue PTA Lexapro. Prazosin on hold d/t hypotension, resume as able. Health Psychology consulted 10/1, re-involve PRN.   GERD: Continue PPI.   Neuropathy: Continue PTA gabapentin.      Diet: Snacks/Supplements Adult: Ensure Clear; Between Meals  Regular Diet Adult    DVT Prophylaxis: Heparin SQ  Rice Catheter: Not present  Central Lines: None  Code Status: Full Code      Disposition Plan   Expected discharge: 2-5 days  recommended to transitional care unit once safe disposition plan/ TCU bed available and PO intake adequate.     The patient's care was discussed with the Attending Physician, Dr. Cruz.    Kady Leal PA-C  Hospitalist Service, [address]  Securely message with the Vocera Web Console (learn more here)  Text page via [facility] Paging/Directory  Please see sign in/sign out for up to date coverage information    Clinically Significant Risk Factors Present on Admission                 ______________________________________________________________________    Interval History   Patient complains of tinnitus in bilateral ears, states that started yesterday morning when she woke up.  States that she feels like it comes from the back of her head, describes it as somewhat of a headache.  Denies any lightheadedness or dizziness.  No visual changes.  Also complains of poor appetite, states that she feels full all the time.  Denies nausea abdominal distention or abdominal pain.    Data reviewed today: I reviewed all medications, new labs and imaging results over the last 24 hours.     Physical Exam   Vital Signs: Temp: 97.7  F (36.5  C) Temp src: Oral BP: 114/71 Pulse: 81   Resp: 16 SpO2: 97 % O2 Device: Nasal cannula Oxygen Delivery: 2 LPM  Weight: 214 lbs 11.65 oz  General Appearance: Alert and oriented x 3, NAD  Respiratory: Breathing non labored on 2L via NC, bibasilar crackles  Cardiovascular: RRR, no murmurs  GI: Abdomen soft, non distended, non tender to palpation  Skin: jaundiced  Other: 2+ bilateral pitting LE edema     Data   Recent Labs   Lab 10/25/21  0740 10/25/21  0555 10/25/21  0309 10/24/21  1248 10/24/21  0837 10/24/21  0740 10/24/21  0529 10/23/21  0754 10/23/21  0627 10/22/21  0806 10/22/21  0535 10/19/21  0401 10/19/21  0356   WBC  --  18.3*  --   --  17.2*  --   --   --  17.1*   < > 19.2*   < > 24.0*   HGB  --  7.6*  --   --  7.9*  --   --   --  6.9*   < > 7.0*   < > 7.6*   MCV  --  102*  --   --  102*  --   --   --  104*   < > 105*   < > 102*   PLT  --  258  --   --  239  --   --   --  241   < > 218   < > 244   INR  --  1.26*  --   --   --   --   --   --   --   --  1.32*  --  1.37*   NA  --  136  --   --   --   --  134  --  133   < > 133   < > 134   POTASSIUM  --  3.5  --   --   --   --  3.5  --  3.3*   < > 3.7   < > 4.0   CHLORIDE  --  100  --   --   --   --  99  --  95   < > 97   < > 102   CO2  --  26  --   --   --   --  27  --  30   < > 25   < > 23   BUN  --  30  --   --   --    --  27  --  39*   < > 68*   < > 41*   CR  --  2.26*  --   --   --   --  1.86*  --  2.09*   < > 3.14*   < > 1.86*   ANIONGAP  --  10  --   --   --   --  8  --  8   < > 11   < > 9   STEFFANY  --  8.3*  --   --   --   --  8.4*  --  7.8*   < > 8.6   < > 8.5   * 96 109*   < >  --    < > 108*   < > 115*   < > 106*   < > 174*   ALBUMIN  --  1.6*  --   --   --   --  1.6*  --   --    < > 1.5*   < >  --    PROTTOTAL  --  6.2*  --   --   --   --  6.1*  --   --    < > 5.7*   < >  --    BILITOTAL  --  3.2*  --   --   --   --  3.5*  --   --    < > 3.9*   < >  --    ALKPHOS  --  327*  --   --   --   --  351*  --   --    < > 285*   < >  --    ALT  --  32  --   --   --   --  36  --   --    < > 42   < >  --    AST  --  72*  --   --   --   --  75*  --   --    < > 85*   < >  --     < > = values in this interval not displayed.

## 2021-10-25 NOTE — PROGRESS NOTES
Calorie Count  Intake recorded for: 10/24  Total Kcals: 509 Total Protein: 22g  Kcals from Hospital Food: 509   Protein: 22g  Kcals from Outside Food (average):0 Protein: 0g  # Meals Ordered from Kitchen: 3 meals  # Meals Recorded: 2 meals (First - 50% sausage marilu, 25% pureed pancakes)      (Second - 50% turkey and cheese sandwich w/ toppings)  # Supplements Recorded: 0

## 2021-10-25 NOTE — PROGRESS NOTES
"Care Management Follow Up    Length of Stay (days): 26    Expected Discharge Date: 10/29/2021     Concerns to be Addressed: substance/tobacco abuse/use, discharge planning     Patient plan of care discussed at interdisciplinary rounds: Yes    Anticipated Discharge Disposition: Transitional Care     Anticipated Discharge Services: Outpatient dialysis   Anticipated Discharge DME: None    Patient/family educated on Medicare website which has current facility and service quality ratings: yes  Education Provided on the Discharge Plan: Yes    Patient/Family in Agreement with the Plan: yes    Referrals Placed by CM/SW:   TCU  [facility]  Ph: [phone]  F: [phone]    [facility]  Ph: [phone]  F: [phone]    [facility]ia[facility] ([place] - Ph: [phone], F: [phone])    [facility]  Ph: [phone]  F: [phone]  10/25- per admissions, no SNF beds for the for seeable further and [facility] does not cover swing bed.     Outpatient dialysis  [facility]- per admissions, they have a two person wait list and cannot accommodate pt at this time,     [facility]- referral sent     Private pay costs discussed: Not applicable    Additional Information:  SW following for TCU and new OP HD placement. Met with pt in her room today, pt reviewed list of TCU's near her home in [place], [place] and gave her preferences to writer. Referrals sent. SW brought up that pt will likely need outpatient dialysis at discharge for some time, per med team (pt is ANA LAURA currently), pt replied \"I don't know about that\" but was agreeable to a referral to [facility]. Unfortunately, [facility] is not taking new admissions, so a referral was sent to the next closest unit, [facility] [place].     SW will continue to follow for discharge placement. Per med team, pt will be ready for discharge on 10/27.     JOY Reed, " LICSW  [address]   [facility]  Phone: [phone]  Pager: [phone]

## 2021-10-25 NOTE — PROGRESS NOTES
CLINICAL NUTRITION SERVICES - REASSESSMENT NOTE     Nutrition Prescription    RECOMMENDATIONS FOR MDs/PROVIDERS TO ORDER:  Recommend nutrition support if oral intake does not improve in 2-3 days.      Malnutrition Status:    Severe malnutrition in the context of acute on chronic illness    Recommendations already ordered by Registered Dietitian (RD):  1. Discontinue prosource 1 packet BID (patient is no longer on TF)  2. Change Thera-vit to Renal Multivitamin   3. Modified nutritional supplements: ensure clear (berry only) and ensure enlive (chocolate/vanilla only)     Future/Additional Recommendations:  -- monitor oral intake of meals and supplements  -- calorie counts if oral intake does not improve  -- if TF warranted : Osmolite 1.5 Faizan @ goal of 45ml/hr (1080ml/day) + 2 Prosource will provide: 1700 kcals (28 kcal/kg), 89 g PRO (1.5 g/kg), 822 ml free H20, 219 g CHO, and 0 g fiber daily.     EVALUATION OF THE PROGRESS TOWARD GOALS   Diet: Regular with ensure clear BID   Intake:   Calorie counts  10/22     Total Kcals: 0           Total Protein: 0g  10/23     Total Kcals: 616       Total Protein: 19g  10/24     Total Kcals: 509       Total Protein: 22g    2 day average intake providin kcals (38% estimated calorie needs or 9 kcals/kg) and 21 g protein (29% estimated protein needs or 0.4 g/kg) per dosing weight 60 kg     Aliyah reports no appetite, feeling full. She is concerned what dietary restrictions she should be following. Patient dislikes apple ensure clear. She likes ensure enlive (chocolate/vanilla flavor only)     NEW FINDINGS   Speech: speech recommended regular diet (10/24)    Labs (10/25): CRP 66 mg/L (H), phos 5.6 mg/dL (H), Mg++ 1.5 mg/dL (L), BUN 30 mg/dL, Cr 2.26 mg/dL (H)    Meds:   Caltrate  Vitamin B12  Folvite  Thera-vit  Mirlax  Prosource 1 packet   Thiamine    Weight: remains elevated since admission weight. Continue current dosing weight of 60 kg     MALNUTRITION  % Intake: </= 50% for  >/= 5 days (severe)  % Weight Loss: None noted  Subcutaneous Fat Loss: Facial region:  Mild to moderate and Upper arm:  mild  Muscle Loss: Temporal:  moderate, Thoracic region (clavicle, acromium bone, deltoid, trapezius, pectoral):  severe and Upper arm (bicep, tricep): severe  Fluid Accumulation/Edema: Moderate  Malnutrition Diagnosis: Severe malnutrition in the context of acute on chronic illness    Previous Goals   Total avg nutritional intake to meet a minimum of 25 kcal/kg and 1.2-1.5 g PRO  Evaluation: Not met    Previous Nutrition Diagnosis  Inadequate oral intake  Evaluation: No change    CURRENT NUTRITION DIAGNOSIS  Inadequate oral intake related to no appetite, feeling full as evidenced by above calorie counts providing 29-38% of calorie/protein intake.      INTERVENTIONS  Implementation  Medical food supplement therapy - updated nutritional supplements per patient request   Nutrition Education - encouraged patient to order/eat food items that sound good. She is on a regular diet. Patient verbalized understanding.     Goals  Patient to consume % of nutritionally adequate meal trays TID, or the equivalent with supplements/snacks.    Monitoring/Evaluation  Progress toward goals will be monitored and evaluated per protocol.    Analia Olmos, MS/RD/ANDREW/CNSC  5A/5B RD Pager: [phone]

## 2021-10-25 NOTE — PLAN OF CARE
D/I: Pt is alert and oriented x4. Vitally stable on 2L O2 via NC, but dyspneic on exertion and uses accessory muscles for breathing at times. Desats on room air. Tolerating oral intake and taking pills whole with water. .  C/O abdominal/back pain; managed with Oxycodone 2.5 mg Q4h PRN. Assist of 2 with gait belt and walker to the commode. BM x 1 this shift. R dialysis CVC dressing intact. Skin is jaundiced. R PIV infusing TKO between antibiotics.  P: Will continue to monitor and follow plan of care. Hemodialysis today.

## 2021-10-25 NOTE — PROGRESS NOTES
Occupational Therapy Cognitive Assessment     Pt scored 26/30 (26+normative range) on MoCA (Version 8.1) with a Memory Index Score of 13/15. This score falls within normal cognition, demo'ing mild difficulty w/calculation, language, and memory. Pt able to IND'ly state 4/5 words and 1/5 words w/multiple cue. Pt able to state 8 words within one minute (11 words normative) and calculate 1/5 numbers accurately. See notes for details and recommendations for higher level IADL tasks.        10/25/21 1100   Cognitive Assessments   Cognitive Assessments Completed MoCA  (version 8.1)   Rafal Cognitive Assessment (MoCA) Total Score out of 30 26/30  (MIS score of 13/15)   Norms Score of 26 or above considered normal   Domains assessed: attention, executive functions, memory, language, visuoconstructional skills, conceptual thinking, calculations, orientation

## 2021-10-26 ENCOUNTER — PRE VISIT (OUTPATIENT)
Dept: GASTROENTEROLOGY | Facility: CLINIC | Age: 46
End: 2021-10-26

## 2021-10-26 ENCOUNTER — APPOINTMENT (OUTPATIENT)
Dept: OCCUPATIONAL THERAPY | Facility: CLINIC | Age: 46
End: 2021-10-26
Payer: COMMERCIAL

## 2021-10-26 LAB
ALBUMIN SERPL-MCNC: 1.7 G/DL (ref 3.4–5)
ALP SERPL-CCNC: 314 U/L (ref 40–150)
ALT SERPL W P-5'-P-CCNC: 34 U/L (ref 0–50)
ANION GAP SERPL CALCULATED.3IONS-SCNC: 11 MMOL/L (ref 3–14)
AST SERPL W P-5'-P-CCNC: 78 U/L (ref 0–45)
BILIRUB SERPL-MCNC: 3.1 MG/DL (ref 0.2–1.3)
BUN SERPL-MCNC: 36 MG/DL (ref 7–30)
CALCIUM SERPL-MCNC: 8.5 MG/DL (ref 8.5–10.1)
CHLORIDE BLD-SCNC: 100 MMOL/L (ref 94–109)
CO2 SERPL-SCNC: 25 MMOL/L (ref 20–32)
CREAT SERPL-MCNC: 2.43 MG/DL (ref 0.52–1.04)
ERYTHROCYTE [DISTWIDTH] IN BLOOD BY AUTOMATED COUNT: 20.1 % (ref 10–15)
GAMMA INTERFERON BACKGROUND BLD IA-ACNC: 0.05 IU/ML
GFR SERPL CREATININE-BSD FRML MDRD: 23 ML/MIN/1.73M2
GLUCOSE BLD-MCNC: 106 MG/DL (ref 70–99)
GLUCOSE BLDC GLUCOMTR-MCNC: 100 MG/DL (ref 70–99)
GLUCOSE BLDC GLUCOMTR-MCNC: 112 MG/DL (ref 70–99)
GLUCOSE BLDC GLUCOMTR-MCNC: 118 MG/DL (ref 70–99)
GLUCOSE BLDC GLUCOMTR-MCNC: 119 MG/DL (ref 70–99)
GLUCOSE BLDC GLUCOMTR-MCNC: 176 MG/DL (ref 70–99)
HCT VFR BLD AUTO: 24.3 % (ref 35–47)
HGB BLD-MCNC: 7.4 G/DL (ref 11.7–15.7)
M TB IFN-G BLD-IMP: NEGATIVE
M TB IFN-G CD4+ BCKGRND COR BLD-ACNC: 6.94 IU/ML
MAGNESIUM SERPL-MCNC: 1.8 MG/DL (ref 1.6–2.3)
MCH RBC QN AUTO: 30.8 PG (ref 26.5–33)
MCHC RBC AUTO-ENTMCNC: 30.5 G/DL (ref 31.5–36.5)
MCV RBC AUTO: 101 FL (ref 78–100)
MITOGEN IGNF BCKGRD COR BLD-ACNC: 0 IU/ML
MITOGEN IGNF BCKGRD COR BLD-ACNC: 0.01 IU/ML
PHOSPHATE SERPL-MCNC: 5.8 MG/DL (ref 2.5–4.5)
PLATELET # BLD AUTO: 235 10E3/UL (ref 150–450)
POTASSIUM BLD-SCNC: 3.3 MMOL/L (ref 3.4–5.3)
PROT SERPL-MCNC: 6.1 G/DL (ref 6.8–8.8)
RBC # BLD AUTO: 2.4 10E6/UL (ref 3.8–5.2)
SODIUM SERPL-SCNC: 136 MMOL/L (ref 133–144)
WBC # BLD AUTO: 15.4 10E3/UL (ref 4–11)

## 2021-10-26 PROCEDURE — 80053 COMPREHEN METABOLIC PANEL: CPT | Performed by: PHYSICIAN ASSISTANT

## 2021-10-26 PROCEDURE — 97110 THERAPEUTIC EXERCISES: CPT | Mod: GO

## 2021-10-26 PROCEDURE — 250N000013 HC RX MED GY IP 250 OP 250 PS 637: Performed by: STUDENT IN AN ORGANIZED HEALTH CARE EDUCATION/TRAINING PROGRAM

## 2021-10-26 PROCEDURE — 99233 SBSQ HOSP IP/OBS HIGH 50: CPT | Performed by: INTERNAL MEDICINE

## 2021-10-26 PROCEDURE — 120N000002 HC R&B MED SURG/OB UMMC

## 2021-10-26 PROCEDURE — 250N000013 HC RX MED GY IP 250 OP 250 PS 637: Performed by: PEDIATRICS

## 2021-10-26 PROCEDURE — 250N000011 HC RX IP 250 OP 636: Performed by: PHYSICIAN ASSISTANT

## 2021-10-26 PROCEDURE — 84100 ASSAY OF PHOSPHORUS: CPT | Performed by: PHYSICIAN ASSISTANT

## 2021-10-26 PROCEDURE — 83735 ASSAY OF MAGNESIUM: CPT | Performed by: PHYSICIAN ASSISTANT

## 2021-10-26 PROCEDURE — 99207 PR APP CREDIT; MD BILLING SHARED VISIT: CPT | Performed by: PHYSICIAN ASSISTANT

## 2021-10-26 PROCEDURE — 90937 HEMODIALYSIS REPEATED EVAL: CPT

## 2021-10-26 PROCEDURE — 250N000013 HC RX MED GY IP 250 OP 250 PS 637: Performed by: PHYSICIAN ASSISTANT

## 2021-10-26 PROCEDURE — 85027 COMPLETE CBC AUTOMATED: CPT | Performed by: PHYSICIAN ASSISTANT

## 2021-10-26 PROCEDURE — 258N000003 HC RX IP 258 OP 636: Performed by: CLINICAL NURSE SPECIALIST

## 2021-10-26 PROCEDURE — 250N000011 HC RX IP 250 OP 636: Performed by: CLINICAL NURSE SPECIALIST

## 2021-10-26 PROCEDURE — 36415 COLL VENOUS BLD VENIPUNCTURE: CPT | Performed by: PHYSICIAN ASSISTANT

## 2021-10-26 RX ORDER — HEPARIN SODIUM 1000 [USP'U]/ML
500 INJECTION, SOLUTION INTRAVENOUS; SUBCUTANEOUS
Status: COMPLETED | OUTPATIENT
Start: 2021-10-26 | End: 2021-10-26

## 2021-10-26 RX ORDER — HEPARIN SODIUM 1000 [USP'U]/ML
500 INJECTION, SOLUTION INTRAVENOUS; SUBCUTANEOUS CONTINUOUS
Status: DISCONTINUED | OUTPATIENT
Start: 2021-10-26 | End: 2021-10-26

## 2021-10-26 RX ORDER — POTASSIUM CHLORIDE 750 MG/1
40 TABLET, EXTENDED RELEASE ORAL ONCE
Status: DISCONTINUED | OUTPATIENT
Start: 2021-10-26 | End: 2021-10-29

## 2021-10-26 RX ADMIN — PIPERACILLIN SODIUM AND TAZOBACTAM SODIUM 2.25 G: 2; .25 INJECTION, POWDER, LYOPHILIZED, FOR SOLUTION INTRAVENOUS at 06:31

## 2021-10-26 RX ADMIN — RIFAXIMIN 550 MG: 550 TABLET ORAL at 19:58

## 2021-10-26 RX ADMIN — ACETAMINOPHEN 650 MG: 325 TABLET, FILM COATED ORAL at 13:01

## 2021-10-26 RX ADMIN — HEPARIN SODIUM 500 UNITS: 1000 INJECTION, SOLUTION INTRAVENOUS; SUBCUTANEOUS at 09:15

## 2021-10-26 RX ADMIN — CALCIUM CARBONATE 600 MG (1,500 MG)-VITAMIN D3 400 UNIT TABLET 1 TABLET: at 13:01

## 2021-10-26 RX ADMIN — PIPERACILLIN SODIUM AND TAZOBACTAM SODIUM 2.25 G: 2; .25 INJECTION, POWDER, LYOPHILIZED, FOR SOLUTION INTRAVENOUS at 00:28

## 2021-10-26 RX ADMIN — THIAMINE HCL TAB 100 MG 100 MG: 100 TAB at 13:01

## 2021-10-26 RX ADMIN — Medication 2.5 MG: at 06:31

## 2021-10-26 RX ADMIN — SODIUM CHLORIDE 300 ML: 9 INJECTION, SOLUTION INTRAVENOUS at 09:15

## 2021-10-26 RX ADMIN — Medication 1 CAPSULE: at 13:01

## 2021-10-26 RX ADMIN — HEPARIN SODIUM 5000 UNITS: 5000 INJECTION, SOLUTION INTRAVENOUS; SUBCUTANEOUS at 08:39

## 2021-10-26 RX ADMIN — HYDROXYZINE HYDROCHLORIDE 50 MG: 25 TABLET, FILM COATED ORAL at 20:09

## 2021-10-26 RX ADMIN — FOLIC ACID 1 MG: 1 TABLET ORAL at 13:01

## 2021-10-26 RX ADMIN — HEPARIN SODIUM 500 UNITS/HR: 1000 INJECTION, SOLUTION INTRAVENOUS; SUBCUTANEOUS at 10:19

## 2021-10-26 RX ADMIN — HEPARIN SODIUM 5000 UNITS: 5000 INJECTION, SOLUTION INTRAVENOUS; SUBCUTANEOUS at 19:58

## 2021-10-26 RX ADMIN — Medication 2.5 MG: at 15:09

## 2021-10-26 RX ADMIN — ESCITALOPRAM OXALATE 10 MG: 10 TABLET ORAL at 13:01

## 2021-10-26 RX ADMIN — Medication 2.5 MG: at 20:09

## 2021-10-26 RX ADMIN — POLYETHYLENE GLYCOL 3350 17 G: 17 POWDER, FOR SOLUTION ORAL at 08:39

## 2021-10-26 RX ADMIN — GABAPENTIN 100 MG: 100 CAPSULE ORAL at 08:39

## 2021-10-26 RX ADMIN — RIFAXIMIN 550 MG: 550 TABLET ORAL at 13:01

## 2021-10-26 RX ADMIN — SODIUM CHLORIDE 250 ML: 9 INJECTION, SOLUTION INTRAVENOUS at 09:15

## 2021-10-26 RX ADMIN — PIPERACILLIN SODIUM AND TAZOBACTAM SODIUM 2.25 G: 2; .25 INJECTION, POWDER, LYOPHILIZED, FOR SOLUTION INTRAVENOUS at 17:16

## 2021-10-26 RX ADMIN — PANTOPRAZOLE SODIUM 40 MG: 40 TABLET, DELAYED RELEASE ORAL at 08:39

## 2021-10-26 RX ADMIN — MIDODRINE HYDROCHLORIDE 20 MG: 5 TABLET ORAL at 17:15

## 2021-10-26 RX ADMIN — MIDODRINE HYDROCHLORIDE 20 MG: 5 TABLET ORAL at 08:53

## 2021-10-26 RX ADMIN — GABAPENTIN 100 MG: 100 CAPSULE ORAL at 19:58

## 2021-10-26 RX ADMIN — Medication 3 MG: at 20:09

## 2021-10-26 RX ADMIN — PIPERACILLIN SODIUM AND TAZOBACTAM SODIUM 2.25 G: 2; .25 INJECTION, POWDER, LYOPHILIZED, FOR SOLUTION INTRAVENOUS at 12:57

## 2021-10-26 RX ADMIN — Medication 100 MCG: at 13:01

## 2021-10-26 RX ADMIN — GABAPENTIN 100 MG: 100 CAPSULE ORAL at 14:25

## 2021-10-26 RX ADMIN — PSYLLIUM HUSK 1 PACKET: 3.4 POWDER ORAL at 08:39

## 2021-10-26 ASSESSMENT — ACTIVITIES OF DAILY LIVING (ADL)
ADLS_ACUITY_SCORE: 10

## 2021-10-26 ASSESSMENT — MIFFLIN-ST. JEOR
SCORE: 1558
SCORE: 1569.83
SCORE: 1550
SCORE: 1570

## 2021-10-26 NOTE — PLAN OF CARE
"/73 (BP Location: Right arm)   Pulse 88   Temp 97.2  F (36.2  C) (Oral)   Resp 18   Ht 1.626 m (5' 4\")   Wt 96.7 kg (213 lb 3 oz)   SpO2 99%   BMI 36.59 kg/m      Assumed Cares: 6571-6548  Neuro: A&O x 4  Respiratory: Complains of dyspnea on exertion and mild SOB at rest; vitally stable on 3L NC  Cardiac: WDL  GI/: Several loose bowel movements  Skin: Jaundiced  Lines: R PIV TKO with intermittent abx therapy  Pain: Given oxycodone x 1 for back pain  Diet: Regular diet with   Activity Level: 1 assist with walker  Events: No acute events over night  Plan: Continue with POC; notify provider with changes      "

## 2021-10-26 NOTE — PLAN OF CARE
Cares 5991-2217. Alert, oriented. VSS on 2L O2 via NC. Endorses back pain, gave oxy x1 with relief. Poor appetite, didn't want dinner. Up with assist x1 and walker. Voiding spontaneously using bedside commode, BM x1 this shift. Continue to monitor.

## 2021-10-26 NOTE — PLAN OF CARE
Assumed cares 2804-2117    Vitals: VSS on 3L O2  Pain: Back pain controlled with oxy x1, scheduled tylenol, and heating pad.  Neuro: A&Ox4  Cardiac: WDL  Respiratory: SWAN  GI/: Voiding in small amounts. Watery BM x 1. Poor appetite.  IV/Drains: R PIV infusing TKO in between antibiotics.  Activity: Up with Ax1 and a walker.  Skin: Jaundiced. Scattered bruising.     Events: Pt completed dialysis today and tolerated it well.     Plan of Care: Continue POC; notify provider with changes.

## 2021-10-26 NOTE — PROGRESS NOTES
HEMODIALYSIS TREATMENT NOTE    Date: 10/26/2021  Time: 12:42 PM    Data:  Pre Wt: 95 kg (209 lb 7 oz)   Desired Wt: 93 kg   Post Wt: 93 kg (205 lb 0.4 oz)  Weight change: 2 kg  Ultrafiltration - Post Run Net Total Removed (mL): 2000 mL  Vascular Access Status: CVC  patent  Dialyzer Rinse: Clear  Total Blood Volume Processed: 62 L   Total Dialysis (Treatment) Time: 3 hr   Dialysate Bath: K 4, Ca 3  Heparin 500 units loading + 500 units/hr    Lab:   No    Interventions:  PT arrived for HD, she and her PCN reported that she did stand on the floor for her wt. Goal ordered 1-2 L, set for 2 and zen tx well. BP remained in 120's, 2 kg net pulled and -9% blood vol chg per crit line. Pt alert and oriented entire run.     Assessment:  Stable HD, likely still has more vol to remove.      Plan:    Next tx per renal

## 2021-10-26 NOTE — PROGRESS NOTES
[facility]    Medicine Progress Note - Hospitalist Service, Gold 6       Date of Admission:  9/29/2021    Assessment & Plan      Janay Angeles is a 46-year-old female with a history of alcohol use disorder, Fitz-en-Y gastric bypass (2010), DM2, anxiety, depression, who was initially admitted to internal medicine service with alcoholic hepatitis.  She transferred to MICU on 10/4/2021 with acute hypoxic respiratory failure secondary to ARDS requiring intubation.  Hospitalization complicated by septic shock, pneumonia, ANA LAURA requiring CRRT and now iHD, encephalopathy.  Self extubated on 10/14, weaned off pressors on 10/19, and transferred to general medical floor on 10/20.    Acute hypoxic respiratory failure, improving  ARDS, resolved  Pneumonia  Pulmonary edema   Acute decline in respiratory status requiring intubation on 10/4, CXR c/w ARDS, likely secondary to HAP vs aspiration. BAL cultures negative. Treated with Zosyn 10/4 - 10/13. Self-extubated on 10/14. Developed increased work of breathing on 10/17, CXR demonstrated increased interstitial opacities concerning for pulmonary edema vs pneumonia. Currently managing volume with Lasix + iHD and treating for VAP. Deconditioning and atelectasis likely contributing to ongoing O2 needs. Still requiring frequent HD for volume management, anticipate will need on discharge.   - Continue IV Zosyn to complete a 10-day course, day #10   - Incentive spirometry   - OOB at least TID   - Continuous pulse oximetry. Wean O2 as able.    - Trend CBC daily, CRP q48h     Septic shock, resolved: Shock most likely secondary to sepsis with hepatic dysfunction contributing as well. TTE with hyperdynamic LVEF, no RV dysfunction. Weaned off pressors on 10/19. Continues to require midodrine for soft blood pressures.   - Midodrine 20 mg TID, wean as able   - Vitals q4h      ANA LAURA  Hypervolemia  Baseline Cr ~0.5. Cr to peak of 2.98. Decline in renal  function most likely secondary to ATN (hypotension, medications, contrast), possible component of HRS. On CRRT 10/7 - 10/13, now transitioned to iHD. RIJ dysfunction and unable to dialyze on 10/21. New tunneled RIJ placed by IR On 10/22.   - Nephrology following   - HD tomorrow   - No evidence of renal recovery at this time, will plan to discharge on HD and start working on outpatient dialysis unit    - Strict I/Os   - Daily weights   - Lymphedema team consulted     Alcohol hepatitis: Recently admitted in September with ETOH hepatitis, started on rifaximin and lactulose per Hepatology recs. Now re-presented with ETOH hepatitis, Maddrey score 13 on admission. Acute hepatitis c/b portal hypertension with ascites. Diagnostic mane negative for SBP (most recently 10/11, 1.25L off). Tbili, INR, AST/ALT, platelets all improving. MELD 22.   - Lactulose titrated to 3-4 loose BMs daily   - Rifaximin BID   - Trend CMP, INR     Toxic metabolic encephalopathy, resolved: Multifactorial secondary to acute illness, sedating medications, HE. Psychiatry consulted 10/15, started Seroquel. Fully A&O today.    - Lactulose, rifaximin   - Discontinue Seroquel    - Delirium precautions   - Minimize sedating medications     Abdominal pain, improved: Secondary to alcohol hepatitis, gas pains.    - Tylenol TID   - Oxycodone 2.5 mg q4h PRN - wean as able   - Simethicone PRN     Severe malnutrition  Dysphagia   In the context of acute on chronic illness. Started on TF 10/5. Now more awake and interested in eating but not feeling hungry, transitioned to cycled feeds on 10/21. NJ clogged and removed on 10/23.    - Trial oral intake, if unable to meet caloric goals will need to replace NG vs NJ   - Nutrition, SLP following   - Advance to regular diet per SLP   - Calorie counts     DM2: A1C 9.1. PTA on metformin. Transitioned to insulin due to hyperglycemia while on tube feeds.    - Hold Lantus   - Holding metformin while inpatient   - MSSI    -  BG checks q4h    - Hypoglycemia protocol     Acute on chronic macrocytic anemia:  Likely secondary to alcohol use, alcohol hepatitis, nutritional deficiencies. Intermittently requiring PRBC transfusions since admission (10/7, 10/12, 10/16). No e/o active bleeding. Hgb 7.6 today.   - CBC in AM     Alcohol use disorder: Last drink on 9/12/21. PTA was at CD treatment facility.    - Patient interested in resuming CD treatment, will need to re-evaluate this closer to discharge date, at this time PT/OT recommending TCU   - Continue folic acid, thiamine, MVI supplements        Other Stable Medical Issues:   Coagulopathy: INR to peak of 2.5, now much improved. Likely secondary to hepatic dysfunction.   MDD, RUBÉN: Continue PTA Lexapro. Prazosin on hold d/t hypotension, resume as able. Health Psychology consulted 10/1, re-involve PRN.   GERD: Continue PPI.   Neuropathy: Continue PTA gabapentin.      Diet: Regular Diet Adult  Snacks/Supplements Adult: Other; ensure clear and ensure enlive; Between Meals    DVT Prophylaxis: Heparin SQ  Rice Catheter: Not present  Central Lines: None  Code Status: Full Code      Disposition Plan   Expected discharge: 2-5 days  recommended to transitional care unit once safe disposition plan/ TCU bed available and PO intake adequate.     The patient's care was discussed with the Attending Physician, Dr. Stockton.     Kady Leal PA-C  Hospitalist Service, [address]  Securely message with the Vocera Web Console (learn more here)  Text page via [facility] Paging/Directory  Please see sign in/sign out for up to date coverage information    Clinically Significant Risk Factors Present on Admission                ______________________________________________________________________    Interval History   Continues to complain of tinnitus, unchanged over the past 48 hours. Denies any nausea, vomiting or dizziness. Has some ear fullness with  swallowing. S/p HD today which she tolerated well. Appears comfortable on 3L via NC. Appetite improved with sister bringing in food from home.     Data reviewed today: I reviewed all medications, new labs and imaging results over the last 24 hours.     Physical Exam   Vital Signs: Temp: 99.7  F (37.6  C) Temp src: Axillary BP: 93/57 Pulse: 100   Resp: 20 SpO2: 96 % O2 Device: Nasal cannula Oxygen Delivery: 3 LPM  Weight: 205 lbs .44 oz  General Appearance: Alert and oriented x 3, NAD  Respiratory: Breathing non labored on 2L via NC, bibasilar crackles  Cardiovascular: RRR, no murmurs  GI: Abdomen soft, non distended, non tender to palpation  Skin: jaundiced  Other: 2+ bilateral pitting LE edema     Data   Recent Labs   Lab 10/26/21  0849 10/26/21  0605 10/25/21  2129 10/25/21  0740 10/25/21  0555 10/24/21  1248 10/24/21  0837 10/24/21  0740 10/24/21  0529 10/22/21  0806 10/22/21  0535   WBC  --  15.4*  --   --  18.3*  --  17.2*  --   --    < > 19.2*   HGB  --  7.4*  --   --  7.6*  --  7.9*  --   --    < > 7.0*   MCV  --  101*  --   --  102*  --  102*  --   --    < > 105*   PLT  --  235  --   --  258  --  239  --   --    < > 218   INR  --   --   --   --  1.26*  --   --   --   --   --  1.32*   NA  --  136  --   --  136  --   --   --  134   < > 133   POTASSIUM  --  3.3*  --   --  3.5  --   --   --  3.5   < > 3.7   CHLORIDE  --  100  --   --  100  --   --   --  99   < > 97   CO2  --  25  --   --  26  --   --   --  27   < > 25   BUN  --  36*  --   --  30  --   --   --  27   < > 68*   CR  --  2.43*  --   --  2.26*  --   --   --  1.86*   < > 3.14*   ANIONGAP  --  11  --   --  10  --   --   --  8   < > 11   STEFFANY  --  8.5  --   --  8.3*  --   --   --  8.4*   < > 8.6   * 106* 91   < > 96   < >  --    < > 108*   < > 106*   ALBUMIN  --  1.7*  --   --  1.6*  --   --    < > 1.6*   < > 1.5*   PROTTOTAL  --  6.1*  --   --  6.2*  --   --    < > 6.1*   < > 5.7*   BILITOTAL  --  3.1*  --   --  3.2*  --   --    < > 3.5*   < >  3.9*   ALKPHOS  --  314*  --   --  327*  --   --    < > 351*   < > 285*   ALT  --  34  --   --  32  --   --    < > 36   < > 42   AST  --  78*  --   --  72*  --   --    < > 75*   < > 85*    < > = values in this interval not displayed.

## 2021-10-26 NOTE — PROGRESS NOTES
Nephrology Progress Note  10/26/2021         Aliyah Angeles is a 46 yof w/hx of ETOH use (Quit 9/12/2021) w/hepatitis, Fitz en Y bypass, Pancreatitis, DM2, Panc cyst who was admitted 9/29 from chem dep with N/V and worsening abdominal pain and edema.  Her course has been resp failure with ARDS and evolving ANA LAURA.  Noted to have received albumin from 10/1-10/3 (150g/day) with no improvement in Cr.  On Vanco/Zosyn to treat sepsis although cultures at time of consult are negative.  Nephrology consulted for evaluation of ANA LAURA and possible RRT, started CRRT on 10/7.       Interval History :   Mrs Angeles had CRRT started 10/7 for management of volume and acidosis, transitioned to iHD on 10/15, no urgent issue but with Cr up again we are running HD today.  Plan for 1-2L of UF as BP's allow, plan for next HD on 10/28 unless Cr starts improving on its own.  Checking iron stores with Hgb of 7.3.      Assessment & Recommendations:   ANA LAURA-Baseline Cr normal at 0.5 as recently as 9/14/2021, abdominal US pending currently.  Was at inpatient chem dep when she developed abdominal pain, N/V and worsening edema and was brought to [facility] on 9/29.  Was treated for UTI and started on Vanco/Zosyn for leukocytosis but decompensated and was intubated the evening of 10/3.  Noted she did receive albumin x3 days from 10/1-10/3 for ANA LAURA but actually worsened and Cr at time of consult is up to 2.  Lisa <5 on 10/3, ECHO done 10/4 showed hyperdynamic LV and normal IVC.  Etiology of ANA LAURA is likely ATN with underlying HRS physiology which made her more susceptible to hypoperfusion.  Started CRRT on 10/7 for management of volume and acidosis.  Trying to transition to iHD.                  -ANA LAURA, likely hypoperfusion in setting of baseline HRS physiology, possible contribution from vanco/zosyn and did receive contrast on 10/3.  Some UOP, still with low gfr but hopeful for further recovery.                   -CRRT started 10/7, stopped 10/13.  Deciding on HD  "daily and running today.                -Line is tunneled placed 10/22.                   -Dialysis consent signed and scanned in under media.       Volume-Has total body volume overload, I/O's difficult to interpret as we are not capturing much of her UOP and wt's are unreliable.  Running today and will pull 1-2L as BP's tolerate.       Electrolytes/pH-K 3.3 prior to run, bicarb 25.       Liver-Unclear duration, quit drinking on 9/12/2021 and was at chem dep when acute issues started.  Bili is down to 6 and INR is 1.4 and a bit improved.  GI following, not clear if she would be a transplant candidate eventually.       ID-On Zosyn and Rifaximin.       Anemia-Hgb 7.4, will check iron stores.      Nutrition-Taking PO     Seen and discussed with Dr Lock     Recommendations were communicated to primary team via verbal communication.           MARIA ELENA Wade CNS  Clinical Nurse Specialist  [phone]    Review of Systems:   I reviewed the following systems:  Gen: No fevers or chills  CV: No CP at rest  Resp: No SOB at rest  GI: No N/V    Physical Exam:   I/O last 3 completed shifts:  In: 120 [P.O.:120]  Out: 2350 [Urine:350; Other:2000]   BP 93/57 (BP Location: Right arm)   Pulse 100   Temp 99.7  F (37.6  C) (Axillary)   Resp 20   Ht 1.626 m (5' 4\")   Wt 93.8 kg (206 lb 12.7 oz)   SpO2 96%   BMI 35.50 kg/m       GENERAL APPEARANCE: Extubated, stable from pulm standpoint.  Sitting up in chair, a bit SOB today.   EYES: No scleral icterus  Pulmonary: lungs rhonchi to auscultation with equal breath sounds bilaterally, no clubbing or cyanosis  CV: Regular rhythm, normal rate, no rub   - Edema +2 LE/dependent  GI: soft, nontender, normal bowel sounds  MS: no evidence of inflammation in joints, no muscle tenderness  : No barrett, some intermittent UOP.   SKIN: no rash, warm, dry  NEURO: No focal deficits.    Labs:   All labs reviewed by me  Electrolytes/Renal - Recent Labs   Lab Test 10/26/21  0849 " 10/26/21  0605 10/25/21  2129 10/25/21  0740 10/25/21  0555 10/24/21  0740 10/24/21  0529   NA  --  136  --   --  136  --  134   POTASSIUM  --  3.3*  --   --  3.5  --  3.5   CHLORIDE  --  100  --   --  100  --  99   CO2  --  25  --   --  26  --  27   BUN  --  36*  --   --  30  --  27   CR  --  2.43*  --   --  2.26*  --  1.86*   * 106* 91   < > 96   < > 108*   STEFFANY  --  8.5  --   --  8.3*  --  8.4*   MAG  --  1.8  --   --  1.5*  --  1.7   PHOS  --  5.8*  --   --  5.6*  --  4.5    < > = values in this interval not displayed.       CBC -   Recent Labs   Lab Test 10/26/21  0605 10/25/21  0555 10/24/21  0837   WBC 15.4* 18.3* 17.2*   HGB 7.4* 7.6* 7.9*    258 239       LFTs -   Recent Labs   Lab Test 10/26/21  0605 10/25/21  0555 10/24/21  0529   ALKPHOS 314* 327* 351*   BILITOTAL 3.1* 3.2* 3.5*   ALT 34 32 36   AST 78* 72* 75*   PROTTOTAL 6.1* 6.2* 6.1*   ALBUMIN 1.7* 1.6* 1.6*       Iron Panel -   Recent Labs   Lab Test 09/13/21  0642   EYAD 1,839*           Current Medications:    acetaminophen  650 mg Oral or Feeding Tube Q8H     calcium carbonate 600 mg-vitamin D 400 units  1 tablet Oral or Feeding Tube Daily     cyanocobalamin  100 mcg Oral or Feeding Tube Daily     escitalopram  10 mg Oral Daily     folic acid  1 mg Oral or Feeding Tube Daily     gabapentin  100 mg Oral TID     heparin ANTICOAGULANT  5,000 Units Subcutaneous Q12H     influenza quadrivalent (PF) vacc  0.5 mL Intramuscular Prior to discharge     insulin aspart  1-7 Units Subcutaneous TID AC     insulin aspart  1-5 Units Subcutaneous At Bedtime     midodrine  20 mg Oral or Feeding Tube Q8H     multivitamin RENAL  1 capsule Oral Daily     pantoprazole  40 mg Oral QAM AC     piperacillin-tazobactam  2.25 g Intravenous Q6H     polyethylene glycol  17 g Oral BID     potassium chloride  40 mEq Oral Once     [Held by provider] prazosin  1 mg Oral QPM     psyllium  1 packet Oral Daily     rifaximin  550 mg Oral or Feeding Tube BID     sodium  chloride  1 spray Both Nostrils 4x Daily     thiamine  100 mg Oral or Feeding Tube Daily

## 2021-10-26 NOTE — PROGRESS NOTES
Care Management Follow Up    Length of Stay (days): 27    Expected Discharge Date: 10/29/2021     Concerns to be Addressed: substance/tobacco abuse/use, discharge planning     Patient plan of care discussed at interdisciplinary rounds: Yes    Anticipated Discharge Disposition: Transitional Care     Anticipated Discharge Services: OP dialysis   Anticipated Discharge DME: None    Patient/family educated on Medicare website which has current facility and service quality ratings: yes  Education Provided on the Discharge Plan: Yes   Patient/Family in Agreement with the Plan: yes    Referrals Placed by CM/SW:    TCU  [facility]  Ph: [phone]  F: [phone]  10/26- LVM, voicemail states they are not taking any admissions currently.      Discontinued  [facility]  Ph: [phone]  F: [phone]  10/26- per Hawa in admissions, no bed availability for the for see able future.     [facility]  Ph: [phone]  F: [phone]  10/25- per admissions, no SNF beds for the for seeable further and [facility] does not cover swing bed.    [facility]ia[facility] ([place] - Ph: [phone], F: [phone])  10/26- per vivi Cisneros, no beds available at this time     OP HD  [facility] (admissions ph: [phone] f: [phone])  10/26- sent Hep B panel, HD flowsheets, and access report per request, updated Violeta in admissions. She has left a voicemail with the [facility] for a chair time.     Private pay costs discussed: Not applicable    Additional Information:  SW following for TCU and OP HD placement. Received updates on referrals noted above. Pt will be medically ready for discharge tomorrow 10/27 as she is getting her NJ tube pulled.     SW will continue to follow for discharge placement.     JOY Reed, LICSW  [address]   [facility]  Phone: [phone]  Pager: [phone]

## 2021-10-27 ENCOUNTER — APPOINTMENT (OUTPATIENT)
Dept: OCCUPATIONAL THERAPY | Facility: CLINIC | Age: 46
End: 2021-10-27
Payer: COMMERCIAL

## 2021-10-27 ENCOUNTER — APPOINTMENT (OUTPATIENT)
Dept: PHYSICAL THERAPY | Facility: CLINIC | Age: 46
End: 2021-10-27
Payer: COMMERCIAL

## 2021-10-27 ENCOUNTER — APPOINTMENT (OUTPATIENT)
Dept: GENERAL RADIOLOGY | Facility: CLINIC | Age: 46
End: 2021-10-27
Attending: PHYSICIAN ASSISTANT
Payer: COMMERCIAL

## 2021-10-27 LAB
ALBUMIN SERPL-MCNC: 1.7 G/DL (ref 3.4–5)
ALP SERPL-CCNC: 295 U/L (ref 40–150)
ALT SERPL W P-5'-P-CCNC: 31 U/L (ref 0–50)
ANION GAP SERPL CALCULATED.3IONS-SCNC: 6 MMOL/L (ref 3–14)
AST SERPL W P-5'-P-CCNC: 89 U/L (ref 0–45)
BASOPHILS # BLD AUTO: 0.1 10E3/UL (ref 0–0.2)
BASOPHILS NFR BLD AUTO: 1 %
BILIRUB SERPL-MCNC: 2.9 MG/DL (ref 0.2–1.3)
BUN SERPL-MCNC: 20 MG/DL (ref 7–30)
CALCIUM SERPL-MCNC: 8.6 MG/DL (ref 8.5–10.1)
CHLORIDE BLD-SCNC: 102 MMOL/L (ref 94–109)
CO2 SERPL-SCNC: 25 MMOL/L (ref 20–32)
CREAT SERPL-MCNC: 1.95 MG/DL (ref 0.52–1.04)
CRP SERPL-MCNC: 90 MG/L (ref 0–8)
EOSINOPHIL # BLD AUTO: 1.1 10E3/UL (ref 0–0.7)
EOSINOPHIL NFR BLD AUTO: 6 %
ERYTHROCYTE [DISTWIDTH] IN BLOOD BY AUTOMATED COUNT: 20.1 % (ref 10–15)
GFR SERPL CREATININE-BSD FRML MDRD: 30 ML/MIN/1.73M2
GLUCOSE BLD-MCNC: 110 MG/DL (ref 70–99)
GLUCOSE BLDC GLUCOMTR-MCNC: 113 MG/DL (ref 70–99)
GLUCOSE BLDC GLUCOMTR-MCNC: 116 MG/DL (ref 70–99)
GLUCOSE BLDC GLUCOMTR-MCNC: 117 MG/DL (ref 70–99)
GLUCOSE BLDC GLUCOMTR-MCNC: 134 MG/DL (ref 70–99)
GLUCOSE BLDC GLUCOMTR-MCNC: 96 MG/DL (ref 70–99)
HCT VFR BLD AUTO: 23.7 % (ref 35–47)
HGB BLD-MCNC: 7.3 G/DL (ref 11.7–15.7)
IMM GRANULOCYTES # BLD: 0.4 10E3/UL
IMM GRANULOCYTES NFR BLD: 2 %
IRON SATN MFR SERPL: 16 % (ref 15–46)
IRON SERPL-MCNC: 28 UG/DL (ref 35–180)
LACTATE SERPL-SCNC: 0.7 MMOL/L (ref 0.7–2)
LYMPHOCYTES # BLD AUTO: 3.4 10E3/UL (ref 0.8–5.3)
LYMPHOCYTES NFR BLD AUTO: 18 %
MAGNESIUM SERPL-MCNC: 1.5 MG/DL (ref 1.6–2.3)
MCH RBC QN AUTO: 31.2 PG (ref 26.5–33)
MCHC RBC AUTO-ENTMCNC: 30.8 G/DL (ref 31.5–36.5)
MCV RBC AUTO: 101 FL (ref 78–100)
MONOCYTES # BLD AUTO: 2 10E3/UL (ref 0–1.3)
MONOCYTES NFR BLD AUTO: 11 %
NEUTROPHILS # BLD AUTO: 11.9 10E3/UL (ref 1.6–8.3)
NEUTROPHILS NFR BLD AUTO: 62 %
NRBC # BLD AUTO: 0.1 10E3/UL
NRBC BLD AUTO-RTO: 0 /100
PHOSPHATE SERPL-MCNC: 4.4 MG/DL (ref 2.5–4.5)
PLATELET # BLD AUTO: 213 10E3/UL (ref 150–450)
POTASSIUM BLD-SCNC: 3.4 MMOL/L (ref 3.4–5.3)
PROCALCITONIN SERPL-MCNC: 16.66 NG/ML
PROT SERPL-MCNC: 5.9 G/DL (ref 6.8–8.8)
RBC # BLD AUTO: 2.34 10E6/UL (ref 3.8–5.2)
SARS-COV-2 RNA RESP QL NAA+PROBE: NEGATIVE
SODIUM SERPL-SCNC: 133 MMOL/L (ref 133–144)
TIBC SERPL-MCNC: 173 UG/DL (ref 240–430)
WBC # BLD AUTO: 18.8 10E3/UL (ref 4–11)

## 2021-10-27 PROCEDURE — 99233 SBSQ HOSP IP/OBS HIGH 50: CPT | Performed by: INTERNAL MEDICINE

## 2021-10-27 PROCEDURE — 82040 ASSAY OF SERUM ALBUMIN: CPT | Performed by: PHYSICIAN ASSISTANT

## 2021-10-27 PROCEDURE — 250N000013 HC RX MED GY IP 250 OP 250 PS 637: Performed by: PEDIATRICS

## 2021-10-27 PROCEDURE — 250N000013 HC RX MED GY IP 250 OP 250 PS 637: Performed by: PHYSICIAN ASSISTANT

## 2021-10-27 PROCEDURE — 97530 THERAPEUTIC ACTIVITIES: CPT | Mod: GP | Performed by: PHYSICAL THERAPIST

## 2021-10-27 PROCEDURE — 250N000013 HC RX MED GY IP 250 OP 250 PS 637: Performed by: STUDENT IN AN ORGANIZED HEALTH CARE EDUCATION/TRAINING PROGRAM

## 2021-10-27 PROCEDURE — 86140 C-REACTIVE PROTEIN: CPT | Performed by: PHYSICIAN ASSISTANT

## 2021-10-27 PROCEDURE — 83735 ASSAY OF MAGNESIUM: CPT | Performed by: PHYSICIAN ASSISTANT

## 2021-10-27 PROCEDURE — 99207 PR APP CREDIT; MD BILLING SHARED VISIT: CPT | Performed by: PHYSICIAN ASSISTANT

## 2021-10-27 PROCEDURE — 99232 SBSQ HOSP IP/OBS MODERATE 35: CPT | Performed by: INTERNAL MEDICINE

## 2021-10-27 PROCEDURE — 85025 COMPLETE CBC W/AUTO DIFF WBC: CPT | Performed by: PHYSICIAN ASSISTANT

## 2021-10-27 PROCEDURE — 87040 BLOOD CULTURE FOR BACTERIA: CPT | Performed by: PHYSICIAN ASSISTANT

## 2021-10-27 PROCEDURE — 84145 PROCALCITONIN (PCT): CPT | Performed by: PHYSICIAN ASSISTANT

## 2021-10-27 PROCEDURE — 97535 SELF CARE MNGMENT TRAINING: CPT | Mod: GO

## 2021-10-27 PROCEDURE — 83550 IRON BINDING TEST: CPT | Performed by: CLINICAL NURSE SPECIALIST

## 2021-10-27 PROCEDURE — 250N000011 HC RX IP 250 OP 636: Performed by: PHYSICIAN ASSISTANT

## 2021-10-27 PROCEDURE — 36415 COLL VENOUS BLD VENIPUNCTURE: CPT | Performed by: PHYSICIAN ASSISTANT

## 2021-10-27 PROCEDURE — 71045 X-RAY EXAM CHEST 1 VIEW: CPT

## 2021-10-27 PROCEDURE — 71045 X-RAY EXAM CHEST 1 VIEW: CPT | Mod: 26 | Performed by: RADIOLOGY

## 2021-10-27 PROCEDURE — 83605 ASSAY OF LACTIC ACID: CPT | Performed by: PHYSICIAN ASSISTANT

## 2021-10-27 PROCEDURE — 97116 GAIT TRAINING THERAPY: CPT | Mod: GP | Performed by: PHYSICAL THERAPIST

## 2021-10-27 PROCEDURE — 120N000002 HC R&B MED SURG/OB UMMC

## 2021-10-27 PROCEDURE — U0005 INFEC AGEN DETEC AMPLI PROBE: HCPCS | Performed by: PHYSICIAN ASSISTANT

## 2021-10-27 PROCEDURE — 84100 ASSAY OF PHOSPHORUS: CPT | Performed by: PHYSICIAN ASSISTANT

## 2021-10-27 PROCEDURE — 97140 MANUAL THERAPY 1/> REGIONS: CPT | Mod: GO

## 2021-10-27 RX ORDER — MAGNESIUM OXIDE 400 MG/1
400 TABLET ORAL 2 TIMES DAILY
Status: COMPLETED | OUTPATIENT
Start: 2021-10-27 | End: 2021-10-29

## 2021-10-27 RX ADMIN — PSYLLIUM HUSK 1 PACKET: 3.4 POWDER ORAL at 08:55

## 2021-10-27 RX ADMIN — GABAPENTIN 100 MG: 100 CAPSULE ORAL at 19:54

## 2021-10-27 RX ADMIN — RIFAXIMIN 550 MG: 550 TABLET ORAL at 08:54

## 2021-10-27 RX ADMIN — MIDODRINE HYDROCHLORIDE 20 MG: 5 TABLET ORAL at 16:33

## 2021-10-27 RX ADMIN — Medication 100 MCG: at 08:58

## 2021-10-27 RX ADMIN — ESCITALOPRAM OXALATE 10 MG: 10 TABLET ORAL at 08:54

## 2021-10-27 RX ADMIN — MIDODRINE HYDROCHLORIDE 20 MG: 5 TABLET ORAL at 08:54

## 2021-10-27 RX ADMIN — PANTOPRAZOLE SODIUM 40 MG: 40 TABLET, DELAYED RELEASE ORAL at 08:54

## 2021-10-27 RX ADMIN — Medication 2.5 MG: at 22:14

## 2021-10-27 RX ADMIN — HYDROXYZINE HYDROCHLORIDE 50 MG: 25 TABLET, FILM COATED ORAL at 19:54

## 2021-10-27 RX ADMIN — Medication 3 MG: at 19:54

## 2021-10-27 RX ADMIN — Medication 400 MG: at 19:54

## 2021-10-27 RX ADMIN — CALCIUM CARBONATE 600 MG (1,500 MG)-VITAMIN D3 400 UNIT TABLET 1 TABLET: at 08:54

## 2021-10-27 RX ADMIN — GABAPENTIN 100 MG: 100 CAPSULE ORAL at 14:22

## 2021-10-27 RX ADMIN — GABAPENTIN 100 MG: 100 CAPSULE ORAL at 08:54

## 2021-10-27 RX ADMIN — THIAMINE HCL TAB 100 MG 100 MG: 100 TAB at 08:54

## 2021-10-27 RX ADMIN — FOLIC ACID 1 MG: 1 TABLET ORAL at 08:54

## 2021-10-27 RX ADMIN — HEPARIN SODIUM 5000 UNITS: 5000 INJECTION, SOLUTION INTRAVENOUS; SUBCUTANEOUS at 19:54

## 2021-10-27 RX ADMIN — Medication 1 CAPSULE: at 08:54

## 2021-10-27 RX ADMIN — Medication 2.5 MG: at 01:23

## 2021-10-27 RX ADMIN — HEPARIN SODIUM 5000 UNITS: 5000 INJECTION, SOLUTION INTRAVENOUS; SUBCUTANEOUS at 08:54

## 2021-10-27 RX ADMIN — RIFAXIMIN 550 MG: 550 TABLET ORAL at 19:54

## 2021-10-27 RX ADMIN — Medication 2.5 MG: at 10:11

## 2021-10-27 RX ADMIN — POLYETHYLENE GLYCOL 3350 17 G: 17 POWDER, FOR SOLUTION ORAL at 08:54

## 2021-10-27 ASSESSMENT — ACTIVITIES OF DAILY LIVING (ADL)
ADLS_ACUITY_SCORE: 10

## 2021-10-27 ASSESSMENT — MIFFLIN-ST. JEOR: SCORE: 1565

## 2021-10-27 NOTE — PROVIDER NOTIFICATION
Paged Willy Hamilton [phone]    5b, 34-2, T.G.  FYI- critical procalcitonin of 16.66.  Mounika MCCRACKEN [phone]

## 2021-10-27 NOTE — PLAN OF CARE
9218-0410: C/O back pain, some relief with PRN meds. SBA assist with a walker. Had 1 small BM on shift. R CVC for dialysis, R PIV infusing NS TKO. A&O x4. Will continue to monitor and follow plan of care.

## 2021-10-27 NOTE — PLAN OF CARE
Cares 1997-1785. Alert, oriented. VSS on 3L O2. Endorses back pain, controlled with oxy x1 and heating pad. BM x1 this shift. R PIV TKO and CVC CDI. Continue to monitor.

## 2021-10-27 NOTE — PROGRESS NOTES
[facility]    Medicine Progress Note - Hospitalist Service, Gold 6       Date of Admission:  9/29/2021    Assessment & Plan         Jnaay Angeles is a 46-year-old female with a history of alcohol use disorder, Fitz-en-Y gastric bypass (2010), DM2, anxiety, depression, who was initially admitted to internal medicine service with alcoholic hepatitis.  She transferred to MICU on 10/4/2021 with acute hypoxic respiratory failure secondary to ARDS requiring intubation.  Hospitalization complicated by septic shock, pneumonia, ANA LAURA requiring CRRT and now iHD, encephalopathy.  Self extubated on 10/14, weaned off pressors on 10/19, and transferred to general medical floor on 10/20.    Plan for today:  - Repeat CXR, COVID PCR, BCs x 2, and UC given rising WBC and CRP since now off IV abx since yesterday  - Restart calorie counts    Acute hypoxic respiratory failure, improving  ARDS, resolved  Pneumonia, aspiration vs VAP  Pulmonary edema   Acute decline in respiratory status requiring intubation on 10/4, CXR c/w ARDS, likely secondary to HAP vs aspiration. BAL cultures negative. Treated with Zosyn 10/4 - 10/13. Self-extubated on 10/14. Developed increased work of breathing on 10/17, CXR demonstrated increased interstitial opacities concerning for pulmonary edema vs pneumonia. Currently managing volume with Lasix + iHD and treating for VAP. Deconditioning and atelectasis likely contributing to ongoing O2 needs. Still requiring frequent HD for volume management, anticipate will need on discharge. Completed 10 day course of Zosyn 10/26. WBC and CRP this am slightly worse at 18.8 (15.4) and 90 (66), respectively. O2 requirements remains stable at 3L NC. Afebrile.   - Repeat CXR, BCs x 2 and UC  - Continue to trend CRP and WBC  - Incentive spirometry  - OOB at least TID  - Continuous pulse oximetry. Wean O2 as able.      ANA LAURA  Hypervolemia  Baseline Cr ~0.5. Cr to peak of 2.98. Decline  in renal function most likely secondary to ATN (hypotension, medications, contrast), possible component of HRS. On CRRT 10/7 - 10/13, now transitioned to iHD. RIJ dysfunction and unable to dialyze on 10/21. New tunneled RIJ placed by IR On 10/22.  - Nephrology following  - HD per nephrology team  - No evidence of renal recovery at this time, will plan to discharge on HD and start working on outpatient dialysis unit   - Strict I/Os  - Daily weights  - Lymphedema team consulted     Alcohol hepatitis: Recently admitted in September with ETOH hepatitis, started on rifaximin and lactulose per Hepatology recs. Now re-presented with ETOH hepatitis, Maddrey score 13 on admission. Acute hepatitis c/b portal hypertension with ascites. Diagnostic mane negative for SBP (most recently 10/11, 1.25L off). Tbili, INR, AST/ALT, platelets stable.   MELD-Na score: 23 at 10/27/2021  5:58 AM  MELD score: 20 at 10/27/2021  5:58 AM  Calculated from:  Serum Creatinine: 1.95 mg/dL at 10/27/2021  5:58 AM  Serum Sodium: 133 mmol/L at 10/27/2021  5:58 AM  Total Bilirubin: 2.9 mg/dL at 10/27/2021  5:58 AM  INR(ratio): 1.26 at 10/25/2021  5:55 AM  Age: 46 years    - Lactulose titrated to 3-4 loose BMs daily   - Rifaximin BID   - Trend CMP, INR     Toxic metabolic encephalopathy, resolved:  Multifactorial secondary to acute illness, sedating medications, HE. Psychiatry consulted 10/15, started Seroquel. Remains fully A&O today.    - Lactulose, rifaximin   - Discontinue Seroquel    - Delirium precautions   - Minimize sedating medications     Abdominal pain, improved: Secondary to alcohol hepatitis, gas pains.    - Tylenol TID   - Oxycodone 2.5 mg q4h PRN - wean as able   - Simethicone PRN     Severe malnutrition in the context of acute on chronic illness  Dysphagia   In the context of acute on chronic illness. Started on TF 10/5. Now more awake and interested in eating but not feeling hungry, transitioned to cycled feeds on 10/21 but NJ clogged  and removed on 10/23.   - Nutrition and SLP consulted; appreciate following.   - Start calorie counts  - Continue regular diet per speech  - If unable to meet caloric goals will need to replace feeding tube.      DM2: A1C 9.1. PTA on metformin. Transitioned to insulin due to hyperglycemia while on tube feeds. Lantus and metformin on hold. BGs stable.  Recent Labs   Lab 10/27/21  1222 10/27/21  0757 10/27/21  0558 10/26/21  2105 10/26/21  1857 10/26/21  1714   * 116* 110* 176* 118* 119*       - MSSI    - BG checks q4h    - Hypoglycemia protocol     Acute on chronic macrocytic anemia:  Likely secondary to alcohol use, alcohol hepatitis, nutritional deficiencies. Intermittently requiring PRBC transfusions since admission (10/7, 10/12, 10/16). No e/o active bleeding. Hgb 7.3 (7.4) today.   - CBC in AM     Alcohol use disorder: Last drink on 9/12/21. PTA was at CD treatment facility.    - Patient interested in resuming CD treatment, will need to re-evaluate this closer to discharge date, at this time PT/OT recommending TCU   - Continue folic acid, thiamine, MVI supplements     Other Stable Medical Issues:   Coagulopathy: INR to peak of 2.5 on 10/4, now much improved. Likely secondary to known severe hepatic steatosis.    MDD, RUBÉN: Continue PTA Lexapro. Prazosin on hold d/t hypotension, resume as able. Health Psychology consulted 10/1, re-involve PRN.   GERD: Continue PPI.   Neuropathy: Continue PTA gabapentin.     Resolved medical issues:  Septic shock, resolved: Shock most likely secondary to sepsis with hepatic dysfunction contributing as well. TTE with hyperdynamic LVEF, no RV dysfunction. Weaned off pressors on 10/19. Continues to require midodrine 20 mg q8hrs for soft blood pressures.           Diet: Regular Diet Adult  Snacks/Supplements Adult: Other; ensure clear and ensure enlive; Between Meals    DVT Prophylaxis: Heparin SQ  Rice Catheter: Not present  Central Lines: None  Code Status: Full Code       Disposition Plan   Expected discharge: 10/29/2021   recommended to transitional care unit once safe disposition plan/ TCU bed available.     The patient's care was discussed with the Attending Physician, Dr. Stockton, Bedside Nurse and Patient.    Willy Hamilton PA-C  Hospitalist Service, [address]  Securely message with the Vocera Web Console (learn more here)  Text page via [facility] Paging/Directory    Please see sign in/sign out for up to date coverage information  ____________________________________________________________________    Interval History   No acute events overnight. Denies acute physical concerns at this time.     Data reviewed today: I reviewed all medications, new labs and imaging results over the last 24 hours.   Physical Exam   Vital Signs: Temp: 97.2  F (36.2  C) Temp src: Oral BP: 106/70 Pulse: 80   Resp: 20 SpO2: 98 % O2 Device: Nasal cannula Oxygen Delivery: 3 LPM  Weight: 206 lbs 12.66 oz  GEN: In NAD  HEENT: NCAT; PERRL; sclerae non-icteric  LUNGS: CTAB  CHEST: Tunneled catheter intact without surrounding edema.  CV: RRR  ABD: +BSs; SNTND  EXT: 3+ BLE edema  SKIN: No acute rashes noted on exposed areas.  NEURO: AAOx3; CNs grossly intact; No acute focal deficits noted.        Data   CMPRecent Labs   Lab 10/27/21  0757 10/27/21  0558 10/26/21  2105 10/26/21  1857 10/26/21  0849 10/26/21  0605 10/25/21  0740 10/25/21  0555 10/24/21  0740 10/24/21  0529   NA  --  133  --   --   --  136  --  136  --  134   POTASSIUM  --  3.4  --   --   --  3.3*  --  3.5  --  3.5   CHLORIDE  --  102  --   --   --  100  --  100  --  99   CO2  --  25  --   --   --  25  --  26  --  27   ANIONGAP  --  6  --   --   --  11  --  10  --  8   * 110* 176* 118*   < > 106*   < > 96   < > 108*   BUN  --  20  --   --   --  36*  --  30  --  27   CR  --  1.95*  --   --   --  2.43*  --  2.26*  --  1.86*   GFRESTIMATED  --  30*  --   --   --  23*  --  25*  --   32*   STEFFANY  --  8.6  --   --   --  8.5  --  8.3*  --  8.4*   MAG  --  1.5*  --   --   --  1.8  --  1.5*  --  1.7   PHOS  --  4.4  --   --   --  5.8*  --  5.6*  --  4.5   PROTTOTAL  --  5.9*  --   --   --  6.1*  --  6.2*  --  6.1*   ALBUMIN  --  1.7*  --   --   --  1.7*  --  1.6*  --  1.6*   BILITOTAL  --  2.9*  --   --   --  3.1*  --  3.2*  --  3.5*   ALKPHOS  --  295*  --   --   --  314*  --  327*  --  351*   AST  --  89*  --   --   --  78*  --  72*  --  75*   ALT  --  31  --   --   --  34  --  32  --  36    < > = values in this interval not displayed.     CBC  Recent Labs   Lab 10/27/21  0558 10/26/21  0605 10/25/21  0555 10/24/21  0837   WBC 18.8* 15.4* 18.3* 17.2*   RBC 2.34* 2.40* 2.47* 2.53*   HGB 7.3* 7.4* 7.6* 7.9*   HCT 23.7* 24.3* 25.2* 25.7*   * 101* 102* 102*   MCH 31.2 30.8 30.8 31.2   MCHC 30.8* 30.5* 30.2* 30.7*   RDW 20.1* 20.1* 20.4* 20.9*    235 258 239     INR  Recent Labs   Lab 10/25/21  0555 10/22/21  0535   INR 1.26* 1.32*       Lab Results   Component Value Date    CRP 90.0 10/27/2021    CRP 66.0 10/25/2021    CRP <2.9 10/11/2016

## 2021-10-27 NOTE — PROGRESS NOTES
"Care Management Follow Up    Length of Stay (days): 28    Expected Discharge Date: 10/29/2021     Concerns to be Addressed: substance/tobacco abuse/use, discharge planning     Patient plan of care discussed at interdisciplinary rounds: Yes    Anticipated Discharge Disposition: Inpatient Chemical Dependency, Transitional Care    TCU  Anticipated Discharge Services: None    OP dialysis   Anticipated Discharge DME: None    Patient/family educated on Medicare website which has current facility and service quality ratings: yes  Education Provided on the Discharge Plan:  yes  Patient/Family in Agreement with the Plan: yes    Referrals Placed by CM/SW:      TCU  [facility]  Ph: [phone]  F: [phone]  10/26- [facility], voicemail states they are not taking any admissions currently.      [facility]  Ph: [phone]  F: [phone]  10/26- per Hawa in admissions, no bed availability for the for see able future.      [facility]  Ph: [phone]  F: [phone]  10/25- per admissions, no SNF beds for the for seeable further and [facility] not cover swing bed.     [facility] ([place] - Ph: [phone], F: [phone])  10/26- per vivi Cisneros, no beds available at this time      OP HD  [facility] (admissions ph: [phone] f: [phone])  10/26- sent Hep B panel, HD flowsheets, and access report per request, updated Violeta in admissions. She has left a voicemail with the [facility] for a chair time.    10/27 chair time still unavailable  Private pay costs discussed: Not applicable    Additional Information:  SW met with Pt regarding discharge planning. SW noted that previously selected options do not have any bed availability.     Pt reported \"I did not any of the [place] options that I didn't select for various reasons.\" SW asked if there was other areas Pt is interested in as Pt is medically ready for discharge. SW " validated Pt concerns and reported bed [place] Pt asked for a additional [place] list. Pt to notify SW when more options are selected.     SW called [facility] (admissions ph: [phone] f: [phone]). Admissions confirmed that Pt has been accepted upon receiving needed documents. Chair time is still unavailable at this time.       JOY Johnson, [facility]- covering 5b  ICU    [facility]  Phone: [phone]  Pager: [phone]

## 2021-10-27 NOTE — PROGRESS NOTES
Nephrology Progress Note  10/27/2021         Aliyah Angeles is a 46 yof w/hx of ETOH use (Quit 9/12/2021) w/hepatitis, Fitz en Y bypass, Pancreatitis, DM2, Panc cyst who was admitted 9/29 from chem dep with N/V and worsening abdominal pain and edema.  Her course has been resp failure with ARDS and evolving ANA LAURA.  Noted to have received albumin from 10/1-10/3 (150g/day) with no improvement in Cr.  On Vanco/Zosyn to treat sepsis although cultures at time of consult are negative.  Nephrology consulted for evaluation of ANA LAURA and possible RRT, started CRRT on 10/7.       Interval History :   Mrs Angeles had CRRT started 10/7 for management of volume and acidosis, transitioned to iHD on 10/15, ran HD yesterday for clearance and pulled 2L without issue.  Planning on day off today and will try to hold off tomorrow as well unless volume or chemistries suggest she needs a run.  Hopeful for recovery as she has improved a bit overall.       Assessment & Recommendations:   ANA LAURA-Baseline Cr normal at 0.5 as recently as 9/14/2021, abdominal US pending currently.  Was at inpatient chem dep when she developed abdominal pain, N/V and worsening edema and was brought to [facility] on 9/29.  Was treated for UTI and started on Vanco/Zosyn for leukocytosis but decompensated and was intubated the evening of 10/3.  Noted she did receive albumin x3 days from 10/1-10/3 for ANA LAURA but actually worsened and Cr at time of consult is up to 2.  Lisa <5 on 10/3, ECHO done 10/4 showed hyperdynamic LV and normal IVC.  Etiology of ANA LAURA is likely ATN with underlying HRS physiology which made her more susceptible to hypoperfusion.  Started CRRT on 10/7 for management of volume and acidosis.  Trying to transition to iHD.                  -ANA LAURA, likely hypoperfusion in setting of baseline HRS physiology, possible contribution from vanco/zosyn and did receive contrast on 10/3.  Some UOP, still with low gfr but hopeful for further recovery.                   -CRRT started  "10/7, stopped 10/13.  Stable on 3x/week HD and watching for recovery.                -Line is tunneled placed 10/22.                   -Dialysis consent signed and scanned in under media.       Volume-Has total body volume overload, I/O's difficult to interpret as we are not capturing much of her UOP but we were able to pull 2L of UF without issue yesterday.       Electrolytes/pH-K 3.4, bicarb 25.       Liver-Unclear duration, quit drinking on 9/12/2021 and was at chem dep when acute issues started.  Bili is down to 6 and INR is 1.4 and a bit improved.  GI following, not clear if she would be a transplant candidate eventually.       ID-On Zosyn and Rifaximin.       Anemia-Hgb 7.3, will check iron stores.       Nutrition-Taking PO     Seen and discussed with Dr Lock     Recommendations were communicated to primary team via verbal communication.        MARIA ELENA Wade CNS  Clinical Nurse Specialist  [phone]    Review of Systems:   I reviewed the following systems:  Gen: No fevers or chills  CV: No CP at rest  Resp: No SOB at rest  GI: No N/V    Physical Exam:   I/O last 3 completed shifts:  In: 350 [P.O.:270; I.V.:80]  Out: 2200 [Urine:200; Other:2000]   /70 (BP Location: Right arm)   Pulse 80   Temp 97.2  F (36.2  C) (Oral)   Resp 20   Ht 1.626 m (5' 4\")   Wt 93.8 kg (206 lb 12.7 oz)   SpO2 98%   BMI 35.50 kg/m       GENERAL APPEARANCE: Extubated, stable from pulm standpoint.  Sitting up in chair, a bit SOB today.   EYES: No scleral icterus  Pulmonary: lungs rhonchi to auscultation with equal breath sounds bilaterally, no clubbing or cyanosis  CV: Regular rhythm, normal rate, no rub   - Edema +2 LE/dependent  GI: soft, nontender, normal bowel sounds  MS: no evidence of inflammation in joints, no muscle tenderness  : No barrett, some intermittent UOP.   SKIN: no rash, warm, dry  NEURO: No focal deficits.    Labs:   All labs reviewed by me  Electrolytes/Renal - Recent Labs   Lab Test " 10/27/21  1222 10/27/21  0757 10/27/21  0558 10/26/21  0849 10/26/21  0605 10/25/21  0740 10/25/21  0555   NA  --   --  133  --  136  --  136   POTASSIUM  --   --  3.4  --  3.3*  --  3.5   CHLORIDE  --   --  102  --  100  --  100   CO2  --   --  25  --  25  --  26   BUN  --   --  20  --  36*  --  30   CR  --   --  1.95*  --  2.43*  --  2.26*   * 116* 110*   < > 106*   < > 96   STEFFANY  --   --  8.6  --  8.5  --  8.3*   MAG  --   --  1.5*  --  1.8  --  1.5*   PHOS  --   --  4.4  --  5.8*  --  5.6*    < > = values in this interval not displayed.       CBC -   Recent Labs   Lab Test 10/27/21  0558 10/26/21  0605 10/25/21  0555   WBC 18.8* 15.4* 18.3*   HGB 7.3* 7.4* 7.6*    235 258       LFTs -   Recent Labs   Lab Test 10/27/21  0558 10/26/21  0605 10/25/21  0555   ALKPHOS 295* 314* 327*   BILITOTAL 2.9* 3.1* 3.2*   ALT 31 34 32   AST 89* 78* 72*   PROTTOTAL 5.9* 6.1* 6.2*   ALBUMIN 1.7* 1.7* 1.6*       Iron Panel -   Recent Labs   Lab Test 10/27/21  0558 09/13/21  0642   IRON 28*  --    IRONSAT 16  --    EYAD  --  1,839*           Current Medications:    acetaminophen  650 mg Oral or Feeding Tube Q8H     calcium carbonate 600 mg-vitamin D 400 units  1 tablet Oral or Feeding Tube Daily     cyanocobalamin  100 mcg Oral or Feeding Tube Daily     escitalopram  10 mg Oral Daily     folic acid  1 mg Oral or Feeding Tube Daily     gabapentin  100 mg Oral TID     heparin ANTICOAGULANT  5,000 Units Subcutaneous Q12H     influenza quadrivalent (PF) vacc  0.5 mL Intramuscular Prior to discharge     insulin aspart  1-7 Units Subcutaneous TID AC     insulin aspart  1-5 Units Subcutaneous At Bedtime     midodrine  20 mg Oral or Feeding Tube Q8H     multivitamin RENAL  1 capsule Oral Daily     pantoprazole  40 mg Oral QAM AC     polyethylene glycol  17 g Oral BID     potassium chloride  40 mEq Oral Once     [Held by provider] prazosin  1 mg Oral QPM     psyllium  1 packet Oral Daily     rifaximin  550 mg Oral or Feeding  Tube BID     sodium chloride  1 spray Both Nostrils 4x Daily     thiamine  100 mg Oral or Feeding Tube Daily

## 2021-10-27 NOTE — PROGRESS NOTES
Care Management Follow Up    Length of Stay (days): 28    Expected Discharge Date: 10/27/2021     Concerns to be Addressed: substance/tobacco abuse/use, discharge planning     Patient plan of care discussed at interdisciplinary rounds: Yes    Anticipated Discharge Disposition: Transitional Care     Anticipated Discharge Services: OP dialysis  Anticipated Discharge DME: None    Patient/family educated on Medicare website which has current facility and service quality ratings: yes  Education Provided on the Discharge Plan:  yes  Patient/Family in Agreement with the Plan: yes    Referrals Placed by CM/SW:      U  [facility]  Ph: [phone]  F: [phone]  10/26- [facility], voicemail states they are not taking any admissions currently.     [facility]  [address]  [facility] [address]  Phone: [phone]  Fax: [phone]  10/27: Sent initial referral via [facility]  [address] [facility] [address]  Phone: [phone]  Fax: [phone]  10/27: Sent initial referral via [facility]  [address]  [facility] [address]  Phone: [phone]  Fax: [phone]  10/27: Sent initial referral via [facility]    [place]  [address] [facility] [address]  Phone: [phone]  Fax: [phone]  10/27: Sent initial referral via [facility]    [facility]  [address] [facility] [address]  Phone: [phone]  Fax: [phone]  10/27: Sent initial referral via Epic    [place]  [address] [address]  Phone: [phone]  Admissions: [phone]  Fax: [phone]  10/27: Sent initial referral via [facility]    [facility]  [address] [facility] [address]  Phone: [phone]  Fax: [phone]  10/27: Sent initial referral via [facility]    [facility]  [address]  [facility] [address]  Phone: [phone]  Fax: [phone]  10/27: Sent initial referral via [facility]    [place]  [facility]  [address] [place] [address]  Phone: [phone]  Fax: [phone]  10/27: Sent initial referral via Epic    Discontinued  [facility]  Ph: [phone]  F: [phone]  10/26- per Hawa in admissions, no bed availability for the for see able future.      [facility]  Ph: [phone]  F: [phone]  10/25- per admissions, no SNF beds for the for seeable further and [facility] does not cover swing bed.     [facility] ([place] - Ph: [phone], F: [phone])  10/26- per vivi Cisneros, no beds available at this time      OP HD  [facility] (admissions ph: [phone] f: [phone])  10/26- sent Hep B panel, HD flowsheets, and access report per request, updated Violeta in admissions. She has left a voicemail with the [facility] for a chair time.   10/27 chair time still unavailable    Private pay costs discussed: Not applicable    Additional Information:  YANDY Walton assisting unit SW in discharge planning.  Pt is in need of TCU placement.     SW spoke with the pt at 1330 via phone - the pt confirmed she has the TCU list and she will be reviewing it, she has not yet had a chance to look at it today, stating that she was busy with other providers (PT/OT, etc.)  SW asked if she could call back at 1430 to see if the pt has picked out additional TCU preferences - the pt agreed.     SW spoke with the pt again via phone - she received the TCU list and crossed off the places she does NOT want to go to - she asked SW to make referrals in both the [place] and [place] area.  The pt's bedside RN faxed SW the TCU list and SW made TCU referrals via [facility], see above.  SW will continue to follow for disposition.       CELY Franklin  [address] & [facility]   Ph: [phone]  Pager: [phone]

## 2021-10-27 NOTE — PROGRESS NOTES
BRIEF SOCIAL WORK NOTE     YANDY received call from Jasbir Odonnell [facility] admissions. Barbara denied having any openings for the near foreseeable future. She mentioned ability to place pt on a waitlist and YANDY asked for that.     JOY Johnston, LGSW- assisting 5B 10/27  5A Acute Care   PH: [phone]  Pager: [phone]  [facility]

## 2021-10-27 NOTE — PLAN OF CARE
Cares 5993-4482. Alert, oriented. VSS on 3L O2. Endorses back pain, controlled with heat pump and oxy x1. BM x2, no urine output this shift. UA needed still, order for straight cath if not voiding. Poor appetite. R PIV SL and CVC CDI. Continue to monitor.

## 2021-10-27 NOTE — PLAN OF CARE
Assumed cares 2139-6271     Vitals: VSS on 3L O2  Pain: Back pain controlled with oxy x1. Refusing scheduled tylenol.   Neuro: A&Ox4  Cardiac: WDL  Respiratory: SWAN  GI/: Voiding in small amounts. Watery BM x 3. Fair appetite. Needs UA/UC.  IV/Drains: R PIV SL.  Activity: Up with Ax1 and a walker.  Skin: Jaundiced. Scattered bruising.  Labs: BG stable. Procalcitonin elevated; provider aware. Asymptomatic COVID swab sent.      Plan of Care: Pt triggered sepsis; lactic acid ordered. Continue POC; notify provider with changes.

## 2021-10-28 ENCOUNTER — APPOINTMENT (OUTPATIENT)
Dept: OCCUPATIONAL THERAPY | Facility: CLINIC | Age: 46
End: 2021-10-28
Payer: COMMERCIAL

## 2021-10-28 LAB
ALBUMIN SERPL-MCNC: 1.8 G/DL (ref 3.4–5)
ALBUMIN UR-MCNC: 50 MG/DL
ALP SERPL-CCNC: 283 U/L (ref 40–150)
ALT SERPL W P-5'-P-CCNC: 32 U/L (ref 0–50)
ANION GAP SERPL CALCULATED.3IONS-SCNC: 9 MMOL/L (ref 3–14)
APPEARANCE FLD: ABNORMAL
APPEARANCE UR: ABNORMAL
AST SERPL W P-5'-P-CCNC: 79 U/L (ref 0–45)
BILIRUB SERPL-MCNC: 2.8 MG/DL (ref 0.2–1.3)
BILIRUB UR QL STRIP: NEGATIVE
BUN SERPL-MCNC: 24 MG/DL (ref 7–30)
CALCIUM SERPL-MCNC: 8.5 MG/DL (ref 8.5–10.1)
CHLORIDE BLD-SCNC: 100 MMOL/L (ref 94–109)
CO2 SERPL-SCNC: 24 MMOL/L (ref 20–32)
COLOR FLD: YELLOW
COLOR UR AUTO: YELLOW
CREAT SERPL-MCNC: 2.18 MG/DL (ref 0.52–1.04)
CRP SERPL-MCNC: 79 MG/L (ref 0–8)
ERYTHROCYTE [DISTWIDTH] IN BLOOD BY AUTOMATED COUNT: 19.5 % (ref 10–15)
GFR SERPL CREATININE-BSD FRML MDRD: 26 ML/MIN/1.73M2
GLUCOSE BLD-MCNC: 101 MG/DL (ref 70–99)
GLUCOSE BLDC GLUCOMTR-MCNC: 108 MG/DL (ref 70–99)
GLUCOSE BLDC GLUCOMTR-MCNC: 111 MG/DL (ref 70–99)
GLUCOSE BLDC GLUCOMTR-MCNC: 112 MG/DL (ref 70–99)
GLUCOSE BLDC GLUCOMTR-MCNC: 131 MG/DL (ref 70–99)
GLUCOSE BLDC GLUCOMTR-MCNC: 99 MG/DL (ref 70–99)
GLUCOSE UR STRIP-MCNC: NEGATIVE MG/DL
HCT VFR BLD AUTO: 24.5 % (ref 35–47)
HGB BLD-MCNC: 7.6 G/DL (ref 11.7–15.7)
HGB UR QL STRIP: NEGATIVE
INR PPP: 1.16 (ref 0.85–1.15)
KETONES UR STRIP-MCNC: NEGATIVE MG/DL
LEUKOCYTE ESTERASE UR QL STRIP: ABNORMAL
LYMPHOCYTES NFR FLD MANUAL: 33 %
MAGNESIUM SERPL-MCNC: 1.7 MG/DL (ref 1.6–2.3)
MCH RBC QN AUTO: 31.7 PG (ref 26.5–33)
MCHC RBC AUTO-ENTMCNC: 31 G/DL (ref 31.5–36.5)
MCV RBC AUTO: 102 FL (ref 78–100)
MONOS+MACROS NFR FLD MANUAL: 61 %
NEUTS BAND NFR FLD MANUAL: 7 %
NITRATE UR QL: NEGATIVE
PH UR STRIP: 5 [PH] (ref 5–7)
PHOSPHATE SERPL-MCNC: 5.1 MG/DL (ref 2.5–4.5)
PLATELET # BLD AUTO: 253 10E3/UL (ref 150–450)
POTASSIUM BLD-SCNC: 3.6 MMOL/L (ref 3.4–5.3)
PROT SERPL-MCNC: 6.4 G/DL (ref 6.8–8.8)
RBC # BLD AUTO: 2.4 10E6/UL (ref 3.8–5.2)
RBC URINE: 2 /HPF
SODIUM SERPL-SCNC: 133 MMOL/L (ref 133–144)
SP GR UR STRIP: 1.01 (ref 1–1.03)
SQUAMOUS EPITHELIAL: <1 /HPF
UROBILINOGEN UR STRIP-MCNC: NORMAL MG/DL
WBC # BLD AUTO: 18.9 10E3/UL (ref 4–11)
WBC # FLD AUTO: 1658 /UL
WBC URINE: 8 /HPF

## 2021-10-28 PROCEDURE — 250N000013 HC RX MED GY IP 250 OP 250 PS 637: Performed by: PHYSICIAN ASSISTANT

## 2021-10-28 PROCEDURE — 49083 ABD PARACENTESIS W/IMAGING: CPT | Performed by: INTERNAL MEDICINE

## 2021-10-28 PROCEDURE — 80053 COMPREHEN METABOLIC PANEL: CPT | Performed by: PHYSICIAN ASSISTANT

## 2021-10-28 PROCEDURE — 250N000013 HC RX MED GY IP 250 OP 250 PS 637: Performed by: STUDENT IN AN ORGANIZED HEALTH CARE EDUCATION/TRAINING PROGRAM

## 2021-10-28 PROCEDURE — 36415 COLL VENOUS BLD VENIPUNCTURE: CPT | Performed by: PHYSICIAN ASSISTANT

## 2021-10-28 PROCEDURE — 89051 BODY FLUID CELL COUNT: CPT | Performed by: INTERNAL MEDICINE

## 2021-10-28 PROCEDURE — 87205 SMEAR GRAM STAIN: CPT | Performed by: INTERNAL MEDICINE

## 2021-10-28 PROCEDURE — 85027 COMPLETE CBC AUTOMATED: CPT | Performed by: PHYSICIAN ASSISTANT

## 2021-10-28 PROCEDURE — 99207 PR CDG-MDM COMPONENT: MEETS MODERATE - DOWN CODED: CPT | Performed by: INTERNAL MEDICINE

## 2021-10-28 PROCEDURE — 99207 PR APP CREDIT; MD BILLING SHARED VISIT: CPT | Performed by: PHYSICIAN ASSISTANT

## 2021-10-28 PROCEDURE — 84100 ASSAY OF PHOSPHORUS: CPT | Performed by: PHYSICIAN ASSISTANT

## 2021-10-28 PROCEDURE — 250N000011 HC RX IP 250 OP 636: Performed by: PHYSICIAN ASSISTANT

## 2021-10-28 PROCEDURE — 81001 URINALYSIS AUTO W/SCOPE: CPT | Performed by: PHYSICIAN ASSISTANT

## 2021-10-28 PROCEDURE — 87075 CULTR BACTERIA EXCEPT BLOOD: CPT | Performed by: INTERNAL MEDICINE

## 2021-10-28 PROCEDURE — 97140 MANUAL THERAPY 1/> REGIONS: CPT | Mod: GO

## 2021-10-28 PROCEDURE — 99232 SBSQ HOSP IP/OBS MODERATE 35: CPT | Mod: 25 | Performed by: INTERNAL MEDICINE

## 2021-10-28 PROCEDURE — 86140 C-REACTIVE PROTEIN: CPT | Performed by: PHYSICIAN ASSISTANT

## 2021-10-28 PROCEDURE — 83735 ASSAY OF MAGNESIUM: CPT | Performed by: PHYSICIAN ASSISTANT

## 2021-10-28 PROCEDURE — 89050 BODY FLUID CELL COUNT: CPT | Performed by: INTERNAL MEDICINE

## 2021-10-28 PROCEDURE — 85610 PROTHROMBIN TIME: CPT | Performed by: PHYSICIAN ASSISTANT

## 2021-10-28 PROCEDURE — 120N000002 HC R&B MED SURG/OB UMMC

## 2021-10-28 PROCEDURE — 250N000013 HC RX MED GY IP 250 OP 250 PS 637: Performed by: PEDIATRICS

## 2021-10-28 PROCEDURE — 87086 URINE CULTURE/COLONY COUNT: CPT | Performed by: PHYSICIAN ASSISTANT

## 2021-10-28 PROCEDURE — 99233 SBSQ HOSP IP/OBS HIGH 50: CPT | Performed by: INTERNAL MEDICINE

## 2021-10-28 RX ORDER — ONDANSETRON 4 MG/1
4 TABLET, ORALLY DISINTEGRATING ORAL EVERY 6 HOURS PRN
Status: DISCONTINUED | OUTPATIENT
Start: 2021-10-28 | End: 2021-11-05 | Stop reason: HOSPADM

## 2021-10-28 RX ADMIN — RIFAXIMIN 550 MG: 550 TABLET ORAL at 21:11

## 2021-10-28 RX ADMIN — Medication 400 MG: at 08:27

## 2021-10-28 RX ADMIN — HEPARIN SODIUM 5000 UNITS: 5000 INJECTION, SOLUTION INTRAVENOUS; SUBCUTANEOUS at 21:12

## 2021-10-28 RX ADMIN — HYDROXYZINE HYDROCHLORIDE 25 MG: 25 TABLET, FILM COATED ORAL at 08:01

## 2021-10-28 RX ADMIN — Medication 3 MG: at 22:31

## 2021-10-28 RX ADMIN — ONDANSETRON 4 MG: 4 TABLET, ORALLY DISINTEGRATING ORAL at 11:11

## 2021-10-28 RX ADMIN — CALCIUM CARBONATE 600 MG (1,500 MG)-VITAMIN D3 400 UNIT TABLET 1 TABLET: at 08:27

## 2021-10-28 RX ADMIN — FOLIC ACID 1 MG: 1 TABLET ORAL at 08:27

## 2021-10-28 RX ADMIN — GABAPENTIN 100 MG: 100 CAPSULE ORAL at 08:27

## 2021-10-28 RX ADMIN — HEPARIN SODIUM 5000 UNITS: 5000 INJECTION, SOLUTION INTRAVENOUS; SUBCUTANEOUS at 08:12

## 2021-10-28 RX ADMIN — Medication 2.5 MG: at 22:31

## 2021-10-28 RX ADMIN — THIAMINE HCL TAB 100 MG 100 MG: 100 TAB at 08:27

## 2021-10-28 RX ADMIN — Medication 1 CAPSULE: at 08:27

## 2021-10-28 RX ADMIN — PANTOPRAZOLE SODIUM 40 MG: 40 TABLET, DELAYED RELEASE ORAL at 08:12

## 2021-10-28 RX ADMIN — RIFAXIMIN 550 MG: 550 TABLET ORAL at 08:12

## 2021-10-28 RX ADMIN — MIDODRINE HYDROCHLORIDE 20 MG: 5 TABLET ORAL at 08:12

## 2021-10-28 RX ADMIN — Medication 2.5 MG: at 18:20

## 2021-10-28 RX ADMIN — Medication 400 MG: at 21:11

## 2021-10-28 RX ADMIN — GABAPENTIN 100 MG: 100 CAPSULE ORAL at 14:40

## 2021-10-28 RX ADMIN — ACETAMINOPHEN 650 MG: 325 TABLET, FILM COATED ORAL at 08:27

## 2021-10-28 RX ADMIN — GABAPENTIN 100 MG: 100 CAPSULE ORAL at 21:11

## 2021-10-28 RX ADMIN — MIDODRINE HYDROCHLORIDE 20 MG: 5 TABLET ORAL at 15:58

## 2021-10-28 RX ADMIN — MIDODRINE HYDROCHLORIDE 20 MG: 5 TABLET ORAL at 00:36

## 2021-10-28 RX ADMIN — ESCITALOPRAM OXALATE 10 MG: 10 TABLET ORAL at 08:27

## 2021-10-28 RX ADMIN — Medication 100 MCG: at 08:27

## 2021-10-28 ASSESSMENT — ACTIVITIES OF DAILY LIVING (ADL)
ADLS_ACUITY_SCORE: 10

## 2021-10-28 ASSESSMENT — MIFFLIN-ST. JEOR
SCORE: 1554
SCORE: 1558.04

## 2021-10-28 NOTE — CONSULTS
Consult and Procedure Service - Procedure Note    Attending:yuval  Resident: n/a  Procedure: Diagnostic and therapeutic paracentesis  Indication: fever, abdominal distension  Risk Assessment: low-moderate due to liver disease, subcu edema  Pre-procedure diagnosis: ascites  Post-procedure diagnosis: same    The risks and benefits of the procedure were explained to pt who expressed understanding and opted to proceed.  Consent was obtained and placed in the chart.  A time out was performed.  An area of ascites was located and marked using ultrasound guidance in the R lower quadrant; the area was prepped and draped in the usual sterile fashion.  10 ml of 1% lidocaine was instilled and ascites located.  The pigtail paracentesis catheter and needle were inserted under real-time guidance until ascites obtained then the needle removed and the catheter advanced.  The apparatus was connected to vacuum bottles and a total of 1700 ml of dark straw colored fluid removed.   A specimen was  sent for analysis. The catheter was withdrawn and the area dressed.  Patient tolerated the procedure well with no immediate complications.  Please contact the Consult and Procedure Service if any complications or concerns arise.     ERWIN WILLIS MD, [facility]  Internal Medicine Hospitalist & Staff Physician  [facility]  Pager: [phone]  [email or web address]    DOS:  October 28, 2021

## 2021-10-28 NOTE — PROGRESS NOTES
Nephrology Progress Note  10/28/2021         Aliyah Angeles is a 46 yof w/hx of ETOH use (Quit 9/12/2021) w/hepatitis, Fitz en Y bypass, Pancreatitis, DM2, Panc cyst who was admitted 9/29 from chem dep with N/V and worsening abdominal pain and edema.  Her course has been resp failure with ARDS and evolving ANA LAURA.  Noted to have received albumin from 10/1-10/3 (150g/day) with no improvement in Cr.  On Vanco/Zosyn to treat sepsis although cultures at time of consult are negative.  Nephrology consulted for evaluation of ANA LAURA and possible RRT, started CRRT on 10/7.       Interval History :   Mrs Angeles had CRRT started 10/7 for management of volume and acidosis, transitioned to iHD on 10/15.  Had day off yesterday and Cr rise was minimal the past 24h so will hold off on HD today and evaluate daily.  Her gfr is likely lower than chemistries would suggest given her cirrhosis and muscle mass but may recover enough to avoid long term RRT.       Assessment & Recommendations:   ANA LAURA-Baseline Cr normal at 0.5 as recently as 9/14/2021, abdominal US pending currently.  Was at inpatient chem dep when she developed abdominal pain, N/V and worsening edema and was brought to [facility] on 9/29.  Was treated for UTI and started on Vanco/Zosyn for leukocytosis but decompensated and was intubated the evening of 10/3.  Noted she did receive albumin x3 days from 10/1-10/3 for ANA LAURA but actually worsened and Cr at time of consult is up to 2.  Lisa <5 on 10/3, ECHO done 10/4 showed hyperdynamic LV and normal IVC.  Etiology of ANA LAURA is likely ATN with underlying HRS physiology which made her more susceptible to hypoperfusion.  Started CRRT on 10/7 for management of volume and acidosis.  Trying to transition to iHD.                  -ANA LAURA, likely hypoperfusion in setting of baseline HRS physiology, possible contribution from vanco/zosyn and did receive contrast on 10/3.  Some UOP, still with low gfr but hopeful for further recovery.                   -CRRT  "started 10/7, stopped 10/13.  Had planned on 3x/week HD but Cr suggests she has some recovering gfr so will decide daily based mainly on degree of Cr rise.                 -Line is tunneled placed 10/22.                   -Dialysis consent signed and scanned in under media.       Volume-Has total body volume overload, I/O's difficult to interpret as we are not capturing much of her UOP but we were able to pull 2L of UF without issue with last run.  Holding today.       Electrolytes/pH-K 3.6, bicarb 24.       Liver-Unclear duration, quit drinking on 9/12/2021 and was at chem dep when acute issues started.  Bili is down to 6 and INR is 1.4 and a bit improved.  GI following, not clear if she would be a transplant candidate eventually.       ID-On Zosyn and Rifaximin.       Anemia-Hgb 7.6, will check iron stores.       Nutrition-Taking PO     Seen and discussed with Dr Lock     Recommendations were communicated to primary team via verbal communication.        Alexis Robbins, [facility]  Clinical Nurse Specialist  [phone]    Review of Systems:   I reviewed the following systems:  Gen: No fevers or chills  CV: No CP at rest  Resp: No SOB at rest  GI: No N/V    Physical Exam:   I/O last 3 completed shifts:  In: 290 [P.O.:290]  Out: -    /76 (BP Location: Right arm)   Pulse 80   Temp 97.5  F (36.4  C) (Oral)   Resp 16   Ht 1.626 m (5' 4\")   Wt 93.8 kg (206 lb 12.8 oz)   SpO2 99%   BMI 35.50 kg/m       GENERAL APPEARANCE: Extubated, stable from pulm standpoint.  Sitting up in chair, a bit SOB today.   EYES: No scleral icterus  Pulmonary: lungs rhonchi to auscultation with equal breath sounds bilaterally, no clubbing or cyanosis  CV: Regular rhythm, normal rate, no rub   - Edema +2 LE/dependent  GI: soft, nontender, normal bowel sounds  MS: no evidence of inflammation in joints, no muscle tenderness  : No barrett, some intermittent UOP.   SKIN: no rash, warm, dry  NEURO: No focal deficits.    Labs:   All " labs reviewed by me  Electrolytes/Renal - Recent Labs   Lab Test 10/28/21  1357 10/28/21  0551 10/28/21  0446 10/28/21  0157 10/27/21  0757 10/27/21  0558 10/26/21  0849 10/26/21  0605   NA  --  133  --   --   --  133  --  136   POTASSIUM  --  3.6  --   --   --  3.4  --  3.3*   CHLORIDE  --  100  --   --   --  102  --  100   CO2  --  24  --   --   --  25  --  25   BUN  --  24  --   --   --  20  --  36*   CR  --  2.18*  --   --   --  1.95*  --  2.43*   GLC 99 101*  --  111*   < > 110*   < > 106*   STEFFANY  --  8.5  --   --   --  8.6  --  8.5   MAG  --   --  1.7  --   --  1.5*  --  1.8   PHOS  --   --  5.1*  --   --  4.4  --  5.8*    < > = values in this interval not displayed.       CBC -   Recent Labs   Lab Test 10/28/21  0551 10/27/21  0558 10/26/21  0605   WBC 18.9* 18.8* 15.4*   HGB 7.6* 7.3* 7.4*    213 235       LFTs -   Recent Labs   Lab Test 10/28/21  0551 10/27/21  0558 10/26/21  0605   ALKPHOS 283* 295* 314*   BILITOTAL 2.8* 2.9* 3.1*   ALT 32 31 34   AST 79* 89* 78*   PROTTOTAL 6.4* 5.9* 6.1*   ALBUMIN 1.8* 1.7* 1.7*       Iron Panel -   Recent Labs   Lab Test 10/27/21  0558 09/13/21  0642   IRON 28*  --    IRONSAT 16  --    EYAD  --  1,839*           Current Medications:    acetaminophen  650 mg Oral or Feeding Tube Q8H     calcium carbonate 600 mg-vitamin D 400 units  1 tablet Oral or Feeding Tube Daily     cyanocobalamin  100 mcg Oral or Feeding Tube Daily     escitalopram  10 mg Oral Daily     folic acid  1 mg Oral or Feeding Tube Daily     gabapentin  100 mg Oral TID     heparin ANTICOAGULANT  5,000 Units Subcutaneous Q12H     influenza quadrivalent (PF) vacc  0.5 mL Intramuscular Prior to discharge     insulin aspart  1-7 Units Subcutaneous TID AC     insulin aspart  1-5 Units Subcutaneous At Bedtime     magnesium oxide  400 mg Oral BID     midodrine  20 mg Oral or Feeding Tube Q8H     multivitamin RENAL  1 capsule Oral Daily     pantoprazole  40 mg Oral QAM AC     polyethylene glycol  17 g Oral  BID     potassium chloride  40 mEq Oral Once     [Held by provider] prazosin  1 mg Oral QPM     psyllium  1 packet Oral Daily     rifaximin  550 mg Oral or Feeding Tube BID     sodium chloride  1 spray Both Nostrils 4x Daily     thiamine  100 mg Oral or Feeding Tube Daily

## 2021-10-28 NOTE — PROVIDER NOTIFICATION
Paged magda [phone]: Lab says they need another blood culture order put in for the other lumen on the CVC. Can you put this in? Thanks

## 2021-10-28 NOTE — PROGRESS NOTES
Chief Complaint   Patient presents with   • Physical         History of Present Illness:   Arely presents today for physical. Concerned about her weight. Working on setting up a Health plan to include healthy eating and exercise. Complains of hot flash day and night. Sleeping fair. Generally wakes 1-2 times per night for about one hour. No mood changes. Mom was in her mid 40s when she went through menopause. Has noted increased forgetfulness, concerning because paternal grandmother Alzheimer's. Work has been stressful. Also she has had some family stress      Past Medical History:   Diagnosis Date   • Abnormal Pap smear    • Abnormal uterine bleeding    • Anemia    • Clotting disorder (CMS/HCC)    • Depressive disorder    • Hemorrhagic cystitis    • Plantar fasciitis    • Rectal bleeding        Past Surgical History:   Procedure Laterality Date   • Pap smear,routine  09/11/2009       Family History   Problem Relation Age of Onset   • Cancer Mother 51     leukemia   • Migraines Mother    • Stroke Mother    • Asthma Brother    • Cancer, Breast Paternal Aunt    • Diabetes Paternal Grandmother    • Dementia/Alzheimers Paternal Grandmother        ALLERGIES:  No Known Allergies    Social History     Social History   • Marital status: Single     Spouse name: N/A   • Number of children: N/A   • Years of education: N/A     Occupational History   •  [employer]     Social History Main Topics   • Smoking status: Former Smoker     Packs/day: 0.50     Years: 2.00     Types: Cigarettes     Quit date: 7/1/1995   • Smokeless tobacco: Never Used   • Alcohol use No   • Drug use: No   • Sexual activity: Yes     Partners: Male     Other Topics Concern   •  Service No   • Blood Transfusions Yes     2008   • Caffeine Concern No   • Sleep Concern Yes   • Stress Concern Yes     8/10   • Weight Concern Yes   • Special Diet No   • Back Care Yes   • Exercise Yes   • Bike Helmet No   • Seat Belt  [facility]    Medicine Progress Note - Hospitalist Service, Gold 6       Date of Admission:  9/29/2021    Assessment & Plan         Janay Angeles is a 46-year-old female with a history of alcohol use disorder, Fitz-en-Y gastric bypass (2010), DM2, anxiety, depression, who was initially admitted to internal medicine service with alcoholic hepatitis.  She transferred to MICU on 10/4/2021 with acute hypoxic respiratory failure secondary to ARDS requiring intubation.  Hospitalization complicated by septic shock, pneumonia, ANA LAURA requiring CRRT and now iHD, encephalopathy.  Self extubated on 10/14, weaned off pressors on 10/19, and transferred to general medical floor on 10/20.    Plan for today:  - Obtain diagnostic paracentesis  - Considering ID consult but will defer until after diagnostic para  - Start prn nausea from polypharmacy    Acute hypoxic respiratory failure, stable  ARDS, resolved  Hx of pneumonia (aspiration vs VAP) with ongoing SIRS after finishing Zosyn  Hx of pulmonary edema   Acute decline in respiratory status requiring intubation on 10/4, CXR c/w ARDS, likely secondary to HAP vs aspiration. BAL cultures negative. Treated with Zosyn 10/4 - 10/13. Self-extubated on 10/14. Developed increased work of breathing on 10/17, CXR demonstrated increased interstitial opacities concerning for pulmonary edema vs pneumonia. Currently managing volume with iHD. Deconditioning and atelectasis likely contributing to ongoing O2 needs. Still requiring frequent HD for volume management, anticipate will need on discharge. Completed 10 day course of Zosyn 10/26 for VAP vs aspiration; however, WBC and CRP continue to be elevated, this am 18.9 (18.8) and 79 (90), respectively, and repeat procal elevated to 16.66, up from 7.28 level on 10/21. Repeat CXR 10/27 with patchy bilateral opacities with overall improvement, otherwise unremarkable. Repeat COVID neg and BCs and UC NGTD. LA  WNL. O2 requirements remains stable at 3L NC. Remains afebrile.  - CAPS team to perform diagnostic paracentesis today   - Considering ID consult but will defer until after diagnostic para  - F/u results of BCs x 2 and UC  - Continue to trend CRP and WBC  - Incentive spirometry  - OOB at least TID  - Continuous pulse oximetry. Wean O2 as able.      ANA LAURA  Hypervolemia  Baseline Cr ~0.5. Cr to peak of 2.98. Decline in renal function most likely secondary to ATN (hypotension, medications, contrast), possible component of HRS. On CRRT 10/7 - 10/13, now transitioned to iHD. RIJ dysfunction and unable to dialyze on 10/21. New tunneled RIJ placed by IR On 10/22.  - Nephrology following  - HD per nephrology team  - No evidence of renal recovery at this time, will plan to discharge on HD and start working on outpatient dialysis unit   - Strict I/Os  - Daily weights  - Lymphedema team consulted     Alcohol hepatitis: Recently admitted in September with ETOH hepatitis, started on rifaximin and lactulose per Hepatology recs. Now re-presented with ETOH hepatitis, Maddrey score 13 on admission. Acute hepatitis c/b portal hypertension with ascites. Diagnostic mane negative for SBP (most recently 10/11, 1.25L off). Tbili, INR, AST/ALT, platelets stable.   MELD-Na score: 23 at 10/28/2021  5:51 AM  MELD score: 20 at 10/28/2021  5:51 AM  Calculated from:  Serum Creatinine: 2.18 mg/dL at 10/28/2021  5:51 AM  Serum Sodium: 133 mmol/L at 10/28/2021  5:51 AM  Total Bilirubin: 2.8 mg/dL at 10/28/2021  5:51 AM  INR(ratio): 1.16 at 10/28/2021  4:46 AM  Age: 46 years   - Diagnostic para as above  - Lactulose titrated to 3-4 loose BMs daily  - Rifaximin BID  - Trend CMP, INR     Toxic metabolic encephalopathy, resolved:  Multifactorial secondary to acute illness, sedating medications, HE. Psychiatry consulted 10/15, started Seroquel. Remains fully A&O today.    - Lactulose, rifaximin   - Discontinue Seroquel    - Delirium precautions   -  Yes   • Self-Exams Yes     Social History Narrative    Boyfriend supportive.  Son lives with her 25 yo.       No current outpatient prescriptions on file.     No current facility-administered medications for this visit.        Lab Services on 05/12/2017   Component Date Value Ref Range Status   • Fasting Status 05/12/2017 12  hrs Final   • Sodium 05/12/2017 141  135 - 145 mmol/L Final   • Potassium 05/12/2017 3.7  3.4 - 5.1 mmol/L Final   • Chloride 05/12/2017 106  98 - 107 mmol/L Final   • Carbon Dioxide 05/12/2017 24  21 - 32 mmol/L Final   • Anion Gap 05/12/2017 15  10 - 20 mmol/L Final   • Glucose 05/12/2017 84  65 - 99 mg/dL Final   • BUN 05/12/2017 8  6 - 20 mg/dL Final   • Creatinine 05/12/2017 0.60  0.51 - 0.95 mg/dL Final   • GFR Estimate,  05/12/2017 >90   Final   • GFR Estimate, Non  05/12/2017 >90   Final   • BUN/Creatinine Ratio 05/12/2017 13  7 - 25 Final   • CALCIUM 05/12/2017 9.1  8.4 - 10.2 mg/dL Final   • TOTAL BILIRUBIN 05/12/2017 0.4  0.2 - 1.0 mg/dL Final   • AST/SGOT 05/12/2017 36  <38 Units/L Final   • ALT/SGPT 05/12/2017 96* <79 Units/L Final   • ALK PHOSPHATASE 05/12/2017 88  45 - 117 Units/L Final   • TOTAL PROTEIN 05/12/2017 7.2  6.4 - 8.2 g/dL Final   • Albumin 05/12/2017 3.6  3.6 - 5.1 g/dL Final   • GLOBULIN 05/12/2017 3.6  2.0 - 4.0 g/dL Final   • A/G Ratio, Serum 05/12/2017 1.0  1.0 - 2.4 Final   • HEP A AB IGM 05/12/2017 NEGATIVE  NEGATIVE Final   • HEPATITIS A INTERPRETATION 05/12/2017 NO EVIDENCE OF ACUTE HEPATITIS A INFECTION.   Final   • HEP B Surface AG 05/12/2017 NEGATIVE  NEGATIVE Final   • HEP B CORE IGM 05/12/2017 NEGATIVE  NEGATIVE Final   • HEPATITIS B INTERPRETATION 05/12/2017 NO EVIDENCE OF ACUTE HEPATITIS B INFECTION.   Final   • HEPATITIS C ANTIBODY 05/12/2017 NEGATIVE  NEGATIVE Final   • AMYLASE 05/12/2017 51  25 - 115 Units/L Final   • Lipase 05/12/2017 87  73 - 393 Units/L Final       Review of systems:     Constitutional: Denies  Minimize sedating medications     Abdominal pain, improved: Secondary to alcohol hepatitis, gas pains.    - Tylenol TID   - Oxycodone 2.5 mg q4h PRN - wean as able   - Simethicone PRN     Severe malnutrition in the context of acute on chronic illness  Dysphagia   In the context of acute on chronic illness. Started on TF 10/5. Now more awake and interested in eating but not feeling hungry, transitioned to cycled feeds on 10/21 but NJ clogged and removed on 10/23.   - Nutrition and SLP consulted; appreciate following.   - Start calorie counts  - Continue regular diet per speech  - If unable to meet caloric goals will need to replace feeding tube.      DM2: A1C 9.1. PTA on metformin. Transitioned to insulin due to hyperglycemia while on tube feeds. Lantus and metformin on hold. BGs stable.  Recent Labs   Lab 10/28/21  0551 10/28/21  0157 10/27/21  2130 10/27/21  1800 10/27/21  1638 10/27/21  1222   * 111* 96 113* 117* 134*       - MSSI    - BG checks q4h    - Hypoglycemia protocol     Acute on chronic macrocytic anemia:  Likely secondary to alcohol use, alcohol hepatitis, nutritional deficiencies. Intermittently requiring PRBC transfusions since admission (10/7, 10/12, 10/16). No e/o active bleeding. Hgb 7.6 (7.3) today.   - CBC in AM     Alcohol use disorder: Last drink on 9/12/21. PTA was at CD treatment facility.    - Patient interested in resuming CD treatment, will need to re-evaluate this closer to discharge date, at this time PT/OT recommending TCU   - Continue folic acid, thiamine, MVI supplements     Other Stable Medical Issues:   Coagulopathy: INR to peak of 2.5 on 10/4, now much improved. Likely secondary to known severe hepatic steatosis.    MDD, RUBÉN: Continue PTA Lexapro. Prazosin on hold d/t hypotension, resume as able. Health Psychology consulted 10/1, re-involve PRN.   GERD: Continue PPI.   Neuropathy: Continue PTA gabapentin.     Resolved medical issues:  Septic shock, resolved: Shock most  fever chills sweats change in sleep fatigue or weight change. No recent infections. Denies hot flashes excessive thirst or excessive urination.  Hematologic: Denies abnormal bleeding easy bruising.  HEENT: Denies nose bleeds hoarseness sores in the mouth or throat, or ringing in the ears. No memory loss or change. No numbness, tingling, weakness or headaches. No blurred or double vision.  Cardiorespiratory: No chronic cough, wheezing or chest pain. No shortness of breath, fainting, dyspnea on exertion or palpitations.  Gastrointestinal: Denies diarrhea, nausea, vomiting, dysphagia, stomach pain or cramping, change in appetite,  OR bloody stools. Positive reflux symptoms  Genitourinary: Denies dysuria, frequency, hematuria, nocturia, incontinence or hesitancy of urination. Denies any unusual vaginal bleeding or discharge. Denies vaginal itching or burning.  Musculoskeletal: No joint pain or swelling. Denies back pain or stiffness.  Neurological: No focal numbness, weakness, paralysis or paresthesias.  Skin:   Denies change in moles denies rashes.  Psychiatric:  Denies difficulty with depression or anxiety.      Physical exam  Visit Vitals  /70 (BP Location: [facility], Patient Position: Sitting, Cuff Size: Regular)   Pulse 99   Temp 98.7 °F (37.1 °C) (Oral)   Resp 12   Ht 5' 6\" (1.676 m)   Wt 85.7 kg   LMP 02/09/2018   SpO2 99%   BMI 30.51 kg/m²       HEENT: Normocephalic and atraumatic. Pupils are equal round and reactive to light and accommodation. Extraocular movements are intact. Conjunctiva and lids are clear. Sclera are anicteric. Fundi are without vessel changes. Optic discs are clear. Ears and nose: Auditory canals are clear. Tympanic membranes are normal. Ears and nose are without lesions. Nasal mucosa is clear. Mouth: Teeth and gums are in fair condition. Mucous membranes are moist. Oral mucosa and pharynx are clear. No oral lesions are evident.  Neck: Supple. No masses. Trachea is midline. There is no  likely secondary to sepsis with hepatic dysfunction contributing as well. TTE with hyperdynamic LVEF, no RV dysfunction. Weaned off pressors on 10/19. Continues to require midodrine 20 mg q8hrs for soft blood pressures.         Diet: Regular Diet Adult  Snacks/Supplements Adult: Other; ensure clear and ensure enlive; Between Meals  Calorie Counts    DVT Prophylaxis: Heparin SQ  Rice Catheter: Not present  Central Lines: None  Code Status: Full Code      Disposition Plan   Expected discharge:   2-3 days recommended to transitional care unit once safe disposition plan/ TCU bed available and infectious workup complete, and CRP/WBC on trend down or normalized.     The patient's care was discussed with the Attending Physician, Dr. Stockton, Bedside Nurse and Patient.    Willy Hamilton PA-C  Hospitalist Service, [address]  Securely message with the Vocera Web Console (learn more here)  Text page via [facility] Paging/Directory    Please see sign in/sign out for up to date coverage information  ____________________________________________________________________    Interval History   No acute events overnight. Nauseous with med intake but denies vomiting and other acute physical concerns at this time.     Data reviewed today: I reviewed all medications, new labs and imaging results over the last 24 hours.   Physical Exam   Vital Signs: Temp: 97.5  F (36.4  C) Temp src: Oral BP: 129/76 Pulse: 80   Resp: 16 SpO2: 99 % O2 Device: Nasal cannula Oxygen Delivery: 3 LPM  Weight: 206 lbs 12.8 oz  GEN: In NAD  HEENT: NCAT; PERRL; sclerae non-icteric  LUNGS: CTAB  CHEST: Tunneled catheter intact without surrounding edema.  CV: RRR  ABD: +BSs; soft, mild generalized tenderness and distension  EXT: 3+ BLE edema  SKIN: No acute rashes noted on exposed areas.  NEURO: AAOx3; CNs grossly intact; No acute focal deficits noted.        Data   CMP  Recent Labs   Lab 10/28/21  8528  thyroid enlargement, masses or nodularity.  No lymphadenopathy to cervical or supra-clavicular areas.  Respiratory: Chest is normal in appearance. There is no chest wall tenderness. Lungs are clear to auscultation bilaterally without wheezes, rales or rhonchi. Respiratory effort is within normal limits.  Cardiovascular: Heart sounds S1-S2 with regular rate and rhythm. There are no murmurs or extra heart sounds. There are no carotid bruits. There is no peripheral edema. Pedal pulses are palpable and symmetrical. No JVD.  Breasts: Breasts are examined bilaterally there is no axillary adenopathy. No masses or densities are palpated there is no nipple discharge, no skin changes.  Abdomen: Soft and nontender without any rebound, guarding or masses evident. Bowel sounds are present and normal. No hernias or hepatosplenomegaly.  Musculoskeletal: No peripheral swelling. No scoliosis, kyphosis or lordosis is appreciated. Bilateral upper and lower extremities are nontender to palpation, without deformity and symmetrical in strength. Patient has good peripheral pulses.   Skin: Warm and dry without rash.  Denies suspicious lesions or ulcers. Neurological: Sensory motor function is grossly within normal limits. Gait is normal. Deep tendon reflexes are symmetrical and intact.    Arely was seen today for physical.    Diagnoses and all orders for this visit:    Physical exam, routine  -     CBC & AUTO DIFFERENTIAL; Future  -     CHOLESTEROL LDL DIRECT; Future  -     CHOLESTEROL; Future  -     BASIC METABOLIC PANEL; Future  -     URINALYSIS WITH MICRO & CULTURE IF INDICATED; Future    Gastroesophageal reflux disease, esophagitis presence not specified    Class 1 obesity due to excess calories without serious comorbidity with body mass index (BMI) of 30.0 to 30.9 in adult          Plan:  Health maintenance recommendations reviewed.  Self-breast exams recommended.  Labs and orders include:  As above.  Test results will be conveyed  10/28/21  0446 10/28/21  0157 10/27/21  2130 10/27/21  1800 10/27/21  0757 10/27/21  0558 10/26/21  0849 10/26/21  0605 10/25/21  0740 10/25/21  0555     --   --   --   --   --  133  --  136  --  136   POTASSIUM 3.6  --   --   --   --   --  3.4  --  3.3*  --  3.5   CHLORIDE 100  --   --   --   --   --  102  --  100  --  100   CO2 24  --   --   --   --   --  25  --  25  --  26   ANIONGAP 9  --   --   --   --   --  6  --  11  --  10   *  --  111* 96 113*   < > 110*   < > 106*   < > 96   BUN 24  --   --   --   --   --  20  --  36*  --  30   CR 2.18*  --   --   --   --   --  1.95*  --  2.43*  --  2.26*   GFRESTIMATED 26*  --   --   --   --   --  30*  --  23*  --  25*   STEFFANY 8.5  --   --   --   --   --  8.6  --  8.5  --  8.3*   MAG  --  1.7  --   --   --   --  1.5*  --  1.8  --  1.5*   PHOS  --  5.1*  --   --   --   --  4.4  --  5.8*  --  5.6*   PROTTOTAL 6.4*  --   --   --   --   --  5.9*  --  6.1*  --  6.2*   ALBUMIN 1.8*  --   --   --   --   --  1.7*  --  1.7*  --  1.6*   BILITOTAL 2.8*  --   --   --   --   --  2.9*  --  3.1*  --  3.2*   ALKPHOS 283*  --   --   --   --   --  295*  --  314*  --  327*   AST 79*  --   --   --   --   --  89*  --  78*  --  72*   ALT 32  --   --   --   --   --  31  --  34  --  32    < > = values in this interval not displayed.     CBC  Recent Labs   Lab 10/28/21  0551 10/27/21  0558 10/26/21  0605 10/25/21  0555   WBC 18.9* 18.8* 15.4* 18.3*   RBC 2.40* 2.34* 2.40* 2.47*   HGB 7.6* 7.3* 7.4* 7.6*   HCT 24.5* 23.7* 24.3* 25.2*   * 101* 101* 102*   MCH 31.7 31.2 30.8 30.8   MCHC 31.0* 30.8* 30.5* 30.2*   RDW 19.5* 20.1* 20.1* 20.4*    213 235 258     INR  Recent Labs   Lab 10/28/21  0446 10/25/21  0555 10/22/21  0535   INR 1.16* 1.26* 1.32*       Lab Results   Component Value Date    CRP 79.0 10/28/2021    CRP 90.0 10/27/2021    CRP <2.9 10/11/2016       Recent Results (from the past 24 hour(s))   XR Chest Port 1 View    Narrative    Portable chest    INDICATION:  through telephone or mail.  Call the clinic if she has not received the results in two weeks.  Advised of healthy lifestyle to include, low fat diet rich in fiber, increase activity  to 150 minutes weekly, avoid smoking and limit alcohol intake.  Home guidelines to manage gastroesophageal reflux disease on AVS  Take pepcid AC 1 tab daily for 3 weeks- heart burn  DGL licorice - 1 chewable before meals for heartburn  Exercise minimum 150 min  Consider yoga and meditation  Guided Imagery Meditation  Resources:  [email or web address]ening.org go to podcasts  www.[email or web address]    [email or web address]-fduzbtq-series/    See goals- fu 3 months 12 # weight loss.177#  Supervision of:  Supervision Dr Tong     Worsening white blood cells and C-reactive protein, now  completed 10 day course of Zosyn for VAP versus aspiration pneumonia    COMPARISON: 10/19/2021    FINDINGS:: Heart size appears normal. Patchy opacities in the lungs  appear slightly decreased on the right lower lung region, otherwise  showing no significant change. New right IJ central venous catheter  tip is in the distal SVC. Previous right IJ sheath has been removed.  Previous feeding tube has been removed. Previous left IJ catheter has  been removed. Calcified right lower lung granuloma.      Impression    IMPRESSION: Continued patchy opacities bilaterally with overall  improved aeration especially in the right lower lung. Right lower lung  granuloma.    ANGÉLICA ABDALLA MD         SYSTEM ID:  [id number]

## 2021-10-28 NOTE — PROGRESS NOTES
Care Management Follow Up    Length of Stay (days): 29    Expected Discharge Date: 11/01/2021     Concerns to be Addressed: substance/tobacco abuse/use, discharge planning     Patient plan of care discussed at interdisciplinary rounds: Yes    Anticipated Discharge Disposition: Inpatient Chemical Dependency, Transitional Care     Anticipated Discharge Services: None  Anticipated Discharge DME: None    Patient/family educated on Medicare website which has current facility and service quality ratings: yes  Education Provided on the Discharge Plan: Yes   Patient/Family in Agreement with the Plan: yes    Referrals Placed by CM/SW:    TCU  [facility]  [address] [facility] [address]  Phone: [phone]  Fax: [phone]  10/28- left follow up voicemail with admissions      [facility]  [address] [facility] [address]  Phone: [phone]  Fax: [phone]  10/28- left follow up voicemail with admissions      [facility]  [address] [place] [address]  Phone: [phone]  Fax: [phone]  10/28- left follow up voicemail with admissions      Discontinued  [facility]  [address] [address]  Phone: [phone]  Fax: [phone]  10/28- per admissions, no beds until at least next week. If pt still needs placement at that time, referral can be resent.     [facility]  Ph: [phone]  F: [phone]  10/26- LVM, voicemail states they are not taking any admissions currently.     [facility]  [address]  [place] [facility] [address]  Phone: [phone]  Fax: [phone]  10/28- no beds available      [facility]  [address] [facility] [address]  Phone: [phone]  Fax: [phone]  10/28- no bed, also declined due to ETOH and smoking      [facility]  [address]  [place] [address]  Phone: [phone]  Fax: [phone]  10/28- no appropriate bed, per admissions     [facility]  [place]  [address]  [facility] [address]  Phone: [phone]  Admissions: [phone]  Fax: [phone]  10/28- no beds available, pt placed on wait list    [facility]  [address]  [facility] [address]  Phone: [phone]  Fax: [phone]  10/28- declined, no beds available     [facility]  Ph: [phone]  F: [phone]  10/26- per Hawa in admissions, no bed availability for the for see able future.      [facility]  Ph: [phone]  F: [phone]  10/25- per admissions, no SNF beds for the for seeable further and [facility] does not cover swing bed.     [facility] ([facility] - Ph: [phone], F: [phone])  10/26- per vivi Cisneros, no beds available at this time      [facility] (admissions ph: [phone] f: [phone])  10/26- sent Hep B panel, HD flowsheets, and access report per request, updated Violeta in admissions.   10/28- provided update to Payal in admissions, [phone]    Private pay costs discussed: Not applicable    Additional Information:  SW following for TCU placement. Received updates noted above. Updated Payal in [facility] admissions (ph: [phone]) that pt's TCU location is pending and that pt is no longer medically ready for discharge (per med team, pt getting diagnostic paracentesis today and may need ID plan). Per Payal, they are looking at placement at their [place] unit as the [place] one is now full, but she can also send pt's referral to units in [place] depending on TCU location.     JOY Reed, [facility]  [address]   [place]  Phone: [phone]  Pager: [phone]

## 2021-10-28 NOTE — PLAN OF CARE
"/76 (BP Location: Right arm)   Pulse 80   Temp 97.5  F (36.4  C) (Oral)   Resp 16   Ht 1.626 m (5' 4\")   Wt 93.8 kg (206 lb 12.8 oz)   SpO2 99%   BMI 35.50 kg/m      Care from: 2764-6802      VS & Pain: VSS. Patient did not report pain this shift.    Neuro: A&O x4.    Respiratory: Dyspnea on exertion.    Peripheral neurovascular: Lymph wraps to bilateral lower legs.    Nutrition: Regular diet. Poor appetite with limited oral intake.    Musculoskeletal: Generalized weakness.    Lines: R piv SL.     Activity: Assist of 1 with pivot to commode.    Events this shift: Call light within reach and able to make needs known. Paracentesis completed this shift.    Plan: Continue to follow plan of care.  "

## 2021-10-28 NOTE — PLAN OF CARE
6491-4121: C/O pain, given meds per MAR. UA done via straight cath. A&O x4. R PIV saline locked. R CVC WDL for dialysis. SBA w/ walker to commode. No urine or BM during shift. Will continue to monitor and follow with plan of care.

## 2021-10-29 ENCOUNTER — APPOINTMENT (OUTPATIENT)
Dept: OCCUPATIONAL THERAPY | Facility: CLINIC | Age: 46
End: 2021-10-29
Payer: COMMERCIAL

## 2021-10-29 LAB
ALBUMIN SERPL-MCNC: 1.6 G/DL (ref 3.4–5)
ALP SERPL-CCNC: 268 U/L (ref 40–150)
ALT SERPL W P-5'-P-CCNC: 33 U/L (ref 0–50)
ANION GAP SERPL CALCULATED.3IONS-SCNC: 12 MMOL/L (ref 3–14)
AST SERPL W P-5'-P-CCNC: 82 U/L (ref 0–45)
BILIRUB SERPL-MCNC: 2.5 MG/DL (ref 0.2–1.3)
BUN SERPL-MCNC: 30 MG/DL (ref 7–30)
CALCIUM SERPL-MCNC: 8.5 MG/DL (ref 8.5–10.1)
CHLORIDE BLD-SCNC: 102 MMOL/L (ref 94–109)
CO2 SERPL-SCNC: 20 MMOL/L (ref 20–32)
CREAT SERPL-MCNC: 2.31 MG/DL (ref 0.52–1.04)
CRP SERPL-MCNC: 67 MG/L (ref 0–8)
ERYTHROCYTE [DISTWIDTH] IN BLOOD BY AUTOMATED COUNT: 19.1 % (ref 10–15)
FERRITIN SERPL-MCNC: 343 NG/ML (ref 8–252)
GFR SERPL CREATININE-BSD FRML MDRD: 24 ML/MIN/1.73M2
GLUCOSE BLD-MCNC: 106 MG/DL (ref 70–99)
GLUCOSE BLDC GLUCOMTR-MCNC: 106 MG/DL (ref 70–99)
GLUCOSE BLDC GLUCOMTR-MCNC: 113 MG/DL (ref 70–99)
GLUCOSE BLDC GLUCOMTR-MCNC: 116 MG/DL (ref 70–99)
GLUCOSE BLDC GLUCOMTR-MCNC: 117 MG/DL (ref 70–99)
GLUCOSE BLDC GLUCOMTR-MCNC: 139 MG/DL (ref 70–99)
HCT VFR BLD AUTO: 23.2 % (ref 35–47)
HGB BLD-MCNC: 7.1 G/DL (ref 11.7–15.7)
LACTATE SERPL-SCNC: 0.7 MMOL/L (ref 0.7–2)
MAGNESIUM SERPL-MCNC: 1.7 MG/DL (ref 1.6–2.3)
MCH RBC QN AUTO: 31.1 PG (ref 26.5–33)
MCHC RBC AUTO-ENTMCNC: 30.6 G/DL (ref 31.5–36.5)
MCV RBC AUTO: 102 FL (ref 78–100)
PHOSPHATE SERPL-MCNC: 5.4 MG/DL (ref 2.5–4.5)
PLATELET # BLD AUTO: 212 10E3/UL (ref 150–450)
POTASSIUM BLD-SCNC: 3.6 MMOL/L (ref 3.4–5.3)
PROT SERPL-MCNC: 5.9 G/DL (ref 6.8–8.8)
RBC # BLD AUTO: 2.28 10E6/UL (ref 3.8–5.2)
SODIUM SERPL-SCNC: 134 MMOL/L (ref 133–144)
WBC # BLD AUTO: 13.6 10E3/UL (ref 4–11)

## 2021-10-29 PROCEDURE — 250N000013 HC RX MED GY IP 250 OP 250 PS 637: Performed by: PHYSICIAN ASSISTANT

## 2021-10-29 PROCEDURE — 84100 ASSAY OF PHOSPHORUS: CPT | Performed by: PHYSICIAN ASSISTANT

## 2021-10-29 PROCEDURE — 99233 SBSQ HOSP IP/OBS HIGH 50: CPT | Performed by: INTERNAL MEDICINE

## 2021-10-29 PROCEDURE — 97140 MANUAL THERAPY 1/> REGIONS: CPT | Mod: GO | Performed by: OCCUPATIONAL THERAPIST

## 2021-10-29 PROCEDURE — 250N000013 HC RX MED GY IP 250 OP 250 PS 637: Performed by: STUDENT IN AN ORGANIZED HEALTH CARE EDUCATION/TRAINING PROGRAM

## 2021-10-29 PROCEDURE — 82728 ASSAY OF FERRITIN: CPT | Performed by: CLINICAL NURSE SPECIALIST

## 2021-10-29 PROCEDURE — 83735 ASSAY OF MAGNESIUM: CPT | Performed by: PHYSICIAN ASSISTANT

## 2021-10-29 PROCEDURE — 86140 C-REACTIVE PROTEIN: CPT | Performed by: PHYSICIAN ASSISTANT

## 2021-10-29 PROCEDURE — 250N000013 HC RX MED GY IP 250 OP 250 PS 637: Performed by: PEDIATRICS

## 2021-10-29 PROCEDURE — 85027 COMPLETE CBC AUTOMATED: CPT | Performed by: PHYSICIAN ASSISTANT

## 2021-10-29 PROCEDURE — 99207 PR APP CREDIT; MD BILLING SHARED VISIT: CPT | Performed by: PHYSICIAN ASSISTANT

## 2021-10-29 PROCEDURE — 258N000003 HC RX IP 258 OP 636: Performed by: CLINICAL NURSE SPECIALIST

## 2021-10-29 PROCEDURE — 250N000011 HC RX IP 250 OP 636: Performed by: PHYSICIAN ASSISTANT

## 2021-10-29 PROCEDURE — 36415 COLL VENOUS BLD VENIPUNCTURE: CPT | Performed by: PHYSICIAN ASSISTANT

## 2021-10-29 PROCEDURE — 80053 COMPREHEN METABOLIC PANEL: CPT | Performed by: PHYSICIAN ASSISTANT

## 2021-10-29 PROCEDURE — 99232 SBSQ HOSP IP/OBS MODERATE 35: CPT | Performed by: INTERNAL MEDICINE

## 2021-10-29 PROCEDURE — 90937 HEMODIALYSIS REPEATED EVAL: CPT

## 2021-10-29 PROCEDURE — 83605 ASSAY OF LACTIC ACID: CPT | Performed by: PHYSICIAN ASSISTANT

## 2021-10-29 PROCEDURE — 120N000002 HC R&B MED SURG/OB UMMC

## 2021-10-29 RX ADMIN — Medication 100 MCG: at 10:15

## 2021-10-29 RX ADMIN — SODIUM CHLORIDE 300 ML: 9 INJECTION, SOLUTION INTRAVENOUS at 14:26

## 2021-10-29 RX ADMIN — HYDROXYZINE HYDROCHLORIDE 50 MG: 25 TABLET, FILM COATED ORAL at 01:34

## 2021-10-29 RX ADMIN — FOLIC ACID 1 MG: 1 TABLET ORAL at 08:51

## 2021-10-29 RX ADMIN — GABAPENTIN 100 MG: 100 CAPSULE ORAL at 08:49

## 2021-10-29 RX ADMIN — RIFAXIMIN 550 MG: 550 TABLET ORAL at 21:25

## 2021-10-29 RX ADMIN — RIFAXIMIN 550 MG: 550 TABLET ORAL at 08:49

## 2021-10-29 RX ADMIN — CALCIUM CARBONATE 600 MG (1,500 MG)-VITAMIN D3 400 UNIT TABLET 1 TABLET: at 10:16

## 2021-10-29 RX ADMIN — PANTOPRAZOLE SODIUM 40 MG: 40 TABLET, DELAYED RELEASE ORAL at 08:49

## 2021-10-29 RX ADMIN — MIDODRINE HYDROCHLORIDE 20 MG: 5 TABLET ORAL at 18:21

## 2021-10-29 RX ADMIN — ESCITALOPRAM OXALATE 10 MG: 10 TABLET ORAL at 08:49

## 2021-10-29 RX ADMIN — Medication: at 14:28

## 2021-10-29 RX ADMIN — GABAPENTIN 100 MG: 100 CAPSULE ORAL at 13:02

## 2021-10-29 RX ADMIN — HEPARIN SODIUM 5000 UNITS: 5000 INJECTION, SOLUTION INTRAVENOUS; SUBCUTANEOUS at 21:19

## 2021-10-29 RX ADMIN — Medication 400 MG: at 08:49

## 2021-10-29 RX ADMIN — Medication 2.5 MG: at 18:23

## 2021-10-29 RX ADMIN — THIAMINE HCL TAB 100 MG 100 MG: 100 TAB at 10:16

## 2021-10-29 RX ADMIN — Medication 1 CAPSULE: at 10:15

## 2021-10-29 RX ADMIN — Medication 2.5 MG: at 22:33

## 2021-10-29 RX ADMIN — GABAPENTIN 100 MG: 100 CAPSULE ORAL at 21:25

## 2021-10-29 RX ADMIN — SODIUM CHLORIDE 250 ML: 9 INJECTION, SOLUTION INTRAVENOUS at 14:28

## 2021-10-29 RX ADMIN — Medication 2.5 MG: at 12:52

## 2021-10-29 RX ADMIN — Medication 2.5 MG: at 03:22

## 2021-10-29 RX ADMIN — MIDODRINE HYDROCHLORIDE 20 MG: 5 TABLET ORAL at 10:16

## 2021-10-29 RX ADMIN — Medication 3 MG: at 22:33

## 2021-10-29 RX ADMIN — HEPARIN SODIUM 5000 UNITS: 5000 INJECTION, SOLUTION INTRAVENOUS; SUBCUTANEOUS at 08:49

## 2021-10-29 ASSESSMENT — ACTIVITIES OF DAILY LIVING (ADL)
ADLS_ACUITY_SCORE: 10
ADLS_ACUITY_SCORE: 8
ADLS_ACUITY_SCORE: 10

## 2021-10-29 ASSESSMENT — MIFFLIN-ST. JEOR
SCORE: 1528.25
SCORE: 1548.25

## 2021-10-29 NOTE — PLAN OF CARE
[id number]: C/O back pain, PRN meds given per MAR. No BM on shift, up with SBA to the commode. R PIV saline locked. R CVC WDL used for dialysis. Continent with urine during shift. Will continue to monitor and follow with plan of care.

## 2021-10-29 NOTE — PROGRESS NOTES
Nephrology Progress Note  10/29/2021         Aliyah Angeles is a 46 yof w/hx of ETOH use (Quit 9/12/2021) w/hepatitis, Fitz en Y bypass, Pancreatitis, DM2, Panc cyst who was admitted 9/29 from chem dep with N/V and worsening abdominal pain and edema.  Her course has been resp failure with ARDS and evolving ANA LAURA.  Noted to have received albumin from 10/1-10/3 (150g/day) with no improvement in Cr.  On Vanco/Zosyn to treat sepsis although cultures at time of consult are negative.  Nephrology consulted for evaluation of ANA LAURA and possible RRT, started CRRT on 10/7.       Interval History :   Mrs Angeles had CRRT started 10/7 for management of volume and acidosis, transitioned to iHD on 10/15.  Had past 2 days off of HD, plan for run today mainly as she has some increased SOB, then likely can hold on runs for another 2-3 days while watching for recovery.  BP's are reasonable especially for her liver dysfx.       Assessment & Recommendations:   ANA LAURA-Baseline Cr normal at 0.5 as recently as 9/14/2021, abdominal US pending currently.  Was at inpatient chem dep when she developed abdominal pain, N/V and worsening edema and was brought to [facility] on 9/29.  Was treated for UTI and started on Vanco/Zosyn for leukocytosis but decompensated and was intubated the evening of 10/3.  Noted she did receive albumin x3 days from 10/1-10/3 for ANA LAURA but actually worsened and Cr at time of consult is up to 2.  Lisa <5 on 10/3, ECHO done 10/4 showed hyperdynamic LV and normal IVC.  Etiology of ANA LAURA is likely ATN with underlying HRS physiology which made her more susceptible to hypoperfusion.  Started CRRT on 10/7 for management of volume and acidosis.  Trying to transition to iHD.                  -ANA LAURA, likely hypoperfusion in setting of baseline HRS physiology, possible contribution from vanco/zosyn and did receive contrast on 10/3.  Some UOP, still with low gfr but hopeful for further recovery.                   -CRRT started 10/7, stopped  "10/13.  Had planned on 3x/week HD but Cr suggests she has some recovering gfr so will decide daily based mainly on degree of Cr rise.                 -Line is tunneled placed 10/22.                   -Dialysis consent signed and scanned in under media.       Volume-Has total body volume overload, I/O's difficult to interpret as we are not capturing much of her UOP but we were able to pull 2L of UF without issue with last run.  Holding today.       Electrolytes/pH-K 3.6, bicarb 24.       Liver-Unclear duration, quit drinking on 9/12/2021 and was at chem dep when acute issues started.  Bili is down to 6 and INR is 1.4 and a bit improved.  GI following, not clear if she would be a transplant candidate eventually.       ID-On Zosyn and Rifaximin.       Anemia-Hgb 7.6, will check iron stores.       Nutrition-Taking PO     Seen and discussed with Dr Lock     Recommendations were communicated to primary team via verbal communication.        MARIA ELENA Wade CNS  Clinical Nurse Specialist  [phone]    Review of Systems:   I reviewed the following systems:  Gen: No fevers or chills  CV: No CP at rest  Resp: No SOB at rest  GI: No N/V    Physical Exam:   I/O last 3 completed shifts:  In: 200 [P.O.:200]  Out: 600 [Urine:600]   /84 (BP Location: Right arm)   Pulse 91   Temp 97  F (36.1  C) (Oral)   Resp 16   Ht 1.626 m (5' 4.02\")   Wt 92.8 kg (204 lb 9.4 oz)   SpO2 93%   BMI 35.10 kg/m       GENERAL APPEARANCE: Extubated, stable from pulm standpoint.  Sitting up in chair, a bit SOB today.   EYES: No scleral icterus  Pulmonary: lungs rhonchi to auscultation with equal breath sounds bilaterally, no clubbing or cyanosis  CV: Regular rhythm, normal rate, no rub   - Edema +2 LE/dependent  GI: soft, nontender, normal bowel sounds  MS: no evidence of inflammation in joints, no muscle tenderness  : No barrett, some intermittent UOP.   SKIN: no rash, warm, dry  NEURO: No focal deficits.    Labs:   All labs " reviewed by me  Electrolytes/Renal - Recent Labs   Lab Test 10/29/21  1228 10/29/21  0823 10/29/21  0609 10/28/21  1357 10/28/21  0551 10/28/21  0446 10/27/21  0757 10/27/21  0558 10/27/21  0558   NA  --   --  134  --  133  --   --   --  133   POTASSIUM  --   --  3.6  --  3.6  --   --   --  3.4   CHLORIDE  --   --  102  --  100  --   --   --  102   CO2  --   --  20  --  24  --   --   --  25   BUN  --   --  30  --  24  --   --   --  20   CR  --   --  2.31*  --  2.18*  --   --   --  1.95*   * 106* 106*   < > 101*  --    < >   < > 110*   STEFFANY  --   --  8.5  --  8.5  --   --   --  8.6   MAG  --   --  1.7  --   --  1.7  --   --  1.5*   PHOS  --   --  5.4*  --   --  5.1*  --   --  4.4    < > = values in this interval not displayed.       CBC -   Recent Labs   Lab Test 10/29/21  0609 10/28/21  0551 10/27/21  0558   WBC 13.6* 18.9* 18.8*   HGB 7.1* 7.6* 7.3*    253 213       LFTs -   Recent Labs   Lab Test 10/29/21  0609 10/28/21  0551 10/27/21  0558   ALKPHOS 268* 283* 295*   BILITOTAL 2.5* 2.8* 2.9*   ALT 33 32 31   AST 82* 79* 89*   PROTTOTAL 5.9* 6.4* 5.9*   ALBUMIN 1.6* 1.8* 1.7*       Iron Panel -   Recent Labs   Lab Test 10/29/21  0609 10/27/21  0558 09/13/21  0642   IRON  --  28*  --    IRONSAT  --  16  --    EYAD 343*  --    < >    < > = values in this interval not displayed.           Current Medications:    sodium chloride 0.9%  250 mL Intravenous Once in dialysis/CRRT     sodium chloride 0.9%  300 mL Hemodialysis Machine Once     acetaminophen  650 mg Oral or Feeding Tube Q8H     calcium carbonate 600 mg-vitamin D 400 units  1 tablet Oral or Feeding Tube Daily     cyanocobalamin  100 mcg Oral or Feeding Tube Daily     escitalopram  10 mg Oral Daily     folic acid  1 mg Oral or Feeding Tube Daily     gabapentin  100 mg Oral TID     heparin ANTICOAGULANT  5,000 Units Subcutaneous Q12H     influenza quadrivalent (PF) vacc  0.5 mL Intramuscular Prior to discharge     insulin aspart  1-7 Units  Subcutaneous TID AC     insulin aspart  1-5 Units Subcutaneous At Bedtime     midodrine  20 mg Oral or Feeding Tube Q8H     multivitamin RENAL  1 capsule Oral Daily     - MEDICATION INSTRUCTIONS -   Does not apply Once     pantoprazole  40 mg Oral QAM AC     polyethylene glycol  17 g Oral BID     [Held by provider] prazosin  1 mg Oral QPM     psyllium  1 packet Oral Daily     rifaximin  550 mg Oral or Feeding Tube BID     sodium chloride  1 spray Both Nostrils 4x Daily     sodium chloride (PF)  9 mL Intracatheter During Dialysis/CRRT (from stock)     sodium chloride (PF)  9 mL Intracatheter During Dialysis/CRRT (from stock)     thiamine  100 mg Oral or Feeding Tube Daily

## 2021-10-29 NOTE — PROGRESS NOTES
Calorie Count  Intake recorded for: 10/28  Total Kcals: 107 Total Protein: 4g  Kcals from Hospital Food: 107  Protein: 4g  Kcals from Outside Food (average):0 Protein: 0g  # Meals Ordered from Kitchen: 1 meal   # Meals Recorded: 1 meal (First - 25% brownie, milk, less than 25% turkey & mashed potatoes w/ gravy)  # Supplements Recorded: 0

## 2021-10-29 NOTE — PLAN OF CARE
Pt alert and oriented x4. Cooperative with cares. Up to BSC with assist of 1, walker. Voiding spontaneously. No BM this evening. Paracentesis site on right abdomen, C/D/I. C/o back pain. Oxycodone administered x2 with effect. BLE lymphedema wraps in place. Weaned oxygen to 1L/nc. Continue to assess orientation and provide for pt safety. Continue to monitor for signs/symptoms of infection.

## 2021-10-29 NOTE — PROGRESS NOTES
"/84   Pulse 96   Temp 98.4  F (36.9  C) (Oral)   Resp 16   Ht 1.626 m (5' 4.02\")   Wt 92.8 kg (204 lb 9.4 oz)   SpO2 97%   BMI 35.10 kg/m      Pt compliant with cares. VSS. No appetite this shift. Only had bites of english muffin in am and would not eat noon meal. SBA to commode. Pt left floor for dialysis at approximately 1415. Continue to monitor per plan of care.  "

## 2021-10-29 NOTE — PROGRESS NOTES
HEMODIALYSIS TREATMENT NOTE    Date: 10/29/2021  Time: 5:12 PM    Data:  Pre Wt: 92.8 kg (Estimated)   Desired Wt: 90.8 kg   Post Wt: 90.8 kg (Estimated)  Weight change: 2 kg  Ultrafiltration - Post Run Net Total Removed (mL): 2000 mL  Vascular Access Status: CVC  patent  Dialyzer Rinse: Clear  Total Blood Volume Processed: 67.93 L   Total Dialysis (Treatment) Time: 3   Dialysate Bath: K 3, Ca 3  Heparin: None    Lab:   No    Interventions:  At about 1615, writer took over from NAWAF Yates with report that all RN assessment/Meds have been completed. Pt voided 200 mL urine output. 2 L of fluid was pulled per order.CVC dressing was changed as due with no unusual findings noted. Ending BP was 127/86. Pt rinsed back post tx, lumen were saline locked, end caps changed, and hand off report given to NAWAF Perez.    Assessment:  -Calm & Cooperative  -VSS  -A & O X 4     Plan:    Per renal

## 2021-10-29 NOTE — CONSULTS
Consult and Procedure Service - Procedure Note    Attending:Mahin  Resident: n/a  Procedure: Diagnostic and therapeutic paracentesis  Indication: ascites  Risk Assessment: low  Pre-procedure diagnosis:  ascites  Post-procedure diagnosis: same    The risks and benefits of the procedure were explained to pt who expressed understanding and opted to proceed.  Consent was obtained and placed in the chart.  A time out was performed.  An area of ascites was located and marked using ultrasound guidance in the R lower quadrant; the area was prepped and draped in the usual sterile fashion.  10 ml of 1% lidocaine was instilled and ascites located.  The pigtail paracentesis catheter and needle were inserted under real-time guidance until ascites obtained then the needle removed and the catheter advanced.  The apparatus was connected to vacuum bottles and a total of 1700 ml of clear straw colored fluid removed.   A specimen was  sent for analysis. The catheter was withdrawn and the area dressed.  Patient tolerated the procedure well with no immediate complications.  Please contact the Consult and Procedure Service if any complications or concerns arise.     ERWIN WILLIS MD, [facility]  Internal Medicine Hospitalist & Staff Physician  [facility]  Pager: [phone]  [email or web address]    DOS:  October 28, 2021

## 2021-10-29 NOTE — PROGRESS NOTES
[facility]    Medicine Progress Note - Hospitalist Service, Gold 6       Date of Admission:  9/29/2021    Assessment & Plan        Janay Angeles is a 46-year-old female with a history of alcohol use disorder, Fitz-en-Y gastric bypass (2010), DM2, anxiety, depression, who was initially admitted to internal medicine service with alcoholic hepatitis.  She transferred to MICU on 10/4/2021 with acute hypoxic respiratory failure secondary to ARDS requiring intubation.  Hospitalization complicated by septic shock, pneumonia, ANA LAURA requiring CRRT and now iHD, encephalopathy.  Self extubated on 10/14, weaned off pressors on 10/19, and transferred to general medical floor on 10/20.    Acute hypoxic respiratory failure, stable  ARDS, resolved  Hx of pneumonia (aspiration vs VAP) with ongoing SIRS after finishing Zosyn  Hx of pulmonary edema   Acute decline in respiratory status requiring intubation on 10/4, CXR c/w ARDS, likely secondary to HAP vs aspiration. BAL cultures negative. Treated with Zosyn 10/4 - 10/13. Self-extubated on 10/14. Developed increased work of breathing on 10/17, CXR demonstrated increased interstitial opacities concerning for pulmonary edema vs pneumonia. Managing volume with iHD, last run 10/26. Deconditioning and atelectasis likely contributing to ongoing O2 needs, however have weaned O2 down to 1L via NC. Completed 10 day course of Zosyn 10/26 for VAP vs aspiration; however, WBC and CRP continue to be elevated (although now downtrending). Repeat CXR 10/27 with patchy bilateral opacities with overall improvement, otherwise unremarkable. Repeat COVID neg and BCs and UC NGTD. LA WNL. Diagnostic para for 10/28 with  \1,658 nucleated cells, however only 7% neutrophils and monocyte/macrophage predominance. Remains afebrile.  - Considering ID consult but will defer until after diagnostic para  - F/u results of BCs x 2 and UC (NGTD)  - Continue to trend CRP and  WBC  - Incentive spirometry  - OOB at least TID  - Continuous pulse oximetry. Wean O2 as able.      ANA LAURA  Hypervolemia  Baseline Cr ~0.5. Cr to peak of 2.98. Decline in renal function most likely secondary to ATN (hypotension, medications, contrast), possible component of HRS. On CRRT 10/7 - 10/13, now transitioned to iHD. RIJ dysfunction and unable to dialyze on 10/21. New tunneled RIJ placed by IR On 10/22.  - Nephrology following  - HD per nephrology team  - Continue to monitor for renal recovery  - Strict I/Os  - Daily weights  - Lymphedema team consulted     Alcohol hepatitis: Recently admitted in September with ETOH hepatitis, started on rifaximin and lactulose per Hepatology recs. Now re-presented with ETOH hepatitis, Maddrey score 13 on admission. Acute hepatitis c/b portal hypertension with ascites. Diagnostic mane negative for SBP (most recently 10/11, 1.25L off). Tbili, INR, AST/ALT, platelets stable. MELD 22.  - Diagnostic para as above  - Lactulose titrated to 3-4 loose BMs daily  - Rifaximin BID  - Trend CMP, INR     Toxic metabolic encephalopathy, resolved:  Multifactorial secondary to acute illness, sedating medications, HE. Psychiatry consulted 10/15, started Seroquel. Remains fully A&O today.    - Lactulose, rifaximin   - Discontinue Seroquel    - Delirium precautions   - Minimize sedating medications     Abdominal pain, improved: Secondary to alcohol hepatitis, gas pains.    - Tylenol TID   - Oxycodone 2.5 mg q4h PRN - wean as able   - Simethicone PRN     Severe malnutrition in the context of acute on chronic illness  Dysphagia   In the context of acute on chronic illness. Started on TF 10/5. Now more awake and interested in eating but not feeling hungry, transitioned to cycled feeds on 10/21 but NJ clogged and removed on 10/23.   - Nutrition and SLP consulted; appreciate following.   - Start calorie counts  - Continue regular diet per speech  - If unable to meet caloric goals will need to replace  feeding tube.      DM2: A1C 9.1. PTA on metformin. Transitioned to insulin due to hyperglycemia while on tube feeds. Lantus and metformin on hold. BGs stable.   - MSSI    - BG checks q4h    - Hypoglycemia protocol     Acute on chronic macrocytic anemia:  Likely secondary to alcohol use, alcohol hepatitis, nutritional deficiencies. Intermittently requiring PRBC transfusions since admission (10/7, 10/12, 10/16). No e/o active bleeding. Hgb 7.6 (7.3) today.   - CBC in AM     Alcohol use disorder: Last drink on 9/12/21. PTA was at CD treatment facility.    - Patient interested in resuming CD treatment, will need to re-evaluate this closer to discharge date, at this time PT/OT recommending TCU   - Continue folic acid, thiamine, MVI supplements     Other Stable Medical Issues:   Coagulopathy: INR to peak of 2.5 on 10/4, now much improved. Likely secondary to known severe hepatic steatosis.    MDD, RUBÉN: Continue PTA Lexapro. Prazosin on hold d/t hypotension, resume as able. Health Psychology consulted 10/1, re-involve PRN.   GERD: Continue PPI.   Neuropathy: Continue PTA gabapentin.      Resolved medical issues:  Septic shock, resolved: Shock most likely secondary to sepsis with hepatic dysfunction contributing as well. TTE with hyperdynamic LVEF, no RV dysfunction. Weaned off pressors on 10/19. Continues to require midodrine 20 mg q8hrs for soft blood pressures.       Diet: Regular Diet Adult  Snacks/Supplements Adult: Other; ensure clear and ensure enlive; Between Meals  Calorie Counts    DVT Prophylaxis: Heparin SQ  Rice Catheter: Not present  Central Lines: None  Code Status: Full Code      Disposition Plan   Expected discharge: 11/01/2021   recommended to transitional care unit once TCU bed available.     The patient's care was discussed with the Attending Physician, Dr. Stockton.    Kady Leal PA-C  Hospitalist Service, [address]  Securely message with the  [employer] Web Console (learn more here)  Text page via [employer] Paging/Directory    Please see sign in/sign out for up to date coverage information    Clinically Significant Risk Factors Present on Admission                ______________________________________________________________________    Interval History   S/p paracentesis yesterday with improvement in abdominal distention/discomfort. Non eventful night. Has weaned to to 1L O2 use. Did not require HD yesterday and states that her urine output is increasing. Denies any fevers, body aches or chills.     Data reviewed today: I reviewed all medications, new labs and imaging results over the last 24 hours.    Physical Exam   Vital Signs: Temp: 98  F (36.7  C) Temp src: Oral BP: 107/58 Pulse: 99   Resp: 16 SpO2: 96 % O2 Device: Nasal cannula Oxygen Delivery: 1 LPM  Weight: 204 lbs 9.39 oz  General Appearance: Alert and oriented x 3, NAD  Respiratory: Lungs CTAB, no wheezing  Cardiovascular: RRR  GI: Abdomen soft, non distended, non tender to palpation  Skin: warm and dry to touch, no rashes or excoriations  Other: 2+ bilateral LE edema    Data   Recent Labs   Lab 10/29/21  0823 10/29/21  0609 10/29/21  0326 10/28/21  1357 10/28/21  0551 10/28/21  0446 10/27/21  0757 10/27/21  0558 10/27/21  0558 10/25/21  0740 10/25/21  0555   WBC  --  13.6*  --   --  18.9*  --   --   --  18.8*   < > 18.3*   HGB  --  7.1*  --   --  7.6*  --   --   --  7.3*   < > 7.6*   MCV  --  102*  --   --  102*  --   --   --  101*   < > 102*   PLT  --  212  --   --  253  --   --   --  213   < > 258   INR  --   --   --   --   --  1.16*  --   --   --   --  1.26*   NA  --  134  --   --  133  --   --   --  133   < > 136   POTASSIUM  --  3.6  --   --  3.6  --   --   --  3.4   < > 3.5   CHLORIDE  --  102  --   --  100  --   --   --  102   < > 100   CO2  --  20  --   --  24  --   --   --  25   < > 26   BUN  --  30  --   --  24  --   --   --  20   < > 30   CR  --  2.31*  --   --  2.18*  --   --   --  1.95*    < > 2.26*   ANIONGAP  --  12  --   --  9  --   --   --  6   < > 10   STEFFANY  --  8.5  --   --  8.5  --   --   --  8.6   < > 8.3*   * 106* 113*   < > 101*  --    < >   < > 110*   < > 96   ALBUMIN  --  1.6*  --   --  1.8*  --   --    < > 1.7*   < > 1.6*   PROTTOTAL  --  5.9*  --   --  6.4*  --   --    < > 5.9*   < > 6.2*   BILITOTAL  --  2.5*  --   --  2.8*  --   --    < > 2.9*   < > 3.2*   ALKPHOS  --  268*  --   --  283*  --   --    < > 295*   < > 327*   ALT  --  33  --   --  32  --   --    < > 31   < > 32   AST  --  82*  --   --  79*  --   --    < > 89*   < > 72*    < > = values in this interval not displayed.

## 2021-10-29 NOTE — PROGRESS NOTES
Care Management Follow Up    Length of Stay (days): 30    Expected Discharge Date: 11/01/2021     Concerns to be Addressed: substance/tobacco abuse/use, discharge planning     Patient plan of care discussed at interdisciplinary rounds: Yes    Anticipated Discharge Disposition: Inpatient Chemical Dependency, Transitional Care     Anticipated Discharge Services: None  Anticipated Discharge DME: None    Patient/family educated on Medicare website which has current facility and service quality ratings: yes  Education Provided on the Discharge Plan: Yes  Patient/Family in Agreement with the Plan: yes    Referrals Placed by CM/SW:    TCU  [facility]  *[id number]  10/29- per admissions, pt looks appropriate for TCU. They would like to clarify whether or not pt can go to IP CD treatment after TCU if she is on dialysis- SW will follow up.      [facility]  [address] [place] [address]  Phone: [phone]  Fax: [phone]  10/28- left follow up voicemail with admissions      Discontinued  [facility]  [address] [facility] [address]  Phone: [phone]  Fax: [phone]  10/29- per admissions, unable to accept due to staffing     [facility]  [address] [facility] [address]  Phone: [phone]  Fax: [phone]  10/29- declined, no available bed    [facility]  [address] [address]  Phone: [phone]  Fax: [phone]  10/28- per admissions, no beds until at least next week. If pt still needs placement at that time, referral can be resent.      [facility]  Ph: [phone]  F: [phone]  10/26- LVM, voicemail states they are not taking any admissions currently.      [facility]  [address]  [place] [facility] [address]  Phone: [phone]  Fax: [phone]  10/28- no beds available      [facility]  [address] [facility] [address]  Phone: [phone]  Fax: [phone]  10/28- no bed, also declined due  to ETOH and smoking      [facility]  [address]  [place] [address]  Phone: [phone]  Fax: [phone]  10/28- no appropriate bed, per admissions      [facility]  [address]  [facility] [address]  Phone: [phone]  Admissions: [phone]  Fax: [phone]  10/28- no beds available, pt placed on wait list     [facility]  [address]  [facility] [address]  Phone: [phone]  Fax: [phone]  10/28- declined, no beds available      [facility]  Ph: [phone]  F: [phone]  10/26- per Hawa in admissions, no bed availability for the forseeable future.      [facility]  Ph: [phone]  F: [phone]  10/25- per admissions, no SNF beds for the for seeable further and [facility] does not cover swing bed.     [facility] ([facility] - Ph: [phone], F: [phone])  10/26- per vivi Cisneros, no beds available at this time      OP HD  [facility] (admissions ph: [phone] f: [phone])  10/26- sent Hep B panel, HD flowsheets, and access report per request, updated Violeta in admissions.   10/28- provided update to Payal in admissions, [phone]    Meg   10/29- sent referral for [facility] and [address] [place] [address] units for potential placement at  TCU.     Private pay costs discussed: Not applicable    Additional Information:  SW following for TCU and OP HD placement. Received updates noted above. Per  TCU, pt appears appropriate for their facility but they would like clarification on whether or not pt can go to inpatient chem dep treatment while on dialysis. Per SW leadership, [facility] inpatient chem dep ([facility]) may be able to accommodate this pending pt's dialysis schedule. SW will continue to work on pt's outpatient dialysis placement and follow up with [facility] (*[id number]).     JOY Reed, [facility] Hospital[facility]  [address]   [facility]  Phone:  [phone]  Pager: [phone]

## 2021-10-30 LAB
ALBUMIN SERPL-MCNC: 1.6 G/DL (ref 3.4–5)
ALBUMIN UR-MCNC: 20 MG/DL
ALP SERPL-CCNC: 283 U/L (ref 40–150)
ALT SERPL W P-5'-P-CCNC: 32 U/L (ref 0–50)
AMORPH CRY #/AREA URNS HPF: ABNORMAL /HPF
ANION GAP SERPL CALCULATED.3IONS-SCNC: 8 MMOL/L (ref 3–14)
APPEARANCE UR: ABNORMAL
AST SERPL W P-5'-P-CCNC: 83 U/L (ref 0–45)
BACTERIA #/AREA URNS HPF: ABNORMAL /HPF
BACTERIA UR CULT: NO GROWTH
BILIRUB SERPL-MCNC: 2.4 MG/DL (ref 0.2–1.3)
BILIRUB UR QL STRIP: NEGATIVE
BUN SERPL-MCNC: 15 MG/DL (ref 7–30)
CALCIUM SERPL-MCNC: 8.4 MG/DL (ref 8.5–10.1)
CHLORIDE BLD-SCNC: 101 MMOL/L (ref 94–109)
CO2 SERPL-SCNC: 24 MMOL/L (ref 20–32)
COLOR UR AUTO: YELLOW
CREAT SERPL-MCNC: 1.41 MG/DL (ref 0.52–1.04)
ERYTHROCYTE [DISTWIDTH] IN BLOOD BY AUTOMATED COUNT: 19 % (ref 10–15)
GFR SERPL CREATININE-BSD FRML MDRD: 44 ML/MIN/1.73M2
GLUCOSE BLD-MCNC: 115 MG/DL (ref 70–99)
GLUCOSE BLDC GLUCOMTR-MCNC: 111 MG/DL (ref 70–99)
GLUCOSE BLDC GLUCOMTR-MCNC: 118 MG/DL (ref 70–99)
GLUCOSE BLDC GLUCOMTR-MCNC: 123 MG/DL (ref 70–99)
GLUCOSE BLDC GLUCOMTR-MCNC: 129 MG/DL (ref 70–99)
GLUCOSE BLDC GLUCOMTR-MCNC: 131 MG/DL (ref 70–99)
GLUCOSE UR STRIP-MCNC: NEGATIVE MG/DL
HCT VFR BLD AUTO: 23.9 % (ref 35–47)
HGB BLD-MCNC: 7.4 G/DL (ref 11.7–15.7)
HGB UR QL STRIP: ABNORMAL
KETONES UR STRIP-MCNC: NEGATIVE MG/DL
LEUKOCYTE ESTERASE UR QL STRIP: ABNORMAL
MAGNESIUM SERPL-MCNC: 1.6 MG/DL (ref 1.6–2.3)
MAGNESIUM SERPL-MCNC: 1.6 MG/DL (ref 1.6–2.3)
MCH RBC QN AUTO: 31.2 PG (ref 26.5–33)
MCHC RBC AUTO-ENTMCNC: 31 G/DL (ref 31.5–36.5)
MCV RBC AUTO: 101 FL (ref 78–100)
NITRATE UR QL: NEGATIVE
PH UR STRIP: 5.5 [PH] (ref 5–7)
PHOSPHATE SERPL-MCNC: 3.4 MG/DL (ref 2.5–4.5)
PLATELET # BLD AUTO: 209 10E3/UL (ref 150–450)
POTASSIUM BLD-SCNC: 3.4 MMOL/L (ref 3.4–5.3)
PROT SERPL-MCNC: 5.7 G/DL (ref 6.8–8.8)
RBC # BLD AUTO: 2.37 10E6/UL (ref 3.8–5.2)
RBC URINE: 4 /HPF
SODIUM SERPL-SCNC: 133 MMOL/L (ref 133–144)
SP GR UR STRIP: 1.01 (ref 1–1.03)
SQUAMOUS EPITHELIAL: 8 /HPF
TRANSITIONAL EPI: <1 /HPF
UROBILINOGEN UR STRIP-MCNC: NORMAL MG/DL
WBC # BLD AUTO: 14.6 10E3/UL (ref 4–11)
WBC URINE: 16 /HPF

## 2021-10-30 PROCEDURE — 250N000013 HC RX MED GY IP 250 OP 250 PS 637: Performed by: PHYSICIAN ASSISTANT

## 2021-10-30 PROCEDURE — 36415 COLL VENOUS BLD VENIPUNCTURE: CPT | Performed by: PHYSICIAN ASSISTANT

## 2021-10-30 PROCEDURE — 250N000013 HC RX MED GY IP 250 OP 250 PS 637: Performed by: STUDENT IN AN ORGANIZED HEALTH CARE EDUCATION/TRAINING PROGRAM

## 2021-10-30 PROCEDURE — 83735 ASSAY OF MAGNESIUM: CPT | Performed by: PHYSICIAN ASSISTANT

## 2021-10-30 PROCEDURE — 250N000013 HC RX MED GY IP 250 OP 250 PS 637: Performed by: PEDIATRICS

## 2021-10-30 PROCEDURE — 120N000002 HC R&B MED SURG/OB UMMC

## 2021-10-30 PROCEDURE — 84100 ASSAY OF PHOSPHORUS: CPT | Performed by: PHYSICIAN ASSISTANT

## 2021-10-30 PROCEDURE — 80053 COMPREHEN METABOLIC PANEL: CPT | Performed by: PHYSICIAN ASSISTANT

## 2021-10-30 PROCEDURE — 81001 URINALYSIS AUTO W/SCOPE: CPT | Performed by: PHYSICIAN ASSISTANT

## 2021-10-30 PROCEDURE — 99232 SBSQ HOSP IP/OBS MODERATE 35: CPT | Performed by: INTERNAL MEDICINE

## 2021-10-30 PROCEDURE — 99233 SBSQ HOSP IP/OBS HIGH 50: CPT | Performed by: INTERNAL MEDICINE

## 2021-10-30 PROCEDURE — 250N000011 HC RX IP 250 OP 636: Performed by: PHYSICIAN ASSISTANT

## 2021-10-30 PROCEDURE — 99207 PR APP CREDIT; MD BILLING SHARED VISIT: CPT | Performed by: PHYSICIAN ASSISTANT

## 2021-10-30 PROCEDURE — 87086 URINE CULTURE/COLONY COUNT: CPT | Performed by: PHYSICIAN ASSISTANT

## 2021-10-30 PROCEDURE — 85027 COMPLETE CBC AUTOMATED: CPT | Performed by: PHYSICIAN ASSISTANT

## 2021-10-30 RX ORDER — DIPHENHYDRAMINE HCL 25 MG
25 CAPSULE ORAL EVERY 6 HOURS PRN
Status: DISCONTINUED | OUTPATIENT
Start: 2021-10-30 | End: 2021-11-03

## 2021-10-30 RX ADMIN — Medication 2.5 MG: at 07:58

## 2021-10-30 RX ADMIN — CALCIUM CARBONATE 600 MG (1,500 MG)-VITAMIN D3 400 UNIT TABLET 1 TABLET: at 13:45

## 2021-10-30 RX ADMIN — Medication 3 MG: at 22:19

## 2021-10-30 RX ADMIN — Medication 100 MCG: at 13:45

## 2021-10-30 RX ADMIN — MIDODRINE HYDROCHLORIDE 20 MG: 5 TABLET ORAL at 23:42

## 2021-10-30 RX ADMIN — Medication 2.5 MG: at 03:06

## 2021-10-30 RX ADMIN — HEPARIN SODIUM 5000 UNITS: 5000 INJECTION, SOLUTION INTRAVENOUS; SUBCUTANEOUS at 09:53

## 2021-10-30 RX ADMIN — Medication 2.5 MG: at 14:04

## 2021-10-30 RX ADMIN — THIAMINE HCL TAB 100 MG 100 MG: 100 TAB at 09:54

## 2021-10-30 RX ADMIN — HEPARIN SODIUM 5000 UNITS: 5000 INJECTION, SOLUTION INTRAVENOUS; SUBCUTANEOUS at 20:13

## 2021-10-30 RX ADMIN — Medication 3 MG: at 03:07

## 2021-10-30 RX ADMIN — PANTOPRAZOLE SODIUM 40 MG: 40 TABLET, DELAYED RELEASE ORAL at 09:58

## 2021-10-30 RX ADMIN — GABAPENTIN 100 MG: 100 CAPSULE ORAL at 13:45

## 2021-10-30 RX ADMIN — Medication 1 CAPSULE: at 09:59

## 2021-10-30 RX ADMIN — GABAPENTIN 100 MG: 100 CAPSULE ORAL at 09:59

## 2021-10-30 RX ADMIN — FOLIC ACID 1 MG: 1 TABLET ORAL at 13:45

## 2021-10-30 RX ADMIN — MIDODRINE HYDROCHLORIDE 20 MG: 5 TABLET ORAL at 09:57

## 2021-10-30 RX ADMIN — GABAPENTIN 100 MG: 100 CAPSULE ORAL at 20:12

## 2021-10-30 RX ADMIN — MIDODRINE HYDROCHLORIDE 20 MG: 5 TABLET ORAL at 16:30

## 2021-10-30 RX ADMIN — ESCITALOPRAM OXALATE 10 MG: 10 TABLET ORAL at 09:58

## 2021-10-30 RX ADMIN — POLYETHYLENE GLYCOL 3350 17 G: 17 POWDER, FOR SOLUTION ORAL at 09:52

## 2021-10-30 RX ADMIN — RIFAXIMIN 550 MG: 550 TABLET ORAL at 20:11

## 2021-10-30 RX ADMIN — RIFAXIMIN 550 MG: 550 TABLET ORAL at 09:58

## 2021-10-30 RX ADMIN — MIDODRINE HYDROCHLORIDE 20 MG: 5 TABLET ORAL at 00:06

## 2021-10-30 RX ADMIN — Medication 2.5 MG: at 20:11

## 2021-10-30 ASSESSMENT — ACTIVITIES OF DAILY LIVING (ADL)
ADLS_ACUITY_SCORE: 8

## 2021-10-30 ASSESSMENT — MIFFLIN-ST. JEOR: SCORE: 1475.25

## 2021-10-30 NOTE — PROGRESS NOTES
"/69 (BP Location: Right arm)   Pulse 104   Temp 98.8  F (37.1  C) (Oral)   Resp 18   Ht 1.626 m (5' 4.02\")   Wt 90.8 kg (200 lb 2.8 oz)   SpO2 93%   BMI 34.34 kg/m      Pt needs UA/UC will update on coming staff pt unable to provide sample this shift. Pt appetite is poor. Pt only ate cereal this shift. Pt did make complaints of pan x2 and received oral pain medication. Pt resting with eyes closed when pain follow up was done. No electrolyte replacement needed this shift.Continue to monitor per plan of care.   "

## 2021-10-30 NOTE — PROGRESS NOTES
Nephrology Progress Note  10/30/2021         Assessment & Recommendations:   Aliyah Angeles is a 46 yof w/hx of ETOH use (Quit 9/12/2021) w/hepatitis, Fitz en Y bypass, Pancreatitis, DM2, Panc cyst who was admitted 9/29 from chem dep with N/V and worsening abdominal pain and edema.  Her course has been resp failure with ARDS and evolving ANA LAURA.  Noted to have received albumin from 10/1-10/3 (150g/day) with no improvement in Cr.  On Vanco/Zosyn to treat sepsis although cultures at time of consult are negative.  Nephrology consulted for evaluation of ANA LAURA and possible RRT, started CRRT on 10/7.       Interval History :   Mrs Angeles had CRRT started 10/7 for management of volume and acidosis, transitioned to iHD on 10/15.  Had past 2 days off of HD, plan for run yesterday mainly as she has some increased SOB, then likely can hold on runs for another 2-3 days while watching for recovery.  BP's are reasonable especially for her liver dysfx.       Assessment & Recommendations:   ANA LAURA stage III-Baseline Cr normal at 0.5 as recently as 9/14/2021, abdominal US pending currently.  Was at inpatient chem dep when she developed abdominal pain, N/V and worsening edema and was brought to [facility] on 9/29.  Was treated for UTI and started on Vanco/Zosyn for leukocytosis but decompensated and was intubated the evening of 10/3.  Noted she did receive albumin x3 days from 10/1-10/3 for ANA LAURA but actually worsened and Cr at time of consult is up to 2.  Lisa <5 on 10/3, ECHO done 10/4 showed hyperdynamic LV and normal IVC.  Etiology of ANA LAURA is likely ATN with underlying HRS physiology which made her more susceptible to hypoperfusion.  Started CRRT on 10/7 for management of volume and acidosis.  Trying to transition to iHD.                  -ANA LAURA, likely hypoperfusion in setting of baseline HRS physiology, possible contribution from vanco/zosyn and did receive contrast on 10/3.  Some UOP, still with low gfr but hopeful for further  "recovery.                   -CRRT started 10/7, stopped 10/13.  Had planned on 3x/week HD but Cr suggests she has some recovering gfr so will decide daily based mainly on degree of Cr rise.                 -Line is tunneled placed 10/22.                   -Dialysis consent signed and scanned in under media.      ? UTI   Pt with urinary urgency, UA on 10/28 was not suggestive and culture with no growth. Pt now developed symptoms, so will repeat UA with culture.   - UA ordered   - please perform bladder scan q day to rule out obstruction and further overflow incontinence.     Volume-Has total body volume overload, I/O's difficult to interpret as we are not capturing much of her UOP but we were able to pull 2L of UF without issue with last run.  Holding today.       Electrolytes/pH-K 3.6, bicarb 24.       Liver-Unclear duration, quit drinking on 9/12/2021 and was at chem dep when acute issues started.  Bili is down to 6 and INR is 1.4 and a bit improved.  GI following, not clear if she would be a transplant candidate eventually.       ID- Rifaximin.       Anemia-Hgb 7.4, will check iron stores.       Nutrition-Taking PO    Recommendations were communicated to primary team via this note    Jacquie Lock MD   [phone]    Interval History :   Nursing and provider notes from last 24 hours reviewed.  In the last 24 hours Aliyah Angeles is hemodynamically stable. Underwent HD yesterday. Stable respiration. C/o urinary urgency with out increase in UOP.  Review of Systems:   I reviewed the following systems:  GI: stable appetite. no nausea or vomiting or diarrhea.   Neuro:  no confusion  Constitutional:  no fever or chills  CV: no dyspnea and  no chest pain.    Physical Exam:   I/O last 3 completed shifts:  In: 910 [P.O.:910]  Out: 2450 [Urine:450; Other:2000]   /69 (BP Location: Right arm)   Pulse 104   Temp 98.8  F (37.1  C) (Oral)   Resp 18   Ht 1.626 m (5' 4.02\")   Wt 90.8 kg (200 lb 2.8 oz)   SpO2 93%   " BMI 34.34 kg/m     General : Pt awake, not in acute distress   Lungs : anterior lung fields are clear  Cardiac : S1, S2 present  Abdomen : Soft/ND/NT  LE : Edema noted  Dialysis Access : right IJ    Labs:   All labs reviewed by me  Electrolytes/Renal - Recent Labs   Lab Test 10/30/21  1246 10/30/21  0827 10/30/21  0540 10/30/21  0436 10/29/21  0823 10/29/21  0609 10/29/21  0609 10/28/21  1357 10/28/21  0551 10/28/21  0446 10/27/21  0558   NA  --   --  133  --   --   --  134  --  133  --    < >   POTASSIUM  --   --  3.4  --   --   --  3.6  --  3.6  --    < >   CHLORIDE  --   --  101  --   --   --  102  --  100  --    < >   CO2  --   --  24  --   --   --  20  --  24  --    < >   BUN  --   --  15  --   --   --  30  --  24  --    < >   CR  --   --  1.41*  --   --   --  2.31*  --  2.18*  --    < >   * 118* 115*  --    < >   < > 106*   < > 101*  --    < >   STEFFANY  --   --  8.4*  --   --   --  8.5  --  8.5  --    < >   MAG  --   --  1.6 1.6  --   --  1.7  --   --  1.7   < >   PHOS  --   --   --  3.4  --   --  5.4*  --   --  5.1*  --     < > = values in this interval not displayed.       CBC -   Recent Labs   Lab Test 10/30/21  0540 10/29/21  0609 10/28/21  0551   WBC 14.6* 13.6* 18.9*   HGB 7.4* 7.1* 7.6*    212 253       LFTs -   Recent Labs   Lab Test 10/30/21  0540 10/29/21  0609 10/28/21  0551   ALKPHOS 283* 268* 283*   BILITOTAL 2.4* 2.5* 2.8*   ALT 32 33 32   AST 83* 82* 79*   PROTTOTAL 5.7* 5.9* 6.4*   ALBUMIN 1.6* 1.6* 1.8*       Iron Panel -   Recent Labs   Lab Test 10/29/21  0609 10/27/21  0558 09/13/21  0642   IRON  --  28*  --    IRONSAT  --  16  --    EYAD 343*  --    < >    < > = values in this interval not displayed.     Current Medications:    acetaminophen  650 mg Oral or Feeding Tube Q8H     calcium carbonate 600 mg-vitamin D 400 units  1 tablet Oral or Feeding Tube Daily     cyanocobalamin  100 mcg Oral or Feeding Tube Daily     escitalopram  10 mg Oral Daily     folic acid  1 mg Oral or  Feeding Tube Daily     gabapentin  100 mg Oral TID     heparin ANTICOAGULANT  5,000 Units Subcutaneous Q12H     influenza quadrivalent (PF) vacc  0.5 mL Intramuscular Prior to discharge     insulin aspart  1-7 Units Subcutaneous TID AC     insulin aspart  1-5 Units Subcutaneous At Bedtime     midodrine  20 mg Oral or Feeding Tube Q8H     multivitamin RENAL  1 capsule Oral Daily     pantoprazole  40 mg Oral QAM AC     polyethylene glycol  17 g Oral BID     [Held by provider] prazosin  1 mg Oral QPM     psyllium  1 packet Oral Daily     rifaximin  550 mg Oral or Feeding Tube BID     sodium chloride  1 spray Both Nostrils 4x Daily     thiamine  100 mg Oral or Feeding Tube Daily       Jacquie Lock MD

## 2021-10-30 NOTE — PLAN OF CARE
RN assumed cares at [id number]     Temp: 98.5  F (36.9  C) Temp src: Oral BP: 113/66 Pulse: 86   Resp: 17 SpO2: 95 % O2 Device: Nasal cannula Oxygen Delivery: 1 LPM    Neuro: A&O x4, able to make needs known. Pt endorses N/T to BLE.   Cardiac: WDL  Respiratory: LS diminished in bases, reports SWAN when up to commode/activity.   GI/: HD pt, able to still make urine. Pt up to commode w/ assistance. No BM this shift.   Skin: Jaundiced skin. Blanchable redness to sacrum/coccyx.   IV/Drains: R jugular CVC WDL. R PIV SL.   Activity: Assist of 1 in room to commode.  Labs: BG checks with meals & bedtime. Mg protocol.   Nutrition: Regular diet.   Pain: Pt reports 6/10 lower back pain relieved w/ prn oxycodone.     Plan of Care: Pt able to rest comfortably overnight. Pt currently on 1 L NC, pt request keeping it on d/t SWAN following activity. Continue to monitor & follow POC.

## 2021-10-30 NOTE — PROGRESS NOTES
[facility]    Medicine Progress Note - Hospitalist Service, Gold 6       Date of Admission:  9/29/2021    Assessment & Plan        Janay Angeles is a 46-year-old female with a history of alcohol use disorder, Fitz-en-Y gastric bypass (2010), DM2, anxiety, depression, who was initially admitted to internal medicine service with alcoholic hepatitis.  She transferred to MICU on 10/4/2021 with acute hypoxic respiratory failure secondary to ARDS requiring intubation.  Hospitalization complicated by septic shock, pneumonia, ANA LAURA requiring CRRT and now iHD, encephalopathy.  Self extubated on 10/14, weaned off pressors on 10/19, and transferred to general medical floor on 10/20.    Today:   - Obtain UA (if urine output adequate)    Acute hypoxic respiratory failure, stable  ARDS, resolved  Hx of pneumonia (aspiration vs VAP) with ongoing SIRS after finishing Zosyn  Hx of pulmonary edema   Acute decline in respiratory status requiring intubation on 10/4, CXR c/w ARDS, likely secondary to HAP vs aspiration. BAL cultures negative. Treated with Zosyn 10/4 - 10/13. Self-extubated on 10/14. Developed increased work of breathing on 10/17, CXR demonstrated increased interstitial opacities concerning for pulmonary edema vs pneumonia. Managing volume with iHD, last run 10/29. Deconditioning and atelectasis likely contributing to ongoing O2 needs, however have weaned O2 down to 1L via NC. Completed 10 day course of Zosyn 10/26 for VAP vs aspiration; however, WBC and CRP continue to be elevated (although now downtrending). Repeat CXR 10/27 with patchy bilateral opacities with overall improvement, otherwise unremarkable. Repeat COVID neg and BCs and UC NGTD. LA WNL. Diagnostic para for 10/28 with  1,658 nucleated cells, however only 7% neutrophils and monocyte/macrophage predominance. Remains afebrile.  - F/u results of BCs x 2 and UC (NGTD)  - Continue to trend WBC  - Incentive  spirometry  - OOB at least TID  - Continuous pulse oximetry. Wean O2 as able.      ANA LAURA  Hypervolemia  Baseline Cr ~0.5. Cr to peak of 2.98. Decline in renal function most likely secondary to ATN (hypotension, medications, contrast), possible component of HRS. On CRRT 10/7 - 10/13, now transitioned to iHD. RIJ dysfunction and unable to dialyze on 10/21. New tunneled RIJ placed by IR On 10/22.  - Nephrology following  - HD per nephrology team  - Continue to monitor for renal recovery  - Strict I/Os  - Daily weights  - Lymphedema team consulted     Alcohol hepatitis: Recently admitted in September with ETOH hepatitis, started on rifaximin and lactulose per Hepatology recs. Now re-presented with ETOH hepatitis, Maddrey score 13 on admission. Acute hepatitis c/b portal hypertension with ascites. Diagnostic mane negative for SBP (most recently 10/11, 1.25L off). Tbili, INR, AST/ALT, platelets stable. MELD 22.  - Diagnostic para as above  - Lactulose titrated to 3-4 loose BMs daily  - Rifaximin BID  - Trend CMP, INR     Toxic metabolic encephalopathy, resolved:  Multifactorial secondary to acute illness, sedating medications, HE. Psychiatry consulted 10/15, started Seroquel. Remains fully A&O today.    - Lactulose, rifaximin   - Discontinue Seroquel    - Delirium precautions   - Minimize sedating medications     Abdominal pain, improved: Secondary to alcohol hepatitis, gas pains.    - Tylenol TID   - Oxycodone 2.5 mg q4h PRN - wean as able   - Simethicone PRN     Severe malnutrition in the context of acute on chronic illness  Dysphagia   In the context of acute on chronic illness. Started on TF 10/5. Now more awake and interested in eating but not feeling hungry, transitioned to cycled feeds on 10/21 but NJ clogged and removed on 10/23.   - Nutrition and SLP consulted; appreciate following.   - Start calorie counts  - Continue regular diet per speech  - If unable to meet caloric goals will need to replace feeding tube.       DM2: A1C 9.1. PTA on metformin. Transitioned to insulin due to hyperglycemia while on tube feeds. Lantus and metformin on hold. BGs stable.   - MSSI    - BG checks q4h    - Hypoglycemia protocol     Acute on chronic macrocytic anemia:  Likely secondary to alcohol use, alcohol hepatitis, nutritional deficiencies. Intermittently requiring PRBC transfusions since admission (10/7, 10/12, 10/16). No e/o active bleeding. Hgb 7.6 (7.3) today.   - CBC in AM     Alcohol use disorder: Last drink on 9/12/21. PTA was at CD treatment facility.    - Patient interested in resuming CD treatment, will need to re-evaluate this closer to discharge date, at this time PT/OT recommending TCU   - Continue folic acid, thiamine, MVI supplements     Other Stable Medical Issues:   Coagulopathy: INR to peak of 2.5 on 10/4, now much improved. Likely secondary to known severe hepatic steatosis.    MDD, RUBÉN: Continue PTA Lexapro. Prazosin on hold d/t hypotension, resume as able. Health Psychology consulted 10/1, re-involve PRN.   GERD: Continue PPI.   Neuropathy: Continue PTA gabapentin.      Resolved medical issues:  Septic shock, resolved: Shock most likely secondary to sepsis with hepatic dysfunction contributing as well. TTE with hyperdynamic LVEF, no RV dysfunction. Weaned off pressors on 10/19. Continues to require midodrine 20 mg q8hrs for soft blood pressures.       Diet: Regular Diet Adult  Snacks/Supplements Adult: Other; ensure clear and ensure enlive; Between Meals  Calorie Counts    DVT Prophylaxis: Heparin SQ  Rice Catheter: Not present  Central Lines: None  Code Status: Full Code      Disposition Plan   Expected discharge: 11/01/2021   recommended to transitional care unit once TCU bed available.     The patient's care was discussed with the Attending Physician, Dr. Stockton.    Kady Leal PA-C  Hospitalist Service, [address]  Securely message with the Vocera Web  Console (learn more here)  Text page via [facility] Paging/Directory    Please see sign in/sign out for up to date coverage information    Clinically Significant Risk Factors Present on Admission                ______________________________________________________________________    Interval History   Complains of urinary frequency, however making minimal urine. Denies any dysuria or suprapubic pain. Denies any recent fevers or chills. Had HD yesterday which she tolerated without issue. Appetite poor, but states this is because food is unappealing.     Data reviewed today: I reviewed all medications, new labs and imaging results over the last 24 hours.    Physical Exam   Vital Signs: Temp: 98  F (36.7  C) Temp src: Oral BP: 139/78 Pulse: 91   Resp: 18 SpO2: 94 % O2 Device: Nasal cannula Oxygen Delivery: 1 LPM  Weight: 200 lbs 2.84 oz  General Appearance: Alert and oriented x 3, NAD  Respiratory: Lungs CTAB, no wheezing  Cardiovascular: RRR  GI: Abdomen soft, non distended, non tender to palpation  Skin: warm and dry to touch, no rashes or excoriations  Other: 2+ bilateral LE edema    Data   Recent Labs   Lab 10/30/21  0827 10/30/21  0540 10/30/21  0217 10/29/21  0823 10/29/21  0609 10/28/21  1357 10/28/21  0551 10/28/21  0446 10/27/21  0558 10/25/21  0740 10/25/21  0555   WBC  --  14.6*  --   --  13.6*  --  18.9*  --    < >   < > 18.3*   HGB  --  7.4*  --   --  7.1*  --  7.6*  --    < >   < > 7.6*   MCV  --  101*  --   --  102*  --  102*  --    < >   < > 102*   PLT  --  209  --   --  212  --  253  --    < >   < > 258   INR  --   --   --   --   --   --   --  1.16*  --   --  1.26*   NA  --  133  --   --  134  --  133  --    < >   < > 136   POTASSIUM  --  3.4  --   --  3.6  --  3.6  --    < >   < > 3.5   CHLORIDE  --  101  --   --  102  --  100  --    < >   < > 100   CO2  --  24  --   --  20  --  24  --    < >   < > 26   BUN  --  15  --   --  30  --  24  --    < >   < > 30   CR  --  1.41*  --   --  2.31*  --  2.18*  --     < >   < > 2.26*   ANIONGAP  --  8  --   --  12  --  9  --    < >   < > 10   STEFFANY  --  8.4*  --   --  8.5  --  8.5  --    < >   < > 8.3*   * 115* 131*   < > 106*   < > 101*  --    < >   < > 96   ALBUMIN  --  1.6*  --   --  1.6*  --  1.8*  --    < >   < > 1.6*   PROTTOTAL  --  5.7*  --   --  5.9*  --  6.4*  --    < >   < > 6.2*   BILITOTAL  --  2.4*  --   --  2.5*  --  2.8*  --    < >   < > 3.2*   ALKPHOS  --  283*  --   --  268*  --  283*  --    < >   < > 327*   ALT  --  32  --   --  33  --  32  --    < >   < > 32   AST  --  83*  --   --  82*  --  79*  --    < >   < > 72*    < > = values in this interval not displayed.

## 2021-10-30 NOTE — PROGRESS NOTES
Calorie Count  Intake recorded for: 10/29  Total Kcals: 535 Total Protein: 33g  Kcals from Hospital Food: 535   Protein: 33g  Kcals from Outside Food (average):0 Protein: 0g  # Meals Ordered from Kitchen: 2 (First- 25% 8oz 1% milk, <25% English muffin w/ butter & peanut butter)       (Second- 100% 8oz apple juice, 75% turkey sandwich w/ provolone cheese)  # Meals Recorded: 2  # Supplements Recorded: 0

## 2021-10-30 NOTE — PLAN OF CARE
Pt alert and oriented x4. C/o back pain. PRN oxycodone administered x2 with effect. Up in chair for dinner. Poor appetite. Refused supplements. No Bm this evening. Voided spontaneously x1. Up to BSC with assist of 1. BLE lymphedema wraps in place. Continue to assess pain and effectiveness of interventions. Assess GI status.

## 2021-10-31 ENCOUNTER — APPOINTMENT (OUTPATIENT)
Dept: OCCUPATIONAL THERAPY | Facility: CLINIC | Age: 46
End: 2021-10-31
Payer: COMMERCIAL

## 2021-10-31 LAB
ALBUMIN SERPL-MCNC: 1.5 G/DL (ref 3.4–5)
ALP SERPL-CCNC: 264 U/L (ref 40–150)
ALT SERPL W P-5'-P-CCNC: 34 U/L (ref 0–50)
ANION GAP SERPL CALCULATED.3IONS-SCNC: 6 MMOL/L (ref 3–14)
AST SERPL W P-5'-P-CCNC: 94 U/L (ref 0–45)
BILIRUB SERPL-MCNC: 2.2 MG/DL (ref 0.2–1.3)
BUN SERPL-MCNC: 16 MG/DL (ref 7–30)
CALCIUM SERPL-MCNC: 8.2 MG/DL (ref 8.5–10.1)
CHLORIDE BLD-SCNC: 102 MMOL/L (ref 94–109)
CO2 SERPL-SCNC: 25 MMOL/L (ref 20–32)
CREAT SERPL-MCNC: 1.52 MG/DL (ref 0.52–1.04)
ERYTHROCYTE [DISTWIDTH] IN BLOOD BY AUTOMATED COUNT: 18.8 % (ref 10–15)
GFR SERPL CREATININE-BSD FRML MDRD: 41 ML/MIN/1.73M2
GLUCOSE BLD-MCNC: 123 MG/DL (ref 70–99)
GLUCOSE BLDC GLUCOMTR-MCNC: 110 MG/DL (ref 70–99)
GLUCOSE BLDC GLUCOMTR-MCNC: 114 MG/DL (ref 70–99)
GLUCOSE BLDC GLUCOMTR-MCNC: 120 MG/DL (ref 70–99)
GLUCOSE BLDC GLUCOMTR-MCNC: 137 MG/DL (ref 70–99)
GLUCOSE BLDC GLUCOMTR-MCNC: 144 MG/DL (ref 70–99)
HCT VFR BLD AUTO: 23.1 % (ref 35–47)
HGB BLD-MCNC: 7 G/DL (ref 11.7–15.7)
INR PPP: 1.24 (ref 0.85–1.15)
MAGNESIUM SERPL-MCNC: 1.4 MG/DL (ref 1.6–2.3)
MCH RBC QN AUTO: 30.4 PG (ref 26.5–33)
MCHC RBC AUTO-ENTMCNC: 30.3 G/DL (ref 31.5–36.5)
MCV RBC AUTO: 100 FL (ref 78–100)
PHOSPHATE SERPL-MCNC: 3.5 MG/DL (ref 2.5–4.5)
PLATELET # BLD AUTO: 210 10E3/UL (ref 150–450)
POTASSIUM BLD-SCNC: 3.4 MMOL/L (ref 3.4–5.3)
PROT SERPL-MCNC: 5.6 G/DL (ref 6.8–8.8)
RBC # BLD AUTO: 2.3 10E6/UL (ref 3.8–5.2)
SODIUM SERPL-SCNC: 133 MMOL/L (ref 133–144)
WBC # BLD AUTO: 14.2 10E3/UL (ref 4–11)

## 2021-10-31 PROCEDURE — 85610 PROTHROMBIN TIME: CPT | Performed by: PHYSICIAN ASSISTANT

## 2021-10-31 PROCEDURE — 36415 COLL VENOUS BLD VENIPUNCTURE: CPT | Performed by: PHYSICIAN ASSISTANT

## 2021-10-31 PROCEDURE — 250N000013 HC RX MED GY IP 250 OP 250 PS 637: Performed by: PEDIATRICS

## 2021-10-31 PROCEDURE — 250N000013 HC RX MED GY IP 250 OP 250 PS 637: Performed by: PHYSICIAN ASSISTANT

## 2021-10-31 PROCEDURE — 250N000013 HC RX MED GY IP 250 OP 250 PS 637: Performed by: INTERNAL MEDICINE

## 2021-10-31 PROCEDURE — 83735 ASSAY OF MAGNESIUM: CPT | Performed by: PHYSICIAN ASSISTANT

## 2021-10-31 PROCEDURE — 250N000013 HC RX MED GY IP 250 OP 250 PS 637: Performed by: STUDENT IN AN ORGANIZED HEALTH CARE EDUCATION/TRAINING PROGRAM

## 2021-10-31 PROCEDURE — 85027 COMPLETE CBC AUTOMATED: CPT | Performed by: PHYSICIAN ASSISTANT

## 2021-10-31 PROCEDURE — 120N000002 HC R&B MED SURG/OB UMMC

## 2021-10-31 PROCEDURE — 97535 SELF CARE MNGMENT TRAINING: CPT | Mod: GO | Performed by: OCCUPATIONAL THERAPIST

## 2021-10-31 PROCEDURE — 99233 SBSQ HOSP IP/OBS HIGH 50: CPT | Performed by: INTERNAL MEDICINE

## 2021-10-31 PROCEDURE — 84100 ASSAY OF PHOSPHORUS: CPT | Performed by: PHYSICIAN ASSISTANT

## 2021-10-31 PROCEDURE — 250N000011 HC RX IP 250 OP 636: Performed by: PHYSICIAN ASSISTANT

## 2021-10-31 PROCEDURE — 80053 COMPREHEN METABOLIC PANEL: CPT | Performed by: PHYSICIAN ASSISTANT

## 2021-10-31 RX ORDER — MAGNESIUM OXIDE 400 MG/1
400 TABLET ORAL 3 TIMES DAILY
Status: DISCONTINUED | OUTPATIENT
Start: 2021-10-31 | End: 2021-10-31

## 2021-10-31 RX ORDER — MAGNESIUM OXIDE 400 MG/1
400 TABLET ORAL 3 TIMES DAILY
Status: DISCONTINUED | OUTPATIENT
Start: 2021-10-31 | End: 2021-11-01

## 2021-10-31 RX ORDER — MIRTAZAPINE 15 MG/1
15 TABLET, FILM COATED ORAL AT BEDTIME
Status: DISCONTINUED | OUTPATIENT
Start: 2021-10-31 | End: 2021-11-05 | Stop reason: HOSPADM

## 2021-10-31 RX ADMIN — Medication 400 MG: at 13:34

## 2021-10-31 RX ADMIN — Medication 2.5 MG: at 05:22

## 2021-10-31 RX ADMIN — MIRTAZAPINE 15 MG: 15 TABLET, FILM COATED ORAL at 22:19

## 2021-10-31 RX ADMIN — Medication 1 CAPSULE: at 09:52

## 2021-10-31 RX ADMIN — THIAMINE HCL TAB 100 MG 100 MG: 100 TAB at 09:58

## 2021-10-31 RX ADMIN — PANTOPRAZOLE SODIUM 40 MG: 40 TABLET, DELAYED RELEASE ORAL at 09:52

## 2021-10-31 RX ADMIN — Medication 2.5 MG: at 14:33

## 2021-10-31 RX ADMIN — Medication 100 MCG: at 13:33

## 2021-10-31 RX ADMIN — RIFAXIMIN 550 MG: 550 TABLET ORAL at 09:52

## 2021-10-31 RX ADMIN — MIDODRINE HYDROCHLORIDE 20 MG: 5 TABLET ORAL at 17:32

## 2021-10-31 RX ADMIN — HEPARIN SODIUM 5000 UNITS: 5000 INJECTION, SOLUTION INTRAVENOUS; SUBCUTANEOUS at 10:15

## 2021-10-31 RX ADMIN — MIDODRINE HYDROCHLORIDE 20 MG: 5 TABLET ORAL at 10:12

## 2021-10-31 RX ADMIN — HEPARIN SODIUM 5000 UNITS: 5000 INJECTION, SOLUTION INTRAVENOUS; SUBCUTANEOUS at 20:21

## 2021-10-31 RX ADMIN — Medication 2.5 MG: at 21:55

## 2021-10-31 RX ADMIN — Medication 400 MG: at 20:21

## 2021-10-31 RX ADMIN — FOLIC ACID 1 MG: 1 TABLET ORAL at 13:34

## 2021-10-31 RX ADMIN — Medication 3 MG: at 22:19

## 2021-10-31 RX ADMIN — RIFAXIMIN 550 MG: 550 TABLET ORAL at 20:21

## 2021-10-31 RX ADMIN — GABAPENTIN 100 MG: 100 CAPSULE ORAL at 13:34

## 2021-10-31 RX ADMIN — POLYETHYLENE GLYCOL 3350 17 G: 17 POWDER, FOR SOLUTION ORAL at 10:02

## 2021-10-31 RX ADMIN — Medication 2.5 MG: at 09:58

## 2021-10-31 RX ADMIN — Medication 400 MG: at 09:52

## 2021-10-31 RX ADMIN — POLYETHYLENE GLYCOL 3350 17 G: 17 POWDER, FOR SOLUTION ORAL at 20:21

## 2021-10-31 RX ADMIN — GABAPENTIN 100 MG: 100 CAPSULE ORAL at 20:21

## 2021-10-31 RX ADMIN — ESCITALOPRAM OXALATE 10 MG: 10 TABLET ORAL at 09:56

## 2021-10-31 RX ADMIN — CALCIUM CARBONATE 600 MG (1,500 MG)-VITAMIN D3 400 UNIT TABLET 1 TABLET: at 13:33

## 2021-10-31 RX ADMIN — GABAPENTIN 100 MG: 100 CAPSULE ORAL at 09:53

## 2021-10-31 ASSESSMENT — ACTIVITIES OF DAILY LIVING (ADL)
ADLS_ACUITY_SCORE: 10
ADLS_ACUITY_SCORE: 8
ADLS_ACUITY_SCORE: 10
ADLS_ACUITY_SCORE: 8
ADLS_ACUITY_SCORE: 10
ADLS_ACUITY_SCORE: 8
ADLS_ACUITY_SCORE: 10
ADLS_ACUITY_SCORE: 8
ADLS_ACUITY_SCORE: 8
ADLS_ACUITY_SCORE: 10
ADLS_ACUITY_SCORE: 8
ADLS_ACUITY_SCORE: 10
ADLS_ACUITY_SCORE: 8
ADLS_ACUITY_SCORE: 10
ADLS_ACUITY_SCORE: 8
ADLS_ACUITY_SCORE: 8

## 2021-10-31 ASSESSMENT — MIFFLIN-ST. JEOR: SCORE: 1505.25

## 2021-10-31 NOTE — PROGRESS NOTES
RN assumed cares at [phone]     Temp: 97.9  F (36.6  C) Temp src: Oral BP: 120/71 Pulse: 87   Resp: 18 SpO2: 93 % O2 Device: Nasal cannula Oxygen Delivery: 1 LPM     Neuro: A&O x4, able to make needs known. Pt endorses N/T to BLE.   Cardiac: WDL  Respiratory: LS diminished in bases, reports SWAN when up to commode/activity.   GI/: HD pt, able to still make urine. UA resulted this shift. Pt up to commode w/ assistance. New orders for daily bladder scanning. No BM this shift.   Skin: Jaundiced skin. Blanchable redness to sacrum/coccyx.   IV/Drains: R jugular CVC WDL. R PIV SL.   Activity: Assist of 1 in room to commode.  Labs: BG checks with meals & bedtime. Mg protocol, AM value of 1.4, replacement ordered, recheck scheduled for tomorrow AM.   Nutrition: Regular diet. Poor appetite.   Pain: Pt c/o abdominal & back pain partially relieved w/ prn oxycodone.  Aqua K pad placed on pt's abdomen, reporting some relief.    Plan of Care: Pt able to rest comfortably overnight. No events overnight. Continue to monitor & follow POC.

## 2021-10-31 NOTE — PLAN OF CARE
Pt alert and oriented x4. C/o back pain. PRN oxycodone administered x1 with adequate effect. Up in chair for meal. Offered to walk with pt in hallway but pt declined. BLE lymphedema wraps in place. UA/UC collected for urinary frequency. BM x1 this evening. Bladder scan <200mL. SWAN. Sats >92% 1L/nc. Poor appetite. Nephrology following for next HD.

## 2021-10-31 NOTE — PROGRESS NOTES
"[facility]    Medicine Progress Note - Hospitalist Service, Gold 6       Date of Admission:  9/29/2021    Assessment & Plan        Janay Angeles is a 46-year-old female with a history of alcohol use disorder, Fitz-en-Y gastric bypass (2010), DM2, anxiety, depression, who was initially admitted to internal medicine service with alcoholic hepatitis.  She transferred to MICU on 10/4/2021 with acute hypoxic respiratory failure secondary to ARDS requiring intubation.  Hospitalization complicated by septic shock, pneumonia, ANA LAURA requiring CRRT and now iHD, encephalopathy.  Self extubated on 10/14, weaned off pressors on 10/19, and transferred to general medical floor on 10/20.    Today:   - UA with 16 WBC, small LE, few bacteria, however squamous epithelial cells present concerning for contamination. No new fevers, and WBC stable. Will follow UCx results (NGTD). Continue daily bladder scanning.  - PRN benadryl available for ear \"fullness\", possible eustachian tube dysfunction     Acute hypoxic respiratory failure, stable  ARDS, resolved  Hx of pneumonia (aspiration vs VAP) with ongoing SIRS after finishing Zosyn  Hx of pulmonary edema   Acute decline in respiratory status requiring intubation on 10/4, CXR c/w ARDS, likely secondary to HAP vs aspiration. BAL cultures negative. Treated with Zosyn 10/4 - 10/13. Self-extubated on 10/14. Developed increased work of breathing on 10/17, CXR demonstrated increased interstitial opacities concerning for pulmonary edema vs pneumonia. Managing volume with iHD, last run 10/29.  Completed 10 day course of Zosyn 10/26 for VAP vs aspiration; however, WBC and CRP continue to be elevated (although now downtrending). Repeat CXR 10/27 with patchy bilateral opacities with overall improvement, otherwise unremarkable. Repeat COVID neg and BCs and UC NGTD. Diagnostic para for 10/28 with  1,658 nucleated cells, however only 7% neutrophils and " monocyte/macrophage predominance. Remains afebrile.  - F/u results of BCs x 2 and UC (NGTD)  - Continue to trend WBC  - Incentive spirometry  - OOB at least TID  - Continuous pulse oximetry. Wean O2 as able.      ANA LAURA  Hypervolemia  Baseline Cr ~0.5. Cr to peak of 2.98. Decline in renal function most likely secondary to ATN (hypotension, medications, contrast), possible component of HRS. On CRRT 10/7 - 10/13, now transitioned to iHD.   - Nephrology following  - HD per nephrology team  - Continue to monitor for renal recovery  - Strict I/Os  - Daily weights  - Lymphedema team consulted     Alcohol hepatitis: Recently admitted in September with ETOH hepatitis, started on rifaximin and lactulose per Hepatology recs. Now re-presented with ETOH hepatitis, Maddrey score 13 on admission. Acute hepatitis c/b portal hypertension with ascites. Diagnostic mane negative for SBP. Tbili, INR, AST/ALT, platelets stable. MELD 18.  - Diagnostic para as above  - Lactulose titrated to 3-4 loose BMs daily  - Rifaximin BID  - Trend CMP, INR     Toxic metabolic encephalopathy, resolved:  Multifactorial secondary to acute illness, sedating medications, HE. Psychiatry consulted 10/15, started Seroquel. Remains fully A&O today.    - Lactulose, rifaximin   - Discontinue Seroquel    - Delirium precautions   - Minimize sedating medications     Abdominal pain, improved: Secondary to alcohol hepatitis, gas pains.    - Tylenol TID   - Oxycodone 2.5 mg q4h PRN - wean as able   - Simethicone PRN     Severe malnutrition in the context of acute on chronic illness  Dysphagia   In the context of acute on chronic illness. Started on TF 10/5. Now more awake and interested in eating but not feeling hungry, transitioned to cycled feeds on 10/21 but NJ clogged and removed on 10/23.   - Nutrition and SLP consulted; appreciate following.   - Calorie counts  - Continue regular diet per speech  - If unable to meet caloric goals will need to replace feeding  tube.      DM2: A1C 9.1. PTA on metformin. Transitioned to insulin due to hyperglycemia while on tube feeds. Lantus and metformin on hold. BGs stable.   - MSSI    - BG checks q4h    - Hypoglycemia protocol     Acute on chronic macrocytic anemia:  Likely secondary to alcohol use, alcohol hepatitis, nutritional deficiencies. Intermittently requiring PRBC transfusions since admission (10/7, 10/12, 10/16). No e/o active bleeding.    - CBC in AM  - Transfuse for Hgb <7     Alcohol use disorder: Last drink on 9/12/21. PTA was at CD treatment facility.    - Patient interested in resuming CD treatment, will need to re-evaluate this closer to discharge date, at this time PT/OT recommending TCU   - Continue folic acid, thiamine, MVI supplements     Other Stable Medical Issues:   Coagulopathy: INR to peak of 2.5 on 10/4, now much improved. Likely secondary to known severe hepatic steatosis.    MDD, RUBÉN: Continue PTA Lexapro. Prazosin on hold d/t hypotension, resume as able. Health Psychology consulted 10/1, re-involve PRN.   GERD: Continue PPI.   Neuropathy: Continue PTA gabapentin.      Resolved medical issues:  Septic shock, resolved: Shock most likely secondary to sepsis with hepatic dysfunction contributing as well. TTE with hyperdynamic LVEF, no RV dysfunction. Weaned off pressors on 10/19. Continues to require midodrine 20 mg q8hrs for soft blood pressures.       Diet: Regular Diet Adult  Snacks/Supplements Adult: Other; ensure clear and ensure enlive; Between Meals    DVT Prophylaxis: Heparin SQ  Rice Catheter: Not present  Central Lines: None  Code Status: Full Code      Disposition Plan   Expected discharge: 11/01/2021   recommended to transitional care unit once TCU bed available.     The patient's care was discussed with the Attending Physician, Dr. Stockton.    Kady Leal PA-C  Hospitalist Service, [address]  Securely message with the Vocera Web Console  (learn more here)  Text page via [facility] Paging/Directory    Please see sign in/sign out for up to date coverage information    Clinically Significant Risk Factors Present on Admission                ______________________________________________________________________    Interval History   Denies new complaints. Endorses poor appetite related to food choices. Ongoing sensation of ear fullness, however did not try benadryl yesterday, will try today. No chest pain, shortness of breath or fevers.     Data reviewed today: I reviewed all medications, new labs and imaging results over the last 24 hours.    Physical Exam   Vital Signs: Temp: 97.9  F (36.6  C) Temp src: Oral BP: 120/71 Pulse: 87   Resp: 18 SpO2: 93 % O2 Device: Nasal cannula Oxygen Delivery: 1 LPM  Weight: 188 lbs 7.89 oz  General Appearance: Alert and oriented x 3, NAD  Respiratory: Lungs CTAB, no wheezing  Cardiovascular: RRR  GI: Abdomen soft, non distended, non tender to palpation  Skin: warm and dry to touch, no rashes or excoriations  Other: 2+ bilateral LE edema    Data   Recent Labs   Lab 10/31/21  0745 10/31/21  0641 10/31/21  0438 10/31/21  0204 10/30/21  0827 10/30/21  0540 10/29/21  0823 10/29/21  0609 10/28/21  0551 10/28/21  0446 10/25/21  0740 10/25/21  0555   WBC  --  14.2*  --   --   --  14.6*  --  13.6*   < >  --    < > 18.3*   HGB  --  7.0*  --   --   --  7.4*  --  7.1*   < >  --    < > 7.6*   MCV  --  100  --   --   --  101*  --  102*   < >  --    < > 102*   PLT  --  210  --   --   --  209  --  212   < >  --    < > 258   INR  --   --  1.24*  --   --   --   --   --   --  1.16*  --  1.26*   NA  --  133  --   --   --  133  --  134   < >  --    < > 136   POTASSIUM  --  3.4  --   --   --  3.4  --  3.6   < >  --    < > 3.5   CHLORIDE  --  102  --   --   --  101  --  102   < >  --    < > 100   CO2  --  25  --   --   --  24  --  20   < >  --    < > 26   BUN  --  16  --   --   --  15  --  30   < >  --    < > 30   CR  --  1.52*  --   --   --   1.41*  --  2.31*   < >  --    < > 2.26*   ANIONGAP  --  6  --   --   --  8  --  12   < >  --    < > 10   STEFFANY  --  8.2*  --   --   --  8.4*  --  8.5   < >  --    < > 8.3*   * 123*  --  114*   < > 115*   < > 106*   < >  --    < > 96   ALBUMIN  --  1.5*  --   --   --  1.6*   < > 1.6*   < >  --    < > 1.6*   PROTTOTAL  --  5.6*  --   --   --  5.7*   < > 5.9*   < >  --    < > 6.2*   BILITOTAL  --  2.2*  --   --   --  2.4*   < > 2.5*   < >  --    < > 3.2*   ALKPHOS  --  264*  --   --   --  283*   < > 268*   < >  --    < > 327*   ALT  --  34  --   --   --  32   < > 33   < >  --    < > 32   AST  --  94*  --   --   --  83*   < > 82*   < >  --    < > 72*    < > = values in this interval not displayed.

## 2021-10-31 NOTE — PROGRESS NOTES
Nephrology Progress Note  10/31/2021         Aliyah Angeles is a 46 yof w/hx of ETOH use (Quit 9/12/2021) w/hepatitis, Fitz en Y bypass, Pancreatitis, DM2, Panc cyst who was admitted 9/29 from chem dep with N/V and worsening abdominal pain and edema.  Her course has been resp failure with ARDS and evolving ANA LAURA.  Noted to have received albumin from 10/1-10/3 (150g/day) with no improvement in Cr.  On Vanco/Zosyn to treat sepsis although cultures at time of consult are negative.  Nephrology consulted for evaluation of ANA LAURA and possible RRT, started CRRT on 10/7.       Interval History :   Mrs Angeles had CRRT started 10/7 for management of volume and acidosis, transitioned to iHD on 10/15.  Had past 2 days off of HD, plan for run yesterday mainly as she has some increased SOB, then likely can hold on runs for another 2-3 days while watching for recovery.  BP's are reasonable especially for her liver dysfx.       Assessment & Recommendations:   Anuric ANA LAURA stage III-Baseline Cr normal at 0.5 as recently as 9/14/2021. Etiology of ANA LAURA is likely ATN with underlying HRS physiology which made her more susceptible to hypoperfusion.  Started CRRT on 10/7 for management of volume and acidosis.  Transitioned to iHD.    - pt is making some urine, not quantified all of it  - strict I and O  - pt with on going respiratory distress and chest xray with multiple opacities, unclear if that is all volume.   - underwent HD session on 10/29 for respiratory distress, but would like to wait to see if she have any meaningful renal recovery.   - will consider trial of diuretics if her respiratory is getting worse before offering HD.     ? UTI   Pt continue on have urgency and frequency of urination. UA repeat on 10/30 is with WBC od 18 cells, but also have squamous cells which is c/w contamination of sample. Will wait for culture before treating for infection.   - awaiting culture  - please perform bladder scan q day to rule out obstruction  "and further overflow incontinence.     Volume-Has total body volume overload, I/O's difficult to interpret as we are not capturing much of her UOP but we were able to pull 2L of UF without issue with last run.  Holding today.       Electrolytes/pH-K 3.6, bicarb 24.       Liver-Unclear duration, quit drinking on 9/12/2021 and was at chem dep when acute issues started.  Bili is down to 6 and INR is 1.4 and a bit improved.  GI following, not clear if she would be a transplant candidate eventually.       ID- Rifaximin.       Anemia-Hgb 7.4, will check iron stores.       Nutrition-Taking PO     Recommendations were communicated to primary team via this note     Jacquie Lock MD   [phone]    Interval History :   Nursing and provider notes from last 24 hours reviewed.  In the last 24 hours Aliyah Carneyuber been hemodynamically stable with midodrine of 20 mg TID. No new symptoms this morning, continue to have urinary urgency.    Review of Systems:   I reviewed the following systems:  GI: no nausea or vomiting or diarrhea.   Neuro:  no confusion  Constitutional:  no fever or chills  CV: no worsening dyspnea or chest pain.    Physical Exam:   I/O last 3 completed shifts:  In: 520 [P.O.:520]  Out: 550 [Urine:300; Other:250]   /75 (BP Location: Right arm)   Pulse 86   Temp 97.6  F (36.4  C) (Oral)   Resp 18   Ht 1.626 m (5' 4.02\")   Wt 85.5 kg (188 lb 7.9 oz)   SpO2 95%   BMI 32.34 kg/m       General : Pt awake, not in acute distress   Lungs : anterior lung fields are clear  Cardiac : S1, S2 present  Abdomen : Soft/ND/NT  LE : Edema noted  Dialysis Access : right IJ    Labs:   All labs reviewed by me  Electrolytes/Renal - Recent Labs   Lab Test 10/31/21  1241 10/31/21  0745 10/31/21  0641 10/31/21  0438 10/30/21  0827 10/30/21  0540 10/30/21  0540 10/30/21  0436 10/29/21  0823 10/29/21  0609 10/29/21  0609   NA  --   --  133  --   --   --  133  --   --   --  134   POTASSIUM  --   --  3.4  --   --   --  3.4  --  "  --   --  3.6   CHLORIDE  --   --  102  --   --   --  101  --   --   --  102   CO2  --   --  25  --   --   --  24  --   --   --  20   BUN  --   --  16  --   --   --  15  --   --   --  30   CR  --   --  1.52*  --   --   --  1.41*  --   --   --  2.31*   * 120* 123*  --    < >   < > 115*  --    < >   < > 106*   STEFFANY  --   --  8.2*  --   --   --  8.4*  --   --   --  8.5   MAG  --   --   --  1.4*  --   --  1.6 1.6   < >  --  1.7   PHOS  --   --   --  3.5  --   --   --  3.4  --   --  5.4*    < > = values in this interval not displayed.       CBC -   Recent Labs   Lab Test 10/31/21  0641 10/30/21  0540 10/29/21  0609   WBC 14.2* 14.6* 13.6*   HGB 7.0* 7.4* 7.1*    209 212       LFTs -   Recent Labs   Lab Test 10/31/21  0641 10/30/21  0540 10/29/21  0609   ALKPHOS 264* 283* 268*   BILITOTAL 2.2* 2.4* 2.5*   ALT 34 32 33   AST 94* 83* 82*   PROTTOTAL 5.6* 5.7* 5.9*   ALBUMIN 1.5* 1.6* 1.6*       Iron Panel -   Recent Labs   Lab Test 10/29/21  0609 10/27/21  0558 09/13/21  0642   IRON  --  28*  --    IRONSAT  --  16  --    EYAD 343*  --    < >    < > = values in this interval not displayed.     Current Medications:    acetaminophen  650 mg Oral or Feeding Tube Q8H     calcium carbonate 600 mg-vitamin D 400 units  1 tablet Oral or Feeding Tube Daily     cyanocobalamin  100 mcg Oral or Feeding Tube Daily     escitalopram  10 mg Oral Daily     folic acid  1 mg Oral or Feeding Tube Daily     gabapentin  100 mg Oral TID     heparin ANTICOAGULANT  5,000 Units Subcutaneous Q12H     influenza quadrivalent (PF) vacc  0.5 mL Intramuscular Prior to discharge     insulin aspart  1-7 Units Subcutaneous TID AC     insulin aspart  1-5 Units Subcutaneous At Bedtime     magnesium oxide  400 mg Oral TID     midodrine  20 mg Oral or Feeding Tube Q8H     mirtazapine  15 mg Oral At Bedtime     multivitamin RENAL  1 capsule Oral Daily     pantoprazole  40 mg Oral QAM AC     polyethylene glycol  17 g Oral BID     [Held by provider]  prazosin  1 mg Oral QPM     psyllium  1 packet Oral Daily     rifaximin  550 mg Oral or Feeding Tube BID     sodium chloride  1 spray Both Nostrils 4x Daily     thiamine  100 mg Oral or Feeding Tube Daily       Jacquie Lock MD

## 2021-10-31 NOTE — PLAN OF CARE
"/75 (BP Location: Right arm)   Pulse 86   Temp 97.6  F (36.4  C) (Oral)   Resp 18   Ht 1.626 m (5' 4.02\")   Wt 85.5 kg (188 lb 7.9 oz)   SpO2 95%   BMI 32.34 kg/m      Care from: [id number]      VS & Pain: VSS ex on 1 LPM nc, denied pain    Neuro: A&O x4    HEENT: scleras yellow    Respiratory: dyspnea on exertion, diminished lung sounds in BLL    Cardiac: WDL    Peripheral neurovascular: numbness and tingling present in BLE, 3+ bilateral hip and 1+ bilateral feet edema    GI/: adequate urine output and no BM this shift    Nutrition: poor appetite and oral intake    Skin: blanchable redness noted at coccyx- applied barrier cream and encouraged turning and repositioning independently; bruising noted in bilateral forearms, hands, and abdomen    Musculoskeletal: generalized weakness    Lines: R PIV is saline locked, R CVC is CDI    Activity: assist of 1, gait belt, and walker    Events this shift: B. Call light within reach.    Plan: Continue to follow poc.    "

## 2021-10-31 NOTE — PROGRESS NOTES
"/75 (BP Location: Right arm)   Pulse 86   Temp 97.6  F (36.4  C) (Oral)   Resp 18   Ht 1.626 m (5' 4.02\")   Wt 85.5 kg (188 lb 7.9 oz)   SpO2 95%   BMI 32.34 kg/m      VSS. Pt appetite poor this shift. Made complaints of pain to lower abdomen and did request pain medication x2 when pain follow up was done pt rated pain at a 2. Pt had lymph wraps to BLE removed and replaced with lymph stockings. Magnesium replaced with oral tabs. Continue to monitor per plan of care.   "

## 2021-11-01 ENCOUNTER — APPOINTMENT (OUTPATIENT)
Dept: OCCUPATIONAL THERAPY | Facility: CLINIC | Age: 46
End: 2021-11-01
Payer: COMMERCIAL

## 2021-11-01 LAB
ALBUMIN SERPL-MCNC: 1.5 G/DL (ref 3.4–5)
ALP SERPL-CCNC: 254 U/L (ref 40–150)
ALT SERPL W P-5'-P-CCNC: 38 U/L (ref 0–50)
ANION GAP SERPL CALCULATED.3IONS-SCNC: 6 MMOL/L (ref 3–14)
AST SERPL W P-5'-P-CCNC: 108 U/L (ref 0–45)
BACTERIA BLD CULT: NO GROWTH
BACTERIA BRONCH: NO GROWTH
BACTERIA SPT CULT: NO GROWTH
BILIRUB SERPL-MCNC: 2.3 MG/DL (ref 0.2–1.3)
BUN SERPL-MCNC: 15 MG/DL (ref 7–30)
CALCIUM SERPL-MCNC: 8.1 MG/DL (ref 8.5–10.1)
CHLORIDE BLD-SCNC: 104 MMOL/L (ref 94–109)
CO2 SERPL-SCNC: 26 MMOL/L (ref 20–32)
CREAT SERPL-MCNC: 1.53 MG/DL (ref 0.52–1.04)
ERYTHROCYTE [DISTWIDTH] IN BLOOD BY AUTOMATED COUNT: 18.8 % (ref 10–15)
GFR SERPL CREATININE-BSD FRML MDRD: 40 ML/MIN/1.73M2
GLUCOSE BLD-MCNC: 112 MG/DL (ref 70–99)
GLUCOSE BLDC GLUCOMTR-MCNC: 115 MG/DL (ref 70–99)
GLUCOSE BLDC GLUCOMTR-MCNC: 120 MG/DL (ref 70–99)
GLUCOSE BLDC GLUCOMTR-MCNC: 120 MG/DL (ref 70–99)
GLUCOSE BLDC GLUCOMTR-MCNC: 121 MG/DL (ref 70–99)
HCT VFR BLD AUTO: 26.4 % (ref 35–47)
HGB BLD-MCNC: 7.9 G/DL (ref 11.7–15.7)
LACTATE SERPL-SCNC: 0.8 MMOL/L (ref 0.7–2)
MAGNESIUM SERPL-MCNC: 1.5 MG/DL (ref 1.6–2.3)
MCH RBC QN AUTO: 30.2 PG (ref 26.5–33)
MCHC RBC AUTO-ENTMCNC: 29.9 G/DL (ref 31.5–36.5)
MCV RBC AUTO: 101 FL (ref 78–100)
PHOSPHATE SERPL-MCNC: 4 MG/DL (ref 2.5–4.5)
PLATELET # BLD AUTO: 213 10E3/UL (ref 150–450)
POTASSIUM BLD-SCNC: 3.3 MMOL/L (ref 3.4–5.3)
POTASSIUM BLD-SCNC: 3.8 MMOL/L (ref 3.4–5.3)
PROT SERPL-MCNC: 5.7 G/DL (ref 6.8–8.8)
RBC # BLD AUTO: 2.62 10E6/UL (ref 3.8–5.2)
SODIUM SERPL-SCNC: 136 MMOL/L (ref 133–144)
WBC # BLD AUTO: 14.4 10E3/UL (ref 4–11)

## 2021-11-01 PROCEDURE — 97535 SELF CARE MNGMENT TRAINING: CPT | Mod: GO | Performed by: OCCUPATIONAL THERAPIST

## 2021-11-01 PROCEDURE — 99232 SBSQ HOSP IP/OBS MODERATE 35: CPT | Mod: 24 | Performed by: INTERNAL MEDICINE

## 2021-11-01 PROCEDURE — 250N000013 HC RX MED GY IP 250 OP 250 PS 637: Performed by: PHYSICIAN ASSISTANT

## 2021-11-01 PROCEDURE — 99207 PR APP CREDIT; MD BILLING SHARED VISIT: CPT | Performed by: PHYSICIAN ASSISTANT

## 2021-11-01 PROCEDURE — 84100 ASSAY OF PHOSPHORUS: CPT | Performed by: PHYSICIAN ASSISTANT

## 2021-11-01 PROCEDURE — 250N000011 HC RX IP 250 OP 636: Performed by: PHYSICIAN ASSISTANT

## 2021-11-01 PROCEDURE — 250N000013 HC RX MED GY IP 250 OP 250 PS 637: Performed by: PEDIATRICS

## 2021-11-01 PROCEDURE — 36415 COLL VENOUS BLD VENIPUNCTURE: CPT | Performed by: INTERNAL MEDICINE

## 2021-11-01 PROCEDURE — 250N000013 HC RX MED GY IP 250 OP 250 PS 637: Performed by: INTERNAL MEDICINE

## 2021-11-01 PROCEDURE — 36415 COLL VENOUS BLD VENIPUNCTURE: CPT | Performed by: PHYSICIAN ASSISTANT

## 2021-11-01 PROCEDURE — 82040 ASSAY OF SERUM ALBUMIN: CPT | Performed by: PHYSICIAN ASSISTANT

## 2021-11-01 PROCEDURE — 99207 PR CDG-MDM COMPONENT: MEETS MODERATE - DOWN CODED: CPT | Performed by: INTERNAL MEDICINE

## 2021-11-01 PROCEDURE — 84132 ASSAY OF SERUM POTASSIUM: CPT | Performed by: PHYSICIAN ASSISTANT

## 2021-11-01 PROCEDURE — 83735 ASSAY OF MAGNESIUM: CPT | Performed by: PHYSICIAN ASSISTANT

## 2021-11-01 PROCEDURE — 250N000013 HC RX MED GY IP 250 OP 250 PS 637: Performed by: STUDENT IN AN ORGANIZED HEALTH CARE EDUCATION/TRAINING PROGRAM

## 2021-11-01 PROCEDURE — 120N000002 HC R&B MED SURG/OB UMMC

## 2021-11-01 PROCEDURE — 83605 ASSAY OF LACTIC ACID: CPT | Performed by: INTERNAL MEDICINE

## 2021-11-01 PROCEDURE — 99232 SBSQ HOSP IP/OBS MODERATE 35: CPT | Performed by: INTERNAL MEDICINE

## 2021-11-01 PROCEDURE — 85027 COMPLETE CBC AUTOMATED: CPT | Performed by: PHYSICIAN ASSISTANT

## 2021-11-01 RX ORDER — MAGNESIUM SULFATE HEPTAHYDRATE 40 MG/ML
4 INJECTION, SOLUTION INTRAVENOUS ONCE
Status: DISCONTINUED | OUTPATIENT
Start: 2021-11-01 | End: 2021-11-01

## 2021-11-01 RX ORDER — MAGNESIUM OXIDE 400 MG/1
400 TABLET ORAL 3 TIMES DAILY
Status: DISPENSED | OUTPATIENT
Start: 2021-11-01 | End: 2021-11-03

## 2021-11-01 RX ORDER — CEFTRIAXONE 1 G/1
1 INJECTION, POWDER, FOR SOLUTION INTRAMUSCULAR; INTRAVENOUS EVERY 24 HOURS
Status: DISCONTINUED | OUTPATIENT
Start: 2021-11-01 | End: 2021-11-03

## 2021-11-01 RX ORDER — POTASSIUM CHLORIDE 750 MG/1
40 TABLET, EXTENDED RELEASE ORAL ONCE
Status: COMPLETED | OUTPATIENT
Start: 2021-11-01 | End: 2021-11-01

## 2021-11-01 RX ADMIN — HEPARIN SODIUM 5000 UNITS: 5000 INJECTION, SOLUTION INTRAVENOUS; SUBCUTANEOUS at 08:44

## 2021-11-01 RX ADMIN — MIDODRINE HYDROCHLORIDE 20 MG: 5 TABLET ORAL at 16:40

## 2021-11-01 RX ADMIN — Medication 400 MG: at 13:26

## 2021-11-01 RX ADMIN — ESCITALOPRAM OXALATE 10 MG: 10 TABLET ORAL at 13:25

## 2021-11-01 RX ADMIN — HEPARIN SODIUM 5000 UNITS: 5000 INJECTION, SOLUTION INTRAVENOUS; SUBCUTANEOUS at 20:54

## 2021-11-01 RX ADMIN — HYDROXYZINE HYDROCHLORIDE 50 MG: 25 TABLET, FILM COATED ORAL at 10:42

## 2021-11-01 RX ADMIN — PSYLLIUM HUSK 1 PACKET: 3.4 POWDER ORAL at 08:44

## 2021-11-01 RX ADMIN — HYDROXYZINE HYDROCHLORIDE 50 MG: 25 TABLET, FILM COATED ORAL at 16:39

## 2021-11-01 RX ADMIN — Medication 2.5 MG: at 20:30

## 2021-11-01 RX ADMIN — PANTOPRAZOLE SODIUM 40 MG: 40 TABLET, DELAYED RELEASE ORAL at 10:42

## 2021-11-01 RX ADMIN — MIDODRINE HYDROCHLORIDE 20 MG: 5 TABLET ORAL at 08:44

## 2021-11-01 RX ADMIN — CEFTRIAXONE SODIUM 1 G: 1 INJECTION, POWDER, FOR SOLUTION INTRAMUSCULAR; INTRAVENOUS at 14:58

## 2021-11-01 RX ADMIN — Medication 1 CAPSULE: at 13:25

## 2021-11-01 RX ADMIN — GABAPENTIN 100 MG: 100 CAPSULE ORAL at 08:44

## 2021-11-01 RX ADMIN — ACETAMINOPHEN 650 MG: 325 TABLET, FILM COATED ORAL at 00:59

## 2021-11-01 RX ADMIN — Medication 50 MG: at 23:04

## 2021-11-01 RX ADMIN — Medication 100 MCG: at 13:31

## 2021-11-01 RX ADMIN — GABAPENTIN 100 MG: 100 CAPSULE ORAL at 20:53

## 2021-11-01 RX ADMIN — GABAPENTIN 100 MG: 100 CAPSULE ORAL at 13:25

## 2021-11-01 RX ADMIN — Medication 2.5 MG: at 06:08

## 2021-11-01 RX ADMIN — ACETAMINOPHEN 650 MG: 325 TABLET, FILM COATED ORAL at 16:58

## 2021-11-01 RX ADMIN — CALCIUM CARBONATE 600 MG (1,500 MG)-VITAMIN D3 400 UNIT TABLET 1 TABLET: at 13:26

## 2021-11-01 RX ADMIN — Medication 400 MG: at 20:53

## 2021-11-01 RX ADMIN — Medication 3 MG: at 22:52

## 2021-11-01 RX ADMIN — RIFAXIMIN 550 MG: 550 TABLET ORAL at 20:53

## 2021-11-01 RX ADMIN — MIRTAZAPINE 15 MG: 15 TABLET, FILM COATED ORAL at 21:10

## 2021-11-01 RX ADMIN — FOLIC ACID 1 MG: 1 TABLET ORAL at 13:25

## 2021-11-01 RX ADMIN — POLYETHYLENE GLYCOL 3350 17 G: 17 POWDER, FOR SOLUTION ORAL at 08:44

## 2021-11-01 RX ADMIN — POTASSIUM CHLORIDE 40 MEQ: 750 TABLET, EXTENDED RELEASE ORAL at 10:42

## 2021-11-01 RX ADMIN — MIDODRINE HYDROCHLORIDE 20 MG: 5 TABLET ORAL at 00:59

## 2021-11-01 RX ADMIN — Medication 400 MG: at 08:45

## 2021-11-01 ASSESSMENT — ACTIVITIES OF DAILY LIVING (ADL)
ADLS_ACUITY_SCORE: 10
ADLS_ACUITY_SCORE: 11
ADLS_ACUITY_SCORE: 10
ADLS_ACUITY_SCORE: 11
ADLS_ACUITY_SCORE: 10
ADLS_ACUITY_SCORE: 11
ADLS_ACUITY_SCORE: 10
ADLS_ACUITY_SCORE: 10
ADLS_ACUITY_SCORE: 11
ADLS_ACUITY_SCORE: 10
ADLS_ACUITY_SCORE: 10

## 2021-11-01 ASSESSMENT — MIFFLIN-ST. JEOR: SCORE: 1485.25

## 2021-11-01 NOTE — PLAN OF CARE
"/67 (BP Location: Right arm)   Pulse 99   Temp 98.8  F (37.1  C) (Oral)   Resp 18   Ht 1.626 m (5' 4.02\")   Wt 88.5 kg (195 lb 1.7 oz)   SpO2 95%   BMI 33.47 kg/m      Care from: [id number]      VS & Pain: VSS. Received prn atarax x1 this shift for abdominal pain.     Neuro: A&Ox4.    Respiratory: On 1L nasal cannula satting around 94%.    Peripheral neurovascular: Numbness and tingling in bilateral lower extremities which is baseline for pt.    GI/: Pivot to bedside commode.    Nutrition: Regular diet but very poor appetite and intake. To start calorie counts tonight.    Skin: Blanchable redness on bottom. Refused barrier cream.    Musculoskeletal: Generalized weakness.    Lines: R piv infusing abx.     Activity: Assist of 1 pivot to commode.    Events this shift: Started on IV abx for UTI.    Plan: Continue to follow plan of care, monitoring need for dialysis with plan to discharge to TCU.  "

## 2021-11-01 NOTE — PROGRESS NOTES
Nephrology Progress Note  11/01/2021       Aliyah Angeles is a 46 yof w/hx of ETOH use (Quit 9/12/2021) w/hepatitis, Fitz en Y bypass, Pancreatitis, DM2, Panc cyst who was admitted 9/29 from chem dep with N/V and worsening abdominal pain and edema.  Her course has been resp failure with ARDS and evolving ANA LAURA.  Noted to have received albumin from 10/1-10/3 (150g/day) with no improvement in Cr.  On Vanco/Zosyn to treat sepsis although cultures at time of consult are negative.  Nephrology consulted for evaluation of ANA LAURA and possible RRT, started CRRT on 10/7.       Interval History :   Mrs Angeles had CRRT started 10/7 for management of volume and acidosis, transitioned to iHD on 10/15.  Holding off on HD today as Cr is stable in 1.5 range, gfr likely a bit lower than chemistries would suggest (40ml/min) given that she has cirrhosis.  UA shows WBC's and she has some urgency when voiding so would favor treating her for UTI which may secondarily help her renal fx.       Assessment & Recommendations:   ANA LAURA-Baseline Cr normal at 0.5 as recently as 9/14/2021, abdominal US pending currently.  Was at inpatient chem dep when she developed abdominal pain, N/V and worsening edema and was brought to [facility] on 9/29.  Was treated for UTI and started on Vanco/Zosyn for leukocytosis but decompensated and was intubated the evening of 10/3.  Noted she did receive albumin x3 days from 10/1-10/3 for ANA LAURA but actually worsened and Cr at time of consult is up to 2.  Lisa <5 on 10/3, ECHO done 10/4 showed hyperdynamic LV and normal IVC.  Etiology of ANA LAURA is likely ATN with underlying HRS physiology which made her more susceptible to hypoperfusion.  Started CRRT on 10/7 for management of volume and acidosis.  Trying to transition to iHD.                  -ANA LAURA, likely hypoperfusion in setting of baseline HRS physiology, possible contribution from vanco/zosyn and did receive contrast on 10/3.  Some UOP, Cr stabilizing, would treat UTI which may  "secondarily help renal fx.                 -CRRT started 10/7, stopped 10/13. Cr stable the past 24h, gfr lower than chemistries would suggest with ascites.                 -Line is tunneled placed 10/22.                   -Dialysis consent signed and scanned in under media.       Volume-Has total body volume overload, I/O's difficult to interpret as we are not capturing much of her UOP but seem to be increasing.        Electrolytes/pH-K 3.3, bicarb 26.       Liver-Unclear duration, quit drinking on 9/12/2021 and was at chem dep when acute issues started.  Bili is down to 6 and INR is 1.4 and a bit improved.  GI following, not clear if she would be a transplant candidate eventually.       ID-On Zosyn and Rifaximin.       Anemia-Hgb 7.9, will check iron stores.       Nutrition-Taking PO     Seen and discussed with Dr Nicholas     Recommendations were communicated to primary team via verbal communication.             MARIA ELENA Wade CNS  Clinical Nurse Specialist  [phone]  Attestation:  This patient has been seen, examined and evaluated by me, Tierra Nicholas MD as a shared visit with the NP/PA above.     In brief:  Aliyah Angeles is a 46 year old female with recovering ANA LAURA.     I personally reviewed major complaints, physical findings, investigations, medications, lab values, vital signs, I/O's and the overall management plan.      My key findings and Decisions:  Plan to pull catheter  Consider ABX for urine findings given her symptoms.       Tierra Nicholas MD MS  Date of service (date I saw patient) 1 nov 2021    Review of Systems:   I reviewed the following systems:  Gen: No fevers or chills  CV: No CP at rest  Resp: No SOB at rest  GI: No N/V    Physical Exam:   I/O last 3 completed shifts:  In: 120 [P.O.:120]  Out: 1225 [Urine:275; Other:950]   /76 (BP Location: Right arm)   Pulse 73   Temp 97.7  F (36.5  C) (Oral)   Resp 20   Ht 1.626 m (5' 4.02\")   Wt 88.5 kg (195 lb 1.7 oz)   SpO2 98%  "  BMI 33.47 kg/m       GENERAL APPEARANCE: Extubated, stable from pulm standpoint.  Sitting up in chair, a bit SOB today.   EYES: No scleral icterus  Pulmonary: lungs rhonchi to auscultation with equal breath sounds bilaterally, no clubbing or cyanosis  CV: Regular rhythm, normal rate, no rub   - Edema +2 LE/dependent  GI: soft, nontender, normal bowel sounds  MS: no evidence of inflammation in joints, no muscle tenderness  : No barrett, some intermittent UOP.   SKIN: no rash, warm, dry  NEURO: No focal deficits.    Labs:   All labs reviewed by me  Electrolytes/Renal - Recent Labs   Lab Test 11/01/21  1146 11/01/21  0633 11/01/21  0441 11/01/21  0413 10/31/21  0745 10/31/21  0641 10/31/21  0438 10/30/21  0827 10/30/21  0540 10/30/21  0540 10/30/21  0436 10/29/21  0609   NA  --  136  --   --   --  133  --   --   --  133  --    < >   POTASSIUM  --  3.3*  --   --   --  3.4  --   --   --  3.4  --    < >   CHLORIDE  --  104  --   --   --  102  --   --   --  101  --    < >   CO2  --  26  --   --   --  25  --   --   --  24  --    < >   BUN  --  15  --   --   --  16  --   --   --  15  --    < >   CR  --  1.53*  --   --   --  1.52*  --   --   --  1.41*  --    < >   * 112*  --  115*   < > 123*  --    < >   < > 115*  --    < >   STEFFANY  --  8.1*  --   --   --  8.2*  --   --   --  8.4*  --    < >   MAG  --   --  1.5*  --   --   --  1.4*  --   --  1.6 1.6   < >   PHOS  --   --  4.0  --   --   --  3.5  --   --   --  3.4  --     < > = values in this interval not displayed.       CBC -   Recent Labs   Lab Test 11/01/21  0633 10/31/21  0641 10/30/21  0540   WBC 14.4* 14.2* 14.6*   HGB 7.9* 7.0* 7.4*    210 209       LFTs -   Recent Labs   Lab Test 11/01/21  0633 10/31/21  0641 10/30/21  0540   ALKPHOS 254* 264* 283*   BILITOTAL 2.3* 2.2* 2.4*   ALT 38 34 32   * 94* 83*   PROTTOTAL 5.7* 5.6* 5.7*   ALBUMIN 1.5* 1.5* 1.6*       Iron Panel -   Recent Labs   Lab Test 10/29/21  0609 10/27/21  0558 09/13/21  0642   IRON   --  28*  --    IRONSAT  --  16  --    EYAD 343*  --    < >    < > = values in this interval not displayed.           Current Medications:    acetaminophen  650 mg Oral or Feeding Tube Q8H     calcium carbonate 600 mg-vitamin D 400 units  1 tablet Oral or Feeding Tube Daily     cyanocobalamin  100 mcg Oral or Feeding Tube Daily     escitalopram  10 mg Oral Daily     folic acid  1 mg Oral or Feeding Tube Daily     gabapentin  100 mg Oral TID     heparin ANTICOAGULANT  5,000 Units Subcutaneous Q12H     influenza quadrivalent (PF) vacc  0.5 mL Intramuscular Prior to discharge     insulin aspart  1-7 Units Subcutaneous TID AC     insulin aspart  1-5 Units Subcutaneous At Bedtime     magnesium oxide  400 mg Oral TID     midodrine  20 mg Oral or Feeding Tube Q8H     mirtazapine  15 mg Oral At Bedtime     multivitamin RENAL  1 capsule Oral Daily     pantoprazole  40 mg Oral QAM AC     polyethylene glycol  17 g Oral BID     [Held by provider] prazosin  1 mg Oral QPM     psyllium  1 packet Oral Daily     rifaximin  550 mg Oral or Feeding Tube BID     sodium chloride  1 spray Both Nostrils 4x Daily     thiamine  100 mg Oral or Feeding Tube Daily

## 2021-11-01 NOTE — PROVIDER NOTIFICATION
Attending provider, Dr. Serge Stockton, notified by phone of pt having chills, temp 99.5 oral, hr 104 and sepsis being flagged. WBC 14.4 also. Vitals checked, lactic drawn. Pt had just received new IV antibiotic at 3 pm for UTI. NO further orders at this time. Will closely monitor. Lactic was normal.

## 2021-11-01 NOTE — PROGRESS NOTES
[facility]    Medicine Progress Note - Hospitalist Service, Gold 6       Date of Admission:  9/29/2021    Assessment & Plan      Aliyah Angeles is a 46-year-old female with a history of alcohol use disorder, Fitz-en-Y gastric bypass (2010), DM2, anxiety, depression, who was initially admitted to internal medicine service with alcoholic hepatitis.  She transferred to MICU on 10/4/2021 with acute hypoxic respiratory failure secondary to ARDS requiring intubation. Hospitalization complicated by septic shock, pneumonia, ANA LAURA requiring CRRT and now iHD, encephalopathy. Self extubated on 10/14, weaned off pressors on 10/19, and transferred to general medical floor on 10/20.     Today:   - Ceftriaxone 1g daily for UTI  - No need for HD today per Nephrology. May be able to pull tunneled line in upcoming days if Cr continues to improve without need for outpatient HD.  - Potassium chloride 40 mEQ x 1  - Monitor PO intake      Acute hypoxic respiratory failure, stable  ARDS, resolved  Hx of pneumonia (aspiration vs VAP) with ongoing SIRS after finishing Zosyn  Hx of pulmonary edema   Acute decline in respiratory status requiring intubation on 10/4, CXR c/w ARDS, likely secondary to HAP vs aspiration. BAL cultures negative. Treated with Zosyn 10/4 - 10/13. Self-extubated on 10/14. Developed increased work of breathing on 10/17, CXR demonstrated increased interstitial opacities concerning for pulmonary edema vs pneumonia. Managing volume with iHD, last run 10/29.  Completed 10 day course of Zosyn 10/26 for VAP vs aspiration; however, WBC and CRP continue to be elevated (although now downtrending). Repeat CXR 10/27 with patchy bilateral opacities with overall improvement, otherwise unremarkable. Repeat COVID neg and BCs NGTD. Diagnostic para for 10/28 with 1,658 nucleated cells, however only 7% neutrophils and monocyte/macrophage predominance. Remains afebrile.  - F/u results of BCs  x 2 (NGTD)  - Continue to trend WBC  - Incentive spirometry  - OOB at least TID  - Continuous pulse oximetry. Wean O2 as able.      ANA LAURA  Hypervolemia  Baseline Cr ~0.5. Cr to peak of 2.98. Decline in renal function most likely secondary to ATN (hypotension, medications, contrast), possible component of HRS. On CRRT 10/7 and transitioned to iHD on 10/13. Cr improving with improvement in UO - may be able to pull tunneled line in upcoming days if renal function continues to improve.  - Nephrology following  - HD per nephrology team  - Continue to monitor for renal recovery  - Strict I/Os  - Daily weights  - Lymphedema team consulted    UTI  Reported increase urinary frequency, though making minimal urine. No dysuria or suprapubic pian. Has had ongoing leukocytosis, though improving. UA with 16 WBC, small LE, few bacteria, however squamous epithelial cells present concerning for contamination. Prelim UC with 10-50k Enterobacter cloacae complex. Nephrology recommending treatment given symptoms and potential secondary improvement in renal function.  - Ceftriaxone 1g daily and narrow pending susceptibilities   - Continue bladder scanning     Alcohol hepatitis  Recently admitted in September with ETOH hepatitis, started on rifaximin and lactulose per Hepatology recs. Now re-presented with ETOH hepatitis, Maddrey score 13 on admission. Acute hepatitis c/b portal hypertension with ascites. Diagnostic mane negative for SBP. Tbili, INR, AST/ALT, platelets stable. MELD 17.  - Diagnostic para as above  - Lactulose titrated to 3-4 loose BMs daily  - Rifaximin BID  - Trend CMP, INR    Toxic metabolic encephalopathy, resolved  Multifactorial secondary to acute illness, sedating medications, HE. Psychiatry consulted 10/15, started Seroquel. Remains fully A&O today.    - Lactulose, rifaximin   - Discontinue Seroquel    - Delirium precautions   - Minimize sedating medications     Abdominal pain, improved: Secondary to alcohol  hepatitis, gas pains.    - Tylenol TID   - Oxycodone 2.5 mg q4h PRN - wean as able   - Simethicone PRN     Severe malnutrition in the context of acute on chronic illness  Dysphagia   In the context of acute on chronic illness. Started on TF 10/5. Now more awake and interested in eating but not feeling hungry, transitioned to cycled feeds on 10/21 but NJ clogged and removed on 10/23.   - Nutrition and SLP consulted; appreciate following.   - Calorie counts  - Continue regular diet per speech  - If unable to meet caloric goals will need to replace feeding tube.      DM2: A1C 9.1. PTA on metformin. Transitioned to insulin due to hyperglycemia while on tube feeds. Lantus and metformin on hold. BGs stable.   - MSSI    - BG checks q4h    - Hypoglycemia protocol     Acute on chronic macrocytic anemia:  Likely secondary to alcohol use, alcohol hepatitis, nutritional deficiencies. Intermittently requiring PRBC transfusions since admission (10/7, 10/12, 10/16). No e/o active bleeding.    - CBC in AM  - Transfuse for Hgb <7     Alcohol use disorder: Last drink on 9/12/21. PTA was at CD treatment facility.    - Patient interested in resuming CD treatment, will need to re-evaluate this closer to discharge date, at this time PT/OT recommending TCU   - Continue folic acid, thiamine, MVI supplements    Hypokalemia  In setting of poor po intake.  - Potassium chloride 40 mEq x 1  - Repeat potassium this afternoon    Hypomagnesemia - replace per protocol    Other Stable Medical Issues:   Coagulopathy: INR to peak of 2.5 on 10/4, now much improved. Likely secondary to known severe hepatic steatosis.    MDD, RUBÉN: Continue PTA Lexapro. Prazosin on hold d/t hypotension, resume as able. Health Psychology consulted 10/1, re-involve PRN.   GERD: Continue PPI.   Neuropathy: Continue PTA gabapentin.      Resolved medical issues:  Septic shock, resolved: Shock most likely secondary to sepsis with hepatic dysfunction contributing as well.  TTE with hyperdynamic LVEF, no RV dysfunction. Weaned off pressors on 10/19. Continues to require midodrine 20 mg q8hrs for soft blood pressures.          Diet: Regular Diet Adult  Snacks/Supplements Adult: Other; ensure clear and ensure enlive; Between Meals    DVT Prophylaxis: Heparin SQ  Rice Catheter: Not present  Central Lines: None  Code Status: Full Code      Disposition Plan   Expected discharge: 11/01/2021   recommended to transitional care unit once antibiotic plan established, renal function improved, safe disposition plan/ TCU bed available and if need for dialysis, plan in place.     The patient's care was discussed with the Attending Physician, Dr. Stockton, Bedside Nurse and Patient.    Kaila Butler PA-C  Hospitalist Service, [address]  Securely message with the Vocera Web Console (learn more here)  Text page via [facility] Paging/Directory    Please see sign in/sign out for up to date coverage information    Clinically Significant Risk Factors Present on Admission                ______________________________________________________________________    Interval History   No acute events overnight. UC growing enterobacter this AM. Continues to report urinary urgency with improving urine production. Still has poor po intake today. O2 needs stable but still feels short of breath at times.    4 point ROS is otherwise negative.    Data reviewed today: I reviewed all medications, new labs and imaging results over the last 24 hours. I personally reviewed no images or EKG's today.    Physical Exam   Vital Signs: Temp: 97.7  F (36.5  C) Temp src: Oral BP: 113/76 Pulse: 73   Resp: 20 SpO2: 98 % O2 Device: Nasal cannula Oxygen Delivery: 1 LPM  Weight: 195 lbs 1.71 oz  General Appearance: Awake. Alert and oriented x 3. No acute distress.  Respiratory: Normal work of breathing on room air. Lungs CTAB. No wheezes, rhonchi or rales.  Cardiovascular: RRR. S1, S2.  No murmurs, rubs or gallops. Pedal pulses 2+ 1-2+ bilateral lower extremity edema.  GI: Abdomen non-distended, normoactive bowel sounds. Soft, non-tender throughout.  Skin: Warm, dry. No rashes on exposed skin.    Data   Recent Labs   Lab 11/01/21  1146 11/01/21  0633 11/01/21  0413 10/31/21  0745 10/31/21  0641 10/31/21  0438 10/30/21  0827 10/30/21  0540 10/30/21  0540 10/28/21  0551 10/28/21  0446   WBC  --  14.4*  --   --  14.2*  --   --   --  14.6*   < >  --    HGB  --  7.9*  --   --  7.0*  --   --   --  7.4*   < >  --    MCV  --  101*  --   --  100  --   --   --  101*   < >  --    PLT  --  213  --   --  210  --   --   --  209   < >  --    INR  --   --   --   --   --  1.24*  --   --   --   --  1.16*   NA  --  136  --   --  133  --   --   --  133   < >  --    POTASSIUM  --  3.3*  --   --  3.4  --   --   --  3.4   < >  --    CHLORIDE  --  104  --   --  102  --   --   --  101   < >  --    CO2  --  26  --   --  25  --   --   --  24   < >  --    BUN  --  15  --   --  16  --   --   --  15   < >  --    CR  --  1.53*  --   --  1.52*  --   --   --  1.41*   < >  --    ANIONGAP  --  6  --   --  6  --   --   --  8   < >  --    STEFFANY  --  8.1*  --   --  8.2*  --   --   --  8.4*   < >  --    * 112* 115*   < > 123*  --    < >   < > 115*   < >  --    ALBUMIN  --  1.5*  --   --  1.5*  --   --    < > 1.6*   < >  --    PROTTOTAL  --  5.7*  --   --  5.6*  --   --    < > 5.7*   < >  --    BILITOTAL  --  2.3*  --   --  2.2*  --   --    < > 2.4*   < >  --    ALKPHOS  --  254*  --   --  264*  --   --    < > 283*   < >  --    ALT  --  38  --   --  34  --   --    < > 32   < >  --    AST  --  108*  --   --  94*  --   --    < > 83*   < >  --     < > = values in this interval not displayed.

## 2021-11-01 NOTE — PROGRESS NOTES
CLINICAL NUTRITION SERVICES - REASSESSMENT NOTE     Nutrition Prescription    RECOMMENDATIONS FOR MDs/PROVIDERS TO ORDER:  Continue with regular diet as tolerated   Goal for intake: 1200 kcal and 59 gm protein ( 67% of estimated needs for repletion)    Malnutrition Status:    Severe malnutrition in the context of acute condition     Recommendations already ordered by Registered Dietitian (RD):  Discontinue current oral supplements due to patient disliking them and ordered the following:   Ensure plant base with dinner trays  10:00 am: Hard boiled egg + canned peaches  2:00 pm: Ensure max protein   HS: String cheese x2   Calorie count x 3 days      Future/Additional Recommendations:  PO improvement        EVALUATION OF THE PROGRESS TOWARD GOALS   Diet:   -Regular + Ensure clear at 10:00 + Ensure Enlive @ 2:00    Nutrition support:  Was on tube feeds from 10/4- 10/21.      Intake:   -Visited with patient today. Patient reports a very poor appetite. Gets bloated with small amount of PO intake. Has early satiety. Ate 1/2 burger and was full instantly. Patient in favor of receiving high protein / low sugar oral supplements to optimize intake.     Patient notes not drinking Ensure enlive and Ensure clear due to being too sweet. Offered patient ensure max protein ( contains only 4 gm sugar and 30 gm protein) and ensure plant base ( 14 gm sugar and 20 gm protein). Patient is also willing to try high protein snacks in between meals in an effort to optimize PO intake through small frequent meals.     Per flow sheet review: patient is consuming 25-75% of single item food ( ie: 75% cereal, bites of entree, applesauce with meds etc). Inadequate oral intake     Calorie count:   10/30: 104 kcal and 6 gm protein from 75% of cheerios and 50% milk  10/29: 535 kcal and 33 gm protein from 2 meals ( bkf and dinner intake recorded)  10/28: 107 kcal and 4 gm protein from 25% of 1 meal recorded       NEW FINDINGS   PMH: Fitz-en-Y GB 2010,  hepatitis (last Etoh 9/12/21), pancreatitis, DM2.    --Admitted 9/29 with N/V/worsening abdominal pain and edema  -Course complicated by Resp failure with ARDS and evolving ANA LAURA.   --ANA LAURA: CRRT started 10/7-> HD 10/15 -> HD line placed 10/22 ( per nephrology holding HD today).    Volume: Per MD note, patient has total body volume overload, Edema +2 LE/dependent  --1+ trace BLE, 3+ hip, 1+ hand/arm, 2+ generalized     Wt trend: admit wt 89.9 kg (9/29/21)-> down to 88.5 kg (10/31/21), HD likely contributing to wt loss      GI: daily stool 1-4 over the last 4-5 days ( Not on lactulose)    Labs:  BUN:15, Cr:1.53 ( on HD),   Mg++:1.5, Phos:4.0< K+:3.3 (L)  Total bili: 2.3 ( trending down) , INR: 1.24 (elevated)    Meds: Remeron 15 mg at bedtime, Renal MVI, Insulin, Protonix,  Folic acid, thiamine, B12, Calcium carb with vitamin D,      MALNUTRITION  % Intake: </= 50% for >/= 5 days (severe)  % Weight Loss: Unable to assess  Subcutaneous Fat Loss: Thoracic/intercostal: moderate  Muscle Loss: Temporal: moderate, Thoracic region (clavicle):mild, Upper arm (bicep, tricep): and Lower arm  (forearm): Severe   Fluid Accumulation/Edema: Moderate  Malnutrition Diagnosis: Severe malnutrition in the context of acute condition     Previous Goals   Patient to consume % of nutritionally adequate meal trays TID, or the equivalent with supplements/snacks.  Evaluation: Not met    Previous Nutrition Diagnosis  Inadequate oral intake related to no appetite, feeling full as evidenced by above calorie counts providing 29-38% of calorie/protein intake.       Evaluation: No change    CURRENT NUTRITION DIAGNOSIS  Inadequate oral intake related to no appetite, feeling full as evidenced by above calorie counts and patients report calorie/protein intake.      INTERVENTIONS  Implementation  Medical food supplement therapy - updated nutritional supplements per patient request     Goals  Patient to consume % of nutritionally adequate meal  trays TID, or the equivalent with supplements/snacks.    Monitoring/Evaluation  Progress toward goals will be monitored and evaluated per protocol.    Henry Lares RD/ANDREW  Pager [phone]

## 2021-11-01 NOTE — PLAN OF CARE
"Care assumed: 1900-0730   Vitals  Pain  /76 (BP Location: Right arm)   Pulse 73   Temp 97.7  F (36.5  C) (Oral)   Resp 20   Ht 1.626 m (5' 4.02\")   Wt 88.5 kg (195 lb 1.7 oz)   SpO2 98%  1LPM  BMI 33.47 kg/m      Oxy 2.5mg for pain     Activity  Assist x 1   to commode    Neuro/Mood  A&Ox 4     Baseline numbness and tingling    Cardiac  No signs of acute distress    Respiratory  No signs of acute respiratory distress    GI/  Patient on hemodialysis   Voids small amounts  1 BM movement loose watery      Diet/ Nutrition  Tolerating Reg diet   Poor appetite    Skin, Wounds, LDAs Skin intact; redness blanchable on buttocks sacrum and back   R PIV  SL   CVC for dialysis      Plan of Care  Other notes Follow POC    Might discontinue dialysis because creatinine is improved. NO set date for CVC removal yet.        "

## 2021-11-02 ENCOUNTER — APPOINTMENT (OUTPATIENT)
Dept: PHYSICAL THERAPY | Facility: CLINIC | Age: 46
End: 2021-11-02
Payer: COMMERCIAL

## 2021-11-02 ENCOUNTER — APPOINTMENT (OUTPATIENT)
Dept: OCCUPATIONAL THERAPY | Facility: CLINIC | Age: 46
End: 2021-11-02
Payer: COMMERCIAL

## 2021-11-02 LAB
ALBUMIN SERPL-MCNC: 1.5 G/DL (ref 3.4–5)
ALP SERPL-CCNC: 227 U/L (ref 40–150)
ALT SERPL W P-5'-P-CCNC: 36 U/L (ref 0–50)
ANION GAP SERPL CALCULATED.3IONS-SCNC: 7 MMOL/L (ref 3–14)
AST SERPL W P-5'-P-CCNC: 97 U/L (ref 0–45)
BACTERIA BLD CULT: NO GROWTH
BACTERIA FLD CULT: NO GROWTH
BILIRUB SERPL-MCNC: 2 MG/DL (ref 0.2–1.3)
BUN SERPL-MCNC: 15 MG/DL (ref 7–30)
CALCIUM SERPL-MCNC: 7.8 MG/DL (ref 8.5–10.1)
CHLORIDE BLD-SCNC: 104 MMOL/L (ref 94–109)
CO2 SERPL-SCNC: 25 MMOL/L (ref 20–32)
CREAT SERPL-MCNC: 1.48 MG/DL (ref 0.52–1.04)
ERYTHROCYTE [DISTWIDTH] IN BLOOD BY AUTOMATED COUNT: 18.7 % (ref 10–15)
GFR SERPL CREATININE-BSD FRML MDRD: 42 ML/MIN/1.73M2
GLUCOSE BLD-MCNC: 113 MG/DL (ref 70–99)
GLUCOSE BLDC GLUCOMTR-MCNC: 113 MG/DL (ref 70–99)
GLUCOSE BLDC GLUCOMTR-MCNC: 116 MG/DL (ref 70–99)
GLUCOSE BLDC GLUCOMTR-MCNC: 119 MG/DL (ref 70–99)
GLUCOSE BLDC GLUCOMTR-MCNC: 122 MG/DL (ref 70–99)
GLUCOSE BLDC GLUCOMTR-MCNC: 142 MG/DL (ref 70–99)
GRAM STAIN RESULT: NORMAL
HCT VFR BLD AUTO: 23.6 % (ref 35–47)
HGB BLD-MCNC: 7.2 G/DL (ref 11.7–15.7)
MAGNESIUM SERPL-MCNC: 1.4 MG/DL (ref 1.6–2.3)
MCH RBC QN AUTO: 30.8 PG (ref 26.5–33)
MCHC RBC AUTO-ENTMCNC: 30.5 G/DL (ref 31.5–36.5)
MCV RBC AUTO: 101 FL (ref 78–100)
PHOSPHATE SERPL-MCNC: 3.9 MG/DL (ref 2.5–4.5)
PLATELET # BLD AUTO: 201 10E3/UL (ref 150–450)
POTASSIUM BLD-SCNC: 3.7 MMOL/L (ref 3.4–5.3)
PROT SERPL-MCNC: 5.4 G/DL (ref 6.8–8.8)
RBC # BLD AUTO: 2.34 10E6/UL (ref 3.8–5.2)
SODIUM SERPL-SCNC: 136 MMOL/L (ref 133–144)
WBC # BLD AUTO: 13.4 10E3/UL (ref 4–11)

## 2021-11-02 PROCEDURE — 258N000003 HC RX IP 258 OP 636: Performed by: CLINICAL NURSE SPECIALIST

## 2021-11-02 PROCEDURE — 97530 THERAPEUTIC ACTIVITIES: CPT | Mod: GO

## 2021-11-02 PROCEDURE — 250N000011 HC RX IP 250 OP 636: Performed by: PHYSICIAN ASSISTANT

## 2021-11-02 PROCEDURE — 80053 COMPREHEN METABOLIC PANEL: CPT | Performed by: PHYSICIAN ASSISTANT

## 2021-11-02 PROCEDURE — 36415 COLL VENOUS BLD VENIPUNCTURE: CPT | Performed by: PHYSICIAN ASSISTANT

## 2021-11-02 PROCEDURE — 250N000013 HC RX MED GY IP 250 OP 250 PS 637: Performed by: PHYSICIAN ASSISTANT

## 2021-11-02 PROCEDURE — 97116 GAIT TRAINING THERAPY: CPT | Mod: GP

## 2021-11-02 PROCEDURE — 250N000013 HC RX MED GY IP 250 OP 250 PS 637: Performed by: PEDIATRICS

## 2021-11-02 PROCEDURE — 82150 ASSAY OF AMYLASE: CPT | Performed by: INTERNAL MEDICINE

## 2021-11-02 PROCEDURE — 250N000011 HC RX IP 250 OP 636: Performed by: INTERNAL MEDICINE

## 2021-11-02 PROCEDURE — 84100 ASSAY OF PHOSPHORUS: CPT | Performed by: PHYSICIAN ASSISTANT

## 2021-11-02 PROCEDURE — 99207 PR APP CREDIT; MD BILLING SHARED VISIT: CPT | Performed by: PHYSICIAN ASSISTANT

## 2021-11-02 PROCEDURE — 99232 SBSQ HOSP IP/OBS MODERATE 35: CPT | Performed by: INTERNAL MEDICINE

## 2021-11-02 PROCEDURE — 83735 ASSAY OF MAGNESIUM: CPT | Performed by: PHYSICIAN ASSISTANT

## 2021-11-02 PROCEDURE — 85014 HEMATOCRIT: CPT | Performed by: PHYSICIAN ASSISTANT

## 2021-11-02 PROCEDURE — 97530 THERAPEUTIC ACTIVITIES: CPT | Mod: GP

## 2021-11-02 PROCEDURE — 250N000011 HC RX IP 250 OP 636: Performed by: CLINICAL NURSE SPECIALIST

## 2021-11-02 PROCEDURE — 83690 ASSAY OF LIPASE: CPT | Performed by: INTERNAL MEDICINE

## 2021-11-02 PROCEDURE — 250N000013 HC RX MED GY IP 250 OP 250 PS 637: Performed by: STUDENT IN AN ORGANIZED HEALTH CARE EDUCATION/TRAINING PROGRAM

## 2021-11-02 PROCEDURE — 120N000002 HC R&B MED SURG/OB UMMC

## 2021-11-02 PROCEDURE — 99233 SBSQ HOSP IP/OBS HIGH 50: CPT | Mod: 24 | Performed by: INTERNAL MEDICINE

## 2021-11-02 RX ORDER — DIPHENHYDRAMINE HYDROCHLORIDE 50 MG/ML
50 INJECTION INTRAMUSCULAR; INTRAVENOUS
Status: DISCONTINUED | OUTPATIENT
Start: 2021-11-02 | End: 2021-11-05 | Stop reason: HOSPADM

## 2021-11-02 RX ORDER — ONDANSETRON 2 MG/ML
4 INJECTION INTRAMUSCULAR; INTRAVENOUS EVERY 6 HOURS PRN
Status: DISCONTINUED | OUTPATIENT
Start: 2021-11-02 | End: 2021-11-05 | Stop reason: HOSPADM

## 2021-11-02 RX ORDER — METHYLPREDNISOLONE SODIUM SUCCINATE 125 MG/2ML
125 INJECTION, POWDER, LYOPHILIZED, FOR SOLUTION INTRAMUSCULAR; INTRAVENOUS
Status: DISCONTINUED | OUTPATIENT
Start: 2021-11-02 | End: 2021-11-05 | Stop reason: HOSPADM

## 2021-11-02 RX ORDER — MAGNESIUM SULFATE HEPTAHYDRATE 40 MG/ML
4 INJECTION, SOLUTION INTRAVENOUS ONCE
Status: COMPLETED | OUTPATIENT
Start: 2021-11-02 | End: 2021-11-02

## 2021-11-02 RX ADMIN — ACETAMINOPHEN 650 MG: 325 TABLET, FILM COATED ORAL at 11:08

## 2021-11-02 RX ADMIN — Medication 2.5 MG: at 15:10

## 2021-11-02 RX ADMIN — MIRTAZAPINE 15 MG: 15 TABLET, FILM COATED ORAL at 22:53

## 2021-11-02 RX ADMIN — GABAPENTIN 100 MG: 100 CAPSULE ORAL at 11:03

## 2021-11-02 RX ADMIN — THIAMINE HCL TAB 100 MG 100 MG: 100 TAB at 11:22

## 2021-11-02 RX ADMIN — Medication 1 CAPSULE: at 12:52

## 2021-11-02 RX ADMIN — GABAPENTIN 100 MG: 100 CAPSULE ORAL at 19:08

## 2021-11-02 RX ADMIN — RIFAXIMIN 550 MG: 550 TABLET ORAL at 19:08

## 2021-11-02 RX ADMIN — HYDROXYZINE HYDROCHLORIDE 50 MG: 25 TABLET, FILM COATED ORAL at 19:08

## 2021-11-02 RX ADMIN — CEFTRIAXONE SODIUM 1 G: 1 INJECTION, POWDER, FOR SOLUTION INTRAMUSCULAR; INTRAVENOUS at 15:10

## 2021-11-02 RX ADMIN — Medication 50 MG: at 22:54

## 2021-11-02 RX ADMIN — CALCIUM CARBONATE 600 MG (1,500 MG)-VITAMIN D3 400 UNIT TABLET 1 TABLET: at 12:52

## 2021-11-02 RX ADMIN — Medication 100 MCG: at 12:52

## 2021-11-02 RX ADMIN — MIDODRINE HYDROCHLORIDE 20 MG: 5 TABLET ORAL at 00:43

## 2021-11-02 RX ADMIN — MIDODRINE HYDROCHLORIDE 20 MG: 5 TABLET ORAL at 11:08

## 2021-11-02 RX ADMIN — ONDANSETRON 4 MG: 2 INJECTION INTRAMUSCULAR; INTRAVENOUS at 19:09

## 2021-11-02 RX ADMIN — RIFAXIMIN 550 MG: 550 TABLET ORAL at 11:22

## 2021-11-02 RX ADMIN — FOLIC ACID 1 MG: 1 TABLET ORAL at 12:52

## 2021-11-02 RX ADMIN — PANTOPRAZOLE SODIUM 40 MG: 40 TABLET, DELAYED RELEASE ORAL at 11:22

## 2021-11-02 RX ADMIN — ESCITALOPRAM OXALATE 10 MG: 10 TABLET ORAL at 12:52

## 2021-11-02 RX ADMIN — HEPARIN SODIUM 5000 UNITS: 5000 INJECTION, SOLUTION INTRAVENOUS; SUBCUTANEOUS at 22:53

## 2021-11-02 RX ADMIN — IRON SUCROSE 200 MG: 20 INJECTION, SOLUTION INTRAVENOUS at 18:57

## 2021-11-02 RX ADMIN — Medication 2.5 MG: at 10:02

## 2021-11-02 RX ADMIN — Medication 3 MG: at 22:53

## 2021-11-02 RX ADMIN — ACETAMINOPHEN 650 MG: 325 TABLET, FILM COATED ORAL at 17:11

## 2021-11-02 RX ADMIN — HEPARIN SODIUM 5000 UNITS: 5000 INJECTION, SOLUTION INTRAVENOUS; SUBCUTANEOUS at 10:05

## 2021-11-02 RX ADMIN — POLYETHYLENE GLYCOL 3350 17 G: 17 POWDER, FOR SOLUTION ORAL at 11:10

## 2021-11-02 RX ADMIN — GABAPENTIN 100 MG: 100 CAPSULE ORAL at 15:10

## 2021-11-02 RX ADMIN — MAGNESIUM SULFATE IN WATER 4 G: 40 INJECTION, SOLUTION INTRAVENOUS at 10:10

## 2021-11-02 RX ADMIN — ONDANSETRON 4 MG: 2 INJECTION INTRAMUSCULAR; INTRAVENOUS at 09:51

## 2021-11-02 RX ADMIN — ACETAMINOPHEN 650 MG: 325 TABLET, FILM COATED ORAL at 00:45

## 2021-11-02 RX ADMIN — MIDODRINE HYDROCHLORIDE 20 MG: 5 TABLET ORAL at 17:11

## 2021-11-02 ASSESSMENT — ACTIVITIES OF DAILY LIVING (ADL)
ADLS_ACUITY_SCORE: 9
ADLS_ACUITY_SCORE: 11
ADLS_ACUITY_SCORE: 11
ADLS_ACUITY_SCORE: 9
ADLS_ACUITY_SCORE: 11
ADLS_ACUITY_SCORE: 9
ADLS_ACUITY_SCORE: 9
ADLS_ACUITY_SCORE: 11
ADLS_ACUITY_SCORE: 9
ADLS_ACUITY_SCORE: 11

## 2021-11-02 ASSESSMENT — MIFFLIN-ST. JEOR: SCORE: 1526.25

## 2021-11-02 NOTE — PROGRESS NOTES
Nephrology Progress Note  11/0/2021       Ailyah Angeles is a 46 yof w/hx of ETOH use (Quit 9/12/2021) w/hepatitis, Fitz en Y bypass, Pancreatitis, DM2, Panc cyst who was admitted 9/29 from chem dep with N/V and worsening abdominal pain and edema.  Her course has been resp failure with ARDS and evolving ANA LAURA.  Noted to have received albumin from 10/1-10/3 (150g/day) with no improvement in Cr.  On Vanco/Zosyn to treat sepsis although cultures at time of consult are negative.  Nephrology consulted for evaluation of ANA LAURA and possible RRT, started CRRT on 10/7.       Interval History :   Mrs Angeles had CRRT started 10/7 for management of volume and acidosis, transitioned to iHD on 10/15 and now is showing recovery with Cr stable at 1.5 since run on 10/29.  If stable again tomorrow we will consult IR to pull tunneled line, still hopeful that she will recover more renal fx as her acute issues subside but already her function appears above the threshold of needing RRT.  Iron sats low and ferritin >600, I ordered venofer protocol.        Assessment & Recommendations:   ANA LAURA-Baseline Cr normal at 0.5 as recently as 9/14/2021, abdominal US done; unremarkable with normal kidneys & mild ascites. Was at inpatient chem dep when she developed abdominal pain, N/V and worsening edema and was brought to [facility] on 9/29.  Was treated for UTI and started on Vanco/Zosyn for leukocytosis but decompensated and was intubated the evening of 10/3.  Noted she did receive albumin x3 days from 10/1-10/3 for ANA LAURA but actually worsened and Cr at time of consult is up to 2.  Lisa <5 on 10/3, ECHO done 10/4 showed hyperdynamic LV and normal IVC.  Etiology of ANA LAURA is likely ATN with underlying HRS physiology which made her more susceptible to hypoperfusion.  Started CRRT on 10/7 for management of volume and acidosis.  Trying to transition to iHD.                  -ANA LAURA, likely hypoperfusion in setting of baseline HRS physiology, possible contribution from  vanco/zosyn and did receive contrast on 10/3.  Some UOP, Cr stabilizing, would treat UTI which may secondarily help renal fx.                 -CRRT started 10/7, stopped 10/13. Cr stable the past 24h, gfr lower than chemistries would suggest with ascites.                 -Line is tunneled placed 10/22.                   -Dialysis consent signed and scanned in under media.    11/2 plan update: if creatinine continues to improve or remains stable, probable line removal in several days. Due to infection risk with repeat insertion, waiting is warranted (assuming line dressing remains CDI) to ensure dialysis will not be needed again. We will be assessing for creatinine improvement or stability. If any component of her stall is due to infection we should see improvement after start of ABX. Based upon her trajectory I would expect the catheter could be removed by end week.      Volume-Has total body volume overload, I/O's difficult to interpret as we are not capturing much of her UOP but seem to be increasing.        Electrolytes/pH-K 3.7, bicarb 25.       Liver-Unclear duration, quit drinking on 9/12/2021 and was at chem dep when acute issues started.  Bili is down to 6 and INR is 1.4 and a bit improved.  GI following, not clear if she would be a transplant candidate eventually.       ID-On  Ceftriaxone .  UA was suggestive of UTI and her culture grew enterobacter. This only had 10-50,000 but she was symptomatic and there is some thought that the cleansing wipes used cant blunt growth.      Anemia-Hgb 7.9, iron sats 16% and ferritin <600, can start IV iron, will discuss with team.       Nutrition-Taking PO     Seen and discussed with Dr Nicholas and preceptor REINALDO Robbins     Recommendations were communicated to primary team via verbal communication.      Danica Hutchinson NP on 11/2/2021 at 2:34 PM      Reviewed by:  MARIA ELENA Wade  Clinical Nurse Specialist  [phone]   Attestation:  This patient has been  "seen, examined and evaluated by me, Tierra Nicholas MD as a shared visit with the NP/PA above.     In brief:  Aliyah Angeles is a 46 year old female with recovering ANA LAURA.     I personally reviewed major complaints, physical findings, investigations, medications, lab values, vital signs, I/O's and the overall management plan.      My key findings and Decisions:  Plan for catheter removal if cont to improve  Monitor for effectiveness of ABX given enterobacter can be problematic.   Other comments are in bold      Tierra Nicholas MD MS  Date of service (date I saw patient) 2 nov 2021    Review of Systems:   I reviewed the following systems:  Gen: No fevers or chills  CV: No CP at rest  Resp: No SOB at rest  GI: No N/V     Physical Exam:   I/O last 3 completed shifts:  In: 120 [P.O.:120]  Out: 1225 [Urine:275; Other:950]   /76 (BP Location: Right arm)   Pulse 73   Temp 97.7  F (36.5  C) (Oral)   Resp 20   Ht 1.626 m (5' 4.02\")   Wt 88.5 kg (195 lb 1.7 oz)   SpO2 98%   BMI 33.47 kg/m        GENERAL APPEARANCE: Extubated, stable from pulm standpoint.  Sitting up in chair, a bit SOB today.   EYES: No scleral icterus  Pulmonary: lungs rhonchi to auscultation with equal breath sounds bilaterally, no clubbing or cyanosis  CV: Regular rhythm, normal rate, no rub   - Edema +2 LE/dependent  GI: soft, nontender, normal bowel sounds  MS: no evidence of inflammation in joints, no muscle tenderness  : No barrett, some intermittent UOP.   SKIN: no rash, warm, dry  NEURO: No focal deficits.     Labs:   All labs reviewed by me                 Electrolytes/Renal - Recent Labs   Lab Test 11/01/21  1146 11/01/21  0633 11/01/21  0441 11/01/21  0413 10/31/21  0745 10/31/21  0641 10/31/21  0438 10/30/21  0827 10/30/21  0540 10/30/21  0540 10/30/21  0436 10/29/21  0609   NA  --  136  --   --   --  133  --   --   --  133  --    < >   POTASSIUM  --  3.3*  --   --   --  3.4  --   --   --  3.4  --    < >   CHLORIDE  --  104  --   " --   --  102  --   --   --  101  --    < >   CO2  --  26  --   --   --  25  --   --   --  24  --    < >   BUN  --  15  --   --   --  16  --   --   --  15  --    < >   CR  --  1.53*  --   --   --  1.52*  --   --   --  1.41*  --    < >   * 112*  --  115*   < > 123*  --    < >   < > 115*  --    < >   STEFFANY  --  8.1*  --   --   --  8.2*  --   --   --  8.4*  --    < >   MAG  --   --  1.5*  --   --   --  1.4*  --   --  1.6 1.6   < >   PHOS  --   --  4.0  --   --   --  3.5  --   --   --  3.4  --     < > = values in this interval not displayed.         CBC -         Recent Labs   Lab Test 11/01/21  0633 10/31/21  0641 10/30/21  0540   WBC 14.4* 14.2* 14.6*   HGB 7.9* 7.0* 7.4*    210 209         LFTs -         Recent Labs   Lab Test 11/01/21  0633 10/31/21  0641 10/30/21  0540   ALKPHOS 254* 264* 283*   BILITOTAL 2.3* 2.2* 2.4*   ALT 38 34 32   * 94* 83*   PROTTOTAL 5.7* 5.6* 5.7*   ALBUMIN 1.5* 1.5* 1.6*         Iron Panel -         Recent Labs   Lab Test 10/29/21  0609 10/27/21  0558 09/13/21  0642   IRON  --  28*  --    IRONSAT  --  16  --    EYAD 343*  --    < >    < > = values in this interval not displayed.               Current Medications:    acetaminophen  650 mg Oral or Feeding Tube Q8H     calcium carbonate 600 mg-vitamin D 400 units  1 tablet Oral or Feeding Tube Daily     cyanocobalamin  100 mcg Oral or Feeding Tube Daily     escitalopram  10 mg Oral Daily     folic acid  1 mg Oral or Feeding Tube Daily     gabapentin  100 mg Oral TID     heparin ANTICOAGULANT  5,000 Units Subcutaneous Q12H     influenza quadrivalent (PF) vacc  0.5 mL Intramuscular Prior to discharge     insulin aspart  1-7 Units Subcutaneous TID AC     insulin aspart  1-5 Units Subcutaneous At Bedtime     magnesium oxide  400 mg Oral TID     midodrine  20 mg Oral or Feeding Tube Q8H     mirtazapine  15 mg Oral At Bedtime     multivitamin RENAL  1 capsule Oral Daily     pantoprazole  40 mg Oral QAM AC     polyethylene glycol   17 g Oral BID     [Held by provider] prazosin  1 mg Oral QPM     psyllium  1 packet Oral Daily     rifaximin  550 mg Oral or Feeding Tube BID     sodium chloride  1 spray Both Nostrils 4x Daily     thiamine  100 mg Oral or Feeding Tube Daily

## 2021-11-02 NOTE — PROGRESS NOTES
Care Management Follow Up    Length of Stay (days): 34    Expected Discharge Date: 11/05/2021     Concerns to be Addressed: substance/tobacco abuse/use, discharge planning     Patient plan of care discussed at interdisciplinary rounds: Yes    Anticipated Discharge Disposition: Inpatient Chemical Dependency, Transitional Care     Anticipated Discharge Services: None  Anticipated Discharge DME: None    Patient/family educated on Medicare website which has current facility and service quality ratings: yes  Education Provided on the Discharge Plan: Yes   Patient/Family in Agreement with the Plan: yes    Referrals Placed by CM/SW:     TCU  [facility]  *[id number]    Discontinued  [facility]  [address] [facility] [address]  Phone: [phone]  Fax: [phone]  10/29- per admissions, unable to accept due to staffing     [facility]  [address] [facility] [address]  Phone: [phone]  Fax: [phone]  10/29- declined, no available bed     [facility]  [address] [place] [address]  Phone: [phone]  Fax: [phone]  10/28- per admissions, no beds until at least next week. If pt still needs placement at that time, referral can be resent.      [facility]  Ph: [phone]  F: [phone]  10/26- LV, voicemail states they are not taking any admissions currently.      [facility]  [address]  [facility] [address]  Phone: [phone]  Fax: [phone]  10/28- no beds available      [facility]  [address] [facility] [address]  Phone: [phone]  Fax: [phone]  10/28- no bed, also declined due to ETOH and smoking      [facility]  [address] [facility] [facility] [address]  [place] [address]  Phone: [phone]  Fax: [phone]  10/28- no appropriate bed, per admissions      [facility]  [address] [facility] [address]  Phone: [phone]  Admissions: [phone]  Fax: [phone]  10/28- no beds  available, pt placed on wait list     [facility]  [address]  [facility] [address]  Phone: [phone]  Fax: [phone]  10/28- declined, no beds available      [facility]  Ph: [phone]  F: [phone]  10/26- per Hawa in admissions, no bed availability for the forseeable future.      [facility]  Ph: [phone]  F: [phone]  10/25- per admissions, no SNF beds for the for seeable further and [facility] does not cover swing bed.     [facility] ([facility] - Ph: [phone], F: [phone])  10/26- per vivi Cisneros, no beds available at this time      OP HD  [facility] (admissions ph: [phone] f: [phone])  10/26- sent Hep B panel, HD flowsheets, and access report per request, updated Violeta in admissions.   10/28- provided update to Payal in admissions, [phone]    Private pay costs discussed: Not applicable    Additional Information:  SW following for TCU and OP HD placement. Received update from pt's primary team that pt's creatinine is trending down and she may not need outpatient dialysis at discharge. YANDY spoke with Rhona at  TCU, who was aware of this update and also noted that pt looks ARU appropriate and she will continue to look at rehab recommendations. Pt is not COVID vaccinated per [facility]OL[facility] and Rhona asked writer to clarify pt's vaccine status.     SW spoke with pt in her room, provided update on TCU/ARU placement. Pt states that her dialysis line will be pulled soon. Pt agreeable to  TCU or ARU and aware that PT drives this recommendation. Pt confirmed that she is not COVID vaccinated and has no plans to get the vaccine. SW updated Rhona.     Updated [facility] admissions to place pt's HD referral on hold.     SW will continue to follow for discharge placement.     JOY Reed, [facility] Psychiatric CenterSW  [address]   [facility]  Phone: [phone]  Pager: [phone]

## 2021-11-02 NOTE — PLAN OF CARE
A:  patient denies pain.  Up to bedside commode times one and voided.  Resting well.  Abdomen continues distended.    R:  continue to monitor and treat per plan of care.

## 2021-11-02 NOTE — PLAN OF CARE
Pt is alert and oriented times 4. Has the chills and is warm to the touch. Started on new IV antibiotic for UTI today. On scheduled tylenol. Cont to have a poor appetite. Had nutrition consult today. Only ate peanut butter toast for supper and drinking water. Offered other foods. Did receive string cheese as a snack and she ate at least half of one piece. Calorie counts to start at midnight to go for 3 days. With slight temp pt noted to have hr of 100. Flagged for sepsis but lactic was normal. Seems weak in bed. Got up to commode a few times this shift but writer did not assist. Urine is kandis. Writer did encourage turning on side every 2 hours off of butt and elevating heels to prevent skin breakdown. Pt agrees and did cooperate with position changes. Lungs are clear and no cough noted. Pt has oxygen at 1.5 liters and sats ate high 90's on continuous monitor. Pt having trouble sleeping. Asked for PRN's a few times. Melatonin and benadryl given. C/O pain in head and left upper abd. Had atarax once and oxycodone 2.5 mg once in 8 hr shift.

## 2021-11-02 NOTE — PROGRESS NOTES
[facility]    Medicine Progress Note - Hospitalist Service, Gold 6       Date of Admission:  9/29/2021    Assessment & Plan    Aliyah Angeles is a 46-year-old female with a history of alcohol use disorder, Fitz-en-Y gastric bypass (2010), DM2, anxiety, depression, who was initially admitted to internal medicine service with alcoholic hepatitis.  She transferred to MICU on 10/4/2021 with acute hypoxic respiratory failure secondary to ARDS requiring intubation. Hospitalization complicated by septic shock, pneumonia, ANA LAURA requiring CRRT and now iHD, encephalopathy. Self extubated on 10/14, weaned off pressors on 10/19, and transferred to general medical floor on 10/20.     Today:   - Continue ceftriaxone for UTI   - Zofran for nausea  - Monitor PO intake, calorie counts  - Trend Cr, if continues to improve likely will not need HD as outpatient  - Wean oxycodone to q8h prn      Acute hypoxic respiratory failure, stable  ARDS, resolved  Hx of pneumonia (aspiration vs VAP) with ongoing SIRS after finishing Zosyn  Hx of pulmonary edema   Acute decline in respiratory status requiring intubation on 10/4, CXR c/w ARDS, likely secondary to HAP vs aspiration. BAL cultures negative. Treated with Zosyn 10/4 - 10/13. Self-extubated on 10/14. Developed increased work of breathing on 10/17, CXR demonstrated increased interstitial opacities concerning for pulmonary edema vs pneumonia. Managing volume with iHD, last run 10/29. Completed 10 day course of Zosyn 10/26 for VAP vs aspiration; however, WBC and CRP continue to be elevated (although now downtrending). Repeat CXR 10/27 with patchy bilateral opacities with overall improvement, otherwise unremarkable. Repeat COVID neg and BCs NGTD. Diagnostic para for 10/28 with 1,658 nucleated cells, however only 7% neutrophils and monocyte/macrophage predominance. Remains afebrile.  - F/u results of BCs x 2 (NGTD)  - Continue to trend WBC  -  Incentive spirometry  - OOB at least TID  - Continuous pulse oximetry. Wean O2 as able.      ANA LAURA  Hypervolemia  Baseline Cr ~0.5. Cr to peak of 2.98. Decline in renal function most likely secondary to ATN (hypotension, medications, contrast), possible component of HRS. On CRRT 10/7 and transitioned to iHD on 10/13. Cr improving with improvement in UO - may be able to pull tunneled line in upcoming days if renal function continues to improve.  - Nephrology following  - HD per nephrology team  - Continue to monitor for renal recovery  - Strict I/Os  - Daily weights  - Lymphedema team consulted     UTI  Reported increase urinary frequency, though making minimal urine. No dysuria or suprapubic pian. Has had ongoing leukocytosis, though improving. UA with 16 WBC, small LE, few bacteria, however squamous epithelial cells present concerning for contamination. Prelim UC with 10-50k Enterobacter cloacae complex. Nephrology recommending treatment given symptoms and potential secondary improvement in renal function.  - Ceftriaxone 1g daily and narrow pending susceptibilities   - Continue bladder scanning      Alcohol hepatitis  Recently admitted in September with ETOH hepatitis, started on rifaximin and lactulose per Hepatology recs. Now re-presented with ETOH hepatitis, Maddrey score 13 on admission. Acute hepatitis c/b portal hypertension with ascites. Diagnostic mane negative for SBP. Tbili, INR, AST/ALT, platelets stable. MELD 16.  - Diagnostic para as above  - Rifaximin BID  - Trend CMP, INR     Toxic metabolic encephalopathy, resolved  Multifactorial secondary to acute illness, sedating medications, HE. Psychiatry consulted 10/15, started Seroquel. Remains fully A&O today.    - Discontinue Seroquel    - Delirium precautions   - Minimize sedating medications     Abdominal pain, improved: Secondary to alcohol hepatitis, gas pains. Will wean oxycodone as only using 2-3x daily for past couple days.   - Tylenol TID   -  Oxycodone 2.5 mg q4h PRN --> q8h prn   - Simethicone PRN     Severe malnutrition in the context of acute on chronic illness  Dysphagia   In the context of acute on chronic illness. Started on TF 10/5. Now more awake and interested in eating but not feeling hungry, transitioned to cycled feeds on 10/21 but NJ clogged and removed on 10/23.   - Nutrition and SLP consulted; appreciate following.   - Calorie counts   - Continue regular diet per speech  - If unable to meet caloric goals will need to replace feeding tube.      DM2: A1C 9.1. PTA on metformin. Transitioned to insulin due to hyperglycemia while on tube feeds. Lantus and metformin on hold. BGs stable.   - MSSI    - BG checks QID and at bedtime    - Hypoglycemia protocol     Acute on chronic macrocytic anemia:  Likely secondary to alcohol use, alcohol hepatitis, nutritional deficiencies. Intermittently requiring PRBC transfusions since admission (10/7, 10/12, 10/16). No e/o active bleeding.    - CBC in AM  - Transfuse for Hgb <7     Alcohol use disorder: Last drink on 9/12/21. PTA was at CD treatment facility.    - Patient interested in resuming CD treatment, will need to re-evaluate this closer to discharge date, at this time PT/OT recommending TCU   - Continue folic acid, thiamine, MVI supplements     Hypokalemia  In setting of poor po intake.  - Replace as needed     Hypomagnesemia - replace per protocol     Other Stable Medical Issues:   Coagulopathy: INR to peak of 2.5 on 10/4, now much improved. Likely secondary to known severe hepatic steatosis.    MDD, RUBÉN: Continue PTA Lexapro. Prazosin on hold d/t hypotension, resume as able. Health Psychology consulted 10/1, re-involve PRN.   GERD: Continue PPI.   Neuropathy: Continue PTA gabapentin.      Resolved medical issues:  Septic shock, resolved: Shock most likely secondary to sepsis with hepatic dysfunction contributing as well. TTE with hyperdynamic LVEF, no RV dysfunction. Weaned off pressors on 10/19.  Continues to require midodrine 20 mg q8hrs for soft blood pressures.       Diet: Snacks/Supplements Adult: Other; see below:; Between Meals  Snacks/Supplements Adult: Other; Ensure plant base; With Meals  High Kcal/High Protein Diet, ADULT  Calorie Counts    DVT Prophylaxis: Heparin SQ  Rice Catheter: Not present  Central Lines: None  Code Status: Full Code      Disposition Plan   Expected discharge: 11/05/2021   recommended to transitional care unit once antibiotic plan established, renal function improved, safe disposition plan/ TCU bed available and tolerating adequate intake.     The patient's care was discussed with the Attending Physician, Dr. Degroot, Bedside Nurse, Care Coordinator/ and Patient.    Kaila Butler PA-C  Hospitalist Service, [address]  Securely message with the Vocera Web Console (learn more here)  Text page via [facility] Paging/Directory    Please see sign in/sign out for up to date coverage information    Clinically Significant Risk Factors Present on Admission                ______________________________________________________________________    Interval History   No acute events overnight. Having more upset stomach, nausea this morning. Started on ceftriaxone yesterday and reports she typically gets upset stomach from antibiotics. Attempting to eat breakfast this morning because she feels she finally had an appetite, but had worsening nausea with eating. Will trial IV zofran. Feels she is making more urine and having improvement in urinary frequency. Noting more swelling in her legs today but didn't sleep with compression socks on due to discomfort.    4 point ROS is otherwise negative.    Data reviewed today: I reviewed all medications, new labs and imaging results over the last 24 hours. I personally reviewed no images or EKG's today.    Physical Exam   Vital Signs: Temp: 97.2  F (36.2  C) Temp src: Axillary BP:  115/69 Pulse: 77   Resp: 17 SpO2: 99 % O2 Device: Nasal cannula Oxygen Delivery: 1 LPM  Weight: 190 lbs 11.17 oz  General Appearance:  Awake. Alert and oriented x 3. No acute distress, sitting up in bed trying to eat.  Respiratory: Normal work of breathing on room air. Lungs CTAB. No wheezes, rhonchi or rales.  Cardiovascular: RRR. S1, S2. No murmurs, rubs or gallops. Pedal pulses 2+ 1-2+ bilateral lower extremity edema.  GI: Abdomen non-distended, normoactive bowel sounds. Soft, mild tenderness throughout. No rebounding or guarding.  Skin: Warm, dry. No rashes on exposed skin.    Data   Recent Labs   Lab 11/02/21  1221 11/02/21  0846 11/02/21  0537 11/01/21  1718 11/01/21  1507 11/01/21  1146 11/01/21  0633 10/31/21  0745 10/31/21  0641 10/31/21  0438 10/30/21  0540 10/28/21  0551 10/28/21  0446   WBC  --   --  13.4*  --   --   --  14.4*  --  14.2*  --    < >   < >  --    HGB  --   --  7.2*  --   --   --  7.9*  --  7.0*  --    < >   < >  --    MCV  --   --  101*  --   --   --  101*  --  100  --    < >   < >  --    PLT  --   --  201  --   --   --  213  --  210  --    < >   < >  --    INR  --   --   --   --   --   --   --   --   --  1.24*  --   --  1.16*   NA  --   --  136  --   --   --  136  --  133  --    < >   < >  --    POTASSIUM  --   --  3.7  --  3.8  --  3.3*   < > 3.4  --    < >   < >  --    CHLORIDE  --   --  104  --   --   --  104  --  102  --    < >   < >  --    CO2  --   --  25  --   --   --  26  --  25  --    < >   < >  --    BUN  --   --  15  --   --   --  15  --  16  --    < >   < >  --    CR  --   --  1.48*  --   --   --  1.53*  --  1.52*  --    < >   < >  --    ANIONGAP  --   --  7  --   --   --  6  --  6  --    < >   < >  --    STEFFANY  --   --  7.8*  --   --   --  8.1*  --  8.2*  --    < >   < >  --    * 119* 113*   < >  --    < > 112*   < > 123*  --    < >   < >  --    ALBUMIN  --   --  1.5*  --   --   --  1.5*   < > 1.5*  --    < >   < >  --    PROTTOTAL  --   --  5.4*  --   --   --  5.7*   <  > 5.6*  --    < >   < >  --    BILITOTAL  --   --  2.0*  --   --   --  2.3*   < > 2.2*  --    < >   < >  --    ALKPHOS  --   --  227*  --   --   --  254*   < > 264*  --    < >   < >  --    ALT  --   --  36  --   --   --  38   < > 34  --    < >   < >  --    AST  --   --  97*  --   --   --  108*   < > 94*  --    < >   < >  --     < > = values in this interval not displayed.

## 2021-11-03 ENCOUNTER — APPOINTMENT (OUTPATIENT)
Dept: INTERVENTIONAL RADIOLOGY/VASCULAR | Facility: CLINIC | Age: 46
End: 2021-11-03
Attending: PHYSICIAN ASSISTANT
Payer: COMMERCIAL

## 2021-11-03 ENCOUNTER — APPOINTMENT (OUTPATIENT)
Dept: OCCUPATIONAL THERAPY | Facility: CLINIC | Age: 46
End: 2021-11-03
Payer: COMMERCIAL

## 2021-11-03 LAB
ABO/RH(D): NORMAL
ALBUMIN SERPL-MCNC: 1.5 G/DL (ref 3.4–5)
ALP SERPL-CCNC: 214 U/L (ref 40–150)
ALT SERPL W P-5'-P-CCNC: 33 U/L (ref 0–50)
ANION GAP SERPL CALCULATED.3IONS-SCNC: 7 MMOL/L (ref 3–14)
ANTIBODY SCREEN: NEGATIVE
AST SERPL W P-5'-P-CCNC: 83 U/L (ref 0–45)
BACTERIA BLD CULT: NO GROWTH
BACTERIA SPT CULT: ABNORMAL
BACTERIA UR CULT: ABNORMAL
BACTERIA UR CULT: ABNORMAL
BILIRUB SERPL-MCNC: 1.8 MG/DL (ref 0.2–1.3)
BLD PROD TYP BPU: NORMAL
BLOOD COMPONENT TYPE: NORMAL
BUN SERPL-MCNC: 14 MG/DL (ref 7–30)
CALCIUM SERPL-MCNC: 8 MG/DL (ref 8.5–10.1)
CHLORIDE BLD-SCNC: 105 MMOL/L (ref 94–109)
CO2 SERPL-SCNC: 25 MMOL/L (ref 20–32)
CODING SYSTEM: NORMAL
CREAT SERPL-MCNC: 1.41 MG/DL (ref 0.52–1.04)
CROSSMATCH: NORMAL
ERYTHROCYTE [DISTWIDTH] IN BLOOD BY AUTOMATED COUNT: 18.2 % (ref 10–15)
GFR SERPL CREATININE-BSD FRML MDRD: 44 ML/MIN/1.73M2
GLUCOSE BLD-MCNC: 113 MG/DL (ref 70–99)
GLUCOSE BLDC GLUCOMTR-MCNC: 109 MG/DL (ref 70–99)
GLUCOSE BLDC GLUCOMTR-MCNC: 116 MG/DL (ref 70–99)
GLUCOSE BLDC GLUCOMTR-MCNC: 116 MG/DL (ref 70–99)
GLUCOSE BLDC GLUCOMTR-MCNC: 131 MG/DL (ref 70–99)
GLUCOSE BLDC GLUCOMTR-MCNC: 146 MG/DL (ref 70–99)
HCT VFR BLD AUTO: 22.2 % (ref 35–47)
HGB BLD-MCNC: 6.8 G/DL (ref 11.7–15.7)
HGB BLD-MCNC: 9 G/DL (ref 11.7–15.7)
INR PPP: 1.23 (ref 0.85–1.15)
ISSUE DATE AND TIME: NORMAL
MAGNESIUM SERPL-MCNC: 2.1 MG/DL (ref 1.6–2.3)
MCH RBC QN AUTO: 30.8 PG (ref 26.5–33)
MCHC RBC AUTO-ENTMCNC: 30.6 G/DL (ref 31.5–36.5)
MCV RBC AUTO: 101 FL (ref 78–100)
PHOSPHATE SERPL-MCNC: 3.6 MG/DL (ref 2.5–4.5)
PLATELET # BLD AUTO: 217 10E3/UL (ref 150–450)
POTASSIUM BLD-SCNC: 3.4 MMOL/L (ref 3.4–5.3)
PROT SERPL-MCNC: 5.5 G/DL (ref 6.8–8.8)
RBC # BLD AUTO: 2.21 10E6/UL (ref 3.8–5.2)
SARS-COV-2 RNA RESP QL NAA+PROBE: NEGATIVE
SODIUM SERPL-SCNC: 137 MMOL/L (ref 133–144)
SPECIMEN EXPIRATION DATE: NORMAL
UNIT ABO/RH: NORMAL
UNIT NUMBER: NORMAL
UNIT STATUS: NORMAL
UNIT TYPE ISBT: 7300
WBC # BLD AUTO: 11.6 10E3/UL (ref 4–11)

## 2021-11-03 PROCEDURE — 86900 BLOOD TYPING SEROLOGIC ABO: CPT | Performed by: INTERNAL MEDICINE

## 2021-11-03 PROCEDURE — 258N000003 HC RX IP 258 OP 636: Performed by: CLINICAL NURSE SPECIALIST

## 2021-11-03 PROCEDURE — 250N000011 HC RX IP 250 OP 636: Performed by: INTERNAL MEDICINE

## 2021-11-03 PROCEDURE — 80053 COMPREHEN METABOLIC PANEL: CPT | Performed by: PHYSICIAN ASSISTANT

## 2021-11-03 PROCEDURE — 250N000013 HC RX MED GY IP 250 OP 250 PS 637: Performed by: PHYSICIAN ASSISTANT

## 2021-11-03 PROCEDURE — 02PY33Z REMOVAL OF INFUSION DEVICE FROM GREAT VESSEL, PERCUTANEOUS APPROACH: ICD-10-PCS | Performed by: PHYSICIAN ASSISTANT

## 2021-11-03 PROCEDURE — 36415 COLL VENOUS BLD VENIPUNCTURE: CPT | Performed by: PHYSICIAN ASSISTANT

## 2021-11-03 PROCEDURE — 250N000011 HC RX IP 250 OP 636: Performed by: PHYSICIAN ASSISTANT

## 2021-11-03 PROCEDURE — 250N000013 HC RX MED GY IP 250 OP 250 PS 637: Performed by: PEDIATRICS

## 2021-11-03 PROCEDURE — 36589 REMOVAL TUNNELED CV CATH: CPT | Performed by: PHYSICIAN ASSISTANT

## 2021-11-03 PROCEDURE — 99233 SBSQ HOSP IP/OBS HIGH 50: CPT | Performed by: INTERNAL MEDICINE

## 2021-11-03 PROCEDURE — 85018 HEMOGLOBIN: CPT | Performed by: INTERNAL MEDICINE

## 2021-11-03 PROCEDURE — 85610 PROTHROMBIN TIME: CPT | Performed by: PHYSICIAN ASSISTANT

## 2021-11-03 PROCEDURE — 84100 ASSAY OF PHOSPHORUS: CPT | Performed by: PHYSICIAN ASSISTANT

## 2021-11-03 PROCEDURE — 250N000013 HC RX MED GY IP 250 OP 250 PS 637: Performed by: STUDENT IN AN ORGANIZED HEALTH CARE EDUCATION/TRAINING PROGRAM

## 2021-11-03 PROCEDURE — 97110 THERAPEUTIC EXERCISES: CPT | Mod: GO | Performed by: OCCUPATIONAL THERAPIST

## 2021-11-03 PROCEDURE — 120N000002 HC R&B MED SURG/OB UMMC

## 2021-11-03 PROCEDURE — P9016 RBC LEUKOCYTES REDUCED: HCPCS | Performed by: HOSPITALIST

## 2021-11-03 PROCEDURE — 85027 COMPLETE CBC AUTOMATED: CPT | Performed by: PHYSICIAN ASSISTANT

## 2021-11-03 PROCEDURE — U0003 INFECTIOUS AGENT DETECTION BY NUCLEIC ACID (DNA OR RNA); SEVERE ACUTE RESPIRATORY SYNDROME CORONAVIRUS 2 (SARS-COV-2) (CORONAVIRUS DISEASE [COVID-19]), AMPLIFIED PROBE TECHNIQUE, MAKING USE OF HIGH THROUGHPUT TECHNOLOGIES AS DESCRIBED BY CMS-2020-01-R: HCPCS | Performed by: INTERNAL MEDICINE

## 2021-11-03 PROCEDURE — 86923 COMPATIBILITY TEST ELECTRIC: CPT | Performed by: HOSPITALIST

## 2021-11-03 PROCEDURE — 250N000013 HC RX MED GY IP 250 OP 250 PS 637: Performed by: INTERNAL MEDICINE

## 2021-11-03 PROCEDURE — 999N000128 HC STATISTIC PERIPHERAL IV START W/O US GUIDANCE

## 2021-11-03 PROCEDURE — 36589 REMOVAL TUNNELED CV CATH: CPT

## 2021-11-03 PROCEDURE — 99207 PR APP CREDIT; MD BILLING SHARED VISIT: CPT | Performed by: PHYSICIAN ASSISTANT

## 2021-11-03 PROCEDURE — 36415 COLL VENOUS BLD VENIPUNCTURE: CPT | Performed by: INTERNAL MEDICINE

## 2021-11-03 PROCEDURE — 250N000011 HC RX IP 250 OP 636: Performed by: CLINICAL NURSE SPECIALIST

## 2021-11-03 PROCEDURE — 0JPT3XZ REMOVAL OF TUNNELED VASCULAR ACCESS DEVICE FROM TRUNK SUBCUTANEOUS TISSUE AND FASCIA, PERCUTANEOUS APPROACH: ICD-10-PCS | Performed by: PHYSICIAN ASSISTANT

## 2021-11-03 PROCEDURE — 83735 ASSAY OF MAGNESIUM: CPT | Performed by: PHYSICIAN ASSISTANT

## 2021-11-03 RX ORDER — CIPROFLOXACIN 2 MG/ML
400 INJECTION, SOLUTION INTRAVENOUS EVERY 12 HOURS
Status: DISCONTINUED | OUTPATIENT
Start: 2021-11-03 | End: 2021-11-05

## 2021-11-03 RX ADMIN — Medication 3 MG: at 21:57

## 2021-11-03 RX ADMIN — FOLIC ACID 1 MG: 1 TABLET ORAL at 12:56

## 2021-11-03 RX ADMIN — ESCITALOPRAM OXALATE 10 MG: 10 TABLET ORAL at 12:56

## 2021-11-03 RX ADMIN — HEPARIN SODIUM 5000 UNITS: 5000 INJECTION, SOLUTION INTRAVENOUS; SUBCUTANEOUS at 11:17

## 2021-11-03 RX ADMIN — CALCIUM CARBONATE 600 MG (1,500 MG)-VITAMIN D3 400 UNIT TABLET 1 TABLET: at 12:56

## 2021-11-03 RX ADMIN — GABAPENTIN 100 MG: 100 CAPSULE ORAL at 09:21

## 2021-11-03 RX ADMIN — GABAPENTIN 100 MG: 100 CAPSULE ORAL at 18:57

## 2021-11-03 RX ADMIN — RIFAXIMIN 550 MG: 550 TABLET ORAL at 18:57

## 2021-11-03 RX ADMIN — HYDROXYZINE HYDROCHLORIDE 50 MG: 25 TABLET, FILM COATED ORAL at 10:17

## 2021-11-03 RX ADMIN — THIAMINE HCL TAB 100 MG 100 MG: 100 TAB at 09:21

## 2021-11-03 RX ADMIN — Medication 2.5 MG: at 06:49

## 2021-11-03 RX ADMIN — ACETAMINOPHEN 650 MG: 325 TABLET, FILM COATED ORAL at 09:21

## 2021-11-03 RX ADMIN — GABAPENTIN 100 MG: 100 CAPSULE ORAL at 13:57

## 2021-11-03 RX ADMIN — POLYETHYLENE GLYCOL 3350 17 G: 17 POWDER, FOR SOLUTION ORAL at 09:21

## 2021-11-03 RX ADMIN — RIFAXIMIN 550 MG: 550 TABLET ORAL at 09:21

## 2021-11-03 RX ADMIN — MIDODRINE HYDROCHLORIDE 20 MG: 5 TABLET ORAL at 09:21

## 2021-11-03 RX ADMIN — MIRTAZAPINE 15 MG: 15 TABLET, FILM COATED ORAL at 21:57

## 2021-11-03 RX ADMIN — IRON SUCROSE 200 MG: 20 INJECTION, SOLUTION INTRAVENOUS at 16:32

## 2021-11-03 RX ADMIN — Medication 2.5 MG: at 18:57

## 2021-11-03 RX ADMIN — PANTOPRAZOLE SODIUM 40 MG: 40 TABLET, DELAYED RELEASE ORAL at 09:21

## 2021-11-03 RX ADMIN — CIPROFLOXACIN 400 MG: 2 INJECTION, SOLUTION INTRAVENOUS at 13:56

## 2021-11-03 RX ADMIN — Medication 1 CAPSULE: at 12:56

## 2021-11-03 RX ADMIN — HEPARIN SODIUM 5000 UNITS: 5000 INJECTION, SOLUTION INTRAVENOUS; SUBCUTANEOUS at 21:57

## 2021-11-03 RX ADMIN — Medication 100 MCG: at 12:57

## 2021-11-03 ASSESSMENT — ACTIVITIES OF DAILY LIVING (ADL)
ADLS_ACUITY_SCORE: 9
ADLS_ACUITY_SCORE: 11
ADLS_ACUITY_SCORE: 9
ADLS_ACUITY_SCORE: 11
ADLS_ACUITY_SCORE: 9
ADLS_ACUITY_SCORE: 11
ADLS_ACUITY_SCORE: 11
ADLS_ACUITY_SCORE: 9
ADLS_ACUITY_SCORE: 11
ADLS_ACUITY_SCORE: 11
ADLS_ACUITY_SCORE: 9
ADLS_ACUITY_SCORE: 9

## 2021-11-03 ASSESSMENT — MIFFLIN-ST. JEOR: SCORE: 1521.25

## 2021-11-03 NOTE — PROVIDER NOTIFICATION
Provider notified (name): Dr. Rhodes  Reason for notification: Pt's hemoglobin is 6.8, critically low  Recommendation/request given to provider: BLADIMIR  Response from provider: Transfuse 1 unit

## 2021-11-03 NOTE — PROGRESS NOTES
Brief Nephrology Note      Mrs Angeles's Cr continues to slowly improve, IR consulted and pulled her tunneled HD line today as she has recovered enough to not need HD for now.  I let her know it would be beneficial to follow up with CKD clinic long term, for now working on getting her to LTACH/Rehab.  I will follow her labs peripherally and am available for any renal questions.          MARIA ELENA Wade CNS  Clinical Nurse Specialist  [phone]

## 2021-11-03 NOTE — PLAN OF CARE
Assumed cares: [id number]  Vitals: VSS  Pain: Intermittently requesting pain medication but when going to administer medication pt refuses medication. Pt requested pain medication at 0645 and complied with administration of oxycodone for pain in chest area and headache, rating pain 6/10.   Neuro: A&O x4  Cardiac: WDL  Respiratory: WDL, diminished lower breath sounds  Skin: Upper extremities intact  IV/Drains: Right CVC Double lumen, R PIV  Behavior: Pt slept throughout most of the shift. Pt repeatedly requesting Benadryl despite recent administration.   Labs: Hemoglobin 6.8. Blood transfusion will be done by day shift staff.       Plan of Care: Continue to monitor pt and follow plan of care

## 2021-11-03 NOTE — PLAN OF CARE
Assumed cares [id number]    Vitals: VSS on RA.   Pain: C/o abdominal and back pain controlled with PRN oxy and scheduled tylenol.  Neuro: A&Ox4. Calls appropriately. Atarax given x1.   Cardiac: WDL. Intermittent tachycardia.   Respiratory: SWAN  GI/: BMx2. Voiding adequately.   IV/Drains: R PIV infusing TKO in between antibiotics.   Activity: Up with SBA to the commode/bathroom.   Skin: Blanchable redness on coccyx.   Labs: Hgb 6.8 this AM. 1 unit PRBCs transfused. Recheck 9.0.     Plan of Care: R chest CVC removed this AM. O2 discontinued. Awaiting TCU placement. Continue POC.

## 2021-11-03 NOTE — CONSULTS
"    Interventional Radiology Consult Service Note  11/03/21     Patient is on IR schedule 11/03/21 for a Tunneled Line Removal.   Labs WNL for procedure.   No NPO required.  Medications to be held include: None   Consent will be done prior to procedure.     Please contact the IR charge RN at [phone] for estimated time of procedure.     Case discussed with IR attending Dr. Joe Gupta.     This is a 46 yof w/hx of ETOH use (Quit 9/12/2021) w/hepatitis, Fitz en Y bypass, Pancreatitis, DM2, Panc cyst who was admitted 9/29 from chem dep with N/V and worsening abdominal pain and edema.  Her course has been resp failure with ARDS and evolving ANA LAURA now with ANA LAURA improving and catheter no longer needed.      Pertinent Imaging:  10/22/2021:  Tunneled HD catheter placement with non tunneled RIJ catheter removal:  Impression:   1. Uncomplicated removal of right-sided nontunneled dialysis catheter.  2. Placement of right IJ, 14.5 Mozambican, 19 cm, double lumen, tunneled  central venous catheter. Catheter is ready for immediate use.     Plan: The glued neck incision does not require dressings.  If the glue  is still adhered to the neck incision in 10 days, it may be gently  removed.  The chest incision should be kept clean, dry, and covered,  with daily dressing changes, for 3 days.  The catheter exit site  should not be submerged, and should be covered when showering/bathing.  The catheter retention suture may be removed in 2 weeks.    Expected date of discharge: TBD    Vitals:   /45 (BP Location: Left arm)   Pulse 103   Temp 99.2  F (37.3  C) (Oral)   Resp 18   Ht 1.626 m (5' 4.02\")   Wt 90.6 kg (199 lb 11.8 oz)   SpO2 95%   BMI 34.27 kg/m      Pertinent Labs:     Lab Results   Component Value Date    WBC 11.6 (H) 11/03/2021    WBC 13.4 (H) 11/02/2021    WBC 14.4 (H) 11/01/2021    WBC 8.3 04/08/2020    WBC 5.8 02/08/2020    WBC 15.2 (H) 02/06/2020     Lab Results   Component Value Date    HGB 6.8 11/03/2021    HGB 7.2 " 11/02/2021    HGB 7.9 11/01/2021    HGB 12.2 04/08/2020    HGB 10.7 02/08/2020    HGB 11.9 02/06/2020     Lab Results   Component Value Date     11/03/2021     11/02/2021     11/01/2021     04/08/2020     02/08/2020     02/06/2020     Lab Results   Component Value Date    INR 1.23 (H) 11/03/2021    INR 1.02 04/08/2020    PTT 44 (H) 10/07/2021    PTT 26 04/08/2020     Lab Results   Component Value Date    POTASSIUM 3.4 11/03/2021    POTASSIUM 4.6 04/10/2020        No results found for: HCGQUANT    COVID-19 Antibody Results, Testing for Immunity    COVID-19 Antibody Results, Testing for Immunity   No data to display.         COVID-19 PCR Results    COVID-19 PCR Results 4/13/21 7/31/21 8/8/21 9/12/21 9/13/21 9/29/21 10/4/21 10/12/21 10/19/21 10/27/21   COVID-19 Virus PCR to [facility] - Result             COVID-19 Virus PCR to [facility] - Source             COVID-19 Virus by PCR (External Result) Negative Negative Negative Negative         SARS-CoV-2 Virus Specimen Source             SARS-CoV-2 PCR Result             SARS CoV2 PCR     Negative Negative Negative Negative Negative Negative      Comments are available for some flowsheets but are not being displayed.             Melisa Garza PA-C  Interventional Radiology  Pager: [phone]

## 2021-11-03 NOTE — PLAN OF CARE
"Cares 1500 to 2300    /70 (BP Location: Left arm)   Pulse 98   Temp 97.6  F (36.4  C) (Oral)   Resp 20   Ht 1.626 m (5' 4.02\")   Wt 90.6 kg (199 lb 11.8 oz)   SpO2 96%   BMI 34.27 kg/m      VSS on 1L NC. Denied SOB. A&o x4. Uses call light appropriately. Endorsed nausea, zofran IV given. Pt ate fruit ice only from dinner tray, on oksana counts. Voided 2x. Iron sucrose IV given, no allergic reaction reported.     Continue to monitor pt.   "

## 2021-11-03 NOTE — PROGRESS NOTES
Calorie Count  Intake recorded for: 11/2  Total Kcals: 79 Total Protein: 0g  Kcals from Hospital Food: 79   Protein: 0g  Kcals from Outside Food (average):0 Protein: 0g  # Meals Ordered from Kitchen: 2 meals   # Meals Recorded: 100% fruit ice   # Supplements Recorded: 0

## 2021-11-03 NOTE — PROGRESS NOTES
[facility]    Medicine Progress Note - Hospitalist Service, Gold 6       Date of Admission:  9/29/2021    Assessment & Plan        Aliyah Angeles is a 46-year-old female with a history of alcohol use disorder, Fitz-en-Y gastric bypass (2010), DM2, anxiety, depression, who was initially admitted to internal medicine service with alcoholic hepatitis.  She transferred to MICU on 10/4/2021 with acute hypoxic respiratory failure secondary to ARDS requiring intubation. Hospitalization complicated by septic shock, pneumonia, ANA LAURA requiring CRRT and now iHD, encephalopathy. Self extubated on 10/14, weaned off pressors on 10/19, and transferred to general medical floor on 10/20.     Updates Today:   - Stop ceftriaxone - start ciprofloxacin for UTI  - Wean O2 as able  - Transfuse 1u pRBCs for low Hgb  - Monitor PO intake, calorie counts  - Tunneled line pulled by IR  - Wean oxycodone to q12h prn   - Stop midodrine and monitor BP. If significant hypotension, will resume.     Acute hypoxic respiratory failure, stable  ARDS, resolved  Hx of pneumonia (aspiration vs VAP) with ongoing SIRS after finishing Zosyn  Hx of pulmonary edema   Acute decline in respiratory status requiring intubation on 10/4, CXR c/w ARDS, likely secondary to HAP vs aspiration. BAL cultures negative. Treated with Zosyn 10/4 - 10/13. Self-extubated on 10/14. Developed increased work of breathing on 10/17, CXR demonstrated increased interstitial opacities concerning for pulmonary edema vs pneumonia. Managing volume with iHD, last run 10/29. Completed 10 day course of Zosyn 10/26 for VAP vs aspiration; however, WBC and CRP continue to be elevated (although now downtrending). Repeat CXR 10/27 with patchy bilateral opacities with overall improvement, otherwise unremarkable. Repeat COVID neg and BCs NGTD. Diagnostic para for 10/28 with 1,658 nucleated cells, however only 7% neutrophils and monocyte/macrophage  predominance. Remains afebrile.  - Continue to trend WBC  - Incentive spirometry  - OOB at least TID  - Continuous pulse oximetry. Wean O2 as able.      ANA LAURA  Hypervolemia  Baseline Cr ~0.5. Cr to peak of 2.98. Decline in renal function most likely secondary to ATN (hypotension, medications, contrast), possible component of HRS. On CRRT 10/7 and transitioned to iHD on 10/13. Cr improving with improvement in UO. Tunneled line pulled by IR 11/3.  - Nephrology following peripherally  - Recommended follow-up with CKD clinic long term  - Trending Cr  - Strict I/Os  - Daily weights  - Lymphedema team consulted     UTI  Reported increase urinary frequency, though making minimal urine. No dysuria or suprapubic pian. Has had ongoing leukocytosis, though improving. UA with 16 WBC, small LE, few bacteria, however squamous epithelial cells present concerning for contamination. Nephrology recommending treatment given symptoms and potential secondary improvement in renal function. UC with 10-50k Enterobacter cloacae complex resistant to ceftriaxone.  - Stop ceftriaxone due to resistance  - Start IV ciprofloxacin 400 mg q12h x 5 days  - Continue bladder scanning      Alcohol hepatitis  Recently admitted in September with ETOH hepatitis, started on rifaximin and lactulose per Hepatology recs. Now re-presented with ETOH hepatitis, Maddrey score 13 on admission. Acute hepatitis c/b portal hypertension with ascites. Diagnostic mane negative for SBP. Tbili, INR, AST/ALT, platelets stable. MELD 16.  - Diagnostic para as above  - Rifaximin BID  - Trend CMP, INR     Toxic metabolic encephalopathy, resolved  Multifactorial secondary to acute illness, sedating medications, HE. Psychiatry consulted 10/15. Previously on seroquel now discontinued. Remains fully A&O today.    - Delirium precautions   - Minimize sedating medications     Abdominal pain, improved: Secondary to alcohol hepatitis, gas pains. Will wean oxycodone as only using 2-3x  daily for past couple days.   - Tylenol TID   - Oxycodone 2.5 mg q8h PRN --> q12h prn   - Simethicone PRN     Severe malnutrition in the context of acute on chronic illness  Dysphagia   In the context of acute on chronic illness. Started on TF 10/5. Now more awake and interested in eating but not feeling hungry, transitioned to cycled feeds on 10/21 but NJ clogged and removed on 10/23.   - Nutrition and SLP consulted; appreciate following.   - Calorie counts   - Continue regular diet per speech     DM2: A1C 9.1. PTA on metformin. Transitioned to insulin due to hyperglycemia while on tube feeds. Lantus and metformin on hold. BGs stable.   - MSSI    - BG checks QID and at bedtime    - Hypoglycemia protocol     Acute on chronic macrocytic anemia:  Likely secondary to alcohol use, alcohol hepatitis, nutritional deficiencies. Iron sats low. Intermittently requiring PRBC transfusions since admission (10/7, 10/12, 10/16). No e/o active bleeding.   - Transfuse 1u pRBC  - Recheck Hgb  - IV iron   - CBC in AM  - Transfuse for Hgb <7     Alcohol use disorder: Last drink on 9/12/21. PTA was at CD treatment facility.    - Patient interested in resuming CD treatment, will need to re-evaluate this closer to discharge date, at this time PT/OT recommending ARU   - Continue folic acid, thiamine, MVI supplements     Hypokalemia  In setting of poor po intake.  - Replace as needed     Hypomagnesemia - replace per protocol    Septic shock, resolved: Shock most likely secondary to sepsis with hepatic dysfunction contributing as well. TTE with hyperdynamic LVEF, no RV dysfunction. Weaned off pressors on 10/19.   - Stop midodrine and monitor BP      Other Stable Medical Issues:   Coagulopathy: INR to peak of 2.5 on 10/4, now much improved. Likely secondary to known severe hepatic steatosis.    MDD, RUBÉN: Continue PTA Lexapro. Prazosin on hold d/t hypotension, resume as able. Health Psychology consulted 10/1, re-involve PRN.   GERD: Continue  PPI.   Neuropathy: Continue PTA gabapentin.          Diet: Snacks/Supplements Adult: Other; see below:; Between Meals  Snacks/Supplements Adult: Other; Ensure plant base; With Meals  High Kcal/High Protein Diet, ADULT  Calorie Counts    DVT Prophylaxis: Heparin SQ  Rice Catheter: Not present  Central Lines: None  Code Status: Full Code      Disposition Plan   Expected discharge: 11/05/2021   recommended to ARU once hemoglobin stable, renal function improved, safe disposition plan/ TCU bed available and O2 needs at baseline.     The patient's care was discussed with the Attending Physician, Dr. Degroot, Bedside Nurse, Care Coordinator/ and Patient.    Kaila Butler PA-C  Hospitalist Service, [address]  Securely message with the Vocera Web Console (learn more here)  Text page via [employer] Paging/Directory    Please see sign in/sign out for up to date coverage information    Clinically Significant Risk Factors Present on Admission                ______________________________________________________________________    Interval History   Hgb 6.8 this morning. No evidence of active bleeding. Denies melena/hematochezia. Remains on 1L O2 but not feeling short of breath. Reports that she desats when she takes it off while eating. Had tunneled line pulled this morning which went well. Discussed her PO intake - states it is always poor and she is eating at her baseline.    Discussed need for COVID vaccination for ARU - patient declines this due to fear of getting complications or sick from the vaccine. Also noting that it does not prevent you from getting COVID. Educated her that given her co-morbidities, we would recommend vaccination for her to prevent severity of illness as if she were to need to be admitted to the ICU in the future, suspect it would be poor prognosis. She was going to think about this and wanted to talk to social work.    4 point  ROS is otherwise negative.    Data reviewed today: I reviewed all medications, new labs and imaging results over the last 24 hours. I personally reviewed no images or EKG's today.    Physical Exam   Vital Signs: Temp: 99.2  F (37.3  C) Temp src: Oral BP: 107/45 Pulse: 103   Resp: 18 SpO2: 95 % O2 Device: Nasal cannula Oxygen Delivery: 1 LPM  Weight: 199 lbs 11.79 oz  General Appearance:  Awake. Alert and oriented x 3. No acute distress, sitting up in chair.  Respiratory: Normal work of breathing on 1L via NC. Lungs CTAB. No wheezes, rhonchi or rales.  Cardiovascular: RRR. S1, S2. No murmurs, rubs or gallops. Pedal pulses 2+ 1-2+ bilateral lower extremity edema.  GI: Abdomen non-distended, normoactive bowel sounds. Soft, mild tenderness throughout. No rebounding or guarding.  Skin: Warm, dry. No rashes on exposed skin.    Data   Recent Labs   Lab 11/03/21  1418 11/03/21  1241 11/03/21  0809 11/03/21  0438 11/03/21  0405 11/02/21  0846 11/02/21  0537 11/02/21  0537 11/01/21  1718 11/01/21  1507 11/01/21  1146 11/01/21  0633 10/31/21  0641 10/31/21  0438 10/28/21  0551 10/28/21  0446   WBC  --   --   --  11.6*  --   --   --  13.4*  --   --   --  14.4*   < >  --    < >  --    HGB 9.0*  --   --  6.8*  --   --   --  7.2*  --   --   --  7.9*   < >  --    < >  --    MCV  --   --   --  101*  --   --   --  101*  --   --   --  101*   < >  --    < >  --    PLT  --   --   --  217  --   --   --  201  --   --   --  213   < >  --    < >  --    INR  --   --   --   --  1.23*  --   --   --   --   --   --   --   --  1.24*  --  1.16*   NA  --   --   --  137  --   --   --  136  --   --   --  136   < >  --    < >  --    POTASSIUM  --   --   --  3.4  --   --   --  3.7  --  3.8  --  3.3*   < >  --    < >  --    CHLORIDE  --   --   --  105  --   --   --  104  --   --   --  104   < >  --    < >  --    CO2  --   --   --  25  --   --   --  25  --   --   --  26   < >  --    < >  --    BUN  --   --   --  14  --   --   --  15  --   --   --  15   <  >  --    < >  --    CR  --   --   --  1.41*  --   --   --  1.48*  --   --   --  1.53*   < >  --    < >  --    ANIONGAP  --   --   --  7  --   --   --  7  --   --   --  6   < >  --    < >  --    STEFFANY  --   --   --  8.0*  --   --   --  7.8*  --   --   --  8.1*   < >  --    < >  --    GLC  --  116* 116* 113*  --    < >   < > 113*   < >  --    < > 112*   < >  --    < >  --    ALBUMIN  --   --   --  1.5*  --   --   --  1.5*  --   --   --  1.5*   < >  --    < >  --    PROTTOTAL  --   --   --  5.5*  --   --   --  5.4*  --   --   --  5.7*   < >  --    < >  --    BILITOTAL  --   --   --  1.8*  --   --   --  2.0*  --   --   --  2.3*   < >  --    < >  --    ALKPHOS  --   --   --  214*  --   --   --  227*  --   --   --  254*   < >  --    < >  --    ALT  --   --   --  33  --   --   --  36  --   --   --  38   < >  --    < >  --    AST  --   --   --  83*  --   --   --  97*  --   --   --  108*   < >  --    < >  --     < > = values in this interval not displayed.

## 2021-11-03 NOTE — PROGRESS NOTES
Care Management Follow Up    Length of Stay (days): 35    Expected Discharge Date: 11/05/2021     Concerns to be Addressed: substance/tobacco abuse/use, discharge planning     Patient plan of care discussed at interdisciplinary rounds: Yes    Anticipated Discharge Disposition: Inpatient Chemical Dependency, Transitional Care     Anticipated Discharge Services: None  Anticipated Discharge DME: None    Patient/family educated on Medicare website which has current facility and service quality ratings: yes  Education Provided on the Discharge Plan: Yes  Patient/Family in Agreement with the Plan: yes    Referrals Placed by CM/SW:      [id number]    [facility] (Amelia - Ph: [phone], F: [phone])  11/3- referrals sent to smoking [facility]  [address] [facility] [address]  Phone: [phone]  Fax: [phone]  10/29- per admissions, unable to accept due to staffing     [facility]  [address] [facility] [address]  Phone: [phone]  Fax: [phone]  10/29- declined, no available bed     [facility]  [address] [address]  Phone: [phone]  Fax: [phone]  10/28- per admissions, no beds until at least next week. If pt still needs placement at that time, referral can be resent.      [facility]  Ph: [phone]  F: [phone]  10/26- LVM, voicemail states they are not taking any admissions currently.      [facility]  [address]  [place] [facility] [address]  Phone: [phone]  Fax: [phone]  10/28- no beds available      [facility]  [address] [facility] [address]  Phone: [phone]  Fax: [phone]  10/28- no bed, also declined due to ETOH and smoking      [facility]  [address]  [place] [address]  Phone: [phone]  Fax: [phone]  10/28- no appropriate bed, per admissions      [facility]  [address]  [facility] [address]  Phone: [phone]  Admissions: [phone]  Fax: [phone]  10/28- no beds available, pt placed on wait list     [facility]  [address] [facility] [address]  Phone: [phone]  Fax: [phone]  10/28- declined, no beds available      [facility]  Ph: [phone]  F: [phone]  10/26- per Hawa in admissions, no bed availability for the forseeable future.      [facility]  Ph: [phone]  F: [phone]  10/25- per admissions, no SNF beds for the for seeable further and [facility] does not cover swing bed.     [facility] (Amelia - Ph: [phone], F: [phone])  10/26- per vivi Cisneros, no beds available at this time     Private pay costs discussed: Not applicable    Additional Information:  SW following for TCU/ARU placement. Spoke with Kelly at  ARU, who stated that pt looks appropriate for their facility. Pt was originally agreeable to ARU, however, upon learning that she could not smoke at their facility, stated that she would have to think about it. Pt also requested referrals be sent to TCU's that allow smoking. There is also a question of whether pt could discharge directly to inpatient chemical dependency treatment or would have to go home first. In speaking with KAMLESH Mantilla who sent IP CD referrals for pt on 9/30, pt would need an updated Rule 25 assessment, which she could either get inpatient here if she plans to go to chem dep within 30 days, or could follow up with their outpatient clinic. Tia also stated that pt had previously been accepted at [facility], but told Tia she did not want to go to that facility because their smoking area was too far away from where pt would be located.     SW will continue to follow for discharge placement. Sent additional TCU referrals to smoking-accepted facilities.     JOY Reed, [facility]  [address]   [facility]  [place]  Phone: [phone]  Pager: [phone]

## 2021-11-03 NOTE — PROCEDURES
Interventional Radiology Brief Post Procedure Note    Procedure: IR Tunneled Line Removal-RIGHT    Proceduralist: Melisa Garza PA-C     Time Out: Prior to the start of the procedure and with procedural staff participation, I verbally confirmed the patient s identity using two indicators, relevant allergies, that the procedure was appropriate and matched the consent or emergent situation, and that the correct equipment/implants were available. Immediately prior to starting the procedure I conducted the Time Out with the procedural staff and re-confirmed the patient s name, procedure, and site/side. (The Joint Commission universal protocol was followed.)  Yes    Sedation: None.     Findings: Removal of RIGHT tunneled catheter in its entirety.      Estimated Blood Loss: Minimal    SPECIMENS: None    Complications: 1. None     Condition: Stable    Plan: Recovery in bed.      Comments: See dictated procedure note for full details.    Melisa Garza PA-C

## 2021-11-04 ENCOUNTER — APPOINTMENT (OUTPATIENT)
Dept: OCCUPATIONAL THERAPY | Facility: CLINIC | Age: 46
End: 2021-11-04
Payer: COMMERCIAL

## 2021-11-04 ENCOUNTER — APPOINTMENT (OUTPATIENT)
Dept: PHYSICAL THERAPY | Facility: CLINIC | Age: 46
End: 2021-11-04
Payer: COMMERCIAL

## 2021-11-04 LAB
ALBUMIN SERPL-MCNC: 1.6 G/DL (ref 3.4–5)
ALP SERPL-CCNC: 208 U/L (ref 40–150)
ALT SERPL W P-5'-P-CCNC: 34 U/L (ref 0–50)
ANION GAP SERPL CALCULATED.3IONS-SCNC: 6 MMOL/L (ref 3–14)
AST SERPL W P-5'-P-CCNC: 76 U/L (ref 0–45)
BACTERIA FLD CULT: NORMAL
BILIRUB SERPL-MCNC: 1.8 MG/DL (ref 0.2–1.3)
BUN SERPL-MCNC: 12 MG/DL (ref 7–30)
CALCIUM SERPL-MCNC: 7.8 MG/DL (ref 8.5–10.1)
CHLORIDE BLD-SCNC: 105 MMOL/L (ref 94–109)
CO2 SERPL-SCNC: 25 MMOL/L (ref 20–32)
CREAT SERPL-MCNC: 1.42 MG/DL (ref 0.52–1.04)
CRP SERPL-MCNC: 58 MG/L (ref 0–8)
ERYTHROCYTE [DISTWIDTH] IN BLOOD BY AUTOMATED COUNT: 19.2 % (ref 10–15)
GFR SERPL CREATININE-BSD FRML MDRD: 44 ML/MIN/1.73M2
GLUCOSE BLD-MCNC: 118 MG/DL (ref 70–99)
GLUCOSE BLDC GLUCOMTR-MCNC: 108 MG/DL (ref 70–99)
GLUCOSE BLDC GLUCOMTR-MCNC: 116 MG/DL (ref 70–99)
GLUCOSE BLDC GLUCOMTR-MCNC: 127 MG/DL (ref 70–99)
GLUCOSE BLDC GLUCOMTR-MCNC: 133 MG/DL (ref 70–99)
HCT VFR BLD AUTO: 25.4 % (ref 35–47)
HGB BLD-MCNC: 7.9 G/DL (ref 11.7–15.7)
MAGNESIUM SERPL-MCNC: 1.9 MG/DL (ref 1.6–2.3)
MCH RBC QN AUTO: 30.4 PG (ref 26.5–33)
MCHC RBC AUTO-ENTMCNC: 31.1 G/DL (ref 31.5–36.5)
MCV RBC AUTO: 98 FL (ref 78–100)
PHOSPHATE SERPL-MCNC: 3.5 MG/DL (ref 2.5–4.5)
PLATELET # BLD AUTO: 195 10E3/UL (ref 150–450)
POTASSIUM BLD-SCNC: 3.2 MMOL/L (ref 3.4–5.3)
PROT SERPL-MCNC: 5.6 G/DL (ref 6.8–8.8)
RBC # BLD AUTO: 2.6 10E6/UL (ref 3.8–5.2)
SODIUM SERPL-SCNC: 136 MMOL/L (ref 133–144)
WBC # BLD AUTO: 9.8 10E3/UL (ref 4–11)

## 2021-11-04 PROCEDURE — 97535 SELF CARE MNGMENT TRAINING: CPT | Mod: GO

## 2021-11-04 PROCEDURE — 250N000013 HC RX MED GY IP 250 OP 250 PS 637: Performed by: PEDIATRICS

## 2021-11-04 PROCEDURE — 97140 MANUAL THERAPY 1/> REGIONS: CPT | Mod: GO | Performed by: OCCUPATIONAL THERAPIST

## 2021-11-04 PROCEDURE — 36415 COLL VENOUS BLD VENIPUNCTURE: CPT | Performed by: PHYSICIAN ASSISTANT

## 2021-11-04 PROCEDURE — 250N000011 HC RX IP 250 OP 636: Performed by: CLINICAL NURSE SPECIALIST

## 2021-11-04 PROCEDURE — 97116 GAIT TRAINING THERAPY: CPT | Mod: GP

## 2021-11-04 PROCEDURE — 250N000013 HC RX MED GY IP 250 OP 250 PS 637: Performed by: PHYSICIAN ASSISTANT

## 2021-11-04 PROCEDURE — 99207 PR APP CREDIT; MD BILLING SHARED VISIT: CPT | Performed by: PHYSICIAN ASSISTANT

## 2021-11-04 PROCEDURE — 85014 HEMATOCRIT: CPT | Performed by: PHYSICIAN ASSISTANT

## 2021-11-04 PROCEDURE — 250N000011 HC RX IP 250 OP 636: Performed by: INTERNAL MEDICINE

## 2021-11-04 PROCEDURE — 83735 ASSAY OF MAGNESIUM: CPT | Performed by: PHYSICIAN ASSISTANT

## 2021-11-04 PROCEDURE — 84100 ASSAY OF PHOSPHORUS: CPT | Performed by: PHYSICIAN ASSISTANT

## 2021-11-04 PROCEDURE — 250N000013 HC RX MED GY IP 250 OP 250 PS 637: Performed by: INTERNAL MEDICINE

## 2021-11-04 PROCEDURE — 97530 THERAPEUTIC ACTIVITIES: CPT | Mod: GP

## 2021-11-04 PROCEDURE — 258N000003 HC RX IP 258 OP 636: Performed by: CLINICAL NURSE SPECIALIST

## 2021-11-04 PROCEDURE — 250N000011 HC RX IP 250 OP 636: Performed by: PHYSICIAN ASSISTANT

## 2021-11-04 PROCEDURE — 86140 C-REACTIVE PROTEIN: CPT | Performed by: PHYSICIAN ASSISTANT

## 2021-11-04 PROCEDURE — 250N000013 HC RX MED GY IP 250 OP 250 PS 637: Performed by: STUDENT IN AN ORGANIZED HEALTH CARE EDUCATION/TRAINING PROGRAM

## 2021-11-04 PROCEDURE — 120N000002 HC R&B MED SURG/OB UMMC

## 2021-11-04 PROCEDURE — 99232 SBSQ HOSP IP/OBS MODERATE 35: CPT | Performed by: INTERNAL MEDICINE

## 2021-11-04 PROCEDURE — 80053 COMPREHEN METABOLIC PANEL: CPT | Performed by: PHYSICIAN ASSISTANT

## 2021-11-04 RX ORDER — POLYETHYLENE GLYCOL 3350 17 G
2 POWDER IN PACKET (EA) ORAL
Status: DISCONTINUED | OUTPATIENT
Start: 2021-11-04 | End: 2021-11-05 | Stop reason: HOSPADM

## 2021-11-04 RX ORDER — POTASSIUM CHLORIDE 750 MG/1
40 TABLET, EXTENDED RELEASE ORAL ONCE
Status: COMPLETED | OUTPATIENT
Start: 2021-11-04 | End: 2021-11-04

## 2021-11-04 RX ADMIN — Medication 3 MG: at 21:51

## 2021-11-04 RX ADMIN — Medication 1 CAPSULE: at 14:06

## 2021-11-04 RX ADMIN — Medication 2.5 MG: at 06:53

## 2021-11-04 RX ADMIN — HEPARIN SODIUM 5000 UNITS: 5000 INJECTION, SOLUTION INTRAVENOUS; SUBCUTANEOUS at 11:03

## 2021-11-04 RX ADMIN — RIFAXIMIN 550 MG: 550 TABLET ORAL at 11:03

## 2021-11-04 RX ADMIN — ACETAMINOPHEN 650 MG: 325 TABLET, FILM COATED ORAL at 02:01

## 2021-11-04 RX ADMIN — GABAPENTIN 100 MG: 100 CAPSULE ORAL at 09:01

## 2021-11-04 RX ADMIN — ESCITALOPRAM OXALATE 10 MG: 10 TABLET ORAL at 14:06

## 2021-11-04 RX ADMIN — ACETAMINOPHEN 650 MG: 325 TABLET, FILM COATED ORAL at 09:01

## 2021-11-04 RX ADMIN — HYDROXYZINE HYDROCHLORIDE 50 MG: 25 TABLET, FILM COATED ORAL at 17:35

## 2021-11-04 RX ADMIN — RIFAXIMIN 550 MG: 550 TABLET ORAL at 20:25

## 2021-11-04 RX ADMIN — PANTOPRAZOLE SODIUM 40 MG: 40 TABLET, DELAYED RELEASE ORAL at 11:03

## 2021-11-04 RX ADMIN — POTASSIUM CHLORIDE 40 MEQ: 750 TABLET, EXTENDED RELEASE ORAL at 09:01

## 2021-11-04 RX ADMIN — GABAPENTIN 100 MG: 100 CAPSULE ORAL at 20:25

## 2021-11-04 RX ADMIN — FOLIC ACID 1 MG: 1 TABLET ORAL at 14:06

## 2021-11-04 RX ADMIN — THIAMINE HCL TAB 100 MG 100 MG: 100 TAB at 11:03

## 2021-11-04 RX ADMIN — CIPROFLOXACIN 400 MG: 2 INJECTION, SOLUTION INTRAVENOUS at 14:11

## 2021-11-04 RX ADMIN — MIRTAZAPINE 15 MG: 15 TABLET, FILM COATED ORAL at 21:51

## 2021-11-04 RX ADMIN — CIPROFLOXACIN 400 MG: 2 INJECTION, SOLUTION INTRAVENOUS at 01:59

## 2021-11-04 RX ADMIN — Medication 100 MCG: at 14:06

## 2021-11-04 RX ADMIN — CALCIUM CARBONATE 600 MG (1,500 MG)-VITAMIN D3 400 UNIT TABLET 1 TABLET: at 14:06

## 2021-11-04 RX ADMIN — ACETAMINOPHEN 650 MG: 325 TABLET, FILM COATED ORAL at 17:36

## 2021-11-04 RX ADMIN — GABAPENTIN 100 MG: 100 CAPSULE ORAL at 14:06

## 2021-11-04 RX ADMIN — POLYETHYLENE GLYCOL 3350 17 G: 17 POWDER, FOR SOLUTION ORAL at 09:01

## 2021-11-04 RX ADMIN — IRON SUCROSE 200 MG: 20 INJECTION, SOLUTION INTRAVENOUS at 17:38

## 2021-11-04 RX ADMIN — HEPARIN SODIUM 5000 UNITS: 5000 INJECTION, SOLUTION INTRAVENOUS; SUBCUTANEOUS at 21:55

## 2021-11-04 ASSESSMENT — ACTIVITIES OF DAILY LIVING (ADL)
ADLS_ACUITY_SCORE: 9
ADLS_ACUITY_SCORE: 11
ADLS_ACUITY_SCORE: 9
ADLS_ACUITY_SCORE: 11
ADLS_ACUITY_SCORE: 9
ADLS_ACUITY_SCORE: 11

## 2021-11-04 ASSESSMENT — MIFFLIN-ST. JEOR: SCORE: 1522.25

## 2021-11-04 NOTE — PROGRESS NOTES
Calorie Count  Intake recorded for: 11/3  Total Kcals: 892 Total Protein: 30g  Kcals from Hospital Food: 892  Protein: 30g  Kcals from Outside Food (average):0 Protein: 0g  # Meals Ordered from Kitchen: 3 meals   # Meals Recorded: 3 meals (First - 100% hard boiled egg, pudding, 50% pears)      (Second - 100% fruit ice, sherbet)      (Third - 100% whole milk, diet sprite, 50% cheese pizza, fruit ice)  # Supplements Recorded: 0

## 2021-11-04 NOTE — PROGRESS NOTES
Care Management Follow Up    Length of Stay (days): 36    Expected Discharge Date: 11/05/2021     Concerns to be Addressed: substance/tobacco abuse/use, discharge planning     Patient plan of care discussed at interdisciplinary rounds: Yes    Anticipated Discharge Disposition: Inpatient Chemical Dependency, Transitional Care     Anticipated Discharge Services: None  Anticipated Discharge DME: None    Patient/family educated on Medicare website which has current facility and service quality ratings: yes  Education Provided on the Discharge Plan: Yes  Patient/Family in Agreement with the Plan: yes    Referrals Placed by CM/SW:      [facility]  *[phone]    Inpatient chemical dependency [facility] ph: [phone]    Private pay costs discussed: Not applicable    Additional Information:  SW following for ARU and chemical dependency placement. Spoke with pt in her room today, updated pt that this writer has not heard back from any TCU near her home re: acceptance, and TCU's located farther out have declined pt based on her vaccine status, pt wanting a smoking facility, or lack of bed availability. YANDY advised that at this time, pt's only option for rehab is [facility], which pt was previously hesitant to go to due to their strict non-smoking rule. Pt stated that she is now agreeable to [facility] and would still like to admit directly to an inpatient chemical dependency program after this, if possible. Pt would like to return to [facility], located in [place], where she received chem dep treatment previously.     YANDY left message for Lorena in admissions at [facility] ([phone]). Pt will need a new Rule 25 assessment, new chem dep consult placed.     YANDY updated Maria Elena in [facility] admissions.     SW will continue to follow for discharge placement.     JOY Reed, LICSW  [address]   [facility]al  Phone: [phone]  Pager: [phone]

## 2021-11-04 NOTE — PLAN OF CARE
Pt alert and oriented x4. C/o back pain. PRN oxycodone administered x1 with effect. VSS on RA. Up to BSC with assist of 1. HGB 9 after transfusion. Continue to assess pain and effectiveness of interventions. Awaiting placement.

## 2021-11-04 NOTE — PLAN OF CARE
Assumed Cares: 9830-8042  Vital signs:  VSS on RA  Neuro: A&Ox4.   Behavior: Calm/cooperative.   GI: No BM this shift.   : Voiding spontaneously in bedside commode.   Respiratory: Dyspnea on exertion.   Cardiac: Denies chest pain.   Skin: No acute deficits noted.   Lines/Drains: PIV TKO.   Mobility: Up with SBA to bedside commode.   Pain: Reported pain in back. Received scheduled tylenol and PRN oxycodone x1.   Plan of care: Continue with current plan of care and update provider as needed.

## 2021-11-04 NOTE — PROGRESS NOTES
[facility]    Medicine Progress Note - Hospitalist Service, Gold 6       Date of Admission:  9/29/2021    Assessment & Plan      Aliyah Angeles is a 46-year-old female with a history of alcohol use disorder, Fitz-en-Y gastric bypass (2010), DM2, anxiety, depression, who was initially admitted to internal medicine service with alcoholic hepatitis.  She transferred to MICU on 10/4/2021 with acute hypoxic respiratory failure secondary to ARDS requiring intubation. Hospitalization complicated by septic shock, pneumonia, ANA LAURA requiring CRRT and now iHD, encephalopathy. Self extubated on 10/14, weaned off pressors on 10/19, and transferred to general medical floor on 10/20.     Updates Today:   - Continue IV ciprofloxacin for UTI - will transition to PO at discharge  - Monitor PO intake, calorie counts  - Stop PRN oxycodone  - Potassium chloride 40 mEq x 1 today  - Chemical Dependency consulted for assistance with CD treatment     Acute hypoxic respiratory failure, stable  ARDS, resolved  Hx of pneumonia (aspiration vs VAP) with ongoing SIRS after finishing Zosyn  Hx of pulmonary edema   Acute decline in respiratory status requiring intubation on 10/4, CXR c/w ARDS, likely secondary to HAP vs aspiration. BAL cultures negative. Treated with Zosyn 10/4 - 10/13. Self-extubated on 10/14. Developed increased work of breathing on 10/17, CXR demonstrated increased interstitial opacities concerning for pulmonary edema vs pneumonia. Managing volume with iHD, last run 10/29. Completed 10 day course of Zosyn 10/26 for VAP vs aspiration; WBC and CRP remained elevated. Repeat CXR 10/27 with patchy bilateral opacities with overall improvement, otherwise unremarkable. Repeat COVID neg and BCs NGTD. Diagnostic para for 10/28 with 1,658 nucleated cells, however only 7% neutrophils and monocyte/macrophage predominance. Remains afebrile. WBC improving on UTI tx.   - Continue to trend WBC  -  Incentive spirometry  - OOB at least TID  - Continuous pulse oximetry. Wean O2 as able.      ANA LAURA  Hypervolemia  Baseline Cr ~0.5. Cr to peak of 2.98. Decline in renal function most likely secondary to ATN (hypotension, medications, contrast), possible component of HRS. On CRRT 10/7 and transitioned to iHD on 10/13. Cr improving with improvement in UO. Tunneled line pulled by IR 11/3.  - Nephrology following peripherally  - Recommended follow-up with CKD clinic long term  - Trending Cr  - Strict I/Os  - Daily weights  - Lymphedema team consulted     UTI  Reported increase urinary frequency, though making minimal urine. No dysuria or suprapubic pian. Has had ongoing leukocytosis, though improving. UA with 16 WBC, small LE, few bacteria, however squamous epithelial cells present concerning for contamination. Nephrology recommending treatment given symptoms and potential secondary improvement in renal function. Started on ceftriaxone. UC with 10-50k Enterobacter cloacae complex resistant to ceftriaxone and switched to ciprofloxacin.  - Continue IV ciprofloxacin 400 mg q12h x 5 days  - Continue bladder scanning      Alcohol hepatitis  Recently admitted in September with ETOH hepatitis, started on rifaximin and lactulose per Hepatology recs. Now re-presented with ETOH hepatitis, Maddrey score 13 on admission. Acute hepatitis c/b portal hypertension with ascites. Diagnostic mane negative for SBP. Tbili, INR, AST/ALT, platelets stable. MELD 16.  - Diagnostic para as above  - Rifaximin BID  - Trend CMP, INR     Toxic metabolic encephalopathy, resolved  Multifactorial secondary to acute illness, sedating medications, HE. Psychiatry consulted 10/15. Previously on seroquel now discontinued. Remains fully A&O today.    - Delirium precautions   - Minimize sedating medications     Abdominal pain, improved: Secondary to alcohol hepatitis, gas pains. PRN oxycodone weaned as pain improved.   - Tylenol TID   - Discontinue prn  oxycodone   - Simethicone PRN     Severe malnutrition in the context of acute on chronic illness  Dysphagia  Hx of jaimie-en-y   In the context of acute on chronic illness. Started on TF 10/5. Now more awake and interested in eating but not feeling hungry, transitioned to cycled feeds on 10/21 but NJ clogged and removed on 10/23.   - Nutrition and SLP consulted; appreciate following.   - Calorie counts   - Continue regular diet per speech     DM2: A1C 9.1. PTA on metformin. Transitioned to insulin due to hyperglycemia while on tube feeds. Lantus and metformin on hold. BGs stable.   - MSSI    - BG checks QID and at bedtime    - Hypoglycemia protocol     Acute on chronic macrocytic anemia:  Likely secondary to alcohol use, alcohol hepatitis, nutritional deficiencies. Iron sats low. Intermittently requiring PRBC transfusions since admission (10/7, 10/12, 10/16, 11/3). No e/o active bleeding.   - Recheck Hgb  - IV iron x 5 days  - CBC in AM  - Transfuse for Hgb <7     Alcohol use disorder: Last drink on 9/12/21. PTA was at CD treatment facility. Patient interested in resuming CD treatment.  - CD consulted for treatment after ARU   - Continue folic acid, thiamine, MVI supplements     Hypokalemia  In setting of poor po intake.  - Replace as needed     Hypomagnesemia - replace per protocol     Septic shock, resolved: Shock most likely secondary to sepsis with hepatic dysfunction contributing as well. TTE with hyperdynamic LVEF, no RV dysfunction. Weaned off pressors on 10/19. Previously on midodrine TID - stopped without significant hypotension    Tobacco use disorder  Smokes 1 cigarette daily. Ordered PRN nicotine lozenges to help with cravings.     Other Stable Medical Issues:   Coagulopathy: INR to peak of 2.5 on 10/4, now much improved. Likely secondary to known severe hepatic steatosis.    MDD, RUBÉN: Continue PTA Lexapro. Prazosin on hold d/t hypotension, suspect this will need to be held indefinitely. Health Psychology  consulted 10/1, re-involve PRN.   GERD: Continue PPI.   Neuropathy: Continue PTA gabapentin.        Diet: Snacks/Supplements Adult: Other; see below:; Between Meals  Snacks/Supplements Adult: Other; Ensure plant base; With Meals  High Kcal/High Protein Diet, ADULT  Calorie Counts    DVT Prophylaxis: Heparin SQ  Rice Catheter: Not present  Central Lines: None  Code Status: Full Code      Disposition Plan   Expected discharge: 11/05/2021   recommended to ARU once safe disposition plan/ TCU bed available.     The patient's care was discussed with the Attending Physician, Dr. Degroot, Bedside Nurse, Care Coordinator/ and Patient.    Kaila Butler PA-C  Hospitalist Service, [address]  Securely message with the Vocera Web Console (learn more here)  Text page via [employer] Paging/Directory    Please see sign in/sign out for up to date coverage information    Clinically Significant Risk Factors Present on Admission                ______________________________________________________________________    Interval History   No acute events overnight. Hgb responded appropriately to transfusion yesterday. She is less nauseous again this morning though has a slightly upset stomach after eating a pudding on empty stomach. Continues to have improved urine output. Slept with her arm funny last night and took prn oxycodone for the pain this morning. Discussed recommendations for ARU and her hesitancy due to desire to smoke. She has only been smoking 1 cigarette daily while inpatient due to craving. Open to using nicotine lozenges for this.    4 point ROS is otherwise negative.    Data reviewed today: I reviewed all medications, new labs and imaging results over the last 24 hours. I personally reviewed no images or EKG's today.    Physical Exam   Vital Signs: Temp: 98.7  F (37.1  C) Temp src: Oral BP: 94/50 Pulse: 92   Resp: 18 SpO2: 95 % O2 Device: None (Room  air)    Weight: 198 lbs 10.15 oz    General Appearance:  Awake. Alert and oriented x 3. No acute distress, sitting up in bed.  Respiratory: Normal work of breathing on room air. Lungs CTAB. No wheezes, rhonchi or rales.  Cardiovascular: RRR. S1, S2. No murmurs, rubs or gallops. Pedal pulses 2+ 1-2+ bilateral lower extremity edema.  GI: Abdomen non-distended, normoactive bowel sounds. Soft, mild tenderness throughout. No rebounding or guarding.  Skin: Warm, dry. No rashes on exposed skin    Data   Recent Labs   Lab 11/04/21  1152 11/04/21  0802 11/04/21  0608 11/03/21  1806 11/03/21  1418 11/03/21  0809 11/03/21  0438 11/03/21  0405 11/02/21  0846 11/02/21  0537 11/02/21  0537 10/31/21  0641 10/31/21  0438   WBC  --   --  9.8  --   --   --  11.6*  --   --   --  13.4*   < >  --    HGB  --   --  7.9*  --  9.0*  --  6.8*  --   --    < > 7.2*   < >  --    MCV  --   --  98  --   --   --  101*  --   --   --  101*   < >  --    PLT  --   --  195  --   --   --  217  --   --   --  201   < >  --    INR  --   --   --   --   --   --   --  1.23*  --   --   --   --  1.24*   NA  --   --  136  --   --   --  137  --   --   --  136   < >  --    POTASSIUM  --   --  3.2*  --   --   --  3.4  --   --   --  3.7   < >  --    CHLORIDE  --   --  105  --   --   --  105  --   --   --  104   < >  --    CO2  --   --  25  --   --   --  25  --   --   --  25   < >  --    BUN  --   --  12  --   --   --  14  --   --   --  15   < >  --    CR  --   --  1.42*  --   --   --  1.41*  --   --   --  1.48*   < >  --    ANIONGAP  --   --  6  --   --   --  7  --   --   --  7   < >  --    STEFFANY  --   --  7.8*  --   --   --  8.0*  --   --   --  7.8*   < >  --    * 108* 118*   < >  --    < > 113*  --    < >   < > 113*   < >  --    ALBUMIN  --   --  1.6*  --   --   --  1.5*  --   --    < > 1.5*   < >  --    PROTTOTAL  --   --  5.6*  --   --   --  5.5*  --   --    < > 5.4*   < >  --    BILITOTAL  --   --  1.8*  --   --   --  1.8*  --   --    < > 2.0*   < >  --     ALKPHOS  --   --  208*  --   --   --  214*  --   --    < > 227*   < >  --    ALT  --   --  34  --   --   --  33  --   --    < > 36   < >  --    AST  --   --  76*  --   --   --  83*  --   --    < > 97*   < >  --     < > = values in this interval not displayed.

## 2021-11-05 ENCOUNTER — APPOINTMENT (OUTPATIENT)
Dept: PHYSICAL THERAPY | Facility: CLINIC | Age: 46
End: 2021-11-05
Payer: COMMERCIAL

## 2021-11-05 ENCOUNTER — APPOINTMENT (OUTPATIENT)
Dept: OCCUPATIONAL THERAPY | Facility: CLINIC | Age: 46
End: 2021-11-05
Payer: COMMERCIAL

## 2021-11-05 ENCOUNTER — HOSPITAL ENCOUNTER (INPATIENT)
Facility: CLINIC | Age: 46
LOS: 5 days | Discharge: ANOTHER HOSPITAL | End: 2021-11-10
Attending: PHYSICAL MEDICINE & REHABILITATION | Admitting: PHYSICAL MEDICINE & REHABILITATION
Payer: COMMERCIAL

## 2021-11-05 VITALS
DIASTOLIC BLOOD PRESSURE: 71 MMHG | OXYGEN SATURATION: 93 % | BODY MASS INDEX: 33.95 KG/M2 | HEIGHT: 64 IN | HEART RATE: 96 BPM | WEIGHT: 198.85 LBS | SYSTOLIC BLOOD PRESSURE: 112 MMHG | RESPIRATION RATE: 17 BRPM | TEMPERATURE: 98.2 F

## 2021-11-05 DIAGNOSIS — K85.91 NECROTIZING PANCREATITIS: ICD-10-CM

## 2021-11-05 DIAGNOSIS — E55.9 VITAMIN D DEFICIENCY: ICD-10-CM

## 2021-11-05 DIAGNOSIS — K70.31 ALCOHOLIC CIRRHOSIS OF LIVER WITH ASCITES (H): Primary | ICD-10-CM

## 2021-11-05 DIAGNOSIS — G47.00 INSOMNIA, UNSPECIFIED TYPE: ICD-10-CM

## 2021-11-05 PROBLEM — R53.81 DEBILITY: Status: ACTIVE | Noted: 2021-11-05

## 2021-11-05 LAB
ALBUMIN SERPL-MCNC: 1.5 G/DL (ref 3.4–5)
ALP SERPL-CCNC: 224 U/L (ref 40–150)
ALT SERPL W P-5'-P-CCNC: 37 U/L (ref 0–50)
ANION GAP SERPL CALCULATED.3IONS-SCNC: 6 MMOL/L (ref 3–14)
AST SERPL W P-5'-P-CCNC: 70 U/L (ref 0–45)
BILIRUB SERPL-MCNC: 1.7 MG/DL (ref 0.2–1.3)
BUN SERPL-MCNC: 12 MG/DL (ref 7–30)
CALCIUM SERPL-MCNC: 8.1 MG/DL (ref 8.5–10.1)
CHLORIDE BLD-SCNC: 107 MMOL/L (ref 94–109)
CO2 SERPL-SCNC: 25 MMOL/L (ref 20–32)
CREAT SERPL-MCNC: 1.38 MG/DL (ref 0.52–1.04)
ERYTHROCYTE [DISTWIDTH] IN BLOOD BY AUTOMATED COUNT: 18.6 % (ref 10–15)
GFR SERPL CREATININE-BSD FRML MDRD: 46 ML/MIN/1.73M2
GLUCOSE BLD-MCNC: 127 MG/DL (ref 70–99)
GLUCOSE BLDC GLUCOMTR-MCNC: 113 MG/DL (ref 70–99)
GLUCOSE BLDC GLUCOMTR-MCNC: 119 MG/DL (ref 70–99)
GLUCOSE BLDC GLUCOMTR-MCNC: 129 MG/DL (ref 70–99)
GLUCOSE BLDC GLUCOMTR-MCNC: 96 MG/DL (ref 70–99)
GLUCOSE BLDC GLUCOMTR-MCNC: 96 MG/DL (ref 70–99)
HCT VFR BLD AUTO: 25 % (ref 35–47)
HGB BLD-MCNC: 7.8 G/DL (ref 11.7–15.7)
MAGNESIUM SERPL-MCNC: 1.6 MG/DL (ref 1.6–2.3)
MCH RBC QN AUTO: 30.6 PG (ref 26.5–33)
MCHC RBC AUTO-ENTMCNC: 31.2 G/DL (ref 31.5–36.5)
MCV RBC AUTO: 98 FL (ref 78–100)
PHOSPHATE SERPL-MCNC: 3.4 MG/DL (ref 2.5–4.5)
PLATELET # BLD AUTO: 214 10E3/UL (ref 150–450)
POTASSIUM BLD-SCNC: 3.5 MMOL/L (ref 3.4–5.3)
PROT SERPL-MCNC: 5.6 G/DL (ref 6.8–8.8)
RBC # BLD AUTO: 2.55 10E6/UL (ref 3.8–5.2)
SODIUM SERPL-SCNC: 138 MMOL/L (ref 133–144)
WBC # BLD AUTO: 9.7 10E3/UL (ref 4–11)

## 2021-11-05 PROCEDURE — 36415 COLL VENOUS BLD VENIPUNCTURE: CPT | Performed by: PHYSICIAN ASSISTANT

## 2021-11-05 PROCEDURE — 97140 MANUAL THERAPY 1/> REGIONS: CPT | Mod: GO | Performed by: OCCUPATIONAL THERAPIST

## 2021-11-05 PROCEDURE — 250N000011 HC RX IP 250 OP 636: Performed by: PHYSICIAN ASSISTANT

## 2021-11-05 PROCEDURE — 250N000013 HC RX MED GY IP 250 OP 250 PS 637: Performed by: PHYSICIAN ASSISTANT

## 2021-11-05 PROCEDURE — 84100 ASSAY OF PHOSPHORUS: CPT | Performed by: PHYSICIAN ASSISTANT

## 2021-11-05 PROCEDURE — 250N000013 HC RX MED GY IP 250 OP 250 PS 637: Performed by: PEDIATRICS

## 2021-11-05 PROCEDURE — 97110 THERAPEUTIC EXERCISES: CPT | Mod: GO | Performed by: OCCUPATIONAL THERAPIST

## 2021-11-05 PROCEDURE — 99223 1ST HOSP IP/OBS HIGH 75: CPT | Mod: 24 | Performed by: PHYSICAL MEDICINE & REHABILITATION

## 2021-11-05 PROCEDURE — 99239 HOSP IP/OBS DSCHRG MGMT >30: CPT | Performed by: INTERNAL MEDICINE

## 2021-11-05 PROCEDURE — 99207 PR APP CREDIT; MD BILLING SHARED VISIT: CPT | Performed by: PHYSICIAN ASSISTANT

## 2021-11-05 PROCEDURE — 80053 COMPREHEN METABOLIC PANEL: CPT | Performed by: PHYSICIAN ASSISTANT

## 2021-11-05 PROCEDURE — 83735 ASSAY OF MAGNESIUM: CPT | Performed by: PHYSICIAN ASSISTANT

## 2021-11-05 PROCEDURE — 250N000013 HC RX MED GY IP 250 OP 250 PS 637: Performed by: INTERNAL MEDICINE

## 2021-11-05 PROCEDURE — 128N000003 HC R&B REHAB

## 2021-11-05 PROCEDURE — 85014 HEMATOCRIT: CPT | Performed by: PHYSICIAN ASSISTANT

## 2021-11-05 PROCEDURE — 250N000011 HC RX IP 250 OP 636: Performed by: INTERNAL MEDICINE

## 2021-11-05 PROCEDURE — 250N000013 HC RX MED GY IP 250 OP 250 PS 637: Performed by: STUDENT IN AN ORGANIZED HEALTH CARE EDUCATION/TRAINING PROGRAM

## 2021-11-05 PROCEDURE — 97530 THERAPEUTIC ACTIVITIES: CPT | Mod: GP

## 2021-11-05 PROCEDURE — 97530 THERAPEUTIC ACTIVITIES: CPT | Mod: GO | Performed by: OCCUPATIONAL THERAPIST

## 2021-11-05 PROCEDURE — 97116 GAIT TRAINING THERAPY: CPT | Mod: GP

## 2021-11-05 RX ORDER — FOLIC ACID 1 MG/1
1 TABLET ORAL DAILY
Status: DISCONTINUED | OUTPATIENT
Start: 2021-11-06 | End: 2021-11-10 | Stop reason: HOSPADM

## 2021-11-05 RX ORDER — MIRTAZAPINE 15 MG/1
15 TABLET, FILM COATED ORAL AT BEDTIME
DISCHARGE
Start: 2021-11-05

## 2021-11-05 RX ORDER — ASCORBIC ACID, THIAMINE MONONITRATE,RIBOFLAVIN, NIACINAMIDE, PYRIDOXINE HYDROCHLORIDE, FOLIC ACID, CYANOCOBALAMIN, BIOTIN, CALCIUM PANTOTHENATE, 100; 1.5; 1.7; 20; 10; 1; 6000; 150000; 5 MG/1; MG/1; MG/1; MG/1; MG/1; MG/1; UG/1; UG/1; MG/1
1 CAPSULE, LIQUID FILLED ORAL DAILY
Status: CANCELLED | OUTPATIENT
Start: 2021-11-06

## 2021-11-05 RX ORDER — PANTOPRAZOLE SODIUM 40 MG/1
40 TABLET, DELAYED RELEASE ORAL
Status: DISCONTINUED | OUTPATIENT
Start: 2021-11-06 | End: 2021-11-10 | Stop reason: HOSPADM

## 2021-11-05 RX ORDER — CIPROFLOXACIN 250 MG/1
250 TABLET, FILM COATED ORAL EVERY 12 HOURS
Status: ON HOLD | DISCHARGE
Start: 2021-11-06 | End: 2021-11-09

## 2021-11-05 RX ORDER — HEPARIN SODIUM 5000 [USP'U]/.5ML
5000 INJECTION, SOLUTION INTRAVENOUS; SUBCUTANEOUS EVERY 12 HOURS
Status: DISCONTINUED | OUTPATIENT
Start: 2021-11-05 | End: 2021-11-05

## 2021-11-05 RX ORDER — ASCORBIC ACID, THIAMINE MONONITRATE,RIBOFLAVIN, NIACINAMIDE, PYRIDOXINE HYDROCHLORIDE, FOLIC ACID, CYANOCOBALAMIN, BIOTIN, CALCIUM PANTOTHENATE, 100; 1.5; 1.7; 20; 10; 1; 6000; 150000; 5 MG/1; MG/1; MG/1; MG/1; MG/1; MG/1; UG/1; UG/1; MG/1
1 CAPSULE, LIQUID FILLED ORAL DAILY
DISCHARGE
Start: 2021-11-05

## 2021-11-05 RX ORDER — HEPARIN SODIUM 5000 [USP'U]/.5ML
5000 INJECTION, SOLUTION INTRAVENOUS; SUBCUTANEOUS EVERY 12 HOURS
Status: DISCONTINUED | OUTPATIENT
Start: 2021-11-05 | End: 2021-11-10 | Stop reason: HOSPADM

## 2021-11-05 RX ORDER — ESCITALOPRAM OXALATE 10 MG/1
10 TABLET ORAL DAILY
Status: DISCONTINUED | OUTPATIENT
Start: 2021-11-06 | End: 2021-11-10 | Stop reason: HOSPADM

## 2021-11-05 RX ORDER — CIPROFLOXACIN 250 MG/1
250 TABLET, FILM COATED ORAL EVERY 12 HOURS SCHEDULED
Status: COMPLETED | OUTPATIENT
Start: 2021-11-05 | End: 2021-11-07

## 2021-11-05 RX ORDER — POLYETHYLENE GLYCOL 3350 17 G
2 POWDER IN PACKET (EA) ORAL
Status: CANCELLED | OUTPATIENT
Start: 2021-11-05

## 2021-11-05 RX ORDER — DEXTROSE MONOHYDRATE 25 G/50ML
25-50 INJECTION, SOLUTION INTRAVENOUS
Status: DISCONTINUED | OUTPATIENT
Start: 2021-11-05 | End: 2021-11-10 | Stop reason: HOSPADM

## 2021-11-05 RX ORDER — LANOLIN ALCOHOL/MO/W.PET/CERES
3 CREAM (GRAM) TOPICAL AT BEDTIME
Status: DISCONTINUED | OUTPATIENT
Start: 2021-11-05 | End: 2021-11-07

## 2021-11-05 RX ORDER — ALBUTEROL SULFATE 90 UG/1
2 AEROSOL, METERED RESPIRATORY (INHALATION) EVERY 4 HOURS PRN
Status: DISCONTINUED | OUTPATIENT
Start: 2021-11-05 | End: 2021-11-10 | Stop reason: HOSPADM

## 2021-11-05 RX ORDER — SIMETHICONE 80 MG
80 TABLET,CHEWABLE ORAL EVERY 6 HOURS PRN
Status: DISCONTINUED | OUTPATIENT
Start: 2021-11-05 | End: 2021-11-10 | Stop reason: HOSPADM

## 2021-11-05 RX ORDER — POLYETHYLENE GLYCOL 3350 17 G
2 POWDER IN PACKET (EA) ORAL
Status: ON HOLD | DISCHARGE
Start: 2021-11-05 | End: 2021-11-09

## 2021-11-05 RX ORDER — FLUCONAZOLE 150 MG/1
150 TABLET ORAL ONCE
Status: COMPLETED | OUTPATIENT
Start: 2021-11-05 | End: 2021-11-05

## 2021-11-05 RX ORDER — ONDANSETRON 4 MG/1
4 TABLET, ORALLY DISINTEGRATING ORAL EVERY 6 HOURS PRN
Status: DISCONTINUED | OUTPATIENT
Start: 2021-11-05 | End: 2021-11-10 | Stop reason: HOSPADM

## 2021-11-05 RX ORDER — GABAPENTIN 100 MG/1
100 CAPSULE ORAL 3 TIMES DAILY
Status: DISCONTINUED | OUTPATIENT
Start: 2021-11-05 | End: 2021-11-10 | Stop reason: HOSPADM

## 2021-11-05 RX ORDER — CIPROFLOXACIN 250 MG/1
250 TABLET, FILM COATED ORAL EVERY 12 HOURS SCHEDULED
Status: DISCONTINUED | OUTPATIENT
Start: 2021-11-05 | End: 2021-11-05 | Stop reason: HOSPADM

## 2021-11-05 RX ORDER — EMOLLIENT BASE
CREAM (GRAM) TOPICAL EVERY 6 HOURS PRN
DISCHARGE
Start: 2021-11-05

## 2021-11-05 RX ORDER — SIMETHICONE 80 MG
80 TABLET,CHEWABLE ORAL EVERY 6 HOURS PRN
DISCHARGE
Start: 2021-11-05

## 2021-11-05 RX ORDER — ACETAMINOPHEN 325 MG/1
650 TABLET ORAL EVERY 6 HOURS PRN
Status: DISCONTINUED | OUTPATIENT
Start: 2021-11-05 | End: 2021-11-10 | Stop reason: HOSPADM

## 2021-11-05 RX ORDER — UBIDECARENONE 75 MG
100 CAPSULE ORAL DAILY
Status: DISCONTINUED | OUTPATIENT
Start: 2021-11-06 | End: 2021-11-10 | Stop reason: HOSPADM

## 2021-11-05 RX ORDER — MIRTAZAPINE 7.5 MG/1
15 TABLET, FILM COATED ORAL AT BEDTIME
Status: DISCONTINUED | OUTPATIENT
Start: 2021-11-05 | End: 2021-11-10 | Stop reason: HOSPADM

## 2021-11-05 RX ORDER — LANOLIN ALCOHOL/MO/W.PET/CERES
100 CREAM (GRAM) TOPICAL DAILY
Status: DISCONTINUED | OUTPATIENT
Start: 2021-11-06 | End: 2021-11-10 | Stop reason: HOSPADM

## 2021-11-05 RX ORDER — CIPROFLOXACIN 250 MG/1
250 TABLET, FILM COATED ORAL EVERY 12 HOURS SCHEDULED
Status: DISCONTINUED | OUTPATIENT
Start: 2021-11-05 | End: 2021-11-05

## 2021-11-05 RX ORDER — NICOTINE POLACRILEX 4 MG
15-30 LOZENGE BUCCAL
Status: DISCONTINUED | OUTPATIENT
Start: 2021-11-05 | End: 2021-11-10 | Stop reason: HOSPADM

## 2021-11-05 RX ORDER — POLYETHYLENE GLYCOL 3350 17 G/17G
17 POWDER, FOR SOLUTION ORAL DAILY PRN
Status: DISCONTINUED | OUTPATIENT
Start: 2021-11-05 | End: 2021-11-10 | Stop reason: HOSPADM

## 2021-11-05 RX ORDER — EMOLLIENT BASE
CREAM (GRAM) TOPICAL EVERY 6 HOURS PRN
Status: CANCELLED | OUTPATIENT
Start: 2021-11-05

## 2021-11-05 RX ADMIN — ACETAMINOPHEN 650 MG: 325 TABLET, FILM COATED ORAL at 01:36

## 2021-11-05 RX ADMIN — CIPROFLOXACIN 250 MG: 250 TABLET, FILM COATED ORAL at 21:24

## 2021-11-05 RX ADMIN — HEPARIN SODIUM 5000 UNITS: 5000 INJECTION INTRAVENOUS; SUBCUTANEOUS at 21:30

## 2021-11-05 RX ADMIN — GABAPENTIN 100 MG: 100 CAPSULE ORAL at 09:05

## 2021-11-05 RX ADMIN — SIMETHICONE 80 MG: 80 TABLET, CHEWABLE ORAL at 04:53

## 2021-11-05 RX ADMIN — GABAPENTIN 100 MG: 100 CAPSULE ORAL at 21:23

## 2021-11-05 RX ADMIN — ONDANSETRON 4 MG: 2 INJECTION INTRAMUSCULAR; INTRAVENOUS at 02:02

## 2021-11-05 RX ADMIN — CIPROFLOXACIN 250 MG: 250 TABLET, FILM COATED ORAL at 11:50

## 2021-11-05 RX ADMIN — Medication 1 CAPSULE: at 14:08

## 2021-11-05 RX ADMIN — ESCITALOPRAM OXALATE 10 MG: 10 TABLET ORAL at 14:08

## 2021-11-05 RX ADMIN — CIPROFLOXACIN 400 MG: 2 INJECTION, SOLUTION INTRAVENOUS at 01:40

## 2021-11-05 RX ADMIN — POLYETHYLENE GLYCOL 3350 17 G: 17 POWDER, FOR SOLUTION ORAL at 09:05

## 2021-11-05 RX ADMIN — CALCIUM CARBONATE 600 MG (1,500 MG)-VITAMIN D3 400 UNIT TABLET 1 TABLET: at 17:34

## 2021-11-05 RX ADMIN — PANTOPRAZOLE SODIUM 40 MG: 40 TABLET, DELAYED RELEASE ORAL at 09:05

## 2021-11-05 RX ADMIN — HEPARIN SODIUM 5000 UNITS: 5000 INJECTION, SOLUTION INTRAVENOUS; SUBCUTANEOUS at 09:05

## 2021-11-05 RX ADMIN — FOLIC ACID 1 MG: 1 TABLET ORAL at 14:08

## 2021-11-05 RX ADMIN — GABAPENTIN 100 MG: 100 CAPSULE ORAL at 14:08

## 2021-11-05 RX ADMIN — Medication 100 MCG: at 14:08

## 2021-11-05 RX ADMIN — FLUCONAZOLE 150 MG: 150 TABLET ORAL at 11:50

## 2021-11-05 RX ADMIN — RIFAXIMIN 550 MG: 550 TABLET ORAL at 09:04

## 2021-11-05 RX ADMIN — MELATONIN TAB 3 MG 3 MG: 3 TAB at 21:23

## 2021-11-05 RX ADMIN — CALCIUM CARBONATE 600 MG (1,500 MG)-VITAMIN D3 400 UNIT TABLET 1 TABLET: at 14:08

## 2021-11-05 RX ADMIN — THIAMINE HCL TAB 100 MG 100 MG: 100 TAB at 09:05

## 2021-11-05 RX ADMIN — RIFAXIMIN 550 MG: 550 TABLET ORAL at 21:23

## 2021-11-05 RX ADMIN — MIRTAZAPINE 15 MG: 7.5 TABLET, FILM COATED ORAL at 21:23

## 2021-11-05 ASSESSMENT — ACTIVITIES OF DAILY LIVING (ADL)
ADLS_ACUITY_SCORE: 4
ADLS_ACUITY_SCORE: 9
ADLS_ACUITY_SCORE: 4
ADLS_ACUITY_SCORE: 9
ADLS_ACUITY_SCORE: 9
ADLS_ACUITY_SCORE: 12
FALL_HISTORY_WITHIN_LAST_SIX_MONTHS: YES
ADLS_ACUITY_SCORE: 9
ADLS_ACUITY_SCORE: 9
ADLS_ACUITY_SCORE: 4
ADLS_ACUITY_SCORE: 12
ADLS_ACUITY_SCORE: 9
ADLS_ACUITY_SCORE: 9
PATIENT_/_FAMILY_COMMUNICATION_STYLE: SPOKEN LANGUAGE (ENGLISH OR BILINGUAL)
ADLS_ACUITY_SCORE: 9
NUMBER_OF_TIMES_PATIENT_HAS_FALLEN_WITHIN_LAST_SIX_MONTHS: 3
ADLS_ACUITY_SCORE: 9
ADLS_ACUITY_SCORE: 4
ADLS_ACUITY_SCORE: 9
ADLS_ACUITY_SCORE: 4
ADLS_ACUITY_SCORE: 10

## 2021-11-05 ASSESSMENT — MIFFLIN-ST. JEOR: SCORE: 1515.85

## 2021-11-05 NOTE — PLAN OF CARE
Physical Therapy Discharge Summary    Reason for therapy discharge:    Discharged to acute rehabilitation facility.    Progress towards therapy goal(s). See goals on Care Plan in [facility] electronic health record for goal details.  Goals partially met.  Barriers to achieving goals:   discharge from facility.    Therapy recommendation(s):    Continued therapy is recommended.  Rationale/Recommendations:  to improve functional activity tolerance, balance and safety with mobility.

## 2021-11-05 NOTE — PLAN OF CARE
Pt alert and oriented x4. C/o back pain. Also reported pain from thigh chaffing from walking in the halls this evening with PT. Barrier cream applied to thighs. Scheduled tylenol and prn atarax administered for pain. BLE lymphedema wraps in place. Continues with poor appetite. On calorie counts. Ambulated to BR a few times this evening to void (not saved). Reports loose BM today. Continue to assess pain and effectiveness of interventions. Encourage and assist with activity. Awaiting TCU placement.

## 2021-11-05 NOTE — DISCHARGE SUMMARY
[facility]  Hospitalist Discharge Summary      Date of Admission:  9/29/2021  Date of Discharge:  11/5/2021  2:20 PM  Discharging Provider: Kaila Butler PA-C  Discharge Team: Hospitalist Service, Gold 6    Discharge Diagnoses   Acute hypoxic respiratory failure  ARDS  Septic shock secondary to pneumonia (Aspiration vs VAP)  Pulmonary edema  Severe ANA LAURA with hypervolemia  New CKD  UTI  Alcohol hepatitis  Toxic metabolic encephalopathy - resolved  Abdominal pain, acute  Severe malnutrition in context of acute on chronic illness  Dysphagia  Hx of jaimie-andrew  DM2  Acute on chronic macrocytic anemia  Alcohol use disorder  Mild hypokalemia  Hypomagnesemia  Tobacco use disorder  Coagulopathy  MDD  RUBÉN  GERD  Neuropathy      Follow-ups Needed After Discharge   Follow-up Appointments     Follow Up and recommended labs and tests      Follow up with your PCP within 1 week after discharge from ARU.  Placed referral for Hepatology for follow-up.  Consider following with CKD clinic per Nephrology recommendations.             Unresulted Labs Ordered in the Past 30 Days of this Admission     Date and Time Order Name Status Description    10/21/2021  9:15 AM Prepare red blood cells (unit) Preliminary     10/7/2021  5:00 AM Prepare red blood cells (unit) Preliminary     10/4/2021  3:23 PM Acid-Fast Bacilli Culture and Stain In process     10/3/2021  7:00 PM Prepare red blood cells (unit) Preliminary           Discharge Disposition   Discharged to rehabilitation facility  Condition at discharge: Good    Hospital Course   Aliyah Angeles is a 46-year-old female with a history of alcohol use disorder, Jaimie-en-Y gastric bypass (2010), DM2, anxiety, depression, who was initially admitted to Internal Medicine for alcoholic hepatitis. She was transferred to MICU on 10/4/2021 with acute hypoxic respiratory failure secondary to ARDS requiring intubation. Hospitalization was complicated by  septic shock, pneumonia, ANA LAURA requiring CRRT and iHD, encephalopathy. Self extubated on 10/14, weaned off pressors on 10/19, and transferred to general medical floor on 10/20. The following were addressed during her admission:     Acute hypoxic respiratory failure, stable  ARDS, resolved  Hx of pneumonia (aspiration vs VAP) with ongoing SIRS after finishing Zosyn  Hx of pulmonary edema   Acute decline in respiratory status requiring intubation on 10/4, CXR c/w ARDS, likely secondary to HAP vs aspiration. BAL cultures negative. Treated with Zosyn 10/4 - 10/13. Self-extubated on 10/14. Developed increased work of breathing on 10/17, CXR demonstrated increased interstitial opacities concerning for pulmonary edema vs pneumonia. Volume managed with iHD until renal function improved. Completed additional 10 day course of Zosyn 10/26 for VAP vs aspiration; WBC and CRP had remained elevated. Repeat CXR 10/27 with patchy bilateral opacities with overall improvement, otherwise unremarkable. Repeat COVID neg and BCs NGTD. Diagnostic para for 10/28 with 1,658 nucleated cells, however only 7% neutrophils and monocyte/macrophage predominance.  WBC now wnl, CRP improving s/p tx of UTI.  - Weaned off oxygen  - ARU for significant deconditioning     ANA LAURA  Hypervolemia  New CKD  Baseline Cr ~0.5. Cr to peak of 2.98. Decline in renal function most likely secondary to ATN (hypotension, medications, contrast), possible component of HRS. Nephrology following during admission. On CRRT 10/7 and transitioned to iHD on 10/13. Cr improving with improvement in UO. Tunneled line pulled by IR 11/3. Unclear how much renal function will continue to improve with new CKD, at this time likely stage 3.  - Recommended follow-up with CKD clinic long term  - Recommend repeat BMP within 1 week  - Monitor daily weights, fluid status  - Lymphedema therapy  - Renal dose medications    Septic shock, resolved: Shock most likely secondary to sepsis with hepatic  dysfunction contributing as well. TTE with hyperdynamic LVEF, no RV dysfunction. Weaned off pressors on 10/19. Previously on midodrine TID - stopped without significant hypotension     UTI  Reported increase urinary frequency, though making minimal urine. No dysuria or suprapubic pain. Ongoing leukocytosis, though improving. UA with 16 WBC, small LE, few bacteria, however squamous epithelial cells present concerning for contamination. Nephrology recommending treatment given symptoms and potential secondary improvement in renal function. Started on ceftriaxone. UC with 10-50k Enterobacter cloacae complex resistant to ceftriaxone and switched to ciprofloxacin.  - Continue ciprofloxacin to complete 5 day course     Alcohol hepatitis  Acute abdominal pain  Recently admitted in September with ETOH hepatitis, started on rifaximin and lactulose per Hepatology recs. Re-presented with ETOH hepatitis, Maddrey score 13 on admission. Acute hepatitis c/b portal hypertension with ascites. Diagnostic mane negative for SBP. Tbili, INR, AST/ALT, platelets stable. MELD 16. Diagnostic para as above. Discussed with GI - recommended she follow-up in clinic.  - Rifaximin BID  - Recommend monitoring CMP, INR  - Hepatology referral placed for follow-up outpatient     Toxic metabolic encephalopathy, resolved  Multifactorial secondary to acute illness, sedating medications, HE. Psychiatry consulted 10/15. Previously on seroquel now discontinued. Remains fully A&O today.      Severe malnutrition in the context of acute on chronic illness  Dysphagia  Hx of jaimie-en-y   In the context of acute on chronic illness. Started on TF 10/5. Transitioned to cycled feeds on 10/21 but NJ clogged and removed on 10/23. Ongoing poor intake, at baseline per patient.   - Encouraged ongoing emphasis on nutrition     DM2   A1C 9.1. PTA on metformin. Transitioned to insulin due to hyperglycemia while on tube feeds. BGs stable with minimal requirement for sliding  scale.  - Resumed metformin on discharge  - BG checks     Acute on chronic macrocytic anemia  Likely secondary to alcohol use, alcohol hepatitis, nutritional deficiencies. Iron sats low. Intermittently requiring PRBC transfusions during admission (10/7, 10/12, 10/16, 11/3). No e/o active bleeding. Received IV iron x 3. Suspect her baseline is low.  - Recommend repeat Hgb within 1 week     Alcohol use disorder  Last drink on 9/12/21. PTA was at CD treatment facility. Patient interested in resuming CD treatment and CD consulted.  - Plans for CD treatment directly from ARU  - Continue folic acid, thiamine, MVI supplements     Hypokalemia  Hypomagnesemia  In setting of poor po intake. Intermittently requiring replacement dependent on intake.  - Recommend repeat potassium, magnesium within 1 week    Tobacco use disorder  Smokes 1 cigarette daily. Ordered PRN nicotine lozenges to help with cravings.     Other Stable Medical Issues:   Coagulopathy: INR to peak of 2.5 on 10/4, now much improved. Likely secondary to known severe hepatic steatosis.    MDD, RUBÉN: Continue PTA Lexapro. Prazosin discontinued due to low BP. Health Psychology consulted during admission.   GERD: Continue PPI.   Neuropathy: Continue PTA gabapentin.    Consultations This Hospital Stay   GI HEPATOLOGY ADULT IP CONSULT  PHYSICAL THERAPY ADULT IP CONSULT  OCCUPATIONAL THERAPY ADULT IP CONSULT  PSYCHOLOGY ADULT IP CONSULT  INTERNAL MEDICINE PROCEDURE TEAM ADULT IP CONSULT [place] - [facility]  CHEMICAL DEPENDENCY IP CONSULT  CARE MANAGEMENT / SOCIAL WORK IP CONSULT  SMOKING CESSATION PROGRAM IP CONSULT  VASCULAR ACCESS CARE ADULT IP CONSULT  VASCULAR ACCESS CARE ADULT IP CONSULT  INTERNAL MEDICINE PROCEDURE TEAM ADULT IP CONSULT [place] - [facility]  PHARMACY TO DOSE VANCO  NUTRITION SERVICES ADULT IP CONSULT  PHARMACY IP CONSULT  GI HEPATOLOGY ADULT IP CONSULT  NEPHROLOGY ICU IP CONSULT  PHARMACY CRRT IP CONSULT  SPEECH LANGUAGE PATH ADULT IP  CONSULT  PSYCHOLOGY ADULT IP CONSULT  PSYCHIATRY IP CONSULT  VASCULAR ACCESS CARE ADULT IP CONSULT  SOCIAL WORK IP CONSULT  PHARMACY TO DOSE VANCO  INTERVENTIONAL RADIOLOGY ADULT/PEDS IP CONSULT  SPEECH LANGUAGE PATH ADULT IP CONSULT  VASCULAR ACCESS CARE ADULT IP CONSULT  INTERVENTIONAL RADIOLOGY ADULT/PEDS IP CONSULT  VASCULAR ACCESS CARE ADULT IP CONSULT  CHEMICAL DEPENDENCY IP CONSULT  PHYSICAL THERAPY ADULT IP CONSULT  OCCUPATIONAL THERAPY ADULT IP CONSULT  NEPHROLOGY ICU IP CONSULT    Code Status   Full Code    Time Spent on this Encounter   I, Kaila Butler PA-C, personally saw the patient today and spent greater than 30 minutes discharging this patient.       Kaila Butler PA-C  [facility] [address] [address]  Phone: [phone]  ______________________________________________________________________    Physical Exam   Vital Signs: Temp: 98.2  F (36.8  C) Temp src: Oral BP: 112/71 Pulse: 96   Resp: 17 SpO2: 93 % O2 Device: None (Room air)    Weight: 198 lbs 13.68 oz  General Appearance:  Awake. Alert and oriented x 3. No acute distress, pleasant.  Respiratory: Normal work of breathing on room air. Lungs CTAB. No wheezes, rhonchi or rales.  Cardiovascular: RRR. S1, S2. No murmurs, rubs or gallops. Pedal pulses 2+ 1-2+ bilateral lower extremity edema.  GI: Abdomen non-distended, normoactive bowel sounds. Soft, mild tenderness throughout. No rebounding or guarding.  Skin: Warm, dry. No rashes on exposed skin  Neuro: No focal deficits. CN II-XII appear grossly intact.       Primary Care Physician   VENICE ROSALES    Discharge Orders      Adult Gastro Ref - Consult Only      General info for SNF    Length of Stay Estimate: Short Term Care: Estimated # of Days <30  Condition at Discharge: Improving  Level of care:skilled   Rehabilitation Potential: Good  Admission H&P remains valid and up-to-date: Yes  Recent Chemotherapy: N/A  Use Nursing Home Standing Orders:  Yes     Mantoux instructions    Give two-step Mantoux (PPD) Per Facility Policy Yes     Follow Up and recommended labs and tests    Follow up with your PCP within 1 week after discharge from ARU.  Placed referral for Hepatology for follow-up.  Consider following with CKD clinic per Nephrology recommendations.     Reason for your hospital stay    Dear Aliyah Angeles    Your were hospitalized at [facility] with respiratory failure due to pneumonia requiring intubation as well as kidney failure. You were treated with antibiotics for pneumonia. You also required dialysis until your kidneys eventually recovered. Over your hospitalization your respiratory status improved as well as your kidney function.    We are suggesting the following medication changes:  - Stopping prazosin (your blood pressure is too low and this can make this worse)  - Continue ciprofloxacin for UTI    Please get the following tests done:  - Repeat CMP, CBC, Mg, Phos at ARU    Please set up an appointment with:  - Hepatology after discharge from ARU (Referral placed in discharge orders)  - Consider following with the CKD (chronic kidney disease clinic)  - You should follow-up with your PCP after discharge from ARU    It was a pleasure meeting with you today. Thank you for allowing me and my team the privilege of caring for you today. You are the reason we are here, and I truly hope we provided you with the excellent service you deserve. Please let us know if there is anything else we can do for you so that we can be sure you are leaving completely satisfied with your care experience.      Take care!  Kaila Butler PA-C  Hospitalist Service     Glucose monitor nursing POCT    Before meals and at bedtime     Daily weights    Call Provider for weight gain of more than 2 pounds per day or 5 pounds per week.     Activity - Up with nursing assistance     Physical Therapy Adult Consult    Evaluate and treat as clinically  indicated.    Reason:  Deconditioning, weakness. Lymphedema therapy     Occupational Therapy Adult Consult    Evaluate and treat as clinically indicated.    Reason:  Deconditioning, assistance with ADLs     Diet    Follow this diet upon discharge: Orders Placed This Encounter      Calorie Counts      Calorie Counts      Snacks/Supplements Adult: Other; see below:; Between Meals      Snacks/Supplements Adult: Other; Ensure plant base; With Meals      Calorie Counts      High Kcal/High Protein Diet, ADULT       Significant Results and Procedures   Most Recent 3 CBC's:Recent Labs   Lab Test 11/05/21 0438 11/04/21  0608 11/03/21  1418 11/03/21  0438 11/03/21 0438   WBC 9.7 9.8  --   --  11.6*   HGB 7.8* 7.9* 9.0*   < > 6.8*   MCV 98 98  --   --  101*    195  --   --  217    < > = values in this interval not displayed.     Most Recent 3 BMP's:Recent Labs   Lab Test 11/06/21  1700 11/05/21  1715 11/05/21  1410 11/05/21  0900 11/05/21  0438 11/04/21  0802 11/04/21  0608 11/03/21  0809 11/03/21 0438   NA  --   --   --   --  138  --  136  --  137   POTASSIUM  --   --   --   --  3.5  --  3.2*  --  3.4   CHLORIDE  --   --   --   --  107  --  105  --  105   CO2  --   --   --   --  25  --  25  --  25   BUN  --   --   --   --  12  --  12  --  14   CR  --   --   --   --  1.38*  --  1.42*  --  1.41*   ANIONGAP  --   --   --   --  6  --  6  --  7   STEFFANY  --   --   --   --  8.1*  --  7.8*  --  8.0*   GLC 97 96 96   < > 127*   < > 118*   < > 113*    < > = values in this interval not displayed.     Most Recent 2 LFT's:Recent Labs   Lab Test 11/05/21 0438 11/04/21 0608   AST 70* 76*   ALT 37 34   ALKPHOS 224* 208*   BILITOTAL 1.7* 1.8*     Most Recent 3 INR's:Recent Labs   Lab Test 11/03/21  0405 10/31/21  0438 10/28/21  0446   INR 1.23* 1.24* 1.16*   ,   Results for orders placed or performed during the hospital encounter of 09/29/21   US Abdomen Complete w Doppler Complete    Narrative    EXAMINATION: US ABDOMEN COMPLETE  WITH DOPPLER COMPLETE 9/29/2021 7:59  PM     COMPARISON: 9/13/2021    HISTORY: Abdominal pain, nausea    TECHNIQUE: The abdomen was scanned in standard fashion with  specialized ultrasound transducer(s) using both gray-scale, color  Doppler, and spectral flow techniques.    Findings:  Liver: Enlarged hyperechoic liver.     Extrahepatic portal vein flow is antegrade at 33 cm/sec.  Right portal vein flow is antegrade, measuring 25 cm/sec.  Left portal vein flow is antegrade, measuring 25 cm/sec.    Flow in the hepatic artery is towards the liver and:  116 cm/sec peak systolic  0.7 resistive index.     The splenic vein is patent and flow is towards the liver.  The middle,  and right hepatic veins are patent with flow towards the IVC. The IVC  is patent with flow towards the heart.   The visualized aorta is not  dilated.    Gallbladder: Surgically absent    Bile Ducts: No biliary dilatation  The common bile duct measures 7 mm.    Pancreas: Visualized portions of the head and body of the pancreas are  unremarkable.     Kidneys: The right kidney measures 11.1 cm.    Spleen: The spleen measures 13.9 cm in length.    Fluid: Small volume ascites      Impression    Impression:   1.  Patent Doppler evaluation of liver.  2.  Stable hepatomegaly and intrinsic hepatic parenchymal disease.  3.  Small volume ascites.    I have personally reviewed the examination and initial interpretation  and I agree with the findings.    YUDELKA WISDOM MD         SYSTEM ID:  [id number]   XR Chest Port 1 View    Narrative    XR CHEST PORT 1 VIEW9/29/2021 7:08 PM    INDICATION: Eval intrapulmonary process    COMPARISON:  9/13/2021.    FINDINGS: AP view of the chest. Low lung lines. Cardiomediastinal  silhouette is stable. Streaky right basilar opacities, likely  atelectasis. No other focal airspace opacities. No pleural effusion or  pneumothorax. Bones are stable. Trachea is midline.      Impression    IMPRESSION: Low lung volumes with streaky  right basilar opacities,  likely atelectasis. No other focal cardiopulmonary findings.    I have personally reviewed the examination and initial interpretation  and I agree with the findings.    YUDELKA WISDOM MD         SYSTEM ID:  [id number]   XR Chest Port 1 View    Narrative    EXAM: XR Chest 1 view 10/3/2021 12:40 PM      HISTORY: sepsis, fluid overloaded.    COMPARISON: Chest x-ray dated 9/29/2021, same day CT of the abdomen  and pelvis..     TECHNIQUE: Frontal view of the chest.    FINDINGS: Trachea is midline cardiomediastinal silhouette is stable.  There are patchy perihilar and basilar pulmonary opacities. No  pneumothoraces. No acute osseous abnormalities. Visualized portion of  the upper abdomen is within normal limits.      Impression    IMPRESSION:   1. Patchy perihilar and basilar pulmonary opacities, suspicious for  infection versus pulmonary edema.  2. Bilateral small pleural effusions, best seen on same day CT of the  abdomen and pelvis.    I have personally reviewed the examination and initial interpretation  and I agree with the findings.    MARIYA PRATT MD         SYSTEM ID:  [id number]   CT Abdomen Pelvis w/o & w Contrast    Narrative    Exam: Computed tomographic angiography of the abdomen and pelvis  without and with contrast, including 3D reformations dated 10/3/2021  12:20 PM    Clinical information:  Abdominal distension; Pt with alcoholic  hepatitis with decreasing hgb concern for bleed, increased abdominal  distension. Also new ANA LAURA.    Technique: Helical scans through the abdomen and pelvis obtained  before the administration of intravenous contrast media and following  the injection of contrast media  in the arterial phase. Source images  reviewed as well as 3D and multi-planar reconstructions.    Contrast: Isovue 370    DLP: 4235 mGy*cm    Comparison study: CT of the abdomen pelvis dated 9/16/2021    Findings:      The abdominal aorta is normal in caliber.    The celiac axis, SMA and  NALDO are patent. The renal arteries are patent  bilaterally.    The splenic vein, portal vein, SMV and renal veins are  patent.  The hepatic veins and IVC are patent.    Abdomen and pelvis:   Hepatomegaly with hypoattenuation of the liver. No intrahepatic  biliary dilatation. Recanalized paraumbilical vein noted.  Splenomegaly measuring 13.0 cm in AP dimension.  Normal appearance of the adrenal glands, pancreas, and kidneys.  No abnormally dilated small bowel or large bowel. Stable postsurgical  changes of Fitz-en-Y gastrojejunostomy. Appendix is not  well-visualized on this exam.  Ascites with fluid in the bilateral paracolic gutters and pelvis.  Bladder is within normal limits. Prostate is within normal limits.    Lower thorax: Bilateral patchy peribronchial vascular groundglass  opacities. Bilateral small pleural effusions.  No acute osseous abnormality. Degenerative changes of the  thoracolumbar spine.      Impression    Impression:  1. Severe hepatic steatosis with signs of portal hypertension  including recanalized paraumbilical vein, mild splenomegaly and  moderate volume ascites.  2. No evidence of GI bleed.  3. Bilateral patchy peribronchovascular groundglass opacities with  small pleural effusions, suspicious for atypical infection.    I have personally reviewed the examination and initial interpretation  and I agree with the findings.    MARIYA PRATT MD         SYSTEM ID:  [id number]   XR Chest Port 1 View    Narrative    XR CHEST PORT 1 VIEW10/3/2021 9:46 PM    INDICATION: compare to previous    COMPARISON:  Same-day radiograph    FINDINGS: AP view of the chest. Increasing interstitial and airspace  opacities with likely increased bilateral pleural effusions. Cardiac  mediastinal silhouette is obscured. No acute osseous abnormalities.  Trachea is midline.      Impression    IMPRESSION: Increasing interstitial and airspace opacities. Likely  small increase in bilateral pleural effusions as well.    I have  personally reviewed the examination and initial interpretation  and I agree with the findings.    JENNY SINGLETON MD         SYSTEM ID:  [id number]   XR Chest Port 1 View    Narrative    EXAM: XR CHEST PORT 1 VIEW  10/4/2021 9:33 AM     HISTORY:  Endotracheal tube positioning       COMPARISON:  Chest radiograph, 10/3/2021.    FINDINGS:   Portable AP view of the chest. The endotracheal tube tip is  approximately 4.4 cm above the flores. Partially visualized enteric  tube with the tip extending beyond the field of view.    Trachea is midline. Heart size is normal. Grossly unchanged bilateral  diffuse interstitial and airspace opacities. Silhouetting of the left  hemidiaphragm is increased suggest atelectasis/consolidation.  Recommend follow-up to clearing. Slightly increased right greater than  left bilateral pleural effusion without appreciable pneumothorax.    No acute osseous abnormality. Visualized upper abdomen is  unremarkable.        Impression    IMPRESSION:   1. The endotracheal tube tip is approximately 4.4 cm above the flores.    2. Grossly unchanged bilateral diffuse interstitial and airspace  opacities with slightly increased right greater than left pleural  effusion.  3. Increasing retrocardiac atelectasis/consolidation, recommend  follow-up to clearing to exclude infection.    I have personally reviewed the examination and initial interpretation  and I agree with the findings.    ANGÉLICA ABDALLA MD         SYSTEM ID:  [id number]   XR Abdomen Port 1 View    Narrative    EXAMINATION: XR ABDOMEN PORT 1 VIEWS, 10/4/2021 9:34 AM    COMPARISON: 10/3/2021    HISTORY: OG placement    FINDINGS: Orogastric tube in the stomach. Postsurgical changes in this  stomach. No air-filled dilated small bowel loops. Basilar airspace  opacities.      Impression    IMPRESSION: Orogastric tube in the stomach.     KIKE GIVENS MD         SYSTEM ID:  [id number]   XR Chest Port 1 View    Narrative    EXAM: XR CHEST PORT 1  VIEW  10/4/2021 10:38 AM     HISTORY:  verify central line placement       COMPARISON:  Same day chest radiograph at 0834    FINDINGS:   Portable AP view of the chest. Interval placement of the left IJ  central venous catheter with the tip in the SVC. The tip of the  endotracheal tube is approximately 6 cm above the flores. Partially  visualized gastric tube with the tip extending beyond the  field-of-view. Trachea is midline. Heart size is normal. Grossly  unchanged bilateral diffuse interstitial and airspace opacities. Right  greater than left pleural effusion without appreciable pneumothorax.    No acute osseous abnormality. Visualized upper abdomen is  unremarkable.        Impression    IMPRESSION:   1. Interval placement of left IJ central venous catheter with the tip  in the SVC.  2. The tip of the endotracheal tube is approximately 6 cm above the  flores.  3. Grossly unchanged bilateral diffuse interstitial and airspace  opacities with right greater than left pleural effusion.     I have personally reviewed the examination and initial interpretation  and I agree with the findings.    ANGÉLICA ABDALLA MD         SYSTEM ID:  [id number]   XR Feeding Tube Placement    Narrative    Feeding tube placement: 10/5/2021 4:53 PM    Comparison: None    Indication: Unsuccessful feeding tube placement.    Fluoroscopy time: 4.2 minutes    Technique: After injection of Xylocaine gel into the left nostril, a  feeding tube was advanced under fluoroscopic guidance.    Findings: The feeding tube was advanced with the tip in the proximal  jejunum. A small amount of barium was injected to demonstrate  placement within the small bowel. Findings suggestive of Fitz-en-Y  gastrojejunostomy.  The feeding tube was then flushed with normal  saline. The feeding tube was secured using a nasal bridle.      Impression    Impression: Uncomplicated feeding tube placement with tip in the  proximal jejunum.    I, MARIYA PRATT MD, attest that  I was immediately available to  provide guidance and assistance during the entirety of the procedure.  The examination was performed portable in the ICU therefore a  radiologist was not directly present during tube placement.    I have personally reviewed the examination and initial interpretation  and I agree with the findings.    MARIYA PRATT MD         SYSTEM ID:  [id number]   XR Chest Port 1 View    Narrative    EXAM: XR CHEST PORT 1 VIEW  10/5/2021 12:34 PM     HISTORY:  ETT placement, assess lung fields       COMPARISON:  Chest radiograph, 10/4/2021.    FINDINGS:   Portable AP view of the chest. The endotracheal tube tip is  approximately 5.3 cm above the flores. Left IJ catheter terminates at  the superior cavoatrial junction. Partially visualized enteric tube  with the tip extending beyond the field of view.    Trachea is midline. Heart size is normal. Grossly unchanged diffuse  interstitial and airspace opacities. Left greater than right bilateral  pleural effusion without appreciable pneumothorax.    No acute osseous abnormality. Surgical clips in the left upper  quadrant..        Impression    IMPRESSION:   1. Grossly unchanged bilateral interstitial and airspace opacities.   2. Stable supporting devices.    I have personally reviewed the examination and initial interpretation  and I agree with the findings.    SWATHI NUNO DO         SYSTEM ID:  [id number]   XR Chest Port 1 View    Narrative    EXAM: XR CHEST PORT 1 VIEW  10/5/2021 5:41 PM     HISTORY:  ETT placement, assess lung fields       COMPARISON:  10/5/2021 at 1159 hours    TECHNIQUE: Single frontal radiograph of the chest    FINDINGS:   Endotracheal tube appears to be advanced projecting over the low  thoracic trachea. New feeding tube courses below the field-of-view.  Contrast material projects over the stomach. Otherwise stable support  devices.    Stable silhouetting of both hemidiaphragms. Diffuse pulmonary  opacities are stable. No  appreciable pneumothorax.      Impression    IMPRESSION:   1. Endotracheal now tube projects over the low thoracic trachea. New  feeding tube courses below the field-of-view with contrast material in  the region of the stomach.  2. Diffuse pulmonary edema, bilateral pleural effusions and opacities  are stable.    I have personally reviewed the examination and initial interpretation  and I agree with the findings.    WILLIE POON MD         SYSTEM ID:  [id number]   CT Chest Abdomen Pelvis w/o Contrast    Narrative    EXAMINATION: CT CHEST ABDOMEN PELVIS W/O CONTRAST, 10/6/2021 1:52 PM    TECHNIQUE: Helical CT images from the thoracic inlet through the  symphysis pubis were obtained without intravenous contrast.     COMPARISON: CT AP 10/3/2021    HISTORY: Sepsis    FINDINGS:    Chest: Left internal jugular central venous catheter tip terminating  in the SVC. Thyroid gland is unremarkable. Numerous prominent  mediastinal nodes, likely reactive. Calcified right hilar lymph nodes.  Cardiac size is borderline enlarged. No pericardial effusion. Aortic  and pulmonary artery caliber within normal limits. Visualized portions  of the aortic arch branching vessels are unremarkable.     Endotracheal tube terminating at the level of the flores. Extensive  groundglass and consolidative opacities throughout the lungs,  predominantly within the posterior portions of the lung lobes. Mild  diffuse interlobular septal thickening. Calcified granuloma in the  right middle lobe. Small bilateral pleural effusions. No pneumothorax.    Abdomen and pelvis: Hepatomegaly with severe hepatic steatosis.  Gallbladder surgically absent. Borderline splenomegaly with several  punctate calcifications. The adrenal glands and pancreas are  unremarkable. Retained contrast in the renal cortices. Kidneys are  otherwise unremarkable without hydronephrosis. Urinary bladder is  decompressed with indwelling Rice catheter. Surgically absent uterus  and  ovaries.    Postoperative changes relating gastric bypass. Enteric tube  terminating within the proximal portion of the alimentary limb.  Feeding tube terminating near the small bowel anastomosis. Colon is  predominantly fluid-filled. Residual oral contrast in the ileum and  colon.    Scattered prominent central mesenteric and retroperitoneal lymph  nodes. No suspicious abdominal lymphadenopathy based on short axis  criteria. Moderate amount of free fluid/ascites about the abdomen. No  free air.    Abdominal aorta is normal in caliber.    Bones and soft tissues: Soft tissue anasarca lower abdomen and  visualized thighs. No suspicious osseous lesions. Chronic left rib  fracture deformities.      Impression    IMPRESSION:   1a. Extensive groundglass and consolidative opacities throughout the  lungs, consistent with infectious etiology. Some degree of pulmonary  edema may be present as well given bilateral interlobular septal  thickening.  1b. Small bilateral pleural effusions.  1c. Low-lying endotracheal tube. Recommend retracting approximately 3  cm. Findings communicated with nursing staff.  2. Hepatomegaly with severe hepatic steatosis. Mild splenomegaly.  3. Moderate amount of free fluid/ascites and soft tissue anasarca.  4. Retained contrast in the renal cortices from prior  contrast-enhanced CT, compatible with acute kidney injury.    I have personally reviewed the examination and initial interpretation  and I agree with the findings.    SWATHI NUNO DO         SYSTEM ID:  [id number]   US Abd/Pelvis Duplex Complete Portable    Narrative    ULTRASOUND ABDOMEN COMPLETE WITH DOPPLER 10/6/2021 4:10 PM    CLINICAL HISTORY: concern for portal venous thrombosis.    COMPARISONS: Abdominal ultrasound 9/29/2021    TECHNIQUE: Doppler evaluation of the hepatic vasculature.    FINDINGS:    Splenic vein: 20 cm/s, antegrade  Main portal vein: 13 cm/s, antegrade  Right portal vein: 14 cm/s, antegrade  Left portal vein: 18  cm/s, antegrade    Left hepatic vein: 30 cm/s, antegrade  Middle hepatic vein: 33 cm/s, antegrade  Right hepatic vein: 26 cm/s, antegrade    Inferior vena cava: 48 cm/s, phasic    Hepatic artery: 140/36 cm/s, resistive index of 0.74  Right hepatic artery: 117/26 cm/s, resistive index of 0.78  Left hepatic artery: 83/24 cm/s, resistive index of 0.71      Impression    IMPRESSION: No evidence of portal venous clot. Antegrade flow  demonstrated throughout the portal and hepatic veins.    I have personally reviewed the examination and initial interpretation  and I agree with the findings.    YUDELKA WISDOM MD         SYSTEM ID:  [id number]   XR Chest Port 1 View    Narrative    EXAM: XR CHEST 1 VW 10/6/2021      HISTORY: Please check ET tube placement.    COMPARISON: Same-day CT.     TECHNIQUE: Frontal view of the chest.    FINDINGS: Endotracheal tube tip is approximately 6.7 cm above the  flores. Left jugular central venous catheter tip is in the right  atrium. Enteric tube tip and sidehole projecting over the stomach.  Feeding tube tip is beyond view inferiorly. Mild patchy pulmonary  opacities. Cardiomediastinal silhouette is stable. Probable small  pleural effusions. No definite pneumothorax. Bones and upper abdomen  are unremarkable.      Impression    IMPRESSION: Endotracheal tube tip is not well profiled and projects  about 7 cm above the flores. Stable remaining chest with patchy  opacities and probable small pleural effusions.    I have personally reviewed the examination and initial interpretation  and I agree with the findings.    DORY VANG MD         SYSTEM ID:  [id number]   XR Chest Port 1 View    Narrative    XR CHEST PORT 1 VIEW10/7/2021 6:14 AM    INDICATION: Assess ETT placement, lung fields    COMPARISON:  10/6/2021    FINDINGS: AP view of the chest. Endotracheal tube tip in unchanged  position, approximately 5 cm from the flores. Enteric tubes coursing  down the field-of-view of this exam. Left  IJ venous catheter tip in  the cavoatrial junction.    Cardiomediastinal silhouette is stable. Diffuse interstitial and  airspace opacities and small bilateral pleural effusions slightly more  pronounced on exam performed yesterday. Low lung volumes. Lungs are  stable.      Impression    IMPRESSION:   1. Support devices in stable position, endotracheal tube tip 4.5 cm  from the flores.  2. Diffuse interstitial and airspace opacities and likely small  bilateral pleural effusions have progressed since exam performed  yesterday    I have personally reviewed the examination and initial interpretation  and I agree with the findings.    KIKE GIVENS MD         SYSTEM ID:  [id number]   US Renal Complete    Narrative    EXAMINATION: US RENAL COMPLETE, 10/7/2021 10:45 AM     COMPARISON: None.    HISTORY: Acute renal failure    TECHNIQUE: The kidneys and bladder were scanned in the standard  fashion with specialized ultrasound transducer(s) using both gray  scale and limited color/spectral Doppler techniques.    FINDINGS:    Right kidney: Measures 12.3 cm in length. Parenchyma is of normal  thickness and echogenicity. No focal mass. No hydronephrosis.    Left kidney: Measures 12.3 cm in length. Parenchyma is of normal  thickness and echogenicity. No focal mass. No hydronephrosis.     Bladder: Empty. Rice present.      Impression    IMPRESSION:  1.  Normal kidneys.  2.  Mild ascites.    MARIYA PRATT MD         SYSTEM ID:  [id number]   XR Chest Port 1 View    Narrative    EXAM: XR CHEST PORT 1 VIEW  10/7/2021 11:36 AM     HISTORY:  RIJ placement       COMPARISON:  Radiographs 10/7/2021, 10/6/2021; CT 8/6/2021    TECHNIQUE: Portable AP radiograph of the chest.    FINDINGS:   Endotracheal tube tip projects over the midthoracic trachea. Feeding  tube courses below the diaphragm and beyond the field-of-view. Enteric  tube tip projects over the stomach. Right IJ central venous catheter  tip projects over the low right atrium.  Left IJ central venous  catheter tip projects over the cavoatrial junction. Residual contrast  within the stomach.    The trachea is midline. Stable cardiac mediastinal silhouette.  Unchanged mixed airspace and interstitial opacities. Calcified nodule  in the mid right lung. No large pleural effusion. No appreciable  pneumothorax.      Impression    IMPRESSION:   1. Right IJ catheter tip projects over the low right atrium. Recommend  retracting up to 8 cm for cavoatrial junction sectioning.  2. Additional support devices stable.  3. Unchanged mixed interstitial and airspace opacities compatible with  infection.     I have personally reviewed the examination and initial interpretation  and I agree with the findings.    WILLIE POON MD         SYSTEM ID:  [id number]   XR Chest Port 1 View    Narrative    EXAM: XR CHEST PORT 1 VIEW  10/7/2021 2:44 PM     HISTORY:  RIJ placement       COMPARISON:  Same day chest x-ray at 1109 hours.    FINDINGS:   Portable AP of the chest. The tip of the ET tube is approximately 4.9  cm above the flores. Both left and right IJ central venous catheter  terminates at superior cavoatrial junction. The sidehole of gastric  tube is obscured by the feeding tube with its tip projecting over the  stomach. The feeding tube extends below the diaphragm with the tip out  of field of view. Residual contrast in the stomach.    Trachea is midline. Heart size is normal. Prominent and indistinct  pulmonary vasculature. Unchanged mixed airspace and interstitial  opacities. No significant pleural effusion or appreciable  pneumothorax. Stable right lung granuloma.    No acute osseous abnormality. Visualized upper abdomen is  unremarkable.        Impression    IMPRESSION:     1. Right IJ central venous catheter terminates at superior cavoatrial  junction. Other supporting devices are stable.    2. Unchanged mixed interstitial and airspace opacities compatible with  infection.    I have personally reviewed  the examination and initial interpretation  and I agree with the findings.    WILLIE POON MD         SYSTEM ID:  [id number]   XR Chest Port 1 View    Narrative    EXAM: XR CHEST PORT 1 VIEW  10/8/2021 6:08 AM     HISTORY:  Assess ETT placement, lung fields       COMPARISON:  Same day chest x-ray at 1109 hours.    FINDINGS:   AP of the chest. Endotracheal tube tip is approximately 5.8 cm above  the flores. Both left and right IJ central venous catheter tips are  near the cavoatrial junction. Partially visualized enteric tubes.    Cardiomediastinal silhouette is unchanged. Slightly low lung volumes.  Prominent pulmonary vasculature. Diffuse mixed airspace and  interstitial opacities, similar. No significant pleural effusion or  appreciable pneumothorax. Bones are stable. Right lung granuloma.  There is some contrast in the stomach partially visualized.      Impression    IMPRESSION:   1. Support devices in stable position.  2. Diffuse mixed interstitial and airspace opacities, unchanged.    I have personally reviewed the examination and initial interpretation  and I agree with the findings.    JENNY SINGLETON MD         SYSTEM ID:  [id number]   CT Head w/o Contrast    Narrative    CT HEAD W/O CONTRAST 10/8/2021 2:14 PM    History: Mental status change, unknown cause; Concern for hypoxic  brain injury   ICD-10:    Comparison: None available    Technique: Using multidetector thin collimation helical acquisition  technique, axial, coronal and sagittal CT images from the skull base  to the vertex were obtained without intravenous contrast.   (topogram) image(s) also obtained and reviewed.    Findings: There is no intracranial hemorrhage, mass effect, or midline  shift. Gray-white matter differentiation in both cerebral hemispheres  appears present, but may be attenuated. Ventricles are proportionate  to the cerebral sulci. The basal cisterns are clear.    The bony calvaria and the bones of the skull base are  normal. The  paranasal sinuses are relatively clear. Mastoid effusions  bilaterally.. A nasoenteric tube is present.      Impression    Impression:    1. No definite acute intracranial pathology.  2. The gray-white matter differentiation both cerebral hemispheres  appears mildly attenuated, which may be related to hypoxic ischemic  encephalopathy or may be related to technique. There are however no  secondary findings of anoxic brain injury, cerebral edema or mass  effect. Attention is recommended on follow-up imaging.         ARSENIO HASTINGS MD         SYSTEM ID:  [id number]   MR Brain w/o Contrast    Narrative    EXAM: MR BRAIN W/O CONTRAST  10/8/2021 7:34 PM     HISTORY:  Rule out ischemic infarct       COMPARISON:  Same-day CT    TECHNIQUE: Multiplanar, multisequence MRI images of the brain were  obtained without intravenous contrast.    CONTRAST: None.    FINDINGS:   No mass effect, midline shift, or intracranial hemorrhage. No  ventriculomegaly. Diffusion-weighted images reveal no abnormal reduced  diffusion. Susceptibility weighted imaging reveals no intracranial  hemorrhage.    The visualized paranasal sinuses, and mastoid air cells are clear. The  globes and intraocular structures are within normal limits.      Impression    IMPRESSION:  1. No abnormal reduced diffusion to suggest infarction. No definite  evidence of cerebral edema. Findings on prior CT are likely related to  technique. No definite evidence for hypoxic ischemic encephalitis on  this limited MRI of the brain.         I have personally reviewed the examination and initial interpretation  and I agree with the findings.    ARSENIO HASTINGS MD         SYSTEM ID:  [id number]   XR Chest Port 1 View    Narrative    EXAM: XR CHEST PORT 1 VIEW  10/9/2021 6:42 AM     HISTORY:  Assess ETT placement, lung fields       COMPARISON:  10/8/2021    FINDINGS:   AP of the chest. Endotracheal tube tip is approximately 5.2 cm above  the flores. Both left and  right IJ central venous catheters are in  stable position. Partially visualized enteric tubes.    Cardiomediastinal silhouette is unchanged. Low lung volumes. Prominent  pulmonary vasculature. Diffuse mixed opacities, similar. No  significant pleural effusion or appreciable pneumothorax. Bones are  stable. Right lung granuloma. Stable contrast in the stomach.      Impression    IMPRESSION:   1. Support devices in stable position.  2. Low lung volumes with diffuse mixed opacities, similar to prior.  Findings likely represent pulmonary edema or atelectasis.    I have personally reviewed the examination and initial interpretation  and I agree with the findings.    SWATHI NUNO DO         SYSTEM ID:  [id number]   XR Chest Port 1 View    Narrative    Portable Single View of the Chest 10/10/2021 5:55 AM    INDICATION: Assess endotracheal tube placement, lung fields    COMPARISON:  10/9/2021    FINDINGS: AP view of the chest. Endotracheal tube tip approximately 6  cm from the flores in the mid to upper thoracic trachea. Enteric tubes  course. The field-of-view, oral contrast within the stomach. Left IJ  venous catheter tip in the high right atrium. Right IJ venous catheter  tip also in the high right atrium.    Cardiac mediastinal silhouette is stable. Low lung volumes. Streaky  perihilar and bibasilar opacities. Likely small bilateral pleural  effusions. No visualized pneumothorax. Bones are stable.      Impression    IMPRESSION:   1. Support devices in stable position.  2. Low lung volumes with perihilar and bibasilar predominant mixed  opacities. Findings likely represent pulmonary edema or atelectasis  and are not significantly changed from prior exam.    I have personally reviewed the examination and initial interpretation  and I agree with the findings.    SWATHI NUNO DO         SYSTEM ID:  [id number]   XR Chest Port 1 View    Narrative    XR CHEST PORT 1 VIEW  10/11/2021 5:54 AM      HISTORY: Assess ETT  placement, lung fields    COMPARISON: Chest x-ray 10/10/2021.    FINDINGS:   Portable AP 20 degrees upright chest x-ray. Endotracheal tube tip  terminates over the low trachea. Gastric and enteric tubes. Right  internal jugular central catheter tip terminates over the mid right  atrium. Left internal jugular central catheter tip terminates over the  high right atrium.    Trachea is midline. Cardiac silhouette is within normal limits.  Pulmonary vasculature is indistinct. Persistent bilateral perihilar  opacities. Trace bilateral pleural effusions. No pneumothorax.      Impression    IMPRESSION:   1. Low lung volumes with persistent perihilar opacities.  2. Small bilateral pleural effusions.    I have personally reviewed the examination and initial interpretation  and I agree with the findings.    NOHEMY WELLINGTON MD         SYSTEM ID:  [id number]   XR Abdomen Port 1 View    Narrative    EXAM: XR ABDOMEN PORT 1 VIEWS  10/12/2021 11:12 AM     HISTORY:  abdominal pain       TECHNIQUE: Single frontal radiograph of the abdomen    COMPARISON:  10/6/2021    FINDINGS:   Enteric tube tip projects over the left lower quadrant, stable.  Cholecystectomy.    Nonobstructive bowel gas pattern. No pneumatosis.      Impression    IMPRESSION: Nonobstructive bowel gas pattern.     I have personally reviewed the examination and initial interpretation  and I agree with the findings.    KIKE GIVENS MD         SYSTEM ID:  [id number]   CT Chest Abdomen Pelvis w/o Contrast    Narrative    EXAMINATION: CT CHEST ABDOMEN PELVIS W/O CONTRAST  10/14/2021 10:08 AM       CLINICAL HISTORY: Abdominal abscess/infection suspected    COMPARISON: CT chest abdomen pelvis 10/6/2021.        PROCEDURE COMMENTS: CT of the chest, abdomen, and pelvis was performed  without contrast. Axial MIP  images of the chest, and coronal and  sagittal reformatted images of the chest, abdomen, and pelvis  obtained.    FINDINGS:    Support devices: Right IJ line terminates  "in the right atrium. Left IJ  line terminates in the low SVC. Enteric tube courses through the  retrogastric/retrocolic Fitz limb and terminates near the  enteroenteric anastomosis. Rectal tube.    Chest:  Partially visualized thyroid gland is unremarkable. No adenopathy in  the chest. Calcified right mediastinal lymph nodes similar to prior  exams. Heart is not enlarged. No pericardial effusion. Major vessels  are normal in caliber. Esophagus is unremarkable.    Trachea and central airways are clear without significant debris.  Small bilateral pleural effusions are decreased since 10/6/2021. No  pneumothorax. Interlobular septal thickening, patchy peripheral  groundglass opacities, and consolidations containing air bronchograms  bilaterally are decreased since 10/6/2021, although more focal right  lower lobe consolidation is slightly increased (series 2 images  [id number]). Calcified granuloma right middle lobe.    Abdomen/pelvis:  Diffuse marked hypoattenuation of the hepatic parenchyma.  Cholecystectomy. No biliary dilation. Diffuse hazy stranding and  lymphadenopathy surrounding the pancreas. Spleen measures up to 15.4  cm. Adrenal glands and unenhanced kidneys are unremarkable. Urinary  bladder is normal. Hysterectomy. Soft tissue stranding and enlarged  lymph nodes throughout the central mesentery, consistent with \" lara  mesentery\" appearance. Layering fluid in the pelvis.    Bones and soft tissues:  Anasarca. No acute or suspicious osseous lesion. Injection granulomas  subcutaneous fat ventral abdominal wall. Degenerative changes at  T11-12 and also in the mid thoracic spine with vertebral osteophyte  formation.      Impression    IMPRESSION:  1. Improved pulmonary opacities reflecting improved infection and  edema. Decreased small bilateral pleural effusions.  2. Increased fluid stranding and enlarged lymph nodes in the central  mesentery and surrounding the pancreas, likely due to fluid third  spacing rather " than pancreatitis given normal lipase. Anasarca.  Differential includes mesenteric adenitis or lymphoma.  3. Marked hepatic steatosis. Splenomegaly.    I have personally reviewed the examination and initial interpretation  and I agree with the findings.    ANGÉLICA ABDALLA MD         SYSTEM ID:  [id number]   XR Chest Port 1 View    Narrative    Exam: XR CHEST PORT 1 VIEW, 10/17/2021 10:48 AM    Indication: Evaluate lung fields, new fever    Comparison: 10/14/2021    Findings:   Feeding tube seen coursing through the mediastinum. Right IJ central  line tip in the low superior vena cava. Left IJ central line tip in  the high right atrium.    Heart within normal limits. Diffuse mixed opacities throughout both  lungs are similar. No new focal areas of consolidation.      Impression    Impression:   1. Stable support devices.  2. Stable diffuse mixed opacities throughout both lungs. No new  consolidations identified.    PAULO ROGERS MD         SYSTEM ID:  [id number]   XR Chest Port 1 View    Narrative    EXAMINATION: XR CHEST PORT 1 VIEW, 10/18/2021 3:45 AM    INDICATION: intubated, c/f developing pna    COMPARISON: 10/17/2021    FINDINGS: Single portable supine AP radiograph of the chest. Enteric  tube courses below the left hemidiaphragm, tip is not within view.  Right IJ central venous catheter tip projecting over the high right  atrium. Left IJ central venous catheter with tip terminating over the  cavoatrial junction. Trachea is midline. The cardiomediastinal  silhouette is within normal limits. No pleural effusion. No  pneumothorax. Increased bilateral interstitial and airspace opacities.  Partially visualized upper abdomen is gasless. No acute osseous  abnormality. Soft tissue is within normal limits.      Impression    IMPRESSION:  1. Increased interstitial and airspace opacities concerning for  infection.  2. Stable support devices. No ET tube is present.    I have personally reviewed the examination and  initial interpretation  and I agree with the findings.    MARIYA PRATT MD         SYSTEM ID:  [id number]   XR Chest Port 1 View    Narrative    EXAMINATION: XR CHEST PORT 1 VIEW, 10/19/2021 5:45 AM    INDICATION: Assess lung fields, concern for developing pneumonia    COMPARISON: Plain film 10/18/2021    FINDINGS: Single portable 20 degrees upright AP radiograph of the  chest. Enteric tube courses below the left hemidiaphragm, tip is not  within view. Stable positioning of left IJ central venous catheter  with tip projecting over the right atrium. Right IJ central venous  catheter with tip projecting over the right atrium.    Trachea is midline. The cardiomediastinal silhouette is stable. No  pleural effusion. No pneumothorax. Unchanged bilateral interstitial  and airspace opacities. Osseous structures are unchanged.      Impression    IMPRESSION:  1. Unchanged bilateral interstitial and airspace opacities concerning  for infection.  2. Stable support devices.    I have personally reviewed the examination and initial interpretation  and I agree with the findings.    IRAIDA IRENE MD         SYSTEM ID:  [id number]   IR CVC Tunnel Placement > 5 Yrs of Age    Addendum: 11/2/2021    Addendum:  Please note ultrasound guidance was used to advance the micropuncture  needle into the internal jugular vein and a hard copy image was saved.    TYLER GUPTA MD         SYSTEM ID:  [id number]      Narrative    Procedure: 10/22/2021.  1. Nontunneled right IJ catheter removal.  2. Placement of 14.5 Salvadorean, 19 cm, double lumen, tunneled central  venous catheter.    History: Patient in need for long-term dialysis. Currently has not  tunneled dialysis catheter which can be removed.    Staff: Tyler Gupta M.D.    Fellow: Aries Bettencourt D.O.    Monitoring: Patient was placed on continuous monitoring supervised by  the IR nursing staff and IR attending. Patient remained stable  throughout the procedure.    Medications:   1.  Fentanyl 75 mcg IV  2. Versed 1.5 mg IV  3. 1% lidocaine 10 ml local anesthesia  4. Heparin 2.5 ml of 1000 units/ml/lumen.    Sedation time: 20 minutes.    Fluoroscopy time: 0.8 minutes.    IV contrast: None.    Consent: Informed written and verbal consent was obtained.    Procedure/Findings: The patient was placed supine on the fluoroscopy  table.  Right-sided nontunneled dialysis catheter was prepped and  draped in usual sterile fashion. Retention suture cut. This was pulled  during Valsalva and manual pressure. Hemostasis achieved. Site was  cleansed and dressed with sterile dressing. No immediate competition.    Ultrasound was used to identify a patent right internal jugular vein.  The patient's right neck and chest were prepped and draped in the  usual sterile fashion. The dermatotomy site was anesthetized with 1%  lidocaine. A 5 mm incision was made with a # 11 blade, and bluntly  dissected. Under ultrasound guidance, a .018 micropuncture needle was  advanced into the right internal jugular vein. Under fluoroscopic  visualization, a 0.018 soft tipped wire was advanced into the right  atrium. Micropuncture needle was exchanged over a wire for a 3-5  Lao dilator. Under fluoroscopy, the wire was used to measure from  the patient's right atrium to the skin. The wire and inner 3 Lao  dilator were removed, and under fluoroscopic guidance, a 0.035 Bentson  wire advanced through the right atrium and into the inferior vena cava  and secured.     The chest was anesthetized with 1% lidocaine, a 5 mm incision was made  with a # 11 blade, a tunnel was created with a tunneling device, and  the catheter was pulled through. The dilator was exchanged over wire  for a 14 Lao dilator/peel-away sheath. The wire and dilator were  removed, and the 14.5 Lao, 19 cm, double lumen, catheter was  advanced through the peel-away sheath into the vessel and the  peel-away sheath removed. Fluoroscopy revealed the catheter tip to  lie  at the mid right atrium.  The catheter flushed and aspirated freely  and was flushed with 2.5 ml of heparin,  1000 units/ml/lumen. The  catheter was secured with one 2-0 Ethilon retention suture and the  site was cleansed and dressed. The neck incision was cleansed and the  skin edges were approximated and glued. Images were saved throughout  the procedure. The procedure was well tolerated, with no immediate  complications.    Estimate blood loss: 5 cc.    Specimens: None.      Impression    Impression:   1. Uncomplicated removal of right-sided nontunneled dialysis catheter.  2. Placement of right IJ, 14.5 Citizen of [place], 19 cm, double lumen, tunneled  central venous catheter. Catheter is ready for immediate use.    Plan: The glued neck incision does not require dressings.  If the glue  is still adhered to the neck incision in 10 days, it may be gently  removed.  The chest incision should be kept clean, dry, and covered,  with daily dressing changes, for 3 days.  The catheter exit site  should not be submerged, and should be covered when showering/bathing.  The catheter retention suture may be removed in 2 weeks.    I, TYLER SINHA MD, attest that I was present for all critical  portions of the procedure and was immediately available to provide  guidance and assistance during the remainder of the procedure.    I have personally reviewed the examination and initial interpretation  and I agree with the findings.    TYLER SINHA MD         SYSTEM ID:  [id number]   IR CVC Non Tunnel Line Removal    Addendum: 11/2/2021    Addendum:  Please note ultrasound guidance was used to advance the micropuncture  needle into the internal jugular vein and a hard copy image was saved.    TYLER SINHA MD         SYSTEM ID:  [id number]      Narrative    Procedure: 10/22/2021.  1. Nontunneled right IJ catheter removal.  2. Placement of 14.5 Citizen of [place], 19 cm, double lumen, tunneled central  venous catheter.    History: Patient in need for  long-term dialysis. Currently has not  tunneled dialysis catheter which can be removed.    Staff: Joe Gupta M.D.    Fellow: Aries Bettencourt D.O.    Monitoring: Patient was placed on continuous monitoring supervised by  the IR nursing staff and IR attending. Patient remained stable  throughout the procedure.    Medications:   1. Fentanyl 75 mcg IV  2. Versed 1.5 mg IV  3. 1% lidocaine 10 ml local anesthesia  4. Heparin 2.5 ml of 1000 units/ml/lumen.    Sedation time: 20 minutes.    Fluoroscopy time: 0.8 minutes.    IV contrast: None.    Consent: Informed written and verbal consent was obtained.    Procedure/Findings: The patient was placed supine on the fluoroscopy  table.  Right-sided nontunneled dialysis catheter was prepped and  draped in usual sterile fashion. Retention suture cut. This was pulled  during Valsalva and manual pressure. Hemostasis achieved. Site was  cleansed and dressed with sterile dressing. No immediate competition.    Ultrasound was used to identify a patent right internal jugular vein.  The patient's right neck and chest were prepped and draped in the  usual sterile fashion. The dermatotomy site was anesthetized with 1%  lidocaine. A 5 mm incision was made with a # 11 blade, and bluntly  dissected. Under ultrasound guidance, a .018 micropuncture needle was  advanced into the right internal jugular vein. Under fluoroscopic  visualization, a 0.018 soft tipped wire was advanced into the right  atrium. Micropuncture needle was exchanged over a wire for a 3-5  Thai dilator. Under fluoroscopy, the wire was used to measure from  the patient's right atrium to the skin. The wire and inner 3 Thai  dilator were removed, and under fluoroscopic guidance, a 0.035 Bentson  wire advanced through the right atrium and into the inferior vena cava  and secured.     The chest was anesthetized with 1% lidocaine, a 5 mm incision was made  with a # 11 blade, a tunnel was created with a tunneling device,  and  the catheter was pulled through. The dilator was exchanged over wire  for a 14 Malagasy dilator/peel-away sheath. The wire and dilator were  removed, and the 14.5 Malagasy, 19 cm, double lumen, catheter was  advanced through the peel-away sheath into the vessel and the  peel-away sheath removed. Fluoroscopy revealed the catheter tip to lie  at the mid right atrium.  The catheter flushed and aspirated freely  and was flushed with 2.5 ml of heparin,  1000 units/ml/lumen. The  catheter was secured with one 2-0 Ethilon retention suture and the  site was cleansed and dressed. The neck incision was cleansed and the  skin edges were approximated and glued. Images were saved throughout  the procedure. The procedure was well tolerated, with no immediate  complications.    Estimate blood loss: 5 cc.    Specimens: None.      Impression    Impression:   1. Uncomplicated removal of right-sided nontunneled dialysis catheter.  2. Placement of right IJ, 14.5 Malagasy, 19 cm, double lumen, tunneled  central venous catheter. Catheter is ready for immediate use.    Plan: The glued neck incision does not require dressings.  If the glue  is still adhered to the neck incision in 10 days, it may be gently  removed.  The chest incision should be kept clean, dry, and covered,  with daily dressing changes, for 3 days.  The catheter exit site  should not be submerged, and should be covered when showering/bathing.  The catheter retention suture may be removed in 2 weeks.    I, TYLER SINHA MD, attest that I was present for all critical  portions of the procedure and was immediately available to provide  guidance and assistance during the remainder of the procedure.    I have personally reviewed the examination and initial interpretation  and I agree with the findings.    TYLER SINHA MD         SYSTEM ID:  [id number]   XR Chest Port 1 View    Narrative    Portable chest    INDICATION: Worsening white blood cells and C-reactive protein,  now  completed 10 day course of Zosyn for VAP versus aspiration pneumonia    COMPARISON: 10/19/2021    FINDINGS:: Heart size appears normal. Patchy opacities in the lungs  appear slightly decreased on the right lower lung region, otherwise  showing no significant change. New right IJ central venous catheter  tip is in the distal SVC. Previous right IJ sheath has been removed.  Previous feeding tube has been removed. Previous left IJ catheter has  been removed. Calcified right lower lung granuloma.      Impression    IMPRESSION: Continued patchy opacities bilaterally with overall  improved aeration especially in the right lower lung. Right lower lung  granuloma.    ANGÉLICA ABDALLA MD         SYSTEM ID:  [id number]   IR CVC Tunnel Removal Right    Narrative    DIAGNOSIS: ANA LAURA    PROCEDURE: IR CVC TUNNEL REMOVAL RIGHT       Impression    IMPRESSION: Completed removal of Right tunneled central venous  catheter in its entirety. There were no complications.    ----------    CLINICAL HISTORY: The patient has a tunneled central venous catheter  placed 10/22/2021 for dialysis. Patient presents for removal as it is  no longer needed.  Procedure performed at bedside.      PERFORMED BY: Melisa Garza PA-C    MEDICATIONS: None    DESCRIPTION: Physical examination demonstrated no erythema,  tenderness, fluctuance, or discharge at the catheter exit site or  along the tract. The catheter and its entry site into the skin was  prepped and draped in usual sterile fashion.     Using steady gentle traction, the catheter and cuff were freed from  the subcutaneous tissue, and the entire catheter was removed without  difficulty. Compression was applied over the venipuncture site, as  well as along the tract, until good hemostasis was achieved. A sterile  dressing was applied to the skin at the exit site.    COMPLICATIONS:  No immediate concerns; the patient remained stable  throughout the procedure and tolerated it well.    ESTIMATED  BLOOD LOSS:  Minimal    SPECIMENS: None    TIME:  A total of 15 minutes was spent with the patient. Greater than  50% of the time was spent in counseling, education, and coordination  of care.    LAITH SUAREZ PA-C         SYSTEM ID:  [id number]   Echo Limited     Value    LVEF  70%    Narrative    [id number]  [id number]  [id number]  [id number]^GAYLE BECERRA^J LUIS     [facility],[place]  Echocardiography Laboratory  [address] [place] [id number]     Name: MAGALY FAJARDO  MRN: [id number]  : 1975  Study Date: 10/04/2021 11:32 AM  Age: 46 yrs  Gender: Female  [place] [facility] [place]: Pulmonary Edema  Ordering Physician: J LUIS ARAUJO JR.  Performed By: Marquez Vick RDCS     BSA: 1.9 m2  Height: 64 in  Weight: 195 lb  HR: 84  ______________________________________________________________________________  Procedure  Limited Portable Echo Adult.  ______________________________________________________________________________  Interpretation Summary  Global and regional left ventricular function is hyperkinetic with an EF >70%.  Right ventricular function, chamber size, wall motion, and thickness are  normal.  The inferior vena cava is normal.  Pulmonary artery systolic pressure is normal.  ______________________________________________________________________________  Left Ventricle  Global and regional left ventricular function is hyperkinetic with an EF >70%.     Right Ventricle  Right ventricular function, chamber size, wall motion, and thickness are  normal.     Tricuspid Valve  Moderate tricuspid insufficiency is present. The right ventricular systolic  pressure is approximated at 23.8 mmHg plus the right atrial pressure.  Pulmonary artery systolic pressure is normal.     Vessels  The inferior vena cava is normal.     Pericardium  No pericardial effusion is present.  ______________________________________________________________________________  Doppler  Measurements & Calculations  MV E max ericka: 72.4 cm/sec  MV A max ericka: 46.8 cm/sec  MV E/A: 1.5  MV dec slope: 582.1 cm/sec2  MV dec time: 0.12 sec  PA acc time: 0.10 sec  TR max ericka: 244.0 cm/sec  TR max P.8 mmHg  E/E' av.1  Lateral E/e': 4.6  Medial E/e': 5.5     ______________________________________________________________________________  Report approved by: Dexter Peters 10/04/2021 03:39 PM               Discharge Medications   Current Discharge Medication List      START taking these medications    Details   B Complex-C-Folic Acid (RENAL) 1 MG CAPS Take 1 capsule by mouth daily    Associated Diagnoses: Alcohol abuse with other alcohol-induced disorder (H)      ciprofloxacin (CIPRO) 250 MG tablet Take 1 tablet (250 mg) by mouth every 12 hours for 3 days    Associated Diagnoses: Acute cystitis without hematuria      cyanocobalamin (CYANOCOBALAMIN) 100 MCG tablet 1 tablet (100 mcg) by Oral or Feeding Tube route daily    Associated Diagnoses: Alcohol abuse with other alcohol-induced disorder (H)      emollient (VANICREAM) external cream Apply topically every 6 hours as needed for other (dry skin)    Associated Diagnoses: Dermatitis      mirtazapine (REMERON) 15 MG tablet Take 1 tablet (15 mg) by mouth At Bedtime    Associated Diagnoses: Insomnia, unspecified type; Mild episode of recurrent major depressive disorder (H)      nicotine (COMMIT) 2 MG lozenge Place 1 lozenge (2 mg) inside cheek every hour as needed for smoking cessation    Associated Diagnoses: Tobacco abuse      simethicone (MYLICON) 80 MG chewable tablet Take 1 tablet (80 mg) by mouth every 6 hours as needed for cramping    Associated Diagnoses: Alcohol abuse with other alcohol-induced disorder (H)         CONTINUE these medications which have NOT CHANGED    Details   albuterol (PROAIR HFA/PROVENTIL HFA/VENTOLIN HFA) 108 (90 Base) MCG/ACT inhaler Inhale 2 puffs into the lungs every 4 hours as needed for shortness of breath / dyspnea or  wheezing  Qty: 8.5 g, Refills: 1    Comments: Pharmacy may dispense brand covered by insurance (Proair, or proventil or ventolin or generic albuterol inhaler)  Associated Diagnoses: Uncomplicated asthma, unspecified asthma severity, unspecified whether persistent      calcium carbonate-vitamin D (OS-STEFFANY) 600-400 MG-UNIT chewable tablet Take 1 chew tab by mouth daily  Qty: 30 tablet, Refills: 0    Associated Diagnoses: Vitamin D deficiency      escitalopram (LEXAPRO) 10 MG tablet Take 1 tablet (10 mg) by mouth daily  Qty: 30 tablet, Refills: 0    Associated Diagnoses: Mild episode of recurrent major depressive disorder (H); Anxiety disorder, unspecified type      folic acid (FOLVITE) 1 MG tablet Take 1 tablet (1 mg) by mouth daily  Qty: 30 tablet, Refills: 0    Associated Diagnoses: Alcoholic cirrhosis of liver with ascites (H)      gabapentin (NEURONTIN) 100 MG capsule Take 1 capsule (100 mg) by mouth 3 times daily  Qty: 90 capsule, Refills: 0    Associated Diagnoses: Alcoholic cirrhosis of liver with ascites (H); Type 2 diabetes mellitus with other specified complication, without long-term current use of insulin (H)      metFORMIN (GLUCOPHAGE) 500 MG tablet Take 1 tablet (500 mg) by mouth 2 times daily (with meals)  Qty: 60 tablet, Refills: 0    Associated Diagnoses: Type 2 diabetes mellitus with other specified complication, without long-term current use of insulin (H)      pantoprazole (PROTONIX) 40 MG EC tablet Take 1 tablet (40 mg) by mouth every morning (before breakfast)  Qty: 30 tablet, Refills: 0    Associated Diagnoses: Alcoholic cirrhosis of liver with ascites (H)      rifaximin (XIFAXAN) 550 MG TABS tablet Take 1 tablet (550 mg) by mouth 2 times daily  Qty: 60 tablet, Refills: 0    Associated Diagnoses: Alcoholic cirrhosis of liver with ascites (H)      thiamine (B-1) 100 MG tablet Take 1 tablet (100 mg) by mouth daily  Qty: 30 tablet, Refills: 0    Associated Diagnoses: Alcoholic cirrhosis of liver with  "ascites (H)         STOP taking these medications       cyanocobalamin (CYANOCOBALAMIN) 1000 MCG/ML injection Comments:   Reason for Stopping:         lactulose (CEPHULAC) 20 GM packet Comments:   Reason for Stopping:         multivitamin (CENTRUM) chewable tablet Comments:   Reason for Stopping:         prazosin (MINIPRESS) 1 MG capsule Comments:   Reason for Stopping:             Allergies   Allergies   Allergen Reactions     Nsaids      Gastric bypass     Trazodone      In a \"hazy\" for the whole next day       "

## 2021-11-05 NOTE — PLAN OF CARE
RN assumed cares at [id number]    Vitals: Temp: 98.2  F (36.8  C) Temp src: Oral BP: 112/71 Pulse: 96   Resp: 17 SpO2: 93 % O2 Device: None (Room air)    Pain: Pain rated at a 4, scheduled tylenol given.  Neuro: A/O x4  Cardiac: murmur detected   Respiratory: dyspnea with activity. SpO2 at 93% on room air.  GI/:  WDL. Nausea during shift, Zofran give. Simethicone given for gas relief.  IV/Drains: 1 PIV infusing.  Activity:  Assistive 1 with walker and gait belt. Cannot get feet back in bed on own, lift both at same time.  Skin: Jaundiced, edema--legs wrapped.  Labs: Labs drawn at 0430 on 11/5/21. Monitor Hematology.    Plan of Care:  Continue to monitor and contact MD with changes.

## 2021-11-05 NOTE — PROGRESS NOTES
Calorie Count  Intake recorded for: 11/4  Total Kcals: 1055 Total Protein: 50g  Kcals from Hospital Food: 1055   Protein: 50g  Kcals from Outside Food (average):0 Protein: 0g  # Meals Ordered from Kitchen: 3 meals  # Meals Recorded: 4 meals (First - 100% hard boiled egg, pears)      (Second - 50% turkey and cheese sandwich, whole milk)      (Third - 100% cookie, whole milk, string cheese)      (Fourth - 100% sausage, 25% cream of wheat w/ whole milk)  # Supplements Recorded: 0

## 2021-11-05 NOTE — PROGRESS NOTES
CLINICAL NUTRITION SERVICES - ASSESSMENT NOTE     Nutrition Prescription    RECOMMENDATIONS FOR MDs/PROVIDERS TO ORDER:  None today    Malnutrition Status:    Unable to determine due to no NFPA completed today    Recommendations already ordered by Registered Dietitian (RD):  Snacks TID: 10 am - hard boiled eggs and peaches, 2 pm - Ensure Max Protein, 8 pm - 2 String Cheese    Future/Additional Recommendations:  Follow up on po intakes and calorie count on Monday  Monitor for acceptance of snacks/supplements as ordered  Monitor weight/lab trends  Follow up with pt visit and NFPA next week      REASON FOR ASSESSMENT  Magaly Fajardo is a/an 46 year old female assessed by the dietitian for Provider Order - assist with snack/supplement, and Provider ordered calorie count     NUTRITION/MEDICAL HISTORY  Per chart review: Pt admits to ARU for rehabilitation in the setting of post hospitalization for alcoholic hepatitis, ARDS, pneumonia, UTI, anemia, severe malnutrition, ANA LAURA on CKD. Other past medical history noted for ETOH abuse (last drink 9/12/21), alcoholic hepatitis, Fitz en y gastric bypass, alcohol induced pancreatitis, Type 2 DM, anxiety, and depression.    RD attempted visit, pt newly admitted and busy with staff in the bathroom, so RD will defer face to face visit to next week. RD will resume nutrition intervention as pt was on at hospital, RD will hang calorie count folder and follow up with weekend calorie counts on Monday.     CURRENT NUTRITION ORDERS  Diet: High Kcal/High Protein  Intake/Tolerance: To be determined, per 3-day hospital calorie count from 11/2-11/4 indicated pt was consuming and average of 675 kcals, 26 gm protein (42% lower end kcal needs, 40% protein needs)      LABS  Results for MAGALY FAJARDO (MRN [id number]) as of 11/5/2021 16:29   11/4/2021 06:08 11/5/2021 04:38   Sodium 136 138   Potassium 3.2 (L) 3.5   Urea Nitrogen 12 12   Creatinine 1.42 (H) 1.38 (H)   GFR Estimate 44 (L) 46 (L)  "  Magnesium  1.6   Phosphorus  3.4   Albumin 1.6 (L) 1.5 (L)   Protein Total 5.6 (L) 5.6 (L)   Bilirubin Total 1.8 (H) 1.7 (H)   Alkaline Phosphatase 208 (H) 224 (H)   ALT 34 37   AST 76 (H) 70 (H)   CRP Inflammation 58.0 (H)      MEDICATIONS  Lexapro, Remeron, Cipro, Vit B12, Folvite, IV Venofer, Lactulose, Caltrate, Rifaximin, Renal MVI, Protonix, Thiamine    ANTHROPOMETRICS  Height: 162.6 cm (5' 4\")  Most Recent Weight: 89.6 kg (197 lb 8 oz)    IBW: 54.5 kg  BMI: Obesity Grade I BMI 30-34.9  Weight History:   Wt Readings from Last 20 Encounters:   11/05/21 89.6 kg (197 lb 8 oz)   11/04/21 90.2 kg (198 lb 13.7 oz)   09/20/21 86.7 kg (191 lb 3.2 oz)   04/14/20 74.8 kg (165 lb)      86 kg (189 lb 8 oz) 09/23/2021 - per CE     Weight assessment: 6 lb wt gain in 2 months.     Dosing Weight: 63.2 kg    ASSESSED NUTRITION NEEDS  Estimated Energy Needs: 0644-4541 kcals/day (25 - 30 kcals/kg)  Justification: Maintenance and obese  Estimated Protein Needs: 65-75 grams protein/day (1 - 1.2 grams of pro/kg)  Justification: Maintenance  Estimated Fluid Needs: 7822-9883 mL/day (25 - 30 mL/kg)   Justification: Maintenance    MALNUTRITION  % Intake: Unable to assess today/to be determined   % Weight Loss: None noted  Subcutaneous Fat Loss: Not assessed today   Muscle Loss: Not assessed today   Fluid Accumulation/Edema: None noted  Malnutrition Diagnosis: Unable to determine due to no NFPA completed today     NUTRITION DIAGNOSIS  Predicted inadequate nutrient intake related to acute illness      INTERVENTIONS  Implementation  Calorie count x3 days per Provider order - RD will follow up on this on Monday   Medical food supplement therapy - ordered as above   Modify composition of meals/snacks - food snacks ordered as above     Goals  Patient to consume % of nutritionally adequate meal trays TID, or the equivalent with supplements/snacks.     Monitoring/Evaluation  Progress toward goals will be monitored and evaluated per " protocol.    Kristel Kelley RD, CNSC, LD  ARU RD pager: [phone]

## 2021-11-05 NOTE — INTERIM SUMMARY
[facility] Pre-Admission Screen    Referral Source:  [facility] [address] [facility] 5234-02  Admit date to referring facility: 9/29/2021    Physical Medicine and Rehab Consult Completed: No    Rehab Diagnosis:    16 Debility (non-cardiac, non-pulmonary): alcoholic hepatitis c/b ARDS and septic shock    Justification for Acute Inpatient Rehabilitation  Aliyah Angeles is a 46-year-old female with a PMH of alcohol use disorder, Fitz-en-Y gastric bypass (2010), DM2, anxiety, depression, who was initially admitted to internal medicine service with alcoholic hepatitis.  She transferred to MICU on 10/4/2021 with acute hypoxic respiratory failure secondary to ARDS requiring intubation. Hospitalization complicated by septic shock, pneumonia, ANA LAURA requiring CRRT and iHD, and encephalopathy. She self extubated on 10/14 and was weaned off pressors on 10/19.  Other complications included pulm edema, hypervolemia, severe malnutrition, acute on chronic macrocytic anemia requiring PRBC transfusions, and a UTI. She is now medically stable and ready to discharge to acute inpatient rehab.   Patient requires an intensive inpatient rehab program to address the following acute impairments: weakness, impaired balance, impaired activity tolerance, fatigue, SWAN, impulsivity, and edema.  These impairments are contributing to functional limitations, specifically impacting her safety and functional independence w/ transfers, gait, stairs, ADLs, and IADLs.     Current Active Medical Management Needs/Risks for Clinical Complications  The patient requires the high level of rehabilitation physician supervision that accompanies the provision of intensive rehabilitation therapy.  The patient needs the services of the rehabilitation physician to assess the patient medically and functionally and to modify the course of treatment as needed to maximize the patient's capacity to benefit from the rehabilitation  process.  The patient requires medical mgmt and assessment of:     Respiratory status in setting of ARDS, pneumonia, and pulm edema: promote pulmonary hygiene, currently stable on room air.    Renal function, fluid, and electrolyte balance in setting of ANA LAURA and hypervolemia: last HD run 10/29 w/ tunneled line pulled 11/3; trend Cr; strict I/Os w/ daily weights.  Pt also w/ hypokalemia and hypomagnesemia - requiring electrolyte replacement. Pt at risk for fluid retention, progressive uremia, and electrolyte imbalances. Assist w/ mgmt of lymphedema.     Urinary function in setting of UTI: currently making minimal urine in setting of ANA LAURA, continue bladder scanning. On IV Cipro x5 days. Also on Diflucan for vaginal candidiasis.    Pain: Tylenol, Atarax. Simethicone PRN for gas pains. Also on Gabapentin for Neuropathy.     Nutritional status in setting of severe malnutrition and hx of Fitz-en-Y: on calorie counts; regular diet w/ thin liquids. Ensure plant base supplement; HighKcal/High Protein Diet. Assess nutritional intake. Vitamin B-12, multivitamin.    Diabetes: MSSI. At risk for hypoglycemia and hyperglycemia.     Acute on chronic macrocytic anemia: monitor Hgb and CBC; transfuse for Hgb <7; pt has received intermittent PRBC transfusions during hospitalization (last one occurring 11/3).  IV iron x5 days.     Alcoholic hepatitis: Rifaximin; trend CMP, INR. Folic acid, Thiamine.    Bowels: Miralax.    Mental health in setting of MDD and RUBÉN, as well as in setting of EtOH and tobacco use disorders: Lexapro. Health psychology was consulted 10/1 - reconsult as appropriate. Pt w/ PRN nicotine lozenges to help w/ cravings. Pt aware [facility] is strict non-smoking facility. CD consult completed 9/30/21 - see note. Pt is interested in returning to IP CD at [facility], or [facility]; pt will need a new rule 25 assessment.     She is at risk for falls w/ injury, DVT (heparin subcutaneous); pt at risk for skin breakdown  in setting of decreased mobility and edema.     Past Medical/Surgical History  Surgery in the past 100 days: No  Additional relevant past medical history: alcohol use disorder (sober since 9/12/2021), alcoholic hepatitis, type 2 diabetes, anxiety, depression, ADHD, arthralgia of temporomandibular joint, arthritis, insomnia, Raynaud's phenomenon, vitamin D deficiency, cholecystectomy, Fitz-en-Y gastric bypass, hysterectomy, tobacco use disorder    Level of Functioning Prior to Admission:  LIVING ENVIRONMENT  People in home: significant other  Current Living Arrangements: apartment  Home Accessibility: no concerns  Transportation Anticipated: family or friend will provide  Living Environment Comments: Pt lives with SO in apt, no stairs present. Tub shower. SO works nights, available during day    SELF-CARE  Usual Activity Tolerance: good  Regular Exercise: No  Equipment Currently Used at Home: none  Activity/Exercise/Self-Care Comment: Pt IND with ADLs and functional mobility at baseline    DISABILITY/FUNCTION  Concentrating, Remembering or Making Decisions Difficulty: no  Difficulty Communicating: no  Difficulty Eating/Swallowing: no  Walking or Climbing Stairs Difficulty: yes   Mobility Management: stairs  Dressing/Bathing Difficulty: no   Dressing/Bathing: dressing difficulty, assistance 1 person   Dressing/Bathing Management: some assistance for LBD  Toileting issues: no  Doing Errands Independently Difficulty (such as shopping): no  Fall history within last six months: yes; # of times patient has fallen within last six months: 1  Change in Functional Status Since Onset of Current Illness/Injury: yes    Level of Function: GG Scale (Section GG Functional Ability and Goals; CMS's WILKINS Version 3.0 Manual effective 10.1.2019):  PT Current Function Goals for Rehab   Bed Rolling 3 Partial/moderate assistance 6 Independent   Supine to Sit 3 Partial/moderate assistance 6 Independent   Sit to Stand 3 Partial/moderate  assistance 6 Independent   Transfer 3 Partial/moderate assistance 6 Independent   Ambulation 3 Partial/moderate assistance 6 Independent   Stairs Not completed 6 Independent     OT Current Function Goals for Rehab   Feeding 5 Setup or clean-up assistance 6 Independent   Grooming 4 Supervision or touching assitance 6 Independent   Bathing 3 Partial/moderate assistance 6 Independent   Upper Body Dressing 4 Supervision or touching assitance 6 Independent   Lower Body Dressing 2 Substantial/maximal assistance 6 Independent   Toileting 1 Dependent 6 Independent   Toilet Transfer 4 Supervision or touching assitance 6 Independent   Tub/Shower Transfer 4 Supervision or touching assitance 6 Independent   Cognition Not Impaired: 26/30 on MoCA, however, impulsivity is noted Independent     SLP Current Function Goals for Rehab   Swallow Not Impaired Not applicable   Communication Not Impaired Not applicable       Current Diet:  0-Thin and 7-Regular/easy to chew; Ensure plant base supplement; HighKcal/High Protein Diet    Summary Statement:  Aliyah requires min A for rolling, CGA for supine > sit w/ heavy use of bed railing, and mod A for sit > supine w/ cues for logroll technique. She performs STS transfers w/ min A to 4ww d/t impaired safety awareness, requiring cues for safe use of 4ww brakes. She ambulates 100 ft x2 w/ min A for balance and moderate cues for safety and use of 4ww brakes. She needs cues for safety given her impulsivity. She performs toilet transfer w/ SBA. She requires mod A for seated bathing tasks w/ max cues for safe shower transfer w/ use of grab bars. She is able to lucy/doff a robe w/ SBA, gown w/ min A; max A for LB dressing. She also requires edema mgmt d/t significant LE edema - currently managed w/ comperm.     Expected Therapies and Services Required During Inpatient Rehab Admission  Intensity of Therapy: Patient requires intensive therapies not available in a lesser level of care. Patient is  motivated, making gains, and can tolerate 3 hours of therapy a day.  Physical Therapy: 90 minutes per day, 7 days a week for 10 days  Occupational Therapy: 90 minutes per day, 7 days a week for 10 days  Speech and Language Therapy: No SLP needs anticipated.   Rehabilitation Nursing Needs: Patient requires 24 hour Rehab Nursing to manage bowel program, bladder program (bladder scan as appropriate), vitals, medication education, positioning, carryover of new rehab techniques, care coordination, skin integrity, blood sugar management, diabetes education, pain management, provide safe environment for patient at falls risk, monitor nutritional intake and edema management, assess weights daily and perform strict I/Os.    Precautions/restrictions/special needs:  Precautions: fall precautions  Restrictions: none  Special Needs: none   Designated Visitor: mother Malina    Expected Level of Improvement: Pt will achieve a level of mod IND for all transfers, gait, stairs, ADLs, and IADLs.  Expected Length of time to achieve: 10 days    Anticipated Discharge Needs:  Anticipated Discharge Destination: IP chemical dependency treatment  Anticipated Discharge Support: None  24/7 support available : No  Identified caregiver(s):  n/a  Anticipated Discharge Needs:  IP chemical dependency treatment    Identified challenges/barriers: none    Rehab Admission Liaison Signature/Date/Time:    Physician statement of review and agreement:  I have reviewed and am in agreement of the need for IRF stay to address above functional and medical needs. In addition to above statements address, Patient requires intensive active and ongoing therapeutic intervention and multiple therapies; Patient requires medical supervision; Expected to actively participate in the intensive rehab program; Sufficiently stable to actively participate; Expectation for measurable improvement in functional capacity or adaption to impairments.    Physician  Signature/Date/Time:

## 2021-11-05 NOTE — H&P
"    [facility]   Acute Rehabilitation Unit  Admission History and Physical    CHIEF COMPLAINT   Generalized weakness.     HISTORY OF PRESENT ILLNESS  Aliyah Angeles is a 46 year old woman with history of ETOH abuse (last drink 9/12/21), alcoholic hepatitis, Fitz en y gastric bypass, alcohol induced pancreatitis, Type 2 DM, anxiety, and depression. She was recently hospitalized 9/13-9/21 in setting of alcoholic hepatitis. She was [facility].     She presented with  abdominal pain, le edema, and nausea from alcohol treatment facility on 9/29/21.   She was transferred to MICU 10/4 in setting of acute hypoxic respiratory failure with ARDS  in setting of sepsis she was intubated and sedated 10/4.  She self extubated 10/14.  Course complicated by ANA LAURA requiring CRRT then HD, hypervolemia,  pneumonia (aspiration vs VAP), possible UTI, metabolic encephalopathy, severe malnutrition, and electrolyte disturbances.     During acute hospitalization, patient was seen and evaluated by PT and OT, who noted functional impairments including: impaired strength, impaired balance, and impaired activity tolerance. She is currently min assist for rolling, CGA for supine to sit.  Needing mod assist for sit to supine.  Sit to stand with min assist and walker.  Ambulated up to 100 feet with min assist.  Toilet transfers with SBA.  Required mod assist for seated bathing.  Min assist for upper body and max assist for lower body dressing.     On arrival to rehab reports she is tired, has interrupted sleep due to roommate in hospital  \"who woke up screaming with night terrors every 15 minutes\".  Some intermittent dizziness with therapies.  Denies fevers, headaches, sob, chest pain, fevers, and bowel and bladder concerns.  Reports chronic left leg neuropathy due to injury.  Appetite is low and disinterested in hospital food.  Reports mood is \"pretty good all considered\".  " "Motivated to improve strength and activity tolerance.     PAST MEDICAL HISTORY   Reviewed and updated in Epic.  Past Medical History:   Diagnosis Date     Acne      ADHD (attention deficit hyperactivity disorder)      Arthralgia of temporomandibular joint 5/19/2016     Arthritis      Cervical high risk HPV (human papillomavirus) test positive 4/27/2018 4/27/18 NIL pap, + HR HPV (not 16 or 18). Plan: cotest in 1 yr.       Cobalamin deficiency 11/24/2014     Drug-seeking behavior 5/19/2016    Overview:  Per [facility] website, patient has filled with multiple controlled substances with multiple prescribers at multiple pharmacies      History of blood transfusion 01/01/1988     Insomnia 7/21/2013     Raynaud's phenomenon 5/25/2015     Vitamin D deficiency 1/16/2013     SURGICAL HISTORY  Reviewed and updated in Epic.  Past Surgical History:   Procedure Laterality Date     ABDOMEN SURGERY  08/10/2010    Gastric  bypass     BIOPSY  1/1/94    HPV, multiple reoccurrances, partial  hysterectomy  2013     CHOLECYSTECTOMY  01/01/2010     COLONOSCOPY  01/01/2009     ENT SURGERY  01/01/1988    Tonsils     GI SURGERY  8/10/10    Fitz-en-Y     HYSTERECTOMY VAGINAL  04/05/2013    pathology of cervix benign.      IR CVC NON TUNNEL LINE REMOVAL  10/22/2021     IR CVC TUNNEL PLACEMENT > 5 YRS OF AGE  10/22/2021     IR CVC TUNNEL REMOVAL RIGHT  11/3/2021       SOCIAL HISTORY  Reviewed and updated in Epic.  Marital Status: single  Living situation:*was admitted from chemical dependency treatment lives with mom in [place] with 3 maria teresa   Family support: support of mom-   Vocational History: unemployed  Tobacco use: \"a little\"   Alcohol use: sober since 9/12/21.   Illicit drug use: none  Social History     Socioeconomic History     Marital status: Legally      Spouse name: Not on file     Number of children: Not on file     Years of education: Not on file     Highest education level: Not on file   Occupational History     Not on file "   Tobacco Use     Smoking status: Current Every Day Smoker     Packs/day: 1.00     Years: 15.00     Pack years: 15.00     Types: Cigarettes     Start date: 1/1/1989     Smokeless tobacco: Never Used     Tobacco comment: 10/1/2021: Declined behavioral counseling, NRT, and post-hosp follow-up   Substance and Sexual Activity     Alcohol use: Yes     Alcohol/week: 0.0 standard drinks     Comment: daily unknown amount      Drug use: No     Sexual activity: Yes     Partners: Male     Birth control/protection: Male Surgical, Female Surgical     Comment:  one partner   Other Topics Concern     Parent/sibling w/ CABG, MI or angioplasty before 65F 55M? No   Social History Narrative     Not on file     Social Determinants of Health     Financial Resource Strain:      Difficulty of Paying Living Expenses:    Food Insecurity:      Worried About Running Out of Food in the Last Year:      Ran Out of Food in the Last Year:    Transportation Needs:      Lack of Transportation (Medical):      Lack of Transportation (Non-Medical):    Physical Activity:      Days of Exercise per Week:      Minutes of Exercise per Session:    Stress:      Feeling of Stress :    Social Connections:      Frequency of Communication with Friends and Family:      Frequency of Social Gatherings with Friends and Family:      Attends Jew Services:      Active Member of Clubs or Organizations:      Attends Club or Organization Meetings:      Marital Status:    Intimate Partner Violence:      Fear of Current or Ex-Partner:      Emotionally Abused:      Physically Abused:      Sexually Abused:      FAMILY HISTORY  Reviewed and updated in Epic.  Family History   Problem Relation Age of Onset     Prostate Cancer Father      Hypertension Father      Hyperlipidemia Father      Substance Abuse Father      Obesity Father      Diabetes Maternal Grandmother      Colon Cancer Maternal Grandmother      Other Cancer Maternal Grandmother         pancreatic cancer      Obesity Maternal Grandmother      Diabetes Paternal Grandmother      Obesity Paternal Grandmother      Diabetes Paternal Grandfather      Obesity Paternal Grandfather      Breast Cancer Sister      Breast Cancer Sister      Anxiety Disorder Sister      Anesthesia Reaction Sister      Depression Mother      Anesthesia Reaction Mother      Asthma Daughter      Coronary Artery Disease No family hx of      Hypertension No family hx of      Hyperlipidemia No family hx of      Cerebrovascular Disease No family hx of      Depression No family hx of      Anxiety Disorder No family hx of      Mental Illness No family hx of      Substance Abuse No family hx of      Anesthesia Reaction No family hx of      Asthma No family hx of      Osteoporosis No family hx of      Genetic Disorder No family hx of      Thyroid Disease No family hx of      Obesity No family hx of      Unknown/Adopted No family hx of      PRIOR FUNCTIONAL HISTORY   Pt was independent with all ADLs/IADLs, transfers, mobility and gait.  Recent hospitalization 9/13-9/21 then discharged to chemical dependency readmitted 9/29-11/5 prolonged hospitalization    MEDICATIONS  Scheduled meds  Medications Prior to Admission   Medication Sig Dispense Refill Last Dose     albuterol (PROAIR HFA/PROVENTIL HFA/VENTOLIN HFA) 108 (90 Base) MCG/ACT inhaler Inhale 2 puffs into the lungs every 4 hours as needed for shortness of breath / dyspnea or wheezing 8.5 g 1      calcium carbonate-vitamin D (OS-STEFFANY) 600-400 MG-UNIT chewable tablet Take 1 chew tab by mouth daily 30 tablet 0      cyanocobalamin (CYANOCOBALAMIN) 1000 MCG/ML injection INJECT 1 ML (1,000 MCG) SUBCUTANEOUS EVERY 4 WEEKS.        escitalopram (LEXAPRO) 10 MG tablet Take 1 tablet (10 mg) by mouth daily 30 tablet 0      folic acid (FOLVITE) 1 MG tablet Take 1 tablet (1 mg) by mouth daily 30 tablet 0      gabapentin (NEURONTIN) 100 MG capsule Take 1 capsule (100 mg) by mouth 3 times daily 90 capsule 0       "lactulose (CEPHULAC) 20 GM packet Take 1 packet (20 g) by mouth 3 times daily 90 packet 0      metFORMIN (GLUCOPHAGE) 500 MG tablet Take 1 tablet (500 mg) by mouth 2 times daily (with meals) 60 tablet 0      multivitamin (CENTRUM) chewable tablet Take 1 tablet by mouth daily 30 tablet 0      pantoprazole (PROTONIX) 40 MG EC tablet Take 1 tablet (40 mg) by mouth every morning (before breakfast) 30 tablet 0      prazosin (MINIPRESS) 1 MG capsule Take 1 capsule (1 mg) by mouth every evening 30 capsule 0      rifaximin (XIFAXAN) 550 MG TABS tablet Take 1 tablet (550 mg) by mouth 2 times daily 60 tablet 0      thiamine (B-1) 100 MG tablet Take 1 tablet (100 mg) by mouth daily 30 tablet 0      ALLERGIES   Allergies   Allergen Reactions     Nsaids      Gastric bypass     Trazodone      In a \"hazy\" for the whole next day       REVIEW OF SYSTEMS  A 10 point ROS was performed and negative unless otherwise noted in HPI.       PHYSICAL EXAM  VITAL SIGNS:  BP (!) 147/99 (BP Location: Right arm)   Pulse 98   Temp 98.8  F (37.1  C) (Oral)   Resp 18   Ht 1.626 m (5' 4\")   Wt 89.6 kg (197 lb 8 oz)   SpO2 96%   BMI 33.90 kg/m    BMI:  Estimated body mass index is 34.12 kg/m  as calculated from the following:    Height as of 10/29/21: 1.626 m (5' 4.02\").    Weight as of 11/4/21: 90.2 kg (198 lb 13.7 oz).     General: awake alert nad  HEENT: mmm, mild jaundice  Pulmonary: non labored clear diminished  Cardiovascular: rrrr   Abdominal: soft distended non tender   Extremities: warm, ble edema up to thighs, with compression in place.   MSK/neuro:   Mental Status:  alert and oriented    Cranial Nerves: grossly normal    Sensory: impaired to left.    Strength:    SF  EF  EE  WE  G   HF  KE  DF  EHL  PF   R  5 5 5 5 5  4- 4 4 4 4  L  5 5 5 5 5  2 4- 4 4 4      Tone per modified Joy Scale: none   Abnormal movements: tremor with exertion.    Coordination: Not tested.    Speech:clear coherent   Cognition:linear logical    Gait: not " tested.   Skin: no redness, warmth, or swelling      LABs  Last Basic Metabolic Panel:  Recent Labs   Lab Test 11/05/21  0900 11/05/21  0438 11/05/21  0227 11/04/21  0802 11/04/21  0608 11/03/21  0809 11/03/21 0438   NA  --  138  --   --  136  --  137   POTASSIUM  --  3.5  --   --  3.2*  --  3.4   CHLORIDE  --  107  --   --  105  --  105   CO2  --  25  --   --  25  --  25   ANIONGAP  --  6  --   --  6  --  7   * 127* 129*   < > 118*   < > 113*   BUN  --  12  --   --  12  --  14   CR  --  1.38*  --   --  1.42*  --  1.41*   GFRESTIMATED  --  46*  --   --  44*  --  44*   STEFFANY  --  8.1*  --   --  7.8*  --  8.0*    < > = values in this interval not displayed.     CBC RESULTS:   Recent Labs   Lab Test 11/05/21  0438 11/04/21  0608 11/03/21  1418 11/03/21 0438 11/03/21 0438   WBC 9.7 9.8  --   --  11.6*   RBC 2.55* 2.60*  --   --  2.21*   HGB 7.8* 7.9* 9.0*   < > 6.8*   HCT 25.0* 25.4*  --   --  22.2*   MCV 98 98  --   --  101*   MCH 30.6 30.4  --   --  30.8   MCHC 31.2* 31.1*  --   --  30.6*   RDW 18.6* 19.2*  --   --  18.2*    195  --   --  217    < > = values in this interval not displayed.       IMPRESSION/PLAN:  Aliyah Angeles is a 46 year old woman with past medical history of type 2 DM, alcohol use disorder, alcoholic hepatitis,  anxiety, depression, Fitz-en-y gastric bypass, admitted with alcholic hepatitis 9/29/21 course complicated by ARDS, pneumonia, UTI, anemia, severe malnutrition, ANA LAURA on CKD. Admitted to acute rehab 11/5/21 in setting of impaired strength, impaired balance, and impaired activity tolerance.       Admission to acute inpatient rehab debility  Impairment group code: 16      1. PT, OT 60 minutes of each on a daily basis, in addition to rehab nursing and close management of physiatrist.      2. Impairment of ADL's: Noted to have impaired strength, impaired activity tolerance, and impaired balance leading to decreased ability to independently complete ADL's.  Will benefit from  ongoing OT with goal for MOD I with basic ADLs.     3. Impairment of mobility:  Noted to have impaired strength, impaired activity tolerance, and impaired balance leading to decreased mobility.  Will benefit from ongoing PT with goal for OLVIN with basic mobility.       4. Medical Conditions  Acute hypoxic respiratory failure  2/2 ARDS (resolved).  Recent pneumonia. Pulmonary edema  Intubated 10/4 cxr consistent with ARDS treated with zosyn. Self extubated 10/14.  CXR 10/17 with pulmonary edema vs pneumonia.  Completed another course of zosyn 10/26.   -encourage IS  -encourage therapy/ out of bed activity   -continue to taper off oxygen    ANA LAURA  Hypervolemia  Cr peak 2.98 felt 2/2 ATN CRRT 10/7-HD 10/13-10/29.    -follow up nephrology  -trend Cr.   -intake/output, wts  -lymphedema consult    ETOH use disorder  Alcohol induced hepatitis  Started on rifaximin/lactulose during 9/13-9/21 hospitalization, seen by hepatology. Diagnostic para neg. Lactulose dcd.  MELD 16. Maddrey score 13 on admission. INR 1.23  -rifaximin bid  -trend cmp, INR  -was seen by CD with recs to return to chemical dependency  -continue folate, thiamine, mvi    UTI  Started on ceftriaxone UC with 10-50k enterobacter cloacae transitioned to cipro  -continue cipro oral to complete course  -check pvrs on aru admission    Severe malnutrition  History of jaimie-en-y   TF 10/5-1021 removed 10/23.   -calorie counts  -appreciate nutritions support  -continue supplements.     DM type 2  A1C 9.1 started on metformin 9/13-21 hospitalization held in setting of ANA LAURA. Glucose 108-146 last 72 hours will discontinue ssi.   -trend glucose, cr -   -restart insulin or metformin pending renal function as indicated.     Acute on chronic macrocytic Anemia  2/2 etoh, alcohol hepatitis, requiring prbc during hospitalization  -trend hgb  -received 3 doses IV iron prior to aru admission, iv removed prior to admission.    -transfuse hgb <7  -continues on folate, b12,     Major  Depression Disorder  RUBÉN  Continue lexapro, prazosin on hold due to hypotension, remeron    GERD-   Continue PPI    Neuropathy  Continue pta gabapentin    5. Adjustment to disability:  Clinical psychology to eval and treat as indicated.   6. FEN: high protein  7. Bowel: monitor  8. Bladder: monitor  9. DVT Prophylaxis: subcutaneous heparin  10. GI Prophylaxis: protonix  11. Code: full confirmed on admission.   12. Disposition: plan to return to chemical dependency pending bed.   13. ELOS:  10-14 days  14. Rehab prognosis:  fair  15. Follow up Appointments on Discharge: pcp, hepatology, nephrology.      discussed with Dr. Laboy , PM&R staff physician     Kathleen Tarango PA-C  Rehab Service

## 2021-11-05 NOTE — PROGRESS NOTES
Care Management Discharge Note    Discharge Date: 11/5/21     Discharge Disposition:     [facility]  [phone]    Discharge Services: None    Discharge DME: None    Discharge Transportation:  [email or web address] wheelchair ride at 215pm    Private pay costs discussed: Not applicable    PAS Confirmation Code: N/A  Patient/family educated on Medicare website which has current facility and service quality ratings: yes    Education Provided on the Discharge Plan: Yes  Persons Notified of Discharge Plans: patient, bedside RN Rhona Jackson @ [facility] team   Patient/Family in Agreement with the Plan: yes    Handoff Referral Completed: Yes    Additional Information:  Pt will discharge to  ARU at 2:15pm today via  [email or web address] wheelchair ride. All persons involved are aware and agreeable to plan.      SW will give handoff to ARU SW re: IP chemical dependency treatment after ARU stay.     JOY Reed, [facility] CareSW  [address]    Jordana Bernal  Phone: [phone]  Pager: [phone]

## 2021-11-05 NOTE — PLAN OF CARE
Assumed cares [id number]. A&O and able to make needs known. VSS on RA. Denies pain. Transitioned to PO abx in anticipation for discharge. 1x dose Diflucan given for concern of yeast infection. BG checked and no need for sliding scale insulin coverage. Good appetite at meals. Ambulates independently to SBA- needs assistance getting legs into bed. Bilateral lymphedema wraps to BLEs. Pt discharged to [facility] at 1430 via wheelchair transport. PIV removed. Discharge paperwork sent with medical transport. Report called to NAWAF Colbert at ARU. Sent with all personal belongings.

## 2021-11-05 NOTE — PROGRESS NOTES
Rehab Admissions:  Patient education completed regarding ARC level of care, anticipated LOS, POC and visitor policy.  Pt informed that she may need to disch home or elsewhere initially after ARC while waiting for a bed at [facility]. Pt states she does not have an alternative disch plan at this time, but she is willing to figure one out if needed.  All questions answered.     Thank you for the referral, we will continue to follow this patient for post acute placement.     Determination of admission is based upon the patient's need for an intensive, interdisciplinary approach to rehabilitation, their ability to progress, their ability to tolerate intensive therapies, their need for daily physician supervision, their need for twenty four hour nursing assistance, and their ability and willingness to participate in such a program.    Rhona Kebede CM  Rehab Liaison/  [facility] and Transitional [facility] Unit  11/5/2021    1:26 PM

## 2021-11-06 ENCOUNTER — APPOINTMENT (OUTPATIENT)
Dept: PHYSICAL THERAPY | Facility: CLINIC | Age: 46
End: 2021-11-06
Attending: PHYSICAL MEDICINE & REHABILITATION
Payer: COMMERCIAL

## 2021-11-06 ENCOUNTER — APPOINTMENT (OUTPATIENT)
Dept: OCCUPATIONAL THERAPY | Facility: CLINIC | Age: 46
End: 2021-11-06
Attending: PHYSICAL MEDICINE & REHABILITATION
Payer: COMMERCIAL

## 2021-11-06 LAB — GLUCOSE BLDC GLUCOMTR-MCNC: 97 MG/DL (ref 70–99)

## 2021-11-06 PROCEDURE — 97166 OT EVAL MOD COMPLEX 45 MIN: CPT | Mod: GO | Performed by: OCCUPATIONAL THERAPIST

## 2021-11-06 PROCEDURE — 97535 SELF CARE MNGMENT TRAINING: CPT | Mod: GO | Performed by: OCCUPATIONAL THERAPIST

## 2021-11-06 PROCEDURE — 97110 THERAPEUTIC EXERCISES: CPT | Mod: GP | Performed by: PHYSICAL THERAPIST

## 2021-11-06 PROCEDURE — 250N000013 HC RX MED GY IP 250 OP 250 PS 637: Performed by: PHYSICIAN ASSISTANT

## 2021-11-06 PROCEDURE — 128N000003 HC R&B REHAB

## 2021-11-06 PROCEDURE — 250N000011 HC RX IP 250 OP 636: Performed by: PHYSICIAN ASSISTANT

## 2021-11-06 PROCEDURE — 97163 PT EVAL HIGH COMPLEX 45 MIN: CPT | Mod: GP | Performed by: PHYSICAL THERAPIST

## 2021-11-06 PROCEDURE — 97110 THERAPEUTIC EXERCISES: CPT | Mod: GO | Performed by: OCCUPATIONAL THERAPIST

## 2021-11-06 RX ADMIN — Medication 1 CAPSULE: at 14:02

## 2021-11-06 RX ADMIN — CIPROFLOXACIN 250 MG: 250 TABLET, FILM COATED ORAL at 21:19

## 2021-11-06 RX ADMIN — ACETAMINOPHEN 650 MG: 325 TABLET, FILM COATED ORAL at 21:19

## 2021-11-06 RX ADMIN — SIMETHICONE 80 MG: 80 TABLET, CHEWABLE ORAL at 01:24

## 2021-11-06 RX ADMIN — RIFAXIMIN 550 MG: 550 TABLET ORAL at 21:18

## 2021-11-06 RX ADMIN — ACETAMINOPHEN 650 MG: 325 TABLET, FILM COATED ORAL at 01:26

## 2021-11-06 RX ADMIN — GABAPENTIN 100 MG: 100 CAPSULE ORAL at 19:49

## 2021-11-06 RX ADMIN — PANTOPRAZOLE SODIUM 40 MG: 40 TABLET, DELAYED RELEASE ORAL at 06:19

## 2021-11-06 RX ADMIN — ACETAMINOPHEN 650 MG: 325 TABLET, FILM COATED ORAL at 14:53

## 2021-11-06 RX ADMIN — GABAPENTIN 100 MG: 100 CAPSULE ORAL at 14:02

## 2021-11-06 RX ADMIN — ESCITALOPRAM OXALATE 10 MG: 10 TABLET ORAL at 14:02

## 2021-11-06 RX ADMIN — MIRTAZAPINE 15 MG: 7.5 TABLET, FILM COATED ORAL at 21:18

## 2021-11-06 RX ADMIN — MELATONIN TAB 3 MG 3 MG: 3 TAB at 21:19

## 2021-11-06 RX ADMIN — VITAM B12 100 MCG: 100 TAB at 14:02

## 2021-11-06 RX ADMIN — ONDANSETRON 4 MG: 4 TABLET, ORALLY DISINTEGRATING ORAL at 01:24

## 2021-11-06 RX ADMIN — HEPARIN SODIUM 5000 UNITS: 5000 INJECTION INTRAVENOUS; SUBCUTANEOUS at 08:25

## 2021-11-06 RX ADMIN — THIAMINE HCL TAB 100 MG 100 MG: 100 TAB at 08:25

## 2021-11-06 RX ADMIN — CALCIUM CARBONATE 600 MG (1,500 MG)-VITAMIN D3 400 UNIT TABLET 1 TABLET: at 17:01

## 2021-11-06 RX ADMIN — RIFAXIMIN 550 MG: 550 TABLET ORAL at 08:25

## 2021-11-06 RX ADMIN — GABAPENTIN 100 MG: 100 CAPSULE ORAL at 08:25

## 2021-11-06 RX ADMIN — FOLIC ACID 1 MG: 1 TABLET ORAL at 14:02

## 2021-11-06 RX ADMIN — CALCIUM CARBONATE 600 MG (1,500 MG)-VITAMIN D3 400 UNIT TABLET 1 TABLET: at 08:25

## 2021-11-06 RX ADMIN — HEPARIN SODIUM 5000 UNITS: 5000 INJECTION INTRAVENOUS; SUBCUTANEOUS at 19:50

## 2021-11-06 RX ADMIN — CIPROFLOXACIN 250 MG: 250 TABLET, FILM COATED ORAL at 06:19

## 2021-11-06 ASSESSMENT — ACTIVITIES OF DAILY LIVING (ADL)
ADLS_ACUITY_SCORE: 10
ADLS_ACUITY_SCORE: 13
ADLS_ACUITY_SCORE: 10
ADLS_ACUITY_SCORE: 14
ADLS_ACUITY_SCORE: 10
ADLS_ACUITY_SCORE: 14
ADLS_ACUITY_SCORE: 14
ADLS_ACUITY_SCORE: 13
ADLS_ACUITY_SCORE: 10
ADLS_ACUITY_SCORE: 14
ADLS_ACUITY_SCORE: 14
ADLS_ACUITY_SCORE: 10
ADLS_ACUITY_SCORE: 13
ADLS_ACUITY_SCORE: 10
ADLS_ACUITY_SCORE: 14
ADLS_ACUITY_SCORE: 10
ADLS_ACUITY_SCORE: 14
ADLS_ACUITY_SCORE: 13
ADLS_ACUITY_SCORE: 13
ADLS_ACUITY_SCORE: 10
PREVIOUS_RESPONSIBILITIES: MEAL PREP;HOUSEKEEPING;LAUNDRY;SHOPPING;YARDWORK;MEDICATION MANAGEMENT;FINANCES;DRIVING
ADLS_ACUITY_SCORE: 10
ADLS_ACUITY_SCORE: 14

## 2021-11-06 NOTE — PLAN OF CARE
"  VS: Blood pressure 114/71, pulse 99, temperature 98.9  F (37.2  C), temperature source Oral, resp. rate 16, height 1.626 m (5' 4\"), weight 89.6 kg (197 lb 8 oz), SpO2 96 %, not currently breastfeeding.    Denies chest pain, dizziness, nausea.   O2: Room air, denies SOB unless up to toilet. Lungs clear and equal bilat.   Output: Ambulates to toilet. Needs 3 PVR's. Inconsistent PVRs, pt instructing to double void before requesting to do the scans.    Last BM: 11/5.   Activity: Assist x1 with walker and gait belt.   Skin: Small petechiae rashes on arms.    Pain: Complains of pain in BLE's with edema, declines pain medication.    CMS: Denies changes to sensation, baseline neuropathy in BLE's.   Dressing: N/A.   Diet: Reg diet, thin liquids, pills whole. Diabetic consult in place, BG checks BIDs.   LDA: N/a.   Equipment: Walker, gait belt.   Plan: Continue with plan of care.    Additional Info: Pt alert and oriented x4, pleasant. Able to make needs known with call light in reach at all times. Bilat edema wraps.       "

## 2021-11-06 NOTE — PROGRESS NOTES
11/06/21 1100   Living Environment   People in home other (see comments)  (staff support)   Current Living Arrangements residential facility  (chemical dependency unit)   Home Accessibility stairs within home;wheelchair accessible   Number of Stairs, Within Home, Primary greater than 10 stairs   Stair Railings, Within Home, Primary railings on both sides of stairs   Transportation Anticipated car, drives self   Self-Care   Usual Activity Tolerance good   Current Activity Tolerance poor   Regular Exercise No   Equipment Currently Used at Home none   Activity/Exercise/Self-Care Comment farm work   Disability/Function   Hearing Difficulty or Deaf no   Wear Glasses or Blind yes   Concentrating, Remembering or Making Decisions Difficulty yes   Difficulty Communicating no   Difficulty Eating/Swallowing no   Walking or Climbing Stairs Difficulty no   Dressing/Bathing Difficulty no   Toileting issues no   Doing Errands Independently Difficulty (such as shopping) no   Fall history within last six months yes   Number of times patient has fallen within last six months 3   Change in Functional Status Since Onset of Current Illness/Injury yes   General Information   Onset of Illness/Injury or Date of Surgery 10/04/21  (acute respiratory distress syndrome with pneumonia/sepsis)   Referring Physician Yony Sidhu, DO   Patient/Family Therapy Goals Statement (PT) Return to alcohol dependency facility with independent function   Pertinent History of Current Problem (include personal factors and/or comorbidities that impact the POC) hospitalized 9/13/21-9/21/21 with alcohol hepatitis/pancreatitis   Existing Precautions/Restrictions no known precautions/restrictions   Weight-Bearing Status - LLE full weight-bearing   Weight-Bearing Status - RLE full weight-bearing   Heart Disease Risk Factors Diabetes;Overweight;Lack of physical activity   Cognition   Orientation Status (Cognition) oriented x 4   Affect/Mental Status  (Cognition) WFL   Follows Commands (Cognition) WFL   Behavioral Issues difficulty managing stress   Safety Deficit (Cognition) minimal deficit   Memory Deficit (Cognition) minimal deficit   Pain Assessment   Patient Currently in Pain Yes, see Vital Sign flowsheet   Integumentary/Edema   Integumentary/Edema other (describe)   Integumentary/Edema Comments abdominal and LE swelling B   Posture    Posture Not impaired   Range of Motion (ROM)   ROM Quick Adds ROM deficits secondary to swelling;ROM deficits secondary to pain;Ankle, Left;Ankle, Right   Strength   Manual Muscle Testing Quick Adds MMT: Hip;MMT: Knee;MMT: Ankle   MMT: Hip, Rehab Eval   Hip Flexion - Left Side (3+/5) fair plus, left   Hip Extension - Left Side (3+/5) fair plus, left   Hip ABduction - Left Side (3+/5) fair plus, left   Hip Flexion - Right Side (3+/5) fair plus, right   Hip Extension - Right Side (3+/5) fair plus, right   Hip ABduction - Right Side (3+/5) fair plus, right   MMT: Knee, Rehab Eval   Knee Flexion - Left Side (4/5) good, left   Knee Extension - Left Side (4/5) good, left   Knee Flexion - Right Side (4/5) good, right   Knee Extension - Right Side (4/5) good, right   MMT: Ankle, Rehab Eval   Ankle Dorsiflexion - Left Side (4/5) good, left   Ankle Plantarflexion - Left Side (2/5) poor, left   Ankle Dorsiflexion - Right Side (4/5) good, right   Ankle Plantarflexion - Right Side (2/5) poor, right   ARC Assessment Only   Acute Rehab Functional Assessment See IP Rehab Daily Documentation Flowsheet for Functional Mobility/ADL Assessment   Balance   Balance other (describe)   Balance Comments BBS completed 30/56 pts; good static standing balance, poor dynamic or proactive balance skills   Sensory Examination   Sensory Perception other (describe)   Sensory Perception Comments diminished   Sensory Perception Quick Adds Light touch   Sensation Light Touch   LLE mild impairment   RLE mild impairment   Coordination   Coordination no deficits were  identified   Muscle Tone   Muscle Tone no deficits were identified   Clinical Impression   Criteria for Skilled Therapeutic Intervention yes, treatment indicated   PT Diagnosis (PT) impaired functional mobility, decreased ambulatory endurance, decreased balance confidence   Influenced by the following impairments strength, balance, sensation, pain, edema   Functional limitations due to impairments neuromuscular and cardiovascular impairments   Clinical Presentation Unstable/Unpredictable   Clinical Presentation Rationale hx ETOH abuse (d/c pending admission to [facility]), severe malnutrion metaboliic encephalopathy   Clinical Decision Making (Complexity) high complexity   Therapy Frequency (PT) Daily   Predicted Duration of Therapy Intervention (days/wks) 7-10 days   Planned Therapy Interventions (PT) balance training;bed mobility training;gait training;home exercise program;motor coordination training;neuromuscular re-education;patient/family education;stair training;strengthening;stretching;transfer training;progressive activity/exercise;risk factor education;home program guidelines   Risk & Benefits of therapy have been explained evaluation/treatment results reviewed;care plan/treatment goals reviewed;risks/benefits reviewed;current/potential barriers reviewed;participants voiced agreement with care plan;participants included;patient   Clinical Impression Comments 45 y/o  female presents with impaired functional mobility, decreased ambulatory endurance, and decreased balance confidence as a result of hospitalization for acute respiratory distress syndrome and hx of chronic ETOH abuse   PT Discharge Planning    PT Discharge Recommendation (DC Rec) home with outpatient physical therapy   PT Rationale for DC Rec complex medical hx, chronic ETOH abuse, d/c to alcohol treatment center   PT Brief overview of current status  impaired functional mobility and decreased ambulatory endurance, decreased  balance confidence   Total Evaluation Time   Total Evaluation Time (Minutes) 45

## 2021-11-06 NOTE — PHARMACY-MEDICATION REGIMEN REVIEW
Pharmacy Medication Regimen Review  Aliyah Angeles is a 46 year old female who is currently in the Acute Rehab Unit.    Assessment: All medications have an appropriate indications, durations and no unnecessary use was found  -Insulin or metformin should be restarted if needed  -Prazosin held due to hypotension, restart if appropriate  -Ciprofloxacin order has stop date   -Pharmacy will monitor renal function and adjust ciprofloxacin and gabapentin if needed along with any other renally cleared medications that are ordered    Plan:   Continue current medication regimen.  Attending provider will be sent this note for review.  If there are any emergent issues noted above, pharmacist will contact provider directly by phone.      Pharmacy will periodically review the resident's medication regimen for any PRN medications not administered in > 72 hours and discontinue them. The pharmacist will discuss gradual dose reductions of psychopharmacologic medications with interdisciplinary team on a regular basis.    Please contact pharmacy if the above does not answer specific medication questions/concerns.    Background:  A pharmacist has reviewed all medications and pertinent medical history today.  Medications were reviewed for appropriate use and any irregularities found are listed with recommendations.      Current Facility-Administered Medications:      acetaminophen (TYLENOL) tablet 650 mg, 650 mg, Oral, Q6H PRN, Kathleen Tarango PA, 650 mg at 11/06/21 0126     albuterol (PROAIR HFA/PROVENTIL HFA/VENTOLIN HFA) 108 (90 Base) MCG/ACT inhaler 2 puff, 2 puff, Inhalation, Q4H PRN, Kathleen Tarango PA     calcium carbonate 600 mg-vitamin D 400 units (CALTRATE) per tablet 1 tablet, 1 tablet, Oral, BID w/meals, Kathleen Tarango PA, 1 tablet at 11/06/21 0825     ciprofloxacin (CIPRO) tablet 250 mg, 250 mg, Oral, Q12H SUNDEEP, Kathleen Tarango PA, 250 mg at 11/06/21 0619     cyanocobalamin (VITAMIN B-12) tablet 100 mcg, 100  mcg, Oral, Daily, Kathleen Tarango PA     glucose gel 15-30 g, 15-30 g, Oral, Q15 Min PRN **OR** dextrose 50 % injection 25-50 mL, 25-50 mL, Intravenous, Q15 Min PRN **OR** glucagon injection 1 mg, 1 mg, Subcutaneous, Q15 Min PRN, Kathleen Tarango PA     escitalopram (LEXAPRO) tablet 10 mg, 10 mg, Oral, Daily, Kathleen Tarango PA     folic acid (FOLVITE) tablet 1 mg, 1 mg, Oral, Daily, Kathleen Tarango PA     gabapentin (NEURONTIN) capsule 100 mg, 100 mg, Oral, TID, Kathleen Tarango PA, 100 mg at 11/06/21 0825     heparin ANTICOAGULANT injection 5,000 Units, 5,000 Units, Subcutaneous, Q12H, Kathleen Tarango PA, 5,000 Units at 11/06/21 0825     lactulose (CEPHULAC) Packet 20 g, 20 g, Oral, TID PRN, Kathleen Tarango PA     melatonin tablet 3 mg, 3 mg, Oral, At Bedtime, Kathleen Tarango PA, 3 mg at 11/05/21 2123     mirtazapine (REMERON) tablet TABS 15 mg, 15 mg, Oral, At Bedtime, Kathleen Tarango PA, 15 mg at 11/05/21 2123     multivitamin RENAL (NEPHROCAPS/TRIPHROCAPS) capsule 1 capsule, 1 capsule, Oral, Daily, Kathleen Tarango PA     ondansetron (ZOFRAN-ODT) ODT tab 4 mg, 4 mg, Oral, Q6H PRN, Kathleen Tarango PA, 4 mg at 11/06/21 0124     pantoprazole (PROTONIX) EC tablet 40 mg, 40 mg, Oral, QAM AC, Kathleen Tarango PA, 40 mg at 11/06/21 0619     polyethylene glycol (MIRALAX) Packet 17 g, 17 g, Oral, Daily PRN, Kathleen Tarango PA     rifaximin (XIFAXAN) tablet 550 mg, 550 mg, Oral, BID, Kathleen Tarango PA, 550 mg at 11/06/21 0825     simethicone (MYLICON) chewable tablet 80 mg, 80 mg, Oral, Q6H PRN, Kathleen Tarango PA, 80 mg at 11/06/21 0124     thiamine (B-1) tablet 100 mg, 100 mg, Oral, Daily, Kathleen Tarango PA, 100 mg at 11/06/21 0825  No current outpatient prescriptions on file.   PMH: ETOH use (last drink 9/12/21), alcoholic hepatitis, Fitz en y gastric bypass, alcohol induced pancreatitis, Type 2 DM, anxiety, and depression     NOVA MARCELO, [facility]

## 2021-11-06 NOTE — PLAN OF CARE
FOCUS/GOAL  Bladder management, Skin integrity, and Safety management    ASSESSMENT, INTERVENTIONS AND CONTINUING PLAN FOR GOAL:  Pt arrived around 1500, A/O x 4.  She needs the assist of one person with walker and gait belt.  Pt denies pain throughout the shift.  She had 3 PVRs all measuring more than 100 mL.  Bruising on abdomen and bilateral arms, old central line site on both the upper right chest and right neck, edema in bilateral legs, wraps from knee down.  Pt stated she doesn't think she needs help to go the bathroom, but she did agree to use the call light until she is approved for Mod I in the room.  She had two loose stools this shift, which she says is typical for her since her gastric bypass.  Pt has tremors in her hands, she reports that is baseline for her.  She has a history of neuropathy in her left foot.

## 2021-11-06 NOTE — PLAN OF CARE
FOCUS/GOAL  Bowel management, Bladder management, and Pain management    ASSESSMENT, INTERVENTIONS AND CONTINUING PLAN FOR GOAL:    Pt slept well during the overnight, continent of bowel and bladder. Turning and repositioning independently in the bed. Using call light appropriately, able to make needs known. Will continue with POC.

## 2021-11-07 ENCOUNTER — APPOINTMENT (OUTPATIENT)
Dept: OCCUPATIONAL THERAPY | Facility: CLINIC | Age: 46
End: 2021-11-07
Attending: PHYSICAL MEDICINE & REHABILITATION
Payer: COMMERCIAL

## 2021-11-07 ENCOUNTER — APPOINTMENT (OUTPATIENT)
Dept: PHYSICAL THERAPY | Facility: CLINIC | Age: 46
End: 2021-11-07
Attending: PHYSICAL MEDICINE & REHABILITATION
Payer: COMMERCIAL

## 2021-11-07 LAB
GLUCOSE BLDC GLUCOMTR-MCNC: 101 MG/DL (ref 70–99)
GLUCOSE BLDC GLUCOMTR-MCNC: 113 MG/DL (ref 70–99)
GLUCOSE BLDC GLUCOMTR-MCNC: 93 MG/DL (ref 70–99)

## 2021-11-07 PROCEDURE — 97110 THERAPEUTIC EXERCISES: CPT | Mod: GO | Performed by: OCCUPATIONAL THERAPIST

## 2021-11-07 PROCEDURE — 97535 SELF CARE MNGMENT TRAINING: CPT | Mod: GO | Performed by: OCCUPATIONAL THERAPIST

## 2021-11-07 PROCEDURE — 250N000011 HC RX IP 250 OP 636: Performed by: PHYSICIAN ASSISTANT

## 2021-11-07 PROCEDURE — 250N000013 HC RX MED GY IP 250 OP 250 PS 637: Performed by: STUDENT IN AN ORGANIZED HEALTH CARE EDUCATION/TRAINING PROGRAM

## 2021-11-07 PROCEDURE — 99231 SBSQ HOSP IP/OBS SF/LOW 25: CPT | Mod: 24 | Performed by: STUDENT IN AN ORGANIZED HEALTH CARE EDUCATION/TRAINING PROGRAM

## 2021-11-07 PROCEDURE — 97112 NEUROMUSCULAR REEDUCATION: CPT | Mod: GP | Performed by: PHYSICAL THERAPIST

## 2021-11-07 PROCEDURE — 97110 THERAPEUTIC EXERCISES: CPT | Mod: GP | Performed by: PHYSICAL THERAPIST

## 2021-11-07 PROCEDURE — 250N000013 HC RX MED GY IP 250 OP 250 PS 637: Performed by: PHYSICIAN ASSISTANT

## 2021-11-07 PROCEDURE — 128N000003 HC R&B REHAB

## 2021-11-07 RX ORDER — LIDOCAINE 4 G/G
1 PATCH TOPICAL
Status: DISCONTINUED | OUTPATIENT
Start: 2021-11-07 | End: 2021-11-10 | Stop reason: HOSPADM

## 2021-11-07 RX ADMIN — ACETAMINOPHEN 650 MG: 325 TABLET, FILM COATED ORAL at 15:46

## 2021-11-07 RX ADMIN — HEPARIN SODIUM 5000 UNITS: 5000 INJECTION INTRAVENOUS; SUBCUTANEOUS at 07:48

## 2021-11-07 RX ADMIN — Medication 5 MG: at 21:46

## 2021-11-07 RX ADMIN — MIRTAZAPINE 15 MG: 7.5 TABLET, FILM COATED ORAL at 21:46

## 2021-11-07 RX ADMIN — LIDOCAINE 1 PATCH: 246 PATCH TOPICAL at 07:48

## 2021-11-07 RX ADMIN — SIMETHICONE 80 MG: 80 TABLET, CHEWABLE ORAL at 01:51

## 2021-11-07 RX ADMIN — Medication 1 CAPSULE: at 13:34

## 2021-11-07 RX ADMIN — GABAPENTIN 100 MG: 100 CAPSULE ORAL at 13:34

## 2021-11-07 RX ADMIN — FOLIC ACID 1 MG: 1 TABLET ORAL at 13:34

## 2021-11-07 RX ADMIN — RIFAXIMIN 550 MG: 550 TABLET ORAL at 07:47

## 2021-11-07 RX ADMIN — CALCIUM CARBONATE 600 MG (1,500 MG)-VITAMIN D3 400 UNIT TABLET 1 TABLET: at 17:37

## 2021-11-07 RX ADMIN — ACETAMINOPHEN 650 MG: 325 TABLET, FILM COATED ORAL at 06:20

## 2021-11-07 RX ADMIN — GABAPENTIN 100 MG: 100 CAPSULE ORAL at 07:47

## 2021-11-07 RX ADMIN — RIFAXIMIN 550 MG: 550 TABLET ORAL at 21:46

## 2021-11-07 RX ADMIN — ACETAMINOPHEN 650 MG: 325 TABLET, FILM COATED ORAL at 21:46

## 2021-11-07 RX ADMIN — THIAMINE HCL TAB 100 MG 100 MG: 100 TAB at 07:47

## 2021-11-07 RX ADMIN — CIPROFLOXACIN 250 MG: 250 TABLET, FILM COATED ORAL at 06:19

## 2021-11-07 RX ADMIN — VITAM B12 100 MCG: 100 TAB at 13:34

## 2021-11-07 RX ADMIN — GABAPENTIN 100 MG: 100 CAPSULE ORAL at 19:31

## 2021-11-07 RX ADMIN — CALCIUM CARBONATE 600 MG (1,500 MG)-VITAMIN D3 400 UNIT TABLET 1 TABLET: at 07:47

## 2021-11-07 RX ADMIN — CIPROFLOXACIN 250 MG: 250 TABLET, FILM COATED ORAL at 21:46

## 2021-11-07 RX ADMIN — HEPARIN SODIUM 5000 UNITS: 5000 INJECTION INTRAVENOUS; SUBCUTANEOUS at 19:31

## 2021-11-07 RX ADMIN — ESCITALOPRAM OXALATE 10 MG: 10 TABLET ORAL at 13:34

## 2021-11-07 RX ADMIN — PANTOPRAZOLE SODIUM 40 MG: 40 TABLET, DELAYED RELEASE ORAL at 06:19

## 2021-11-07 ASSESSMENT — ACTIVITIES OF DAILY LIVING (ADL)
ADLS_ACUITY_SCORE: 11
ADLS_ACUITY_SCORE: 11
ADLS_ACUITY_SCORE: 13
ADLS_ACUITY_SCORE: 11
ADLS_ACUITY_SCORE: 13
ADLS_ACUITY_SCORE: 11
ADLS_ACUITY_SCORE: 13
ADLS_ACUITY_SCORE: 11
ADLS_ACUITY_SCORE: 13
ADLS_ACUITY_SCORE: 11
ADLS_ACUITY_SCORE: 11
ADLS_ACUITY_SCORE: 13

## 2021-11-07 ASSESSMENT — MIFFLIN-ST. JEOR: SCORE: 1492.27

## 2021-11-07 NOTE — PLAN OF CARE
Discharge Planner Post-Acute Rehab PT:     Discharge Plan: Return to previous residence (alcohol dependency unit)    Precautions: fall risk, leg/abdominal edema, DM, pain, hx ETOH abuse    Current Status:  Bed Mobility: Mod I (rails, elevated HOB)  Transfer: Mod I FWW in room; min A sit/stand from low seat to floor heights  Gait: SBA/CGA  ft level surfaces  Stairs: 1, mod A, rails x 2  Balance: BBS 30/56, good steady-state standing unsupported but reliant on UE support for proactive demands    Assessment:  47 y/o female with impaired strength, balance, and aerobic capacity that limits functional mobility, ambulatory endurance, and balance confidence as a result of prolonged hospitalization for acute respiratory distress syndrome and sepsis following hx of ETOH abuse.     Other Barriers to Discharge (DME, Family Training, etc): discharge location

## 2021-11-07 NOTE — PLAN OF CARE
Discharge Planner Post-Acute Rehab OT:     Discharge Plan: Chemical Dependency Facility    Precautions: Falls, BLE/abdominal edema  ELOS 11/15/21    Current Status:  ADLs: Mod IND transfers and short distance mobility with fww. Set up UB dressing, Max A LB dressing, Mod IND toileting, and IND standing grooming. Bathing TBA tomorrow.  IADLs: TBA. Previously IND.   Vision/Cognition: Metabolic encephalopathy during acute hospital stay. Will further assess.   Edema: Pitting edema MTP-abdomen. GCB MTP-below knee. Recommending compression shorts.    Assessment: Initial OT evaluation completed and POC established. Pt ADL/IADL performance below baseline, impacted by deconditioning, weakness, edema, and medical complexity. Goals to advance to Mod IND ADLs and simple IADLs. Anticipate 10 days to address goals in POC.    Other Barriers to Discharge (DME, Family Training, etc):   Current level of assist  IND management of edema

## 2021-11-07 NOTE — PLAN OF CARE
"  VS: Blood pressure 115/70, pulse 91, temperature 98.7  F (37.1  C), temperature source Oral, resp. rate 16, height 1.626 m (5' 4\"), weight 87.2 kg (192 lb 4.8 oz), SpO2 97 %, not currently breastfeeding.    Denies chest pain, nausea, dizziness.   O2: Room air, SOB with exertion. Lungs clear and equal bilat.   Output: Ambulates to toilet.   Last BM: 11/6 per pt report.   Activity: Mod I with walker.   Skin: Petechiae on forearm.   Pain: Complains of muscle soreness in legs. Controlled with PRN tylenol and scheduled lidocaine patch.   CMS: Denies any changes to sensation, baseline N/T in legs bilat.   Dressing: N/A.   Diet: Reg diet, thin liquids. Pills whole, larger pills in pudding.   LDA: N/A.   Equipment: Walker, mobile VS machine (stationary in room not working).   Plan: Continue with plan of care.   Additional Info: Pt alert and oriented x4, able to make needs known with call light in reach at all times.        "

## 2021-11-07 NOTE — CONSULTS
"Social Work: Initial Assessment with Discharge Plan    Patient Name: Aliyah Angeles  : 1975  Age: 46 year old  MRN: [id number]  Completed assessment with: Chart review and interview with patient   Admitted to ARU: 2021    Presenting Information   Date of SW assessment: 2021  Health Care Directive: Declined completing  Primary Health Care Agent: Pt  Secondary Health Care Agent: Defer to NOK  Living Situation: Lives in a 1 story house in [place], [place] w/ her mother, Malina.   Previous Functional Status: IND w/ mobility, ADLs/IADLs, transfers and gait. Pt states she was at [facility] in [place] for 1 week prior to hospitalization.   DME available: None  Patient and family understanding of hospitalization: Appropriate  Cultural/Language/Spiritual Considerations: English speaking female. Baptism.       Physical Health  Reason for admission: \"Aliyah Angeles is a 46 year old woman with past medical history of type 2 DM, alcohol use disorder, alcoholic hepatitis,  anxiety, depression, Fitz-en-y gastric bypass, admitted with alcholic hepatitis 21 course complicated by ARDS, pneumonia, UTI, anemia, severe malnutrition, ANA LAURA on CKD. Admitted to acute rehab 21 in setting of impaired strength, impaired balance, and impaired activity tolerance.\"    Provider Information   Primary Care Physician:[facility] [id number]  : None reported    Mental Health/Chemical Dependency:   Diagnosis: Anxiety and depression  Alcohol/Tobacco/Narcotis: Alcohol use disorder  Support/Services in Place: PTA, pt was attending CD treatment at [facility] in [place]. Pt was there for 1 week prior to being hospitalization.   Services Needed/Recommended: Supportive services available by consult (Health Psychology and [facility] services). CD treatment recommended at discharge and pt in agreement.   Sexuality/Intimacy: Not discussed "     Support System  Marital Status: Pt states she's in the process of getting .  Family support: Mother, Malina, whom pt's lives w/. 3 children who live near pt: Natalio (26), Allie (22) and Boogie (20).  Other support available: None reported    Community Resources  Current in home services: None reported  Previous services: None reported    Financial/Employment/Education  Employment Status: Unemployed  Income Source: None reported  Education: High school degree  Financial Concerns:  No income  Insurance: [facility]      Discharge Plan   Patient and family discharge goal: Home with HC vs OP, pending progress.   Provided Education on discharge plan: Yes  Patient agreeable to discharge plan:  Yes  Provided education and attained signature for Medicare IM and IRF Patient Rights and Privacy Information provided to patient : NA  Provided patient with [place] [facility] Resources: NA  Barriers to discharge: Medical clearance, therapy goals met, chem dep treatment bed availability.     Discharge Recommendations   Disposition: See above   Transportation Needs: Family assistance   Name of Transportation Company and Phone: N/A     Additional comments   Discharge DAWOOD DAS 10 days. 13/15 on BIMS. Pt states she was at [facility] (chem dep treatment center) in [place] for 1 week prior to hospitalization. Pt states she plans to either discharge back to [facility] or [facility] after ARU pending bed availability. Per handoff from hospital , pt has a Rule 25 assessment scheduled for Monday 11/8 w/ Araceli France (currently not listed in pt's appointments). SW left  for Araceli France [facility] requesting she update floor SW on Rule 25 assessment time on 11/8.     [facility] Admissions PH #: [phone]    Araceli France [facility] PH #: [phone]    SW will remain available and continue to follow as needs arise.     Please invite to Care Conference:  PIPPA Hartman MSW, YANDY  [facility]  Worker  Phone: [phone]  Pager: [phone]

## 2021-11-07 NOTE — PLAN OF CARE
Discharge Planner Post-Acute Rehab OT:     Discharge Plan: Chemical Dependency Facility pending bed availability vs Home    Precautions: Falls, BLE/abdominal edema  ELOS 11/15/21    Current Status:  ADLs: Mod IND transfers and short distance mobility with fww. IND UB dressing, Max A LB dressing, Mod IND toileting, and IND standing grooming.   IADLs: TBA. Previously IND.   Vision/Cognition: Metabolic encephalopathy during acute hospital stay. Will further assess.   Edema: Pitting edema MTP-abdomen. GCB MTP-below knee. Recommending compression shorts. Ordered thigh high jobst stockings and plan to trial 11/9.    Assessment: Completed shower with ADLs. Edema continues to be a barrier to IND with [facility]. Plan to reinforce use of AE to improve IND. Edema in lower legs is improving but now moving to thighs and pelvis. Plan to trial jobst stockings this week and recommended purchasing compression shorts.     Other Barriers to Discharge (DME, Family Training, etc):   Current level of assist  IND management of edema

## 2021-11-07 NOTE — PLAN OF CARE
FOCUS/GOAL  Bladder management, Pain management, and Skin integrity    ASSESSMENT, INTERVENTIONS AND CONTINUING PLAN FOR GOAL:    Pt A/O x 4, upgraded to Mod I in room. Up to toilet independently, still retaining urine.  PVRs were 229 and 187 this shift.  LBM 11/5/21.  C/O pain in legs from edema and in lower back, treated with tylenol with minimal relief per pt report, she requested something stronger.  Page sent to provider.  Edema wraps bilateral LE, pt.  Replaced dressing on old central line site on chest.

## 2021-11-07 NOTE — PLAN OF CARE
"FOCUS/GOAL  Bowel management, Bladder management, Pain management, Skin integrity and Cognition/Memory/Judgment/Problem solving    ASSESSMENT, INTERVENTIONS AND CONTINUING PLAN FOR GOAL:  Pt is alert and oriented x4, denied chest pain, sob,fever, chills, n/v or new numbness and tingling, pt reported soreness to back, and bilateral lower extremities that started at the same time of therapy. PMR on call of called and ordered lidocane patches for 8am. Pt was given prn tylenol and simethicone. Pt reported \"gas\" but had normoactive bowel sounds. Abdomin is distended but soft and non tender to palpation, chris in room, no further care concerns at this time continue with POC.     "

## 2021-11-07 NOTE — PROGRESS NOTES
11/06/21 0900   Quick Adds   Type of Visit Initial Occupational Therapy Evaluation       Present no   Language English   Living Environment   People in home parent(s)   Current Living Arrangements house   Home Accessibility stairs to enter home;stairs within home   Number of Stairs, Main Entrance 3   Number of Stairs, Within Home, Primary greater than 10 stairs   Stair Railings, Within Home, Primary railings on both sides of stairs   Transportation Anticipated car, drives self   Living Environment Comments OT: Pt admitted to the hospital from a chemical dependency facility. Plans to discharge directly to a facility from IP rehab - pending placement. Pt lives in a home with her mother in [place].    Self-Care   Usual Activity Tolerance good   Current Activity Tolerance fair   Regular Exercise No   Equipment Currently Used at Home none   Activity/Exercise/Self-Care Comment OT: Unemployed. No formal exercise program. Active with household activities. Enjoys riding her horses.    Instrumental Activities of Daily Living (IADL)   Previous Responsibilities meal prep;housekeeping;laundry;shopping;yardwork;medication management;finances;driving   IADL Comments Shared responsibility of IADLs with mother   Disability/Function   Hearing Difficulty or Deaf no   Wear Glasses or Blind yes   Vision Management Glasses   Concentrating, Remembering or Making Decisions Difficulty no   Difficulty Communicating no   Difficulty Eating/Swallowing no   Walking or Climbing Stairs Difficulty no   Dressing/Bathing Difficulty no   Toileting issues no   Doing Errands Independently Difficulty (such as shopping) no   Fall history within last six months yes   Number of times patient has fallen within last six months 3   Change in Functional Status Since Onset of Current Illness/Injury yes   General Information   Onset of Illness/Injury or Date of Surgery 09/29/21   Referring Physician Yony Sidhu, DO   Patient/Family  Therapy Goal Statement (OT) Pt goals to decreased edema, improve strength, and walk better   Additional Occupational Profile Info/Pertinent History of Current Problem Aliyah Angeles is a 46 year old woman with past medical history of type 2 DM, alcohol use disorder, alcoholic hepatitis,  anxiety, depression, Fitz-en-y gastric bypass, admitted with alcholic hepatitis 9/29/21 course complicated by ARDS, pneumonia, UTI, anemia, severe malnutrition, ANA LAURA on CKD. Admitted to acute rehab 11/5/21 in setting of impaired strength, impaired balance, and impaired activity tolerance.    Performance Patterns (Routines, Roles, Habits) Pt is a 46 yr old female, lives with mother in a house, currently unemployed, previously IND ADLs/IADLs including driving.    Existing Precautions/Restrictions fall;other (see comments)  (BLE edema)   Left Upper Extremity (Weight-bearing Status) full weight-bearing (FWB)   Right Upper Extremity (Weight-bearing Status) full weight-bearing (FWB)   Left Lower Extremity (Weight-bearing Status) full weight-bearing (FWB)   Right Lower Extremity (Weight-bearing Status) full weight-bearing (FWB)   Heart Disease Risk Factors Medical history   Cognitive Status Examination   Orientation Status orientation to person, place and time   Affect/Mental Status (Cognitive) WNL   Follows Commands WNL   Memory Deficit minimal deficit;short-term memory   Cognitive Status Comments Plan to further assess. Hx of acute encephalopathy during hospital stay. Pt reports that she has cleared but does have mild difficulties with short term recall.   Visual Perception   Visual Impairment/Limitations corrective lenses for reading   Impact of Vision Impairment on Function (Vision) No acute visual concerns   Pain Assessment   Patient Currently in Pain No   Integumentary/Edema   Integumentary/Edema Comments Chest tube site, dressed. Multiple bruises from IV sites. BLE edema MTP-abdomen.    Posture   Posture not impaired   Range of  Motion Comprehensive   Comment, General Range of Motion BUE AROM WFL/WNL   Strength Comprehensive (MMT)   Comment, General Manual Muscle Testing (MMT) Assessment BUE strength 4-4+/5, general deconditioning.    Muscle Tone Assessment   Muscle Tone Quick Adds No deficits were identified   Coordination   Upper Extremity Coordination No deficits were identified   ARC Assessment Only   Acute Rehab Functional Assessment See IP Rehab Daily Documentation Flowsheet for Functional Mobility/ADL Assessment   Bed Mobility   Comment (Bed Mobility) Min A EOB > supine. Needing HOB raised to get out of bed   Transfers   Transfer Comments SBA-Min A SPT with fww.   Balance   Balance Assessment sitting balance: static;sitting balance: dynamic;sit to stand balance;standing balance: static;standing balance: dynamic   Sitting Balance: Static WNL   Sitting Balance: Dynamic WNL   Sit-to-Stand Balance normal balance   Standing Balance: Static good balance   Standing Balance: Dynamic fair balance   Clinical Impression   Criteria for Skilled Therapeutic Interventions Met (OT) yes   OT Diagnosis Decreased IND ADLs/IADLs   OT Problem List-Impairments impacting ADL problems related to;activity tolerance impaired;balance;cognition;mobility;other (see comments);flexibility;postural control;strength;pain;range of motion (ROM)  (edema)   ADL comments/analysis ADL/IADL performance is below baseline   Assessment of Occupational Performance 5 or more Performance Deficits   Identified Performance Deficits Performance deficits include mobility, transfers, dressing, bathing, meal preparation, household management, driving, work, sleep, social participation.   Planned Therapy Interventions (OT) ADL retraining;IADL retraining;balance training;ROM;strengthening;stretching;transfer training;home program guidelines;progressive activity/exercise;cognition;bed mobility training   Clinical Decision Making Complexity (OT) moderate complexity   Therapy Frequency  (OT) Daily   Predicted Duration of Therapy 10 days   Anticipated Equipment Needs Upon Discharge (OT) walker, rolling;tub bench;other (see comments);reacher;dressing equipment   Risk & Benefits of therapy have been explained evaluation/treatment results reviewed;care plan/treatment goals reviewed;risks/benefits reviewed;current/potential barriers reviewed;participants voiced agreement with care plan;participants included;patient   Comment-Clinical Impression The pt will benefit from skilled OT intervention to address goals in POC and maximize functional IND and safety in discharge environment.    Total Evaluation Time (Minutes)   Total Evaluation Time (Minutes) 30

## 2021-11-07 NOTE — PROGRESS NOTES
[facility]   Acute Rehabilitation Unit  Admission History and Physical    CHIEF COMPLAINT   Generalized weakness.     HISTORY OF PRESENT ILLNESS  Pt is doing well today, she has soreness in bilateral thighs associated with exercise. She benefits from lidocaine patches. She didn't sleep well and would like increased melatonin dose. She otherwise denies other chest pain, shortness of breath, nausea/vomiting, fevers or chills.    Functionally:  ADLs: Mod IND transfers and short distance mobility with fww. IND UB dressing, Max A LB dressing, Mod IND toileting, and IND standing grooming.   IADLs: TBA. Previously IND.   Vision/Cognition: Metabolic encephalopathy during acute hospital stay. Will further assess.   Edema: Pitting edema MTP-abdomen. GCB MTP-below knee. Recommending compression shorts. Ordered thigh high jobst stockings and plan to trial 11/9.    PAST MEDICAL HISTORY   Reviewed and updated in Epic.  Past Medical History:   Diagnosis Date     Acne      ADHD (attention deficit hyperactivity disorder)      Arthralgia of temporomandibular joint 5/19/2016     Arthritis      Cervical high risk HPV (human papillomavirus) test positive 4/27/2018 4/27/18 NIL pap, + HR HPV (not 16 or 18). Plan: cotest in 1 yr.       Cobalamin deficiency 11/24/2014     Drug-seeking behavior 5/19/2016    Overview:  Per [facility] website, patient has filled with multiple controlled substances with multiple prescribers at multiple pharmacies      History of blood transfusion 01/01/1988     Insomnia 7/21/2013     Raynaud's phenomenon 5/25/2015     Vitamin D deficiency 1/16/2013     SURGICAL HISTORY  Reviewed and updated in Epic.  Past Surgical History:   Procedure Laterality Date     ABDOMEN SURGERY  08/10/2010    Gastric  bypass     BIOPSY  1/1/94    HPV, multiple reoccurrances, partial  hysterectomy  2013     CHOLECYSTECTOMY  01/01/2010     COLONOSCOPY  01/01/2009     ENT SURGERY  01/01/1988    Tonsils      GI SURGERY  8/10/10    Fitz-en-Y     HYSTERECTOMY VAGINAL  04/05/2013    pathology of cervix benign.      IR CVC NON TUNNEL LINE REMOVAL  10/22/2021     IR CVC TUNNEL PLACEMENT > 5 YRS OF AGE  10/22/2021     IR CVC TUNNEL REMOVAL RIGHT  11/3/2021         Social History     Socioeconomic History     Marital status: Legally      Spouse name: Not on file     Number of children: Not on file     Years of education: Not on file     Highest education level: Not on file   Occupational History     Not on file   Tobacco Use     Smoking status: Current Every Day Smoker     Packs/day: 1.00     Years: 15.00     Pack years: 15.00     Types: Cigarettes     Start date: 1/1/1989     Smokeless tobacco: Never Used     Tobacco comment: 10/1/2021: Declined behavioral counseling, NRT, and post-hosp follow-up   Substance and Sexual Activity     Alcohol use: Yes     Alcohol/week: 0.0 standard drinks     Comment: daily unknown amount      Drug use: No     Sexual activity: Yes     Partners: Male     Birth control/protection: Male Surgical, Female Surgical     Comment:  one partner   Other Topics Concern     Parent/sibling w/ CABG, MI or angioplasty before 65F 55M? No   Social History Narrative     Not on file     Social Determinants of Health     Financial Resource Strain: Not on file   Food Insecurity: Not on file   Transportation Needs: Not on file   Physical Activity: Not on file   Stress: Not on file   Social Connections: Not on file   Intimate Partner Violence: Not on file   Housing Stability: Not on file     FAMILY HISTORY  Reviewed and updated in Epic.  Family History   Problem Relation Age of Onset     Prostate Cancer Father      Hypertension Father      Hyperlipidemia Father      Substance Abuse Father      Obesity Father      Diabetes Maternal Grandmother      Colon Cancer Maternal Grandmother      Other Cancer Maternal Grandmother         pancreatic cancer     Obesity Maternal Grandmother      Diabetes Paternal  Grandmother      Obesity Paternal Grandmother      Diabetes Paternal Grandfather      Obesity Paternal Grandfather      Breast Cancer Sister      Breast Cancer Sister      Anxiety Disorder Sister      Anesthesia Reaction Sister      Depression Mother      Anesthesia Reaction Mother      Asthma Daughter      Coronary Artery Disease No family hx of      Hypertension No family hx of      Hyperlipidemia No family hx of      Cerebrovascular Disease No family hx of      Depression No family hx of      Anxiety Disorder No family hx of      Mental Illness No family hx of      Substance Abuse No family hx of      Anesthesia Reaction No family hx of      Asthma No family hx of      Osteoporosis No family hx of      Genetic Disorder No family hx of      Thyroid Disease No family hx of      Obesity No family hx of      Unknown/Adopted No family hx of      PRIOR FUNCTIONAL HISTORY   Pt was independent with all ADLs/IADLs, transfers, mobility and gait.  Recent hospitalization 9/13-9/21 then discharged to chemical dependency readmitted 9/29-11/5 prolonged hospitalization    MEDICATIONS  Scheduled meds  Medications Prior to Admission   Medication Sig Dispense Refill Last Dose     albuterol (PROAIR HFA/PROVENTIL HFA/VENTOLIN HFA) 108 (90 Base) MCG/ACT inhaler Inhale 2 puffs into the lungs every 4 hours as needed for shortness of breath / dyspnea or wheezing 8.5 g 1 Unknown at Unknown time     B Complex-C-Folic Acid (RENAL) 1 MG CAPS Take 1 capsule by mouth daily   Unknown at Unknown time     calcium carbonate-vitamin D (OS-STEFFANY) 600-400 MG-UNIT chewable tablet Take 1 chew tab by mouth daily 30 tablet 0 11/5/2021 at 1408     ciprofloxacin (CIPRO) 250 MG tablet Take 1 tablet (250 mg) by mouth every 12 hours for 3 days   11/5/2021 at 1150     cyanocobalamin (CYANOCOBALAMIN) 100 MCG tablet 1 tablet (100 mcg) by Oral or Feeding Tube route daily   11/5/2021 at 1408     emollient (VANICREAM) external cream Apply topically every 6 hours as  "needed for other (dry skin)   Unknown at Unknown time     escitalopram (LEXAPRO) 10 MG tablet Take 1 tablet (10 mg) by mouth daily 30 tablet 0 11/5/2021 at 1408     folic acid (FOLVITE) 1 MG tablet Take 1 tablet (1 mg) by mouth daily 30 tablet 0 11/5/2021 at 1408     gabapentin (NEURONTIN) 100 MG capsule Take 1 capsule (100 mg) by mouth 3 times daily 90 capsule 0 11/5/2021 at 1408     metFORMIN (GLUCOPHAGE) 500 MG tablet Take 1 tablet (500 mg) by mouth 2 times daily (with meals) 60 tablet 0 Unknown at Unknown time     mirtazapine (REMERON) 15 MG tablet Take 1 tablet (15 mg) by mouth At Bedtime   11/4/2021 at 2151time     nicotine (COMMIT) 2 MG lozenge Place 1 lozenge (2 mg) inside cheek every hour as needed for smoking cessation   Unknown at Unknown time     pantoprazole (PROTONIX) 40 MG EC tablet Take 1 tablet (40 mg) by mouth every morning (before breakfast) 30 tablet 0 11/5/2021 at 0905     rifaximin (XIFAXAN) 550 MG TABS tablet Take 1 tablet (550 mg) by mouth 2 times daily 60 tablet 0 11/5/2021 at 0904     simethicone (MYLICON) 80 MG chewable tablet Take 1 tablet (80 mg) by mouth every 6 hours as needed for cramping   11/5/2021 at 0453     thiamine (B-1) 100 MG tablet Take 1 tablet (100 mg) by mouth daily 30 tablet 0 11/5/2021 at 0905     ALLERGIES   Allergies   Allergen Reactions     Nsaids      Gastric bypass     Trazodone      In a \"hazy\" for the whole next day         PHYSICAL EXAM  VITAL SIGNS:  /70 (BP Location: Right arm, Patient Position: Supine)   Pulse 91   Temp 98.7  F (37.1  C) (Oral)   Resp 16   Ht 1.626 m (5' 4\")   Wt 87.2 kg (192 lb 4.8 oz)   SpO2 97%   BMI 33.01 kg/m    BMI:  Estimated body mass index is 33.01 kg/m  as calculated from the following:    Height as of this encounter: 1.626 m (5' 4\").    Weight as of this encounter: 87.2 kg (192 lb 4.8 oz).     General: awake alert nad  HEENT: mmm, EOMI  Pulmonary: non labored clear diminished  Cardiovascular: RRR  Abdominal: soft " distended non tender, normal bowel sounds   Extremities: warm, ble edema up to thighs, with compression in place.   MSK/neuro:  Moves all 4 limbs      LABs  Last Basic Metabolic Panel:  Recent Labs   Lab Test 11/07/21  1207 11/07/21  0808 11/06/21  1700 11/05/21  0900 11/05/21  0438 11/04/21  0802 11/04/21  0608 11/03/21  0809 11/03/21  0438   NA  --   --   --   --  138  --  136  --  137   POTASSIUM  --   --   --   --  3.5  --  3.2*  --  3.4   CHLORIDE  --   --   --   --  107  --  105  --  105   CO2  --   --   --   --  25  --  25  --  25   ANIONGAP  --   --   --   --  6  --  6  --  7   GLC 93 113* 97   < > 127*   < > 118*   < > 113*   BUN  --   --   --   --  12  --  12  --  14   CR  --   --   --   --  1.38*  --  1.42*  --  1.41*   GFRESTIMATED  --   --   --   --  46*  --  44*  --  44*   STEFFANY  --   --   --   --  8.1*  --  7.8*  --  8.0*    < > = values in this interval not displayed.     CBC RESULTS:   Recent Labs   Lab Test 11/05/21  0438 11/04/21  0608 11/03/21  1418 11/03/21 0438   WBC 9.7 9.8  --  11.6*   RBC 2.55* 2.60*  --  2.21*   HGB 7.8* 7.9* 9.0* 6.8*   HCT 25.0* 25.4*  --  22.2*   MCV 98 98  --  101*   MCH 30.6 30.4  --  30.8   MCHC 31.2* 31.1*  --  30.6*   RDW 18.6* 19.2*  --  18.2*    195  --  217       IMPRESSION/PLAN:  Aliyah Angeles is a 46 year old woman with past medical history of type 2 DM, alcohol use disorder, alcoholic hepatitis,  anxiety, depression, Fitz-en-y gastric bypass, admitted with alcholic hepatitis 9/29/21 course complicated by ARDS, pneumonia, UTI, anemia, severe malnutrition, ANA LAURA on CKD. Admitted to acute rehab 11/5/21 in setting of impaired strength, impaired balance, and impaired activity tolerance.       Admission to acute inpatient rehab debility  Impairment group code: 16      1. PT, OT 60 minutes of each on a daily basis, in addition to rehab nursing and close management of physiatrist.      2. Impairment of ADL's: Noted to have impaired strength, impaired activity  tolerance, and impaired balance leading to decreased ability to independently complete ADL's.  Will benefit from ongoing OT with goal for MOD I with basic ADLs.     3. Impairment of mobility:  Noted to have impaired strength, impaired activity tolerance, and impaired balance leading to decreased mobility.  Will benefit from ongoing PT with goal for OLVIN with basic mobility.       4. Medical Conditions  Acute hypoxic respiratory failure  2/2 ARDS (resolved).  Recent pneumonia. Pulmonary edema  Intubated 10/4 cxr consistent with ARDS treated with zosyn. Self extubated 10/14.  CXR 10/17 with pulmonary edema vs pneumonia.  Completed another course of zosyn 10/26.   -encourage IS  -encourage therapy/ out of bed activity   -continue to taper off oxygen    ANA LAURA  Hypervolemia  Cr peak 2.98 felt 2/2 ATN CRRT 10/7-HD 10/13-10/29.    -follow up nephrology  -trend Cr.   -intake/output, wts  -lymphedema consult    ETOH use disorder  Alcohol induced hepatitis  Started on rifaximin/lactulose during 9/13-9/21 hospitalization, seen by hepatology. Diagnostic para neg. Lactulose dcd.  MELD 16. Maddrey score 13 on admission. INR 1.23  -rifaximin bid  -trend cmp, INR  -was seen by CD with recs to return to chemical dependency  -continue folate, thiamine, mvi    UTI  Started on ceftriaxone UC with 10-50k enterobacter cloacae transitioned to cipro  -continue cipro oral to complete course  -check pvrs on aru admission    Severe malnutrition  History of jaimie-en-y   TF 10/5-1021 removed 10/23.   -calorie counts  -appreciate nutritions support  -continue supplements.     DM type 2  A1C 9.1 started on metformin 9/13-21 hospitalization held in setting of ANA LAURA. Glucose 108-146 last 72 hours will discontinue ssi.   -trend glucose, cr -   -restart insulin or metformin pending renal function as indicated.     Acute on chronic macrocytic Anemia  2/2 etoh, alcohol hepatitis, requiring prbc during hospitalization  -trend hgb  -received 3 doses IV iron  prior to aru admission, iv removed prior to admission.    -transfuse hgb <7  -continues on folate, b12,     Major Depression Disorder  RUBÉN  Continue lexapro, prazosin on hold due to hypotension, remeron    GERD-   Continue PPI    Neuropathy  Continue pta gabapentin    Sleep:  Melatonin increased to 5mg on 11/7    5. Adjustment to disability:  Clinical psychology to eval and treat as indicated.   6. FEN: high protein  7. Bowel: monitor  8. Bladder: monitor  9. DVT Prophylaxis: subcutaneous heparin  10. GI Prophylaxis: protonix  11. Code: full confirmed on admission.   12. Disposition: plan to return to chemical dependency pending bed.   13. ELOS:  10-14 days  14. Rehab prognosis:  fair  15. Follow up Appointments on Discharge: pcp, hepatology, nephrology.      The patient's assessment and plan was discussed with my attending physician Dr. Cannon.  Jovani Taavres DO  PGY-4 PM&R Resident    Physician Attestation   I, Bess Cannon MD, personally examined and evaluated this patient.  I discussed the patient with the resident/fellow and care team, and agree with the assessment and plan of care as documented in the note of 11/7/21.    I personally reviewed vital signs, medications, labs and imaging.    Bess Cannon MD  Physical Medicine & Rehabilitation  Date of Service (when I saw the patient): 11/7/21    I spent a total of 15 minutes face-to-face and managing the care of the patient. Over 50% of my time on the unit was spent counseling the patient and coordinating care. See note for details.

## 2021-11-08 ENCOUNTER — APPOINTMENT (OUTPATIENT)
Dept: OCCUPATIONAL THERAPY | Facility: CLINIC | Age: 46
End: 2021-11-08
Attending: PHYSICAL MEDICINE & REHABILITATION
Payer: COMMERCIAL

## 2021-11-08 ENCOUNTER — APPOINTMENT (OUTPATIENT)
Dept: PHYSICAL THERAPY | Facility: CLINIC | Age: 46
End: 2021-11-08
Attending: PHYSICAL MEDICINE & REHABILITATION
Payer: COMMERCIAL

## 2021-11-08 ENCOUNTER — APPOINTMENT (OUTPATIENT)
Dept: CT IMAGING | Facility: CLINIC | Age: 46
End: 2021-11-08
Attending: PHYSICIAN ASSISTANT
Payer: COMMERCIAL

## 2021-11-08 LAB
ALBUMIN SERPL-MCNC: 1.7 G/DL (ref 3.4–5)
ALP SERPL-CCNC: 220 U/L (ref 40–150)
ALT SERPL W P-5'-P-CCNC: 25 U/L (ref 0–50)
ANION GAP SERPL CALCULATED.3IONS-SCNC: 6 MMOL/L (ref 3–14)
AST SERPL W P-5'-P-CCNC: 69 U/L (ref 0–45)
BILIRUB SERPL-MCNC: 2 MG/DL (ref 0.2–1.3)
BUN SERPL-MCNC: 7 MG/DL (ref 7–30)
CALCIUM SERPL-MCNC: 7.5 MG/DL (ref 8.5–10.1)
CHLORIDE BLD-SCNC: 111 MMOL/L (ref 94–109)
CO2 SERPL-SCNC: 23 MMOL/L (ref 20–32)
CREAT SERPL-MCNC: 1.21 MG/DL (ref 0.52–1.04)
ERYTHROCYTE [DISTWIDTH] IN BLOOD BY AUTOMATED COUNT: 17.8 % (ref 10–15)
GFR SERPL CREATININE-BSD FRML MDRD: 53 ML/MIN/1.73M2
GLUCOSE BLD-MCNC: 104 MG/DL (ref 70–99)
GLUCOSE BLDC GLUCOMTR-MCNC: 106 MG/DL (ref 70–99)
GLUCOSE BLDC GLUCOMTR-MCNC: 89 MG/DL (ref 70–99)
GLUCOSE BLDC GLUCOMTR-MCNC: 90 MG/DL (ref 70–99)
HCT VFR BLD AUTO: 26.1 % (ref 35–47)
HGB BLD-MCNC: 8.3 G/DL (ref 11.7–15.7)
LIPASE SERPL-CCNC: 161 U/L (ref 73–393)
MAGNESIUM SERPL-MCNC: 1.4 MG/DL (ref 1.6–2.3)
MCH RBC QN AUTO: 30.6 PG (ref 26.5–33)
MCHC RBC AUTO-ENTMCNC: 31.8 G/DL (ref 31.5–36.5)
MCV RBC AUTO: 96 FL (ref 78–100)
PHOSPHATE SERPL-MCNC: 4.4 MG/DL (ref 2.5–4.5)
PLATELET # BLD AUTO: 241 10E3/UL (ref 150–450)
POTASSIUM BLD-SCNC: 3.6 MMOL/L (ref 3.4–5.3)
PROT SERPL-MCNC: 5.9 G/DL (ref 6.8–8.8)
RADIOLOGIST FLAGS: ABNORMAL
RBC # BLD AUTO: 2.71 10E6/UL (ref 3.8–5.2)
SODIUM SERPL-SCNC: 140 MMOL/L (ref 133–144)
WBC # BLD AUTO: 10.5 10E3/UL (ref 4–11)

## 2021-11-08 PROCEDURE — 83690 ASSAY OF LIPASE: CPT | Performed by: STUDENT IN AN ORGANIZED HEALTH CARE EDUCATION/TRAINING PROGRAM

## 2021-11-08 PROCEDURE — 84145 PROCALCITONIN (PCT): CPT | Performed by: PHYSICIAN ASSISTANT

## 2021-11-08 PROCEDURE — 99232 SBSQ HOSP IP/OBS MODERATE 35: CPT | Mod: 24 | Performed by: PHYSICAL MEDICINE & REHABILITATION

## 2021-11-08 PROCEDURE — 87040 BLOOD CULTURE FOR BACTERIA: CPT | Performed by: HOSPITALIST

## 2021-11-08 PROCEDURE — 258N000003 HC RX IP 258 OP 636: Performed by: PHYSICIAN ASSISTANT

## 2021-11-08 PROCEDURE — 250N000011 HC RX IP 250 OP 636: Performed by: PHYSICIAN ASSISTANT

## 2021-11-08 PROCEDURE — 250N000013 HC RX MED GY IP 250 OP 250 PS 637: Performed by: STUDENT IN AN ORGANIZED HEALTH CARE EDUCATION/TRAINING PROGRAM

## 2021-11-08 PROCEDURE — 250N000011 HC RX IP 250 OP 636: Performed by: PHYSICAL MEDICINE & REHABILITATION

## 2021-11-08 PROCEDURE — 97535 SELF CARE MNGMENT TRAINING: CPT | Mod: GO

## 2021-11-08 PROCEDURE — 74177 CT ABD & PELVIS W/CONTRAST: CPT

## 2021-11-08 PROCEDURE — 83735 ASSAY OF MAGNESIUM: CPT | Performed by: PHYSICIAN ASSISTANT

## 2021-11-08 PROCEDURE — 97110 THERAPEUTIC EXERCISES: CPT | Mod: GP

## 2021-11-08 PROCEDURE — 97140 MANUAL THERAPY 1/> REGIONS: CPT | Mod: GP

## 2021-11-08 PROCEDURE — 82040 ASSAY OF SERUM ALBUMIN: CPT | Performed by: PHYSICIAN ASSISTANT

## 2021-11-08 PROCEDURE — 36415 COLL VENOUS BLD VENIPUNCTURE: CPT | Performed by: HOSPITALIST

## 2021-11-08 PROCEDURE — 999N000127 HC STATISTIC PERIPHERAL IV START W US GUIDANCE

## 2021-11-08 PROCEDURE — 86140 C-REACTIVE PROTEIN: CPT | Performed by: PHYSICIAN ASSISTANT

## 2021-11-08 PROCEDURE — 84100 ASSAY OF PHOSPHORUS: CPT | Performed by: PHYSICIAN ASSISTANT

## 2021-11-08 PROCEDURE — 85027 COMPLETE CBC AUTOMATED: CPT | Performed by: PHYSICIAN ASSISTANT

## 2021-11-08 PROCEDURE — 128N000003 HC R&B REHAB

## 2021-11-08 PROCEDURE — 36415 COLL VENOUS BLD VENIPUNCTURE: CPT | Performed by: PHYSICIAN ASSISTANT

## 2021-11-08 PROCEDURE — 250N000009 HC RX 250: Performed by: PHYSICAL MEDICINE & REHABILITATION

## 2021-11-08 PROCEDURE — 97530 THERAPEUTIC ACTIVITIES: CPT | Mod: GP

## 2021-11-08 PROCEDURE — 74177 CT ABD & PELVIS W/CONTRAST: CPT | Mod: 26 | Performed by: RADIOLOGY

## 2021-11-08 PROCEDURE — 250N000013 HC RX MED GY IP 250 OP 250 PS 637: Performed by: PHYSICIAN ASSISTANT

## 2021-11-08 PROCEDURE — 87493 C DIFF AMPLIFIED PROBE: CPT | Performed by: PHYSICIAN ASSISTANT

## 2021-11-08 RX ORDER — IOPAMIDOL 755 MG/ML
100 INJECTION, SOLUTION INTRAVASCULAR ONCE
Status: COMPLETED | OUTPATIENT
Start: 2021-11-08 | End: 2021-11-08

## 2021-11-08 RX ORDER — METHOCARBAMOL 500 MG/1
500 TABLET, FILM COATED ORAL
Status: DISCONTINUED | OUTPATIENT
Start: 2021-11-08 | End: 2021-11-10 | Stop reason: HOSPADM

## 2021-11-08 RX ORDER — MAGNESIUM SULFATE HEPTAHYDRATE 40 MG/ML
4 INJECTION, SOLUTION INTRAVENOUS ONCE
Status: COMPLETED | OUTPATIENT
Start: 2021-11-08 | End: 2021-11-08

## 2021-11-08 RX ORDER — LIDOCAINE 40 MG/G
CREAM TOPICAL
Status: DISCONTINUED | OUTPATIENT
Start: 2021-11-08 | End: 2021-11-10 | Stop reason: HOSPADM

## 2021-11-08 RX ADMIN — MAGNESIUM SULFATE IN WATER 4 G: 40 INJECTION, SOLUTION INTRAVENOUS at 13:12

## 2021-11-08 RX ADMIN — LIDOCAINE 1 PATCH: 246 PATCH TOPICAL at 08:22

## 2021-11-08 RX ADMIN — THIAMINE HCL TAB 100 MG 100 MG: 100 TAB at 08:19

## 2021-11-08 RX ADMIN — SODIUM CHLORIDE 1000 ML: 9 INJECTION, SOLUTION INTRAVENOUS at 17:11

## 2021-11-08 RX ADMIN — FOLIC ACID 1 MG: 1 TABLET ORAL at 15:08

## 2021-11-08 RX ADMIN — ESCITALOPRAM OXALATE 10 MG: 10 TABLET ORAL at 15:08

## 2021-11-08 RX ADMIN — MIRTAZAPINE 15 MG: 7.5 TABLET, FILM COATED ORAL at 22:03

## 2021-11-08 RX ADMIN — GABAPENTIN 100 MG: 100 CAPSULE ORAL at 08:19

## 2021-11-08 RX ADMIN — PANTOPRAZOLE SODIUM 40 MG: 40 TABLET, DELAYED RELEASE ORAL at 05:25

## 2021-11-08 RX ADMIN — GABAPENTIN 100 MG: 100 CAPSULE ORAL at 22:03

## 2021-11-08 RX ADMIN — HEPARIN SODIUM 5000 UNITS: 5000 INJECTION INTRAVENOUS; SUBCUTANEOUS at 08:20

## 2021-11-08 RX ADMIN — VITAM B12 100 MCG: 100 TAB at 15:08

## 2021-11-08 RX ADMIN — ACETAMINOPHEN 650 MG: 325 TABLET, FILM COATED ORAL at 16:04

## 2021-11-08 RX ADMIN — HEPARIN SODIUM 5000 UNITS: 5000 INJECTION INTRAVENOUS; SUBCUTANEOUS at 22:03

## 2021-11-08 RX ADMIN — SODIUM CHLORIDE 64 ML: 9 INJECTION, SOLUTION INTRAVENOUS at 20:10

## 2021-11-08 RX ADMIN — Medication 1 CAPSULE: at 15:08

## 2021-11-08 RX ADMIN — RIFAXIMIN 550 MG: 550 TABLET ORAL at 08:19

## 2021-11-08 RX ADMIN — RIFAXIMIN 550 MG: 550 TABLET ORAL at 22:03

## 2021-11-08 RX ADMIN — IOPAMIDOL 94 ML: 755 INJECTION, SOLUTION INTRAVENOUS at 20:10

## 2021-11-08 RX ADMIN — CALCIUM CARBONATE 600 MG (1,500 MG)-VITAMIN D3 400 UNIT TABLET 1 TABLET: at 08:19

## 2021-11-08 RX ADMIN — GABAPENTIN 100 MG: 100 CAPSULE ORAL at 15:08

## 2021-11-08 RX ADMIN — Medication 5 MG: at 22:03

## 2021-11-08 ASSESSMENT — ACTIVITIES OF DAILY LIVING (ADL)
ADLS_ACUITY_SCORE: 13

## 2021-11-08 ASSESSMENT — MIFFLIN-ST. JEOR: SCORE: 1488.64

## 2021-11-08 NOTE — PROGRESS NOTES
General acute hospital   Acute Rehabilitation Unit  Admission History and Physical    CHIEF COMPLAINT   Generalized weakness.     HISTORY OF PRESENT ILLNESS  Pt is doing well today, she has soreness in bilateral thighs associated with exercise and trouble with sleep.  She otherwise denies other chest pain, shortness of breath, nausea/vomiting, fevers or chills.  Magnesium low.  There is RUE thrombophlebitis.      Functionally:  PT:  Bed Mobility: Mod I (rails, elevated HOB)  Transfer: Mod I FWW in room; min A sit/stand from low seat to floor heights  Gait: SBA/CGA  ft level surfaces  Stairs: 1, mod A, rails x 2  Balance: BBS 30/56, good steady-state standing unsupported but reliant on UE support for proactive demands       ROS: 10 point ROS neg other than the symptoms noted above in the HPI.      PAST MEDICAL HISTORY   Reviewed and updated in Epic.  Past Medical History:   Diagnosis Date     Acne      ADHD (attention deficit hyperactivity disorder)      Arthralgia of temporomandibular joint 5/19/2016     Arthritis      Cervical high risk HPV (human papillomavirus) test positive 4/27/2018 4/27/18 NIL pap, + HR HPV (not 16 or 18). Plan: cotest in 1 yr.       Cobalamin deficiency 11/24/2014     Drug-seeking behavior 5/19/2016    Overview:  Per [facility] website, patient has filled with multiple controlled substances with multiple prescribers at multiple pharmacies      History of blood transfusion 01/01/1988     Insomnia 7/21/2013     Raynaud's phenomenon 5/25/2015     Vitamin D deficiency 1/16/2013     SURGICAL HISTORY  Reviewed and updated in Epic.  Past Surgical History:   Procedure Laterality Date     ABDOMEN SURGERY  08/10/2010    Gastric  bypass     BIOPSY  1/1/94    HPV, multiple reoccurrances, partial  hysterectomy  2013     CHOLECYSTECTOMY  01/01/2010     COLONOSCOPY  01/01/2009     ENT SURGERY  01/01/1988    Tonsils     GI SURGERY  8/10/10    Fitz-en-Y     HYSTERECTOMY VAGINAL   04/05/2013    pathology of cervix benign.      IR CVC NON TUNNEL LINE REMOVAL  10/22/2021     IR CVC TUNNEL PLACEMENT > 5 YRS OF AGE  10/22/2021     IR CVC TUNNEL REMOVAL RIGHT  11/3/2021         Social History     Socioeconomic History     Marital status: Legally      Spouse name: Not on file     Number of children: Not on file     Years of education: Not on file     Highest education level: Not on file   Occupational History     Not on file   Tobacco Use     Smoking status: Current Every Day Smoker     Packs/day: 1.00     Years: 15.00     Pack years: 15.00     Types: Cigarettes     Start date: 1/1/1989     Smokeless tobacco: Never Used     Tobacco comment: 10/1/2021: Declined behavioral counseling, NRT, and post-hosp follow-up   Substance and Sexual Activity     Alcohol use: Yes     Alcohol/week: 0.0 standard drinks     Comment: daily unknown amount      Drug use: No     Sexual activity: Yes     Partners: Male     Birth control/protection: Male Surgical, Female Surgical     Comment:  one partner   Other Topics Concern     Parent/sibling w/ CABG, MI or angioplasty before 65F 55M? No   Social History Narrative     Not on file     Social Determinants of Health     Financial Resource Strain: Not on file   Food Insecurity: Not on file   Transportation Needs: Not on file   Physical Activity: Not on file   Stress: Not on file   Social Connections: Not on file   Intimate Partner Violence: Not on file   Housing Stability: Not on file     FAMILY HISTORY  Reviewed and updated in Epic.  Family History   Problem Relation Age of Onset     Prostate Cancer Father      Hypertension Father      Hyperlipidemia Father      Substance Abuse Father      Obesity Father      Diabetes Maternal Grandmother      Colon Cancer Maternal Grandmother      Other Cancer Maternal Grandmother         pancreatic cancer     Obesity Maternal Grandmother      Diabetes Paternal Grandmother      Obesity Paternal Grandmother      Diabetes  Paternal Grandfather      Obesity Paternal Grandfather      Breast Cancer Sister      Breast Cancer Sister      Anxiety Disorder Sister      Anesthesia Reaction Sister      Depression Mother      Anesthesia Reaction Mother      Asthma Daughter      Coronary Artery Disease No family hx of      Hypertension No family hx of      Hyperlipidemia No family hx of      Cerebrovascular Disease No family hx of      Depression No family hx of      Anxiety Disorder No family hx of      Mental Illness No family hx of      Substance Abuse No family hx of      Anesthesia Reaction No family hx of      Asthma No family hx of      Osteoporosis No family hx of      Genetic Disorder No family hx of      Thyroid Disease No family hx of      Obesity No family hx of      Unknown/Adopted No family hx of      PRIOR FUNCTIONAL HISTORY   Pt was independent with all ADLs/IADLs, transfers, mobility and gait.  Recent hospitalization 9/13-9/21 then discharged to chemical dependency readmitted 9/29-11/5 prolonged hospitalization    MEDICATIONS  Scheduled meds  Medications Prior to Admission   Medication Sig Dispense Refill Last Dose     albuterol (PROAIR HFA/PROVENTIL HFA/VENTOLIN HFA) 108 (90 Base) MCG/ACT inhaler Inhale 2 puffs into the lungs every 4 hours as needed for shortness of breath / dyspnea or wheezing 8.5 g 1 Unknown at Unknown time     B Complex-C-Folic Acid (RENAL) 1 MG CAPS Take 1 capsule by mouth daily   Unknown at Unknown time     calcium carbonate-vitamin D (OS-STEFFANY) 600-400 MG-UNIT chewable tablet Take 1 chew tab by mouth daily 30 tablet 0 11/5/2021 at 1408     ciprofloxacin (CIPRO) 250 MG tablet Take 1 tablet (250 mg) by mouth every 12 hours for 3 days   11/5/2021 at 1150     cyanocobalamin (CYANOCOBALAMIN) 100 MCG tablet 1 tablet (100 mcg) by Oral or Feeding Tube route daily   11/5/2021 at 1408     emollient (VANICREAM) external cream Apply topically every 6 hours as needed for other (dry skin)   Unknown at Unknown time      "escitalopram (LEXAPRO) 10 MG tablet Take 1 tablet (10 mg) by mouth daily 30 tablet 0 11/5/2021 at 1408     folic acid (FOLVITE) 1 MG tablet Take 1 tablet (1 mg) by mouth daily 30 tablet 0 11/5/2021 at 1408     gabapentin (NEURONTIN) 100 MG capsule Take 1 capsule (100 mg) by mouth 3 times daily 90 capsule 0 11/5/2021 at 1408     metFORMIN (GLUCOPHAGE) 500 MG tablet Take 1 tablet (500 mg) by mouth 2 times daily (with meals) 60 tablet 0 Unknown at Unknown time     mirtazapine (REMERON) 15 MG tablet Take 1 tablet (15 mg) by mouth At Bedtime   11/4/2021 at 2151time     nicotine (COMMIT) 2 MG lozenge Place 1 lozenge (2 mg) inside cheek every hour as needed for smoking cessation   Unknown at Unknown time     pantoprazole (PROTONIX) 40 MG EC tablet Take 1 tablet (40 mg) by mouth every morning (before breakfast) 30 tablet 0 11/5/2021 at 0905     rifaximin (XIFAXAN) 550 MG TABS tablet Take 1 tablet (550 mg) by mouth 2 times daily 60 tablet 0 11/5/2021 at 0904     simethicone (MYLICON) 80 MG chewable tablet Take 1 tablet (80 mg) by mouth every 6 hours as needed for cramping   11/5/2021 at 0453     thiamine (B-1) 100 MG tablet Take 1 tablet (100 mg) by mouth daily 30 tablet 0 11/5/2021 at 0905     ALLERGIES   Allergies   Allergen Reactions     Nsaids      Gastric bypass     Trazodone      In a \"hazy\" for the whole next day         PHYSICAL EXAM  VITAL SIGNS:  /73 (BP Location: Right arm)   Pulse 101   Temp 99.6  F (37.6  C) (Oral)   Resp 16   Ht 1.626 m (5' 4\")   Wt 86.9 kg (191 lb 8 oz)   SpO2 92%   BMI 32.87 kg/m    BMI:  Estimated body mass index is 32.87 kg/m  as calculated from the following:    Height as of this encounter: 1.626 m (5' 4\").    Weight as of this encounter: 86.9 kg (191 lb 8 oz).     General: nad, sitting up in bed  HEENT: mmm, EOMI  Pulmonary: non labored clear diminished, symmetrical chest rise  Cardiovascular: 2+ radial pulse, well perfused   Abdominal: soft, distended, " nontender  Extremities: warm, ble edema up to thighs, with compression in place. RUE thrombophlebitis   MSK/neuro:  AAOx3, good AROM and PROM      LABs  Last Basic Metabolic Panel:  Recent Labs   Lab Test 11/08/21  0636 11/07/21  1801 11/07/21  1207 11/05/21  0900 11/05/21  0438 11/04/21  0802 11/04/21  0608     --   --   --  138  --  136   POTASSIUM 3.6  --   --   --  3.5  --  3.2*   CHLORIDE 111*  --   --   --  107  --  105   CO2 23  --   --   --  25  --  25   ANIONGAP 6  --   --   --  6  --  6   * 101* 93   < > 127*   < > 118*   BUN 7  --   --   --  12  --  12   CR 1.21*  --   --   --  1.38*  --  1.42*   GFRESTIMATED 53*  --   --   --  46*  --  44*   STEFFANY 7.5*  --   --   --  8.1*  --  7.8*    < > = values in this interval not displayed.     CBC RESULTS:   Recent Labs   Lab Test 11/08/21  0636 11/05/21  0438 11/04/21  0608   WBC 10.5 9.7 9.8   RBC 2.71* 2.55* 2.60*   HGB 8.3* 7.8* 7.9*   HCT 26.1* 25.0* 25.4*   MCV 96 98 98   MCH 30.6 30.6 30.4   MCHC 31.8 31.2* 31.1*   RDW 17.8* 18.6* 19.2*    214 195       IMPRESSION/PLAN:  Aliyah Angeles is a 46 year old woman with past medical history of type 2 DM, alcohol use disorder, alcoholic hepatitis,  anxiety, depression, Fitz-en-y gastric bypass, admitted with alcholic hepatitis 9/29/21 course complicated by ARDS, pneumonia, UTI, anemia, severe malnutrition, ANA LAURA on CKD. Admitted to acute rehab 11/5/21 in setting of impaired strength, impaired balance, and impaired activity tolerance.       Admission to acute inpatient rehab debility  Impairment group code: 16      1. PT, OT 60 minutes of each on a daily basis, in addition to rehab nursing and close management of physiatrist.      2. Impairment of ADL's: Noted to have impaired strength, impaired activity tolerance, and impaired balance leading to decreased ability to independently complete ADL's.  Will benefit from ongoing OT with goal for MOD I with basic ADLs.     3. Impairment of mobility:  Noted  to have impaired strength, impaired activity tolerance, and impaired balance leading to decreased mobility.  Will benefit from ongoing PT with goal for OLVIN with basic mobility.       4. Medical Conditions  Acute hypoxic respiratory failure  2/2 ARDS (resolved).  Recent pneumonia. Pulmonary edema  Intubated 10/4 cxr consistent with ARDS treated with zosyn. Self extubated 10/14.  CXR 10/17 with pulmonary edema vs pneumonia.  Completed another course of zosyn 10/26.   -encourage IS  -encourage therapy/ out of bed activity   -continue to taper off oxygen    ANA LAURA  Hypervolemia  Cr peak 2.98 felt 2/2 ATN CRRT 10/7-HD 10/13-10/29.    -follow up nephrology  -trend Cr.   -intake/output, wts  -lymphedema consult    ETOH use disorder  Alcohol induced hepatitis  Started on rifaximin/lactulose during 9/13-9/21 hospitalization, seen by hepatology. Diagnostic para neg. Lactulose dcd.  MELD 16. Maddrey score 13 on admission. INR 1.23  -rifaximin bid  -trend cmp, INR  -was seen by CD with recs to return to chemical dependency  -continue folate, thiamine, mvi    UTI  Started on ceftriaxone UC with 10-50k enterobacter cloacae transitioned to cipro  -continue cipro oral to complete course  -check pvrs on aru admission    Severe malnutrition  History of jaimie-en-y   TF 10/5-1021 removed 10/23.   -calorie counts  -appreciate nutritions support  -continue supplements.     DM type 2  A1C 9.1 started on metformin 9/13-21 hospitalization held in setting of ANA LAURA. Glucose 108-146 last 72 hours will discontinue ssi.   -trend glucose, cr -   -restart insulin or metformin pending renal function as indicated.     Acute on chronic macrocytic Anemia  2/2 etoh, alcohol hepatitis, requiring prbc during hospitalization  -trend hgb  -received 3 doses IV iron prior to aru admission, iv removed prior to admission.    -transfuse hgb <7  -continues on folate, b12,     Major Depression Disorder  RUBÉN  Continue lexapro, prazosin on hold due to hypotension,  remeron    GERD-   Continue PPI    Neuropathy  Continue pta gabapentin    Sleep:  Melatonin increased to 5mg on 11/7    Hypomagnesia  -will replenish     Muscle Soreness/spasms  -post exercise fatigue will add prn muscle relaxer.       Thrombophlebitis  -RUE, will add heat pack/clothe     5. Adjustment to disability:  Clinical psychology to eval and treat as indicated.   6. FEN: high protein  7. Bowel: monitor  8. Bladder: monitor  9. DVT Prophylaxis: subcutaneous heparin  10. GI Prophylaxis: protonix  11. Code: full confirmed on admission.   12. Disposition: plan to return to chemical dependency pending bed.   13. ELOS:  10-14 days  14. Rehab prognosis:  fair  15. Follow up Appointments on Discharge: pcp, hepatology, nephrology.         Yony Sidhu,   PM&R        I spent a total of 25 minutes face to face and coordinating care of Aliyah Angeles. Over 50% of my time on the unit was spent counseling the patient and /or coordinating care regarding debility 2/2 prolonged medical course.

## 2021-11-08 NOTE — PLAN OF CARE
"FOCUS/GOAL  Bowel management, Bladder management, Medical management, Reinforcement of self-care/ADL and Prevention of secondary complications    ASSESSMENT, INTERVENTIONS AND CONTINUING PLAN FOR GOAL:  Patient is alert/oriented x4, is calm and cooperative in am, had low Mag level with blood draw this am (1.4), other lab values are also abnormal but near baseline.  PA ordered Mag replacement per IV and order for IV placement for infusion.  Patient was somewhat upset this morning with that decision but was agreeable, flyer tried x1 but patient has difficult veins and was unable to obtain access. Peds vascular was paged and able to come and patient was tearful about the IV and with discussion later stated, \"I'm just tired of all of this,\" tolerated the IV well with peds vascular though and IV is patent.  Mag replacement started, was spot checked for O2 at that time at rest because with PT biking patient had dipped below 90% and had been given 1L O2.  Patient was sitting at 90% at that time so 1L was added back, encouraged IS and PA notified of the change.  Patient later on assessment reported feeling cold and was given a blanket, temp checked at that time and was low grade 100 and then patient stated with therapy she had a loose/watery incontinent stool and was feeling some pain in her RLQ.  Palpation at that time patient had some tenderness, no acute injury visible but patient has swollen abdomen/ascites skin.  PA was paged again at that time and notified of the temp change and patient's pain, new orders for imaging and PA assessed patient at bedside.  No additional care concerns at this time, next shift aware to continue to f/u.      "

## 2021-11-08 NOTE — PLAN OF CARE
Discharge Planner Post-Acute Rehab PT:      Discharge Plan: Return to previous residence (alcohol dependency unit)     Precautions: fall risk, leg/abdominal edema, DM, pain, hx ETOH abuse     Current Status:  Bed Mobility: Mod I (rails, elevated HOB)  Transfer: Mod I FWW in room; min A sit/stand from low seat to floor heights  Gait: SBA/CGA  ft level surfaces  Stairs: 1, mod A, rails x 2  Balance: BBS 30/56, good steady-state standing unsupported but reliant on UE support for proactive demands     Assessment:  -45 min PM session 2/2 not feeling well, low grade fever. Re-wrapped pt in GCBs this AM to address edema w/ plan to follow up w/ primary therapist tomorrow. Demos good tolerance on Nustep for 15min. Encouraged increased incentive spirometer use for lung capacity training to assist with functional mobility tolerance, ~500mL average today.      Other Barriers to Discharge (DME, Family Training, etc): discharge location

## 2021-11-08 NOTE — PROGRESS NOTES
Calorie Count:  11/5: 263 kcal and 8 g protein (Dinner meal only - first meal on ARU)  11/6: 153 kcal and 4 g protein (Dinner meal only, breakfast ordered - intake unknown, lunch not ordered, supplement intake unknown)  11/7: Unknown kcal and unknown g protein (All 3 meals ordered but no documentation for these meals)    Kristel Kelley RD, CNSC, LD  [facility] RD pager: [phone]

## 2021-11-08 NOTE — PLAN OF CARE
Discharge Planner Post-Acute Rehab OT:     Discharge Plan: Chemical Dependency Facility pending bed availability vs Home    Precautions: Falls, BLE/abdominal edema  ELOS 11/15/21    Current Status:  ADLs: Mod IND transfers and short distance mobility with fww. IND UB dressing, mod IND LB dressing, Mod IND toileting, and IND standing grooming.   IADLs: TBA. Previously IND.   Vision/Cognition: Metabolic encephalopathy during acute hospital stay. Will further assess.   Edema: Pitting edema MTP-abdomen. GCB MTP-below knee. Recommending compression shorts. Ordered thigh high jobst stockings and plan to trial 11/9.    Assessment: Completed LB clothing mgmt w/ sock aid and reacher. Pt demo carryover from previous OT session and used reacher w/o need for A throughout session. Mod I w/ sock aid. Mod I to shave legs after set up A. PM session pt w/  Low ax tolerance due to not feeling well.    Other Barriers to Discharge (DME, Family Training, etc):   Current level of assist  IND management of edema

## 2021-11-08 NOTE — PLAN OF CARE
FOCUS/GOAL  Bowel management, Pain management, and Skin integrity    ASSESSMENT, INTERVENTIONS AND CONTINUING PLAN FOR GOAL:  Pt is A/O x 4, Mod I in room.  Pt is continent of bowel and bladder, up to the toilet.  Her last BM was today per pt report.  Pt complained of pain in her lower back and legs, tylenol given at 1545 and 2145 with some relief.  Dressing on old chest central line site was changed, transparent dressing on old neck central line site was not changed, clean, dry and intact.  Pt requested lymphedema wraps be removed this shift.  Pt concerned about raised red area on right forearm near the site of a PIV that was removed on 11/5, note left for provider.

## 2021-11-08 NOTE — PLAN OF CARE
"11/8/2021    Patient with RLQ abdominal pain, low grade temp, anorexia, generally feeling unwell and several episodes of acute loose/liquid stool.      /75 (BP Location: Left arm)   Pulse 101   Temp 99.7  F (37.6  C) (Oral)   Resp 18   Ht 1.626 m (5' 4\")   Wt 86.9 kg (191 lb 8 oz)   SpO2 92%   BMI 32.87 kg/m    Exam:  Constitutional: alert and mild distress  Head: Normocephalic. No masses, lesions, tenderness or abnormalities  Cardiovascular: negative findings: regular rate and rhythm  Respiratory:  Lungs clear  Gastrointestinal: Abdomen soft, distended, RLQ tenderness.   Musculoskeletal: extremities normal- no gross deformities noted  Skin: no suspicious lesions or rashes      - add CRP, Procalcitonin to am labs (WBC 10.5, ALP, AST stable)   - check for C-diff  -CT abdomen pelvis  -blood culture prn fever >100.5  -NPO- ok for pills- hold non essential meds  -  IV fluids overnight.   -repeat labs in am.   - monitor clinically- transfer to medical floor pending workup/ medical stability    Kathleen Tarango PA-C  PM&R  "

## 2021-11-08 NOTE — PROGRESS NOTES
Chem Dep Rule 25 re-assessment scheduled over the phone with CD Counselor Araceli France and pt, Tues 11/09 at 11am. Pt aware. Sticky note left for ARU team. SW will assist as needed and continue to follow.     Maricruz Montes [facility]   [facility]   Direct Phone: [phone]  I   Pager: [phone]  I  Fax: [phone]

## 2021-11-08 NOTE — PROGRESS NOTES
Exchanged emails with [facility] CD Counselor, Araceli France. Araceli requested a new CD consult in order to schedule/complete updated Rule 25. Provider notified of this. Rule 25 WILL NOT take place today. Devon will work with CD Counselor and block pt therapy time, once reassessment is scheduled. YANDY will continue to follow.     HODA Guo   [facility]   Direct Phone: [phone]  I   Pager: [phone]  I  Fax: [phone]

## 2021-11-08 NOTE — PLAN OF CARE
FOCUS/GOAL  Bowel management, Bladder management, Pain management and Cognition/Memory/Judgment/Problem solving    ASSESSMENT, INTERVENTIONS AND CONTINUING PLAN FOR GOAL:  Pt is alert and oriented x4, denies fever, chills, chest pain, SOB, N/V, abdominal pain, or new weakness/numbness/tingling, continent of bowel and bladder, chris with ww, sleeping well throughout the night, no tubes, lines or drains, temp slightly elevated, abdomin distended and rounded, pt requested to have lymph wraps off overnight, no further care concerns at this time continue with POC.

## 2021-11-09 ENCOUNTER — APPOINTMENT (OUTPATIENT)
Dept: INTERVENTIONAL RADIOLOGY/VASCULAR | Facility: CLINIC | Age: 46
End: 2021-11-09
Attending: PHYSICIAN ASSISTANT
Payer: COMMERCIAL

## 2021-11-09 LAB
ALBUMIN FLD-MCNC: 0.5 G/DL
ALBUMIN SERPL-MCNC: 1.8 G/DL (ref 3.4–5)
ALBUMIN SERPL-MCNC: 1.8 G/DL (ref 3.4–5)
ALBUMIN UR-MCNC: 50 MG/DL
ALP SERPL-CCNC: 200 U/L (ref 40–150)
ALP SERPL-CCNC: 222 U/L (ref 40–150)
ALT SERPL W P-5'-P-CCNC: 22 U/L (ref 0–50)
ALT SERPL W P-5'-P-CCNC: 29 U/L (ref 0–50)
ANION GAP SERPL CALCULATED.3IONS-SCNC: 5 MMOL/L (ref 3–14)
ANION GAP SERPL CALCULATED.3IONS-SCNC: 7 MMOL/L (ref 3–14)
APPEARANCE FLD: ABNORMAL
APPEARANCE UR: CLEAR
AST SERPL W P-5'-P-CCNC: 55 U/L (ref 0–45)
AST SERPL W P-5'-P-CCNC: 65 U/L (ref 0–45)
BILIRUB SERPL-MCNC: 1.9 MG/DL (ref 0.2–1.3)
BILIRUB SERPL-MCNC: 2 MG/DL (ref 0.2–1.3)
BILIRUB UR QL STRIP: NEGATIVE
BUN SERPL-MCNC: 5 MG/DL (ref 7–30)
BUN SERPL-MCNC: 5 MG/DL (ref 7–30)
C COLI+JEJUNI+LARI FUSA STL QL NAA+PROBE: NOT DETECTED
C DIFF TOX B STL QL: NEGATIVE
C DIFF TOX B STL QL: NEGATIVE
CALCIUM SERPL-MCNC: 7.7 MG/DL (ref 8.5–10.1)
CALCIUM SERPL-MCNC: 7.7 MG/DL (ref 8.5–10.1)
CHLORIDE BLD-SCNC: 108 MMOL/L (ref 94–109)
CHLORIDE BLD-SCNC: 111 MMOL/L (ref 94–109)
CO2 SERPL-SCNC: 22 MMOL/L (ref 20–32)
CO2 SERPL-SCNC: 22 MMOL/L (ref 20–32)
COLOR FLD: YELLOW
COLOR UR AUTO: YELLOW
CREAT SERPL-MCNC: 1.02 MG/DL (ref 0.52–1.04)
CREAT SERPL-MCNC: 1.11 MG/DL (ref 0.52–1.04)
CRP SERPL-MCNC: 62 MG/L (ref 0–8)
EC STX1 GENE STL QL NAA+PROBE: NOT DETECTED
EC STX2 GENE STL QL NAA+PROBE: NOT DETECTED
ERYTHROCYTE [DISTWIDTH] IN BLOOD BY AUTOMATED COUNT: 17.5 % (ref 10–15)
GFR SERPL CREATININE-BSD FRML MDRD: 59 ML/MIN/1.73M2
GFR SERPL CREATININE-BSD FRML MDRD: 66 ML/MIN/1.73M2
GLUCOSE BLD-MCNC: 86 MG/DL (ref 70–99)
GLUCOSE BLD-MCNC: 95 MG/DL (ref 70–99)
GLUCOSE BLDC GLUCOMTR-MCNC: 80 MG/DL (ref 70–99)
GLUCOSE BLDC GLUCOMTR-MCNC: 90 MG/DL (ref 70–99)
GLUCOSE BLDC GLUCOMTR-MCNC: 95 MG/DL (ref 70–99)
GLUCOSE UR STRIP-MCNC: NEGATIVE MG/DL
HCT VFR BLD AUTO: 28.9 % (ref 35–47)
HGB BLD-MCNC: 8.9 G/DL (ref 11.7–15.7)
HGB UR QL STRIP: ABNORMAL
KETONES UR STRIP-MCNC: NEGATIVE MG/DL
LACTATE SERPL-SCNC: 0.9 MMOL/L (ref 0.7–2)
LDH FLD L TO P-CCNC: 97 U/L
LEUKOCYTE ESTERASE UR QL STRIP: NEGATIVE
LIPASE SERPL-CCNC: 175 U/L (ref 73–393)
LIPASE SERPL-CCNC: 203 U/L (ref 73–393)
LYMPHOCYTES NFR FLD MANUAL: 10 %
MAGNESIUM SERPL-MCNC: 1.8 MG/DL (ref 1.6–2.3)
MCH RBC QN AUTO: 30.2 PG (ref 26.5–33)
MCHC RBC AUTO-ENTMCNC: 30.8 G/DL (ref 31.5–36.5)
MCV RBC AUTO: 98 FL (ref 78–100)
MONOS+MACROS NFR FLD MANUAL: 82 %
NEUTS BAND NFR FLD MANUAL: 8 %
NITRATE UR QL: NEGATIVE
NOROV GI+II ORF1-ORF2 JNC STL QL NAA+PR: NOT DETECTED
PH UR STRIP: 5.5 [PH] (ref 5–7)
PLATELET # BLD AUTO: 273 10E3/UL (ref 150–450)
POTASSIUM BLD-SCNC: 3.3 MMOL/L (ref 3.4–5.3)
POTASSIUM BLD-SCNC: 3.7 MMOL/L (ref 3.4–5.3)
PROCALCITONIN SERPL-MCNC: 0.66 NG/ML
PROT SERPL-MCNC: 6.2 G/DL (ref 6.8–8.8)
PROT SERPL-MCNC: 6.2 G/DL (ref 6.8–8.8)
RBC # BLD AUTO: 2.95 10E6/UL (ref 3.8–5.2)
RBC URINE: 6 /HPF
RVA NSP5 STL QL NAA+PROBE: NOT DETECTED
SALMONELLA SP RPOD STL QL NAA+PROBE: NOT DETECTED
SHIGELLA SP+EIEC IPAH STL QL NAA+PROBE: NOT DETECTED
SODIUM SERPL-SCNC: 137 MMOL/L (ref 133–144)
SODIUM SERPL-SCNC: 138 MMOL/L (ref 133–144)
SP GR UR STRIP: 1.02 (ref 1–1.03)
SQUAMOUS EPITHELIAL: 3 /HPF
UROBILINOGEN UR STRIP-MCNC: NORMAL MG/DL
V CHOL+PARA RFBL+TRKH+TNAA STL QL NAA+PR: NOT DETECTED
WBC # BLD AUTO: 13.1 10E3/UL (ref 4–11)
WBC # FLD AUTO: 931 /UL
WBC URINE: 6 /HPF
Y ENTERO RECN STL QL NAA+PROBE: NOT DETECTED

## 2021-11-09 PROCEDURE — 81003 URINALYSIS AUTO W/O SCOPE: CPT | Performed by: HOSPITALIST

## 2021-11-09 PROCEDURE — 250N000013 HC RX MED GY IP 250 OP 250 PS 637: Performed by: STUDENT IN AN ORGANIZED HEALTH CARE EDUCATION/TRAINING PROGRAM

## 2021-11-09 PROCEDURE — 250N000011 HC RX IP 250 OP 636: Performed by: PHYSICAL MEDICINE & REHABILITATION

## 2021-11-09 PROCEDURE — 36415 COLL VENOUS BLD VENIPUNCTURE: CPT | Performed by: PHYSICIAN ASSISTANT

## 2021-11-09 PROCEDURE — 272N000144 HC KIT CR4

## 2021-11-09 PROCEDURE — 83690 ASSAY OF LIPASE: CPT | Performed by: PHYSICIAN ASSISTANT

## 2021-11-09 PROCEDURE — 128N000003 HC R&B REHAB

## 2021-11-09 PROCEDURE — 82042 OTHER SOURCE ALBUMIN QUAN EA: CPT | Performed by: PHYSICIAN ASSISTANT

## 2021-11-09 PROCEDURE — 999N000216 HC STATISTIC ADULT CD FACE TO FACE-NO CHRG

## 2021-11-09 PROCEDURE — 83735 ASSAY OF MAGNESIUM: CPT | Performed by: PHYSICIAN ASSISTANT

## 2021-11-09 PROCEDURE — 83605 ASSAY OF LACTIC ACID: CPT | Performed by: PHYSICAL MEDICINE & REHABILITATION

## 2021-11-09 PROCEDURE — 87493 C DIFF AMPLIFIED PROBE: CPT | Performed by: PHYSICIAN ASSISTANT

## 2021-11-09 PROCEDURE — 80053 COMPREHEN METABOLIC PANEL: CPT | Performed by: PHYSICIAN ASSISTANT

## 2021-11-09 PROCEDURE — 250N000009 HC RX 250: Performed by: PHYSICIAN ASSISTANT

## 2021-11-09 PROCEDURE — 250N000013 HC RX MED GY IP 250 OP 250 PS 637: Performed by: PHYSICAL MEDICINE & REHABILITATION

## 2021-11-09 PROCEDURE — 250N000011 HC RX IP 250 OP 636: Performed by: PHYSICIAN ASSISTANT

## 2021-11-09 PROCEDURE — 82150 ASSAY OF AMYLASE: CPT | Performed by: INTERNAL MEDICINE

## 2021-11-09 PROCEDURE — 49083 ABD PARACENTESIS W/IMAGING: CPT | Mod: 79 | Performed by: PHYSICIAN ASSISTANT

## 2021-11-09 PROCEDURE — 0W9G3ZZ DRAINAGE OF PERITONEAL CAVITY, PERCUTANEOUS APPROACH: ICD-10-PCS | Performed by: PHYSICIAN ASSISTANT

## 2021-11-09 PROCEDURE — 85027 COMPLETE CBC AUTOMATED: CPT | Performed by: INTERNAL MEDICINE

## 2021-11-09 PROCEDURE — 82247 BILIRUBIN TOTAL: CPT | Performed by: PHYSICIAN ASSISTANT

## 2021-11-09 PROCEDURE — 87075 CULTR BACTERIA EXCEPT BLOOD: CPT | Performed by: PHYSICIAN ASSISTANT

## 2021-11-09 PROCEDURE — 83690 ASSAY OF LIPASE: CPT | Performed by: INTERNAL MEDICINE

## 2021-11-09 PROCEDURE — 89051 BODY FLUID CELL COUNT: CPT | Performed by: PHYSICIAN ASSISTANT

## 2021-11-09 PROCEDURE — 49083 ABD PARACENTESIS W/IMAGING: CPT

## 2021-11-09 PROCEDURE — 87506 IADNA-DNA/RNA PROBE TQ 6-11: CPT | Performed by: PHYSICIAN ASSISTANT

## 2021-11-09 PROCEDURE — 272N000500 HC NEEDLE CR2

## 2021-11-09 PROCEDURE — 83615 LACTATE (LD) (LDH) ENZYME: CPT | Performed by: PHYSICIAN ASSISTANT

## 2021-11-09 PROCEDURE — 250N000013 HC RX MED GY IP 250 OP 250 PS 637: Performed by: PHYSICIAN ASSISTANT

## 2021-11-09 PROCEDURE — 99239 HOSP IP/OBS DSCHRG MGMT >30: CPT | Mod: 24 | Performed by: PHYSICAL MEDICINE & REHABILITATION

## 2021-11-09 PROCEDURE — 87205 SMEAR GRAM STAIN: CPT | Performed by: PHYSICIAN ASSISTANT

## 2021-11-09 PROCEDURE — 89050 BODY FLUID CELL COUNT: CPT | Performed by: PHYSICIAN ASSISTANT

## 2021-11-09 PROCEDURE — 84155 ASSAY OF PROTEIN SERUM: CPT | Performed by: PHYSICIAN ASSISTANT

## 2021-11-09 PROCEDURE — 36415 COLL VENOUS BLD VENIPUNCTURE: CPT | Performed by: PHYSICAL MEDICINE & REHABILITATION

## 2021-11-09 RX ORDER — PIPERACILLIN SODIUM, TAZOBACTAM SODIUM 3; .375 G/15ML; G/15ML
3.38 INJECTION, POWDER, LYOPHILIZED, FOR SOLUTION INTRAVENOUS EVERY 6 HOURS
Status: DISCONTINUED | OUTPATIENT
Start: 2021-11-09 | End: 2021-11-09

## 2021-11-09 RX ORDER — PIPERACILLIN SODIUM, TAZOBACTAM SODIUM 4; .5 G/20ML; G/20ML
4.5 INJECTION, POWDER, LYOPHILIZED, FOR SOLUTION INTRAVENOUS EVERY 6 HOURS
Status: DISCONTINUED | OUTPATIENT
Start: 2021-11-09 | End: 2021-11-10 | Stop reason: HOSPADM

## 2021-11-09 RX ORDER — DIPHENHYDRAMINE HCL 25 MG
25 CAPSULE ORAL EVERY 6 HOURS PRN
Status: DISCONTINUED | OUTPATIENT
Start: 2021-11-09 | End: 2021-11-10 | Stop reason: HOSPADM

## 2021-11-09 RX ORDER — ONDANSETRON 4 MG/1
4 TABLET, ORALLY DISINTEGRATING ORAL EVERY 6 HOURS PRN
DISCHARGE
Start: 2021-11-09

## 2021-11-09 RX ORDER — SODIUM CHLORIDE 9 MG/ML
INJECTION, SOLUTION INTRAVENOUS CONTINUOUS
Status: DISCONTINUED | OUTPATIENT
Start: 2021-11-09 | End: 2021-11-09

## 2021-11-09 RX ORDER — POTASSIUM CHLORIDE 7.45 MG/ML
10 INJECTION INTRAVENOUS
Status: COMPLETED | OUTPATIENT
Start: 2021-11-09 | End: 2021-11-09

## 2021-11-09 RX ORDER — METHOCARBAMOL 500 MG/1
500 TABLET, FILM COATED ORAL
Status: ON HOLD | DISCHARGE
Start: 2021-11-09 | End: 2021-12-02

## 2021-11-09 RX ORDER — LIDOCAINE HYDROCHLORIDE 10 MG/ML
.5-1 INJECTION, SOLUTION EPIDURAL; INFILTRATION; INTRACAUDAL; PERINEURAL ONCE
Status: COMPLETED | OUTPATIENT
Start: 2021-11-09 | End: 2021-11-09

## 2021-11-09 RX ORDER — PIPERACILLIN SODIUM, TAZOBACTAM SODIUM 4; .5 G/20ML; G/20ML
4.5 INJECTION, POWDER, LYOPHILIZED, FOR SOLUTION INTRAVENOUS EVERY 6 HOURS
Status: ON HOLD | DISCHARGE
Start: 2021-11-09 | End: 2021-12-02

## 2021-11-09 RX ADMIN — METHOCARBAMOL 500 MG: 500 TABLET ORAL at 00:54

## 2021-11-09 RX ADMIN — ONDANSETRON 4 MG: 4 TABLET, ORALLY DISINTEGRATING ORAL at 05:18

## 2021-11-09 RX ADMIN — Medication 5 MG: at 21:59

## 2021-11-09 RX ADMIN — PANTOPRAZOLE SODIUM 40 MG: 40 TABLET, DELAYED RELEASE ORAL at 06:50

## 2021-11-09 RX ADMIN — ACETAMINOPHEN 650 MG: 325 TABLET, FILM COATED ORAL at 14:10

## 2021-11-09 RX ADMIN — POTASSIUM CHLORIDE 10 MEQ: 7.46 INJECTION, SOLUTION INTRAVENOUS at 15:15

## 2021-11-09 RX ADMIN — POTASSIUM CHLORIDE 10 MEQ: 7.46 INJECTION, SOLUTION INTRAVENOUS at 14:09

## 2021-11-09 RX ADMIN — PIPERACILLIN SODIUM AND TAZOBACTAM SODIUM 4.5 G: 4; .5 INJECTION, POWDER, LYOPHILIZED, FOR SOLUTION INTRAVENOUS at 21:59

## 2021-11-09 RX ADMIN — PIPERACILLIN SODIUM AND TAZOBACTAM SODIUM 4.5 G: 4; .5 INJECTION, POWDER, LYOPHILIZED, FOR SOLUTION INTRAVENOUS at 16:44

## 2021-11-09 RX ADMIN — ONDANSETRON 4 MG: 4 TABLET, ORALLY DISINTEGRATING ORAL at 11:19

## 2021-11-09 RX ADMIN — DIPHENHYDRAMINE HCL 25 MG: 25 CAPSULE ORAL at 19:25

## 2021-11-09 RX ADMIN — POTASSIUM CHLORIDE 10 MEQ: 7.46 INJECTION, SOLUTION INTRAVENOUS at 11:04

## 2021-11-09 RX ADMIN — MIRTAZAPINE 15 MG: 7.5 TABLET, FILM COATED ORAL at 22:01

## 2021-11-09 RX ADMIN — LIDOCAINE HYDROCHLORIDE 5 ML: 10 INJECTION, SOLUTION EPIDURAL; INFILTRATION; INTRACAUDAL; PERINEURAL at 12:28

## 2021-11-09 RX ADMIN — SIMETHICONE 80 MG: 80 TABLET, CHEWABLE ORAL at 02:25

## 2021-11-09 RX ADMIN — POTASSIUM CHLORIDE 10 MEQ: 7.46 INJECTION, SOLUTION INTRAVENOUS at 12:59

## 2021-11-09 RX ADMIN — GABAPENTIN 100 MG: 100 CAPSULE ORAL at 20:18

## 2021-11-09 RX ADMIN — ACETAMINOPHEN 650 MG: 325 TABLET, FILM COATED ORAL at 20:13

## 2021-11-09 ASSESSMENT — ACTIVITIES OF DAILY LIVING (ADL)
ADLS_ACUITY_SCORE: 11
ADLS_ACUITY_SCORE: 13
ADLS_ACUITY_SCORE: 11
ADLS_ACUITY_SCORE: 13
ADLS_ACUITY_SCORE: 11
ADLS_ACUITY_SCORE: 11
ADLS_ACUITY_SCORE: 13
ADLS_ACUITY_SCORE: 11
ADLS_ACUITY_SCORE: 13
ADLS_ACUITY_SCORE: 13
ADLS_ACUITY_SCORE: 11
ADLS_ACUITY_SCORE: 13
ADLS_ACUITY_SCORE: 11
ADLS_ACUITY_SCORE: 13
ADLS_ACUITY_SCORE: 11
ADLS_ACUITY_SCORE: 13
ADLS_ACUITY_SCORE: 11
ADLS_ACUITY_SCORE: 13

## 2021-11-09 ASSESSMENT — MIFFLIN-ST. JEOR: SCORE: 1484.1

## 2021-11-09 NOTE — PROGRESS NOTES
YANDY spoke with PA (Kathleen Tarango) and due to medical w/u, SW sent secure email to CD counselor (Araceli France) and requested that the CD re-assessment for Rule 25 is rescheduled a later time. Will continue to follow and assist with scheduling assessment when appropriate.     ADDENDUM: Pt will readmit to the hospital today for further w/u. CD counselor notified.     HODA Guo   [facility]   Direct Phone: [phone]  I   Pager: [phone]  I  Fax: [phone]

## 2021-11-09 NOTE — PROCEDURES
Interventional Radiology Brief Post Procedure Note    Procedure: IR PARACENTESIS-diagnostic and therapeutic    Proceduralist: Melisa Garza PA-C     Time Out: Prior to the start of the procedure and with procedural staff participation, I verbally confirmed the patient s identity using two indicators, relevant allergies, that the procedure was appropriate and matched the consent or emergent situation, and that the correct equipment/implants were available. Immediately prior to starting the procedure I conducted the Time Out with the procedural staff and re-confirmed the patient s name, procedure, and site/side. (The Joint Commission universal protocol was followed.)  Yes    Sedation: None. Local Anesthestic used    Findings: Successful removal of 2525 mL of clear yellow fluid and sample of fluid was sent for diagnostic testing for culture and chemistries.  Small residual ascites remains after removal of catheter.  Dry sterile dressing placed.      Estimated Blood Loss: Minimal     SPECIMENS:  Gram stain, culture and chemistries    Complications: 1. None     Condition: Stable    Plan: Transport back to inpatient bed.      Comments: See dictated procedure note for full details.    Melisa Garza PA-C  Physician Assistant  Interventional Radiology   [phone]

## 2021-11-09 NOTE — PLAN OF CARE
Orientation: Oriented x4.  Bowel: Continent of bowel on this shift. C. Diff sample collected and results pending.  Bladder: Continent of bladder. UA ordered and needs to be collected.  Pain: Complains of generalized pain and intermittent abdominal pain. PRN Tylenol given x1 with some relief per patient report.  Ambulation/Transfers: Mod I with walker  Blood sugars: See results tab for BG result.  Diet/ Liquids: Made NPO on this shift for workup of abdominal pain. Upgraded at approximately 2230 to clear liq diet.  Tubes/ Lines/ Drains: PIV to R forearm patent with IVF infusing without difficulty. IVF discontinued. See MAR for stop time.  Oxygen: Stable on RA this shift.  Skin: BLE lymph wraps in place.  Misc: CT chest/abdomen done for workup of abdominal pain. PM&R and Hospitalist notified of results. See results tab for further details. Hospitalist rounded on patient at bedside.  Call light within reach. Continue with care plan.      Patient Vitals for the past 8 hrs:   BP Temp Temp src Pulse Resp SpO2   11/08/21 2028 -- 98.7  F (37.1  C) Oral -- -- --   11/08/21 1620 117/75 99.7  F (37.6  C) Oral 101 18 92 %

## 2021-11-09 NOTE — PLAN OF CARE
C/o headache, also had a temp of 100.1 at 1400, tylenol given. Temp recheck after after an hour and is now 101.1.Abd appear distended, went for a paracentesis and they took out 3000 cc of fluid. Stool sample collected and sent to the lab for test. Patient declined all therapies today,also declined all her schedule po meds. Potassium today 3.3, replaced per RN potassium replacement protocol. Currently chris in her room, pending transfer to [facility] of care. PA updated with patient status, will continue POC

## 2021-11-09 NOTE — DISCHARGE SUMMARY
[facility]   Acute Rehabilitation Unit  Discharge summary     Date of Admission: 11/5/2021  Date of Discharge: 11/9/21  Disposition: return to hospital (Formerly [facility])   Primary Care Physician: Stacie Cameron  Attending physician: Yony Sidhu,     DISCHARGE DIAGNOSIS  Necrotizing Pancreatitis  Alcohol induced hepatitis  Bilateral Pleural effusions  Ground Glass opacities  ETOH use disorder  Recent Pneumonia  ANA LAURA  Hypervolemia  Recent UTI (completed course 11/7)     BRIEF SUMMARY  Aliyah Angeles is a 46 year old woman with past medical history of type 2 DM, alcohol use disorder, alcoholic hepatitis,  anxiety, depression, Fitz-en-y gastric bypass, admitted with alcholic hepatitis 9/29/21 course complicated by ARDS, pneumonia, UTI, anemia, severe malnutrition, ANA LAURA on CKD. Admitted to acute rehab 11/5/21 in setting of impaired strength, impaired balance, and impaired activity tolerance.     11/8 afternoon with worsening abdomina pain, tender in RLQ, watery diarrhea, low grade temp, nausea.  Denied dysuria, reports disinterest in eating, feeling unwell, some shortness of breath wearing one liter oxygen. Given acute pain tenderness to palpation ordered Cdiff, CT abdomen pelvis, was made NPO and was started IV fluids.  CT abdomen with findings concerning for necrotizing pancreatitis, also bilateral pleural effusions, ascites.  Seen by hospitalist vss, low grade temp, ongoing loose stool.  IV fluids stopped recommendation for clear liquids.     11/9 patient ongoing abdominal pain now more generalized, low grade temp, new leucocytosis, nausea, loose stool.  Feeling more short of breath stable on one liter nasal cannula.  Diagnostic/ therapeutic para ordered per hospitalist recommendations. Case then discussed with triage hospitalist given ongoing workup/ medical management needs and inability to participate in therapy decision made to transfer to hospital for  further management.  Repeat C-diff/ stool cultures order per hospitalist, made NPO per recs.      REHABILITATION COURSE  OT:   ADLs: Mod IND transfers and short distance mobility with fww. IND UB dressing, mod IND LB dressing, Mod IND toileting, and IND standing grooming.   IADLs: TBA. Previously IND.   Vision/Cognition: Metabolic encephalopathy during acute hospital stay. Will further assess.   Edema: Pitting edema MTP-abdomen. GCB MTP-below knee. Recommending compression shorts. Ordered thigh high jobst stockings     Assessment: Completed LB clothing mgmt w/ sock aid and reacher. Pt demo carryover from previous OT session and used reacher w/o need for A throughout session. Mod I w/ sock aid. Mod I to shave legs after set up A.     Goal for discharge to Chemical Dependency facility 11/15/21    PT:   Bed Mobility: Mod I (rails, elevated HOB)  Transfer: Mod I FWW in room; min A sit/stand from low seat to floor heights  Gait: SBA/CGA  ft level surfaces  Stairs: 1, mod A, rails x 2  Balance: BBS 30/56, good steady-state standing unsupported but reliant on UE support for proactive demands    Recommended ongoing PT/OT/Lymphedema therapy.    MEDICAL COURSE    Necrotizing Pancreatitis  Low grade temp, abdominal pain, new leukocytosis, loose watery diarrhea, anorexia.  History of alcohol hepatitis.  No prior pancreatitis.  Lipase WNL.   -NPO  -transfer to hospital further medical management.   -additional workup: Cdiff neg x 1 repeat per hospitalist recs, diagnostic/ therapeutic paracentesis (per hospitalist recs), UA, follow blood cultures.   -start zosyn    Acute hypoxic respiratory failure  Recent ARDS:   Recent pneumonia. Pulmonary edema  Bilateral Pleural effusions  No new cough,  feeling more short of breath 11/8-11/9 on 1 liter nc   CT 11/8: Left greater than right basilar groundglass opacities, which could represent infection although in the setting of necrotic pancreatitis, ARDS is also considered. Left  greater than right pleural effusions.  Intubated 10/4 cxr consistent with ARDS treated with zosyn. Self extubated 10/14.  CXR 10/17 with pulmonary edema vs pneumonia.  Completed another course of zosyn 10/26.   -continue oxygen    ETOH use disorder  Alcohol induced hepatitis  Started on rifaximin/lactulose during 9/13-9/21 hospitalization, seen by hepatology. Diagnostic para neg. Lactulose dcd. AlP, ALT AST down stable.   CT with hepatic steatosis  -rifaximin bid  -trend cmp this pm.   -was seen by CD with recs to return to chemical dependency- will need repeat consult with plan for discharge to treatment.   -continue folate, thiamine, mvi  -diagnostic/therapeutic paracentesis (11/9)     ANA LAURA  Hypervolemia  Cr peak 2.98 felt 2/2 ATN CRRT 10/7-HD 10/13-10/29.  Cr.down trending during ARU stay 1.11 11/9.    -intake/output, wts  -lymphedema consult initiated during ARU stay.        Recent UTI  Started on ceftriaxone UC with 10-50k enterobacter cloacae transitioned to cipro completed course 11/7/21.      Severe malnutrition  History of jaimie-en-y   TF 10/5-1021 removed 10/23. Seen by nutrition during ARU stay, calorie counts were not complete though intake was poor.      DM type 2  A1C 9.1% started on metformin 9/13-21 hospitalization held in setting of ANA LAURA. Glucose  during ARU stay. discontinued ssi.      Acute on chronic macrocytic Anemia  2/2 etoh, alcohol hepatitis, requiring prbc during hospitalization.  Hgb 8.9 11/9  -received 3 doses IV iron prior to aru admission  -continues on folate, b12,     Epistaxis   11/9 self resolved also with some streaks of blood per rectum  -hold subcutaneous heparin      Major Depression Disorder  RUBÉN  Continue lexapro, prazosin on hold due to hypotension, remeron     GERD-   Continue PPI     Neuropathy  Continue pta gabapentin     Sleep:  Melatonin increased to 5mg on 11/7     Hypomagnesia  Hypokalemia  In setting of poor intake. Replaced. K 3.3 Mag 1.8 11/9    DISCHARGE  MEDICATIONS  Current Discharge Medication List      START taking these medications    Details   calcium carbonate 600 mg-vitamin D 400 units (CALTRATE) 600-400 MG-UNIT per tablet Take 1 tablet by mouth 2 times daily (with meals)    Associated Diagnoses: Vitamin D deficiency      melatonin 5 MG tablet Take 1 tablet (5 mg) by mouth At Bedtime    Associated Diagnoses: Insomnia, unspecified type      methocarbamol (ROBAXIN) 500 MG tablet Take 1 tablet (500 mg) by mouth nightly as needed for muscle spasms    Associated Diagnoses: Alcoholic cirrhosis of liver with ascites (H)      ondansetron (ZOFRAN-ODT) 4 MG ODT tab Take 1 tablet (4 mg) by mouth every 6 hours as needed for nausea or vomiting    Associated Diagnoses: Alcoholic cirrhosis of liver with ascites (H)         CONTINUE these medications which have NOT CHANGED    Details   albuterol (PROAIR HFA/PROVENTIL HFA/VENTOLIN HFA) 108 (90 Base) MCG/ACT inhaler Inhale 2 puffs into the lungs every 4 hours as needed for shortness of breath / dyspnea or wheezing  Qty: 8.5 g, Refills: 1    Comments: Pharmacy may dispense brand covered by insurance (Proair, or proventil or ventolin or generic albuterol inhaler)  Associated Diagnoses: Uncomplicated asthma, unspecified asthma severity, unspecified whether persistent      B Complex-C-Folic Acid (RENAL) 1 MG CAPS Take 1 capsule by mouth daily    Associated Diagnoses: Alcohol abuse with other alcohol-induced disorder (H)      cyanocobalamin (CYANOCOBALAMIN) 100 MCG tablet 1 tablet (100 mcg) by Oral or Feeding Tube route daily    Associated Diagnoses: Alcohol abuse with other alcohol-induced disorder (H)      emollient (VANICREAM) external cream Apply topically every 6 hours as needed for other (dry skin)    Associated Diagnoses: Dermatitis      escitalopram (LEXAPRO) 10 MG tablet Take 1 tablet (10 mg) by mouth daily  Qty: 30 tablet, Refills: 0    Associated Diagnoses: Mild episode of recurrent major depressive disorder (H); Anxiety  disorder, unspecified type      folic acid (FOLVITE) 1 MG tablet Take 1 tablet (1 mg) by mouth daily  Qty: 30 tablet, Refills: 0    Associated Diagnoses: Alcoholic cirrhosis of liver with ascites (H)      gabapentin (NEURONTIN) 100 MG capsule Take 1 capsule (100 mg) by mouth 3 times daily  Qty: 90 capsule, Refills: 0    Associated Diagnoses: Alcoholic cirrhosis of liver with ascites (H); Type 2 diabetes mellitus with other specified complication, without long-term current use of insulin (H)      mirtazapine (REMERON) 15 MG tablet Take 1 tablet (15 mg) by mouth At Bedtime    Associated Diagnoses: Insomnia, unspecified type; Mild episode of recurrent major depressive disorder (H)      pantoprazole (PROTONIX) 40 MG EC tablet Take 1 tablet (40 mg) by mouth every morning (before breakfast)  Qty: 30 tablet, Refills: 0    Associated Diagnoses: Alcoholic cirrhosis of liver with ascites (H)      rifaximin (XIFAXAN) 550 MG TABS tablet Take 1 tablet (550 mg) by mouth 2 times daily  Qty: 60 tablet, Refills: 0    Associated Diagnoses: Alcoholic cirrhosis of liver with ascites (H)      simethicone (MYLICON) 80 MG chewable tablet Take 1 tablet (80 mg) by mouth every 6 hours as needed for cramping    Associated Diagnoses: Alcohol abuse with other alcohol-induced disorder (H)      thiamine (B-1) 100 MG tablet Take 1 tablet (100 mg) by mouth daily  Qty: 30 tablet, Refills: 0    Associated Diagnoses: Alcoholic cirrhosis of liver with ascites (H)         STOP taking these medications       calcium carbonate-vitamin D (OS-STEFFANY) 600-400 MG-UNIT chewable tablet Comments:   Reason for Stopping:         ciprofloxacin (CIPRO) 250 MG tablet Comments:   Reason for Stopping:         metFORMIN (GLUCOPHAGE) 500 MG tablet Comments:   Reason for Stopping:         nicotine (COMMIT) 2 MG lozenge Comments:   Reason for Stopping:                 DISCHARGE INSTRUCTIONS AND FOLLOW UP  Discharge Procedure Orders   Follow Up and recommended labs and tests    Order Comments: Follow up with chemical dependency prior to discharge.     Reason for your hospital stay   Order Comments: Admitted for rehabilitation after prolonged hospital stay with onset of abdominal pain, low grade fevers imaging with necrotizing pancreatitis.     Intake and output   Order Comments: Every shift     Daily weights   Order Comments: Call Provider for weight gain of more than 2 pounds per day or 5 pounds per week.     Bladder scan   Order Comments: X 2 for post void residual     Activity - Up with nursing assistance     Order Specific Question Answer Comments   Is discharge order? Yes      Full Code     Order Specific Question Answer Comments   Code status determined by: Discussion with patient/ legal decision maker      Physical Therapy Adult Consult   Order Comments: Evaluate and treat as clinically indicated.    Reason:  debility     Occupational Therapy Adult Consult   Order Comments: Evaluate and treat as clinically indicated.    Reason:  debility.     Contact Isolation   Order Comments: enteric for rule out C-diff     Oxygen   Standing Status: Future Standing Exp. Date: 12/09/21     Fall precautions     Diet   Order Comments: Follow this diet upon discharge:       NPO for Medical/Clinical Reasons Except for: Meds, Ice Chips     Order Specific Question Answer Comments   Is discharge order? Yes           PHYSICAL EXAMINATION    Most recent Vital Signs:   Vitals:    11/09/21 1200 11/09/21 1210 11/09/21 1220 11/09/21 1230   BP: 134/88 (!) 133/90 131/82 107/82   BP Location:       Patient Position:       Pulse: 95 94 92 92   Resp: 20 20 24 24   Temp:       TempSrc:       SpO2: 96% 98% 99% 98%   Weight:       Height:       General: appears uncomfortable fatigued  Resp: on 1 liter lungs clear diminished  CV: rrr  Ab: distended generalized discomfort, RLQ pain no flank pain  Extremities: ble wraps in place.   MSK/alert: moves all extremities, bilateral hip flexor weakness L>R.       60 minutes  spent in discharge, including >50% in counseling and coordination of care, medication review and plan of care recommended on follow up.     Patient was evaluated on day of discharge by attending physician, Yony Sidhu DO, who agrees with plan of care.    Discharge summary was forwarded to Stacie Cameron (PCP) at the time of discharge, so as to bridge from hospital to outpatient care.     It was our pleasure to care for Aliyah Angeles during this hospitalization. Please do not hesitate to contact me should there be questions regarding the hospital course or discharge plan.          Kathleen Tarango PA-C  Physical Medicine and Rehabilitation

## 2021-11-09 NOTE — PROGRESS NOTES
"Cross Cover note:     Aliyah Angeles is a 46 year old woman with past medical history of type 2 DM, alcohol use disorder, alcoholic hepatitis,  anxiety, depression, Fitz-en-y gastric bypass, admitted with alcholic hepatitis 9/29/21 course complicated by ARDS, pneumonia, UTI, anemia, severe malnutrition, ANA LAURA on CKD. Admitted to acute rehab 11/5/21 in setting of impaired strength, impaired balance, and impaired activity tolerance.     Medicine cross cover called to come and assess the patient, if she needs to be admitted or can be managed here.     On review, she is having pain on and off for last 2 weeks. Pain is noted to be in the RLQ. Currently 2/10. No specific triggers. Its on and off pain. She has low grade fevers. No sweats. She reports having diarrhea too. She had 5 loose stools today and few yesterday too. No vomiting.     No chest pain or sob. No cough.     Exam:     Vital signs:    Temp: 98.7  F (37.1  C) Temp src: Oral BP: 117/75 Pulse: 101   Resp: 18 SpO2: 92 % O2 Device: None (Room air)   Height: 162.6 cm (5' 4\") Weight: 86.9 kg (191 lb 8 oz)  Estimated body mass index is 32.87 kg/m  as calculated from the following:    Height as of this encounter: 1.626 m (5' 4\").    Weight as of this encounter: 86.9 kg (191 lb 8 oz).    Lungs CTA  S1S2 normal  Abdomen protuberant, BS present  Sub cut edema noted in the skin folds of the abdomen and more so in the RLQ region  RLQ tenderness noted on exam. Some tenderness noted in suprapubic region too.   No guarding or rigidity.     No epigastric or upper abdomen tenderness noted.   BL leg and calf edema noted.     Labs CT of the abdomen reviwed. Showed necrotizing pancreatitis, large ascites  Lipase pending.  c diff pending.     AP:     RLQ abdominal pain  DD Colitis( C diff), SBP, Appendicitis, Mesenteric adenitis, Pancreatitis, UTI, Pyelonephritis etc considered.     Though CT showed acute necrotizing pancreatitis, not sure how long she had this and her current pain " is in RLQ. No upper abdominal tenderness noted on exam. Pain does not seem to correlated with pancreatitis. Its not impossible to have RLQ pain from pancreatitis, but its less likely.     C diff is on DD. We will wait for C diff toxin to come back. SBP, UTI  Should be considered too. Get UA. Add lipase to labs.     Stop IVF. She has edema and ascites too. Start clear liquid diet. Reassess and monitor. We will hold of admitting to inpatient unit unless things change.     Discussed with nursing and resident. Signed out to medicine night who is asked to follow up on lipase and c diff.

## 2021-11-09 NOTE — CONSULTS
11/9/2021    CD consult closing as SW reported pt is transferring back to medical unit.   Once there is a clearer plan with her medical concerns, CD consult can be reordered.     Araceli [facility]  [email or web address]  Direct phone: [phone]

## 2021-11-09 NOTE — PROGRESS NOTES
"  [facility] Note      Aliyah Angeles MRN# [id number]   YOB: 1975 Age: 46 year old           Interval History:   Paged by RN that patient triggered sepsis protocol for tachycardia (101) and tachypnea (22) and that she had recently had both an episode of spontaneous epistaxis as well as streaks of bright red blood in stool.     Assessed patient. She reports that the epistaxis started abruptly while she was lying down. No precipitating factors. Lasted 3 min then resolved.     She continues to have diarrhea which is preceded by crampy lower abdominal pain. This am, she had some streaks of blood in her stool for the first time.     She denies abdominal pain at the moment         Physical Exam:   Vitals were reviewed  Blood pressure 131/68, pulse 92, temperature 99  F (37.2  C), temperature source Oral, resp. rate 20, height 1.626 m (5' 4\"), weight 86.9 kg (191 lb 8 oz), SpO2 99 %, not currently breastfeeding.  General: awake, alert, NAD  Chest/Pulmonary: crackles noted diffusely, mild tachypnea  Cardiovascular: RRR, no MRG  GI: distended, nontender, + BS  Neuro: alert, oriented, THOMPSON, answers questions appropriately.    Witnessed unflushed stool. Few streaks of bright red blood mixed with mucous in otherwise watery stool.          Data:   C diff negative  CRP slightly uptrending 56 --> 62  Lipase negative         Assessment and Plan:   Unlikely sepsis. Mild, intermittent tachycardia longstanding. Tachypnea appears to be due to volume overload. Could have some SIRS criteria from necrotizing pancreatitis, though I agree with Dr. Davis that exam/sx not consistent.    Blood streaked diarrhea: small amount of streaky blood/mucous in diarrhea. Does not appear to be a cynthia GI bleed at this time. Stat CBC ordered and hgb is stable/improved. Repeat ordered for 11 am.     Epistaxis: resolved. Hold heparin.     Given multiple ongoing/new issues, will ask day medicine team to discuss with GI. "     Du Storm MD   of Medicine  [facility] Hospitalist  Pager: [phone]  Cell: [phone]

## 2021-11-09 NOTE — PROGRESS NOTES
Individualized Overall Plan Of Care (IOPOC)      Rehab diagnosis/Impairment Group Code: 16 debility (non-cardiac, non-pulmonary): alcoholic hepatitis c/b ards and septic shock  Debility       Expected functional outcome: reach a level of mod I      Clinical Impression Comments: patient with debility post prolonged medical course    Mobility: 47 y/o  female presents with impaired functional mobility, decreased ambulatory endurance, and decreased balance confidence as a result of hospitalization for acute respiratory distress syndrome and hx of chronic ETOH abuse    ADL: The pt will benefit from skilled OT intervention to address goals in POC and maximize functional IND and safety in discharge environment.     Communication/Cognition/Swallow:  Reg with thins     Intensity of therapy:   PT 90 minutes, Daily, for 7-10 days  OT 90 minutes, Daily, for 10 days          Neuropsychology Testing: No        Medical Prognosis: good       Physician summary statement: patient with debility post prolonged medical course, goal is to reach a level of mod i    Discharge destination: prior home  Discharge rehabilitation needs: outpatient, PT and OT      Estimated length of stay: 10 days       Rehabilitation Physician Yony Sidhu DO

## 2021-11-09 NOTE — PROGRESS NOTES
"[facility]  Transfer Triage Note    Date of call: 11/09/21  Time of call: 10:52 AM    Current Patient Location: ARU  Current Level of Care: ARU    Vitals: Temp: 99.4  F (37.4  C) Temp src: Oral BP: 105/68 Pulse: 97   Resp: 19 SpO2: 95 % Height: 162.6 cm (5' 4\") Weight: 86.4 kg (190 lb 8 oz)  O2 Device: Nasal cannula at Oxygen Delivery: 1 LPM  Diagnosis: necrotizing pancreatitis  Is COVID-19 a concern? No  Reason for requested transfer: Further diagnostic work up, management, and consultation for specialized care   Isolation Needs: Contact    Outside Records: Available  Additional records may be faxed to [phone].    Transfer accepted: Yes  Stability of Patient: Patient is vitally stable, with no critical labs, and will likely remain stable throughout the transfer process  Level of Care Needed: Med Surg  Telemetry Needed:  None  Expected Time of Arrival for Transfer: 0-8 hours  Arrival Location:  [facility]    Recommendations for Management and Stabilization: Given    Additional Comments:     46F alcoholic hepatitis with two recent hospitalizations  Admitted to rehab last Friday  Yesterday began to have acute RLQ abd pain, watery diarrhea, low grade temp  CT a/p showed necrotizing pancreatitis  Elevated WBC  C.diff negative   Going for diag and thera para now at rehab  Repeat c.diff pending   Remains NPO for necrotizing pancreatitis at least while abd pain persists  Holding off on IVF and trending Cr as per total body fluid overload    Accepted for med/surg no tele with contact iso at next available. Can come off contact iso if repeat c.diff is negative.     Nehemias Xavier MD      "

## 2021-11-09 NOTE — PLAN OF CARE
Patient denies pain, c/o nausea, no emesis ginger  ale given to sip on, zofran also  given. Last temp 99.4. Currently declined all morning meds.  Currently chris in her room,ambulating to the BR with her walker and continent of watery stools. A hat place on the toilet to collect stools for-diff test, currently place on enteric precaution pending stool test. Patient declined all therapies so far, currently out of the unit for a paracentesis, potassium replacement infusing on right arm PI.will continue POC

## 2021-11-09 NOTE — PLAN OF CARE
FOCUS/GOAL  Bowel management, Medical management and Safety management    ASSESSMENT, INTERVENTIONS AND CONTINUING PLAN FOR GOAL:      Pt is alert and oriented. Uses the call light for needs. MOD I in the room w/ the walker. Complained of low back spasms at the start of the shift and was given PRN Robaxin with relief. C/o of intermitent mild RLQ pain, given simethicone with partial relief. Temp recheck and is at 99.3F. C-diff test came back negative. Pending blood culture and still unable to obtain urine sample because pt has been having multiple loose stool. Pt requested for prn immodium, paged on-call pmr and updated about c-diff test results, received a call and said to contact hospitalist if the pt is going to have more loose stool to get her evaluated. At 3am, pt complained of sob, de-satting to 88% at room air. Oxygen started at 2lpm and O2 sats now at 97%. Noting use of accessory muscles, RR of 24 cpm, DBE done. After about 30 mins, pt called and had episode of epistaxis for about 3 minutes. Denies headache, N/V and vitals normal except temp is 99.5F. Hospitalist paged. After returning to check on the pt, she already had another loose stool which has bright red blood w/ some clots. Vitals w/in pt's baseline, slightly tachycardic at 101. Pt then triggered the sepsis protocol, Paged hospitalist again for update. Seen by hospitalist Dr. Storm at 0435.Stat labs ordered. Lactic acid at 0.9 and some other labs have abnormal levels. Updated hospitalist. Heparin to be held this morning. Pt recommended to be seen by PMR and GI this morning.     At 0515, pt complained of nausea, requested for zofran, given. Temp at 100.5F. Will give tylenol as soon as nausea resolves. Recheck of temp was 99.9F. Nausea relieved and pt is now sleeping, very tired. Vitals this morning still at pt's baseline. Will continue pOC.

## 2021-11-09 NOTE — CONSULTS
Patient is a 46 year old female with alcoholic hepatitis and ascites, now admitted to rehabilitation unit. Patient's team requesting diagnostic and therapeutic paracentesis. Per report, patient with some rectal bleeding, for which team is considering transferring back to [facility]. Patient is vitally stable at this point.   Patient will be added to IR schedule on 11/9/21 for diagnostic and therapeutic paracentesis.     Labs WNL for procedure.      Preprocedural orders have been entered.   Consent will be done prior to procedure.     Please contact the IR control at [phone] for estimated time of procedure.     Case discussed with primary team.    Kenyon Traylor PA-C  Interventional Radiology  [phone] [facility].

## 2021-11-09 NOTE — PROGRESS NOTES
3-Day Calorie Count Assessment:   11/5: 263 kcal and 8 g protein (Dinner meal only - first meal on ARU)  11/6: 153 kcal and 4 g protein (Dinner meal only, breakfast ordered - intake unknown, lunch not ordered, supplement intake unknown)  11/7: Unknown kcal and unknown g protein (All 3 meals ordered but no documentation for these meals)  11/8: Unknown kcal and unknown g protein (No meals ordered, pt made NPO by end of day on 11/8)  3 day average PO intake = Too minimal to assess/calorie counts incomplete    Chart reviewed and RD visited with rounding PA, pt likely to transfer back to the hospital due to medical decompensation. RD will defer face to face visit at this time, RD discontinued snack/supplement order on 11/8 once pt was made NPO.     Kristel Kelley RD, CNSC, LD  ARU RD pager: [phone]

## 2021-11-10 ENCOUNTER — HOSPITAL ENCOUNTER (INPATIENT)
Facility: CLINIC | Age: 46
LOS: 22 days | Discharge: HOME | End: 2021-12-02
Attending: INTERNAL MEDICINE | Admitting: INTERNAL MEDICINE
Payer: COMMERCIAL

## 2021-11-10 VITALS
HEART RATE: 100 BPM | RESPIRATION RATE: 22 BRPM | WEIGHT: 190.5 LBS | TEMPERATURE: 99.6 F | HEIGHT: 64 IN | BODY MASS INDEX: 32.52 KG/M2 | SYSTOLIC BLOOD PRESSURE: 103 MMHG | OXYGEN SATURATION: 93 % | DIASTOLIC BLOOD PRESSURE: 63 MMHG

## 2021-11-10 DIAGNOSIS — E87.6 HYPOKALEMIA: ICD-10-CM

## 2021-11-10 DIAGNOSIS — K85.21 ALCOHOL-INDUCED ACUTE PANCREATITIS WITH UNINFECTED NECROSIS: Primary | ICD-10-CM

## 2021-11-10 DIAGNOSIS — K70.31 ALCOHOLIC CIRRHOSIS OF LIVER WITH ASCITES (H): ICD-10-CM

## 2021-11-10 DIAGNOSIS — K59.03 DRUG-INDUCED CONSTIPATION: ICD-10-CM

## 2021-11-10 PROBLEM — K85.90 ACUTE PANCREATITIS: Status: ACTIVE | Noted: 2021-11-10

## 2021-11-10 LAB
ALBUMIN SERPL-MCNC: 1.5 G/DL (ref 3.4–5)
ALP SERPL-CCNC: 178 U/L (ref 40–150)
ALT SERPL W P-5'-P-CCNC: 19 U/L (ref 0–50)
ANION GAP SERPL CALCULATED.3IONS-SCNC: 11 MMOL/L (ref 3–14)
AST SERPL W P-5'-P-CCNC: 43 U/L (ref 0–45)
BILIRUB SERPL-MCNC: 1.9 MG/DL (ref 0.2–1.3)
BUN SERPL-MCNC: 5 MG/DL (ref 7–30)
CALCIUM SERPL-MCNC: 7.6 MG/DL (ref 8.5–10.1)
CHLORIDE BLD-SCNC: 110 MMOL/L (ref 94–109)
CO2 SERPL-SCNC: 21 MMOL/L (ref 20–32)
CREAT SERPL-MCNC: 1.14 MG/DL (ref 0.52–1.04)
ERYTHROCYTE [DISTWIDTH] IN BLOOD BY AUTOMATED COUNT: 17.4 % (ref 10–15)
GFR SERPL CREATININE-BSD FRML MDRD: 57 ML/MIN/1.73M2
GLUCOSE BLD-MCNC: 87 MG/DL (ref 70–99)
GLUCOSE BLDC GLUCOMTR-MCNC: 74 MG/DL (ref 70–99)
HCT VFR BLD AUTO: 26.9 % (ref 35–47)
HGB BLD-MCNC: 8.1 G/DL (ref 11.7–15.7)
INR PPP: 1.42 (ref 0.85–1.15)
LACTATE SERPL-SCNC: 0.8 MMOL/L (ref 0.7–2)
MCH RBC QN AUTO: 29.9 PG (ref 26.5–33)
MCHC RBC AUTO-ENTMCNC: 30.1 G/DL (ref 31.5–36.5)
MCV RBC AUTO: 99 FL (ref 78–100)
PLATELET # BLD AUTO: 231 10E3/UL (ref 150–450)
POTASSIUM BLD-SCNC: 3.5 MMOL/L (ref 3.4–5.3)
PROT SERPL-MCNC: 5.6 G/DL (ref 6.8–8.8)
RBC # BLD AUTO: 2.71 10E6/UL (ref 3.8–5.2)
SODIUM SERPL-SCNC: 142 MMOL/L (ref 133–144)
WBC # BLD AUTO: 11.4 10E3/UL (ref 4–11)

## 2021-11-10 PROCEDURE — 250N000011 HC RX IP 250 OP 636: Performed by: INTERNAL MEDICINE

## 2021-11-10 PROCEDURE — 85027 COMPLETE CBC AUTOMATED: CPT | Performed by: INTERNAL MEDICINE

## 2021-11-10 PROCEDURE — 250N000013 HC RX MED GY IP 250 OP 250 PS 637: Performed by: STUDENT IN AN ORGANIZED HEALTH CARE EDUCATION/TRAINING PROGRAM

## 2021-11-10 PROCEDURE — 258N000003 HC RX IP 258 OP 636: Performed by: INTERNAL MEDICINE

## 2021-11-10 PROCEDURE — 120N000002 HC R&B MED SURG/OB UMMC

## 2021-11-10 PROCEDURE — 99207 PR CDG-MDM COMPONENT: MEETS MODERATE - UP CODED: CPT | Mod: AI | Performed by: INTERNAL MEDICINE

## 2021-11-10 PROCEDURE — 36415 COLL VENOUS BLD VENIPUNCTURE: CPT | Performed by: INTERNAL MEDICINE

## 2021-11-10 PROCEDURE — 250N000013 HC RX MED GY IP 250 OP 250 PS 637: Performed by: INTERNAL MEDICINE

## 2021-11-10 PROCEDURE — 80053 COMPREHEN METABOLIC PANEL: CPT | Performed by: INTERNAL MEDICINE

## 2021-11-10 PROCEDURE — 250N000013 HC RX MED GY IP 250 OP 250 PS 637: Performed by: HOSPITALIST

## 2021-11-10 PROCEDURE — 83605 ASSAY OF LACTIC ACID: CPT | Performed by: INTERNAL MEDICINE

## 2021-11-10 PROCEDURE — 250N000011 HC RX IP 250 OP 636: Performed by: PHYSICIAN ASSISTANT

## 2021-11-10 PROCEDURE — 85610 PROTHROMBIN TIME: CPT | Performed by: INTERNAL MEDICINE

## 2021-11-10 RX ORDER — NALOXONE HYDROCHLORIDE 0.4 MG/ML
0.2 INJECTION, SOLUTION INTRAMUSCULAR; INTRAVENOUS; SUBCUTANEOUS
Status: DISCONTINUED | OUTPATIENT
Start: 2021-11-10 | End: 2021-12-02 | Stop reason: HOSPADM

## 2021-11-10 RX ORDER — METHOCARBAMOL 500 MG/1
500 TABLET, FILM COATED ORAL
Status: DISCONTINUED | OUTPATIENT
Start: 2021-11-10 | End: 2021-12-01

## 2021-11-10 RX ORDER — LIDOCAINE 4 G/G
1 PATCH TOPICAL EVERY 24 HOURS
COMMUNITY

## 2021-11-10 RX ORDER — LIDOCAINE 40 MG/G
CREAM TOPICAL
Status: DISCONTINUED | OUTPATIENT
Start: 2021-11-10 | End: 2021-12-02 | Stop reason: HOSPADM

## 2021-11-10 RX ORDER — ONDANSETRON 4 MG/1
4 TABLET, ORALLY DISINTEGRATING ORAL EVERY 6 HOURS PRN
Status: DISCONTINUED | OUTPATIENT
Start: 2021-11-10 | End: 2021-11-10

## 2021-11-10 RX ORDER — ONDANSETRON 4 MG/1
4 TABLET, ORALLY DISINTEGRATING ORAL EVERY 6 HOURS PRN
Status: DISCONTINUED | OUTPATIENT
Start: 2021-11-10 | End: 2021-12-02 | Stop reason: HOSPADM

## 2021-11-10 RX ORDER — PIPERACILLIN SODIUM, TAZOBACTAM SODIUM 4; .5 G/20ML; G/20ML
4.5 INJECTION, POWDER, LYOPHILIZED, FOR SOLUTION INTRAVENOUS EVERY 6 HOURS
Status: DISCONTINUED | OUTPATIENT
Start: 2021-11-10 | End: 2021-11-11

## 2021-11-10 RX ORDER — POLYETHYLENE GLYCOL 3350 17 G/17G
17 POWDER, FOR SOLUTION ORAL DAILY
Status: DISCONTINUED | OUTPATIENT
Start: 2021-11-10 | End: 2021-12-02 | Stop reason: HOSPADM

## 2021-11-10 RX ORDER — PROCHLORPERAZINE MALEATE 10 MG
10 TABLET ORAL EVERY 6 HOURS PRN
Status: DISCONTINUED | OUTPATIENT
Start: 2021-11-10 | End: 2021-12-02 | Stop reason: HOSPADM

## 2021-11-10 RX ORDER — LOPERAMIDE HCL 2 MG
2 CAPSULE ORAL 4 TIMES DAILY PRN
Status: DISCONTINUED | OUTPATIENT
Start: 2021-11-10 | End: 2021-11-14

## 2021-11-10 RX ORDER — SODIUM CHLORIDE, SODIUM LACTATE, POTASSIUM CHLORIDE, CALCIUM CHLORIDE 600; 310; 30; 20 MG/100ML; MG/100ML; MG/100ML; MG/100ML
INJECTION, SOLUTION INTRAVENOUS CONTINUOUS
Status: DISCONTINUED | OUTPATIENT
Start: 2021-11-10 | End: 2021-11-10

## 2021-11-10 RX ORDER — PANTOPRAZOLE SODIUM 40 MG/1
40 TABLET, DELAYED RELEASE ORAL
Status: DISCONTINUED | OUTPATIENT
Start: 2021-11-10 | End: 2021-12-02 | Stop reason: HOSPADM

## 2021-11-10 RX ORDER — DIPHENHYDRAMINE HYDROCHLORIDE, ZINC ACETATE 2; .1 G/100G; G/100G
CREAM TOPICAL 3 TIMES DAILY PRN
Status: DISCONTINUED | OUTPATIENT
Start: 2021-11-10 | End: 2021-12-02 | Stop reason: HOSPADM

## 2021-11-10 RX ORDER — FOLIC ACID 1 MG/1
1 TABLET ORAL DAILY
Status: DISCONTINUED | OUTPATIENT
Start: 2021-11-10 | End: 2021-12-02 | Stop reason: HOSPADM

## 2021-11-10 RX ORDER — MIRTAZAPINE 15 MG/1
15 TABLET, FILM COATED ORAL AT BEDTIME
Status: DISCONTINUED | OUTPATIENT
Start: 2021-11-10 | End: 2021-12-02 | Stop reason: HOSPADM

## 2021-11-10 RX ORDER — ONDANSETRON 2 MG/ML
4 INJECTION INTRAMUSCULAR; INTRAVENOUS EVERY 6 HOURS PRN
Status: DISCONTINUED | OUTPATIENT
Start: 2021-11-10 | End: 2021-11-10

## 2021-11-10 RX ORDER — ALBUTEROL SULFATE 90 UG/1
2 AEROSOL, METERED RESPIRATORY (INHALATION) EVERY 4 HOURS PRN
Status: DISCONTINUED | OUTPATIENT
Start: 2021-11-10 | End: 2021-12-02 | Stop reason: HOSPADM

## 2021-11-10 RX ORDER — PROCHLORPERAZINE 25 MG
25 SUPPOSITORY, RECTAL RECTAL EVERY 12 HOURS PRN
Status: DISCONTINUED | OUTPATIENT
Start: 2021-11-10 | End: 2021-12-02 | Stop reason: HOSPADM

## 2021-11-10 RX ORDER — NALOXONE HYDROCHLORIDE 0.4 MG/ML
0.4 INJECTION, SOLUTION INTRAMUSCULAR; INTRAVENOUS; SUBCUTANEOUS
Status: DISCONTINUED | OUTPATIENT
Start: 2021-11-10 | End: 2021-12-02 | Stop reason: HOSPADM

## 2021-11-10 RX ORDER — OXYCODONE HYDROCHLORIDE 5 MG/1
5 TABLET ORAL EVERY 4 HOURS PRN
Status: DISCONTINUED | OUTPATIENT
Start: 2021-11-10 | End: 2021-11-14

## 2021-11-10 RX ORDER — UBIDECARENONE 75 MG
100 CAPSULE ORAL DAILY
Status: DISCONTINUED | OUTPATIENT
Start: 2021-11-10 | End: 2021-12-02 | Stop reason: HOSPADM

## 2021-11-10 RX ORDER — GABAPENTIN 100 MG/1
100 CAPSULE ORAL 3 TIMES DAILY
Status: DISCONTINUED | OUTPATIENT
Start: 2021-11-10 | End: 2021-12-02 | Stop reason: HOSPADM

## 2021-11-10 RX ORDER — SIMETHICONE 80 MG
80 TABLET,CHEWABLE ORAL EVERY 6 HOURS PRN
Status: DISCONTINUED | OUTPATIENT
Start: 2021-11-10 | End: 2021-12-02 | Stop reason: HOSPADM

## 2021-11-10 RX ORDER — DIPHENHYDRAMINE HCL 25 MG
25 TABLET ORAL EVERY 6 HOURS PRN
COMMUNITY

## 2021-11-10 RX ORDER — ESCITALOPRAM OXALATE 10 MG/1
10 TABLET ORAL DAILY
Status: DISCONTINUED | OUTPATIENT
Start: 2021-11-10 | End: 2021-12-02 | Stop reason: HOSPADM

## 2021-11-10 RX ADMIN — ASCORBIC ACID, THIAMINE MONONITRATE,RIBOFLAVIN, NIACINAMIDE, PYRIDOXINE HYDROCHLORIDE, FOLIC ACID, CYANOCOBALAMIN, BIOTIN, CALCIUM PANTOTHENATE, 1 CAPSULE: 100; 1.5; 1.7; 20; 10; 1; 6000; 150000; 5 CAPSULE, LIQUID FILLED ORAL at 09:20

## 2021-11-10 RX ADMIN — ESCITALOPRAM OXALATE 10 MG: 10 TABLET ORAL at 09:11

## 2021-11-10 RX ADMIN — DIPHENHYDRAMINE HCL 25 MG: 25 CAPSULE ORAL at 03:17

## 2021-11-10 RX ADMIN — RIFAXIMIN 550 MG: 550 TABLET ORAL at 20:19

## 2021-11-10 RX ADMIN — PIPERACILLIN SODIUM AND TAZOBACTAM SODIUM 4.5 G: 4; .5 INJECTION, POWDER, LYOPHILIZED, FOR SOLUTION INTRAVENOUS at 20:45

## 2021-11-10 RX ADMIN — OXYCODONE HYDROCHLORIDE 5 MG: 5 TABLET ORAL at 09:40

## 2021-11-10 RX ADMIN — MIRTAZAPINE 15 MG: 15 TABLET, FILM COATED ORAL at 20:22

## 2021-11-10 RX ADMIN — PIPERACILLIN SODIUM AND TAZOBACTAM SODIUM 4.5 G: 4; .5 INJECTION, POWDER, LYOPHILIZED, FOR SOLUTION INTRAVENOUS at 15:18

## 2021-11-10 RX ADMIN — PIPERACILLIN SODIUM AND TAZOBACTAM SODIUM 4.5 G: 4; .5 INJECTION, POWDER, LYOPHILIZED, FOR SOLUTION INTRAVENOUS at 09:10

## 2021-11-10 RX ADMIN — OXYCODONE HYDROCHLORIDE 5 MG: 5 TABLET ORAL at 23:45

## 2021-11-10 RX ADMIN — VITAM B12 100 MCG: 100 TAB at 10:27

## 2021-11-10 RX ADMIN — FOLIC ACID 1 MG: 1 TABLET ORAL at 09:20

## 2021-11-10 RX ADMIN — OXYCODONE HYDROCHLORIDE 5 MG: 5 TABLET ORAL at 05:50

## 2021-11-10 RX ADMIN — GABAPENTIN 100 MG: 100 CAPSULE ORAL at 09:11

## 2021-11-10 RX ADMIN — THIAMINE HCL TAB 100 MG 100 MG: 100 TAB at 09:11

## 2021-11-10 RX ADMIN — GABAPENTIN 100 MG: 100 CAPSULE ORAL at 14:55

## 2021-11-10 RX ADMIN — RIFAXIMIN 550 MG: 550 TABLET ORAL at 09:11

## 2021-11-10 RX ADMIN — PIPERACILLIN SODIUM AND TAZOBACTAM SODIUM 4.5 G: 4; .5 INJECTION, POWDER, LYOPHILIZED, FOR SOLUTION INTRAVENOUS at 02:57

## 2021-11-10 RX ADMIN — SODIUM CHLORIDE, POTASSIUM CHLORIDE, SODIUM LACTATE AND CALCIUM CHLORIDE: 600; 310; 30; 20 INJECTION, SOLUTION INTRAVENOUS at 05:47

## 2021-11-10 RX ADMIN — PANTOPRAZOLE SODIUM 40 MG: 40 TABLET, DELAYED RELEASE ORAL at 09:11

## 2021-11-10 RX ADMIN — LOPERAMIDE HYDROCHLORIDE 2 MG: 2 CAPSULE ORAL at 12:35

## 2021-11-10 RX ADMIN — ENOXAPARIN SODIUM 40 MG: 40 INJECTION SUBCUTANEOUS at 09:11

## 2021-11-10 RX ADMIN — GABAPENTIN 100 MG: 100 CAPSULE ORAL at 20:19

## 2021-11-10 ASSESSMENT — ACTIVITIES OF DAILY LIVING (ADL)
DRESSING/BATHING: BATHING DIFFICULTY, ASSISTANCE 1 PERSON
WHICH_OF_THE_ABOVE_FUNCTIONAL_RISKS_HAD_A_RECENT_ONSET_OR_CHANGE?: FALL HISTORY
ADLS_ACUITY_SCORE: 17
FALL_HISTORY_WITHIN_LAST_SIX_MONTHS: YES
DIFFICULTY_EATING/SWALLOWING: NO
ADLS_ACUITY_SCORE: 17
WEAR_GLASSES_OR_BLIND: YES
DRESSING/BATHING_DIFFICULTY: YES
NUMBER_OF_TIMES_PATIENT_HAS_FALLEN_WITHIN_LAST_SIX_MONTHS: 3
ADLS_ACUITY_SCORE: 17
DOING_ERRANDS_INDEPENDENTLY_DIFFICULTY: YES
ADLS_ACUITY_SCORE: 17
WALKING_OR_CLIMBING_STAIRS: AMBULATION DIFFICULTY, REQUIRES EQUIPMENT;AMBULATION DIFFICULTY, ASSISTANCE 1 PERSON
EQUIPMENT_CURRENTLY_USED_AT_HOME: WALKER, STANDARD
HEARING_DIFFICULTY_OR_DEAF: NO
TOILETING_ISSUES: NO
CONCENTRATING,_REMEMBERING_OR_MAKING_DECISIONS_DIFFICULTY: YES
ADLS_ACUITY_SCORE: 17
CONCENTRATING,_REMEMBERING_OR_MAKING_DECISIONS_DIFFICULTY: NO
ADLS_ACUITY_SCORE: 17
WALKING_OR_CLIMBING_STAIRS: AMBULATION DIFFICULTY, ASSISTANCE 1 PERSON;AMBULATION DIFFICULTY, REQUIRES EQUIPMENT
ADLS_ACUITY_SCORE: 13
ADLS_ACUITY_SCORE: 17
WEAR_GLASSES_OR_BLIND: YES
DIFFICULTY_COMMUNICATING: NO
FALL_HISTORY_WITHIN_LAST_SIX_MONTHS: YES
NUMBER_OF_TIMES_PATIENT_HAS_FALLEN_WITHIN_LAST_SIX_MONTHS: 3
DIFFICULTY_COMMUNICATING: NO
WALKING_OR_CLIMBING_STAIRS_DIFFICULTY: YES
DIFFICULTY_EATING/SWALLOWING: NO
DOING_ERRANDS_INDEPENDENTLY_DIFFICULTY: YES
ADLS_ACUITY_SCORE: 17
ADLS_ACUITY_SCORE: 13
WALKING_OR_CLIMBING_STAIRS_DIFFICULTY: YES
COMMUNICATION: OTHER (SEE COMMENTS)
ADLS_ACUITY_SCORE: 17
WHICH_OF_THE_ABOVE_FUNCTIONAL_RISKS_HAD_A_RECENT_ONSET_OR_CHANGE?: FALL HISTORY
DRESSING/BATHING: BATHING DIFFICULTY, ASSISTANCE 1 PERSON
ADLS_ACUITY_SCORE: 17
PATIENT_/_FAMILY_COMMUNICATION_STYLE: SPOKEN LANGUAGE (ENGLISH OR BILINGUAL)
ADLS_ACUITY_SCORE: 17
ADLS_ACUITY_SCORE: 17
ADLS_ACUITY_SCORE: 13
ADLS_ACUITY_SCORE: 13
DRESSING/BATHING_DIFFICULTY: YES
ADLS_ACUITY_SCORE: 17
TOILETING_ISSUES: NO

## 2021-11-10 ASSESSMENT — MIFFLIN-ST. JEOR: SCORE: 1465.8

## 2021-11-10 NOTE — PLAN OF CARE
FOCUS/GOAL  Bladder management, Skin integrity, and Safety management    ASSESSMENT, INTERVENTIONS AND CONTINUING PLAN FOR GOAL:    Alert and oriented x4, complained of abdominal pain, febrile, given PRN Tylenol once which was effective in controlling pain/lowering temp. With ongoing potassium infusion at start of shift and completed around 1615H. Started on Zosyn 4.5g IV antibiotic at 1644H. After antibiotic was completed complained of generalized itchiness (at 1730H) then had nosebleed with blood clots. Nostril pressure done and finally at 1800H bleeding stopped. Dr. Tavares (doctor - on-call) updated and had ordered Benadryl 25mg PRN, given one time and was effective. Urine specimen sent to lab. Stool exam result for C Diff negative, enteric precaution discontinued around 2100H. Pt refused other scheduled medications and took only scheduled gabapentin, melatonin, remeron. Oxygen inhalation continued at 1L/min, oxygen saturation between 94-98%. Paracentesis site leaking with blood tinged drainage, dressing changed 3x. Pt having watery stools, going to the bathroom around 8x, Mod I, on NPO, took ice chips and small sips of water, blood sugar checked 2x = 80 then 90. Appears settled at end of shift and napping. With fast breathing =24 when lyaing in bed and breathing is with normal rhythm once sitting up. Anusha Murillo updated of pt status ( leaking paracentesis site, bm, intake- NPO) at end of shift. Still awaiting transfer to hospital. Endorsed to night shift.

## 2021-11-10 NOTE — H&P
[facility]    History and Physical - Hospitalist Service       Date of Admission:  11/10/2021    Assessment & Plan      lAiyah Angeles is a 46 year old female admitted on 11/10/2021. She has a hx of DMII, etoh abuse, alcholic hepatitis, and Fitz-en-y gastric bypass who was transferred from the [facility]U for necrotizing pancreatitis and fevers    # Acute necrotizing pancreatitis:  Meets criteria for pancreatitis based on abd pain + imaging findings, although location of abd pain is atypical.  - NPO  - oxycodone 5mg q4 prn  - 1L LR ordered  - consider GI consult (not ordered)    # Fever  # SIRS, may be related to pancreatitis given negative infectious workup  Negative infectious workup thus far includes BCx, UA and paracentesis.  Started on zosyn 11/9 for concerns of infection prior to transfer  - continue zosyn for now.  Consider discontinuing if infectious workup remains negative    # ETOH Use disorder  # Alcoholic hepatitis, PTA  Labs stable  - cont home rifaximin  - lactulose discontinued during September hospitalization, will continue to hold  - paracentesis as needed (last 11/9 prior to transfer)    # ANA LAURA, improving, POA:  ANA LAURA requiring CRRT during recent hospitlization.  Creatinine today is 1.14, which appears to be close to new baseline    # Anxiety/Depression  - cont home lexapro, remeron       Diet: NPO for Medical/Clinical Reasons Except for: Meds, Ice Chips    DVT Prophylaxis: Enoxaparin (Lovenox) SQ  Rice Catheter: Not present  Central Lines: None  Code Status: Full Code      Clinically Significant Risk Factors Present on Admission             # Coagulation Defect: INR = 1.42 (Ref range: 0.85 - 1.15) and/or PTT = N/A on admission, will monitor for bleeding       Disposition Plan   Expected discharge: 11/15/2021 recommended to ARU once adequate pain management/ tolerating PO medications.     The patient's care was discussed with the Patient.    Kady  Bell Triplett MD  [facility]  Securely message with the [employer] Web Console (learn more here)  Text page via [employer] Paging/Directory    Please see sign in/sign out for up to date coverage information    ______________________________________________________________________    Chief Complaint   Abdominal pain    History is obtained from the patient    History of Present Illness   Aliyah Angeles is a 46 year old female who has a hx of DMII, etoh abuse, alcholic hepatitis, and Fitz-en-y gastric bypass who was transferred from the [facility] for necrotizing pancreatitis and fevers.  She has a recent prolonged hospitalization on [place] for alcoholic hepatitis, acute hypoxic respiratory failure/ARDS, and septic shock for which she was discharged on 11/5 to acute rehab.  Initially reports she was doing well at acute rehab; however over the past few days she noted increasing RLQ abd pain and fevers.  CT abd/pelvis done at acute rehab notable for necrotizing pancreatitis.  Infectious workup was also initiated and she was started on zosyn.    Currently she reports abd pain is tolerable after 5mg oxycodone.  She reports minimal PO for the past approximately 4 days and decreased UOP.  She denies changes with bowel movements.  No vomiting.  Has had SOB that has been stable, although improved after a paracentesis done 2 days prior.        Review of Systems    The 10 point Review of Systems is negative other than noted in the HPI or here.     Past Medical History    I have reviewed this patient's medical history and updated it with pertinent information if needed.   Past Medical History:   Diagnosis Date     Acne      ADHD (attention deficit hyperactivity disorder)      Arthralgia of temporomandibular joint 5/19/2016     Arthritis      Cervical high risk HPV (human papillomavirus) test positive 4/27/2018 4/27/18 NIL pap, + HR HPV (not 16 or 18). Plan: cotest in 1 yr.        Cobalamin deficiency 11/24/2014     Drug-seeking behavior 5/19/2016    Overview:  Per  website, patient has filled with multiple controlled substances with multiple prescribers at multiple pharmacies      History of blood transfusion 01/01/1988     Insomnia 7/21/2013     Raynaud's phenomenon 5/25/2015     Vitamin D deficiency 1/16/2013       Past Surgical History   I have reviewed this patient's surgical history and updated it with pertinent information if needed.  Past Surgical History:   Procedure Laterality Date     ABDOMEN SURGERY  08/10/2010    Gastric  bypass     BIOPSY  1/1/94    HPV, multiple reoccurrances, partial  hysterectomy  2013     CHOLECYSTECTOMY  01/01/2010     COLONOSCOPY  01/01/2009     ENT SURGERY  01/01/1988    Tonsils     GI SURGERY  8/10/10    Fitz-en-Y     HYSTERECTOMY VAGINAL  04/05/2013    pathology of cervix benign.      IR CVC NON TUNNEL LINE REMOVAL  10/22/2021     IR CVC TUNNEL PLACEMENT > 5 YRS OF AGE  10/22/2021     IR CVC TUNNEL REMOVAL RIGHT  11/3/2021     IR PARACENTESIS  11/9/2021       Social History   I have reviewed this patient's social history and updated it with pertinent information if needed.  Social History     Tobacco Use     Smoking status: Current Every Day Smoker     Packs/day: 1.00     Years: 15.00     Pack years: 15.00     Types: Cigarettes     Start date: 1/1/1989     Smokeless tobacco: Never Used     Tobacco comment: 10/1/2021: Declined behavioral counseling, NRT, and post-hosp follow-up   Substance Use Topics     Alcohol use: Yes     Alcohol/week: 0.0 standard drinks     Comment: daily unknown amount      Drug use: No       Family History   I have reviewed this patient's family history and updated it with pertinent information if needed.  Family History   Problem Relation Age of Onset     Prostate Cancer Father      Hypertension Father      Hyperlipidemia Father      Substance Abuse Father      Obesity Father      Diabetes Maternal Grandmother      Colon  Cancer Maternal Grandmother      Other Cancer Maternal Grandmother         pancreatic cancer     Obesity Maternal Grandmother      Diabetes Paternal Grandmother      Obesity Paternal Grandmother      Diabetes Paternal Grandfather      Obesity Paternal Grandfather      Breast Cancer Sister      Breast Cancer Sister      Anxiety Disorder Sister      Anesthesia Reaction Sister      Depression Mother      Anesthesia Reaction Mother      Asthma Daughter      Coronary Artery Disease No family hx of      Hypertension No family hx of      Hyperlipidemia No family hx of      Cerebrovascular Disease No family hx of      Depression No family hx of      Anxiety Disorder No family hx of      Mental Illness No family hx of      Substance Abuse No family hx of      Anesthesia Reaction No family hx of      Asthma No family hx of      Osteoporosis No family hx of      Genetic Disorder No family hx of      Thyroid Disease No family hx of      Obesity No family hx of      Unknown/Adopted No family hx of        Prior to Admission Medications   Prior to Admission Medications   Prescriptions Last Dose Informant Patient Reported? Taking?   B Complex-C-Folic Acid (RENAL) 1 MG CAPS   No No   Sig: Take 1 capsule by mouth daily   albuterol (PROAIR HFA/PROVENTIL HFA/VENTOLIN HFA) 108 (90 Base) MCG/ACT inhaler   No No   Sig: Inhale 2 puffs into the lungs every 4 hours as needed for shortness of breath / dyspnea or wheezing   calcium carbonate 600 mg-vitamin D 400 units (CALTRATE) 600-400 MG-UNIT per tablet   No No   Sig: Take 1 tablet by mouth 2 times daily (with meals)   cyanocobalamin (CYANOCOBALAMIN) 100 MCG tablet   No No   Si tablet (100 mcg) by Oral or Feeding Tube route daily   emollient (VANICREAM) external cream   No No   Sig: Apply topically every 6 hours as needed for other (dry skin)   escitalopram (LEXAPRO) 10 MG tablet   No No   Sig: Take 1 tablet (10 mg) by mouth daily   folic acid (FOLVITE) 1 MG tablet   No No   Sig: Take 1  "tablet (1 mg) by mouth daily   gabapentin (NEURONTIN) 100 MG capsule   No No   Sig: Take 1 capsule (100 mg) by mouth 3 times daily   melatonin 5 MG tablet   No No   Sig: Take 1 tablet (5 mg) by mouth At Bedtime   methocarbamol (ROBAXIN) 500 MG tablet   No No   Sig: Take 1 tablet (500 mg) by mouth nightly as needed for muscle spasms   mirtazapine (REMERON) 15 MG tablet   No No   Sig: Take 1 tablet (15 mg) by mouth At Bedtime   ondansetron (ZOFRAN-ODT) 4 MG ODT tab   No No   Sig: Take 1 tablet (4 mg) by mouth every 6 hours as needed for nausea or vomiting   pantoprazole (PROTONIX) 40 MG EC tablet   No No   Sig: Take 1 tablet (40 mg) by mouth every morning (before breakfast)   piperacillin-tazobactam (ZOSYN) 4-0.5 GM vial   No No   Sig: Inject 4.5 g into the vein every 6 hours   rifaximin (XIFAXAN) 550 MG TABS tablet   No No   Sig: Take 1 tablet (550 mg) by mouth 2 times daily   simethicone (MYLICON) 80 MG chewable tablet   No No   Sig: Take 1 tablet (80 mg) by mouth every 6 hours as needed for cramping   thiamine (B-1) 100 MG tablet   No No   Sig: Take 1 tablet (100 mg) by mouth daily      Facility-Administered Medications: None     Allergies   Allergies   Allergen Reactions     Nsaids      Gastric bypass     Trazodone      In a \"hazy\" for the whole next day       Physical Exam   Vital Signs: Temp: 98.2  F (36.8  C) Temp src: Oral BP: 115/79 Pulse: 98   Resp: 27 SpO2: (!) 85 % (up to the bathroom )   Oxygen Delivery: 3 LPM  Weight: 186 lbs 7.44 oz    General Appearance: NAD  Eyes: anicteric  HEENT: MMM  Respiratory: breathing comfortably on RA  Cardiovascular: 1+ edema bl  GI: soft, +RLQ tenderness. No rebound tenderness  Skin: no jaundice  Musculoskeletal: no joint swelling appreciated  Neurologic: AAOx3  Psychiatric: appropriate    Data   Data reviewed today: I reviewed all medications, new labs and imaging results over the last 24 hours. I personally reviewed no images or EKG's today.      "

## 2021-11-10 NOTE — PHARMACY-ADMISSION MEDICATION HISTORY
Admission Medication History Completed by Pharmacy    See [facility] Admission Navigator for allergy information, preferred outpatient pharmacy, prior to admission medications and immunization status.     Medication History Sources:     [facility]    Changes made to PTA medication list (reason):    Added: lidoderm patch, prn diphenhydramine    Deleted: None    Changed: None    Additional Information:    None    Prior to Admission medications    Medication Sig Last Dose Taking? Auth Provider   B Complex-C-Folic Acid (RENAL) 1 MG CAPS Take 1 capsule by mouth daily Past Week at Unknown time Yes Kaila Butler PA-C   calcium carbonate 600 mg-vitamin D 400 units (CALTRATE) 600-400 MG-UNIT per tablet Take 1 tablet by mouth 2 times daily (with meals) Past Week at Unknown time Yes Kathleen Tarango PA   cyanocobalamin (CYANOCOBALAMIN) 100 MCG tablet 1 tablet (100 mcg) by Oral or Feeding Tube route daily Past Week at Unknown time Yes Kaila Butler PA-C   diphenhydrAMINE (BENADRYL) 25 MG tablet Take 25 mg by mouth every 6 hours as needed for itching  Yes Unknown, Entered By History   emollient (VANICREAM) external cream Apply topically every 6 hours as needed for other (dry skin) Past Week at Unknown time Yes Kaila Butler PA-C   escitalopram (LEXAPRO) 10 MG tablet Take 1 tablet (10 mg) by mouth daily Past Week at Unknown time Yes Stefani Thompson MD   folic acid (FOLVITE) 1 MG tablet Take 1 tablet (1 mg) by mouth daily Past Week at Unknown time Yes Stefani Thompson MD   gabapentin (NEURONTIN) 100 MG capsule Take 1 capsule (100 mg) by mouth 3 times daily Past Week at Unknown time Yes Stefani Thompson MD   Lidocaine (LIDOCARE) 4 % Patch Place 1 patch onto the skin every 24 hours To prevent lidocaine toxicity, patient should be patch free for 12 hrs daily.  Apply patch to lower back  Yes Unknown, Entered By History   melatonin 5 MG tablet Take 1 tablet (5 mg) by mouth At Bedtime Past Week at Unknown  time Yes Kathleen Tarango PA   methocarbamol (ROBAXIN) 500 MG tablet Take 1 tablet (500 mg) by mouth nightly as needed for muscle spasms Past Week at Unknown time Yes Kathleen Tarango PA   mirtazapine (REMERON) 15 MG tablet Take 1 tablet (15 mg) by mouth At Bedtime Past Week at Unknown time Yes Kaila Butler PA-C   ondansetron (ZOFRAN-ODT) 4 MG ODT tab Take 1 tablet (4 mg) by mouth every 6 hours as needed for nausea or vomiting Past Week at Unknown time Yes Kathleen Tarango PA   pantoprazole (PROTONIX) 40 MG EC tablet Take 1 tablet (40 mg) by mouth every morning (before breakfast) Past Week at Unknown time Yes Stefani Thompson MD   piperacillin-tazobactam (ZOSYN) 4-0.5 GM vial Inject 4.5 g into the vein every 6 hours Past Week at Unknown time Yes Kathleen Tarango PA   rifaximin (XIFAXAN) 550 MG TABS tablet Take 1 tablet (550 mg) by mouth 2 times daily Past Week at Unknown time Yes Stefani Thompson MD   simethicone (MYLICON) 80 MG chewable tablet Take 1 tablet (80 mg) by mouth every 6 hours as needed for cramping Past Week at Unknown time Yes Kaila Butler PA-C   thiamine (B-1) 100 MG tablet Take 1 tablet (100 mg) by mouth daily Past Week at Unknown time Yes Stefani Thompson MD   albuterol (PROAIR HFA/PROVENTIL HFA/VENTOLIN HFA) 108 (90 Base) MCG/ACT inhaler Inhale 2 puffs into the lungs every 4 hours as needed for shortness of breath / dyspnea or wheezing   Stefani Thompson MD       Date completed: 11/10/21    Medication history completed by: Sayra Polanco, PharmD, BCPS

## 2021-11-10 NOTE — PLAN OF CARE
Pt is hemodynamically stable. Maintaining sats on 3L O2 via NC.  A&O x4. Abdominal pain radiates across quadrants response well to pain medication.NPO except for medication. LR infusing at 100mL/hr.  Ambulates with standby assist and walker. Diarrhea  still ongoing hourly Paracentesis site dressing to RUQ intact.

## 2021-11-10 NOTE — PROGRESS NOTES
Daytime updates:  - hold MIVF as pt c/o repeat abdominal and leg swelling, o2 trending down suspect 3rd spacing, if hypotensive or progressigve hypoxia will prefer albumin +/- diuretic  - ordered incentive spirometer  - ordered benadryl topical for itchy skin  - continue zosyn to cover potentially infected pancreatic necrosis

## 2021-11-10 NOTE — PLAN OF CARE
Admission          11/10/2021  4:26 AM  -----------------------------------------------------------  Reason for admission: Necrotizing pancreatitis  Primary team notified of pt arrival.  Admitted from: [facility]  Via: stretcher  Accompanied by: KEIRY  Belongings: Placed in closet  Admission Profile: complete  Teaching: orientation to unit and call light- call light within reach, call don't fall, use of console, meal times, when to call for the RN, and enforced importance of safety   Access: PIV  Telemetry:Placed on pt  Ht./Wt.: complete  Code Status verified on armband: yes  2 RN Skin Assessment Completed By: Amber Rose and Caro Cruz  Bruising bilateral forearms, scabs and redness bilateral forearms, BLE edema, RUQ puncture site. Unable to visualize lower extremities due to TOY hose.   Med Rec completed: yes  Bed surface reassessed with algorithm and charted: yes  New bed surface ordered: yes  Suction/Ambu bag/Flowmeter at bedside: yes  Is patient having diarrhea upon admission- if YES fill out testing algorithm : yes      Pt status: FULL CODE    Temp:  [98.2  F (36.8  C)-101.2  F (38.4  C)] 98.2  F (36.8  C)  Pulse:  [] 95  Resp:  [18-30] 30  BP: ()/(46-92) 105/68  SpO2:  [92 %-99 %] 98 %     Caro Cruz RN on 11/10/2021 at 6:20 AM

## 2021-11-10 NOTE — PLAN OF CARE
FOCUS/GOAL  Medical management    ASSESSMENT, INTERVENTIONS AND CONTINUING PLAN FOR GOAL:  Pt was awake before 1 am, alert and oriented. Walked to the BR, had watery stool and voided. C/o abdominal pain. Post paracentesis site has minimal drainage of sanguinous, change dressing, covered w/ mepilex. VS checked,febrile(see flowsheet). On 1L/nc of oxygen.  Received phone call from , bed is available at [facility] at [facility]. Call the unit and give report to unit nurse (Caro) at 0240am.  IV abx on and prn benadryl given. All her belongings are packed. Per pt, no need to call for immediate family, she said she already informed her mother.  0345-paramedics are here to  pt.  0350-transferred to [facility] w/ all belongings.

## 2021-11-10 NOTE — PLAN OF CARE
Shift Summary    Pt arrived on unit at 0430, VSS, afebrile, maintaining sats on 3L O2 via NC.  A&O x4, endorses abdominal pain that radiates across quadrants, medicated with PRN oxycodone with good effect.  NPO status communicated to pt, LR infusing at 100mL/hr.  Ambulates with standby assist and walker, pt had diarrhea after arriving on floor.  Paracentesis site to RUQ covered with gauze and island dressing.  Sleeping between cares.  Will commence with plan of care.     Caro Cruz RN on 11/10/2021 at 6:25 AM

## 2021-11-10 NOTE — CONSULTS
Please see detailed CMA from 11/7/21    Care Management Initial Consult    General Information  Assessment completed with: chart review. Please see detailed CMA from 11/7/21  Type of CM/SW Visit: Initial Assessment  Primary Care Provider verified and updated as needed: Yes   Readmission within the last 30 days:  Yes, readmitted from ARU  Advance Care Planning: Advance Care Planning Reviewed: no concerns identified          Communication Assessment  Patient's communication style:   spoken language (English or Bilingual)    Hearing Difficulty or Deaf: no   Wear Glasses or Blind: yes    Cognitive  Cognitive/Neuro/Behavioral: WDL      Living Environment:   People in home: mother    Current living Arrangements: house      Able to return to prior arrangements:  Transfer from ARU - will need to go to inpatient CD treatment prior to home.     Family/Social Support:  Care provided by:    Provides care for:    Marital Status: in process of getting          Description of Support System: MotherMalina, whom pt's lives w/. 3 children who live near pt: Natalio (26), Allie (22) and Boogie (20).        Current Resources:   Patient receiving home care services: No  Community Resources: Chemical Dependency Services  Equipment currently used at home: none  Supplies currently used at home: None    Employment/Financial:  Employment Status: unemployed     Financial Concerns: No concerns identified        Mental Health Status: see SW assessment from 11/7/21          Chemical Dependency Status: needs inpatient CD treatment. See SW assessment from 11/7/21                      Additional Information:  Initial assessment completed due to high risk readmission score. See SW assessment from 11/7/21. As well as SW notes from previous admission. Admit from [facility]. Patient will need to transfer to inpatient CD treatment prior to home. 6D SW updated.          Kandi Gauthier, RN, [place]  Float Care Coordinator  Covering 6D RNCC   Pager:  [phone]

## 2021-11-11 ENCOUNTER — APPOINTMENT (OUTPATIENT)
Dept: PHYSICAL THERAPY | Facility: CLINIC | Age: 46
End: 2021-11-11
Attending: HOSPITALIST
Payer: COMMERCIAL

## 2021-11-11 ENCOUNTER — APPOINTMENT (OUTPATIENT)
Dept: OCCUPATIONAL THERAPY | Facility: CLINIC | Age: 46
End: 2021-11-11
Attending: HOSPITALIST
Payer: COMMERCIAL

## 2021-11-11 LAB
ALBUMIN SERPL-MCNC: 1.5 G/DL (ref 3.4–5)
ALP SERPL-CCNC: 168 U/L (ref 40–150)
ALT SERPL W P-5'-P-CCNC: 16 U/L (ref 0–50)
ANION GAP SERPL CALCULATED.3IONS-SCNC: 8 MMOL/L (ref 3–14)
AST SERPL W P-5'-P-CCNC: 31 U/L (ref 0–45)
BILIRUB SERPL-MCNC: 1.7 MG/DL (ref 0.2–1.3)
BUN SERPL-MCNC: 8 MG/DL (ref 7–30)
CALCIUM SERPL-MCNC: 7.7 MG/DL (ref 8.5–10.1)
CHLORIDE BLD-SCNC: 111 MMOL/L (ref 94–109)
CO2 SERPL-SCNC: 21 MMOL/L (ref 20–32)
CREAT SERPL-MCNC: 1.16 MG/DL (ref 0.52–1.04)
ERYTHROCYTE [DISTWIDTH] IN BLOOD BY AUTOMATED COUNT: 17.1 % (ref 10–15)
GFR SERPL CREATININE-BSD FRML MDRD: 56 ML/MIN/1.73M2
GLUCOSE BLD-MCNC: 80 MG/DL (ref 70–99)
GLUCOSE BLDC GLUCOMTR-MCNC: 106 MG/DL (ref 70–99)
GLUCOSE BLDC GLUCOMTR-MCNC: 64 MG/DL (ref 70–99)
HCT VFR BLD AUTO: 26.7 % (ref 35–47)
HGB BLD-MCNC: 8.1 G/DL (ref 11.7–15.7)
MCH RBC QN AUTO: 30.2 PG (ref 26.5–33)
MCHC RBC AUTO-ENTMCNC: 30.3 G/DL (ref 31.5–36.5)
MCV RBC AUTO: 100 FL (ref 78–100)
PLATELET # BLD AUTO: 222 10E3/UL (ref 150–450)
POTASSIUM BLD-SCNC: 3.4 MMOL/L (ref 3.4–5.3)
PROT SERPL-MCNC: 5.6 G/DL (ref 6.8–8.8)
RBC # BLD AUTO: 2.68 10E6/UL (ref 3.8–5.2)
SODIUM SERPL-SCNC: 140 MMOL/L (ref 133–144)
WBC # BLD AUTO: 9.8 10E3/UL (ref 4–11)

## 2021-11-11 PROCEDURE — 97165 OT EVAL LOW COMPLEX 30 MIN: CPT | Mod: GO

## 2021-11-11 PROCEDURE — 85027 COMPLETE CBC AUTOMATED: CPT | Performed by: HOSPITALIST

## 2021-11-11 PROCEDURE — 97535 SELF CARE MNGMENT TRAINING: CPT | Mod: GO

## 2021-11-11 PROCEDURE — 250N000013 HC RX MED GY IP 250 OP 250 PS 637: Performed by: HOSPITALIST

## 2021-11-11 PROCEDURE — 258N000001 HC RX 258: Performed by: INTERNAL MEDICINE

## 2021-11-11 PROCEDURE — 36415 COLL VENOUS BLD VENIPUNCTURE: CPT | Performed by: HOSPITALIST

## 2021-11-11 PROCEDURE — 97530 THERAPEUTIC ACTIVITIES: CPT | Mod: GP

## 2021-11-11 PROCEDURE — 97110 THERAPEUTIC EXERCISES: CPT | Mod: GP

## 2021-11-11 PROCEDURE — 97110 THERAPEUTIC EXERCISES: CPT | Mod: GO

## 2021-11-11 PROCEDURE — 250N000011 HC RX IP 250 OP 636: Performed by: INTERNAL MEDICINE

## 2021-11-11 PROCEDURE — 99233 SBSQ HOSP IP/OBS HIGH 50: CPT | Performed by: HOSPITALIST

## 2021-11-11 PROCEDURE — 250N000013 HC RX MED GY IP 250 OP 250 PS 637: Performed by: INTERNAL MEDICINE

## 2021-11-11 PROCEDURE — 120N000002 HC R&B MED SURG/OB UMMC

## 2021-11-11 PROCEDURE — 250N000011 HC RX IP 250 OP 636: Performed by: HOSPITALIST

## 2021-11-11 PROCEDURE — 97162 PT EVAL MOD COMPLEX 30 MIN: CPT | Mod: GP

## 2021-11-11 PROCEDURE — 80053 COMPREHEN METABOLIC PANEL: CPT | Performed by: HOSPITALIST

## 2021-11-11 RX ORDER — NICOTINE POLACRILEX 4 MG
15-30 LOZENGE BUCCAL
Status: DISCONTINUED | OUTPATIENT
Start: 2021-11-11 | End: 2021-12-02 | Stop reason: HOSPADM

## 2021-11-11 RX ORDER — CIPROFLOXACIN 2 MG/ML
400 INJECTION, SOLUTION INTRAVENOUS EVERY 12 HOURS
Status: DISCONTINUED | OUTPATIENT
Start: 2021-11-11 | End: 2021-11-12

## 2021-11-11 RX ORDER — SPIRONOLACTONE 25 MG/1
25 TABLET ORAL DAILY
Status: DISCONTINUED | OUTPATIENT
Start: 2021-11-11 | End: 2021-11-13

## 2021-11-11 RX ORDER — METRONIDAZOLE 500 MG/1
500 TABLET ORAL EVERY 8 HOURS SCHEDULED
Status: DISCONTINUED | OUTPATIENT
Start: 2021-11-11 | End: 2021-11-12

## 2021-11-11 RX ORDER — DEXTROSE MONOHYDRATE 25 G/50ML
25-50 INJECTION, SOLUTION INTRAVENOUS
Status: DISCONTINUED | OUTPATIENT
Start: 2021-11-11 | End: 2021-12-02 | Stop reason: HOSPADM

## 2021-11-11 RX ADMIN — ESCITALOPRAM OXALATE 10 MG: 10 TABLET ORAL at 07:54

## 2021-11-11 RX ADMIN — PIPERACILLIN SODIUM AND TAZOBACTAM SODIUM 4.5 G: 4; .5 INJECTION, POWDER, LYOPHILIZED, FOR SOLUTION INTRAVENOUS at 08:20

## 2021-11-11 RX ADMIN — GABAPENTIN 100 MG: 100 CAPSULE ORAL at 14:28

## 2021-11-11 RX ADMIN — PIPERACILLIN SODIUM AND TAZOBACTAM SODIUM 4.5 G: 4; .5 INJECTION, POWDER, LYOPHILIZED, FOR SOLUTION INTRAVENOUS at 03:04

## 2021-11-11 RX ADMIN — OXYCODONE HYDROCHLORIDE 5 MG: 5 TABLET ORAL at 20:39

## 2021-11-11 RX ADMIN — CIPROFLOXACIN 400 MG: 2 INJECTION, SOLUTION INTRAVENOUS at 12:28

## 2021-11-11 RX ADMIN — ASCORBIC ACID, THIAMINE MONONITRATE,RIBOFLAVIN, NIACINAMIDE, PYRIDOXINE HYDROCHLORIDE, FOLIC ACID, CYANOCOBALAMIN, BIOTIN, CALCIUM PANTOTHENATE, 1 CAPSULE: 100; 1.5; 1.7; 20; 10; 1; 6000; 150000; 5 CAPSULE, LIQUID FILLED ORAL at 08:01

## 2021-11-11 RX ADMIN — SPIRONOLACTONE 25 MG: 25 TABLET, FILM COATED ORAL at 11:57

## 2021-11-11 RX ADMIN — OXYCODONE HYDROCHLORIDE 5 MG: 5 TABLET ORAL at 16:00

## 2021-11-11 RX ADMIN — VITAM B12 100 MCG: 100 TAB at 08:02

## 2021-11-11 RX ADMIN — MIRTAZAPINE 15 MG: 15 TABLET, FILM COATED ORAL at 22:36

## 2021-11-11 RX ADMIN — OXYCODONE HYDROCHLORIDE 5 MG: 5 TABLET ORAL at 05:07

## 2021-11-11 RX ADMIN — RIFAXIMIN 550 MG: 550 TABLET ORAL at 20:39

## 2021-11-11 RX ADMIN — LOPERAMIDE HYDROCHLORIDE 2 MG: 2 CAPSULE ORAL at 07:54

## 2021-11-11 RX ADMIN — FOLIC ACID 1 MG: 1 TABLET ORAL at 07:54

## 2021-11-11 RX ADMIN — THIAMINE HCL TAB 100 MG 100 MG: 100 TAB at 08:04

## 2021-11-11 RX ADMIN — OXYCODONE HYDROCHLORIDE 5 MG: 5 TABLET ORAL at 11:57

## 2021-11-11 RX ADMIN — PANTOPRAZOLE SODIUM 40 MG: 40 TABLET, DELAYED RELEASE ORAL at 07:54

## 2021-11-11 RX ADMIN — GABAPENTIN 100 MG: 100 CAPSULE ORAL at 20:39

## 2021-11-11 RX ADMIN — DEXTROSE MONOHYDRATE 25 ML: 500 INJECTION PARENTERAL at 06:07

## 2021-11-11 RX ADMIN — ENOXAPARIN SODIUM 40 MG: 40 INJECTION SUBCUTANEOUS at 07:54

## 2021-11-11 RX ADMIN — METRONIDAZOLE 500 MG: 500 TABLET ORAL at 20:40

## 2021-11-11 RX ADMIN — RIFAXIMIN 550 MG: 550 TABLET ORAL at 07:53

## 2021-11-11 RX ADMIN — METRONIDAZOLE 500 MG: 500 TABLET ORAL at 14:28

## 2021-11-11 RX ADMIN — GABAPENTIN 100 MG: 100 CAPSULE ORAL at 07:54

## 2021-11-11 ASSESSMENT — ACTIVITIES OF DAILY LIVING (ADL)
ADLS_ACUITY_SCORE: 14
ADLS_ACUITY_SCORE: 17
PREVIOUS_RESPONSIBILITIES: MEAL PREP;HOUSEKEEPING;LAUNDRY;SHOPPING;YARDWORK;MEDICATION MANAGEMENT;FINANCES;DRIVING
ADLS_ACUITY_SCORE: 15
ADLS_ACUITY_SCORE: 14
ADLS_ACUITY_SCORE: 15
ADLS_ACUITY_SCORE: 17
ADLS_ACUITY_SCORE: 17
ADLS_ACUITY_SCORE: 15
ADLS_ACUITY_SCORE: 17
ADLS_ACUITY_SCORE: 17
ADLS_ACUITY_SCORE: 14
ADLS_ACUITY_SCORE: 15
ADLS_ACUITY_SCORE: 14
ADLS_ACUITY_SCORE: 14
ADLS_ACUITY_SCORE: 17
ADLS_ACUITY_SCORE: 14
ADLS_ACUITY_SCORE: 17
ADLS_ACUITY_SCORE: 17

## 2021-11-11 ASSESSMENT — MIFFLIN-ST. JEOR: SCORE: 1454.62

## 2021-11-11 NOTE — PROVIDER NOTIFICATION
Notified MD at 802 PM regarding new orders.      Spoke with: E-text    Orders pending.    Comments:   Bridgette BAHENA 6D room 6582 pt prefers to have oral Benadryl vs.  cream.    #[id number]

## 2021-11-11 NOTE — PLAN OF CARE
"/64 (BP Location: Left arm)   Pulse 89   Temp 98.5  F (36.9  C) (Oral)   Resp 22   Ht 1.626 m (5' 4\")   Wt 84.6 kg (186 lb 7.4 oz)   SpO2 99%   BMI 32.01 kg/m       Pt is A&O able to make  Needs known.  BG  64  1/2 amp of D50 given recheck 106. Oxy 5 mg given x2 for pain  with relief stated.    Problem: Respiratory Compromise (Pancreatitis)  Goal: Effective Oxygenation and Ventilation  Intervention: Optimize Oxygenation and Ventilation  Recent Flowsheet Documentation  Taken 11/11/2021 0420 by Monik Will, RN  Activity Management: activity adjusted per tolerance  Head of Bed (HOB) Positioning: HOB at 30 degrees  Taken 11/11/2021 0000 by Monik Will, RN  Activity Management: activity adjusted per tolerance  Head of Bed (HOB) Positioning: HOB at 30 degrees  Taken 11/10/2021 2000 by Monik Will, RN  Activity Management: activity adjusted per tolerance     "

## 2021-11-11 NOTE — PROGRESS NOTES
11/11/21 1119   Quick Adds   Type of Visit Initial PT Evaluation       Present no   Living Environment   People in home other (see comments)  (staff assist)   Current Living Arrangements house   Home Accessibility stairs within home;wheelchair accessible   Number of Stairs, Main Entrance 3   Number of Stairs, Within Home, Primary greater than 10 stairs   Stair Railings, Within Home, Primary railings on both sides of stairs   Transportation Anticipated car, drives self;agency   Living Environment Comments PT: Pt lives with mother in [place], but was initially admitted from  treatment with plans to return to  post inpatient discharge.    Self-Care   Usual Activity Tolerance good   Current Activity Tolerance poor   Regular Exercise Other (see comments)  (ARU PT/OT)   Equipment Currently Used at Home none   Activity/Exercise/Self-Care Comment PT: Pt recently admitted from [facility] where pt was mod I w/ FWW in room, working on endurance and strength training. Prior to initial hospitalization was IND w/ mobility and ADLs.    Disability/Function   Hearing Difficulty or Deaf no   Wear Glasses or Blind yes   Vision Management glasses   Concentrating, Remembering or Making Decisions Difficulty no   Difficulty Communicating no   Walking or Climbing Stairs Difficulty no   Dressing/Bathing Difficulty no   Toileting issues no   Doing Errands Independently Difficulty (such as shopping) yes   Fall history within last six months yes   Number of times patient has fallen within last six months 3   Change in Functional Status Since Onset of Current Illness/Injury yes   General Information   Onset of Illness/Injury or Date of Surgery 11/10/21  (readmit from ARU)   Referring Physician Rasta Allen MD   Patient/Family Therapy Goals Statement (PT) to not lose any strength she has gained at rehab   Pertinent History of Current Problem (include personal factors and/or comorbidities that impact the POC) Aliyah THOMAS  Bridgette is a 46 year old female admitted on 11/10/2021. She has a hx of DMII, etoh abuse, alcholic hepatitis, and Fitz-en-y gastric bypass who was transferred from the [facility] for necrotizing pancreatitis and fevers   Existing Precautions/Restrictions fall   Heart Disease Risk Factors Medical history;Lack of physical activity;Overweight   General Observations Activity orders: Up w/ assist   Cognition   Orientation Status (Cognition) oriented x 4   Affect/Mental Status (Cognition) WNL   Follows Commands (Cognition) WNL   Pain Assessment   Patient Currently in Pain No   Integumentary/Edema   Integumentary/Edema Comments 3+ pitting edema in BLEs, wraps doffed, mildly tender with pressure.    Posture    Posture Forward head position;Protracted shoulders   Range of Motion (ROM)   ROM Comment grossly WFL, mild soft tissue approximation in BLEs   Strength Comprehensive (MMT)   Comment, General Manual Muscle Testing (MMT) Assessment >3/5 for functional mobility but functoinal BLE deconditioning noted   Bed Mobility   Comment (Bed Mobility) IND   Transfers   Transfer Safety Comments IND   Gait/Stairs (Locomotion)   Comment (Gait/Stairs) SBA w/o device for short distances, mod I w/ FWW   Balance   Balance Comments mild dynamic instability w/o device needing SBA, good static standing balance w/o UE support. IND sitting balance   Sensory Examination   Sensory Perception patient reports no sensory changes   Coordination   Coordination no deficits were identified   Muscle Tone   Muscle Tone no deficits were identified   Clinical Impression   Criteria for Skilled Therapeutic Intervention yes, treatment indicated   PT Diagnosis (PT) impaired functional mobility    Influenced by the following impairments decreased strength, functional activity tolerance and aerobic capacity, edema   Functional limitations due to impairments decreased (I) w/ gait, tolerance to ADLs   Clinical Presentation Evolving/Changing   Clinical  Presentation Rationale current status, discharge dispo, clinical judgement, [facility]   Clinical Decision Making (Complexity) moderate complexity   Therapy Frequency (PT) 6x/week   Predicted Duration of Therapy Intervention (days/wks) 4 days   Planned Therapy Interventions (PT) balance training;bed mobility training;gait training;home exercise program;motor coordination training;neuromuscular re-education;patient/family education;stair training;strengthening;stretching;transfer training;progressive activity/exercise;risk factor education;home program guidelines;manual therapy techniques   Anticipated Equipment Needs at Discharge (PT)   (may require FWW)   Risk & Benefits of therapy have been explained evaluation/treatment results reviewed;care plan/treatment goals reviewed;risks/benefits reviewed;current/potential barriers reviewed;participants voiced agreement with care plan;participants included;patient   Clinical Impression Comments PT: Pt presents below baseline in setting of decreased activity tolerance, functional strength and balance. Pt will benefit from skilled PT to progress functional mobility towards successful discharge.    PT Discharge Planning    PT Discharge Recommendation (DC Rec) home with outpatient physical therapy  (plan for CD)   PT Rationale for DC Rec Anticipate over LOS to progress to discharge to CD, may require FWW at discharge. Pt will benefit from OP PT at discharge to progress functional activity tolerance, strength and balance   PT Brief overview of current status  SBA w/o device in room and w/ FWW in hallway. Will benefit on ambulation bouts in hallway w/ nursing staff.    Total Evaluation Time   Total Evaluation Time (Minutes) 5

## 2021-11-11 NOTE — PROVIDER NOTIFICATION
Notified MD at 555 AM regarding BG.      Spoke with:e-text    Orders pending.    Comments: G.VICKI 6D room 6528  Pt is NPO BG now is 64. no PRN orders do you want to start D 10?    Monik   #[id number]

## 2021-11-11 NOTE — PROGRESS NOTES
11/11/21 1442   Quick Adds   Type of Visit Initial Occupational Therapy Evaluation   Living Environment   People in home   (mother)   Current Living Arrangements house   Home Accessibility stairs to enter home;stairs within home   Number of Stairs, Main Entrance 3   Number of Stairs, Within Home, Primary   (flight to basement)   Stair Railings, Within Home, Primary railings on both sides of stairs   Transportation Anticipated family or friend will provide   Living Environment Comments Above is set up for her mom's house. Pt lives with mother in [place], but was initially admitted from  treatment with plans to return to  post inpatient discharge.    Self-Care   Usual Activity Tolerance good   Current Activity Tolerance moderate   Regular Exercise   (OT/PT at ARU)   Equipment Currently Used at Home none   Activity/Exercise/Self-Care Comment Had been at ARU prior to return to hospital. At baseline, is I with ADLs/IADLs. Not currently working. Enjoys riding horses.    Instrumental Activities of Daily Living (IADL)   Previous Responsibilities meal prep;housekeeping;laundry;shopping;yardwork;medication management;finances;driving   IADL Comments Shared responsibility of IADLs with mother   Disability/Function   Hearing Difficulty or Deaf no   Wear Glasses or Blind yes   Vision Management glasses   Concentrating, Remembering or Making Decisions Difficulty no   Difficulty Communicating no   Walking or Climbing Stairs Difficulty no   Dressing/Bathing Difficulty no   Toileting issues no   Doing Errands Independently Difficulty (such as shopping) no   Fall history within last six months yes   Number of times patient has fallen within last six months 3  (r/t drinking)   Change in Functional Status Since Onset of Current Illness/Injury yes   General Information   Onset of Illness/Injury or Date of Surgery 11/10/21   Referring Physician CHRISTIN Leung   Patient/Family Therapy Goal Statement (OT) go to chemical  dependency   Additional Occupational Profile Info/Pertinent History of Current Problem Aliyah Angeles is a 46 year old female admitted on 11/10/2021. She has a hx of DMII, etoh abuse, alcholic hepatitis, and Fitz-en-y gastric bypass who was transferred from the [facility] for necrotizing pancreatitis and fevers   General Observations and Info Activity: up W/A   Cognitive Status Examination   Orientation Status orientation to person, place and time   Cognitive Status Comments Pt reports she feels she is at her cognitive baseline. Is grossly oriented, will monitor. MoCA 8.1 was 26/30 on 10/25 where = or > 26 is considered normal.    Visual Perception   Visual Impairment/Limitations corrective lenses full-time   Impact of Vision Impairment on Function (Vision) denies acute vision   Sensory   Sensory Comments denies n/t   Pain Assessment   Patient Currently in Pain Yes, see Vital Sign flowsheet  (general ache in belly, turns into stabbing pain)   Integumentary/Edema   Integumentary/Edema Comments PT to follow for edema this admission. Pt with moderate edema into thighs, upper legs feel heavy, Mixed 1+/2+ pitting distally.    Range of Motion Comprehensive   Comment, General Range of Motion B UE WNL   Strength Comprehensive (MMT)   Comment, General Manual Muscle Testing (MMT) Assessment grossly 3+/5 UEs   Coordination   Upper Extremity Coordination No deficits were identified   Bed Mobility   Comment (Bed Mobility) I w/HOB flat   Transfers   Transfer Comments SBA   Toileting   Comment (Toileting) mod i with IV pole for support   Clinical Impression   Criteria for Skilled Therapeutic Interventions Met (OT) yes   OT Diagnosis Decreased ADL I   OT Problem List-Impairments impacting ADL problems related to;activity tolerance impaired;balance;strength;pain   Assessment of Occupational Performance 1-3 Performance Deficits   Identified Performance Deficits home management, bathing   Planned Therapy Interventions (OT) ADL  retraining;IADL retraining;strengthening;home program guidelines;progressive activity/exercise   Clinical Decision Making Complexity (OT) low complexity   Therapy Frequency (OT) 4x/week   Predicted Duration of Therapy 1 week   Risk & Benefits of therapy have been explained evaluation/treatment results reviewed;care plan/treatment goals reviewed   Comment-Clinical Impression Pt to benefit from skilled OT to address above deficits. See daily flowsheet for details regarding tx provided.    OT Discharge Planning    OT Discharge Recommendation (DC Rec) Home with assist   OT Rationale for DC Rec Anticipate pt able to d/c to IP chem dep when medically ready for d/c. Demonstrates decreased activity tolerance and strength but is moving well.    OT Brief overview of current status  Up with IV pole for support, ambulating x ~150' x 2 with  no LOB, toileting mod i.    Total Evaluation Time (Minutes)   Total Evaluation Time (Minutes) 5

## 2021-11-11 NOTE — PROGRESS NOTES
Care Management Follow Up    Length of Stay (days): 1    Expected Discharge Date: 11/15/2021     Concerns to be Addressed:       Patient plan of care discussed at interdisciplinary rounds: Yes    Anticipated Discharge Disposition:       Anticipated Discharge Services:    Anticipated Discharge DME:      Patient/family educated on Medicare website which has current facility and service quality ratings:    Education Provided on the Discharge Plan:    Patient/Family in Agreement with the Plan:      Referrals Placed by CM/SW:    Private pay costs discussed: Not applicable    Additional Information:  Recs had been changed for Pt so she will not be returning to ARU. YANDY made call to CE CounselorAraceli to f/u with a request for a Rule 25 assessment since updated recs is home with OPTx 11/11. SW to f/u with Pt and with CD Counselor to work on discharge plans.      Addendum: YANDY requested order to be written for CD consult/Rule 25. Provider f/u with YANDY to confirm.      Kinga Melgar, OJW, LSW  Acute Care [facility]   PH#: [phone]  Pager#: [phone]  [facility]

## 2021-11-11 NOTE — PROVIDER NOTIFICATION
Notified MD at 515 AM regarding lab results.      Spoke with: e-text    Orders pending.    Comments: Bridgette COHEN 6D room 6541 Pt is NPO BG 75 at mid night now 64 do you want to start D10?    #[id number]

## 2021-11-11 NOTE — PROGRESS NOTES
Pt is hemodynamically stable. Maintaining sats on 3L O2 via NC.  A&O x4. Abdominal pain radiates across quadrants response well to 5mg oxycodone.  On regular diet and tolerating well. Ambulates with standby assist and walker. Diarrhea not visualize. Abdomen soft but distended still. Pt up to the bathroom with writer x 2. PT today and patient with minimal dyspnea after PT. Transferred to [facility] at 1650.

## 2021-11-11 NOTE — PROGRESS NOTES
[facility]    Medicine Progress Note - Hospitalist Service       Date of Admission:  11/10/2021    Assessment & Plan           Aliyah Angeles is a 46 year old female admitted on 11/10/2021. She has a hx of DMII, etoh abuse, alcholic hepatitis, and Fitz-en-y gastric bypass who was transferred from the [facility] ARU for necrotizing pancreatitis and fevers.     #Severe sepsis (HR, Temp) 2/2 infected acute pancreatic necrosis, present on admission   - complex recent hospitalization course recently, notably from 9/29 to 11/5 w/ alk hep, series of pneumonias and ARDS w/ CRRT ICU stay/intubation, improved and was at ARU  - There, malaise, fever, nausea and belly pain occurred prompting CT scan showing pancreatitis w/ area of necrosis surrounding splenic vein and artery  - Recent admission driving by etoh sequalae and pt amenable to treatment, had planned on CD admission after ARU admission  - Suspect that smoldering alcoholic pancreatitis in the interim (after last CT 10/14 which was without pancreatitis, though a 9/13 CT scan OSH did show stranding) was occurring and though acute phase is over, necrotic area has become infected  Plan:  *zosyn (11/9 - 11/11) deescalated to ciprofloxacin + flagyl (11/11 - )  *no MIVF given rapid ascites accumulation  *GI consulted given vascular involvement  *oxy 5mg PRN for pain, decrease as able    #Acute hypoxemic respiratory failure  - needed up to 4L to maintain sats this admission, suspect 2/2 pulmonary edema in the setting of fluids and low oncotic pressure  *gentle diuresis as tolerated above given recent / potentially active pancreatitis    #Suspect evolving alcoholic liver disease  - has not had chance to f/u output w/ hepatology, only started needing paracenteses during last admission (10/2021), w/ rising INR suspect worsening liver disease  *CD consult placed  *c/w rifaxamin for HE last admission  *add lactulose if needed, was  discontinued last admission  *spironolactone (11/11 - ) add lasix as tolerated  *Hepatology consulted for any additional workup given progressive ascites and edema    #Anxiety/Depression  *cont home lexapro, remeron       Diet: Regular Diet Adult    DVT Prophylaxis: Enoxaparin (Lovenox) SQ  Rice Catheter: Not present  Central Lines: None  Code Status: Full Code      Disposition Plan   Expected discharge: 11/15/2021   recommended to prior living arrangement once adequate pain management/ tolerating PO medications, antibiotic plan established and O2 use less than 0 liters/minute.     The patient's care was discussed with the Patient.    Rasta Allen MD  Hospitalist Service  [facility]  Securely message with the Vocera Web Console (learn more here)  Text page via [employer] Paging/Directory    Please see sign in/sign out for up to date coverage information    Clinically Significant Risk Factors Present on Admission               ______________________________________________________________________    Interval History   Belly pain controled w/ pain medication. Feels ready to advance diet today.    4 point ROS otherwise negative.     Data reviewed today: I reviewed all medications, new labs and imaging results over the last 24 hours. I personally reviewed no images or EKG's today.    Physical Exam   Vital Signs: Temp: 98  F (36.7  C) Temp src: Oral BP: 118/79 Pulse: 91   Resp: 23 SpO2: 96 % O2 Device: Nasal cannula Oxygen Delivery: 3 LPM  Weight: 186 lbs 7.44 oz    Physical Exam   Constitutional: sleeping with eye mask NAD  HEENT: EOMI  Neck: Symmetric  Cardiovascular: regular without murmurs or gallops  Pulmonary/Chest: bibasilar crackles. No respiratory distress.  GI: Soft, diffusely tender throughout, moderately distended with ascites  Musculoskeletal:  + bilateral lower extremity edema to pelvis  Skin: Skin is warm and dry  Neurological: Alert and oriented   Psychiatric:   euthymic      Data   Recent Labs   Lab 11/11/21  0807 11/11/21  0637 11/11/21  0512 11/10/21  2248 11/10/21  0538 11/09/21  1716 11/09/21  1539 11/09/21  0807 11/09/21  0448 11/08/21  2352   WBC 9.8  --   --   --  11.4*  --   --   --  13.1*  --    HGB 8.1*  --   --   --  8.1*  --   --   --  8.9*  --      --   --   --  99  --   --   --  98  --      --   --   --  231  --   --   --  273  --    INR  --   --   --   --  1.42*  --   --   --   --   --      --   --   --  142  --  138  --  137  --    POTASSIUM 3.4  --   --   --  3.5  --  3.7  --  3.3*  --    CHLORIDE 111*  --   --   --  110*  --  111*  --  108  --    CO2 21  --   --   --  21  --  22  --  22  --    BUN 8  --   --   --  5*  --  5*  --  5*  --    CR 1.16*  --   --   --  1.14*  --  1.02  --  1.11*  --    ANIONGAP 8  --   --   --  11  --  5  --  7  --    STEFFANY 7.7*  --   --   --  7.6*  --  7.7*  --  7.7*  --    GLC 80 106* 64*   < > 87   < > 86   < > 95  --    ALBUMIN 1.5*  --   --   --  1.5*  --  1.8*  --  1.8*  --    PROTTOTAL 5.6*  --   --   --  5.6*  --  6.2*  --  6.2*  --    BILITOTAL 1.7*  --   --   --  1.9*  --  1.9*  --  2.0*  --    ALKPHOS 168*  --   --   --  178*  --  200*  --  222*  --    ALT 16  --   --   --  19  --  22  --  29  --    AST 31  --   --   --  43  --  55*  --  65*  --    LIPASE  --   --   --   --   --   --  175  --   --  203    < > = values in this interval not displayed.     No results found for this or any previous visit (from the past 24 hour(s)).

## 2021-11-11 NOTE — PROVIDER NOTIFICATION
Notified MD at 537 AM regarding new orders.      Spoke with: E-Text    Orders pending.    Comments: G.VICKI 6D room 6574  Pt is NPO BG now is 64. no PRN orders do you want to start D 10?    Monik   #[id number]

## 2021-11-12 ENCOUNTER — APPOINTMENT (OUTPATIENT)
Dept: PHYSICAL THERAPY | Facility: CLINIC | Age: 46
End: 2021-11-12
Attending: INTERNAL MEDICINE
Payer: COMMERCIAL

## 2021-11-12 LAB
ALBUMIN SERPL-MCNC: 1.4 G/DL (ref 3.4–5)
ALP SERPL-CCNC: 164 U/L (ref 40–150)
ALT SERPL W P-5'-P-CCNC: 15 U/L (ref 0–50)
AMYLASE FLD-CCNC: 8 U/L
ANION GAP SERPL CALCULATED.3IONS-SCNC: 6 MMOL/L (ref 3–14)
AST SERPL W P-5'-P-CCNC: 27 U/L (ref 0–45)
BILIRUB SERPL-MCNC: 1.6 MG/DL (ref 0.2–1.3)
BUN SERPL-MCNC: 8 MG/DL (ref 7–30)
CALCIUM SERPL-MCNC: 7.6 MG/DL (ref 8.5–10.1)
CHLORIDE BLD-SCNC: 112 MMOL/L (ref 94–109)
CO2 SERPL-SCNC: 22 MMOL/L (ref 20–32)
CREAT SERPL-MCNC: 1.01 MG/DL (ref 0.52–1.04)
ERYTHROCYTE [DISTWIDTH] IN BLOOD BY AUTOMATED COUNT: 16.9 % (ref 10–15)
GFR SERPL CREATININE-BSD FRML MDRD: 66 ML/MIN/1.73M2
GLUCOSE BLD-MCNC: 83 MG/DL (ref 70–99)
GLUCOSE BLDC GLUCOMTR-MCNC: 72 MG/DL (ref 70–99)
HCT VFR BLD AUTO: 26.1 % (ref 35–47)
HGB BLD-MCNC: 8 G/DL (ref 11.7–15.7)
LIPASE FLD-CCNC: 53 U/L
MAGNESIUM SERPL-MCNC: 1.3 MG/DL (ref 1.6–2.3)
MCH RBC QN AUTO: 30.3 PG (ref 26.5–33)
MCHC RBC AUTO-ENTMCNC: 30.7 G/DL (ref 31.5–36.5)
MCV RBC AUTO: 99 FL (ref 78–100)
PLATELET # BLD AUTO: 216 10E3/UL (ref 150–450)
POTASSIUM BLD-SCNC: 3.1 MMOL/L (ref 3.4–5.3)
POTASSIUM BLD-SCNC: 3.6 MMOL/L (ref 3.4–5.3)
PROT SERPL-MCNC: 5.6 G/DL (ref 6.8–8.8)
RBC # BLD AUTO: 2.64 10E6/UL (ref 3.8–5.2)
SODIUM SERPL-SCNC: 140 MMOL/L (ref 133–144)
WBC # BLD AUTO: 7.2 10E3/UL (ref 4–11)

## 2021-11-12 PROCEDURE — 84132 ASSAY OF SERUM POTASSIUM: CPT | Performed by: HOSPITALIST

## 2021-11-12 PROCEDURE — 99233 SBSQ HOSP IP/OBS HIGH 50: CPT | Performed by: HOSPITALIST

## 2021-11-12 PROCEDURE — 99222 1ST HOSP IP/OBS MODERATE 55: CPT | Mod: GC | Performed by: INTERNAL MEDICINE

## 2021-11-12 PROCEDURE — 97110 THERAPEUTIC EXERCISES: CPT | Mod: GP

## 2021-11-12 PROCEDURE — 83735 ASSAY OF MAGNESIUM: CPT | Performed by: HOSPITALIST

## 2021-11-12 PROCEDURE — 85014 HEMATOCRIT: CPT | Performed by: HOSPITALIST

## 2021-11-12 PROCEDURE — 250N000011 HC RX IP 250 OP 636: Performed by: HOSPITALIST

## 2021-11-12 PROCEDURE — 97530 THERAPEUTIC ACTIVITIES: CPT | Mod: GP

## 2021-11-12 PROCEDURE — 36415 COLL VENOUS BLD VENIPUNCTURE: CPT | Performed by: HOSPITALIST

## 2021-11-12 PROCEDURE — 120N000002 HC R&B MED SURG/OB UMMC

## 2021-11-12 PROCEDURE — 97140 MANUAL THERAPY 1/> REGIONS: CPT | Mod: GP

## 2021-11-12 PROCEDURE — H0001 ALCOHOL AND/OR DRUG ASSESS: HCPCS

## 2021-11-12 PROCEDURE — 250N000013 HC RX MED GY IP 250 OP 250 PS 637: Performed by: INTERNAL MEDICINE

## 2021-11-12 PROCEDURE — 250N000013 HC RX MED GY IP 250 OP 250 PS 637: Performed by: HOSPITALIST

## 2021-11-12 PROCEDURE — 82040 ASSAY OF SERUM ALBUMIN: CPT | Performed by: HOSPITALIST

## 2021-11-12 PROCEDURE — 250N000011 HC RX IP 250 OP 636: Performed by: INTERNAL MEDICINE

## 2021-11-12 RX ORDER — LIDOCAINE 4 G/G
1 PATCH TOPICAL
Status: DISCONTINUED | OUTPATIENT
Start: 2021-11-12 | End: 2021-11-30

## 2021-11-12 RX ORDER — POTASSIUM CHLORIDE 750 MG/1
40 TABLET, EXTENDED RELEASE ORAL ONCE
Status: COMPLETED | OUTPATIENT
Start: 2021-11-12 | End: 2021-11-12

## 2021-11-12 RX ORDER — DIPHENHYDRAMINE HCL 25 MG
25 CAPSULE ORAL EVERY 6 HOURS PRN
Status: DISCONTINUED | OUTPATIENT
Start: 2021-11-12 | End: 2021-12-02 | Stop reason: HOSPADM

## 2021-11-12 RX ORDER — MAGNESIUM SULFATE HEPTAHYDRATE 40 MG/ML
4 INJECTION, SOLUTION INTRAVENOUS ONCE
Status: COMPLETED | OUTPATIENT
Start: 2021-11-12 | End: 2021-11-12

## 2021-11-12 RX ADMIN — GABAPENTIN 100 MG: 100 CAPSULE ORAL at 20:26

## 2021-11-12 RX ADMIN — CIPROFLOXACIN 400 MG: 2 INJECTION, SOLUTION INTRAVENOUS at 13:13

## 2021-11-12 RX ADMIN — THIAMINE HCL TAB 100 MG 100 MG: 100 TAB at 09:15

## 2021-11-12 RX ADMIN — RIFAXIMIN 550 MG: 550 TABLET ORAL at 09:15

## 2021-11-12 RX ADMIN — CIPROFLOXACIN 400 MG: 2 INJECTION, SOLUTION INTRAVENOUS at 01:38

## 2021-11-12 RX ADMIN — FOLIC ACID 1 MG: 1 TABLET ORAL at 09:15

## 2021-11-12 RX ADMIN — OXYCODONE HYDROCHLORIDE 5 MG: 5 TABLET ORAL at 05:57

## 2021-11-12 RX ADMIN — VITAM B12 100 MCG: 100 TAB at 09:15

## 2021-11-12 RX ADMIN — ASCORBIC ACID, THIAMINE MONONITRATE,RIBOFLAVIN, NIACINAMIDE, PYRIDOXINE HYDROCHLORIDE, FOLIC ACID, CYANOCOBALAMIN, BIOTIN, CALCIUM PANTOTHENATE, 1 CAPSULE: 100; 1.5; 1.7; 20; 10; 1; 6000; 150000; 5 CAPSULE, LIQUID FILLED ORAL at 10:45

## 2021-11-12 RX ADMIN — POTASSIUM CHLORIDE 40 MEQ: 750 TABLET, EXTENDED RELEASE ORAL at 10:45

## 2021-11-12 RX ADMIN — Medication 1 MG: at 01:41

## 2021-11-12 RX ADMIN — ESCITALOPRAM OXALATE 10 MG: 10 TABLET ORAL at 09:14

## 2021-11-12 RX ADMIN — SPIRONOLACTONE 25 MG: 25 TABLET, FILM COATED ORAL at 09:15

## 2021-11-12 RX ADMIN — OXYCODONE HYDROCHLORIDE 5 MG: 5 TABLET ORAL at 18:16

## 2021-11-12 RX ADMIN — Medication 1 MG: at 21:12

## 2021-11-12 RX ADMIN — LIDOCAINE 1 PATCH: 560 PATCH PERCUTANEOUS; TOPICAL; TRANSDERMAL at 18:16

## 2021-11-12 RX ADMIN — METRONIDAZOLE 500 MG: 500 TABLET ORAL at 05:57

## 2021-11-12 RX ADMIN — OXYCODONE HYDROCHLORIDE 5 MG: 5 TABLET ORAL at 01:42

## 2021-11-12 RX ADMIN — AMOXICILLIN AND CLAVULANATE POTASSIUM 1 TABLET: 875; 125 TABLET, FILM COATED ORAL at 20:26

## 2021-11-12 RX ADMIN — RIFAXIMIN 550 MG: 550 TABLET ORAL at 20:26

## 2021-11-12 RX ADMIN — MIRTAZAPINE 15 MG: 15 TABLET, FILM COATED ORAL at 21:12

## 2021-11-12 RX ADMIN — OXYCODONE HYDROCHLORIDE 5 MG: 5 TABLET ORAL at 14:12

## 2021-11-12 RX ADMIN — MAGNESIUM SULFATE IN WATER 4 G: 40 INJECTION, SOLUTION INTRAVENOUS at 10:12

## 2021-11-12 RX ADMIN — PANTOPRAZOLE SODIUM 40 MG: 40 TABLET, DELAYED RELEASE ORAL at 09:15

## 2021-11-12 RX ADMIN — GABAPENTIN 100 MG: 100 CAPSULE ORAL at 09:15

## 2021-11-12 RX ADMIN — METRONIDAZOLE 500 MG: 500 TABLET ORAL at 14:12

## 2021-11-12 RX ADMIN — ENOXAPARIN SODIUM 40 MG: 40 INJECTION SUBCUTANEOUS at 10:15

## 2021-11-12 RX ADMIN — OXYCODONE HYDROCHLORIDE 5 MG: 5 TABLET ORAL at 10:14

## 2021-11-12 RX ADMIN — GABAPENTIN 100 MG: 100 CAPSULE ORAL at 14:12

## 2021-11-12 ASSESSMENT — ACTIVITIES OF DAILY LIVING (ADL)
ADLS_ACUITY_SCORE: 14

## 2021-11-12 NOTE — CONSULTS
11/12/2021      Pt has been interviewed via [employer] and CD Consult has been completed. Pt reports she would like to attend inpatient CD treatment. Pt reports she would like to return to [facility] but was agreeable to [facility] also. Email has been sent to unit SW with ROBERT's for pt to sign. Referrals have been sent.    Recommendations:      1. Abstain from all non-prescribed mood-altering substances  2. Take all medications as prescribed  3. Enter and complete a residential or inpatient treatment program  4. Increase sober support  5. Follow all recommendations upon discharge from treatment. Recommendations may include, but are not limited to: extended treatment, outpatient treatment and/or sober housing.        6.   Follow all recommendations of your medical providers        Referrals:     Nevaeh sher [facility]  Phone: [phone]  Fax: [phone] ATTN: [facility]  Phone: [phone]  Fax: [phone]  Email: [email or web address]  Address: [address] [place] [id number]     BHARGAV consult completed by: KAMLESH Rodriguez  Phone Number: [phone]  E-mail Address:   [facility] Evaluation Department  [address] [place] [id number]

## 2021-11-12 NOTE — PROGRESS NOTES
11/12/21 1400   Edema General Information   Onset of Edema 11/01/21  (estimated)   Affected Body Part(s) Right LE;Left LE   Edema Etiology Other (see comments)  (alcholoic hepatitis)   Edema Precaution Comments verbal okay from provider for compression   General Comments/Previous Edema Treatment/Edema Equipment pt reports no previouse hx of edema prior to this hospitalization. Pt being wrapped at ARU prior to re-admit to hospital   Edema Examination/Assessment   Skin Condition Pitting;Intact   Skin Condition Comments tender to deep pressure, taut, no evidence of skin tears/wounds.   Scar No   Ulcerations No   Radial Pulse Symmetrical   Skin Integrity intact, no ecessive dryness noted   Pitting Assessment 3+ pitting, L>R, focal over dorsum of feet   Fibrosis Assessment non fibrotic   Clinical Impression   Edema: Patient Presentation Stage 2 Lymphedema   Predicted Duration of Therapy Intervention (days/wks) ~3-4 days   Edema: Planned Interventions Gradient compression bandaging;Fit for compression garment;Edema exercises;Precautions to prevent infection/exacerbation;Education;Manual therapy   Clinical Impression Comments PT: Pt will benefit from edema treatment in BLEs to progress functional mobility tolerance and allow LE dressing/ footwear.

## 2021-11-12 NOTE — CONSULTS
Gastroenterology Consultation  Aliyah Angeles [id number] 46 year old 1975 11/12/2021        Date of Admission: 11/10/2021  Requesting physician: Rasta Alba MD       Reason for Consultation:   Necrotizing Pancreatitis           Assessment and Plan:   Aliyah Angeles is a 46 year old female with history of ETOH use disorder (last drink 9/12/21), alcoholic hepatitis, Fitz en y gastric bypass, alcohol induced pancreatitis, Type 2 DM, anxiety, and depression who presented with ETOH necrotizing pancreatitis with ascites, ANC without gas and pleural effusion.     # Necrotizing Pancreatitis    Etiology: ETOH   Date of onset: 11/9   BISAP score: 1 (pleural effusion)   Fluid collections:     Infection: None    Antibiotics: Ctx Flagyl   Concurrent organ failures: Pulmonary   Nutrition: Oral   Necrosectomy    Last imaging study: CT 11/9  Onset 11/9. Currently suspected PD leak with ongoing ascites last paracentesis 11/9 but no amylase or lipase (just added). Positive for fluid exam today so will reassess with US and para if needed. CT showed ANC 5x4x3 cm without gas. This is still very early in course for infected necrosis to happen and pt has been afebrile without leukocytosis. Her ascites all likely from pancreatitis rather than liver cirrhosis but difficult to diagnose given very recent alcoholic hepatitis (No e/o portal HTN on CT, normal plt, however SAAG 1.3 on 11/9). Liver consult order is discontinued. Panc/bili will follow the patient.          Recommendations:   - Amylase and Lipase added to previous ascitic fluid on 11/9  - Treat pancreatitis supportively with fluid, pain control and diet as tolerated.   - Please obtain another abd US to assess for ascites and if present, please do another therapeutic paracentesis and send for cell count, gram stain, albumin, protein, amylase and lipase. If this is due to pancreatic leak, the ascites with digestive enzymes can do damage do peritoneum and increased the  risk of complications.   - Please consult nutrition to help optimize her diet. Good nutrition prevent complications such as infected necrosis.   - Calorie count x3 day and fecal elastase ordered.   - Can discontinue antibiotics, there is no e/o infected necrosis.     It has been a pleasure to participate in the care of this patient.  Patient discussed with GI staff, Dr. Augustin.  Please do not hesitate to contact the GI service with any questions or concerns.     Clive Barnes MD  Gastroenterology Fellow  [facility]  Text page [phone]           HPI:   Aliyah Angeles is a 46 year old female with history of ETOH use disorder (last drink 9/12/21), alcoholic hepatitis, Fitz en y gastric bypass, alcohol induced pancreatitis, Type 2 DM, anxiety, and depression who presented with necrotizing pancreatitis.     9/13-9/21 hospitalized in setting of alcoholic hepatitis. [facility]     9/29-11/5 She presented with  abdominal pain, le edema, and nausea from alcohol treatment facility on 9/29. Course was complicated by acute hypoxic respiratory failure with ARDS  in setting of sepsis she was intubated and sedated 10/4. She self extubated 10/14.  Course was further complicated by ANA LAURA requiring CRRT then HD, hypervolemia,  pneumonia (aspiration vs VAP), possible UTI, metabolic encephalopathy, severe malnutrition, and electrolyte disturbances. Eventually, she was discharged to ARU.     11/5 - 11/8 while at ARU, she did well initially but was having low appetite and cont to have epigastric pain with eating. 11/8 afternoon with worsening abdomina pain, tender in RLQ radiating up the right side, low grade temp, nausea.  Denied dysuria, reports disinterest in eating, feeling unwell, some shortness of breath wearing one liter oxygen. CT abdomen with findings concerning for necrotizing pancreatitis and acute necrotic collections without gas, bilateral pleural effusions, and ascites.       11/9 patient ongoing abdominal pain now more generalized, low grade temp, new leucocytosis, nausea, loose stool.  Feeling more short of breath stable on one liter nasal cannula. Paracentesis was done with 2.5L removed. Fluid study not consistent SBP. SAAG 1.3. With ongoing pain and c/f necrotizing pancreatitis pt was transferred to [place] [facility].    In the ED, afebrile with normal BP. CBC and BMP notable for WBC 7, Hgb 8. LFTs showed Tbili 1.6, AST/ALT normal, Alk Phos 164 (near baseline). Still having pain with eating. Denied fever, chills, N/V.          Past Medical History:   Reviewed and edited as appropriate  Past Medical History:   Diagnosis Date     Acne      ADHD (attention deficit hyperactivity disorder)      Arthralgia of temporomandibular joint 5/19/2016     Arthritis      Cervical high risk HPV (human papillomavirus) test positive 4/27/2018 4/27/18 NIL pap, + HR HPV (not 16 or 18). Plan: cotest in 1 yr.       Cobalamin deficiency 11/24/2014     Drug-seeking behavior 5/19/2016    Overview:  Per  website, patient has filled with multiple controlled substances with multiple prescribers at multiple pharmacies      History of blood transfusion 01/01/1988     Insomnia 7/21/2013     Raynaud's phenomenon 5/25/2015     Vitamin D deficiency 1/16/2013            Past Surgical History:   Reviewed and edited as appropriate   Past Surgical History:   Procedure Laterality Date     ABDOMEN SURGERY  08/10/2010    Gastric  bypass     BIOPSY  1/1/94    HPV, multiple reoccurrances, partial  hysterectomy  2013     CHOLECYSTECTOMY  01/01/2010     COLONOSCOPY  01/01/2009     ENT SURGERY  01/01/1988    Tonsils     GI SURGERY  8/10/10    Fitz-en-Y     HYSTERECTOMY VAGINAL  04/05/2013    pathology of cervix benign.      IR CVC NON TUNNEL LINE REMOVAL  10/22/2021     IR CVC TUNNEL PLACEMENT > 5 YRS OF AGE  10/22/2021     IR CVC TUNNEL REMOVAL RIGHT  11/3/2021     IR PARACENTESIS  11/9/2021            Medications:  "    Current Facility-Administered Medications   Medication     albuterol (PROAIR HFA/PROVENTIL HFA/VENTOLIN HFA) 108 (90 Base) MCG/ACT inhaler 2 puff     ciprofloxacin (CIPRO) infusion 400 mg     cyanocobalamin (VITAMIN B-12) tablet 100 mcg     glucose gel 15-30 g    Or     dextrose 50 % injection 25-50 mL    Or     glucagon injection 1 mg     diphenhydrAMINE-zinc acetate (BENADRYL) 2-0.1 % cream     enoxaparin ANTICOAGULANT (LOVENOX) injection 40 mg     escitalopram (LEXAPRO) tablet 10 mg     folic acid (FOLVITE) tablet 1 mg     gabapentin (NEURONTIN) capsule 100 mg     lidocaine (LMX4) cream     lidocaine 1 % 0.1-1 mL     loperamide (IMODIUM) capsule 2 mg     melatonin tablet 1 mg     methocarbamol (ROBAXIN) tablet 500 mg     metroNIDAZOLE (FLAGYL) tablet 500 mg     mirtazapine (REMERON) tablet 15 mg     multivitamin RENAL (NEPHROCAPS/TRIPHROCAPS) capsule 1 capsule     naloxone (NARCAN) injection 0.2 mg    Or     naloxone (NARCAN) injection 0.4 mg    Or     naloxone (NARCAN) injection 0.2 mg    Or     naloxone (NARCAN) injection 0.4 mg     ondansetron (ZOFRAN-ODT) ODT tab 4 mg     oxyCODONE (ROXICODONE) tablet 5 mg     pantoprazole (PROTONIX) EC tablet 40 mg     polyethylene glycol (MIRALAX) Packet 17 g     prochlorperazine (COMPAZINE) injection 10 mg    Or     prochlorperazine (COMPAZINE) tablet 10 mg    Or     prochlorperazine (COMPAZINE) suppository 25 mg     rifaximin (XIFAXAN) tablet 550 mg     simethicone (MYLICON) chewable tablet 80 mg     sodium chloride (PF) 0.9% PF flush 3 mL     sodium chloride (PF) 0.9% PF flush 3 mL     spironolactone (ALDACTONE) tablet 25 mg     thiamine (B-1) tablet 100 mg            Allergies      Allergies   Allergen Reactions     Nsaids      Gastric bypass     Trazodone      In a \"hazy\" for the whole next day            Social History:   Reviewed and edited as appropriate  Social History     Socioeconomic History     Marital status: Legally      Spouse name: Not on file "     Number of children: Not on file     Years of education: Not on file     Highest education level: Not on file   Occupational History     Not on file   Tobacco Use     Smoking status: Current Every Day Smoker     Packs/day: 1.00     Years: 15.00     Pack years: 15.00     Types: Cigarettes     Start date: 1/1/1989     Smokeless tobacco: Never Used     Tobacco comment: 10/1/2021: Declined behavioral counseling, NRT, and post-hosp follow-up   Substance and Sexual Activity     Alcohol use: Yes     Alcohol/week: 0.0 standard drinks     Comment: daily unknown amount      Drug use: No     Sexual activity: Yes     Partners: Male     Birth control/protection: Male Surgical, Female Surgical     Comment:  one partner   Other Topics Concern     Parent/sibling w/ CABG, MI or angioplasty before 65F 55M? No   Social History Narrative     Not on file     Social Determinants of Health     Financial Resource Strain: Not on file   Food Insecurity: Not on file   Transportation Needs: Not on file   Physical Activity: Not on file   Stress: Not on file   Social Connections: Not on file   Intimate Partner Violence: Not on file   Housing Stability: Not on file           Family History:   Reviewed and edited as appropriate  Family History   Problem Relation Age of Onset     Prostate Cancer Father      Hypertension Father      Hyperlipidemia Father      Substance Abuse Father      Obesity Father      Diabetes Maternal Grandmother      Colon Cancer Maternal Grandmother      Other Cancer Maternal Grandmother         pancreatic cancer     Obesity Maternal Grandmother      Diabetes Paternal Grandmother      Obesity Paternal Grandmother      Diabetes Paternal Grandfather      Obesity Paternal Grandfather      Breast Cancer Sister      Breast Cancer Sister      Anxiety Disorder Sister      Anesthesia Reaction Sister      Depression Mother      Anesthesia Reaction Mother      Asthma Daughter      Coronary Artery Disease No family hx of       "Hypertension No family hx of      Hyperlipidemia No family hx of      Cerebrovascular Disease No family hx of      Depression No family hx of      Anxiety Disorder No family hx of      Mental Illness No family hx of      Substance Abuse No family hx of      Anesthesia Reaction No family hx of      Asthma No family hx of      Osteoporosis No family hx of      Genetic Disorder No family hx of      Thyroid Disease No family hx of      Obesity No family hx of      Unknown/Adopted No family hx of       No known history of colorectal cancer, liver disease, or inflammatory bowel disease.         Review of Systems:   A complete 10 point review of systems was obtained.  Please see the HPI for pertinent positives and negatives.  All other systems were reviewed and were found to be negative.          Physical Exam:   VS:  /73 (BP Location: Left arm)   Pulse 94   Temp 97.7  F (36.5  C) (Oral)   Resp 22   Ht 1.626 m (5' 4\")   Wt 83.5 kg (184 lb)   SpO2 96%   BMI 31.58 kg/m      Wt:   Wt Readings from Last 2 Encounters:   11/11/21 83.5 kg (184 lb)   11/09/21 86.4 kg (190 lb 8 oz)      Constitutional: cooperative, pleasant, no acute distress  Eyes: Conjunctiva anicteric  HEENT: Normal oropharynx without ulcers or exudate, mucus membranes moist  CV: No Flip edema  Respiratory: Breathing comfortably on ambient air  Abd: +distended, + fluid wave, +bs, no hepatosplenomegaly, +ttp epigastric, no rebound or guarding   Skin: warm, perfused, no jaundice  Neuro: A&O x 3, No asterixis         Laboratory:   [facility]  Recent Labs   Lab 11/12/21  0557 11/12/21  0531 11/11/21  0807 11/11/21  0637 11/10/21  2248 11/10/21  0538 11/09/21  1716 11/09/21  1539     --  140  --   --  142  --  138   POTASSIUM 3.1*  --  3.4  --   --  3.5  --  3.7   CHLORIDE 112*  --  111*  --   --  110*  --  111*   STEFFANY 7.6*  --  7.7*  --   --  7.6*  --  7.7*   CO2 22  --  21  --   --  21  --  22   BUN 8  --  8  --   --  5*  --  5*   CR 1.01  --  1.16*  --   " --  1.14*  --  1.02   GLC 83 72 80 106*   < > 87   < > 86    < > = values in this interval not displayed.     CBC  Recent Labs   Lab 11/12/21  0557 11/11/21  0807 11/10/21  0538 11/09/21  0448   WBC 7.2 9.8 11.4* 13.1*   RBC 2.64* 2.68* 2.71* 2.95*   HGB 8.0* 8.1* 8.1* 8.9*   HCT 26.1* 26.7* 26.9* 28.9*   MCV 99 100 99 98   MCH 30.3 30.2 29.9 30.2   MCHC 30.7* 30.3* 30.1* 30.8*   RDW 16.9* 17.1* 17.4* 17.5*    222 231 273     INR  Recent Labs   Lab 11/10/21  0538   INR 1.42*     Liver Enzymes  Recent Labs   Lab 11/12/21  0557 11/11/21  0807 11/10/21  0538 11/09/21  1539   ALKPHOS 164* 168* 178* 200*   AST 27 31 43 55*   ALT 15 16 19 22   BILITOTAL 1.6* 1.7* 1.9* 1.9*   PROTTOTAL 5.6* 5.6* 5.6* 6.2*   ALBUMIN 1.4* 1.5* 1.5* 1.8*      PANC  Recent Labs   Lab 11/09/21  1539 11/08/21  2352 11/08/21  0636   LIPASE 175 203 161       Imaging/Procedures/Studies:

## 2021-11-12 NOTE — PLAN OF CARE
"  Problem: Adult Inpatient Plan of Care  Goal: Optimal Comfort and Wellbeing  Outcome: Improving     Problem: Fluid Imbalance (Pancreatitis)  Goal: Fluid Balance  Outcome: Improving     Problem: Pain (Pancreatitis)  Goal: Acceptable Pain Control  Outcome: Improving     Pt was given oxy X 2 for pain. Melatonin given for insomnia. IV antibiotics infused. MD was paged X 2 for request for benadryl per patients request with no response from attendants. SBA assist with activities. /67 (BP Location: Right arm)   Pulse 94   Temp 97  F (36.1  C) (Oral)   Resp 20   Ht 1.626 m (5' 4\")   Wt 83.5 kg (184 lb)   SpO2 96%. On 02 at 2L/NC. Shortness of breath with activities. Will continue to monitor.   "

## 2021-11-12 NOTE — PROVIDER NOTIFICATION
"Pgd. Dr. Allen at 0737    \"Pt potassium is 3.1. Need orders if you want to replace. Thanks\"     Kinga #[id number]  "

## 2021-11-12 NOTE — PLAN OF CARE
[id number]    Hypotensive, within parameters. OVSS, afebrile and on 2L O2 via NC. A&Ox4. Up with SBA with IV pole connected to oxygen. Rated 6/10 abdominal pain radiating to back. Pain managed with PRN oxy x2. Pt originally wanted to shower today, however repeatedly declined when offered to asssist up to shower. Legs were wiped down with bath wipes prior to placement of lymphedema wraps. Magnesium replaced for 1.3, recheck tomorrow with AM labs. Potassium replaced for 3.1, recheck at 1445 was 3.6. Miralax held, LBM 11/11 and loose per pt. Voiding adequately. Up w/ SBA and O2. Continue to monitor & w/ POC.

## 2021-11-12 NOTE — PROGRESS NOTES
[facility]    Medicine Progress Note - Hospitalist Service       Date of Admission:  11/10/2021    Assessment & Plan             Aliyah Angeles is a 46 year old female admitted on 11/10/2021. She has a hx of DMII, etoh abuse, alcholic hepatitis, and Fitz-en-y gastric bypass who was transferred from the [facility] ARU for necrotizing pancreatitis and fevers.     # Acute necrotizing pancreatitis, present on admission   - complex recent hospitalization course recently, notably from 9/29 to 11/5 w/ alk hep, series of pneumonias and ARDS w/ CRRT ICU stay/intubation, improved and was at ARU  - There, malaise, fever, nausea and belly pain occurred prompting CT scan showing pancreatitis w/ area of necrosis surrounding splenic vein and artery  - Recent admission driving by etoh sequalae and pt amenable to treatment, had planned on CD admission after ARU admission  - Suspect smoldering alcoholic pancreatitis in the interim (after last CT 10/14 which was without pancreatitis, though a 9/13 CT scan OSH did show stranding); GI does not believe early enough for truly infected necrosis thus stopped antibiotics per consult recommendations (paracentesis on admission w/o SBP), pancreatic duct leak may be causing symptoms   Plan:  *Appreciate GI consult: will obtain repeat paracentesis and check amylase/lipase w/ CAPS team  *s/p zosyn (11/9 - 11/11) deescalated to ciprofloxacin + flagyl (11/11 - 11/12) and stopped  *no MIVF given rapid ascites accumulation  *GI consulted given vascular involvement  *oxy 5mg PRN for pain, decrease after paracentesis     #Severe sepsis (HR, Temp) 2/2 suspected pneumonia with unknown organism, present on admission   #Acute hypoxemic respiratory failure  - bases on 11/8 CT a/p w/ L>R bilateral ggo w/ new o2 requirement  *after improvement on above antibiotics, c/w Augmentin through 11/14 to complete course    #Suspect R lower back strain  - TTP paraspinals on  11/12, no focal spinal tenderness  *lido patch  *heat pad    #Suspect evolving alcoholic liver disease  - has not had chance to f/u output w/ hepatology, only started needing paracenteses during last admission (10/2021), w/ rising INR suspect worsening liver disease  *CD consult placed  *c/w rifaxamin for HE last admission  *add lactulose if needed, was discontinued last admission  *spironolactone (11/11 - ) add lasix as tolerated    #Anxiety/Depression  *cont home lexapro, remeron       Diet: Regular Diet Adult  Calorie Counts    DVT Prophylaxis: Enoxaparin (Lovenox) SQ  Rice Catheter: Not present  Central Lines: None  Code Status: Full Code      Disposition Plan   Expected discharge: 11/13/2021   recommended to prior living arrangement once adequate pain management/ tolerating PO medications, antibiotic plan established and O2 use less than 0 liters/minute.     The patient's care was discussed with the Patient.    Rasta Allen MD  Hospitalist Service  [facility]  Securely message with the Vocera Web Console (learn more here)  Text page via [employer] Paging/Directory    Please see sign in/sign out for up to date coverage information    Clinically Significant Risk Factors Present on Admission               ______________________________________________________________________    Interval History   Pain more in right lower back and side. Appetite better    4 point ROS otherwise negative.     Data reviewed today: I reviewed all medications, new labs and imaging results over the last 24 hours. I personally reviewed no images or EKG's today.    Physical Exam   Vital Signs: Temp: 97.1  F (36.2  C) Temp src: Oral BP: 124/84 Pulse: 84   Resp: 22 SpO2: 96 % O2 Device: Nasal cannula Oxygen Delivery: 2 LPM  Weight: 184 lbs 0 oz    Physical Exam   Constitutional: nad  HEENT: EOMI  Neck: Symmetric  Cardiovascular: regular without murmurs or gallops  Pulmonary/Chest: r base crackles.  No respiratory distress.  GI: Soft, diffusely tender throughout, moderately distended with ascites  Musculoskeletal:  + bilateral lower extremity edema to pelvis, R lower paraspinal tenderness  Skin: Skin is warm and dry  Neurological: Alert and oriented   Psychiatric:  euthymic      Data   Recent Labs   Lab 11/12/21  1431 11/12/21  0557 11/12/21  0531 11/11/21  0807 11/10/21  2248 11/10/21  0538 11/09/21  1716 11/09/21  1539 11/09/21  0448 11/08/21  2352   WBC  --  7.2  --  9.8  --  11.4*  --   --    < >  --    HGB  --  8.0*  --  8.1*  --  8.1*  --   --    < >  --    MCV  --  99  --  100  --  99  --   --    < >  --    PLT  --  216  --  222  --  231  --   --    < >  --    INR  --   --   --   --   --  1.42*  --   --   --   --    NA  --  140  --  140  --  142  --  138   < >  --    POTASSIUM 3.6 3.1*  --  3.4  --  3.5  --  3.7   < >  --    CHLORIDE  --  112*  --  111*  --  110*  --  111*   < >  --    CO2  --  22  --  21  --  21  --  22   < >  --    BUN  --  8  --  8  --  5*  --  5*   < >  --    CR  --  1.01  --  1.16*  --  1.14*  --  1.02   < >  --    ANIONGAP  --  6  --  8  --  11  --  5   < >  --    STEFFANY  --  7.6*  --  7.7*  --  7.6*  --  7.7*   < >  --    GLC  --  83 72 80   < > 87   < > 86   < >  --    ALBUMIN  --  1.4*  --  1.5*  --  1.5*  --  1.8*   < >  --    PROTTOTAL  --  5.6*  --  5.6*  --  5.6*  --  6.2*   < >  --    BILITOTAL  --  1.6*  --  1.7*  --  1.9*  --  1.9*   < >  --    ALKPHOS  --  164*  --  168*  --  178*  --  200*   < >  --    ALT  --  15  --  16  --  19  --  22   < >  --    AST  --  27  --  31  --  43  --  55*   < >  --    LIPASE  --   --   --   --   --   --   --  175  --  203    < > = values in this interval not displayed.     No results found for this or any previous visit (from the past 24 hour(s)).

## 2021-11-12 NOTE — PLAN OF CARE
[id number]:     Pt transferred to 7D from 6D. Denies pain. Tachypnic at 22. On 3L upon arrival. Weaned down to 2L. Needs oxygen when up to the bathroom. Rash to abdomen and bilateral upper thighs. Skin is pruritic and and pink. States the rash started a few days ago. MD paged for benadryl but no orders. She declined Benadryl cream. Will page again. Chem dep, GI Hepatology and GI pancreaticobiliary consults in place. On a regular diet. She was planning on ordering something small for dinner.
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
## 2021-11-13 ENCOUNTER — APPOINTMENT (OUTPATIENT)
Dept: PHYSICAL THERAPY | Facility: CLINIC | Age: 46
End: 2021-11-13
Attending: INTERNAL MEDICINE
Payer: COMMERCIAL

## 2021-11-13 LAB
ALBUMIN FLD-MCNC: 0.5 G/DL
ALBUMIN SERPL-MCNC: 1.5 G/DL (ref 3.4–5)
ALP SERPL-CCNC: 176 U/L (ref 40–150)
ALT SERPL W P-5'-P-CCNC: 12 U/L (ref 0–50)
AMYLASE FLD-CCNC: 6 U/L
ANION GAP SERPL CALCULATED.3IONS-SCNC: 5 MMOL/L (ref 3–14)
APPEARANCE FLD: CLEAR
AST SERPL W P-5'-P-CCNC: 27 U/L (ref 0–45)
BILIRUB SERPL-MCNC: 1.5 MG/DL (ref 0.2–1.3)
BUN SERPL-MCNC: 5 MG/DL (ref 7–30)
CALCIUM SERPL-MCNC: 7.7 MG/DL (ref 8.5–10.1)
CHLORIDE BLD-SCNC: 115 MMOL/L (ref 94–109)
CO2 SERPL-SCNC: 22 MMOL/L (ref 20–32)
COLOR FLD: YELLOW
CREAT SERPL-MCNC: 0.88 MG/DL (ref 0.52–1.04)
ERYTHROCYTE [DISTWIDTH] IN BLOOD BY AUTOMATED COUNT: 16.9 % (ref 10–15)
GFR SERPL CREATININE-BSD FRML MDRD: 78 ML/MIN/1.73M2
GLUCOSE BLD-MCNC: 82 MG/DL (ref 70–99)
GRAM STAIN RESULT: NORMAL
GRAM STAIN RESULT: NORMAL
HCT VFR BLD AUTO: 26.8 % (ref 35–47)
HGB BLD-MCNC: 8.2 G/DL (ref 11.7–15.7)
LIPASE FLD-CCNC: 21 U/L
LYMPHOCYTES NFR FLD MANUAL: 76 %
MAGNESIUM SERPL-MCNC: 1.8 MG/DL (ref 1.6–2.3)
MCH RBC QN AUTO: 29.9 PG (ref 26.5–33)
MCHC RBC AUTO-ENTMCNC: 30.6 G/DL (ref 31.5–36.5)
MCV RBC AUTO: 98 FL (ref 78–100)
MONOS+MACROS NFR FLD MANUAL: NORMAL %
NEUTS BAND NFR FLD MANUAL: 3 %
OTHER CELLS FLD MANUAL: 22 %
PLATELET # BLD AUTO: 230 10E3/UL (ref 150–450)
POTASSIUM BLD-SCNC: 3.6 MMOL/L (ref 3.4–5.3)
PROT FLD-MCNC: 1.8 G/DL
PROT SERPL-MCNC: 5.5 G/DL (ref 6.8–8.8)
RBC # BLD AUTO: 2.74 10E6/UL (ref 3.8–5.2)
SODIUM SERPL-SCNC: 142 MMOL/L (ref 133–144)
WBC # BLD AUTO: 6.6 10E3/UL (ref 4–11)
WBC # FLD AUTO: 638 /UL

## 2021-11-13 PROCEDURE — 83690 ASSAY OF LIPASE: CPT | Performed by: HOSPITALIST

## 2021-11-13 PROCEDURE — 85014 HEMATOCRIT: CPT | Performed by: HOSPITALIST

## 2021-11-13 PROCEDURE — 36415 COLL VENOUS BLD VENIPUNCTURE: CPT | Performed by: HOSPITALIST

## 2021-11-13 PROCEDURE — 84157 ASSAY OF PROTEIN OTHER: CPT | Performed by: HOSPITALIST

## 2021-11-13 PROCEDURE — 89050 BODY FLUID CELL COUNT: CPT | Performed by: HOSPITALIST

## 2021-11-13 PROCEDURE — 250N000011 HC RX IP 250 OP 636: Performed by: HOSPITALIST

## 2021-11-13 PROCEDURE — 120N000002 HC R&B MED SURG/OB UMMC

## 2021-11-13 PROCEDURE — 89051 BODY FLUID CELL COUNT: CPT | Performed by: HOSPITALIST

## 2021-11-13 PROCEDURE — 87205 SMEAR GRAM STAIN: CPT | Performed by: HOSPITALIST

## 2021-11-13 PROCEDURE — 250N000013 HC RX MED GY IP 250 OP 250 PS 637: Performed by: HOSPITALIST

## 2021-11-13 PROCEDURE — 250N000013 HC RX MED GY IP 250 OP 250 PS 637: Performed by: INTERNAL MEDICINE

## 2021-11-13 PROCEDURE — 82042 OTHER SOURCE ALBUMIN QUAN EA: CPT | Performed by: HOSPITALIST

## 2021-11-13 PROCEDURE — 99233 SBSQ HOSP IP/OBS HIGH 50: CPT | Performed by: HOSPITALIST

## 2021-11-13 PROCEDURE — 97140 MANUAL THERAPY 1/> REGIONS: CPT | Mod: GP

## 2021-11-13 PROCEDURE — 82150 ASSAY OF AMYLASE: CPT | Performed by: HOSPITALIST

## 2021-11-13 PROCEDURE — 87070 CULTURE OTHR SPECIMN AEROBIC: CPT | Performed by: HOSPITALIST

## 2021-11-13 PROCEDURE — 0W9G3ZX DRAINAGE OF PERITONEAL CAVITY, PERCUTANEOUS APPROACH, DIAGNOSTIC: ICD-10-PCS | Performed by: STUDENT IN AN ORGANIZED HEALTH CARE EDUCATION/TRAINING PROGRAM

## 2021-11-13 PROCEDURE — 80053 COMPREHEN METABOLIC PANEL: CPT | Performed by: HOSPITALIST

## 2021-11-13 PROCEDURE — 250N000011 HC RX IP 250 OP 636: Performed by: INTERNAL MEDICINE

## 2021-11-13 PROCEDURE — 83735 ASSAY OF MAGNESIUM: CPT | Performed by: HOSPITALIST

## 2021-11-13 RX ORDER — LORAZEPAM 2 MG/ML
0.5 INJECTION INTRAMUSCULAR ONCE
Status: COMPLETED | OUTPATIENT
Start: 2021-11-13 | End: 2021-11-13

## 2021-11-13 RX ORDER — FUROSEMIDE 20 MG
20 TABLET ORAL DAILY
Status: DISCONTINUED | OUTPATIENT
Start: 2021-11-13 | End: 2021-11-15

## 2021-11-13 RX ORDER — SPIRONOLACTONE 25 MG/1
50 TABLET ORAL DAILY
Status: DISCONTINUED | OUTPATIENT
Start: 2021-11-13 | End: 2021-11-15

## 2021-11-13 RX ADMIN — SPIRONOLACTONE 50 MG: 25 TABLET, FILM COATED ORAL at 09:18

## 2021-11-13 RX ADMIN — OXYCODONE HYDROCHLORIDE 5 MG: 5 TABLET ORAL at 04:48

## 2021-11-13 RX ADMIN — ENOXAPARIN SODIUM 40 MG: 40 INJECTION SUBCUTANEOUS at 09:08

## 2021-11-13 RX ADMIN — THIAMINE HCL TAB 100 MG 100 MG: 100 TAB at 09:09

## 2021-11-13 RX ADMIN — OXYCODONE HYDROCHLORIDE 5 MG: 5 TABLET ORAL at 09:10

## 2021-11-13 RX ADMIN — ASCORBIC ACID, THIAMINE MONONITRATE,RIBOFLAVIN, NIACINAMIDE, PYRIDOXINE HYDROCHLORIDE, FOLIC ACID, CYANOCOBALAMIN, BIOTIN, CALCIUM PANTOTHENATE, 1 CAPSULE: 100; 1.5; 1.7; 20; 10; 1; 6000; 150000; 5 CAPSULE, LIQUID FILLED ORAL at 10:34

## 2021-11-13 RX ADMIN — ESCITALOPRAM OXALATE 10 MG: 10 TABLET ORAL at 09:09

## 2021-11-13 RX ADMIN — ONDANSETRON 4 MG: 4 TABLET, ORALLY DISINTEGRATING ORAL at 09:27

## 2021-11-13 RX ADMIN — OXYCODONE HYDROCHLORIDE 5 MG: 5 TABLET ORAL at 13:37

## 2021-11-13 RX ADMIN — VITAM B12 100 MCG: 100 TAB at 09:09

## 2021-11-13 RX ADMIN — LORAZEPAM 0.5 MG: 2 INJECTION INTRAMUSCULAR; INTRAVENOUS at 15:21

## 2021-11-13 RX ADMIN — AMOXICILLIN AND CLAVULANATE POTASSIUM 1 TABLET: 875; 125 TABLET, FILM COATED ORAL at 20:09

## 2021-11-13 RX ADMIN — AMOXICILLIN AND CLAVULANATE POTASSIUM 1 TABLET: 875; 125 TABLET, FILM COATED ORAL at 09:09

## 2021-11-13 RX ADMIN — GABAPENTIN 100 MG: 100 CAPSULE ORAL at 13:39

## 2021-11-13 RX ADMIN — POLYETHYLENE GLYCOL 3350 17 G: 17 POWDER, FOR SOLUTION ORAL at 09:18

## 2021-11-13 RX ADMIN — MIRTAZAPINE 15 MG: 15 TABLET, FILM COATED ORAL at 21:11

## 2021-11-13 RX ADMIN — PANTOPRAZOLE SODIUM 40 MG: 40 TABLET, DELAYED RELEASE ORAL at 09:09

## 2021-11-13 RX ADMIN — RIFAXIMIN 550 MG: 550 TABLET ORAL at 09:09

## 2021-11-13 RX ADMIN — RIFAXIMIN 550 MG: 550 TABLET ORAL at 20:09

## 2021-11-13 RX ADMIN — OXYCODONE HYDROCHLORIDE 5 MG: 5 TABLET ORAL at 00:53

## 2021-11-13 RX ADMIN — OXYCODONE HYDROCHLORIDE 5 MG: 5 TABLET ORAL at 21:45

## 2021-11-13 RX ADMIN — Medication 1 MG: at 21:11

## 2021-11-13 RX ADMIN — FOLIC ACID 1 MG: 1 TABLET ORAL at 09:08

## 2021-11-13 RX ADMIN — OXYCODONE HYDROCHLORIDE 5 MG: 5 TABLET ORAL at 17:38

## 2021-11-13 RX ADMIN — GABAPENTIN 100 MG: 100 CAPSULE ORAL at 09:09

## 2021-11-13 RX ADMIN — GABAPENTIN 100 MG: 100 CAPSULE ORAL at 20:09

## 2021-11-13 RX ADMIN — FUROSEMIDE 20 MG: 20 TABLET ORAL at 09:18

## 2021-11-13 ASSESSMENT — ACTIVITIES OF DAILY LIVING (ADL)
ADLS_ACUITY_SCORE: 14

## 2021-11-13 NOTE — PROGRESS NOTES
[facility]    Medicine Progress Note - Hospitalist Service       Date of Admission:  11/10/2021    Assessment & Plan             Aliyah Angeles is a 46 year old female admitted on 11/10/2021. She has a hx of DMII, etoh abuse, alcholic hepatitis, and Fitz-en-y gastric bypass who was transferred from the [facility] for necrotizing pancreatitis and fevers.     # Acute necrotizing pancreatitis, present on admission   - complex recent hospitalization course recently, notably from 9/29 to 11/5 w/ alk hep, series of pneumonias and ARDS w/ CRRT ICU stay/intubation, improved and was at ARU  - There, malaise, fever, nausea and belly pain occurred prompting CT scan showing pancreatitis w/ area of necrosis surrounding splenic vein and artery  - Recent admission driving by etoh sequalae and pt amenable to treatment, had planned on CD admission after ARU admission  - Suspect smoldering alcoholic pancreatitis in the interim (after last CT 10/14 which was without pancreatitis, though a 9/13 CT scan OSH did show stranding); GI does not believe early enough for truly infected necrosis thus stopped antibiotics per consult recommendations (paracentesis on admission w/o SBP), pancreatic duct leak may be causing symptoms   Plan:  *Appreciate GI consult: will obtain repeat paracentesis and check amylase/lipase w/ CAPS team  *no MIVF given rapid ascites accumulation  *oxy 5mg PRN for pain, decrease after paracentesis     #Severe sepsis (HR, Temp) 2/2 suspected pneumonia with unknown organism, present on admission   #Acute hypoxemic respiratory failure  - bases on 11/8 CT a/p w/ L>R bilateral ggo w/ new o2 requirement  *s/p zosyn (11/9 - 11/11) deescalated to ciprofloxacin + flagyl (11/11 - 11/12), c/w Augmentin through 11/14 to complete 5 day course given rapid improvement    #Suspect R lower back strain  - TTP paraspinals on 11/12, no focal spinal tenderness  *lido patch  *heat  pad    #Suspect evolving alcoholic liver disease  - has not had chance to f/u output w/ hepatology, only started needing paracenteses during last admission (10/2021), w/ rising INR suspect worsening liver disease  *CD consult placed  *c/w rifaxamin for HE last admission --> will check auth  *add lactulose if needed, was discontinued last admission  *spironolactone (11/11 - )  *lasix (11/13 - )    #Anxiety/Depression  *cont home lexapro, remeron       Diet: Regular Diet Adult  Calorie Counts  Snacks/Supplements Adult: Other; Please send Ensure Max protein vanilla flavor at 2:00 and Special K bar at 10:00 daily; Between Meals    DVT Prophylaxis: Enoxaparin (Lovenox) SQ  Rice Catheter: Not present  Central Lines: None  Code Status: Full Code      Disposition Plan   Expected discharge: 11/13/2021   recommended to prior living arrangement once adequate pain management/ tolerating PO medications, antibiotic plan established and O2 use less than 0 liters/minute.     The patient's care was discussed with the Patient.    Rasta Allen MD  Hospitalist Service  [facility]  Securely message with the Vocera Web Console (learn more here)  Text page via [facility] Paging/Directory    Please see sign in/sign out for up to date coverage information    Clinically Significant Risk Factors Present on Admission           # Non-Severe Malnutrition, POA: based on Reduced intake;Subcutaneous fat loss;Muscle loss (11/13/21 1500)     ______________________________________________________________________    Interval History   Feels about the same, belly pain miild and diffuse around the front w/ some radiation to R flank. Soft bowel movement in the am    4 point ROS otherwise negative.     Data reviewed today: I reviewed all medications, new labs and imaging results over the last 24 hours. I personally reviewed no images or EKG's today.    Physical Exam   Vital Signs: Temp: 97.1  F (36.2  C) Temp  src: Oral BP: 112/82 Pulse: 83   Resp: 21 SpO2: 97 % O2 Device: Nasal cannula Oxygen Delivery: 2 LPM  Weight: 184 lbs 0 oz    Physical Exam   Constitutional: nad  HEENT: EOMI  Neck: Symmetric  Cardiovascular: regular without murmurs or gallops  Pulmonary/Chest:ctab. No respiratory distress.  GI: Soft, diffusely tender throughout, moderately distended with ascites and linear hypodensities  Musculoskeletal:  Much improved bilateral lower extremity edema w/ compressive stockings  Skin: Skin is warm and dry  Neurological: Alert and oriented   Psychiatric:  euthymic      Data   Recent Labs   Lab 11/13/21  0530 11/12/21  1431 11/12/21  0557 11/12/21  0531 11/11/21  0807 11/10/21  2248 11/10/21  0538 11/09/21  1716 11/09/21  1539 11/09/21  0448 11/08/21  2352   WBC 6.6  --  7.2  --  9.8  --  11.4*  --   --    < >  --    HGB 8.2*  --  8.0*  --  8.1*  --  8.1*  --   --    < >  --    MCV 98  --  99  --  100  --  99  --   --    < >  --      --  216  --  222  --  231  --   --    < >  --    INR  --   --   --   --   --   --  1.42*  --   --   --   --      --  140  --  140  --  142  --  138   < >  --    POTASSIUM 3.6 3.6 3.1*  --  3.4  --  3.5  --  3.7   < >  --    CHLORIDE 115*  --  112*  --  111*  --  110*  --  111*   < >  --    CO2 22  --  22  --  21  --  21  --  22   < >  --    BUN 5*  --  8  --  8  --  5*  --  5*   < >  --    CR 0.88  --  1.01  --  1.16*  --  1.14*  --  1.02   < >  --    ANIONGAP 5  --  6  --  8  --  11  --  5   < >  --    STEFFANY 7.7*  --  7.6*  --  7.7*  --  7.6*  --  7.7*   < >  --    GLC 82  --  83 72 80   < > 87   < > 86   < >  --    ALBUMIN 1.5*  --  1.4*  --  1.5*  --  1.5*  --  1.8*   < >  --    PROTTOTAL 5.5*  --  5.6*  --  5.6*  --  5.6*  --  6.2*   < >  --    BILITOTAL 1.5*  --  1.6*  --  1.7*  --  1.9*  --  1.9*   < >  --    ALKPHOS 176*  --  164*  --  168*  --  178*  --  200*   < >  --    ALT 12  --  15  --  16  --  19  --  22   < >  --    AST 27  --  27  --  31  --  43  --  55*   < >  --     LIPASE  --   --   --   --   --   --   --   --  175  --  203    < > = values in this interval not displayed.     No results found for this or any previous visit (from the past 24 hour(s)).

## 2021-11-13 NOTE — PROGRESS NOTES
"CLINICAL NUTRITION SERVICES - ASSESSMENT NOTE     Nutrition Prescription    RECOMMENDATIONS FOR MDs/PROVIDERS TO ORDER:  Calorie count in progress: Goal for po is ~ 66% estimated needs to avoid nutrition support ( ~ 1040 kcal/day and 50 gm protein/day)    Malnutrition Status:    Moderate malnutrition in the context of acute condition     Recommendations already ordered by Registered Dietitian (RD):  Calorie count in progress  Ordered Ensure Max protein at 2:00 and Special K bar at 10:00 daily     Future/Additional Recommendations:  PO adequacy        REASON FOR ASSESSMENT  Aliyah Angeles is a/an 46 year old female assessed by the dietitian for Provider Order - Optimize nutrition in s/o necrotizing pancreatitis    Chart reviewed:  Admitted on 11/10/2021.   PMH: significant for Fitz-en-Y GB 2010, hepatitis (last Etoh 9/12/21), pancreatitis, DM2.    [facility].  Recent series of pneumonias and ARDS w/ CRRT ICU stay/intubation, improved and was at ARU.       NUTRITION HISTORY  Visited with patient and her mom today:  Patient reports not having much of an appetite. Factors effecting intake are mainly decrease in appetite, early satiety and abdominal discomfort with po. Patient reports, taking small frequent meals at home. Dislikes oral supplements.     CURRENT NUTRITION ORDERS  Diet: Regular + Calorie count   Intake/Tolerance: Patient had bites of eggs for bkf this am. Patient in agreement with a trial of oral supplements in between meals to optimize intake.     LABS  BUN:5 ( may reflect inadequate protein intake)  CRP:62 (H), WBC:6.6    MEDICATIONS  Reviewed     ANTHROPOMETRICS  Height: 162.6 cm (5' 4\")  Most Recent Weight: 83.5 kg (184 lb) most recent wt on 11/11  IBW: 54.6 kg ( 153% IBW)  BMI:31.58 kg /m2  Obesity Grade I BMI 30-34.9  Weight History:   Wt Readings from Last 10 Encounters:   11/09/21 86.4 kg (190 lb 8 oz)   11/04/21 90.2 kg (198 lb 13.7 " oz)   09/20/21 86.7 kg (191 lb 3.2 oz)   04/14/20 74.8 kg (165 lb)   04/08/20 74.8 kg (165 lb)   09/16/19 68.6 kg (151 lb 3.2 oz)   01/18/19 71.5 kg (157 lb 11.2 oz)   01/03/19 70.5 kg (155 lb 6.4 oz)   04/27/18 69 kg (152 lb 3.2 oz)   12/14/17 65.9 kg (145 lb 4.8 oz)       Dosing Weight: 62 kg adjusted wt from admit wt of 83.5 kg and IBW of 54.6 kg     ASSESSED NUTRITION NEEDS  Estimated Energy Needs: 1550 - 1860 kcals/day (25 - 30  kcals/kg)  Justification: Maintenance  Estimated Protein Needs: 74- 93  grams protein/day (1.2 - 1.5 grams of pro/kg)  Justification: Repletion  Estimated Fluid Needs:  (1 mL/kcal)   Justification: Maintenance    PHYSICAL FINDINGS  + bilateral lower extremity edema to pelvis, R lower paraspinal tenderness    MALNUTRITION  % Intake: </= 50% for >/= 5 days (severe)  % Weight Loss: Unable to assess  Subcutaneous Fat Loss: Thoracic: moderate  Muscle Loss: Temporal: mild, Thoracic region (clavicle):mild, Upper arm (bicep, tricep) and Lower arm  (forearm): Moderate  Fluid Accumulation/Edema: Mild ( 2+ knee and leg)  Malnutrition Diagnosis: Moderate malnutrition in the context of acute condition     NUTRITION DIAGNOSIS  Inadequate oral intake related to limited appetite and early satiety as evidenced by patients report      INTERVENTIONS  Implementation  Nutrition education for nutrition relationship to health/disease   Medical food supplement therapy     Goals  Patient to consume % of nutritionally adequate meal trays TID, or the equivalent with supplements/snacks.     Monitoring/Evaluation  Progress toward goals will be monitored and evaluated per protocol.    Henry Lares RD/ANDREW  Pager [phone]

## 2021-11-13 NOTE — CONSULTS
Consult and Procedure Service - Procedure Note    Attending: Willy Astorga  Resident: none  Procedure: Diagnostic and therapeutic paracentesis  Indication: abdominal pain, distention  Risk Assessment: low risk based on platelets, coags, medications.  Pre-procedure diagnosis: abdominal pain with swelling  Post-procedure diagnosis: abdominal pain with swelling.  The risks and benefits of the procedure were explained to Ms. Aliyah Angeles who expressed understanding and opted to proceed.  Consent was obtained and placed in the chart.  A time out was performed.  An area of ascites was located and marked using ultrasound guidance in the LEFT lower quadrant; the area was prepped and draped in the usual sterile fashion.  Tenml of 1% lidocaine was instilled and ascites located.  The paracentesis catheter and needle were inserted under real-time guidance until ascites obtained then the needle removed and the catheter advanced.  The apparatus was connected to vacuum bottles but only 50 ML of fluid was able to be removed.   A specimen was sent for analysis. The catheter was withdrawn and the area dressed.  Patient experienced some pain with insertion of the catheter however the procedure was tolerated. There were no immediate complications.  Please contact the Consult and Procedure Service if any complications or concerns arise.     Willy Astorga D.O.  Internal Medicine, Hospitalist  [facility]  Pager: [phone]    DOS:  11/13/2021

## 2021-11-13 NOTE — PLAN OF CARE
[id number]    VSS, afebrile and on 2L O2 via NC. A&Ox4. Complained of mild SOB after ambulating to bathroom, denied SOB at rest. Rated 6/10 right abdominal pain radiating to the back, managed with PRN 5 mg oxy x2 and heating pad. Gave ODT zofran x1 for nausea in the morning with effectiveness. Minimal appetite for breakfast, encouraged to order lunch several times, ate 50% of slice of pizza. Paracentesis to occur later today, to receive 0.5 mg of lorazepam prior to procedure. On continuous pulse ox. Voiding adequately, not saving. LBM 11/11, willing to take miralax this morning. Up with SBA and O2. Continue to monitor & w/ POC.

## 2021-11-13 NOTE — PLAN OF CARE
"[id number]    A&O x4. VSS on 2 L NC. Pt c/o back and abdominal pain. 5mg PRN oxycodone given x1 with some relief. Heat applied to abdomen. Lidocaine patch in place on R lower back. Denies N/V and SOB. Lymph wraps on bilateral LE. Pt reported LLE wrap was \"a little tighter\" than the right one. Pt denies any additional numbness or tingling from baseline or increased pain. Writer offered to loosen or remove wrap but pt declined, continue to monitor. Pt up with SBA and O2 to bathroom. Pt requested to have bed alarm turned off. Pt educated on the importance of calling before getting up. Pt agreed to use call light appropriately when feeling weak and needing assistance to go the BR. Voiding spontaneously with adequate output. No BM this shift. PO intake fair. Calorie Counts to begin at midnight. Magnesium recheck tomorrow AM. Continue to monitor and with POC.   "

## 2021-11-13 NOTE — PLAN OF CARE
Pt is alert and oriented x4, AVSS, afebrile on 2L of oxygen and on continuous pulse oxymetry sat at 98% . Pt denied nausea, vomiting as well as numbness and tingling. Lymph wrap on BLE.Pt c/o of  right side pain 2 doses of prn oxycodone given overnight and pt stated that oxy had been helpful. Heat pad in place to help with pain. Lidocaine patch removed overnight. Calorie counts starting today. Mag to be recheck this morning.Pt is on the bed alarm which is activated and audible. Continue POC

## 2021-11-14 ENCOUNTER — APPOINTMENT (OUTPATIENT)
Dept: OCCUPATIONAL THERAPY | Facility: CLINIC | Age: 46
End: 2021-11-14
Attending: INTERNAL MEDICINE
Payer: COMMERCIAL

## 2021-11-14 LAB
ALBUMIN SERPL-MCNC: 1.6 G/DL (ref 3.4–5)
ALP SERPL-CCNC: 188 U/L (ref 40–150)
ALT SERPL W P-5'-P-CCNC: 12 U/L (ref 0–50)
ANION GAP SERPL CALCULATED.3IONS-SCNC: 6 MMOL/L (ref 3–14)
AST SERPL W P-5'-P-CCNC: 26 U/L (ref 0–45)
BACTERIA BLD CULT: NO GROWTH
BACTERIA BLD CULT: NO GROWTH
BACTERIA FLD CULT: NO GROWTH
BILIRUB SERPL-MCNC: 1.3 MG/DL (ref 0.2–1.3)
BUN SERPL-MCNC: 4 MG/DL (ref 7–30)
CALCIUM SERPL-MCNC: 7.5 MG/DL (ref 8.5–10.1)
CHLORIDE BLD-SCNC: 115 MMOL/L (ref 94–109)
CO2 SERPL-SCNC: 24 MMOL/L (ref 20–32)
CREAT SERPL-MCNC: 0.75 MG/DL (ref 0.52–1.04)
ERYTHROCYTE [DISTWIDTH] IN BLOOD BY AUTOMATED COUNT: 16.9 % (ref 10–15)
GFR SERPL CREATININE-BSD FRML MDRD: >90 ML/MIN/1.73M2
GLUCOSE BLD-MCNC: 93 MG/DL (ref 70–99)
GRAM STAIN RESULT: NORMAL
GRAM STAIN RESULT: NORMAL
HCT VFR BLD AUTO: 27.5 % (ref 35–47)
HGB BLD-MCNC: 8.3 G/DL (ref 11.7–15.7)
MAGNESIUM SERPL-MCNC: 1.3 MG/DL (ref 1.6–2.3)
MCH RBC QN AUTO: 30 PG (ref 26.5–33)
MCHC RBC AUTO-ENTMCNC: 30.2 G/DL (ref 31.5–36.5)
MCV RBC AUTO: 99 FL (ref 78–100)
PLATELET # BLD AUTO: 222 10E3/UL (ref 150–450)
POTASSIUM BLD-SCNC: 3.5 MMOL/L (ref 3.4–5.3)
PROT SERPL-MCNC: 5.7 G/DL (ref 6.8–8.8)
RBC # BLD AUTO: 2.77 10E6/UL (ref 3.8–5.2)
SARS-COV-2 RNA RESP QL NAA+PROBE: NEGATIVE
SODIUM SERPL-SCNC: 145 MMOL/L (ref 133–144)
WBC # BLD AUTO: 6.1 10E3/UL (ref 4–11)

## 2021-11-14 PROCEDURE — 250N000011 HC RX IP 250 OP 636: Performed by: INTERNAL MEDICINE

## 2021-11-14 PROCEDURE — 80053 COMPREHEN METABOLIC PANEL: CPT | Performed by: HOSPITALIST

## 2021-11-14 PROCEDURE — 250N000013 HC RX MED GY IP 250 OP 250 PS 637: Performed by: INTERNAL MEDICINE

## 2021-11-14 PROCEDURE — 99233 SBSQ HOSP IP/OBS HIGH 50: CPT | Performed by: HOSPITALIST

## 2021-11-14 PROCEDURE — 85014 HEMATOCRIT: CPT | Performed by: HOSPITALIST

## 2021-11-14 PROCEDURE — 120N000002 HC R&B MED SURG/OB UMMC

## 2021-11-14 PROCEDURE — 250N000013 HC RX MED GY IP 250 OP 250 PS 637: Performed by: HOSPITALIST

## 2021-11-14 PROCEDURE — U0003 INFECTIOUS AGENT DETECTION BY NUCLEIC ACID (DNA OR RNA); SEVERE ACUTE RESPIRATORY SYNDROME CORONAVIRUS 2 (SARS-COV-2) (CORONAVIRUS DISEASE [COVID-19]), AMPLIFIED PROBE TECHNIQUE, MAKING USE OF HIGH THROUGHPUT TECHNOLOGIES AS DESCRIBED BY CMS-2020-01-R: HCPCS | Performed by: HOSPITALIST

## 2021-11-14 PROCEDURE — 36415 COLL VENOUS BLD VENIPUNCTURE: CPT | Performed by: HOSPITALIST

## 2021-11-14 PROCEDURE — 97535 SELF CARE MNGMENT TRAINING: CPT | Mod: GO

## 2021-11-14 PROCEDURE — 250N000011 HC RX IP 250 OP 636: Performed by: HOSPITALIST

## 2021-11-14 PROCEDURE — 83735 ASSAY OF MAGNESIUM: CPT | Performed by: HOSPITALIST

## 2021-11-14 RX ORDER — TRAMADOL HYDROCHLORIDE 50 MG/1
50 TABLET ORAL EVERY 6 HOURS PRN
Status: DISCONTINUED | OUTPATIENT
Start: 2021-11-14 | End: 2021-11-14

## 2021-11-14 RX ORDER — MAGNESIUM SULFATE HEPTAHYDRATE 40 MG/ML
4 INJECTION, SOLUTION INTRAVENOUS ONCE
Status: COMPLETED | OUTPATIENT
Start: 2021-11-14 | End: 2021-11-14

## 2021-11-14 RX ORDER — OXYCODONE HYDROCHLORIDE 5 MG/1
5 TABLET ORAL 3 TIMES DAILY PRN
Status: DISCONTINUED | OUTPATIENT
Start: 2021-11-14 | End: 2021-11-17

## 2021-11-14 RX ORDER — ACETAMINOPHEN 325 MG/1
650 TABLET ORAL 3 TIMES DAILY
Status: DISCONTINUED | OUTPATIENT
Start: 2021-11-14 | End: 2021-11-21

## 2021-11-14 RX ORDER — SENNOSIDES 8.6 MG
1 TABLET ORAL AT BEDTIME
Status: DISCONTINUED | OUTPATIENT
Start: 2021-11-14 | End: 2021-12-02 | Stop reason: HOSPADM

## 2021-11-14 RX ADMIN — OXYCODONE HYDROCHLORIDE 5 MG: 5 TABLET ORAL at 11:37

## 2021-11-14 RX ADMIN — SENNOSIDES 1 TABLET: 8.6 TABLET ORAL at 21:38

## 2021-11-14 RX ADMIN — ACETAMINOPHEN 650 MG: 325 TABLET, FILM COATED ORAL at 19:33

## 2021-11-14 RX ADMIN — ACETAMINOPHEN 650 MG: 325 TABLET, FILM COATED ORAL at 14:03

## 2021-11-14 RX ADMIN — GABAPENTIN 100 MG: 100 CAPSULE ORAL at 14:03

## 2021-11-14 RX ADMIN — OXYCODONE HYDROCHLORIDE 5 MG: 5 TABLET ORAL at 07:43

## 2021-11-14 RX ADMIN — THIAMINE HCL TAB 100 MG 100 MG: 100 TAB at 10:13

## 2021-11-14 RX ADMIN — FUROSEMIDE 20 MG: 20 TABLET ORAL at 10:13

## 2021-11-14 RX ADMIN — AMOXICILLIN AND CLAVULANATE POTASSIUM 1 TABLET: 875; 125 TABLET, FILM COATED ORAL at 10:13

## 2021-11-14 RX ADMIN — GABAPENTIN 100 MG: 100 CAPSULE ORAL at 10:13

## 2021-11-14 RX ADMIN — ASCORBIC ACID, THIAMINE MONONITRATE,RIBOFLAVIN, NIACINAMIDE, PYRIDOXINE HYDROCHLORIDE, FOLIC ACID, CYANOCOBALAMIN, BIOTIN, CALCIUM PANTOTHENATE, 1 CAPSULE: 100; 1.5; 1.7; 20; 10; 1; 6000; 150000; 5 CAPSULE, LIQUID FILLED ORAL at 11:36

## 2021-11-14 RX ADMIN — ACETAMINOPHEN 650 MG: 325 TABLET, FILM COATED ORAL at 10:12

## 2021-11-14 RX ADMIN — MIRTAZAPINE 15 MG: 15 TABLET, FILM COATED ORAL at 21:38

## 2021-11-14 RX ADMIN — AMOXICILLIN AND CLAVULANATE POTASSIUM 1 TABLET: 875; 125 TABLET, FILM COATED ORAL at 19:33

## 2021-11-14 RX ADMIN — METHOCARBAMOL 500 MG: 500 TABLET ORAL at 23:56

## 2021-11-14 RX ADMIN — MAGNESIUM SULFATE IN WATER 4 G: 40 INJECTION, SOLUTION INTRAVENOUS at 11:37

## 2021-11-14 RX ADMIN — FOLIC ACID 1 MG: 1 TABLET ORAL at 10:13

## 2021-11-14 RX ADMIN — GABAPENTIN 100 MG: 100 CAPSULE ORAL at 19:33

## 2021-11-14 RX ADMIN — OXYCODONE HYDROCHLORIDE 5 MG: 5 TABLET ORAL at 15:50

## 2021-11-14 RX ADMIN — OXYCODONE HYDROCHLORIDE 5 MG: 5 TABLET ORAL at 20:21

## 2021-11-14 RX ADMIN — ESCITALOPRAM OXALATE 10 MG: 10 TABLET ORAL at 10:13

## 2021-11-14 RX ADMIN — SPIRONOLACTONE 50 MG: 25 TABLET, FILM COATED ORAL at 10:13

## 2021-11-14 RX ADMIN — PANTOPRAZOLE SODIUM 40 MG: 40 TABLET, DELAYED RELEASE ORAL at 10:13

## 2021-11-14 RX ADMIN — Medication 1 MG: at 21:38

## 2021-11-14 RX ADMIN — ENOXAPARIN SODIUM 40 MG: 40 INJECTION SUBCUTANEOUS at 10:13

## 2021-11-14 RX ADMIN — RIFAXIMIN 550 MG: 550 TABLET ORAL at 10:13

## 2021-11-14 RX ADMIN — VITAM B12 100 MCG: 100 TAB at 10:13

## 2021-11-14 RX ADMIN — OXYCODONE HYDROCHLORIDE 5 MG: 5 TABLET ORAL at 01:01

## 2021-11-14 ASSESSMENT — ACTIVITIES OF DAILY LIVING (ADL)
ADLS_ACUITY_SCORE: 14

## 2021-11-14 ASSESSMENT — MIFFLIN-ST. JEOR: SCORE: 1402.45

## 2021-11-14 NOTE — PROGRESS NOTES
Calorie Count  Intake recorded for: 11/13  Total Kcals: 424 Total Protein: 14g  Kcals from Hospital Food: 229   Protein: 6g  Kcals from Outside Food (average):195  Protein: 8g  # Meals Ordered from Kitchen: 3  # Meals Recorded: 2 meals, First - 100% cheerios, 4oz whole milk, sugar    Second - 100% apple juice (from nursing station), 50% slice cheese pizza (from upstairs cafeteria)   # Supplements Recorded: No intake

## 2021-11-14 NOTE — PLAN OF CARE
[id number]    Soft BP, OVSS, afebrile and on 1L O2 via NC. A&Ox4. Denies nausea/SOB at rest, although experiences dyspnea upon exertion. C/o 7/10 right abdominal pain radiating to back. Pain managed with PRN oxy x2 and heating pad. Mag replaced for 1.3, recheck tomorrow with AM labs. Up to bathroom with SBA, voiding adequately. LBM this morning, loose per pt, miralax was held. Stool sample still needs to be collected. COVID swab obtained, awaiting results. OT assisted pt with shower. Continue to monitor & w/ POC.

## 2021-11-14 NOTE — PROGRESS NOTES
[facility]    Medicine Progress Note - Hospitalist Service       Date of Admission:  11/10/2021    Assessment & Plan             Aliyah Angeles is a 46 year old female admitted on 11/10/2021. She has a hx of DMII, etoh abuse, alcholic hepatitis, and Fitz-en-y gastric bypass who was transferred from the [facility] for necrotizing pancreatitis and fevers.     # Acute necrotizing pancreatitis, present on admission   - complex recent hospitalization course recently, notably from 9/29 to 11/5 w/ alk hep, series of pneumonias and ARDS w/ CRRT ICU stay/intubation, improved and was at ARU  - There, malaise, fever, nausea and belly pain occurred prompting CT scan showing pancreatitis w/ area of necrosis surrounding splenic vein and artery  - Recent admission driving by etoh sequalae and pt amenable to treatment, had planned on CD admission after ARU admission  - Suspect smoldering alcoholic pancreatitis in the interim (after last CT 10/14 which was without pancreatitis, though a 9/13 CT scan OSH did show stranding); GI does not believe early enough for truly infected necrosis thus stopped antibiotics per consult recommendations (paracentesis on admission w/o SBP), and concerned pancreatic duct leak may be causing symptoms   Plan:  *Appreciate GI consult: obtained paracentesis 50cc 11/14 (seemed to be more fluid on other side but pt declined additional instrumentation) --> amylase 6, lipase 21, seems lower than would be expected for PD leak, pending additional GI input  *oxy 5mg PRN TID at mealtime for pain that occurs with eating, weaning as able (pt reports tramadol not very helpful in the past for arthralgias. would like to avoid habit forming drugs as much as possible. staying away from NSAIDs given liver disease)    #Severe sepsis (HR, Temp) 2/2 suspected pneumonia with unknown organism, present on admission   #Acute hypoxemic respiratory failure  - bases on 11/8 CT a/p  w/ L>R bilateral ggo w/ new o2 requirement  *s/p zosyn (11/9 - 11/11) deescalated to ciprofloxacin + flagyl (11/11 - 11/12), s/p Augmentin through 11/14 to complete 5 day course given rapid improvement  *wean oxygen, IC instructed    #Suspect evolving alcoholic liver disease  #Severe alcohol use disorder  - has not had chance to f/u output w/ hepatology, only started needing paracenteses during last admission (10/2021), INR elevated ~1.4 on presentation  *CD consult placed --> ideally will discharge to [facility], appreciate SW coordination 11/15  *initially continued rifaximin given for presumed HE last admission, however after chart review that diagnosis was equivocal in the setting of many other medical problems, anticipating outpatient coverage difficulties and no e/o HE presently, discontinued as of 11/14, and will monitor for signs of HE development  - Ascites management:   *spironolactone (11/11 - )   *lasix (11/13 - )    #Suspect R lower back strain  - TTP paraspinals on 11/12, no focal spinal tenderness  *lido patch  *heat pad    #Anxiety/Depression  *cont home lexapro, remeron       Diet: Regular Diet Adult  Calorie Counts  Snacks/Supplements Adult: Other; Please send Ensure Max protein vanilla flavor at 2:00 and Special K bar at 10:00 daily; Between Meals    DVT Prophylaxis: Enoxaparin (Lovenox) SQ  Rice Catheter: Not present  Central Lines: None  Code Status: Full Code      Disposition Plan   Expected discharge:    recommended to prior living arrangement once adequate pain management/ tolerating PO medications and O2 use less than 0 liters/minute.     The patient's care was discussed with the Patient.    Rasta Allen MD  Hospitalist Service  [facility]  Securely message with the Vocera Web Console (learn more here)  Text page via [employer] Paging/Directory    Please see sign in/sign out for up to date coverage information    Clinically  Significant Risk Factors Present on Admission            # Non-Severe Malnutrition, POA: based on Reduced intake;Subcutaneous fat loss;Muscle loss (11/13/21 1500)     ______________________________________________________________________    Interval History   Pain in belly slowly getting better with none at rest now, though does get pangs with eating in side. Feels belly and leg swelling improved.    4 point ROS otherwise negative.     Data reviewed today: I reviewed all medications, new labs and imaging results over the last 24 hours. I personally reviewed no images or EKG's today.    Physical Exam   Vital Signs: Temp: 97.7  F (36.5  C) Temp src: Oral BP: 102/66 Pulse: 82   Resp: 18 SpO2: 96 % O2 Device: Nasal cannula Oxygen Delivery: 2 LPM  Weight: 172 lbs 8 oz    Physical Exam   Constitutional: nad, sleeping heavily  HEENT: EOMI  Neck: Symmetric  Cardiovascular: regular without murmurs or gallops  Pulmonary/Chest: trace right lower crackles. No respiratory distress.  GI: Soft, moderately distended, mild ttp epigastrium  Musculoskeletal:  improved bilateral lower extremity edema w/ compressive stockings  Skin: Skin is warm and dry  Neurological: Alert and oriented   Psychiatric:  hypothymic      Data   Recent Labs   Lab 11/14/21  0724 11/13/21  0530 11/12/21  1431 11/12/21  0557 11/10/21  2248 11/10/21  0538 11/09/21  1716 11/09/21  1539 11/09/21  0448 11/08/21  2352   WBC 6.1 6.6  --  7.2   < > 11.4*  --   --    < >  --    HGB 8.3* 8.2*  --  8.0*   < > 8.1*  --   --    < >  --    MCV 99 98  --  99   < > 99  --   --    < >  --     230  --  216   < > 231  --   --    < >  --    INR  --   --   --   --   --  1.42*  --   --   --   --    * 142  --  140   < > 142  --  138   < >  --    POTASSIUM 3.5 3.6 3.6 3.1*   < > 3.5  --  3.7   < >  --    CHLORIDE 115* 115*  --  112*   < > 110*  --  111*   < >  --    CO2 24 22  --  22   < > 21  --  22   < >  --    BUN 4* 5*  --  8   < > 5*  --  5*   < >  --    CR 0.75  0.88  --  1.01   < > 1.14*  --  1.02   < >  --    ANIONGAP 6 5  --  6   < > 11  --  5   < >  --    STEFFANY 7.5* 7.7*  --  7.6*   < > 7.6*  --  7.7*   < >  --    GLC 93 82  --  83   < > 87   < > 86   < >  --    ALBUMIN 1.6* 1.5*  --  1.4*   < > 1.5*  --  1.8*   < >  --    PROTTOTAL 5.7* 5.5*  --  5.6*   < > 5.6*  --  6.2*   < >  --    BILITOTAL 1.3 1.5*  --  1.6*   < > 1.9*  --  1.9*   < >  --    ALKPHOS 188* 176*  --  164*   < > 178*  --  200*   < >  --    ALT 12 12  --  15   < > 19  --  22   < >  --    AST 26 27  --  27   < > 43  --  55*   < >  --    LIPASE  --   --   --   --   --   --   --  175  --  203    < > = values in this interval not displayed.     Recent Results (from the past 24 hour(s))   POC US Abdomen Limited    Impression    Ascites noted in the LLQ less in the RLQ.    Willy Astorga D.O.  Internal Medicine, Hospitalist  [facility]  Pager: [phone]

## 2021-11-14 NOTE — PLAN OF CARE
[id number]    A&O x4. VSS on 1.5L NC. Denies N/V. Dyspneic on exertion with ambulation to bathroom. C/o 5/10 abdominal and back pain. PRN 5 mg oxycodone given x2 with some relief. Declined lidocaine patch. One time dose of ativan given before paracentesis at bedside this evening. Pt tolerated procedure well. Compression stockings on bilateral LE. Up with SBA to bathroom. No BM this shift. PO intake fair. Continue with calorie counts. Continue to monitor and with POC.

## 2021-11-14 NOTE — PLAN OF CARE
Pt is alert and oriented x4, AVSS, afebrile on 1.5 L of oxygen and on continuous pulse oxymetry sat at 98%. Pt has dyspnea on exertion. Pt denied nausea, vomiting as well as numbness and tingling. Compression stocking on  BLE.Pt c/o of  back pain 1 dose of prn oxycodone given overnight. Heat pad in place to help with pain. Day 2 of Calorie counts. Pt is on the bed alarm which is activated and audible. Continue POC

## 2021-11-15 ENCOUNTER — APPOINTMENT (OUTPATIENT)
Dept: PHYSICAL THERAPY | Facility: CLINIC | Age: 46
End: 2021-11-15
Attending: INTERNAL MEDICINE
Payer: COMMERCIAL

## 2021-11-15 ENCOUNTER — APPOINTMENT (OUTPATIENT)
Dept: OCCUPATIONAL THERAPY | Facility: CLINIC | Age: 46
End: 2021-11-15
Attending: INTERNAL MEDICINE
Payer: COMMERCIAL

## 2021-11-15 LAB
ALBUMIN SERPL-MCNC: 1.5 G/DL (ref 3.4–5)
ALP SERPL-CCNC: 186 U/L (ref 40–150)
ALT SERPL W P-5'-P-CCNC: 12 U/L (ref 0–50)
ANION GAP SERPL CALCULATED.3IONS-SCNC: 5 MMOL/L (ref 3–14)
AST SERPL W P-5'-P-CCNC: 27 U/L (ref 0–45)
BILIRUB SERPL-MCNC: 1.3 MG/DL (ref 0.2–1.3)
BUN SERPL-MCNC: 3 MG/DL (ref 7–30)
CALCIUM SERPL-MCNC: 7.8 MG/DL (ref 8.5–10.1)
CHLORIDE BLD-SCNC: 113 MMOL/L (ref 94–109)
CO2 SERPL-SCNC: 24 MMOL/L (ref 20–32)
CREAT SERPL-MCNC: 0.84 MG/DL (ref 0.52–1.04)
ERYTHROCYTE [DISTWIDTH] IN BLOOD BY AUTOMATED COUNT: 16.8 % (ref 10–15)
GFR SERPL CREATININE-BSD FRML MDRD: 83 ML/MIN/1.73M2
GLUCOSE BLD-MCNC: 90 MG/DL (ref 70–99)
HCT VFR BLD AUTO: 30.4 % (ref 35–47)
HGB BLD-MCNC: 9.2 G/DL (ref 11.7–15.7)
INR PPP: 1.41 (ref 0.85–1.15)
MAGNESIUM SERPL-MCNC: 1.6 MG/DL (ref 1.6–2.3)
MCH RBC QN AUTO: 29.9 PG (ref 26.5–33)
MCHC RBC AUTO-ENTMCNC: 30.3 G/DL (ref 31.5–36.5)
MCV RBC AUTO: 99 FL (ref 78–100)
PLATELET # BLD AUTO: 219 10E3/UL (ref 150–450)
POTASSIUM BLD-SCNC: 3.5 MMOL/L (ref 3.4–5.3)
PROT SERPL-MCNC: 5.6 G/DL (ref 6.8–8.8)
RBC # BLD AUTO: 3.08 10E6/UL (ref 3.8–5.2)
SODIUM SERPL-SCNC: 142 MMOL/L (ref 133–144)
WBC # BLD AUTO: 7.9 10E3/UL (ref 4–11)

## 2021-11-15 PROCEDURE — 97530 THERAPEUTIC ACTIVITIES: CPT | Mod: GO

## 2021-11-15 PROCEDURE — 36415 COLL VENOUS BLD VENIPUNCTURE: CPT | Performed by: HOSPITALIST

## 2021-11-15 PROCEDURE — 97535 SELF CARE MNGMENT TRAINING: CPT | Mod: GO

## 2021-11-15 PROCEDURE — 85027 COMPLETE CBC AUTOMATED: CPT | Performed by: HOSPITALIST

## 2021-11-15 PROCEDURE — 85610 PROTHROMBIN TIME: CPT | Performed by: HOSPITALIST

## 2021-11-15 PROCEDURE — 80053 COMPREHEN METABOLIC PANEL: CPT | Performed by: HOSPITALIST

## 2021-11-15 PROCEDURE — 250N000013 HC RX MED GY IP 250 OP 250 PS 637: Performed by: HOSPITALIST

## 2021-11-15 PROCEDURE — 83735 ASSAY OF MAGNESIUM: CPT | Performed by: HOSPITALIST

## 2021-11-15 PROCEDURE — 99233 SBSQ HOSP IP/OBS HIGH 50: CPT | Performed by: PHYSICIAN ASSISTANT

## 2021-11-15 PROCEDURE — 120N000002 HC R&B MED SURG/OB UMMC

## 2021-11-15 PROCEDURE — 99233 SBSQ HOSP IP/OBS HIGH 50: CPT | Performed by: HOSPITALIST

## 2021-11-15 PROCEDURE — 250N000013 HC RX MED GY IP 250 OP 250 PS 637: Performed by: INTERNAL MEDICINE

## 2021-11-15 PROCEDURE — 250N000011 HC RX IP 250 OP 636: Performed by: INTERNAL MEDICINE

## 2021-11-15 PROCEDURE — 97535 SELF CARE MNGMENT TRAINING: CPT | Mod: GP | Performed by: REHABILITATION PRACTITIONER

## 2021-11-15 RX ORDER — LORAZEPAM 2 MG/ML
0.5 INJECTION INTRAMUSCULAR
Status: COMPLETED | OUTPATIENT
Start: 2021-11-15 | End: 2021-11-16

## 2021-11-15 RX ORDER — SPIRONOLACTONE 100 MG/1
100 TABLET, FILM COATED ORAL DAILY
Status: DISCONTINUED | OUTPATIENT
Start: 2021-11-16 | End: 2021-12-02 | Stop reason: HOSPADM

## 2021-11-15 RX ORDER — FUROSEMIDE 40 MG
40 TABLET ORAL DAILY
Status: DISCONTINUED | OUTPATIENT
Start: 2021-11-16 | End: 2021-12-02 | Stop reason: HOSPADM

## 2021-11-15 RX ADMIN — ENOXAPARIN SODIUM 40 MG: 40 INJECTION SUBCUTANEOUS at 09:03

## 2021-11-15 RX ADMIN — GABAPENTIN 100 MG: 100 CAPSULE ORAL at 13:58

## 2021-11-15 RX ADMIN — OXYCODONE HYDROCHLORIDE 5 MG: 5 TABLET ORAL at 18:12

## 2021-11-15 RX ADMIN — PANTOPRAZOLE SODIUM 40 MG: 40 TABLET, DELAYED RELEASE ORAL at 09:03

## 2021-11-15 RX ADMIN — GABAPENTIN 100 MG: 100 CAPSULE ORAL at 19:46

## 2021-11-15 RX ADMIN — OXYCODONE HYDROCHLORIDE 5 MG: 5 TABLET ORAL at 13:59

## 2021-11-15 RX ADMIN — FOLIC ACID 1 MG: 1 TABLET ORAL at 09:03

## 2021-11-15 RX ADMIN — ASCORBIC ACID, THIAMINE MONONITRATE,RIBOFLAVIN, NIACINAMIDE, PYRIDOXINE HYDROCHLORIDE, FOLIC ACID, CYANOCOBALAMIN, BIOTIN, CALCIUM PANTOTHENATE, 1 CAPSULE: 100; 1.5; 1.7; 20; 10; 1; 6000; 150000; 5 CAPSULE, LIQUID FILLED ORAL at 09:03

## 2021-11-15 RX ADMIN — VITAM B12 100 MCG: 100 TAB at 09:03

## 2021-11-15 RX ADMIN — SPIRONOLACTONE 50 MG: 25 TABLET, FILM COATED ORAL at 09:03

## 2021-11-15 RX ADMIN — GABAPENTIN 100 MG: 100 CAPSULE ORAL at 09:03

## 2021-11-15 RX ADMIN — ACETAMINOPHEN 650 MG: 325 TABLET, FILM COATED ORAL at 13:58

## 2021-11-15 RX ADMIN — MIRTAZAPINE 15 MG: 15 TABLET, FILM COATED ORAL at 21:00

## 2021-11-15 RX ADMIN — Medication 1 MG: at 20:59

## 2021-11-15 RX ADMIN — THIAMINE HCL TAB 100 MG 100 MG: 100 TAB at 09:03

## 2021-11-15 RX ADMIN — Medication 25 MG: at 00:01

## 2021-11-15 RX ADMIN — FUROSEMIDE 20 MG: 20 TABLET ORAL at 09:03

## 2021-11-15 RX ADMIN — SENNOSIDES 1 TABLET: 8.6 TABLET ORAL at 21:00

## 2021-11-15 RX ADMIN — ACETAMINOPHEN 650 MG: 325 TABLET, FILM COATED ORAL at 19:46

## 2021-11-15 RX ADMIN — OXYCODONE HYDROCHLORIDE 5 MG: 5 TABLET ORAL at 08:00

## 2021-11-15 RX ADMIN — ACETAMINOPHEN 650 MG: 325 TABLET, FILM COATED ORAL at 09:03

## 2021-11-15 RX ADMIN — ESCITALOPRAM OXALATE 10 MG: 10 TABLET ORAL at 09:03

## 2021-11-15 ASSESSMENT — ACTIVITIES OF DAILY LIVING (ADL)
ADLS_ACUITY_SCORE: 14
ADLS_ACUITY_SCORE: 16
ADLS_ACUITY_SCORE: 14
ADLS_ACUITY_SCORE: 16
ADLS_ACUITY_SCORE: 16
ADLS_ACUITY_SCORE: 14
ADLS_ACUITY_SCORE: 16
ADLS_ACUITY_SCORE: 14
ADLS_ACUITY_SCORE: 16
ADLS_ACUITY_SCORE: 16
ADLS_ACUITY_SCORE: 14
ADLS_ACUITY_SCORE: 14
ADLS_ACUITY_SCORE: 16
ADLS_ACUITY_SCORE: 14
ADLS_ACUITY_SCORE: 16
ADLS_ACUITY_SCORE: 14
ADLS_ACUITY_SCORE: 16
ADLS_ACUITY_SCORE: 14

## 2021-11-15 ASSESSMENT — MIFFLIN-ST. JEOR: SCORE: 1393.38

## 2021-11-15 NOTE — PROGRESS NOTES
Walk test    1. Oxygen saturation while awake on room air: 93%    2. Minimum 0 L/min of oxygen to keep oxygen saturation > 90% at rest. Lowest oxygen saturation on this L/min: 91    3. Oxygen saturation on room air with activity: 90  (if reaches 86% walking back and forth in the room 3 times or down the montes and back then document that and stop)     4. Minimum 0 L/min of oxygen required to keep oxygen saturation > 90% with activity (walking back and forth in the room 3 times or down the montes and back). Lowest oxygen saturation on this L/min: 90

## 2021-11-15 NOTE — PROGRESS NOTES
Care Management Follow Up    Length of Stay (days): 5    Expected Discharge Date: 11/13/2021     Concerns to be Addressed:  Chemical dependency placement       Patient plan of care discussed at interdisciplinary rounds: Yes    Anticipated Discharge Disposition:  Residential chemical dependency program      Anticipated Discharge Services:  None    Anticipated Discharge DME:  None    Additional Information:    Voicemail received from NAWAF Medrano from [facility] requesting a return call ([phone]).    Per Gold 8 rounding this morning, patient will likely be medically ready 11/16/21 pending GI recommendations and ability to wean O2. GI placed progress note as of 1216 recommending NJFT placement.      returned call to [facility] RN ([phone]); voicemail left providing brief medical update and requested return call to determine patient's placement eligibility.      Return call received from Marcela ([phone]); patient denied from [facility] as facility feels patient's needs are too medically complex.     HODA Hawk  7D/5C Hematology/Oncology Social Worker  Phone: [phone]  Pager: [phone]  [email or web address]

## 2021-11-15 NOTE — PLAN OF CARE
A&O x4, AVSS on 1L O2. Patient needs oxygen walk test, states she prefers to do in later in the afternoon. Pain in abdomen managed with PRN oxycodone x2 and heating pad. No N/V or SOB. SBA to bathroom, LBM 11/15. Continue calorie counts. Stool sample needs to be collected.

## 2021-11-15 NOTE — PROGRESS NOTES
[facility]    Medicine Progress Note - Hospitalist Service       Date of Admission:  11/10/2021    Assessment & Plan             Aliyah Angeles is a 46 year old female admitted on 11/10/2021. She has a hx of DMII, etoh abuse, alcholic hepatitis, and Fitz-en-y gastric bypass who was transferred from the [facility] for necrotizing pancreatitis and fevers.     # Acute necrotizing pancreatitis, present on admission   - complex recent hospitalization course recently, notably from 9/29 to 11/5 w/ alk hep, series of pneumonias and ARDS w/ CRRT ICU stay/intubation, improved and was at ARU  - There, malaise, fever, nausea and belly pain occurred prompting CT scan showing pancreatitis w/ area of necrosis surrounding splenic vein and artery  - Suspect smoldering alcoholic pancreatitis in the interim (after last CT 10/14 which was without pancreatitis, though a 9/13 CT scan OSH did show stranding); likely too early for infected necrosis (and no gas in necrosis) thus stopped antibiotics (paracentesis on admission w/o SBP)  Plan:  *Appreciate GI consult: obtained paracentesis 50cc 11/14 (seemed to be more fluid on other side but pt declined additional instrumentation) --> amylase 6, lipase 21, pancreatic duct leak ruled out  *oxy 5mg PRN TID at mealtime for pain that occurs with eating, weaning as able (pt reports tramadol not very helpful in the past for arthralgias. would like to avoid habit forming drugs as much as possible. staying away from NSAIDs given liver disease)    Malnutrition:    - Level of malnutrition: Moderate   - Based on: reduced intake, mild (or greater) subcutaneous fat loss, mild (or greater) muscle loss  *ordered NJ feeding tube 11/15 as per GI recommendations for post-pyloric feeding given pain w/ eating and need for adequate nutrition to heal pancreatitis - was not meeting goals by PO calorie counts. Pt agreeable, ativan x1 IV ordered for beforehand for  nausea    #Severe sepsis (HR, Temp) 2/2 suspected pneumonia with unknown organism, present on admission   #Acute hypoxemic respiratory failure  - based on 11/8 CT a/p w/ L>R bilateral ggo w/ new o2 requirement  *s/p zosyn (11/9 - 11/11) deescalated to ciprofloxacin + flagyl (11/11 - 11/12), s/p Augmentin through 11/14 (completed 5 day course)  *wean oxygen, IC instructed    #Suspect evolving alcoholic liver disease  #Severe alcohol use disorder  - has not had chance to f/u output w/ hepatology, only started needing paracenteses during last admission (10/2021), INR elevated ~1.4 on presentation  *CD consult placed --> ideally will discharge to [facility], appreciate SW coordination 11/15 however with NJ feeding for at least 1-2 weeks in the hospital will have to wait for now, ultimately TF duration to be determined by GI  *initially continued rifaximin given for presumed HE last admission, however after chart review that diagnosis was equivocal in the setting of many other medical problems, anticipating outpatient coverage difficulties and no e/o HE presently, discontinued as of 11/14, and will monitor for signs of HE development  - Ascites management: *increased dosing to 100/40 on 11/16   *spironolactone (11/11 - )   *lasix (11/13 - )    #Suspect R lower back strain  - TTP paraspinals on 11/12, no focal spinal tenderness  *lido patch  *heat pad    #Anxiety/Depression  *cont home lexapro, remeron       Diet: Regular Diet Adult  Calorie Counts  Snacks/Supplements Adult: Other; Please send Ensure Max protein vanilla flavor at 2:00 and Special K bar at 10:00 daily; Between Meals    DVT Prophylaxis: Enoxaparin (Lovenox) SQ  Rice Catheter: Not present  Central Lines: None  Code Status: Full Code      Disposition Plan   Expected discharge: 11/17/2021   recommended to prior living arrangement once adequate pain management/ tolerating PO medications and O2 use less than 0 liters/minute.     The patient's  care was discussed with the Patient.    Rasta Allen MD  Hospitalist Service  [facility]  Securely message with the [employer] Web Console (learn more here)  Text page via [facility] Paging/Directory    Please see sign in/sign out for up to date coverage information    Clinically Significant Risk Factors Present on Admission            # Non-Severe Malnutrition, POA: based on Reduced intake;Subcutaneous fat loss;Muscle loss (11/13/21 1500)     ______________________________________________________________________    Interval History   Pain slightly better but still quite intense in R flank w/ eating. Says RN put oxygen on back overnight because that's when it drops down slightly below 90%.    4 point ROS otherwise negative.     Data reviewed today: I reviewed all medications, new labs and imaging results over the last 24 hours. I personally reviewed no images or EKG's today.    Physical Exam   Vital Signs: Temp: 97.8  F (36.6  C) Temp src: Oral BP: 122/85 Pulse: 89   Resp: 20 SpO2: 91 % O2 Device: None (Room air) Oxygen Delivery: 1 LPM  Weight: 170 lbs 8 oz    Physical Exam   Constitutional: nad, initially sleeping  HEENT: EOMI  Neck: Symmetric  Cardiovascular: regular without murmurs or gallops  Pulmonary/Chest: trace right lower crackles. No respiratory distress.  GI: Soft, moderately distended, mild ttp epigastrium  Musculoskeletal:  improved bilateral lower extremity edema under compressive stockings  Skin: Skin is warm and dry  Neurological: Alert and oriented   Psychiatric:  hypothymic      Data   Recent Labs   Lab 11/15/21  0607 11/14/21  0724 11/13/21  0530 11/10/21  2248 11/10/21  0538 11/09/21  1716 11/09/21  1539 11/09/21  0448 11/08/21  2352   WBC 7.9 6.1 6.6   < > 11.4*  --   --    < >  --    HGB 9.2* 8.3* 8.2*   < > 8.1*  --   --    < >  --    MCV 99 99 98   < > 99  --   --    < >  --     222 230   < > 231  --   --    < >  --    INR 1.41*  --   --   --   1.42*  --   --   --   --     145* 142   < > 142  --  138   < >  --    POTASSIUM 3.5 3.5 3.6   < > 3.5  --  3.7   < >  --    CHLORIDE 113* 115* 115*   < > 110*  --  111*   < >  --    CO2 24 24 22   < > 21  --  22   < >  --    BUN 3* 4* 5*   < > 5*  --  5*   < >  --    CR 0.84 0.75 0.88   < > 1.14*  --  1.02   < >  --    ANIONGAP 5 6 5   < > 11  --  5   < >  --    STEFFANY 7.8* 7.5* 7.7*   < > 7.6*  --  7.7*   < >  --    GLC 90 93 82   < > 87   < > 86   < >  --    ALBUMIN 1.5* 1.6* 1.5*   < > 1.5*  --  1.8*   < >  --    PROTTOTAL 5.6* 5.7* 5.5*   < > 5.6*  --  6.2*   < >  --    BILITOTAL 1.3 1.3 1.5*   < > 1.9*  --  1.9*   < >  --    ALKPHOS 186* 188* 176*   < > 178*  --  200*   < >  --    ALT 12 12 12   < > 19  --  22   < >  --    AST 27 26 27   < > 43  --  55*   < >  --    LIPASE  --   --   --   --   --   --  175  --  203    < > = values in this interval not displayed.     No results found for this or any previous visit (from the past 24 hour(s)).

## 2021-11-15 NOTE — PLAN OF CARE
[id number]    A&O x4. VSS on 1 LPM. Denies N/V. Endorses SOB on exertion. Continues to c/o abdominal pain. PRN oxycodone given x2 with meals this evening with some relief. Small amount of drainage from paracentesis site. Bandage in place, pressure applied. Continue to monitor. Up to bathroom with SBA, voiding spontaneously, not saving. No BM this shift. Stool sample still needs to be collected. Continues on calorie counts, fair appetite this evening. Continue to monitor and with POC.

## 2021-11-15 NOTE — PROGRESS NOTES
Calorie Count  Intake recorded for: 11/14  Total Kcals: 863 Total Protein: 35g  Kcals from Hospital Food: 863  Protein: 35g  Kcals from Outside Food (average):0 Protein: 0g  # Meals Ordered from Kitchen: 1 meal + food from nursing station   # Meals Recorded: 1 meal + snack (First - 75% sausage marilu, 50% fruit ice, whole milk, 25% scrambled eggs, ketchup)       (Second - 100% applesauce, 50% peaches - from nursing station)   # Supplements Recorded: 100% Ensure Shake

## 2021-11-15 NOTE — PLAN OF CARE
Pt is alert and oriented x4, AVSS, afebrile on 1 L of oxygen and on continuous pulse oxymetry sat around 96%. Pt has dyspnea on exertion. Pt denied nausea, vomiting as well as numbness and tingling. Compression stocking on  BLE.prn benadryl x1 and prn robaxin given overnight. Heat pad in place to help with pain. Continue calorie counts. Collect stool sample. Pt is on the bed alarm which is activated and audible. PD site is wdl. Continue POC

## 2021-11-15 NOTE — PROGRESS NOTES
"    GASTROENTEROLOGY PROGRESS NOTE    Date of Admission: 11/10/2021  Reason for Admission: abdominal pain, pancreatitis      ASSESSMENT:  Aliyah Angeles is a 46 year old female with history of ETOH use disorder (last drink 9/12/21), alcoholic hepatitis, Fitz en y gastric bypass, alcohol induced pancreatitis, Type 2 DM, anxiety, and depression who presented with ETOH necrotizing pancreatitis with ascites, ANC without gas and pleural effusion.     #. Acute necrotizing pancreatitis with acute necrotic collection  Patient with notable recent hospitalization for alcohol hepatitis c/b pneumonia with ARDS requiring intubation/ICU stay, CRRT from 9/29-11/5 -- discharged to TCU and subsequently developed epigastric and RUQ abdominal pain on 11/18 prompting CT scan abd/pelv showing necrotizing pancreatitis with non-enhancing areas of the tail which was new from previous CT on 10/14 (\"Incompletely encapsulated acute necrotic collection containing nonliquefied fat measuring 53 x 44 x 39 mm with a more ill-defined component extending inferiorly along the left anterior pararenal fascia.\"). Suspect smoldering pancreatitis from initial presentation back in September. She has had prior admissions for severe alcohol related pancreatitis as well. At this time there is no necrotic collection large enough or walled off for endoscopic transluminal drainage. Has had paracentesis x 3 with low levels of lipase/amylase which does not support pancreati ascites.   Initial concern when she started to have fevers was for infected necrosis however no e/o gas on CT. She did receive 5 days of antibiotics to cover for presumed pneumonia and since completion has shown improvement. For now, continue conservative management with a focus on nutrition. Recommend NJFT placement today.  Etiology: Presumed alcohol  Date of onset: 11/6, unclear  Thromboses: none, currently on Lovenox subcutaneous prophy  Diabetes: no  Current nutrition access: eating by " "mouth but not meeting caloric goals  Last imaging: CT abd/pelv 11/8  Infection/antiinfectives: Zosyn (11/9-11/11), Cipro/Flagyl (11/11-11/12), Augmentin (11/12-11/14) for presumed pneumonia/resp failure. **Currently no abx    #. Severe sepsis 2/2 suspected pneumonia  #. Fevers, resolved  Developed fevers as well as new oxygen requirements 11/9, imaging suggested lung bases w/ L>R bilateral ground glass opacities, treated with 5 day course abx (11/9-11/14) with rapid improvement.    #. Alcohol use disorder  #. Hepatic steatosis  #. Ascites, likely r/t portal hypertension  Last reported alcohol use 9/12. Known severe hepatic steatosis with e/o recanalized umbilical vein, mild splenomegaly and ascites.     RECOMMENDATIONS:  NJFT placement, dietitian following  Fecal elastase to be collected (patient reminded)  No endoscopic procedures planned at this time  Ongoing alcohol abstinence  Diuretics per primary team/hepatology  Analgesia per primary team    GI will follow, call with questions    The patient was discussed and plan agreed upon with GI staff, Dr Augustin.      Katie Nieto PA-C  Advanced Endoscopy/Pancreaticobiliary GI Service  [facility]  Text Page  _______________________________________________________________      Subjective: Nursing notes and 24hr events reviewed. Patient seen and examined at 1030. Patient reports ongoing R sided abdominal pain. Points to upper R and radiates around to R flank. Worse with oral intake. Not eating very much and states that she understands that this means she should have a feeding tube. Having daily stools. Remains afebrile.    ROS:   4 pt ROS negative unless noted in subjective.     Objective:  Blood pressure (!) 148/86, pulse 85, temperature (!) 96.5  F (35.8  C), temperature source Oral, resp. rate 18, height 1.626 m (5' 4\"), weight 77.3 kg (170 lb 8 oz), SpO2 96 %, not currently breastfeeding.  Gen: Sitting in bed. Appears " comfortable  HEENT: NCAT. Conjunctiva clear. Sclera anicteric  CV: RRR, Peripheral perfusion intact  Resp: normal work of breathing  Abd: Soft, obese, tender over RUQ, +hepatomegaly, no guarding or rebound, +BS  Msk: no gross deformity  Skin:  no jaundice, capu  Ext: warm, well perfused  Neuro: grossly normal  Mental status/Psych: A&O. Asks/answers questions appropriately     LABS:  BMP  Recent Labs   Lab 11/15/21  0607 11/14/21  0724 11/13/21  0530 11/12/21  1431 11/12/21  0557    145* 142  --  140   POTASSIUM 3.5 3.5 3.6 3.6 3.1*   CHLORIDE 113* 115* 115*  --  112*   STEFFANY 7.8* 7.5* 7.7*  --  7.6*   CO2 24 24 22  --  22   BUN 3* 4* 5*  --  8   CR 0.84 0.75 0.88  --  1.01   GLC 90 93 82  --  83     CBC  Recent Labs   Lab 11/15/21  0607 11/14/21  0724 11/13/21  0530 11/12/21  0557   WBC 7.9 6.1 6.6 7.2   RBC 3.08* 2.77* 2.74* 2.64*   HGB 9.2* 8.3* 8.2* 8.0*   HCT 30.4* 27.5* 26.8* 26.1*   MCV 99 99 98 99   MCH 29.9 30.0 29.9 30.3   MCHC 30.3* 30.2* 30.6* 30.7*   RDW 16.8* 16.9* 16.9* 16.9*    222 230 216     INR  Recent Labs   Lab 11/15/21  0607 11/10/21  0538   INR 1.41* 1.42*     LFTs  Recent Labs   Lab 11/15/21  0607 11/14/21  0724 11/13/21  0530 11/12/21  0557   ALKPHOS 186* 188* 176* 164*   AST 27 26 27 27   ALT 12 12 12 15   BILITOTAL 1.3 1.3 1.5* 1.6*   PROTTOTAL 5.6* 5.7* 5.5* 5.6*   ALBUMIN 1.5* 1.6* 1.5* 1.4*      PANC  Recent Labs   Lab 11/09/21  1539 11/08/21  2352   LIPASE 175 203         IMAGING:  CT abd/pelv with IV contrast 11/8  IMPRESSION:   1. Findings concerning for necrotizing pancreatitis with acute  necrotic fluid collection about the pancreatic tail, which encases the  splenic artery and vein with apparent narrowing of the splenic vein.  No evidence of hemorrhage or superinfection.  2. Left greater than right basilar groundglass opacities, which could  represent infection although in the setting of necrotic pancreatitis,  ARDS is also considered. Left greater than right pleural  effusions.  3. Hepatic steatosis. Splenomegaly.  4. Large ascites.

## 2021-11-16 ENCOUNTER — APPOINTMENT (OUTPATIENT)
Dept: GENERAL RADIOLOGY | Facility: CLINIC | Age: 46
End: 2021-11-16
Attending: HOSPITALIST
Payer: COMMERCIAL

## 2021-11-16 LAB
ALBUMIN SERPL-MCNC: 1.4 G/DL (ref 3.4–5)
ALP SERPL-CCNC: 188 U/L (ref 40–150)
ALT SERPL W P-5'-P-CCNC: 12 U/L (ref 0–50)
ANION GAP SERPL CALCULATED.3IONS-SCNC: 7 MMOL/L (ref 3–14)
AST SERPL W P-5'-P-CCNC: 31 U/L (ref 0–45)
BACTERIA FLD CULT: NORMAL
BILIRUB SERPL-MCNC: 1.3 MG/DL (ref 0.2–1.3)
BUN SERPL-MCNC: 4 MG/DL (ref 7–30)
CALCIUM SERPL-MCNC: 7.5 MG/DL (ref 8.5–10.1)
CHLORIDE BLD-SCNC: 110 MMOL/L (ref 94–109)
CO2 SERPL-SCNC: 22 MMOL/L (ref 20–32)
CREAT SERPL-MCNC: 0.78 MG/DL (ref 0.52–1.04)
ERYTHROCYTE [DISTWIDTH] IN BLOOD BY AUTOMATED COUNT: 16.4 % (ref 10–15)
GFR SERPL CREATININE-BSD FRML MDRD: >90 ML/MIN/1.73M2
GLUCOSE BLD-MCNC: 93 MG/DL (ref 70–99)
HCT VFR BLD AUTO: 30.9 % (ref 35–47)
HGB BLD-MCNC: 9.5 G/DL (ref 11.7–15.7)
MAGNESIUM SERPL-MCNC: 1.3 MG/DL (ref 1.6–2.3)
MCH RBC QN AUTO: 29.9 PG (ref 26.5–33)
MCHC RBC AUTO-ENTMCNC: 30.7 G/DL (ref 31.5–36.5)
MCV RBC AUTO: 97 FL (ref 78–100)
PHOSPHATE SERPL-MCNC: 3.4 MG/DL (ref 2.5–4.5)
PLATELET # BLD AUTO: 214 10E3/UL (ref 150–450)
POTASSIUM BLD-SCNC: 3.5 MMOL/L (ref 3.4–5.3)
PROT SERPL-MCNC: 5.5 G/DL (ref 6.8–8.8)
RBC # BLD AUTO: 3.18 10E6/UL (ref 3.8–5.2)
SODIUM SERPL-SCNC: 139 MMOL/L (ref 133–144)
WBC # BLD AUTO: 9.5 10E3/UL (ref 4–11)

## 2021-11-16 PROCEDURE — 250N000013 HC RX MED GY IP 250 OP 250 PS 637: Performed by: INTERNAL MEDICINE

## 2021-11-16 PROCEDURE — 84100 ASSAY OF PHOSPHORUS: CPT | Performed by: HOSPITALIST

## 2021-11-16 PROCEDURE — 120N000002 HC R&B MED SURG/OB UMMC

## 2021-11-16 PROCEDURE — 83735 ASSAY OF MAGNESIUM: CPT | Performed by: HOSPITALIST

## 2021-11-16 PROCEDURE — 85027 COMPLETE CBC AUTOMATED: CPT | Performed by: HOSPITALIST

## 2021-11-16 PROCEDURE — 82040 ASSAY OF SERUM ALBUMIN: CPT | Performed by: HOSPITALIST

## 2021-11-16 PROCEDURE — 44500 INTRO GASTROINTESTINAL TUBE: CPT

## 2021-11-16 PROCEDURE — 99233 SBSQ HOSP IP/OBS HIGH 50: CPT | Performed by: STUDENT IN AN ORGANIZED HEALTH CARE EDUCATION/TRAINING PROGRAM

## 2021-11-16 PROCEDURE — 36415 COLL VENOUS BLD VENIPUNCTURE: CPT | Performed by: HOSPITALIST

## 2021-11-16 PROCEDURE — 74340 X-RAY GUIDE FOR GI TUBE: CPT | Mod: 26 | Performed by: RADIOLOGY

## 2021-11-16 PROCEDURE — 250N000013 HC RX MED GY IP 250 OP 250 PS 637: Performed by: STUDENT IN AN ORGANIZED HEALTH CARE EDUCATION/TRAINING PROGRAM

## 2021-11-16 PROCEDURE — 250N000013 HC RX MED GY IP 250 OP 250 PS 637: Performed by: HOSPITALIST

## 2021-11-16 PROCEDURE — 250N000009 HC RX 250: Performed by: HOSPITALIST

## 2021-11-16 PROCEDURE — 250N000011 HC RX IP 250 OP 636: Performed by: INTERNAL MEDICINE

## 2021-11-16 PROCEDURE — 250N000011 HC RX IP 250 OP 636: Performed by: HOSPITALIST

## 2021-11-16 PROCEDURE — 74340 X-RAY GUIDE FOR GI TUBE: CPT

## 2021-11-16 RX ORDER — LIDOCAINE HYDROCHLORIDE 20 MG/ML
15 SOLUTION OROPHARYNGEAL ONCE
Status: COMPLETED | OUTPATIENT
Start: 2021-11-16 | End: 2021-11-16

## 2021-11-16 RX ORDER — MAGNESIUM OXIDE 400 MG/1
400 TABLET ORAL 3 TIMES DAILY
Status: COMPLETED | OUTPATIENT
Start: 2021-11-16 | End: 2021-11-18

## 2021-11-16 RX ADMIN — GABAPENTIN 100 MG: 100 CAPSULE ORAL at 20:31

## 2021-11-16 RX ADMIN — OXYCODONE HYDROCHLORIDE 5 MG: 5 TABLET ORAL at 13:33

## 2021-11-16 RX ADMIN — FUROSEMIDE 40 MG: 40 TABLET ORAL at 22:00

## 2021-11-16 RX ADMIN — MIRTAZAPINE 15 MG: 15 TABLET, FILM COATED ORAL at 21:59

## 2021-11-16 RX ADMIN — SENNOSIDES 1 TABLET: 8.6 TABLET ORAL at 21:59

## 2021-11-16 RX ADMIN — OXYCODONE HYDROCHLORIDE 5 MG: 5 TABLET ORAL at 09:12

## 2021-11-16 RX ADMIN — LIDOCAINE HYDROCHLORIDE 15 ML: 20 SOLUTION ORAL; TOPICAL at 15:26

## 2021-11-16 RX ADMIN — ACETAMINOPHEN 650 MG: 325 TABLET, FILM COATED ORAL at 16:00

## 2021-11-16 RX ADMIN — GABAPENTIN 100 MG: 100 CAPSULE ORAL at 16:00

## 2021-11-16 RX ADMIN — METHOCARBAMOL 500 MG: 500 TABLET ORAL at 02:15

## 2021-11-16 RX ADMIN — Medication 400 MG: at 21:59

## 2021-11-16 RX ADMIN — Medication 1 MG: at 21:59

## 2021-11-16 RX ADMIN — ENOXAPARIN SODIUM 40 MG: 40 INJECTION SUBCUTANEOUS at 10:41

## 2021-11-16 RX ADMIN — Medication 25 MG: at 05:22

## 2021-11-16 RX ADMIN — PROCHLORPERAZINE EDISYLATE 10 MG: 5 INJECTION INTRAMUSCULAR; INTRAVENOUS at 16:00

## 2021-11-16 RX ADMIN — ACETAMINOPHEN 650 MG: 325 TABLET, FILM COATED ORAL at 20:32

## 2021-11-16 RX ADMIN — DIATRIZOATE MEGLUMINE AND DIATRIZOATE SODIUM 40 ML: 660; 100 SOLUTION ORAL; RECTAL at 15:25

## 2021-11-16 RX ADMIN — ACETAMINOPHEN 650 MG: 325 TABLET, FILM COATED ORAL at 07:05

## 2021-11-16 RX ADMIN — LORAZEPAM 0.5 MG: 2 INJECTION INTRAMUSCULAR; INTRAVENOUS at 14:50

## 2021-11-16 ASSESSMENT — ACTIVITIES OF DAILY LIVING (ADL)
ADLS_ACUITY_SCORE: 17
ADLS_ACUITY_SCORE: 17
ADLS_ACUITY_SCORE: 16
ADLS_ACUITY_SCORE: 14
ADLS_ACUITY_SCORE: 16
ADLS_ACUITY_SCORE: 14
ADLS_ACUITY_SCORE: 16
ADLS_ACUITY_SCORE: 14

## 2021-11-16 ASSESSMENT — MIFFLIN-ST. JEOR: SCORE: 1386.13

## 2021-11-16 NOTE — PLAN OF CARE
[id number]    A&O x4. VSS on RA while awake. O2 in the 80's, while asleep, 1L NC while asleep to bring sats up to 95. Oxygen walk test completed today, see note. Denies N/V and SOB. PRN oxycodone given x1 with dinner for abdominal pain. Pain also managed with heat. Paracentesis site with small drainage. Pressure applied and bandage changed. Continue to monitor. SBA to bathroom. Voiding spontaneously with adequate UOP. No BM this shift, still needs stool sample. Continues on calorie counts. Plan to place NJ tube. Continue to monitor and with POC.

## 2021-11-16 NOTE — PROVIDER NOTIFICATION
Provider was notified to clarify order for NJ tube placement under fluoroscopy. If pt needed to be NPO for that.      Provider response was to put an order for NPO.

## 2021-11-16 NOTE — PLAN OF CARE
0700 - 1500 Patient is alert and oriented x4, vitals stable, on 1L O2 via nasal cannula. Complains of abdominal pain, managed with oxycodone. NPO for placement of NJ later today, stated she did not want to take her morning medications until after placement. Up with standby assist to the commode, voiding spontaneously. Needs stool sample collected. Update given to oncoming RN at the bedside.

## 2021-11-16 NOTE — PROVIDER NOTIFICATION
Provider is being repage related to pt requesting prn tylenol.       Provider stated that pt cannot have a prn dose tylenol related to liver issue. Pt gets tylenol three times a day

## 2021-11-16 NOTE — PLAN OF CARE
Pt is alert and oriented x4, AVSS, afebrile on 1 L of oxygen and on continuous pulse oxymetry sat around 98%. Pt has dyspnea on exertion.  pt does desat around 88 with ambulation to the bathroom and back to bed. Pt denied nausea, vomiting as well as numbness and tingling. prn robaxin given overnight. Heat pad in place to help with pain. Collect stool sample. Night writer was unable to collect stool sample because stool was mix with urine. Pt is on the bed alarm which is activated and audible. pt is currently NPO r/t to NJ tube placement under fluoroscopy. PD site is wdl. Continue POC

## 2021-11-16 NOTE — PROGRESS NOTES
[facility]    Medicine Progress Note - Hospitalist Service, Gold 8       Date of Admission:  11/10/2021    Assessment & Plan           Aliyah Angeles is a 46 year old female admitted on 11/10/2021. She has a hx of DMII, etoh abuse, alcholic hepatitis, and Fitz-en-y gastric bypass who was transferred from the [facility] for necrotizing pancreatitis and fevers.     # Acute necrotizing pancreatitis, present on admission   - complex recent hospitalization course recently, notably from 9/29 to 11/5 w/ alk hep, series of pneumonias and ARDS w/ CRRT ICU stay/intubation, improved and was at ARU  - There, malaise, fever, nausea and belly pain occurred prompting CT scan showing pancreatitis w/ area of necrosis surrounding splenic vein and artery  - Suspect smoldering alcoholic pancreatitis in the interim (after last CT 10/14 which was without pancreatitis, though a 9/13 CT scan OSH did show stranding); likely too early for infected necrosis (and no gas in necrosis) thus stopped antibiotics (paracentesis on admission w/o SBP)  Plan:  *Appreciate GI consult: obtained paracentesis 50cc 11/14 (seemed to be more fluid on other side but pt declined additional instrumentation) --> amylase 6, lipase 21, pancreatic duct leak ruled out  *oxy 5mg PRN TID at mealtime for pain that occurs with eating, weaning as able (pt reports tramadol not very helpful in the past for arthralgias. would like to avoid habit forming drugs as much as possible. staying away from NSAIDs given liver disease)    Malnutrition:    - Level of malnutrition: Moderate   - Based on: reduced intake, mild (or greater) subcutaneous fat loss, mild (or greater) muscle loss  *plan for NJ feeding tube 11/16 as per GI recommendations for post-pyloric feeding given pain w/ eating and need for adequate nutrition to heal pancreatitis - was not meeting goals by PO calorie counts. Pt agreeable, ativan x1 IV ordered for beforehand  for nausea    #Severe sepsis (HR, Temp) 2/2 suspected pneumonia with unknown organism, present on admission   #Acute hypoxemic respiratory failure  - based on 11/8 CT a/p w/ L>R bilateral ggo w/ new o2 requirement  *s/p zosyn (11/9 - 11/11) deescalated to ciprofloxacin + flagyl (11/11 - 11/12), s/p Augmentin through 11/14 (completed 5 day course)  *wean oxygen, IC instructed    #Suspect evolving alcoholic liver disease  #Severe alcohol use disorder  - has not had chance to f/u output w/ hepatology, only started needing paracenteses during last admission (10/2021), INR elevated ~1.4 on presentation  *CD consult placed --> ideally will discharge to [facility] [place], appreciate SW coordination  however with NJ feeding for at least 1-2 weeks in the hospital will have to wait for now, ultimately TF duration to be determined by GI  *initially continued rifaximin given for presumed HE last admission, however after chart review that diagnosis was equivocal in the setting of many other medical problems, anticipating outpatient coverage difficulties and no e/o HE presently, discontinued as of 11/14, and will monitor for signs of HE development  - Ascites management: *increased dosing to 100/40 on 11/16   *spironolactone (11/11 - )   *lasix (11/13 - )    #Suspect R lower back strain  - TTP paraspinals on 11/12, no focal spinal tenderness  *lido patch  *heat pad    #Anxiety/Depression  *cont home lexapro, remeron       Diet: Snacks/Supplements Adult: Other; Please send Ensure Max protein vanilla flavor at 2:00 and Special K bar at 10:00 daily; Between Meals  NPO per Anesthesia Guidelines for Procedure/Surgery Except for: Meds    DVT Prophylaxis: Enoxaparin (Lovenox) SQ  Rice Catheter: Not present  Central Lines: None  Code Status: Full Code      Disposition Plan   Expected discharge: 11/24/2021   recommended to prior living arrangement once adequate pain management/ tolerating PO medications.     The patient's care  was discussed with the Care Coordinator/ and Patient.    Reinier Rivera MD  Hospitalist Service, [address]  Securely message with the [employer] Web Console (learn more here)  Text page via [facility] Paging/Directory    Please see sign in/sign out for up to date coverage information    Clinically Significant Risk Factors Present on Admission                ______________________________________________________________________    Interval History   Continues to have abdominal pain. It is managed okay on her regimen. No acute overnight events. No chest pain or shortness of breath. No fevers. Four-point ROS otherwise negative.    Data reviewed today: I reviewed all medications, new labs and imaging results over the last 24 hours. I personally reviewed no images or EKG's today.    Physical Exam   Vital Signs: Temp: 98.3  F (36.8  C) Temp src: Oral BP: 108/83 Pulse: 78   Resp: 18 SpO2: 94 % O2 Device: Nasal cannula Oxygen Delivery: 2 LPM  Weight: 168 lbs 14.4 oz  Constitutional: Sitting up in bed, NAD  Cardiovascular: regular without murmurs or gallops  Pulmonary/Chest: trace right lower crackles. No respiratory distress.  GI: Soft, moderately distended, mild ttp epigastrium, no rebound tenderness or guardings  Skin: Skin is warm and dry  Neurological: Alert and oriented     Data   Recent Labs   Lab 11/16/21  0610 11/15/21  0607 11/14/21  0724 11/10/21  2248 11/10/21  0538 11/09/21  1716 11/09/21  1539   WBC 9.5 7.9 6.1   < > 11.4*  --   --    HGB 9.5* 9.2* 8.3*   < > 8.1*  --   --    MCV 97 99 99   < > 99  --   --     219 222   < > 231  --   --    INR  --  1.41*  --   --  1.42*  --   --     142 145*   < > 142  --  138   POTASSIUM 3.5 3.5 3.5   < > 3.5  --  3.7   CHLORIDE 110* 113* 115*   < > 110*  --  111*   CO2 22 24 24   < > 21  --  22   BUN 4* 3* 4*   < > 5*  --  5*   CR 0.78 0.84 0.75   < > 1.14*  --  1.02   ANIONGAP 7 5 6   < > 11  --  5    STEFFANY 7.5* 7.8* 7.5*   < > 7.6*  --  7.7*   GLC 93 90 93   < > 87   < > 86   ALBUMIN 1.4* 1.5* 1.6*   < > 1.5*  --  1.8*   PROTTOTAL 5.5* 5.6* 5.7*   < > 5.6*  --  6.2*   BILITOTAL 1.3 1.3 1.3   < > 1.9*  --  1.9*   ALKPHOS 188* 186* 188*   < > 178*  --  200*   ALT 12 12 12   < > 19  --  22   AST 31 27 26   < > 43  --  55*   LIPASE  --   --   --   --   --   --  175    < > = values in this interval not displayed.     No results found for this or any previous visit (from the past 24 hour(s)).  Medications       acetaminophen  650 mg Oral TID     vitamin B-12  100 mcg Oral or Feeding Tube Daily     enoxaparin ANTICOAGULANT  40 mg Subcutaneous Q24H     escitalopram  10 mg Oral Daily     folic acid  1 mg Oral Daily     furosemide  40 mg Oral Daily     gabapentin  100 mg Oral TID     lidocaine  1 patch Transdermal Q24h    And     lidocaine   Transdermal Q8H     mirtazapine  15 mg Oral At Bedtime     multivitamin RENAL  1 capsule Oral Daily     pantoprazole  40 mg Oral QAM AC     polyethylene glycol  17 g Oral Daily     sennosides  1 tablet Oral At Bedtime     sodium chloride (PF)  3 mL Intracatheter Q8H     spironolactone  100 mg Oral or Feeding Tube Daily     thiamine  100 mg Oral Daily

## 2021-11-16 NOTE — PROGRESS NOTES
Calorie Count  Intake recorded for: 11/15  Total Kcals: 730 Total Protein: 39g  Kcals from Hospital Food: 730  Protein: 39g  Kcals from Outside Food (average):0 Protein: 0g  # Meals Ordered from Kitchen: 3 meals   # Meals Recorded: 3 meals (First - 100% cheerios w/ whole milk & sugar)      (Second - 100% fruit ice, 25% cheeseburger, corn)      (Third - 50% chicken salad sandwich)  # Supplements Recorded: 0

## 2021-11-17 ENCOUNTER — APPOINTMENT (OUTPATIENT)
Dept: PHYSICAL THERAPY | Facility: CLINIC | Age: 46
End: 2021-11-17
Attending: INTERNAL MEDICINE
Payer: COMMERCIAL

## 2021-11-17 LAB
ALBUMIN SERPL-MCNC: 1.6 G/DL (ref 3.4–5)
ALP SERPL-CCNC: 193 U/L (ref 40–150)
ALT SERPL W P-5'-P-CCNC: 11 U/L (ref 0–50)
ANION GAP SERPL CALCULATED.3IONS-SCNC: 9 MMOL/L (ref 3–14)
AST SERPL W P-5'-P-CCNC: 34 U/L (ref 0–45)
BILIRUB SERPL-MCNC: 1.3 MG/DL (ref 0.2–1.3)
BUN SERPL-MCNC: 7 MG/DL (ref 7–30)
CALCIUM SERPL-MCNC: 7.4 MG/DL (ref 8.5–10.1)
CHLORIDE BLD-SCNC: 108 MMOL/L (ref 94–109)
CO2 SERPL-SCNC: 25 MMOL/L (ref 20–32)
CREAT SERPL-MCNC: 0.76 MG/DL (ref 0.52–1.04)
ERYTHROCYTE [DISTWIDTH] IN BLOOD BY AUTOMATED COUNT: 16.4 % (ref 10–15)
GFR SERPL CREATININE-BSD FRML MDRD: >90 ML/MIN/1.73M2
GLUCOSE BLD-MCNC: 78 MG/DL (ref 70–99)
GLUCOSE BLDC GLUCOMTR-MCNC: 100 MG/DL (ref 70–99)
GLUCOSE BLDC GLUCOMTR-MCNC: 76 MG/DL (ref 70–99)
HCT VFR BLD AUTO: 30.1 % (ref 35–47)
HGB BLD-MCNC: 9.4 G/DL (ref 11.7–15.7)
MAGNESIUM SERPL-MCNC: 1 MG/DL (ref 1.6–2.3)
MAGNESIUM SERPL-MCNC: 1.1 MG/DL (ref 1.6–2.3)
MCH RBC QN AUTO: 29.7 PG (ref 26.5–33)
MCHC RBC AUTO-ENTMCNC: 31.2 G/DL (ref 31.5–36.5)
MCV RBC AUTO: 95 FL (ref 78–100)
PHOSPHATE SERPL-MCNC: 3.6 MG/DL (ref 2.5–4.5)
PLATELET # BLD AUTO: 220 10E3/UL (ref 150–450)
POTASSIUM BLD-SCNC: 3.2 MMOL/L (ref 3.4–5.3)
POTASSIUM BLD-SCNC: 3.9 MMOL/L (ref 3.4–5.3)
PROT SERPL-MCNC: 5.6 G/DL (ref 6.8–8.8)
RBC # BLD AUTO: 3.17 10E6/UL (ref 3.8–5.2)
SODIUM SERPL-SCNC: 142 MMOL/L (ref 133–144)
WBC # BLD AUTO: 9.7 10E3/UL (ref 4–11)

## 2021-11-17 PROCEDURE — 250N000013 HC RX MED GY IP 250 OP 250 PS 637: Performed by: INTERNAL MEDICINE

## 2021-11-17 PROCEDURE — 120N000002 HC R&B MED SURG/OB UMMC

## 2021-11-17 PROCEDURE — 36415 COLL VENOUS BLD VENIPUNCTURE: CPT | Performed by: STUDENT IN AN ORGANIZED HEALTH CARE EDUCATION/TRAINING PROGRAM

## 2021-11-17 PROCEDURE — 250N000011 HC RX IP 250 OP 636: Performed by: INTERNAL MEDICINE

## 2021-11-17 PROCEDURE — 85027 COMPLETE CBC AUTOMATED: CPT | Performed by: HOSPITALIST

## 2021-11-17 PROCEDURE — 83735 ASSAY OF MAGNESIUM: CPT | Performed by: STUDENT IN AN ORGANIZED HEALTH CARE EDUCATION/TRAINING PROGRAM

## 2021-11-17 PROCEDURE — 36415 COLL VENOUS BLD VENIPUNCTURE: CPT | Performed by: HOSPITALIST

## 2021-11-17 PROCEDURE — 250N000013 HC RX MED GY IP 250 OP 250 PS 637: Performed by: HOSPITALIST

## 2021-11-17 PROCEDURE — 83735 ASSAY OF MAGNESIUM: CPT | Performed by: HOSPITALIST

## 2021-11-17 PROCEDURE — 97116 GAIT TRAINING THERAPY: CPT | Mod: GP

## 2021-11-17 PROCEDURE — 84132 ASSAY OF SERUM POTASSIUM: CPT | Performed by: STUDENT IN AN ORGANIZED HEALTH CARE EDUCATION/TRAINING PROGRAM

## 2021-11-17 PROCEDURE — 84100 ASSAY OF PHOSPHORUS: CPT | Performed by: HOSPITALIST

## 2021-11-17 PROCEDURE — 82040 ASSAY OF SERUM ALBUMIN: CPT | Performed by: HOSPITALIST

## 2021-11-17 PROCEDURE — 250N000011 HC RX IP 250 OP 636: Performed by: STUDENT IN AN ORGANIZED HEALTH CARE EDUCATION/TRAINING PROGRAM

## 2021-11-17 PROCEDURE — 99233 SBSQ HOSP IP/OBS HIGH 50: CPT | Performed by: STUDENT IN AN ORGANIZED HEALTH CARE EDUCATION/TRAINING PROGRAM

## 2021-11-17 PROCEDURE — 250N000013 HC RX MED GY IP 250 OP 250 PS 637: Performed by: STUDENT IN AN ORGANIZED HEALTH CARE EDUCATION/TRAINING PROGRAM

## 2021-11-17 RX ORDER — MAGNESIUM SULFATE HEPTAHYDRATE 40 MG/ML
4 INJECTION, SOLUTION INTRAVENOUS EVERY 4 HOURS
Status: COMPLETED | OUTPATIENT
Start: 2021-11-17 | End: 2021-11-17

## 2021-11-17 RX ORDER — POTASSIUM CHLORIDE 750 MG/1
40 TABLET, EXTENDED RELEASE ORAL ONCE
Status: COMPLETED | OUTPATIENT
Start: 2021-11-17 | End: 2021-11-17

## 2021-11-17 RX ORDER — DEXTROSE MONOHYDRATE 100 MG/ML
INJECTION, SOLUTION INTRAVENOUS CONTINUOUS PRN
Status: DISCONTINUED | OUTPATIENT
Start: 2021-11-17 | End: 2021-12-02 | Stop reason: HOSPADM

## 2021-11-17 RX ORDER — AMINO AC/PROTEIN HYDR/WHEY PRO 10G-100/30
1 LIQUID (ML) ORAL 2 TIMES DAILY
Status: DISCONTINUED | OUTPATIENT
Start: 2021-11-17 | End: 2021-11-29

## 2021-11-17 RX ORDER — OXYCODONE HYDROCHLORIDE 5 MG/1
5 TABLET ORAL 4 TIMES DAILY PRN
Status: DISCONTINUED | OUTPATIENT
Start: 2021-11-17 | End: 2021-12-01

## 2021-11-17 RX ADMIN — FUROSEMIDE 40 MG: 40 TABLET ORAL at 08:43

## 2021-11-17 RX ADMIN — OXYCODONE HYDROCHLORIDE 5 MG: 5 TABLET ORAL at 02:24

## 2021-11-17 RX ADMIN — GABAPENTIN 100 MG: 100 CAPSULE ORAL at 20:50

## 2021-11-17 RX ADMIN — MIRTAZAPINE 15 MG: 15 TABLET, FILM COATED ORAL at 20:57

## 2021-11-17 RX ADMIN — ENOXAPARIN SODIUM 40 MG: 40 INJECTION SUBCUTANEOUS at 08:41

## 2021-11-17 RX ADMIN — ACETAMINOPHEN 650 MG: 325 TABLET, FILM COATED ORAL at 08:43

## 2021-11-17 RX ADMIN — VITAM B12 100 MCG: 100 TAB at 08:44

## 2021-11-17 RX ADMIN — POTASSIUM CHLORIDE 40 MEQ: 750 TABLET, EXTENDED RELEASE ORAL at 10:38

## 2021-11-17 RX ADMIN — Medication 400 MG: at 08:43

## 2021-11-17 RX ADMIN — ACETAMINOPHEN 650 MG: 325 TABLET, FILM COATED ORAL at 14:34

## 2021-11-17 RX ADMIN — FOLIC ACID 1 MG: 1 TABLET ORAL at 08:43

## 2021-11-17 RX ADMIN — MAGNESIUM SULFATE IN WATER 4 G: 40 INJECTION, SOLUTION INTRAVENOUS at 20:50

## 2021-11-17 RX ADMIN — GABAPENTIN 100 MG: 100 CAPSULE ORAL at 14:35

## 2021-11-17 RX ADMIN — MAGNESIUM SULFATE IN WATER 4 G: 40 INJECTION, SOLUTION INTRAVENOUS at 16:35

## 2021-11-17 RX ADMIN — OXYCODONE HYDROCHLORIDE 5 MG: 5 TABLET ORAL at 12:09

## 2021-11-17 RX ADMIN — Medication 400 MG: at 14:35

## 2021-11-17 RX ADMIN — OXYCODONE HYDROCHLORIDE 5 MG: 5 TABLET ORAL at 07:36

## 2021-11-17 RX ADMIN — GABAPENTIN 100 MG: 100 CAPSULE ORAL at 08:41

## 2021-11-17 RX ADMIN — ESCITALOPRAM OXALATE 10 MG: 10 TABLET ORAL at 08:42

## 2021-11-17 RX ADMIN — Medication 1 MG: at 20:57

## 2021-11-17 RX ADMIN — SPIRONOLACTONE 100 MG: 100 TABLET ORAL at 08:41

## 2021-11-17 RX ADMIN — Medication 400 MG: at 20:50

## 2021-11-17 RX ADMIN — ASCORBIC ACID, THIAMINE MONONITRATE,RIBOFLAVIN, NIACINAMIDE, PYRIDOXINE HYDROCHLORIDE, FOLIC ACID, CYANOCOBALAMIN, BIOTIN, CALCIUM PANTOTHENATE, 1 CAPSULE: 100; 1.5; 1.7; 20; 10; 1; 6000; 150000; 5 CAPSULE, LIQUID FILLED ORAL at 08:41

## 2021-11-17 RX ADMIN — PANTOPRAZOLE SODIUM 40 MG: 40 TABLET, DELAYED RELEASE ORAL at 08:41

## 2021-11-17 RX ADMIN — THIAMINE HCL TAB 100 MG 100 MG: 100 TAB at 08:43

## 2021-11-17 RX ADMIN — ACETAMINOPHEN 650 MG: 325 TABLET, FILM COATED ORAL at 20:50

## 2021-11-17 RX ADMIN — OXYCODONE HYDROCHLORIDE 5 MG: 5 TABLET ORAL at 16:36

## 2021-11-17 ASSESSMENT — ACTIVITIES OF DAILY LIVING (ADL)
ADLS_ACUITY_SCORE: 16

## 2021-11-17 ASSESSMENT — MIFFLIN-ST. JEOR: SCORE: 1368

## 2021-11-17 NOTE — PLAN OF CARE
[id number]: VSS on RA. Abdominal pain controlled throughout day with oxycodone and tylenol. Rating up to 6/10. No nausea, dizziness, or headache throughout the day. Changed to clear liquid diet. Tolerated chicken broth and mashed potatoes well. Advance diet as tolerated. Put on high replacement protocol potassium, phosphorus, and magnesium. Oral potassium and IV magnesium given for replacement. Needs to have balanced electrolytes before initaiting NJ feeds. Unable to begin feeds today. Voiding well. LBM 11/16. Denied miralax this morning. Stool sample needed for next stool. BG 76 at 1700. Compression stockings on. Calm and cooperative. Continue with plan of care.

## 2021-11-17 NOTE — PROGRESS NOTES
[facility]    Medicine Progress Note - Hospitalist Service, Gold 8       Date of Admission:  11/10/2021    Assessment & Plan           Aliyah Angeles is a 46 year old female admitted on 11/10/2021. She has a hx of DMII, etoh abuse, alcholic hepatitis, and Fitz-en-y gastric bypass who was transferred from the [facility] ARU for necrotizing pancreatitis and fevers.     # Acute necrotizing pancreatitis, present on admission   - complex recent hospitalization course recently, notably from 9/29 to 11/5 w/ alk hep, series of pneumonias and ARDS w/ CRRT ICU stay/intubation, improved and was at ARU  - There, malaise, fever, nausea and belly pain occurred prompting CT scan showing pancreatitis w/ area of necrosis surrounding splenic vein and artery  - Suspect smoldering alcoholic pancreatitis in the interim (after last CT 10/14 which was without pancreatitis, though a 9/13 CT scan OSH did show stranding); likely too early for infected necrosis (and no gas in necrosis) thus stopped antibiotics (paracentesis on admission w/o SBP)  Plan:  *Appreciate GI consult: obtained paracentesis 50cc 11/14 (seemed to be more fluid on other side but pt declined additional instrumentation) --> amylase 6, lipase 21, pancreatic duct leak ruled out  *oxy 5mg PRN QID at mealtime for pain that occurs with eating, weaning as able (pt reports tramadol not very helpful in the past for arthralgias. would like to avoid habit forming drugs as much as possible. staying away from NSAIDs given liver disease)    Malnutrition:    - Level of malnutrition: Moderate   - Based on: reduced intake, mild (or greater) subcutaneous fat loss, mild (or greater) muscle loss  *Starting tube feeds 11/17 after NJ placement as per GI recommendations for post-pyloric feeding given pain w/ eating and need for adequate nutrition to heal pancreatitis - was not meeting goals by PO calorie counts.    #Severe sepsis (HR, Temp) 2/2  suspected pneumonia with unknown organism, present on admission   #Acute hypoxemic respiratory failure  - based on 11/8 CT a/p w/ L>R bilateral ggo w/ new o2 requirement  *s/p zosyn (11/9 - 11/11) deescalated to ciprofloxacin + flagyl (11/11 - 11/12), s/p Augmentin through 11/14 (completed 5 day course)  *wean oxygen, IC instructed    #Suspect evolving alcoholic liver disease  #Severe alcohol use disorder  - has not had chance to f/u output w/ hepatology, only started needing paracenteses during last admission (10/2021), INR elevated ~1.4 on presentation  *CD consult placed --> ideally will discharge to [facility] treatment [place], appreciate SW coordination  however with NJ feeding for at least 1-2 weeks in the hospital will have to wait for now, ultimately TF duration to be determined by GI  *initially continued rifaximin given for presumed HE last admission, however after chart review that diagnosis was equivocal in the setting of many other medical problems, anticipating outpatient coverage difficulties and no e/o HE presently, discontinued as of 11/14, and will monitor for signs of HE development  - Ascites management: *increased dosing to 100/40 on 11/16   *spironolactone (11/11 - )   *lasix (11/13 - )    #Suspect R lower back strain  - TTP paraspinals on 11/12, no focal spinal tenderness  *lido patch  *heat pad    #Anxiety/Depression  *cont home lexapro, remeron  .     Diet: NPO per Anesthesia Guidelines for Procedure/Surgery Except for: Meds    DVT Prophylaxis: Enoxaparin (Lovenox) SQ  Rice Catheter: Not present  Central Lines: None  Code Status: Full Code      Disposition Plan   Expected discharge: 11/24/2021   recommended to prior living arrangement once adequate pain management/ tolerating PO medications.     The patient's care was discussed with the Bedside Nurse and Patient.    Reinier Rivera MD  Hospitalist Service, [address]  Securely message  with the [employer] Web Console (learn more here)  Text page via [employer] Paging/Directory    Please see sign in/sign out for up to date coverage information    Clinically Significant Risk Factors Present on Admission                ______________________________________________________________________    Interval History   Slightly worsening abdominal pain.  She feels like she does not have long enough coverage with her oxycodone at 3 times daily.  Okay with starting tube feeds today.  No acute events overnight.  No fevers.  No chest pain or shortness of breath.  Four-point ROS otherwise negative.    Data reviewed today: I reviewed all medications, new labs and imaging results over the last 24 hours. I personally reviewed no images or EKG's today.    Physical Exam   Vital Signs: Temp: (!) 96.7  F (35.9  C) Temp src: Oral BP: 131/85 Pulse: 84   Resp: 18 SpO2: 96 % O2 Device: None (Room air) Oxygen Delivery: 2 LPM  Weight: 164 lbs 14.47 oz  Constitutional: Sitting up in bed, NAD  Cardiovascular: regular without murmurs or gallops  Pulmonary/Chest: CTAB.as documented by no respiratory distress.  GI: Soft, moderately distended, mild ttp epigastrium, no rebound tenderness or guardings  Skin: Skin is warm and dry  Neurological: Alert and oriented     Data   Recent Labs   Lab 11/17/21  0552 11/16/21  0610 11/15/21  0607   WBC 9.7 9.5 7.9   HGB 9.4* 9.5* 9.2*   MCV 95 97 99    214 219   INR  --   --  1.41*    139 142   POTASSIUM 3.2* 3.5 3.5   CHLORIDE 108 110* 113*   CO2 25 22 24   BUN 7 4* 3*   CR 0.76 0.78 0.84   ANIONGAP 9 7 5   STEFFANY 7.4* 7.5* 7.8*   GLC 78 93 90   ALBUMIN 1.6* 1.4* 1.5*   PROTTOTAL 5.6* 5.5* 5.6*   BILITOTAL 1.3 1.3 1.3   ALKPHOS 193* 188* 186*   ALT 11 12 12   AST 34 31 27     Recent Results (from the past 24 hour(s))   XR Feeding Tube Placement    Narrative    Feeding tube placement: 11/16/2021 3:39 PM    Comparison: CT chest/abdomen/pelvis 10/14/2021.    Indication: Postpyloric feeding tube  placement.    Fluoroscopy time: 0.8 minutes    Technique: After injection of Xylocaine gel into the left nostril, a  feeding tube was advanced under fluoroscopic guidance.    Findings: Patient with a history of Fitz-en-Y gastric bypass surgery.  The feeding tube was advanced with the tip into the jejunum, distal to  the gastric pouch. A small amount of contrast was injected to  demonstrate placement within the jejunum. The feeding tube was secured  using a nasal bridle.       Impression    Impression: Uncomplicated feeding tube placement with tip in the  jejunum.    I have personally reviewed the examination and initial interpretation  and I agree with the findings.    PRATIK BRISCOE MD         SYSTEM ID:  [id number]     Medications       acetaminophen  650 mg Oral TID     vitamin B-12  100 mcg Oral or Feeding Tube Daily     enoxaparin ANTICOAGULANT  40 mg Subcutaneous Q24H     escitalopram  10 mg Oral Daily     folic acid  1 mg Oral Daily     furosemide  40 mg Oral Daily     gabapentin  100 mg Oral TID     lidocaine  1 patch Transdermal Q24h    And     lidocaine   Transdermal Q8H     magnesium oxide  400 mg Oral TID     mirtazapine  15 mg Oral At Bedtime     multivitamin RENAL  1 capsule Oral Daily     pantoprazole  40 mg Oral QAM AC     polyethylene glycol  17 g Oral Daily     potassium chloride  40 mEq Oral Once     sennosides  1 tablet Oral At Bedtime     sodium chloride (PF)  3 mL Intracatheter Q8H     spironolactone  100 mg Oral or Feeding Tube Daily     thiamine  100 mg Oral Daily

## 2021-11-17 NOTE — PLAN OF CARE
[id number]  AVSS, alert and oriented x 4, lucy pain/sob. C/o nausea, given PRN IV compazine. Up with 1 assist to bathroom, did not void for this jose cruz shift, refuse to go bathroom. Did bladder scan at 2100 is 186 ml, given schedule Lasix, which is daily, and not given in the morning. Replaced magnesium. Pt needs stool sample.  NJ placed today, still on NPO, take sips of water with pills and able to swallow pills without difficulty. No order for MD ENOCH notified.  Melatonin given at bed time.  Continue to monitor care

## 2021-11-17 NOTE — PROGRESS NOTES
CLINICAL NUTRITION SERVICES - REASSESSMENT NOTE     Nutrition Prescription    RECOMMENDATIONS FOR MDs/PROVIDERS TO ORDER:  Total fluids/adjustments per MD    Malnutrition Status:    Severe malnutrition in the context of acute illness    Recommendations already ordered by Registered Dietitian (RD):  --Discontinued supplements with NPO/clear liquid diet    TF:  --Osmolite 1.5 Faizan @ goal of 45ml/hr (1080ml/day)  + 2 ProSource will provide 1700 kcal (28 kcal/kg), 89 g PRO (1.5 g/kg PRO), 822 ml free H20, 219 g CHO, and 0 g fiber daily.  --Start TF @ 10 ml/hr and advance by 10 ml/hr q 12 hrs until goal rate.  --Do not start or advance TF rate unless K+ >3.0, Mg++ > 1.5,  and Phos > 2.0  --Minimum 30 ml q 4 hrs water flushes for tube patency.    Future/Additional Recommendations:  --TF tolerance, advancement  --Weight trends and need to adjust dosing wt pending frequency of paracentesis   --Pending results of fecal elastase, add enzymes to TF     EVALUATION OF THE PROGRESS TOWARD GOALS   Diet: NPO 11/16. Advanced to clear liquids 11/17 at 1230 - pt had broth, juice, and jello for lunch 11/17    Intake:   Calorie Counts:   11/13: 424 kcal, 14 g protein (2 meals)   11/14: 863 kcal, 35 g protein (1 meal + snack)   11/15: 730 kcals, 39 g protein (3 meals)   Average: 672 kcal, 29 g protein (meets 45% of low-end estimated energy needs and 40% of low-end estimated protein needs)     NEW FINDINGS   Provider order received for TF therapy. Pt had NJT placed yesterday.    Pt reports a usual body weight of 150 lbs. She contributes loss of fluid to the significant decrease in weight (suspect some is from decreased po intake). She still has some excess fluids in legs and abdomen. Paracentesis obtained on 11/14 with 50cc removed (seemed to be more fluid on other side but pt declined additional instrumentation per MD note today)     Weight:  Wt Readings from Last 5 Encounters:   11/17/21 74.8 kg (164 lb 14.5 oz)   11/09/21 86.4 kg (190  lb 8 oz)   21 90.2 kg (198 lb 13.7 oz)   21 86.7 kg (191 lb 3.2 oz)   20 74.8 kg (165 lb)     21 0746 74.8 kg (164 lb 14.5 oz)   21 0858 76.6 kg (168 lb 14.4 oz)   11/15/21 0700 77.3 kg (170 lb 8 oz)   21 0731 78.2 kg (172 lb 8 oz)   21 1659 83.5 kg (184 lb)   11/10/21 0428 84.6 kg (186 lb 7.4 oz)   11.6% wt loss in 1 week using most recent wt of 74.8 kg and 11/10 wt of 84.6 kg (pt on Lasix and had paracentesis )    Labs:   K: 3.2(L), M.1(L) --> pt on oral potassium and magnesium replacement. Pt at risk for refeeding with initiation and adv of TF to goal.  Fecal elastase to be collected    Meds:  Vitamin B12, Folic acid, Thiamine, Nephrocaps, Lasix, Protonix, Miralax      MALNUTRITION  % Intake: </= 50% for >/= 5 days (severe)  % Weight Loss: Unable to assess d/t fluid shifts  Subcutaneous Fat Loss: Upper arm: mild  Muscle Loss: Temporal: mild, Thoracic region (clavicle, acromium bone, deltoid, trapezius, pectoral): mild, Upper arm (bicep, tricep): moderate and Lower arm  (forearm): moderate  Fluid Accumulation/Edema: Does not meet criteria, trace  Malnutrition Diagnosis: Severe malnutrition in the context of acute illness    Previous Goals   Patient to consume % of nutritionally adequate meal trays TID, or the equivalent with supplements/snacks.  Evaluation: Not met    Previous Nutrition Diagnosis  Inadequate oral intake related to limited appetite and early satiety as evidenced by patients report    Evaluation: No change - modified    CURRENT NUTRITION DIAGNOSIS  Inadequate oral intake related to limited appetite and early satiety as evidenced by ave po intakes per calorie count x 3 days meeting only 45% of low-end estimated energy needs and 40% of low-end estimated protein needs, and consult received for TF therapy.     INTERVENTIONS  Implementation  Enteral Nutrition - Initiate    Goals  Total avg nutritional intake to meet a minimum of 25 kcal/kg and 1.2  g PRO/kg daily (per dosing wt 60 kg - adjusted).    Monitoring/Evaluation  Progress toward goals will be monitored and evaluated per protocol.    Katie Turner  Dietetic Intern    RD has read and agrees with above    eKlly Monroe RD,LD  7D pager [phone]

## 2021-11-17 NOTE — PROGRESS NOTES
"    GASTROENTEROLOGY PROGRESS NOTE    Date of Admission: 11/10/2021  Reason for Admission: abdominal pain, pancreatitis      ASSESSMENT:  Aliyah Angeles is a 46 year old female with history of ETOH use disorder (last drink 9/12/21), alcoholic hepatitis, Fitz en y gastric bypass, alcohol induced pancreatitis, Type 2 DM, anxiety, and depression who presented with ETOH necrotizing pancreatitis with ascites, ANC without gas and pleural effusion.     #. Necrotizing pancreatitis with small acute necrotic collection  Patient with notable recent hospitalization for alcohol hepatitis c/b pneumonia with ARDS requiring intubation/ICU stay, CRRT from 9/29-11/5 -- discharged to TCU and subsequently developed epigastric and RUQ abdominal pain on 11/18 prompting CT scan abd/pelv showing necrotizing pancreatitis with non-enhancing areas of the tail which was new from previous CT on 10/14 (\"Incompletely encapsulated acute necrotic collection containing nonliquefied fat measuring 53 x 44 x 39 mm with a more ill-defined component extending inferiorly along the left anterior pararenal fascia.\"). Suspect smoldering pancreatitis from initial presentation back in September. She has had prior admissions for severe alcohol related pancreatitis as well. At this time there is no necrotic collection large enough or walled off for endoscopic transluminal drainage. Has had paracentesis x 3 with low levels of lipase/amylase which does not support pancreati ascites.   Initial concern when she started to have fevers was for infected necrosis however no e/o gas on CT. She did receive 5 days of antibiotics to cover for presumed pneumonia and since completion has shown improvement. For now, continue conservative management with a focus on nutrition, NJFT placed yesterday with plans to start tube feeds today.  Etiology: Presumed alcohol  Date of onset: 11/6, unclear  Thromboses: none, currently on Lovenox subcutaneous prophy  Diabetes: no  Current " "nutrition access: NJFT + po  Last imaging: CT abd/pelv 11/8  Infection/antiinfectives: Zosyn (11/9-11/11), Cipro/Flagyl (11/11-11/12), Augmentin (11/12-11/14) for presumed pneumonia/resp failure. **Currently no abx    #. Severe sepsis 2/2 suspected pneumonia  #. Fevers, resolved  Developed fevers as well as new oxygen requirements 11/9, imaging suggested lung bases w/ L>R bilateral ground glass opacities, treated with 5 day course abx (11/9-11/14) with rapid improvement.    #. Alcohol use disorder  #. Hepatic steatosis  #. Ascites, likely r/t portal hypertension  Last reported alcohol use 9/12. Known severe hepatic steatosis with e/o recanalized umbilical vein, mild splenomegaly and ascites. Planned to establish with hepatology as outpatient    RECOMMENDATIONS:  Dietitian consulted to start tube feeds today  Fecal elastase to be collected (patient reminded)  No endoscopic procedures planned at this time  Ongoing alcohol abstinence  Diuretics per primary team/hepatology  Analgesia per primary team Dr. Rivera    GI will follow peripherally, call with questions    The patient was discussed and plan agreed upon with GI staff, Dr Larry Nieto, PA-C  Advanced Endoscopy/Pancreaticobiliary GI Service  [facility]  Text Page  _______________________________________________________________      Subjective: Nursing notes and 24hr events reviewed. Patient seen and examined at 1030. NJFT placed yesterday, tube feeds not yet started. Asking for regular diet. Abdominal pain (RUQ) is stable, not worse. No fevers or other new symptoms    ROS:   4 pt ROS negative unless noted in subjective.     Objective:  Blood pressure 108/74, pulse 92, temperature 96.8  F (36  C), temperature source Axillary, resp. rate 18, height 1.626 m (5' 4\"), weight 74.8 kg (164 lb 14.5 oz), SpO2 97 %, not currently breastfeeding.  Gen: Sitting in bed. Appears comfortable  HEENT: NCAT. Conjunctiva clear. Sclera " anicteric  CV: RRR, Peripheral perfusion intact  Resp: normal work of breathing  Abd: Soft, obese, tender over RUQ, +hepatomegaly, no guarding or rebound, +BS  Msk: no gross deformity  Skin:  no jaundice  Ext: warm, well perfused  Neuro: grossly normal  Mental status/Psych: A&O. Asks/answers questions appropriately     LABS:  BMP  Recent Labs   Lab 11/17/21  0552 11/16/21  0610 11/15/21  0607 11/14/21  0724    139 142 145*   POTASSIUM 3.2* 3.5 3.5 3.5   CHLORIDE 108 110* 113* 115*   STEFFANY 7.4* 7.5* 7.8* 7.5*   CO2 25 22 24 24   BUN 7 4* 3* 4*   CR 0.76 0.78 0.84 0.75   GLC 78 93 90 93     CBC  Recent Labs   Lab 11/17/21  0552 11/16/21  0610 11/15/21  0607 11/14/21  0724   WBC 9.7 9.5 7.9 6.1   RBC 3.17* 3.18* 3.08* 2.77*   HGB 9.4* 9.5* 9.2* 8.3*   HCT 30.1* 30.9* 30.4* 27.5*   MCV 95 97 99 99   MCH 29.7 29.9 29.9 30.0   MCHC 31.2* 30.7* 30.3* 30.2*   RDW 16.4* 16.4* 16.8* 16.9*    214 219 222     INR  Recent Labs   Lab 11/15/21  0607   INR 1.41*     LFTs  Recent Labs   Lab 11/17/21  0552 11/16/21  0610 11/15/21  0607 11/14/21  0724   ALKPHOS 193* 188* 186* 188*   AST 34 31 27 26   ALT 11 12 12 12   BILITOTAL 1.3 1.3 1.3 1.3   PROTTOTAL 5.6* 5.5* 5.6* 5.7*   ALBUMIN 1.6* 1.4* 1.5* 1.6*      PANC  No lab results found in last 7 days.      IMAGING:  CT abd/pelv with IV contrast 11/8  IMPRESSION:   1. Findings concerning for necrotizing pancreatitis with acute  necrotic fluid collection about the pancreatic tail, which encases the  splenic artery and vein with apparent narrowing of the splenic vein.  No evidence of hemorrhage or superinfection.  2. Left greater than right basilar groundglass opacities, which could  represent infection although in the setting of necrotic pancreatitis,  ARDS is also considered. Left greater than right pleural effusions.  3. Hepatic steatosis. Splenomegaly.  4. Large ascites.

## 2021-11-17 NOTE — PLAN OF CARE
[id number]    VSS on O2 2L via nasal cannula. Continuous pulse ox monitoring continued. Denies SOB and nausea. C/o pain, gave oxycodone x1 with relief. NJ tube clamped and in place, no tube feed orders. Pt reports voiding overnight. No bowel movement overnight, pt still needs a stool sample. No acute events overnight. Continue with plan of care.

## 2021-11-17 NOTE — PHARMACY-CONSULT NOTE
Pharmacy Tube Feeding Consult    Medication reviewed for administration by feeding tube and for potential food/drug interactions.    Recommendation: No changes are needed at this time.  Pt is currently able to tolerate oral meds.      Pharmacy will continue to follow as new medications are ordered.

## 2021-11-18 ENCOUNTER — APPOINTMENT (OUTPATIENT)
Dept: GENERAL RADIOLOGY | Facility: CLINIC | Age: 46
End: 2021-11-18
Attending: STUDENT IN AN ORGANIZED HEALTH CARE EDUCATION/TRAINING PROGRAM
Payer: COMMERCIAL

## 2021-11-18 LAB
ALBUMIN SERPL-MCNC: 1.5 G/DL (ref 3.4–5)
ALP SERPL-CCNC: 204 U/L (ref 40–150)
ALT SERPL W P-5'-P-CCNC: 11 U/L (ref 0–50)
ANION GAP SERPL CALCULATED.3IONS-SCNC: 6 MMOL/L (ref 3–14)
AST SERPL W P-5'-P-CCNC: 37 U/L (ref 0–45)
BACTERIA FLD CULT: NO GROWTH
BILIRUB SERPL-MCNC: 1.4 MG/DL (ref 0.2–1.3)
BUN SERPL-MCNC: 6 MG/DL (ref 7–30)
CALCIUM SERPL-MCNC: 7.4 MG/DL (ref 8.5–10.1)
CHLORIDE BLD-SCNC: 107 MMOL/L (ref 94–109)
CO2 SERPL-SCNC: 25 MMOL/L (ref 20–32)
CREAT SERPL-MCNC: 0.78 MG/DL (ref 0.52–1.04)
ERYTHROCYTE [DISTWIDTH] IN BLOOD BY AUTOMATED COUNT: 16.7 % (ref 10–15)
GFR SERPL CREATININE-BSD FRML MDRD: >90 ML/MIN/1.73M2
GLUCOSE BLD-MCNC: 91 MG/DL (ref 70–99)
GLUCOSE BLDC GLUCOMTR-MCNC: 101 MG/DL (ref 70–99)
GLUCOSE BLDC GLUCOMTR-MCNC: 122 MG/DL (ref 70–99)
GLUCOSE BLDC GLUCOMTR-MCNC: 91 MG/DL (ref 70–99)
HCT VFR BLD AUTO: 29.8 % (ref 35–47)
HGB BLD-MCNC: 9.4 G/DL (ref 11.7–15.7)
MAGNESIUM SERPL-MCNC: 2.8 MG/DL (ref 1.6–2.3)
MCH RBC QN AUTO: 30.1 PG (ref 26.5–33)
MCHC RBC AUTO-ENTMCNC: 31.5 G/DL (ref 31.5–36.5)
MCV RBC AUTO: 96 FL (ref 78–100)
PHOSPHATE SERPL-MCNC: 3.3 MG/DL (ref 2.5–4.5)
PLATELET # BLD AUTO: 262 10E3/UL (ref 150–450)
POTASSIUM BLD-SCNC: 3.5 MMOL/L (ref 3.4–5.3)
PROT SERPL-MCNC: 5.8 G/DL (ref 6.8–8.8)
RBC # BLD AUTO: 3.12 10E6/UL (ref 3.8–5.2)
SODIUM SERPL-SCNC: 138 MMOL/L (ref 133–144)
WBC # BLD AUTO: 8.4 10E3/UL (ref 4–11)

## 2021-11-18 PROCEDURE — 74018 RADEX ABDOMEN 1 VIEW: CPT | Mod: 26 | Performed by: RADIOLOGY

## 2021-11-18 PROCEDURE — 84100 ASSAY OF PHOSPHORUS: CPT | Performed by: STUDENT IN AN ORGANIZED HEALTH CARE EDUCATION/TRAINING PROGRAM

## 2021-11-18 PROCEDURE — 250N000013 HC RX MED GY IP 250 OP 250 PS 637: Performed by: STUDENT IN AN ORGANIZED HEALTH CARE EDUCATION/TRAINING PROGRAM

## 2021-11-18 PROCEDURE — 82040 ASSAY OF SERUM ALBUMIN: CPT | Performed by: HOSPITALIST

## 2021-11-18 PROCEDURE — 250N000013 HC RX MED GY IP 250 OP 250 PS 637: Performed by: INTERNAL MEDICINE

## 2021-11-18 PROCEDURE — 99207 PR CDG-CUT & PASTE-POTENTIAL IMPACT ON LEVEL: CPT | Performed by: STUDENT IN AN ORGANIZED HEALTH CARE EDUCATION/TRAINING PROGRAM

## 2021-11-18 PROCEDURE — 36415 COLL VENOUS BLD VENIPUNCTURE: CPT | Performed by: HOSPITALIST

## 2021-11-18 PROCEDURE — 82656 EL-1 FECAL QUAL/SEMIQ: CPT | Performed by: INTERNAL MEDICINE

## 2021-11-18 PROCEDURE — 85027 COMPLETE CBC AUTOMATED: CPT | Performed by: HOSPITALIST

## 2021-11-18 PROCEDURE — 83735 ASSAY OF MAGNESIUM: CPT | Performed by: STUDENT IN AN ORGANIZED HEALTH CARE EDUCATION/TRAINING PROGRAM

## 2021-11-18 PROCEDURE — 74018 RADEX ABDOMEN 1 VIEW: CPT

## 2021-11-18 PROCEDURE — 250N000013 HC RX MED GY IP 250 OP 250 PS 637: Performed by: HOSPITALIST

## 2021-11-18 PROCEDURE — 120N000002 HC R&B MED SURG/OB UMMC

## 2021-11-18 PROCEDURE — 99233 SBSQ HOSP IP/OBS HIGH 50: CPT | Performed by: STUDENT IN AN ORGANIZED HEALTH CARE EDUCATION/TRAINING PROGRAM

## 2021-11-18 PROCEDURE — 250N000011 HC RX IP 250 OP 636: Performed by: INTERNAL MEDICINE

## 2021-11-18 RX ORDER — POTASSIUM CHLORIDE 750 MG/1
20 TABLET, EXTENDED RELEASE ORAL ONCE
Status: COMPLETED | OUTPATIENT
Start: 2021-11-18 | End: 2021-11-18

## 2021-11-18 RX ADMIN — OXYCODONE HYDROCHLORIDE 5 MG: 5 TABLET ORAL at 13:40

## 2021-11-18 RX ADMIN — ACETAMINOPHEN 650 MG: 325 TABLET, FILM COATED ORAL at 08:20

## 2021-11-18 RX ADMIN — ESCITALOPRAM OXALATE 10 MG: 10 TABLET ORAL at 08:20

## 2021-11-18 RX ADMIN — FUROSEMIDE 40 MG: 40 TABLET ORAL at 08:20

## 2021-11-18 RX ADMIN — MIRTAZAPINE 15 MG: 15 TABLET, FILM COATED ORAL at 21:29

## 2021-11-18 RX ADMIN — PANTOPRAZOLE SODIUM 40 MG: 40 TABLET, DELAYED RELEASE ORAL at 08:19

## 2021-11-18 RX ADMIN — GABAPENTIN 100 MG: 100 CAPSULE ORAL at 13:40

## 2021-11-18 RX ADMIN — VITAM B12 100 MCG: 100 TAB at 08:19

## 2021-11-18 RX ADMIN — THIAMINE HCL TAB 100 MG 100 MG: 100 TAB at 08:39

## 2021-11-18 RX ADMIN — ACETAMINOPHEN 650 MG: 325 TABLET, FILM COATED ORAL at 19:47

## 2021-11-18 RX ADMIN — GABAPENTIN 100 MG: 100 CAPSULE ORAL at 19:47

## 2021-11-18 RX ADMIN — Medication 1 PACKET: at 13:42

## 2021-11-18 RX ADMIN — Medication 1 MG: at 21:29

## 2021-11-18 RX ADMIN — OXYCODONE HYDROCHLORIDE 5 MG: 5 TABLET ORAL at 09:19

## 2021-11-18 RX ADMIN — ASCORBIC ACID, THIAMINE MONONITRATE,RIBOFLAVIN, NIACINAMIDE, PYRIDOXINE HYDROCHLORIDE, FOLIC ACID, CYANOCOBALAMIN, BIOTIN, CALCIUM PANTOTHENATE, 1 CAPSULE: 100; 1.5; 1.7; 20; 10; 1; 6000; 150000; 5 CAPSULE, LIQUID FILLED ORAL at 08:19

## 2021-11-18 RX ADMIN — Medication 400 MG: at 08:20

## 2021-11-18 RX ADMIN — FOLIC ACID 1 MG: 1 TABLET ORAL at 08:19

## 2021-11-18 RX ADMIN — Medication 25 MG: at 01:34

## 2021-11-18 RX ADMIN — OXYCODONE HYDROCHLORIDE 5 MG: 5 TABLET ORAL at 17:53

## 2021-11-18 RX ADMIN — OXYCODONE HYDROCHLORIDE 5 MG: 5 TABLET ORAL at 05:16

## 2021-11-18 RX ADMIN — Medication 1 PACKET: at 19:49

## 2021-11-18 RX ADMIN — POTASSIUM CHLORIDE 20 MEQ: 750 TABLET, EXTENDED RELEASE ORAL at 08:39

## 2021-11-18 RX ADMIN — Medication 400 MG: at 13:40

## 2021-11-18 RX ADMIN — ONDANSETRON 4 MG: 4 TABLET, ORALLY DISINTEGRATING ORAL at 17:15

## 2021-11-18 RX ADMIN — PROCHLORPERAZINE EDISYLATE 10 MG: 5 INJECTION INTRAMUSCULAR; INTRAVENOUS at 08:30

## 2021-11-18 RX ADMIN — SPIRONOLACTONE 100 MG: 100 TABLET ORAL at 08:39

## 2021-11-18 RX ADMIN — ENOXAPARIN SODIUM 40 MG: 40 INJECTION SUBCUTANEOUS at 08:19

## 2021-11-18 RX ADMIN — METHOCARBAMOL 500 MG: 500 TABLET ORAL at 19:47

## 2021-11-18 RX ADMIN — GABAPENTIN 100 MG: 100 CAPSULE ORAL at 08:19

## 2021-11-18 ASSESSMENT — ACTIVITIES OF DAILY LIVING (ADL)
ADLS_ACUITY_SCORE: 16

## 2021-11-18 ASSESSMENT — MIFFLIN-ST. JEOR: SCORE: 1365.26

## 2021-11-18 NOTE — PROGRESS NOTES
"    GASTROENTEROLOGY PROGRESS NOTE    Date of Admission: 11/10/2021  Reason for Admission: abdominal pain, pancreatitis      ASSESSMENT:  Aliyah Angeles is a 46 year old female with history of ETOH use disorder (last drink 9/12/21), alcoholic hepatitis, Fitz en y gastric bypass, alcohol induced pancreatitis, Type 2 DM, anxiety, and depression who presented with ETOH necrotizing pancreatitis with ascites, ANC without gas and pleural effusion.     #. Acute necrotizing pancreatitis with acute necrotic collection  Patient with notable recent hospitalization for alcohol hepatitis c/b pneumonia with ARDS requiring intubation/ICU stay, CRRT from 9/29-11/5 -- discharged to TCU and subsequently developed epigastric and RUQ abdominal pain on 11/18 prompting CT scan abd/pelv showing necrotizing pancreatitis with non-enhancing areas of the tail which was new from previous CT on 10/14 (\"Incompletely encapsulated acute necrotic collection containing nonliquefied fat measuring 53 x 44 x 39 mm with a more ill-defined component extending inferiorly along the left anterior pararenal fascia.\"). Suspect smoldering pancreatitis from initial presentation back in September. She has had prior admissions for severe alcohol related pancreatitis as well. At this time there is no necrotic collection large enough or walled off for endoscopic transluminal drainage. Has had paracentesis x 3 with low levels of lipase/amylase which does not support pancreati ascites.   Initial concern when she started to have fevers was for infected necrosis however no e/o gas on CT. She did receive 5 days of antibiotics to cover for presumed pneumonia and since completion has shown improvement. For now, continue conservative management with a focus on nutrition. Recommend NJFT placement today.  Etiology: Presumed alcohol  Date of onset: 11/6, unclear  Thromboses: none, currently on Lovenox subcutaneous prophy  Diabetes: no  Current nutrition access: eating by " mouth but not meeting caloric goals  Last imaging: CT abd/pelv 11/8  Infection/antiinfectives: Zosyn (11/9-11/11), Cipro/Flagyl (11/11-11/12), Augmentin (11/12-11/14) for presumed pneumonia/resp failure. **Currently no abx    #. Severe sepsis 2/2 suspected pneumonia  #. Fevers, resolved  Developed fevers as well as new oxygen requirements 11/9, imaging suggested lung bases w/ L>R bilateral ground glass opacities, treated with 5 day course abx (11/9-11/14) with rapid improvement.    #. Alcohol use disorder  #. Hepatic steatosis  #. Ascites, likely r/t portal hypertension  Last reported alcohol use 9/12. Known severe hepatic steatosis with e/o recanalized umbilical vein, mild splenomegaly and ascites.     RECOMMENDATIONS:  Start tube feeds, dietitian following  Fecal elastase to be collected (patient reminded)  No endoscopic procedures planned at this time  Ongoing alcohol abstinence  Diuretics per primary team/hepatology  Analgesia per primary team    The inpatient advanced endoscopy/pancreaticobiliary service will sign off at this time. Please call or repost with questions or if status changes. Patient to follow up with hepatology after discharge regarding liver disease. No specific pancreaticobiliary follow up needed. Thank you for allowing us to participate in the care of this patient.    The patient was discussed and plan agreed upon with GI staff, Dr Augustin.      Katie Nieto PA-C  Advanced Endoscopy/Pancreaticobiliary GI Service  [facility]  Text Page  _______________________________________________________________      Subjective: Nursing notes and 24hr events reviewed. Patient seen and examined. Stable abdominal pain. NJT in place but unfortunately still hasn't started tube feeds yet. No vomiting.     ROS:   4 pt ROS negative unless noted in subjective.     Objective:  Blood pressure 100/72, pulse 85, temperature 97.4  F (36.3  C), temperature source Oral, resp. rate  "18, height 1.626 m (5' 4\"), weight 74.8 kg (164 lb 14.5 oz), SpO2 92 %, not currently breastfeeding.  Gen: Sitting in bed. Appears comfortable  HEENT: NJT present  CV: non labored breathing  Abd: Soft, obese, tender over RUQ, +hepatomegaly, no guarding or rebound, +BS  Msk: no gross deformity  Skin:  no jaundice  Neuro: grossly normal  Mental status/Psych: A&O. Asks/answers questions appropriately     LABS:  BMP  Recent Labs   Lab 11/18/21  1150 11/18/21  0935 11/18/21  0556 11/17/21  2200 11/17/21  1710 11/17/21  1404 11/17/21  0552 11/16/21  0610 11/15/21  0607   NA  --   --  138  --   --   --  142 139 142   POTASSIUM  --   --  3.5  --   --  3.9 3.2* 3.5 3.5   CHLORIDE  --   --  107  --   --   --  108 110* 113*   STEFFANY  --   --  7.4*  --   --   --  7.4* 7.5* 7.8*   CO2  --   --  25  --   --   --  25 22 24   BUN  --   --  6*  --   --   --  7 4* 3*   CR  --   --  0.78  --   --   --  0.76 0.78 0.84   * 91 91 100*   < >  --  78 93 90    < > = values in this interval not displayed.     CBC  Recent Labs   Lab 11/18/21  0556 11/17/21  0552 11/16/21  0610 11/15/21  0607   WBC 8.4 9.7 9.5 7.9   RBC 3.12* 3.17* 3.18* 3.08*   HGB 9.4* 9.4* 9.5* 9.2*   HCT 29.8* 30.1* 30.9* 30.4*   MCV 96 95 97 99   MCH 30.1 29.7 29.9 29.9   MCHC 31.5 31.2* 30.7* 30.3*   RDW 16.7* 16.4* 16.4* 16.8*    220 214 219     INR  Recent Labs   Lab 11/15/21  0607   INR 1.41*     LFTs  Recent Labs   Lab 11/18/21  0556 11/17/21  0552 11/16/21  0610 11/15/21  0607   ALKPHOS 204* 193* 188* 186*   AST 37 34 31 27   ALT 11 11 12 12   BILITOTAL 1.4* 1.3 1.3 1.3   PROTTOTAL 5.8* 5.6* 5.5* 5.6*   ALBUMIN 1.5* 1.6* 1.4* 1.5*      PANC  No lab results found in last 7 days.      IMAGING:  CT abd/pelv with IV contrast 11/8  IMPRESSION:   1. Findings concerning for necrotizing pancreatitis with acute  necrotic fluid collection about the pancreatic tail, which encases the  splenic artery and vein with apparent narrowing of the splenic vein.  No evidence " of hemorrhage or superinfection.  2. Left greater than right basilar groundglass opacities, which could  represent infection although in the setting of necrotic pancreatitis,  ARDS is also considered. Left greater than right pleural effusions.  3. Hepatic steatosis. Splenomegaly.  4. Large ascites.

## 2021-11-18 NOTE — PROVIDER NOTIFICATION
"#[phone] paged    NJ tube landmark changed since yesterday when I had pt. Bridle appears more taught and pt says feels different \"in my throat.\"  "

## 2021-11-18 NOTE — PLAN OF CARE
1900 - 0700  VS stable, afebrile. Pt c/o abdominal pain - PRN 5mg oxy given x1. Denied SOB/n/v. Pt requested for PO benadryl for sleep - given x1. Up independently to bathroom, however pt not saving UOP. No BM - stool sample still needs collection. BG at bedtime 100. No significant events, continue POC.

## 2021-11-18 NOTE — PROGRESS NOTES
[facility]    Medicine Progress Note - Hospitalist Service, Gold 8       Date of Admission:  11/10/2021    Assessment & Plan           Aliyah Angeles is a 46 year old female admitted on 11/10/2021. She has a hx of DMII, etoh abuse, alcholic hepatitis, and Fitz-en-y gastric bypass who was transferred from the [facility] ARU for necrotizing pancreatitis and fevers.     # Acute necrotizing pancreatitis, present on admission   - complex recent hospitalization course recently, notably from 9/29 to 11/5 w/ alk hep, series of pneumonias and ARDS w/ CRRT ICU stay/intubation, improved and was at ARU  - There, malaise, fever, nausea and belly pain occurred prompting CT scan showing pancreatitis w/ area of necrosis surrounding splenic vein and artery  - Suspect smoldering alcoholic pancreatitis in the interim (after last CT 10/14 which was without pancreatitis, though a 9/13 CT scan OSH did show stranding); likely too early for infected necrosis (and no gas in necrosis) thus stopped antibiotics (paracentesis on admission w/o SBP)  Plan:  *Appreciate GI consult: obtained paracentesis 50cc 11/14 (seemed to be more fluid on other side but pt declined additional instrumentation) --> amylase 6, lipase 21, pancreatic duct leak ruled out  *oxy 5mg PRN QID at mealtime for pain that occurs with eating, weaning as able (pt reports tramadol not very helpful in the past for arthralgias. would like to avoid habit forming drugs as much as possible. staying away from NSAIDs given liver disease)    Malnutrition:    - Level of malnutrition: Moderate   - Based on: reduced intake, mild (or greater) subcutaneous fat loss, mild (or greater) muscle loss  *Starting tube feeds 11/18 (initially low magnesium) after NJ placement as per GI recommendations for post-pyloric feeding given pain w/ eating and need for adequate nutrition to heal pancreatitis - was not meeting goals by PO calorie counts.    #Severe  sepsis (HR, Temp) 2/2 suspected pneumonia with unknown organism, present on admission   #Acute hypoxemic respiratory failure  - based on 11/8 CT a/p w/ L>R bilateral ggo w/ new o2 requirement  *s/p zosyn (11/9 - 11/11) deescalated to ciprofloxacin + flagyl (11/11 - 11/12), s/p Augmentin through 11/14 (completed 5 day course)  *wean oxygen, IC instructed    #Suspect evolving alcoholic liver disease  #Severe alcohol use disorder  - has not had chance to f/u output w/ hepatology, only started needing paracenteses during last admission (10/2021), INR elevated ~1.4 on presentation  *CD consult placed --> ideally will discharge to [facility] treatment [place], appreciate SW coordination  however with NJ feeding for at least 1-2 weeks in the hospital will have to wait for now, ultimately TF duration to be determined by GI  *initially continued rifaximin given for presumed HE last admission, however after chart review that diagnosis was equivocal in the setting of many other medical problems, anticipating outpatient coverage difficulties and no e/o HE presently, discontinued as of 11/14, and will monitor for signs of HE development  - Ascites management: *increased dosing to 100/40 on 11/16   *spironolactone (11/11 - )   *lasix (11/13 - )    #Suspect R lower back strain  - TTP paraspinals on 11/12, no focal spinal tenderness  *lido patch  *heat pad    #Anxiety/Depression  *cont home lexapro, remeron.       Diet: Adult Formula Drip Feeding: Continuous Osmolite 1.5; Nasojejunal; Goal Rate: 45; mL/hr; Medication - Feeding Tube Flush Frequency: At least 15-30 mL water before and after medication administration and with tube clogging; Amount to Send (Nutrition...  Advance Diet as Tolerated: Low Fiber    DVT Prophylaxis: Enoxaparin (Lovenox) SQ  Rice Catheter: Not present  Central Lines: None  Code Status: Full Code      Disposition Plan   Expected discharge: 12/02/2021   recommended to prior living arrangement once adequate  pain management/ tolerating PO medications.     The patient's care was discussed with the Patient.    Reinier Rivera MD  Hospitalist Service, [address]  Securely message with the Vocera Web Console (learn more here)  Text page via [facility] Paging/Directory    Please see sign in/sign out for up to date coverage information    Clinically Significant Risk Factors Present on Admission                ______________________________________________________________________    Interval History   Stable abdominal pain. No acute overnight events. Otherwise feels okay. She continues to be okay with starting tube feeds today. No chest pain or shortness of breath. No fevers. 12 point ROS otherwise negative.    Data reviewed today: I reviewed all medications, new labs and imaging results over the last 24 hours. I personally reviewed no images or EKG's today.    Physical Exam   Vital Signs: Temp: 97.1  F (36.2  C) Temp src: Oral BP: 131/78 Pulse: 81   Resp: 18 SpO2: 92 % O2 Device: None (Room air)    Weight: 164 lbs 14.47 oz  Constitutional: Lying back in bed, NAD  Cardiovascular: regular without murmurs or gallops  Pulmonary/Chest: CTAB, no respiratory distress.  GI: Soft, moderately distended (stable), mild ttp epigastrium, no rebound tenderness or guarding  Skin: Skin is warm and dry  Neurological: Alert and oriented     Data   Recent Labs   Lab 11/18/21  0935 11/18/21  0556 11/17/21  2200 11/17/21  1710 11/17/21  1404 11/17/21  0552 11/16/21  0610 11/15/21  0607   WBC  --  8.4  --   --   --  9.7 9.5 7.9   HGB  --  9.4*  --   --   --  9.4* 9.5* 9.2*   MCV  --  96  --   --   --  95 97 99   PLT  --  262  --   --   --  220 214 219   INR  --   --   --   --   --   --   --  1.41*   NA  --  138  --   --   --  142 139 142   POTASSIUM  --  3.5  --   --  3.9 3.2* 3.5 3.5   CHLORIDE  --  107  --   --   --  108 110* 113*   CO2  --  25  --   --   --  25 22 24   BUN  --  6*  --   --   --  7  4* 3*   CR  --  0.78  --   --   --  0.76 0.78 0.84   ANIONGAP  --  6  --   --   --  9 7 5   STEFFANY  --  7.4*  --   --   --  7.4* 7.5* 7.8*   GLC 91 91 100*   < >  --  78 93 90   ALBUMIN  --  1.5*  --   --   --  1.6* 1.4* 1.5*   PROTTOTAL  --  5.8*  --   --   --  5.6* 5.5* 5.6*   BILITOTAL  --  1.4*  --   --   --  1.3 1.3 1.3   ALKPHOS  --  204*  --   --   --  193* 188* 186*   ALT  --  11  --   --   --  11 12 12   AST  --  37  --   --   --  34 31 27    < > = values in this interval not displayed.     No results found for this or any previous visit (from the past 24 hour(s)).  Medications     dextrose       dextrose         acetaminophen  650 mg Oral TID     vitamin B-12  100 mcg Oral or Feeding Tube Daily     enoxaparin ANTICOAGULANT  40 mg Subcutaneous Q24H     escitalopram  10 mg Oral Daily     folic acid  1 mg Oral Daily     furosemide  40 mg Oral Daily     gabapentin  100 mg Oral TID     lidocaine  1 patch Transdermal Q24h    And     lidocaine   Transdermal Q8H     magnesium oxide  400 mg Oral TID     mirtazapine  15 mg Oral At Bedtime     multivitamin RENAL  1 capsule Oral Daily     pantoprazole  40 mg Oral QAM AC     polyethylene glycol  17 g Oral Daily     protein modular  1 packet Per Feeding Tube BID     sennosides  1 tablet Oral At Bedtime     sodium chloride (PF)  3 mL Intracatheter Q8H     spironolactone  100 mg Oral or Feeding Tube Daily     thiamine  100 mg Oral Daily

## 2021-11-18 NOTE — PLAN OF CARE
1500 - 2300 Patient is A&Ox4, vitals stable, afebrile. Abdominal x-ray ordered to assess if NJ is still in correct place, continues to be positioned in the jejunum per x-ray Tube feeds advanced to 20 mL/ hour at 2200, due for next increase at 0600. Complains of abdominal pain and nausea, managed with prn oxycodone and zofran. Stool sample collected. Bg 101. Update given to oncoming RN at the bedside.

## 2021-11-18 NOTE — PROGRESS NOTES
[id number]    A&O x 4. VSS. Pt c/o N/V, given IV10mg compazine. C/o abdominal pain, given oral 5mg oxycodone x 2. Tube Feeds started at 10ml/hr. Okay to take pills orally. PO 20 mEq KCl given per High K Replacement protocol, recheck next AM. NJ tube landmark changed from 52cm to 46cm, M.D. notified. Stool sample still needed.     Continue with POC.

## 2021-11-19 ENCOUNTER — APPOINTMENT (OUTPATIENT)
Dept: OCCUPATIONAL THERAPY | Facility: CLINIC | Age: 46
End: 2021-11-19
Attending: INTERNAL MEDICINE
Payer: COMMERCIAL

## 2021-11-19 ENCOUNTER — APPOINTMENT (OUTPATIENT)
Dept: PHYSICAL THERAPY | Facility: CLINIC | Age: 46
End: 2021-11-19
Attending: INTERNAL MEDICINE
Payer: COMMERCIAL

## 2021-11-19 LAB
ANION GAP SERPL CALCULATED.3IONS-SCNC: 7 MMOL/L (ref 3–14)
BUN SERPL-MCNC: 6 MG/DL (ref 7–30)
CALCIUM SERPL-MCNC: 7.4 MG/DL (ref 8.5–10.1)
CHLORIDE BLD-SCNC: 107 MMOL/L (ref 94–109)
CO2 SERPL-SCNC: 24 MMOL/L (ref 20–32)
CREAT SERPL-MCNC: 0.66 MG/DL (ref 0.52–1.04)
ELASTASE PANC STL-MCNT: 7.7 UG/G
ERYTHROCYTE [DISTWIDTH] IN BLOOD BY AUTOMATED COUNT: 16.8 % (ref 10–15)
GFR SERPL CREATININE-BSD FRML MDRD: >90 ML/MIN/1.73M2
GLUCOSE BLD-MCNC: 123 MG/DL (ref 70–99)
GLUCOSE BLDC GLUCOMTR-MCNC: 101 MG/DL (ref 70–99)
GLUCOSE BLDC GLUCOMTR-MCNC: 127 MG/DL (ref 70–99)
HCT VFR BLD AUTO: 30.5 % (ref 35–47)
HGB BLD-MCNC: 9.3 G/DL (ref 11.7–15.7)
MAGNESIUM SERPL-MCNC: 2 MG/DL (ref 1.6–2.3)
MCH RBC QN AUTO: 29.6 PG (ref 26.5–33)
MCHC RBC AUTO-ENTMCNC: 30.5 G/DL (ref 31.5–36.5)
MCV RBC AUTO: 97 FL (ref 78–100)
PHOSPHATE SERPL-MCNC: 2.7 MG/DL (ref 2.5–4.5)
PLATELET # BLD AUTO: 276 10E3/UL (ref 150–450)
POTASSIUM BLD-SCNC: 3.9 MMOL/L (ref 3.4–5.3)
RBC # BLD AUTO: 3.14 10E6/UL (ref 3.8–5.2)
SODIUM SERPL-SCNC: 138 MMOL/L (ref 133–144)
WBC # BLD AUTO: 7.3 10E3/UL (ref 4–11)

## 2021-11-19 PROCEDURE — 99207 PR CDG-CUT & PASTE-POTENTIAL IMPACT ON LEVEL: CPT | Performed by: STUDENT IN AN ORGANIZED HEALTH CARE EDUCATION/TRAINING PROGRAM

## 2021-11-19 PROCEDURE — 84100 ASSAY OF PHOSPHORUS: CPT | Performed by: STUDENT IN AN ORGANIZED HEALTH CARE EDUCATION/TRAINING PROGRAM

## 2021-11-19 PROCEDURE — 97530 THERAPEUTIC ACTIVITIES: CPT | Mod: GO

## 2021-11-19 PROCEDURE — 83735 ASSAY OF MAGNESIUM: CPT | Performed by: STUDENT IN AN ORGANIZED HEALTH CARE EDUCATION/TRAINING PROGRAM

## 2021-11-19 PROCEDURE — 250N000013 HC RX MED GY IP 250 OP 250 PS 637: Performed by: STUDENT IN AN ORGANIZED HEALTH CARE EDUCATION/TRAINING PROGRAM

## 2021-11-19 PROCEDURE — 80048 BASIC METABOLIC PNL TOTAL CA: CPT | Performed by: STUDENT IN AN ORGANIZED HEALTH CARE EDUCATION/TRAINING PROGRAM

## 2021-11-19 PROCEDURE — 250N000013 HC RX MED GY IP 250 OP 250 PS 637: Performed by: INTERNAL MEDICINE

## 2021-11-19 PROCEDURE — 85027 COMPLETE CBC AUTOMATED: CPT | Performed by: HOSPITALIST

## 2021-11-19 PROCEDURE — 250N000011 HC RX IP 250 OP 636: Performed by: INTERNAL MEDICINE

## 2021-11-19 PROCEDURE — 250N000013 HC RX MED GY IP 250 OP 250 PS 637: Performed by: HOSPITALIST

## 2021-11-19 PROCEDURE — 250N000011 HC RX IP 250 OP 636: Performed by: STUDENT IN AN ORGANIZED HEALTH CARE EDUCATION/TRAINING PROGRAM

## 2021-11-19 PROCEDURE — 36415 COLL VENOUS BLD VENIPUNCTURE: CPT | Performed by: HOSPITALIST

## 2021-11-19 PROCEDURE — 999N000127 HC STATISTIC PERIPHERAL IV START W US GUIDANCE

## 2021-11-19 PROCEDURE — 120N000002 HC R&B MED SURG/OB UMMC

## 2021-11-19 PROCEDURE — 97110 THERAPEUTIC EXERCISES: CPT | Mod: GP

## 2021-11-19 PROCEDURE — 99233 SBSQ HOSP IP/OBS HIGH 50: CPT | Performed by: STUDENT IN AN ORGANIZED HEALTH CARE EDUCATION/TRAINING PROGRAM

## 2021-11-19 PROCEDURE — 97530 THERAPEUTIC ACTIVITIES: CPT | Mod: GP

## 2021-11-19 RX ORDER — MAGNESIUM SULFATE HEPTAHYDRATE 40 MG/ML
2 INJECTION, SOLUTION INTRAVENOUS ONCE
Status: COMPLETED | OUTPATIENT
Start: 2021-11-19 | End: 2021-11-19

## 2021-11-19 RX ADMIN — Medication 1 PACKET: at 19:55

## 2021-11-19 RX ADMIN — OXYCODONE HYDROCHLORIDE 5 MG: 5 TABLET ORAL at 04:25

## 2021-11-19 RX ADMIN — FUROSEMIDE 40 MG: 40 TABLET ORAL at 08:16

## 2021-11-19 RX ADMIN — THIAMINE HCL TAB 100 MG 100 MG: 100 TAB at 08:16

## 2021-11-19 RX ADMIN — PANTOPRAZOLE SODIUM 40 MG: 40 TABLET, DELAYED RELEASE ORAL at 08:16

## 2021-11-19 RX ADMIN — ACETAMINOPHEN 650 MG: 325 TABLET, FILM COATED ORAL at 08:16

## 2021-11-19 RX ADMIN — VITAM B12 100 MCG: 100 TAB at 08:16

## 2021-11-19 RX ADMIN — GABAPENTIN 100 MG: 100 CAPSULE ORAL at 08:16

## 2021-11-19 RX ADMIN — OXYCODONE HYDROCHLORIDE 5 MG: 5 TABLET ORAL at 10:41

## 2021-11-19 RX ADMIN — OXYCODONE HYDROCHLORIDE 5 MG: 5 TABLET ORAL at 15:57

## 2021-11-19 RX ADMIN — ENOXAPARIN SODIUM 40 MG: 40 INJECTION SUBCUTANEOUS at 08:16

## 2021-11-19 RX ADMIN — POTASSIUM & SODIUM PHOSPHATES POWDER PACK 280-160-250 MG 1 PACKET: 280-160-250 PACK at 08:32

## 2021-11-19 RX ADMIN — MIRTAZAPINE 15 MG: 15 TABLET, FILM COATED ORAL at 21:23

## 2021-11-19 RX ADMIN — POTASSIUM & SODIUM PHOSPHATES POWDER PACK 280-160-250 MG 1 PACKET: 280-160-250 PACK at 19:54

## 2021-11-19 RX ADMIN — GABAPENTIN 100 MG: 100 CAPSULE ORAL at 19:55

## 2021-11-19 RX ADMIN — ACETAMINOPHEN 650 MG: 325 TABLET, FILM COATED ORAL at 14:00

## 2021-11-19 RX ADMIN — ASCORBIC ACID, THIAMINE MONONITRATE,RIBOFLAVIN, NIACINAMIDE, PYRIDOXINE HYDROCHLORIDE, FOLIC ACID, CYANOCOBALAMIN, BIOTIN, CALCIUM PANTOTHENATE, 1 CAPSULE: 100; 1.5; 1.7; 20; 10; 1; 6000; 150000; 5 CAPSULE, LIQUID FILLED ORAL at 08:16

## 2021-11-19 RX ADMIN — ONDANSETRON 4 MG: 4 TABLET, ORALLY DISINTEGRATING ORAL at 15:43

## 2021-11-19 RX ADMIN — ACETAMINOPHEN 650 MG: 325 TABLET, FILM COATED ORAL at 19:54

## 2021-11-19 RX ADMIN — MAGNESIUM SULFATE IN WATER 2 G: 40 INJECTION, SOLUTION INTRAVENOUS at 10:41

## 2021-11-19 RX ADMIN — FOLIC ACID 1 MG: 1 TABLET ORAL at 08:16

## 2021-11-19 RX ADMIN — Medication 25 MG: at 04:25

## 2021-11-19 RX ADMIN — OXYCODONE HYDROCHLORIDE 5 MG: 5 TABLET ORAL at 21:22

## 2021-11-19 RX ADMIN — Medication 1 PACKET: at 08:16

## 2021-11-19 RX ADMIN — SPIRONOLACTONE 100 MG: 100 TABLET ORAL at 08:16

## 2021-11-19 RX ADMIN — ESCITALOPRAM OXALATE 10 MG: 10 TABLET ORAL at 08:16

## 2021-11-19 RX ADMIN — GABAPENTIN 100 MG: 100 CAPSULE ORAL at 14:00

## 2021-11-19 ASSESSMENT — ACTIVITIES OF DAILY LIVING (ADL)
ADLS_ACUITY_SCORE: 14
ADLS_ACUITY_SCORE: 16
ADLS_ACUITY_SCORE: 14
ADLS_ACUITY_SCORE: 16
ADLS_ACUITY_SCORE: 14
ADLS_ACUITY_SCORE: 16
ADLS_ACUITY_SCORE: 14
ADLS_ACUITY_SCORE: 14
ADLS_ACUITY_SCORE: 16
ADLS_ACUITY_SCORE: 14
ADLS_ACUITY_SCORE: 16
ADLS_ACUITY_SCORE: 14
ADLS_ACUITY_SCORE: 16

## 2021-11-19 ASSESSMENT — MIFFLIN-ST. JEOR: SCORE: 1365.26

## 2021-11-19 NOTE — PLAN OF CARE
"  Problem: Adult Inpatient Plan of Care  Goal: Optimal Comfort and Wellbeing  Outcome: Improving     Problem: Infection (Pancreatitis)  Goal: Infection Symptom Resolution  Outcome: Improving     Problem: Nutrition Impaired (Pancreatitis)  Goal: Optimal Nutrition Intake  Outcome: Improving     Problem: Pain (Pancreatitis)  Goal: Acceptable Pain Control  Outcome: Improving     Pt was given oxy for pain to side. Benadryl given for itching. Tube feeding running at 20 ml per hour and increased to 30 ml at 0600. Pt is tolerating feeding well. Denies nausea or vomiting. BP 97/62 (BP Location: Left arm)   Pulse 88   Temp 98.1  F (36.7  C) (Oral)   Resp 18   Ht 1.626 m (5' 4\")   Wt 74.5 kg (164 lb 4.8 oz)   SpO2 95%. Refused IV placement. Pt is continent of bladder and bowel. Alert and oriented. Will monitor.   "

## 2021-11-19 NOTE — PROGRESS NOTES
[facility]    Medicine Progress Note - Hospitalist Service, Gold 8       Date of Admission:  11/10/2021    Assessment & Plan           Aliyah Angeles is a 46 year old female admitted on 11/10/2021. She has a hx of DMII, etoh abuse, alcholic hepatitis, and Fitz-en-y gastric bypass who was transferred from the [facility] ARU for necrotizing pancreatitis and fevers.     # Acute necrotizing pancreatitis, present on admission   - complex recent hospitalization course recently, notably from 9/29 to 11/5 w/ alk hep, series of pneumonias and ARDS w/ CRRT ICU stay/intubation, improved and was at ARU  - There, malaise, fever, nausea and belly pain occurred prompting CT scan showing pancreatitis w/ area of necrosis surrounding splenic vein and artery  - Suspect smoldering alcoholic pancreatitis in the interim (after last CT 10/14 which was without pancreatitis, though a 9/13 CT scan OSH did show stranding); likely too early for infected necrosis (and no gas in necrosis) thus stopped antibiotics (paracentesis on admission w/o SBP)  Plan:  *Appreciate GI consult: obtained paracentesis 50cc 11/14 (seemed to be more fluid on other side but pt declined additional instrumentation) --> amylase 6, lipase 21, pancreatic duct leak ruled out  *oxy 5mg PRN QID at mealtime for pain that occurs with eating, weaning as able (pt reports tramadol not very helpful in the past for arthralgias. would like to avoid habit forming drugs as much as possible. staying away from NSAIDs given liver disease)    Malnutrition:    - Level of malnutrition: Moderate   - Based on: reduced intake, mild (or greater) subcutaneous fat loss, mild (or greater) muscle loss  *Starting tube feeds 11/18 (initially low magnesium) after NJ placement as per GI recommendations for post-pyloric feeding given pain w/ eating and need for adequate nutrition to heal pancreatitis - was not meeting goals by PO calorie counts.    #Severe  sepsis (HR, Temp) 2/2 suspected pneumonia with unknown organism, present on admission   #Acute hypoxemic respiratory failure  - based on 11/8 CT a/p w/ L>R bilateral ggo w/ new o2 requirement  *s/p zosyn (11/9 - 11/11) deescalated to ciprofloxacin + flagyl (11/11 - 11/12), s/p Augmentin through 11/14 (completed 5 day course)  *wean oxygen, IC instructed    #Suspect evolving alcoholic liver disease  #Severe alcohol use disorder  - has not had chance to f/u output w/ hepatology, only started needing paracenteses during last admission (10/2021), INR elevated ~1.4 on presentation  *CD consult placed --> ideally will discharge to [facility] [place], appreciate SW coordination  however with NJ feeding for at least 1-2 weeks in the hospital will have to wait for now, ultimately TF duration to be determined by GI  *initially continued rifaximin given for presumed HE last admission, however after chart review that diagnosis was equivocal in the setting of many other medical problems, anticipating outpatient coverage difficulties and no e/o HE presently, discontinued as of 11/14, and will monitor for signs of HE development  - Ascites management: *increased dosing to 100/40 on 11/16   *spironolactone (11/11 - )   *lasix (11/13 - )    #Suspect R lower back strain  - TTP paraspinals on 11/12, no focal spinal tenderness  *lido patch  *heat pad    #Anxiety/Depression  *cont home lexapro, remeron.       Diet: Adult Formula Drip Feeding: Continuous Osmolite 1.5; Nasojejunal; Goal Rate: 45; mL/hr; Medication - Feeding Tube Flush Frequency: At least 15-30 mL water before and after medication administration and with tube clogging; Amount to Send (Nutrition...  Advance Diet as Tolerated: Low Fiber    DVT Prophylaxis: Enoxaparin (Lovenox) SQ  Rice Catheter: Not present  Central Lines: None  Code Status: Full Code      Disposition Plan   Expected discharge: 12/02/2021   recommended to prior living arrangement once adequate  pain management/ tolerating PO medications.     The patient's care was discussed with the Bedside Nurse and Patient.    Reinier Rivera MD  Hospitalist Service, [address]  Securely message with the Vocera Web Console (learn more here)  Text page via [facility] Paging/Directory    Please see sign in/sign out for up to date coverage information    Clinically Significant Risk Factors Present on Admission                ______________________________________________________________________    Interval History   Tearful on visit, patient did not want to talk about it.  Reiterated to patient that she can talk to writer if she wanted to discuss later.  Abdominal pain is stable, otherwise she feels okay.  Tube feeds going okay.  No fevers or chills.  No chest pain or shortness of breath.  Four-point ROS otherwise negative.    Data reviewed today: I reviewed all medications, new labs and imaging results over the last 24 hours. I personally reviewed no images or EKG's today.    Physical Exam   Vital Signs: Temp: 97.7  F (36.5  C) Temp src: Oral BP: 118/89 Pulse: 98   Resp: 16 SpO2: 97 % O2 Device: None (Room air)    Weight: 164 lbs 4.8 oz  Constitutional: Lying back in bed, tearful  Cardiovascular: regular without murmurs or gallops  Pulmonary/Chest: CTAB, no respiratory distress.  GI: Soft, moderately distended (stable), mild ttp epigastrium, no rebound tenderness or guarding  Skin: Skin is warm and dry  Neurological: Alert and oriented     Data   Recent Labs   Lab 11/19/21  0609 11/18/21 2012 11/18/21  1150 11/18/21  0935 11/18/21  0556 11/17/21  1710 11/17/21  1404 11/17/21  0552 11/16/21  0610 11/15/21  0607   WBC 7.3  --   --   --  8.4  --   --  9.7   < > 7.9   HGB 9.3*  --   --   --  9.4*  --   --  9.4*   < > 9.2*   MCV 97  --   --   --  96  --   --  95   < > 99     --   --   --  262  --   --  220   < > 219   INR  --   --   --   --   --   --   --   --   --  1.41*      --   --   --  138  --   --  142   < > 142   POTASSIUM 3.9  --   --   --  3.5  --  3.9 3.2*   < > 3.5   CHLORIDE 107  --   --   --  107  --   --  108   < > 113*   CO2 24  --   --   --  25  --   --  25   < > 24   BUN 6*  --   --   --  6*  --   --  7   < > 3*   CR 0.66  --   --   --  0.78  --   --  0.76   < > 0.84   ANIONGAP 7  --   --   --  6  --   --  9   < > 5   STEFFANY 7.4*  --   --   --  7.4*  --   --  7.4*   < > 7.8*   * 101* 122*   < > 91   < >  --  78   < > 90   ALBUMIN  --   --   --   --  1.5*  --   --  1.6*   < > 1.5*   PROTTOTAL  --   --   --   --  5.8*  --   --  5.6*   < > 5.6*   BILITOTAL  --   --   --   --  1.4*  --   --  1.3   < > 1.3   ALKPHOS  --   --   --   --  204*  --   --  193*   < > 186*   ALT  --   --   --   --  11  --   --  11   < > 12   AST  --   --   --   --  37  --   --  34   < > 27    < > = values in this interval not displayed.     Recent Results (from the past 24 hour(s))   XR Abdomen Port 1 View    Narrative    Exam: XR ABDOMEN PORT 1 VIEWS, 11/18/2021 4:59 PM    Indication: NJ tube malposition?    Comparison: Same day placement, 11/8/2021 CT    Findings:   Feeding tube distal tip projects distal to the gastrojejunal  anastomosis likely within proximal jejunum. Nonobstructive bowel gas  pattern. Cholecystectomy clips. No portal venous gas or pneumatosis.      Impression    Impression: Feeding tube distal tip projects distal to the  gastrojejunal anastomosis, likely within proximal jejunum.    I have personally reviewed the examination and initial interpretation  and I agree with the findings.    WILLIE POON MD         SYSTEM ID:  [id number]     Medications     dextrose       dextrose         acetaminophen  650 mg Oral TID     vitamin B-12  100 mcg Oral or Feeding Tube Daily     enoxaparin ANTICOAGULANT  40 mg Subcutaneous Q24H     escitalopram  10 mg Oral Daily     folic acid  1 mg Oral Daily     furosemide  40 mg Oral Daily     gabapentin  100 mg Oral TID     lidocaine  1  patch Transdermal Q24h    And     lidocaine   Transdermal Q8H     magnesium sulfate  2 g Intravenous Once     mirtazapine  15 mg Oral At Bedtime     multivitamin RENAL  1 capsule Oral Daily     pantoprazole  40 mg Oral QAM AC     polyethylene glycol  17 g Oral Daily     potassium & sodium phosphates  1 packet Oral or Feeding Tube BID     protein modular  1 packet Per Feeding Tube BID     sennosides  1 tablet Oral At Bedtime     sodium chloride (PF)  3 mL Intracatheter Q8H     spironolactone  100 mg Oral or Feeding Tube Daily     thiamine  100 mg Oral Daily

## 2021-11-19 NOTE — PLAN OF CARE
A&O x 4. VSS. Pt c/o abdominal pain relived with 5mg oral oxycodone x 1. Lidocaine patch not being used. AM Mg 2.0 and replace with 2g IV Mg. AM phos 2.7 and replaced with PO phos protocol. Rechecks in the AM. BG TID through today. B and 101. Up walking halls independently. Denies N/V and SOB. NJ Tube feeds ramped up to 40 mL/hr at 1400. If tolerating, ramp up to goal of 45 mL/hr @ 2200. Voiding adequately. Continue with POC.

## 2021-11-19 NOTE — PROGRESS NOTES
Pt refused PIV insertion at this time and requested to have it done at around 8 AM. RN was informed.

## 2021-11-20 LAB
ALBUMIN SERPL-MCNC: 1.5 G/DL (ref 3.4–5)
ALP SERPL-CCNC: 249 U/L (ref 40–150)
ALT SERPL W P-5'-P-CCNC: 14 U/L (ref 0–50)
ANION GAP SERPL CALCULATED.3IONS-SCNC: 7 MMOL/L (ref 3–14)
AST SERPL W P-5'-P-CCNC: 38 U/L (ref 0–45)
BILIRUB SERPL-MCNC: 1.1 MG/DL (ref 0.2–1.3)
BUN SERPL-MCNC: 6 MG/DL (ref 7–30)
CALCIUM SERPL-MCNC: 7.8 MG/DL (ref 8.5–10.1)
CHLORIDE BLD-SCNC: 109 MMOL/L (ref 94–109)
CO2 SERPL-SCNC: 23 MMOL/L (ref 20–32)
CREAT SERPL-MCNC: 0.69 MG/DL (ref 0.52–1.04)
GFR SERPL CREATININE-BSD FRML MDRD: >90 ML/MIN/1.73M2
GLUCOSE BLD-MCNC: 134 MG/DL (ref 70–99)
GLUCOSE BLDC GLUCOMTR-MCNC: 127 MG/DL (ref 70–99)
MAGNESIUM SERPL-MCNC: 1.8 MG/DL (ref 1.6–2.3)
PHOSPHATE SERPL-MCNC: 2.6 MG/DL (ref 2.5–4.5)
PHOSPHATE SERPL-MCNC: 2.7 MG/DL (ref 2.5–4.5)
POTASSIUM BLD-SCNC: 4 MMOL/L (ref 3.4–5.3)
PROT SERPL-MCNC: 6 G/DL (ref 6.8–8.8)
SODIUM SERPL-SCNC: 139 MMOL/L (ref 133–144)

## 2021-11-20 PROCEDURE — 250N000009 HC RX 250: Performed by: INTERNAL MEDICINE

## 2021-11-20 PROCEDURE — 84100 ASSAY OF PHOSPHORUS: CPT | Performed by: STUDENT IN AN ORGANIZED HEALTH CARE EDUCATION/TRAINING PROGRAM

## 2021-11-20 PROCEDURE — 250N000011 HC RX IP 250 OP 636: Performed by: INTERNAL MEDICINE

## 2021-11-20 PROCEDURE — 250N000013 HC RX MED GY IP 250 OP 250 PS 637: Performed by: STUDENT IN AN ORGANIZED HEALTH CARE EDUCATION/TRAINING PROGRAM

## 2021-11-20 PROCEDURE — 250N000013 HC RX MED GY IP 250 OP 250 PS 637: Performed by: INTERNAL MEDICINE

## 2021-11-20 PROCEDURE — 250N000011 HC RX IP 250 OP 636: Performed by: STUDENT IN AN ORGANIZED HEALTH CARE EDUCATION/TRAINING PROGRAM

## 2021-11-20 PROCEDURE — 99207 PR CDG-MDM COMPONENT: MEETS MODERATE - UP CODED: CPT | Performed by: STUDENT IN AN ORGANIZED HEALTH CARE EDUCATION/TRAINING PROGRAM

## 2021-11-20 PROCEDURE — 82040 ASSAY OF SERUM ALBUMIN: CPT | Performed by: STUDENT IN AN ORGANIZED HEALTH CARE EDUCATION/TRAINING PROGRAM

## 2021-11-20 PROCEDURE — 120N000002 HC R&B MED SURG/OB UMMC

## 2021-11-20 PROCEDURE — 36415 COLL VENOUS BLD VENIPUNCTURE: CPT | Performed by: STUDENT IN AN ORGANIZED HEALTH CARE EDUCATION/TRAINING PROGRAM

## 2021-11-20 PROCEDURE — 258N000003 HC RX IP 258 OP 636: Performed by: INTERNAL MEDICINE

## 2021-11-20 PROCEDURE — 83735 ASSAY OF MAGNESIUM: CPT | Performed by: STUDENT IN AN ORGANIZED HEALTH CARE EDUCATION/TRAINING PROGRAM

## 2021-11-20 PROCEDURE — 84100 ASSAY OF PHOSPHORUS: CPT | Performed by: INTERNAL MEDICINE

## 2021-11-20 PROCEDURE — 36415 COLL VENOUS BLD VENIPUNCTURE: CPT | Performed by: INTERNAL MEDICINE

## 2021-11-20 PROCEDURE — 99232 SBSQ HOSP IP/OBS MODERATE 35: CPT | Performed by: STUDENT IN AN ORGANIZED HEALTH CARE EDUCATION/TRAINING PROGRAM

## 2021-11-20 PROCEDURE — 250N000013 HC RX MED GY IP 250 OP 250 PS 637: Performed by: HOSPITALIST

## 2021-11-20 RX ORDER — MAGNESIUM SULFATE HEPTAHYDRATE 40 MG/ML
2 INJECTION, SOLUTION INTRAVENOUS ONCE
Status: COMPLETED | OUTPATIENT
Start: 2021-11-20 | End: 2021-11-20

## 2021-11-20 RX ADMIN — MIRTAZAPINE 15 MG: 15 TABLET, FILM COATED ORAL at 21:09

## 2021-11-20 RX ADMIN — FOLIC ACID 1 MG: 1 TABLET ORAL at 08:07

## 2021-11-20 RX ADMIN — SODIUM PHOSPHATE, MONOBASIC, MONOHYDRATE AND SODIUM PHOSPHATE, DIBASIC, ANHYDROUS 9 MMOL: 276; 142 INJECTION, SOLUTION INTRAVENOUS at 12:21

## 2021-11-20 RX ADMIN — ACETAMINOPHEN 650 MG: 325 TABLET, FILM COATED ORAL at 08:04

## 2021-11-20 RX ADMIN — Medication 25 MG: at 01:23

## 2021-11-20 RX ADMIN — GABAPENTIN 100 MG: 100 CAPSULE ORAL at 19:20

## 2021-11-20 RX ADMIN — ACETAMINOPHEN 650 MG: 325 TABLET, FILM COATED ORAL at 14:08

## 2021-11-20 RX ADMIN — ENOXAPARIN SODIUM 40 MG: 40 INJECTION SUBCUTANEOUS at 08:08

## 2021-11-20 RX ADMIN — SPIRONOLACTONE 100 MG: 100 TABLET ORAL at 08:04

## 2021-11-20 RX ADMIN — THIAMINE HCL TAB 100 MG 100 MG: 100 TAB at 08:06

## 2021-11-20 RX ADMIN — ESCITALOPRAM OXALATE 10 MG: 10 TABLET ORAL at 08:06

## 2021-11-20 RX ADMIN — OXYCODONE HYDROCHLORIDE 5 MG: 5 TABLET ORAL at 08:04

## 2021-11-20 RX ADMIN — OXYCODONE HYDROCHLORIDE 5 MG: 5 TABLET ORAL at 04:31

## 2021-11-20 RX ADMIN — Medication 1 MG: at 21:09

## 2021-11-20 RX ADMIN — GABAPENTIN 100 MG: 100 CAPSULE ORAL at 14:08

## 2021-11-20 RX ADMIN — PANTOPRAZOLE SODIUM 40 MG: 40 TABLET, DELAYED RELEASE ORAL at 08:06

## 2021-11-20 RX ADMIN — SIMETHICONE 80 MG: 80 TABLET, CHEWABLE ORAL at 17:01

## 2021-11-20 RX ADMIN — Medication 1 PACKET: at 08:10

## 2021-11-20 RX ADMIN — Medication 1 PACKET: at 19:27

## 2021-11-20 RX ADMIN — MAGNESIUM SULFATE IN WATER 2 G: 40 INJECTION, SOLUTION INTRAVENOUS at 08:08

## 2021-11-20 RX ADMIN — METHOCARBAMOL 500 MG: 500 TABLET ORAL at 20:14

## 2021-11-20 RX ADMIN — ONDANSETRON 4 MG: 4 TABLET, ORALLY DISINTEGRATING ORAL at 18:32

## 2021-11-20 RX ADMIN — VITAM B12 100 MCG: 100 TAB at 08:06

## 2021-11-20 RX ADMIN — ASCORBIC ACID, THIAMINE MONONITRATE,RIBOFLAVIN, NIACINAMIDE, PYRIDOXINE HYDROCHLORIDE, FOLIC ACID, CYANOCOBALAMIN, BIOTIN, CALCIUM PANTOTHENATE, 1 CAPSULE: 100; 1.5; 1.7; 20; 10; 1; 6000; 150000; 5 CAPSULE, LIQUID FILLED ORAL at 08:05

## 2021-11-20 RX ADMIN — FUROSEMIDE 40 MG: 40 TABLET ORAL at 08:05

## 2021-11-20 RX ADMIN — OXYCODONE HYDROCHLORIDE 5 MG: 5 TABLET ORAL at 12:20

## 2021-11-20 RX ADMIN — ACETAMINOPHEN 650 MG: 325 TABLET, FILM COATED ORAL at 19:20

## 2021-11-20 RX ADMIN — OXYCODONE HYDROCHLORIDE 5 MG: 5 TABLET ORAL at 17:01

## 2021-11-20 RX ADMIN — GABAPENTIN 100 MG: 100 CAPSULE ORAL at 08:05

## 2021-11-20 ASSESSMENT — ACTIVITIES OF DAILY LIVING (ADL)
ADLS_ACUITY_SCORE: 14

## 2021-11-20 ASSESSMENT — MIFFLIN-ST. JEOR: SCORE: 1351.2

## 2021-11-20 NOTE — PLAN OF CARE
Alert and oriented X 4. AVSS. Denies difficulty breathing. Denies nausea, diarrhea or abdominal cramping. Tolerating tube feeding well at goal rate of 45 mL/hr. C/o itching. Benadryl given with relief. C/o right abd pain 7/10. Oxycodone given with relief. Sleeping in between cares.

## 2021-11-20 NOTE — PROGRESS NOTES
[facility]    Medicine Progress Note - Hospitalist Service, Gold 8       Date of Admission:  11/10/2021    Assessment & Plan           Aliyah Angeles is a 46 year old female admitted on 11/10/2021. She has a hx of DMII, etoh abuse, alcholic hepatitis, and Fitz-en-y gastric bypass who was transferred from the [facility] ARU for necrotizing pancreatitis and fevers.     # Acute necrotizing pancreatitis, present on admission   - complex recent hospitalization course recently, notably from 9/29 to 11/5 w/ alk hep, series of pneumonias and ARDS w/ CRRT ICU stay/intubation, improved and was at ARU  - There, malaise, fever, nausea and belly pain occurred prompting CT scan showing pancreatitis w/ area of necrosis surrounding splenic vein and artery  - Suspect smoldering alcoholic pancreatitis in the interim (after last CT 10/14 which was without pancreatitis, though a 9/13 CT scan OSH did show stranding); likely too early for infected necrosis (and no gas in necrosis) thus stopped antibiotics (paracentesis on admission w/o SBP)  Plan:  *Appreciate GI consult: obtained paracentesis 50cc 11/14 (seemed to be more fluid on other side but pt declined additional instrumentation) --> amylase 6, lipase 21, pancreatic duct leak ruled out  *oxy 5mg PRN QID at mealtime for pain that occurs with eating, weaning as able (pt reports tramadol not very helpful in the past for arthralgias. would like to avoid habit forming drugs as much as possible. staying away from NSAIDs given liver disease)    Malnutrition:    - Level of malnutrition: Moderate   - Based on: reduced intake, mild (or greater) subcutaneous fat loss, mild (or greater) muscle loss  *Starting tube feeds 11/18 (initially low magnesium) after NJ placement as per GI recommendations for post-pyloric feeding given pain w/ eating and need for adequate nutrition to heal pancreatitis - was not meeting goals by PO calorie counts.    #Severe  sepsis (HR, Temp) 2/2 suspected pneumonia with unknown organism, present on admission   #Acute hypoxemic respiratory failure, resolved  - based on 11/8 CT a/p w/ L>R bilateral ggo w/ new o2 requirement  *s/p zosyn (11/9 - 11/11) deescalated to ciprofloxacin + flagyl (11/11 - 11/12), s/p Augmentin through 11/14 (completed 5 day course)    #Suspect evolving alcoholic liver disease  #Severe alcohol use disorder  - has not had chance to f/u output w/ hepatology, only started needing paracenteses during last admission (10/2021), INR elevated ~1.4 on presentation  *CD consult placed --> ideally will discharge to [facility] treatment center, appreciate SW coordination  however with NJ feeding for at least 1-2 weeks in the hospital will have to wait for now, ultimately TF duration to be determined by GI  *initially continued rifaximin given for presumed HE last admission, however after chart review that diagnosis was equivocal in the setting of many other medical problems, anticipating outpatient coverage difficulties and no e/o HE presently, discontinued as of 11/14, and will monitor for signs of HE development  - Ascites management: *increased dosing to 100/40 on 11/16   *spironolactone (11/11 - )   *lasix (11/13 - )    #Suspect R lower back strain  - TTP paraspinals on 11/12, no focal spinal tenderness  *lido patch  *heat pad    #Anxiety/Depression  *cont home lexapro, remeron.       Diet: Adult Formula Drip Feeding: Continuous Osmolite 1.5; Nasojejunal; Goal Rate: 45; mL/hr; Medication - Feeding Tube Flush Frequency: At least 15-30 mL water before and after medication administration and with tube clogging; Amount to Send (Nutrition...  Advance Diet as Tolerated: Low Fiber    DVT Prophylaxis: Enoxaparin (Lovenox) SQ  Rice Catheter: Not present  Central Lines: None  Code Status: Full Code      Disposition Plan   Expected discharge: 12/02/2021   recommended to prior living arrangement once adequate pain management/  tolerating PO medications.     The patient's care was discussed with the Patient.    Reinier Rivera MD  Hospitalist Service, [address]  Securely message with the Vocera Web Console (learn more here)  Text page via [employer] Paging/Directory    Please see sign in/sign out for up to date coverage information    Clinically Significant Risk Factors Present on Admission                ______________________________________________________________________    Interval History   Abdominal pain is stable.  No overnight events.  Less tearful this morning.  No fever or chills.  Tube feeds are still going okay.  No chest pain or shortness of breath.  Discussed possible therapeutic paracentesis in the future, patient still breathing well and would not like one in the coming days.  Four-point ROS otherwise negative.    Data reviewed today: I reviewed all medications, new labs and imaging results over the last 24 hours. I personally reviewed no images or EKG's today.    Physical Exam   Vital Signs: Temp: 98.7  F (37.1  C) Temp src: Oral BP: 111/75 Pulse: 85   Resp: 18 SpO2: 97 % O2 Device: Nasal cannula    Weight: 161 lbs 3.2 oz  Constitutional: Lying back in bed,  NAD  Cardiovascular: regular without murmurs or gallops  Pulmonary/Chest: CTAB, no respiratory distress.  GI: Soft, moderately distended (stable), mild ttp epigastrium, no rebound tenderness or guarding  Skin: Skin is warm and dry  Neurological: Alert and oriented     Data   Recent Labs   Lab 11/20/21  0619 11/19/21  2134 11/19/21  1207 11/19/21  0609 11/18/21  0935 11/18/21  0556 11/17/21  1404 11/17/21  0552 11/16/21  0610 11/15/21  0607   WBC  --   --   --  7.3  --  8.4  --  9.7   < > 7.9   HGB  --   --   --  9.3*  --  9.4*  --  9.4*   < > 9.2*   MCV  --   --   --  97  --  96  --  95   < > 99   PLT  --   --   --  276  --  262  --  220   < > 219   INR  --   --   --   --   --   --   --   --   --  1.41*     --    --  138  --  138  --  142   < > 142   POTASSIUM 4.0  --   --  3.9  --  3.5   < > 3.2*   < > 3.5   CHLORIDE 109  --   --  107  --  107  --  108   < > 113*   CO2 23  --   --  24  --  25  --  25   < > 24   BUN 6*  --   --  6*  --  6*  --  7   < > 3*   CR 0.69  --   --  0.66  --  0.78  --  0.76   < > 0.84   ANIONGAP 7  --   --  7  --  6  --  9   < > 5   STEFFANY 7.8*  --   --  7.4*  --  7.4*  --  7.4*   < > 7.8*   * 127* 101* 123*   < > 91   < > 78   < > 90   ALBUMIN 1.5*  --   --   --   --  1.5*  --  1.6*   < > 1.5*   PROTTOTAL 6.0*  --   --   --   --  5.8*  --  5.6*   < > 5.6*   BILITOTAL 1.1  --   --   --   --  1.4*  --  1.3   < > 1.3   ALKPHOS 249*  --   --   --   --  204*  --  193*   < > 186*   ALT 14  --   --   --   --  11  --  11   < > 12   AST 38  --   --   --   --  37  --  34   < > 27    < > = values in this interval not displayed.     No results found for this or any previous visit (from the past 24 hour(s)).  Medications     dextrose       dextrose         acetaminophen  650 mg Oral TID     vitamin B-12  100 mcg Oral or Feeding Tube Daily     enoxaparin ANTICOAGULANT  40 mg Subcutaneous Q24H     escitalopram  10 mg Oral Daily     folic acid  1 mg Oral Daily     furosemide  40 mg Oral Daily     gabapentin  100 mg Oral TID     lidocaine  1 patch Transdermal Q24h    And     lidocaine   Transdermal Q8H     mirtazapine  15 mg Oral At Bedtime     multivitamin RENAL  1 capsule Oral Daily     pantoprazole  40 mg Oral QAM AC     polyethylene glycol  17 g Oral Daily     protein modular  1 packet Per Feeding Tube BID     sennosides  1 tablet Oral At Bedtime     sodium chloride (PF)  3 mL Intracatheter Q8H     spironolactone  100 mg Oral or Feeding Tube Daily     thiamine  100 mg Oral Daily

## 2021-11-20 NOTE — PLAN OF CARE
Pt C/o abdominal pain, PRN oxycodone given x 2 with some relief. C/o nausea, zofran given with some relief. Vital signs stable, afebrile. Alert and oriented x 4. Pt refused Lidocaine patch and sennosides this shift.  Blood glucose was 127 this evening. NJ tube infusing at 40 mL/hr. Pt tolerating NJ feeding well, it was increased to 45 mL/hr at 2200. Pt is up  independently. No bowel movement this shift, voiding adequately. Pt had a blister on her left heel that burst,  writer applied dressing on it. Continue with POC

## 2021-11-20 NOTE — PLAN OF CARE
A&Ox4. VSS. C/o LUQ abdominal pain r/t pancreatitis. Oxycodone given x2 this shift. One more dose available today. Mg 1.8 and replaced IV. Phose 2.7 and IV replacement infusing. NJ tube infusing at goal 45 mL/hr.  Denies N/V and SOB. Outside to smoke x2 today. Pt curious on updates for discharge. Continue with POC.

## 2021-11-21 LAB
ANION GAP SERPL CALCULATED.3IONS-SCNC: 6 MMOL/L (ref 3–14)
BUN SERPL-MCNC: 8 MG/DL (ref 7–30)
CALCIUM SERPL-MCNC: 7.2 MG/DL (ref 8.5–10.1)
CHLORIDE BLD-SCNC: 105 MMOL/L (ref 94–109)
CO2 SERPL-SCNC: 25 MMOL/L (ref 20–32)
CREAT SERPL-MCNC: 0.66 MG/DL (ref 0.52–1.04)
ERYTHROCYTE [DISTWIDTH] IN BLOOD BY AUTOMATED COUNT: 16.8 % (ref 10–15)
GFR SERPL CREATININE-BSD FRML MDRD: >90 ML/MIN/1.73M2
GLUCOSE BLD-MCNC: 133 MG/DL (ref 70–99)
GLUCOSE BLDC GLUCOMTR-MCNC: 125 MG/DL (ref 70–99)
HCT VFR BLD AUTO: 28.3 % (ref 35–47)
HGB BLD-MCNC: 9.1 G/DL (ref 11.7–15.7)
MAGNESIUM SERPL-MCNC: 1.4 MG/DL (ref 1.6–2.3)
MCH RBC QN AUTO: 30.6 PG (ref 26.5–33)
MCHC RBC AUTO-ENTMCNC: 32.2 G/DL (ref 31.5–36.5)
MCV RBC AUTO: 95 FL (ref 78–100)
PLATELET # BLD AUTO: 204 10E3/UL (ref 150–450)
POTASSIUM BLD-SCNC: 4.5 MMOL/L (ref 3.4–5.3)
RBC # BLD AUTO: 2.97 10E6/UL (ref 3.8–5.2)
SODIUM SERPL-SCNC: 136 MMOL/L (ref 133–144)
WBC # BLD AUTO: 10.5 10E3/UL (ref 4–11)

## 2021-11-21 PROCEDURE — 99207 PR CDG-MDM COMPONENT: MEETS MODERATE - UP CODED: CPT | Performed by: STUDENT IN AN ORGANIZED HEALTH CARE EDUCATION/TRAINING PROGRAM

## 2021-11-21 PROCEDURE — 36415 COLL VENOUS BLD VENIPUNCTURE: CPT | Performed by: STUDENT IN AN ORGANIZED HEALTH CARE EDUCATION/TRAINING PROGRAM

## 2021-11-21 PROCEDURE — 85014 HEMATOCRIT: CPT | Performed by: STUDENT IN AN ORGANIZED HEALTH CARE EDUCATION/TRAINING PROGRAM

## 2021-11-21 PROCEDURE — 83735 ASSAY OF MAGNESIUM: CPT | Performed by: STUDENT IN AN ORGANIZED HEALTH CARE EDUCATION/TRAINING PROGRAM

## 2021-11-21 PROCEDURE — 258N000003 HC RX IP 258 OP 636: Performed by: INTERNAL MEDICINE

## 2021-11-21 PROCEDURE — 250N000011 HC RX IP 250 OP 636: Performed by: INTERNAL MEDICINE

## 2021-11-21 PROCEDURE — 250N000009 HC RX 250: Performed by: INTERNAL MEDICINE

## 2021-11-21 PROCEDURE — 250N000013 HC RX MED GY IP 250 OP 250 PS 637: Performed by: HOSPITALIST

## 2021-11-21 PROCEDURE — 120N000002 HC R&B MED SURG/OB UMMC

## 2021-11-21 PROCEDURE — 250N000013 HC RX MED GY IP 250 OP 250 PS 637: Performed by: INTERNAL MEDICINE

## 2021-11-21 PROCEDURE — 250N000013 HC RX MED GY IP 250 OP 250 PS 637: Performed by: STUDENT IN AN ORGANIZED HEALTH CARE EDUCATION/TRAINING PROGRAM

## 2021-11-21 PROCEDURE — 99232 SBSQ HOSP IP/OBS MODERATE 35: CPT | Performed by: STUDENT IN AN ORGANIZED HEALTH CARE EDUCATION/TRAINING PROGRAM

## 2021-11-21 PROCEDURE — 80048 BASIC METABOLIC PNL TOTAL CA: CPT | Performed by: STUDENT IN AN ORGANIZED HEALTH CARE EDUCATION/TRAINING PROGRAM

## 2021-11-21 RX ORDER — MAGNESIUM SULFATE HEPTAHYDRATE 40 MG/ML
4 INJECTION, SOLUTION INTRAVENOUS ONCE
Status: COMPLETED | OUTPATIENT
Start: 2021-11-21 | End: 2021-11-21

## 2021-11-21 RX ORDER — ACETAMINOPHEN 325 MG/1
650 TABLET ORAL EVERY 6 HOURS PRN
Status: DISCONTINUED | OUTPATIENT
Start: 2021-11-21 | End: 2021-11-30

## 2021-11-21 RX ADMIN — GABAPENTIN 100 MG: 100 CAPSULE ORAL at 14:15

## 2021-11-21 RX ADMIN — PANTOPRAZOLE SODIUM 40 MG: 40 TABLET, DELAYED RELEASE ORAL at 08:40

## 2021-11-21 RX ADMIN — METHOCARBAMOL 500 MG: 500 TABLET ORAL at 17:23

## 2021-11-21 RX ADMIN — ASCORBIC ACID, THIAMINE MONONITRATE,RIBOFLAVIN, NIACINAMIDE, PYRIDOXINE HYDROCHLORIDE, FOLIC ACID, CYANOCOBALAMIN, BIOTIN, CALCIUM PANTOTHENATE, 1 CAPSULE: 100; 1.5; 1.7; 20; 10; 1; 6000; 150000; 5 CAPSULE, LIQUID FILLED ORAL at 08:40

## 2021-11-21 RX ADMIN — Medication 1 MG: at 22:01

## 2021-11-21 RX ADMIN — OXYCODONE HYDROCHLORIDE 5 MG: 5 TABLET ORAL at 05:24

## 2021-11-21 RX ADMIN — ACETAMINOPHEN 650 MG: 325 TABLET, FILM COATED ORAL at 08:40

## 2021-11-21 RX ADMIN — ONDANSETRON 4 MG: 4 TABLET, ORALLY DISINTEGRATING ORAL at 20:35

## 2021-11-21 RX ADMIN — Medication 25 MG: at 20:36

## 2021-11-21 RX ADMIN — OXYCODONE HYDROCHLORIDE 5 MG: 5 TABLET ORAL at 00:49

## 2021-11-21 RX ADMIN — ENOXAPARIN SODIUM 40 MG: 40 INJECTION SUBCUTANEOUS at 08:40

## 2021-11-21 RX ADMIN — THIAMINE HCL TAB 100 MG 100 MG: 100 TAB at 08:40

## 2021-11-21 RX ADMIN — GABAPENTIN 100 MG: 100 CAPSULE ORAL at 08:40

## 2021-11-21 RX ADMIN — FOLIC ACID 1 MG: 1 TABLET ORAL at 08:40

## 2021-11-21 RX ADMIN — SPIRONOLACTONE 100 MG: 100 TABLET ORAL at 08:40

## 2021-11-21 RX ADMIN — POTASSIUM PHOSPHATE, MONOBASIC AND POTASSIUM PHOSPHATE, DIBASIC 9 MMOL: 224; 236 INJECTION, SOLUTION, CONCENTRATE INTRAVENOUS at 12:58

## 2021-11-21 RX ADMIN — VITAM B12 100 MCG: 100 TAB at 08:40

## 2021-11-21 RX ADMIN — OXYCODONE HYDROCHLORIDE 5 MG: 5 TABLET ORAL at 14:15

## 2021-11-21 RX ADMIN — Medication 1 PACKET: at 20:36

## 2021-11-21 RX ADMIN — ACETAMINOPHEN 650 MG: 325 TABLET, FILM COATED ORAL at 18:14

## 2021-11-21 RX ADMIN — FUROSEMIDE 40 MG: 40 TABLET ORAL at 08:40

## 2021-11-21 RX ADMIN — Medication 1 PACKET: at 08:42

## 2021-11-21 RX ADMIN — MAGNESIUM SULFATE IN WATER 4 G: 40 INJECTION, SOLUTION INTRAVENOUS at 10:52

## 2021-11-21 RX ADMIN — GABAPENTIN 100 MG: 100 CAPSULE ORAL at 20:36

## 2021-11-21 RX ADMIN — ESCITALOPRAM OXALATE 10 MG: 10 TABLET ORAL at 08:40

## 2021-11-21 RX ADMIN — OXYCODONE HYDROCHLORIDE 5 MG: 5 TABLET ORAL at 09:45

## 2021-11-21 RX ADMIN — MIRTAZAPINE 15 MG: 15 TABLET, FILM COATED ORAL at 22:02

## 2021-11-21 ASSESSMENT — ACTIVITIES OF DAILY LIVING (ADL)
ADLS_ACUITY_SCORE: 14

## 2021-11-21 ASSESSMENT — MIFFLIN-ST. JEOR: SCORE: 1351.2

## 2021-11-21 NOTE — PROVIDER NOTIFICATION
#[phone] paged     On enteric for c. diff rule out but no stool sample ordered. Do we want one ordered?

## 2021-11-21 NOTE — PROGRESS NOTES
[facility]    Medicine Progress Note - Hospitalist Service, Gold 8       Date of Admission:  11/10/2021    Assessment & Plan           Aliyah Angeles is a 46 year old female admitted on 11/10/2021. She has a hx of DMII, etoh abuse, alcholic hepatitis, and Fitz-en-y gastric bypass who was transferred from the [facility] ARU for necrotizing pancreatitis and fevers.     # Acute necrotizing pancreatitis, present on admission, improving  - complex recent hospitalization course recently, notably from 9/29 to 11/5 w/ alk hep, series of pneumonias and ARDS w/ CRRT ICU stay/intubation, improved and was at ARU  - There, malaise, fever, nausea and belly pain occurred prompting CT scan showing pancreatitis w/ area of necrosis surrounding splenic vein and artery  - Suspect smoldering alcoholic pancreatitis in the interim (after last CT 10/14 which was without pancreatitis, though a 9/13 CT scan OSH did show stranding); likely too early for infected necrosis (and no gas in necrosis) thus stopped antibiotics (paracentesis on admission w/o SBP)  Plan:  *Appreciate GI consult: obtained paracentesis 50cc 11/14 (seemed to be more fluid on other side but pt declined additional instrumentation) --> amylase 6, lipase 21, pancreatic duct leak ruled out  *oxy 5mg PRN QID at mealtime for pain that occurs with eating, weaning as able (pt reports tramadol not very helpful in the past for arthralgias. would like to avoid habit forming drugs as much as possible)    Malnutrition:    - Level of malnutrition: Moderate   - Based on: reduced intake, mild (or greater) subcutaneous fat loss, mild (or greater) muscle loss  *Started tube feeds 11/18 (initially low magnesium) after NJ placement as per GI recommendations for post-pyloric feeding given pain w/ eating and need for adequate nutrition to heal pancreatitis - was not meeting goals by PO calorie counts    #Severe sepsis (HR, Temp) 2/2 suspected  pneumonia with unknown organism, present on admission   #Acute hypoxemic respiratory failure, resolved  - based on 11/8 CT a/p w/ L>R bilateral GGO w/ new o2 requirement  *s/p zosyn (11/9 - 11/11) deescalated to ciprofloxacin + flagyl (11/11 - 11/12), s/p Augmentin through 11/14 (completed 5 day course)    #Suspect evolving alcoholic liver disease  #Severe alcohol use disorder  - has not had chance to f/u output w/ hepatology, only started needing paracenteses during last admission (10/2021), INR elevated ~1.4 on presentation  *CD consult placed --> ideally will discharge to [facility], appreciate SW coordination  however with NJ feeding for at least 1-2 weeks in the hospital will have to wait for now, ultimately TF duration to be determined by GI  *initially continued rifaximin given for presumed HE last admission, however after chart review that diagnosis was equivocal in the setting of many other medical problems, anticipating outpatient coverage difficulties and no e/o HE presently, discontinued as of 11/14, and will monitor for signs of HE development  - Ascites management:    *spironolactone (11/11 - )   *lasix (11/13 - )    #Headache  Patient with overnight headaches on 11/20.  Improved on oxycodone.  No photophobia or phonophobia noted on the visit.  No vision changes. No nausea/vomiting. Physical exam without neurological sensation or motoric loss, no meningismus, and no vision changes.  Will continue to monitor and may require further analgesia if the headache does not continue to improve.    #Suspect R lower back strain  - TTP paraspinals on 11/12, no focal spinal tenderness  *lido patch  *heat pad    #Anxiety/Depression  *cont home lexapro, remeron.       Diet: Adult Formula Drip Feeding: Continuous Osmolite 1.5; Nasojejunal; Goal Rate: 45; mL/hr; Medication - Feeding Tube Flush Frequency: At least 15-30 mL water before and after medication administration and with tube clogging; Amount  to Send (Nutrition...  Advance Diet as Tolerated: Low Fiber    DVT Prophylaxis: Enoxaparin (Lovenox) SQ  Rice Catheter: Not present  Central Lines: None  Code Status: Full Code      Disposition Plan   Expected discharge: 12/02/2021   recommended to prior living arrangement once adequate pain management/ tolerating PO medications.     The patient's care was discussed with the Bedside Nurse and Patient.    Reinier Rivera MD  Hospitalist Service, [address]  Securely message with the Vocera Web Console (learn more here)  Text page via [facility] Paging/Directory    Please see sign in/sign out for up to date coverage information    Clinically Significant Risk Factors Present on Admission                ______________________________________________________________________    Interval History   Doing okay overall.  Had a headache overnight that improved on oxycodone.  No photophobia or phonophobia.  No vision changes.  No nausea or vomiting.  No chest pain.  No shortness of breath.  Tolerating tube feeds.  No acute overnight events.    Data reviewed today: I reviewed all medications, new labs and imaging results over the last 24 hours. I personally reviewed no images or EKG's today.    Physical Exam   Vital Signs: Temp: 100.1  F (37.8  C) Temp src: Oral BP: 106/74 Pulse: 94   Resp: 20 SpO2: 98 % O2 Device: None (Room air)    Weight: 161 lbs 3.2 oz  Constitutional: Lying back in bed, NAD  Cardiovascular: regular without murmurs or gallops  Pulmonary/Chest: CTAB, no respiratory distress.  GI: Soft, moderately distended (stable), mild ttp epigastrium, no rebound tenderness or guarding  Skin: Skin is warm and dry  Neurological: Alert and oriented x4. B/L upper and lower extremity strength and sensation preserved. No neck stiffness.    Data   Recent Labs   Lab 11/21/21  0808 11/20/21  2048 11/20/21  0619 11/19/21  1207 11/19/21  0609 11/18/21  0935 11/18/21  0556  11/16/21  0610 11/15/21  0607   WBC 10.5  --   --   --  7.3  --  8.4   < > 7.9   HGB 9.1*  --   --   --  9.3*  --  9.4*   < > 9.2*   MCV 95  --   --   --  97  --  96   < > 99     --   --   --  276  --  262   < > 219   INR  --   --   --   --   --   --   --   --  1.41*     --  139  --  138  --  138   < > 142   POTASSIUM 4.5  --  4.0  --  3.9  --  3.5   < > 3.5   CHLORIDE 105  --  109  --  107  --  107   < > 113*   CO2 25  --  23  --  24  --  25   < > 24   BUN 8  --  6*  --  6*  --  6*   < > 3*   CR 0.66  --  0.69  --  0.66  --  0.78   < > 0.84   ANIONGAP 6  --  7  --  7  --  6   < > 5   STEFFANY 7.2*  --  7.8*  --  7.4*  --  7.4*   < > 7.8*   * 127* 134*   < > 123*   < > 91   < > 90   ALBUMIN  --   --  1.5*  --   --   --  1.5*   < > 1.5*   PROTTOTAL  --   --  6.0*  --   --   --  5.8*   < > 5.6*   BILITOTAL  --   --  1.1  --   --   --  1.4*   < > 1.3   ALKPHOS  --   --  249*  --   --   --  204*   < > 186*   ALT  --   --  14  --   --   --  11   < > 12   AST  --   --  38  --   --   --  37   < > 27    < > = values in this interval not displayed.     No results found for this or any previous visit (from the past 24 hour(s)).  Medications     dextrose       dextrose         acetaminophen  650 mg Oral TID     vitamin B-12  100 mcg Oral or Feeding Tube Daily     enoxaparin ANTICOAGULANT  40 mg Subcutaneous Q24H     escitalopram  10 mg Oral Daily     folic acid  1 mg Oral Daily     furosemide  40 mg Oral Daily     gabapentin  100 mg Oral TID     lidocaine  1 patch Transdermal Q24h    And     lidocaine   Transdermal Q8H     magnesium sulfate  4 g Intravenous Once     mirtazapine  15 mg Oral At Bedtime     multivitamin RENAL  1 capsule Oral Daily     pantoprazole  40 mg Oral QAM AC     polyethylene glycol  17 g Oral Daily     protein modular  1 packet Per Feeding Tube BID     sennosides  1 tablet Oral At Bedtime     sodium chloride (PF)  3 mL Intracatheter Q8H     sodium phosphate  9 mmol Intravenous Once      spironolactone  100 mg Oral or Feeding Tube Daily     thiamine  100 mg Oral Daily

## 2021-11-21 NOTE — PROVIDER NOTIFICATION
C/o new onset severe headache from neck towards front of head. AVSS. PERRLA. Denies dizziness. Reports photosensitivity associated with the headache. No neuro deficits noted. Oxycodone given. Physician Assistant notified at pager #[phone].

## 2021-11-21 NOTE — PLAN OF CARE
Pt is alert and oriented x 4. Vital signs stable, afebrile. C/o abdominal pain, PRN oxycodone given x 1 with some relief. C/o nausea, zofran given with some relief.  Robaxin given for spasms and Melatonin given for sleep. Pt continues to refused her lidocaine patch and sennosides. Blood glucose was 127. NJ tube infusing at 45 mL/hr.  Pt is up  independently. No bowel movement this shift, voiding adequately. Continue with POC.

## 2021-11-21 NOTE — PLAN OF CARE
Alert and oriented X 4. AVSS. Denies SOB. C/o right lower abdominal pain which improved with oxycodone. Tube feeding at 45 mL/hr via NJ tube. Tolerating well. Denies nausea, abdominal cramping or diarrhea. Pt reports new onset headache since 0440. Oxycodone given. Pt noted sleeping on pain reassessment. Upon waking up she reports headache and photosensitivity have not improved. Physician Assistant updated.

## 2021-11-21 NOTE — PROVIDER NOTIFICATION
#[phone] paged.    Low grade fever 100.1. Questioning continuation of scheduled Tylenol. Also bad headache continues, any other meds to try besides oxycodone?

## 2021-11-21 NOTE — PLAN OF CARE
A&Ox4. VSS. Denies N/V and SOB. C/o headache and abdominal pain. Headache unrelieved by tylenol and oxycodone. 4/4 daily Oxycodone doses already given. Pt encouraged to drink caffeine or spoke with provider about ordering a caffeine pill. Declining at this time. Resting between cares today. Would like to speak with GI about ~2week stay. Continue with POC.

## 2021-11-22 LAB
ALBUMIN SERPL-MCNC: 1.4 G/DL (ref 3.4–5)
ALP SERPL-CCNC: 233 U/L (ref 40–150)
ALT SERPL W P-5'-P-CCNC: 15 U/L (ref 0–50)
ANION GAP SERPL CALCULATED.3IONS-SCNC: 6 MMOL/L (ref 3–14)
AST SERPL W P-5'-P-CCNC: 34 U/L (ref 0–45)
BILIRUB SERPL-MCNC: 1.1 MG/DL (ref 0.2–1.3)
BUN SERPL-MCNC: 10 MG/DL (ref 7–30)
CALCIUM SERPL-MCNC: 7.7 MG/DL (ref 8.5–10.1)
CHLORIDE BLD-SCNC: 104 MMOL/L (ref 94–109)
CO2 SERPL-SCNC: 26 MMOL/L (ref 20–32)
CREAT SERPL-MCNC: 0.68 MG/DL (ref 0.52–1.04)
GFR SERPL CREATININE-BSD FRML MDRD: >90 ML/MIN/1.73M2
GLUCOSE BLD-MCNC: 134 MG/DL (ref 70–99)
MAGNESIUM SERPL-MCNC: 1.9 MG/DL (ref 1.6–2.3)
PHOSPHATE SERPL-MCNC: 3.4 MG/DL (ref 2.5–4.5)
POTASSIUM BLD-SCNC: 4.4 MMOL/L (ref 3.4–5.3)
PROT SERPL-MCNC: 5.8 G/DL (ref 6.8–8.8)
SARS-COV-2 RNA RESP QL NAA+PROBE: NEGATIVE
SODIUM SERPL-SCNC: 136 MMOL/L (ref 133–144)

## 2021-11-22 PROCEDURE — U0005 INFEC AGEN DETEC AMPLI PROBE: HCPCS | Performed by: STUDENT IN AN ORGANIZED HEALTH CARE EDUCATION/TRAINING PROGRAM

## 2021-11-22 PROCEDURE — 250N000013 HC RX MED GY IP 250 OP 250 PS 637: Performed by: INTERNAL MEDICINE

## 2021-11-22 PROCEDURE — 84100 ASSAY OF PHOSPHORUS: CPT | Performed by: INTERNAL MEDICINE

## 2021-11-22 PROCEDURE — 83735 ASSAY OF MAGNESIUM: CPT | Performed by: INTERNAL MEDICINE

## 2021-11-22 PROCEDURE — 250N000013 HC RX MED GY IP 250 OP 250 PS 637: Performed by: STUDENT IN AN ORGANIZED HEALTH CARE EDUCATION/TRAINING PROGRAM

## 2021-11-22 PROCEDURE — 250N000013 HC RX MED GY IP 250 OP 250 PS 637: Performed by: HOSPITALIST

## 2021-11-22 PROCEDURE — 120N000002 HC R&B MED SURG/OB UMMC

## 2021-11-22 PROCEDURE — 250N000011 HC RX IP 250 OP 636: Performed by: INTERNAL MEDICINE

## 2021-11-22 PROCEDURE — 99232 SBSQ HOSP IP/OBS MODERATE 35: CPT | Performed by: STUDENT IN AN ORGANIZED HEALTH CARE EDUCATION/TRAINING PROGRAM

## 2021-11-22 PROCEDURE — 99207 PR CDG-MDM COMPONENT: MEETS HIGH - UP CODED: CPT | Performed by: STUDENT IN AN ORGANIZED HEALTH CARE EDUCATION/TRAINING PROGRAM

## 2021-11-22 PROCEDURE — 80053 COMPREHEN METABOLIC PANEL: CPT | Performed by: STUDENT IN AN ORGANIZED HEALTH CARE EDUCATION/TRAINING PROGRAM

## 2021-11-22 PROCEDURE — 87040 BLOOD CULTURE FOR BACTERIA: CPT | Performed by: PHYSICIAN ASSISTANT

## 2021-11-22 PROCEDURE — 36415 COLL VENOUS BLD VENIPUNCTURE: CPT | Performed by: PHYSICIAN ASSISTANT

## 2021-11-22 PROCEDURE — 36415 COLL VENOUS BLD VENIPUNCTURE: CPT | Performed by: STUDENT IN AN ORGANIZED HEALTH CARE EDUCATION/TRAINING PROGRAM

## 2021-11-22 RX ORDER — MAGNESIUM OXIDE 400 MG/1
400 TABLET ORAL 2 TIMES DAILY
Status: COMPLETED | OUTPATIENT
Start: 2021-11-22 | End: 2021-11-23

## 2021-11-22 RX ORDER — OXYCODONE HYDROCHLORIDE 5 MG/1
5 TABLET ORAL
Status: COMPLETED | OUTPATIENT
Start: 2021-11-22 | End: 2021-11-22

## 2021-11-22 RX ADMIN — Medication 400 MG: at 20:20

## 2021-11-22 RX ADMIN — Medication 400 MG: at 08:47

## 2021-11-22 RX ADMIN — LIDOCAINE 1 PATCH: 560 PATCH PERCUTANEOUS; TOPICAL; TRANSDERMAL at 20:20

## 2021-11-22 RX ADMIN — FOLIC ACID 1 MG: 1 TABLET ORAL at 08:47

## 2021-11-22 RX ADMIN — VITAM B12 100 MCG: 100 TAB at 08:46

## 2021-11-22 RX ADMIN — ACETAMINOPHEN 650 MG: 325 TABLET, FILM COATED ORAL at 15:40

## 2021-11-22 RX ADMIN — FUROSEMIDE 40 MG: 40 TABLET ORAL at 08:46

## 2021-11-22 RX ADMIN — Medication 1 PACKET: at 08:47

## 2021-11-22 RX ADMIN — GABAPENTIN 100 MG: 100 CAPSULE ORAL at 14:06

## 2021-11-22 RX ADMIN — OXYCODONE HYDROCHLORIDE 5 MG: 5 TABLET ORAL at 14:08

## 2021-11-22 RX ADMIN — ACETAMINOPHEN 650 MG: 325 TABLET, FILM COATED ORAL at 08:46

## 2021-11-22 RX ADMIN — OXYCODONE HYDROCHLORIDE 5 MG: 5 TABLET ORAL at 17:57

## 2021-11-22 RX ADMIN — ASCORBIC ACID, THIAMINE MONONITRATE,RIBOFLAVIN, NIACINAMIDE, PYRIDOXINE HYDROCHLORIDE, FOLIC ACID, CYANOCOBALAMIN, BIOTIN, CALCIUM PANTOTHENATE, 1 CAPSULE: 100; 1.5; 1.7; 20; 10; 1; 6000; 150000; 5 CAPSULE, LIQUID FILLED ORAL at 08:47

## 2021-11-22 RX ADMIN — SPIRONOLACTONE 100 MG: 100 TABLET ORAL at 08:47

## 2021-11-22 RX ADMIN — OXYCODONE HYDROCHLORIDE 5 MG: 5 TABLET ORAL at 00:43

## 2021-11-22 RX ADMIN — THIAMINE HCL TAB 100 MG 100 MG: 100 TAB at 08:47

## 2021-11-22 RX ADMIN — OXYCODONE HYDROCHLORIDE 5 MG: 5 TABLET ORAL at 10:11

## 2021-11-22 RX ADMIN — Medication 1 PACKET: at 20:23

## 2021-11-22 RX ADMIN — Medication 1 MG: at 21:16

## 2021-11-22 RX ADMIN — PANTOPRAZOLE SODIUM 40 MG: 40 TABLET, DELAYED RELEASE ORAL at 08:46

## 2021-11-22 RX ADMIN — MIRTAZAPINE 15 MG: 15 TABLET, FILM COATED ORAL at 21:16

## 2021-11-22 RX ADMIN — GABAPENTIN 100 MG: 100 CAPSULE ORAL at 20:20

## 2021-11-22 RX ADMIN — Medication 25 MG: at 03:00

## 2021-11-22 RX ADMIN — ACETAMINOPHEN 650 MG: 325 TABLET, FILM COATED ORAL at 02:56

## 2021-11-22 RX ADMIN — ESCITALOPRAM OXALATE 10 MG: 10 TABLET ORAL at 08:47

## 2021-11-22 RX ADMIN — ENOXAPARIN SODIUM 40 MG: 40 INJECTION SUBCUTANEOUS at 08:47

## 2021-11-22 RX ADMIN — OXYCODONE HYDROCHLORIDE 5 MG: 5 TABLET ORAL at 05:51

## 2021-11-22 RX ADMIN — GABAPENTIN 100 MG: 100 CAPSULE ORAL at 08:47

## 2021-11-22 ASSESSMENT — ACTIVITIES OF DAILY LIVING (ADL)
ADLS_ACUITY_SCORE: 14

## 2021-11-22 ASSESSMENT — MIFFLIN-ST. JEOR: SCORE: 1337.59

## 2021-11-22 NOTE — PROVIDER NOTIFICATION
#[phone] paged:     Pt used last 4/4 oxycodone for day. Said provider spoke of giving extra doses this AM. Wondering if order could be placed.     Provider returned RNs page with a phone call and agreed to put x1 additional dose of 5mg PO oxycodone for tonight.

## 2021-11-22 NOTE — PLAN OF CARE
T-max 100.3. OVSS. C/o headache, photosensitivity and right abdominal pain. Oxycodone and acetaminophen given with decrease in pain. Tube feeding at goal rate of 45 mL/hr. Tolerating well. Sleeping in between cares.

## 2021-11-22 NOTE — PLAN OF CARE
[id number]:   Pt C/o nausea, Zofran given with some relief. C/o headache and abdominal pain, tylenol given with no relief. Benadryl with for itch with relief. Other vital signs stable with Temp of 99.4. Pt requested Robaxin for muscle spasms. Melatoini for sleep. BS was 125.  Voiding adequally, no BM. Continue with POC.

## 2021-11-22 NOTE — PLAN OF CARE
0700 - 1930: A&Ox4. tmax 100.8. Cross cover notified. Blood cultures drawn.C/o intense throbbing headache and abdominal pain. 650 mg tylenol given x2. Recheck temp 99.2. 4/4 daily PO Oxycodone doses used by 1500. Provider paged and added additonal 1x dose of 5 mg Oxycodone for this evening. RN spoke with about other options for pain management which seems quite limited with pt's hx. Encouraging caffeine to attempt to help with headache. Denies N/V and SOB. AM Mg 1.9. PO replacement protocol initiated. Recheck AM of 11/24. Covid swab collected and sent.      Requesting follow-up from team on whether pt needs PLC teaching on tube feedings prior to discharge. When GI rounds tomorrow, please talk about discharge plan with pt. Continue with POC.

## 2021-11-22 NOTE — PROGRESS NOTES
[facility]    Medicine Progress Note - Hospitalist Service, Gold 8       Date of Admission:  11/10/2021    Assessment & Plan           Aliyah Angeles is a 46 year old female admitted on 11/10/2021. She has a hx of DMII, etoh abuse, alcholic hepatitis, and Fitz-en-y gastric bypass who was transferred from the [facility] ARU for necrotizing pancreatitis and fevers.     # Acute necrotizing pancreatitis, present on admission, improving  - complex recent hospitalization course recently, notably from 9/29 to 11/5 w/ alk hep, series of pneumonias and ARDS w/ CRRT ICU stay/intubation, improved and was at ARU  - There, malaise, fever, nausea and belly pain occurred prompting CT scan showing pancreatitis w/ area of necrosis surrounding splenic vein and artery  - Suspect smoldering alcoholic pancreatitis in the interim (after last CT 10/14 which was without pancreatitis, though a 9/13 CT scan OSH did show stranding); likely too early for infected necrosis (and no gas in necrosis) thus stopped antibiotics (paracentesis on admission w/o SBP)  Plan:  *Appreciate GI consult: obtained paracentesis 50cc 11/14 (seemed to be more fluid on other side but pt declined additional instrumentation) --> amylase 6, lipase 21, pancreatic duct leak ruled out   *If abdominal pain improves in the coming days then may be able to touch base with GI regarding necessity to stay here for whole NJ regimen  *May need new GI referral once outpatient (they have tried to reach out to her while in hospital)  *oxy 5mg PRN QID at mealtime for pain that occurs with eating, weaning as able (pt reports tramadol not very helpful in the past for arthralgias. would like to avoid habit forming drugs as much as possible)    Malnutrition:    - Level of malnutrition: Moderate   - Based on: reduced intake, mild (or greater) subcutaneous fat loss, mild (or greater) muscle loss  *Started tube feeds 11/18 (initially low  magnesium) after NJ placement as per GI recommendations for post-pyloric feeding given pain w/ eating and need for adequate nutrition to heal pancreatitis - was not meeting goals by PO calorie counts    #Severe sepsis (HR, Temp) 2/2 suspected pneumonia with unknown organism, present on admission   #Acute hypoxemic respiratory failure, resolved  - based on 11/8 CT a/p w/ L>R bilateral GGO w/ new o2 requirement  *s/p zosyn (11/9 - 11/11) deescalated to ciprofloxacin + flagyl (11/11 - 11/12), s/p Augmentin through 11/14 (completed 5 day course)    #Suspect evolving alcoholic liver disease  #Severe alcohol use disorder  - has not had chance to f/u output w/ hepatology, only started needing paracenteses during last admission (10/2021), INR elevated ~1.4 on presentation  *CD consult placed --> ideally will discharge to [facility], appreciate SW coordination  however with NJ feeding for at least 1-2 weeks in the hospital will have to wait for now, ultimately TF duration to be determined by GI  *initially continued rifaximin given for presumed HE last admission, however after chart review that diagnosis was equivocal in the setting of many other medical problems, anticipating outpatient coverage difficulties and no e/o HE presently, discontinued as of 11/14, and will monitor for signs of HE development  - Ascites management:    *spironolactone (11/11 - )   *lasix (11/13 - )    #Headache  Patient with overnight headaches on 11/20.  Improved on oxycodone.  No photophobia or phonophobia noted on the visit.  No vision changes. No nausea/vomiting. Physical exam without neurological sensation or motoric loss, no meningismus, and no vision changes.  Will continue to monitor and may require further analgesia if the headache does not continue to improve.    #Suspect R lower back strain  - TTP paraspinals on 11/12, no focal spinal tenderness  *lido patch  *heat pad    #Anxiety/Depression  *cont home lexapro,  remeron.       Diet: Adult Formula Drip Feeding: Continuous Osmolite 1.5; Nasojejunal; Goal Rate: 45; mL/hr; Medication - Feeding Tube Flush Frequency: At least 15-30 mL water before and after medication administration and with tube clogging; Amount to Send (Nutrition...  Advance Diet as Tolerated: Low Fiber    DVT Prophylaxis: Enoxaparin (Lovenox) SQ  Rice Catheter: Not present  Central Lines: None  Code Status: Full Code      Disposition Plan   Expected discharge: 12/02/2021   recommended to prior living arrangement once adequate pain management/ tolerating PO medications.     The patient's care was discussed with the Patient.    Reinier Rivera MD  Hospitalist Service, [address]  Securely message with the Vocera Web Console (learn more here)  Text page via [employer] Paging/Directory    Please see sign in/sign out for up to date coverage information    Clinically Significant Risk Factors Present on Admission                ______________________________________________________________________    Interval History   Doing okay overall.  Headache slightly improved.  No fever or chills.  No chest pain.  No neck stiffness.  No motoric or sensation loss.  No photophobia or phonophobia.  No visual changes.  No nausea or vomiting.  Abdominal pain stable.  No shortness of breath.  Tube feeds are going okay.  Four-point ROS otherwise negative.    Data reviewed today: I reviewed all medications, new labs and imaging results over the last 24 hours. I personally reviewed no images or EKG's today.    Physical Exam   Vital Signs: Temp: 98.7  F (37.1  C) Temp src: Oral BP: 105/74 Pulse: 86   Resp: 19 SpO2: 99 % O2 Device: None (Room air)    Weight: 161 lbs 3.2 oz  Constitutional: Lying back in bed, NAD  Cardiovascular: regular, without murmurs or gallops  Pulmonary/Chest: CTAB, no respiratory distress  GI: Soft, moderately distended (stable), mild ttp epigastrium, no rebound  tenderness or guarding   Skin: Skin is warm and dry  Neurological: Alert and oriented x4. B/L upper and lower extremity strength and sensation preserved. No neck stiffness.    Data   Recent Labs   Lab 11/22/21  0505 11/21/21  2212 11/21/21  0808 11/20/21  2048 11/20/21  0619 11/19/21  1207 11/19/21  0609 11/18/21  0935 11/18/21  0556   WBC  --   --  10.5  --   --   --  7.3  --  8.4   HGB  --   --  9.1*  --   --   --  9.3*  --  9.4*   MCV  --   --  95  --   --   --  97  --  96   PLT  --   --  204  --   --   --  276  --  262     --  136  --  139  --  138  --  138   POTASSIUM 4.4  --  4.5  --  4.0  --  3.9  --  3.5   CHLORIDE 104  --  105  --  109  --  107  --  107   CO2 26  --  25  --  23  --  24  --  25   BUN 10  --  8  --  6*  --  6*  --  6*   CR 0.68  --  0.66  --  0.69  --  0.66  --  0.78   ANIONGAP 6  --  6  --  7  --  7  --  6   STEFFANY 7.7*  --  7.2*  --  7.8*  --  7.4*  --  7.4*   * 125* 133*   < > 134*   < > 123*   < > 91   ALBUMIN 1.4*  --   --   --  1.5*  --   --   --  1.5*   PROTTOTAL 5.8*  --   --   --  6.0*  --   --   --  5.8*   BILITOTAL 1.1  --   --   --  1.1  --   --   --  1.4*   ALKPHOS 233*  --   --   --  249*  --   --   --  204*   ALT 15  --   --   --  14  --   --   --  11   AST 34  --   --   --  38  --   --   --  37    < > = values in this interval not displayed.     No results found for this or any previous visit (from the past 24 hour(s)).  Medications     dextrose       dextrose         vitamin B-12  100 mcg Oral or Feeding Tube Daily     enoxaparin ANTICOAGULANT  40 mg Subcutaneous Q24H     escitalopram  10 mg Oral Daily     folic acid  1 mg Oral Daily     furosemide  40 mg Oral Daily     gabapentin  100 mg Oral TID     lidocaine  1 patch Transdermal Q24h    And     lidocaine   Transdermal Q8H     magnesium oxide  400 mg Oral BID     mirtazapine  15 mg Oral At Bedtime     multivitamin RENAL  1 capsule Oral Daily     pantoprazole  40 mg Oral QAM AC     polyethylene glycol  17 g Oral  Daily     protein modular  1 packet Per Feeding Tube BID     sennosides  1 tablet Oral At Bedtime     sodium chloride (PF)  3 mL Intracatheter Q8H     spironolactone  100 mg Oral or Feeding Tube Daily     thiamine  100 mg Oral Daily

## 2021-11-23 ENCOUNTER — ANCILLARY PROCEDURE (OUTPATIENT)
Dept: ULTRASOUND IMAGING | Facility: CLINIC | Age: 46
End: 2021-11-23
Attending: STUDENT IN AN ORGANIZED HEALTH CARE EDUCATION/TRAINING PROGRAM
Payer: COMMERCIAL

## 2021-11-23 LAB
ANION GAP SERPL CALCULATED.3IONS-SCNC: 6 MMOL/L (ref 3–14)
APPEARANCE FLD: CLEAR
BASOPHILS NFR FLD MANUAL: 2 %
BUN SERPL-MCNC: 11 MG/DL (ref 7–30)
CALCIUM SERPL-MCNC: 8 MG/DL (ref 8.5–10.1)
CHLORIDE BLD-SCNC: 104 MMOL/L (ref 94–109)
CO2 SERPL-SCNC: 25 MMOL/L (ref 20–32)
COLOR FLD: YELLOW
CREAT SERPL-MCNC: 0.7 MG/DL (ref 0.52–1.04)
EOSINOPHIL NFR FLD MANUAL: 1 %
ERYTHROCYTE [DISTWIDTH] IN BLOOD BY AUTOMATED COUNT: 16.6 % (ref 10–15)
GFR SERPL CREATININE-BSD FRML MDRD: >90 ML/MIN/1.73M2
GLUCOSE BLD-MCNC: 132 MG/DL (ref 70–99)
GRAM STAIN RESULT: NORMAL
HCT VFR BLD AUTO: 25.8 % (ref 35–47)
HGB BLD-MCNC: 8 G/DL (ref 11.7–15.7)
LYMPHOCYTES NFR FLD MANUAL: 77 %
MAGNESIUM SERPL-MCNC: 1.5 MG/DL (ref 1.6–2.3)
MCH RBC QN AUTO: 30.4 PG (ref 26.5–33)
MCHC RBC AUTO-ENTMCNC: 31 G/DL (ref 31.5–36.5)
MCV RBC AUTO: 98 FL (ref 78–100)
MONOS+MACROS NFR FLD MANUAL: NORMAL %
NEUTS BAND NFR FLD MANUAL: 11 %
OTHER CELLS FLD MANUAL: 11 %
PHOSPHATE SERPL-MCNC: 4.4 MG/DL (ref 2.5–4.5)
PLATELET # BLD AUTO: 206 10E3/UL (ref 150–450)
POTASSIUM BLD-SCNC: 4.4 MMOL/L (ref 3.4–5.3)
RBC # BLD AUTO: 2.63 10E6/UL (ref 3.8–5.2)
SODIUM SERPL-SCNC: 135 MMOL/L (ref 133–144)
WBC # BLD AUTO: 9.4 10E3/UL (ref 4–11)
WBC # FLD AUTO: 1327 /UL

## 2021-11-23 PROCEDURE — 36415 COLL VENOUS BLD VENIPUNCTURE: CPT | Performed by: STUDENT IN AN ORGANIZED HEALTH CARE EDUCATION/TRAINING PROGRAM

## 2021-11-23 PROCEDURE — 250N000013 HC RX MED GY IP 250 OP 250 PS 637: Performed by: HOSPITALIST

## 2021-11-23 PROCEDURE — 250N000013 HC RX MED GY IP 250 OP 250 PS 637: Performed by: STUDENT IN AN ORGANIZED HEALTH CARE EDUCATION/TRAINING PROGRAM

## 2021-11-23 PROCEDURE — 250N000013 HC RX MED GY IP 250 OP 250 PS 637: Performed by: INTERNAL MEDICINE

## 2021-11-23 PROCEDURE — 49083 ABD PARACENTESIS W/IMAGING: CPT | Mod: GC | Performed by: PEDIATRICS

## 2021-11-23 PROCEDURE — 89050 BODY FLUID CELL COUNT: CPT | Performed by: STUDENT IN AN ORGANIZED HEALTH CARE EDUCATION/TRAINING PROGRAM

## 2021-11-23 PROCEDURE — 120N000002 HC R&B MED SURG/OB UMMC

## 2021-11-23 PROCEDURE — 87205 SMEAR GRAM STAIN: CPT | Performed by: STUDENT IN AN ORGANIZED HEALTH CARE EDUCATION/TRAINING PROGRAM

## 2021-11-23 PROCEDURE — 87070 CULTURE OTHR SPECIMN AEROBIC: CPT | Performed by: STUDENT IN AN ORGANIZED HEALTH CARE EDUCATION/TRAINING PROGRAM

## 2021-11-23 PROCEDURE — 250N000011 HC RX IP 250 OP 636: Performed by: INTERNAL MEDICINE

## 2021-11-23 PROCEDURE — 0W9G3ZZ DRAINAGE OF PERITONEAL CAVITY, PERCUTANEOUS APPROACH: ICD-10-PCS | Performed by: PEDIATRICS

## 2021-11-23 PROCEDURE — 80048 BASIC METABOLIC PNL TOTAL CA: CPT | Performed by: STUDENT IN AN ORGANIZED HEALTH CARE EDUCATION/TRAINING PROGRAM

## 2021-11-23 PROCEDURE — 85014 HEMATOCRIT: CPT | Performed by: STUDENT IN AN ORGANIZED HEALTH CARE EDUCATION/TRAINING PROGRAM

## 2021-11-23 PROCEDURE — 89051 BODY FLUID CELL COUNT: CPT | Performed by: STUDENT IN AN ORGANIZED HEALTH CARE EDUCATION/TRAINING PROGRAM

## 2021-11-23 PROCEDURE — 84100 ASSAY OF PHOSPHORUS: CPT | Performed by: INTERNAL MEDICINE

## 2021-11-23 PROCEDURE — 99233 SBSQ HOSP IP/OBS HIGH 50: CPT | Mod: 25 | Performed by: STUDENT IN AN ORGANIZED HEALTH CARE EDUCATION/TRAINING PROGRAM

## 2021-11-23 PROCEDURE — 83735 ASSAY OF MAGNESIUM: CPT | Performed by: INTERNAL MEDICINE

## 2021-11-23 PROCEDURE — 250N000011 HC RX IP 250 OP 636: Performed by: STUDENT IN AN ORGANIZED HEALTH CARE EDUCATION/TRAINING PROGRAM

## 2021-11-23 RX ORDER — SODIUM BICARBONATE 325 MG/1
325 TABLET ORAL EVERY 4 HOURS
Status: DISCONTINUED | OUTPATIENT
Start: 2021-11-23 | End: 2021-11-28

## 2021-11-23 RX ORDER — SODIUM BICARBONATE 325 MG/1
325 TABLET ORAL EVERY 4 HOURS
Status: DISCONTINUED | OUTPATIENT
Start: 2021-11-23 | End: 2021-11-23

## 2021-11-23 RX ORDER — LORAZEPAM 2 MG/ML
0.5 INJECTION INTRAMUSCULAR
Status: COMPLETED | OUTPATIENT
Start: 2021-11-23 | End: 2021-11-23

## 2021-11-23 RX ADMIN — ACETAMINOPHEN 650 MG: 325 TABLET, FILM COATED ORAL at 18:54

## 2021-11-23 RX ADMIN — METHOCARBAMOL 500 MG: 500 TABLET ORAL at 20:20

## 2021-11-23 RX ADMIN — ACETAMINOPHEN 650 MG: 325 TABLET, FILM COATED ORAL at 03:56

## 2021-11-23 RX ADMIN — LORAZEPAM 0.5 MG: 2 INJECTION INTRAMUSCULAR; INTRAVENOUS at 13:12

## 2021-11-23 RX ADMIN — MIRTAZAPINE 15 MG: 15 TABLET, FILM COATED ORAL at 22:15

## 2021-11-23 RX ADMIN — Medication 1 PACKET: at 09:46

## 2021-11-23 RX ADMIN — OXYCODONE HYDROCHLORIDE 5 MG: 5 TABLET ORAL at 11:29

## 2021-11-23 RX ADMIN — ASCORBIC ACID, THIAMINE MONONITRATE,RIBOFLAVIN, NIACINAMIDE, PYRIDOXINE HYDROCHLORIDE, FOLIC ACID, CYANOCOBALAMIN, BIOTIN, CALCIUM PANTOTHENATE, 1 CAPSULE: 100; 1.5; 1.7; 20; 10; 1; 6000; 150000; 5 CAPSULE, LIQUID FILLED ORAL at 09:44

## 2021-11-23 RX ADMIN — OXYCODONE HYDROCHLORIDE 5 MG: 5 TABLET ORAL at 16:10

## 2021-11-23 RX ADMIN — SODIUM BICARBONATE 325 MG: 325 TABLET ORAL at 22:15

## 2021-11-23 RX ADMIN — Medication 400 MG: at 20:20

## 2021-11-23 RX ADMIN — Medication 1 PACKET: at 20:22

## 2021-11-23 RX ADMIN — THIAMINE HCL TAB 100 MG 100 MG: 100 TAB at 09:44

## 2021-11-23 RX ADMIN — ESCITALOPRAM OXALATE 10 MG: 10 TABLET ORAL at 09:45

## 2021-11-23 RX ADMIN — PANTOPRAZOLE SODIUM 40 MG: 40 TABLET, DELAYED RELEASE ORAL at 09:44

## 2021-11-23 RX ADMIN — POLYETHYLENE GLYCOL 3350 17 G: 17 POWDER, FOR SOLUTION ORAL at 11:29

## 2021-11-23 RX ADMIN — PANCRELIPASE 2 CAPSULE: 60000; 12000; 38000 CAPSULE, DELAYED RELEASE PELLETS ORAL at 22:15

## 2021-11-23 RX ADMIN — Medication 400 MG: at 09:44

## 2021-11-23 RX ADMIN — VITAM B12 100 MCG: 100 TAB at 09:43

## 2021-11-23 RX ADMIN — GABAPENTIN 100 MG: 100 CAPSULE ORAL at 15:48

## 2021-11-23 RX ADMIN — SODIUM BICARBONATE 325 MG: 325 TABLET ORAL at 18:16

## 2021-11-23 RX ADMIN — OXYCODONE HYDROCHLORIDE 5 MG: 5 TABLET ORAL at 01:20

## 2021-11-23 RX ADMIN — GABAPENTIN 100 MG: 100 CAPSULE ORAL at 09:44

## 2021-11-23 RX ADMIN — FUROSEMIDE 40 MG: 40 TABLET ORAL at 09:44

## 2021-11-23 RX ADMIN — ENOXAPARIN SODIUM 40 MG: 40 INJECTION SUBCUTANEOUS at 09:45

## 2021-11-23 RX ADMIN — PANCRELIPASE 2 CAPSULE: 60000; 12000; 38000 CAPSULE, DELAYED RELEASE PELLETS ORAL at 18:17

## 2021-11-23 RX ADMIN — GABAPENTIN 100 MG: 100 CAPSULE ORAL at 20:20

## 2021-11-23 RX ADMIN — Medication 1 MG: at 22:21

## 2021-11-23 RX ADMIN — SPIRONOLACTONE 100 MG: 100 TABLET ORAL at 09:44

## 2021-11-23 RX ADMIN — Medication 25 MG: at 01:18

## 2021-11-23 RX ADMIN — OXYCODONE HYDROCHLORIDE 5 MG: 5 TABLET ORAL at 06:30

## 2021-11-23 RX ADMIN — FOLIC ACID 1 MG: 1 TABLET ORAL at 09:44

## 2021-11-23 ASSESSMENT — ACTIVITIES OF DAILY LIVING (ADL)
ADLS_ACUITY_SCORE: 14

## 2021-11-23 ASSESSMENT — MIFFLIN-ST. JEOR: SCORE: 1327.16

## 2021-11-23 NOTE — CONSULTS
Consult and Procedure Service - Procedure Note  Attending: Dr. Cruz  Resident: Dr. Calvo  Procedure: Diagnostic and therapeutic paracentesis  Indication: Large volume ascites  Risk Assessment: low  Pre-procedure diagnosis: necrotizing pancreatitis, severe hepatic steatosis  Post-procedure diagnosis: necrotizing pancreatitis, severe hepatic steatosis  The risks and benefits of the procedure were explained to the patient who expressed understanding and opted to proceed.  Consent was obtained and placed in the chart.  A time out was performed.  An area of ascites was located and marked using ultrasound guidance in the left lower quadrant; the area was prepped and draped in the usual sterile fashion.  8 ml of 1% lidocaine was instilled and ascites located.  The DoYouRemember catheter and needle were inserted under real-time guidance until ascites obtained then the needle removed and the catheter advanced.  The apparatus was connected to vacuum bottles and a total of 3400 ml of cloudy yellow colored fluid removed.   A specimen was sent for analysis. The catheter was withdrawn and the area dressed.  Patient tolerated the procedure well with no immediate complications.  Please contact the Consult and Procedure Service if any complications or concerns arise.   John Calvo MD  Internal Medicine, PGY-3  Pager: [phone]    DOS:  11/23/21

## 2021-11-23 NOTE — PLAN OF CARE
A&Ox4. VSS. C/o generalized abdominal pain. Tolerated with 5 mg PO oxycodone given x1. 1 more dose remaining for this evening. Paracentesis completed today at bedside. Team speaking with dietician about weaning off tube feedings vs. Discharging home with them. Encouraging PO intake. Calorie counts ordered for next 3 days. Continue with POC.

## 2021-11-23 NOTE — PROGRESS NOTES
[facility]    Medicine Progress Note - Hospitalist Service, Gold 8       Date of Admission:  11/10/2021    Assessment & Plan           Aliyah Angeles is a 46 year old female admitted on 11/10/2021. She has a hx of DMII, etoh abuse, alcholic hepatitis, and Fitz-en-y gastric bypass who was transferred from the [facility] ARU for necrotizing pancreatitis and fevers.    Changes today:   - Paracentesis per CAPS team, cell count pending   - Discuss weaning off of tube feeds with dietician   - Replete mag     Acute necrotizing pancreatitis, present on admission, improving  Complex recent hospitalization course recently, notably from 9/29 to 11/5 w/ alk hep, series of pneumonias and ARDS w/ CRRT ICU stay/intubation, improved and was at ARU. CT scan showing pancreatitis w/ area of necrosis surrounding splenic vein and artery   - Panc/Bili consulted, appreciate assistance   - Continue tube feeds per nutrition   - Calorie counts to attempt to wean tube feeds vs send home with tube feeds   - Oxycodone PRN for pain with eating, will continue to try and wean.     Malnutrition:     - Level of malnutrition: Moderate    - Based on: reduced intake, mild (or greater) subcutaneous fat loss, mild (or greater) muscle loss   - Started tube feeds 11/18 (initially low magnesium) after NJ placement as per GI recommendations for post-pyloric feeding given pain w/ eating and need for adequate nutrition to heal pancreatitis - was not meeting goals by PO calorie counts     Severe sepsis (HR, Temp) 2/2 suspected pneumonia with unknown organism, present on admission   Acute hypoxemic respiratory failure, resolved  based on 11/8 CT a/p w/ L>R bilateral GGO w/ new o2 requirement. s/p zosyn (11/9 - 11/11) deescalated to ciprofloxacin + flagyl (11/11 - 11/12), s/p Augmentin through 11/14 (completed 5 day course)    Suspect evolving alcoholic liver disease  Severe alcohol use disorder  Has not had chance to  f/u output w/ hepatology, only started needing paracenteses during last admission (10/2021), INR elevated ~1.4 on presentation. initially continued rifaximin given for presumed HE last admission, however after chart review that diagnosis was equivocal in the setting of many other medical problems, anticipating outpatient coverage difficulties and no e/o HE presently, discontinued as of 11/14, and will monitor for signs of HE development   - CD consult placed --> ideally will discharge to [facility] treatment center, appreciate SW coordination  however with NJ feeding for at least 1-2 weeks in the hospital will have to wait for now, ultimately TF duration to be determined by GI   - Ascites management:      - Spironolactone (11/11 - )    - Lasix (11/13 - )    - Paracentesis PRN    Headache  Patient with overnight headaches on 11/20.  Improved on oxycodone.  No photophobia or phonophobia noted on the visit.  No vision changes. No nausea/vomiting. Physical exam without neurological sensation or motoric loss, no meningismus, and no vision changes.  Will continue to monitor and may require further analgesia if the headache does not continue to improve.    Suspect R lower back strain  -TTP paraspinals on 11/12, no focal spinal tenderness   - lido patch, heat pad    Anxiety/Depression   - cont home lexapro, remeron.       Diet: Adult Formula Drip Feeding: Continuous Osmolite 1.5; Nasojejunal; Goal Rate: 45; mL/hr; Medication - Feeding Tube Flush Frequency: At least 15-30 mL water before and after medication administration and with tube clogging; Amount to Send (Nutrition...  Advance Diet as Tolerated: Low Fiber  Calorie Counts    DVT Prophylaxis: Enoxaparin (Lovenox) SQ  Rice Catheter: Not present  Central Lines: None  Code Status: Full Code      Disposition Plan   Expected discharge: 12/02/2021   recommended to prior living arrangement once adequate pain management/ tolerating PO medications.     The patient's care was  discussed with the Patient and GI Consultant.    Rosmery Collazo MD  Hospitalist Service, [address]  Securely message with the Vocera Web Console (learn more here)  Text page via [employer] Paging/Directory    Please see sign in/sign out for up to date coverage information    Clinically Significant Risk Factors Present on Admission                ______________________________________________________________________    Interval History   Doing okay overall. Still with intermittent headache -worse at night. Did have fever overnight. Blood cultures pending.  No fever or chills.  No shortness of breath.  Tube feeds are going okay.  Four-point ROS otherwise negative.    Data reviewed today: I reviewed all medications, new labs and imaging results over the last 24 hours. Labs and Imaging reviewed in Epic, pertinent discussed in A&P.       Physical Exam   Vital Signs: Temp: 98.5  F (36.9  C) Temp src: Oral BP: 102/61 Pulse: 96   Resp: 18 SpO2: 96 % O2 Device: None (Room air)    Weight: 155 lbs 14.4 oz  Constitutional: Lying back in bed, NAD  Cardiovascular: regular, without murmurs or gallops  Pulmonary/Chest: CTAB, no respiratory distress  GI: Soft, moderately distended but not tender diffusely, mild ttp epigastrium, no rebound tenderness or guarding   Skin: Skin is warm and dry  Neurological: Alert and oriented x4. B/L upper and lower extremity strength and sensation preserved. No neck stiffness.    Data   Recent Labs   Lab 11/23/21  0557 11/22/21  0505 11/21/21  2212 11/21/21  0808 11/20/21  2048 11/20/21  0619 11/19/21  1207 11/19/21  0609   WBC 9.4  --   --  10.5  --   --   --  7.3   HGB 8.0*  --   --  9.1*  --   --   --  9.3*   MCV 98  --   --  95  --   --   --  97     --   --  204  --   --   --  276    136  --  136  --  139  --  138   POTASSIUM 4.4 4.4  --  4.5  --  4.0  --  3.9   CHLORIDE 104 104  --  105  --  109  --  107   CO2 25 26  --  25  --  23   --  24   BUN 11 10  --  8  --  6*  --  6*   CR 0.70 0.68  --  0.66  --  0.69  --  0.66   ANIONGAP 6 6  --  6  --  7  --  7   STEFFANY 8.0* 7.7*  --  7.2*  --  7.8*  --  7.4*   * 134* 125* 133*   < > 134*   < > 123*   ALBUMIN  --  1.4*  --   --   --  1.5*  --   --    PROTTOTAL  --  5.8*  --   --   --  6.0*  --   --    BILITOTAL  --  1.1  --   --   --  1.1  --   --    ALKPHOS  --  233*  --   --   --  249*  --   --    ALT  --  15  --   --   --  14  --   --    AST  --  34  --   --   --  38  --   --     < > = values in this interval not displayed.     Recent Results (from the past 24 hour(s))   POC US Guide for Paracentesis    Impression    POCUS Abdomen    Indication: Evaluate for ascites for safe site for paracentesis    Findings:  LLQ:Catheter tip was shown at peritoneum before entry and final position within fluid pocket of abdominal cavity.        Medications     dextrose       dextrose         sodium bicarbonate  325 mg Per Feeding Tube Q4H    Or     amylase-lipase-protease  2 capsule Per Feeding Tube Q4H     vitamin B-12  100 mcg Oral or Feeding Tube Daily     enoxaparin ANTICOAGULANT  40 mg Subcutaneous Q24H     escitalopram  10 mg Oral Daily     folic acid  1 mg Oral Daily     furosemide  40 mg Oral Daily     gabapentin  100 mg Oral TID     lidocaine  1 patch Transdermal Q24h    And     lidocaine   Transdermal Q8H     magnesium oxide  400 mg Oral BID     mirtazapine  15 mg Oral At Bedtime     multivitamin RENAL  1 capsule Oral Daily     pantoprazole  40 mg Oral QAM AC     polyethylene glycol  17 g Oral Daily     protein modular  1 packet Per Feeding Tube BID     sennosides  1 tablet Oral At Bedtime     sodium chloride (PF)  3 mL Intracatheter Q8H     spironolactone  100 mg Oral or Feeding Tube Daily     thiamine  100 mg Oral Daily

## 2021-11-23 NOTE — PLAN OF CARE
"  Problem: Adult Inpatient Plan of Care  Goal: Optimal Comfort and Wellbeing  Outcome: Improving     Problem: Pain (Pancreatitis)  Goal: Acceptable Pain Control  Outcome: Improving     /57 (BP Location: Left arm)   Pulse (P) 102   Temp (P) 99.6  F (37.6  C) (Oral)   Resp (P) 18   Ht 1.626 m (5' 4\")   Wt 71.8 kg (158 lb 3.2 oz)   SpO2 (P) 95%. Pt was given tylenol and oxy for pain to abdomen. Benadryl for itching. Denies having headache. Pt went outside on evenings. Pt slept intermittently. Plan is for PLC teaching before discharge. Will continue to monitor.   "

## 2021-11-23 NOTE — PROGRESS NOTES
CLINICAL NUTRITION SERVICES - REASSESSMENT NOTE     Nutrition Prescription    RECOMMENDATIONS FOR MDs/PROVIDERS TO ORDER:  none    Malnutrition Status:   Severe malnutrition in the context of acute illness    Recommendations already ordered by Registered Dietitian (RD):  Once begin TFs, begin the following pancreatic enzyme regimen and recommend order each of the following:   A) Sodium Bicarb tablet (325 mg), 1 tablet q 4 hrs via Jtube. Administration Instructions: Crush 1 tablet and mix into 15 ml of warm water and use this solution to mix with Creon pancreatic enzymes. DO NOT administer directly into Jtube (to be mixed into TF formula with Creon enzyme - see Creon enzyme order)   B) Creon 12, 1-2 capsules q 4 hrs via Jtube. Administration Instructions:   --If TF rate is running @ 10-20 ml/hr, administer 1 capsule q 4 hrs;   --If TF rate is running @ 20-45 ml/hr, increase to 2 capsules q 4 hrs.    **Open capsule and empty contents into 15 ml sodium bicarb solution (see sodium bicarb order), let dissolve for about 20-30 minutes and then add this solution to the amount of TF formula hung in TF bag every 4 hrs (i.e., once TF @ goal infusion 45 ml/hr will mix 2 capsules into 180 ml of TF formula every 4 hrs).   *Note: this enzyme regimen with TF @ goal infusion will provide approx 2716 units of lipase/gram of total Fat daily and approp dosing initially for pancreatic insufficiency with more elemental TF formula.       Future/Additional Recommendations:  Monitor TF tolerance via NJT / po intake / lytes  Monitor weight trends with paracentesis     EVALUATION OF THE PROGRESS TOWARD GOALS   Diet: Low Fiber  Nutrition Support via NJT: Osmolite 1.5 Faizan @ goal of 45ml/hr (1080ml/day)  + 2 ProSource will provide 1700 kcal (28 kcal/kg), 89 g PRO (1.5 g/kg PRO), 822 ml free H20, 219 g CHO, and 0 g fiber daily.  Intake: EN reached goal 11/19.  Average intake over past 4 days: 866 ml + 2 packets prosource providing 1379 kcal  (23kcal/kg) or 92% low end needs, 77 gm protein (1.3gm/kg), for 100% protein needs     NEW FINDINGS   Paracentesis today   Weight 70.7 kg, up from usual weight of 150 lbs.   Changes difficult to assess with fluid fluctuations    Labs:   fecal elastase 7.7 (L)- may be dilutional if sample taken from loose stools  Mg 1.5 (L) - replacement ordered  Phos 4.4 (WNL)  K+ 4.4 (WNL)  Alk phos 233 (H)    Meds  Vit B12, folic acid, MagOx (2 doses), lasix, nephrocaps, protonix, miralax, prosource, senna, thiamine, aldactone    Patient reports metallic taste in mouth and no appetite, reports eating results in increased abd pain; intermittent nausea, distended abdomen  Last BM this am - formed per patient.   Prior to today, no BM x 3 days, with prior loose BM's       MALNUTRITION  % Intake: Decreased intake does not meet criteria  % Weight Loss: Unable to assess  Subcutaneous Fat Loss: Upper arm:  Mild - per previous RD note.  Pt undergoing paracentesis at time of visit  Muscle Loss: Temporal: mild, Thoracic region (clavicle, acromium bone, deltoid, trapezius, pectoral): mild, Upper arm (bicep, tricep): moderate and Lower arm  (forearm): moderate.  Per previous RD note..  Fluid Accumulation/Edema: Does not meet criteria, +1 trace edema  Malnutrition Diagnosis: Severe malnutrition in the context of acute illness    Previous Goals   Total avg nutritional intake to meet a minimum of 25 kcal/kg and 1.2 g PRO/kg daily (per dosing wt 60 kg - adjusted).  Evaluation: Not met - TF reached goal 11/19    Previous Nutrition Diagnosis  Inadequate oral intake related to limited appetite and early satiety as evidenced by ave po intakes per calorie count x 3 days meeting only 45% of low-end estimated energy needs and 40% of low-end estimated protein needs, and consult received for TF therapy.   Evaluation: No change    CURRENT NUTRITION DIAGNOSIS  Inadequate oral intake related to poor appetite as evidenced by minimal po intake and reliance on  EN via NJT to meet 100% nutrition needs      INTERVENTIONS  Implementation  Enteral Nutrition - continue current regiman  Creon ordered as above    Goals  Total avg nutritional intake to meet a minimum of 25 kcal/kg and 1.2 g PRO/kg daily (per dosing wt 60 kg).    Monitoring/Evaluation  Progress toward goals will be monitored and evaluated per protocol.    Esme Lea, RD, LD   7D pager [phone]

## 2021-11-24 ENCOUNTER — APPOINTMENT (OUTPATIENT)
Dept: PHYSICAL THERAPY | Facility: CLINIC | Age: 46
End: 2021-11-24
Attending: INTERNAL MEDICINE
Payer: COMMERCIAL

## 2021-11-24 LAB
MAGNESIUM SERPL-MCNC: 1.5 MG/DL (ref 1.6–2.3)
PHOSPHATE SERPL-MCNC: 4.2 MG/DL (ref 2.5–4.5)
POTASSIUM BLD-SCNC: 4.5 MMOL/L (ref 3.4–5.3)

## 2021-11-24 PROCEDURE — 250N000013 HC RX MED GY IP 250 OP 250 PS 637: Performed by: HOSPITALIST

## 2021-11-24 PROCEDURE — 120N000002 HC R&B MED SURG/OB UMMC

## 2021-11-24 PROCEDURE — 84132 ASSAY OF SERUM POTASSIUM: CPT | Performed by: INTERNAL MEDICINE

## 2021-11-24 PROCEDURE — 99233 SBSQ HOSP IP/OBS HIGH 50: CPT | Performed by: STUDENT IN AN ORGANIZED HEALTH CARE EDUCATION/TRAINING PROGRAM

## 2021-11-24 PROCEDURE — 84100 ASSAY OF PHOSPHORUS: CPT | Performed by: INTERNAL MEDICINE

## 2021-11-24 PROCEDURE — 83735 ASSAY OF MAGNESIUM: CPT | Performed by: INTERNAL MEDICINE

## 2021-11-24 PROCEDURE — 97535 SELF CARE MNGMENT TRAINING: CPT | Mod: GP | Performed by: REHABILITATION PRACTITIONER

## 2021-11-24 PROCEDURE — 250N000011 HC RX IP 250 OP 636: Performed by: INTERNAL MEDICINE

## 2021-11-24 PROCEDURE — 250N000013 HC RX MED GY IP 250 OP 250 PS 637: Performed by: STUDENT IN AN ORGANIZED HEALTH CARE EDUCATION/TRAINING PROGRAM

## 2021-11-24 PROCEDURE — 36415 COLL VENOUS BLD VENIPUNCTURE: CPT | Performed by: INTERNAL MEDICINE

## 2021-11-24 PROCEDURE — 250N000013 HC RX MED GY IP 250 OP 250 PS 637: Performed by: INTERNAL MEDICINE

## 2021-11-24 PROCEDURE — 250N000011 HC RX IP 250 OP 636: Performed by: STUDENT IN AN ORGANIZED HEALTH CARE EDUCATION/TRAINING PROGRAM

## 2021-11-24 RX ORDER — MAGNESIUM SULFATE HEPTAHYDRATE 40 MG/ML
4 INJECTION, SOLUTION INTRAVENOUS ONCE
Status: COMPLETED | OUTPATIENT
Start: 2021-11-24 | End: 2021-11-24

## 2021-11-24 RX ADMIN — GABAPENTIN 100 MG: 100 CAPSULE ORAL at 19:58

## 2021-11-24 RX ADMIN — PANCRELIPASE 2 CAPSULE: 60000; 12000; 38000 CAPSULE, DELAYED RELEASE PELLETS ORAL at 06:25

## 2021-11-24 RX ADMIN — VITAM B12 100 MCG: 100 TAB at 09:22

## 2021-11-24 RX ADMIN — FUROSEMIDE 40 MG: 40 TABLET ORAL at 10:32

## 2021-11-24 RX ADMIN — GABAPENTIN 100 MG: 100 CAPSULE ORAL at 14:35

## 2021-11-24 RX ADMIN — PANCRELIPASE 2 CAPSULE: 24000; 76000; 120000 CAPSULE, DELAYED RELEASE PELLETS ORAL at 18:15

## 2021-11-24 RX ADMIN — FOLIC ACID 1 MG: 1 TABLET ORAL at 09:21

## 2021-11-24 RX ADMIN — MIRTAZAPINE 15 MG: 15 TABLET, FILM COATED ORAL at 21:38

## 2021-11-24 RX ADMIN — PANCRELIPASE 2 CAPSULE: 60000; 12000; 38000 CAPSULE, DELAYED RELEASE PELLETS ORAL at 14:38

## 2021-11-24 RX ADMIN — SODIUM BICARBONATE 325 MG: 325 TABLET ORAL at 21:38

## 2021-11-24 RX ADMIN — OXYCODONE HYDROCHLORIDE 5 MG: 5 TABLET ORAL at 10:30

## 2021-11-24 RX ADMIN — POLYETHYLENE GLYCOL 3350 17 G: 17 POWDER, FOR SOLUTION ORAL at 09:21

## 2021-11-24 RX ADMIN — ENOXAPARIN SODIUM 40 MG: 40 INJECTION SUBCUTANEOUS at 09:22

## 2021-11-24 RX ADMIN — MAGNESIUM SULFATE IN WATER 4 G: 40 INJECTION, SOLUTION INTRAVENOUS at 09:29

## 2021-11-24 RX ADMIN — OXYCODONE HYDROCHLORIDE 5 MG: 5 TABLET ORAL at 01:25

## 2021-11-24 RX ADMIN — OXYCODONE HYDROCHLORIDE 5 MG: 5 TABLET ORAL at 06:24

## 2021-11-24 RX ADMIN — PANTOPRAZOLE SODIUM 40 MG: 40 TABLET, DELAYED RELEASE ORAL at 09:24

## 2021-11-24 RX ADMIN — OXYCODONE HYDROCHLORIDE 5 MG: 5 TABLET ORAL at 14:34

## 2021-11-24 RX ADMIN — Medication 1 MG: at 21:38

## 2021-11-24 RX ADMIN — SPIRONOLACTONE 100 MG: 100 TABLET ORAL at 09:21

## 2021-11-24 RX ADMIN — PANCRELIPASE 2 CAPSULE: 60000; 12000; 38000 CAPSULE, DELAYED RELEASE PELLETS ORAL at 02:05

## 2021-11-24 RX ADMIN — SODIUM BICARBONATE 325 MG: 325 TABLET ORAL at 06:25

## 2021-11-24 RX ADMIN — ACETAMINOPHEN 650 MG: 325 TABLET, FILM COATED ORAL at 09:53

## 2021-11-24 RX ADMIN — SODIUM BICARBONATE 325 MG: 325 TABLET ORAL at 18:12

## 2021-11-24 RX ADMIN — SODIUM BICARBONATE 325 MG: 325 TABLET ORAL at 02:05

## 2021-11-24 RX ADMIN — Medication 1 PACKET: at 19:58

## 2021-11-24 RX ADMIN — SODIUM BICARBONATE 325 MG: 325 TABLET ORAL at 09:20

## 2021-11-24 RX ADMIN — PANCRELIPASE 2 CAPSULE: 60000; 12000; 38000 CAPSULE, DELAYED RELEASE PELLETS ORAL at 09:20

## 2021-11-24 RX ADMIN — ONDANSETRON 4 MG: 4 TABLET, ORALLY DISINTEGRATING ORAL at 14:35

## 2021-11-24 RX ADMIN — OXYCODONE HYDROCHLORIDE 5 MG: 5 TABLET ORAL at 18:23

## 2021-11-24 RX ADMIN — GABAPENTIN 100 MG: 100 CAPSULE ORAL at 09:25

## 2021-11-24 RX ADMIN — Medication 25 MG: at 01:25

## 2021-11-24 RX ADMIN — ESCITALOPRAM OXALATE 10 MG: 10 TABLET ORAL at 09:24

## 2021-11-24 RX ADMIN — THIAMINE HCL TAB 100 MG 100 MG: 100 TAB at 09:28

## 2021-11-24 RX ADMIN — ASCORBIC ACID, THIAMINE MONONITRATE,RIBOFLAVIN, NIACINAMIDE, PYRIDOXINE HYDROCHLORIDE, FOLIC ACID, CYANOCOBALAMIN, BIOTIN, CALCIUM PANTOTHENATE, 1 CAPSULE: 100; 1.5; 1.7; 20; 10; 1; 6000; 150000; 5 CAPSULE, LIQUID FILLED ORAL at 09:23

## 2021-11-24 ASSESSMENT — ACTIVITIES OF DAILY LIVING (ADL)
ADLS_ACUITY_SCORE: 14

## 2021-11-24 NOTE — PLAN OF CARE
Occupational Therapy Discharge Summary    Reason for therapy discharge:    All goals and outcomes met, no further needs identified.    Progress towards therapy goal(s). See goals on Care Plan in [facility] electronic health record for goal details.  Goals met    Therapy recommendation(s):    No further therapy is recommended.  Continue home exercise program.

## 2021-11-24 NOTE — PLAN OF CARE
"BP 90/53 (BP Location: Left arm)   Pulse 84   Temp 97.4  F (36.3  C) (Oral)   Resp 16   Ht 1.626 m (5' 4\")   Wt 70.7 kg (155 lb 14.4 oz)   SpO2 98%   BMI 26.76 kg/m        A&Ox4. VSS. c/o generalized abdominal pain of 7/10. Oxy 5 mg given and Tylenol with relief. Team weaned off TF between 1000 and 1800   to encourage po intake. Pt states \"does not like the food here.\" Zofran x1 for intermittent nausea. Delayed ordering food each time when cued. Ate 0% besides the nutrition bar.Pt has a nutrition Tube Feeding class on Sunday the 28th at the [facility]. Will require transport arrangement. A family member has to be present as per Dietician. Continue with POC.     "

## 2021-11-24 NOTE — PROGRESS NOTES
[facility]    Medicine Progress Note - Hospitalist Service, Gold 8       Date of Admission:  11/10/2021    Assessment & Plan           Aliyah Angeles is a 46 year old female admitted on 11/10/2021. She has a hx of DMII, etoh abuse, alcholic hepatitis, and Fitz-en-y gastric bypass who was transferred from the [facility] ARU for necrotizing pancreatitis and fevers.    Changes today:   - Tube feeds to be truncated to 16 hours with encouragement of oral intake   - No fevers overnight    - Continue to replete lytes   - Negative for SBP on paracentesis      Acute necrotizing pancreatitis, present on admission, improving  Complex recent hospitalization course recently, notably from 9/29 to 11/5 w/ alk hep, series of pneumonias and ARDS w/ CRRT ICU stay/intubation, improved and was at ARU. CT scan showing pancreatitis w/ area of necrosis surrounding splenic vein and artery   - Panc/Bili consulted, appreciate assistance   - Continue tube feeds per nutrition   - Calorie counts to attempt to wean tube feeds as she will need to be off TF to go to chem dep   - Oxycodone PRN for pain with eating, will continue to try and wean.     Malnutrition:     - Level of malnutrition: Moderate    - Based on: reduced intake, mild (or greater) subcutaneous fat loss, mild (or greater) muscle loss   - Started tube feeds 11/18 (initially low magnesium) after NJ placement as per GI recommendations for post-pyloric feeding given pain w/ eating and need for adequate nutrition to heal pancreatitis - was not meeting goals by PO calorie counts     Severe sepsis (HR, Temp) 2/2 suspected pneumonia with unknown organism, present on admission   Acute hypoxemic respiratory failure, resolved  based on 11/8 CT a/p w/ L>R bilateral GGO w/ new o2 requirement. s/p zosyn (11/9 - 11/11) deescalated to ciprofloxacin + flagyl (11/11 - 11/12), s/p Augmentin through 11/14 (completed 5 day course)    Suspect evolving  alcoholic liver disease  Severe alcohol use disorder  Has not had chance to f/u output w/ hepatology, only started needing paracenteses during last admission (10/2021), INR elevated ~1.4 on presentation. initially continued rifaximin given for presumed HE last admission, however after chart review that diagnosis was equivocal in the setting of many other medical problems, anticipating outpatient coverage difficulties and no e/o HE presently, discontinued as of 11/14, and will monitor for signs of HE development   - CD consult placed --> ideally will discharge to [facility] treatment [place], appreciate SW coordination  however with NJ feeding for at least 1-2 weeks in the hospital will have to wait for now, ultimately TF duration to be determined by GI   - Ascites management:      - Spironolactone (11/11 - )    - Lasix (11/13 - )    - Paracentesis PRN    Headache  Patient with overnight headaches on 11/20.  Improved on oxycodone.  No photophobia or phonophobia noted on the visit.  No vision changes. No nausea/vomiting. Physical exam without neurological sensation or motoric loss, no meningismus, and no vision changes.  Will continue to monitor and may require further analgesia if the headache does not continue to improve.    Suspect R lower back strain  -TTP paraspinals on 11/12, no focal spinal tenderness   - lido patch, heat pad    Anxiety/Depression   - cont home lexapro, remeron.       Diet: Adult Formula Drip Feeding: Continuous Osmolite 1.5; Nasojejunal; Goal Rate: 45; mL/hr; Medication - Feeding Tube Flush Frequency: At least 15-30 mL water before and after medication administration and with tube clogging; Amount to Send (Nutrition...  Advance Diet as Tolerated: Low Fiber  Calorie Counts    DVT Prophylaxis: Enoxaparin (Lovenox) SQ  Rice Catheter: Not present  Central Lines: None  Code Status: Full Code      Disposition Plan   Expected discharge: 12/02/2021   recommended to prior living arrangement once  adequate pain management/ tolerating PO medications.     The patient's care was discussed with the Patient and GI Consultant.    Rosmery Collazo MD  Hospitalist Service, [address]  Securely message with the Vocera Web Console (learn more here)  Text page via [facility] Paging/Directory    Please see sign in/sign out for up to date coverage information    Clinically Significant Risk Factors Present on Admission                ______________________________________________________________________    Interval History   Doing okay overall. Is hopeful that being able to eat will help her headaches. Abdomen feeling better after paracentesis.     Data reviewed today: I reviewed all medications, new labs and imaging results over the last 24 hours. Labs and Imaging reviewed in Epic, pertinent discussed in A&P.       Physical Exam   Vital Signs: Temp: 97.1  F (36.2  C) Temp src: Axillary BP: 103/62 Pulse: 104   Resp: 18 SpO2: 98 % O2 Device: None (Room air)    Weight: 155 lbs 14.4 oz     Constitutional: Lying back in bed, NAD  Cardiovascular: regular, without murmurs or gallops  Pulmonary/Chest: CTAB, no respiratory distress  GI: Soft,  Mildly distended, minimally tender, no rebound tenderness or guarding   Skin: Skin is warm and dry  Neurological: Alert and oriented x4. B/L upper and lower extremity strength and sensation preserved.     Data   Recent Labs   Lab 11/24/21  0454 11/23/21  0557 11/22/21  0505 11/21/21  2212 11/21/21  0808 11/20/21  2048 11/20/21  0619 11/19/21  1207 11/19/21  0609   WBC  --  9.4  --   --  10.5  --   --   --  7.3   HGB  --  8.0*  --   --  9.1*  --   --   --  9.3*   MCV  --  98  --   --  95  --   --   --  97   PLT  --  206  --   --  204  --   --   --  276   NA  --  135 136  --  136  --  139  --  138   POTASSIUM 4.5 4.4 4.4  --  4.5  --  4.0  --  3.9   CHLORIDE  --  104 104  --  105  --  109  --  107   CO2  --  25 26  --  25  --  23  --  24   BUN   --  11 10  --  8  --  6*  --  6*   CR  --  0.70 0.68  --  0.66  --  0.69  --  0.66   ANIONGAP  --  6 6  --  6  --  7  --  7   STEFFANY  --  8.0* 7.7*  --  7.2*  --  7.8*  --  7.4*   GLC  --  132* 134* 125* 133*   < > 134*   < > 123*   ALBUMIN  --   --  1.4*  --   --   --  1.5*  --   --    PROTTOTAL  --   --  5.8*  --   --   --  6.0*  --   --    BILITOTAL  --   --  1.1  --   --   --  1.1  --   --    ALKPHOS  --   --  233*  --   --   --  249*  --   --    ALT  --   --  15  --   --   --  14  --   --    AST  --   --  34  --   --   --  38  --   --     < > = values in this interval not displayed.     Recent Results (from the past 24 hour(s))   POC US Guide for Paracentesis    Impression    POCUS Abdomen    Indication: Evaluate for ascites for safe site for paracentesis    Findings:  LLQ:Catheter tip was shown at peritoneum before entry and final position within fluid pocket of abdominal cavity.        Medications     dextrose       dextrose         sodium bicarbonate  325 mg Per Feeding Tube Q4H    And     amylase-lipase-protease  2 capsule Per Feeding Tube Q4H     vitamin B-12  100 mcg Oral or Feeding Tube Daily     enoxaparin ANTICOAGULANT  40 mg Subcutaneous Q24H     escitalopram  10 mg Oral Daily     folic acid  1 mg Oral Daily     furosemide  40 mg Oral Daily     gabapentin  100 mg Oral TID     lidocaine  1 patch Transdermal Q24h    And     lidocaine   Transdermal Q8H     mirtazapine  15 mg Oral At Bedtime     multivitamin RENAL  1 capsule Oral Daily     pantoprazole  40 mg Oral QAM AC     polyethylene glycol  17 g Oral Daily     protein modular  1 packet Per Feeding Tube BID     sennosides  1 tablet Oral At Bedtime     sodium chloride (PF)  3 mL Intracatheter Q8H     spironolactone  100 mg Oral or Feeding Tube Daily     thiamine  100 mg Oral Daily

## 2021-11-24 NOTE — PLAN OF CARE
[id number]    VSS. Complains of abdominal pain, utilized PRN oxy x1 and PRN Tylenol x1.       TF running at 45 ml/hr. Encourage PO intake, calorie counts starting at 0000. Mag replaced on days, recheck in am.

## 2021-11-24 NOTE — PLAN OF CARE
"1332-9000:     /62 (BP Location: Left arm)   Pulse 104   Temp 97.1  F (36.2  C) (Axillary)   Resp 18   Ht 1.626 m (5' 4\")   Wt 70.7 kg (155 lb 14.4 oz)   SpO2 98%   BMI 26.76 kg/m        NEURO: Intact, A&O x4.      RESPIRATORY:  WDL. Pt denies having any SOB.     CARDIO: WDL.      GI/:  WDL. Last BM: 11/23/21. Pt denies having any N/V. Tube feeding through NJ @ 45ml/hr.  Started on calorie counts at 0000.      SKIN: WDL.       ACTIVITY: Independent and up at reina      PAIN: Pt continues to report pain in her abdomen, PRN oxycodone given x2 this shift, medication was somewhat effective.      LDA: PIV (left) - saline locked.          "

## 2021-11-24 NOTE — PROGRESS NOTES
CLINICAL NUTRITION SERVICES - BRIEF NOTE     Nutrition Prescription    RECOMMENDATIONS FOR MDs/PROVIDERS TO ORDER:  none    Malnutrition Status:    Severe malnutrition in the context of acute illness    Recommendations already ordered by Registered Dietitian (RD):  Creon 24,000 2 tabs TID with meals  Cycle feeds: Osmolite 1.5 faizan @ 45 ml/hr x 16 hours (6p-10a) + 2 prosource will provide 1160 kcals (19 kcal/kg), 67gm protein (1.1gm/kg) for 77% kcal needs, 94 % low end protein needs   Resume ONS: vanilla ensure max @ 10a, special K bar @ 2p    Future/Additional Recommendations:  Monitor progress with po intake (calorie counts)     See full RD reassessment in chart 11/23    EVALUATION OF THE PROGRESS TOWARD GOALS   Diet: Low fiber with calorie counts 11/24-11/26  Nutrition Support via NJT: Osmolite 1.5 Faizan @ goal of 45ml/hr (1080ml/day)  + 2 ProSource will provide 1700 kcal (28 kcal/kg), 89 g PRO (1.5 g/kg PRO), 822 ml free H20, 219 g CHO, and 0 g fiber daily.      NEW FINDINGS   Per provider, plan to cycle feeds to allow for po / appetite during the day, hopeful to remove NJT so that patient can transfer to chem dep.  3.5L removed via paracentesis 11/23  Metallic taste and abd pain persist and may interfere with appetite    INTERVENTIONS  Cycled feeds x 16 hours, 6pm-10am, continue creon dose as ordered q4 hours with TF  Add creon with meals  Changes discussed with provider and RN     Monitoring/Evaluation  Progress toward goals will be monitored and evaluated per protocol.     Esme Lea RD, LD  7D pager [phone]

## 2021-11-25 ENCOUNTER — APPOINTMENT (OUTPATIENT)
Dept: PHYSICAL THERAPY | Facility: CLINIC | Age: 46
End: 2021-11-25
Attending: INTERNAL MEDICINE
Payer: COMMERCIAL

## 2021-11-25 LAB
ANION GAP SERPL CALCULATED.3IONS-SCNC: 8 MMOL/L (ref 3–14)
BUN SERPL-MCNC: 11 MG/DL (ref 7–30)
CALCIUM SERPL-MCNC: 8.4 MG/DL (ref 8.5–10.1)
CHLORIDE BLD-SCNC: 103 MMOL/L (ref 94–109)
CO2 SERPL-SCNC: 25 MMOL/L (ref 20–32)
CREAT SERPL-MCNC: 0.68 MG/DL (ref 0.52–1.04)
ERYTHROCYTE [DISTWIDTH] IN BLOOD BY AUTOMATED COUNT: 16.1 % (ref 10–15)
GFR SERPL CREATININE-BSD FRML MDRD: >90 ML/MIN/1.73M2
GLUCOSE BLD-MCNC: 144 MG/DL (ref 70–99)
HCT VFR BLD AUTO: 26 % (ref 35–47)
HGB BLD-MCNC: 7.8 G/DL (ref 11.7–15.7)
MAGNESIUM SERPL-MCNC: 2.1 MG/DL (ref 1.6–2.3)
MCH RBC QN AUTO: 29.8 PG (ref 26.5–33)
MCHC RBC AUTO-ENTMCNC: 30 G/DL (ref 31.5–36.5)
MCV RBC AUTO: 99 FL (ref 78–100)
PHOSPHATE SERPL-MCNC: 4.6 MG/DL (ref 2.5–4.5)
PLATELET # BLD AUTO: 244 10E3/UL (ref 150–450)
POTASSIUM BLD-SCNC: 4.5 MMOL/L (ref 3.4–5.3)
RBC # BLD AUTO: 2.62 10E6/UL (ref 3.8–5.2)
SODIUM SERPL-SCNC: 136 MMOL/L (ref 133–144)
WBC # BLD AUTO: 7.1 10E3/UL (ref 4–11)

## 2021-11-25 PROCEDURE — 36415 COLL VENOUS BLD VENIPUNCTURE: CPT | Performed by: STUDENT IN AN ORGANIZED HEALTH CARE EDUCATION/TRAINING PROGRAM

## 2021-11-25 PROCEDURE — 85027 COMPLETE CBC AUTOMATED: CPT | Performed by: STUDENT IN AN ORGANIZED HEALTH CARE EDUCATION/TRAINING PROGRAM

## 2021-11-25 PROCEDURE — 250N000013 HC RX MED GY IP 250 OP 250 PS 637: Performed by: INTERNAL MEDICINE

## 2021-11-25 PROCEDURE — 80048 BASIC METABOLIC PNL TOTAL CA: CPT | Performed by: STUDENT IN AN ORGANIZED HEALTH CARE EDUCATION/TRAINING PROGRAM

## 2021-11-25 PROCEDURE — 97535 SELF CARE MNGMENT TRAINING: CPT | Mod: GP | Performed by: REHABILITATION PRACTITIONER

## 2021-11-25 PROCEDURE — 99232 SBSQ HOSP IP/OBS MODERATE 35: CPT | Performed by: STUDENT IN AN ORGANIZED HEALTH CARE EDUCATION/TRAINING PROGRAM

## 2021-11-25 PROCEDURE — 120N000002 HC R&B MED SURG/OB UMMC

## 2021-11-25 PROCEDURE — 250N000011 HC RX IP 250 OP 636: Performed by: INTERNAL MEDICINE

## 2021-11-25 PROCEDURE — 83735 ASSAY OF MAGNESIUM: CPT | Performed by: STUDENT IN AN ORGANIZED HEALTH CARE EDUCATION/TRAINING PROGRAM

## 2021-11-25 PROCEDURE — 84100 ASSAY OF PHOSPHORUS: CPT | Performed by: STUDENT IN AN ORGANIZED HEALTH CARE EDUCATION/TRAINING PROGRAM

## 2021-11-25 PROCEDURE — 250N000013 HC RX MED GY IP 250 OP 250 PS 637: Performed by: STUDENT IN AN ORGANIZED HEALTH CARE EDUCATION/TRAINING PROGRAM

## 2021-11-25 PROCEDURE — 99207 PR CDG-MDM COMPONENT: MEETS MODERATE - DOWN CODED: CPT | Performed by: STUDENT IN AN ORGANIZED HEALTH CARE EDUCATION/TRAINING PROGRAM

## 2021-11-25 PROCEDURE — 250N000013 HC RX MED GY IP 250 OP 250 PS 637: Performed by: HOSPITALIST

## 2021-11-25 RX ADMIN — PANCRELIPASE 2 CAPSULE: 60000; 12000; 38000 CAPSULE, DELAYED RELEASE PELLETS ORAL at 02:30

## 2021-11-25 RX ADMIN — ENOXAPARIN SODIUM 40 MG: 40 INJECTION SUBCUTANEOUS at 08:50

## 2021-11-25 RX ADMIN — GABAPENTIN 100 MG: 100 CAPSULE ORAL at 20:09

## 2021-11-25 RX ADMIN — FUROSEMIDE 40 MG: 40 TABLET ORAL at 08:47

## 2021-11-25 RX ADMIN — SODIUM BICARBONATE 325 MG: 325 TABLET ORAL at 02:30

## 2021-11-25 RX ADMIN — OXYCODONE HYDROCHLORIDE 5 MG: 5 TABLET ORAL at 05:09

## 2021-11-25 RX ADMIN — SPIRONOLACTONE 100 MG: 100 TABLET ORAL at 09:02

## 2021-11-25 RX ADMIN — Medication 25 MG: at 12:14

## 2021-11-25 RX ADMIN — SODIUM BICARBONATE 325 MG: 325 TABLET ORAL at 21:16

## 2021-11-25 RX ADMIN — Medication 1 MG: at 21:16

## 2021-11-25 RX ADMIN — PANCRELIPASE 2 CAPSULE: 24000; 76000; 120000 CAPSULE, DELAYED RELEASE PELLETS ORAL at 09:02

## 2021-11-25 RX ADMIN — ACETAMINOPHEN 650 MG: 325 TABLET, FILM COATED ORAL at 21:16

## 2021-11-25 RX ADMIN — SODIUM BICARBONATE 325 MG: 325 TABLET ORAL at 19:00

## 2021-11-25 RX ADMIN — PANCRELIPASE 2 CAPSULE: 24000; 76000; 120000 CAPSULE, DELAYED RELEASE PELLETS ORAL at 12:14

## 2021-11-25 RX ADMIN — ASCORBIC ACID, THIAMINE MONONITRATE,RIBOFLAVIN, NIACINAMIDE, PYRIDOXINE HYDROCHLORIDE, FOLIC ACID, CYANOCOBALAMIN, BIOTIN, CALCIUM PANTOTHENATE, 1 CAPSULE: 100; 1.5; 1.7; 20; 10; 1; 6000; 150000; 5 CAPSULE, LIQUID FILLED ORAL at 08:47

## 2021-11-25 RX ADMIN — Medication 25 MG: at 00:41

## 2021-11-25 RX ADMIN — VITAM B12 100 MCG: 100 TAB at 08:47

## 2021-11-25 RX ADMIN — PANTOPRAZOLE SODIUM 40 MG: 40 TABLET, DELAYED RELEASE ORAL at 08:48

## 2021-11-25 RX ADMIN — THIAMINE HCL TAB 100 MG 100 MG: 100 TAB at 08:48

## 2021-11-25 RX ADMIN — GABAPENTIN 100 MG: 100 CAPSULE ORAL at 08:48

## 2021-11-25 RX ADMIN — SODIUM BICARBONATE 325 MG: 325 TABLET ORAL at 06:31

## 2021-11-25 RX ADMIN — PANCRELIPASE 2 CAPSULE: 60000; 12000; 38000 CAPSULE, DELAYED RELEASE PELLETS ORAL at 19:00

## 2021-11-25 RX ADMIN — FOLIC ACID 1 MG: 1 TABLET ORAL at 08:48

## 2021-11-25 RX ADMIN — METHOCARBAMOL 500 MG: 500 TABLET ORAL at 21:16

## 2021-11-25 RX ADMIN — Medication 1 PACKET: at 09:01

## 2021-11-25 RX ADMIN — Medication 1 PACKET: at 20:13

## 2021-11-25 RX ADMIN — ESCITALOPRAM OXALATE 10 MG: 10 TABLET ORAL at 08:47

## 2021-11-25 RX ADMIN — PANCRELIPASE 2 CAPSULE: 60000; 12000; 38000 CAPSULE, DELAYED RELEASE PELLETS ORAL at 06:31

## 2021-11-25 RX ADMIN — PANCRELIPASE 2 CAPSULE: 24000; 76000; 120000 CAPSULE, DELAYED RELEASE PELLETS ORAL at 19:00

## 2021-11-25 RX ADMIN — OXYCODONE HYDROCHLORIDE 5 MG: 5 TABLET ORAL at 09:19

## 2021-11-25 RX ADMIN — OXYCODONE HYDROCHLORIDE 5 MG: 5 TABLET ORAL at 15:58

## 2021-11-25 RX ADMIN — OXYCODONE HYDROCHLORIDE 5 MG: 5 TABLET ORAL at 00:41

## 2021-11-25 RX ADMIN — ACETAMINOPHEN 650 MG: 325 TABLET, FILM COATED ORAL at 08:47

## 2021-11-25 RX ADMIN — POLYETHYLENE GLYCOL 3350 17 G: 17 POWDER, FOR SOLUTION ORAL at 08:52

## 2021-11-25 RX ADMIN — MIRTAZAPINE 15 MG: 15 TABLET, FILM COATED ORAL at 21:16

## 2021-11-25 ASSESSMENT — ACTIVITIES OF DAILY LIVING (ADL)
ADLS_ACUITY_SCORE: 14

## 2021-11-25 ASSESSMENT — MIFFLIN-ST. JEOR: SCORE: 1287.69

## 2021-11-25 NOTE — PROGRESS NOTES
Calorie Count  Intake recorded for: 11/24  Total Kcals: 0 Total Protein: 0g  Kcals from Hospital Food: 0   Protein: 0g  Kcals from Outside Food (average):0 Protein: 0g  # Meals Recorded: 2 meals ordered from kitchen, no intake recorded  # Supplements Recorded: no intake recorded

## 2021-11-25 NOTE — PLAN OF CARE
"[id number]:     BP 91/50 (BP Location: Left arm)   Pulse 86   Temp 98.2  F (36.8  C) (Oral)   Resp 16   Ht 1.626 m (5' 4\")   Wt 70.7 kg (155 lb 14.4 oz)   SpO2 98%   BMI 26.76 kg/m      NEURO:  Intact, A&O x4.      RESPIRATORY:  WDL. No cough noted. Pt denies having any N/V.     CARDIO: WDL.      GI/:  WDL. Pt voiding spontaneously with AUOP. Last BM: 11/23/21. NJ: tube feedings @ 45ml/hr. Pt denies having any N/V.      SKIN: WDL. Pt reports minimal itching, prn benadryl given x1 with some effectiveness.      ACTIVITY: Independent and up ad reina     PAIN: Pt continuing to have right sided abdominal pain, prn oxycodone given x2 with some effectiveness.      LDA: PIV (left) - saline locked. NJ - tube feedings @ 45ml/hr x16 hours.          "

## 2021-11-25 NOTE — PLAN OF CARE
Pt's vital signs stable except soft BPs. A&O x4. Neuros intact, baseline neuropathy to L foot. Provided 5mg oxycodone x1 and tylenol this AM for complain of abdominal pain and headache. Voiding spont. Last BM 11/24. PIV SL. Up ad reina. On low fiber diet, on day 2 of oksana count. Continue cyclic TF via NG tube (6PM - 10AM) per MD notes.  Cont to monitor and with POC.      New this shift: Reviewed, and clarified to pt that the team has made oxycodone available 4 times a day and not every 4 hours. Pt stated she knows this but needs medication for pain. She has received 3 oxycodone thus far today.

## 2021-11-25 NOTE — PLAN OF CARE
[id number]:   Pt is alert and oriented x 4. Vital signs stable, afebrile.  C/o abdominal pain of 6/10. PRN oxycodone given x1 with some relief. Pt tube feeds was re-started at 1800 and it's infusing at 45mL/hr. Ordered dinner, but did not eat any. Voiding adequally, had a bowel movement this shift. Pt has nutrition class concerning her Tube Feeding on Sunday 11/ 28/21, at the [facility]. Continue with POC.

## 2021-11-25 NOTE — PROGRESS NOTES
[facility]    Medicine Progress Note - Hospitalist Service, Gold 8       Date of Admission:  11/10/2021    Assessment & Plan           Aliyah Angeles is a 46 year old female admitted on 11/10/2021. She has a hx of DMII, etoh abuse, alcholic hepatitis, and Fitz-en-y gastric bypass who was transferred from the [facility] ARU for necrotizing pancreatitis and fevers.    Changes today:   - Is feeling hungry today, is hopeful to be able to eat more today - family is bringing food    - Continue cycled TFs   - Abdominal pain improving     Acute necrotizing pancreatitis, present on admission, improving  Complex recent hospitalization course recently, notably from 9/29 to 11/5 w/ alk hep, series of pneumonias and ARDS w/ CRRT ICU stay/intubation, improved and was at ARU. CT scan showing pancreatitis w/ area of necrosis surrounding splenic vein and artery   - Panc/Bili consulted, appreciate assistance   - Continue tube feeds per nutrition   - Calorie counts to attempt to wean tube feeds as she will need to be off TF to go to chem dep   - Oxycodone PRN for pain with eating, will continue to try and wean.     Malnutrition:     - Level of malnutrition: Moderate    - Based on: reduced intake, mild (or greater) subcutaneous fat loss, mild (or greater) muscle loss   - Started tube feeds 11/18 (initially low magnesium) after NJ placement as per GI recommendations for post-pyloric feeding given pain w/ eating and need for adequate nutrition to heal pancreatitis - was not meeting goals by PO calorie counts     Severe sepsis (HR, Temp) 2/2 suspected pneumonia with unknown organism, present on admission   Acute hypoxemic respiratory failure, resolved  based on 11/8 CT a/p w/ L>R bilateral GGO w/ new o2 requirement. s/p zosyn (11/9 - 11/11) deescalated to ciprofloxacin + flagyl (11/11 - 11/12), s/p Augmentin through 11/14 (completed 5 day course)    Suspect evolving alcoholic liver  disease  Severe alcohol use disorder  Has not had chance to f/u output w/ hepatology, only started needing paracenteses during last admission (10/2021), INR elevated ~1.4 on presentation. initially continued rifaximin given for presumed HE last admission, however after chart review that diagnosis was equivocal in the setting of many other medical problems, anticipating outpatient coverage difficulties and no e/o HE presently, discontinued as of 11/14, and will monitor for signs of HE development   - CD consult placed --> ideally will discharge to residential treatment center, appreciate SW coordination  however with NJ feeding for at least 1-2 weeks in the hospital will have to wait for now, ultimately TF duration to be determined by GI   - Ascites management:      - Spironolactone (11/11 - )    - Lasix (11/13 - )    - Paracentesis PRN    Headache  Patient with overnight headaches on 11/20.  Improved on oxycodone.  No photophobia or phonophobia noted on the visit.  No vision changes. No nausea/vomiting. Physical exam without neurological sensation or motoric loss, no meningismus, and no vision changes.  Will continue to monitor and may require further analgesia if the headache does not continue to improve.    Suspect R lower back strain  -TTP paraspinals on 11/12, no focal spinal tenderness   - lido patch, heat pad    Anxiety/Depression   - cont home lexapro, remeron.       Diet: Advance Diet as Tolerated: Low Fiber  Calorie Counts  Snacks/Supplements Adult: Ensure Max Protein (bariatric); Between Meals  Snacks/Supplements Adult: Other; Special K bar; Between Meals    DVT Prophylaxis: Enoxaparin (Lovenox) SQ  Rice Catheter: Not present  Central Lines: None  Code Status: Full Code      Disposition Plan   Expected discharge: 11/29/2021   recommended to prior living arrangement once adequate pain management/ tolerating PO medications.     The patient's care was discussed with the Patient and GI Consultant.    Rosmery NEUMANN  MD Zay  Hospitalist Service, [address]  Securely message with the [employer] Web Console (learn more here)  Text page via [facility] Paging/Directory    Please see sign in/sign out for up to date coverage information    Clinically Significant Risk Factors Present on Admission                ______________________________________________________________________    Interval History   Feeling better today. Denies chest pain or SOB> Still having abdominal pain - it is worse with eating but she is hopeful it will keep improving.     Data reviewed today: I reviewed all medications, new labs and imaging results over the last 24 hours. Labs and Imaging reviewed in Epic, pertinent discussed in A&P.       Physical Exam   Vital Signs: Temp: 98.2  F (36.8  C) Temp src: Oral BP: 91/50 Pulse: 86   Resp: 16 SpO2: 98 % O2 Device: None (Room air)    Weight: 155 lbs 14.4 oz     Constitutional: Lying in bed, NAD  Cardiovascular: regular, without murmurs or gallops  Pulmonary/Chest: CTAB, no respiratory distress  GI: Soft,  Mildly distended, tender diffusely, no rebound tenderness or guarding   Skin: Skin is warm and dry  Neurological: Alert and oriented x4. B/L upper and lower extremity strength and sensation preserved.     Data   Recent Labs   Lab 11/25/21  0522 11/24/21  0454 11/23/21  0557 11/22/21  0505 11/21/21  2212 11/21/21  0808 11/20/21  2048 11/20/21  0619   WBC 7.1  --  9.4  --   --  10.5  --   --    HGB 7.8*  --  8.0*  --   --  9.1*  --   --    MCV 99  --  98  --   --  95  --   --      --  206  --   --  204  --   --      --  135 136  --  136  --  139   POTASSIUM 4.5 4.5 4.4 4.4  --  4.5  --  4.0   CHLORIDE 103  --  104 104  --  105  --  109   CO2 25  --  25 26  --  25  --  23   BUN 11  --  11 10  --  8  --  6*   CR 0.68  --  0.70 0.68  --  0.66  --  0.69   ANIONGAP 8  --  6 6  --  6  --  7   STEFFANY 8.4*  --  8.0* 7.7*  --  7.2*  --  7.8*   *  --  132* 134*    < > 133*   < > 134*   ALBUMIN  --   --   --  1.4*  --   --   --  1.5*   PROTTOTAL  --   --   --  5.8*  --   --   --  6.0*   BILITOTAL  --   --   --  1.1  --   --   --  1.1   ALKPHOS  --   --   --  233*  --   --   --  249*   ALT  --   --   --  15  --   --   --  14   AST  --   --   --  34  --   --   --  38    < > = values in this interval not displayed.     No results found for this or any previous visit (from the past 24 hour(s)).  Medications     dextrose       dextrose         sodium bicarbonate  325 mg Per Feeding Tube Q4H    And     amylase-lipase-protease  2 capsule Per Feeding Tube Q4H     amylase-lipase-protease  2 capsule Oral TID w/meals     vitamin B-12  100 mcg Oral or Feeding Tube Daily     enoxaparin ANTICOAGULANT  40 mg Subcutaneous Q24H     escitalopram  10 mg Oral Daily     folic acid  1 mg Oral Daily     furosemide  40 mg Oral Daily     gabapentin  100 mg Oral TID     lidocaine  1 patch Transdermal Q24h    And     lidocaine   Transdermal Q8H     mirtazapine  15 mg Oral At Bedtime     multivitamin RENAL  1 capsule Oral Daily     pantoprazole  40 mg Oral QAM AC     polyethylene glycol  17 g Oral Daily     protein modular  1 packet Per Feeding Tube BID     sennosides  1 tablet Oral At Bedtime     sodium chloride (PF)  3 mL Intracatheter Q8H     spironolactone  100 mg Oral or Feeding Tube Daily     thiamine  100 mg Oral Daily

## 2021-11-26 ENCOUNTER — APPOINTMENT (OUTPATIENT)
Dept: PHYSICAL THERAPY | Facility: CLINIC | Age: 46
End: 2021-11-26
Attending: INTERNAL MEDICINE
Payer: COMMERCIAL

## 2021-11-26 LAB
ANION GAP SERPL CALCULATED.3IONS-SCNC: 7 MMOL/L (ref 3–14)
BUN SERPL-MCNC: 14 MG/DL (ref 7–30)
CALCIUM SERPL-MCNC: 8.5 MG/DL (ref 8.5–10.1)
CHLORIDE BLD-SCNC: 104 MMOL/L (ref 94–109)
CO2 SERPL-SCNC: 26 MMOL/L (ref 20–32)
CREAT SERPL-MCNC: 0.67 MG/DL (ref 0.52–1.04)
ERYTHROCYTE [DISTWIDTH] IN BLOOD BY AUTOMATED COUNT: 15.9 % (ref 10–15)
GFR SERPL CREATININE-BSD FRML MDRD: >90 ML/MIN/1.73M2
GLUCOSE BLD-MCNC: 131 MG/DL (ref 70–99)
HCT VFR BLD AUTO: 25.3 % (ref 35–47)
HGB BLD-MCNC: 7.8 G/DL (ref 11.7–15.7)
MAGNESIUM SERPL-MCNC: 1.8 MG/DL (ref 1.6–2.3)
MCH RBC QN AUTO: 29.9 PG (ref 26.5–33)
MCHC RBC AUTO-ENTMCNC: 30.8 G/DL (ref 31.5–36.5)
MCV RBC AUTO: 97 FL (ref 78–100)
PHOSPHATE SERPL-MCNC: 5.1 MG/DL (ref 2.5–4.5)
PLATELET # BLD AUTO: 252 10E3/UL (ref 150–450)
POTASSIUM BLD-SCNC: 4.3 MMOL/L (ref 3.4–5.3)
RBC # BLD AUTO: 2.61 10E6/UL (ref 3.8–5.2)
SODIUM SERPL-SCNC: 137 MMOL/L (ref 133–144)
WBC # BLD AUTO: 6.7 10E3/UL (ref 4–11)

## 2021-11-26 PROCEDURE — 80048 BASIC METABOLIC PNL TOTAL CA: CPT | Performed by: STUDENT IN AN ORGANIZED HEALTH CARE EDUCATION/TRAINING PROGRAM

## 2021-11-26 PROCEDURE — 97530 THERAPEUTIC ACTIVITIES: CPT | Mod: GP

## 2021-11-26 PROCEDURE — 120N000002 HC R&B MED SURG/OB UMMC

## 2021-11-26 PROCEDURE — 99232 SBSQ HOSP IP/OBS MODERATE 35: CPT | Performed by: STUDENT IN AN ORGANIZED HEALTH CARE EDUCATION/TRAINING PROGRAM

## 2021-11-26 PROCEDURE — 83735 ASSAY OF MAGNESIUM: CPT | Performed by: INTERNAL MEDICINE

## 2021-11-26 PROCEDURE — 250N000013 HC RX MED GY IP 250 OP 250 PS 637: Performed by: INTERNAL MEDICINE

## 2021-11-26 PROCEDURE — 250N000011 HC RX IP 250 OP 636: Performed by: INTERNAL MEDICINE

## 2021-11-26 PROCEDURE — 85027 COMPLETE CBC AUTOMATED: CPT | Performed by: STUDENT IN AN ORGANIZED HEALTH CARE EDUCATION/TRAINING PROGRAM

## 2021-11-26 PROCEDURE — 250N000013 HC RX MED GY IP 250 OP 250 PS 637: Performed by: HOSPITALIST

## 2021-11-26 PROCEDURE — 99207 PR CDG-MDM COMPONENT: MEETS MODERATE - DOWN CODED: CPT | Performed by: STUDENT IN AN ORGANIZED HEALTH CARE EDUCATION/TRAINING PROGRAM

## 2021-11-26 PROCEDURE — 250N000013 HC RX MED GY IP 250 OP 250 PS 637: Performed by: STUDENT IN AN ORGANIZED HEALTH CARE EDUCATION/TRAINING PROGRAM

## 2021-11-26 PROCEDURE — 36415 COLL VENOUS BLD VENIPUNCTURE: CPT | Performed by: STUDENT IN AN ORGANIZED HEALTH CARE EDUCATION/TRAINING PROGRAM

## 2021-11-26 PROCEDURE — 999N000128 HC STATISTIC PERIPHERAL IV START W/O US GUIDANCE

## 2021-11-26 PROCEDURE — 84100 ASSAY OF PHOSPHORUS: CPT | Performed by: INTERNAL MEDICINE

## 2021-11-26 RX ADMIN — PANCRELIPASE 2 CAPSULE: 60000; 12000; 38000 CAPSULE, DELAYED RELEASE PELLETS ORAL at 02:36

## 2021-11-26 RX ADMIN — Medication 1 PACKET: at 19:57

## 2021-11-26 RX ADMIN — Medication 25 MG: at 00:46

## 2021-11-26 RX ADMIN — ESCITALOPRAM OXALATE 10 MG: 10 TABLET ORAL at 09:27

## 2021-11-26 RX ADMIN — GABAPENTIN 100 MG: 100 CAPSULE ORAL at 09:26

## 2021-11-26 RX ADMIN — ASCORBIC ACID, THIAMINE MONONITRATE,RIBOFLAVIN, NIACINAMIDE, PYRIDOXINE HYDROCHLORIDE, FOLIC ACID, CYANOCOBALAMIN, BIOTIN, CALCIUM PANTOTHENATE, 1 CAPSULE: 100; 1.5; 1.7; 20; 10; 1; 6000; 150000; 5 CAPSULE, LIQUID FILLED ORAL at 09:26

## 2021-11-26 RX ADMIN — PANCRELIPASE 2 CAPSULE: 60000; 12000; 38000 CAPSULE, DELAYED RELEASE PELLETS ORAL at 15:06

## 2021-11-26 RX ADMIN — OXYCODONE HYDROCHLORIDE 5 MG: 5 TABLET ORAL at 00:46

## 2021-11-26 RX ADMIN — OXYCODONE HYDROCHLORIDE 5 MG: 5 TABLET ORAL at 17:02

## 2021-11-26 RX ADMIN — MIRTAZAPINE 15 MG: 15 TABLET, FILM COATED ORAL at 21:06

## 2021-11-26 RX ADMIN — PANCRELIPASE 2 CAPSULE: 60000; 12000; 38000 CAPSULE, DELAYED RELEASE PELLETS ORAL at 21:06

## 2021-11-26 RX ADMIN — GABAPENTIN 100 MG: 100 CAPSULE ORAL at 19:57

## 2021-11-26 RX ADMIN — FUROSEMIDE 40 MG: 40 TABLET ORAL at 09:27

## 2021-11-26 RX ADMIN — VITAM B12 100 MCG: 100 TAB at 09:27

## 2021-11-26 RX ADMIN — SODIUM BICARBONATE 325 MG: 325 TABLET ORAL at 06:14

## 2021-11-26 RX ADMIN — SODIUM BICARBONATE 325 MG: 325 TABLET ORAL at 02:35

## 2021-11-26 RX ADMIN — Medication 25 MG: at 15:02

## 2021-11-26 RX ADMIN — BENZOCAINE AND MENTHOL 1 LOZENGE: 15; 3.6 LOZENGE ORAL at 15:02

## 2021-11-26 RX ADMIN — THIAMINE HCL TAB 100 MG 100 MG: 100 TAB at 09:27

## 2021-11-26 RX ADMIN — ACETAMINOPHEN 650 MG: 325 TABLET, FILM COATED ORAL at 15:02

## 2021-11-26 RX ADMIN — GABAPENTIN 100 MG: 100 CAPSULE ORAL at 15:02

## 2021-11-26 RX ADMIN — ENOXAPARIN SODIUM 40 MG: 40 INJECTION SUBCUTANEOUS at 09:28

## 2021-11-26 RX ADMIN — Medication 1 MG: at 21:06

## 2021-11-26 RX ADMIN — OXYCODONE HYDROCHLORIDE 5 MG: 5 TABLET ORAL at 20:05

## 2021-11-26 RX ADMIN — PANCRELIPASE 2 CAPSULE: 24000; 76000; 120000 CAPSULE, DELAYED RELEASE PELLETS ORAL at 18:02

## 2021-11-26 RX ADMIN — SODIUM BICARBONATE 325 MG: 325 TABLET ORAL at 15:02

## 2021-11-26 RX ADMIN — FOLIC ACID 1 MG: 1 TABLET ORAL at 09:28

## 2021-11-26 RX ADMIN — METHOCARBAMOL 500 MG: 500 TABLET ORAL at 21:06

## 2021-11-26 RX ADMIN — SENNOSIDES 1 TABLET: 8.6 TABLET ORAL at 21:06

## 2021-11-26 RX ADMIN — PANCRELIPASE 2 CAPSULE: 60000; 12000; 38000 CAPSULE, DELAYED RELEASE PELLETS ORAL at 06:14

## 2021-11-26 RX ADMIN — SODIUM BICARBONATE 325 MG: 325 TABLET ORAL at 09:26

## 2021-11-26 RX ADMIN — ONDANSETRON 4 MG: 4 TABLET, ORALLY DISINTEGRATING ORAL at 11:08

## 2021-11-26 RX ADMIN — PANCRELIPASE 2 CAPSULE: 60000; 12000; 38000 CAPSULE, DELAYED RELEASE PELLETS ORAL at 18:03

## 2021-11-26 RX ADMIN — OXYCODONE HYDROCHLORIDE 5 MG: 5 TABLET ORAL at 12:50

## 2021-11-26 RX ADMIN — LIDOCAINE 1 PATCH: 560 PATCH PERCUTANEOUS; TOPICAL; TRANSDERMAL at 19:57

## 2021-11-26 RX ADMIN — SPIRONOLACTONE 100 MG: 100 TABLET ORAL at 09:28

## 2021-11-26 RX ADMIN — POLYETHYLENE GLYCOL 3350 17 G: 17 POWDER, FOR SOLUTION ORAL at 09:28

## 2021-11-26 RX ADMIN — PANTOPRAZOLE SODIUM 40 MG: 40 TABLET, DELAYED RELEASE ORAL at 09:27

## 2021-11-26 RX ADMIN — SODIUM BICARBONATE 325 MG: 325 TABLET ORAL at 21:06

## 2021-11-26 RX ADMIN — PANCRELIPASE 25 CAPSULE: 24000; 76000; 120000 CAPSULE, DELAYED RELEASE PELLETS ORAL at 09:35

## 2021-11-26 RX ADMIN — PANCRELIPASE 25 CAPSULE: 24000; 76000; 120000 CAPSULE, DELAYED RELEASE PELLETS ORAL at 11:26

## 2021-11-26 RX ADMIN — OXYCODONE HYDROCHLORIDE 5 MG: 5 TABLET ORAL at 07:58

## 2021-11-26 RX ADMIN — BENZOCAINE AND MENTHOL 1 LOZENGE: 15; 3.6 LOZENGE ORAL at 17:59

## 2021-11-26 RX ADMIN — SODIUM BICARBONATE 325 MG: 325 TABLET ORAL at 18:03

## 2021-11-26 RX ADMIN — Medication 1 PACKET: at 09:29

## 2021-11-26 ASSESSMENT — ACTIVITIES OF DAILY LIVING (ADL)
ADLS_ACUITY_SCORE: 14

## 2021-11-26 NOTE — PLAN OF CARE
"[id number]:     BP 99/64 (BP Location: Left arm)   Pulse 80   Temp 96.8  F (36  C) (Oral)   Resp 16   Ht 1.626 m (5' 4\")   Wt 66.8 kg (147 lb 3.2 oz)   SpO2 96%   BMI 25.27 kg/m        NEURO: Intact, A&O x4     RESPIRATORY:  WDL. Pt denies having any SOB.     CARDIO: WDL.     GI/:  WDL. Pt voiding spontaneously with AUOP. Last BM: 11/25/21. Tube feeds going at 45 ml/hr x16 hours. Pt denies having any N/V.      SKIN: WDL. Pt given PRN benadryl x1 for itching.       ACTIVITY: Independent and up ad reina.      PAIN: Pt continues to have pain in her abdomen, 6/10. PRN oxycodone given x1, medication was somewhat effective.      LDA: PIV (left) - saline locked.          "

## 2021-11-26 NOTE — PLAN OF CARE
Pt  requested for tube feed to be started at 1900 because her family were bringing her some Thanksgiving meal at 1830, she ate 50% of her meal from home. Vital signs stable, afebrile. C/o abdominal pain, PRN oxycodone given x1 and Tylenol  for management. Robaxin given for muscle spasms and melatonin for sleep. Pt tube feeds was re-started at 1900, and stop at 1100. Infusing at 45mL/hr. Voiding adequally, had a bowel movement this shift. Pt has nutrition class concerning her Tube Feeding on Sunday 11/ 28/21, at the [facility]. Continue with POC

## 2021-11-26 NOTE — PLAN OF CARE
"BP 99/64 (BP Location: Left arm)   Pulse 80   Temp 96.8  F (36  C) (Oral)   Resp 16   Ht 1.626 m (5' 4\")   Wt 66.8 kg (147 lb 3.2 oz)   SpO2 96%   BMI 25.27 kg/m         A&Ox4. VSS. c/o RLQ abd of 8/10. Oxy 5 mg x1, Tylenol x1,Zofran x1 for Nausea with relief. TF between 1000 and 1800  to encourage po intake. Pt tolerating po intake moderately with oksana counts in effect. C/o pain in throat, benadryl and Cepacol lozenges requested by pt Q1 hr.New order for benzocaine spray Q3 hrs.  Continue with POC.  "

## 2021-11-26 NOTE — PROGRESS NOTES
[facility]    Medicine Progress Note - Hospitalist Service, Gold 8       Date of Admission:  11/10/2021    Assessment & Plan           Aliyah Angeles is a 46 year old female admitted on 11/10/2021. She has a hx of DMII, etoh abuse, alcholic hepatitis, and Fitz-en-y gastric bypass who was transferred from the [facility] ARU for necrotizing pancreatitis and fevers.    Changes today:   - Continue to encourage oral intake. If eating well, will stop tube feeds tomorrow and trial with just oral intake and calorie counts. If eating well enough, will pull NG on Sunday and possibly discharge to inpatient chem dep pending placement.     Acute necrotizing pancreatitis, present on admission, improving  Complex recent hospitalization course recently, notably from 9/29 to 11/5 w/ alk hep, series of pneumonias and ARDS w/ CRRT ICU stay/intubation, improved and was at ARU. CT scan showing pancreatitis w/ area of necrosis surrounding splenic vein and artery   - Panc/Bili consulted, appreciate assistance   - Continue tube feeds per nutrition   - Calorie counts to attempt to wean tube feeds as she will need to be off TF to go to chem dep   - Oxycodone PRN for pain with eating, will continue to try and wean.     Malnutrition:     - Level of malnutrition: Moderate    - Based on: reduced intake, mild (or greater) subcutaneous fat loss, mild (or greater) muscle loss   - Started tube feeds 11/18 (initially low magnesium) after NJ placement as per GI recommendations for post-pyloric feeding given pain w/ eating and need for adequate nutrition to heal pancreatitis - was not meeting goals by PO calorie counts     Severe sepsis (HR, Temp) 2/2 suspected pneumonia with unknown organism, present on admission   Acute hypoxemic respiratory failure, resolved  based on 11/8 CT a/p w/ L>R bilateral GGO w/ new o2 requirement. s/p zosyn (11/9 - 11/11) deescalated to ciprofloxacin + flagyl (11/11 - 11/12),  s/p Augmentin through 11/14 (completed 5 day course)    Suspect evolving alcoholic liver disease  Severe alcohol use disorder  Has not had chance to f/u output w/ hepatology, only started needing paracenteses during last admission (10/2021), INR elevated ~1.4 on presentation. initially continued rifaximin given for presumed HE last admission, however after chart review that diagnosis was equivocal in the setting of many other medical problems, anticipating outpatient coverage difficulties and no e/o HE presently, discontinued as of 11/14, and will monitor for signs of HE development   - CD consult placed --> ideally will discharge to [facility] treatment [place], appreciate SW coordination  however with NJ feeding for at least 1-2 weeks in the hospital will have to wait for now, ultimately TF duration to be determined by GI   - Ascites management:      - Spironolactone (11/11 - )    - Lasix (11/13 - )    - Paracentesis PRN    Headache  Patient with overnight headaches on 11/20.  Improved on oxycodone.  No photophobia or phonophobia noted on the visit.  No vision changes. No nausea/vomiting. Physical exam without neurological sensation or motoric loss, no meningismus, and no vision changes.  Will continue to monitor and may require further analgesia if the headache does not continue to improve.    Suspect R lower back strain  -TTP paraspinals on 11/12, no focal spinal tenderness   - lido patch, heat pad    Anxiety/Depression   - cont home lexapro, remeron.       Diet: Advance Diet as Tolerated: Low Fiber  Calorie Counts  Snacks/Supplements Adult: Ensure Max Protein (bariatric); Between Meals  Snacks/Supplements Adult: Other; Special K bar; Between Meals    DVT Prophylaxis: Enoxaparin (Lovenox) SQ  Rice Catheter: Not present  Central Lines: None  Code Status: Full Code      Disposition Plan   Expected discharge: 11/29/2021   recommended to prior living arrangement once adequate pain management/ tolerating PO  medications.     The patient's care was discussed with the Patient and GI Consultant.    Rosmery Collazo MD  Hospitalist Service, [address]  Securely message with the [employer]oc[employer] Web Console (learn more here)  Text page via [facility] Paging/Directory    Please see sign in/sign out for up to date coverage information    Clinically Significant Risk Factors Present on Admission                ______________________________________________________________________    Interval History   Feeling better today.Feeling hungry. Was able to eat full meal yesterday with family and mostly tolerated. Still having some abdominal pain but thinks it might be partially due to history of gastric bypass and satiety.     Data reviewed today: I reviewed all medications, new labs and imaging results over the last 24 hours. Labs and Imaging reviewed in Epic, pertinent discussed in A&P.       Physical Exam   Vital Signs: Temp: 96.8  F (36  C) Temp src: Oral BP: 99/64 Pulse: 80   Resp: 16 SpO2: 96 % O2 Device: None (Room air)    Weight: 147 lbs 3.2 oz     Constitutional: able to sit up, NAD  Cardiovascular: regular, without murmurs or gallops  Pulmonary/Chest: CTAB, no respiratory distress  GI: Soft,  Mildly distended, tender diffusely but improving, no rebound tenderness or guarding   Skin: Skin is warm and dry  Neurological: Alert and oriented x4. B/L upper and lower extremity strength and sensation preserved.     Data   Recent Labs   Lab 11/26/21  0513 11/25/21  0522 11/24/21  0454 11/23/21  0557 11/22/21  0505 11/20/21  2048 11/20/21  0619   WBC 6.7 7.1  --  9.4  --    < >  --    HGB 7.8* 7.8*  --  8.0*  --    < >  --    MCV 97 99  --  98  --    < >  --     244  --  206  --    < >  --     136  --  135 136   < > 139   POTASSIUM 4.3 4.5 4.5 4.4 4.4   < > 4.0   CHLORIDE 104 103  --  104 104   < > 109   CO2 26 25  --  25 26   < > 23   BUN 14 11  --  11 10   < > 6*   CR 0.67 0.68  --   0.70 0.68   < > 0.69   ANIONGAP 7 8  --  6 6   < > 7   STEFFANY 8.5 8.4*  --  8.0* 7.7*   < > 7.8*   * 144*  --  132* 134*   < > 134*   ALBUMIN  --   --   --   --  1.4*  --  1.5*   PROTTOTAL  --   --   --   --  5.8*  --  6.0*   BILITOTAL  --   --   --   --  1.1  --  1.1   ALKPHOS  --   --   --   --  233*  --  249*   ALT  --   --   --   --  15  --  14   AST  --   --   --   --  34  --  38    < > = values in this interval not displayed.     No results found for this or any previous visit (from the past 24 hour(s)).  Medications     dextrose       dextrose         sodium bicarbonate  325 mg Per Feeding Tube Q4H    And     amylase-lipase-protease  2 capsule Per Feeding Tube Q4H     amylase-lipase-protease  2 capsule Oral TID w/meals     vitamin B-12  100 mcg Oral or Feeding Tube Daily     enoxaparin ANTICOAGULANT  40 mg Subcutaneous Q24H     escitalopram  10 mg Oral Daily     folic acid  1 mg Oral Daily     furosemide  40 mg Oral Daily     gabapentin  100 mg Oral TID     lidocaine  1 patch Transdermal Q24h    And     lidocaine   Transdermal Q8H     mirtazapine  15 mg Oral At Bedtime     multivitamin RENAL  1 capsule Oral Daily     pantoprazole  40 mg Oral QAM AC     polyethylene glycol  17 g Oral Daily     protein modular  1 packet Per Feeding Tube BID     sennosides  1 tablet Oral At Bedtime     sodium chloride (PF)  3 mL Intracatheter Q8H     spironolactone  100 mg Oral or Feeding Tube Daily     thiamine  100 mg Oral Daily

## 2021-11-26 NOTE — PROGRESS NOTES
Calorie Count  Intake recorded for: 11/25  Total Kcals: 0 Total Protein: 0g  Kcals from Hospital Food: 0   Protein: 0g  Kcals from Outside Food (average):0 Protein: 0g  # Meals Recorded: 1 meal ordered, no food intake recorded  # Supplements Recorded: 0

## 2021-11-26 NOTE — PROGRESS NOTES
Care Management Follow Up    Length of Stay (days): 16    Expected Discharge Date: 11/29/2021     Concerns to be Addressed:  Discharge planning       Patient plan of care discussed at interdisciplinary rounds: Yes    Anticipated Discharge Disposition:  Residential Chem Dep Placement     Anticipated Discharge Services:  None    Anticipated Discharge DME:  None    Education Provided on the Discharge Plan:  Yes    Patient/Family in Agreement with the Plan:  Yes    Referrals Placed by CM/SW:  [facility]; [facility]     Additional Information:    Report received from Jean Marie Laureano MD that patient may likely be medically ready by 11/29/21 and is still interested in residential chemical dependency placement. Patient was assessed by chemical dependency  on 11/12/21 and initial referrals were sent to [facility] and [facility].     called [facility] (P: [phone]); voicemail left with Lorena requesting a return call to further assist with patient's placement needs ([phone]).     called [facility] intake; voicemail left requesting a return call ([phone]).     Brief visit with patient and father, Melvin. Provided update regarding status of referrals and voicemails.  explained that any updates received will be provided to patient in anticipation of placement. Patient and father thanked this writer for visiting.     HODA Hawk  7D/5C Hematology/Oncology Social Worker  Phone: [phone]  Pager: [phone]  [email or web address]

## 2021-11-27 LAB
ANION GAP SERPL CALCULATED.3IONS-SCNC: 6 MMOL/L (ref 3–14)
BACTERIA BLD CULT: NO GROWTH
BUN SERPL-MCNC: 16 MG/DL (ref 7–30)
CALCIUM SERPL-MCNC: 8.6 MG/DL (ref 8.5–10.1)
CHLORIDE BLD-SCNC: 103 MMOL/L (ref 94–109)
CO2 SERPL-SCNC: 27 MMOL/L (ref 20–32)
CREAT SERPL-MCNC: 0.73 MG/DL (ref 0.52–1.04)
ERYTHROCYTE [DISTWIDTH] IN BLOOD BY AUTOMATED COUNT: 16.2 % (ref 10–15)
GFR SERPL CREATININE-BSD FRML MDRD: >90 ML/MIN/1.73M2
GLUCOSE BLD-MCNC: 161 MG/DL (ref 70–99)
HCT VFR BLD AUTO: 27 % (ref 35–47)
HGB BLD-MCNC: 8.1 G/DL (ref 11.7–15.7)
MCH RBC QN AUTO: 29.8 PG (ref 26.5–33)
MCHC RBC AUTO-ENTMCNC: 30 G/DL (ref 31.5–36.5)
MCV RBC AUTO: 99 FL (ref 78–100)
PLATELET # BLD AUTO: 272 10E3/UL (ref 150–450)
POTASSIUM BLD-SCNC: 4.2 MMOL/L (ref 3.4–5.3)
RBC # BLD AUTO: 2.72 10E6/UL (ref 3.8–5.2)
SODIUM SERPL-SCNC: 136 MMOL/L (ref 133–144)
WBC # BLD AUTO: 6.7 10E3/UL (ref 4–11)

## 2021-11-27 PROCEDURE — 250N000013 HC RX MED GY IP 250 OP 250 PS 637: Performed by: HOSPITALIST

## 2021-11-27 PROCEDURE — 250N000009 HC RX 250: Performed by: STUDENT IN AN ORGANIZED HEALTH CARE EDUCATION/TRAINING PROGRAM

## 2021-11-27 PROCEDURE — 99207 PR CDG-MDM COMPONENT: MEETS MODERATE - DOWN CODED: CPT | Performed by: STUDENT IN AN ORGANIZED HEALTH CARE EDUCATION/TRAINING PROGRAM

## 2021-11-27 PROCEDURE — 85027 COMPLETE CBC AUTOMATED: CPT | Performed by: STUDENT IN AN ORGANIZED HEALTH CARE EDUCATION/TRAINING PROGRAM

## 2021-11-27 PROCEDURE — 250N000013 HC RX MED GY IP 250 OP 250 PS 637: Performed by: STUDENT IN AN ORGANIZED HEALTH CARE EDUCATION/TRAINING PROGRAM

## 2021-11-27 PROCEDURE — 80048 BASIC METABOLIC PNL TOTAL CA: CPT | Performed by: STUDENT IN AN ORGANIZED HEALTH CARE EDUCATION/TRAINING PROGRAM

## 2021-11-27 PROCEDURE — 250N000011 HC RX IP 250 OP 636: Performed by: INTERNAL MEDICINE

## 2021-11-27 PROCEDURE — 99232 SBSQ HOSP IP/OBS MODERATE 35: CPT | Performed by: STUDENT IN AN ORGANIZED HEALTH CARE EDUCATION/TRAINING PROGRAM

## 2021-11-27 PROCEDURE — 36415 COLL VENOUS BLD VENIPUNCTURE: CPT | Performed by: STUDENT IN AN ORGANIZED HEALTH CARE EDUCATION/TRAINING PROGRAM

## 2021-11-27 PROCEDURE — 999N000128 HC STATISTIC PERIPHERAL IV START W/O US GUIDANCE

## 2021-11-27 PROCEDURE — 250N000013 HC RX MED GY IP 250 OP 250 PS 637: Performed by: INTERNAL MEDICINE

## 2021-11-27 PROCEDURE — 120N000002 HC R&B MED SURG/OB UMMC

## 2021-11-27 RX ADMIN — GABAPENTIN 100 MG: 100 CAPSULE ORAL at 14:42

## 2021-11-27 RX ADMIN — ACETAMINOPHEN 650 MG: 325 TABLET, FILM COATED ORAL at 09:41

## 2021-11-27 RX ADMIN — THIAMINE HCL TAB 100 MG 100 MG: 100 TAB at 09:41

## 2021-11-27 RX ADMIN — GABAPENTIN 100 MG: 100 CAPSULE ORAL at 19:35

## 2021-11-27 RX ADMIN — ASCORBIC ACID, THIAMINE MONONITRATE,RIBOFLAVIN, NIACINAMIDE, PYRIDOXINE HYDROCHLORIDE, FOLIC ACID, CYANOCOBALAMIN, BIOTIN, CALCIUM PANTOTHENATE, 1 CAPSULE: 100; 1.5; 1.7; 20; 10; 1; 6000; 150000; 5 CAPSULE, LIQUID FILLED ORAL at 09:43

## 2021-11-27 RX ADMIN — PANCRELIPASE 2 CAPSULE: 24000; 76000; 120000 CAPSULE, DELAYED RELEASE PELLETS ORAL at 12:15

## 2021-11-27 RX ADMIN — FOLIC ACID 1 MG: 1 TABLET ORAL at 09:41

## 2021-11-27 RX ADMIN — OXYCODONE HYDROCHLORIDE 5 MG: 5 TABLET ORAL at 12:16

## 2021-11-27 RX ADMIN — BENZOCAINE 0.5 ML: 220 SPRAY, METERED PERIODONTAL at 10:06

## 2021-11-27 RX ADMIN — SODIUM BICARBONATE 325 MG: 325 TABLET ORAL at 06:05

## 2021-11-27 RX ADMIN — VITAM B12 100 MCG: 100 TAB at 09:44

## 2021-11-27 RX ADMIN — SODIUM BICARBONATE 325 MG: 325 TABLET ORAL at 14:43

## 2021-11-27 RX ADMIN — PANCRELIPASE 2 CAPSULE: 24000; 76000; 120000 CAPSULE, DELAYED RELEASE PELLETS ORAL at 09:39

## 2021-11-27 RX ADMIN — OXYCODONE HYDROCHLORIDE 5 MG: 5 TABLET ORAL at 01:07

## 2021-11-27 RX ADMIN — POLYETHYLENE GLYCOL 3350 17 G: 17 POWDER, FOR SOLUTION ORAL at 09:43

## 2021-11-27 RX ADMIN — PANCRELIPASE 2 CAPSULE: 60000; 12000; 38000 CAPSULE, DELAYED RELEASE PELLETS ORAL at 10:23

## 2021-11-27 RX ADMIN — PANCRELIPASE 2 CAPSULE: 24000; 76000; 120000 CAPSULE, DELAYED RELEASE PELLETS ORAL at 18:00

## 2021-11-27 RX ADMIN — PANCRELIPASE 2 CAPSULE: 60000; 12000; 38000 CAPSULE, DELAYED RELEASE PELLETS ORAL at 14:43

## 2021-11-27 RX ADMIN — PANCRELIPASE 2 CAPSULE: 60000; 12000; 38000 CAPSULE, DELAYED RELEASE PELLETS ORAL at 18:00

## 2021-11-27 RX ADMIN — OXYCODONE HYDROCHLORIDE 5 MG: 5 TABLET ORAL at 06:08

## 2021-11-27 RX ADMIN — METHOCARBAMOL 500 MG: 500 TABLET ORAL at 21:39

## 2021-11-27 RX ADMIN — PANCRELIPASE 2 CAPSULE: 60000; 12000; 38000 CAPSULE, DELAYED RELEASE PELLETS ORAL at 21:39

## 2021-11-27 RX ADMIN — SENNOSIDES 1 TABLET: 8.6 TABLET ORAL at 21:39

## 2021-11-27 RX ADMIN — GABAPENTIN 100 MG: 100 CAPSULE ORAL at 09:41

## 2021-11-27 RX ADMIN — Medication 1 PACKET: at 09:45

## 2021-11-27 RX ADMIN — OXYCODONE HYDROCHLORIDE 5 MG: 5 TABLET ORAL at 19:35

## 2021-11-27 RX ADMIN — FUROSEMIDE 40 MG: 40 TABLET ORAL at 09:41

## 2021-11-27 RX ADMIN — PANTOPRAZOLE SODIUM 40 MG: 40 TABLET, DELAYED RELEASE ORAL at 09:40

## 2021-11-27 RX ADMIN — PANCRELIPASE 2 CAPSULE: 60000; 12000; 38000 CAPSULE, DELAYED RELEASE PELLETS ORAL at 06:04

## 2021-11-27 RX ADMIN — ESCITALOPRAM OXALATE 10 MG: 10 TABLET ORAL at 09:43

## 2021-11-27 RX ADMIN — OXYCODONE HYDROCHLORIDE 5 MG: 5 TABLET ORAL at 16:18

## 2021-11-27 RX ADMIN — SODIUM BICARBONATE 325 MG: 325 TABLET ORAL at 18:00

## 2021-11-27 RX ADMIN — SODIUM BICARBONATE 325 MG: 325 TABLET ORAL at 10:23

## 2021-11-27 RX ADMIN — Medication 25 MG: at 01:06

## 2021-11-27 RX ADMIN — PANCRELIPASE 2 CAPSULE: 60000; 12000; 38000 CAPSULE, DELAYED RELEASE PELLETS ORAL at 01:07

## 2021-11-27 RX ADMIN — Medication 25 MG: at 09:44

## 2021-11-27 RX ADMIN — SODIUM BICARBONATE 325 MG: 325 TABLET ORAL at 01:07

## 2021-11-27 RX ADMIN — BENZOCAINE AND MENTHOL 1 LOZENGE: 15; 3.6 LOZENGE ORAL at 20:24

## 2021-11-27 RX ADMIN — ACETAMINOPHEN 650 MG: 325 TABLET, FILM COATED ORAL at 20:24

## 2021-11-27 RX ADMIN — SPIRONOLACTONE 100 MG: 100 TABLET ORAL at 09:41

## 2021-11-27 RX ADMIN — MIRTAZAPINE 15 MG: 15 TABLET, FILM COATED ORAL at 21:39

## 2021-11-27 RX ADMIN — ENOXAPARIN SODIUM 40 MG: 40 INJECTION SUBCUTANEOUS at 09:40

## 2021-11-27 RX ADMIN — Medication 1 MG: at 21:39

## 2021-11-27 RX ADMIN — LIDOCAINE 1 PATCH: 560 PATCH PERCUTANEOUS; TOPICAL; TRANSDERMAL at 19:36

## 2021-11-27 RX ADMIN — SODIUM BICARBONATE 325 MG: 325 TABLET ORAL at 21:39

## 2021-11-27 ASSESSMENT — ACTIVITIES OF DAILY LIVING (ADL)
ADLS_ACUITY_SCORE: 14

## 2021-11-27 ASSESSMENT — MIFFLIN-ST. JEOR: SCORE: 1269.55

## 2021-11-27 NOTE — PROGRESS NOTES
[id number]:  A&O X4, OVSS. Complained of pain, given PRN Oxycodone x2. Requested prn Benadryl, given.  NJ tube is intact, infusing TF at a goal rate of 45 ml/hr. On 30 ml flushes every 4 hours. Has PIV saline locked. Up independently to bathroom. Continue with POC.

## 2021-11-27 NOTE — PLAN OF CARE
[id number]  AVSS, alert and oriented x 4, denies nausea/sob. C/o pain at right abdomen, given PRN oxycodone x 2, applied lidocaine patch at left shoulder. Given Melatonin, Robaxin, Remeron for the bed time.  NJ intact, infusing TF at goal rate 45 ml/hr started @ 1800, 30 ml flush Q 4 hrs  Took shower this jose cruz, ate good supper. PIV is intact and saline locked.  Continue to monitor care,

## 2021-11-27 NOTE — PROGRESS NOTES
[facility]    Medicine Progress Note - Hospitalist Service, Gold 8       Date of Admission:  11/10/2021    Assessment & Plan           Aliyah Angeles is a 46 year old female admitted on 11/10/2021. She has a hx of DMII, etoh abuse, alcholic hepatitis, and Fitz-en-y gastric bypass who was transferred [place] [facility] for necrotizing pancreatitis and fevers.    Changes today:   - Holding tube feeds to allow to eat. Has been eating more than calorie counts show as she has been eating food from outside and not recording. Endorses hunger and trying to eat more. Is really confident she will be able to continue to improve eating once out of the hospital. Still is hoping to discharge to inpatient treatment.      Acute necrotizing pancreatitis, present on admission, improving  Complex recent hospitalization course recently, notably from 9/29 to 11/5 w/ alk hep, series of pneumonias and ARDS w/ CRRT ICU stay/intubation, improved and was at ARU. CT scan showing pancreatitis w/ area of necrosis surrounding splenic vein and artery   - Panc/Bili consulted, appreciate assistance   - Holding tube feeds   - Calorie counts to attempt to wean tube feeds as she will need to be off TF to go to chem dep   - Oxycodone PRN for pain with eating, will continue to try and wean.     Malnutrition:     - Level of malnutrition: Moderate    - Based on: reduced intake, mild (or greater) subcutaneous fat loss, mild (or greater) muscle loss   - Started tube feeds 11/18 (initially low magnesium) after NJ placement as per GI recommendations for post-pyloric feeding given pain w/ eating and need for adequate nutrition to heal pancreatitis - was not meeting goals by PO calorie counts     Severe sepsis (HR, Temp) 2/2 suspected pneumonia with unknown organism, present on admission   Acute hypoxemic respiratory failure, resolved  based on 11/8 CT a/p w/ L>R bilateral GGO w/ new o2 requirement. s/p zosyn  (11/9 - 11/11) deescalated to ciprofloxacin + flagyl (11/11 - 11/12), s/p Augmentin through 11/14 (completed 5 day course)    Suspect evolving alcoholic liver disease  Severe alcohol use disorder  Has not had chance to f/u output w/ hepatology, only started needing paracenteses during last admission (10/2021), INR elevated ~1.4 on presentation. initially continued rifaximin given for presumed HE last admission, however after chart review that diagnosis was equivocal in the setting of many other medical problems, anticipating outpatient coverage difficulties and no e/o HE presently, discontinued as of 11/14, and will monitor for signs of HE development   - CD consult placed --> ideally will discharge to residential treatment center, appreciate SW coordination  however with NJ feeding for at least 1-2 weeks in the hospital will have to wait for now, ultimately TF duration to be determined by GI   - Ascites management:      - Spironolactone (11/11 - )    - Lasix (11/13 - )    - Paracentesis PRN    Headache  Patient with overnight headaches on 11/20.  Improved on oxycodone.  No photophobia or phonophobia noted on the visit.  No vision changes. No nausea/vomiting. Physical exam without neurological sensation or motoric loss, no meningismus, and no vision changes.  Will continue to monitor and may require further analgesia if the headache does not continue to improve.    Suspect R lower back strain  -TTP paraspinals on 11/12, no focal spinal tenderness   - lido patch, heat pad    Anxiety/Depression   - cont home lexapro, remeron.       Diet: Advance Diet as Tolerated: Low Fiber  Snacks/Supplements Adult: Ensure Max Protein (bariatric); Between Meals  Snacks/Supplements Adult: Other; Special K bar; Between Meals    DVT Prophylaxis: Enoxaparin (Lovenox) SQ  Rice Catheter: Not present  Central Lines: None  Code Status: Full Code      Disposition Plan   Expected discharge: 11/29/2021   recommended to prior living arrangement  "once adequate pain management/ tolerating PO medications.     The patient's care was discussed with the Patient and GI Consultant.    Rosmery Collazo MD  Hospitalist Service, [address]  Securely message with the Vocera Web Console (learn more here)  Text page via [facility] Paging/Directory    Please see sign in/sign out for up to date coverage information    Clinically Significant Risk Factors Present on Admission                ______________________________________________________________________    Interval History   Continues to feel better - pain is \"pretty good\" today. Denies chest pain or SOB. Was able to eat more yesterday - mother is bringing in turkey wild rice soup today - she is looking forward to that. Has been up and moving around.     Data reviewed today: I reviewed all medications, new labs and imaging results over the last 24 hours. Labs and Imaging reviewed in Epic, pertinent discussed in A&P.     Physical Exam   Vital Signs: Temp: 98  F (36.7  C) Temp src: Oral BP: 120/72 Pulse: 90   Resp: 18 SpO2: 98 % O2 Device: None (Room air)    Weight: 147 lbs 3.2 oz     Constitutional: able to sit up, NAD  Cardiovascular: regular, without murmurs or gallops  Pulmonary/Chest: CTAB, no respiratory distress  GI: Soft,  much less tender today, no rebound tenderness or guarding   Skin: Skin is warm and dry  Neurological: Alert and oriented x4. B/L upper and lower extremity strength and sensation preserved.     Data   Recent Labs   Lab 11/27/21  0653 11/26/21  0513 11/25/21  0522 11/23/21  0557 11/22/21  0505   WBC 6.7 6.7 7.1   < >  --    HGB 8.1* 7.8* 7.8*   < >  --    MCV 99 97 99   < >  --     252 244   < >  --     137 136   < > 136   POTASSIUM 4.2 4.3 4.5   < > 4.4   CHLORIDE 103 104 103   < > 104   CO2 27 26 25   < > 26   BUN 16 14 11   < > 10   CR 0.73 0.67 0.68   < > 0.68   ANIONGAP 6 7 8   < > 6   STEFFANY 8.6 8.5 8.4*   < > 7.7*   * 131* " 144*   < > 134*   ALBUMIN  --   --   --   --  1.4*   PROTTOTAL  --   --   --   --  5.8*   BILITOTAL  --   --   --   --  1.1   ALKPHOS  --   --   --   --  233*   ALT  --   --   --   --  15   AST  --   --   --   --  34    < > = values in this interval not displayed.     No results found for this or any previous visit (from the past 24 hour(s)).  Medications     dextrose       dextrose         sodium bicarbonate  325 mg Per Feeding Tube Q4H    And     amylase-lipase-protease  2 capsule Per Feeding Tube Q4H     amylase-lipase-protease  2 capsule Oral TID w/meals     vitamin B-12  100 mcg Oral or Feeding Tube Daily     enoxaparin ANTICOAGULANT  40 mg Subcutaneous Q24H     escitalopram  10 mg Oral Daily     folic acid  1 mg Oral Daily     furosemide  40 mg Oral Daily     gabapentin  100 mg Oral TID     lidocaine  1 patch Transdermal Q24h    And     lidocaine   Transdermal Q8H     mirtazapine  15 mg Oral At Bedtime     multivitamin RENAL  1 capsule Oral Daily     pantoprazole  40 mg Oral QAM AC     polyethylene glycol  17 g Oral Daily     protein modular  1 packet Per Feeding Tube BID     sennosides  1 tablet Oral At Bedtime     sodium chloride (PF)  3 mL Intracatheter Q8H     spironolactone  100 mg Oral or Feeding Tube Daily     thiamine  100 mg Oral Daily

## 2021-11-27 NOTE — PLAN OF CARE
"/67 (BP Location: Left arm)   Pulse 89   Temp 97.3  F (36.3  C) (Oral)   Resp 18   Ht 1.626 m (5' 4\")   Wt 65 kg (143 lb 3.2 oz)   SpO2 100%   BMI 24.58 kg/m      4146-7451    A&O X4, AVSS. c/o of pain in RLQ of 7/10, given PRN Oxycodone x1 with relief to 4/10. Requested Benadryl PRN and Benzocaine hurricane spray for a sore throat. NJ tube is intact,TF on hold from 1000 am, was unwilling to d/continue just incase she doesn't meet the nutrient requirements.Had 28 carbs total between b/fast and lunch. PIV SL. Up independently to bathroom. Continue with POC.           "

## 2021-11-27 NOTE — PROGRESS NOTES
Calorie Count  Intake recorded for: 11/26  Total Kcals: 0 Total Protein: 0g  Kcals from Hospital Food: 0   Protein: 0g  Kcals from Outside Food (average):0 Protein: 0g  # Meals Ordered from Kitchen: 3  # Meals Recorded: No food intake recorded  # Supplements Recorded: No intake recorded

## 2021-11-28 ENCOUNTER — APPOINTMENT (OUTPATIENT)
Dept: EDUCATION SERVICES | Facility: CLINIC | Age: 46
End: 2021-11-28
Attending: STUDENT IN AN ORGANIZED HEALTH CARE EDUCATION/TRAINING PROGRAM
Payer: COMMERCIAL

## 2021-11-28 LAB
BACTERIA BLD CULT: NO GROWTH
BACTERIA FLD CULT: NO GROWTH
HOLD SPECIMEN: NORMAL
MAGNESIUM SERPL-MCNC: 1.7 MG/DL (ref 1.6–2.3)
PHOSPHATE SERPL-MCNC: 5.8 MG/DL (ref 2.5–4.5)

## 2021-11-28 PROCEDURE — 250N000013 HC RX MED GY IP 250 OP 250 PS 637: Performed by: STUDENT IN AN ORGANIZED HEALTH CARE EDUCATION/TRAINING PROGRAM

## 2021-11-28 PROCEDURE — 999N000128 HC STATISTIC PERIPHERAL IV START W/O US GUIDANCE

## 2021-11-28 PROCEDURE — 250N000013 HC RX MED GY IP 250 OP 250 PS 637: Performed by: INTERNAL MEDICINE

## 2021-11-28 PROCEDURE — 250N000011 HC RX IP 250 OP 636: Performed by: INTERNAL MEDICINE

## 2021-11-28 PROCEDURE — 120N000002 HC R&B MED SURG/OB UMMC

## 2021-11-28 PROCEDURE — 250N000013 HC RX MED GY IP 250 OP 250 PS 637: Performed by: HOSPITALIST

## 2021-11-28 PROCEDURE — 36415 COLL VENOUS BLD VENIPUNCTURE: CPT | Performed by: STUDENT IN AN ORGANIZED HEALTH CARE EDUCATION/TRAINING PROGRAM

## 2021-11-28 PROCEDURE — 99207 PR CDG-MDM COMPONENT: MEETS MODERATE - DOWN CODED: CPT | Performed by: STUDENT IN AN ORGANIZED HEALTH CARE EDUCATION/TRAINING PROGRAM

## 2021-11-28 PROCEDURE — 84100 ASSAY OF PHOSPHORUS: CPT | Performed by: STUDENT IN AN ORGANIZED HEALTH CARE EDUCATION/TRAINING PROGRAM

## 2021-11-28 PROCEDURE — 99232 SBSQ HOSP IP/OBS MODERATE 35: CPT | Performed by: STUDENT IN AN ORGANIZED HEALTH CARE EDUCATION/TRAINING PROGRAM

## 2021-11-28 PROCEDURE — 83735 ASSAY OF MAGNESIUM: CPT | Performed by: STUDENT IN AN ORGANIZED HEALTH CARE EDUCATION/TRAINING PROGRAM

## 2021-11-28 RX ADMIN — GABAPENTIN 100 MG: 100 CAPSULE ORAL at 20:46

## 2021-11-28 RX ADMIN — Medication 1 MG: at 21:37

## 2021-11-28 RX ADMIN — OXYCODONE HYDROCHLORIDE 5 MG: 5 TABLET ORAL at 01:39

## 2021-11-28 RX ADMIN — OXYCODONE HYDROCHLORIDE 5 MG: 5 TABLET ORAL at 06:16

## 2021-11-28 RX ADMIN — GABAPENTIN 100 MG: 100 CAPSULE ORAL at 14:55

## 2021-11-28 RX ADMIN — SODIUM BICARBONATE 325 MG: 325 TABLET ORAL at 05:54

## 2021-11-28 RX ADMIN — MIRTAZAPINE 15 MG: 15 TABLET, FILM COATED ORAL at 21:37

## 2021-11-28 RX ADMIN — PANTOPRAZOLE SODIUM 40 MG: 40 TABLET, DELAYED RELEASE ORAL at 09:00

## 2021-11-28 RX ADMIN — PANCRELIPASE 2 CAPSULE: 24000; 76000; 120000 CAPSULE, DELAYED RELEASE PELLETS ORAL at 09:03

## 2021-11-28 RX ADMIN — SPIRONOLACTONE 100 MG: 100 TABLET ORAL at 09:00

## 2021-11-28 RX ADMIN — SENNOSIDES 1 TABLET: 8.6 TABLET ORAL at 21:37

## 2021-11-28 RX ADMIN — PANCRELIPASE 2 CAPSULE: 60000; 12000; 38000 CAPSULE, DELAYED RELEASE PELLETS ORAL at 05:54

## 2021-11-28 RX ADMIN — FOLIC ACID 1 MG: 1 TABLET ORAL at 09:01

## 2021-11-28 RX ADMIN — GABAPENTIN 100 MG: 100 CAPSULE ORAL at 09:00

## 2021-11-28 RX ADMIN — ESCITALOPRAM OXALATE 10 MG: 10 TABLET ORAL at 09:01

## 2021-11-28 RX ADMIN — BENZOCAINE 1 ML: 220 SPRAY, METERED PERIODONTAL at 01:38

## 2021-11-28 RX ADMIN — METHOCARBAMOL 500 MG: 500 TABLET ORAL at 21:37

## 2021-11-28 RX ADMIN — ACETAMINOPHEN 650 MG: 325 TABLET, FILM COATED ORAL at 20:46

## 2021-11-28 RX ADMIN — LIDOCAINE 1 PATCH: 560 PATCH PERCUTANEOUS; TOPICAL; TRANSDERMAL at 20:47

## 2021-11-28 RX ADMIN — FUROSEMIDE 40 MG: 40 TABLET ORAL at 09:00

## 2021-11-28 RX ADMIN — BENZOCAINE AND MENTHOL 1 LOZENGE: 15; 3.6 LOZENGE ORAL at 20:46

## 2021-11-28 RX ADMIN — ASCORBIC ACID, THIAMINE MONONITRATE,RIBOFLAVIN, NIACINAMIDE, PYRIDOXINE HYDROCHLORIDE, FOLIC ACID, CYANOCOBALAMIN, BIOTIN, CALCIUM PANTOTHENATE, 1 CAPSULE: 100; 1.5; 1.7; 20; 10; 1; 6000; 150000; 5 CAPSULE, LIQUID FILLED ORAL at 09:01

## 2021-11-28 RX ADMIN — BENZOCAINE 1 ML: 220 SPRAY, METERED PERIODONTAL at 18:25

## 2021-11-28 RX ADMIN — PANCRELIPASE 2 CAPSULE: 24000; 76000; 120000 CAPSULE, DELAYED RELEASE PELLETS ORAL at 18:24

## 2021-11-28 RX ADMIN — VITAM B12 100 MCG: 100 TAB at 09:03

## 2021-11-28 RX ADMIN — Medication 25 MG: at 16:45

## 2021-11-28 RX ADMIN — BENZOCAINE 1 ML: 220 SPRAY, METERED PERIODONTAL at 13:04

## 2021-11-28 RX ADMIN — THIAMINE HCL TAB 100 MG 100 MG: 100 TAB at 09:00

## 2021-11-28 RX ADMIN — POLYETHYLENE GLYCOL 3350 17 G: 17 POWDER, FOR SOLUTION ORAL at 08:59

## 2021-11-28 RX ADMIN — SODIUM BICARBONATE 325 MG: 325 TABLET ORAL at 01:28

## 2021-11-28 RX ADMIN — ENOXAPARIN SODIUM 40 MG: 40 INJECTION SUBCUTANEOUS at 09:01

## 2021-11-28 RX ADMIN — PANCRELIPASE 2 CAPSULE: 24000; 76000; 120000 CAPSULE, DELAYED RELEASE PELLETS ORAL at 13:00

## 2021-11-28 RX ADMIN — BENZOCAINE AND MENTHOL 1 LOZENGE: 15; 3.6 LOZENGE ORAL at 13:02

## 2021-11-28 RX ADMIN — BENZOCAINE AND MENTHOL 1 LOZENGE: 15; 3.6 LOZENGE ORAL at 09:01

## 2021-11-28 RX ADMIN — BENZOCAINE 1 ML: 220 SPRAY, METERED PERIODONTAL at 21:37

## 2021-11-28 RX ADMIN — PANCRELIPASE 2 CAPSULE: 60000; 12000; 38000 CAPSULE, DELAYED RELEASE PELLETS ORAL at 01:28

## 2021-11-28 RX ADMIN — Medication 1 PACKET: at 09:04

## 2021-11-28 RX ADMIN — OXYCODONE HYDROCHLORIDE 5 MG: 5 TABLET ORAL at 12:59

## 2021-11-28 RX ADMIN — OXYCODONE HYDROCHLORIDE 5 MG: 5 TABLET ORAL at 16:45

## 2021-11-28 RX ADMIN — BENZOCAINE 1 ML: 220 SPRAY, METERED PERIODONTAL at 09:07

## 2021-11-28 ASSESSMENT — ACTIVITIES OF DAILY LIVING (ADL)
ADLS_ACUITY_SCORE: 14
ADLS_ACUITY_SCORE: 14
ADLS_ACUITY_SCORE: 15
ADLS_ACUITY_SCORE: 14
ADLS_ACUITY_SCORE: 15
ADLS_ACUITY_SCORE: 14
ADLS_ACUITY_SCORE: 15
ADLS_ACUITY_SCORE: 14
ADLS_ACUITY_SCORE: 15
ADLS_ACUITY_SCORE: 14
ADLS_ACUITY_SCORE: 15

## 2021-11-28 ASSESSMENT — MIFFLIN-ST. JEOR: SCORE: 1259

## 2021-11-28 NOTE — PLAN OF CARE
"BP 98/65 (BP Location: Left arm)   Pulse 95   Temp 98.8  F (37.1  C) (Oral)   Resp 18   Ht 1.626 m (5' 4\")   Wt 63.9 kg (140 lb 14 oz)   SpO2 95%   BMI 24.18 kg/m        9740-2261    AVSS, A&O X4, denies nausea/sob. C/o pain RLQ, PRN oxycodone x1, Lido patch free.PCO to remove NJ and keep encourage oral intake after completion of TF class today, Creon with meals. NG/NJ Tube Home Care Instructions given at pt training. c/o throat discomfort, Benzo hurricane spray x2, Cepacol x2.Eatimg moderately. PIV is intact and saline locked. discharge plans for 11/29/21.SW to follow up in AM regarding inpatient chem dep. Continue with POC.        "

## 2021-11-28 NOTE — PROGRESS NOTES
[facility]    Medicine Progress Note - Hospitalist Service, Gold 8       Date of Admission:  11/10/2021    Assessment & Plan           Aliyah Angeles is a 46 year old female admitted on 11/10/2021. She has a hx of DMII, etoh abuse, alcholic hepatitis, and Fitz-en-y gastric bypass who was transferred from the [facility] ARU for necrotizing pancreatitis and fevers.    Changes today:   - Pull NG and encourage oral intake   - SW to follow up in AM regarding inpatient chem dep   - Creon with meals     Acute necrotizing pancreatitis, present on admission, improving  Complex recent hospitalization course recently, notably from 9/29 to 11/5 w/ alk hep, series of pneumonias and ARDS w/ CRRT ICU stay/intubation, improved and was at ARU. CT scan showing pancreatitis w/ area of necrosis surrounding splenic vein and artery   - Panc/Bili consulted, appreciate assistance   - Oxycodone PRN for pain with eating, will continue to try and wean.     Malnutrition:     - Level of malnutrition: Moderate    - Based on: reduced intake, mild (or greater) subcutaneous fat loss, mild (or greater) muscle loss   - TF 11/18-11/28, NG pulled 11/28    Severe sepsis (HR, Temp) 2/2 suspected pneumonia with unknown organism, present on admission   Acute hypoxemic respiratory failure, resolved  based on 11/8 CT a/p w/ L>R bilateral GGO w/ new o2 requirement. s/p zosyn (11/9 - 11/11) deescalated to ciprofloxacin + flagyl (11/11 - 11/12), s/p Augmentin through 11/14 (completed 5 day course)    Suspect evolving alcoholic liver disease  Severe alcohol use disorder  Has not had chance to f/u output w/ hepatology, only started needing paracenteses during last admission (10/2021), INR elevated ~1.4 on presentation. initially continued rifaximin given for presumed HE last admission, however after chart review that diagnosis was equivocal in the setting of many other medical problems, anticipating outpatient  coverage difficulties and no e/o HE presently, discontinued as of 11/14, and will monitor for signs of HE development   - CD consult placed --> ideally will discharge to residential treatment center, appreciate SW coordination  however with NJ feeding for at least 1-2 weeks in the hospital will have to wait for now, ultimately TF duration to be determined by GI   - Ascites management:      - Spironolactone (11/11 - )    - Lasix (11/13 - )    - Paracentesis PRN    Headache  Patient with overnight headaches on 11/20.  Improved on oxycodone.  No photophobia or phonophobia noted on the visit.  No vision changes. No nausea/vomiting. Physical exam without neurological sensation or motoric loss, no meningismus, and no vision changes.  Will continue to monitor and may require further analgesia if the headache does not continue to improve.    Suspect R lower back strain  -TTP paraspinals on 11/12, no focal spinal tenderness   - lido patch, heat pad    Anxiety/Depression   - cont home lexapro, remeron.       Diet: Advance Diet as Tolerated: Low Fiber  Snacks/Supplements Adult: Ensure Max Protein (bariatric); Between Meals  Snacks/Supplements Adult: Other; Special K bar; Between Meals    DVT Prophylaxis: Enoxaparin (Lovenox) SQ  Rice Catheter: Not present  Central Lines: None  Code Status: Full Code      Disposition Plan   Expected discharge: 11/29/2021   recommended to prior living arrangement once adequate pain management/ tolerating PO medications.     The patient's care was discussed with the Patient and GI Consultant.    Rosmery Collazo MD  Hospitalist Service, [address]  Securely message with the Vocera Web Console (learn more here)  Text page via [facility] Paging/Directory    Please see sign in/sign out for up to date coverage information    Clinically Significant Risk Factors Present on Admission                 ______________________________________________________________________    Interval History   REINIER. Ate well yesterday and feeling like she is going to be able to eat well with tube out-  Would like to try - having a lot of throat pain with tube. Denies SOB or CP. Abdominal pain improving.     Data reviewed today: I reviewed all medications, new labs and imaging results over the last 24 hours. Labs and Imaging reviewed in Epic, pertinent discussed in A&P.     Physical Exam   Vital Signs: Temp: 97.2  F (36.2  C) Temp src: Oral BP: 102/61 Pulse: 82   Resp: 18 SpO2: 96 % O2 Device: None (Room air)    Weight: 140 lbs 13.98 oz     Constitutional: laying in bed, NAD  HEENT: NG tube present, tracking appropriately, poor dentition  Cardiovascular: regular, without murmurs or gallops  Pulmonary/Chest: CTAB, no respiratory distress  GI: Soft,  much less tender today, no rebound tenderness or guarding   Skin: Skin is warm and dry  Neurological: Alert and oriented x4. B/L upper and lower extremity strength and sensation preserved.     Data   Recent Labs   Lab 11/27/21  0653 11/26/21  0513 11/25/21  0522 11/23/21  0557 11/22/21  0505   WBC 6.7 6.7 7.1   < >  --    HGB 8.1* 7.8* 7.8*   < >  --    MCV 99 97 99   < >  --     252 244   < >  --     137 136   < > 136   POTASSIUM 4.2 4.3 4.5   < > 4.4   CHLORIDE 103 104 103   < > 104   CO2 27 26 25   < > 26   BUN 16 14 11   < > 10   CR 0.73 0.67 0.68   < > 0.68   ANIONGAP 6 7 8   < > 6   STEFFANY 8.6 8.5 8.4*   < > 7.7*   * 131* 144*   < > 134*   ALBUMIN  --   --   --   --  1.4*   PROTTOTAL  --   --   --   --  5.8*   BILITOTAL  --   --   --   --  1.1   ALKPHOS  --   --   --   --  233*   ALT  --   --   --   --  15   AST  --   --   --   --  34    < > = values in this interval not displayed.     No results found for this or any previous visit (from the past 24 hour(s)).  Medications     dextrose       dextrose         sodium bicarbonate  325 mg Per Feeding Tube Q4H     And     amylase-lipase-protease  2 capsule Per Feeding Tube Q4H     amylase-lipase-protease  2 capsule Oral TID w/meals     vitamin B-12  100 mcg Oral or Feeding Tube Daily     enoxaparin ANTICOAGULANT  40 mg Subcutaneous Q24H     escitalopram  10 mg Oral Daily     folic acid  1 mg Oral Daily     furosemide  40 mg Oral Daily     gabapentin  100 mg Oral TID     lidocaine  1 patch Transdermal Q24h    And     lidocaine   Transdermal Q8H     mirtazapine  15 mg Oral At Bedtime     multivitamin RENAL  1 capsule Oral Daily     pantoprazole  40 mg Oral QAM AC     polyethylene glycol  17 g Oral Daily     protein modular  1 packet Per Feeding Tube BID     sennosides  1 tablet Oral At Bedtime     sodium chloride (PF)  3 mL Intracatheter Q8H     spironolactone  100 mg Oral or Feeding Tube Daily     thiamine  100 mg Oral Daily

## 2021-11-28 NOTE — CONSULTS
Met with patient for NJ tube education and enteral tube feeding demonstration. Verbally reviewed basic cares and safety of the NJ tube.     Patient demonstrated giving meds and flushing the tube with water, clamping and unclamping at the appropriate times.     Patient was able to operate the enteralite infinity pump, set the rate/dose, prime the pump, administer feeding and correctly disconnect the tubing and flush the NJ tube with water before and after feeding. Talked about storage of formula/feeding bag.     Patient was engaged in education and asking appropriate questions.     Literature given: NG/NJ Tube Home Care Instructions, Handwashing and Skin Care, Using the Enteralite Infinity Pump for Tube Feeding.

## 2021-11-28 NOTE — PROGRESS NOTES
8977-2122  Pt alert and oriented x4, VSS on RA. Complained of pain, given PRN Oxycodone x2. NJ tube is intact-clamped. TF on hold as pt is eating well and tolerating OK. Up independently to bathroom, voiding adequately. Has PIV saline locked. Requested Benzocaine hurricane spray, given. Possible discharge today. No acute events overnight, continue with POC.

## 2021-11-28 NOTE — PLAN OF CARE
[id number]  VSS, temp max 99.2 alert and oriented x 4, denies nausea/sob. C/o at right abdomen, given PRN oxycodone x 2, PRN tylenol x 1.  NJ is intact, TF is discontinue from the this morning per MD, pt eating good, ate early supper, her own home food. On calorie count. Given Melatonin and Robaxin for the bed time. Up independent, walked in the [address] couple times, voiding adequately, LBM this morning. PIV is intact and saline locked. Possible discharge tomorrow, e,  Pt has PLC schedule tomorrow for NJ, should ask MD for keep NJ or not.  Continue to monitor care.

## 2021-11-29 LAB
ANION GAP SERPL CALCULATED.3IONS-SCNC: 7 MMOL/L (ref 3–14)
BUN SERPL-MCNC: 16 MG/DL (ref 7–30)
CALCIUM SERPL-MCNC: 9.1 MG/DL (ref 8.5–10.1)
CHLORIDE BLD-SCNC: 106 MMOL/L (ref 94–109)
CO2 SERPL-SCNC: 25 MMOL/L (ref 20–32)
CREAT SERPL-MCNC: 0.76 MG/DL (ref 0.52–1.04)
GFR SERPL CREATININE-BSD FRML MDRD: >90 ML/MIN/1.73M2
GLUCOSE BLD-MCNC: 107 MG/DL (ref 70–99)
MAGNESIUM SERPL-MCNC: 1.6 MG/DL (ref 1.6–2.3)
PHOSPHATE SERPL-MCNC: 4.5 MG/DL (ref 2.5–4.5)
POTASSIUM BLD-SCNC: 3.8 MMOL/L (ref 3.4–5.3)
SARS-COV-2 RNA RESP QL NAA+PROBE: NEGATIVE
SODIUM SERPL-SCNC: 138 MMOL/L (ref 133–144)

## 2021-11-29 PROCEDURE — 36415 COLL VENOUS BLD VENIPUNCTURE: CPT | Performed by: STUDENT IN AN ORGANIZED HEALTH CARE EDUCATION/TRAINING PROGRAM

## 2021-11-29 PROCEDURE — 120N000002 HC R&B MED SURG/OB UMMC

## 2021-11-29 PROCEDURE — 999N000128 HC STATISTIC PERIPHERAL IV START W/O US GUIDANCE

## 2021-11-29 PROCEDURE — 99232 SBSQ HOSP IP/OBS MODERATE 35: CPT | Performed by: STUDENT IN AN ORGANIZED HEALTH CARE EDUCATION/TRAINING PROGRAM

## 2021-11-29 PROCEDURE — 84100 ASSAY OF PHOSPHORUS: CPT | Performed by: INTERNAL MEDICINE

## 2021-11-29 PROCEDURE — U0003 INFECTIOUS AGENT DETECTION BY NUCLEIC ACID (DNA OR RNA); SEVERE ACUTE RESPIRATORY SYNDROME CORONAVIRUS 2 (SARS-COV-2) (CORONAVIRUS DISEASE [COVID-19]), AMPLIFIED PROBE TECHNIQUE, MAKING USE OF HIGH THROUGHPUT TECHNOLOGIES AS DESCRIBED BY CMS-2020-01-R: HCPCS | Performed by: STUDENT IN AN ORGANIZED HEALTH CARE EDUCATION/TRAINING PROGRAM

## 2021-11-29 PROCEDURE — 83735 ASSAY OF MAGNESIUM: CPT | Performed by: INTERNAL MEDICINE

## 2021-11-29 PROCEDURE — 99207 PR CDG-MDM COMPONENT: MEETS MODERATE - DOWN CODED: CPT | Performed by: STUDENT IN AN ORGANIZED HEALTH CARE EDUCATION/TRAINING PROGRAM

## 2021-11-29 PROCEDURE — 36415 COLL VENOUS BLD VENIPUNCTURE: CPT | Performed by: INTERNAL MEDICINE

## 2021-11-29 PROCEDURE — 250N000013 HC RX MED GY IP 250 OP 250 PS 637: Performed by: INTERNAL MEDICINE

## 2021-11-29 PROCEDURE — 250N000013 HC RX MED GY IP 250 OP 250 PS 637: Performed by: STUDENT IN AN ORGANIZED HEALTH CARE EDUCATION/TRAINING PROGRAM

## 2021-11-29 PROCEDURE — 250N000011 HC RX IP 250 OP 636: Performed by: INTERNAL MEDICINE

## 2021-11-29 PROCEDURE — 250N000013 HC RX MED GY IP 250 OP 250 PS 637: Performed by: HOSPITALIST

## 2021-11-29 PROCEDURE — 82374 ASSAY BLOOD CARBON DIOXIDE: CPT | Performed by: STUDENT IN AN ORGANIZED HEALTH CARE EDUCATION/TRAINING PROGRAM

## 2021-11-29 RX ORDER — MAGNESIUM OXIDE 400 MG/1
400 TABLET ORAL 2 TIMES DAILY
Status: COMPLETED | OUTPATIENT
Start: 2021-11-29 | End: 2021-12-01

## 2021-11-29 RX ORDER — POTASSIUM CHLORIDE 750 MG/1
10 TABLET, EXTENDED RELEASE ORAL ONCE
Status: COMPLETED | OUTPATIENT
Start: 2021-11-29 | End: 2021-11-29

## 2021-11-29 RX ADMIN — SENNOSIDES 1 TABLET: 8.6 TABLET ORAL at 21:12

## 2021-11-29 RX ADMIN — POLYETHYLENE GLYCOL 3350 17 G: 17 POWDER, FOR SOLUTION ORAL at 09:18

## 2021-11-29 RX ADMIN — OXYCODONE HYDROCHLORIDE 5 MG: 5 TABLET ORAL at 04:39

## 2021-11-29 RX ADMIN — PANTOPRAZOLE SODIUM 40 MG: 40 TABLET, DELAYED RELEASE ORAL at 09:17

## 2021-11-29 RX ADMIN — ACETAMINOPHEN 650 MG: 325 TABLET, FILM COATED ORAL at 19:03

## 2021-11-29 RX ADMIN — GABAPENTIN 100 MG: 100 CAPSULE ORAL at 09:17

## 2021-11-29 RX ADMIN — ENOXAPARIN SODIUM 40 MG: 40 INJECTION SUBCUTANEOUS at 09:17

## 2021-11-29 RX ADMIN — PANCRELIPASE 2 CAPSULE: 24000; 76000; 120000 CAPSULE, DELAYED RELEASE PELLETS ORAL at 17:09

## 2021-11-29 RX ADMIN — ASCORBIC ACID, THIAMINE MONONITRATE,RIBOFLAVIN, NIACINAMIDE, PYRIDOXINE HYDROCHLORIDE, FOLIC ACID, CYANOCOBALAMIN, BIOTIN, CALCIUM PANTOTHENATE, 1 CAPSULE: 100; 1.5; 1.7; 20; 10; 1; 6000; 150000; 5 CAPSULE, LIQUID FILLED ORAL at 09:17

## 2021-11-29 RX ADMIN — MIRTAZAPINE 15 MG: 15 TABLET, FILM COATED ORAL at 21:12

## 2021-11-29 RX ADMIN — Medication 400 MG: at 19:03

## 2021-11-29 RX ADMIN — OXYCODONE HYDROCHLORIDE 5 MG: 5 TABLET ORAL at 17:07

## 2021-11-29 RX ADMIN — GABAPENTIN 100 MG: 100 CAPSULE ORAL at 19:03

## 2021-11-29 RX ADMIN — FOLIC ACID 1 MG: 1 TABLET ORAL at 09:17

## 2021-11-29 RX ADMIN — ESCITALOPRAM OXALATE 10 MG: 10 TABLET ORAL at 09:17

## 2021-11-29 RX ADMIN — Medication 1 MG: at 21:17

## 2021-11-29 RX ADMIN — OXYCODONE HYDROCHLORIDE 5 MG: 5 TABLET ORAL at 13:22

## 2021-11-29 RX ADMIN — POTASSIUM CHLORIDE 10 MEQ: 750 TABLET, EXTENDED RELEASE ORAL at 14:45

## 2021-11-29 RX ADMIN — FUROSEMIDE 40 MG: 40 TABLET ORAL at 09:26

## 2021-11-29 RX ADMIN — Medication 25 MG: at 19:03

## 2021-11-29 RX ADMIN — OXYCODONE HYDROCHLORIDE 5 MG: 5 TABLET ORAL at 09:26

## 2021-11-29 RX ADMIN — METHOCARBAMOL 500 MG: 500 TABLET ORAL at 19:03

## 2021-11-29 RX ADMIN — GABAPENTIN 100 MG: 100 CAPSULE ORAL at 13:24

## 2021-11-29 RX ADMIN — LIDOCAINE 1 PATCH: 560 PATCH PERCUTANEOUS; TOPICAL; TRANSDERMAL at 21:13

## 2021-11-29 RX ADMIN — VITAM B12 100 MCG: 100 TAB at 09:17

## 2021-11-29 RX ADMIN — PANCRELIPASE 2 CAPSULE: 24000; 76000; 120000 CAPSULE, DELAYED RELEASE PELLETS ORAL at 12:36

## 2021-11-29 RX ADMIN — PANCRELIPASE 2 CAPSULE: 24000; 76000; 120000 CAPSULE, DELAYED RELEASE PELLETS ORAL at 09:19

## 2021-11-29 RX ADMIN — SPIRONOLACTONE 100 MG: 100 TABLET ORAL at 09:17

## 2021-11-29 RX ADMIN — THIAMINE HCL TAB 100 MG 100 MG: 100 TAB at 09:17

## 2021-11-29 ASSESSMENT — ACTIVITIES OF DAILY LIVING (ADL)
ADLS_ACUITY_SCORE: 15

## 2021-11-29 ASSESSMENT — MIFFLIN-ST. JEOR: SCORE: 1255.94

## 2021-11-29 NOTE — PLAN OF CARE
A&Ox4. VSS. Denies N/V and SOB. Moderate pain after eating certain foods. PO Oxycodone given x2. 1 more dose available today. AM K 3.8 and replaced orally. Recheck in AM. Phos and Mg need to be drawn. Discharge pending chemical dependency rehab facility. Continue with POC.

## 2021-11-29 NOTE — PROGRESS NOTES
Care Management Follow Up    Length of Stay (days): 19    Expected Discharge Date: 11/29/2021     Concerns to be Addressed: Discharge planning       Patient plan of care discussed at interdisciplinary rounds: Yes    Anticipated Discharge Disposition: Residential Chem Dep Placement     Anticipated Discharge Services:  None    Anticipated Discharge DME:  None     Education Provided on the Discharge Plan:  Yes    Patient/Family in Agreement with the Plan:  Yes - Patient is extremely interested in going to residential treatment    Referrals Placed by CM/SW:  [facility]; [facility]    Private pay costs discussed: Not applicable    Additional Information:     attempted x2 to reach [facility] (P: [phone]); voice messaging system reached but no clear indicator of who intake is.  called [facility] (P: [phone]) and left an additional voicemail for Lorena requesting a return call ([phone]).     also attempted to call [facility] intake x 2; voicemail left requesting intake return call ([phone]).    MD met with patient and provided update. Per MD, patient stated that she was going to try and call herself.     Discharge pending residential treatment placement.    HODA Hawk  7D/5C Hematology/Oncology Social Worker  Phone: [phone]  Pager: [phone]  [email or web address]

## 2021-11-29 NOTE — PLAN OF CARE
2300 - 0700 VSS, afebrile. Complains of abdominal pain, given oxy x1 with adequate relief. Denies nausea, shortness of breath. Slept well over night. Plan is to discharge to chem [facility] pending placement.

## 2021-11-29 NOTE — PROGRESS NOTES
[facility]    Medicine Progress Note - Hospitalist Service, Gold 8       Date of Admission:  11/10/2021    Assessment & Plan           Aliyah Angeles is a 46 year old female admitted on 11/10/2021. She has a hx of DMII, etoh abuse, alcholic hepatitis, and Fitz-en-y gastric bypass who was transferred from the [facility] ARU for necrotizing pancreatitis and fevers.    Changes today:   - SW to follow up regarding inpatient chem dep   - Creon with meals   - Doing well with oral intake      Acute necrotizing pancreatitis, present on admission, improving  Complex recent hospitalization course recently, notably from 9/29 to 11/5 w/ alk hep, series of pneumonias and ARDS w/ CRRT ICU stay/intubation, improved and was at ARU. CT scan showing pancreatitis w/ area of necrosis surrounding splenic vein and artery   - Panc/Bili consulted, appreciate assistance   - Oxycodone PRN for pain with eating, will continue to try and wean.     Malnutrition:     - Level of malnutrition: Moderate    - Based on: reduced intake, mild (or greater) subcutaneous fat loss, mild (or greater) muscle loss   - TF 11/18-11/28, NG pulled 11/28    Severe sepsis (HR, Temp) 2/2 suspected pneumonia with unknown organism, present on admission   Acute hypoxemic respiratory failure, resolved  based on 11/8 CT a/p w/ L>R bilateral GGO w/ new o2 requirement. s/p zosyn (11/9 - 11/11) deescalated to ciprofloxacin + flagyl (11/11 - 11/12), s/p Augmentin through 11/14 (completed 5 day course)    Suspect evolving alcoholic liver disease  Severe alcohol use disorder  Has not had chance to f/u output w/ hepatology, only started needing paracenteses during last admission (10/2021), INR elevated ~1.4 on presentation. initially continued rifaximin given for presumed HE last admission, however after chart review that diagnosis was equivocal in the setting of many other medical problems, anticipating outpatient coverage  difficulties and no e/o HE presently, discontinued as of 11/14, and will monitor for signs of HE development   - CD consult placed --> ideally will discharge to residential treatment center, appreciate SW coordination  however with NJ feeding for at least 1-2 weeks in the hospital will have to wait for now, ultimately TF duration to be determined by GI   - Ascites management:      - Spironolactone (11/11 - )    - Lasix (11/13 - )    - Paracentesis PRN    Headache  Patient with overnight headaches on 11/20.  Improved on oxycodone.  No photophobia or phonophobia noted on the visit.  No vision changes. No nausea/vomiting. Physical exam without neurological sensation or motoric loss, no meningismus, and no vision changes.  Will continue to monitor and may require further analgesia if the headache does not continue to improve.    Suspect R lower back strain  -TTP paraspinals on 11/12, no focal spinal tenderness   - lido patch, heat pad    Anxiety/Depression   - cont home lexapro, remeron.       Diet: Advance Diet as Tolerated: Low Fiber  Snacks/Supplements Adult: Ensure Max Protein (bariatric); Between Meals  Snacks/Supplements Adult: Other; Special K bar; Between Meals    DVT Prophylaxis: Enoxaparin (Lovenox) SQ  Rice Catheter: Not present  Central Lines: None  Code Status: Full Code      Disposition Plan   Expected discharge: 11/29/2021   recommended to prior living arrangement once adequate pain management/ tolerating PO medications.     The patient's care was discussed with the Patient and GI Consultant.    Rosmery Collazo MD  Hospitalist Service, [address]  Securely message with the Vocera Web Console (learn more here)  Text page via [employer] Paging/Directory    Please see sign in/sign out for up to date coverage information    Clinically Significant Risk Factors Present on Admission                 ______________________________________________________________________    Interval History   NAEON. Eating better without tube. Mom brought turkey wild rice soup - which is going well. Denies chest pain. Abdominal pain getting much better. Up walking around. Is going to call around to inpatient treatment centers to help self-advocate.     Data reviewed today: I reviewed all medications, new labs and imaging results over the last 24 hours. Labs and Imaging reviewed in Epic, pertinent discussed in A&P.     Physical Exam   Vital Signs: Temp: 98  F (36.7  C) Temp src: Oral BP: 122/78 Pulse: 83   Resp: 18 SpO2: 100 % O2 Device: None (Room air)    Weight: 140 lbs 13.98 oz     Constitutional: laying in bed, NAD  HEENT: NG tube present, tracking appropriately, poor dentition  Cardiovascular: regular, without murmurs or gallops  Pulmonary/Chest: CTAB, no respiratory distress  GI: Soft, minimally tender no rebound tenderness or guarding   Skin: Skin is warm and dry  Neurological: Alert and oriented x4.     Data   Recent Labs   Lab 11/29/21  0539 11/27/21  0653 11/26/21  0513 11/25/21  0522   WBC  --  6.7 6.7 7.1   HGB  --  8.1* 7.8* 7.8*   MCV  --  99 97 99   PLT  --  272 252 244    136 137 136   POTASSIUM 3.8 4.2 4.3 4.5   CHLORIDE 106 103 104 103   CO2 25 27 26 25   BUN 16 16 14 11   CR 0.76 0.73 0.67 0.68   ANIONGAP 7 6 7 8   STEFFANY 9.1 8.6 8.5 8.4*   * 161* 131* 144*     No results found for this or any previous visit (from the past 24 hour(s)).  Medications     dextrose       dextrose         amylase-lipase-protease  2 capsule Oral TID w/meals     vitamin B-12  100 mcg Oral or Feeding Tube Daily     enoxaparin ANTICOAGULANT  40 mg Subcutaneous Q24H     escitalopram  10 mg Oral Daily     folic acid  1 mg Oral Daily     furosemide  40 mg Oral Daily     gabapentin  100 mg Oral TID     lidocaine  1 patch Transdermal Q24h    And     lidocaine   Transdermal Q8H     mirtazapine  15 mg Oral At Bedtime      multivitamin RENAL  1 capsule Oral Daily     pantoprazole  40 mg Oral QAM AC     polyethylene glycol  17 g Oral Daily     sennosides  1 tablet Oral At Bedtime     sodium chloride (PF)  3 mL Intracatheter Q8H     spironolactone  100 mg Oral or Feeding Tube Daily     thiamine  100 mg Oral Daily

## 2021-11-29 NOTE — PLAN OF CARE
[id number]  VSS, temp max 99 Alert and oriented x 4, denies nausea/sob. C/o pain abdomen, given PRN oxycodone x 1, PRN tylenol x 1.  NJ is pull out per order. Possible discharging to treatment center, SW has to call tomorrow to find place for her.  Ate good supper, good appetite. Up independent to bathroom, voiding adequately, no BM for the shift. PIV is intact and saline locked. Given Melatonin and Robaxin for the bed time.  Continue to monitor care.

## 2021-11-30 LAB
ACID FAST STAIN (ARUP): NORMAL
POTASSIUM BLD-SCNC: 3.6 MMOL/L (ref 3.4–5.3)

## 2021-11-30 PROCEDURE — 36415 COLL VENOUS BLD VENIPUNCTURE: CPT | Performed by: INTERNAL MEDICINE

## 2021-11-30 PROCEDURE — 120N000002 HC R&B MED SURG/OB UMMC

## 2021-11-30 PROCEDURE — 99232 SBSQ HOSP IP/OBS MODERATE 35: CPT | Performed by: STUDENT IN AN ORGANIZED HEALTH CARE EDUCATION/TRAINING PROGRAM

## 2021-11-30 PROCEDURE — 250N000013 HC RX MED GY IP 250 OP 250 PS 637: Performed by: STUDENT IN AN ORGANIZED HEALTH CARE EDUCATION/TRAINING PROGRAM

## 2021-11-30 PROCEDURE — 84132 ASSAY OF SERUM POTASSIUM: CPT | Performed by: INTERNAL MEDICINE

## 2021-11-30 PROCEDURE — 250N000013 HC RX MED GY IP 250 OP 250 PS 637: Performed by: HOSPITALIST

## 2021-11-30 PROCEDURE — 250N000013 HC RX MED GY IP 250 OP 250 PS 637: Performed by: INTERNAL MEDICINE

## 2021-11-30 PROCEDURE — 250N000011 HC RX IP 250 OP 636: Performed by: INTERNAL MEDICINE

## 2021-11-30 RX ORDER — ACETAMINOPHEN 325 MG/1
975 TABLET ORAL EVERY 8 HOURS
Status: DISCONTINUED | OUTPATIENT
Start: 2021-11-30 | End: 2021-12-02 | Stop reason: HOSPADM

## 2021-11-30 RX ORDER — HYDROXYZINE HYDROCHLORIDE 25 MG/1
25 TABLET, FILM COATED ORAL EVERY 6 HOURS PRN
Status: DISCONTINUED | OUTPATIENT
Start: 2021-11-30 | End: 2021-12-02 | Stop reason: HOSPADM

## 2021-11-30 RX ORDER — LIDOCAINE 4 G/G
1-2 PATCH TOPICAL
Status: DISCONTINUED | OUTPATIENT
Start: 2021-11-30 | End: 2021-12-02 | Stop reason: HOSPADM

## 2021-11-30 RX ORDER — HYDROXYZINE HYDROCHLORIDE 25 MG/1
50 TABLET, FILM COATED ORAL EVERY 6 HOURS PRN
Status: DISCONTINUED | OUTPATIENT
Start: 2021-11-30 | End: 2021-12-02 | Stop reason: HOSPADM

## 2021-11-30 RX ORDER — POTASSIUM CHLORIDE 750 MG/1
10 TABLET, EXTENDED RELEASE ORAL ONCE
Status: COMPLETED | OUTPATIENT
Start: 2021-11-30 | End: 2021-11-30

## 2021-11-30 RX ADMIN — FUROSEMIDE 40 MG: 40 TABLET ORAL at 09:14

## 2021-11-30 RX ADMIN — GABAPENTIN 100 MG: 100 CAPSULE ORAL at 13:47

## 2021-11-30 RX ADMIN — Medication 400 MG: at 21:10

## 2021-11-30 RX ADMIN — Medication 400 MG: at 09:14

## 2021-11-30 RX ADMIN — PANCRELIPASE 2 CAPSULE: 24000; 76000; 120000 CAPSULE, DELAYED RELEASE PELLETS ORAL at 16:49

## 2021-11-30 RX ADMIN — HYDROXYZINE HYDROCHLORIDE 25 MG: 25 TABLET, FILM COATED ORAL at 23:55

## 2021-11-30 RX ADMIN — OXYCODONE HYDROCHLORIDE 5 MG: 5 TABLET ORAL at 18:04

## 2021-11-30 RX ADMIN — HYDROXYZINE HYDROCHLORIDE 50 MG: 25 TABLET, FILM COATED ORAL at 12:04

## 2021-11-30 RX ADMIN — VITAM B12 100 MCG: 100 TAB at 09:14

## 2021-11-30 RX ADMIN — PANTOPRAZOLE SODIUM 40 MG: 40 TABLET, DELAYED RELEASE ORAL at 09:14

## 2021-11-30 RX ADMIN — SIMETHICONE 80 MG: 80 TABLET, CHEWABLE ORAL at 16:59

## 2021-11-30 RX ADMIN — GABAPENTIN 100 MG: 100 CAPSULE ORAL at 21:10

## 2021-11-30 RX ADMIN — MIRTAZAPINE 15 MG: 15 TABLET, FILM COATED ORAL at 21:10

## 2021-11-30 RX ADMIN — FOLIC ACID 1 MG: 1 TABLET ORAL at 09:14

## 2021-11-30 RX ADMIN — OXYCODONE HYDROCHLORIDE 5 MG: 5 TABLET ORAL at 23:55

## 2021-11-30 RX ADMIN — ASCORBIC ACID, THIAMINE MONONITRATE,RIBOFLAVIN, NIACINAMIDE, PYRIDOXINE HYDROCHLORIDE, FOLIC ACID, CYANOCOBALAMIN, BIOTIN, CALCIUM PANTOTHENATE, 1 CAPSULE: 100; 1.5; 1.7; 20; 10; 1; 6000; 150000; 5 CAPSULE, LIQUID FILLED ORAL at 09:14

## 2021-11-30 RX ADMIN — PANCRELIPASE 2 CAPSULE: 24000; 76000; 120000 CAPSULE, DELAYED RELEASE PELLETS ORAL at 09:13

## 2021-11-30 RX ADMIN — ENOXAPARIN SODIUM 40 MG: 40 INJECTION SUBCUTANEOUS at 09:13

## 2021-11-30 RX ADMIN — SPIRONOLACTONE 100 MG: 100 TABLET ORAL at 09:13

## 2021-11-30 RX ADMIN — LIDOCAINE 2 PATCH: 560 PATCH PERCUTANEOUS; TOPICAL; TRANSDERMAL at 21:10

## 2021-11-30 RX ADMIN — GABAPENTIN 100 MG: 100 CAPSULE ORAL at 09:14

## 2021-11-30 RX ADMIN — POTASSIUM CHLORIDE 10 MEQ: 750 TABLET, EXTENDED RELEASE ORAL at 09:17

## 2021-11-30 RX ADMIN — ESCITALOPRAM OXALATE 10 MG: 10 TABLET ORAL at 09:14

## 2021-11-30 RX ADMIN — POLYETHYLENE GLYCOL 3350 17 G: 17 POWDER, FOR SOLUTION ORAL at 09:14

## 2021-11-30 RX ADMIN — OXYCODONE HYDROCHLORIDE 5 MG: 5 TABLET ORAL at 02:35

## 2021-11-30 RX ADMIN — THIAMINE HCL TAB 100 MG 100 MG: 100 TAB at 09:14

## 2021-11-30 RX ADMIN — ACETAMINOPHEN 975 MG: 325 TABLET, FILM COATED ORAL at 12:02

## 2021-11-30 RX ADMIN — OXYCODONE HYDROCHLORIDE 5 MG: 5 TABLET ORAL at 09:15

## 2021-11-30 RX ADMIN — ACETAMINOPHEN 975 MG: 325 TABLET, FILM COATED ORAL at 21:10

## 2021-11-30 RX ADMIN — Medication 1 MG: at 21:18

## 2021-11-30 RX ADMIN — OXYCODONE HYDROCHLORIDE 5 MG: 5 TABLET ORAL at 13:47

## 2021-11-30 RX ADMIN — PANCRELIPASE 2 CAPSULE: 24000; 76000; 120000 CAPSULE, DELAYED RELEASE PELLETS ORAL at 12:02

## 2021-11-30 ASSESSMENT — ACTIVITIES OF DAILY LIVING (ADL)
ADLS_ACUITY_SCORE: 13
ADLS_ACUITY_SCORE: 15
ADLS_ACUITY_SCORE: 15
ADLS_ACUITY_SCORE: 13
ADLS_ACUITY_SCORE: 15
ADLS_ACUITY_SCORE: 13
ADLS_ACUITY_SCORE: 15
ADLS_ACUITY_SCORE: 13
ADLS_ACUITY_SCORE: 15
ADLS_ACUITY_SCORE: 15
ADLS_ACUITY_SCORE: 13
ADLS_ACUITY_SCORE: 15

## 2021-11-30 ASSESSMENT — MIFFLIN-ST. JEOR: SCORE: 1250.05

## 2021-11-30 NOTE — PROGRESS NOTES
[facility]    Medicine Progress Note - Hospitalist Service, Gold 8       Date of Admission:  11/10/2021    Assessment & Plan           Aliyah Angeles is a 46 year old female admitted on 11/10/2021. She has a hx of DMII, etoh abuse, alcholic hepatitis, and Fitz-en-y gastric bypass who was transferred from the [facility] ARU for necrotizing pancreatitis and fevers.    Changes today:   - SW to follow up regarding inpatient chem dep, discharge pending placement  - Start scheduled APAP, atarax PRN for pain with goal to wean off opioids   - Creon with meals   - Doing well with oral intake      Acute necrotizing pancreatitis, present on admission, improving  Complex recent hospitalization course recently, notably from 9/29 to 11/5 w/ alk hep, series of pneumonias and ARDS w/ CRRT ICU stay/intubation, improved and was at ARU. CT scan showing pancreatitis w/ area of necrosis surrounding splenic vein and artery   - Panc/Bili consulted, appreciate assistance   - Oxycodone PRN for pain with eating, will continue to try and wean.     Malnutrition:     - Level of malnutrition: Moderate    - Based on: reduced intake, mild (or greater) subcutaneous fat loss, mild (or greater) muscle loss   - TF 11/18-11/28, NG pulled 11/28    Severe sepsis (HR, Temp) 2/2 suspected pneumonia with unknown organism, present on admission   Acute hypoxemic respiratory failure, resolved  based on 11/8 CT a/p w/ L>R bilateral GGO w/ new o2 requirement. s/p zosyn (11/9 - 11/11) deescalated to ciprofloxacin + flagyl (11/11 - 11/12), s/p Augmentin through 11/14 (completed 5 day course)    Suspect evolving alcoholic liver disease  Severe alcohol use disorder  Has not had chance to f/u output w/ hepatology, only started needing paracenteses during last admission (10/2021), INR elevated ~1.4 on presentation. initially continued rifaximin given for presumed HE last admission, however after chart review that diagnosis  was equivocal in the setting of many other medical problems, anticipating outpatient coverage difficulties and no e/o HE presently, discontinued as of 11/14, and will monitor for signs of HE development   - CD consult placed --> ideally will discharge to residential treatment center, appreciate SW coordination  however with NJ feeding for at least 1-2 weeks in the hospital will have to wait for now, ultimately TF duration to be determined by GI   - Ascites management:      - Spironolactone (11/11 - )    - Lasix (11/13 - )    - Paracentesis PRN    Headache  Patient with overnight headaches on 11/20.  Improved on oxycodone.  No photophobia or phonophobia noted on the visit.  No vision changes. No nausea/vomiting. Physical exam without neurological sensation or motoric loss, no meningismus, and no vision changes.  Will continue to monitor and may require further analgesia if the headache does not continue to improve.    Suspect R lower back strain  -TTP paraspinals on 11/12, no focal spinal tenderness   - lido patch, heat pad    Anxiety/Depression   - cont home lexapro, remeron.       Diet: Advance Diet as Tolerated: Low Fiber  Snacks/Supplements Adult: Ensure Max Protein (bariatric); Between Meals  Snacks/Supplements Adult: Other; Special K bar; Between Meals    DVT Prophylaxis: Enoxaparin (Lovenox) SQ  Rice Catheter: Not present  Central Lines: None  Code Status: Full Code      Disposition Plan   Expected discharge: 12/01/2021   recommended to prior living arrangement once adequate pain management/ tolerating PO medications.     The patient's care was discussed with the Patient and GI Consultant.    Cris Mariscal MD  Hospitalist Service, [address]  Securely message with the Vocera Web Console (learn more here)  Text page via [employer] Paging/Directory    Please see sign in/sign out for up to date coverage information    Clinically Significant Risk  Factors Present on Admission                ______________________________________________________________________    Interval History   NAEON. Eating adequately per patient. Denies chest pain. Abdominal pain stable, continues to need opioids. Up walking around. Is open to pain regimen changes to wean off opioids. Is eager for chem dep treatment. 4 point ROS otherwise negative.    Data reviewed today: I reviewed all medications, new labs and imaging results over the last 24 hours. Labs and Imaging reviewed in Epic, pertinent discussed in A&P.     Physical Exam   Vital Signs: Temp: 98.2  F (36.8  C) Temp src: Oral BP: 97/66 Pulse: 83   Resp: 20 SpO2: 97 % O2 Device: None (Room air)    Weight: 138 lbs 14.4 oz     Constitutional: laying in bed, NAD  HEENT: NG tube present, tracking appropriately, poor dentition  Cardiovascular: regular, without murmurs or gallops  Pulmonary/Chest: CTAB, no respiratory distress  GI: Soft, minimally tender no rebound tenderness or guarding   Skin: Skin is warm and dry  Neurological: Alert and oriented x4.     Data   Recent Labs   Lab 11/30/21  0458 11/29/21  0539 11/27/21  0653 11/26/21  0513 11/25/21  0522   WBC  --   --  6.7 6.7 7.1   HGB  --   --  8.1* 7.8* 7.8*   MCV  --   --  99 97 99   PLT  --   --  272 252 244   NA  --  138 136 137 136   POTASSIUM 3.6 3.8 4.2 4.3 4.5   CHLORIDE  --  106 103 104 103   CO2  --  25 27 26 25   BUN  --  16 16 14 11   CR  --  0.76 0.73 0.67 0.68   ANIONGAP  --  7 6 7 8   STEFFANY  --  9.1 8.6 8.5 8.4*   GLC  --  107* 161* 131* 144*     No results found for this or any previous visit (from the past 24 hour(s)).  Medications     dextrose       dextrose         acetaminophen  975 mg Oral Q8H     amylase-lipase-protease  2 capsule Oral TID w/meals     vitamin B-12  100 mcg Oral or Feeding Tube Daily     enoxaparin ANTICOAGULANT  40 mg Subcutaneous Q24H     escitalopram  10 mg Oral Daily     folic acid  1 mg Oral Daily     furosemide  40 mg Oral Daily      gabapentin  100 mg Oral TID     lidocaine  1-2 patch Transdermal Q24h    And     lidocaine   Transdermal Q8H     magnesium oxide  400 mg Oral BID     mirtazapine  15 mg Oral At Bedtime     multivitamin RENAL  1 capsule Oral Daily     pantoprazole  40 mg Oral QAM AC     polyethylene glycol  17 g Oral Daily     sennosides  1 tablet Oral At Bedtime     sodium chloride (PF)  3 mL Intracatheter Q8H     spironolactone  100 mg Oral or Feeding Tube Daily     thiamine  100 mg Oral Daily

## 2021-11-30 NOTE — PROGRESS NOTES
CLINICAL NUTRITION SERVICES - REASSESSMENT NOTE     Nutrition Prescription    RECOMMENDATIONS FOR MDs/PROVIDERS TO ORDER:  - None at this time    Malnutrition Status:    - Moderate malnutrition in the context of acute illness      Recommendations already ordered by Registered Dietitian (RD):  - Continue ONS as ordered    Future/Additional Recommendations:  - Monitor wt trends   - Monitor po tolerance and adequacy of intakes     EVALUATION OF THE PROGRESS TOWARD GOALS   Diet: Low Fiber with Ensure Max Protein betw meals (@ 10 am - Vanilla flavor) and Special K bar @ 2 pm    Intake: Calorie counts ordered on 11/24 x 3 days, but no data obtained per chart review.  Per RN/flowsheets (pt sleeping at time of visit), po intakes have gradually been improving from 25% of meals to 75% of meals over the past week. Noted that pt has had c/o's of abd pain recently, but no report of N/V. Good appetite reported by RN on 11/28  - Reported that family has been bringing in food from home for pt.    Nutrition Support: Previously on TF, but stopped per Provider on 11/27 and FT removed on 11/28    NEW FINDINGS   Weight: 63 kg (today), 70.7 kg (11/23), 84.6 kg (11/10 - admit); Wt continues to decline since admit with overall loss of 21.6 kg (26% loss) x approximately 3 weeks which is a significant amount of wt to lose in a relative short period of time. Question if pt was fluid wt up on admit, given that 68 kg (150 lbs reported to be her UBW) per RD note on 11/23    Labs:   No BMP obtained today, except for K+ which was WNL  Mg++ and PO4 WNL yesterday    Medications   Creon 24 - 2 capsules with meals TID  Vitamin B12 daily  Folic Acid daily  MAG-OX BID  Renal MVI daily  KCL x 1 dose today  Thiamine daily    MALNUTRITION  % Intake: < 75% for > 7 days (moderate)  % Weight Loss: Unable to assess  Subcutaneous Fat Loss: Upper arm:  Mild - per previous RD note.  Muscle Loss:Temporal: mild, Thoracic region (clavicle, acromium bone, deltoid,  trapezius, pectoral): mild, Upper arm (bicep, tricep): moderate and Lower arm  (forearm): moderate.  Per previous RD note..  Fluid Accumulation/Edema: Does not meet criteria  Malnutrition Diagnosis: Moderate malnutrition in the context of acute illness    Previous Goals   Total avg nutritional intake to meet a minimum of 25 kcal/kg and 1.2 g PRO/kg daily (per dosing wt 60 kg).    Evaluation: Unable to evaluate    Previous Nutrition Diagnosis  Inadequate oral intake related to poor appetite as evidenced by minimal po intake and reliance on EN via NJT to meet 100% nutrition needs      Evaluation: Improving    CURRENT NUTRITION DIAGNOSIS  Inadequate oral intake related to previously dependent on TF, but discontinued d/t improved appetite, however, pt now with recent c/o's abd pain as evidenced by pt consuming on ave < 75% of po intakes over the past week.    INTERVENTIONS  Implementation  Collaboration with RN regarding pt's po intakes    Goals  Patient to consume % of nutritionally adequate meal trays TID, or the equivalent with supplements/snacks.    Monitoring/Evaluation  Progress toward goals will be monitored and evaluated per protocol.        Kelly Monroe RD,LD  7D pager [phone]

## 2021-11-30 NOTE — PLAN OF CARE
A&Ox4. VSS. C/o abdominal pain tolerated with 5 mg prn oxycodone x2 and 975 mg Tylenol x1. 50 mg PO atarax given as adjuvant for pain. Denies N/V and SOB. AM K: 3.6. Replace with 10 mEq PO K, recheck in AM. Walking halls independently. No significant events this shift. Continuing to seek facility placement with [facility].

## 2021-11-30 NOTE — PROGRESS NOTES
Care Management Follow Up    Length of Stay (days): 20    Expected Discharge Date: 11/30/2021     Concerns to be Addressed:   Discharge planning      Patient plan of care discussed at interdisciplinary rounds: Yes    Anticipated Discharge Disposition: Residential Chem Dep Placement     Anticipated Discharge Services:  None     Anticipated Discharge DME:  None      Education Provided on the Discharge Plan:  Yes     Patient/Family in Agreement with the Plan:  Yes - Patient is extremely interested in going to residential treatment     Referrals Placed by CM/SW:  [facility]; [facility]     Private pay costs discussed: Not applicable     Additional Information:    Discharge pending residential chemical dependency treatment placement.      is awaiting multiple return calls from [facility], [facility], and [facility]. Gold 8 MD also encouraged patient to reach out to treatment facilities 11/29/21 and advocate for self.      visited with patient this morning. Discussed status of calls and no responses. Patient also noted that she had not received any return calls from either [facility] or [facility].  Patient stated that she will continue to call today in hopes of reaching a representative.  Patient thanked this writer for the updates.     Return call received from [facility] Director, Randy. Discussed referral that was sent on 11/12/21 and Randy stated that patient was not medically appropriate for their facility due to oxygen needs.  explained that patient's medical status has significantly improved and is not longer on oxygen, with a feeding tube, and is independent with all cares. Randy stated that this writer would need to contact [facility] (P: [phone]) to re-refer patient to program and provide updated medical records. Randy provided direct contact number (P: [phone]) and  "encouraged this writer to reach out if any resistance is met.      reached out to KAMLESH Rodriguez to request assistance with obtaining initial referral to [facility] so proper documents can be submitted.      faxed updated records and Rule 25 assessment to [facility] (F: [phone]). Return call received from Daisy at [facility] ([phone]). Patient has been denied admission to program due to concerns raised from prior admission. Daisy encouraged writer to attempt to find alternate placement for patient.     Return call received from [facility] director, Randy. Randy has denied patient for admission as they feel they \"are note equipped to take her here\".       met with patient and provided update. Patient stated that she is still very interested in pursuing residential treatment and would like additional referrals submitted to the following:    [facility] [address] [place] [address]  P: [phone]  F: [phone]    [address] [address]  P: [phone]  F: [phone]    Referrals sent.    HODA Hawk  7D/5C Hematology/Oncology Social Worker  Phone: [phone]  Pager: [phone]  [email or web address]  "

## 2021-11-30 NOTE — PLAN OF CARE
[id number]    VSS on RA. Denies nausea and vomiting. Experiencing some abdominal pain, utilized PRN oxy x1 and PRN tylenol x1. PRN robaxin, benadryl, and melatonin also given..     Mag being replaced for 1.6.

## 2021-11-30 NOTE — PLAN OF CARE
[id number]: VSS, no c/o nausea or sob this shift. PRN oxy given x1 for abdominal pain w/relief. Continuing w/mg replacement protocol today. Continue w/plan of care.

## 2021-12-01 LAB
HOLD SPECIMEN: NORMAL
MAGNESIUM SERPL-MCNC: 1.8 MG/DL (ref 1.6–2.3)
PHOSPHATE SERPL-MCNC: 5.1 MG/DL (ref 2.5–4.5)
POTASSIUM BLD-SCNC: 3.5 MMOL/L (ref 3.4–5.3)

## 2021-12-01 PROCEDURE — 250N000013 HC RX MED GY IP 250 OP 250 PS 637: Performed by: STUDENT IN AN ORGANIZED HEALTH CARE EDUCATION/TRAINING PROGRAM

## 2021-12-01 PROCEDURE — 83735 ASSAY OF MAGNESIUM: CPT | Performed by: INTERNAL MEDICINE

## 2021-12-01 PROCEDURE — 250N000013 HC RX MED GY IP 250 OP 250 PS 637: Performed by: INTERNAL MEDICINE

## 2021-12-01 PROCEDURE — 84132 ASSAY OF SERUM POTASSIUM: CPT | Performed by: INTERNAL MEDICINE

## 2021-12-01 PROCEDURE — 120N000002 HC R&B MED SURG/OB UMMC

## 2021-12-01 PROCEDURE — 36415 COLL VENOUS BLD VENIPUNCTURE: CPT | Performed by: INTERNAL MEDICINE

## 2021-12-01 PROCEDURE — 999N000127 HC STATISTIC PERIPHERAL IV START W US GUIDANCE

## 2021-12-01 PROCEDURE — 99232 SBSQ HOSP IP/OBS MODERATE 35: CPT | Performed by: STUDENT IN AN ORGANIZED HEALTH CARE EDUCATION/TRAINING PROGRAM

## 2021-12-01 PROCEDURE — 84100 ASSAY OF PHOSPHORUS: CPT | Performed by: INTERNAL MEDICINE

## 2021-12-01 PROCEDURE — 250N000013 HC RX MED GY IP 250 OP 250 PS 637: Performed by: HOSPITALIST

## 2021-12-01 PROCEDURE — 250N000011 HC RX IP 250 OP 636: Performed by: INTERNAL MEDICINE

## 2021-12-01 PROCEDURE — 250N000011 HC RX IP 250 OP 636: Performed by: STUDENT IN AN ORGANIZED HEALTH CARE EDUCATION/TRAINING PROGRAM

## 2021-12-01 PROCEDURE — 99207 PR CDG-MDM COMPONENT: MEETS MODERATE - DOWN CODED: CPT | Performed by: STUDENT IN AN ORGANIZED HEALTH CARE EDUCATION/TRAINING PROGRAM

## 2021-12-01 RX ORDER — MAGNESIUM SULFATE HEPTAHYDRATE 40 MG/ML
2 INJECTION, SOLUTION INTRAVENOUS ONCE
Status: COMPLETED | OUTPATIENT
Start: 2021-12-01 | End: 2021-12-01

## 2021-12-01 RX ORDER — OXYCODONE HYDROCHLORIDE 5 MG/1
5 TABLET ORAL 3 TIMES DAILY PRN
Status: DISCONTINUED | OUTPATIENT
Start: 2021-12-01 | End: 2021-12-02 | Stop reason: HOSPADM

## 2021-12-01 RX ORDER — METHOCARBAMOL 500 MG/1
500 TABLET, FILM COATED ORAL 3 TIMES DAILY
Status: DISCONTINUED | OUTPATIENT
Start: 2021-12-01 | End: 2021-12-02 | Stop reason: HOSPADM

## 2021-12-01 RX ORDER — POTASSIUM CHLORIDE 750 MG/1
10 TABLET, EXTENDED RELEASE ORAL ONCE
Status: COMPLETED | OUTPATIENT
Start: 2021-12-01 | End: 2021-12-01

## 2021-12-01 RX ORDER — CYCLOBENZAPRINE HCL 10 MG
10 TABLET ORAL 3 TIMES DAILY
Status: DISCONTINUED | OUTPATIENT
Start: 2021-12-01 | End: 2021-12-01

## 2021-12-01 RX ORDER — POTASSIUM CHLORIDE 20MEQ/15ML
10 LIQUID (ML) ORAL ONCE
Status: DISCONTINUED | OUTPATIENT
Start: 2021-12-01 | End: 2021-12-02 | Stop reason: HOSPADM

## 2021-12-01 RX ADMIN — ASCORBIC ACID, THIAMINE MONONITRATE,RIBOFLAVIN, NIACINAMIDE, PYRIDOXINE HYDROCHLORIDE, FOLIC ACID, CYANOCOBALAMIN, BIOTIN, CALCIUM PANTOTHENATE, 1 CAPSULE: 100; 1.5; 1.7; 20; 10; 1; 6000; 150000; 5 CAPSULE, LIQUID FILLED ORAL at 09:01

## 2021-12-01 RX ADMIN — ACETAMINOPHEN 975 MG: 325 TABLET, FILM COATED ORAL at 14:29

## 2021-12-01 RX ADMIN — MAGNESIUM SULFATE IN WATER 2 G: 40 INJECTION, SOLUTION INTRAVENOUS at 10:16

## 2021-12-01 RX ADMIN — ACETAMINOPHEN 975 MG: 325 TABLET, FILM COATED ORAL at 21:23

## 2021-12-01 RX ADMIN — Medication 400 MG: at 09:02

## 2021-12-01 RX ADMIN — GABAPENTIN 100 MG: 100 CAPSULE ORAL at 09:03

## 2021-12-01 RX ADMIN — METHOCARBAMOL 500 MG: 500 TABLET ORAL at 21:22

## 2021-12-01 RX ADMIN — VITAM B12 100 MCG: 100 TAB at 09:03

## 2021-12-01 RX ADMIN — ENOXAPARIN SODIUM 40 MG: 40 INJECTION SUBCUTANEOUS at 10:17

## 2021-12-01 RX ADMIN — SPIRONOLACTONE 100 MG: 100 TABLET ORAL at 09:01

## 2021-12-01 RX ADMIN — LIDOCAINE 1 PATCH: 560 PATCH PERCUTANEOUS; TOPICAL; TRANSDERMAL at 21:23

## 2021-12-01 RX ADMIN — ESCITALOPRAM OXALATE 10 MG: 10 TABLET ORAL at 09:02

## 2021-12-01 RX ADMIN — THIAMINE HCL TAB 100 MG 100 MG: 100 TAB at 09:03

## 2021-12-01 RX ADMIN — OXYCODONE HYDROCHLORIDE 5 MG: 5 TABLET ORAL at 07:27

## 2021-12-01 RX ADMIN — OXYCODONE HYDROCHLORIDE 5 MG: 5 TABLET ORAL at 16:59

## 2021-12-01 RX ADMIN — HYDROXYZINE HYDROCHLORIDE 25 MG: 25 TABLET, FILM COATED ORAL at 07:28

## 2021-12-01 RX ADMIN — OXYCODONE HYDROCHLORIDE 5 MG: 5 TABLET ORAL at 11:49

## 2021-12-01 RX ADMIN — MIRTAZAPINE 15 MG: 15 TABLET, FILM COATED ORAL at 21:23

## 2021-12-01 RX ADMIN — FOLIC ACID 1 MG: 1 TABLET ORAL at 09:03

## 2021-12-01 RX ADMIN — PANCRELIPASE 2 CAPSULE: 24000; 76000; 120000 CAPSULE, DELAYED RELEASE PELLETS ORAL at 17:00

## 2021-12-01 RX ADMIN — FUROSEMIDE 40 MG: 40 TABLET ORAL at 09:03

## 2021-12-01 RX ADMIN — GABAPENTIN 100 MG: 100 CAPSULE ORAL at 21:23

## 2021-12-01 RX ADMIN — Medication 1 MG: at 21:22

## 2021-12-01 RX ADMIN — GABAPENTIN 100 MG: 100 CAPSULE ORAL at 14:29

## 2021-12-01 RX ADMIN — ACETAMINOPHEN 975 MG: 325 TABLET, FILM COATED ORAL at 05:10

## 2021-12-01 RX ADMIN — METHOCARBAMOL 500 MG: 500 TABLET ORAL at 14:29

## 2021-12-01 RX ADMIN — PANCRELIPASE 2 CAPSULE: 24000; 76000; 120000 CAPSULE, DELAYED RELEASE PELLETS ORAL at 08:40

## 2021-12-01 RX ADMIN — POLYETHYLENE GLYCOL 3350 17 G: 17 POWDER, FOR SOLUTION ORAL at 09:01

## 2021-12-01 RX ADMIN — PANCRELIPASE 2 CAPSULE: 24000; 76000; 120000 CAPSULE, DELAYED RELEASE PELLETS ORAL at 13:20

## 2021-12-01 RX ADMIN — PANTOPRAZOLE SODIUM 40 MG: 40 TABLET, DELAYED RELEASE ORAL at 09:03

## 2021-12-01 RX ADMIN — POTASSIUM CHLORIDE 10 MEQ: 750 TABLET, EXTENDED RELEASE ORAL at 13:20

## 2021-12-01 ASSESSMENT — ACTIVITIES OF DAILY LIVING (ADL)
ADLS_ACUITY_SCORE: 15
ADLS_ACUITY_SCORE: 14
ADLS_ACUITY_SCORE: 15
ADLS_ACUITY_SCORE: 14
ADLS_ACUITY_SCORE: 15
ADLS_ACUITY_SCORE: 15
ADLS_ACUITY_SCORE: 13
ADLS_ACUITY_SCORE: 15
ADLS_ACUITY_SCORE: 15
ADLS_ACUITY_SCORE: 14
ADLS_ACUITY_SCORE: 14
ADLS_ACUITY_SCORE: 15
ADLS_ACUITY_SCORE: 15
ADLS_ACUITY_SCORE: 14
ADLS_ACUITY_SCORE: 14
ADLS_ACUITY_SCORE: 15
ADLS_ACUITY_SCORE: 14
ADLS_ACUITY_SCORE: 13
ADLS_ACUITY_SCORE: 15

## 2021-12-01 ASSESSMENT — MIFFLIN-ST. JEOR: SCORE: 1250.05

## 2021-12-01 NOTE — PROGRESS NOTES
[facility]    Medicine Progress Note - Hospitalist Service, Gold 8       Date of Admission:  11/10/2021    Assessment & Plan           Aliyah Angeles is a 46 year old female admitted on 11/10/2021. She has a hx of DMII, etoh abuse, alcholic hepatitis, and Fitz-en-y gastric bypass who [place] [facility] [place] due to necrotizing pancreatitis .    Changes today:  - Plan for discharge to home as early as 12/2 with establishment of chemical dependency inpatient placement (patient to discharge home prior)  - Continue scheduled APAP, atarax PRN for pain, transition icy-hot patch to scheduled, increase robaxin to TID with goal to wean off opioids  - Reduce oxycodone frequency to TID PRN from QID PRN   - Creon with meals     Acute necrotizing pancreatitis, present on admission, improving  Complex recent hospitalization course recently, notably from 9/29 to 11/5 w/ alk hep, series of pneumonias and ARDS w/ CRRT ICU stay/intubation, improved and was at ARU. CT scan showing pancreatitis w/ area of necrosis surrounding splenic vein and artery   - Panc/Bili consulted, appreciate assistance   - Oxycodone PRN for pain with eating, will continue to try and wean.     Malnutrition:     - Level of malnutrition: Moderate    - Based on: reduced intake, mild (or greater) subcutaneous fat loss, mild (or greater) muscle loss   - TF 11/18-11/28, NG pulled 11/28    Severe sepsis (HR, Temp) 2/2 suspected pneumonia with unknown organism, present on admission   Acute hypoxemic respiratory failure, resolved  based on 11/8 CT a/p w/ L>R bilateral GGO w/ new o2 requirement. s/p zosyn (11/9 - 11/11) deescalated to ciprofloxacin + flagyl (11/11 - 11/12), s/p Augmentin through 11/14 (completed 5 day course)    Suspect evolving alcoholic liver disease  Severe alcohol use disorder  Has not had chance to f/u output w/ hepatology, only started needing paracenteses during last  admission (10/2021), INR elevated ~1.4 on presentation. initially continued rifaximin given for presumed HE last admission, however after chart review that diagnosis was equivocal in the setting of many other medical problems, anticipating outpatient coverage difficulties and no e/o HE presently, discontinued as of 11/14, and will monitor for signs of HE development   - Ascites management:      - Spironolactone (11/11 - )    - Lasix (11/13 - )    - Paracentesis PRN    Headache  Patient with overnight headaches on 11/20.  Improved on oxycodone.  No photophobia or phonophobia noted on the visit.  No vision changes. No nausea/vomiting. Physical exam without neurological sensation or motoric loss, no meningismus, and no vision changes.  Will continue to monitor and may require further analgesia if the headache does not continue to improve.    Suspect R lower back strain  -TTP paraspinals on 11/12, no focal spinal tenderness   - lido patch, heat pad    Anxiety/Depression   - cont home lexapro, remeron.       Diet: Advance Diet as Tolerated: Low Fiber  Snacks/Supplements Adult: Ensure Max Protein (bariatric); Between Meals  Snacks/Supplements Adult: Other; Special K bar; Between Meals    DVT Prophylaxis: Enoxaparin (Lovenox) SQ  Rice Catheter: Not present  Central Lines: None  Code Status: Full Code      Disposition Plan   Expected discharge: 12/01/2021   recommended to prior living arrangement once adequate pain management/ tolerating PO medications.     The patient's care was discussed with the Care Coordinator/ and Patient.    Cris Mariscal MD  Hospitalist Service, [address]  Securely message with the Vocera Web Console (learn more here)  Text page via [employer] Paging/Directory    Please see sign in/sign out for up to date coverage information    Clinically Significant Risk Factors Present on Admission                 ______________________________________________________________________    Interval History   NAEON. Eating adequately per patient. Denies chest pain. Abdominal pain stable, continues to need opioids. Up walking around. Is open to pain regimen changes to wean off opioids. Is eager for chem dep treatment. 4 point ROS otherwise negative.    Data reviewed today: I reviewed all medications, new labs and imaging results over the last 24 hours. Labs and Imaging reviewed in Epic, pertinent discussed in A&P.     Physical Exam   Vital Signs: Temp: (!) 96.7  F (35.9  C) Temp src: Oral BP: 110/74 Pulse: 92   Resp: 16 SpO2: 96 % O2 Device: None (Room air)    Weight: 138 lbs 14.4 oz     Constitutional: walking through halls, sitting upright at edge of bed, NAD  HEENT: NG tube present, tracking appropriately, poor dentition  Cardiovascular: regular, without murmurs or gallops  Pulmonary/Chest: CTAB, no respiratory distress  GI: Soft, minimally tender no rebound tenderness or guarding   Skin: Skin is warm and dry  Neurological: Alert and oriented x4.     Data   Recent Labs   Lab 11/30/21  0458 11/29/21  0539 11/27/21  0653 11/26/21  0513 11/25/21  0522   WBC  --   --  6.7 6.7 7.1   HGB  --   --  8.1* 7.8* 7.8*   MCV  --   --  99 97 99   PLT  --   --  272 252 244   NA  --  138 136 137 136   POTASSIUM 3.6 3.8 4.2 4.3 4.5   CHLORIDE  --  106 103 104 103   CO2  --  25 27 26 25   BUN  --  16 16 14 11   CR  --  0.76 0.73 0.67 0.68   ANIONGAP  --  7 6 7 8   STEFFANY  --  9.1 8.6 8.5 8.4*   GLC  --  107* 161* 131* 144*     No results found for this or any previous visit (from the past 24 hour(s)).  Medications     dextrose       dextrose         acetaminophen  975 mg Oral Q8H     amylase-lipase-protease  2 capsule Oral TID w/meals     vitamin B-12  100 mcg Oral or Feeding Tube Daily     enoxaparin ANTICOAGULANT  40 mg Subcutaneous Q24H     escitalopram  10 mg Oral Daily     folic acid  1 mg Oral Daily     furosemide  40 mg Oral Daily      gabapentin  100 mg Oral TID     lidocaine  1-2 patch Transdermal Q24h    And     lidocaine   Transdermal Q8H     magnesium oxide  400 mg Oral BID     mirtazapine  15 mg Oral At Bedtime     multivitamin RENAL  1 capsule Oral Daily     pantoprazole  40 mg Oral QAM AC     polyethylene glycol  17 g Oral Daily     sennosides  1 tablet Oral At Bedtime     sodium chloride (PF)  3 mL Intracatheter Q8H     spironolactone  100 mg Oral or Feeding Tube Daily     thiamine  100 mg Oral Daily

## 2021-12-01 NOTE — PROGRESS NOTES
[id number]:  A&OX4, VSS. Afebrile. Complained of abdominal pain, PRN Oxycodone 5 mg given. Requested PRN Simethicone and  melatonin for sleep. Denies nausea or vomiting. Up independenlty ambulating in the hallways. Awaiting placement. Continue with POC.

## 2021-12-01 NOTE — PLAN OF CARE
[id number]    VSS on RA. C/o pain, gave oxycodone x1 and scheduled tylenol x1. Atarax given as adjuvant for pain. Denies nausea and SOB. Pt up independently. No acute events overnight. Continue with plan of care.

## 2021-12-01 NOTE — PROGRESS NOTES
Care Management Follow Up    Length of Stay (days): 21    Expected Discharge Date: 12/01/2021 - pending placement     Concerns to be Addressed:  Discharge planning    Patient plan of care discussed at interdisciplinary rounds: Yes    Anticipated Discharge Disposition: Residential Chem Dep Placement     Anticipated Discharge Services:  None     Anticipated Discharge DME:  None      Education Provided on the Discharge Plan:  Yes     Patient/Family in Agreement with the Plan:  Yes - Patient is extremely interested in going to residential treatment     Referrals Placed by CM/SW:  [facility]; [facility]; [facility]; [facility]     Private pay costs discussed: Not applicable    Additional Information:    Discharge pending residential chemical dependency treatment placement.     Call received from [facility], [facility], requesting additional progress notes and nursing notes.  faxed requested records at 0905.  Return call received from [facility] ([phone]). [facility] reports patient appears appropriate for placement but want patient to be off of all opiates. [facility] requested this writer pass off information to provider and trial patient's stability prior to acceptance. Provider paged [phone].     Patient expressed to bedside nurse that she would be open to discharging to patient's father's home until residential placement is obtained.  provided update to patient about [facility]'s need for patient to be weaned off of all opiates prior to admission.  explained that call would be placed to [facility] to determine if patient would need to stay inpatient while she awaits placement or if patient could discharge to father's home and admit from there. Voicemail left with intake requesting call back ([phone]).     Return voicemail received from [facility] at [facility]. Facility can accept patient from home but prefer a direct transfer. [facility] requested writer return  "call to provide additional information ([phone]).      called patient to provide update regarding [facility]. Patient stated that she had received a call from [facility] stating that they would be able to accept her but not until next week. Patient explained that she was okay with delaying admissions as she would prefer to go to [facility] over [place] and her plan would be to discharge to her father's home until admissions could occur.  informed patient that we would support her in whatever treatment option she would prefer and inquired if there was anything else this writer could do to support her; patient stated \"No\" and thanked this writer.      paged Gold 8 and provided update regarding discharge plan.     Denials:       [facility]    [facility]     Kallie Peña [facility]  7D/5C Hematology/Oncology Social Worker  Phone: [phone]  Pager: [phone]  [email or web address]  "

## 2021-12-01 NOTE — PLAN OF CARE
Doing well. Plan is to wean her off Oxycodone so she get get placement at a chemical dependency facility. Denied nausea. Up independently.

## 2021-12-02 VITALS
WEIGHT: 143.1 LBS | DIASTOLIC BLOOD PRESSURE: 84 MMHG | OXYGEN SATURATION: 100 % | SYSTOLIC BLOOD PRESSURE: 114 MMHG | HEART RATE: 83 BPM | RESPIRATION RATE: 16 BRPM | BODY MASS INDEX: 24.43 KG/M2 | HEIGHT: 64 IN | TEMPERATURE: 97.4 F

## 2021-12-02 LAB
MAGNESIUM SERPL-MCNC: 2.1 MG/DL (ref 1.6–2.3)
PHOSPHATE SERPL-MCNC: 5.2 MG/DL (ref 2.5–4.5)
POTASSIUM BLD-SCNC: 3.7 MMOL/L (ref 3.4–5.3)

## 2021-12-02 PROCEDURE — 250N000013 HC RX MED GY IP 250 OP 250 PS 637: Performed by: HOSPITALIST

## 2021-12-02 PROCEDURE — 83735 ASSAY OF MAGNESIUM: CPT | Performed by: STUDENT IN AN ORGANIZED HEALTH CARE EDUCATION/TRAINING PROGRAM

## 2021-12-02 PROCEDURE — 36415 COLL VENOUS BLD VENIPUNCTURE: CPT | Performed by: STUDENT IN AN ORGANIZED HEALTH CARE EDUCATION/TRAINING PROGRAM

## 2021-12-02 PROCEDURE — 84132 ASSAY OF SERUM POTASSIUM: CPT | Performed by: STUDENT IN AN ORGANIZED HEALTH CARE EDUCATION/TRAINING PROGRAM

## 2021-12-02 PROCEDURE — 250N000013 HC RX MED GY IP 250 OP 250 PS 637: Performed by: STUDENT IN AN ORGANIZED HEALTH CARE EDUCATION/TRAINING PROGRAM

## 2021-12-02 PROCEDURE — 99239 HOSP IP/OBS DSCHRG MGMT >30: CPT | Performed by: STUDENT IN AN ORGANIZED HEALTH CARE EDUCATION/TRAINING PROGRAM

## 2021-12-02 PROCEDURE — 84100 ASSAY OF PHOSPHORUS: CPT | Performed by: STUDENT IN AN ORGANIZED HEALTH CARE EDUCATION/TRAINING PROGRAM

## 2021-12-02 PROCEDURE — 250N000013 HC RX MED GY IP 250 OP 250 PS 637: Performed by: INTERNAL MEDICINE

## 2021-12-02 RX ORDER — OXYCODONE HYDROCHLORIDE 5 MG/1
5 TABLET ORAL 3 TIMES DAILY PRN
Qty: 5 TABLET | Refills: 0 | Status: SHIPPED | OUTPATIENT
Start: 2021-12-02

## 2021-12-02 RX ORDER — ACETAMINOPHEN 325 MG/1
975 TABLET ORAL EVERY 8 HOURS
COMMUNITY
Start: 2021-12-02

## 2021-12-02 RX ORDER — SPIRONOLACTONE 100 MG/1
100 TABLET, FILM COATED ORAL DAILY
Qty: 30 TABLET | Refills: 0 | Status: SHIPPED | OUTPATIENT
Start: 2021-12-03 | End: 2022-01-02

## 2021-12-02 RX ORDER — METHOCARBAMOL 500 MG/1
500 TABLET, FILM COATED ORAL 3 TIMES DAILY
Qty: 35 TABLET | Refills: 0 | Status: SHIPPED | OUTPATIENT
Start: 2021-12-02

## 2021-12-02 RX ORDER — HYDROXYZINE HYDROCHLORIDE 25 MG/1
25 TABLET, FILM COATED ORAL EVERY 6 HOURS PRN
Qty: 28 TABLET | Refills: 0 | Status: SHIPPED | OUTPATIENT
Start: 2021-12-02 | End: 2021-12-09

## 2021-12-02 RX ORDER — FUROSEMIDE 40 MG
40 TABLET ORAL DAILY
Qty: 30 TABLET | Refills: 0 | Status: SHIPPED | OUTPATIENT
Start: 2021-12-03 | End: 2022-01-02

## 2021-12-02 RX ORDER — POLYETHYLENE GLYCOL 3350 17 G/17G
17 POWDER, FOR SOLUTION ORAL DAILY
Qty: 116 G | Refills: 0 | Status: SHIPPED | OUTPATIENT
Start: 2021-12-02 | End: 2021-12-09

## 2021-12-02 RX ADMIN — SPIRONOLACTONE 100 MG: 100 TABLET ORAL at 07:27

## 2021-12-02 RX ADMIN — ESCITALOPRAM OXALATE 10 MG: 10 TABLET ORAL at 07:28

## 2021-12-02 RX ADMIN — VITAM B12 100 MCG: 100 TAB at 07:28

## 2021-12-02 RX ADMIN — THIAMINE HCL TAB 100 MG 100 MG: 100 TAB at 07:28

## 2021-12-02 RX ADMIN — FUROSEMIDE 40 MG: 40 TABLET ORAL at 07:28

## 2021-12-02 RX ADMIN — FOLIC ACID 1 MG: 1 TABLET ORAL at 07:26

## 2021-12-02 RX ADMIN — ASCORBIC ACID, THIAMINE MONONITRATE,RIBOFLAVIN, NIACINAMIDE, PYRIDOXINE HYDROCHLORIDE, FOLIC ACID, CYANOCOBALAMIN, BIOTIN, CALCIUM PANTOTHENATE, 1 CAPSULE: 100; 1.5; 1.7; 20; 10; 1; 6000; 150000; 5 CAPSULE, LIQUID FILLED ORAL at 07:27

## 2021-12-02 RX ADMIN — PANTOPRAZOLE SODIUM 40 MG: 40 TABLET, DELAYED RELEASE ORAL at 07:28

## 2021-12-02 RX ADMIN — OXYCODONE HYDROCHLORIDE 5 MG: 5 TABLET ORAL at 07:26

## 2021-12-02 RX ADMIN — PANCRELIPASE 2 CAPSULE: 24000; 76000; 120000 CAPSULE, DELAYED RELEASE PELLETS ORAL at 07:28

## 2021-12-02 RX ADMIN — ACETAMINOPHEN 975 MG: 325 TABLET, FILM COATED ORAL at 07:28

## 2021-12-02 RX ADMIN — METHOCARBAMOL 500 MG: 500 TABLET ORAL at 07:28

## 2021-12-02 RX ADMIN — GABAPENTIN 100 MG: 100 CAPSULE ORAL at 07:28

## 2021-12-02 ASSESSMENT — ACTIVITIES OF DAILY LIVING (ADL)
ADLS_ACUITY_SCORE: 14

## 2021-12-02 ASSESSMENT — MIFFLIN-ST. JEOR: SCORE: 1269.1

## 2021-12-02 NOTE — PLAN OF CARE
[id number]    VSS on RA. Denies pain, nausea, and SOB. Up independently overnight. No acute events overnight. Possible discharge today. Continue with plan of care.

## 2021-12-02 NOTE — PROGRESS NOTES
Reviewed discharge orders and medications and Aliyah verbalized understanding. She has a follow up clinic appointment and phone numbers to call with questions.

## 2021-12-02 NOTE — PROGRESS NOTES
[id number]  VSS on RA.  Requested PRN oxycodone for abdominal pain and melatonin for sleep. Denies nausea or vomiting. Up independently. Awaiting placement. No acute events, continue with POC.

## 2021-12-03 ENCOUNTER — PATIENT OUTREACH (OUTPATIENT)
Dept: CARE COORDINATION | Facility: CLINIC | Age: 46
End: 2021-12-03
Payer: COMMERCIAL

## 2021-12-03 DIAGNOSIS — Z71.89 OTHER SPECIFIED COUNSELING: ICD-10-CM

## 2021-12-03 NOTE — PROGRESS NOTES
Clinic Care Coordination Contact  [facility]: Post-Discharge Note  SITUATION                                                      Admission:    Admission Date: 11/10/21   Reason for Admission: Acute necrotizing pancreatitis  Discharge:   Discharge Date: 12/02/21  Discharge Diagnosis: Acute necrotizing pancreatitis    BACKGROUND                                                      Aliyah Angeles is a 46 year old female admitted on 11/10/2021. She has a hx of DMII, etoh abuse, alcholic hepatitis, and Fitz-en-y gastric bypass who was transferred from the [facility] for severe sepsis due to necrotizing pancreatitis .       ASSESSMENT      Enrollment  Primary Care Care Coordination Status: Not a Candidate    Discharge Assessment  How are you doing now that you are home?: Patient reports she is feeling better.  She is quetioning if she got all medication needed.  She will call the unit to confirm  How are your symptoms? (Red Flag symptoms escalate to triage hotline per guidelines): Improved  Do you feel your condition is stable enough to be safe at home until your provider visit?: Yes  Does the patient have their discharge instructions? : Yes  Does the patient have questions regarding their discharge instructions? : No  Were you started on any new medications or were there changes to any of your previous medications? : Yes  Does the patient have all of their medications?: No (see comment) (Patient think she may be missing one.)  Do you have questions regarding any of your medications? : No  Discharge follow-up appointment scheduled within 14 calendar days? : No  Is patient agreeable to assistance with scheduling? : No (Patient waiting for treatment center to call)                  PLAN                                                      Outpatient Plan:      Follow up with primary care provider, VENICE ROSALES, within 1 month for hospital follow- up. The following labs/tests  are recommended: BMP while on  diuretics.  [place] [facility] [place]    No future appointments.      For any urgent concerns, please contact our 24 hour nurse triage line: [phone] ([phone])         EMERSON Soliman  [phone]  [facility]

## 2021-12-13 NOTE — DISCHARGE SUMMARY
[facility]  Hospitalist Discharge Summary      Date of Admission:  11/10/2021  Date of Discharge:  12/2/2021 10:59 AM  Discharging Provider: Cris Mariscal MD  Discharge Team: Hospitalist Service, Gold 8    Discharge Diagnoses   Acute necrotizing pancreatitis, present on admission, improving  Malnutrition  Severe sepsis (HR, Temp) 2/2 suspected pneumonia with unknown organism, present on admission   Acute hypoxemic respiratory failure, resolved  Suspect evolving alcoholic liver disease  Severe alcohol use disorder  Headache  Suspect R lower back strain  Anxiety/Depression      Follow-ups Needed After Discharge   Follow-up Appointments     Adult [facility] Follow-up and recommended labs and tests      Follow up with primary care provider, VENICE ROSALES, within 1 month for   hospital follow- up.  The following labs/tests are recommended: BMP while   on diuretics.    Follow up with hepatology at the [facility].    Appointments on [place] and/or [facility] (with [facility] or [facility]   provider or service). Call [phone] if you haven't heard regarding   these appointments within 7 days of discharge.             Unresulted Labs Ordered in the Past 30 Days of this Admission     Date and Time Order Name Status Description    10/21/2021  9:15 AM Prepare red blood cells (unit) Preliminary     10/7/2021  5:00 AM Prepare red blood cells (unit) Preliminary     10/3/2021  7:00 PM Prepare red blood cells (unit) Preliminary           Discharge Disposition   Discharged to home  Condition at discharge: Stable    Hospital Course   Aliyah Angeles is a 46 year old female admitted on 11/10/2021. She has a hx of DMII, etoh abuse, alcholic hepatitis, and Fitz-en-y gastric bypass who was transferred from the [facility] for severe sepsis due to necrotizing pancreatitis .     Acute necrotizing pancreatitis, present on admission, improving  Complex recent hospitalization course  recently, notably from 9/29 to 11/5 w/ alk hep, series of pneumonias and ARDS w/ CRRT ICU stay/intubation, improved and was at ARU. CT scan showing pancreatitis w/ area of necrosis surrounding splenic vein and artery. Panc bili consulted on current hospitalization. On multimodal pain therapy including oxycodone which was being weaned at time of discharge. Patient seen by nutrition with adequate PO intake prior to discharge.    Malnutrition:     - Level of malnutrition: Moderate    - Based on: reduced intake, mild (or greater) subcutaneous fat loss, mild (or greater) muscle loss   - TF 11/18-11/28, NG pulled 11/28    Severe sepsis (HR, Temp) 2/2 suspected pneumonia with unknown organism, present on admission   Acute hypoxemic respiratory failure, resolved  based on 11/8 CT a/p w/ L>R bilateral GGO w/ new o2 requirement. s/p zosyn (11/9 - 11/11) deescalated to ciprofloxacin + flagyl (11/11 - 11/12), s/p Augmentin through 11/14 (completed 5 day course)    Suspect evolving alcoholic liver disease  Severe alcohol use disorder  Has not had chance to f/u output w/ hepatology, only started needing paracenteses during last admission (10/2021), INR elevated ~1.4 on presentation. initially continued rifaximin given for presumed HE last admission, however after chart review that diagnosis was equivocal in the setting of many other medical problems, anticipating outpatient coverage difficulties and no e/o HE presently, discontinued as of 11/14, and will monitor for signs of HE development   - Ascites management:      - Spironolactone (11/11 - )    - Lasix (11/13 - )    - Paracentesis PRN    Headache  Patient with overnight headaches on 11/20.  Improved on oxycodone.  No photophobia or phonophobia noted on the visit.  No vision changes. No nausea/vomiting. Physical exam without neurological sensation or motoric loss, no meningismus, and no vision changes.    Suspect R lower back strain  -TTP paraspinals on 11/12, no focal spinal  tenderness   - lido patch, heat pad    Anxiety/Depression   - cont home lexapro, remeron.    Consultations This Hospital Stay   PHYSICAL THERAPY ADULT IP CONSULT  OCCUPATIONAL THERAPY ADULT IP CONSULT  CARE MANAGEMENT / SOCIAL WORK IP CONSULT  CHEMICAL DEPENDENCY IP CONSULT  GI PANCREATICOBILIARY ADULT IP CONSULT  GI HEPATOLOGY ADULT IP CONSULT  INTERNAL MEDICINE PROCEDURE TEAM ADULT IP CONSULT [facility]  NUTRITION SERVICES ADULT IP CONSULT  PHARMACY IP CONSULT  NUTRITION SERVICES ADULT IP CONSULT  VASCULAR ACCESS CARE ADULT IP CONSULT  VASCULAR ACCESS CARE ADULT IP CONSULT  INTERNAL MEDICINE PROCEDURE TEAM ADULT IP CONSULT [facility] - [facility]  PATIENT LEARNING CENTER IP CONSULT  VASCULAR ACCESS CARE ADULT IP CONSULT  VASCULAR ACCESS CARE ADULT IP CONSULT  VASCULAR ACCESS CARE ADULT IP CONSULT  VASCULAR ACCESS CARE ADULT IP CONSULT  VASCULAR ACCESS CARE ADULT IP CONSULT    Code Status   Prior    Time Spent on this Encounter   I, Cris Mariscal MD, personally saw the patient today and spent greater than 30 minutes discharging this patient.       Cris Mariscal MD  [facility] [address] [place] [id number]  Phone: [phone]  ______________________________________________________________________    Physical Exam   Vital Signs:                    Weight: 143 lbs 1.6 oz  Constitutional: walking through halls, sitting upright at edge of bed, NAD  HEENT: NG tube present, tracking appropriately, poor dentition  Cardiovascular: regular, without murmurs or gallops  Pulmonary/Chest: CTAB, no respiratory distress  GI: Soft, minimally tender no rebound tenderness or guarding   Skin: Skin is warm and dry  Neurological: Alert and oriented x4.        Primary Care Physician   VENICE ROSALES    Discharge Orders      Basic metabolic panel     Phosphorus     Adult Gastro Ref - Consult Only      Reason for your hospital stay    You were hospitalized  with acute pancreatitis with ascites. Your condition continued to improve and you are being discharged. Please follow up with your primary care provider within one month with labs to check your electrolytes. Follow up with hepatology.    Please continue to abstain from alcohol and check in to the chemical dependency program as planned. Keep up the hard work!     Your discharge unit is 7D. Please call this unit with any questions after discharge.    Thank you for allowing me to participate in your care.    Best wishes,  Cris Mariscal MD  Hospitalist     Activity    Your activity upon discharge: activity as tolerated     Adult [facility] Follow-up and recommended labs and tests    Follow up with primary care provider, VENICE ROSALES, within 1 month for hospital follow- up.  The following labs/tests are recommended: BMP while on diuretics.    Follow up with hepatology at the [facility].    Appointments on [place] and/or [facility] (with [facility] or [facility] provider or service). Call [phone] if you haven't heard regarding these appointments within 7 days of discharge.     Diet    Follow this diet upon discharge: Orders Placed This Encounter  High protein diet as tolerated       Significant Results and Procedures   Most Recent 3 CBC's:Recent Labs   Lab Test 11/27/21  0653 11/26/21  0513 11/25/21  0522   WBC 6.7 6.7 7.1   HGB 8.1* 7.8* 7.8*   MCV 99 97 99    252 244     Most Recent 3 BMP's:Recent Labs   Lab Test 12/02/21  0620 12/01/21  0559 11/30/21  0458 11/29/21  0539 11/27/21  0653 11/26/21  0513   NA  --   --   --  138 136 137   POTASSIUM 3.7 3.5 3.6 3.8 4.2 4.3   CHLORIDE  --   --   --  106 103 104   CO2  --   --   --  25 27 26   BUN  --   --   --  16 16 14   CR  --   --   --  0.76 0.73 0.67   ANIONGAP  --   --   --  7 6 7   STEFFANY  --   --   --  9.1 8.6 8.5   GLC  --   --   --  107* 161* 131*     Most Recent 2 LFT's:Recent Labs   Lab Test 11/22/21  0505 11/20/21  0619   AST 34 38   ALT 15 14   ALKPHOS 233*  249*   BILITOTAL 1.1 1.1   ,   Results for orders placed or performed during the hospital encounter of 11/10/21   POC US Abdomen Limited    Impression    Ascites noted in the LLQ less in the RLQ.    Willy Astorga D.O.  Internal Medicine, Hospitalist  [facility]  Pager: [phone]     XR Feeding Tube Placement    Addendum: 12/6/2021    I, PRATIK BRISCOE MD, attest that I was present for all critical  portions of the procedure and was immediately available to provide  guidance and assistance during the remainder of the procedure.    PRATIK BRISCOE MD         SYSTEM ID:  [id number]      Narrative    Feeding tube placement: 11/16/2021 3:39 PM    Comparison: CT chest/abdomen/pelvis 10/14/2021.    Indication: Postpyloric feeding tube placement.    Fluoroscopy time: 0.8 minutes    Technique: After injection of Xylocaine gel into the left nostril, a  feeding tube was advanced under fluoroscopic guidance.    Findings: Patient with a history of Fitz-en-Y gastric bypass surgery.  The feeding tube was advanced with the tip into the jejunum, distal to  the gastric pouch. A small amount of contrast was injected to  demonstrate placement within the jejunum. The feeding tube was secured  using a nasal bridle.       Impression    Impression: Uncomplicated feeding tube placement with tip in the  jejunum.    I have personally reviewed the examination and initial interpretation  and I agree with the findings.    PRATIK BRISCOE MD         SYSTEM ID:  [id number]   XR Abdomen Port 1 View    Narrative    Exam: XR ABDOMEN PORT 1 VIEWS, 11/18/2021 4:59 PM    Indication: NJ tube malposition?    Comparison: Same day placement, 11/8/2021 CT    Findings:   Feeding tube distal tip projects distal to the gastrojejunal  anastomosis likely within proximal jejunum. Nonobstructive bowel gas  pattern. Cholecystectomy clips. No portal venous gas or pneumatosis.      Impression    Impression: Feeding tube distal tip projects  distal to the  gastrojejunal anastomosis, likely within proximal jejunum.    I have personally reviewed the examination and initial interpretation  and I agree with the findings.    WILLIE POON MD         SYSTEM ID:  [id number]   POC US Guide for Paracentesis    Impression    POCUS Abdomen    Indication: Evaluate for ascites for safe site for paracentesis    Findings:  LLQ:Catheter tip was shown at peritoneum before entry and final position within fluid pocket of abdominal cavity.            Discharge Medications   Discharge Medication List as of 12/2/2021 11:00 AM      START taking these medications    Details   acetaminophen (TYLENOL) 325 MG tablet Take 3 tablets (975 mg) by mouth every 8 hours No more than 3000 mg tylenol daily from all sources, Historical      amylase-lipase-protease (CREON 24) 65346-37956 units CPEP per EC capsule Take 2 capsules by mouth 3 times daily (with meals), Disp-180 capsule, R-0, E-Prescribe      furosemide (LASIX) 40 MG tablet Take 1 tablet (40 mg) by mouth daily, Disp-30 tablet, R-0, E-Prescribe      hydrOXYzine (ATARAX) 25 MG tablet Take 1 tablet (25 mg) by mouth every 6 hours as needed for other (adjuvant pain), Disp-28 tablet, R-0, E-Prescribe      oxyCODONE (ROXICODONE) 5 MG tablet Take 1 tablet (5 mg) by mouth 3 times daily as needed for moderate to severe pain, Disp-5 tablet, R-0, Local Print      polyethylene glycol (MIRALAX) 17 GM/Dose powder Take 17 g by mouth daily for 7 days While on opioids to prevent constipation, Disp-116 g, R-0, E-Prescribe      spironolactone (ALDACTONE) 100 MG tablet 1 tablet (100 mg) by Oral or Feeding Tube route daily, Disp-30 tablet, R-0, E-Prescribe         CONTINUE these medications which have CHANGED    Details   methocarbamol (ROBAXIN) 500 MG tablet Take 1 tablet (500 mg) by mouth 3 times daily, Disp-35 tablet, R-0, E-PrescribeThree times daily for 7 days then two times daily for 7 days then resume at bedtime dosing         CONTINUE these  medications which have NOT CHANGED    Details   B Complex-C-Folic Acid (RENAL) 1 MG CAPS Take 1 capsule by mouth daily, Transitional      calcium carbonate 600 mg-vitamin D 400 units (CALTRATE) 600-400 MG-UNIT per tablet Take 1 tablet by mouth 2 times daily (with meals), Transitional      cyanocobalamin (CYANOCOBALAMIN) 100 MCG tablet 1 tablet (100 mcg) by Oral or Feeding Tube route daily, Transitional      diphenhydrAMINE (BENADRYL) 25 MG tablet Take 25 mg by mouth every 6 hours as needed for itching, Historical      emollient (VANICREAM) external cream Apply topically every 6 hours as needed for other (dry skin)Transitional      escitalopram (LEXAPRO) 10 MG tablet Take 1 tablet (10 mg) by mouth daily, Disp-30 tablet, R-0, E-Prescribe      folic acid (FOLVITE) 1 MG tablet Take 1 tablet (1 mg) by mouth daily, Disp-30 tablet, R-0, E-Prescribe      gabapentin (NEURONTIN) 100 MG capsule Take 1 capsule (100 mg) by mouth 3 times daily, Disp-90 capsule, R-0, E-Prescribe      Lidocaine (LIDOCARE) 4 % Patch Place 1 patch onto the skin every 24 hours To prevent lidocaine toxicity, patient should be patch free for 12 hrs daily.  Apply patch to lower backHistorical      melatonin 5 MG tablet Take 1 tablet (5 mg) by mouth At Bedtime, Transitional      mirtazapine (REMERON) 15 MG tablet Take 1 tablet (15 mg) by mouth At Bedtime, Transitional      ondansetron (ZOFRAN-ODT) 4 MG ODT tab Take 1 tablet (4 mg) by mouth every 6 hours as needed for nausea or vomiting, Transitional      pantoprazole (PROTONIX) 40 MG EC tablet Take 1 tablet (40 mg) by mouth every morning (before breakfast), Disp-30 tablet, R-0, E-Prescribe      rifaximin (XIFAXAN) 550 MG TABS tablet Take 1 tablet (550 mg) by mouth 2 times daily, Disp-60 tablet, R-0, E-Prescribe      simethicone (MYLICON) 80 MG chewable tablet Take 1 tablet (80 mg) by mouth every 6 hours as needed for cramping, Transitional      thiamine (B-1) 100 MG tablet Take 1 tablet (100 mg) by mouth  "daily, Disp-30 tablet, R-0, E-Prescribe      albuterol (PROAIR HFA/PROVENTIL HFA/VENTOLIN HFA) 108 (90 Base) MCG/ACT inhaler Inhale 2 puffs into the lungs every 4 hours as needed for shortness of breath / dyspnea or wheezing, Disp-8.5 g, R-1, E-PrescribePharmacy may dispense brand covered by insurance (Proair, or proventil or ventolin or generic albuterol inhaler)         STOP taking these medications       piperacillin-tazobactam (ZOSYN) 4-0.5 GM vial Comments:   Reason for Stopping:             Allergies   Allergies   Allergen Reactions     Nsaids      Gastric bypass     Trazodone      In a \"hazy\" for the whole next day     "

## 2022-01-22 ENCOUNTER — HEALTH MAINTENANCE LETTER (OUTPATIENT)
Age: 47
End: 2022-01-22

## 2022-05-03 NOTE — TELEPHONE ENCOUNTER
Called patient, relayed message & patient verbalized understanding. Recommended contacting Dr. Santiago or another provider for ADHD refills.     Patient states someone else used her [facility] MyChart. She states she has been dealing with this fraud case for 6 months and is $4000 in debt due to it.   She states she saw Dr. Santiago for her foot and was surprised two Adderall scripts were filled the same day.     Patient became tearful. She would like a call from PCP on Monday. Informed that PCP had a full schedule on Monday and was clear about instructions.      Zaida Ramirez RN      [facility]    Call Back [phone]

## 2022-05-14 ENCOUNTER — HEALTH MAINTENANCE LETTER (OUTPATIENT)
Age: 47
End: 2022-05-14

## 2022-09-04 ENCOUNTER — HEALTH MAINTENANCE LETTER (OUTPATIENT)
Age: 47
End: 2022-09-04

## 2023-01-15 ENCOUNTER — HEALTH MAINTENANCE LETTER (OUTPATIENT)
Age: 48
End: 2023-01-15

## 2023-04-29 ENCOUNTER — HEALTH MAINTENANCE LETTER (OUTPATIENT)
Age: 48
End: 2023-04-29

## 2023-06-03 ENCOUNTER — HEALTH MAINTENANCE LETTER (OUTPATIENT)
Age: 48
End: 2023-06-03

## 2023-09-30 ENCOUNTER — HEALTH MAINTENANCE LETTER (OUTPATIENT)
Age: 48
End: 2023-09-30

## 2024-02-17 ENCOUNTER — HEALTH MAINTENANCE LETTER (OUTPATIENT)
Age: 49
End: 2024-02-17

## 2024-07-06 ENCOUNTER — HEALTH MAINTENANCE LETTER (OUTPATIENT)
Age: 49
End: 2024-07-06

## 2024-09-18 NOTE — TELEPHONE ENCOUNTER
Received disability forms   I have seen her only one time in October  We can send this one date for her if she wants us to but can not complete any disability forms    [As Noted in HPI] : as noted in HPI [Negative] : Heme/Lymph

## 2025-04-07 NOTE — PLAN OF CARE
No Physical Therapy Discharge Summary    Reason for therapy discharge:    Discharged to home/CD.    Progress towards therapy goal(s). See goals on Care Plan in [facility] electronic health record for goal details.  Goals met    Therapy recommendation(s):    No further therapy is recommended. Pt discharged from PT and edema. Pt demos IND mobility and IND management of comperm compression stockings for LE edema maintenance.        complains of pain/discomfort

## (undated) RX ORDER — FENTANYL CITRATE 50 UG/ML
INJECTION, SOLUTION INTRAMUSCULAR; INTRAVENOUS
Status: DISPENSED
Start: 2021-10-22

## (undated) RX ORDER — LIDOCAINE HYDROCHLORIDE 10 MG/ML
INJECTION, SOLUTION EPIDURAL; INFILTRATION; INTRACAUDAL; PERINEURAL
Status: DISPENSED
Start: 2021-11-03

## (undated) RX ORDER — LIDOCAINE HYDROCHLORIDE 20 MG/ML
SOLUTION OROPHARYNGEAL
Status: DISPENSED
Start: 2021-11-16

## (undated) RX ORDER — LIDOCAINE HYDROCHLORIDE 10 MG/ML
INJECTION, SOLUTION EPIDURAL; INFILTRATION; INTRACAUDAL; PERINEURAL
Status: DISPENSED
Start: 2021-10-22

## (undated) RX ORDER — HEPARIN SODIUM 1000 [USP'U]/ML
INJECTION, SOLUTION INTRAVENOUS; SUBCUTANEOUS
Status: DISPENSED
Start: 2021-10-22

## (undated) RX ORDER — LIDOCAINE HYDROCHLORIDE 20 MG/ML
SOLUTION OROPHARYNGEAL
Status: DISPENSED
Start: 2021-10-05